# Patient Record
Sex: FEMALE | Race: BLACK OR AFRICAN AMERICAN | NOT HISPANIC OR LATINO | Employment: OTHER | URBAN - METROPOLITAN AREA
[De-identification: names, ages, dates, MRNs, and addresses within clinical notes are randomized per-mention and may not be internally consistent; named-entity substitution may affect disease eponyms.]

---

## 2017-01-19 ENCOUNTER — TELEPHONE (OUTPATIENT)
Dept: HEMATOLOGY/ONCOLOGY | Facility: CLINIC | Age: 52
End: 2017-01-19

## 2017-02-08 ENCOUNTER — LAB VISIT (OUTPATIENT)
Dept: LAB | Facility: OTHER | Age: 52
End: 2017-02-08
Attending: INTERNAL MEDICINE
Payer: COMMERCIAL

## 2017-02-08 DIAGNOSIS — D63.1 ANEMIA IN CHRONIC RENAL DISEASE: ICD-10-CM

## 2017-02-08 DIAGNOSIS — C90.00 MULTIPLE MYELOMA: ICD-10-CM

## 2017-02-08 DIAGNOSIS — N18.9 ANEMIA IN CHRONIC RENAL DISEASE: ICD-10-CM

## 2017-02-08 DIAGNOSIS — C90.01 MULTIPLE MYELOMA IN REMISSION: ICD-10-CM

## 2017-02-08 LAB
ALBUMIN SERPL BCP-MCNC: 3.8 G/DL
ALP SERPL-CCNC: 50 U/L
ALT SERPL W/O P-5'-P-CCNC: 12 U/L
ANION GAP SERPL CALC-SCNC: 13 MMOL/L
AST SERPL-CCNC: 13 U/L
B2 MICROGLOB SERPL-MCNC: 12.7 UG/ML
BASOPHILS # BLD AUTO: 0.03 K/UL
BASOPHILS NFR BLD: 0.6 %
BILIRUB SERPL-MCNC: 0.5 MG/DL
BUN SERPL-MCNC: 73 MG/DL
CALCIUM SERPL-MCNC: 9.5 MG/DL
CHLORIDE SERPL-SCNC: 106 MMOL/L
CO2 SERPL-SCNC: 17 MMOL/L
CREAT SERPL-MCNC: 5.6 MG/DL
DIFFERENTIAL METHOD: ABNORMAL
EOSINOPHIL # BLD AUTO: 0.1 K/UL
EOSINOPHIL NFR BLD: 1.7 %
ERYTHROCYTE [DISTWIDTH] IN BLOOD BY AUTOMATED COUNT: 16.6 %
EST. GFR  (AFRICAN AMERICAN): 9 ML/MIN/1.73 M^2
EST. GFR  (NON AFRICAN AMERICAN): 8 ML/MIN/1.73 M^2
FERRITIN SERPL-MCNC: 825 NG/ML
GLUCOSE SERPL-MCNC: 77 MG/DL
HCT VFR BLD AUTO: 24.2 %
HGB BLD-MCNC: 7.8 G/DL
IRON SERPL-MCNC: 47 UG/DL
LDH SERPL L TO P-CCNC: 133 U/L
LYMPHOCYTES # BLD AUTO: 0.8 K/UL
LYMPHOCYTES NFR BLD: 14.4 %
MCH RBC QN AUTO: 26.4 PG
MCHC RBC AUTO-ENTMCNC: 32.2 %
MCV RBC AUTO: 82 FL
MONOCYTES # BLD AUTO: 0.5 K/UL
MONOCYTES NFR BLD: 8.3 %
NEUTROPHILS # BLD AUTO: 4.1 K/UL
NEUTROPHILS NFR BLD: 74.8 %
PLATELET # BLD AUTO: 150 K/UL
PMV BLD AUTO: 9.1 FL
POTASSIUM SERPL-SCNC: 5.1 MMOL/L
PROT SERPL-MCNC: 7 G/DL
RBC # BLD AUTO: 2.95 M/UL
SATURATED IRON: 17 %
SODIUM SERPL-SCNC: 136 MMOL/L
TOTAL IRON BINDING CAPACITY: 272 UG/DL
TRANSFERRIN SERPL-MCNC: 184 MG/DL
WBC # BLD AUTO: 5.42 K/UL

## 2017-02-08 PROCEDURE — 36415 COLL VENOUS BLD VENIPUNCTURE: CPT

## 2017-02-08 PROCEDURE — 80053 COMPREHEN METABOLIC PANEL: CPT

## 2017-02-08 PROCEDURE — 84165 PROTEIN E-PHORESIS SERUM: CPT

## 2017-02-08 PROCEDURE — 85025 COMPLETE CBC W/AUTO DIFF WBC: CPT

## 2017-02-08 PROCEDURE — 82728 ASSAY OF FERRITIN: CPT

## 2017-02-08 PROCEDURE — 83520 IMMUNOASSAY QUANT NOS NONAB: CPT | Mod: 59

## 2017-02-08 PROCEDURE — 83615 LACTATE (LD) (LDH) ENZYME: CPT

## 2017-02-08 PROCEDURE — 82232 ASSAY OF BETA-2 PROTEIN: CPT

## 2017-02-08 PROCEDURE — 84165 PROTEIN E-PHORESIS SERUM: CPT | Mod: 26,,, | Performed by: PATHOLOGY

## 2017-02-08 PROCEDURE — 83540 ASSAY OF IRON: CPT

## 2017-02-09 LAB
ALBUMIN SERPL ELPH-MCNC: 4.05 G/DL
ALPHA1 GLOB SERPL ELPH-MCNC: 0.26 G/DL
ALPHA2 GLOB SERPL ELPH-MCNC: 0.76 G/DL
B-GLOBULIN SERPL ELPH-MCNC: 0.73 G/DL
GAMMA GLOB SERPL ELPH-MCNC: 0.8 G/DL
KAPPA LC SER QL IA: 12.07 MG/DL
KAPPA LC/LAMBDA SER IA: 1.36
LAMBDA LC SER QL IA: 8.85 MG/DL
PATHOLOGIST INTERPRETATION SPE: NORMAL
PROT SERPL-MCNC: 6.6 G/DL

## 2017-02-12 DIAGNOSIS — F32.A DEPRESSION, UNSPECIFIED DEPRESSION TYPE: ICD-10-CM

## 2017-02-12 RX ORDER — ESCITALOPRAM OXALATE 20 MG/1
TABLET ORAL
Qty: 90 TABLET | Refills: 0 | Status: SHIPPED | OUTPATIENT
Start: 2017-02-12 | End: 2018-02-19 | Stop reason: SDUPTHER

## 2017-05-05 ENCOUNTER — TELEPHONE (OUTPATIENT)
Dept: HEMATOLOGY/ONCOLOGY | Facility: CLINIC | Age: 52
End: 2017-05-05

## 2017-05-05 DIAGNOSIS — N08 MYELOMA KIDNEY: Primary | ICD-10-CM

## 2017-05-05 DIAGNOSIS — C90.00 MYELOMA: ICD-10-CM

## 2017-05-05 DIAGNOSIS — C90.00 MYELOMA KIDNEY: Primary | ICD-10-CM

## 2017-05-17 ENCOUNTER — LAB VISIT (OUTPATIENT)
Dept: LAB | Facility: OTHER | Age: 52
End: 2017-05-17
Attending: INTERNAL MEDICINE
Payer: COMMERCIAL

## 2017-05-17 DIAGNOSIS — C90.00 MYELOMA: ICD-10-CM

## 2017-05-17 LAB
ALBUMIN SERPL BCP-MCNC: 3.9 G/DL
ALP SERPL-CCNC: 79 U/L
ALT SERPL W/O P-5'-P-CCNC: 13 U/L
ANION GAP SERPL CALC-SCNC: 14 MMOL/L
AST SERPL-CCNC: 21 U/L
B2 MICROGLOB SERPL-MCNC: 12 UG/ML
BASOPHILS # BLD AUTO: 0.02 K/UL
BASOPHILS NFR BLD: 0.5 %
BILIRUB SERPL-MCNC: 0.3 MG/DL
BUN SERPL-MCNC: 76 MG/DL
CALCIUM SERPL-MCNC: 9.3 MG/DL
CHLORIDE SERPL-SCNC: 113 MMOL/L
CO2 SERPL-SCNC: 13 MMOL/L
CREAT SERPL-MCNC: 5.7 MG/DL
DIFFERENTIAL METHOD: ABNORMAL
EOSINOPHIL # BLD AUTO: 0.1 K/UL
EOSINOPHIL NFR BLD: 2.8 %
ERYTHROCYTE [DISTWIDTH] IN BLOOD BY AUTOMATED COUNT: 18 %
EST. GFR  (AFRICAN AMERICAN): 9 ML/MIN/1.73 M^2
EST. GFR  (NON AFRICAN AMERICAN): 8 ML/MIN/1.73 M^2
GLUCOSE SERPL-MCNC: 104 MG/DL
HCT VFR BLD AUTO: 22.1 %
HGB BLD-MCNC: 7.1 G/DL
LYMPHOCYTES # BLD AUTO: 0.8 K/UL
LYMPHOCYTES NFR BLD: 19.7 %
MCH RBC QN AUTO: 26 PG
MCHC RBC AUTO-ENTMCNC: 32.1 %
MCV RBC AUTO: 81 FL
MONOCYTES # BLD AUTO: 0.3 K/UL
MONOCYTES NFR BLD: 7.4 %
NEUTROPHILS # BLD AUTO: 2.7 K/UL
NEUTROPHILS NFR BLD: 69.3 %
PLATELET # BLD AUTO: 164 K/UL
PMV BLD AUTO: 8.3 FL
POTASSIUM SERPL-SCNC: 5.1 MMOL/L
PROT SERPL-MCNC: 7.1 G/DL
RBC # BLD AUTO: 2.73 M/UL
SODIUM SERPL-SCNC: 140 MMOL/L
WBC # BLD AUTO: 3.9 K/UL

## 2017-05-17 PROCEDURE — 85025 COMPLETE CBC W/AUTO DIFF WBC: CPT | Mod: TXP

## 2017-05-17 PROCEDURE — 84165 PROTEIN E-PHORESIS SERUM: CPT | Mod: NTX

## 2017-05-17 PROCEDURE — 82232 ASSAY OF BETA-2 PROTEIN: CPT | Mod: TXP

## 2017-05-17 PROCEDURE — 84165 PROTEIN E-PHORESIS SERUM: CPT | Mod: 26,NTX,, | Performed by: PATHOLOGY

## 2017-05-17 PROCEDURE — 36415 COLL VENOUS BLD VENIPUNCTURE: CPT | Mod: TXP

## 2017-05-17 PROCEDURE — 80053 COMPREHEN METABOLIC PANEL: CPT | Mod: TXP

## 2017-05-18 LAB
ALBUMIN SERPL ELPH-MCNC: 4.22 G/DL
ALPHA1 GLOB SERPL ELPH-MCNC: 0.27 G/DL
ALPHA2 GLOB SERPL ELPH-MCNC: 0.7 G/DL
B-GLOBULIN SERPL ELPH-MCNC: 0.69 G/DL
GAMMA GLOB SERPL ELPH-MCNC: 0.82 G/DL
PATHOLOGIST INTERPRETATION SPE: NORMAL
PROT SERPL-MCNC: 6.7 G/DL

## 2017-05-22 ENCOUNTER — TELEPHONE (OUTPATIENT)
Dept: HEMATOLOGY/ONCOLOGY | Facility: CLINIC | Age: 52
End: 2017-05-22

## 2017-05-22 NOTE — TELEPHONE ENCOUNTER
Pt called to reschedule appointment until Wednesday (5/24). Pt also wanted  to know that she is also undergoing dialysis as well. Will discuss on on next appt  -[facility]

## 2017-05-23 DIAGNOSIS — F32.A DEPRESSION, UNSPECIFIED DEPRESSION TYPE: ICD-10-CM

## 2017-05-24 RX ORDER — ESCITALOPRAM OXALATE 20 MG/1
TABLET ORAL
Qty: 90 TABLET | Refills: 0 | Status: SHIPPED | OUTPATIENT
Start: 2017-05-24 | End: 2017-06-20 | Stop reason: SDUPTHER

## 2017-05-24 NOTE — TELEPHONE ENCOUNTER
Pt called early this morning to cancel appt today, states she's not feeling well.  Wondering about her lab results though.  Discussed with  then attempted to call pt back, had to leave voicemail.

## 2017-05-24 NOTE — TELEPHONE ENCOUNTER
Pt returned call, reviewed lab results with her.  We will fax labs to her nephrologist, , & she will follow up with him regarding anemia.  States she had Procrit on 5/19 & 5/22. Appt r/s until June.

## 2017-06-08 DIAGNOSIS — Z76.82 ORGAN TRANSPLANT CANDIDATE: Primary | ICD-10-CM

## 2017-06-20 ENCOUNTER — OFFICE VISIT (OUTPATIENT)
Dept: HEMATOLOGY/ONCOLOGY | Facility: CLINIC | Age: 52
End: 2017-06-20
Payer: COMMERCIAL

## 2017-06-20 ENCOUNTER — LAB VISIT (OUTPATIENT)
Dept: LAB | Facility: HOSPITAL | Age: 52
End: 2017-06-20
Payer: COMMERCIAL

## 2017-06-20 VITALS
WEIGHT: 132.06 LBS | SYSTOLIC BLOOD PRESSURE: 162 MMHG | BODY MASS INDEX: 18.91 KG/M2 | TEMPERATURE: 98 F | DIASTOLIC BLOOD PRESSURE: 80 MMHG | HEIGHT: 70 IN | HEART RATE: 69 BPM | OXYGEN SATURATION: 99 %

## 2017-06-20 DIAGNOSIS — D63.1 ANEMIA IN CHRONIC KIDNEY DISEASE(285.21): ICD-10-CM

## 2017-06-20 DIAGNOSIS — C90.01 MULTIPLE MYELOMA IN REMISSION: Primary | ICD-10-CM

## 2017-06-20 DIAGNOSIS — N18.6 ESRD (END STAGE RENAL DISEASE): ICD-10-CM

## 2017-06-20 DIAGNOSIS — Z76.82 AWAITING ORGAN TRANSPLANT STATUS: ICD-10-CM

## 2017-06-20 DIAGNOSIS — N18.9 ANEMIA IN CHRONIC KIDNEY DISEASE(285.21): ICD-10-CM

## 2017-06-20 DIAGNOSIS — N18.6 ESRD (END STAGE RENAL DISEASE): Primary | ICD-10-CM

## 2017-06-20 PROCEDURE — 99214 OFFICE O/P EST MOD 30 MIN: CPT | Mod: S$GLB,,, | Performed by: INTERNAL MEDICINE

## 2017-06-20 PROCEDURE — 99999 PR PBB SHADOW E&M-EST. PATIENT-LVL III: CPT | Mod: PBBFAC,,, | Performed by: INTERNAL MEDICINE

## 2017-06-20 NOTE — PROGRESS NOTES
Subjective:       Patient ID: Jennifer Booth is a 52 y.o. female.    Chief Complaint: Follow-up    HPI Diagnosis: MM IPSS Stage III  in remission     LOST TO FOLLOW-UP   LAST VISIT 12/2016   52-year-old woman with MM in remission presents today for f/u.   She is also followed by Nephrology team at [facility].   Previously followed at [facility].   She has not had the resources to continue f/u at [facility].   No fevers, night sweats or wt loss .    She continues to smoke occasionally   .   Today, pt reports mild fatigue  No CP/lightheadedness  Appetite and weight stable    She is followed by Nephrology for ESRD  She was initially diagnosed with advanced kidney disease secondary to multiple myeloma & HTN.   She was diagnosed with  AK I from MM in 2010 that required initiation of dialysis.   She then underwent chemotherapy for her myeloma, and stopped dialysis in 2013.    Kidney biopsy earlier this yr due to worsening  kidney function reveals glomerulosclerosis and no evidence of MM    Pt undergoing Procrit therapy and IV Fe under direction of Nephrology  Pt missed Procrit therapy last 2mos    She is also followed by kidney transplant team at [facility] [place]  Patient met selection criteria for kidney transplant related to ESRD due to Hypertensive Nephrosclerosis    Cr level trending  Peaked 5.8mg/dl  She is undergoing HD 3x week since 5/19/17        PD planned in near future        CBC reveals trending Hb 7.8 g/dl+ to 7.1 g/dL  SPEP 5/17/2017 remains normal  UPEP-no monoclonal peaks    Patient met selection criteria for kidney transplant related to ESRD due to Hypertensive Nephrosclerosis    Onc hx:  She was diagnosed with kappa free light chain disease, 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 She originally presented with acute renal failure requiring HD .She has completed bortezomib/dexamethasone/Revlimid 6 cycles on 04/13/2012 for the multiple myeloma kappa light chain disease, IPSS stage III. She underwent  "bortezomib maintenance therapy three weeks on, one week off (05/15, 05/22 and 05/29) with her last cycle received on 05/29/2012. She is followed at [facility] myeloma [place] where she was evaluated for an auto stem cell transplant . It was decided not to proceed with transplant was originally due to drug reaction.Pt was diagnosed with DRESS syndrome during hospitalization and then [facility] team elected not to proceed proceed with auto SCT. Velcade maintenance was discontinued due to side effects.       Tx: Velcade/Dex/Revlimid x 6 cycles 4/13/12   Velcade maintenance 3wks on, 1wk off dc'd 5/29/12 sec to adv SE          Review of Systems   Constitutional: Negative for appetite change, chills and fatigue.   HENT: Negative for congestion, hearing loss and nosebleeds.    Eyes: Negative for visual disturbance.   Respiratory: Negative for apnea, cough, chest tightness, shortness of breath and wheezing.    Cardiovascular: Negative for chest pain, palpitations and leg swelling.   Gastrointestinal: Negative for abdominal distention, abdominal pain, blood in stool, constipation, diarrhea, nausea and vomiting.   Genitourinary: Negative for difficulty urinating, dysuria, flank pain, frequency, hematuria and urgency.   Musculoskeletal: Negative for arthralgias, back pain, gait problem, joint swelling and neck pain.   Skin: Negative for rash.        No petechiae, ecchymoses   Neurological: Negative for dizziness, tremors, seizures, syncope, speech difficulty, light-headedness, numbness and headaches.   Hematological: Negative for adenopathy. Does not bruise/bleed easily.       Objective:       Vitals:    06/20/17 1451   BP: (!) 162/80   BP Location: Right arm   Patient Position: Sitting   BP Method: Manual   Pulse: 69   Temp: 98.4 °F (36.9 °C)   TempSrc: Oral   SpO2: 99%   Weight: 59.9 kg (132 lb 0.9 oz)   Height: 5' 10" (1.778 m)       Physical Exam   Constitutional: She is oriented to person, place, and time. She appears " well-developed and well-nourished.   HENT:   Head: Normocephalic.   Mouth/Throat: Oropharynx is clear and moist. No oropharyngeal exudate.   Eyes: Conjunctivae and lids are normal. Pupils are equal, round, and reactive to light. No scleral icterus.   Neck: Normal range of motion. Neck supple. No thyromegaly present.   Cardiovascular: Normal rate, regular rhythm and normal heart sounds.    No murmur heard.  Pulmonary/Chest: Breath sounds normal. She has no wheezes. She has no rales.   Abdominal: Soft. Bowel sounds are normal. She exhibits no distension and no mass. There is no hepatosplenomegaly. There is no tenderness. There is no rebound and no guarding.   Musculoskeletal: Normal range of motion. She exhibits no edema or tenderness.   Lymphadenopathy:     She has no cervical adenopathy.     She has no axillary adenopathy.        Right: No supraclavicular adenopathy present.        Left: No supraclavicular adenopathy present.   Neurological: She is alert and oriented to person, place, and time. No cranial nerve deficit. Coordination normal.   Skin: Skin is warm and dry. No ecchymosis, no petechiae and no rash noted. No erythema.   Psychiatric: She has a normal mood and affect.             Bone survey 2015   1. Small lucent foci in the right femoral neck. Given this patient's history of multiple myeloma, these findings are concerning for sequela of that disease  2. Otherwise degenerative changes of the cervical spine and lower lumbar spine are identified.      Results for JOHN BEST (MRN [id number]) as of 6/22/2016 17:00   Ref. Range 4/15/2016 15:42 6/17/2016 08:49   [place] Free Light Chains Latest Ref Range: 0.33 - 1.94 mg/dL 6.79 (H) 8.42 (H)   Lambda Free Light Chains Latest Ref Range: 0.57 - 2.63 mg/dL 4.98 (H) 5.77 (H)   Kappa/Lambda FLC Ratio Latest Ref Range: 0.26 - 1.60  1.36 1.46          Lab Results   Component Value Date    WBC 3.90 05/17/2017    HGB 7.1 (L) 05/17/2017    HCT 22.1 (L) 05/17/2017    MCV  81 (L) 05/17/2017     05/17/2017                     SPEP 5/17/2017 - nl-  Patient met selection criteria for kidney transplant related to ESRD due to Hypertensive Nephrosclerosis    Results for JENNIFER BEST (MRN [id number]) as of 6/20/2017 15:11   Ref. Range 2/8/2017 15:44 5/17/2017 12:49   Beta-2 Microglobulin Latest Ref Range: 0.0 - 2.5 ug/mL 12.7 (H) 12.0 (H)       Lab Results   Component Value Date    WBC 3.90 05/17/2017    HGB 7.1 (L) 05/17/2017    HCT 22.1 (L) 05/17/2017    MCV 81 (L) 05/17/2017     05/17/2017       Results for JENNIFER BEST (MRN [id number]) as of 6/20/2017 15:11   Ref. Range 5/17/2017 12:49   Sodium Latest Ref Range: 136 - 145 mmol/L 140   Potassium Latest Ref Range: 3.5 - 5.1 mmol/L 5.1   Chloride Latest Ref Range: 95 - 110 mmol/L 113 (H)   CO2 Latest Ref Range: 23 - 29 mmol/L 13 (L)   Anion Gap Latest Ref Range: 8 - 16 mmol/L 14   BUN, Bld Latest Ref Range: 6 - 20 mg/dL 76 (H)   Creatinine Latest Ref Range: 0.5 - 1.4 mg/dL 5.7 (H)   eGFR if non African American Latest Ref Range: >60 mL/min/1.73 m^2 8 (A)   eGFR if African American Latest Ref Range: >60 mL/min/1.73 m^2 9 (A)   Glucose Latest Ref Range: 70 - 110 mg/dL 104   Calcium Latest Ref Range: 8.7 - 10.5 mg/dL 9.3   Alkaline Phosphatase Latest Ref Range: 55 - 135 U/L 79   Total Protein Latest Ref Range: 6.0 - 8.4 g/dL 7.1   Protein, Serum Latest Ref Range: 6.0 - 8.4 g/dL 6.7   Albumin Latest Ref Range: 3.5 - 5.2 g/dL 3.9   Total Bilirubin Latest Ref Range: 0.1 - 1.0 mg/dL 0.3   AST Latest Ref Range: 10 - 40 U/L 21   ALT Latest Ref Range: 10 - 44 U/L 13       Assessment:       1. Multiple myeloma in remission    2. ESRD (end stage renal disease)    3. Anemia in chronic kidney disease        Plan:   Jennifer was seen today for follow-up.    Diagnoses and associated orders for this visit:    Multiple myeloma in remission  bmbx UAMS 2013-neg  SPEP remains normal 5/17/2017  FLCR normal   Urine studies- no monoclonal peaks  Bone  survey 2016 no new findings  Kidney bx neg for myeloma involvement        ESRD  Followed by Nephrology  S/p Kidney biopsy earlier this yr due to worsening  kidney function reveals glomerulosclerosis and no evidence of MM  Followed by Renal Transplant team at [facility] - on cadaveric wait list     Anemia in chronic renal disease  Hb trending 7.1g/dl+ range  SHERMAN per Nephrology ( pt missed SHERMAN inj)  Intermittent IV iron treatments per Nephrology        F/u   2mo with cbc,cmp, SPEP, FLC, B-2 microglobulin       Cc: Micha Nunez Jr., MD

## 2017-07-13 ENCOUNTER — LAB VISIT (OUTPATIENT)
Dept: LAB | Facility: HOSPITAL | Age: 52
End: 2017-07-13
Payer: COMMERCIAL

## 2017-07-13 DIAGNOSIS — Z76.82 AWAITING ORGAN TRANSPLANT STATUS: ICD-10-CM

## 2017-07-31 ENCOUNTER — PATIENT MESSAGE (OUTPATIENT)
Dept: SURGERY | Facility: OTHER | Age: 52
End: 2017-07-31

## 2017-07-31 ENCOUNTER — HOSPITAL ENCOUNTER (OUTPATIENT)
Dept: PREADMISSION TESTING | Facility: OTHER | Age: 52
Discharge: HOME | End: 2017-07-31
Attending: SPECIALIST
Payer: COMMERCIAL

## 2017-07-31 ENCOUNTER — ANESTHESIA EVENT (OUTPATIENT)
Dept: SURGERY | Facility: OTHER | Age: 52
End: 2017-07-31
Payer: COMMERCIAL

## 2017-07-31 VITALS
DIASTOLIC BLOOD PRESSURE: 87 MMHG | BODY MASS INDEX: 20 KG/M2 | SYSTOLIC BLOOD PRESSURE: 121 MMHG | TEMPERATURE: 99 F | OXYGEN SATURATION: 99 % | WEIGHT: 132 LBS | HEIGHT: 68 IN | HEART RATE: 72 BPM

## 2017-07-31 DIAGNOSIS — Z76.82 ORGAN TRANSPLANT CANDIDATE: ICD-10-CM

## 2017-07-31 LAB
ANION GAP SERPL CALC-SCNC: 15 MMOL/L
BUN SERPL-MCNC: 31 MG/DL
CALCIUM SERPL-MCNC: 9 MG/DL
CHLORIDE SERPL-SCNC: 92 MMOL/L
CO2 SERPL-SCNC: 22 MMOL/L
CREAT SERPL-MCNC: 4.6 MG/DL
EST. GFR  (AFRICAN AMERICAN): 12 ML/MIN/1.73 M^2
EST. GFR  (NON AFRICAN AMERICAN): 10 ML/MIN/1.73 M^2
GLUCOSE SERPL-MCNC: 73 MG/DL
HCT VFR BLD AUTO: 35.9 %
HGB BLD-MCNC: 12.2 G/DL
HPRA INTERPRETATION: NORMAL
POTASSIUM SERPL-SCNC: 4.4 MMOL/L
SODIUM SERPL-SCNC: 129 MMOL/L

## 2017-07-31 PROCEDURE — 36415 COLL VENOUS BLD VENIPUNCTURE: CPT | Mod: TXP

## 2017-07-31 PROCEDURE — 85018 HEMOGLOBIN: CPT | Mod: TXP

## 2017-07-31 PROCEDURE — 86832 HLA CLASS I HIGH DEFIN QUAL: CPT | Mod: PO,TXP

## 2017-07-31 PROCEDURE — 85014 HEMATOCRIT: CPT | Mod: TXP

## 2017-07-31 PROCEDURE — 86833 HLA CLASS II HIGH DEFIN QUAL: CPT | Mod: PO,TXP

## 2017-07-31 PROCEDURE — 80048 BASIC METABOLIC PNL TOTAL CA: CPT | Mod: TXP

## 2017-07-31 RX ORDER — MIDAZOLAM HYDROCHLORIDE 5 MG/ML
4 INJECTION INTRAMUSCULAR; INTRAVENOUS
Status: CANCELLED | OUTPATIENT
Start: 2017-07-31

## 2017-07-31 RX ORDER — LIDOCAINE HYDROCHLORIDE 10 MG/ML
1 INJECTION, SOLUTION EPIDURAL; INFILTRATION; INTRACAUDAL; PERINEURAL ONCE
Status: CANCELLED | OUTPATIENT
Start: 2017-07-31 | End: 2017-07-31

## 2017-07-31 RX ORDER — OXYCODONE HYDROCHLORIDE 5 MG/1
5 TABLET ORAL ONCE AS NEEDED
Status: CANCELLED | OUTPATIENT
Start: 2017-07-31 | End: 2017-07-31

## 2017-07-31 RX ORDER — SODIUM CHLORIDE, SODIUM LACTATE, POTASSIUM CHLORIDE, CALCIUM CHLORIDE 600; 310; 30; 20 MG/100ML; MG/100ML; MG/100ML; MG/100ML
INJECTION, SOLUTION INTRAVENOUS CONTINUOUS
Status: CANCELLED | OUTPATIENT
Start: 2017-07-31

## 2017-07-31 RX ORDER — FAMOTIDINE 20 MG/1
20 TABLET, FILM COATED ORAL
Status: CANCELLED | OUTPATIENT
Start: 2017-07-31 | End: 2017-07-31

## 2017-07-31 NOTE — ANESTHESIA PREPROCEDURE EVALUATION
07/31/2017  Jennifer Booth is a 52 y.o., female.    Anesthesia Evaluation    I have reviewed the Patient Summary Reports.    I have reviewed the Nursing Notes.   I have reviewed the Medications.     Review of Systems  Anesthesia Hx:  No problems with previous Anesthesia  Denies Family Hx of Anesthesia complications.   Denies Personal Hx of Anesthesia complications.   Social:  Smoker, Social Alcohol Use 1/2 -1 ppd  occ marijuana  Wine with dinner   Hematology/Oncology:         -- Anemia: Hematology Comments: anemia Oncology Comments: Multiple myeloma dx'ed  Dec 2011, followed by oncologist  Chemo, last time over a year ago, considered to be in remission     EENT/Dental:EENT/Dental Normal   Cardiovascular:   Exercise tolerance: good Hypertension, well controlled htn not optimized    Pulmonary:   Recent URI, unresolved    Renal/:   Chronic Renal Disease, ESRD ESRD as result of light-chain reaction (result of multiple myeloma) dialyzed from Dec 2011- Aug 2012  Resumed hemodialysis 3 mos ago, last dialyzed Wednesday, August 2, 2017   Hepatic/GI:   GERD, well controlled    Endocrine:  Endocrine Normal    Psych:   Psychiatric History depression         Physical Exam  General:  Well nourished    Airway/Jaw/Neck:  Airway Findings: Mouth Opening: Normal Mallampati: I      Dental:  Dental Findings: molar caps, In tact              Anesthesia Plan  Type of Anesthesia, risks & benefits discussed:  Anesthesia Type:  general  Patient's Preference:   Intra-op Monitoring Plan:   Intra-op Monitoring Plan Comments:   Post Op Pain Control Plan:   Post Op Pain Control Plan Comments:   Induction:   IV  Beta Blocker:         Informed Consent: Patient understands risks and agrees with Anesthesia plan.  Questions answered. Anesthesia consent signed with patient.  ASA Score: 3     Day of Surgery Review of History & Physical:    H&P  update referred to the surgeon.     Anesthesia Plan Notes: H/H and BMP        Ready For Surgery From Anesthesia Perspective.     Patient to see primary care (Dr. Nunez) on Thursday for routine visit.  She will have her URI assessed (not getting better in the 2 weeks, having night sweats).  Pt to call on Thursday to report outcome of visit.  May postpone.

## 2017-07-31 NOTE — DISCHARGE INSTRUCTIONS
PRE-ADMIT TESTING -  [phone]    [address]  [facility]        OUTPATIENT SURGERY UNIT - [phone]    Your surgery has been scheduled at [facility]. We are pleased to have the opportunity to serve you. For Further Information please call [phone].    On the day of surgery please report to the Information Desk on the 1st floor.    · CONTACT YOUR PHYSICIAN'S OFFICE THE DAY PRIOR TO YOUR SURGERY TO OBTAIN YOUR ARRIVAL TIME.     · The evening before surgery do not eat anything after 9 p.m. ( this includes hard candy, chewing gum and mints).  You may only have GATORADE, POWERADE AND WATER  from 9 p.m. until you leave your home.   DO NOT DRINK ANY LIQUIDS ON THE WAY TO THE HOSPITAL.      SPECIAL MEDICATION INSTRUCTIONS: TAKE medications checked off by the Anesthesiologist on your Medication List.    Angiogram Patients: Take medications as instructed by your physician, including aspirin.     Surgery Patients:    If you take ASPIRIN - Your PHYSICIAN/SURGEON will need to inform you IF/OR when you need to stop taking aspirin prior to your surgery.     Do Not take any medications containing IBUPROFEN.  Do Not Wear any make-up or dark nail polish   (especially eye make-up) to surgery. If you come to surgery with makeup on you will be required to remove the makeup or nail polish.    Do not shave your surgical area at least 5 days prior to your surgery. The surgical prep will be performed at the hospital according to Infection Control regulations.    Leave all valuables at home.   Do Not wear any jewelry or watches, including any metal in body piercings.  Contact Lens must be removed before surgery. Either do not wear the contact lens or bring a case and solution for storage.  Please bring a container for eyeglasses or dentures as required.  Bring any paperwork your physician has provided, such as consent forms,  history and physicals, doctor's orders, etc.   Bring comfortable clothes  that are loose fitting to wear upon discharge. Take into consideration the type of surgery being performed.  Maintain your diet as advised per your physician the day prior to surgery.      Adequate rest the night before surgery is advised.   Park in the Parking lot behind the hospital or in the [facility] across the street from the parking lot. Parking is complimentary.  If you will be discharged the same day as your procedure, please arrange for a responsible adult to drive you home or to accompany you if traveling by taxi.   YOU WILL NOT BE PERMITTED TO DRIVE OR TO LEAVE THE HOSPITAL ALONE AFTER SURGERY.   It is strongly recommended that you arrange for someone to remain with you for the first 24 hrs following your surgery.       Thank you for your cooperation.  The Staff of [facility].        Bathing Instructions                                                                 Please shower the evening before and morning of your procedure with    ANTIBACTERIAL SOAP. ( DIAL, etc )  Concentrate on the surgical area   for at least 3 minutes and rinse completely. Dry off as usual.   Do not use any deodorant, powder, body lotions, perfume, after shave or    cologne.

## 2017-08-03 ENCOUNTER — ANESTHESIA (OUTPATIENT)
Dept: SURGERY | Facility: OTHER | Age: 52
End: 2017-08-03
Payer: COMMERCIAL

## 2017-08-03 ENCOUNTER — SURGERY (OUTPATIENT)
Age: 52
End: 2017-08-03

## 2017-08-03 ENCOUNTER — HOSPITAL ENCOUNTER (OUTPATIENT)
Facility: OTHER | Age: 52
Discharge: HOME | End: 2017-08-03
Attending: SPECIALIST | Admitting: SPECIALIST
Payer: COMMERCIAL

## 2017-08-03 ENCOUNTER — NURSE TRIAGE (OUTPATIENT)
Dept: ADMINISTRATIVE | Facility: CLINIC | Age: 52
End: 2017-08-03

## 2017-08-03 VITALS
HEART RATE: 60 BPM | WEIGHT: 132 LBS | HEIGHT: 68 IN | TEMPERATURE: 98 F | DIASTOLIC BLOOD PRESSURE: 96 MMHG | BODY MASS INDEX: 20 KG/M2 | RESPIRATION RATE: 16 BRPM | OXYGEN SATURATION: 100 % | SYSTOLIC BLOOD PRESSURE: 159 MMHG

## 2017-08-03 DIAGNOSIS — N18.6 ESRD (END STAGE RENAL DISEASE) ON DIALYSIS: ICD-10-CM

## 2017-08-03 DIAGNOSIS — Z99.2 ESRD (END STAGE RENAL DISEASE) ON DIALYSIS: ICD-10-CM

## 2017-08-03 LAB
B-HCG UR QL: NEGATIVE
CTP QC/QA: YES
POTASSIUM SERPL-SCNC: 3.7 MMOL/L

## 2017-08-03 PROCEDURE — 71000015 HC POSTOP RECOV 1ST HR: Mod: TXP | Performed by: SPECIALIST

## 2017-08-03 PROCEDURE — 25000003 PHARM REV CODE 250: Mod: TXP | Performed by: SPECIALIST

## 2017-08-03 PROCEDURE — 81025 URINE PREGNANCY TEST: CPT | Mod: TXP | Performed by: SPECIALIST

## 2017-08-03 PROCEDURE — 25000003 PHARM REV CODE 250: Mod: TXP | Performed by: NURSE ANESTHETIST, CERTIFIED REGISTERED

## 2017-08-03 PROCEDURE — 71000016 HC POSTOP RECOV ADDL HR: Mod: TXP | Performed by: SPECIALIST

## 2017-08-03 PROCEDURE — 37000009 HC ANESTHESIA EA ADD 15 MINS: Mod: NTX | Performed by: SPECIALIST

## 2017-08-03 PROCEDURE — 36000708 HC OR TIME LEV III 1ST 15 MIN: Mod: TXP | Performed by: SPECIALIST

## 2017-08-03 PROCEDURE — 63600175 PHARM REV CODE 636 W HCPCS: Mod: TXP | Performed by: ANESTHESIOLOGY

## 2017-08-03 PROCEDURE — C1750 CATH, HEMODIALYSIS,LONG-TERM: HCPCS | Mod: TXP | Performed by: SPECIALIST

## 2017-08-03 PROCEDURE — 25000003 PHARM REV CODE 250: Mod: TXP | Performed by: ANESTHESIOLOGY

## 2017-08-03 PROCEDURE — 36000709 HC OR TIME LEV III EA ADD 15 MIN: Mod: NTX | Performed by: SPECIALIST

## 2017-08-03 PROCEDURE — 84132 ASSAY OF SERUM POTASSIUM: CPT | Mod: TXP

## 2017-08-03 PROCEDURE — 27201423 OPTIME MED/SURG SUP & DEVICES STERILE SUPPLY: Mod: TXP | Performed by: SPECIALIST

## 2017-08-03 PROCEDURE — 37000008 HC ANESTHESIA 1ST 15 MINUTES: Mod: TXP | Performed by: SPECIALIST

## 2017-08-03 PROCEDURE — 71000039 HC RECOVERY, EACH ADD'L HOUR: Mod: TXP | Performed by: SPECIALIST

## 2017-08-03 PROCEDURE — 63600175 PHARM REV CODE 636 W HCPCS: Mod: TXP | Performed by: SPECIALIST

## 2017-08-03 PROCEDURE — 63600175 PHARM REV CODE 636 W HCPCS: Mod: TXP | Performed by: NURSE ANESTHETIST, CERTIFIED REGISTERED

## 2017-08-03 PROCEDURE — 71000033 HC RECOVERY, INTIAL HOUR: Mod: TXP | Performed by: SPECIALIST

## 2017-08-03 DEVICE — KIT PERITONEAL DIALYS 62.0CM: Type: IMPLANTABLE DEVICE | Site: ABDOMEN | Status: FUNCTIONAL

## 2017-08-03 RX ORDER — DIPHENHYDRAMINE HYDROCHLORIDE 50 MG/ML
25 INJECTION INTRAMUSCULAR; INTRAVENOUS EVERY 4 HOURS PRN
Status: DISCONTINUED | OUTPATIENT
Start: 2017-08-03 | End: 2017-08-03 | Stop reason: HOSPADM

## 2017-08-03 RX ORDER — RAMELTEON 8 MG/1
8 TABLET ORAL NIGHTLY PRN
Status: DISCONTINUED | OUTPATIENT
Start: 2017-08-03 | End: 2017-08-03 | Stop reason: HOSPADM

## 2017-08-03 RX ORDER — LIDOCAINE HCL/PF 100 MG/5ML
SYRINGE (ML) INTRAVENOUS
Status: DISCONTINUED | OUTPATIENT
Start: 2017-08-03 | End: 2017-08-03

## 2017-08-03 RX ORDER — ONDANSETRON 8 MG/1
8 TABLET, ORALLY DISINTEGRATING ORAL EVERY 8 HOURS PRN
Status: DISCONTINUED | OUTPATIENT
Start: 2017-08-03 | End: 2017-08-03 | Stop reason: HOSPADM

## 2017-08-03 RX ORDER — PROPOFOL 10 MG/ML
VIAL (ML) INTRAVENOUS
Status: DISCONTINUED | OUTPATIENT
Start: 2017-08-03 | End: 2017-08-03

## 2017-08-03 RX ORDER — FENTANYL CITRATE 50 UG/ML
25 INJECTION, SOLUTION INTRAMUSCULAR; INTRAVENOUS EVERY 5 MIN PRN
Status: DISCONTINUED | OUTPATIENT
Start: 2017-08-03 | End: 2017-08-03 | Stop reason: HOSPADM

## 2017-08-03 RX ORDER — OXYCODONE HYDROCHLORIDE 5 MG/1
5 TABLET ORAL ONCE AS NEEDED
Status: DISCONTINUED | OUTPATIENT
Start: 2017-08-03 | End: 2017-08-03 | Stop reason: HOSPADM

## 2017-08-03 RX ORDER — MEPERIDINE HYDROCHLORIDE 50 MG/ML
12.5 INJECTION INTRAMUSCULAR; INTRAVENOUS; SUBCUTANEOUS ONCE AS NEEDED
Status: DISCONTINUED | OUTPATIENT
Start: 2017-08-03 | End: 2017-08-03 | Stop reason: HOSPADM

## 2017-08-03 RX ORDER — SODIUM CHLORIDE 0.9 % (FLUSH) 0.9 %
3 SYRINGE (ML) INJECTION
Status: DISCONTINUED | OUTPATIENT
Start: 2017-08-03 | End: 2017-08-03 | Stop reason: HOSPADM

## 2017-08-03 RX ORDER — MIDAZOLAM HYDROCHLORIDE 5 MG/ML
4 INJECTION INTRAMUSCULAR; INTRAVENOUS
Status: DISCONTINUED | OUTPATIENT
Start: 2017-08-03 | End: 2017-08-03 | Stop reason: HOSPADM

## 2017-08-03 RX ORDER — HYDROMORPHONE HYDROCHLORIDE 2 MG/ML
0.4 INJECTION, SOLUTION INTRAMUSCULAR; INTRAVENOUS; SUBCUTANEOUS EVERY 5 MIN PRN
Status: DISCONTINUED | OUTPATIENT
Start: 2017-08-03 | End: 2017-08-03 | Stop reason: HOSPADM

## 2017-08-03 RX ORDER — NEOSTIGMINE METHYLSULFATE 1 MG/ML
INJECTION, SOLUTION INTRAVENOUS
Status: DISCONTINUED | OUTPATIENT
Start: 2017-08-03 | End: 2017-08-03

## 2017-08-03 RX ORDER — OXYCODONE HYDROCHLORIDE 5 MG/1
5 TABLET ORAL
Status: DISCONTINUED | OUTPATIENT
Start: 2017-08-03 | End: 2017-08-03 | Stop reason: HOSPADM

## 2017-08-03 RX ORDER — SODIUM CHLORIDE 0.9 % (FLUSH) 0.9 %
3 SYRINGE (ML) INJECTION EVERY 8 HOURS
Status: DISCONTINUED | OUTPATIENT
Start: 2017-08-03 | End: 2017-08-03 | Stop reason: HOSPADM

## 2017-08-03 RX ORDER — ACETAMINOPHEN 10 MG/ML
INJECTION, SOLUTION INTRAVENOUS
Status: DISCONTINUED | OUTPATIENT
Start: 2017-08-03 | End: 2017-08-03

## 2017-08-03 RX ORDER — FAMOTIDINE 20 MG/1
20 TABLET, FILM COATED ORAL
Status: COMPLETED | OUTPATIENT
Start: 2017-08-03 | End: 2017-08-03

## 2017-08-03 RX ORDER — GLYCOPYRROLATE 0.2 MG/ML
INJECTION INTRAMUSCULAR; INTRAVENOUS
Status: DISCONTINUED | OUTPATIENT
Start: 2017-08-03 | End: 2017-08-03

## 2017-08-03 RX ORDER — HEPARIN SODIUM 10000 [USP'U]/ML
INJECTION, SOLUTION INTRAVENOUS; SUBCUTANEOUS
Status: DISCONTINUED | OUTPATIENT
Start: 2017-08-03 | End: 2017-08-03 | Stop reason: HOSPADM

## 2017-08-03 RX ORDER — BUPIVACAINE HCL/EPINEPHRINE 0.25-.0005
VIAL (ML) INJECTION
Status: DISCONTINUED | OUTPATIENT
Start: 2017-08-03 | End: 2017-08-03 | Stop reason: HOSPADM

## 2017-08-03 RX ORDER — LIDOCAINE HYDROCHLORIDE 10 MG/ML
1 INJECTION, SOLUTION EPIDURAL; INFILTRATION; INTRACAUDAL; PERINEURAL ONCE
Status: DISCONTINUED | OUTPATIENT
Start: 2017-08-03 | End: 2017-08-03 | Stop reason: HOSPADM

## 2017-08-03 RX ORDER — FENTANYL CITRATE 50 UG/ML
INJECTION, SOLUTION INTRAMUSCULAR; INTRAVENOUS
Status: DISCONTINUED | OUTPATIENT
Start: 2017-08-03 | End: 2017-08-03

## 2017-08-03 RX ORDER — SODIUM CHLORIDE 9 MG/ML
INJECTION, SOLUTION INTRAVENOUS CONTINUOUS PRN
Status: DISCONTINUED | OUTPATIENT
Start: 2017-08-03 | End: 2017-08-03

## 2017-08-03 RX ORDER — OXYCODONE AND ACETAMINOPHEN 7.5; 325 MG/1; MG/1
1 TABLET ORAL EVERY 4 HOURS PRN
Qty: 40 TABLET | Refills: 0 | Status: SHIPPED | OUTPATIENT
Start: 2017-08-03 | End: 2017-11-09

## 2017-08-03 RX ORDER — SODIUM CHLORIDE 9 MG/ML
INJECTION, SOLUTION INTRAVENOUS CONTINUOUS
Status: DISCONTINUED | OUTPATIENT
Start: 2017-08-03 | End: 2017-08-03 | Stop reason: HOSPADM

## 2017-08-03 RX ORDER — CEFAZOLIN SODIUM 2 G/50ML
2 SOLUTION INTRAVENOUS
Status: DISCONTINUED | OUTPATIENT
Start: 2017-08-03 | End: 2017-08-03 | Stop reason: HOSPADM

## 2017-08-03 RX ORDER — ONDANSETRON 2 MG/ML
4 INJECTION INTRAMUSCULAR; INTRAVENOUS EVERY 12 HOURS PRN
Status: DISCONTINUED | OUTPATIENT
Start: 2017-08-03 | End: 2017-08-03 | Stop reason: HOSPADM

## 2017-08-03 RX ORDER — ONDANSETRON 2 MG/ML
INJECTION INTRAMUSCULAR; INTRAVENOUS
Status: DISCONTINUED | OUTPATIENT
Start: 2017-08-03 | End: 2017-08-03

## 2017-08-03 RX ORDER — ACETAMINOPHEN 325 MG/1
650 TABLET ORAL EVERY 8 HOURS PRN
Status: DISCONTINUED | OUTPATIENT
Start: 2017-08-03 | End: 2017-08-03 | Stop reason: HOSPADM

## 2017-08-03 RX ORDER — ROCURONIUM BROMIDE 10 MG/ML
INJECTION, SOLUTION INTRAVENOUS
Status: DISCONTINUED | OUTPATIENT
Start: 2017-08-03 | End: 2017-08-03

## 2017-08-03 RX ORDER — SODIUM CHLORIDE, SODIUM LACTATE, POTASSIUM CHLORIDE, CALCIUM CHLORIDE 600; 310; 30; 20 MG/100ML; MG/100ML; MG/100ML; MG/100ML
INJECTION, SOLUTION INTRAVENOUS CONTINUOUS
Status: DISCONTINUED | OUTPATIENT
Start: 2017-08-03 | End: 2017-08-03 | Stop reason: HOSPADM

## 2017-08-03 RX ORDER — DEXAMETHASONE SODIUM PHOSPHATE 4 MG/ML
INJECTION, SOLUTION INTRA-ARTICULAR; INTRALESIONAL; INTRAMUSCULAR; INTRAVENOUS; SOFT TISSUE
Status: DISCONTINUED | OUTPATIENT
Start: 2017-08-03 | End: 2017-08-03

## 2017-08-03 RX ADMIN — ROCURONIUM BROMIDE 40 MG: 10 INJECTION, SOLUTION INTRAVENOUS at 10:08

## 2017-08-03 RX ADMIN — CEFAZOLIN SODIUM 2 G: 2 SOLUTION INTRAVENOUS at 10:08

## 2017-08-03 RX ADMIN — PROPOFOL 200 MG: 10 INJECTION, EMULSION INTRAVENOUS at 10:08

## 2017-08-03 RX ADMIN — NEOSTIGMINE METHYLSULFATE 5 MG: 1 INJECTION INTRAVENOUS at 11:08

## 2017-08-03 RX ADMIN — ONDANSETRON 4 MG: 2 INJECTION INTRAMUSCULAR; INTRAVENOUS at 10:08

## 2017-08-03 RX ADMIN — FAMOTIDINE 20 MG: 20 TABLET, FILM COATED ORAL at 07:08

## 2017-08-03 RX ADMIN — FENTANYL CITRATE 100 MCG: 50 INJECTION, SOLUTION INTRAMUSCULAR; INTRAVENOUS at 10:08

## 2017-08-03 RX ADMIN — LIDOCAINE HYDROCHLORIDE 50 MG: 20 INJECTION, SOLUTION INTRAVENOUS at 10:08

## 2017-08-03 RX ADMIN — OXYCODONE HYDROCHLORIDE 5 MG: 5 TABLET ORAL at 11:08

## 2017-08-03 RX ADMIN — MIDAZOLAM HYDROCHLORIDE 4 MG: 5 INJECTION, SOLUTION INTRAMUSCULAR; INTRAVENOUS at 07:08

## 2017-08-03 RX ADMIN — SODIUM CHLORIDE: 0.9 INJECTION, SOLUTION INTRAVENOUS at 10:08

## 2017-08-03 RX ADMIN — HYDROMORPHONE HYDROCHLORIDE 0.4 MG: 2 INJECTION INTRAMUSCULAR; INTRAVENOUS; SUBCUTANEOUS at 11:08

## 2017-08-03 RX ADMIN — ACETAMINOPHEN 1000 MG: 10 INJECTION, SOLUTION INTRAVENOUS at 11:08

## 2017-08-03 RX ADMIN — GLYCOPYRROLATE 0.8 MG: 0.2 INJECTION, SOLUTION INTRAMUSCULAR; INTRAVENOUS at 11:08

## 2017-08-03 RX ADMIN — HEPARIN SODIUM 5000 UNITS: 10000 INJECTION, SOLUTION INTRAVENOUS; SUBCUTANEOUS at 11:08

## 2017-08-03 RX ADMIN — BUPIVACAINE HYDROCHLORIDE AND EPINEPHRINE BITARTRATE 50 ML: 2.5; .005 INJECTION, SOLUTION INFILTRATION; PERINEURAL at 10:08

## 2017-08-03 RX ADMIN — DEXAMETHASONE SODIUM PHOSPHATE 8 MG: 4 INJECTION, SOLUTION INTRAMUSCULAR; INTRAVENOUS at 10:08

## 2017-08-03 RX ADMIN — GLYCOPYRROLATE 0.4 MG: 0.2 INJECTION, SOLUTION INTRAMUSCULAR; INTRAVENOUS at 10:08

## 2017-08-03 NOTE — PLAN OF CARE
Patient prefers to have  Yusra daughter present for discharge teaching. Please contact them @ [phone].

## 2017-08-03 NOTE — ANESTHESIA POSTPROCEDURE EVALUATION
"Anesthesia Post Evaluation    Patient: Jennifer Booth    Procedure(s) Performed: Procedure(s) (LRB):  INSERTION-CATHETER-TENCHOFF-LAPAROSCOPIC (N/A)    Final Anesthesia Type: general  Patient location during evaluation: PACU  Patient participation: Yes- Able to Participate  Level of consciousness: awake and alert  Post-procedure vital signs: reviewed and stable  Pain management: adequate  Airway patency: patent  PONV status at discharge: No PONV  Anesthetic complications: no      Cardiovascular status: blood pressure returned to baseline and stable  Respiratory status: unassisted, spontaneous ventilation and room air  Hydration status: euvolemic  Follow-up not needed.        Visit Vitals  BP (!) 145/91 (BP Location: Right arm, Patient Position: Lying, BP Method: Automatic)   Pulse 62   Temp 36.6 °C (97.9 °F) (Oral)   Resp 16   Ht 5' 8" (1.727 m)   Wt 59.9 kg (132 lb)   SpO2 100%   Breastfeeding? No   BMI 20.07 kg/m²       Pain/Ivan Score: Pain Assessment Performed: Yes (8/3/2017  1:00 PM)  Presence of Pain: complains of pain/discomfort (8/3/2017  1:00 PM)  Pain Rating Prior to Med Admin: 1 (8/3/2017 12:45 PM)  Pain Rating Post Med Admin: 1 (8/3/2017 12:45 PM)  Ivan Score: 9 (8/3/2017 12:45 PM)  Modified Ivan Score: 19 (8/3/2017 12:45 PM)      "

## 2017-08-03 NOTE — DISCHARGE INSTRUCTIONS
Anesthesia: After Your Surgery  Youve just had surgery. During surgery, you received medication called anesthesia to keep you comfortable and pain-free. After surgery, you may experience some pain or nausea. This is common. Here are some tips for feeling better and recovering after surgery.    Going home  Your doctor or nurse will show you how to take care of yourself when you go home. He or she will also answer your questions. Have an adult family member or friend drive you home. For the first 24 hours after your surgery:  · Do not drive or use heavy equipment.  · Do not make important decisions or sign legal documents.  · Avoid alcohol.  · Have someone stay with you, if needed. He or she can watch for problems and help keep you safe.  Be sure to keep all follow-up appointments with your doctor. And rest after your procedure for as long as your doctor tells you to.    Coping with pain  If you have pain after surgery, pain medication will help you feel better. Take it as directed, before pain becomes severe. Also, ask your doctor or pharmacist about other ways to control pain, such as with heat, ice, and relaxation. And follow any other instructions your surgeon or nurse gives you.    Tips for taking pain medication  To get the best relief possible, remember these points:  · Pain medications can upset your stomach. Taking them with a little food may help.  · Most pain relievers taken by mouth need at least 20 to 30 minutes to take effect.  · Taking medication on a schedule can help you remember to take it. Try to time your medication so that you can take it before beginning an activity, such as dressing, walking, or sitting down for dinner.  · Constipation is a common side effect of pain medications. Contact your doctor before taking any medications like laxatives or stool softeners to help relieve constipation. Also ask about any dietary restrictions, because drinking lots of fluids and eating foods like fruits  and vegetables that are high in fiber can also help. Remember, dont take laxatives unless your surgeon has prescribed them.  · Mixing alcohol and pain medication can cause dizziness and slow your breathing. It can even be fatal. Dont drink alcohol while taking pain medication.  · Pain medication can slow your reflexes. Dont drive or operate machinery while taking pain medication.  If your health care provider tells you to take acetaminophen to help relieve your pain, ask him or her how much you are supposed to take each day. (Acetaminophen is the generic name for Tylenol and other brand-name pain relievers.) Acetaminophen or other pain relievers may interact with your prescription medicines or other over-the-counter (OTC) drugs. Some prescription medications contain acetaminophen along with other active ingredients. Using both prescription and OTC acetaminophen for pain can cause you to overdose. The FDA recommends that you read the labels on your OTC medications carefully. This will help you to clearly understand the list of active ingredients, dosing instructions, and any warnings. It may also help you avoid taking too much acetaminophen. If you have questions or don't understand the information, ask your pharmacist or health care provider to explain it to you before you take the OTC medication.    Managing nausea  Some people have an upset stomach after surgery. This is often due to anesthesia, pain, pain medications, or the stress of surgery. The following tips will help you manage nausea and get good nutrition as you recover. If you were on a special diet before surgery, ask your doctor if you should follow it during recovery. These tips may help:  · Dont push yourself to eat. Your body will tell you when to eat and how much.  · Start off with clear liquids and soup. They are easier to digest.  · Progress to semi-solid foods (mashed potatoes, applesauce, and gelatin) as you feel ready.  · Slowly move to  solid foods. Dont eat fatty, rich, or spicy foods at first.  · Dont force yourself to have three large meals a day. Instead, eat smaller amounts more often.  · Take pain medications with a small amount of solid food, such as crackers or toast to avoid nausea.      Call your surgeon if  · You still have pain an hour after taking medication (it may not be strong enough).  · You feel too sleepy, dizzy, or groggy (medication may be too strong).  · You have side effects like nausea, vomiting, or skin changes (rash, itching, or hives).   © 3470-2238 [employer]. [address] [place] [address], [place] [address]. All rights reserved. This information is not intended as a substitute for professional medical care. Always follow your healthcare professional's instructions.

## 2017-08-03 NOTE — OR NURSING
Dr Gonsales aware that patient is unable to void,and that bladder scan showed 35 cc's , order noted to discharge home without voiding.

## 2017-08-03 NOTE — OP NOTE
DATE OF PROCEDURE:  08/03/2017    PREOPERATIVE DIAGNOSIS:  End-stage renal disease.    POSTOPERATIVE DIAGNOSIS:  End-stage renal disease.    SURGEON:  Mukesh Gonsales Jr., M.D.    PROCEDURE:  Laparoscopic PD catheter placement.    PROCEDURE IN DETAIL:  The patient was brought to the Operating Room, placed in   supine position.  The abdomen prepped and draped in a sterile fashion.  A small   incision was made in the left upper quadrant and a Veress needle inserted and   pneumoperitoneum achieved.  A 5-mm Optiview trocar inserted on the left side of   the abdomen just above the level of the umbilicus.  An additional 5-mm trocar   inserted under direct observation.  This was placed in the right side of the   abdomen opposite of the previously placed trocar.  Peritoneal cavity inspected.    There were minimal adhesions in the pelvis.  A small incision was made in the   right lower quadrant.  A 20-gauge vascular needle inserted.  A guidewire   advanced through this and into the true pelvis.  A peel-away dilator placed over   the guidewire.  The guidewire removed.  A dual cuff curl cath Tenckhoff was   then inserted into the true pelvis.  The outer portion brought through a   subcutaneous tissue tunnel to bury the outer cuff approximately 1.5 cm proximal   to the exit site of the catheter.  The pneumoperitoneum released.  The abdominal   cavity infused with 2 liters of dialysate, which drained via gravity without   difficulty.  The pneumoperitoneum then reestablished.  Peritoneal cavity   inspected.  The pneumoperitoneum released.  The catheter was secured with 3-0   Vicryl.  The skin closed with running 4-0 Monocryl sutures.  The wound sterilely   dressed.  The patient tolerated the procedure well and left the Operating Room   in good condition.      FRANCOIS  dd: 08/03/2017 11:44:34 (CDT)  td: 08/03/2017 12:29:07 (CDT)  Doc ID   #[id number]  Job ID #[id number]    CC:

## 2017-08-03 NOTE — INTERVAL H&P NOTE
The patient has been examined and the H&P has been reviewed:    I concur with the findings and no changes have occurred since H&P was written.                Active Hospital Problems    Diagnosis  POA    ESRD (end stage renal disease) on dialysis [N18.6, Z99.2]  Not Applicable      Resolved Hospital Problems    Diagnosis Date Resolved POA   No resolved problems to display.

## 2017-08-03 NOTE — PLAN OF CARE
Jennifer Booth has met all discharge criteria from Phase II. Vital Signs are stable, ambulating  without difficulty. Discharge instructions given, patient verbalized understanding. Discharged from facility via wheelchair in stable condition.

## 2017-08-03 NOTE — H&P (VIEW-ONLY)
Patient ID: Jennifer Booth is a 52 y.o. female.    Chief Complaint: No chief complaint on file.      HPI:pt with ESRD for Tenckhoff catheter placement.S/P C section thru Pfannenstiel incision  HPI    Review of Systems   Constitutional: Negative.    HENT: Negative.    Eyes: Negative.  Negative for discharge.   Respiratory: Negative.    Cardiovascular: Negative.    Gastrointestinal: Negative.    Genitourinary: Negative.    Skin: Negative.    Psychiatric/Behavioral: Negative.        Current Outpatient Prescriptions   Medication Sig Dispense Refill    carvedilol (COREG) 12.5 MG tablet TK 1 T PO BID  3    cholecalciferol, vitamin D3, (VITAMIN D3) 400 unit Cap Take 2,000 Units by mouth once daily.       escitalopram oxalate (LEXAPRO) 20 MG tablet TAKE 1 TABLET BY MOUTH DAILY 90 tablet 0    NIFEDICAL XL 60 mg 24 hr tablet TAKE 1 TABLET BY MOUTH EVERY DAY 90 tablet 2    PROCRIT 10,000 unit/mL injection        No current facility-administered medications for this visit.        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/min from myeloma kidney & HTN      Depression     GERD (gastroesophageal reflux disease)     Hypertension     Mood swings     Multiple myeloma without mention of remission     Renal hypertension     Status post dialysis 2012       Past Surgical History:   Procedure Laterality Date    AV FISTULA PLACEMENT Left     BREAST SURGERY      cyst removal & drainage (left)     SECTION      RENAL BIOPSY  2016    VASCULAR SURGERY  2012    dialysis catheter to right subclavian       Family History   Problem Relation Age of Onset    Hypertension Mother     Heart failure Mother     Cancer Maternal Aunt     Diabetes Maternal Grandmother     Stroke Maternal Grandmother     Breast cancer Neg Hx     Ovarian cancer Neg  Hx     Colon cancer Neg Hx        Social History     Social History    Marital status: Single     Spouse name: N/A    Number of children: 1    Years of education: N/A     Occupational History    Appeals       Social History Main Topics    Smoking status: Current Every Day Smoker     Packs/day: 0.50     Years: 30.00     Types: Cigarettes    Smokeless tobacco: Not on file      Comment: 12/16/15: states 1/2 pack a day    Alcohol use 0.6 oz/week     1 Glasses of wine per week      Comment: Wine    Drug use: No    Sexual activity: Not Currently     Other Topics Concern    Not on file     Social History Narrative    Works FT; likes theatre. Lives alone.           Vitals:    07/26/17 1447   BP: (!) 149/89   Pulse: 85       Physical Exam   Constitutional: She is oriented to person, place, and time. She appears well-developed and well-nourished. No distress.   HENT:   Head: Normocephalic and atraumatic.   Eyes: EOM are normal. No scleral icterus.   Neck: Neck supple. No JVD present.   Cardiovascular: Normal rate, regular rhythm and normal heart sounds.    Pulmonary/Chest: Effort normal and breath sounds normal.   Abdominal: Soft. She exhibits no distension and no mass. There is tenderness. No hernia.   Musculoskeletal: Normal range of motion. She exhibits no edema, tenderness or deformity.   Neurological: She is alert and oriented to person, place, and time. She displays abnormal reflex. She exhibits normal muscle tone.   Skin: Skin is warm and dry. No rash noted. She is not diaphoretic.   Psychiatric: She has a normal mood and affect. Her behavior is normal. Thought content normal.       Assessment & Plan:   ESRD/PD catheter

## 2017-08-04 NOTE — TELEPHONE ENCOUNTER
Reason for Disposition   Caller has URGENT question and triager unable to answer question    Answer Assessment - Initial Assessment Questions  Pt had surgery today- had catheter placement for PD. She reported there was a clear plastic dressing over the catheter -she noted bleeding under it and panicked removing the dressing from the bottom so she could place gauze under it - now concerned about the bleeding and dressing and sterility.    Protocols used: ST POST-OP SYMPTOMS AND [id number]     advised Dr Gonsales has answering service, [phone]. Pt advised - instructed to call back for any further help.

## 2017-08-07 ENCOUNTER — LAB VISIT (OUTPATIENT)
Dept: LAB | Facility: HOSPITAL | Age: 52
End: 2017-08-07
Payer: COMMERCIAL

## 2017-08-07 DIAGNOSIS — Z76.82 AWAITING ORGAN TRANSPLANT STATUS: ICD-10-CM

## 2017-08-07 LAB
CLASS I ANTIBODIES - LUMINEX: NEGATIVE
CLASS II ANTIBODIES - LUMINEX: NEGATIVE
CPRA %: 0
SERUM COLLECTION DT - LUMINEX CLASS I: NORMAL
SERUM COLLECTION DT - LUMINEX CLASS II: NORMAL
SPCL1 TESTING DATE: NORMAL
SPCL2 TESTING DATE: NORMAL
SPCLU TESTING DATE: NORMAL

## 2017-08-07 PROCEDURE — 86829 HLA CLASS I/II ANTIBODY QUAL: CPT | Mod: PO,TXP

## 2017-08-07 PROCEDURE — 86829 HLA CLASS I/II ANTIBODY QUAL: CPT | Mod: 91,PO,TXP

## 2017-08-14 LAB — HPRA INTERPRETATION: NORMAL

## 2017-08-14 PROCEDURE — 86829 HLA CLASS I/II ANTIBODY QUAL: CPT | Mod: PO,TXP

## 2017-08-14 PROCEDURE — 86829 HLA CLASS I/II ANTIBODY QUAL: CPT | Mod: 91,PO,TXP

## 2017-08-16 PROCEDURE — 86833 HLA CLASS II HIGH DEFIN QUAL: CPT | Mod: PO,TXP

## 2017-08-16 PROCEDURE — 86832 HLA CLASS I HIGH DEFIN QUAL: CPT | Mod: PO,TXP

## 2017-08-20 DIAGNOSIS — F32.A DEPRESSION, UNSPECIFIED DEPRESSION TYPE: ICD-10-CM

## 2017-08-20 RX ORDER — ESCITALOPRAM OXALATE 20 MG/1
TABLET ORAL
Qty: 90 TABLET | Refills: 0 | Status: SHIPPED | OUTPATIENT
Start: 2017-08-20 | End: 2017-11-09 | Stop reason: SDUPTHER

## 2017-08-21 NOTE — BRIEF OP NOTE
[facility], operated by [facility]  Brief Operative Note     SUMMARY     Surgery Date: 8/3/2017     Surgeon(s) and Role:     * Mukesh Gonsales Jr., MD - Primary    Assisting Surgeon: None    Pre-op Diagnosis:  End-stage kidney disease [N18.6]  Encounter for extracorporeal dialysis [Z99.2]    Post-op Diagnosis:  Post-Op Diagnosis Codes:     * End-stage kidney disease [N18.6]     * Encounter for extracorporeal dialysis [Z99.2]    Procedure(s) (LRB):  INSERTION-CATHETER-TENCHOFF-LAPAROSCOPIC (N/A)    Anesthesia: General    Description of the findings of the procedure: minimal adhesions    Findings/Key Components: above  Estimated Blood Loss: min           Specimens:   Specimen (12h ago through future)    None          Discharge Note    SUMMARY     Admit Date: 8/3/2017    Discharge Date and Time:  08/21/2017 2:04 PM    Hospital Course (synopsis of major diagnoses, care, treatment, and services provided during the course of the hospital stay): b9     Final Diagnosis: Post-Op Diagnosis Codes:     * End-stage kidney disease [N18.6]     * Encounter for extracorporeal dialysis [Z99.2]    Disposition: Home or Self Care    Follow Up/Patient Instructions:     Medications:  Reconciled Home Medications:   Discharge Medication List as of 8/3/2017 12:56 PM      START taking these medications    Details   oxycodone-acetaminophen (PERCOCET) 7.5-325 mg per tablet Take 1 tablet by mouth every 4 (four) hours as needed for Pain., Starting Thu 8/3/2017, Print         CONTINUE these medications which have NOT CHANGED    Details   B COMPLEX W-C NO.20/FOLIC ACID (VIRT-CAPS ORAL) Take by mouth once daily., Historical Med      carvedilol (COREG) 12.5 MG tablet TK 1 T PO BID, Historical Med      cholecalciferol, vitamin D3, (VITAMIN D3) 400 unit Cap Take 2,000 Units by mouth once daily. , Until Discontinued, Historical Med      escitalopram oxalate (LEXAPRO) 20 MG tablet TAKE 1 TABLET BY MOUTH DAILY, Normal      NIFEDICAL XL 60 mg 24 hr tablet TAKE 1  TABLET BY MOUTH EVERY DAY, Normal      PROCRIT 10,000 unit/mL injection Starting 4/8/2016, Until Discontinued, Historical Med             Discharge Procedure Orders  Diet general     Activity as tolerated     Call MD for:  persistent nausea and vomiting     Call MD for:  severe uncontrolled pain     Call MD for:  redness, tenderness, or signs of infection (pain, swelling, redness, odor or green/yellow discharge around incision site)     Leave dressing on - Keep it clean, dry, and intact until clinic visit   Order Comments: Leave steri strips intact.       Follow-up Information     Follow up In 1 week.           Follow up In 1 week.

## 2017-08-23 LAB — HPRA INTERPRETATION: NORMAL

## 2017-09-05 ENCOUNTER — HOSPITAL ENCOUNTER (OUTPATIENT)
Dept: RADIOLOGY | Facility: HOSPITAL | Age: 52
Discharge: HOME | End: 2017-09-05
Attending: NURSE PRACTITIONER
Payer: COMMERCIAL

## 2017-09-05 DIAGNOSIS — Z76.82 ORGAN TRANSPLANT CANDIDATE: ICD-10-CM

## 2017-09-05 DIAGNOSIS — Z76.82 AWAITING ORGAN TRANSPLANT STATUS: Primary | ICD-10-CM

## 2017-09-05 PROCEDURE — 71020 XR CHEST PA AND LATERAL: CPT | Mod: TC,TXP

## 2017-09-05 PROCEDURE — 76770 US EXAM ABDO BACK WALL COMP: CPT | Mod: TC,TXP

## 2017-09-05 PROCEDURE — 71020 XR CHEST PA AND LATERAL: CPT | Mod: 26,TXP,, | Performed by: RADIOLOGY

## 2017-09-05 PROCEDURE — 76770 US EXAM ABDO BACK WALL COMP: CPT | Mod: 26,TXP,, | Performed by: RADIOLOGY

## 2017-09-07 ENCOUNTER — LAB VISIT (OUTPATIENT)
Dept: LAB | Facility: HOSPITAL | Age: 52
End: 2017-09-07
Payer: COMMERCIAL

## 2017-09-07 DIAGNOSIS — Z76.82 AWAITING ORGAN TRANSPLANT STATUS: Primary | ICD-10-CM

## 2017-09-07 DIAGNOSIS — Z76.82 AWAITING ORGAN TRANSPLANT STATUS: ICD-10-CM

## 2017-09-07 PROCEDURE — 99001 SPECIMEN HANDLING PT-LAB: CPT | Mod: PO,TXP

## 2017-09-21 LAB
HLA FREEZE AND HOLD INTERPRETATION: NORMAL
HPRA INTERPRETATION: NORMAL

## 2017-09-29 ENCOUNTER — LAB VISIT (OUTPATIENT)
Dept: LAB | Facility: OTHER | Age: 52
End: 2017-09-29
Attending: INTERNAL MEDICINE
Payer: COMMERCIAL

## 2017-09-29 DIAGNOSIS — N18.6 ESRD (END STAGE RENAL DISEASE): ICD-10-CM

## 2017-09-29 DIAGNOSIS — C90.01 MULTIPLE MYELOMA IN REMISSION: ICD-10-CM

## 2017-09-29 LAB
ALBUMIN SERPL BCP-MCNC: 2.8 G/DL
ALP SERPL-CCNC: 168 U/L
ALT SERPL W/O P-5'-P-CCNC: 11 U/L
ANION GAP SERPL CALC-SCNC: 10 MMOL/L
AST SERPL-CCNC: 17 U/L
B2 MICROGLOB SERPL-MCNC: 11.2 UG/ML
BASOPHILS # BLD AUTO: 0.02 K/UL
BASOPHILS NFR BLD: 0.5 %
BILIRUB SERPL-MCNC: 0.2 MG/DL
BUN SERPL-MCNC: 49 MG/DL
CALCIUM SERPL-MCNC: 8.5 MG/DL
CHLORIDE SERPL-SCNC: 106 MMOL/L
CO2 SERPL-SCNC: 20 MMOL/L
CREAT SERPL-MCNC: 5.3 MG/DL
DIFFERENTIAL METHOD: ABNORMAL
EOSINOPHIL # BLD AUTO: 0.1 K/UL
EOSINOPHIL NFR BLD: 1.9 %
ERYTHROCYTE [DISTWIDTH] IN BLOOD BY AUTOMATED COUNT: 14.6 %
EST. GFR  (AFRICAN AMERICAN): 10 ML/MIN/1.73 M^2
EST. GFR  (NON AFRICAN AMERICAN): 9 ML/MIN/1.73 M^2
GLUCOSE SERPL-MCNC: 89 MG/DL
HCT VFR BLD AUTO: 25 %
HGB BLD-MCNC: 8.6 G/DL
LYMPHOCYTES # BLD AUTO: 0.8 K/UL
LYMPHOCYTES NFR BLD: 22.3 %
MCH RBC QN AUTO: 29 PG
MCHC RBC AUTO-ENTMCNC: 34.4 G/DL
MCV RBC AUTO: 84 FL
MONOCYTES # BLD AUTO: 0.4 K/UL
MONOCYTES NFR BLD: 10.1 %
NEUTROPHILS # BLD AUTO: 2.5 K/UL
NEUTROPHILS NFR BLD: 65.2 %
PLATELET # BLD AUTO: 132 K/UL
PMV BLD AUTO: 9.1 FL
POTASSIUM SERPL-SCNC: 4.7 MMOL/L
PROT SERPL-MCNC: 5.6 G/DL
RBC # BLD AUTO: 2.97 M/UL
SODIUM SERPL-SCNC: 136 MMOL/L
WBC # BLD AUTO: 3.76 K/UL

## 2017-09-29 PROCEDURE — 83520 IMMUNOASSAY QUANT NOS NONAB: CPT | Mod: TXP

## 2017-09-29 PROCEDURE — 84165 PROTEIN E-PHORESIS SERUM: CPT | Mod: TXP

## 2017-09-29 PROCEDURE — 36415 COLL VENOUS BLD VENIPUNCTURE: CPT | Mod: TXP

## 2017-09-29 PROCEDURE — 85025 COMPLETE CBC W/AUTO DIFF WBC: CPT | Mod: TXP

## 2017-09-29 PROCEDURE — 80053 COMPREHEN METABOLIC PANEL: CPT | Mod: TXP

## 2017-09-29 PROCEDURE — 82232 ASSAY OF BETA-2 PROTEIN: CPT | Mod: TXP

## 2017-09-29 PROCEDURE — 84165 PROTEIN E-PHORESIS SERUM: CPT | Mod: 26,NTX,, | Performed by: PATHOLOGY

## 2017-09-30 ENCOUNTER — PATIENT MESSAGE (OUTPATIENT)
Dept: ADMINISTRATIVE | Facility: OTHER | Age: 52
End: 2017-09-30

## 2017-10-02 LAB
ALBUMIN SERPL ELPH-MCNC: 3.18 G/DL
ALPHA1 GLOB SERPL ELPH-MCNC: 0.23 G/DL
ALPHA2 GLOB SERPL ELPH-MCNC: 0.62 G/DL
B-GLOBULIN SERPL ELPH-MCNC: 0.56 G/DL
GAMMA GLOB SERPL ELPH-MCNC: 0.6 G/DL
KAPPA LC SER QL IA: 11.03 MG/DL
KAPPA LC/LAMBDA SER IA: 1.07
LAMBDA LC SER QL IA: 10.29 MG/DL
PATHOLOGIST INTERPRETATION SPE: NORMAL
PROT SERPL-MCNC: 5.2 G/DL

## 2017-10-03 ENCOUNTER — TELEPHONE (OUTPATIENT)
Dept: HEMATOLOGY/ONCOLOGY | Facility: CLINIC | Age: 52
End: 2017-10-03

## 2017-10-03 DIAGNOSIS — C90.01 MULTIPLE MYELOMA IN REMISSION: Primary | ICD-10-CM

## 2017-10-05 ENCOUNTER — LAB VISIT (OUTPATIENT)
Dept: LAB | Facility: HOSPITAL | Age: 52
End: 2017-10-05
Payer: COMMERCIAL

## 2017-10-05 DIAGNOSIS — Z76.82 AWAITING ORGAN TRANSPLANT STATUS: ICD-10-CM

## 2017-10-05 PROCEDURE — 86829 HLA CLASS I/II ANTIBODY QUAL: CPT | Mod: PO,TXP

## 2017-10-05 PROCEDURE — 86829 HLA CLASS I/II ANTIBODY QUAL: CPT | Mod: 91,PO,TXP

## 2017-11-01 ENCOUNTER — LAB VISIT (OUTPATIENT)
Dept: LAB | Facility: OTHER | Age: 52
End: 2017-11-01
Attending: FAMILY MEDICINE
Payer: COMMERCIAL

## 2017-11-01 DIAGNOSIS — C90.01 MULTIPLE MYELOMA IN REMISSION: ICD-10-CM

## 2017-11-01 LAB
ALBUMIN SERPL BCP-MCNC: 3 G/DL
ALP SERPL-CCNC: 83 U/L
ALT SERPL W/O P-5'-P-CCNC: 19 U/L
ANION GAP SERPL CALC-SCNC: 10 MMOL/L
AST SERPL-CCNC: 25 U/L
B2 MICROGLOB SERPL-MCNC: 12 UG/ML
BASOPHILS # BLD AUTO: 0.02 K/UL
BASOPHILS NFR BLD: 0.8 %
BILIRUB SERPL-MCNC: 0.5 MG/DL
BUN SERPL-MCNC: 35 MG/DL
CALCIUM SERPL-MCNC: 8.9 MG/DL
CHLORIDE SERPL-SCNC: 104 MMOL/L
CO2 SERPL-SCNC: 23 MMOL/L
CREAT SERPL-MCNC: 5.1 MG/DL
DIFFERENTIAL METHOD: ABNORMAL
EOSINOPHIL # BLD AUTO: 0.1 K/UL
EOSINOPHIL NFR BLD: 3.4 %
ERYTHROCYTE [DISTWIDTH] IN BLOOD BY AUTOMATED COUNT: 16.3 %
EST. GFR  (AFRICAN AMERICAN): 10 ML/MIN/1.73 M^2
EST. GFR  (NON AFRICAN AMERICAN): 9 ML/MIN/1.73 M^2
GLUCOSE SERPL-MCNC: 106 MG/DL
HCT VFR BLD AUTO: 23.3 %
HGB BLD-MCNC: 8 G/DL
LYMPHOCYTES # BLD AUTO: 0.7 K/UL
LYMPHOCYTES NFR BLD: 24.9 %
MCH RBC QN AUTO: 28.9 PG
MCHC RBC AUTO-ENTMCNC: 34.3 G/DL
MCV RBC AUTO: 84 FL
MONOCYTES # BLD AUTO: 0.2 K/UL
MONOCYTES NFR BLD: 6.5 %
NEUTROPHILS # BLD AUTO: 1.7 K/UL
NEUTROPHILS NFR BLD: 64 %
PLATELET # BLD AUTO: 154 K/UL
PMV BLD AUTO: 8.6 FL
POTASSIUM SERPL-SCNC: 4.3 MMOL/L
PROT SERPL-MCNC: 6.1 G/DL
RBC # BLD AUTO: 2.77 M/UL
SODIUM SERPL-SCNC: 137 MMOL/L
WBC # BLD AUTO: 2.61 K/UL

## 2017-11-01 PROCEDURE — 84165 PROTEIN E-PHORESIS SERUM: CPT | Mod: TXP

## 2017-11-01 PROCEDURE — 80053 COMPREHEN METABOLIC PANEL: CPT | Mod: TXP

## 2017-11-01 PROCEDURE — 85025 COMPLETE CBC W/AUTO DIFF WBC: CPT | Mod: TXP

## 2017-11-01 PROCEDURE — 83520 IMMUNOASSAY QUANT NOS NONAB: CPT | Mod: TXP

## 2017-11-01 PROCEDURE — 84165 PROTEIN E-PHORESIS SERUM: CPT | Mod: 26,NTX,, | Performed by: PATHOLOGY

## 2017-11-01 PROCEDURE — 36415 COLL VENOUS BLD VENIPUNCTURE: CPT | Mod: TXP

## 2017-11-01 PROCEDURE — 82232 ASSAY OF BETA-2 PROTEIN: CPT | Mod: TXP

## 2017-11-02 LAB
ALBUMIN SERPL ELPH-MCNC: 3.47 G/DL
ALPHA1 GLOB SERPL ELPH-MCNC: 0.28 G/DL
ALPHA2 GLOB SERPL ELPH-MCNC: 0.74 G/DL
B-GLOBULIN SERPL ELPH-MCNC: 0.71 G/DL
GAMMA GLOB SERPL ELPH-MCNC: 0.7 G/DL
KAPPA LC SER QL IA: 12.76 MG/DL
KAPPA LC/LAMBDA SER IA: 0.84
LAMBDA LC SER QL IA: 15.24 MG/DL
PATHOLOGIST INTERPRETATION SPE: NORMAL
PROT SERPL-MCNC: 5.9 G/DL

## 2017-11-06 ENCOUNTER — LAB VISIT (OUTPATIENT)
Dept: LAB | Facility: HOSPITAL | Age: 52
End: 2017-11-06
Payer: COMMERCIAL

## 2017-11-06 DIAGNOSIS — Z76.82 AWAITING ORGAN TRANSPLANT STATUS: ICD-10-CM

## 2017-11-06 LAB — HPRA INTERPRETATION: NORMAL

## 2017-11-06 PROCEDURE — 86829 HLA CLASS I/II ANTIBODY QUAL: CPT | Mod: PO,TXP

## 2017-11-06 PROCEDURE — 86829 HLA CLASS I/II ANTIBODY QUAL: CPT | Mod: 91,PO,TXP

## 2017-11-09 ENCOUNTER — OFFICE VISIT (OUTPATIENT)
Dept: HEMATOLOGY/ONCOLOGY | Facility: CLINIC | Age: 52
End: 2017-11-09
Payer: COMMERCIAL

## 2017-11-09 VITALS
DIASTOLIC BLOOD PRESSURE: 84 MMHG | HEIGHT: 67 IN | SYSTOLIC BLOOD PRESSURE: 128 MMHG | WEIGHT: 135.06 LBS | HEART RATE: 79 BPM | BODY MASS INDEX: 21.2 KG/M2 | TEMPERATURE: 99 F | OXYGEN SATURATION: 100 %

## 2017-11-09 DIAGNOSIS — N18.6 ESRD (END STAGE RENAL DISEASE): ICD-10-CM

## 2017-11-09 DIAGNOSIS — C90.01 MULTIPLE MYELOMA IN REMISSION: Primary | ICD-10-CM

## 2017-11-09 DIAGNOSIS — N18.9 ANEMIA IN CHRONIC KIDNEY DISEASE, UNSPECIFIED CKD STAGE: ICD-10-CM

## 2017-11-09 DIAGNOSIS — D63.1 ANEMIA IN CHRONIC KIDNEY DISEASE, UNSPECIFIED CKD STAGE: ICD-10-CM

## 2017-11-09 PROBLEM — N18.5 ANEMIA IN STAGE 5 CHRONIC KIDNEY DISEASE, NOT ON CHRONIC DIALYSIS: Status: ACTIVE | Noted: 2017-11-09

## 2017-11-09 PROCEDURE — 99214 OFFICE O/P EST MOD 30 MIN: CPT | Mod: S$GLB,,, | Performed by: INTERNAL MEDICINE

## 2017-11-09 PROCEDURE — 99999 PR PBB SHADOW E&M-EST. PATIENT-LVL III: CPT | Mod: PBBFAC,,, | Performed by: INTERNAL MEDICINE

## 2017-11-09 RX ORDER — NEPHROCAP 1 MG
CAP ORAL
Status: ON HOLD | COMMUNITY
Start: 2017-09-19 | End: 2017-11-13 | Stop reason: SDUPTHER

## 2017-11-09 RX ORDER — SEVELAMER CARBONATE 800 MG/1
TABLET, FILM COATED ORAL
Refills: 3 | COMMUNITY
Start: 2017-08-06 | End: 2019-07-03 | Stop reason: SDUPTHER

## 2017-11-09 NOTE — PROGRESS NOTES
Subjective:       Patient ID: Jennifer Booth is a 52 y.o. female.    Chief Complaint: Follow-up    HPI Diagnosis: MM IPSS Stage III  in remission       52-year-old woman with MM in remission presents today for f/u.   She is also followed by Nephrology team at [facility].   Previously followed at [facility].   She has not had the resources to continue f/u at [facility].       She is followed by Nephrology for ESRD  She was initially diagnosed with advanced kidney disease secondary to multiple myeloma & HTN.   She was diagnosed with  AK I from MM in 2010 that required initiation of dialysis.    She started chronic dialysis on 12/23/11 and ended on 8/28/12  She then underwent chemotherapy for her myeloma, and stopped dialysis in 2013.    Kidney biopsy earlier this yr due to worsening  kidney function reveals glomerulosclerosis and no evidence of MM    She is now undergoing peritoneal dialysis daily     She is followed by Kidney Transplant team  Patient met selection criteria for kidney transplant related to ESRD due to Hypertensive Nephrosclerosis  She is considered a candidate for kidney transplant       Today, pt reports mild fatigue  She is in good spirits  Appetite and weight stable  No SOB  No N/V  No CP    Pt recently restarted  Procrit therapy and IV Fe under direction of Nephrology    CBC reveals trending Hb 7.8 g/dl+ to 7.1 g/dL  SPEP 5/17/2017 remains normal  UPEP-no monoclonal peaks    Patient met selection criteria for kidney transplant related to ESRD due to Hypertensive Nephrosclerosis    Onc hx:  She was diagnosed with kappa free light chain disease, 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 She originally presented with acute renal failure requiring HD .She has completed bortezomib/dexamethasone/Revlimid 6 cycles on 04/13/2012 for the multiple myeloma kappa light chain disease, IPSS stage III. She underwent bortezomib maintenance therapy three weeks on, one week off (05/15, 05/22 and  "05/29) with her last cycle received on 05/29/2012. She is followed at [facility] myeloma [place] where she was evaluated for an auto stem cell transplant . It was decided not to proceed with transplant was originally due to drug reaction.Pt was diagnosed with DRESS syndrome during hospitalization and then [facility] team elected not to proceed proceed with auto SCT. Velcade maintenance was discontinued due to side effects.       Tx: Velcade/Dex/Revlimid x 6 cycles 4/13/12   Velcade maintenance 3wks on, 1wk off dc'd 5/29/12 sec to adv SE          Review of Systems   Constitutional: Negative for appetite change, chills and fatigue.   HENT: Negative for congestion, hearing loss and nosebleeds.    Eyes: Negative for visual disturbance.   Respiratory: Negative for apnea, cough, chest tightness, shortness of breath and wheezing.    Cardiovascular: Negative for chest pain, palpitations and leg swelling.   Gastrointestinal: Negative for abdominal distention, abdominal pain, blood in stool, constipation, diarrhea, nausea and vomiting.   Genitourinary: Negative for difficulty urinating, dysuria, flank pain, frequency, hematuria and urgency.   Musculoskeletal: Negative for arthralgias, back pain, gait problem, joint swelling and neck pain.   Skin: Negative for rash.        No petechiae, ecchymoses   Neurological: Negative for dizziness, tremors, seizures, syncope, speech difficulty, light-headedness, numbness and headaches.   Hematological: Negative for adenopathy. Does not bruise/bleed easily.       Objective:       Vitals:    11/09/17 1529 11/09/17 1532   BP: (!) 142/93 128/84   BP Location: Right arm Right arm   Patient Position: Sitting Sitting   BP Method: Medium (Automatic) Medium (Manual)   Pulse: 79    Temp: 98.8 °F (37.1 °C)    TempSrc: Oral    SpO2: 100%    Weight: 61.3 kg (135 lb 0.5 oz)    Height: 5' 7" (1.702 m)        Physical Exam   Constitutional: She is oriented to person, place, and time. She appears well-developed and " well-nourished.   HENT:   Head: Normocephalic.   Mouth/Throat: Oropharynx is clear and moist. No oropharyngeal exudate.   Eyes: Conjunctivae and lids are normal. Pupils are equal, round, and reactive to light. No scleral icterus.   Neck: Normal range of motion. Neck supple. No thyromegaly present.   Cardiovascular: Normal rate, regular rhythm and normal heart sounds.    No murmur heard.  Pulmonary/Chest: Breath sounds normal. She has no wheezes. She has no rales.   Abdominal: Soft. Bowel sounds are normal. She exhibits no distension and no mass. There is no hepatosplenomegaly. There is no tenderness. There is no rebound and no guarding.   Musculoskeletal: Normal range of motion. She exhibits no edema or tenderness.   Lymphadenopathy:     She has no cervical adenopathy.     She has no axillary adenopathy.        Right: No supraclavicular adenopathy present.        Left: No supraclavicular adenopathy present.   Neurological: She is alert and oriented to person, place, and time. No cranial nerve deficit. Coordination normal.   Skin: Skin is warm and dry. No ecchymosis, no petechiae and no rash noted. No erythema.   Psychiatric: She has a normal mood and affect.             Bone survey 2015   1. Small lucent foci in the right femoral neck. Given this patient's history of multiple myeloma, these findings are concerning for sequela of that disease  2. Otherwise degenerative changes of the cervical spine and lower lumbar spine are identified.      Results for BEST JOHN D (MRN [id number]) as of 6/22/2016 17:00   Ref. Range 4/15/2016 15:42 6/17/2016 08:49   [place] Free Light Chains Latest Ref Range: 0.33 - 1.94 mg/dL 6.79 (H) 8.42 (H)   Lambda Free Light Chains Latest Ref Range: 0.57 - 2.63 mg/dL 4.98 (H) 5.77 (H)   Kappa/Lambda FLC Ratio Latest Ref Range: 0.26 - 1.60  1.36 1.46          Lab Results   Component Value Date    WBC 3.98 11/10/2017    HGB 7.3 (L) 11/10/2017    HCT 22.2 (L) 11/10/2017    MCV 87 11/10/2017    PLT  162 11/10/2017                     SPEP 5/17/2017 - nl-  Patient met selection criteria for kidney transplant related to ESRD due to Hypertensive Nephrosclerosis    Results for JENNIFER BEST (MRN [id number]) as of 6/20/2017 15:11   Ref. Range 2/8/2017 15:44 5/17/2017 12:49   Beta-2 Microglobulin Latest Ref Range: 0.0 - 2.5 ug/mL 12.7 (H) 12.0 (H)       Lab Results   Component Value Date    WBC 3.98 11/10/2017    HGB 7.3 (L) 11/10/2017    HCT 22.2 (L) 11/10/2017    MCV 87 11/10/2017     11/10/2017           Results for JENNIFER BEST (MRN [id number]) as of 11/10/2017 13:10   Ref. Range 11/1/2017 08:03   [place] Free Light Chains Latest Ref Range: 0.33 - 1.94 mg/dL 12.76 (H)   Lambda Free Light Chains Latest Ref Range: 0.57 - 2.63 mg/dL 15.24 (H)   Kappa/Lambda FLC Ratio Latest Ref Range: 0.26 - 1.65  0.84     11/1/2017 SPEP- no paraprotein      Electronically reviewed and signed by:   Maya Westbrook MD   Signed on 11/02/17 at 13:41   Decreased total protein. No paraprotein bands seen.       Assessment:       1. Multiple myeloma in remission    2. ESRD (end stage renal disease)    3. Anemia in chronic kidney disease, unspecified CKD stage        Plan:   Jennifer was seen today for follow-up.    Diagnoses and associated orders for this visit:    Multiple myeloma in remission  bmbx [facility] 2013-neg  SPEP - NO paraprotein   FLCR normal   Urine studies- no monoclonal peaks  Bone survey 2016 no new findings  Kidney bx neg for myeloma involvement  Plan surveillance bone survey and urine studies        ESRD  Followed by Nephrology  S/p Kidney biopsy earlier this yr due to worsening  kidney function reveals glomerulosclerosis and no evidence of MM  Followed by Renal Transplant team at [facility] - on cadaveric wait list  Follow-up with Transplant team tomorrow  Pt undergoing PD daily started 6/2017      Anemia in chronic renal disease  Hb  8g/dl+ range  SHERMAN per Nephrology ( no SHERMAN x 4 mos)   Weekly Procrit restarted  ( not  taking 4 mos)   If Hb < 7g/dl , consider transfusion support      F/u   2mo with cbc,cmp, SPEP, FLC, B-2 microglobulin , bone survey and urine studies      Cc: MD Elliott Calvert Jr., MD

## 2017-11-10 ENCOUNTER — HOSPITAL ENCOUNTER (EMERGENCY)
Facility: OTHER | Age: 52
Discharge: HOME | End: 2017-11-10
Attending: EMERGENCY MEDICINE
Payer: COMMERCIAL

## 2017-11-10 VITALS
SYSTOLIC BLOOD PRESSURE: 148 MMHG | WEIGHT: 130 LBS | OXYGEN SATURATION: 100 % | BODY MASS INDEX: 20.4 KG/M2 | HEART RATE: 70 BPM | DIASTOLIC BLOOD PRESSURE: 85 MMHG | HEIGHT: 67 IN | RESPIRATION RATE: 16 BRPM | TEMPERATURE: 99 F

## 2017-11-10 DIAGNOSIS — T85.611A PERITONEAL DIALYSIS CATHETER DYSFUNCTION, INITIAL ENCOUNTER: Primary | ICD-10-CM

## 2017-11-10 LAB
ANION GAP SERPL CALC-SCNC: 10 MMOL/L
APPEARANCE FLD: CLEAR
BASOPHILS # BLD AUTO: 0.02 K/UL
BASOPHILS NFR BLD: 0.5 %
BODY FLD TYPE: NORMAL
BUN SERPL-MCNC: 36 MG/DL
CALCIUM SERPL-MCNC: 8.7 MG/DL
CHLORIDE SERPL-SCNC: 106 MMOL/L
CO2 SERPL-SCNC: 23 MMOL/L
COLOR FLD: COLORLESS
CREAT SERPL-MCNC: 4.7 MG/DL
DIFFERENTIAL METHOD: ABNORMAL
EOSINOPHIL # BLD AUTO: 0.1 K/UL
EOSINOPHIL NFR BLD: 2.3 %
EOSINOPHIL NFR FLD MANUAL: 17 %
ERYTHROCYTE [DISTWIDTH] IN BLOOD BY AUTOMATED COUNT: 16.9 %
EST. GFR  (AFRICAN AMERICAN): 11 ML/MIN/1.73 M^2
EST. GFR  (NON AFRICAN AMERICAN): 10 ML/MIN/1.73 M^2
GLUCOSE SERPL-MCNC: 108 MG/DL
GRAM STN SPEC: NORMAL
HCT VFR BLD AUTO: 22.2 %
HGB BLD-MCNC: 7.3 G/DL
LYMPHOCYTES # BLD AUTO: 0.9 K/UL
LYMPHOCYTES NFR BLD: 21.9 %
LYMPHOCYTES NFR FLD MANUAL: 33 %
MCH RBC QN AUTO: 28.6 PG
MCHC RBC AUTO-ENTMCNC: 32.9 G/DL
MCV RBC AUTO: 87 FL
MESOTHL CELL NFR FLD MANUAL: 17 %
MONOCYTES # BLD AUTO: 0.3 K/UL
MONOCYTES NFR BLD: 7.3 %
MONOS+MACROS NFR FLD MANUAL: 17 %
NEUTROPHILS # BLD AUTO: 2.7 K/UL
NEUTROPHILS NFR BLD: 67.7 %
NEUTROPHILS NFR FLD MANUAL: 16 %
PLATELET # BLD AUTO: 162 K/UL
PMV BLD AUTO: 9.2 FL
POTASSIUM SERPL-SCNC: 4.3 MMOL/L
RBC # BLD AUTO: 2.55 M/UL
SODIUM SERPL-SCNC: 139 MMOL/L
WBC # BLD AUTO: 3.98 K/UL
WBC # FLD: 7 /CU MM

## 2017-11-10 PROCEDURE — 96366 THER/PROPH/DIAG IV INF ADDON: CPT | Mod: NTX

## 2017-11-10 PROCEDURE — 87205 SMEAR GRAM STAIN: CPT | Mod: NTX

## 2017-11-10 PROCEDURE — 80048 BASIC METABOLIC PNL TOTAL CA: CPT | Mod: NTX

## 2017-11-10 PROCEDURE — 63600175 PHARM REV CODE 636 W HCPCS: Mod: NTX | Performed by: NURSE PRACTITIONER

## 2017-11-10 PROCEDURE — 25000003 PHARM REV CODE 250: Mod: NTX | Performed by: NURSE PRACTITIONER

## 2017-11-10 PROCEDURE — 96365 THER/PROPH/DIAG IV INF INIT: CPT | Mod: NTX

## 2017-11-10 PROCEDURE — 87070 CULTURE OTHR SPECIMN AEROBIC: CPT | Mod: NTX

## 2017-11-10 PROCEDURE — 89051 BODY FLUID CELL COUNT: CPT | Mod: NTX

## 2017-11-10 PROCEDURE — 87040 BLOOD CULTURE FOR BACTERIA: CPT | Mod: 59,NTX

## 2017-11-10 PROCEDURE — 96367 TX/PROPH/DG ADDL SEQ IV INF: CPT | Mod: NTX

## 2017-11-10 PROCEDURE — 85025 COMPLETE CBC W/AUTO DIFF WBC: CPT | Mod: NTX

## 2017-11-10 PROCEDURE — 99284 EMERGENCY DEPT VISIT MOD MDM: CPT | Mod: 25,NTX

## 2017-11-10 RX ORDER — GENTAMICIN SULFATE 100 MG/100ML
100 INJECTION, SOLUTION INTRAVENOUS ONCE
Status: COMPLETED | OUTPATIENT
Start: 2017-11-10 | End: 2017-11-10

## 2017-11-10 RX ADMIN — GENTAMICIN SULFATE 100 MG: 100 INJECTION, SOLUTION INTRAVENOUS at 08:11

## 2017-11-10 RX ADMIN — VANCOMYCIN HYDROCHLORIDE 1000 MG: 1 INJECTION, POWDER, LYOPHILIZED, FOR SOLUTION INTRAVENOUS at 09:11

## 2017-11-10 NOTE — ED NOTES
Rounding has been completed on the pt. Pt resting on stretcher with tv on. Pt reports pain remains 0 out of 10. Pt denies bathroom and positional needs. Pt AAOx4 and appropriate at this time. Respirations even and unlabored. No acute distress noted. Pt updated on POC. Bed is locked and in lowest position with side rails up x2. Call bell within reach and pt oriented to use of call bell. Pt on continuous cardiac monitoring, continuous pulse ox, and continuous BP cuff. Will continue to monitor.

## 2017-11-10 NOTE — ED PROVIDER NOTES
"Encounter Date: 11/10/2017    SCRIBE #1 NOTE: I, Christiano Martinez, am scribing for, and in the presence of, Dr. Coleman.       History     Chief Complaint   Patient presents with    Blood in PD fluid     " I was completing my PD last nigth when I notcied it was pink colored with blood in it. I called my PD nurse and she told me to come to the ER to ahve cultures taken and to start me on antibiotics". Pt currently asymptomatic. Denies fever, chills, N/V/D, abd pains or discomfort.      7:22 AM    Patient is a 52 y.o. female with a history of ESRD, HTN, and MM in remission who presents to the ED with complaint of suspected blood in her peritoneal dialysis fluid. After completing PD last night, her daughter noticed the fluid was "pink colored" with what appeared to be blood mixed in. She denies any cloudiness or sediment visualized. She called her PD nurse and was told to come to the ED for evaluation. She reports no associated symptoms, and denies fever, chills, nausea, vomiting, diarrhea, or abdominal pain. She has been on PD for five months and states this has never happened before. Her nephrologist is Dr. Diaz.       The history is provided by the patient.     Review of patient's allergies indicates:  No Known Allergies  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/min from myeloma kidney & HTN      Depression     Dialysis patient     dialysis m-w-f    GERD (gastroesophageal reflux disease)     Hypertension     Mood swings     Multiple myeloma without mention of remission     Renal hypertension     Status post dialysis 2012     Past Surgical History:   Procedure Laterality Date    AV FISTULA PLACEMENT Left     BREAST SURGERY      cyst removal & drainage (left)     SECTION      RENAL BIOPSY  2016    VASCULAR SURGERY  2012    dialysis catheter to right subclavian     Family " "History   Problem Relation Age of Onset    Hypertension Mother     Heart failure Mother     Cancer Maternal Aunt     Diabetes Maternal Grandmother     Stroke Maternal Grandmother     Breast cancer Neg Hx     Ovarian cancer Neg Hx     Colon cancer Neg Hx      Social History   Substance Use Topics    Smoking status: Current Every Day Smoker     Packs/day: 0.50     Years: 30.00     Types: Cigarettes    Smokeless tobacco: Never Used      Comment: 12/16/15: states 1/2 pack a day    Alcohol use 0.6 oz/week     1 Glasses of wine per week      Comment: Wine     Review of Systems   Constitutional: Negative for fever.   HENT: Negative for sore throat.    Respiratory: Negative for shortness of breath.    Cardiovascular: Negative for chest pain.   Gastrointestinal: Negative for abdominal pain, diarrhea, nausea and vomiting.        Positive for "pink-colored" peritoneal dialysis fluid.   Genitourinary: Negative for dysuria.   Musculoskeletal: Negative for back pain.   Skin: Negative for rash.   Neurological: Negative for weakness.   Hematological: Does not bruise/bleed easily.   Psychiatric/Behavioral: Negative for confusion.       Physical Exam     Initial Vitals [11/10/17 0714]   BP Pulse Resp Temp SpO2   (!) 142/85 87 16 97.9 °F (36.6 °C) 100 %      MAP       104         Physical Exam    Nursing note and vitals reviewed.  Constitutional: She appears well-developed and well-nourished. No distress.   HENT:   Head: Normocephalic and atraumatic.   Eyes: Conjunctivae and EOM are normal.   Neck: Neck supple.   Cardiovascular: Normal rate, regular rhythm, normal heart sounds and intact distal pulses. Exam reveals no gallop and no friction rub.    No murmur heard.  Pulmonary/Chest: Breath sounds normal. No respiratory distress. She has no wheezes. She has no rhonchi. She has no rales.   Abdominal: Soft. There is no tenderness.   In the RLQ there is a peritoneal dialysis catheter with no tenderness, discharge, or " surrounding erythema.   Musculoskeletal: Normal range of motion.   Neurological: She is alert and oriented to person, place, and time. She has normal strength.   Skin: Skin is warm and dry.   Psychiatric: She has a normal mood and affect. Her behavior is normal. Judgment and thought content normal.         ED Course   Procedures  Labs Reviewed   CULTURE, BLOOD   CULTURE, BLOOD   CULTURE, BODY FLUID - BACTEC   GRAM STAIN   BASIC METABOLIC PANEL   CBC W/ AUTO DIFFERENTIAL   WBC & DIFF,BODY FLUID             Medical Decision Making:   Clinical Tests:   Lab Tests: Ordered and Reviewed  Radiological Study: Ordered and Reviewed  ED Management:  Well-appearing patient presents complaining of blood in her peritoneal dialysis that she found this morning.  Denies any symptoms.  She does received sample which does appear bloody.  Catheters intact without signs of infection or dislodgment.  We have discussed with nephrology who comes evaluate the patient.  Recommend IV dose of vancomycin and gent.  Send the fluid and labwork, they do not think we need to keep the patient until results.  They will follow up with her in clinic later today.  Patient is comfortable with this plan.    I did have an extensive talk regarding signs to return for and need for follow up. Patient expressed understanding and will monitor symptoms closely and follow-up as needed.    RUSTY Coleman M.D.  11/10/2017  8:12 AM    7:24 AM - Paged Nephrology.  7:29 AM - Case discussed with Raymon Vidales, nephrology mid-level, who requests cultures and cell count, and will come to evaluate the patient.  7:35 AM - Raymon Vidales at bedside evaluating the patient.            Scribe Attestation:   Scribe #1: I performed the above scribed service and the documentation accurately describes the services I performed. I attest to the accuracy of the note.            ED Course      Clinical Impression:     1. Peritoneal dialysis catheter dysfunction, initial  encounter                                 Mulugeta Coleman MD  11/10/17 0804

## 2017-11-10 NOTE — ED NOTES
Patient identifiers verified and correct for Jennifer Booth.    LOC: The patient is awake, alert and aware of environment with an appropriate affect, the patient is oriented x 3 and speaking appropriately.  APPEARANCE: Patient resting comfortably and in no acute distress, patient is clean and well groomed, patient's clothing is properly fastened.  SKIN: The skin is warm and dry, color consistent with ethnicity, patient has normal skin turgor and moist mucus membranes, skin intact, no breakdown or bruising noted.  MUSCULOSKELETAL: Patient moving all extremities spontaneously, no obvious swelling or deformities noted.  RESPIRATORY: Airway is open and patent, respirations are spontaneous, patient has a normal effort and rate, no accessory muscle use noted, bilateral breath sounds clear and equal.  CARDIAC: Patient has a normal rate and regular rhythm, no periphreal edema noted, capillary refill < 3 seconds.  ABDOMEN: Soft and non tender to palpation, no distention noted. +RLQ PD access- pt noticed pink tinged fluid last night  NEUROLOGIC: Eyes open spontaneously, behavior appropriate to situation, follows commands, facial expression symmetrical, bilateral hand grasp equal and even, purposeful motor response noted, normal sensation in all extremities when touched with a finger.

## 2017-11-10 NOTE — ED NOTES
Rounding has been completed on the pt. Pt resting on stretcher with lights off and tv on. Pt reports pain remains 0 out of 0. Pt denies bathroom and positional needs. Pt AAOx4 and appropriate at this time. Respirations even and unlabored. No acute distress noted. Pt updated on POC. Bed is locked and in lowest position with side rails up x2. Call bell within reach and pt oriented to use of call bell. Pt on continuous cardiac monitoring, continuous pulse ox, and continuous BP cuff. Will continue to monitor.

## 2017-11-10 NOTE — ED NOTES
PT known to us from clinic with ESRD on CCP[facility].  Just seen in clinic 2 days ago w/o any complaints.  Saw Hem/Onc yesterday.  Had a restless night last night and was tossing and turning in sleep and got tangled in her PD line.      Upon awakening this am she noticed blood tinge PD fluid in her bag.  No fever/chills/abd pain.  Came to ED for evaluation.      Pt seen and examined.  Completely asymptomatic.  PD line accessed and fluid clearing.      Discussed with Dr. Diaz.  Will cover with Vanc/Gent IV, send cell count, culture.      Will follow up on cultures.  Pt has appt on Monday with PD clinic.  Will alert them of current issue and redraw cell count/culture there.  No need for oral antibx at this time as Vanc/Gent will cover her over next couple of days.        Thanks for alerting us of our pt.      See orders.  OK to DC home.

## 2017-11-10 NOTE — ED NOTES
Pt to ED c/o blood tinged peritoneal fluid while completing PD last night, Pt reports called PD nurse and was instructed to come to ED for BC and abx. Pt denies pain, fever, chills, NVD, Cp, SOB, cough, congestion. Pt AAOx4 and appropriate at this time. Respirations even and unlabored. No acute distress noted.Awaiting further orders. Pt updated on POC. Bed is locked and in lowest position with side rails up x2. Call bell within reach and pt oriented to use of call bell. Pt on continuous pulse ox, and continuous BP cuff. Will continue to monitor.

## 2017-11-13 ENCOUNTER — HOSPITAL ENCOUNTER (INPATIENT)
Facility: OTHER | Age: 52
LOS: 1 days | Discharge: HOME | DRG: 606 | End: 2017-11-14
Attending: EMERGENCY MEDICINE | Admitting: EMERGENCY MEDICINE
Payer: COMMERCIAL

## 2017-11-13 DIAGNOSIS — D64.9 SYMPTOMATIC ANEMIA: ICD-10-CM

## 2017-11-13 DIAGNOSIS — E87.1 HYPONATREMIA: ICD-10-CM

## 2017-11-13 DIAGNOSIS — N18.6 ESRD ON PERITONEAL DIALYSIS: ICD-10-CM

## 2017-11-13 DIAGNOSIS — D69.6 THROMBOCYTOPENIA: ICD-10-CM

## 2017-11-13 DIAGNOSIS — R06.02 SOB (SHORTNESS OF BREATH): ICD-10-CM

## 2017-11-13 DIAGNOSIS — R06.02 SHORTNESS OF BREATH: ICD-10-CM

## 2017-11-13 DIAGNOSIS — Z99.2 ESRD ON PERITONEAL DIALYSIS: ICD-10-CM

## 2017-11-13 DIAGNOSIS — T78.40XA ALLERGIC REACTION TO DRUG, INITIAL ENCOUNTER: Primary | ICD-10-CM

## 2017-11-13 LAB
ABO + RH BLD: NORMAL
ALBUMIN SERPL BCP-MCNC: 2.3 G/DL
ALP SERPL-CCNC: 43 U/L
ALT SERPL W/O P-5'-P-CCNC: 25 U/L
ANION GAP SERPL CALC-SCNC: 9 MMOL/L
ANISOCYTOSIS BLD QL SMEAR: SLIGHT
APPEARANCE FLD: CLEAR
AST SERPL-CCNC: 32 U/L
BASOPHILS # BLD AUTO: 0.01 K/UL
BASOPHILS NFR BLD: 0.1 %
BASOPHILS NFR FLD MANUAL: 1 %
BILIRUB SERPL-MCNC: 0.4 MG/DL
BLD GP AB SCN CELLS X3 SERPL QL: NORMAL
BLD PROD TYP BPU: NORMAL
BLD PROD TYP BPU: NORMAL
BLOOD UNIT EXPIRATION DATE: NORMAL
BLOOD UNIT EXPIRATION DATE: NORMAL
BLOOD UNIT TYPE CODE: 9500
BLOOD UNIT TYPE CODE: 9500
BLOOD UNIT TYPE: NORMAL
BLOOD UNIT TYPE: NORMAL
BODY FLD TYPE: NORMAL
BUN SERPL-MCNC: 33 MG/DL
CALCIUM SERPL-MCNC: 8.5 MG/DL
CHLORIDE SERPL-SCNC: 97 MMOL/L
CO2 SERPL-SCNC: 22 MMOL/L
CODING SYSTEM: NORMAL
CODING SYSTEM: NORMAL
COLOR FLD: COLORLESS
CREAT SERPL-MCNC: 5.7 MG/DL
DIFFERENTIAL METHOD: ABNORMAL
DISPENSE STATUS: NORMAL
DISPENSE STATUS: NORMAL
DOHLE BOD BLD QL SMEAR: PRESENT
EOSINOPHIL # BLD AUTO: 0.2 K/UL
EOSINOPHIL NFR BLD: 2.3 %
EOSINOPHIL NFR FLD MANUAL: 7 %
ERYTHROCYTE [DISTWIDTH] IN BLOOD BY AUTOMATED COUNT: 15.8 %
EST. GFR  (AFRICAN AMERICAN): 9 ML/MIN/1.73 M^2
EST. GFR  (NON AFRICAN AMERICAN): 8 ML/MIN/1.73 M^2
GLUCOSE SERPL-MCNC: 120 MG/DL
GRAM STN SPEC: NORMAL
HCT VFR BLD AUTO: 20.4 %
HGB BLD-MCNC: 6.7 G/DL
HYPOCHROMIA BLD QL SMEAR: ABNORMAL
INR PPP: 0.9
INR PPP: 0.9
LACTATE SERPL-SCNC: 0.8 MMOL/L
LYMPHOCYTES # BLD AUTO: 0.2 K/UL
LYMPHOCYTES NFR BLD: 2.8 %
LYMPHOCYTES NFR FLD MANUAL: 8 %
MCH RBC QN AUTO: 28 PG
MCHC RBC AUTO-ENTMCNC: 32.8 G/DL
MCV RBC AUTO: 85 FL
MESOTHL CELL NFR FLD MANUAL: 9 %
MONOCYTES # BLD AUTO: 0.2 K/UL
MONOCYTES NFR BLD: 3.3 %
MONOS+MACROS NFR FLD MANUAL: 65 %
NEUTROPHILS # BLD AUTO: 6.6 K/UL
NEUTROPHILS NFR BLD: 91.5 %
NEUTROPHILS NFR FLD MANUAL: 10 %
NUM UNITS TRANS PACKED RBC: NORMAL
NUM UNITS TRANS PACKED RBC: NORMAL
OVALOCYTES BLD QL SMEAR: ABNORMAL
PLATELET # BLD AUTO: 86 K/UL
PLATELET BLD QL SMEAR: ABNORMAL
PMV BLD AUTO: 9.4 FL
POIKILOCYTOSIS BLD QL SMEAR: SLIGHT
POLYCHROMASIA BLD QL SMEAR: ABNORMAL
POTASSIUM SERPL-SCNC: 4 MMOL/L
PROT SERPL-MCNC: 5.2 G/DL
PROTHROMBIN TIME: 10.3 SEC
PROTHROMBIN TIME: 10.3 SEC
RBC # BLD AUTO: 2.39 M/UL
SCHISTOCYTES BLD QL SMEAR: ABNORMAL
SCHISTOCYTES BLD QL SMEAR: PRESENT
SODIUM SERPL-SCNC: 128 MMOL/L
TOXIC GRANULES BLD QL SMEAR: PRESENT
WBC # BLD AUTO: 7.25 K/UL
WBC # FLD: 21 /CU MM

## 2017-11-13 PROCEDURE — 85025 COMPLETE CBC W/AUTO DIFF WBC: CPT | Mod: NTX

## 2017-11-13 PROCEDURE — 89051 BODY FLUID CELL COUNT: CPT | Mod: NTX

## 2017-11-13 PROCEDURE — P9040 RBC LEUKOREDUCED IRRADIATED: HCPCS | Mod: NTX

## 2017-11-13 PROCEDURE — 96374 THER/PROPH/DIAG INJ IV PUSH: CPT | Mod: NTX

## 2017-11-13 PROCEDURE — 93010 ELECTROCARDIOGRAM REPORT: CPT | Mod: NTX,,, | Performed by: INTERNAL MEDICINE

## 2017-11-13 PROCEDURE — 63600175 PHARM REV CODE 636 W HCPCS: Mod: NTX | Performed by: EMERGENCY MEDICINE

## 2017-11-13 PROCEDURE — 93005 ELECTROCARDIOGRAM TRACING: CPT | Mod: NTX

## 2017-11-13 PROCEDURE — 86900 BLOOD TYPING SEROLOGIC ABO: CPT | Mod: NTX

## 2017-11-13 PROCEDURE — 85610 PROTHROMBIN TIME: CPT | Mod: NTX

## 2017-11-13 PROCEDURE — 96375 TX/PRO/DX INJ NEW DRUG ADDON: CPT | Mod: NTX

## 2017-11-13 PROCEDURE — 36430 TRANSFUSION BLD/BLD COMPNT: CPT

## 2017-11-13 PROCEDURE — S0028 INJECTION, FAMOTIDINE, 20 MG: HCPCS | Mod: NTX | Performed by: EMERGENCY MEDICINE

## 2017-11-13 PROCEDURE — 25000003 PHARM REV CODE 250: Mod: NTX | Performed by: NURSE PRACTITIONER

## 2017-11-13 PROCEDURE — 87070 CULTURE OTHR SPECIMN AEROBIC: CPT | Mod: NTX

## 2017-11-13 PROCEDURE — 80053 COMPREHEN METABOLIC PANEL: CPT | Mod: NTX

## 2017-11-13 PROCEDURE — 86920 COMPATIBILITY TEST SPIN: CPT | Mod: NTX

## 2017-11-13 PROCEDURE — 20000000 HC ICU ROOM: Mod: NTX

## 2017-11-13 PROCEDURE — 87040 BLOOD CULTURE FOR BACTERIA: CPT | Mod: NTX

## 2017-11-13 PROCEDURE — 87205 SMEAR GRAM STAIN: CPT | Mod: NTX

## 2017-11-13 PROCEDURE — 25000003 PHARM REV CODE 250: Mod: NTX | Performed by: EMERGENCY MEDICINE

## 2017-11-13 PROCEDURE — 83605 ASSAY OF LACTIC ACID: CPT | Mod: NTX

## 2017-11-13 PROCEDURE — 63600175 PHARM REV CODE 636 W HCPCS: Mod: NTX | Performed by: INTERNAL MEDICINE

## 2017-11-13 PROCEDURE — 99223 1ST HOSP IP/OBS HIGH 75: CPT | Mod: NTX,,, | Performed by: INTERNAL MEDICINE

## 2017-11-13 PROCEDURE — 25000003 PHARM REV CODE 250: Mod: NTX | Performed by: INTERNAL MEDICINE

## 2017-11-13 PROCEDURE — 99285 EMERGENCY DEPT VISIT HI MDM: CPT | Mod: 25,NTX

## 2017-11-13 PROCEDURE — 86850 RBC ANTIBODY SCREEN: CPT | Mod: NTX

## 2017-11-13 RX ORDER — DIPHENHYDRAMINE HYDROCHLORIDE 50 MG/ML
25 INJECTION INTRAMUSCULAR; INTRAVENOUS
Status: COMPLETED | OUTPATIENT
Start: 2017-11-13 | End: 2017-11-13

## 2017-11-13 RX ORDER — ACETAMINOPHEN 500 MG
1000 TABLET ORAL
Status: COMPLETED | OUTPATIENT
Start: 2017-11-13 | End: 2017-11-13

## 2017-11-13 RX ORDER — HYDROCODONE BITARTRATE AND ACETAMINOPHEN 500; 5 MG/1; MG/1
TABLET ORAL
Status: DISCONTINUED | OUTPATIENT
Start: 2017-11-13 | End: 2017-11-14 | Stop reason: HOSPADM

## 2017-11-13 RX ORDER — ESCITALOPRAM OXALATE 10 MG/1
20 TABLET ORAL DAILY
Status: DISCONTINUED | OUTPATIENT
Start: 2017-11-14 | End: 2017-11-14 | Stop reason: HOSPADM

## 2017-11-13 RX ORDER — FAMOTIDINE 10 MG/ML
20 INJECTION INTRAVENOUS EVERY 24 HOURS
Status: DISCONTINUED | OUTPATIENT
Start: 2017-11-14 | End: 2017-11-14 | Stop reason: HOSPADM

## 2017-11-13 RX ORDER — ZOLPIDEM TARTRATE 5 MG/1
5 TABLET ORAL NIGHTLY PRN
Status: DISCONTINUED | OUTPATIENT
Start: 2017-11-13 | End: 2017-11-14 | Stop reason: HOSPADM

## 2017-11-13 RX ORDER — SEVELAMER CARBONATE 800 MG/1
1600 TABLET, FILM COATED ORAL
Status: DISCONTINUED | OUTPATIENT
Start: 2017-11-13 | End: 2017-11-14 | Stop reason: HOSPADM

## 2017-11-13 RX ORDER — CETIRIZINE HYDROCHLORIDE 5 MG/1
5 TABLET ORAL DAILY
Status: DISCONTINUED | OUTPATIENT
Start: 2017-11-13 | End: 2017-11-14 | Stop reason: HOSPADM

## 2017-11-13 RX ORDER — FAMOTIDINE 10 MG/ML
20 INJECTION INTRAVENOUS
Status: COMPLETED | OUTPATIENT
Start: 2017-11-13 | End: 2017-11-13

## 2017-11-13 RX ORDER — METHYLPREDNISOLONE SOD SUCC 125 MG
125 VIAL (EA) INJECTION
Status: COMPLETED | OUTPATIENT
Start: 2017-11-13 | End: 2017-11-13

## 2017-11-13 RX ORDER — CALCIUM CARB/VITAMIN D3/VIT K1 500-500-40
2000 TABLET,CHEWABLE ORAL DAILY
Status: DISCONTINUED | OUTPATIENT
Start: 2017-11-14 | End: 2017-11-14 | Stop reason: HOSPADM

## 2017-11-13 RX ORDER — METHYLPREDNISOLONE SOD SUCC 125 MG
125 VIAL (EA) INJECTION EVERY 6 HOURS
Status: DISCONTINUED | OUTPATIENT
Start: 2017-11-13 | End: 2017-11-14

## 2017-11-13 RX ADMIN — DIPHENHYDRAMINE HYDROCHLORIDE 25 MG: 50 INJECTION, SOLUTION INTRAMUSCULAR; INTRAVENOUS at 10:11

## 2017-11-13 RX ADMIN — SEVELAMER CARBONATE 1600 MG: 800 TABLET, FILM COATED ORAL at 05:11

## 2017-11-13 RX ADMIN — METHYLPREDNISOLONE SODIUM SUCCINATE 125 MG: 125 INJECTION, POWDER, FOR SOLUTION INTRAMUSCULAR; INTRAVENOUS at 10:11

## 2017-11-13 RX ADMIN — ACETAMINOPHEN 1000 MG: 500 TABLET ORAL at 11:11

## 2017-11-13 RX ADMIN — METHYLPREDNISOLONE SODIUM SUCCINATE 125 MG: 125 INJECTION, POWDER, FOR SOLUTION INTRAMUSCULAR; INTRAVENOUS at 09:11

## 2017-11-13 RX ADMIN — FAMOTIDINE 20 MG: 10 INJECTION, SOLUTION INTRAVENOUS at 10:11

## 2017-11-13 RX ADMIN — CETIRIZINE HYDROCHLORIDE 5 MG: 5 TABLET, FILM COATED ORAL at 05:11

## 2017-11-13 RX ADMIN — ZOLPIDEM TARTRATE 5 MG: 5 TABLET, FILM COATED ORAL at 09:11

## 2017-11-13 NOTE — UM SECONDARY REVIEW
F/U  328PM CALLED EHR - CASE STILL IN PROGRESS - NEEDS MD NOTES IN EPIC - TDB RNCM - ER COVERAGE

## 2017-11-13 NOTE — ED NOTES
Rounding has been completed on the pt. Pt resting on stretcher with daughter at bedside. Pt reports pain 0 out of 10. Pt denies bathroom and positional needs. Pt AAOx4 and appropriate at this time. Respirations even and unlabored. No acute distress noted. Pt updated on POC. Bed is locked and in lowest position with side rails up x2. Call bell within reach and pt oriented to use of call bell. Pt on continuous cardiac monitoring, continuous pulse ox, and continuous BP cuff. Will continue to monitor.

## 2017-11-13 NOTE — ED NOTES
Pt to ED c/o upper lip swelling, throat swelling and irritation, generalized body itching, rash to BUE and face around mouth since Saturday. Pt seen in ED Friday c/o blood in PD fluid and given IV abx here. Pt denies any prior allergic reaction to medication. Pt also reports swelling to BLE- so she performed PD at home and reports BLE swelling decreased but lip swelling remained-PD every night- reports taking benadryl at 600. Pt denies pain, SOB, NVD, dysuria, cough, congestion. Pt AAOx4 and appropriate at this time. Respirations even and unlabored. No acute distress noted. Awaiting further orders. Pt updated on POC. Bed is locked and in lowest position with side rails up x2. Call bell within reach and pt oriented to use of call bell. Pt on continuous cardiac monitoring, continuous pulse ox, and continuous BP cuff. Will continue to monitor.

## 2017-11-13 NOTE — ED NOTES
Rounding has been completed on the pt. Pt resting on stretcher with eyes closed- awakens as RN entered room. Pt reports pain 0 out of 10. Pt denies bathroom and positional needs. Pt AAOx4 and appropriate at this time. Respirations even and unlabored. No acute distress noted. Pt updated on POC. Bed is locked and in lowest position with side rails up x2. Call bell within reach and pt oriented to use of call bell. Pt on continuous cardiac monitoring, continuous pulse ox, and continuous BP cuff. Will continue to monitor.

## 2017-11-13 NOTE — ED NOTES
Rounding has been completed on the pt. Pt resting on stretcher with eyes closed- awakens to verbal stimuli. Pt reports pain remains 0 out of 10. Pt denies bathroom and positional needs. Pt AAOx4 and appropriate at this time. Respirations even and unlabored. No acute distress noted. Pt updated on POC. Bed is locked and in lowest position with side rails up x2. Call bell within reach and pt oriented to use of call bell. Pt on continuous cardiac monitoring, continuous pulse ox, and continuous BP cuff. Will continue to monitor.

## 2017-11-13 NOTE — ED NOTES
Patient identifiers verified and correct for Jennifer Booth.    LOC: The patient is awake, alert and aware of environment with an appropriate affect, the patient is oriented x 3 and speaking appropriately.  APPEARANCE: Patient resting comfortably and in no acute distress, patient is clean and well groomed, patient's clothing is properly fastened.  SKIN: The skin is warm and dry, petichiae noted to BUE, rash noted around mouth and on posterior aspect of bilateral hands  HEAD: +upper lip swelling, pt reports throat itching and irritation  MUSCULOSKELETAL: Patient moving all extremities spontaneously, no obvious swelling or deformities noted.  RESPIRATORY: Airway is open and patent, respirations are spontaneous, patient has a normal effort and rate, no accessory muscle use noted, bilateral breath sounds clear .  CARDIAC: Patient has a normal rate and regular rhythm, no periphreal edema noted, capillary refill < 3 seconds.  ABDOMEN: Soft and non tender to palpation, no distention noted.  NEUROLOGIC: Eyes open spontaneously, behavior appropriate to situation, follows commands, facial expression symmetrical, , purposeful motor response noted

## 2017-11-13 NOTE — PLAN OF CARE
ATTN: TEAM DC PLANNING      PT VOICED INDEPENDENT  NO NEEDS @ THIS    IF ANY ARISE PLEASE CONTACT RN TOREY / PERCY TEAM     READMIT WITH 30 DAYS    HX: PDIALYISIS @HOME QD     Lauren Burciaga RN  Case management 11/13/201712:10 PM  # [phone] (FAX) [phone]     11/13/17 1210   Discharge Assessment   Assessment Type Discharge Planning Assessment   Confirmed/corrected address and phone number on facesheet? Yes   Assessment information obtained from? Patient   Communicated expected length of stay with patient/caregiver yes   Prior to hospitilization cognitive status: Alert/Oriented   Prior to hospitalization functional status: Independent   Current cognitive status: Alert/Oriented   Current Functional Status: Independent   Lives With child(autumn), adult   Able to Return to Prior Arrangements yes   Is patient able to care for self after discharge? Yes   Patient's perception of discharge disposition admitted as an inpatient   Readmission Within The Last 30 Days current reason for admission unrelated to previous admission   Patient currently being followed by outpatient case management? No   Patient currently receives any other outside agency services? No   Equipment Currently Used at Home none   Do you have any problems affording any of your prescribed medications? TBD   Is the patient taking medications as prescribed? yes   Does the patient have transportation home? Yes   Transportation Available car   Does the patient receive services at the [facility]? No   Discharge Plan A Home with family   Discharge Plan B Home with family   Readmission Questionnaire   At the time of your discharge, did someone talk to you about what your health problems were? Yes   At the time of discharge, did someone talk to you about what to watch out for regarding worsening of your health problem? Yes   At the time of discharge, did someone talk to you about which medication to take when you left the hospital and which ones  to stop taking? Yes   At the time of discharge, did someone talk to you about when and where to follow up with a doctor after you left the hospital? Yes   How often do you need to have someone help you when you read instructions, pamphlets, or other written material from your doctor or pharmacy? Sometimes   Do you have problems taking your medications as prescribed? Yes   Do you have any problems affording any of  your prescribed medications? No   Do you have problems obtaining/receiving your medications? No   Does the patient have transportation to healthcare appointments? No   Living Arrangements house   Does the patient have family/friends to help with healtcare needs after discharge? yes   Does your caregiver provide all the help you need? Yes   Are you currently feeling confused? No   Are you currently having memory problems? No   Have you felt down, depressed, or hopeless? 0

## 2017-11-13 NOTE — CONSULTS
Consult Note  Nephrology    Consult Requested By: Apolonia Fair, *  Reason for Consult: ESRD    SUBJECTIVE:     History of Present Illness:  Patient is a 52 y.o. 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  Pt seen in [facility] ED on 11/10 for concerns of bleeding in peritoneal fluid after she entangled herself with the tubing.  Peritoneal fluid at the time was colorless with only 7 WBCs.  She received a dose of Vanc and Gent and was discharged from the ED.     Pt and daughter report that the next morning, pt noted upper lip and R facial swelling, hives to underside of chin, and rash to L arm and b/l palms.  She did not do PD treatment on 11/10 evening but did usual treatment on  and  nights.  She took Benadryl with no relief.    This morning she went to PD clinic and with no improvements in swelling, was advised by PD nurse to come to ER.  She reports pruritis.  Denies skin sloughing, CP, SOB, N/V/D.   In the ED SBPs in the 90s.  Labs remarkable for acute on chronic anemia with H/H 6.7/20.4 with thrombocytopenia (platelets 86).      She saw Dr. Shane last week.    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/min from myeloma kidney & HTN      Depression     Dialysis patient     dialysis --    GERD (gastroesophageal reflux disease)     Hypertension     Mood swings     Multiple myeloma without mention of remission     Renal hypertension     Status post dialysis 2012     Past Surgical History:   Procedure Laterality Date    AV FISTULA PLACEMENT Left     BREAST SURGERY      cyst removal & drainage (left)     SECTION      RENAL BIOPSY  2016    VASCULAR SURGERY  2012     dialysis catheter to right subclavian     Family History   Problem Relation Age of Onset    Hypertension Mother     Heart failure Mother     Cancer Maternal Aunt     Diabetes Maternal Grandmother     Stroke Maternal Grandmother     Breast cancer Neg Hx     Ovarian cancer Neg Hx     Colon cancer Neg Hx      Social History   Substance Use Topics    Smoking status: Current Every Day Smoker     Packs/day: 0.50     Years: 30.00     Types: Cigarettes    Smokeless tobacco: Never Used      Comment: 12/16/15: states 1/2 pack a day    Alcohol use 0.6 oz/week     1 Glasses of wine per week      Comment: Wine       Review of patient's allergies indicates:  No Known Allergies     Review of Systems:  Constitutional: positive for fatigue  Eyes: no visual changes  ENT: no nasal congestion or sore throat  Respiratory: no cough or shortness of breath  Cardiovascular: no chest pain or palpitations  Gastrointestinal: no nausea or vomiting, no abdominal pain or change in bowel habits  Allergy/Immunology: postnasal drip  Musculoskeletal: no arthralgias or myalgias  Neurological: no seizures or tremors  Behavioral/Psych: no auditory or visual hallucinations  Endocrine: no heat or cold intolerance     OBJECTIVE:     Vital Signs (Most Recent)  Temp: 98.5 °F (36.9 °C) (11/13/17 1445)  Pulse: 66 (11/13/17 1500)  Resp: 17 (11/13/17 1500)  BP: (!) 92/59 (11/13/17 1500)  SpO2: 100 % (11/13/17 1500)    Vital Signs Range (Last 24H):  Temp:  [97.6 °F (36.4 °C)-100.8 °F (38.2 °C)]   Pulse:  [66-74]   Resp:  [16-30]   BP: ()/(51-61)   SpO2:  [99 %-100 %]     Physical Exam:  Gen: AAOx3, NAD  HEENT: mmm, sclera anicteric, swelling to R face and upper lip.  No tongue enlargement or oropharyngeal exudate.  CV: RRR, no m/r  Resp: CTAB, no rales or wheezes  GI: soft, ND, NTTP, +BS.  PD catheter examined--no discharge, erythema, or pain at exit site.  Extr: no edema  Skin: petechiae to left upper arm and b/l palms.  Hives to underside of  chin.  Access: PD catheter c/d/i. L forearm AVF with palpable thrill.         Laboratory:      Recent Labs  Lab 11/10/17  0728 11/13/17  0948    128*   K 4.3 4.0    97   CO2 23 22*   BUN 36* 33*   CREATININE 4.7* 5.7*   CALCIUM 8.7 8.5*       Recent Labs  Lab 11/10/17  0728 11/13/17  0948   WBC 3.98 7.25   HGB 7.3* 6.7*   HCT 22.2* 20.4*    86*       Diagnostic Results:  Labs: Reviewed    ASSESSMENT/PLAN:     Allergic reaction to medications versus viral exanthum?  - Possible allergic reaction to Vanc and Gent from 11/10?  Also consider viral illness?  - Check complement levels.  - Review of medications to not reveal any that are high-risk for causing Garvey-Chiki Syndrome.  - Steroids and anti-histamines.    Hypotension (I95.9)  - Due to above?  Less likely sepsis, as no apparent source of infection.  - PD cultures checked and unremarkable.  No abx for now.  - Received 1L NS bolus in ED.  - Hold CCB and BB.    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  - CCPD Rx: 9 hours, Dextrose 2.5% dialysate, total volume 8L, fill volume 1.5L, last fill 500ml, dwell time: 1:35.  - Volume and electrolytes acceptable.  Hold PD tonight.  - She has reasonable residual renal function, so can Lasix challenge if she becomes hypervolemic.  - [place] [facility]    Acute on chronic anemia of CKD (D63.1)  - Will check FOBT.  - I was hoping to avoid blood transfusion, as she is being evaluated for transfusion and can mount antibodies, but with symptomatic anemia, will transfuse 2u PRBCs today.    Secondary HPTH, Vitamin D deficiency, Hyperphosphatemia (N25.81, E55.9, E83.39)  - Resume D3 2000 units daily.  - Recently increased Renvela to 2 tabs with meals.    Thrombocytopenia  - Due to above viral illness versus allergic reaction?  - Will follow trends.  - Avoid antiplatelet agents.    Thank you for the  consult.  We will continue to follow along with you.    Elliott Diaz MD  Nephrology

## 2017-11-13 NOTE — UM SECONDARY REVIEW
11/13 - INCOMPLETE MD NOTE FROM ER - FAILED REVIEW - PLEASE REREVIEW - I WILL EMAIL TO EHR - TDB RNCM ER COVERAGE

## 2017-11-13 NOTE — ED PROVIDER NOTES
Encounter Date: 2017    SCRIBE #1 NOTE: Emily MEEKS am scribing for, and in the presence of, Dr. Reynoso.       History     Chief Complaint   Patient presents with    Allergic Reaction     lip swelling, rash to bilateral palms with blisters, SOB and sore throat x 2 days; seen in ED friday and treated with IV abx for possible infection to PD site; Pt speaking in full sentences, in no distress, no oral/throat swelling     Time seen by provider: 9:37 AM    This is a 52 y.o. female who presents to the ED on the referral of Dr. Diaz with complaint of an allergic reaction that has progressively worsened over the past 3 days.  As per her daughter, the patient began experiencing lip swelling after taking a dose of antibiotics that she was prescribed 3 days ago.  This morning, she noticed that the patient had a sore throat and blisters to the dorsum of the hands bilaterally.  The patient mentions no fever, chills, congestion, rhinorrhea, trouble swallowing, chest tightness, cough, SOB, CP, abdominal pain, nausea, or vomiting.  Although she attempted to alleviate her symptoms with benadryl approximately 3.5 hours ago, she has found no relief.  She mentions no other identifying, alleviating, or exacerbating factors.  As per medical records, the patient has history of HTN, multiple myeloma, and Stage IV CKD, for which she receives peritoneal dialysis every night.  She admits that she has not taken her daily dose of antibiotics.  She has surgical history of AV fistula placement,  section, and dialysis catheter placement.  The patient mentions that she saw her nephrologist, Dr. Diaz, this morning.  She claims that she has had no recent complications with her dialysis.        The history is provided by medical records, the patient and a relative (daughter).     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-29 ml/min from myeloma kidney & HTN      Depression     Dialysis patient     dialysis m-w-f    GERD (gastroesophageal reflux disease)     Hypertension     Mood swings     Multiple myeloma without mention of remission     Renal hypertension     Status post dialysis 2012     Past Surgical History:   Procedure Laterality Date    AV FISTULA PLACEMENT Left 2014    BREAST SURGERY  1995    cyst removal & drainage (left)     SECTION      RENAL BIOPSY  2016    VASCULAR SURGERY  2012    dialysis catheter to right subclavian     Family History   Problem Relation Age of Onset    Hypertension Mother     Heart failure Mother     Cancer Maternal Aunt     Diabetes Maternal Grandmother     Stroke Maternal Grandmother     Breast cancer Neg Hx     Ovarian cancer Neg Hx     Colon cancer Neg Hx      Social History   Substance Use Topics    Smoking status: Current Every Day Smoker     Packs/day: 0.50     Years: 30.00     Types: Cigarettes    Smokeless tobacco: Never Used      Comment: 12/16/15: states 1/2 pack a day    Alcohol use 0.6 oz/week     1 Glasses of wine per week      Comment: Wine     Review of Systems   Constitutional: Negative for chills and fever.   HENT: Positive for facial swelling (lips) and sore throat. Negative for congestion, rhinorrhea and trouble swallowing.    Eyes: Negative for photophobia and redness.   Respiratory: Negative for cough, chest tightness and shortness of breath.    Cardiovascular: Negative for chest pain.   Gastrointestinal: Negative for abdominal pain, nausea and vomiting.   Genitourinary: Negative for dysuria.   Musculoskeletal: Negative for back pain.   Skin: Negative for rash.        Positive for blisters to the dorsum of the hands bilaterally.     Neurological: Negative for weakness, light-headedness and headaches.   Psychiatric/Behavioral: Negative for confusion.       Physical Exam      Initial Vitals [11/13/17 0915]   BP Pulse Resp Temp SpO2   -- 74 16 99.2 °F (37.3 °C) 100 %      MAP       --         Physical Exam    Nursing note and vitals reviewed.  Constitutional: She appears well-developed and well-nourished. She is not diaphoretic. No distress.   HENT:   Head: Normocephalic and atraumatic.   Mouth/Throat: Oropharynx is clear and moist. No oral lesions. No trismus in the jaw.   Oropharynx is clear and intact.  Moist mucous membranes.  No visible oral ulcerations.  Upper lip swelling 1+.  No tongue edema.  No trismus or drooling.     Eyes: Conjunctivae and EOM are normal. Pupils are equal, round, and reactive to light. No scleral icterus.   Conjunctiva are pink, clear, and intact.   Neck: Normal range of motion. Neck supple. No stridor present.   No stridor.   Cardiovascular: Normal rate, regular rhythm, S1 normal, S2 normal and normal heart sounds. Exam reveals no gallop and no friction rub.    No murmur heard.  Pulmonary/Chest: Breath sounds normal. No respiratory distress. She has no wheezes. She has no rhonchi. She has no rales.   Clear to ascultation bilaterally.   Abdominal: Soft. Bowel sounds are normal. There is no tenderness. There is no rebound and no guarding.   No audible bruits.   Musculoskeletal: Normal range of motion. She exhibits edema. She exhibits no tenderness.   Right hand is edematous with some erythema.   No lower extremity edema.    Lymphadenopathy:     She has no cervical adenopathy.   Neurological: She is alert and oriented to person, place, and time.   Skin: Skin is warm and dry. Petechiae noted. No rash noted. There is erythema. No pallor.   Blisters to both hands with some skin peeling.  Blotchy, erythematous petechiae on the left arm.  A few petechiae to the right forearm.  Right hand is edematous with some erythema.    Psychiatric: She has a normal mood and affect. Her behavior is normal. Judgment and thought content normal.         ED Course    Procedures  Labs Reviewed   CBC W/ AUTO DIFFERENTIAL - Abnormal; Notable for the following:        Result Value    RBC 2.39 (*)     Hemoglobin 6.7 (*)     Hematocrit 20.4 (*)     RDW 15.8 (*)     Platelets 86 (*)     Lymph # 0.2 (*)     Mono # 0.2 (*)     Gran% 91.5 (*)     Lymph% 2.8 (*)     Mono% 3.3 (*)     All other components within normal limits   COMPREHENSIVE METABOLIC PANEL - Abnormal; Notable for the following:     Sodium 128 (*)     CO2 22 (*)     Glucose 120 (*)     BUN, Bld 33 (*)     Creatinine 5.7 (*)     Calcium 8.5 (*)     Total Protein 5.2 (*)     Albumin 2.3 (*)     Alkaline Phosphatase 43 (*)     eGFR if  9 (*)     eGFR if non  8 (*)     All other components within normal limits   CULTURE, BLOOD   CULTURE, BLOOD   PROTIME-INR   PROTIME-INR   LACTIC ACID, PLASMA   TYPE & SCREEN      Imaging Results          X-Ray Chest AP Portable (Final result)  Result time 11/13/17 11:35:12    Final result by Tiago Dixon MD (11/13/17 11:35:12)                 Impression:        No acute cardiopulmonary abnormality or detrimental change when compared with 09/05/2017.      Electronically signed by: Tiago Dixon  Date:     11/13/17  Time:    11:35              Narrative:    CLINICAL INFORMATION: Shortness of breath.    COMPARISON: 09/05/2017.    FINDINGS: One view of the chest was obtained.    The cardiac silhouette is not enlarged.  No air space disease.  No pleural effusion or pneumothorax.                              EKG Readings: (Independently Interpreted)   Initial Reading: No STEMI.   Normal sinus rhythm. No ischemic changes. Ventricular rate of 71.        X-Rays:   Independently Interpreted Readings:   Chest X-Ray: X-Ray Chest AP Portable: No acute findings visualized.      Medical Decision Making:   Independently Interpreted Test(s):   I have ordered and independently interpreted X-rays - see prior notes.  I have ordered and independently interpreted EKG  Reading(s) - see prior notes  Clinical Tests:   Lab Tests: Ordered and Reviewed  Radiological Study: Ordered and Reviewed  Medical Tests: Ordered and Reviewed  ED Management:  10:30 AM.  Delay in patient care because awaiting lab results.    Other:   I have discussed this case with another health care provider.       <> Summary of the Discussion: 11:19 AM.  Consulted and discussed with Dr. Diaz, who agrees with current plan for admission.  Will type and cross for blood transfusion.  Agrees with most probably not sepsis.  Will not initiate sepsis protocol at this time.  Will continue to follow.  Will come to see the patient.  Will admit to the hopstialist.   11:40 AM. Consulted and discussed with Dr. Astorga, who will admit the patient to Dr. Manjula Serraibrachel Attestation:   Scribe #1: I performed the above scribed service and the documentation accurately describes the services I performed. I attest to the accuracy of the note.    Attending Attestation:             Attending ED Notes:   Emergent evaluation a 52-year-old female with chief complaint of upper lip swelling, rash to face and bilateral upper extremities.  Patient concerned she may be having an ALLERGIC reaction to IV antibiotics that she had on Friday.  Patient is initially afebrile, nontoxic-appearing with stable vital signs.  Patient is neurovascularly intact without focal neurologic deficits.  Throughout the course of patient's stay patient had a fever which was treated with Tylenol.  There is no elevation of white blood cell count.  H&H is 6.7 and 20.4.  Patient is type and screen for 2 units of packed red blood cells.  Consent for blood transfusion is signed to the chart.  Sodium of 128.  BUN/creatinine are 33 and 5.7.  Calcium of 8.5.  Albumin is 2.3.  No acute findings on chest x-ray.  Lactic acid is within normal limits.  The patient is extensively counseled on her diagnosis and treatment including all laboratory, diagnostic and  physical exam findings.  The patient is admitted in stable condition.          ED Course      Clinical Impression:     1. Allergic reaction to drug, initial encounter    2. SOB (shortness of breath)    3. Shortness of breath    4. Hyponatremia    5. Thrombocytopenia    6. Symptomatic anemia    7. ESRD on peritoneal dialysis                                 Miguel Doran MD  11/13/17 8662

## 2017-11-14 VITALS
RESPIRATION RATE: 20 BRPM | WEIGHT: 123.25 LBS | SYSTOLIC BLOOD PRESSURE: 136 MMHG | HEIGHT: 67 IN | OXYGEN SATURATION: 100 % | HEART RATE: 68 BPM | BODY MASS INDEX: 19.35 KG/M2 | TEMPERATURE: 98 F | DIASTOLIC BLOOD PRESSURE: 85 MMHG

## 2017-11-14 LAB
ANION GAP SERPL CALC-SCNC: 9 MMOL/L
ANISOCYTOSIS BLD QL SMEAR: SLIGHT
BASOPHILS # BLD AUTO: ABNORMAL K/UL
BASOPHILS NFR BLD: 0 %
BUN SERPL-MCNC: 44 MG/DL
C3 SERPL-MCNC: 113 MG/DL
C4 SERPL-MCNC: 41 MG/DL
CALCIUM SERPL-MCNC: 9 MG/DL
CHLORIDE SERPL-SCNC: 98 MMOL/L
CO2 SERPL-SCNC: 21 MMOL/L
CREAT SERPL-MCNC: 5.8 MG/DL
DIFFERENTIAL METHOD: ABNORMAL
DOHLE BOD BLD QL SMEAR: PRESENT
EOSINOPHIL # BLD AUTO: ABNORMAL K/UL
EOSINOPHIL NFR BLD: 3 %
ERYTHROCYTE [DISTWIDTH] IN BLOOD BY AUTOMATED COUNT: 15.3 %
EST. GFR  (AFRICAN AMERICAN): 9 ML/MIN/1.73 M^2
EST. GFR  (NON AFRICAN AMERICAN): 8 ML/MIN/1.73 M^2
GIANT PLATELETS BLD QL SMEAR: PRESENT
GLUCOSE SERPL-MCNC: 127 MG/DL
HCT VFR BLD AUTO: 29.4 %
HGB BLD-MCNC: 10.2 G/DL
LYMPHOCYTES # BLD AUTO: ABNORMAL K/UL
LYMPHOCYTES NFR BLD: 3 %
MCH RBC QN AUTO: 29 PG
MCHC RBC AUTO-ENTMCNC: 34.7 G/DL
MCV RBC AUTO: 84 FL
MONOCYTES # BLD AUTO: ABNORMAL K/UL
MONOCYTES NFR BLD: 5 %
NEUTROPHILS # BLD AUTO: ABNORMAL K/UL
NEUTROPHILS NFR BLD: 50 %
NEUTS BAND NFR BLD MANUAL: 39 %
OB PNL STL: NEGATIVE
PHOSPHATE SERPL-MCNC: 5.6 MG/DL
PLATELET # BLD AUTO: 75 K/UL
PLATELET BLD QL SMEAR: ABNORMAL
PMV BLD AUTO: 9.4 FL
POTASSIUM SERPL-SCNC: 4.1 MMOL/L
RBC # BLD AUTO: 3.52 M/UL
SODIUM SERPL-SCNC: 128 MMOL/L
WBC # BLD AUTO: 9.94 K/UL

## 2017-11-14 PROCEDURE — 80048 BASIC METABOLIC PNL TOTAL CA: CPT | Mod: NTX

## 2017-11-14 PROCEDURE — 25000003 PHARM REV CODE 250: Mod: NTX | Performed by: NURSE PRACTITIONER

## 2017-11-14 PROCEDURE — 82272 OCCULT BLD FECES 1-3 TESTS: CPT | Mod: NTX

## 2017-11-14 PROCEDURE — 85027 COMPLETE CBC AUTOMATED: CPT | Mod: NTX

## 2017-11-14 PROCEDURE — 85007 BL SMEAR W/DIFF WBC COUNT: CPT | Mod: NTX

## 2017-11-14 PROCEDURE — 86160 COMPLEMENT ANTIGEN: CPT | Mod: 59,NTX

## 2017-11-14 PROCEDURE — 25000003 PHARM REV CODE 250: Mod: NTX | Performed by: INTERNAL MEDICINE

## 2017-11-14 PROCEDURE — 63600175 PHARM REV CODE 636 W HCPCS: Mod: NTX | Performed by: INTERNAL MEDICINE

## 2017-11-14 PROCEDURE — 86160 COMPLEMENT ANTIGEN: CPT | Mod: NTX

## 2017-11-14 PROCEDURE — 99238 HOSP IP/OBS DSCHRG MGMT 30/<: CPT | Mod: NTX,,, | Performed by: INTERNAL MEDICINE

## 2017-11-14 PROCEDURE — 84100 ASSAY OF PHOSPHORUS: CPT | Mod: NTX

## 2017-11-14 PROCEDURE — S0028 INJECTION, FAMOTIDINE, 20 MG: HCPCS | Mod: NTX | Performed by: INTERNAL MEDICINE

## 2017-11-14 PROCEDURE — 36415 COLL VENOUS BLD VENIPUNCTURE: CPT | Mod: NTX

## 2017-11-14 RX ORDER — METHYLPREDNISOLONE SOD SUCC 125 MG
80 VIAL (EA) INJECTION EVERY 12 HOURS
Status: DISCONTINUED | OUTPATIENT
Start: 2017-11-14 | End: 2017-11-14 | Stop reason: HOSPADM

## 2017-11-14 RX ORDER — METHYLPREDNISOLONE 4 MG/1
TABLET ORAL
Qty: 1 PACKAGE | Refills: 0 | Status: SHIPPED | OUTPATIENT
Start: 2017-11-14 | End: 2017-12-05

## 2017-11-14 RX ORDER — DIPHENHYDRAMINE HCL 25 MG
25 CAPSULE ORAL ONCE
Status: COMPLETED | OUTPATIENT
Start: 2017-11-14 | End: 2017-11-14

## 2017-11-14 RX ADMIN — METHYLPREDNISOLONE SODIUM SUCCINATE 125 MG: 125 INJECTION, POWDER, FOR SOLUTION INTRAMUSCULAR; INTRAVENOUS at 05:11

## 2017-11-14 RX ADMIN — Medication 2000 UNITS: at 09:11

## 2017-11-14 RX ADMIN — DIPHENHYDRAMINE HYDROCHLORIDE 25 MG: 25 CAPSULE ORAL at 09:11

## 2017-11-14 RX ADMIN — FAMOTIDINE 20 MG: 10 INJECTION, SOLUTION INTRAVENOUS at 09:11

## 2017-11-14 RX ADMIN — CETIRIZINE HYDROCHLORIDE 5 MG: 5 TABLET, FILM COATED ORAL at 08:11

## 2017-11-14 RX ADMIN — ESCITALOPRAM 20 MG: 10 TABLET, FILM COATED ORAL at 08:11

## 2017-11-14 RX ADMIN — SEVELAMER CARBONATE 1600 MG: 800 TABLET, FILM COATED ORAL at 07:11

## 2017-11-14 NOTE — PROGRESS NOTES
[facility] Hospital Medicine  Progress Note    Patient Name: Jennifer Booth  MRN: [id number]  Patient Class: IP- Inpatient   Admission Date: 2017  Length of Stay: 0 days  Attending Physician: Apolonia Fair, *  Primary Care Provider: Meg Donahue MD        Subjective:     Principal Problem:Allergic drug reaction    HPI:  51 yo female with PMH of ESRD on peritoneal dialysis, HTN, and Multiple myeloma presents to the ED from peritoneal dialysis c/o facial swelling x 2 days.  On 11/10/17, patient received vanc and gentamicin in the ER after being evaluated for possible bleeding in peritoneal fluid.  The next day, patient noticed upper lip swelling, right sided facial swelling, and hives on face, arms, and hands.  Took benadryl at home but did not resolve symptoms.  Patient went to PD clinic today and was instructed to come to the ER for further evaluation.     Hospital Course:  No notes on file    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/min from myeloma kidney & HTN      Depression     Dialysis patient     dialysis m-w-f    GERD (gastroesophageal reflux disease)     Hypertension     Mood swings     Multiple myeloma without mention of remission     Renal hypertension     Status post dialysis 2012       Past Surgical History:   Procedure Laterality Date    AV FISTULA PLACEMENT Left 2014    BREAST SURGERY  1995    cyst removal & drainage (left)     SECTION      RENAL BIOPSY  2016    VASCULAR SURGERY  2012    dialysis catheter to right subclavian       Review of patient's allergies indicates:  No Known Allergies    No current facility-administered medications on file prior to encounter.      Current Outpatient Prescriptions on File Prior to Encounter   Medication Sig    B COMPLEX W-C NO.20/FOLIC ACID (VIRT-CAPS ORAL) Take 1 capsule by mouth once daily.      carvedilol (COREG) 12.5 MG tablet TK 1 T PO BID    cholecalciferol, vitamin D3, (VITAMIN D3) 400 unit Cap Take 2,000 Units by mouth once daily.     escitalopram oxalate (LEXAPRO) 20 MG tablet TAKE 1 TABLET BY MOUTH DAILY    NIFEDICAL XL 60 mg 24 hr tablet TAKE 1 TABLET BY MOUTH EVERY DAY    RENVELA 800 mg Tab TK 1 T PO  TID WITH MEALS    PROCRIT 10,000 unit/mL injection     [DISCONTINUED] VIRT-CAPS 1 mg Cap      Family History     Problem Relation (Age of Onset)    Cancer Maternal Aunt    Diabetes Maternal Grandmother    Heart failure Mother    Hypertension Mother    Stroke Maternal Grandmother        Social History Main Topics    Smoking status: Current Every Day Smoker     Packs/day: 0.50     Years: 30.00     Types: Cigarettes    Smokeless tobacco: Never Used      Comment: 12/16/15: states 1/2 pack a day    Alcohol use 0.6 oz/week     1 Glasses of wine per week      Comment: Wine    Drug use: No    Sexual activity: Not Currently     Review of Systems   Constitutional: Negative for chills and fever.   HENT: Negative.    Eyes: Negative.    Respiratory: Negative for chest tightness and shortness of breath.    Cardiovascular: Negative for chest pain and palpitations.   Gastrointestinal: Negative for abdominal distention and abdominal pain.   Endocrine: Negative.    Genitourinary: Negative for dysuria and frequency.   Musculoskeletal: Negative for arthralgias and back pain.   Skin: Positive for rash.   Neurological: Negative for dizziness and headaches.   Psychiatric/Behavioral: Negative for agitation and confusion.     Objective:     Vital Signs (Most Recent):  Temp: 97.7 °F (36.5 °C) (11/13/17 1915)  Pulse: 62 (11/13/17 2000)  Resp: (!) 26 (11/13/17 2000)  BP: 93/60 (11/13/17 2000)  SpO2: 99 % (11/13/17 2000) Vital Signs (24h Range):  Temp:  [96.7 °F (35.9 °C)-100.8 °F (38.2 °C)] 97.7 °F (36.5 °C)  Pulse:  [60-74] 62  Resp:  [16-33] 26  SpO2:  [97 %-100 %] 99 %  BP: ()/(51-71) 93/60     Weight:  55.9 kg (123 lb 3.8 oz)  Body mass index is 19.3 kg/m².    Physical Exam   Constitutional: She is oriented to person, place, and time. She appears well-developed and well-nourished.   HENT:   Head: Normocephalic and atraumatic.   Eyes: Conjunctivae and EOM are normal.   Neck: Normal range of motion. Neck supple.   Cardiovascular: Normal rate and regular rhythm.    Pulmonary/Chest: Effort normal and breath sounds normal.   Abdominal: Soft. Bowel sounds are normal.   Musculoskeletal: Normal range of motion.   Neurological: She is alert and oriented to person, place, and time.   Skin: Skin is warm. Petechiae: arms and palms. She is diaphoretic.        Significant Labs:   CBC:   Recent Labs  Lab 11/13/17  0948   WBC 7.25   HGB 6.7*   HCT 20.4*   PLT 86*     CMP:   Recent Labs  Lab 11/13/17  0948   *   K 4.0   CL 97   CO2 22*   *   BUN 33*   CREATININE 5.7*   CALCIUM 8.5*   PROT 5.2*   ALBUMIN 2.3*   BILITOT 0.4   ALKPHOS 43*   AST 32   ALT 25   ANIONGAP 9   EGFRNONAA 8*     All pertinent labs within the past 24 hours have been reviewed.    Significant Imaging: I have reviewed all pertinent imaging results/findings within the past 24 hours.    Assessment/Plan:      * Allergic drug reaction    -Appears to be an allergic reaction to vancomycin or gentamycin  -Solumedrol 125 mg IV q 6h  -Famotidine 20 mg IV q 12h  -Monitor clinically for improvement          Anemia    -H/H 6.7/20.4  -Nephrology will transfuse today 2 Units of PRBCs  -Monitor H/H daily          Renal hypertension    -Currently hypotensive  -Hold carvedilol and nifedical  -No evidence of infection  -Will continue to monitor clinically          ESRD (end stage renal disease)    -Per Nephrology  -Peritoneal dialysis  -On hold for tonight          Multiple myeloma in remission              Thrombocytopenia    -Currently platelets 86  -No active bleeding            VTE Risk Mitigation         Ordered     Medium Risk of VTE  Once      11/13/17 5046      Place sequential compression device  Until discontinued      11/13/17 6099              Apolonia Fair MD  Department of Hospital Medicine   [facility]

## 2017-11-14 NOTE — PROGRESS NOTES
"Nephrology  Progress Note    Admit Date: 11/13/2017   LOS: 1 day     SUBJECTIVE:     Follow-up For:  Allergic drug reaction    Interval History:    Uneventful night.  Tolerated 2 units PRBC's w/o difficulty.  Rash starting to improve.  Denies CP/SOB. Family at bedside.     Review of Systems:  Constitutional: No fever or chills  Respiratory: No cough or shortness of breath  Cardiovascular: No chest pain or palpitations  Gastrointestinal: No nausea or vomiting  Neurological: No confusion or weakness    OBJECTIVE:     Vital Signs Range (Last 24H):  /84 (BP Location: Left arm, Patient Position: Lying)   Pulse 72   Temp 97.7 °F (36.5 °C) (Oral)   Resp (!) 29   Ht 5' 7" (1.702 m)   Wt 55.9 kg (123 lb 3.8 oz)   SpO2 (!) 93%   Breastfeeding? No   BMI 19.30 kg/m²     Temp:  [96.7 °F (35.9 °C)-100.8 °F (38.2 °C)]   Pulse:  [60-74]   Resp:  [15-33]   BP: ()/(51-84)   SpO2:  [93 %-100 %]     I & O (Last 24H):  Intake/Output Summary (Last 24 hours) at 11/14/17 0823  Last data filed at 11/14/17 0600   Gross per 24 hour   Intake          1063.75 ml   Output                0 ml   Net          1063.75 ml       Physical Exam:  General appearance: Whole body rash.  Well developed, well nourished  Eyes:  Conjunctivae/corneas clear. PERRL.  Lungs: Normal respiratory effort,   clear to auscultation bilaterally   Heart: Regular rate and rhythm, S1, S2 normal, no murmur, rub or edmundo.  Abdomen: Soft, non-tender non-distended; bowel sounds normal; no masses,  no organomegaly.  PD site CDI.   Extremities: No cyanosis or clubbing. No edema.    Skin: whole body rash.    Neurologic: Normal strength and tone. No focal numbness or weakness   Access: PD catheter c/d/i. L forearm AVF with palpable thrill.    Laboratory Data:    Recent Labs  Lab 11/13/17  0948   WBC 7.25   RBC 2.39*   HGB 6.7*   HCT 20.4*   PLT 86*   MCV 85   MCH 28.0   MCHC 32.8       BMP:   Recent Labs  Lab 11/13/17  0948   *   *   K 4.0   CL 97 "   CO2 22*   BUN 33*   CREATININE 5.7*   CALCIUM 8.5*     Lab Results   Component Value Date    CALCIUM 8.5 (L) 11/13/2017    PHOS 4.2 07/26/2016               Medications:  Medication list was reviewed and changes noted under Assessment/Plan.    Diagnostic Results:        ASSESSMENT/PLAN:     Allergic reaction to medications versus viral exanthum?  - Possible allergic reaction to Vanc and Gent from 11/10?  Will make allergic to both for now.     - Check complement levels.  - Review of medications to not reveal any that are high-risk for causing Garvey-Hciki Syndrome.  - Continue with Steroids and anti-histamines.     Hypotension (I95.9)  - Due to above? Much improved after PRBC's.   - PD cultures checked and unremarkable.  No abx for now.  - Hold CCB and BB.     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  - CCPD Rx: 9 hours, Dextrose 2.5% dialysate, total volume 8L, fill volume 1.5L, last fill 500ml, dwell time: 1:35.  - PD today.  - [place] [facility]     Acute on chronic anemia of CKD (D63.1)  - Will check FOBT.  - I was hoping to avoid blood transfusion, as she is being evaluated for transfusion and can mount antibodies, but with symptomatic anemia, transfused 2u PRBCs yesterday.  Repeat CBC pending.     Secondary HPTH, Vitamin D deficiency, Hyperphosphatemia (N25.81, E55.9, E83.39)  - Resume D3 2000 units daily.  - Recently increased Renvela to 2 tabs with meals.     Thrombocytopenia  - Due to above viral illness versus allergic reaction?  - Will follow trends.  - Avoid antiplatelet agents.          See above  Can transfer to floor from Renal standpoint.

## 2017-11-14 NOTE — NURSING
Patient awake alert and oriented. Vitals stable and patient free from S/S of distress or injury.  Patient ambulates under own power and is ready for discharge.

## 2017-11-14 NOTE — ASSESSMENT & PLAN NOTE
-Currently hypotensive  -Hold carvedilol and nifedical  -No evidence of infection  -Will continue to monitor clinically

## 2017-11-14 NOTE — HPI
51 yo female with PMH of ESRD on peritoneal dialysis, HTN, and Multiple myeloma presents to the ED from peritoneal dialysis c/o facial swelling x 2 days.  On 11/10/17, patient received vanc and gentamicin in the ER after being evaluated for possible bleeding in peritoneal fluid.  The next day, patient noticed upper lip swelling, right sided facial swelling, and hives on face, arms, and hands.  Took benadryl at home but did not resolve symptoms.  Patient went to PD clinic today and was instructed to come to the ER for further evaluation.

## 2017-11-14 NOTE — PLAN OF CARE
11/14/17 1107   Final Note   Assessment Type Final Discharge Note   Discharge Disposition Home   Hospital Follow Up  Appt(s) scheduled? Yes   Discharge plans and expectations educations in teach back method with documentation complete? Yes   Right Care Referral Info   Post Acute Recommendation No Care   Referral Type See AVS

## 2017-11-14 NOTE — PLAN OF CARE
11/14/2017    Patient: Jennifer Booth    YOB: 1965    To whom it may concern,    Jennifer Booth was admitted to the hospital under my care on 11/13/2017 and was discharged on 11/14/2017.  Cleared to return to work on 11/16/17, with no restrictions.    If you have any questions or concerns, please don't hesitate to contact the department of hospital medicine at [phone]    Sincerely,    Javan Newman MD    Department of Hospital Medicine

## 2017-11-14 NOTE — DISCHARGE SUMMARY
Discharge Summary  Hospital Medicine      Admit Date: 11/13/2017    Discharge Date and Time: 11/14/2017 9:23 AM    Discharge Attending Physician: Javan Newman MD     Diagnoses:  Active Hospital Problems    Diagnosis  POA    *Allergic drug reaction [T78.40XA]  Yes    ESRD (end stage renal disease) [N18.6]  Yes    Anemia [D64.9]  Yes    Thrombocytopenia [D69.6]  Yes    Renal hypertension [I12.9]  Yes     Chronic      Resolved Hospital Problems    Diagnosis Date Resolved POA   No resolved problems to display.       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  On 11/10/17, patient received vanc and gentamicin in the ER after being evaluated for possible bleeding in peritoneal fluid.  The next day, patient noticed upper lip swelling, right sided facial swelling, and hives on face, arms, and hands.  Took benadryl at home but did not resolve symptoms.  Patient went to PD clinic today and was instructed to come to the ER for further evaluation.    Allergic drug reaction     -Appears to be an allergic reaction to vancomycin or gentamycin vs. others  -sx improved with IV Steroids and Benadryl.   - now with resolving rash and very mild upper lip swelling. Oropharynx clear.   - plan for discharge with steroid taper  - have suggested close follow up with Dr. Madden, Allergy/Immunology for formal evaluation  - was made Gentamycin and Vancomycin allergic for now. .        Anemia     -H/H 6.7/20.4  -transfused while admitted.        Renal hypertension     - had relative hypotension when admitted  - now normotensive  - resume home Rx.        ESRD (end stage renal disease)     - she is on PD  - no evidence for peritonitis  - will resume home PD  - followed by Dr. Nunez  - has left forearm AVF.         Multiple myeloma in remission      -  Appears ASX.        Thrombocytopenia     - cause unclear.   - outpatient evaluation per hematology.          Plans for discharge        Consults: Nephrology    Significant Diagnostic Studies:   CBC:   Recent Labs  Lab 11/13/17  0948   WBC 7.25   RBC 2.39*   HGB 6.7*   HCT 20.4*   PLT 86*   MCV 85   MCH 28.0   MCHC 32.8       CMP:   Recent Labs  Lab 11/13/17  0948   *   CALCIUM 8.5*   ALBUMIN 2.3*   PROT 5.2*   *   K 4.0   CO2 22*   CL 97   BUN 33*   CREATININE 5.7*   ALKPHOS 43*   ALT 25   AST 32   BILITOT 0.4       Special Treatments/Procedures: none    Disposition: Home or Self Care    Diet: renal    Activity: as tolerated    Patient Instructions:   Reconciled Home Medications:   Current Discharge Medication List      START taking these medications    Details   methylPREDNISolone (MEDROL DOSEPACK) 4 mg tablet use as directed  Qty: 1 Package, Refills: 0         CONTINUE these medications which have NOT CHANGED    Details   B COMPLEX W-C NO.20/FOLIC ACID (VIRT-CAPS ORAL) Take 1 capsule by mouth once daily.       carvedilol (COREG) 12.5 MG tablet TK 1 T PO BID  Refills: 3      cholecalciferol, vitamin D3, (VITAMIN D3) 400 unit Cap Take 2,000 Units by mouth once daily.     Associated Diagnoses: Multiple myeloma in remission      escitalopram oxalate (LEXAPRO) 20 MG tablet TAKE 1 TABLET BY MOUTH DAILY  Qty: 90 tablet, Refills: 0    Associated Diagnoses: Depression, unspecified depression type      NIFEDICAL XL 60 mg 24 hr tablet TAKE 1 TABLET BY MOUTH EVERY DAY  Qty: 90 tablet, Refills: 2    Comments: **Patient requests 90 day supply**  Associated Diagnoses: Hypertension      RENVELA 800 mg Tab TK 1 T PO  TID WITH MEALS  Refills: 3      PROCRIT 10,000 unit/mL injection                Discharge Procedure Orders  Diet renal     Activity as tolerated     Call MD for:  temperature >100.4     Call MD for:  severe uncontrolled pain     Call MD for:  difficulty breathing or  increased cough     Call MD for:  severe persistent headache     Call MD for:  increased confusion or weakness     No dressing needed         Follow-up Information     Meg Donahue MD In 1 week.    Specialty:  Family Medicine  Contact information:  [address]  [address]  [facility] [id number] [phone]             Micha Nunez MD.    Specialty:  Nephrology  Contact information:  [address]  [address]  [facility] [id number] [phone]             Jason Madden MD In 1 week.    Specialty:  Allergy  Contact information:  [address]  [address]  [facility] [id number] [phone]

## 2017-11-14 NOTE — SUBJECTIVE & OBJECTIVE
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/min from myeloma kidney & HTN      Depression     Dialysis patient     dialysis m-w-    GERD (gastroesophageal reflux disease)     Hypertension     Mood swings     Multiple myeloma without mention of remission     Renal hypertension     Status post dialysis 2012       Past Surgical History:   Procedure Laterality Date    AV FISTULA PLACEMENT Left 2014    BREAST SURGERY  1995    cyst removal & drainage (left)     SECTION      RENAL BIOPSY  2016    VASCULAR SURGERY  2012    dialysis catheter to right subclavian       Review of patient's allergies indicates:  No Known Allergies    No current facility-administered medications on file prior to encounter.      Current Outpatient Prescriptions on File Prior to Encounter   Medication Sig    B COMPLEX W-C NO.20/FOLIC ACID (VIRT-CAPS ORAL) Take 1 capsule by mouth once daily.     carvedilol (COREG) 12.5 MG tablet TK 1 T PO BID    cholecalciferol, vitamin D3, (VITAMIN D3) 400 unit Cap Take 2,000 Units by mouth once daily.     escitalopram oxalate (LEXAPRO) 20 MG tablet TAKE 1 TABLET BY MOUTH DAILY    NIFEDICAL XL 60 mg 24 hr tablet TAKE 1 TABLET BY MOUTH EVERY DAY    RENVELA 800 mg Tab TK 1 T PO  TID WITH MEALS    PROCRIT 10,000 unit/mL injection     [DISCONTINUED] VIRT-CAPS 1 mg Cap      Family History     Problem Relation (Age of Onset)    Cancer Maternal Aunt    Diabetes Maternal Grandmother    Heart failure Mother    Hypertension Mother    Stroke Maternal Grandmother        Social History Main Topics    Smoking status: Current Every Day Smoker     Packs/day: 0.50     Years: 30.00     Types: Cigarettes    Smokeless tobacco: Never Used      Comment: 12/16/15: states 1/2 pack a day    Alcohol use 0.6 oz/week     1 Glasses of wine per week      Comment: Wine    Drug use: No    Sexual activity:  Not Currently     Review of Systems   Constitutional: Negative for chills and fever.   HENT: Negative.    Eyes: Negative.    Respiratory: Negative for chest tightness and shortness of breath.    Cardiovascular: Negative for chest pain and palpitations.   Gastrointestinal: Negative for abdominal distention and abdominal pain.   Endocrine: Negative.    Genitourinary: Negative for dysuria and frequency.   Musculoskeletal: Negative for arthralgias and back pain.   Skin: Positive for rash.   Neurological: Negative for dizziness and headaches.   Psychiatric/Behavioral: Negative for agitation and confusion.     Objective:     Vital Signs (Most Recent):  Temp: 97.7 °F (36.5 °C) (11/13/17 1915)  Pulse: 62 (11/13/17 2000)  Resp: (!) 26 (11/13/17 2000)  BP: 93/60 (11/13/17 2000)  SpO2: 99 % (11/13/17 2000) Vital Signs (24h Range):  Temp:  [96.7 °F (35.9 °C)-100.8 °F (38.2 °C)] 97.7 °F (36.5 °C)  Pulse:  [60-74] 62  Resp:  [16-33] 26  SpO2:  [97 %-100 %] 99 %  BP: ()/(51-71) 93/60     Weight: 55.9 kg (123 lb 3.8 oz)  Body mass index is 19.3 kg/m².    Physical Exam   Constitutional: She is oriented to person, place, and time. She appears well-developed and well-nourished.   HENT:   Head: Normocephalic and atraumatic.   Eyes: Conjunctivae and EOM are normal.   Neck: Normal range of motion. Neck supple.   Cardiovascular: Normal rate and regular rhythm.    Pulmonary/Chest: Effort normal and breath sounds normal.   Abdominal: Soft. Bowel sounds are normal.   Musculoskeletal: Normal range of motion.   Neurological: She is alert and oriented to person, place, and time.   Skin: Skin is warm. Petechiae: arms and palms. She is diaphoretic.        Significant Labs:   CBC:   Recent Labs  Lab 11/13/17 0948   WBC 7.25   HGB 6.7*   HCT 20.4*   PLT 86*     CMP:   Recent Labs  Lab 11/13/17 0948   *   K 4.0   CL 97   CO2 22*   *   BUN 33*   CREATININE 5.7*   CALCIUM 8.5*   PROT 5.2*   ALBUMIN 2.3*   BILITOT 0.4   ALKPHOS 43*    AST 32   ALT 25   ANIONGAP 9   EGFRNONAA 8*     All pertinent labs within the past 24 hours have been reviewed.    Significant Imaging: I have reviewed all pertinent imaging results/findings within the past 24 hours.

## 2017-11-14 NOTE — PLAN OF CARE
Problem: Patient Care Overview  Goal: Plan of Care Review  Outcome: Ongoing (interventions implemented as appropriate)  Patient stable overnight,  No complaints of SOB,  pain or weakness.  No swelling noted, few hives on lower face.   Ambulates to commode, voids small amounts.  One small dark stool sent to lab this a.m.  PD catheter without sign/symptoms of infection.  Given Ambien 5mg for insomnia.  Free from falls and injury.

## 2017-11-14 NOTE — ASSESSMENT & PLAN NOTE
-Appears to be an allergic reaction to vancomycin or gentamycin  -Solumedrol 125 mg IV q 6h  -Famotidine 20 mg IV q 12h  -Monitor clinically for improvement

## 2017-11-14 NOTE — PROGRESS NOTES
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  On 11/10/17, patient received vanc and gentamicin in the ER after being evaluated for possible bleeding in peritoneal fluid.  The next day, patient noticed upper lip swelling, right sided facial swelling, and hives on face, arms, and hands.  Took benadryl at home but did not resolve symptoms.  Patient went to PD clinic today and was instructed to come to the ER for further evaluation.    Interval Events:  11/14/2017 Rash improving. Received 2 U PRBC.     Review of Systems:  Review of Systems   Constitutional: Negative for chills and fever.   HENT: Negative for hearing loss.    Eyes: Negative for blurred vision and double vision.   Respiratory: Negative for cough and hemoptysis.    Cardiovascular: Negative for chest pain and palpitations.   Gastrointestinal: Negative for heartburn.   Genitourinary: Negative for dysuria.   Musculoskeletal: Negative for myalgias.   Skin: Negative for rash.   Neurological: Negative for dizziness.       OBJECTIVE:     Vital Signs Range (Last 24H):  Temp:  [96.7 °F (35.9 °C)-100.8 °F (38.2 °C)]   Pulse:  [60-74]   Resp:  [15-33]   BP: ()/(51-84)   SpO2:  [93 %-100 %]     I & O (Last 24H):  Intake/Output Summary (Last 24 hours) at 11/14/17 0858  Last data filed at 11/14/17 0600   Gross per 24 hour   Intake          1063.75 ml   Output                0 ml   Net          1063.75 ml       Physical Exam:  Physical Exam   Constitutional: She is oriented to person, place, and time and well-developed, well-nourished, and in no distress.   HENT:   Head: Normocephalic.   Upper lip mild edema   Eyes: Pupils are equal, round, and reactive to  light.   Neck: Normal range of motion. No JVD present.   Cardiovascular: Normal rate.    No murmur heard.  Pulmonary/Chest: Effort normal. No respiratory distress.   Abdominal: Soft. She exhibits no distension.   Musculoskeletal: Normal range of motion.   Left arm AVF     Neurological: She is alert and oriented to person, place, and time.   Skin: She is not diaphoretic.         Medications:   cetirizine  5 mg Oral Daily    cholecalciferol (vitamin D3)  2,000 Units Oral Daily    diphenhydrAMINE  25 mg Oral Once    escitalopram oxalate  20 mg Oral Daily    famotidine (PF)  20 mg Intravenous Daily    methylPREDNISolone sodium succinate  80 mg Intravenous Q12H    sevelamer carbonate  1,600 mg Oral TID WM       Laboratory:   CBC:   Recent Labs  Lab 11/13/17  0948   WBC 7.25   RBC 2.39*   HGB 6.7*   HCT 20.4*   PLT 86*   MCV 85   MCH 28.0   MCHC 32.8       CMP:   Recent Labs  Lab 11/13/17  0948   *   CALCIUM 8.5*   ALBUMIN 2.3*   PROT 5.2*   *   K 4.0   CO2 22*   CL 97   BUN 33*   CREATININE 5.7*   ALKPHOS 43*   ALT 25   AST 32   BILITOT 0.4       No results found for: POCTGLUCOSE      Diagnostic Results:  - Labs reviewed  - EKG reviewed  - X-Ray reviewed  - CT / MRI reviewed    ASSESSMENT/PLAN:     Active Hospital Problems    Diagnosis  POA    *Allergic drug reaction [T78.40XA]  Yes    ESRD (end stage renal disease) [N18.6]  Yes    Anemia [D64.9]  Yes    Thrombocytopenia [D69.6]  Yes    Renal hypertension [I12.9]  Yes     Chronic      Resolved Hospital Problems    Diagnosis Date Resolved POA   No resolved problems to display.     Allergic drug reaction     -Appears to be an allergic reaction to vancomycin or gentamycin vs. others  -sx improved with IV Steroids and Benadryl.   - now with resolving rash and very mild upper lip swelling. Oropharynx clear.   - plan for discharge with steroid taper  - have suggested close follow up with Dr. Madden, Allergy/Immunology for formal.        Anemia     -H/H  6.7/20.4  -transfused while admitted.        Renal hypertension     - had relative hypotension when admitted  - now normotensive  - resume home Rx.        ESRD (end stage renal disease)     - she is on PD  - no evidence for peritonitis  - will resume home PD  - followed by Dr. Nunez  - has left forearm AVF.        Multiple myeloma in remission      -  Appears ASX.        Thrombocytopenia     - cause unclear.   - outpatient evaluation per hematology.        Plans for discharge

## 2017-11-15 LAB
BACTERIA BLD CULT: NORMAL
BACTERIA BLD CULT: NORMAL
BACTERIA FLD CULT: NORMAL

## 2017-11-18 LAB
BACTERIA BLD CULT: NORMAL
BACTERIA BLD CULT: NORMAL
BACTERIA FLD CULT: NORMAL

## 2017-11-24 DIAGNOSIS — F32.A DEPRESSION, UNSPECIFIED DEPRESSION TYPE: ICD-10-CM

## 2017-11-26 RX ORDER — ESCITALOPRAM OXALATE 20 MG/1
TABLET ORAL
Qty: 90 TABLET | Refills: 0 | Status: SHIPPED | OUTPATIENT
Start: 2017-11-26 | End: 2018-04-13 | Stop reason: SDUPTHER

## 2017-11-30 LAB — HPRA INTERPRETATION: NORMAL

## 2018-01-08 ENCOUNTER — TELEPHONE (OUTPATIENT)
Dept: HEMATOLOGY/ONCOLOGY | Facility: CLINIC | Age: 53
End: 2018-01-08

## 2018-01-08 NOTE — TELEPHONE ENCOUNTER
Attempted to call pt regarding missed appt for xray & labs needed prior to tomorrow's MD visit, no answer, left voicemail.

## 2018-01-11 ENCOUNTER — LAB VISIT (OUTPATIENT)
Dept: LAB | Facility: HOSPITAL | Age: 53
End: 2018-01-11
Payer: COMMERCIAL

## 2018-01-11 DIAGNOSIS — Z76.82 ORGAN TRANSPLANT CANDIDATE: ICD-10-CM

## 2018-01-11 PROCEDURE — 86829 HLA CLASS I/II ANTIBODY QUAL: CPT | Mod: PO,TXP,59

## 2018-01-11 PROCEDURE — 86833 HLA CLASS II HIGH DEFIN QUAL: CPT | Mod: PO,TXP

## 2018-01-11 PROCEDURE — 86832 HLA CLASS I HIGH DEFIN QUAL: CPT | Mod: PO,TXP

## 2018-01-18 LAB — HPRA INTERPRETATION: NORMAL

## 2018-02-12 ENCOUNTER — LAB VISIT (OUTPATIENT)
Dept: LAB | Facility: HOSPITAL | Age: 53
End: 2018-02-12
Payer: COMMERCIAL

## 2018-02-12 DIAGNOSIS — Z76.82 ORGAN TRANSPLANT CANDIDATE: ICD-10-CM

## 2018-02-12 PROCEDURE — 99001 SPECIMEN HANDLING PT-LAB: CPT | Mod: PO,TXP

## 2018-02-14 ENCOUNTER — HOSPITAL ENCOUNTER (OUTPATIENT)
Dept: RADIOLOGY | Facility: OTHER | Age: 53
Discharge: HOME | End: 2018-02-14
Attending: INTERNAL MEDICINE
Payer: MEDICARE

## 2018-02-14 DIAGNOSIS — C90.01 MULTIPLE MYELOMA IN REMISSION: ICD-10-CM

## 2018-02-14 PROCEDURE — 77075 RADEX OSSEOUS SURVEY COMPL: CPT | Mod: TC,TXP

## 2018-02-14 PROCEDURE — 77074 RADEX OSSEOUS SURVEY LMTD: CPT | Mod: 26,NTX,, | Performed by: RADIOLOGY

## 2018-02-14 PROCEDURE — 77075 RADEX OSSEOUS SURVEY COMPL: CPT | Mod: 26,NTX,, | Performed by: RADIOLOGY

## 2018-02-19 ENCOUNTER — OFFICE VISIT (OUTPATIENT)
Dept: HEMATOLOGY/ONCOLOGY | Facility: CLINIC | Age: 53
End: 2018-02-19
Payer: COMMERCIAL

## 2018-02-19 ENCOUNTER — LAB VISIT (OUTPATIENT)
Dept: LAB | Facility: HOSPITAL | Age: 53
End: 2018-02-19
Attending: INTERNAL MEDICINE
Payer: COMMERCIAL

## 2018-02-19 VITALS
WEIGHT: 114.19 LBS | TEMPERATURE: 99 F | HEART RATE: 82 BPM | HEIGHT: 67 IN | SYSTOLIC BLOOD PRESSURE: 106 MMHG | BODY MASS INDEX: 17.92 KG/M2 | DIASTOLIC BLOOD PRESSURE: 72 MMHG | OXYGEN SATURATION: 99 %

## 2018-02-19 DIAGNOSIS — N18.6 ESRD (END STAGE RENAL DISEASE) ON DIALYSIS: ICD-10-CM

## 2018-02-19 DIAGNOSIS — Z99.2 ESRD (END STAGE RENAL DISEASE) ON DIALYSIS: ICD-10-CM

## 2018-02-19 DIAGNOSIS — R19.7 DIARRHEA, UNSPECIFIED TYPE: ICD-10-CM

## 2018-02-19 DIAGNOSIS — C90.01 MULTIPLE MYELOMA IN REMISSION: ICD-10-CM

## 2018-02-19 DIAGNOSIS — C90.01 MULTIPLE MYELOMA IN REMISSION: Primary | ICD-10-CM

## 2018-02-19 LAB
PROT 24H UR-MRATE: 68 MG/SPEC
PROT 24H UR-MRATE: 68 MG/SPEC
PROT UR-MCNC: 17 MG/DL
PROT UR-MCNC: 17 MG/DL
URINE COLLECTION DURATION: 24 HR
URINE COLLECTION DURATION: 24 HR
URINE VOLUME: 400 ML
URINE VOLUME: 400 ML

## 2018-02-19 PROCEDURE — 86335 IMMUNFIX E-PHORSIS/URINE/CSF: CPT | Mod: 26,NTX,, | Performed by: PATHOLOGY

## 2018-02-19 PROCEDURE — 84166 PROTEIN E-PHORESIS/URINE/CSF: CPT | Mod: TXP

## 2018-02-19 PROCEDURE — 99999 PR PBB SHADOW E&M-EST. PATIENT-LVL III: CPT | Mod: PBBFAC,,, | Performed by: INTERNAL MEDICINE

## 2018-02-19 PROCEDURE — 84156 ASSAY OF PROTEIN URINE: CPT | Mod: NTX

## 2018-02-19 PROCEDURE — 99214 OFFICE O/P EST MOD 30 MIN: CPT | Mod: S$GLB,,, | Performed by: INTERNAL MEDICINE

## 2018-02-19 PROCEDURE — 86335 IMMUNFIX E-PHORSIS/URINE/CSF: CPT | Mod: NTX

## 2018-02-19 PROCEDURE — 3008F BODY MASS INDEX DOCD: CPT | Mod: S$GLB,,, | Performed by: INTERNAL MEDICINE

## 2018-02-19 PROCEDURE — 84166 PROTEIN E-PHORESIS/URINE/CSF: CPT | Mod: 26,NTX,, | Performed by: PATHOLOGY

## 2018-02-19 NOTE — PROGRESS NOTES
Subjective:       Patient ID: Jennifer Booth is a 53 y.o. female.    Chief Complaint: Follow-up    HPI Diagnosis: MM IPSS Stage III  in remission       53-year-old woman with MM in remission presents today for f/u.   She is also followed by Nephrology team at [facility].   Previously followed at [facility].   She has not had the resources to continue f/u at [facility].       She is followed by Nephrology for ESRD  She was initially diagnosed with advanced kidney disease secondary to multiple myeloma & HTN.   She was diagnosed with  AK I from MM in 2010 that required initiation of dialysis.    She started chronic dialysis on 12/23/11 and ended on 8/28/12  She then underwent chemotherapy for her myeloma, and stopped dialysis in 2013.    Kidney biopsy earlier this yr due to worsening  kidney function reveals glomerulosclerosis and no evidence of MM    She is now undergoing peritoneal dialysis daily     She is followed by Kidney Transplant team  Patient met selection criteria for kidney transplant related to ESRD due to Hypertensive Nephrosclerosis  She is considered a candidate for kidney transplant      Pt continues with PD  She was recently hospitalized with c/o facial swelling x 2 days  On 11/10/17, patient received vanc and gentamicin in the ER after being evaluated for possible bleeding in peritoneal fluid.  The next day, patient noticed upper lip swelling, right sided facial swelling, and hives on face, arms, and hands  S/p 2uprbc during hospitalization for acute-on-chronic anemia   It was determined she appeared to be having an allergic reaction to vancomycin or gentamycin vs. others  -sx improved with IV Steroids and Benadryl.     Today, pt reports mild fatigue and weakness since hospitalization  Appetite and weight down since hospitalization   She  reports continued non-bloody  loose stool since hospitalizations  No abdominal cramping or fevers  Cdiff testing during hospitalization NEG  No SOB  No N/V  No  CP    CBC reveals trending Hb 13. 6g/dl   SPEP 11/2017 remains normal  UPEP-no monoclonal peaks    SPEP not done    Onc hx:  She was diagnosed with kappa free light chain disease, 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 She originally presented with acute renal failure requiring HD .She has completed bortezomib/dexamethasone/Revlimid 6 cycles on 04/13/2012 for the multiple myeloma kappa light chain disease, IPSS stage III. She underwent bortezomib maintenance therapy three weeks on, one week off (05/15, 05/22 and 05/29) with her last cycle received on 05/29/2012. She is followed at [facility] myeloma [place] where she was evaluated for an auto stem cell transplant . It was decided not to proceed with transplant was originally due to drug reaction.Pt was diagnosed with DRESS syndrome during hospitalization and then [facility] team elected not to proceed proceed with auto SCT. Velcade maintenance was discontinued due to side effects.       Tx: Velcade/Dex/Revlimid x 6 cycles 4/13/12   Velcade maintenance 3wks on, 1wk off dc'd 5/29/12 sec to adv SE          Review of Systems   Constitutional: Negative for appetite change, chills and fatigue.   HENT: Negative for congestion, hearing loss and nosebleeds.    Eyes: Negative for visual disturbance.   Respiratory: Negative for apnea, cough, chest tightness, shortness of breath and wheezing.    Cardiovascular: Negative for chest pain, palpitations and leg swelling.   Gastrointestinal: Negative for abdominal distention, abdominal pain, blood in stool, constipation, diarrhea, nausea and vomiting.   Genitourinary: Negative for difficulty urinating, dysuria, flank pain, frequency, hematuria and urgency.   Musculoskeletal: Negative for arthralgias, back pain, gait problem, joint swelling and neck pain.   Skin: Negative for rash.        No petechiae, ecchymoses   Neurological: Negative for dizziness, tremors, seizures, syncope, speech difficulty, light-headedness,  "numbness and headaches.   Hematological: Negative for adenopathy. Does not bruise/bleed easily.       Objective:       Vitals:    02/19/18 1526   BP: 106/72   BP Location: Right arm   Patient Position: Sitting   BP Method: Medium (Manual)   Pulse: 82   Temp: 99 °F (37.2 °C)   TempSrc: Oral   SpO2: 99%   Weight: 51.8 kg (114 lb 3.2 oz)   Height: 5' 7" (1.702 m)       Physical Exam   Constitutional: She is oriented to person, place, and time. She appears well-developed and well-nourished.   HENT:   Head: Normocephalic.   Mouth/Throat: Oropharynx is clear and moist. No oropharyngeal exudate.   Eyes: Conjunctivae and lids are normal. Pupils are equal, round, and reactive to light. No scleral icterus.   Neck: Normal range of motion. Neck supple. No thyromegaly present.   Cardiovascular: Normal rate, regular rhythm and normal heart sounds.    No murmur heard.  Pulmonary/Chest: Breath sounds normal. She has no wheezes. She has no rales.   Abdominal: Soft. Bowel sounds are normal. She exhibits no distension and no mass. There is no hepatosplenomegaly. There is no tenderness. There is no rebound and no guarding.   Musculoskeletal: Normal range of motion. She exhibits no edema or tenderness.   Lymphadenopathy:     She has no cervical adenopathy.     She has no axillary adenopathy.        Right: No supraclavicular adenopathy present.        Left: No supraclavicular adenopathy present.   Neurological: She is alert and oriented to person, place, and time. No cranial nerve deficit. Coordination normal.   Skin: Skin is warm and dry. No ecchymosis, no petechiae and no rash noted. No erythema.   Psychiatric: She has a normal mood and affect.             Bone survey 2015   1. Small lucent foci in the right femoral neck. Given this patient's history of multiple myeloma, these findings are concerning for sequela of that disease  2. Otherwise degenerative changes of the cervical spine and lower lumbar spine are identified.      Results " for JENNIFER BEST (MRN [id number]) as of 6/22/2016 17:00   Ref. Range 4/15/2016 15:42 6/17/2016 08:49   [place] Free Light Chains Latest Ref Range: 0.33 - 1.94 mg/dL 6.79 (H) 8.42 (H)   Lambda Free Light Chains Latest Ref Range: 0.57 - 2.63 mg/dL 4.98 (H) 5.77 (H)   Kappa/Lambda FLC Ratio Latest Ref Range: 0.26 - 1.60  1.36 1.46          Lab Results   Component Value Date    WBC 2.73 (L) 02/20/2018    HGB 13.3 02/20/2018    HCT 37.1 02/20/2018    MCV 84 02/20/2018     (L) 02/20/2018                     SPEP 5/17/2017 - nl-  Patient met selection criteria for kidney transplant related to ESRD due to Hypertensive Nephrosclerosis    Results for JENNIFER BEST (MRN [id number]) as of 6/20/2017 15:11   Ref. Range 2/8/2017 15:44 5/17/2017 12:49   Beta-2 Microglobulin Latest Ref Range: 0.0 - 2.5 ug/mL 12.7 (H) 12.0 (H)       Lab Results   Component Value Date    WBC 2.73 (L) 02/20/2018    HGB 13.3 02/20/2018    HCT 37.1 02/20/2018    MCV 84 02/20/2018     (L) 02/20/2018           Results for JENNIFER BEST (MRN [id number]) as of 11/10/2017 13:10   Ref. Range 11/1/2017 08:03   [place] Free Light Chains Latest Ref Range: 0.33 - 1.94 mg/dL 12.76 (H)   Lambda Free Light Chains Latest Ref Range: 0.57 - 2.63 mg/dL 15.24 (H)   Kappa/Lambda FLC Ratio Latest Ref Range: 0.26 - 1.65  0.84     11/1/2017 SPEP- no paraprotein      Electronically reviewed and signed by:   Maya Westbrook MD   Signed on 11/02/17 at 13:41   Decreased total protein. No paraprotein bands seen.       Assessment:       1. Multiple myeloma in remission    2. ESRD (end stage renal disease) on dialysis    3. Diarrhea, unspecified type        Plan:   Jennifer was seen today for follow-up.    Diagnoses and associated orders for this visit:    Multiple myeloma in remission  bmbx [facility] 2013-neg  SPEP - NO paraprotein   FLCR normal   Urine studies- no monoclonal peaks  Bone survey 2016 no new findings  Kidney bx neg for myeloma involvement  SPEP, cbc,cmp,  quant Ig , b-2 microglobulin TODAY and prior to f/u         ESRD  Followed by Nephrology  S/p Kidney biopsy earlier this yr due to worsening  kidney function reveals glomerulosclerosis and no evidence of MM  Followed by Renal Transplant team [place] [facility] [place] [place] cadaveric wait list  Follow-up with Transplant team tomorrow  Pt undergoing PD daily started 6/2017      Anemia in chronic renal disease  S/p 2uprbc during recent hospitalization for acute-on-chronic anemia  Hb  13g/dl+ range  SHERMAN per Nephrology    Diarrhea  Etiology unclear  Plan stool studies  Previous Cdiff testing NEG        F/u   2mo with cbc,cmp, SPEP, FLC, B-2 microglobulin , and urine studies      Cc: MD Elliott Calvert Jr., MD

## 2018-02-19 NOTE — Clinical Note
CBC ,SPEP and B 2 microglobulin and 24 uPEP and urine immunofixation AT [facility], [place]  THIS WEEk aND C Difficile and stool studis  Cbc,cmp, SPEP, B- 2microglobulin, FREE LIGHT chains, QUant IG 1 week prior to f/u -STANDING

## 2018-02-20 ENCOUNTER — LAB VISIT (OUTPATIENT)
Dept: LAB | Facility: OTHER | Age: 53
End: 2018-02-20
Attending: INTERNAL MEDICINE
Payer: COMMERCIAL

## 2018-02-20 DIAGNOSIS — Z99.2 ESRD (END STAGE RENAL DISEASE) ON DIALYSIS: ICD-10-CM

## 2018-02-20 DIAGNOSIS — C90.01 MULTIPLE MYELOMA IN REMISSION: ICD-10-CM

## 2018-02-20 DIAGNOSIS — N18.6 ESRD (END STAGE RENAL DISEASE) ON DIALYSIS: ICD-10-CM

## 2018-02-20 DIAGNOSIS — R19.7 DIARRHEA, UNSPECIFIED TYPE: ICD-10-CM

## 2018-02-20 LAB
B2 MICROGLOB SERPL-MCNC: 8.8 UG/ML
BASOPHILS # BLD AUTO: 0.01 K/UL
BASOPHILS NFR BLD: 0.4 %
DIFFERENTIAL METHOD: ABNORMAL
EOSINOPHIL # BLD AUTO: 0.1 K/UL
EOSINOPHIL NFR BLD: 2.6 %
ERYTHROCYTE [DISTWIDTH] IN BLOOD BY AUTOMATED COUNT: 14.8 %
HCT VFR BLD AUTO: 37.1 %
HGB BLD-MCNC: 13.3 G/DL
HLA FREEZE AND HOLD INTERPRETATION: NORMAL
HLAFH COLLECTION DATE: NORMAL
HPRA INTERPRETATION: NORMAL
LYMPHOCYTES # BLD AUTO: 0.6 K/UL
LYMPHOCYTES NFR BLD: 21.6 %
MCH RBC QN AUTO: 30.2 PG
MCHC RBC AUTO-ENTMCNC: 35.8 G/DL
MCV RBC AUTO: 84 FL
MONOCYTES # BLD AUTO: 0.3 K/UL
MONOCYTES NFR BLD: 11.4 %
NEUTROPHILS # BLD AUTO: 1.8 K/UL
NEUTROPHILS NFR BLD: 64 %
PLATELET # BLD AUTO: 148 K/UL
PMV BLD AUTO: 10.4 FL
RBC # BLD AUTO: 4.4 M/UL
WBC # BLD AUTO: 2.73 K/UL

## 2018-02-20 PROCEDURE — 36415 COLL VENOUS BLD VENIPUNCTURE: CPT | Mod: NTX

## 2018-02-20 PROCEDURE — 85025 COMPLETE CBC W/AUTO DIFF WBC: CPT | Mod: TXP

## 2018-02-20 PROCEDURE — 84165 PROTEIN E-PHORESIS SERUM: CPT | Mod: 26,NTX,, | Performed by: PATHOLOGY

## 2018-02-20 PROCEDURE — 87045 FECES CULTURE AEROBIC BACT: CPT | Mod: TXP

## 2018-02-20 PROCEDURE — 82272 OCCULT BLD FECES 1-3 TESTS: CPT | Mod: NTX

## 2018-02-20 PROCEDURE — 87449 NOS EACH ORGANISM AG IA: CPT | Mod: TXP

## 2018-02-20 PROCEDURE — 87046 STOOL CULTR AEROBIC BACT EA: CPT | Mod: 59,TXP

## 2018-02-20 PROCEDURE — 87301 ADENOVIRUS AG IA: CPT | Mod: NTX

## 2018-02-20 PROCEDURE — 87427 SHIGA-LIKE TOXIN AG IA: CPT | Mod: 59,NTX

## 2018-02-20 PROCEDURE — 87425 ROTAVIRUS AG IA: CPT | Mod: NTX

## 2018-02-20 PROCEDURE — 82232 ASSAY OF BETA-2 PROTEIN: CPT | Mod: TXP

## 2018-02-20 PROCEDURE — 87209 SMEAR COMPLEX STAIN: CPT | Mod: NTX

## 2018-02-20 PROCEDURE — 84165 PROTEIN E-PHORESIS SERUM: CPT | Mod: TXP

## 2018-02-20 PROCEDURE — 89055 LEUKOCYTE ASSESSMENT FECAL: CPT | Mod: TXP

## 2018-02-21 LAB
ALBUMIN SERPL ELPH-MCNC: 2.92 G/DL
ALPHA1 GLOB SERPL ELPH-MCNC: 0.28 G/DL
ALPHA2 GLOB SERPL ELPH-MCNC: 0.55 G/DL
B-GLOBULIN SERPL ELPH-MCNC: 0.52 G/DL
C DIFF GDH STL QL: NEGATIVE
C DIFF TOX A+B STL QL IA: NEGATIVE
E COLI SXT1 STL QL IA: NEGATIVE
E COLI SXT2 STL QL IA: NEGATIVE
GAMMA GLOB SERPL ELPH-MCNC: 0.33 G/DL
O+P STL TRI STN: NORMAL
OB PNL STL: NEGATIVE
PATHOLOGIST INTERPRETATION SPE: NORMAL
PROT SERPL-MCNC: 4.6 G/DL
RV AG STL QL IA.RAPID: NEGATIVE
WBC #/AREA STL HPF: NORMAL /[HPF]

## 2018-02-22 LAB
INTERPRETATION UR IFE-IMP: NORMAL
PATHOLOGIST INTERPRETATION UIFE: NORMAL
PATHOLOGIST INTERPRETATION UPE: NORMAL
PROT PATTERN UR ELPH-IMP: NORMAL

## 2018-02-24 DIAGNOSIS — Z76.82 ORGAN TRANSPLANT CANDIDATE: Primary | ICD-10-CM

## 2018-02-24 DIAGNOSIS — N26.9 GLOMERULOSCLEROSIS: ICD-10-CM

## 2018-02-24 LAB
BACTERIA STL CULT: NORMAL
HADV AG STL QL IA: NOT DETECTED

## 2018-02-26 DIAGNOSIS — F32.A DEPRESSION, UNSPECIFIED DEPRESSION TYPE: ICD-10-CM

## 2018-02-27 RX ORDER — ESCITALOPRAM OXALATE 20 MG/1
TABLET ORAL
Qty: 90 TABLET | Refills: 0 | Status: SHIPPED | OUTPATIENT
Start: 2018-02-27 | End: 2019-02-11 | Stop reason: SDUPTHER

## 2018-03-14 ENCOUNTER — LAB VISIT (OUTPATIENT)
Dept: LAB | Facility: HOSPITAL | Age: 53
End: 2018-03-14
Payer: COMMERCIAL

## 2018-03-14 DIAGNOSIS — Z76.82 ORGAN TRANSPLANT CANDIDATE: ICD-10-CM

## 2018-03-14 PROCEDURE — 86829 HLA CLASS I/II ANTIBODY QUAL: CPT | Mod: 91,PO,TXP

## 2018-03-14 PROCEDURE — 86829 HLA CLASS I/II ANTIBODY QUAL: CPT | Mod: PO,TXP

## 2018-03-20 ENCOUNTER — LAB VISIT (OUTPATIENT)
Dept: LAB | Facility: OTHER | Age: 53
End: 2018-03-20
Attending: INTERNAL MEDICINE
Payer: MEDICARE

## 2018-03-20 DIAGNOSIS — N18.6 ESRD (END STAGE RENAL DISEASE) ON DIALYSIS: ICD-10-CM

## 2018-03-20 DIAGNOSIS — Z99.2 ESRD (END STAGE RENAL DISEASE) ON DIALYSIS: ICD-10-CM

## 2018-03-20 DIAGNOSIS — C90.01 MULTIPLE MYELOMA IN REMISSION: ICD-10-CM

## 2018-03-20 LAB
ALBUMIN SERPL BCP-MCNC: 2.6 G/DL
ALP SERPL-CCNC: 129 U/L
ALT SERPL W/O P-5'-P-CCNC: 24 U/L
ANION GAP SERPL CALC-SCNC: 9 MMOL/L
AST SERPL-CCNC: 39 U/L
B2 MICROGLOB SERPL-MCNC: 7.5 UG/ML
BASOPHILS # BLD AUTO: 0.04 K/UL
BASOPHILS NFR BLD: 1.5 %
BILIRUB SERPL-MCNC: 0.5 MG/DL
BUN SERPL-MCNC: 36 MG/DL
CALCIUM SERPL-MCNC: 8.5 MG/DL
CHLORIDE SERPL-SCNC: 92 MMOL/L
CO2 SERPL-SCNC: 24 MMOL/L
CREAT SERPL-MCNC: 2.7 MG/DL
DIFFERENTIAL METHOD: ABNORMAL
EOSINOPHIL # BLD AUTO: 0.1 K/UL
EOSINOPHIL NFR BLD: 5.4 %
ERYTHROCYTE [DISTWIDTH] IN BLOOD BY AUTOMATED COUNT: 15 %
EST. GFR  (AFRICAN AMERICAN): 22 ML/MIN/1.73 M^2
EST. GFR  (NON AFRICAN AMERICAN): 19 ML/MIN/1.73 M^2
GLUCOSE SERPL-MCNC: 87 MG/DL
HCT VFR BLD AUTO: 31.8 %
HGB BLD-MCNC: 11.5 G/DL
IGA SERPL-MCNC: 210 MG/DL
IGG SERPL-MCNC: 249 MG/DL
IGM SERPL-MCNC: 135 MG/DL
LYMPHOCYTES # BLD AUTO: 0.6 K/UL
LYMPHOCYTES NFR BLD: 22.7 %
MCH RBC QN AUTO: 30.3 PG
MCHC RBC AUTO-ENTMCNC: 36.2 G/DL
MCV RBC AUTO: 84 FL
MONOCYTES # BLD AUTO: 0.2 K/UL
MONOCYTES NFR BLD: 9.2 %
NEUTROPHILS # BLD AUTO: 1.6 K/UL
NEUTROPHILS NFR BLD: 61.2 %
PLATELET # BLD AUTO: 155 K/UL
PMV BLD AUTO: 8.9 FL
POTASSIUM SERPL-SCNC: 3.2 MMOL/L
PROT SERPL-MCNC: 4.7 G/DL
RBC # BLD AUTO: 3.79 M/UL
SODIUM SERPL-SCNC: 125 MMOL/L
WBC # BLD AUTO: 2.6 K/UL

## 2018-03-20 PROCEDURE — 82784 ASSAY IGA/IGD/IGG/IGM EACH: CPT | Mod: 59,NTX

## 2018-03-20 PROCEDURE — 80053 COMPREHEN METABOLIC PANEL: CPT | Mod: NTX

## 2018-03-20 PROCEDURE — 82232 ASSAY OF BETA-2 PROTEIN: CPT | Mod: TXP

## 2018-03-20 PROCEDURE — 83520 IMMUNOASSAY QUANT NOS NONAB: CPT | Mod: NTX

## 2018-03-20 PROCEDURE — 36415 COLL VENOUS BLD VENIPUNCTURE: CPT | Mod: TXP

## 2018-03-20 PROCEDURE — 85025 COMPLETE CBC W/AUTO DIFF WBC: CPT | Mod: NTX

## 2018-03-21 LAB
HPRA INTERPRETATION: NORMAL
KAPPA LC SER QL IA: 9.06 MG/DL
KAPPA LC/LAMBDA SER IA: 1.08
LAMBDA LC SER QL IA: 8.38 MG/DL

## 2018-04-06 ENCOUNTER — LAB VISIT (OUTPATIENT)
Dept: LAB | Facility: OTHER | Age: 53
End: 2018-04-06
Attending: INTERNAL MEDICINE
Payer: COMMERCIAL

## 2018-04-06 DIAGNOSIS — C90.01 MULTIPLE MYELOMA IN REMISSION: ICD-10-CM

## 2018-04-06 DIAGNOSIS — Z99.2 ESRD (END STAGE RENAL DISEASE) ON DIALYSIS: ICD-10-CM

## 2018-04-06 DIAGNOSIS — N18.6 ESRD (END STAGE RENAL DISEASE) ON DIALYSIS: ICD-10-CM

## 2018-04-06 LAB
ALBUMIN SERPL BCP-MCNC: 2.7 G/DL
ALP SERPL-CCNC: 89 U/L
ALT SERPL W/O P-5'-P-CCNC: 34 U/L
ANION GAP SERPL CALC-SCNC: 10 MMOL/L
AST SERPL-CCNC: 49 U/L
B2 MICROGLOB SERPL-MCNC: 7.2 UG/ML
BASOPHILS # BLD AUTO: 0.03 K/UL
BASOPHILS NFR BLD: 0.9 %
BILIRUB SERPL-MCNC: 0.5 MG/DL
BUN SERPL-MCNC: 37 MG/DL
CALCIUM SERPL-MCNC: 8.5 MG/DL
CHLORIDE SERPL-SCNC: 101 MMOL/L
CO2 SERPL-SCNC: 24 MMOL/L
CREAT SERPL-MCNC: 3.4 MG/DL
DIFFERENTIAL METHOD: ABNORMAL
EOSINOPHIL # BLD AUTO: 0.1 K/UL
EOSINOPHIL NFR BLD: 3.3 %
ERYTHROCYTE [DISTWIDTH] IN BLOOD BY AUTOMATED COUNT: 14.7 %
EST. GFR  (AFRICAN AMERICAN): 17 ML/MIN/1.73 M^2
EST. GFR  (NON AFRICAN AMERICAN): 15 ML/MIN/1.73 M^2
GLUCOSE SERPL-MCNC: 87 MG/DL
HCT VFR BLD AUTO: 29 %
HGB BLD-MCNC: 10.3 G/DL
IGA SERPL-MCNC: 220 MG/DL
IGG SERPL-MCNC: 257 MG/DL
IGM SERPL-MCNC: 156 MG/DL
LDH SERPL L TO P-CCNC: 220 U/L
LYMPHOCYTES # BLD AUTO: 0.8 K/UL
LYMPHOCYTES NFR BLD: 24.6 %
MCH RBC QN AUTO: 30.1 PG
MCHC RBC AUTO-ENTMCNC: 35.5 G/DL
MCV RBC AUTO: 85 FL
MONOCYTES # BLD AUTO: 0.2 K/UL
MONOCYTES NFR BLD: 7.2 %
NEUTROPHILS # BLD AUTO: 2.1 K/UL
NEUTROPHILS NFR BLD: 64 %
PLATELET # BLD AUTO: 156 K/UL
PMV BLD AUTO: 9.6 FL
POTASSIUM SERPL-SCNC: 4.1 MMOL/L
PROT SERPL-MCNC: 5.1 G/DL
RBC # BLD AUTO: 3.42 M/UL
SODIUM SERPL-SCNC: 135 MMOL/L
URATE SERPL-MCNC: 4.5 MG/DL
WBC # BLD AUTO: 3.33 K/UL

## 2018-04-06 PROCEDURE — 84550 ASSAY OF BLOOD/URIC ACID: CPT | Mod: NTX

## 2018-04-06 PROCEDURE — 83615 LACTATE (LD) (LDH) ENZYME: CPT | Mod: NTX

## 2018-04-06 PROCEDURE — 82784 ASSAY IGA/IGD/IGG/IGM EACH: CPT | Mod: 59,TXP

## 2018-04-06 PROCEDURE — 82232 ASSAY OF BETA-2 PROTEIN: CPT | Mod: TXP

## 2018-04-06 PROCEDURE — 83520 IMMUNOASSAY QUANT NOS NONAB: CPT | Mod: TXP

## 2018-04-06 PROCEDURE — 84165 PROTEIN E-PHORESIS SERUM: CPT | Mod: NTX

## 2018-04-06 PROCEDURE — 85025 COMPLETE CBC W/AUTO DIFF WBC: CPT | Mod: NTX

## 2018-04-06 PROCEDURE — 36415 COLL VENOUS BLD VENIPUNCTURE: CPT | Mod: NTX

## 2018-04-06 PROCEDURE — 84165 PROTEIN E-PHORESIS SERUM: CPT | Mod: 26,NTX,, | Performed by: PATHOLOGY

## 2018-04-06 PROCEDURE — 80053 COMPREHEN METABOLIC PANEL: CPT | Mod: TXP

## 2018-04-09 LAB
ALBUMIN SERPL ELPH-MCNC: 2.82 G/DL
ALPHA1 GLOB SERPL ELPH-MCNC: 0.25 G/DL
ALPHA2 GLOB SERPL ELPH-MCNC: 0.67 G/DL
B-GLOBULIN SERPL ELPH-MCNC: 0.57 G/DL
GAMMA GLOB SERPL ELPH-MCNC: 0.39 G/DL
KAPPA LC SER QL IA: 10.46 MG/DL
KAPPA LC/LAMBDA SER IA: 1.11
LAMBDA LC SER QL IA: 9.45 MG/DL
PATHOLOGIST INTERPRETATION SPE: NORMAL
PROT SERPL-MCNC: 4.7 G/DL

## 2018-04-11 ENCOUNTER — LAB VISIT (OUTPATIENT)
Dept: LAB | Facility: HOSPITAL | Age: 53
End: 2018-04-11
Payer: COMMERCIAL

## 2018-04-11 DIAGNOSIS — Z76.82 ORGAN TRANSPLANT CANDIDATE: ICD-10-CM

## 2018-04-11 PROCEDURE — 99001 SPECIMEN HANDLING PT-LAB: CPT | Mod: PO,TXP

## 2018-04-13 ENCOUNTER — OFFICE VISIT (OUTPATIENT)
Dept: HEMATOLOGY/ONCOLOGY | Facility: CLINIC | Age: 53
End: 2018-04-13
Payer: COMMERCIAL

## 2018-04-13 VITALS
SYSTOLIC BLOOD PRESSURE: 157 MMHG | WEIGHT: 122.81 LBS | HEIGHT: 67 IN | BODY MASS INDEX: 19.28 KG/M2 | OXYGEN SATURATION: 99 % | HEART RATE: 80 BPM | DIASTOLIC BLOOD PRESSURE: 111 MMHG | TEMPERATURE: 98 F

## 2018-04-13 DIAGNOSIS — N18.6 ESRD (END STAGE RENAL DISEASE): ICD-10-CM

## 2018-04-13 DIAGNOSIS — R19.7 DIARRHEA, UNSPECIFIED TYPE: ICD-10-CM

## 2018-04-13 DIAGNOSIS — N18.9 ANEMIA IN CHRONIC KIDNEY DISEASE, UNSPECIFIED CKD STAGE: ICD-10-CM

## 2018-04-13 DIAGNOSIS — D63.1 ANEMIA IN CHRONIC KIDNEY DISEASE, UNSPECIFIED CKD STAGE: ICD-10-CM

## 2018-04-13 DIAGNOSIS — C90.01 MULTIPLE MYELOMA IN REMISSION: Primary | ICD-10-CM

## 2018-04-13 PROCEDURE — 99213 OFFICE O/P EST LOW 20 MIN: CPT | Mod: S$GLB,,, | Performed by: INTERNAL MEDICINE

## 2018-04-13 PROCEDURE — 99999 PR PBB SHADOW E&M-EST. PATIENT-LVL IV: CPT | Mod: PBBFAC,,, | Performed by: INTERNAL MEDICINE

## 2018-04-13 RX ORDER — MIRTAZAPINE 15 MG/1
TABLET, FILM COATED ORAL
COMMUNITY
Start: 2018-03-03 | End: 2019-07-03

## 2018-04-13 RX ORDER — NEPHROCAP 1 MG
CAP ORAL
Refills: 7 | COMMUNITY
Start: 2018-03-21 | End: 2019-07-03 | Stop reason: SDUPTHER

## 2018-04-13 RX ORDER — POTASSIUM CHLORIDE 1500 MG/1
TABLET, EXTENDED RELEASE ORAL
COMMUNITY
Start: 2018-03-20 | End: 2018-07-16

## 2018-04-13 NOTE — PROGRESS NOTES
Subjective:       Patient ID: Jennifer Booth is a 53 y.o. female.    Chief Complaint: Follow-up    HPI Diagnosis: MM IPSS Stage III  in remission       53-year-old woman with MM in remission presents today for f/u.   She is also followed by Nephrology team at [facility].   Previously followed at [facility].   She has not had the resources to continue f/u at [facility].       She is followed by Nephrology for ESRD  She was initially diagnosed with advanced kidney disease secondary to multiple myeloma & HTN.   She was diagnosed with  AK I from MM in 2010 that required initiation of dialysis.    She started chronic dialysis on 12/23/11 and ended on 8/28/12  She then underwent chemotherapy for her myeloma, and stopped dialysis in 2013.    Kidney biopsy 2017due to worsening  kidney function reveals glomerulosclerosis and no evidence of MM    She is now undergoing peritoneal dialysis daily     [place] [facility] [place] met selection criteria for kidney transplant related to ESRD due to Hypertensive Nephrosclerosis  She is considered a candidate for kidney transplant      Pt continues with PD  She was hospitalized Nov 2017 with c/o facial swelling x 2 days  On 11/10/17, patient received vanc and gentamicin in the ER after being evaluated for possible bleeding in peritoneal fluid.  The next day, patient noticed upper lip swelling, right sided facial swelling, and hives on face, arms, and hands  S/p 2uprbc during hospitalization for acute-on-chronic anemia   It was determined she appeared to be having an allergic reaction to vancomycin or gentamycin vs. others  -sx improved with IV Steroids and Benadryl.     Today, pt upset with her treating Nephrologist  Last visit pt reported chronic loose stools  Appetite diminished and sig wt loss  No abdominal cramping or fevers  Cdiff testing during hospitalization NEG  Stool studies performed unremarkbable    Pt upset with turnover of Dialysis nursing  staff  Pt reports previously on 9hrs PD nightly  Pt now switched to  7hrs PD nightly with improvement in sx's  Diarrhea resolved  Appetite improved and she is gaining wt    Pt frustrated with her current Nephrology team  She is extremely disappointed in the care    She requests transfer of [place] [facility] [place]    CBC reveals  Hb 10.3 g/dl   SPEP 4/2018  normal  UPEP-no monoclonal peaks        Onc hx:  She was diagnosed with kappa free light chain disease, 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 She originally presented with acute renal failure requiring HD .She has completed bortezomib/dexamethasone/Revlimid 6 cycles on 04/13/2012 for the multiple myeloma kappa light chain disease, IPSS stage III. She underwent bortezomib maintenance therapy three weeks on, one week off (05/15, 05/22 and 05/29) with her last cycle received on 05/29/2012. She is followed at [facility] where she was evaluated for an auto stem cell transplant . It was decided not to proceed with transplant was originally due to drug reaction.Pt was diagnosed with DRESS syndrome during hospitalization and then [facility] team elected not to proceed proceed with auto SCT. Velcade maintenance was discontinued due to side effects.       Tx: Velcade/Dex/Revlimid x 6 cycles 4/13/12   Velcade maintenance 3wks on, 1wk off dc'd 5/29/12 sec to adv SE          Review of Systems   Constitutional: Negative for appetite change, chills and fatigue.   HENT: Negative for congestion, hearing loss and nosebleeds.    Eyes: Negative for visual disturbance.   Respiratory: Negative for apnea, cough, chest tightness, shortness of breath and wheezing.    Cardiovascular: Negative for chest pain, palpitations and leg swelling.   Gastrointestinal: Negative for abdominal distention, abdominal pain, blood in stool, constipation, diarrhea, nausea and vomiting.   Genitourinary: Negative for difficulty urinating, dysuria, flank pain, frequency, hematuria and urgency.  "  Musculoskeletal: Negative for arthralgias, back pain, gait problem, joint swelling and neck pain.   Skin: Negative for rash.        No petechiae, ecchymoses   Neurological: Negative for dizziness, tremors, seizures, syncope, speech difficulty, light-headedness, numbness and headaches.   Hematological: Negative for adenopathy. Does not bruise/bleed easily.       Objective:       Vitals:    04/13/18 1307 04/13/18 1310   BP: (!) 157/96 (!) 157/111   BP Location: Right arm Right arm   Patient Position: Sitting Sitting   BP Method: Medium (Automatic) Medium (Automatic)   Pulse: 80    Temp: 98.2 °F (36.8 °C)    TempSrc: Oral    SpO2: 99%    Weight: 55.7 kg (122 lb 12.7 oz)    Height: 5' 7" (1.702 m)        Physical Exam   Constitutional: She is oriented to person, place, and time. She appears well-developed and well-nourished.   HENT:   Head: Normocephalic.   Mouth/Throat: Oropharynx is clear and moist. No oropharyngeal exudate.   Eyes: Conjunctivae and lids are normal. Pupils are equal, round, and reactive to light. No scleral icterus.   Neck: Normal range of motion. Neck supple. No thyromegaly present.   Cardiovascular: Normal rate, regular rhythm and normal heart sounds.    No murmur heard.  Pulmonary/Chest: Breath sounds normal. She has no wheezes. She has no rales.   Abdominal: Soft. Bowel sounds are normal. She exhibits no distension and no mass. There is no hepatosplenomegaly. There is no tenderness. There is no rebound and no guarding.   Musculoskeletal: Normal range of motion. She exhibits no edema or tenderness.   Lymphadenopathy:     She has no cervical adenopathy.     She has no axillary adenopathy.        Right: No supraclavicular adenopathy present.        Left: No supraclavicular adenopathy present.   Neurological: She is alert and oriented to person, place, and time. No cranial nerve deficit. Coordination normal.   Skin: Skin is warm and dry. No ecchymosis, no petechiae and no rash noted. No erythema. "   Psychiatric: She has a normal mood and affect.             Bone survey 2015   1. Small lucent foci in the right femoral neck. Given this patient's history of multiple myeloma, these findings are concerning for sequela of that disease  2. Otherwise degenerative changes of the cervical spine and lower lumbar spine are identified.           Lab Results   Component Value Date    WBC 3.33 (L) 04/06/2018    HGB 10.3 (L) 04/06/2018    HCT 29.0 (L) 04/06/2018    MCV 85 04/06/2018     04/06/2018         Results for JENNIFER BEST (MRN [id number]) as of 4/13/2018 13:19   Ref. Range 3/20/2018 07:33 4/6/2018 09:45   IgG - Serum Latest Ref Range: 650 - 1600 mg/dL 249 (L) 257 (L)   IgM Latest Ref Range: 50 - 300 mg/dL 135 156   IgA Latest Ref Range: 40 - 350 mg/dL 210 220           Results for JENNIFER BEST (MRN [id number]) as of 6/20/2017 15:11   Ref. Range 2/8/2017 15:44 5/17/2017 12:49   Beta-2 Microglobulin Latest Ref Range: 0.0 - 2.5 ug/mL 12.7 (H) 12.0 (H)       Lab Results   Component Value Date    WBC 3.33 (L) 04/06/2018    HGB 10.3 (L) 04/06/2018    HCT 29.0 (L) 04/06/2018    MCV 85 04/06/2018     04/06/2018         Results for JENNIFER BEST (MRN [id number]) as of 4/13/2018 13:19   Ref. Range 3/20/2018 07:33 4/6/2018 09:45   [place] Free Light Chains Latest Ref Range: 0.33 - 1.94 mg/dL 9.06 (H) 10.46 (H)   Lambda Free Light Chains Latest Ref Range: 0.57 - 2.63 mg/dL 8.38 (H) 9.45 (H)   Kappa/Lambda FLC Ratio Latest Ref Range: 0.26 - 1.65  1.08 1.11       Bone survey 2/2018   No convincing lytic lesions to confirm the presence of myeloma.    SPEP 4/6/2018    Decreased total protein.   Normal gamma globulins are decreased.   No paraproteins are detected.        Assessment:       1. Multiple myeloma in remission    2. ESRD (end stage renal disease)    3. Anemia in chronic kidney disease, unspecified CKD stage    4. Diarrhea, unspecified type        Plan:   Jennifer was seen today for follow-up.    Diagnoses and  associated orders for this visit:    Multiple myeloma in remission  bmbx UAMS 2013-neg  SPEP - NO paraprotein   FLCR normal   Urine studies- no monoclonal peaks  Bone survey 2018 no new findings  Kidney bx 2017 neg for myeloma involvement      ESRD  Followed by Nephrology  S/p Kidney biopsy 2017 ( outside facility)  due to worsening  kidney function reveals glomerulosclerosis and no evidence of MM  [place] [facility] [place]  Pt undergoing PD daily started 6/2017   [place] [facility] [place]  Follow-up with transplant team 5/3/2018      Anemia in chronic renal disease  S/p 2uprbc during  hospitalization  11/2017 for acute-on-chronic anemia  Hb  10.3 g/dl   SHERMAN per Nephrology    Diarrhea, resolved  Etiology unclear  STOOL STUDIES NEG   Previous Cdiff testing NEG      AMBULATORY REFERRAL TO NEPHROLOGY         F/u   2mo with cbc,cmp, SPEP, FLC, B-2 microglobulin , and urine studies      Cc: MD Elliott Calvert Jr., MD

## 2018-04-18 PROBLEM — D63.1 ANEMIA IN CHRONIC RENAL DISEASE: Status: ACTIVE | Noted: 2018-04-18

## 2018-04-18 PROBLEM — N18.9 ANEMIA IN CHRONIC RENAL DISEASE: Status: ACTIVE | Noted: 2018-04-18

## 2018-04-24 ENCOUNTER — TELEPHONE (OUTPATIENT)
Dept: OBSTETRICS AND GYNECOLOGY | Facility: CLINIC | Age: 53
End: 2018-04-24

## 2018-04-24 ENCOUNTER — HOSPITAL ENCOUNTER (OUTPATIENT)
Dept: RADIOLOGY | Facility: HOSPITAL | Age: 53
Discharge: HOME | End: 2018-04-24
Attending: NURSE PRACTITIONER
Payer: COMMERCIAL

## 2018-04-24 ENCOUNTER — OFFICE VISIT (OUTPATIENT)
Dept: OBSTETRICS AND GYNECOLOGY | Facility: CLINIC | Age: 53
End: 2018-04-24
Payer: COMMERCIAL

## 2018-04-24 VITALS
DIASTOLIC BLOOD PRESSURE: 68 MMHG | BODY MASS INDEX: 18.69 KG/M2 | SYSTOLIC BLOOD PRESSURE: 108 MMHG | WEIGHT: 119.06 LBS | HEIGHT: 67 IN

## 2018-04-24 DIAGNOSIS — Z78.0 POSTMENOPAUSE: ICD-10-CM

## 2018-04-24 DIAGNOSIS — Z01.419 WELL WOMAN EXAM WITH ROUTINE GYNECOLOGICAL EXAM: Primary | ICD-10-CM

## 2018-04-24 DIAGNOSIS — Z76.82 ORGAN TRANSPLANT CANDIDATE: ICD-10-CM

## 2018-04-24 PROCEDURE — 99396 PREV VISIT EST AGE 40-64: CPT | Mod: S$GLB,,, | Performed by: NURSE PRACTITIONER

## 2018-04-24 PROCEDURE — 77067 SCR MAMMO BI INCL CAD: CPT | Mod: TC,TXP

## 2018-04-24 PROCEDURE — 99999 PR PBB SHADOW E&M-EST. PATIENT-LVL III: CPT | Mod: PBBFAC,,, | Performed by: NURSE PRACTITIONER

## 2018-04-24 PROCEDURE — 88175 CYTOPATH C/V AUTO FLUID REDO: CPT

## 2018-04-24 PROCEDURE — 77067 SCR MAMMO BI INCL CAD: CPT | Mod: 26,TXP,, | Performed by: RADIOLOGY

## 2018-04-24 NOTE — LETTER
April 24, 2018      Breanna Anne MD  [address]  [facility] [address]           [facility]N 5th Floor  [address]  [facility] [address]  Phone: [phone]          Patient: Jennifer Booth   MR Number: [id number]   YOB: 1965   Date of Visit: 4/24/2018       Dear Dr. Breanna Anne:    Thank you for referring Jennifer Booth to me for evaluation. Attached you will find relevant portions of my assessment and plan of care.    If you have questions, please do not hesitate to call me. I look forward to following Jennifer Booth along with you.    Sincerely,    NINA Souza, ABRAHAM    Enclosure  CC:  No Recipients    If you would like to receive this communication electronically, please contact [email or web address] or [phone] to request more information on [facility] access.    For providers and/or their staff who would like to refer a patient to [facility], please contact us through our one-stop-shop provider referral line, [facility], at [phone].    If you feel you have received this communication in error or would no longer like to receive these types of communications, please e-mail [email or web address]

## 2018-04-24 NOTE — PROGRESS NOTES
HISTORY OF PRESENT ILLNESS:    Jennifer Booth is a 53 y.o. female , presents for a pre-kidney routine exam and has no gyn complaints.      Past Medical History:   Diagnosis Date    Anxiety     Arm pain, left 2014    Breast cyst     Chronic renal failure 2014    CKD stage 4, GFR 15-29 ml/min from myeloma kidney & HTN      Depression     Dialysis patient     dialysis m-w-f    GERD (gastroesophageal reflux disease)     Hypertension     Mood swings     Multiple myeloma without mention of remission     Renal hypertension     Status post dialysis 2012       Past Surgical History:   Procedure Laterality Date    AV FISTULA PLACEMENT Left     BREAST CYST ASPIRATION Left     BREAST CYST EXCISION Left      SECTION      x1    RENAL BIOPSY  2016    VASCULAR SURGERY  2012    dialysis catheter to right subclavian        MEDICATIONS AND ALLERGIES:      Current Outpatient Prescriptions:     B COMPLEX W-C NO.20/FOLIC ACID (VIRT-CAPS ORAL), Take 1 capsule by mouth once daily. , Disp: , Rfl:     carvedilol (COREG) 12.5 MG tablet, TK 1 T PO BID, Disp: , Rfl: 3    cholecalciferol, vitamin D3, (VITAMIN D3) 400 unit Cap, Take 2,000 Units by mouth once daily. , Disp: , Rfl:     escitalopram oxalate (LEXAPRO) 20 MG tablet, TAKE 1 TABLET BY MOUTH DAILY, Disp: 90 tablet, Rfl: 0    mirtazapine (REMERON) 15 MG tablet, , Disp: , Rfl:     NIFEDICAL XL 60 mg 24 hr tablet, TAKE 1 TABLET BY MOUTH EVERY DAY, Disp: 90 tablet, Rfl: 2    potassium chloride (K-TAB) 20 mEq, , Disp: , Rfl:     PROCRIT 10,000 unit/mL injection, , Disp: , Rfl:     RENVELA 800 mg Tab, TK 1 T PO  TID WITH MEALS, Disp: , Rfl: 3    VIRT-CAPS 1 mg Cap, TK 1 C PO QD, Disp: , Rfl: 7    Review of patient's allergies indicates:   Allergen Reactions    Gentamicin Anaphylaxis    Vancomycin analogues Anaphylaxis       Family History   Problem Relation Age of Onset    Hypertension Mother     Heart failure Mother      Cancer Maternal Aunt     Diabetes Maternal Grandmother     Stroke Maternal Grandmother     Breast cancer Neg Hx     Ovarian cancer Neg Hx     Colon cancer Neg Hx        Social History     Social History    Marital status: Single     Spouse name: N/A    Number of children: 1    Years of education: N/A     Occupational History    Appeals       Social History Main Topics    Smoking status: Current Every Day Smoker     Packs/day: 0.50     Years: 30.00     Types: Cigarettes    Smokeless tobacco: Never Used      Comment: 12/16/15: states 1/2 pack a day    Alcohol use 0.6 oz/week     1 Glasses of wine per week      Comment: Wine    Drug use: No    Sexual activity: Not Currently     Other Topics Concern    Not on file     Social History Narrative    Works FT; likes theatre. Lives alone.           OB HISTORY: Number of C/S:1    COMPREHENSIVE GYN HISTORY:  PAP History:  Denies abnormal Paps.  Infection History: Denies STDs. Denies PID.  Benign History: Denies uterine fibroids. Denies ovarian cysts. Denies endometriosis.  Denies other conditions.  Cancer History: Denies cervical cancer. Denies uterine cancer or hyperplasia. Denies ovarian cancer. Denies vulvar cancer or pre-cancer. Denies vaginal cancer or pre-cancer. Denies breast cancer. Denies colon cancer.  Sexual Activity History: Denies currently being sexually active  Menstrual History: Denies menses. Pt is  not on HRT.     ROS:  GENERAL: No weight changes. No swelling. No fatigue. No fever.  CARDIOVASCULAR: No chest pain. No shortness of breath. No leg cramps.   NEUROLOGICAL: No headaches. No vision changes.  BREASTS: No pain. No lumps. No discharge.  ABDOMEN: No pain. No nausea. No vomiting. No diarrhea. No constipation.  REPRODUCTIVE: No abnormal bleeding.   VULVA: No pain. No lesions. No itching.  VAGINA: No relaxation. No itching. No odor. No discharge. No lesions.  URINARY: No incontinence. No nocturia. No frequency. No dysuria.    BP  "108/68   Ht 5' 7" (1.702 m)   Wt 54 kg (119 lb 0.8 oz)   BMI 18.65 kg/m²     PE:  APPEARANCE: Well nourished, well developed, in no acute distress.  AFFECT: WNL, alert and oriented x 3.  SKIN: No hirsutism or acne.  NECK: Neck symmetric without masses or thyromegaly.  NODES: No inguinal, cervical, axillary or femoral lymph node enlargement.  CHEST: Good respiratory effort.   ABDOMEN: Soft. No tenderness or masses. PD CATHETER RLQ.   BREASTS: Symmetrical, no skin changes or visible lesions. No palpable masses, nipple discharge bilaterally.  PELVIC: ATROPHIC EXTERNAL FEMALE GENITALIA without lesions. Normal hair distribution. Adequate perineal body, normal urethral meatus. VAGINA DRY without lesions or discharge. CERVIX STENOTIC without lesions, discharge or tenderness. No significant cystocele or rectocele. Bimanual exam shows uterus to be normal size, regular, mobile and nontender. Adnexa without masses or tenderness.  RECTAL: Rectovaginal exam confirms above with normal sphincter tone, no masses.  EXTREMITIES: No edema.    DIAGNOSIS:  1. Well woman exam with routine gynecological exam    2. Postmenopause        PLAN:    Orders Placed This Encounter    Liquid-based pap smear, screening   Had mammogram this morning  Up to date on colon screening    COUNSELING:  The patient was counseled today on:  -osteoporosis prevention, (to ask her transplant team about calcium supplementation), regular weight bearing exercise;  -A.C.S. Pap and pelvic exam guidelines (pap every 3 years, no pap after age 65) and recommendations for yearly mammogram;  -to see her PCP for other health maintenance.    FOLLOW-UP annually.    "

## 2018-04-26 LAB
HLA FREEZE AND HOLD INTERPRETATION: NORMAL
HLAFH COLLECTION DATE: NORMAL
HPRA INTERPRETATION: NORMAL

## 2018-05-01 DIAGNOSIS — Z76.82 AWAITING ORGAN TRANSPLANT STATUS: Primary | ICD-10-CM

## 2018-05-29 DIAGNOSIS — F32.A DEPRESSION, UNSPECIFIED DEPRESSION TYPE: ICD-10-CM

## 2018-05-29 RX ORDER — ESCITALOPRAM OXALATE 20 MG/1
TABLET ORAL
Qty: 90 TABLET | Refills: 0 | Status: SHIPPED | OUTPATIENT
Start: 2018-05-29 | End: 2018-06-26 | Stop reason: SDUPTHER

## 2018-06-08 ENCOUNTER — LAB VISIT (OUTPATIENT)
Dept: LAB | Facility: HOSPITAL | Age: 53
End: 2018-06-08
Payer: MEDICARE

## 2018-06-08 DIAGNOSIS — Z76.82 ORGAN TRANSPLANT CANDIDATE: ICD-10-CM

## 2018-06-08 PROCEDURE — 86829 HLA CLASS I/II ANTIBODY QUAL: CPT | Mod: PO,TXP

## 2018-06-08 PROCEDURE — 86829 HLA CLASS I/II ANTIBODY QUAL: CPT | Mod: 91,PO,TXP

## 2018-06-16 LAB — HPRA INTERPRETATION: NORMAL

## 2018-06-19 ENCOUNTER — LAB VISIT (OUTPATIENT)
Dept: LAB | Facility: OTHER | Age: 53
End: 2018-06-19
Attending: INTERNAL MEDICINE
Payer: COMMERCIAL

## 2018-06-19 DIAGNOSIS — C90.01 MULTIPLE MYELOMA IN REMISSION: ICD-10-CM

## 2018-06-19 DIAGNOSIS — Z99.2 ESRD (END STAGE RENAL DISEASE) ON DIALYSIS: ICD-10-CM

## 2018-06-19 DIAGNOSIS — N18.6 ESRD (END STAGE RENAL DISEASE) ON DIALYSIS: ICD-10-CM

## 2018-06-19 LAB
ALBUMIN SERPL BCP-MCNC: 2.8 G/DL
ALP SERPL-CCNC: 119 U/L
ALT SERPL W/O P-5'-P-CCNC: 39 U/L
ANION GAP SERPL CALC-SCNC: 14 MMOL/L
AST SERPL-CCNC: 121 U/L
B2 MICROGLOB SERPL-MCNC: 8.1 UG/ML
BASOPHILS # BLD AUTO: 0.03 K/UL
BASOPHILS NFR BLD: 0.9 %
BILIRUB SERPL-MCNC: 0.2 MG/DL
BUN SERPL-MCNC: 50 MG/DL
CALCIUM SERPL-MCNC: 8.4 MG/DL
CHLORIDE SERPL-SCNC: 104 MMOL/L
CO2 SERPL-SCNC: 18 MMOL/L
CREAT SERPL-MCNC: 4.9 MG/DL
DIFFERENTIAL METHOD: ABNORMAL
EOSINOPHIL # BLD AUTO: 0.1 K/UL
EOSINOPHIL NFR BLD: 3 %
ERYTHROCYTE [DISTWIDTH] IN BLOOD BY AUTOMATED COUNT: 16.1 %
EST. GFR  (AFRICAN AMERICAN): 11 ML/MIN/1.73 M^2
EST. GFR  (NON AFRICAN AMERICAN): 9 ML/MIN/1.73 M^2
GLUCOSE SERPL-MCNC: 82 MG/DL
HCT VFR BLD AUTO: 25.7 %
HGB BLD-MCNC: 8.6 G/DL
IGA SERPL-MCNC: 187 MG/DL
IGG SERPL-MCNC: 251 MG/DL
IGM SERPL-MCNC: 161 MG/DL
LYMPHOCYTES # BLD AUTO: 0.7 K/UL
LYMPHOCYTES NFR BLD: 22.5 %
MCH RBC QN AUTO: 30.6 PG
MCHC RBC AUTO-ENTMCNC: 33.5 G/DL
MCV RBC AUTO: 92 FL
MONOCYTES # BLD AUTO: 0.3 K/UL
MONOCYTES NFR BLD: 8.2 %
NEUTROPHILS # BLD AUTO: 2.1 K/UL
NEUTROPHILS NFR BLD: 65.1 %
PLATELET # BLD AUTO: 171 K/UL
PMV BLD AUTO: 9.1 FL
POTASSIUM SERPL-SCNC: 4.3 MMOL/L
PROT SERPL-MCNC: 5.2 G/DL
RBC # BLD AUTO: 2.81 M/UL
SODIUM SERPL-SCNC: 136 MMOL/L
WBC # BLD AUTO: 3.29 K/UL

## 2018-06-19 PROCEDURE — 82232 ASSAY OF BETA-2 PROTEIN: CPT | Mod: NTX

## 2018-06-19 PROCEDURE — 83520 IMMUNOASSAY QUANT NOS NONAB: CPT | Mod: 59,TXP

## 2018-06-19 PROCEDURE — 36415 COLL VENOUS BLD VENIPUNCTURE: CPT | Mod: NTX

## 2018-06-19 PROCEDURE — 82784 ASSAY IGA/IGD/IGG/IGM EACH: CPT | Mod: 59,TXP

## 2018-06-19 PROCEDURE — 84165 PROTEIN E-PHORESIS SERUM: CPT | Mod: 26,NTX,, | Performed by: PATHOLOGY

## 2018-06-19 PROCEDURE — 85025 COMPLETE CBC W/AUTO DIFF WBC: CPT | Mod: TXP

## 2018-06-19 PROCEDURE — 84165 PROTEIN E-PHORESIS SERUM: CPT | Mod: TXP

## 2018-06-19 PROCEDURE — 86334 IMMUNOFIX E-PHORESIS SERUM: CPT | Mod: NTX

## 2018-06-19 PROCEDURE — 86334 IMMUNOFIX E-PHORESIS SERUM: CPT | Mod: 26,NTX,, | Performed by: PATHOLOGY

## 2018-06-19 PROCEDURE — 80053 COMPREHEN METABOLIC PANEL: CPT | Mod: TXP

## 2018-06-20 LAB
ALBUMIN SERPL ELPH-MCNC: 3.14 G/DL
ALPHA1 GLOB SERPL ELPH-MCNC: 0.25 G/DL
ALPHA2 GLOB SERPL ELPH-MCNC: 0.64 G/DL
B-GLOBULIN SERPL ELPH-MCNC: 0.52 G/DL
GAMMA GLOB SERPL ELPH-MCNC: 0.35 G/DL
KAPPA LC SER QL IA: 13.49 MG/DL
KAPPA LC/LAMBDA SER IA: 1.62
LAMBDA LC SER QL IA: 8.34 MG/DL
PROT SERPL-MCNC: 4.9 G/DL

## 2018-06-21 LAB — PATHOLOGIST INTERPRETATION SPE: NORMAL

## 2018-06-22 LAB
INTERPRETATION SERPL IFE-IMP: NORMAL
PATHOLOGIST INTERPRETATION IFE: NORMAL

## 2018-06-26 ENCOUNTER — OFFICE VISIT (OUTPATIENT)
Dept: HEMATOLOGY/ONCOLOGY | Facility: CLINIC | Age: 53
End: 2018-06-26
Payer: COMMERCIAL

## 2018-06-26 VITALS
SYSTOLIC BLOOD PRESSURE: 131 MMHG | HEART RATE: 82 BPM | BODY MASS INDEX: 19.87 KG/M2 | HEIGHT: 67 IN | WEIGHT: 126.56 LBS | DIASTOLIC BLOOD PRESSURE: 83 MMHG | OXYGEN SATURATION: 99 % | TEMPERATURE: 99 F

## 2018-06-26 DIAGNOSIS — M54.9 BACK PAIN, UNSPECIFIED BACK LOCATION, UNSPECIFIED BACK PAIN LATERALITY, UNSPECIFIED CHRONICITY: ICD-10-CM

## 2018-06-26 DIAGNOSIS — D63.1 ANEMIA IN CHRONIC KIDNEY DISEASE, UNSPECIFIED CKD STAGE: ICD-10-CM

## 2018-06-26 DIAGNOSIS — N18.9 ANEMIA IN CHRONIC KIDNEY DISEASE, UNSPECIFIED CKD STAGE: ICD-10-CM

## 2018-06-26 DIAGNOSIS — C90.00 MULTIPLE MYELOMA, REMISSION STATUS UNSPECIFIED: Primary | ICD-10-CM

## 2018-06-26 PROCEDURE — 3008F BODY MASS INDEX DOCD: CPT | Mod: CPTII,S$GLB,, | Performed by: INTERNAL MEDICINE

## 2018-06-26 PROCEDURE — 99999 PR PBB SHADOW E&M-EST. PATIENT-LVL IV: CPT | Mod: PBBFAC,,, | Performed by: INTERNAL MEDICINE

## 2018-06-26 PROCEDURE — 99214 OFFICE O/P EST MOD 30 MIN: CPT | Mod: S$GLB,,, | Performed by: INTERNAL MEDICINE

## 2018-06-26 NOTE — Clinical Note
MRI T AND LUMBAR SPINE   referaral [place] [facility] [place] - [place] or [facility]  Multiple Myeloma

## 2018-06-26 NOTE — PROGRESS NOTES
Subjective:       Patient ID: Jennifer Booth is a 53 y.o. female.    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.   Previously followed at [facility].   She has not had the resources to continue f/u at [facility].       She is followed by Nephrology for ESRD  She was initially diagnosed with advanced kidney disease secondary to multiple myeloma & HTN.   She was diagnosed with  AK I from MM in 2010 that required initiation of dialysis.    She started chronic dialysis on 12/23/11 and ended on 8/28/12  She then underwent chemotherapy for her myeloma, and stopped dialysis in 2013.    Kidney biopsy 2017due to worsening  kidney function reveals glomerulosclerosis and no evidence of MM    She is now undergoing peritoneal dialysis daily     She is [place] [facility]  Patient met selection criteria for kidney transplant related to ESRD due to Hypertensive Nephrosclerosis  She is considered a candidate for kidney transplant      Pt continues with PD  She was hospitalized Nov 2017 with c/o facial swelling x 2 days  On 11/10/17, patient received vanc and gentamicin in the ER after being evaluated for possible bleeding in peritoneal fluid.  The next day, patient noticed upper lip swelling, right sided facial swelling, and hives on face, arms, and hands  S/p 2uprbc during hospitalization for acute-on-chronic anemia   It was determined she appeared to be having an allergic reaction to vancomycin or gentamycin vs. others  -sx improved with IV Steroids and Benadryl.       Last visit pt reported chronic loose stools  Appetite diminished and sig wt loss  No abdominal cramping or fevers  Cdiff testing during hospitalization NEG  Stool studies performed unremarkable  She is undergoing further evaluation per her treating  Nephrologist    Pt reports previously on 9hrs PD nightly  Pt now switched to  7hrs PD nightly with improvement in sx's  Loose stools improve  Appetite improved and she is slowly gaining wt  Mild LBP x 1 wk  No numbness/tingling in extremities  She has now transferred care to Dr. Wolfe     She is undergoing EPO under direction of Nephrology        CBC reveals  Hb 8.6 g/dl   SPEP 4/2018  normal  UPEP-no monoclonal peaks    SPEP 6/19/2018 There is a very faint linear irregularity in gamma, less than 0.1   g/dL.            Onc hx:  She was diagnosed with kappa free light chain disease, 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 She originally presented with acute renal failure requiring HD .She has completed bortezomib/dexamethasone/Revlimid 6 cycles on 04/13/2012 for the multiple myeloma kappa light chain disease, IPSS stage III. She underwent bortezomib maintenance therapy three weeks on, one week off (05/15, 05/22 and 05/29) with her last cycle received on 05/29/2012. She is followed at [facility] [place] where she was evaluated for an auto stem cell transplant . It was decided not to proceed with transplant was originally due to drug reaction.Pt was diagnosed with DRESS syndrome during hospitalization and then [facility] team elected not to proceed proceed with auto SCT. Velcade maintenance was discontinued due to side effects.       Tx: Velcade/Dex/Revlimid x 6 cycles 4/13/12   Velcade maintenance 3wks on, 1wk off dc'd 5/29/12 sec to adv SE          Review of Systems   Constitutional: Negative for appetite change, chills and fatigue.   HENT: Negative for congestion, hearing loss and nosebleeds.    Eyes: Negative for visual disturbance.   Respiratory: Negative for apnea, cough, chest tightness, shortness of breath and wheezing.    Cardiovascular: Negative for chest pain, palpitations and leg swelling.   Gastrointestinal: Negative for abdominal distention, abdominal pain, blood in stool,  "constipation, diarrhea, nausea and vomiting.   Genitourinary: Negative for difficulty urinating, dysuria, flank pain, frequency, hematuria and urgency.   Musculoskeletal: Negative for arthralgias, back pain, gait problem, joint swelling and neck pain.   Skin: Negative for rash.        No petechiae, ecchymoses   Neurological: Negative for dizziness, tremors, seizures, syncope, speech difficulty, light-headedness, numbness and headaches.   Hematological: Negative for adenopathy. Does not bruise/bleed easily.       Objective:       Vitals:    06/26/18 1503   BP: 131/83   BP Location: Right arm   Patient Position: Sitting   BP Method: Medium (Automatic)   Pulse: 82   Temp: 98.8 °F (37.1 °C)   TempSrc: Oral   SpO2: 99%   Weight: 57.4 kg (126 lb 8.7 oz)   Height: 5' 7" (1.702 m)       Physical Exam   Constitutional: She is oriented to person, place, and time. She appears well-developed and well-nourished.   HENT:   Head: Normocephalic.   Mouth/Throat: Oropharynx is clear and moist. No oropharyngeal exudate.   Eyes: Conjunctivae and lids are normal. Pupils are equal, round, and reactive to light. No scleral icterus.   Neck: Normal range of motion. Neck supple. No thyromegaly present.   Cardiovascular: Normal rate, regular rhythm and normal heart sounds.    No murmur heard.  Pulmonary/Chest: Breath sounds normal. She has no wheezes. She has no rales.   Abdominal: Soft. Bowel sounds are normal. She exhibits no distension and no mass. There is no hepatosplenomegaly. There is no tenderness. There is no rebound and no guarding.   Musculoskeletal: Normal range of motion. She exhibits no edema or tenderness.   Lymphadenopathy:     She has no cervical adenopathy.     She has no axillary adenopathy.        Right: No supraclavicular adenopathy present.        Left: No supraclavicular adenopathy present.   Neurological: She is alert and oriented to person, place, and time. No cranial nerve deficit. Coordination normal.   Skin: Skin " is warm and dry. No ecchymosis, no petechiae and no rash noted. No erythema.   Psychiatric: She has a normal mood and affect.             Bone survey 2015   1. Small lucent foci in the right femoral neck. Given this patient's history of multiple myeloma, these findings are concerning for sequela of that disease  2. Otherwise degenerative changes of the cervical spine and lower lumbar spine are identified.           Lab Results   Component Value Date    WBC 3.29 (L) 06/19/2018    HGB 8.6 (L) 06/19/2018    HCT 25.7 (L) 06/19/2018    MCV 92 06/19/2018     06/19/2018         Results for JOHN BEST (MRN [id number]) as of 4/13/2018 13:19   Ref. Range 3/20/2018 07:33 4/6/2018 09:45   IgG - Serum Latest Ref Range: 650 - 1600 mg/dL 249 (L) 257 (L)   IgM Latest Ref Range: 50 - 300 mg/dL 135 156   IgA Latest Ref Range: 40 - 350 mg/dL 210 220           Results for JOHN BEST (MRN [id number]) as of 6/20/2017 15:11   Ref. Range 2/8/2017 15:44 5/17/2017 12:49   Beta-2 Microglobulin Latest Ref Range: 0.0 - 2.5 ug/mL 12.7 (H) 12.0 (H)       Lab Results   Component Value Date    WBC 3.29 (L) 06/19/2018    HGB 8.6 (L) 06/19/2018    HCT 25.7 (L) 06/19/2018    MCV 92 06/19/2018     06/19/2018       Results for JOHN BEST (MRN [id number]) as of 7/15/2018 20:14   Ref. Range 3/20/2018 07:33 4/6/2018 09:45 6/19/2018 07:50   Scanlon Free Light Chains Latest Ref Range: 0.33 - 1.94 mg/dL 9.06 (H) 10.46 (H) 13.49 (H)   Lambda Free Light Chains Latest Ref Range: 0.57 - 2.63 mg/dL 8.38 (H) 9.45 (H) 8.34 (H)   Kappa/Lambda FLC Ratio Latest Ref Range: 0.26 - 1.65  1.08 1.11 1.62         Bone survey 2/2018   No convincing lytic lesions to confirm the presence of myeloma.    SPEP 6/19/2018  There is a very faint linear irregularity in gamma, less than 0.1 g/dL.      Results for JOHN BEST (MRN [id number]) as of 6/26/2018 15:22   Ref. Range 11/1/2017 08:03 2/14/2018 10:22 2/20/2018 08:09 3/20/2018 07:33 4/6/2018 09:45 6/19/2018  07:50   Beta-2 Microglobulin Latest Ref Range: 0.0 - 2.5 ug/mL 12.0 (H) 10.3 (H) 8.8 (H) 7.5 (H) 7.2 (H) 8.1 (H)     Assessment:       1. Multiple myeloma, remission status unspecified    2. Anemia in chronic kidney disease, unspecified CKD stage    3. Back pain, unspecified back location, unspecified back pain laterality, unspecified chronicity        Plan:   Jennifer was seen today for follow-up.    Diagnoses and associated orders for this visit:    Multiple myeloma   Dagnosed with kappa free light chain disease, multiple myeloma IPSS with deletion 13, 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   She originally presented with acute renal failure requiring HD .  She  completed bortezomib/dexamethasone/Revlimid 6 cycles on 04/13/2012 for the multiple myeloma kappa light chain disease, IPSS stage III.   She underwent bortezomib maintenance therapy three weeks on, one week off (05/15, 05/22 and 05/29) with her last cycle received on 05/29/2012  She was followed at [facility] and was diagnosed with DRESS syndrome during hospitalization and then [facility] team elected not to proceed proceed with auto SCT. Velcade maintenance was discontinued due to side effects.   She has remained in remission with nl SPEP   She has had worsening kidney function and s/p Kidney bx 2017 neg for myeloma involvement  Urine studies- no monoclonal peaks  Bone survey 2018 no new findings  Recent SPEP reveals very faint linear irregularity in gamma, less than 0.1 g/dL.  Serum free KLC 13.49mg/dl and lambda free light chain 8.34 mg/dl and K/L FLCR 1.62  Plan MRI T and L spine  Plan [place] [facility] [place] for possible relapse          ESRD  Followed by Nephrology  S/p Kidney biopsy 2017 ( outside facility)  due to worsening  kidney function reveals glomerulosclerosis and no evidence of MM  [place] [facility] [place] cadaver[place]  Pt undergoing PD daily started 6/2017   She is [place] [facility] [place]   Follow-up  with transplant team 5/3/2018      Anemia in chronic renal disease  S/p 2uprbc during  hospitalization  11/2017 for acute-on-chronic anemia  Hb  remains 8 g/dl   SHERMAN per Nephrology    Loose stools  Etiology unclear  STOOL STUDIES NEG   Previous Cdiff testing NEG      45 minutes spent during this visit of which greater than 50% devoted to counseling and coordination of care regarding diagnosis and management plan.    F/u   2mo with cbc,cmp, SPEP, FLC, B-2 microglobulin , and urine studies      Cc: MD Elliott Calvert Jr., MD

## 2018-06-29 ENCOUNTER — HOSPITAL ENCOUNTER (OUTPATIENT)
Dept: RADIOLOGY | Facility: HOSPITAL | Age: 53
Discharge: HOME | End: 2018-06-29
Attending: INTERNAL MEDICINE
Payer: COMMERCIAL

## 2018-06-29 DIAGNOSIS — C90.00 MULTIPLE MYELOMA, REMISSION STATUS UNSPECIFIED: ICD-10-CM

## 2018-06-29 DIAGNOSIS — M54.9 BACK PAIN, UNSPECIFIED BACK LOCATION, UNSPECIFIED BACK PAIN LATERALITY, UNSPECIFIED CHRONICITY: ICD-10-CM

## 2018-06-29 PROCEDURE — 72146 MRI CHEST SPINE W/O DYE: CPT | Mod: 26,NTX,, | Performed by: RADIOLOGY

## 2018-06-29 PROCEDURE — 72148 MRI LUMBAR SPINE W/O DYE: CPT | Mod: 26,NTX,, | Performed by: RADIOLOGY

## 2018-06-29 PROCEDURE — 72146 MRI CHEST SPINE W/O DYE: CPT | Mod: TC,TXP

## 2018-06-29 PROCEDURE — 72148 MRI LUMBAR SPINE W/O DYE: CPT | Mod: TC,NTX

## 2018-07-16 ENCOUNTER — INITIAL CONSULT (OUTPATIENT)
Dept: HEMATOLOGY/ONCOLOGY | Facility: CLINIC | Age: 53
End: 2018-07-16
Payer: COMMERCIAL

## 2018-07-16 ENCOUNTER — LAB VISIT (OUTPATIENT)
Dept: LAB | Facility: HOSPITAL | Age: 53
End: 2018-07-16
Attending: INTERNAL MEDICINE
Payer: COMMERCIAL

## 2018-07-16 VITALS
OXYGEN SATURATION: 100 % | TEMPERATURE: 99 F | DIASTOLIC BLOOD PRESSURE: 86 MMHG | HEIGHT: 67 IN | HEART RATE: 68 BPM | BODY MASS INDEX: 20.17 KG/M2 | SYSTOLIC BLOOD PRESSURE: 122 MMHG | WEIGHT: 128.5 LBS

## 2018-07-16 DIAGNOSIS — C90.00 MULTIPLE MYELOMA, REMISSION STATUS UNSPECIFIED: ICD-10-CM

## 2018-07-16 DIAGNOSIS — C90.00 MULTIPLE MYELOMA, REMISSION STATUS UNSPECIFIED: Primary | ICD-10-CM

## 2018-07-16 LAB
ALBUMIN SERPL BCP-MCNC: 2.4 G/DL
ALP SERPL-CCNC: 100 U/L
ALT SERPL W/O P-5'-P-CCNC: 21 U/L
ANION GAP SERPL CALC-SCNC: 5 MMOL/L
AST SERPL-CCNC: 31 U/L
BASOPHILS # BLD AUTO: 0.03 K/UL
BASOPHILS NFR BLD: 0.6 %
BILIRUB SERPL-MCNC: 0.2 MG/DL
BUN SERPL-MCNC: 50 MG/DL
CALCIUM SERPL-MCNC: 8 MG/DL
CHLORIDE SERPL-SCNC: 104 MMOL/L
CO2 SERPL-SCNC: 21 MMOL/L
CREAT SERPL-MCNC: 4.6 MG/DL
DIFFERENTIAL METHOD: ABNORMAL
EOSINOPHIL # BLD AUTO: 0.2 K/UL
EOSINOPHIL NFR BLD: 3.4 %
ERYTHROCYTE [DISTWIDTH] IN BLOOD BY AUTOMATED COUNT: 14.7 %
EST. GFR  (AFRICAN AMERICAN): 11.7 ML/MIN/1.73 M^2
EST. GFR  (NON AFRICAN AMERICAN): 10.1 ML/MIN/1.73 M^2
GLUCOSE SERPL-MCNC: 85 MG/DL
HCT VFR BLD AUTO: 24.8 %
HGB BLD-MCNC: 8.2 G/DL
IMM GRANULOCYTES # BLD AUTO: 0 K/UL
IMM GRANULOCYTES NFR BLD AUTO: 0 %
LYMPHOCYTES # BLD AUTO: 1.2 K/UL
LYMPHOCYTES NFR BLD: 24.7 %
MCH RBC QN AUTO: 29.9 PG
MCHC RBC AUTO-ENTMCNC: 33.1 G/DL
MCV RBC AUTO: 91 FL
MONOCYTES # BLD AUTO: 0.4 K/UL
MONOCYTES NFR BLD: 7.3 %
NEUTROPHILS # BLD AUTO: 3.1 K/UL
NEUTROPHILS NFR BLD: 64 %
NRBC BLD-RTO: 0 /100 WBC
PLATELET # BLD AUTO: 158 K/UL
PMV BLD AUTO: 10.5 FL
POTASSIUM SERPL-SCNC: 5.2 MMOL/L
PROT SERPL-MCNC: 4.6 G/DL
RBC # BLD AUTO: 2.74 M/UL
SODIUM SERPL-SCNC: 130 MMOL/L
WBC # BLD AUTO: 4.77 K/UL

## 2018-07-16 PROCEDURE — 84165 PROTEIN E-PHORESIS SERUM: CPT | Mod: 26,TXP,, | Performed by: PATHOLOGY

## 2018-07-16 PROCEDURE — 83520 IMMUNOASSAY QUANT NOS NONAB: CPT | Mod: TXP

## 2018-07-16 PROCEDURE — 99213 OFFICE O/P EST LOW 20 MIN: CPT | Mod: PBBFAC | Performed by: INTERNAL MEDICINE

## 2018-07-16 PROCEDURE — 99999 PR PBB SHADOW E&M-EST. PATIENT-LVL III: CPT | Mod: PBBFAC,,, | Performed by: INTERNAL MEDICINE

## 2018-07-16 PROCEDURE — 80053 COMPREHEN METABOLIC PANEL: CPT | Mod: TXP

## 2018-07-16 PROCEDURE — 84165 PROTEIN E-PHORESIS SERUM: CPT | Mod: TXP

## 2018-07-16 PROCEDURE — 36415 COLL VENOUS BLD VENIPUNCTURE: CPT | Mod: TXP

## 2018-07-16 PROCEDURE — 99215 OFFICE O/P EST HI 40 MIN: CPT | Mod: S$GLB,,, | Performed by: INTERNAL MEDICINE

## 2018-07-16 PROCEDURE — 85025 COMPLETE CBC W/AUTO DIFF WBC: CPT | Mod: TXP

## 2018-07-16 NOTE — LETTER
July 17, 2018      Leilani Shane MD  [address]  [address]  [place] [address]           [facility]  [address]  [place] [address]  Phone: [phone]          Patient: Jennifer Booth   MR Number: [id number]   YOB: 1965   Date of Visit: 7/16/2018       Dear Dr. Leilani Shane:    Thank you for referring Jennifer Booth to me for evaluation. Attached you will find relevant portions of my assessment and plan of care.    If you have questions, please do not hesitate to call me. I look forward to following Jennifer Booth along with you.    Sincerely,    Jaycee Gonsales MD    Enclosure  CC:  No Recipients    If you would like to receive this communication electronically, please contact [email or web address] or [phone] to request more information on [employer] access.    For providers and/or their staff who would like to refer a patient to [facility], please contact us through our one-stop-shop provider referral line, [facility], at [phone].    If you feel you have received this communication in error or would no longer like to receive these types of communications, please e-mail [email or web address]

## 2018-07-17 LAB
ALBUMIN SERPL ELPH-MCNC: 2.67 G/DL
ALPHA1 GLOB SERPL ELPH-MCNC: 0.25 G/DL
ALPHA2 GLOB SERPL ELPH-MCNC: 0.65 G/DL
B-GLOBULIN SERPL ELPH-MCNC: 0.51 G/DL
GAMMA GLOB SERPL ELPH-MCNC: 0.33 G/DL
KAPPA LC SER QL IA: 8.45 MG/DL
KAPPA LC/LAMBDA SER IA: 1.27
LAMBDA LC SER QL IA: 6.64 MG/DL
PATHOLOGIST INTERPRETATION SPE: NORMAL
PROT SERPL-MCNC: 4.4 G/DL

## 2018-07-17 NOTE — PROGRESS NOTES
"Subjective:       Patient ID: Jennifer Booth is a 53 y.o. female.    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 She was diagnosed in December 2011 after feeling ill and eventual diagnosis of renal failure due to myeloma and hypertension. Her induction therapy was VRD for 6 cyles. She was also on renal support with hemodialysis. After 6 cycles of VRD completed in April 2012 she started maintenance Velcade for a few cycles. Therapy was stopped after collection of stem cell for ASCT at [facility]. Throughout induction and maintenance therapy she had a rash, she describes as her face "breaking out." This is documented as DRESS syndrome and the reason why [facility] deferred autologous transplant after stem cell collection.    The primary health issues since diagnosis and treatment of myeloma has been end stage renal disease. She was on hemodialysis from December 2011 to August 2012 when renal function improved on Myeloma induction therapy per patient. Due to worsening renal function in 2017 renal biopsy was performed to reveal glomerulosclerosis and no evidence of myeloma. She is now using peritoneal dialysis. [place] [facility] [place]    She is having events of acute on chronic anemia. It is unclear if this is progression of myeloma (unlikely with a m protein of 0.1) or anemia of chronic renal disease. She does not have gross clinical blood loss. 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 Calcium is low. Free light chain ration was normal in June 2018, though difficult to interpret in setting of renal failure. Previously normal total protein pattern now has an M protein of 0.1 grams in June 2018. Bone survey in February 2018 had no lytic lesions.    The patient is an active " "tobacco smoker. She drinks alcohol weekly. She made a passing comment to the MA that she has issues with substance abuse.    Tx: Velcade/Dex/Revlimid x 6 cycles 4/13/12   Velcade maintenance 3wks on, 1wk off dc'd 5/29/12 sec to adv SE (rash)    Review of Systems   Constitutional: Negative.  Negative for appetite change, chills and fatigue.   HENT: Negative.  Negative for congestion, hearing loss and nosebleeds.    Eyes: Negative.  Negative for visual disturbance.   Respiratory: Negative.  Negative for apnea, cough, chest tightness, shortness of breath and wheezing.    Cardiovascular: Negative.  Negative for chest pain, palpitations and leg swelling.   Gastrointestinal: Negative.  Negative for abdominal distention, abdominal pain, blood in stool, constipation, diarrhea, nausea and vomiting.   Endocrine: Negative.    Genitourinary: Negative.  Negative for difficulty urinating, dysuria, flank pain, frequency, hematuria and urgency.   Musculoskeletal: Negative.  Negative for arthralgias, back pain, gait problem, joint swelling and neck pain.   Skin: Negative.  Negative for rash.        No petechiae, ecchymoses   Allergic/Immunologic: Negative for environmental allergies, food allergies and immunocompromised state.   Neurological: Negative.  Negative for dizziness, tremors, seizures, syncope, speech difficulty, light-headedness, numbness and headaches.   Hematological: Negative for adenopathy. Does not bruise/bleed easily.   Psychiatric/Behavioral: Negative.        Objective:       Vitals:    07/16/18 1437   BP: 122/86   BP Location: Left arm   Patient Position: Sitting   BP Method: Medium (Automatic)   Pulse: 68   Temp: 98.6 °F (37 °C)   TempSrc: Oral   SpO2: 100%   Weight: 58.3 kg (128 lb 8.5 oz)   Height: 5' 7" (1.702 m)       Physical Exam   Constitutional: She is oriented to person, place, and time. She appears well-developed and well-nourished.   HENT:   Head: Normocephalic and atraumatic.   Mouth/Throat: " Oropharynx is clear and moist. No oropharyngeal exudate.   Eyes: Conjunctivae and lids are normal. Pupils are equal, round, and reactive to light. No scleral icterus.   Neck: Normal range of motion. Neck supple. No thyromegaly present.   Cardiovascular: Normal rate, regular rhythm, normal heart sounds and intact distal pulses.    No murmur heard.  Pulmonary/Chest: Effort normal and breath sounds normal. No respiratory distress. She has no wheezes. She has no rales.   Abdominal: Soft. Bowel sounds are normal. She exhibits no distension and no mass. There is no hepatosplenomegaly. There is no tenderness. There is no rebound and no guarding.   Musculoskeletal: Normal range of motion. She exhibits no edema or tenderness.   Lymphadenopathy:     She has no cervical adenopathy.     She has no axillary adenopathy.        Right: No supraclavicular adenopathy present.        Left: No supraclavicular adenopathy present.   Neurological: She is alert and oriented to person, place, and time. No cranial nerve deficit. Coordination normal.   Skin: Skin is warm and dry. No ecchymosis, no petechiae and no rash noted. No erythema.   Psychiatric: She has a normal mood and affect. Her behavior is normal.   Nursing note and vitals reviewed.         Assessment:       1. Multiple myeloma, remission status unspecified        Plan:   Jennifer was seen today for initial visit:    Multiple myeloma   History of MM in remission for about 5-6 years. Now with possible evidence of relapse with detectable M protein.  Repeat M protein today to assess pace of relapse today has no detectable m protein.  Free light chain ratio remains normal.  VRD could be considered for re-induction since the patient had a prolonged remission but prior DRESS syndrome on treatment is concerning.  We discussed several new treatment options. One includes Ninlaro, Revlimid (renal dosing for dialysis) and dexamethasone as an all oral regimen.  May still be a candidate for  ASCT but patient is hesitant since [facility] deferred at diagnosis.    ESRD  S/p Kidney biopsy 2017 ( outside facility)  due to worsening  kidney function reveals glomerulosclerosis and no evidence of MM  Followed by Renal Transplant team at [facility] - on cadaveric wait list  PD daily started 6/2017      Anemia in chronic renal disease  Hb  remains 8 g/dl   SHERMAN per Nephrology    Recommendations:  Repeat myeloma labs have returned to normal on 7/16/18  Recommend repeat every 4 months to get sense of disease behavior.  If persistent increase in M protein, return visit to BMT to consider treatment for relapse and possible transplant.

## 2018-07-18 ENCOUNTER — LAB VISIT (OUTPATIENT)
Dept: LAB | Facility: HOSPITAL | Age: 53
End: 2018-07-18
Payer: COMMERCIAL

## 2018-07-18 DIAGNOSIS — Z76.82 ORGAN TRANSPLANT CANDIDATE: ICD-10-CM

## 2018-07-18 PROCEDURE — 99001 SPECIMEN HANDLING PT-LAB: CPT | Mod: PO,TXP

## 2018-07-19 ENCOUNTER — TELEPHONE (OUTPATIENT)
Dept: HEMATOLOGY/ONCOLOGY | Facility: CLINIC | Age: 53
End: 2018-07-19

## 2018-07-19 NOTE — TELEPHONE ENCOUNTER
----- Message from Leilani Shane MD sent at 7/19/2018 11:16 AM CDT -----  Notify pt I have reviewed Dr. Gonsales's note and repeat SPEP was wnl and plan to recheck labs in 3mos   OK to reschedule pt to 3 mos f/u with labs     OK to re schedule to 3 mos

## 2018-07-24 NOTE — TELEPHONE ENCOUNTER
Pt returned call, notified of 's message, she voiced understanding. July appt rescheduled to October, appt letters mailed.

## 2018-08-01 LAB
HLA FREEZE AND HOLD INTERPRETATION: NORMAL
HLAFH COLLECTION DATE: NORMAL

## 2018-08-02 LAB — HPRA INTERPRETATION: NORMAL

## 2018-08-02 PROCEDURE — 99001 SPECIMEN HANDLING PT-LAB: CPT | Mod: PO,TXP

## 2018-08-12 PROCEDURE — 99001 SPECIMEN HANDLING PT-LAB: CPT | Mod: PO,TXP

## 2018-08-13 ENCOUNTER — LAB VISIT (OUTPATIENT)
Dept: LAB | Facility: HOSPITAL | Age: 53
End: 2018-08-13
Payer: COMMERCIAL

## 2018-08-13 DIAGNOSIS — N18.6 ESRD (END STAGE RENAL DISEASE): ICD-10-CM

## 2018-08-13 PROCEDURE — 99001 SPECIMEN HANDLING PT-LAB: CPT | Mod: PO,TXP

## 2018-09-11 ENCOUNTER — LAB VISIT (OUTPATIENT)
Dept: LAB | Facility: HOSPITAL | Age: 53
End: 2018-09-11
Payer: COMMERCIAL

## 2018-09-11 DIAGNOSIS — Z76.82 AWAITING ORGAN TRANSPLANT STATUS: ICD-10-CM

## 2018-09-11 DIAGNOSIS — Z76.82 AWAITING ORGAN TRANSPLANT STATUS: Primary | ICD-10-CM

## 2018-09-11 PROCEDURE — 86829 HLA CLASS I/II ANTIBODY QUAL: CPT | Mod: PO,TXP

## 2018-09-11 PROCEDURE — 86829 HLA CLASS I/II ANTIBODY QUAL: CPT | Mod: 91,PO,TXP

## 2018-09-13 LAB — HPRA INTERPRETATION: NORMAL

## 2018-09-30 DIAGNOSIS — F32.A DEPRESSION, UNSPECIFIED DEPRESSION TYPE: ICD-10-CM

## 2018-09-30 RX ORDER — ESCITALOPRAM OXALATE 20 MG/1
TABLET ORAL
Qty: 90 TABLET | Refills: 0 | Status: SHIPPED | OUTPATIENT
Start: 2018-09-30 | End: 2019-02-11 | Stop reason: SDUPTHER

## 2018-10-10 DIAGNOSIS — Z76.82 ORGAN TRANSPLANT CANDIDATE: ICD-10-CM

## 2018-10-16 ENCOUNTER — LAB VISIT (OUTPATIENT)
Dept: LAB | Facility: HOSPITAL | Age: 53
End: 2018-10-16
Payer: COMMERCIAL

## 2018-10-16 DIAGNOSIS — Z76.82 AWAITING ORGAN TRANSPLANT STATUS: ICD-10-CM

## 2018-10-16 PROCEDURE — 99001 SPECIMEN HANDLING PT-LAB: CPT | Mod: PO,TXP

## 2018-10-17 ENCOUNTER — LAB VISIT (OUTPATIENT)
Dept: LAB | Facility: OTHER | Age: 53
End: 2018-10-17
Attending: INTERNAL MEDICINE
Payer: COMMERCIAL

## 2018-10-17 DIAGNOSIS — C90.01 MULTIPLE MYELOMA IN REMISSION: ICD-10-CM

## 2018-10-17 DIAGNOSIS — N18.6 ESRD (END STAGE RENAL DISEASE) ON DIALYSIS: ICD-10-CM

## 2018-10-17 DIAGNOSIS — Z99.2 ESRD (END STAGE RENAL DISEASE) ON DIALYSIS: ICD-10-CM

## 2018-10-17 LAB
ALBUMIN SERPL BCP-MCNC: 2.3 G/DL
ALP SERPL-CCNC: 83 U/L
ALT SERPL W/O P-5'-P-CCNC: 12 U/L
ANION GAP SERPL CALC-SCNC: 6 MMOL/L
AST SERPL-CCNC: 24 U/L
BASOPHILS # BLD AUTO: 0.02 K/UL
BASOPHILS NFR BLD: 0.5 %
BILIRUB SERPL-MCNC: 0.4 MG/DL
BUN SERPL-MCNC: 9 MG/DL
CALCIUM SERPL-MCNC: 8.3 MG/DL
CHLORIDE SERPL-SCNC: 96 MMOL/L
CO2 SERPL-SCNC: 29 MMOL/L
CREAT SERPL-MCNC: 2.7 MG/DL
DIFFERENTIAL METHOD: ABNORMAL
EOSINOPHIL # BLD AUTO: 0.1 K/UL
EOSINOPHIL NFR BLD: 2.3 %
ERYTHROCYTE [DISTWIDTH] IN BLOOD BY AUTOMATED COUNT: 18.8 %
EST. GFR  (AFRICAN AMERICAN): 22 ML/MIN/1.73 M^2
EST. GFR  (NON AFRICAN AMERICAN): 19 ML/MIN/1.73 M^2
GLUCOSE SERPL-MCNC: 100 MG/DL
HCT VFR BLD AUTO: 28.4 %
HGB BLD-MCNC: 9.4 G/DL
IGA SERPL-MCNC: 191 MG/DL
IGG SERPL-MCNC: 297 MG/DL
IGM SERPL-MCNC: 132 MG/DL
LYMPHOCYTES # BLD AUTO: 1.1 K/UL
LYMPHOCYTES NFR BLD: 25.7 %
MCH RBC QN AUTO: 29.1 PG
MCHC RBC AUTO-ENTMCNC: 33.1 G/DL
MCV RBC AUTO: 88 FL
MONOCYTES # BLD AUTO: 0.3 K/UL
MONOCYTES NFR BLD: 6.8 %
NEUTROPHILS # BLD AUTO: 2.9 K/UL
NEUTROPHILS NFR BLD: 64.7 %
PLATELET # BLD AUTO: 107 K/UL
PMV BLD AUTO: 9.1 FL
POTASSIUM SERPL-SCNC: 4.3 MMOL/L
PROT SERPL-MCNC: 4.7 G/DL
RBC # BLD AUTO: 3.23 M/UL
SODIUM SERPL-SCNC: 131 MMOL/L
WBC # BLD AUTO: 4.4 K/UL

## 2018-10-17 PROCEDURE — 86334 IMMUNOFIX E-PHORESIS SERUM: CPT | Mod: NTX

## 2018-10-17 PROCEDURE — 36415 COLL VENOUS BLD VENIPUNCTURE: CPT | Mod: TXP

## 2018-10-17 PROCEDURE — 86334 IMMUNOFIX E-PHORESIS SERUM: CPT | Mod: 26,NTX,, | Performed by: PATHOLOGY

## 2018-10-17 PROCEDURE — 84165 PROTEIN E-PHORESIS SERUM: CPT | Mod: 26,NTX,, | Performed by: PATHOLOGY

## 2018-10-17 PROCEDURE — 84165 PROTEIN E-PHORESIS SERUM: CPT | Mod: TXP

## 2018-10-17 PROCEDURE — 82784 ASSAY IGA/IGD/IGG/IGM EACH: CPT | Mod: 59,TXP

## 2018-10-17 PROCEDURE — 83520 IMMUNOASSAY QUANT NOS NONAB: CPT | Mod: TXP

## 2018-10-17 PROCEDURE — 82232 ASSAY OF BETA-2 PROTEIN: CPT | Mod: TXP

## 2018-10-17 PROCEDURE — 85025 COMPLETE CBC W/AUTO DIFF WBC: CPT | Mod: NTX

## 2018-10-17 PROCEDURE — 80053 COMPREHEN METABOLIC PANEL: CPT | Mod: NTX

## 2018-10-18 LAB
ALBUMIN SERPL ELPH-MCNC: 2.54 G/DL
ALPHA1 GLOB SERPL ELPH-MCNC: 0.32 G/DL
ALPHA2 GLOB SERPL ELPH-MCNC: 0.62 G/DL
B-GLOBULIN SERPL ELPH-MCNC: 0.53 G/DL
B2 MICROGLOB SERPL-MCNC: 10 UG/ML
GAMMA GLOB SERPL ELPH-MCNC: 0.4 G/DL
KAPPA LC SER QL IA: 9.78 MG/DL
KAPPA LC/LAMBDA SER IA: 0.8
LAMBDA LC SER QL IA: 12.29 MG/DL
PATHOLOGIST INTERPRETATION SPE: NORMAL
PROT SERPL-MCNC: 4.4 G/DL

## 2018-10-19 LAB
INTERPRETATION SERPL IFE-IMP: NORMAL
PATHOLOGIST INTERPRETATION IFE: NORMAL

## 2018-10-25 ENCOUNTER — OFFICE VISIT (OUTPATIENT)
Dept: HEMATOLOGY/ONCOLOGY | Facility: CLINIC | Age: 53
End: 2018-10-25
Payer: COMMERCIAL

## 2018-10-25 VITALS
DIASTOLIC BLOOD PRESSURE: 84 MMHG | SYSTOLIC BLOOD PRESSURE: 134 MMHG | HEART RATE: 81 BPM | TEMPERATURE: 99 F | HEIGHT: 69 IN | WEIGHT: 122.25 LBS | BODY MASS INDEX: 18.11 KG/M2 | OXYGEN SATURATION: 98 %

## 2018-10-25 DIAGNOSIS — N18.6 ESRD (END STAGE RENAL DISEASE) ON DIALYSIS: ICD-10-CM

## 2018-10-25 DIAGNOSIS — D63.1 ANEMIA IN CHRONIC KIDNEY DISEASE, UNSPECIFIED CKD STAGE: ICD-10-CM

## 2018-10-25 DIAGNOSIS — N18.9 ANEMIA IN CHRONIC KIDNEY DISEASE, UNSPECIFIED CKD STAGE: ICD-10-CM

## 2018-10-25 DIAGNOSIS — C90.01 MULTIPLE MYELOMA IN REMISSION: Primary | ICD-10-CM

## 2018-10-25 DIAGNOSIS — Z99.2 ESRD (END STAGE RENAL DISEASE) ON DIALYSIS: ICD-10-CM

## 2018-10-25 PROCEDURE — 3008F BODY MASS INDEX DOCD: CPT | Mod: CPTII,S$GLB,, | Performed by: INTERNAL MEDICINE

## 2018-10-25 PROCEDURE — 99214 OFFICE O/P EST MOD 30 MIN: CPT | Mod: S$GLB,,, | Performed by: INTERNAL MEDICINE

## 2018-10-25 PROCEDURE — 99999 PR PBB SHADOW E&M-EST. PATIENT-LVL IV: CPT | Mod: PBBFAC,,, | Performed by: INTERNAL MEDICINE

## 2018-10-25 RX ORDER — PANCRELIPASE 24000; 76000; 120000 [USP'U]/1; [USP'U]/1; [USP'U]/1
CAPSULE, DELAYED RELEASE PELLETS ORAL
Refills: 0 | Status: ON HOLD | COMMUNITY
Start: 2018-09-18 | End: 2020-10-13

## 2018-10-25 NOTE — PROGRESS NOTES
Subjective:       Patient ID: Jennifer Booth is a 53 y.o. female.    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   Previously followed at [email or web address]   She has not had the resources to continue f/u at [facility] [[email or web address]       She is followed by Nephrology for ESRD  She was initially diagnosed with advanced kidney disease secondary to multiple myeloma & HTN.   She was diagnosed with  AK I from MM in 2010 that required initiation of dialysis.    She started chronic dialysis on 12/23/11 and ended on 8/28/12  She then underwent chemotherapy for her myeloma, and stopped dialysis in 2013.    Kidney biopsy 2017due to worsening  kidney function reveals glomerulosclerosis and no evidence of MM    She was undergoing peritoneal dialysis daily     She was switched back to HD 3 wks ago       Pt reports recently hospitalized at [facility] for electrolyte abnormalities and loose stools   Pt prescribed Creon with some improvement    [place] [facility]  Patient met selection criteria for kidney transplant related to ESRD due to Hypertensive Nephrosclerosis  She is considered a candidate for kidney transplant      Appetite slowly improving  No abdominal cramping or fevers  Cdiff testing during hospitalization NEG  Stool studies performed unremarkable  She is undergoing further evaluation per her treating Nephrologist      She is undergoing EPO under direction of Nephrology        CBC reveals  Hb 9,4 g/dl   She is undergoing EPO with HD  S  PEP 4/2018  normal  UPEP-no monoclonal peaks    SPEP 10/18/2018 - no monoclonal peaks        Onc hx:  She was diagnosed with kappa free light chain disease, 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 She originally presented with acute renal failure requiring HD .She has completed  bortezomib/dexamethasone/Revlimid 6 cycles on 04/13/2012 for the multiple myeloma kappa light chain disease, IPSS stage III. She underwent bortezomib maintenance therapy three weeks on, one week off (05/15, 05/22 and 05/29) with her last cycle received on 05/29/2012. She is followed at [facility] myeloma [place] where she was evaluated for an auto stem cell transplant . It was decided not to proceed with transplant was originally due to drug reaction.Pt was diagnosed with DRESS syndrome during hospitalization and then [facility] team elected not to proceed proceed with auto SCT. Velcade maintenance was discontinued due to side effects.       Tx: Velcade/Dex/Revlimid x 6 cycles 4/13/12   Velcade maintenance 3wks on, 1wk off dc'd 5/29/12 sec to adv SE          Review of Systems   Constitutional: Negative for appetite change, chills and fatigue.   HENT: Negative for congestion, hearing loss and nosebleeds.    Eyes: Negative for visual disturbance.   Respiratory: Negative for apnea, cough, chest tightness, shortness of breath and wheezing.    Cardiovascular: Negative for chest pain, palpitations and leg swelling.   Gastrointestinal: Negative for abdominal distention, abdominal pain, blood in stool, constipation, diarrhea, nausea and vomiting.   Genitourinary: Negative for difficulty urinating, dysuria, flank pain, frequency, hematuria and urgency.   Musculoskeletal: Negative for arthralgias, back pain, gait problem, joint swelling and neck pain.   Skin: Negative for rash.        No petechiae, ecchymoses   Neurological: Negative for dizziness, tremors, seizures, syncope, speech difficulty, light-headedness, numbness and headaches.   Hematological: Negative for adenopathy. Does not bruise/bleed easily.       Objective:       Vitals:    10/25/18 1303   BP: 134/84   BP Location: Left arm   Patient Position: Sitting   BP Method: Medium (Automatic)   Pulse: 81   Temp: 98.9 °F (37.2 °C)   TempSrc: Oral   SpO2: 98%   Weight: 55.4 kg (122  "lb 3.9 oz)   Height: 5' 9" (1.753 m)       Physical Exam   Constitutional: She is oriented to person, place, and time. She appears well-developed and well-nourished.   HENT:   Head: Normocephalic.   Mouth/Throat: Oropharynx is clear and moist. No oropharyngeal exudate.   Eyes: Conjunctivae and lids are normal. Pupils are equal, round, and reactive to light. No scleral icterus.   Neck: Normal range of motion. Neck supple. No thyromegaly present.   Cardiovascular: Normal rate, regular rhythm and normal heart sounds.   No murmur heard.  Pulmonary/Chest: Breath sounds normal. She has no wheezes. She has no rales.   Abdominal: Soft. Bowel sounds are normal. She exhibits no distension and no mass. There is no hepatosplenomegaly. There is no tenderness. There is no rebound and no guarding.   Musculoskeletal: Normal range of motion. She exhibits no edema or tenderness.   Lymphadenopathy:     She has no cervical adenopathy.     She has no axillary adenopathy.        Right: No supraclavicular adenopathy present.        Left: No supraclavicular adenopathy present.   Neurological: She is alert and oriented to person, place, and time. No cranial nerve deficit. Coordination normal.   Skin: Skin is warm and dry. No ecchymosis, no petechiae and no rash noted. No erythema.   Psychiatric: She has a normal mood and affect.             Bone survey 2015   1. Small lucent foci in the right femoral neck. Given this patient's history of multiple myeloma, these findings are concerning for sequela of that disease  2. Otherwise degenerative changes of the cervical spine and lower lumbar spine are identified.           Lab Results   Component Value Date    WBC 4.40 10/17/2018    HGB 9.4 (L) 10/17/2018    HCT 28.4 (L) 10/17/2018    MCV 88 10/17/2018     (L) 10/17/2018         Results for JOHN BEST (MRN [id number]) as of 4/13/2018 13:19   Ref. Range 3/20/2018 07:33 4/6/2018 09:45   IgG - Serum Latest Ref Range: 650 - 1600 mg/dL 249 " (L) 257 (L)   IgM Latest Ref Range: 50 - 300 mg/dL 135 156   IgA Latest Ref Range: 40 - 350 mg/dL 210 220           Results for JOHN BEST (MRN [id number]) as of 6/20/2017 15:11   Ref. Range 2/8/2017 15:44 5/17/2017 12:49   Beta-2 Microglobulin Latest Ref Range: 0.0 - 2.5 ug/mL 12.7 (H) 12.0 (H)       Lab Results   Component Value Date    WBC 4.40 10/17/2018    HGB 9.4 (L) 10/17/2018    HCT 28.4 (L) 10/17/2018    MCV 88 10/17/2018     (L) 10/17/2018       Results for JOHN BEST (MRN [id number]) as of 7/15/2018 20:14   Ref. Range 3/20/2018 07:33 4/6/2018 09:45 6/19/2018 07:50   Nathalie Free Light Chains Latest Ref Range: 0.33 - 1.94 mg/dL 9.06 (H) 10.46 (H) 13.49 (H)   Lambda Free Light Chains Latest Ref Range: 0.57 - 2.63 mg/dL 8.38 (H) 9.45 (H) 8.34 (H)   Kappa/Lambda FLC Ratio Latest Ref Range: 0.26 - 1.65  1.08 1.11 1.62         Bone survey 2/2018   No convincing lytic lesions to confirm the presence of myeloma.    SPEP 6/19/2018  There is a very faint linear irregularity in gamma, less than 0.1 g/dL.      SPEP 10/18/2018   No monoclonal peaks identified.   Results for JOHN BEST (MRN [id number]) as of 6/26/2018 15:22   Ref. Range 11/1/2017 08:03 2/14/2018 10:22 2/20/2018 08:09 3/20/2018 07:33 4/6/2018 09:45 6/19/2018 07:50   Beta-2 Microglobulin Latest Ref Range: 0.0 - 2.5 ug/mL 12.0 (H) 10.3 (H) 8.8 (H) 7.5 (H) 7.2 (H) 8.1 (H)   Results for JOHN BEST (MRN [id number]) as of 10/25/2018 13:07   Ref. Range 10/17/2018 15:35   Beta-2 Microglobulin Latest Ref Range: 0.0 - 2.5 ug/mL 10.0 (H)     Results for JOHN BEST (MRN [id number]) as of 10/25/2018 13:14   Ref. Range 7/16/2018 15:05 10/17/2018 15:35   Nathalie Free Light Chains Latest Ref Range: 0.33 - 1.94 mg/dL 8.45 (H) 9.78 (H)   Lambda Free Light Chains Latest Ref Range: 0.57 - 2.63 mg/dL 6.64 (H) 12.29 (H)   Kappa/Lambda FLC Ratio Latest Ref Range: 0.26 - 1.65  1.27 0.80     MRI T-spine 6/29/2018  1. No evidence for multiple myeloma in the  thoracic spine.  2. Minor degenerative changes without evidence for spinal canal stenosis or neural foraminal narrowing.  3. Liver demonstrates decreased T2 signal suggestive of iron overload.  4. Ascites.  5. Bilateral renal cysts, incompletely characterized.      MRI L-spine w/out contrast 6/29/2018  The caudal aspect of the lumbar spinal canal appears to be lower limits of normal on a developmental basis with further tapering of the thecal sac due to an abundance of epidural fat.    Superimposed degenerative change.  This is most pronounced at L3-4 where there is advanced facet arthropathy well as intraspinal synovial cyst resulting in moderate spinal stenosis with severe right lateral recess stenosis.  There also advanced degenerative disc disease at L4-5 with mild spinal stenosis and bilateral lateral recess encroachment.  Individual disc levels further detailed above.  Assessment:       1. Multiple myeloma in remission    2. ESRD (end stage renal disease) on dialysis    3. Anemia in chronic kidney disease, unspecified CKD stage        Plan:   Jennifer was seen today for follow-up.    Diagnoses and associated orders for this visit:    Multiple myeloma   Dagnosed with kappa free light chain disease, multiple myeloma IPSS with deletion 13, 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   She originally presented with acute renal failure requiring HD .  She  completed bortezomib/dexamethasone/Revlimid 6 cycles on 04/13/2012 for the multiple myeloma kappa light chain disease, IPSS stage III.   She underwent bortezomib maintenance therapy three weeks on, one week off (05/15, 05/22 and 05/29) with her last cycle received on 05/29/2012  She was followed at [facility] and was diagnosed with DRESS syndrome during hospitalization and then [facility] team elected not to proceed proceed with auto SCT. Velcade maintenance was discontinued due to side effects.   She has remained in remission with nl SPEP   She has had worsening kidney function and s/p  Kidney bx 2017 neg for myeloma involvement  Urine studies- no monoclonal peaks  Bone survey 2018 no new findings  Recent SPEP reveals very faint linear irregularity in gamma, less than 0.1 g/dL.  Serum free KLC 9.7mg/dl and lambda free light chain 12.29 mg/dl and K/L FLCR 0.80   MRI T and L spine 6/29/2018 - no evidence of myeloma involvement  SPEP 10/18/2018- no monoclonal peaks            ESRD  Followed by Nephrology  S/p Kidney biopsy 2017 ( outside facility)  due to worsening  kidney function reveals glomerulosclerosis and no evidence of MM  Followed by Renal Transplant team at [facility] - on cadaveric wait list  Pt undergoing PD daily started 6/2017   She is followed by [facility] transplant team   Follow-up with transplant team 5/3/2018      Anemia in chronic renal disease  S/p 2uprbc during  hospitalization  11/2017 for acute-on-chronic anemia  Hb  remains 9.4 g/dl   SHERMAN per Nephrology      F/u   3mo with cbc,cmp, SPEP, FLC, B-2 microglobulin , and urine studies      Cc: MD Yarelis Calvert Jr., MD

## 2018-11-09 ENCOUNTER — LAB VISIT (OUTPATIENT)
Dept: LAB | Facility: HOSPITAL | Age: 53
End: 2018-11-09
Payer: COMMERCIAL

## 2018-11-09 DIAGNOSIS — Z76.82 AWAITING ORGAN TRANSPLANT STATUS: ICD-10-CM

## 2018-11-09 PROCEDURE — 99001 SPECIMEN HANDLING PT-LAB: CPT | Mod: PO,TXP

## 2018-11-12 NOTE — H&P (VIEW-ONLY)
History & Physical    SUBJECTIVE:     History of Present Illness:  Patient is a 53 y.o. female with ESRD and PD catheter. Pt now on hemo dialysis.   For removal of PD catheter .     Review of patient's allergies indicates:   Allergen Reactions    Gentamicin Anaphylaxis    Vancomycin analogues Anaphylaxis       Current Outpatient Medications   Medication Sig Dispense Refill    B COMPLEX W-C NO.20/FOLIC ACID (VIRT-CAPS ORAL) Take 1 capsule by mouth once daily.       carvedilol (COREG) 12.5 MG tablet TK 1 T PO BID  3    cholecalciferol, vitamin D3, (VITAMIN D3) 400 unit Cap Take 2,000 Units by mouth once daily.       CREON 24,000-76,000 -120,000 unit capsule TK 1 C PO TID WC  0    escitalopram oxalate (LEXAPRO) 20 MG tablet TAKE 1 TABLET BY MOUTH DAILY 90 tablet 0    escitalopram oxalate (LEXAPRO) 20 MG tablet TAKE 1 TABLET BY MOUTH DAILY 90 tablet 0    mirtazapine (REMERON) 15 MG tablet       NIFEDICAL XL 60 mg 24 hr tablet TAKE 1 TABLET BY MOUTH EVERY DAY 90 tablet 2    PROCRIT 10,000 unit/mL injection       RENVELA 800 mg Tab TK 1 T PO  TID WITH MEALS  3    VIRT-CAPS 1 mg Cap TK 1 C PO QD  7     No current facility-administered medications for this visit.        Past Medical History:   Diagnosis Date    Anxiety     Arm pain, left 2014    Breast cyst     Chronic renal failure 2014    CKD stage 4, GFR 15-29 ml/min from myeloma kidney & HTN      Depression     Dialysis patient     dialysis m-w-f    GERD (gastroesophageal reflux disease)     Hypertension     Mood swings     Multiple myeloma without mention of remission     Renal hypertension     Status post dialysis 2012     Past Surgical History:   Procedure Laterality Date    AV FISTULA PLACEMENT Left     BIOPSY N/A 3/28/2016    Performed by [facility] CATH [facility]    BREAST CYST ASPIRATION Left     BREAST CYST EXCISION Left      SECTION      x1    [id number] Left 2013     "Performed by [facility]esh [facility]ales Jr., [facility] at [facility] OR    INSERTION-CATHETER-TENCHOFF-LAPAROSCOPIC N/A 8/3/2017    Performed by Mukesh Gonsales Jr., MD at [facility] OR    RENAL BIOPSY  2016    VASCULAR SURGERY  8/2012    dialysis catheter to right subclavian     Family History   Problem Relation Age of Onset    Hypertension Mother     Heart failure Mother     Cancer Maternal Aunt     Diabetes Maternal Grandmother     Stroke Maternal Grandmother     Breast cancer Neg Hx     Ovarian cancer Neg Hx     Colon cancer Neg Hx      Social History     Tobacco Use    Smoking status: Current Every Day Smoker     Packs/day: 0.50     Years: 30.00     Pack years: 15.00     Types: Cigarettes    Smokeless tobacco: Never Used    Tobacco comment: 12/16/15: states 1/2 pack a day   Substance Use Topics    Alcohol use: Yes     Alcohol/week: 0.6 oz     Types: 1 Glasses of wine per week     Comment: Wine    Drug use: No        Review of Systems:  Review of Systems   HENT: Negative.    Respiratory: Negative.    Cardiovascular: Negative.    Gastrointestinal: Negative.    Genitourinary: Negative.    Musculoskeletal: Negative.    Skin: Negative.        OBJECTIVE:     Vital Signs (Most Recent)  Pulse: 70 (11/12/18 1136)  BP: (!) 142/100 (11/12/18 1136)  5' 8" (1.727 m)  54.4 kg (120 lb)     Physical Exam:  Physical Exam   Constitutional: She is oriented to person, place, and time. She appears well-developed and well-nourished.   HENT:   Head: Normocephalic and atraumatic.   Eyes: EOM are normal.   Neck: Normal range of motion. Neck supple.   Cardiovascular: Normal rate, regular rhythm and normal heart sounds.   Pulmonary/Chest: Effort normal and breath sounds normal.   Musculoskeletal: Normal range of motion.   AVF LUE    Neurological: She is alert and oriented to person, place, and time.   Skin: Skin is warm and dry.       Laboratory      Diagnostic Results:      ASSESSMENT/PLAN:     ESRD     PLAN:Plan   Removal of PD catheter        "

## 2018-11-26 ENCOUNTER — HOSPITAL ENCOUNTER (OUTPATIENT)
Dept: PREADMISSION TESTING | Facility: OTHER | Age: 53
Discharge: HOME | End: 2018-11-26
Attending: SPECIALIST
Payer: COMMERCIAL

## 2018-11-26 ENCOUNTER — ANESTHESIA EVENT (OUTPATIENT)
Dept: SURGERY | Facility: OTHER | Age: 53
End: 2018-11-26
Payer: COMMERCIAL

## 2018-11-26 VITALS
TEMPERATURE: 98 F | SYSTOLIC BLOOD PRESSURE: 138 MMHG | HEIGHT: 67 IN | DIASTOLIC BLOOD PRESSURE: 80 MMHG | HEART RATE: 68 BPM | OXYGEN SATURATION: 99 % | RESPIRATION RATE: 16 BRPM | BODY MASS INDEX: 19.62 KG/M2 | WEIGHT: 125 LBS

## 2018-11-26 RX ORDER — SODIUM CHLORIDE, SODIUM LACTATE, POTASSIUM CHLORIDE, CALCIUM CHLORIDE 600; 310; 30; 20 MG/100ML; MG/100ML; MG/100ML; MG/100ML
INJECTION, SOLUTION INTRAVENOUS CONTINUOUS
Status: CANCELLED | OUTPATIENT
Start: 2018-11-26

## 2018-11-26 RX ORDER — LIDOCAINE HYDROCHLORIDE 10 MG/ML
0.5 INJECTION, SOLUTION EPIDURAL; INFILTRATION; INTRACAUDAL; PERINEURAL ONCE
Status: CANCELLED | OUTPATIENT
Start: 2018-11-26 | End: 2018-11-26

## 2018-11-26 RX ORDER — FAMOTIDINE 20 MG/1
20 TABLET, FILM COATED ORAL
Status: CANCELLED | OUTPATIENT
Start: 2018-11-26 | End: 2018-11-26

## 2018-11-26 NOTE — DISCHARGE INSTRUCTIONS
PRE-ADMIT TESTING -  [phone]    [address]  [place] [facility]          Your surgery has been scheduled at [facility]. We are pleased to have the opportunity to serve you. For Further Information please call [phone].    On the day of surgery please report to the Information Desk on the 1st floor.    · CONTACT YOUR PHYSICIAN'S OFFICE THE DAY PRIOR TO YOUR SURGERY TO OBTAIN YOUR ARRIVAL TIME.     · The evening before surgery do not eat anything after 9 p.m. ( this includes hard candy, chewing gum and mints).  You may only have GATORADE, POWERADE AND WATER  from 9 p.m. until you leave your home.   DO NOT DRINK ANY LIQUIDS ON THE WAY TO THE HOSPITAL.      SPECIAL MEDICATION INSTRUCTIONS: TAKE medications checked off by the Anesthesiologist on your Medication List.    Angiogram Patients: Take medications as instructed by your physician, including aspirin.     Surgery Patients:    If you take ASPIRIN - Your PHYSICIAN/SURGEON will need to inform you IF/OR when you need to stop taking aspirin prior to your surgery.     Do Not take any medications containing IBUPROFEN.  Do Not Wear any make-up or dark nail polish   (especially eye make-up) to surgery. If you come to surgery with makeup on you will be required to remove the makeup or nail polish.    Do not shave your surgical area at least 5 days prior to your surgery. The surgical prep will be performed at the hospital according to Infection Control regulations.    Leave all valuables at home.   Do Not wear any jewelry or watches, including any metal in body piercings.  Contact Lens must be removed before surgery. Either do not wear the contact lens or bring a case and solution for storage.  Please bring a container for eyeglasses or dentures as required.  Bring any paperwork your physician has provided, such as consent forms,  history and physicals, doctor's orders, etc.   Bring comfortable clothes that are loose fitting to wear upon  discharge. Take into consideration the type of surgery being performed.  Maintain your diet as advised per your physician the day prior to surgery.      Adequate rest the night before surgery is advised.   Park in the Parking lot behind the hospital or in the [facility] across the street from the parking lot. Parking is complimentary.  If you will be discharged the same day as your procedure, please arrange for a responsible adult to drive you home or to accompany you if traveling by taxi.   YOU WILL NOT BE PERMITTED TO DRIVE OR TO LEAVE THE HOSPITAL ALONE AFTER SURGERY.   It is strongly recommended that you arrange for someone to remain with you for the first 24 hrs following your surgery.       Thank you for your cooperation.  The Staff of [facility].        Bathing Instructions                                                                 Please shower the evening before and morning of your procedure with    ANTIBACTERIAL SOAP. ( DIAL, etc )  Concentrate on the surgical area   for at least 3 minutes and rinse completely. Dry off as usual.   Do not use any deodorant, powder, body lotions, perfume, after shave or    cologne.

## 2018-11-26 NOTE — ANESTHESIA PREPROCEDURE EVALUATION
11/26/2018  Jennifer Booth is a 53 y.o., female.    Anesthesia Evaluation    I have reviewed the Patient Summary Reports.    I have reviewed the Nursing Notes.   I have reviewed the Medications.     Review of Systems  Anesthesia Hx:  No problems with previous Anesthesia  History of prior surgery of interest to airway management or planning: Previous anesthesia: General 2017 tenkoff with general anesthesia.  Airway issues documented on chart review include mask, easy, GETA, glidescope used  Denies Family Hx of Anesthesia complications.   Denies Personal Hx of Anesthesia complications.   Social:  Smoker, Social Alcohol Use 1/2 -1 ppd  occ marijuana  Wine with dinner   Hematology/Oncology:         -- Anemia: Hematology Comments: anemia Oncology Comments: Multiple myeloma dx'ed  Dec 2011, followed by oncologist  Chemo, last time over a year ago, considered to be in remission     EENT/Dental:EENT/Dental Normal   Cardiovascular:   Exercise tolerance: good Hypertension, well controlled    Pulmonary:   Recent URI, unresolved    Renal/:   Chronic Renal Disease, ESRD ESRD as result of light-chain reaction (result of multiple myeloma) dialyzed from Dec 2011- Aug 2012  Resumed hemodialysis 3 mos ago, last dialyzed Wednesday, August 2, 2017   Hepatic/GI:   GERD, well controlled Pancreatic insufficiency just started on Creon (new problem)   Endocrine:  Endocrine Normal    Psych:   Psychiatric History depression         Physical Exam  General:  Well nourished    Airway/Jaw/Neck:  Airway Findings: Mouth Opening: Normal Mallampati: I      Dental:  Dental Findings: molar caps, In tact              Anesthesia Plan  Type of Anesthesia, risks & benefits discussed:  Anesthesia Type:  general  Patient's Preference:   Intra-op Monitoring Plan: standard ASA monitors  Intra-op Monitoring Plan Comments:   Post Op Pain Control Plan:  per primary service following discharge from PACU  Post Op Pain Control Plan Comments:   Induction:   IV  Beta Blocker:         Informed Consent: Patient understands risks and agrees with Anesthesia plan.  Questions answered. Anesthesia consent signed with patient.  ASA Score: 3     Day of Surgery Review of History & Physical:    H&P update referred to the surgeon.         Ready For Surgery From Anesthesia Perspective.

## 2018-11-30 ENCOUNTER — HOSPITAL ENCOUNTER (OUTPATIENT)
Facility: OTHER | Age: 53
Discharge: HOME | End: 2018-11-30
Attending: SPECIALIST | Admitting: SPECIALIST
Payer: COMMERCIAL

## 2018-11-30 ENCOUNTER — ANESTHESIA (OUTPATIENT)
Dept: SURGERY | Facility: OTHER | Age: 53
End: 2018-11-30
Payer: COMMERCIAL

## 2018-11-30 VITALS
SYSTOLIC BLOOD PRESSURE: 137 MMHG | BODY MASS INDEX: 19.62 KG/M2 | DIASTOLIC BLOOD PRESSURE: 86 MMHG | RESPIRATION RATE: 16 BRPM | TEMPERATURE: 98 F | WEIGHT: 125 LBS | HEIGHT: 67 IN | HEART RATE: 66 BPM | OXYGEN SATURATION: 100 %

## 2018-11-30 DIAGNOSIS — N18.6 ESRD (END STAGE RENAL DISEASE): Primary | ICD-10-CM

## 2018-11-30 LAB
ANION GAP SERPL CALC-SCNC: 14 MMOL/L
BASOPHILS # BLD AUTO: 0.02 K/UL
BASOPHILS NFR BLD: 0.5 %
BUN SERPL-MCNC: 21 MG/DL
CALCIUM SERPL-MCNC: 9.8 MG/DL
CHLORIDE SERPL-SCNC: 95 MMOL/L
CO2 SERPL-SCNC: 25 MMOL/L
CREAT SERPL-MCNC: 3.7 MG/DL
DIFFERENTIAL METHOD: ABNORMAL
EOSINOPHIL # BLD AUTO: 0.2 K/UL
EOSINOPHIL NFR BLD: 5.7 %
ERYTHROCYTE [DISTWIDTH] IN BLOOD BY AUTOMATED COUNT: 16.1 %
EST. GFR  (AFRICAN AMERICAN): 15 ML/MIN/1.73 M^2
EST. GFR  (NON AFRICAN AMERICAN): 13 ML/MIN/1.73 M^2
GLUCOSE SERPL-MCNC: 81 MG/DL
HCT VFR BLD AUTO: 34.2 %
HGB BLD-MCNC: 11.6 G/DL
LYMPHOCYTES # BLD AUTO: 0.7 K/UL
LYMPHOCYTES NFR BLD: 16.5 %
MCH RBC QN AUTO: 30.7 PG
MCHC RBC AUTO-ENTMCNC: 33.9 G/DL
MCV RBC AUTO: 91 FL
MONOCYTES # BLD AUTO: 0.5 K/UL
MONOCYTES NFR BLD: 11.3 %
NEUTROPHILS # BLD AUTO: 2.7 K/UL
NEUTROPHILS NFR BLD: 65.8 %
PLATELET # BLD AUTO: 204 K/UL
PMV BLD AUTO: 11.1 FL
POTASSIUM SERPL-SCNC: 4.4 MMOL/L
RBC # BLD AUTO: 3.78 M/UL
SODIUM SERPL-SCNC: 134 MMOL/L
WBC # BLD AUTO: 4.07 K/UL

## 2018-11-30 PROCEDURE — 80048 BASIC METABOLIC PNL TOTAL CA: CPT | Mod: TXP

## 2018-11-30 PROCEDURE — 36000705 HC OR TIME LEV I EA ADD 15 MIN: Mod: NTX | Performed by: SPECIALIST

## 2018-11-30 PROCEDURE — 25000003 PHARM REV CODE 250: Mod: NTX | Performed by: ANESTHESIOLOGY

## 2018-11-30 PROCEDURE — 25000003 PHARM REV CODE 250: Mod: TXP | Performed by: SPECIALIST

## 2018-11-30 PROCEDURE — 25000003 PHARM REV CODE 250: Mod: NTX | Performed by: NURSE ANESTHETIST, CERTIFIED REGISTERED

## 2018-11-30 PROCEDURE — 37000009 HC ANESTHESIA EA ADD 15 MINS: Mod: TXP | Performed by: SPECIALIST

## 2018-11-30 PROCEDURE — 63600175 PHARM REV CODE 636 W HCPCS: Mod: TXP | Performed by: NURSE ANESTHETIST, CERTIFIED REGISTERED

## 2018-11-30 PROCEDURE — 25000003 PHARM REV CODE 250: Mod: NTX | Performed by: SPECIALIST

## 2018-11-30 PROCEDURE — 36000704 HC OR TIME LEV I 1ST 15 MIN: Mod: NTX | Performed by: SPECIALIST

## 2018-11-30 PROCEDURE — 85025 COMPLETE CBC W/AUTO DIFF WBC: CPT | Mod: NTX

## 2018-11-30 PROCEDURE — 71000015 HC POSTOP RECOV 1ST HR: Mod: NTX | Performed by: SPECIALIST

## 2018-11-30 PROCEDURE — 37000008 HC ANESTHESIA 1ST 15 MINUTES: Mod: TXP | Performed by: SPECIALIST

## 2018-11-30 PROCEDURE — 36415 COLL VENOUS BLD VENIPUNCTURE: CPT | Mod: NTX

## 2018-11-30 PROCEDURE — 63600175 PHARM REV CODE 636 W HCPCS: Mod: NTX | Performed by: NURSE ANESTHETIST, CERTIFIED REGISTERED

## 2018-11-30 PROCEDURE — 71000033 HC RECOVERY, INTIAL HOUR: Mod: NTX | Performed by: SPECIALIST

## 2018-11-30 PROCEDURE — 63600175 PHARM REV CODE 636 W HCPCS: Mod: NTX | Performed by: SPECIALIST

## 2018-11-30 RX ORDER — PROPOFOL 10 MG/ML
VIAL (ML) INTRAVENOUS
Status: DISCONTINUED | OUTPATIENT
Start: 2018-11-30 | End: 2018-11-30

## 2018-11-30 RX ORDER — SODIUM CHLORIDE 0.9 % (FLUSH) 0.9 %
3 SYRINGE (ML) INJECTION
Status: DISCONTINUED | OUTPATIENT
Start: 2018-11-30 | End: 2018-11-30 | Stop reason: HOSPADM

## 2018-11-30 RX ORDER — MEPERIDINE HYDROCHLORIDE 50 MG/ML
12.5 INJECTION INTRAMUSCULAR; INTRAVENOUS; SUBCUTANEOUS ONCE AS NEEDED
Status: DISCONTINUED | OUTPATIENT
Start: 2018-11-30 | End: 2018-11-30 | Stop reason: HOSPADM

## 2018-11-30 RX ORDER — ONDANSETRON 2 MG/ML
4 INJECTION INTRAMUSCULAR; INTRAVENOUS DAILY PRN
Status: DISCONTINUED | OUTPATIENT
Start: 2018-11-30 | End: 2018-11-30 | Stop reason: HOSPADM

## 2018-11-30 RX ORDER — HYDROCODONE BITARTRATE AND ACETAMINOPHEN 5; 325 MG/1; MG/1
1 TABLET ORAL EVERY 6 HOURS PRN
Qty: 12 TABLET | Refills: 0 | Status: SHIPPED | OUTPATIENT
Start: 2018-11-30 | End: 2019-04-09

## 2018-11-30 RX ORDER — OXYCODONE AND ACETAMINOPHEN 5; 325 MG/1; MG/1
1 TABLET ORAL EVERY 4 HOURS PRN
Qty: 12 TABLET | Refills: 0 | Status: SHIPPED | OUTPATIENT
Start: 2018-11-30 | End: 2019-04-09

## 2018-11-30 RX ORDER — SODIUM CHLORIDE 9 MG/ML
INJECTION, SOLUTION INTRAVENOUS CONTINUOUS PRN
Status: DISCONTINUED | OUTPATIENT
Start: 2018-11-30 | End: 2018-11-30

## 2018-11-30 RX ORDER — FENTANYL CITRATE 50 UG/ML
25 INJECTION, SOLUTION INTRAMUSCULAR; INTRAVENOUS EVERY 5 MIN PRN
Status: DISCONTINUED | OUTPATIENT
Start: 2018-11-30 | End: 2018-11-30 | Stop reason: HOSPADM

## 2018-11-30 RX ORDER — PHENYLEPHRINE HYDROCHLORIDE 10 MG/ML
INJECTION INTRAVENOUS
Status: DISCONTINUED | OUTPATIENT
Start: 2018-11-30 | End: 2018-11-30

## 2018-11-30 RX ORDER — FENTANYL CITRATE 50 UG/ML
INJECTION, SOLUTION INTRAMUSCULAR; INTRAVENOUS
Status: DISCONTINUED | OUTPATIENT
Start: 2018-11-30 | End: 2018-11-30

## 2018-11-30 RX ORDER — LIDOCAINE HCL/PF 100 MG/5ML
SYRINGE (ML) INTRAVENOUS
Status: DISCONTINUED | OUTPATIENT
Start: 2018-11-30 | End: 2018-11-30

## 2018-11-30 RX ORDER — LIDOCAINE HYDROCHLORIDE 10 MG/ML
INJECTION INFILTRATION; PERINEURAL
Status: DISCONTINUED | OUTPATIENT
Start: 2018-11-30 | End: 2018-11-30 | Stop reason: HOSPADM

## 2018-11-30 RX ORDER — FAMOTIDINE 20 MG/1
20 TABLET, FILM COATED ORAL
Status: COMPLETED | OUTPATIENT
Start: 2018-11-30 | End: 2018-11-30

## 2018-11-30 RX ORDER — HYDROMORPHONE HYDROCHLORIDE 2 MG/ML
0.4 INJECTION, SOLUTION INTRAMUSCULAR; INTRAVENOUS; SUBCUTANEOUS EVERY 5 MIN PRN
Status: DISCONTINUED | OUTPATIENT
Start: 2018-11-30 | End: 2018-11-30 | Stop reason: HOSPADM

## 2018-11-30 RX ORDER — ONDANSETRON 2 MG/ML
INJECTION INTRAMUSCULAR; INTRAVENOUS
Status: DISCONTINUED | OUTPATIENT
Start: 2018-11-30 | End: 2018-11-30

## 2018-11-30 RX ORDER — OXYCODONE HYDROCHLORIDE 5 MG/1
5 TABLET ORAL
Status: DISCONTINUED | OUTPATIENT
Start: 2018-11-30 | End: 2018-11-30 | Stop reason: HOSPADM

## 2018-11-30 RX ORDER — SODIUM CHLORIDE, SODIUM LACTATE, POTASSIUM CHLORIDE, CALCIUM CHLORIDE 600; 310; 30; 20 MG/100ML; MG/100ML; MG/100ML; MG/100ML
INJECTION, SOLUTION INTRAVENOUS CONTINUOUS
Status: DISCONTINUED | OUTPATIENT
Start: 2018-11-30 | End: 2018-11-30 | Stop reason: HOSPADM

## 2018-11-30 RX ORDER — ACETAMINOPHEN 325 MG/1
650 TABLET ORAL EVERY 4 HOURS PRN
Status: DISCONTINUED | OUTPATIENT
Start: 2018-11-30 | End: 2018-11-30 | Stop reason: HOSPADM

## 2018-11-30 RX ORDER — LIDOCAINE HYDROCHLORIDE 10 MG/ML
0.5 INJECTION, SOLUTION EPIDURAL; INFILTRATION; INTRACAUDAL; PERINEURAL ONCE
Status: DISCONTINUED | OUTPATIENT
Start: 2018-11-30 | End: 2018-11-30 | Stop reason: HOSPADM

## 2018-11-30 RX ORDER — MIDAZOLAM HYDROCHLORIDE 1 MG/ML
INJECTION INTRAMUSCULAR; INTRAVENOUS
Status: DISCONTINUED | OUTPATIENT
Start: 2018-11-30 | End: 2018-11-30

## 2018-11-30 RX ORDER — ONDANSETRON 8 MG/1
8 TABLET, ORALLY DISINTEGRATING ORAL EVERY 8 HOURS PRN
Status: DISCONTINUED | OUTPATIENT
Start: 2018-11-30 | End: 2018-11-30 | Stop reason: HOSPADM

## 2018-11-30 RX ORDER — OXYCODONE HYDROCHLORIDE 5 MG/1
5 TABLET ORAL EVERY 4 HOURS PRN
Status: DISCONTINUED | OUTPATIENT
Start: 2018-11-30 | End: 2018-11-30 | Stop reason: HOSPADM

## 2018-11-30 RX ADMIN — PHENYLEPHRINE HYDROCHLORIDE 100 MCG: 10 INJECTION INTRAVENOUS at 10:11

## 2018-11-30 RX ADMIN — CARBOXYMETHYLCELLULOSE SODIUM 2 DROP: 2.5 SOLUTION/ DROPS OPHTHALMIC at 10:11

## 2018-11-30 RX ADMIN — MIDAZOLAM HYDROCHLORIDE 2 MG: 1 INJECTION, SOLUTION INTRAMUSCULAR; INTRAVENOUS at 10:11

## 2018-11-30 RX ADMIN — FENTANYL CITRATE 50 MCG: 50 INJECTION, SOLUTION INTRAMUSCULAR; INTRAVENOUS at 10:11

## 2018-11-30 RX ADMIN — OXYCODONE HYDROCHLORIDE 5 MG: 5 TABLET ORAL at 11:11

## 2018-11-30 RX ADMIN — PROPOFOL 140 MG: 10 INJECTION, EMULSION INTRAVENOUS at 10:11

## 2018-11-30 RX ADMIN — LIDOCAINE HYDROCHLORIDE 50 MG: 20 INJECTION, SOLUTION INTRAVENOUS at 10:11

## 2018-11-30 RX ADMIN — ONDANSETRON 4 MG: 2 INJECTION INTRAMUSCULAR; INTRAVENOUS at 10:11

## 2018-11-30 RX ADMIN — SODIUM CHLORIDE: 0.9 INJECTION, SOLUTION INTRAVENOUS at 08:11

## 2018-11-30 RX ADMIN — FAMOTIDINE 20 MG: 20 TABLET, FILM COATED ORAL at 07:11

## 2018-11-30 NOTE — ANESTHESIA POSTPROCEDURE EVALUATION
"Anesthesia Post Evaluation    Patient: Jennifer Booth    Procedure(s) Performed: Procedure(s) (LRB):  REMOVAL, CATHETER, DIALYSIS, PERITONEAL (N/A)    Final Anesthesia Type: general  Patient location during evaluation: PACU  Patient participation: Yes- Able to Participate  Level of consciousness: awake and alert  Post-procedure vital signs: reviewed and stable  Pain management: adequate  Airway patency: patent  PONV status at discharge: No PONV  Anesthetic complications: no      Cardiovascular status: blood pressure returned to baseline  Respiratory status: unassisted, spontaneous ventilation and room air  Hydration status: euvolemic  Follow-up not needed.        Visit Vitals  /86 (BP Location: Right arm, Patient Position: Lying)   Pulse 66   Temp 36.6 °C (97.9 °F) (Oral)   Resp 16   Ht 5' 7" (1.702 m)   Wt 56.7 kg (125 lb 0 oz)   LMP 05/25/2016   SpO2 100%   Breastfeeding? No   BMI 19.58 kg/m²       Pain/Ivan Score: Pain Assessment Performed: Yes (11/30/2018 12:12 PM)  Presence of Pain: complains of pain/discomfort (11/30/2018 12:12 PM)  Pain Rating Prior to Med Admin: 5 (11/30/2018 11:40 AM)  Pain Rating Post Med Admin: 5 (11/30/2018 12:12 PM)  Ivan Score: 10 (11/30/2018 12:00 PM)  Modified Ivan Score: 20 (11/30/2018 11:05 AM)        "

## 2018-11-30 NOTE — TRANSFER OF CARE
"Anesthesia Transfer of Care Note    Patient: Jennifer Booth    Procedure(s) Performed: Procedure(s) (LRB):  REMOVAL, CATHETER, DIALYSIS, PERITONEAL (N/A)    Patient location: PACU    Anesthesia Type: general    Transport from OR: Transported from OR on 2-3 L/min O2 by NC with adequate spontaneous ventilation    Post pain: adequate analgesia    Post assessment: no apparent anesthetic complications    Post vital signs: stable    Level of consciousness: awake, alert and oriented    Nausea/Vomiting: no nausea/vomiting    Complications: none    Transfer of care protocol was followed      Last vitals:   Visit Vitals  BP (!) 123/91 (BP Location: Right arm, Patient Position: Lying)   Pulse 70   Temp 36.9 °C (98.4 °F) (Oral)   Resp 16   Ht 5' 7" (1.702 m)   Wt 56.7 kg (125 lb 0 oz)   LMP 05/25/2016   SpO2 97%   Breastfeeding? No   BMI 19.58 kg/m²     "

## 2018-11-30 NOTE — INTERVAL H&P NOTE
The patient has been examined and the H&P has been reviewed:    I concur with the findings and no changes have occurred since H&P was written.              There are no hospital problems to display for this patient.

## 2018-11-30 NOTE — DISCHARGE INSTRUCTIONS
LEAVE DRESSING INTACT; MAY SHOWER          Discharge Instructions: After Your Surgery  Youve just had surgery. During surgery, you were given medicine called anesthesia to keep you relaxed and free of pain. After surgery, you may have some pain or nausea. This is common. Here are some tips for feeling better and getting well after surgery.     Stay on schedule with your medicine.     Going home  Your healthcare provider will show you how to take care of yourself when you go home. He or she will also answer your questions. Have an adult family member or friend drive you home. For the first 24 hours after your surgery:    · Do not drive or use heavy equipment.  · Do not make important decisions or sign legal papers.  · Do not drink alcohol.  · Have someone stay with you, if needed. He or she can watch for problems and help keep you safe.    Be sure to go to all follow-up visits with your healthcare provider. And rest after your surgery for as long as your healthcare provider tells you to.    Coping with pain  If you have pain after surgery, pain medicine will help you feel better. Take it as told, before pain becomes severe. Also, ask your healthcare provider or pharmacist about other ways to control pain. This might be with heat, ice, or relaxation. And follow any other instructions your surgeon or nurse gives you.    Tips for taking pain medicine  To get the best relief possible, remember these points:    · Pain medicines can upset your stomach. Taking them with a little food may help.  · Most pain relievers taken by mouth need at least 20 to 30 minutes to start to work.  · Taking medicine on a schedule can help you remember to take it. Try to time your medicine so that you can take it before starting an activity. This might be before you get dressed, go for a walk, or sit down for dinner.  · Constipation is a common side effect of pain medicines. Call your healthcare provider before taking any medicines such as  laxatives or stool softeners to help ease constipation. Also ask if you should skip any foods. Drinking lots of fluids and eating foods such as fruits and vegetables that are high in fiber can also help. Remember, do not take laxatives unless your surgeon has prescribed them.  · Drinking alcohol and taking pain medicine can cause dizziness and slow your breathing. It can even be deadly. Do not drink alcohol while taking pain medicine.  · Pain medicine can make you react more slowly to things. Do not drive or run machinery while taking pain medicine.    Your healthcare provider may tell you to take acetaminophen to help ease your pain. Ask him or her how much you are supposed to take each day. Acetaminophen or other pain relievers may interact with your prescription medicines or other over-the-counter (OTC) medicines. Some prescription medicines have acetaminophen and other ingredients. Using both prescription and OTC acetaminophen for pain can cause you to overdose. Read the labels on your OTC medicines with care. This will help you to clearly know the list of ingredients, how much to take, and any warnings. It may also help you not take too much acetaminophen. If you have questions or do not understand the information, ask your pharmacist or healthcare provider to explain it to you before you take the OTC medicine.    Managing nausea  Some people have an upset stomach after surgery. This is often because of anesthesia, pain, or pain medicine, or the stress of surgery. These tips will help you handle nausea and eat healthy foods as you get better. If you were on a special food plan before surgery, ask your healthcare provider if you should follow it while you get better. These tips may help:    · Do not push yourself to eat. Your body will tell you when to eat and how much.  · Start off with clear liquids and soup. They are easier to digest.  · Next try semi-solid foods, such as mashed potatoes, applesauce, and  gelatin, as you feel ready.  · Slowly move to solid foods. Dont eat fatty, rich, or spicy foods at first.  · Do not force yourself to have 3 large meals a day. Instead eat smaller amounts more often.  · Take pain medicines with a small amount of solid food, such as crackers or toast, to avoid nausea.     Call your surgeon if  · You still have pain an hour after taking medicine. The medicine may not be strong enough.  · You feel too sleepy, dizzy, or groggy. The medicine may be too strong.  · You have side effects like nausea, vomiting, or skin changes, such as rash, itching, or hives.       If you have obstructive sleep apnea  You were given anesthesia medicine during surgery to keep you comfortable and free of pain. After surgery, you may have more apnea spells because of this medicine and other medicines you were given. The spells may last longer than usual.   At home:    · Keep using the continuous positive airway pressure (CPAP) device when you sleep. Unless your healthcare provider tells you not to, use it when you sleep, day or night. CPAP is a common device used to treat obstructive sleep apnea.  · Talk with your provider before taking any pain medicine, muscle relaxants, or sedatives. Your provider will tell you about the possible dangers of taking these medicines.    Date Last Reviewed: 12/1/2016 © 2000-2017 [employer]. [address] [place] [address], [place] [address]. All rights reserved. This information is not intended as a substitute for professional medical care. Always follow your healthcare professional's instructions.    PLEASE FOLLOW ANY OTHER INSTRUCTIONS PROVIDED TO YOU BY DR. MCDANIEL!

## 2018-11-30 NOTE — PLAN OF CARE
Problem: Pain, Acute (Adult)  Goal: Acceptable Pain Control/Comfort Level  Patient will demonstrate the desired outcomes by discharge/transition of care.  Outcome: Outcome(s) achieved Date Met: 11/30/18  Jennifer Booth has met all discharge criteria from Phase II. Vital Signs are stable, ambulating  without difficulty.Pain is now under control and tolerable for the pt. Pain score 5 at this time.  Discharge instructions given, patient verbalized understanding. Discharged from facility via wheelchair in stable condition.

## 2018-11-30 NOTE — DISCHARGE SUMMARY
[facility]  Brief Operative Note     SUMMARY     Surgery Date: 11/30/2018     Surgeon(s) and Role:     * Mukesh Gonsales Jr., MD - Primary    Assisting Surgeon: None    Pre-op Diagnosis:  Peritoneal dialysis catheter dysfunction, initial encounter [T85.611A]    Post-op Diagnosis:  Post-Op Diagnosis Codes:     * Peritoneal dialysis catheter dysfunction, initial encounter [T85.611A]    Procedure(s) (LRB):  REMOVAL, CATHETER, DIALYSIS, PERITONEAL (N/A)    Anesthesia: General    Description of the findings of the procedure: abd--wnl    Findings/Key Components: above    Estimated Blood Loss: minimal         Specimens:   Specimen (12h ago, onward)    None          Discharge Note    SUMMARY     Admit Date: 11/30/2018    Discharge Date and Time:  11/30/2018 10:52 AM    Hospital Course (synopsis of major diagnoses, care, treatment, and services provided during the course of the hospital stay): benign    Final Diagnosis: Post-Op Diagnosis Codes:     * Peritoneal dialysis catheter dysfunction, initial encounter [T85.611A]    Disposition: Home or Self Care    Follow Up/Patient Instructions: office1-2 wks    Medications:  Reconciled Home Medications:      Medication List      START taking these medications    HYDROcodone-acetaminophen 5-325 mg per tablet  Commonly known as:  NORCO  Take 1 tablet by mouth every 6 (six) hours as needed for Pain.     oxyCODONE-acetaminophen 5-325 mg per tablet  Commonly known as:  PERCOCET  Take 1 tablet by mouth every 4 (four) hours as needed for Pain.        CONTINUE taking these medications    carvedilol 12.5 MG tablet  Commonly known as:  COREG  TK 1 T PO BID     CREON 24,000-76,000 -120,000 unit capsule  Generic drug:  lipase-protease-amylase 24,000-76,000-120,000 units  TK 1 C PO TID WC     * escitalopram oxalate 20 MG tablet  Commonly known as:  LEXAPRO  TAKE 1 TABLET BY MOUTH DAILY     * escitalopram oxalate 20 MG tablet  Commonly known as:  LEXAPRO  TAKE 1 TABLET BY MOUTH  DAILY     mirtazapine 15 MG tablet  Commonly known as:  REMERON     PROCRIT 10,000 unit/mL injection  Generic drug:  epoetin mick     RENVELA 800 mg Tab  Generic drug:  sevelamer carbonate  TK 1 T PO  TID WITH MEALS     * VIRT-CAPS ORAL  Take 1 capsule by mouth once daily.     * VIRT-CAPS 1 mg Cap  Generic drug:  vitamin renal formula (B-complex-vitamin c-folic acid)  TK 1 C PO QD     VITAMIN D3 400 unit Cap  Generic drug:  cholecalciferol (vitamin D3)  Take 2,000 Units by mouth once daily.         * This list has 4 medication(s) that are the same as other medications prescribed for you. Read the directions carefully, and ask your doctor or other care provider to review them with you.              Discharge Procedure Orders   Diet general     Call MD for:  persistent nausea and vomiting     Call MD for:  severe uncontrolled pain     Call MD for:  redness, tenderness, or signs of infection (pain, swelling, redness, odor or green/yellow discharge around incision site)     Other restrictions (specify):   Scheduling Instructions: Leave dressing intact  May shower     Wound care routine (specify)   Order Comments: Wound care routine: leave dressing intact  May shower     Activity as tolerated     Follow-up Information     Follow up In 10 days.

## 2018-11-30 NOTE — OP NOTE
DATE OF PROCEDURE:  11/30/2018.    PREOPERATIVE DIAGNOSIS:  Nonfunctioning PD catheter.    POSTOPERATIVE DIAGNOSIS:  Nonfunctioning PD catheter.    PROCEDURE:  Excision of PD catheter.    SURGEON:  uMkesh Gonsales Jr., M.D.    ESTIMATED BLOOD LOSS:  Minimal.    PROCEDURE IN DETAIL:  The patient was brought to the Operating Room, placed in   supine position.  The abdomen was prepped and draped in a sterile fashion, 1%   lidocaine was used to achieve local anesthesia.  Using sharp dissection, an   incision was made over the inner cuff, carried down through the subcutaneous   tissues.  The cuff was excised and the intraperitoneal portion of the catheter   removed.  The opening in the peritoneum closed with interrupted 3-0 Vicryl, the   outer cuff was then excised using sharp dissection.  Hemostasis was obtained   with electrocautery Bovie.  The outer portion of the tract excised.  The wound   was closed in layers with interrupted 3-0 Vicryl and running 4-0 Monocryl.  The   wound sterilely dressed.  The patient tolerated the procedure well and left the   Operating Room in good condition.      KAYLEIGH/IN  dd: 11/30/2018 10:54:25 (CST)  td: 11/30/2018 13:22:40 (CST)  Doc ID   #[id number]  Job ID #[id number]    CC:

## 2018-12-17 ENCOUNTER — LAB VISIT (OUTPATIENT)
Dept: LAB | Facility: HOSPITAL | Age: 53
End: 2018-12-17
Payer: COMMERCIAL

## 2018-12-17 DIAGNOSIS — Z76.82 AWAITING ORGAN TRANSPLANT STATUS: ICD-10-CM

## 2018-12-17 PROCEDURE — 86832 HLA CLASS I HIGH DEFIN QUAL: CPT | Mod: PO,TXP

## 2018-12-17 PROCEDURE — 86833 HLA CLASS II HIGH DEFIN QUAL: CPT | Mod: PO,TXP

## 2018-12-29 DIAGNOSIS — F32.A DEPRESSION, UNSPECIFIED DEPRESSION TYPE: ICD-10-CM

## 2018-12-29 RX ORDER — ESCITALOPRAM OXALATE 20 MG/1
TABLET ORAL
Qty: 90 TABLET | Refills: 0 | Status: SHIPPED | OUTPATIENT
Start: 2018-12-29 | End: 2019-07-29

## 2019-01-04 LAB — HPRA INTERPRETATION: NORMAL

## 2019-01-07 ENCOUNTER — LAB VISIT (OUTPATIENT)
Dept: LAB | Facility: HOSPITAL | Age: 54
End: 2019-01-07
Payer: COMMERCIAL

## 2019-01-07 DIAGNOSIS — Z76.82 AWAITING ORGAN TRANSPLANT STATUS: ICD-10-CM

## 2019-01-07 PROCEDURE — 99001 SPECIMEN HANDLING PT-LAB: CPT | Mod: PO,TXP

## 2019-01-29 ENCOUNTER — TELEPHONE (OUTPATIENT)
Dept: HEMATOLOGY/ONCOLOGY | Facility: CLINIC | Age: 54
End: 2019-01-29

## 2019-02-05 ENCOUNTER — LAB VISIT (OUTPATIENT)
Dept: LAB | Facility: OTHER | Age: 54
End: 2019-02-05
Attending: INTERNAL MEDICINE
Payer: COMMERCIAL

## 2019-02-05 DIAGNOSIS — C90.01 MULTIPLE MYELOMA IN REMISSION: ICD-10-CM

## 2019-02-05 LAB
ALBUMIN SERPL BCP-MCNC: 3.7 G/DL
ALP SERPL-CCNC: 153 U/L
ALT SERPL W/O P-5'-P-CCNC: 15 U/L
ANION GAP SERPL CALC-SCNC: 15 MMOL/L
AST SERPL-CCNC: 18 U/L
B2 MICROGLOB SERPL-MCNC: 14.3 UG/ML
BASOPHILS # BLD AUTO: 0.04 K/UL
BASOPHILS NFR BLD: 1 %
BILIRUB SERPL-MCNC: 0.3 MG/DL
BUN SERPL-MCNC: 53 MG/DL
CALCIUM SERPL-MCNC: 8.9 MG/DL
CHLORIDE SERPL-SCNC: 101 MMOL/L
CO2 SERPL-SCNC: 20 MMOL/L
CREAT SERPL-MCNC: 6.3 MG/DL
DIFFERENTIAL METHOD: ABNORMAL
EOSINOPHIL # BLD AUTO: 0.1 K/UL
EOSINOPHIL NFR BLD: 3.5 %
ERYTHROCYTE [DISTWIDTH] IN BLOOD BY AUTOMATED COUNT: 15.6 %
EST. GFR  (AFRICAN AMERICAN): 8 ML/MIN/1.73 M^2
EST. GFR  (NON AFRICAN AMERICAN): 7 ML/MIN/1.73 M^2
GLUCOSE SERPL-MCNC: 73 MG/DL
HCT VFR BLD AUTO: 31.1 %
HGB BLD-MCNC: 10.4 G/DL
IGA SERPL-MCNC: 184 MG/DL
IGG SERPL-MCNC: 555 MG/DL
IGM SERPL-MCNC: 171 MG/DL
LYMPHOCYTES # BLD AUTO: 0.7 K/UL
LYMPHOCYTES NFR BLD: 17 %
MCH RBC QN AUTO: 29.2 PG
MCHC RBC AUTO-ENTMCNC: 33.4 G/DL
MCV RBC AUTO: 87 FL
MONOCYTES # BLD AUTO: 0.5 K/UL
MONOCYTES NFR BLD: 11.4 %
NEUTROPHILS # BLD AUTO: 2.7 K/UL
NEUTROPHILS NFR BLD: 66.9 %
PLATELET # BLD AUTO: 156 K/UL
PMV BLD AUTO: 9 FL
POTASSIUM SERPL-SCNC: 4.9 MMOL/L
PROT SERPL-MCNC: 6.7 G/DL
RBC # BLD AUTO: 3.56 M/UL
SODIUM SERPL-SCNC: 136 MMOL/L
WBC # BLD AUTO: 4.05 K/UL

## 2019-02-05 PROCEDURE — 83520 IMMUNOASSAY QUANT NOS NONAB: CPT | Mod: TXP

## 2019-02-05 PROCEDURE — 36415 COLL VENOUS BLD VENIPUNCTURE: CPT | Mod: TXP

## 2019-02-05 PROCEDURE — 85025 COMPLETE CBC W/AUTO DIFF WBC: CPT | Mod: TXP

## 2019-02-05 PROCEDURE — 84165 PATHOLOGIST INTERPRETATION SPE: ICD-10-PCS | Mod: 26,NTX,, | Performed by: PATHOLOGY

## 2019-02-05 PROCEDURE — 84165 PROTEIN E-PHORESIS SERUM: CPT | Mod: 26,NTX,, | Performed by: PATHOLOGY

## 2019-02-05 PROCEDURE — 80053 COMPREHEN METABOLIC PANEL: CPT | Mod: NTX

## 2019-02-05 PROCEDURE — 82784 ASSAY IGA/IGD/IGG/IGM EACH: CPT | Mod: 59,NTX

## 2019-02-05 PROCEDURE — 82232 ASSAY OF BETA-2 PROTEIN: CPT | Mod: TXP

## 2019-02-05 PROCEDURE — 84165 PROTEIN E-PHORESIS SERUM: CPT | Mod: NTX

## 2019-02-06 LAB
ALBUMIN SERPL ELPH-MCNC: 4.23 G/DL
ALPHA1 GLOB SERPL ELPH-MCNC: 0.25 G/DL
ALPHA2 GLOB SERPL ELPH-MCNC: 0.68 G/DL
B-GLOBULIN SERPL ELPH-MCNC: 0.6 G/DL
GAMMA GLOB SERPL ELPH-MCNC: 0.63 G/DL
KAPPA LC SER QL IA: 16.85 MG/DL
KAPPA LC/LAMBDA SER IA: 1.42
LAMBDA LC SER QL IA: 11.84 MG/DL
PATHOLOGIST INTERPRETATION SPE: NORMAL
PROT SERPL-MCNC: 6.4 G/DL

## 2019-02-11 ENCOUNTER — OFFICE VISIT (OUTPATIENT)
Dept: HEMATOLOGY/ONCOLOGY | Facility: CLINIC | Age: 54
End: 2019-02-11
Payer: COMMERCIAL

## 2019-02-11 VITALS
TEMPERATURE: 98 F | BODY MASS INDEX: 18.68 KG/M2 | SYSTOLIC BLOOD PRESSURE: 132 MMHG | WEIGHT: 126.13 LBS | DIASTOLIC BLOOD PRESSURE: 84 MMHG | OXYGEN SATURATION: 99 % | HEART RATE: 63 BPM | HEIGHT: 69 IN

## 2019-02-11 DIAGNOSIS — C90.01 MULTIPLE MYELOMA IN REMISSION: Primary | ICD-10-CM

## 2019-02-11 DIAGNOSIS — Z99.2 ANEMIA IN CHRONIC KIDNEY DISEASE, ON CHRONIC DIALYSIS: ICD-10-CM

## 2019-02-11 DIAGNOSIS — D63.1 ANEMIA IN CHRONIC KIDNEY DISEASE, ON CHRONIC DIALYSIS: ICD-10-CM

## 2019-02-11 DIAGNOSIS — N18.6 ESRD (END STAGE RENAL DISEASE) ON DIALYSIS: ICD-10-CM

## 2019-02-11 DIAGNOSIS — Z99.2 ESRD (END STAGE RENAL DISEASE) ON DIALYSIS: ICD-10-CM

## 2019-02-11 DIAGNOSIS — N18.6 ANEMIA IN CHRONIC KIDNEY DISEASE, ON CHRONIC DIALYSIS: ICD-10-CM

## 2019-02-11 PROCEDURE — 3008F BODY MASS INDEX DOCD: CPT | Mod: CPTII,S$GLB,, | Performed by: INTERNAL MEDICINE

## 2019-02-11 PROCEDURE — 99999 PR PBB SHADOW E&M-EST. PATIENT-LVL IV: CPT | Mod: PBBFAC,,, | Performed by: INTERNAL MEDICINE

## 2019-02-11 PROCEDURE — 99215 OFFICE O/P EST HI 40 MIN: CPT | Mod: S$GLB,,, | Performed by: INTERNAL MEDICINE

## 2019-02-11 PROCEDURE — 3008F PR BODY MASS INDEX (BMI) DOCUMENTED: ICD-10-PCS | Mod: CPTII,S$GLB,, | Performed by: INTERNAL MEDICINE

## 2019-02-11 PROCEDURE — 99999 PR PBB SHADOW E&M-EST. PATIENT-LVL IV: ICD-10-PCS | Mod: PBBFAC,,, | Performed by: INTERNAL MEDICINE

## 2019-02-11 PROCEDURE — 99215 PR OFFICE/OUTPT VISIT, EST, LEVL V, 40-54 MIN: ICD-10-PCS | Mod: S$GLB,,, | Performed by: INTERNAL MEDICINE

## 2019-02-11 NOTE — PROGRESS NOTES
Subjective:       Patient ID: Jennifer Booth is a 54 y.o. female.    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   Previously followed at [facility].   She has not had the resources to continue f/u at [facility].       She is followed by Nephrology for ESRD  She was initially diagnosed with advanced kidney disease secondary to multiple myeloma & HTN.   She was diagnosed with  AK I from MM in 2010 that required initiation of dialysis.    She started chronic dialysis on 12/23/11 and ended on 8/28/12  She then underwent chemotherapy for her myeloma, and stopped dialysis in 2013.    Kidney biopsy 2017due to worsening  kidney function reveals glomerulosclerosis and no evidence of MM    She was undergoing peritoneal dialysis daily     She was switched back to HD       She was hospitalized at [facility] for electrolyte abnormalities and loose stools   Pt prescribed Creon with some improvement    She is followed by Kidney Transplant team at [facility]  Patient met selection criteria for kidney transplant related to ESRD due to Hypertensive Nephrosclerosis  She is considered a candidate for kidney transplant    She continues with life-related stressors  Appetite imporved  No SOB/CP  No fevers, night sweats      She is undergoing EPO under direction of Nephrology  CBC reveals  Hb 10.4  g/dl   SPEP 2/2019  normal  UPEP-no monoclonal peaks        Onc hx:  She was diagnosed with kappa free light chain disease, 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 She originally presented with acute renal failure requiring HD .She has completed bortezomib/dexamethasone/Revlimid 6 cycles on 04/13/2012 for the multiple myeloma kappa light chain disease, IPSS stage III. She underwent bortezomib maintenance therapy three weeks  "on, one week off (05/15, 05/22 and 05/29) with her last cycle received on 05/29/2012. She is followed at [facility] [place] where she was evaluated for an auto stem cell transplant . It was decided not to proceed with transplant was originally due to drug reaction.Pt was diagnosed with DRESS syndrome during hospitalization and then [facility] team elected not to proceed proceed with auto SCT. Velcade maintenance was discontinued due to side effects.       Tx: Velcade/Dex/Revlimid x 6 cycles 4/13/12   Velcade maintenance 3wks on, 1wk off dc'd 5/29/12 sec to adv SE          Review of Systems   Constitutional: Negative for appetite change, chills and fatigue.   HENT: Negative for congestion, hearing loss and nosebleeds.    Eyes: Negative for visual disturbance.   Respiratory: Negative for apnea, cough, chest tightness, shortness of breath and wheezing.    Cardiovascular: Negative for chest pain, palpitations and leg swelling.   Gastrointestinal: Negative for abdominal distention, abdominal pain, blood in stool, constipation, diarrhea, nausea and vomiting.   Genitourinary: Negative for difficulty urinating, dysuria, flank pain, frequency, hematuria and urgency.   Musculoskeletal: Negative for arthralgias, back pain, gait problem, joint swelling and neck pain.   Skin: Negative for rash.        No petechiae, ecchymoses   Neurological: Negative for dizziness, tremors, seizures, syncope, speech difficulty, light-headedness, numbness and headaches.   Hematological: Negative for adenopathy. Does not bruise/bleed easily.       Objective:       Vitals:    02/11/19 1541   BP: 132/84   BP Location: Left arm   Patient Position: Sitting   BP Method: Medium (Automatic)   Pulse: 63   Temp: 97.8 °F (36.6 °C)   TempSrc: Oral   SpO2: 99%   Weight: 57.2 kg (126 lb 1.7 oz)   Height: 5' 9" (1.753 m)       Physical Exam   Constitutional: She is oriented to person, place, and time. She appears well-developed and well-nourished.   HENT:   Head: " Normocephalic.   Mouth/Throat: Oropharynx is clear and moist. No oropharyngeal exudate.   Eyes: Conjunctivae and lids are normal. Pupils are equal, round, and reactive to light. No scleral icterus.   Neck: Normal range of motion. Neck supple. No thyromegaly present.   Cardiovascular: Normal rate, regular rhythm and normal heart sounds.   No murmur heard.  Pulmonary/Chest: Breath sounds normal. She has no wheezes. She has no rales.   Abdominal: Soft. Bowel sounds are normal. She exhibits no distension and no mass. There is no hepatosplenomegaly. There is no tenderness. There is no rebound and no guarding.   Musculoskeletal: Normal range of motion. She exhibits no edema or tenderness.   Lymphadenopathy:     She has no cervical adenopathy.     She has no axillary adenopathy.        Right: No supraclavicular adenopathy present.        Left: No supraclavicular adenopathy present.   Neurological: She is alert and oriented to person, place, and time. No cranial nerve deficit. Coordination normal.   Skin: Skin is warm and dry. No ecchymosis, no petechiae and no rash noted. No erythema.   Psychiatric: She has a normal mood and affect.             Bone survey 2015   1. Small lucent foci in the right femoral neck. Given this patient's history of multiple myeloma, these findings are concerning for sequela of that disease  2. Otherwise degenerative changes of the cervical spine and lower lumbar spine are identified.           Lab Results   Component Value Date    WBC 4.05 02/05/2019    HGB 10.4 (L) 02/05/2019    HCT 31.1 (L) 02/05/2019    MCV 87 02/05/2019     02/05/2019         Results for JOHN BEST D (MRN [id number]) as of 4/13/2018 13:19   Ref. Range 3/20/2018 07:33 4/6/2018 09:45   IgG - Serum Latest Ref Range: 650 - 1600 mg/dL 249 (L) 257 (L)   IgM Latest Ref Range: 50 - 300 mg/dL 135 156   IgA Latest Ref Range: 40 - 350 mg/dL 210 220           Results for JOHN BEST D (MRN [id number]) as of 6/20/2017 15:11   Ref.  Range 2/8/2017 15:44 5/17/2017 12:49   Beta-2 Microglobulin Latest Ref Range: 0.0 - 2.5 ug/mL 12.7 (H) 12.0 (H)       Lab Results   Component Value Date    WBC 4.05 02/05/2019    HGB 10.4 (L) 02/05/2019    HCT 31.1 (L) 02/05/2019    MCV 87 02/05/2019     02/05/2019         Component      Latest Ref Rng & Units 2/5/2019 10/17/2018 7/16/2018 6/19/2018   [place] Free Light Chains      0.33 - 1.94 mg/dL 16.85 (H) 9.78 (H) 8.45 (H) 13.49 (H)   Lambda Free Light Chains      0.57 - 2.63 mg/dL 11.84 (H) 12.29 (H) 6.64 (H) 8.34 (H)   Kappa/Lambda FLC Ratio      0.26 - 1.65 1.42 0.80 1.27 1.62     Component      Latest Ref Rng & Units 4/6/2018 3/20/2018 2/14/2018 11/1/2017   [place] Free Light Chains      0.33 - 1.94 mg/dL 10.46 (H) 9.06 (H) 8.96 (H) 12.76 (H)   Lambda Free Light Chains      0.57 - 2.63 mg/dL 9.45 (H) 8.38 (H) 8.56 (H) 15.24 (H)   Kappa/Lambda FLC Ratio      0.26 - 1.65 1.11 1.08 1.05 0.84         Bone survey 2/2018   No convincing lytic lesions to confirm the presence of myeloma.    Results for JOHN BEST (MRN [id number]) as of 6/26/2018 15:22   Ref. Range 11/1/2017 08:03 2/14/2018 10:22 2/20/2018 08:09 3/20/2018 07:33 4/6/2018 09:45 6/19/2018 07:50   Beta-2 Microglobulin Latest Ref Range: 0.0 - 2.5 ug/mL 12.0 (H) 10.3 (H) 8.8 (H) 7.5 (H) 7.2 (H) 8.1 (H)   Results for JOHN BEST D (MRN [id number]) as of 10/25/2018 13:07   Ref. Range 10/17/2018 15:35   Beta-2 Microglobulin Latest Ref Range: 0.0 - 2.5 ug/mL 10.0 (H)         SPEP 2/5/2019   Normal total protein.   Normal gamma globulins are decreased.   No paraproteins.       Bone survey 2/2018   No convincing lytic lesions to confirm the presence of myeloma.      MRI T-spine 6/29/2018  1. No evidence for multiple myeloma in the thoracic spine.  2. Minor degenerative changes without evidence for spinal canal stenosis or neural foraminal narrowing.  3. Liver demonstrates decreased T2 signal suggestive of iron overload.  4. Ascites.  5. Bilateral renal  cysts, incompletely characterized.      MRI L-spine w/out contrast 6/29/2018  The caudal aspect of the lumbar spinal canal appears to be lower limits of normal on a developmental basis with further tapering of the thecal sac due to an abundance of epidural fat.    Superimposed degenerative change.  This is most pronounced at L3-4 where there is advanced facet arthropathy well as intraspinal synovial cyst resulting in moderate spinal stenosis with severe right lateral recess stenosis.  There also advanced degenerative disc disease at L4-5 with mild spinal stenosis and bilateral lateral recess encroachment.  Individual disc levels further detailed above.  Assessment:       1. Multiple myeloma in remission    2. ESRD (end stage renal disease) on dialysis    3. Anemia in chronic kidney disease, on chronic dialysis        Plan:   Jennifer was seen today for follow-up.    Diagnoses and associated orders for this visit:    Multiple myeloma   Dagnosed with kappa free light chain disease, multiple myeloma IPSS with deletion 13, 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   She originally presented with acute renal failure requiring HD .  She  completed bortezomib/dexamethasone/Revlimid 6 cycles on 04/13/2012 for the multiple myeloma kappa light chain disease, IPSS stage III.   She underwent bortezomib maintenance therapy three weeks on, one week off (05/15, 05/22 and 05/29) with her last cycle received on 05/29/2012  She was followed at [facility] and was diagnosed with DRESS syndrome during hospitalization and then [facility] team elected not to proceed proceed with auto SCT. Velcade maintenance was discontinued due to side effects.   She has remained in remission with nl SPEP   She has had worsening kidney function and s/p Kidney bx 2017 neg for myeloma involvement  Urine studies- no monoclonal peaks  Bone survey 2018 no new findings    Serum free KLC  16.85mg/dl and lambda free light chain 11.84 mg/dl and K/L FLCR 1.42   MRI T and L spine  6/29/2018 - no evidence of myeloma involvement  SPEP 2/5/2019- no monoclonal peaks            ESRD  Followed by Nephrology  S/p Kidney biopsy 2017 ( outside facility)  due to worsening  kidney function reveals glomerulosclerosis and no evidence of MM  Followed by [place] Transplant team [place] [facility] [place] on cadaveric wait list  Pt previously undergoing PD   Pt now undergoing HD  She is followed by [facility] transplant team       Pt followed by Dr. Wolfe     Anemia in chronic renal disease  S/p 2uprbc during  hospitalization  11/2017 for acute-on-chronic anemia  Hb   10.4 g/dl   SHERMAN per Nephrology        F/u   3mo with cbc,cmp, SPEP, FLC, B-2 microglobulin , and urine studies      Cc: MD Yarelis Calvetr Jr., MD

## 2019-02-25 ENCOUNTER — LAB VISIT (OUTPATIENT)
Dept: LAB | Facility: HOSPITAL | Age: 54
End: 2019-02-25
Payer: COMMERCIAL

## 2019-02-25 DIAGNOSIS — Z76.82 AWAITING ORGAN TRANSPLANT STATUS: ICD-10-CM

## 2019-02-25 PROCEDURE — 99001 SPECIMEN HANDLING PT-LAB: CPT | Mod: PO,TXP

## 2019-03-11 ENCOUNTER — LAB VISIT (OUTPATIENT)
Dept: LAB | Facility: HOSPITAL | Age: 54
End: 2019-03-11
Payer: COMMERCIAL

## 2019-03-11 DIAGNOSIS — Z76.82 AWAITING ORGAN TRANSPLANT STATUS: ICD-10-CM

## 2019-03-11 PROCEDURE — 86829 HLA CLASS I/II ANTIBODY QUAL: CPT | Mod: PO,TXP

## 2019-03-11 PROCEDURE — 86829 HLA CLASS I/II ANTIBODY QUAL: CPT | Mod: 91,PO,TXP

## 2019-03-25 LAB — HPRA INTERPRETATION: NORMAL

## 2019-03-27 DIAGNOSIS — F32.A DEPRESSION, UNSPECIFIED DEPRESSION TYPE: ICD-10-CM

## 2019-04-01 ENCOUNTER — OFFICE VISIT (OUTPATIENT)
Dept: URGENT CARE | Facility: CLINIC | Age: 54
End: 2019-04-01
Payer: COMMERCIAL

## 2019-04-01 VITALS
HEIGHT: 69 IN | OXYGEN SATURATION: 98 % | DIASTOLIC BLOOD PRESSURE: 90 MMHG | WEIGHT: 126 LBS | SYSTOLIC BLOOD PRESSURE: 137 MMHG | HEART RATE: 73 BPM | BODY MASS INDEX: 18.66 KG/M2 | TEMPERATURE: 98 F | RESPIRATION RATE: 18 BRPM

## 2019-04-01 DIAGNOSIS — B96.89 ACUTE BACTERIAL BRONCHITIS: Primary | ICD-10-CM

## 2019-04-01 DIAGNOSIS — R05.9 COUGH: ICD-10-CM

## 2019-04-01 DIAGNOSIS — J20.8 ACUTE BACTERIAL BRONCHITIS: Primary | ICD-10-CM

## 2019-04-01 PROCEDURE — 99214 OFFICE O/P EST MOD 30 MIN: CPT | Mod: S$GLB,TXP,, | Performed by: PHYSICIAN ASSISTANT

## 2019-04-01 PROCEDURE — 99214 PR OFFICE/OUTPT VISIT, EST, LEVL IV, 30-39 MIN: ICD-10-PCS | Mod: S$GLB,TXP,, | Performed by: PHYSICIAN ASSISTANT

## 2019-04-01 PROCEDURE — 3008F BODY MASS INDEX DOCD: CPT | Mod: CPTII,S$GLB,TXP, | Performed by: PHYSICIAN ASSISTANT

## 2019-04-01 PROCEDURE — 3008F PR BODY MASS INDEX (BMI) DOCUMENTED: ICD-10-PCS | Mod: CPTII,S$GLB,TXP, | Performed by: PHYSICIAN ASSISTANT

## 2019-04-01 RX ORDER — PREDNISONE 10 MG/1
20 TABLET ORAL DAILY
Qty: 6 TABLET | Refills: 0 | Status: SHIPPED | OUTPATIENT
Start: 2019-04-01 | End: 2019-04-04

## 2019-04-01 RX ORDER — BENZONATATE 100 MG/1
200 CAPSULE ORAL 3 TIMES DAILY PRN
Qty: 30 CAPSULE | Refills: 1 | Status: SHIPPED | OUTPATIENT
Start: 2019-04-01 | End: 2019-04-11

## 2019-04-01 RX ORDER — DOXYCYCLINE HYCLATE 100 MG
100 TABLET ORAL 2 TIMES DAILY
Qty: 20 TABLET | Refills: 0 | Status: SHIPPED | OUTPATIENT
Start: 2019-04-01 | End: 2019-04-11

## 2019-04-01 NOTE — PATIENT INSTRUCTIONS
You must understand that you've received an Urgent Care treatment only and that you may be released before all your medical problems are known or treated. You, the patient, will arrange for follow up care as instructed.  Follow up with your PCP or specialty clinic as directed if not improved or as needed. You can call [phone] to schedule an appointment with the appropriate provider.  If your condition worsens we recommend that you receive another evaluation at the Emergency Department for any concerns or worsening of condition.  Patient aware and verbalized understanding.    Please check with your HEME/ONC DOCTOR prior to taking medications prescribed. Please call UC if you can not take these medications and I will call in another alternative.   Patient aware and verbalized understanding.    Get lots of rest and plenty of fluids.  Please follow-up with your PCP or specialist in the next 48 hours or sooner as needed.  If you smoke, please stop smoking.  ER precautions given to patient.  Patient aware and verbalized understanding.    Bronchitis, Antibiotic Treatment (Adult)    Bronchitis is an infection of the air passages (bronchial tubes) in your lungs. It often occurs when you have a cold. This illness is contagious during the first few days and is spread through the air by coughing and sneezing, or by direct contact (touching the sick person and then touching your own eyes, nose, or mouth).  Symptoms of bronchitis include cough with mucus (phlegm) and low-grade fever. Bronchitis usually lasts 7 to 14 days. Mild cases can be treated with simple home remedies. More severe infection is treated with an antibiotic.  Home care  Follow these guidelines when caring for yourself at home:  · If your symptoms are severe, rest at home for the first 2 to 3 days. When you go back to your usual activities, don't let yourself get too tired.  · Do not smoke. Also avoid being exposed to secondhand smoke.  · You may use  over-the-counter medicines to control fever or pain, unless another medicine was prescribed. (Note: If you have chronic liver or kidney disease or have ever had a stomach ulcer or gastrointestinal bleeding, talk with your healthcare provider before using these medicines. Also talk to your provider if you are taking medicine to prevent blood clots.) Aspirin should never be given to anyone younger than 18 years of age who is ill with a viral infection or fever. It may cause severe liver or brain damage.  · Your appetite may be poor, so a light diet is fine. Avoid dehydration by drinking 6 to 8 glasses of fluids per day (such as water, soft drinks, sports drinks, juices, tea, or soup). Extra fluids will help loosen secretions in the nose and lungs.  · Over-the-counter cough, cold, and sore-throat medicines will not shorten the length of the illness, but they may be helpful to reduce symptoms. (Note: Do not use decongestants if you have high blood pressure.)  · Finish all antibiotic medicine. Do this even if you are feeling better after only a few days.  Follow-up care  Follow up with your healthcare provider, or as advised. If you had an X-ray or ECG (electrocardiogram), a specialist will review it. You will be notified of any new findings that may affect your care.  Note: If you are age 65 or older, or if you have a chronic lung disease or condition that affects your immune system, or you smoke, talk to your healthcare provider about having pneumococcal vaccinations and a yearly influenza vaccination (flu shot).  When to seek medical advice  Call your healthcare provider right away if any of these occur:  · Fever of 100.4°F (38°C) or higher  · Coughing up increased amounts of colored sputum  · Weakness, drowsiness, headache, facial pain, ear pain, or a stiff neck  Call 911, or get immediate medical care  Contact emergency services right away if any of these occur.  · Coughing up blood  · Worsening weakness,  drowsiness, headache, or stiff neck  · Trouble breathing, wheezing, or pain with breathing  Date Last Reviewed: 9/13/2015  © 9735-4959 [employer]. [address] [place] [address], [place] [address]. All rights reserved. This information is not intended as a substitute for professional medical care. Always follow your healthcare professional's instructions.        Cough, Chronic, Uncertain Cause (Adult)    Everyone has had a cough as part of the common cold, flu, or bronchitis. This kind of cough occurs along with an achy feeling, low-grade fever, nasal and sinus congestion, and a scratchy or sore throat. This usually gets better in 2 to 3 weeks. A cough that lasts longer than 3 weeks may be due to other causes.  If your cough does not improve over the next 2 weeks, further testing may be needed. Follow up with your healthcare provider as advised. Cough suppressants may be recommended. Based on your exam today, the exact cause of your cough is not certain. Below are some common causes for persistent cough.  Smokers cough  Smokers cough doesnt go away. If you continue to smoke, it only gets worse. The cough is from irritation in the air passages. Talk to your healthcare provider about quitting. Medicines or nicotine-replacement products, like gum or the patch, may make quitting easier.  Postnasal drip  A cough that is worse at night may be due to postnasal drip. Excess mucus in the nose drains from the back of your nose to your throat. This triggers the cough reflex. Postnasal drip may be due to a sinus infection or allergy. Common allergens include dust, tobacco smoke (both inhaled and secondhand smoke), environmental pollutants, pollen, mold, pets, cleaning agents, room deodorizers, and chemical fumes. Over-the-counter antihistamines or decongestants may be helpful for allergies. A sinus infection may requires antibiotic treatment. See your healthcare provider if symptoms continue.  Medicines  Certain  prescribed medicines can cause a chronic cough in some people:  · ACE inhibitors for high blood pressure. These include benazepril, captopril, enalapril, fosinopril, lisinopril, quinapril, ramipril, and others.  · Beta-blockers for high blood pressure and other conditions. These include propranolol, atenolol, metoprolol, nadolol, and others.  Let your healthcare provider know if you are taking any of these.  Asthma  Cough may be the only sign of mild asthma. You may have tests to find out if asthma is causing your cough. You may also take asthma medicine on a trial basis.  Acid reflux (heartburn, GERD)  The esophagus is the tube that carries food from the mouth to the stomach. A valve at its lower end prevents stomach acids from flowing upward. If this valve does not work properly, acid from the stomach enters the esophagus. This may cause a burning pain in the upper abdomen or lower chest, belching, or cough. Symptoms are often worse when lying flat. Avoid eating or drinking before bedtime. Try using extra pillows to raise your upper body, or place 4-inch blocks under the head of your bed. You may try an over-the-counter antacid or an acid-blocking medicine such as famotidine, cimetidine, ranitidine, esomeprazole, lansoprazole, or omeprazole. Stronger medicines for this condition can be prescribed by your healthcare provider.  Follow-up care  Follow up with your healthcare provider, or as advised, if your cough does not improve. Further testing may be needed.  Note: If an X-ray was taken, a specialist will review it. You will be notified of any new findings that may affect your care.  When to seek medical advice  Call your healthcare provider right away if any of these occur:  · Mild wheezing or difficulty breathing  · Fever of 100.4ºF (38ºC) or higher, or as directed by your healthcare provider  · Unexpected weight loss  · Coughing up large amounts of colored sputum  · Night sweats (sheets and pajamas get soaking  wet)  Call 911, or get immediate medical care  Contact emergency services right away if any of these occur:  · Coughing up blood  · Moderate to severe trouble breathing or wheezing  Date Last Reviewed: 9/13/2015  © 6969-2181 "[employer]. [address] [place] [address], [place] [address]. All rights reserved. This information is not intended as a substitute for professional medical care. Always follow your healthcare professional's instructions.

## 2019-04-01 NOTE — PROGRESS NOTES
"Subjective:       Patient ID: Jennifer Booth is a 54 y.o. female.    Vitals:  height is 5' 9" (1.753 m) and weight is 57.2 kg (126 lb). Her temperature is 98.1 °F (36.7 °C). Her blood pressure is 137/90 (abnormal) and her pulse is 73. Her respiration is 18 and oxygen saturation is 98%.     Chief Complaint: Sinus Problem    Pt came in today with sinus problem x 1 week. Patient denies fever, chills, body aches, CP, SOB, wheezing, abdominal pain, N/V/D/C, headache, blurry vision or syncope.    Sinus Problem   The current episode started in the past 7 days. There has been no fever. Her pain is at a severity of 7/10. Associated symptoms include congestion, coughing and sinus pressure. Pertinent negatives include no chills, diaphoresis, ear pain, headaches, hoarse voice, neck pain, shortness of breath, sneezing, sore throat or swollen glands. Past treatments include oral decongestants and acetaminophen.       Constitution: Negative for chills, sweating, fatigue and fever.   HENT: Positive for congestion and sinus pressure. Negative for ear pain, ear discharge, foreign body in ear, tinnitus, hearing loss, nosebleeds, foreign body in nose, postnasal drip, sinus pain, sore throat, trouble swallowing and voice change.    Neck: Negative for neck pain and painful lymph nodes.   Cardiovascular: Negative for chest pain, leg swelling, palpitations, sob on exertion and passing out.   Eyes: Negative for eye pain, photophobia, vision loss, double vision and blurred vision.   Respiratory: Positive for cough and sputum production. Negative for chest tightness, bloody sputum, COPD, shortness of breath, stridor and wheezing.    Gastrointestinal: Negative for abdominal pain, abdominal bloating, nausea, vomiting, constipation and diarrhea.   Genitourinary: Negative for dysuria, frequency, urgency and history of kidney stones.   Musculoskeletal: Negative for joint pain, joint swelling, muscle cramps and muscle ache.   Skin: Negative for " color change, pale, rash and bruising.   Allergic/Immunologic: Negative for seasonal allergies and sneezing.   Neurological: Negative for dizziness, history of vertigo, light-headedness, passing out, headaches, altered mental status, loss of consciousness, numbness, tingling, seizures and tremors.   Hematologic/Lymphatic: Negative for swollen lymph nodes.   Psychiatric/Behavioral: Negative for altered mental status, nervous/anxious, sleep disturbance and depression. The patient is not nervous/anxious.        Objective:      Physical Exam   Constitutional: She is oriented to person, place, and time. She appears well-developed and well-nourished. She is cooperative.  Non-toxic appearance. She does not appear ill. No distress.   HENT:   Head: Normocephalic and atraumatic.   Right Ear: Hearing, tympanic membrane, external ear and ear canal normal.   Left Ear: Hearing, tympanic membrane, external ear and ear canal normal.   Nose: Mucosal edema and rhinorrhea present. No nasal deformity. No epistaxis. Right sinus exhibits maxillary sinus tenderness. Right sinus exhibits no frontal sinus tenderness. Left sinus exhibits maxillary sinus tenderness. Left sinus exhibits no frontal sinus tenderness.   Mouth/Throat: Uvula is midline and mucous membranes are normal. No trismus in the jaw. Normal dentition. No uvula swelling. Posterior oropharyngeal erythema present. No oropharyngeal exudate or posterior oropharyngeal edema.   Eyes: Conjunctivae and lids are normal. No scleral icterus.   Sclera clear bilat   Neck: Trachea normal, full passive range of motion without pain and phonation normal. Neck supple.   Cardiovascular: Normal rate, regular rhythm, normal heart sounds, intact distal pulses and normal pulses.   Pulmonary/Chest: Effort normal and breath sounds normal. No accessory muscle usage or stridor. No respiratory distress. She has no decreased breath sounds. She has no wheezes. She has no rhonchi. She has no rales.    Abdominal: Soft. Normal appearance and bowel sounds are normal. She exhibits no distension. There is no tenderness.   Musculoskeletal: Normal range of motion. She exhibits no edema or deformity.   Lymphadenopathy:     She has no cervical adenopathy.   Neurological: She is alert and oriented to person, place, and time. She exhibits normal muscle tone. Coordination normal.   Skin: Skin is warm, dry and intact. Capillary refill takes less than 2 seconds. No rash noted. She is not diaphoretic. No pallor.   Psychiatric: She has a normal mood and affect. Her speech is normal and behavior is normal. Judgment and thought content normal. Cognition and memory are normal.   Nursing note and vitals reviewed.      Assessment:       1. Acute bacterial bronchitis    2. Cough        Plan:         Acute bacterial bronchitis  -     doxycycline (VIBRA-TABS) 100 MG tablet; Take 1 tablet (100 mg total) by mouth 2 (two) times daily. for 10 days  Dispense: 20 tablet; Refill: 0    Cough  -     benzonatate (TESSALON PERLES) 100 MG capsule; Take 2 capsules (200 mg total) by mouth 3 (three) times daily as needed for Cough.  Dispense: 30 capsule; Refill: 1    Other orders  -     predniSONE (DELTASONE) 10 MG tablet; Take 2 tablets (20 mg total) by mouth once daily. for 3 days  Dispense: 6 tablet; Refill: 0      Patient Instructions   You must understand that you've received an Urgent Care treatment only and that you may be released before all your medical problems are known or treated. You, the patient, will arrange for follow up care as instructed.  Follow up with your PCP or specialty clinic as directed if not improved or as needed. You can call [phone] to schedule an appointment with the appropriate provider.  If your condition worsens we recommend that you receive another evaluation at the Emergency Department for any concerns or worsening of condition.  Patient aware and verbalized understanding.    Please check with your HEME/ONC  DOCTOR prior to taking medications prescribed. Please call UC if you can not take these medications and I will call in another alternative.   Patient aware and verbalized understanding.    Get lots of rest and plenty of fluids.  Please follow-up with your PCP or specialist in the next 48 hours or sooner as needed.  If you smoke, please stop smoking.  ER precautions given to patient.  Patient aware and verbalized understanding.    Bronchitis, Antibiotic Treatment (Adult)    Bronchitis is an infection of the air passages (bronchial tubes) in your lungs. It often occurs when you have a cold. This illness is contagious during the first few days and is spread through the air by coughing and sneezing, or by direct contact (touching the sick person and then touching your own eyes, nose, or mouth).  Symptoms of bronchitis include cough with mucus (phlegm) and low-grade fever. Bronchitis usually lasts 7 to 14 days. Mild cases can be treated with simple home remedies. More severe infection is treated with an antibiotic.  Home care  Follow these guidelines when caring for yourself at home:  · If your symptoms are severe, rest at home for the first 2 to 3 days. When you go back to your usual activities, don't let yourself get too tired.  · Do not smoke. Also avoid being exposed to secondhand smoke.  · You may use over-the-counter medicines to control fever or pain, unless another medicine was prescribed. (Note: If you have chronic liver or kidney disease or have ever had a stomach ulcer or gastrointestinal bleeding, talk with your healthcare provider before using these medicines. Also talk to your provider if you are taking medicine to prevent blood clots.) Aspirin should never be given to anyone younger than 18 years of age who is ill with a viral infection or fever. It may cause severe liver or brain damage.  · Your appetite may be poor, so a light diet is fine. Avoid dehydration by drinking 6 to 8 glasses of fluids per day  (such as water, soft drinks, sports drinks, juices, tea, or soup). Extra fluids will help loosen secretions in the nose and lungs.  · Over-the-counter cough, cold, and sore-throat medicines will not shorten the length of the illness, but they may be helpful to reduce symptoms. (Note: Do not use decongestants if you have high blood pressure.)  · Finish all antibiotic medicine. Do this even if you are feeling better after only a few days.  Follow-up care  Follow up with your healthcare provider, or as advised. If you had an X-ray or ECG (electrocardiogram), a specialist will review it. You will be notified of any new findings that may affect your care.  Note: If you are age 65 or older, or if you have a chronic lung disease or condition that affects your immune system, or you smoke, talk to your healthcare provider about having pneumococcal vaccinations and a yearly influenza vaccination (flu shot).  When to seek medical advice  Call your healthcare provider right away if any of these occur:  · Fever of 100.4°F (38°C) or higher  · Coughing up increased amounts of colored sputum  · Weakness, drowsiness, headache, facial pain, ear pain, or a stiff neck  Call 911, or get immediate medical care  Contact emergency services right away if any of these occur.  · Coughing up blood  · Worsening weakness, drowsiness, headache, or stiff neck  · Trouble breathing, wheezing, or pain with breathing  Date Last Reviewed: 9/13/2015  © 5950-9816 [employer]. [address] [place] [address], [place] [address]. All rights reserved. This information is not intended as a substitute for professional medical care. Always follow your healthcare professional's instructions.        Cough, Chronic, Uncertain Cause (Adult)    Everyone has had a cough as part of the common cold, flu, or bronchitis. This kind of cough occurs along with an achy feeling, low-grade fever, nasal and sinus congestion, and a scratchy or sore throat. This usually gets  better in 2 to 3 weeks. A cough that lasts longer than 3 weeks may be due to other causes.  If your cough does not improve over the next 2 weeks, further testing may be needed. Follow up with your healthcare provider as advised. Cough suppressants may be recommended. Based on your exam today, the exact cause of your cough is not certain. Below are some common causes for persistent cough.  Smokers cough  Smokers cough doesnt go away. If you continue to smoke, it only gets worse. The cough is from irritation in the air passages. Talk to your healthcare provider about quitting. Medicines or nicotine-replacement products, like gum or the patch, may make quitting easier.  Postnasal drip  A cough that is worse at night may be due to postnasal drip. Excess mucus in the nose drains from the back of your nose to your throat. This triggers the cough reflex. Postnasal drip may be due to a sinus infection or allergy. Common allergens include dust, tobacco smoke (both inhaled and secondhand smoke), environmental pollutants, pollen, mold, pets, cleaning agents, room deodorizers, and chemical fumes. Over-the-counter antihistamines or decongestants may be helpful for allergies. A sinus infection may requires antibiotic treatment. See your healthcare provider if symptoms continue.  Medicines  Certain prescribed medicines can cause a chronic cough in some people:  · ACE inhibitors for high blood pressure. These include benazepril, captopril, enalapril, fosinopril, lisinopril, quinapril, ramipril, and others.  · Beta-blockers for high blood pressure and other conditions. These include propranolol, atenolol, metoprolol, nadolol, and others.  Let your healthcare provider know if you are taking any of these.  Asthma  Cough may be the only sign of mild asthma. You may have tests to find out if asthma is causing your cough. You may also take asthma medicine on a trial basis.  Acid reflux (heartburn, GERD)  The esophagus is the tube that  carries food from the mouth to the stomach. A valve at its lower end prevents stomach acids from flowing upward. If this valve does not work properly, acid from the stomach enters the esophagus. This may cause a burning pain in the upper abdomen or lower chest, belching, or cough. Symptoms are often worse when lying flat. Avoid eating or drinking before bedtime. Try using extra pillows to raise your upper body, or place 4-inch blocks under the head of your bed. You may try an over-the-counter antacid or an acid-blocking medicine such as famotidine, cimetidine, ranitidine, esomeprazole, lansoprazole, or omeprazole. Stronger medicines for this condition can be prescribed by your healthcare provider.  Follow-up care  Follow up with your healthcare provider, or as advised, if your cough does not improve. Further testing may be needed.  Note: If an X-ray was taken, a specialist will review it. You will be notified of any new findings that may affect your care.  When to seek medical advice  Call your healthcare provider right away if any of these occur:  · Mild wheezing or difficulty breathing  · Fever of 100.4ºF (38ºC) or higher, or as directed by your healthcare provider  · Unexpected weight loss  · Coughing up large amounts of colored sputum  · Night sweats (sheets and pajamas get soaking wet)  Call 911, or get immediate medical care  Contact emergency services right away if any of these occur:  · Coughing up blood  · Moderate to severe trouble breathing or wheezing  Date Last Reviewed: 9/13/2015  © 7396-3722 [employer]. [address] [place] [address], [place] [address]. All rights reserved. This information is not intended as a substitute for professional medical care. Always follow your healthcare professional's instructions.

## 2019-04-09 ENCOUNTER — OFFICE VISIT (OUTPATIENT)
Dept: URGENT CARE | Facility: CLINIC | Age: 54
End: 2019-04-09
Payer: COMMERCIAL

## 2019-04-09 VITALS
WEIGHT: 126 LBS | DIASTOLIC BLOOD PRESSURE: 69 MMHG | RESPIRATION RATE: 16 BRPM | SYSTOLIC BLOOD PRESSURE: 108 MMHG | OXYGEN SATURATION: 98 % | BODY MASS INDEX: 18.66 KG/M2 | HEART RATE: 77 BPM | HEIGHT: 69 IN | TEMPERATURE: 98 F

## 2019-04-09 DIAGNOSIS — T78.40XA ALLERGIC REACTION, INITIAL ENCOUNTER: Primary | ICD-10-CM

## 2019-04-09 PROCEDURE — 99214 PR OFFICE/OUTPT VISIT, EST, LEVL IV, 30-39 MIN: ICD-10-PCS | Mod: S$GLB,TXP,, | Performed by: PHYSICIAN ASSISTANT

## 2019-04-09 PROCEDURE — 96372 THER/PROPH/DIAG INJ SC/IM: CPT | Mod: S$GLB,TXP,, | Performed by: PHYSICIAN ASSISTANT

## 2019-04-09 PROCEDURE — 96372 PR INJECTION,THERAP/PROPH/DIAG2ST, IM OR SUBCUT: ICD-10-PCS | Mod: S$GLB,TXP,, | Performed by: PHYSICIAN ASSISTANT

## 2019-04-09 PROCEDURE — 99214 OFFICE O/P EST MOD 30 MIN: CPT | Mod: S$GLB,TXP,, | Performed by: PHYSICIAN ASSISTANT

## 2019-04-09 RX ORDER — HYDROXYZINE PAMOATE 25 MG/1
25 CAPSULE ORAL 4 TIMES DAILY
Qty: 30 CAPSULE | Refills: 0 | Status: SHIPPED | OUTPATIENT
Start: 2019-04-09 | End: 2019-05-13

## 2019-04-09 RX ORDER — BETAMETHASONE SODIUM PHOSPHATE AND BETAMETHASONE ACETATE 3; 3 MG/ML; MG/ML
6 INJECTION, SUSPENSION INTRA-ARTICULAR; INTRALESIONAL; INTRAMUSCULAR; SOFT TISSUE
Status: COMPLETED | OUTPATIENT
Start: 2019-04-09 | End: 2019-04-09

## 2019-04-09 RX ADMIN — BETAMETHASONE SODIUM PHOSPHATE AND BETAMETHASONE ACETATE 6 MG: 3; 3 INJECTION, SUSPENSION INTRA-ARTICULAR; INTRALESIONAL; INTRAMUSCULAR; SOFT TISSUE at 06:04

## 2019-04-09 NOTE — PROGRESS NOTES
"Subjective:       Patient ID: Jennifer Booth is a 54 y.o. female.    Vitals:  height is 5' 9" (1.753 m) and weight is 57.2 kg (126 lb). Her oral temperature is 98 °F (36.7 °C). Her blood pressure is 108/69 and her pulse is 77. Her respiration is 16 and oxygen saturation is 98%.     Chief Complaint: Rash    Patient reports that she took a bubble bath for the first time with new products last night and does not know if she had an allergic reaction from new soaps and/or with the combination of taking Doxycycline. Patient reports NKA to Doxy or new soaps before, but these are the 2 most recent changes that she can think of that could have caused the allergic reaction. Patient reports rash on both upper extremities and abdomen. Patient denies fever, chills, CP, SOB, wheezing, abdominal pain, N/V/D/C, headache, blurry vision, dizziness or syncope.    Rash   This is a new problem. Episode onset: 3 days ago. The problem has been gradually worsening since onset. The rash is diffuse. The rash is characterized by redness and itchiness. She was exposed to a new medication (Doxycycline). Pertinent negatives include no congestion, cough, diarrhea, eye pain, fatigue, fever, shortness of breath, sore throat or vomiting. Past treatments include topical steroids. The treatment provided mild relief.       Constitution: Negative for activity change, appetite change, chills, sweating, fatigue and fever.   HENT: Negative for facial swelling, facial trauma, congestion, nosebleeds, foreign body in nose, postnasal drip, sinus pain, sinus pressure, sore throat and trouble swallowing.    Neck: Negative for neck pain, neck stiffness, painful lymph nodes and neck swelling.   Cardiovascular: Negative for chest pain, leg swelling, palpitations, sob on exertion and passing out.   Eyes: Negative for eye discharge, eye itching, eye pain, eye redness, photophobia, vision loss, double vision, blurred vision and eyelid swelling.   Respiratory: " Negative for cough, sputum production, bloody sputum, COPD, shortness of breath, stridor and wheezing.    Gastrointestinal: Negative for abdominal pain, abdominal bloating, nausea, vomiting, constipation, diarrhea and heartburn.   Genitourinary: Negative for dysuria, frequency, urgency, hematuria and pelvic pain.   Musculoskeletal: Negative for joint pain, joint swelling, muscle cramps and muscle ache.   Skin: Positive for rash and erythema. Negative for color change, pale, wound, abrasion, laceration, lesion, skin thickening/induration, puncture wound, bruising, abscess, avulsion and hives.   Allergic/Immunologic: Positive for itching. Negative for environmental allergies, immunocompromised state and hives.   Neurological: Negative for dizziness, history of vertigo, light-headedness, passing out, facial drooping, speech difficulty, coordination disturbances, loss of balance, headaches, history of migraines, disorientation, altered mental status, loss of consciousness, numbness, tingling, seizures and tremors.   Hematologic/Lymphatic: Negative for swollen lymph nodes.   Psychiatric/Behavioral: Negative for altered mental status, disorientation and nervous/anxious. The patient is not nervous/anxious.        Objective:      Physical Exam   Constitutional: She is oriented to person, place, and time. She appears well-developed and well-nourished. She is cooperative.  Non-toxic appearance. She does not have a sickly appearance. She does not appear ill. No distress.   Patient is stable, seated comfortably in NAD.   HENT:   Head: Normocephalic and atraumatic.   Right Ear: Hearing, tympanic membrane, external ear and ear canal normal.   Left Ear: Hearing, tympanic membrane, external ear and ear canal normal.   Nose: Nose normal. No mucosal edema, rhinorrhea or nasal deformity. No epistaxis. Right sinus exhibits no maxillary sinus tenderness and no frontal sinus tenderness. Left sinus exhibits no maxillary sinus tenderness  and no frontal sinus tenderness.   Mouth/Throat: Uvula is midline, oropharynx is clear and moist and mucous membranes are normal. No trismus in the jaw. Normal dentition. No uvula swelling. No oropharyngeal exudate, posterior oropharyngeal edema or posterior oropharyngeal erythema.   Eyes: Pupils are equal, round, and reactive to light. Conjunctivae, EOM and lids are normal. No scleral icterus.   Sclera clear bilat   Neck: Trachea normal, normal range of motion, full passive range of motion without pain and phonation normal. Neck supple.   Cardiovascular: Normal rate, regular rhythm, normal heart sounds, intact distal pulses and normal pulses.   Pulmonary/Chest: Effort normal and breath sounds normal. No accessory muscle usage or stridor. No respiratory distress. She has no decreased breath sounds. She has no wheezes. She has no rhonchi. She has no rales.   Abdominal: Soft. Normal appearance and bowel sounds are normal. She exhibits no distension. There is no tenderness. There is no rigidity, no rebound, no guarding, no CVA tenderness, no tenderness at McBurney's point and negative Karimi's sign. No hernia.   Musculoskeletal: Normal range of motion. She exhibits no edema or deformity.   FROM to upper and lower extremities bilateral. 5/5 strength and full sensation bilateral. 2+ pulses bilateral. No numbness or tingling. Negative straight leg raise. Able to ambulate without difficulty.   Lymphadenopathy:     She has no cervical adenopathy.        Right cervical: No superficial cervical, no deep cervical and no posterior cervical adenopathy present.       Left cervical: No superficial cervical, no deep cervical and no posterior cervical adenopathy present.   Neurological: She is alert and oriented to person, place, and time. She has normal strength. No cranial nerve deficit or sensory deficit. She exhibits normal muscle tone. She displays a negative Romberg sign. Coordination normal.   Skin: Skin is warm, dry and  intact. Capillary refill takes less than 2 seconds. Rash noted. No abrasion, no bruising, no burn, no ecchymosis, no laceration, no lesion, no petechiae and no purpura noted. Rash is urticarial. Rash is not macular, not papular, not maculopapular, not nodular, not pustular and not vesicular. She is not diaphoretic. There is erythema. No pallor.   Small, round, raised, erythematous and pruritic lesions noted to upper extremities bilateral and abdomen. No TTP, fluctuance, induration, warmth, drainage, weeping, oozing, excoriations or blisters.   Psychiatric: She has a normal mood and affect. Her speech is normal and behavior is normal. Judgment and thought content normal. Cognition and memory are normal.   Nursing note and vitals reviewed.      Assessment:       1. Allergic reaction, initial encounter        Plan:         Allergic reaction, initial encounter  -     hydrOXYzine pamoate (VISTARIL) 25 MG Cap; Take 1 capsule (25 mg total) by mouth 4 (four) times daily.  Dispense: 30 capsule; Refill: 0  -     betamethasone acetate-betamethasone sodium phosphate injection 6 mg      Patient Instructions   If you were prescribed a narcotic or controlled medication, do not drive or operate heavy equipment or machinery while taking these medications.  You must understand that you've received an Urgent Care treatment only and that you may be released before all your medical problems are known or treated. You, the patient, will arrange for follow up care as instructed.  Follow up with your PCP or specialty clinic as directed if not improved or as needed. You can call [phone] to schedule an appointment with the appropriate provider.  If your condition worsens we recommend that you receive another evaluation at the Emergency Department for any concerns or worsening of condition.  Patient aware and verbalized understanding.    You received a steroid shot today - this can elevate your blood pressure, elevate your blood sugar,  water weight gain, nervous energy, redness to the face and dimpling of the skin where the shot goes in.   Patient aware and verbalized understanding.    Stop taking Doxycycline.  Do not try any new soaps, detergents, etc.  Advised patient to avoid alcohol, caffeine, spicy foods, etc. until symptoms completely resolve.  Contact your Heme/Onc doctor tomorrow morning.  Please drink plenty of fluids.   Please get plenty of rest.  You can take OTC Pepcid or Zantac as directed for the next 24-72 hours as needed.   If you have a localized reaction, you can OTC topical creams (e.g. Cortaid) as directed to the affected area.  Vistaril as needed for itching, however this may make you drowsy, so do not drive or operate heavy equipment or machinery while taking these medications.  Please follow-up with PCP for further evaluation as needed.  STRICT ER PRECAUTIONS GIVEN TO PATIENT.  Patient is stable, seated comfortably and in NAD upon discharge.  Patient aware and verbalized understanding.    General Allergic Reactions  An allergic reaction is a set of symptoms caused by an allergen. An allergen is something that causes a persons immune system to react. When a person comes in contact with an allergen, it causes the body to release chemicals. These include the chemical histamine. Histamine causes swelling and itching. It may affect the entire body. This is called a general allergic reaction. Often symptoms affect only 1 part of the body. This is called a local allergic reaction.  You are having an allergic reaction. Almost anything can cause one. Different people are allergic to different things. It is usually something that you ate or swallowed, came into contact with by getting or putting it on your skin or clothes, or something you breathed in the air. This can be very annoying and sometimes scary.  Most of us think of allergic reactions when we have a rash or itchy skin. Symptoms can include:  · Itching of the eyes, nose,  and roof of the mouth  · Runny or stuffy nose  · Watery eyes   · Sneezing or coughing   · A blocked feeling in the ear  · Red, itchy rash called hives  · Red and purple spots  · Rash, redness, welts, blisters  · Itching, burning, stinging, pain  · Dry, flaky, cracking, scaly skin  Severe symptoms include:  · Swelling of the face, lips, or other parts of the body  · Hoarse voice  · Trouble swallowing, feeling like your throat is closing  · Trouble breathing, wheezing  · Nausea, vomiting, diarrhea, stomach cramps  · Feeling faint or lightheaded, rapid heart rate  Sometimes the cause may be obvious. But there are so many things that can cause a reaction that you may not be able to figure out. The most important things to help find your allergen are:  · Remembering when it started  · What you were doing at the time or just before that  · Any activities you were involved in  · Any new products or contacts  Below are some common causes. But remember that almost anything can cause a reaction. You may not even be aware that you came into contact with one of these things:  · Dust, mold, pollen  · Plants (common ones are poison ivy and poison oak, but there are many others)   · Animals  · Foods such as shrimp, shellfish, peanuts, milk products, gluten, and eggs. Also food colorings, flavorings, and additives.  · Insect bites or stings such as bees, mosquitos, fleas, ticks  · Medicines such as penicillin, sulfa medicines, amoxicillin, aspirin, and ibuprofen. But any medicine can cause a reaction.  · Jewelry such as nickel or gold. This can be new, or something youve worn for a while, including zippers and buttons.  · Latex such as in gloves, clothes, toys, balloons, or some tapes. Some people allergic to latex may also have problems with foods like bananas, avocados, kiwi, papaya, or chestnuts.  · Lotions, perfumes, cosmetics, soaps, shampoos, skincare products, nail products  · Chemicals or dyes in clothing, linen, ,  hair dyes, soaps, iodine  Many viruses and common colds can cause a rash that is not an allergic reaction. Sometimes it is hard to tell the difference between allergies, sensitivity, or an intolerance to something. This is especially true with food. Many things can cause diarrhea, vomiting, stomach cramps, and skin irritation.  Home care    The goal of treatment is to help relieve the symptoms and get you feeling better. The rash will usually fade over several days. But it can sometimes last a couple of weeks. Over the next couple of days, there may be times when it is gets a little worse, and then better again. Here are some things to do:  · If you know what you are allergic to, stay away from it. Future reactions could be worse than this one.  · Avoid tight clothing and anything that heats up your skin (hot showers or baths, direct sunlight). Heat will make itching worse.  · An ice pack will relieve local areas of intense itching and redness. To make an ice pack, put ice cubes in a plastic bag that seals at the top. Wrap it in a thin, clean towel. Dont put the ice directly on the skin because it can damage the skin.  · Oral diphenhydramine is an over-the-counter antihistamine sold at pharmacy and grocery stores. Unless a prescription antihistamine was given, diphenhydramine may be used to reduce itching if large areas of the skin are involved. It may make you sleepy. So be careful using it in the daytime or when going to school, working, or driving. Note: Dont use diphenhydramine if you have glaucoma or if you are a man with trouble urinating due to an enlarged prostate. There are other antihistamines that wont make you so sleepy. These are good choices for daytime use. Ask your pharmacist for suggestions.  · Dont use diphenhydramine cream on your skin. It can cause a further reaction in some people.  · To help prevent an infection, don't scratch the affected area. Scratching may worsen the reaction and damage  your skin. It can also lead to an infection. Always check the affected for signs of an infection.  · Call your healthcare provider and ask what you can use to help decrease the itching.  · To decrease allergic reactions, try the following:    · Use heat-steam to clean your home  · Use high-efficiency particulate (HEPA) vacuums and filters  · Stay away from food and pet triggers  · Kill any cockroaches  · Clean your house often  Follow-up care  Follow up with your healthcare provider, or as advised. If you had a severe reaction today, or if you have had several mild to medium allergic reactions in the past, ask your provider about allergy testing. This can help you find out what you are allergic to. If your reaction included dizziness, fainting, or trouble breathing or swallowing, ask your provider about carrying auto-injectable epinephrine.  Call 911  Call 911 if any of these occur:  · Trouble breathing or swallowing, wheezing  · Cool, moist, pale skin  · Shortness of breath  · Hoarse voice or trouble speaking  · Confused   · Very drowsy or trouble awakening  · Fainting or loss of consciousness  · Rapid heart rate  · Feeling of dizziness or weakness or a sudden drop in blood pressure  · Feeling of doom  · Feeling lightheaded  · Severe nausea or vomiting, or diarrhea  · Seizure  · Swelling in the face, eyelids, lips, mouth, throat or tongue  · Drooling  When to seek medical advice  Call your healthcare provider right away if any of these occur:  · Spreading areas of itching, redness or swelling  · Nausea or stomach cramps or abdominal pain  · Continuing or recurring symptoms  · Spreading areas of redness, swelling, or itching  · Signs of infection at the affected site:  ¨ Spreading redness  ¨ Increased pain or swelling  ¨ Fluid or colored drainage from the site  ¨ Fever of 100.4°F (38°C) or above lasting for 24 to 48 hours, or as directed by your provider  Date Last Reviewed: 3/1/2017  © 8358-8718 [employer] [address] [place] [address], [place] [address]. All rights reserved. This information is not intended as a substitute for professional medical care. Always follow your healthcare professional's instructions.

## 2019-04-09 NOTE — PATIENT INSTRUCTIONS
If you were prescribed a narcotic or controlled medication, do not drive or operate heavy equipment or machinery while taking these medications.  You must understand that you've received an Urgent Care treatment only and that you may be released before all your medical problems are known or treated. You, the patient, will arrange for follow up care as instructed.  Follow up with your PCP or specialty clinic as directed if not improved or as needed. You can call [phone] to schedule an appointment with the appropriate provider.  If your condition worsens we recommend that you receive another evaluation at the Emergency Department for any concerns or worsening of condition.  Patient aware and verbalized understanding.    You received a steroid shot today - this can elevate your blood pressure, elevate your blood sugar, water weight gain, nervous energy, redness to the face and dimpling of the skin where the shot goes in.   Patient aware and verbalized understanding.    Stop taking Doxycycline.  Do not try any new soaps, detergents, etc.  Advised patient to avoid alcohol, caffeine, spicy foods, etc. until symptoms completely resolve.  Contact your Heme/Onc doctor tomorrow morning.  Please drink plenty of fluids.   Please get plenty of rest.  You can take OTC Pepcid or Zantac as directed for the next 24-72 hours as needed.   If you have a localized reaction, you can OTC topical creams (e.g. Cortaid) as directed to the affected area.  Vistaril as needed for itching, however this may make you drowsy, so do not drive or operate heavy equipment or machinery while taking these medications.  Please follow-up with PCP for further evaluation as needed.  STRICT ER PRECAUTIONS GIVEN TO PATIENT.  Patient is stable, seated comfortably and in NAD upon discharge.  Patient aware and verbalized understanding.    General Allergic Reactions  An allergic reaction is a set of symptoms caused by an allergen. An allergen is something  that causes a persons immune system to react. When a person comes in contact with an allergen, it causes the body to release chemicals. These include the chemical histamine. Histamine causes swelling and itching. It may affect the entire body. This is called a general allergic reaction. Often symptoms affect only 1 part of the body. This is called a local allergic reaction.  You are having an allergic reaction. Almost anything can cause one. Different people are allergic to different things. It is usually something that you ate or swallowed, came into contact with by getting or putting it on your skin or clothes, or something you breathed in the air. This can be very annoying and sometimes scary.  Most of us think of allergic reactions when we have a rash or itchy skin. Symptoms can include:  · Itching of the eyes, nose, and roof of the mouth  · Runny or stuffy nose  · Watery eyes   · Sneezing or coughing   · A blocked feeling in the ear  · Red, itchy rash called hives  · Red and purple spots  · Rash, redness, welts, blisters  · Itching, burning, stinging, pain  · Dry, flaky, cracking, scaly skin  Severe symptoms include:  · Swelling of the face, lips, or other parts of the body  · Hoarse voice  · Trouble swallowing, feeling like your throat is closing  · Trouble breathing, wheezing  · Nausea, vomiting, diarrhea, stomach cramps  · Feeling faint or lightheaded, rapid heart rate  Sometimes the cause may be obvious. But there are so many things that can cause a reaction that you may not be able to figure out. The most important things to help find your allergen are:  · Remembering when it started  · What you were doing at the time or just before that  · Any activities you were involved in  · Any new products or contacts  Below are some common causes. But remember that almost anything can cause a reaction. You may not even be aware that you came into contact with one of these things:  · Dust, mold, pollen  · Plants  (common ones are poison ivy and poison oak, but there are many others)   · Animals  · Foods such as shrimp, shellfish, peanuts, milk products, gluten, and eggs. Also food colorings, flavorings, and additives.  · Insect bites or stings such as bees, mosquitos, fleas, ticks  · Medicines such as penicillin, sulfa medicines, amoxicillin, aspirin, and ibuprofen. But any medicine can cause a reaction.  · Jewelry such as nickel or gold. This can be new, or something youve worn for a while, including zippers and buttons.  · Latex such as in gloves, clothes, toys, balloons, or some tapes. Some people allergic to latex may also have problems with foods like bananas, avocados, kiwi, papaya, or chestnuts.  · Lotions, perfumes, cosmetics, soaps, shampoos, skincare products, nail products  · Chemicals or dyes in clothing, linen, , hair dyes, soaps, iodine  Many viruses and common colds can cause a rash that is not an allergic reaction. Sometimes it is hard to tell the difference between allergies, sensitivity, or an intolerance to something. This is especially true with food. Many things can cause diarrhea, vomiting, stomach cramps, and skin irritation.  Home care    The goal of treatment is to help relieve the symptoms and get you feeling better. The rash will usually fade over several days. But it can sometimes last a couple of weeks. Over the next couple of days, there may be times when it is gets a little worse, and then better again. Here are some things to do:  · If you know what you are allergic to, stay away from it. Future reactions could be worse than this one.  · Avoid tight clothing and anything that heats up your skin (hot showers or baths, direct sunlight). Heat will make itching worse.  · An ice pack will relieve local areas of intense itching and redness. To make an ice pack, put ice cubes in a plastic bag that seals at the top. Wrap it in a thin, clean towel. Dont put the ice directly on the skin  because it can damage the skin.  · Oral diphenhydramine is an over-the-counter antihistamine sold at pharmacy and grocery stores. Unless a prescription antihistamine was given, diphenhydramine may be used to reduce itching if large areas of the skin are involved. It may make you sleepy. So be careful using it in the daytime or when going to school, working, or driving. Note: Dont use diphenhydramine if you have glaucoma or if you are a man with trouble urinating due to an enlarged prostate. There are other antihistamines that wont make you so sleepy. These are good choices for daytime use. Ask your pharmacist for suggestions.  · Dont use diphenhydramine cream on your skin. It can cause a further reaction in some people.  · To help prevent an infection, don't scratch the affected area. Scratching may worsen the reaction and damage your skin. It can also lead to an infection. Always check the affected for signs of an infection.  · Call your healthcare provider and ask what you can use to help decrease the itching.  · To decrease allergic reactions, try the following:    · Use heat-steam to clean your home  · Use high-efficiency particulate (HEPA) vacuums and filters  · Stay away from food and pet triggers  · Kill any cockroaches  · Clean your house often  Follow-up care  Follow up with your healthcare provider, or as advised. If you had a severe reaction today, or if you have had several mild to medium allergic reactions in the past, ask your provider about allergy testing. This can help you find out what you are allergic to. If your reaction included dizziness, fainting, or trouble breathing or swallowing, ask your provider about carrying auto-injectable epinephrine.  Call 911  Call 911 if any of these occur:  · Trouble breathing or swallowing, wheezing  · Cool, moist, pale skin  · Shortness of breath  · Hoarse voice or trouble speaking  · Confused   · Very drowsy or trouble awakening  · Fainting or loss of  consciousness  · Rapid heart rate  · Feeling of dizziness or weakness or a sudden drop in blood pressure  · Feeling of doom  · Feeling lightheaded  · Severe nausea or vomiting, or diarrhea  · Seizure  · Swelling in the face, eyelids, lips, mouth, throat or tongue  · Drooling  When to seek medical advice  Call your healthcare provider right away if any of these occur:  · Spreading areas of itching, redness or swelling  · Nausea or stomach cramps or abdominal pain  · Continuing or recurring symptoms  · Spreading areas of redness, swelling, or itching  · Signs of infection at the affected site:  ¨ Spreading redness  ¨ Increased pain or swelling  ¨ Fluid or colored drainage from the site  ¨ Fever of 100.4°F (38°C) or above lasting for 24 to 48 hours, or as directed by your provider  Date Last Reviewed: 3/1/2017  © 5189-3684 [employer] [address] [place] [id number]. All rights reserved. This information is not intended as a substitute for professional medical care. Always follow your healthcare professional's instructions.

## 2019-04-15 RX ORDER — ESCITALOPRAM OXALATE 20 MG/1
TABLET ORAL
Qty: 90 TABLET | Refills: 0 | Status: SHIPPED | OUTPATIENT
Start: 2019-04-15 | End: 2019-04-16

## 2019-04-16 DIAGNOSIS — F32.A DEPRESSION, UNSPECIFIED DEPRESSION TYPE: ICD-10-CM

## 2019-04-16 DIAGNOSIS — Z76.82 ORGAN TRANSPLANT CANDIDATE: Primary | ICD-10-CM

## 2019-04-16 RX ORDER — ESCITALOPRAM OXALATE 20 MG/1
20 TABLET ORAL DAILY
Qty: 30 TABLET | Refills: 2 | OUTPATIENT
Start: 2019-04-16

## 2019-04-16 RX ORDER — ESCITALOPRAM OXALATE 20 MG/1
TABLET ORAL
Qty: 90 TABLET | Refills: 0 | Status: SHIPPED | OUTPATIENT
Start: 2019-04-16 | End: 2019-04-16 | Stop reason: SDUPTHER

## 2019-04-16 NOTE — PROGRESS NOTES
YEARLY LIST MANAGEMENT NOTE    [facility] kidney transplant listing status reviewed.  Patient is due for follow-up appointments.   Appointments will be scheduled per protocol.

## 2019-04-16 NOTE — PROGRESS NOTES
Care everywhere notes from ED visit       ED Provider Notes - Selena Vickie L, FNP - 04/11/2019 6:08 PM CDT  Formatting of this note may be different from the original.  CHIEF COMPLAINT  Chief Complaint   Patient presents with    Rash     HPI  Jennifer Booth is a 54 y.o. female who presents with complaints of itchy rash to her abdomen times two days. Patient reports starting doxcycline, tessalon perles, and prednisone for bronchitis. She returned to the urgent care yesterday where she was diagnosed with the bronchitis and given a prescription for vistaril. Patient got medication filled but has not taken them due to fear of other side effects. She denies any difficulty breathing or swallowing.     PAST MEDICAL HISTORY  Past Medical History:   Diagnosis Date    Anemia    Anxiety    Chronic pancreatitis    Depression    Diverticulitis    ESRD (end stage renal disease)    GERD (gastroesophageal reflux disease)    Hypertension    LFT elevation    Na deficiency    Renal disorder     CURRENT MEDICATIONS   No current facility-administered medications for this encounter.     Current Outpatient Prescriptions:    carvedilol (COREG) 12.5 MG tablet, Take 12.5 mg by mouth 2 (two) times daily with meals, Disp: , Rfl:    cholecalciferol, vitamin D3, 1000 UNITS tablet, Take 2 tablets (2,000 Units total) by mouth daily, Disp: 60 tablet, Rfl: 0   escitalopram oxalate (LEXAPRO) 20 MG tablet, Take 20 mg by mouth daily, Disp: , Rfl:    folic acid (FOLVITE) 1 MG tablet, Take 1 tablet (1 mg total) by mouth daily, Disp: 30 tablet, Rfl: 11   lipase-protease-amylase (CREON) 24,000-76,000 -120,000 unit per capsule, Take 1 capsule by mouth 3 (three) times daily with meals, Disp: 90 capsule, Rfl: 0   methylPREDNISolone (MEDROL DOSEPACK) 4 mg tablet, follow package directions, start 4/12/19, Disp: 1 Package, Rfl: 0   mometasone (NASONEX) 50 mcg/actuation nasal spray, 1 spray by Nasal route 2 (two) times daily, Disp: 17 g, Rfl:  3   multivitamin (THERAGRAN) per tablet, Take 1 tablet by mouth daily, Disp: 30 tablet, Rfl: 11   pantoprazole (PROTONIX) 40 MG tablet, Take 1 tablet (40 mg total) by mouth daily, Disp: 30 tablet, Rfl: 11   potassium chloride (KLOR-CON) 20 mEq packet, Take 20 mEq by mouth 2 (two) times daily, Disp: , Rfl:    sevelamer (RENAGEL) 800 MG tablet, Take 1 tablet (800 mg total) by mouth 3 (three) times daily with meals, Disp: 90 tablet, Rfl: 11   thiamine 100 MG tablet, Take 1 tablet (100 mg total) by mouth daily, Disp: 30 tablet, Rfl: 11   VIRT-CAPS 1 mg Cap per capsule, TAKE 1 CAPSULE BY MOUTH EVERY DAY, Disp: 30 capsule, Rfl: 0    ALLERGIES   Allergies   Allergen Reactions    Gentamicin Anaphylaxis    Vancomycin Analogues Anaphylaxis     SURGICAL HISTORY   Past Surgical History:   Procedure Laterality Date    BREAST BIOPSY Left     SECTION    COLONOSCOPY N/A 2018   Procedure: COLONOSCOPY; Surgeon: Shaw Mahmood MD; Location: [facility]; Service: Gastroenterology; Laterality: N/A; EGD/colon with propofol anesthesia and ampicillin/gent preprocedure done on nursing unit preprocedure    RENAL BIOPSY   3/2016     SOCIAL HISTORY   Social History     Social History    Marital status:    Spouse name: N/A    Number of children: N/A    Years of education: N/A     Social History Main Topics    Smoking status: Current Every Day Smoker    Smokeless tobacco: Never Used    Alcohol use Yes   Comment: daily    Drug use: No    Sexual activity: Not Asked   Comment: chela dasilva @ [employer] [employer] // Single // 1 Daughter     Other Topics Concern    None     Social History Narrative     FAMILY HISTORY   Family History   Problem Relation Age of Onset    Heart failure Mother    Hypertension Mother     REVIEW OF SYSTEMS   Constitutional: No fever, chills.  Eyes: No redness, pain, or discharge.   HENT: No facial swelling, no difficulty swallowing.   Respiratory: No wheezing or shortness of breath.    Cardiovascular: No chest pain or palpitations.  GI: No abdominal pain, nausea, vomiting.  Musculoskeletal: No injury.  Skin: Positive rash.  Neurologic: No focal weakness or sensory changes.   All Systems otherwise negative except as noted in the Review of Systems and History of Present Illness.    Physical Exam  VITAL SIGNS: Blood pressure (!) 106/80, pulse 77, temperature 98.2 °F (36.8 °C), temperature source Oral, resp. rate 16, SpO2 100 %.   Constitutional: No acute distress. Well developed, well nourished, alert & oriented x 3, non-toxic appearance.   HENT: Normocephalic, atraumatic. Mucous membranes moist.   Eyes: EOMI, conjunctiva normal.  Neck: Normal range of motion.  Respiratory: Respirations are even and non-labored.   GI: Soft, nontender, no rebound.  Musculoskeletal: No gross abnormalities, normal gait.   Integument: Warm, dry skin with erythematous macular rash to abdomen.   Neurologic: Normal motor, sensation with no focal deficit.  Psychiatric: Affect normal, Mood normal.     LABS  Pertinent labs reviewed. (See chart for details)   Labs Reviewed - No data to display    EKG   ECG Results   None       EKG interpreted by ED MD    RADIOLOGY   No orders to display       PROCEDURES   Procedures    Medications   diphenhydrAMINE (BENADRYL) capsule 25 mg (25 mg Oral Given 4/11/19 1743)   famotidine (PEPCID) tablet 20 mg (20 mg Oral Given 4/11/19 1742)   dexamethasone (DECADRON) injection 8 mg (8 mg Intramuscular Given 4/11/19 1742)     ED COURSE & MEDICAL DECISION MAKING   ED Course     Pertinent & Imaging studies reviewed. (See chart for details)    Differential Diagnosis: contact dermatitis, Ion-Chiki's syndrome, allergic reaction, measles.     Discharge Medication List as of 4/11/2019 6:12 PM       Discharge Medication List as of 4/11/2019 6:12 PM     START taking these medications   Details   methylPREDNISolone (MEDROL DOSEPACK) 4 mg tablet follow package directions, start 4/12/19, Normal          DISPOSITION  Patient in stable condition.   Physical exam findings discussed with patient. No further testing warranted at this time. Will dispo home with instructions to follow up with PCP.  Pt understands to return to the ED for worsening or changing condition.   Pt agrees with plan of care.    FINAL IMPRESSION   1. Rash, drug       LIZ Rodriguez  04/11/19 2014      Back to top of Miscellaneous Notes  ED Triage Notes - Suma Franklin RN - 04/11/2019 4:12 PM CDT  Pt reports rash to stomach after taking an antibiotics. The rash started 2 days ago. Pt has not taken any medication to treat the rash. Pt was prescribed vistaril from urgent care, but states she was scared to take it.     Back to top of Miscellaneous Notes  ED Triage Notes - Vickie Walters, LIZ - 04/11/2019 4:10 PM CDT  C/o itchy rash for the past 3-4 days after starting doxycycline for bronchitis. No hospital pre-treatment prior to arrival. +hives to abdomen.

## 2019-04-17 ENCOUNTER — TELEPHONE (OUTPATIENT)
Dept: FAMILY MEDICINE | Facility: CLINIC | Age: 54
End: 2019-04-17

## 2019-04-17 NOTE — TELEPHONE ENCOUNTER
----- Message from Milena Carmichael sent at 4/17/2019 10:12 AM CDT -----  Contact: Pt   Name of Who is Calling: JOHN BEST [[id number]]    What is the request in detail: Pt states she is a former pt of Dr. Donahue and would like to re-establish care. States it's been about 5 years. Please call pt to further discuss and advise.     Can the clinic reply by [facility]: No     [facility] [phone]

## 2019-04-25 ENCOUNTER — LAB VISIT (OUTPATIENT)
Dept: LAB | Facility: HOSPITAL | Age: 54
End: 2019-04-25
Payer: COMMERCIAL

## 2019-04-25 DIAGNOSIS — Z76.82 AWAITING ORGAN TRANSPLANT STATUS: ICD-10-CM

## 2019-04-25 PROCEDURE — 99001 SPECIMEN HANDLING PT-LAB: CPT | Mod: PO,TXP

## 2019-05-03 ENCOUNTER — LAB VISIT (OUTPATIENT)
Dept: LAB | Facility: HOSPITAL | Age: 54
End: 2019-05-03
Payer: COMMERCIAL

## 2019-05-03 DIAGNOSIS — Z76.82 AWAITING ORGAN TRANSPLANT STATUS: ICD-10-CM

## 2019-05-03 PROCEDURE — 99001 SPECIMEN HANDLING PT-LAB: CPT | Mod: PO,TXP

## 2019-05-06 ENCOUNTER — LAB VISIT (OUTPATIENT)
Dept: LAB | Facility: OTHER | Age: 54
End: 2019-05-06
Payer: COMMERCIAL

## 2019-05-06 DIAGNOSIS — C90.01 MULTIPLE MYELOMA IN REMISSION: ICD-10-CM

## 2019-05-06 LAB
ALBUMIN SERPL BCP-MCNC: 3.8 G/DL (ref 3.5–5.2)
ALP SERPL-CCNC: 151 U/L (ref 55–135)
ALT SERPL W/O P-5'-P-CCNC: 18 U/L (ref 10–44)
ANION GAP SERPL CALC-SCNC: 14 MMOL/L (ref 8–16)
AST SERPL-CCNC: 22 U/L (ref 10–40)
B2 MICROGLOB SERPL-MCNC: 17.8 UG/ML (ref 0–2.5)
BASOPHILS # BLD AUTO: 0.04 K/UL (ref 0–0.2)
BASOPHILS NFR BLD: 0.9 % (ref 0–1.9)
BILIRUB SERPL-MCNC: 0.3 MG/DL (ref 0.1–1)
BUN SERPL-MCNC: 46 MG/DL (ref 6–20)
CALCIUM SERPL-MCNC: 9.3 MG/DL (ref 8.7–10.5)
CHLORIDE SERPL-SCNC: 101 MMOL/L (ref 95–110)
CO2 SERPL-SCNC: 22 MMOL/L (ref 23–29)
CREAT SERPL-MCNC: 4.7 MG/DL (ref 0.5–1.4)
DIFFERENTIAL METHOD: ABNORMAL
EOSINOPHIL # BLD AUTO: 0.2 K/UL (ref 0–0.5)
EOSINOPHIL NFR BLD: 3.3 % (ref 0–8)
ERYTHROCYTE [DISTWIDTH] IN BLOOD BY AUTOMATED COUNT: 18.3 % (ref 11.5–14.5)
EST. GFR  (AFRICAN AMERICAN): 11 ML/MIN/1.73 M^2
EST. GFR  (NON AFRICAN AMERICAN): 10 ML/MIN/1.73 M^2
GLUCOSE SERPL-MCNC: 96 MG/DL (ref 70–110)
HCT VFR BLD AUTO: 27.2 % (ref 37–48.5)
HGB BLD-MCNC: 8.8 G/DL (ref 12–16)
LYMPHOCYTES # BLD AUTO: 0.7 K/UL (ref 1–4.8)
LYMPHOCYTES NFR BLD: 16.3 % (ref 18–48)
MCH RBC QN AUTO: 29.8 PG (ref 27–31)
MCHC RBC AUTO-ENTMCNC: 32.4 G/DL (ref 32–36)
MCV RBC AUTO: 92 FL (ref 82–98)
MONOCYTES # BLD AUTO: 0.4 K/UL (ref 0.3–1)
MONOCYTES NFR BLD: 8.9 % (ref 4–15)
NEUTROPHILS # BLD AUTO: 3.2 K/UL (ref 1.8–7.7)
NEUTROPHILS NFR BLD: 70.4 % (ref 38–73)
PLATELET # BLD AUTO: 197 K/UL (ref 150–350)
PMV BLD AUTO: 10.9 FL (ref 9.2–12.9)
POTASSIUM SERPL-SCNC: 4.9 MMOL/L (ref 3.5–5.1)
PROT SERPL-MCNC: 6.7 G/DL (ref 6–8.4)
RBC # BLD AUTO: 2.95 M/UL (ref 4–5.4)
SODIUM SERPL-SCNC: 137 MMOL/L (ref 136–145)
WBC # BLD AUTO: 4.48 K/UL (ref 3.9–12.7)

## 2019-05-06 PROCEDURE — 82232 ASSAY OF BETA-2 PROTEIN: CPT | Mod: TXP

## 2019-05-06 PROCEDURE — 84165 PATHOLOGIST INTERPRETATION SPE: ICD-10-PCS | Mod: 26,NTX,, | Performed by: PATHOLOGY

## 2019-05-06 PROCEDURE — 83520 IMMUNOASSAY QUANT NOS NONAB: CPT | Mod: 59,TXP

## 2019-05-06 PROCEDURE — 80053 COMPREHEN METABOLIC PANEL: CPT | Mod: TXP

## 2019-05-06 PROCEDURE — 36415 COLL VENOUS BLD VENIPUNCTURE: CPT | Mod: NTX

## 2019-05-06 PROCEDURE — 85025 COMPLETE CBC W/AUTO DIFF WBC: CPT | Mod: NTX

## 2019-05-06 PROCEDURE — 84165 PROTEIN E-PHORESIS SERUM: CPT | Mod: 26,NTX,, | Performed by: PATHOLOGY

## 2019-05-06 PROCEDURE — 84165 PROTEIN E-PHORESIS SERUM: CPT | Mod: NTX

## 2019-05-07 LAB
ALBUMIN SERPL ELPH-MCNC: 4.04 G/DL (ref 3.35–5.55)
ALPHA1 GLOB SERPL ELPH-MCNC: 0.33 G/DL (ref 0.17–0.41)
ALPHA2 GLOB SERPL ELPH-MCNC: 0.81 G/DL (ref 0.43–0.99)
B-GLOBULIN SERPL ELPH-MCNC: 0.64 G/DL (ref 0.5–1.1)
GAMMA GLOB SERPL ELPH-MCNC: 0.68 G/DL (ref 0.67–1.58)
HLA FREEZE AND HOLD INTERPRETATION: NORMAL
HLAFH COLLECTION DATE: NORMAL
HPRA INTERPRETATION: NORMAL
KAPPA LC SER QL IA: 16.06 MG/DL (ref 0.33–1.94)
KAPPA LC/LAMBDA SER IA: 1.16 (ref 0.26–1.65)
LAMBDA LC SER QL IA: 13.8 MG/DL (ref 0.57–2.63)
PATHOLOGIST INTERPRETATION SPE: NORMAL
PROT SERPL-MCNC: 6.5 G/DL (ref 6–8.4)

## 2019-05-13 ENCOUNTER — OFFICE VISIT (OUTPATIENT)
Dept: HEMATOLOGY/ONCOLOGY | Facility: CLINIC | Age: 54
End: 2019-05-13
Payer: COMMERCIAL

## 2019-05-13 VITALS
HEART RATE: 60 BPM | WEIGHT: 124.31 LBS | SYSTOLIC BLOOD PRESSURE: 160 MMHG | DIASTOLIC BLOOD PRESSURE: 58 MMHG | OXYGEN SATURATION: 99 % | TEMPERATURE: 98 F | BODY MASS INDEX: 19.51 KG/M2 | HEIGHT: 67 IN

## 2019-05-13 DIAGNOSIS — Z99.2 ESRD (END STAGE RENAL DISEASE) ON DIALYSIS: ICD-10-CM

## 2019-05-13 DIAGNOSIS — D63.1 ANEMIA IN CHRONIC KIDNEY DISEASE, ON CHRONIC DIALYSIS: ICD-10-CM

## 2019-05-13 DIAGNOSIS — N18.6 ANEMIA IN CHRONIC KIDNEY DISEASE, ON CHRONIC DIALYSIS: ICD-10-CM

## 2019-05-13 DIAGNOSIS — N18.6 ESRD (END STAGE RENAL DISEASE) ON DIALYSIS: ICD-10-CM

## 2019-05-13 DIAGNOSIS — Z99.2 ANEMIA IN CHRONIC KIDNEY DISEASE, ON CHRONIC DIALYSIS: ICD-10-CM

## 2019-05-13 DIAGNOSIS — C90.00 MULTIPLE MYELOMA, REMISSION STATUS UNSPECIFIED: Primary | ICD-10-CM

## 2019-05-13 DIAGNOSIS — Z00.00 HEALTHCARE MAINTENANCE: ICD-10-CM

## 2019-05-13 PROCEDURE — 3008F PR BODY MASS INDEX (BMI) DOCUMENTED: ICD-10-PCS | Mod: CPTII,S$GLB,, | Performed by: INTERNAL MEDICINE

## 2019-05-13 PROCEDURE — 3008F BODY MASS INDEX DOCD: CPT | Mod: CPTII,S$GLB,, | Performed by: INTERNAL MEDICINE

## 2019-05-13 PROCEDURE — 99999 PR PBB SHADOW E&M-EST. PATIENT-LVL V: CPT | Mod: PBBFAC,,, | Performed by: INTERNAL MEDICINE

## 2019-05-13 PROCEDURE — 99214 PR OFFICE/OUTPT VISIT, EST, LEVL IV, 30-39 MIN: ICD-10-PCS | Mod: S$GLB,,, | Performed by: INTERNAL MEDICINE

## 2019-05-13 PROCEDURE — 99999 PR PBB SHADOW E&M-EST. PATIENT-LVL V: ICD-10-PCS | Mod: PBBFAC,,, | Performed by: INTERNAL MEDICINE

## 2019-05-13 PROCEDURE — 99214 OFFICE O/P EST MOD 30 MIN: CPT | Mod: S$GLB,,, | Performed by: INTERNAL MEDICINE

## 2019-05-13 RX ORDER — LANOLIN ALCOHOL/MO/W.PET/CERES
100 CREAM (GRAM) TOPICAL
COMMUNITY
Start: 2018-09-18 | End: 2019-09-18

## 2019-05-13 RX ORDER — POTASSIUM CHLORIDE 1.5 G/1.58G
20 POWDER, FOR SOLUTION ORAL
COMMUNITY
End: 2019-07-03

## 2019-05-13 RX ORDER — PANTOPRAZOLE SODIUM 40 MG/1
40 TABLET, DELAYED RELEASE ORAL
COMMUNITY
Start: 2018-09-18 | End: 2019-07-03

## 2019-05-13 RX ORDER — MOMETASONE FUROATE 50 UG/1
SPRAY, METERED NASAL
Refills: 3 | COMMUNITY
Start: 2019-03-29 | End: 2019-08-14

## 2019-05-13 RX ORDER — SEVELAMER HYDROCHLORIDE 800 MG/1
800 TABLET, FILM COATED ORAL
COMMUNITY
Start: 2018-09-18 | End: 2019-07-29

## 2019-05-13 RX ORDER — FOLIC ACID 1 MG/1
1 TABLET ORAL
Status: ON HOLD | COMMUNITY
Start: 2018-09-18 | End: 2020-10-13

## 2019-05-13 RX ORDER — HYDROXYZINE HYDROCHLORIDE 25 MG/1
TABLET, FILM COATED ORAL
Refills: 0 | COMMUNITY
Start: 2019-04-10 | End: 2019-07-03

## 2019-05-13 RX ORDER — MULTIVITAMIN
1 TABLET ORAL
COMMUNITY
Start: 2018-09-18 | End: 2019-07-29

## 2019-05-13 NOTE — PROGRESS NOTES
Subjective:       Patient ID: Jennifer Booth is a 54 y.o. female.    Chief Complaint: Follow-up    HPI Diagnosis: MM IPSS Stage III deletion 13, t4:14 diagnosed 12/2011 in remission         54-year-old woman with MM in remission here for f/u   She is also followed by Nephrology team at [facility].   Previously followed at [facility].   She has not had the resources to continue f/u at [facility].       She is followed by Nephrology for ESRD  She was initially diagnosed with advanced kidney disease secondary to multiple myeloma & HTN.   She was diagnosed with  AK I from MM in 2010 that required initiation of dialysis.    She started chronic dialysis on 12/23/11 and ended on 8/28/12  She then underwent chemotherapy for her myeloma, and stopped dialysis in 2013.    Kidney biopsy 2017due to worsening  kidney function reveals glomerulosclerosis and no evidence of MM    She was undergoing peritoneal dialysis daily     She was switched back to HD     She is followed by Kidney Transplant team [place] [facility]  Patient met selection criteria for kidney transplant related to ESRD due to Hypertensive Nephrosclerosis  She is considered a candidate for kidney transplant    She is doing well  She is excited about becoming a grandmother soon  She is considering switching Nephrolotgists  Appetite and wieght stable   No SOB/CP  No fevers, night sweats\  No recent infections      She is undergoing EPO under direction of Nephrology  CBC reveals  Hb 8.8  g/dl   SPEP 5/2019  normal  UPEP-no monoclonal peaks        Onc hx:  She was diagnosed with kappa free light chain disease, 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 She originally presented with acute renal failure requiring HD .She has completed bortezomib/dexamethasone/Revlimid 6 cycles on 04/13/2012 for the multiple myeloma kappa light chain disease, IPSS stage III. She underwent bortezomib maintenance therapy three weeks on, one week off (05/15, 05/22 and 05/29) with her  "last cycle received on 05/29/2012. She is followed at [facility] myeloma [place] where she was evaluated for an auto stem cell transplant . It was decided not to proceed with transplant was originally due to drug reaction.Pt was diagnosed with DRESS syndrome during hospitalization and then [facility] team elected not to proceed proceed with auto SCT. Velcade maintenance was discontinued due to side effects.       Tx: Velcade/Dex/Revlimid x 6 cycles 4/13/12   Velcade maintenance 3wks on, 1wk off dc'd 5/29/12 sec to adv SE          Review of Systems   Constitutional: Negative for appetite change, chills and fatigue.   HENT: Negative for congestion, hearing loss and nosebleeds.    Eyes: Negative for visual disturbance.   Respiratory: Negative for apnea, cough, chest tightness, shortness of breath and wheezing.    Cardiovascular: Negative for chest pain, palpitations and leg swelling.   Gastrointestinal: Negative for abdominal distention, abdominal pain, blood in stool, constipation, diarrhea, nausea and vomiting.   Genitourinary: Negative for difficulty urinating, dysuria, flank pain, frequency, hematuria and urgency.   Musculoskeletal: Negative for arthralgias, back pain, gait problem, joint swelling and neck pain.   Skin: Negative for rash.        No petechiae, ecchymoses   Neurological: Negative for dizziness, tremors, seizures, syncope, speech difficulty, light-headedness, numbness and headaches.   Hematological: Negative for adenopathy. Does not bruise/bleed easily.       Objective:       Vitals:    05/13/19 1528 05/13/19 1533   BP: (!) 160/104 (!) 160/58   BP Location: Right arm Right arm   Patient Position: Sitting Sitting   BP Method: Medium (Automatic) Medium (Manual)   Pulse: 60    Temp: 97.5 °F (36.4 °C)    TempSrc: Oral    SpO2: 99%    Weight: 56.4 kg (124 lb 5.4 oz)    Height: 5' 7" (1.702 m)        Physical Exam   Constitutional: She is oriented to person, place, and time. She appears well-developed and " well-nourished.   HENT:   Head: Normocephalic.   Mouth/Throat: Oropharynx is clear and moist. No oropharyngeal exudate.   Eyes: Pupils are equal, round, and reactive to light. Conjunctivae and lids are normal. No scleral icterus.   Neck: Normal range of motion. Neck supple. No thyromegaly present.   Cardiovascular: Normal rate, regular rhythm and normal heart sounds.   No murmur heard.  Pulmonary/Chest: Breath sounds normal. She has no wheezes. She has no rales.   Abdominal: Soft. Bowel sounds are normal. She exhibits no distension and no mass. There is no hepatosplenomegaly. There is no tenderness. There is no rebound and no guarding.   Musculoskeletal: Normal range of motion. She exhibits no edema or tenderness.   Lymphadenopathy:     She has no cervical adenopathy.     She has no axillary adenopathy.        Right: No supraclavicular adenopathy present.        Left: No supraclavicular adenopathy present.   Neurological: She is alert and oriented to person, place, and time. No cranial nerve deficit. Coordination normal.   Skin: Skin is warm and dry. No ecchymosis, no petechiae and no rash noted. No erythema.   Psychiatric: She has a normal mood and affect.             Bone survey 2015   1. Small lucent foci in the right femoral neck. Given this patient's history of multiple myeloma, these findings are concerning for sequela of that disease  2. Otherwise degenerative changes of the cervical spine and lower lumbar spine are identified.           Lab Results   Component Value Date    WBC 4.48 05/06/2019    HGB 8.8 (L) 05/06/2019    HCT 27.2 (L) 05/06/2019    MCV 92 05/06/2019     05/06/2019         Results for JOHN BEST (MRN [id number]) as of 4/13/2018 13:19   Ref. Range 3/20/2018 07:33 4/6/2018 09:45   IgG - Serum Latest Ref Range: 650 - 1600 mg/dL 249 (L) 257 (L)   IgM Latest Ref Range: 50 - 300 mg/dL 135 156   IgA Latest Ref Range: 40 - 350 mg/dL 210 220                 Lab Results   Component Value Date     WBC 4.48 05/06/2019    HGB 8.8 (L) 05/06/2019    HCT 27.2 (L) 05/06/2019    MCV 92 05/06/2019     05/06/2019         Results for JOHN BEST (MRN [id number]) as of 5/13/2019 15:38   Ref. Range 6/19/2018 07:50 7/16/2018 15:05 10/17/2018 15:35 2/5/2019 08:50 5/6/2019 09:19   Nubieber Free Light Chains Latest Ref Range: 0.33 - 1.94 mg/dL 13.49 (H) 8.45 (H) 9.78 (H) 16.85 (H) 16.06 (H)   Lambda Free Light Chains Latest Ref Range: 0.57 - 2.63 mg/dL 8.34 (H) 6.64 (H) 12.29 (H) 11.84 (H) 13.80 (H)   Kappa/Lambda FLC Ratio Latest Ref Range: 0.26 - 1.65  1.62 1.27 0.80 1.42 1.16         Results for JOHN BEST (MRN [id number]) as of 5/13/2019 15:43   Ref. Range 10/17/2018 15:35 2/5/2019 08:50 5/6/2019 09:19   Beta-2 Microglobulin Latest Ref Range: 0.0 - 2.5 ug/mL 10.0 (H) 14.3 (H) 17.8 (H)       SPEP 5/7/2019   No discrete paraprotein bands are identified          Bone survey 2/2018   No convincing lytic lesions to confirm the presence of myeloma.      MRI T-spine 6/29/2018  1. No evidence for multiple myeloma in the thoracic spine.  2. Minor degenerative changes without evidence for spinal canal stenosis or neural foraminal narrowing.  3. Liver demonstrates decreased T2 signal suggestive of iron overload.  4. Ascites.  5. Bilateral renal cysts, incompletely characterized.      MRI L-spine w/out contrast 6/29/2018  The caudal aspect of the lumbar spinal canal appears to be lower limits of normal on a developmental basis with further tapering of the thecal sac due to an abundance of epidural fat.    Superimposed degenerative change.  This is most pronounced at L3-4 where there is advanced facet arthropathy well as intraspinal synovial cyst resulting in moderate spinal stenosis with severe right lateral recess stenosis.  There also advanced degenerative disc disease at L4-5 with mild spinal stenosis and bilateral lateral recess encroachment.  Individual disc levels further detailed above.  Assessment:       1.  Multiple myeloma, remission status unspecified    2. ESRD (end stage renal disease) on dialysis    3. Anemia in chronic kidney disease, on chronic dialysis        Plan:   Jennifer was seen today for follow-up.    Diagnoses and associated orders for this visit:    Multiple myeloma   Dagnosed with kappa free light chain disease, multiple myeloma IPSS with deletion 13, 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   She originally presented with acute renal failure requiring HD .  She  completed bortezomib/dexamethasone/Revlimid 6 cycles on 04/13/2012 for the multiple myeloma kappa light chain disease, IPSS stage III.   She underwent bortezomib maintenance therapy three weeks on, one week off (05/15, 05/22 and 05/29) with her last cycle received on 05/29/2012  She was followed at [facility] and was diagnosed with DRESS syndrome during hospitalization and then [facility] team elected not to proceed proceed with auto SCT. Velcade maintenance was discontinued due to side effects.   She has remained in remission with nl SPEP   She has had worsening kidney function and s/p Kidney bx 2017 neg for myeloma involvement  Urine studies- no monoclonal peaks  Bone survey 2018 no new findings    Serum free KLC  16.06mg/dl and lambda free light chain 13.80 mg/dl and K/L FLCR 1.16   MRI T and L spine 6/29/2018 - no evidence of myeloma involvement  SPEP 5/7/2019- no monoclonal peaks        ESRD  Followed by Nephrology  S/p Kidney biopsy 2017 ( outside facility)  due to worsening  kidney function reveals glomerulosclerosis and no evidence of MM  Followed by Renal Transplant team at [facility] [place] on cadaveric wait list  Pt previously undergoing PD   Pt now undergoing HD  She is followed by [facility] transplant team       Pt followed by Dr. Wolfe     Anemia in chronic renal disease  S/p 2uprbc during  hospitalization  11/2017 for acute-on-chronic anemia  Hb   8.8g/dl   SHERMAN per Nephrology        F/u   3mo with cbc,cmp, SPEP, FLC, B-2 microglobulin , and urine studies      Cc:  MD Yarelis Calvert Jr., MD

## 2019-05-14 ENCOUNTER — TELEPHONE (OUTPATIENT)
Dept: FAMILY MEDICINE | Facility: CLINIC | Age: 54
End: 2019-05-14

## 2019-05-14 NOTE — TELEPHONE ENCOUNTER
----- Message from Lou Jha MA sent at 5/14/2019 11:10 AM CDT -----  Regarding: FW: Establish care      ----- Message -----  From: Antonia Damian RN  Sent: 5/14/2019  10:10 AM  To: Pilar WILCOX Staff, Laura Alcocer Staff  Subject: Establish care                                   Good morning,   is referring this pt to either  or  to establish care, can someone please assist with scheduling? Thanks.  Clark PYLE

## 2019-05-20 ENCOUNTER — OFFICE VISIT (OUTPATIENT)
Dept: FAMILY MEDICINE | Facility: CLINIC | Age: 54
End: 2019-05-20
Payer: COMMERCIAL

## 2019-05-20 VITALS
WEIGHT: 124.75 LBS | BODY MASS INDEX: 18.91 KG/M2 | HEART RATE: 71 BPM | HEIGHT: 68 IN | SYSTOLIC BLOOD PRESSURE: 138 MMHG | OXYGEN SATURATION: 100 % | DIASTOLIC BLOOD PRESSURE: 82 MMHG

## 2019-05-20 DIAGNOSIS — Z99.2 ESRD (END STAGE RENAL DISEASE) ON DIALYSIS: ICD-10-CM

## 2019-05-20 DIAGNOSIS — J30.2 SEASONAL ALLERGIES: Primary | ICD-10-CM

## 2019-05-20 DIAGNOSIS — F17.200 TOBACCO DEPENDENCE: ICD-10-CM

## 2019-05-20 DIAGNOSIS — C90.01 MULTIPLE MYELOMA IN REMISSION: ICD-10-CM

## 2019-05-20 DIAGNOSIS — D63.1 ANEMIA IN STAGE 5 CHRONIC KIDNEY DISEASE, NOT ON CHRONIC DIALYSIS: ICD-10-CM

## 2019-05-20 DIAGNOSIS — N18.6 ESRD (END STAGE RENAL DISEASE) ON DIALYSIS: ICD-10-CM

## 2019-05-20 DIAGNOSIS — I12.9 RENAL HYPERTENSION: Chronic | ICD-10-CM

## 2019-05-20 DIAGNOSIS — N18.5 ANEMIA IN STAGE 5 CHRONIC KIDNEY DISEASE, NOT ON CHRONIC DIALYSIS: ICD-10-CM

## 2019-05-20 PROBLEM — K21.9 GERD WITHOUT ESOPHAGITIS: Status: ACTIVE | Noted: 2018-09-13

## 2019-05-20 PROCEDURE — 3008F BODY MASS INDEX DOCD: CPT | Mod: CPTII,S$GLB,, | Performed by: FAMILY MEDICINE

## 2019-05-20 PROCEDURE — 99204 PR OFFICE/OUTPT VISIT, NEW, LEVL IV, 45-59 MIN: ICD-10-PCS | Mod: S$GLB,,, | Performed by: FAMILY MEDICINE

## 2019-05-20 PROCEDURE — 99999 PR PBB SHADOW E&M-EST. PATIENT-LVL V: ICD-10-PCS | Mod: PBBFAC,,, | Performed by: FAMILY MEDICINE

## 2019-05-20 PROCEDURE — 3008F PR BODY MASS INDEX (BMI) DOCUMENTED: ICD-10-PCS | Mod: CPTII,S$GLB,, | Performed by: FAMILY MEDICINE

## 2019-05-20 PROCEDURE — 99999 PR PBB SHADOW E&M-EST. PATIENT-LVL V: CPT | Mod: PBBFAC,,, | Performed by: FAMILY MEDICINE

## 2019-05-20 PROCEDURE — 99204 OFFICE O/P NEW MOD 45 MIN: CPT | Mod: S$GLB,,, | Performed by: FAMILY MEDICINE

## 2019-05-20 NOTE — PROGRESS NOTES
"Subjective:       Patient ID: Jennifer Booth is a 54 y.o. female.    Chief Complaint: Establish Care    53 yo F pt, new to me, with PMH significant for Multiple Myeloma in remission, ESRD on HD, AoCKD, HTN, GERD, MDD/Anxiety,  Constipation, and tobacco use.  She presents to establish care today.    Acute concerns:  Reports that she has been experiencing problems with her "sinuses".  She uses Benadryl as needed with improvement in symptoms.  Denies fevers, chills, headache, facial pain, and sore throat.    Chronic Diseases  Multiple Myeloma in remission: Followed by [facility]; last visit 5/13/19. Shecompleted bortezomib/dexamethasone/Revlimid 6 cycles on 04/13/2012 for the multiple myeloma kappa light chain disease, IPSS stage III. She underwent bortezomib maintenance therapy three weeks on, one week off (05/15, 05/22 and 05/29) with her last cycle received on 05/29/2012. She was followed at [facility] and was diagnosed with DRESS syndrome during hospitalization and then [facility] team elected not to proceed with auto SCT. Velcade maintenance was discontinued due to side effects. MRI T and L spine 6/29/2018 - no evidence of myeloma involvement  SPEP 5/7/2019- no monoclonal peaks.     ESRD on HD:  She is followed by a nephrologist at [facility].  Receives hemodialysis on Tuesdays/Thursdays/Saturdays. Iinitially diagnosed with advanced kidney disease secondary to multiple myeloma & HTN.  She was diagnosed with NANETTE from MM in 2010 that required initiation of dialysis.  She started chronic dialysis on 12/23/11 and ended on 8/28/12. She then underwent chemotherapy for her myeloma, and stopped dialysis in 2013.  Kidney biopsy performed 2017due to worsening  Kidney--revealed glomerulosclerosis and no evidence of MM. She has an upcoming appt with renal transplant at [facility] 6/2019. Patient met selection criteria for kidney transplant related to ESRD due to Hypertensive Nephrosclerosis. She is considered a " candidate for kidney transplant     AoCKD:  Followed by [facility].  Receives Procrit infusions during HD. H/H 8.8/27.2 5/6/19    HTN: /82 in the office today on triage; 138/82 on my repeat    She continues to smoke cigarettes. Pre-contemplative stage of quitting.         Review of Systems   Constitutional: Negative for activity change, appetite change, chills, fever and unexpected weight change.   HENT: Negative for sore throat.    Eyes: Negative for redness, itching and visual disturbance.   Respiratory: Negative for chest tightness.    Cardiovascular: Negative for chest pain.   Gastrointestinal: Negative for constipation, diarrhea, nausea and vomiting.   Genitourinary: Negative for dysuria, frequency, hematuria, urgency, vaginal bleeding and vaginal discharge.   Skin: Negative for rash.   Neurological: Negative for dizziness, syncope and headaches.   Psychiatric/Behavioral: Negative for dysphoric mood and suicidal ideas. The patient is not nervous/anxious.          Past Medical History:   Diagnosis Date    Anemia     Anxiety     Arm pain, left 2/12/2014    Breast cyst     Chronic renal failure 2/12/2014    CKD stage 4, GFR 15-29 ml/min from myeloma kidney & HTN      Depression     Dialysis patient     dialysis m-w-f    Encounter for blood transfusion     GERD (gastroesophageal reflux disease)     Hypertension     Mood swings     Multiple myeloma without mention of remission     Renal hypertension     Status post dialysis 8/2012    Thrombocytopenia 8/2/2012       Patient Active Problem List   Diagnosis    Constipation - functional    Multiple myeloma in remission    Renal hypertension    Anemia    Tobacco abuse counseling    Cigarette nicotine dependence    ESRD (end stage renal disease) on dialysis    Anemia in stage 5 chronic kidney disease, not on chronic dialysis    Allergic drug reaction    Glomerulosclerosis    Anemia in chronic renal disease    Multiple  "myeloma without mention of remission       Past Surgical History:   Procedure Laterality Date    AV FISTULA PLACEMENT Left     BIOPSY N/A 3/28/2016    Performed by [facility] Diagnostic Provider at [facility] CATH LAB    BREAST CYST ASPIRATION Left     BREAST CYST EXCISION Left      SECTION      x1    [id number]-HW Left 2013    Performed by Mukesh Gonsales Jr., MD at [facility] OR    INSERTION-CATHETER-TENCHOFF-LAPAROSCOPIC N/A 8/3/2017    Performed by Mukesh Gonsales Jr., MD at [facility] OR    pd catheter      REMOVAL, CATHETER, DIALYSIS, PERITONEAL N/A 2018    Performed by Mukesh Gonsales Jr., MD at [facility] OR    RENAL BIOPSY  2016    VASCULAR SURGERY  2012    dialysis catheter to right subclavian       Family History   Problem Relation Age of Onset    Hypertension Mother     Heart failure Mother     Cancer Maternal Aunt     Diabetes Maternal Grandmother     Stroke Maternal Grandmother     Breast cancer Neg Hx     Ovarian cancer Neg Hx     Colon cancer Neg Hx        Social History     Tobacco Use   Smoking Status Current Every Day Smoker    Packs/day: 0.50    Years: 30.00    Pack years: 15.00    Types: Cigarettes   Smokeless Tobacco Never Used   Tobacco Comment    12/16/15: states 1/2 pack a day       Social History     Social History Narrative    Works FT; likes theatre. Lives alone.       Objective:        Vitals:    19 0714 19 0740   BP: (!) 150/82 138/82   Pulse: 71    SpO2: 100%    Weight: 56.6 kg (124 lb 12.5 oz)    Height: 5' 7.5" (1.715 m)        Physical Exam   Constitutional: She is oriented to person, place, and time. She appears well-developed and well-nourished. No distress.   HENT:   Head: Normocephalic and atraumatic.   Right Ear: External ear normal.   Left Ear: External ear normal.   Nose: Mucosal edema (+ inflamed/boggy inferior nasal turbinates) present. No rhinorrhea. Right sinus exhibits no maxillary sinus tenderness and no frontal sinus tenderness. " Left sinus exhibits no maxillary sinus tenderness and no frontal sinus tenderness.   Mouth/Throat: Oropharynx is clear and moist. No oropharyngeal exudate.   Eyes: EOM are normal. No scleral icterus.   Neck: Normal range of motion. Neck supple.   Cardiovascular: Normal rate and regular rhythm. Exam reveals no gallop and no friction rub.   Murmur (loudest at LUSB) heard.   Systolic murmur is present with a grade of 1/6.  Pulmonary/Chest: Effort normal and breath sounds normal. She has no wheezes. She has no rales.   Musculoskeletal: Normal range of motion. She exhibits no edema.   Neurological: She is alert and oriented to person, place, and time. No cranial nerve deficit.   Skin: Skin is warm and dry. No rash noted. No erythema.   Psychiatric: She has a normal mood and affect. Her behavior is normal.       Assessment:       1. Seasonal allergies    2. Multiple myeloma in remission    3. ESRD (end stage renal disease) on dialysis    4. Anemia in stage 5 chronic kidney disease, not on chronic dialysis    5. Renal hypertension    6. Tobacco dependence        Plan:       Jennifer was seen today for establish care.    Diagnoses and all orders for this visit:    Seasonal allergies    Multiple myeloma in remission    ESRD (end stage renal disease) on dialysis    Anemia in stage 5 chronic kidney disease, not on chronic dialysis    Renal hypertension    Tobacco dependence  -     Ambulatory referral to Smoking Cessation Program    [place]   Encouraged adherence with previously prescribed Nasonex daily.  Add over-the-counter Allegra or Zyrtec daily.    Multiple Myeloma in remission: Followed by [facility]; last visit 5/13/19. Shecompleted bortezomib/dexamethasone/Revlimid 6 cycles on 04/13/2012 for the multiple myeloma kappa light chain disease, IPSS stage III. She underwent bortezomib maintenance therapy three weeks on, one week off (05/15, 05/22 and 05/29) with her last cycle received on 05/29/2012. She was  followed at [facility] and was diagnosed with DRESS syndrome during hospitalization and then [facility] team elected not to proceed with auto SCT. Velcade maintenance was discontinued due to side effects. MRI T and L spine 6/29/2018 - no evidence of myeloma involvement  SPEP 5/7/2019- no monoclonal peaks.     ESRD on HD:   Continue follow-up with Nephrology.  HD Tuesdays/Thursdays/Saturdays.   Initially diagnosed with advanced kidney disease secondary to multiple myeloma & HTN.   Started chronic dialysis on 12/23/11 and ended on 8/28/12.  Completed chemotherapy for her myeloma, and stopped dialysis in 2013.    Kidney biopsy performed 2017due to worsening  Kidney--revealed glomerulosclerosis and no evidence of MM.   Patient met selection criteria for kidney transplant related to ESRD due to Hypertensive Nephrosclerosis.  Has  upcoming app[place] [facility] [place]   Continue med regimen per renal      AoCKD:    H/H 8.8/27.2 5/6/19  Followed by [facility]  Receives Procrit infusions during HD  Next visit due 8/2019    HTN  BP at target today. Continue carvedilol 12.5 BID    Tobacco Dependence  Referred to tobacco cessation program today    MDD/Anxiety   Mood stable in the office today   Will need to determine if pt adherent with previously rx'd lexapro and mirtazapine     GERD  Assess if pt requiring pantoprazole on f/u visit     HM   Pap neg 4/2018  Mammo BIRADs Cat 4/2018  Catarina 10/2011  Pneumovax-23 due 10/2021  Tdap due 7/2026    Follow up in about 6 weeks (around 7/1/2019).

## 2019-05-20 NOTE — LETTER
May 20, 2019      Other  [address]  [place] [address]           [address]  [place] [address]  Phone: [phone]  Fax: [phone]          Patient: Jennifer Booth   MR Number: [id number]   YOB: 1965   Date of Visit: 5/20/2019       Dear Other:    Thank you for referring Jennifer Booth to me for evaluation. Attached you will find relevant portions of my assessment and plan of care.    If you have questions, please do not hesitate to call me. I look forward to following Jennifer Booth along with you.    Sincerely,    Evangelista Wilks MD    Enclosure  CC:  No Recipients    If you would like to receive this communication electronically, please contact [email or web address] or [phone] to request more information on [employer] Link access.    For providers and/or their staff who would like to refer a patient to [facility], please contact us through our one-stop-shop provider referral line, [facility] , at [phone].    If you feel you have received this communication in error or would no longer like to receive these types of communications, please e-mail [email or web address]

## 2019-06-05 ENCOUNTER — HOSPITAL ENCOUNTER (OUTPATIENT)
Dept: RADIOLOGY | Facility: HOSPITAL | Age: 54
Discharge: HOME | End: 2019-06-05
Attending: NURSE PRACTITIONER
Payer: COMMERCIAL

## 2019-06-05 ENCOUNTER — HOSPITAL ENCOUNTER (OUTPATIENT)
Dept: CARDIOLOGY | Facility: HOSPITAL | Age: 54
Discharge: HOME | End: 2019-06-05
Attending: NURSE PRACTITIONER
Payer: COMMERCIAL

## 2019-06-05 ENCOUNTER — OFFICE VISIT (OUTPATIENT)
Dept: OBSTETRICS AND GYNECOLOGY | Facility: CLINIC | Age: 54
End: 2019-06-05
Payer: COMMERCIAL

## 2019-06-05 VITALS
DIASTOLIC BLOOD PRESSURE: 78 MMHG | HEIGHT: 68 IN | WEIGHT: 127.63 LBS | BODY MASS INDEX: 19.34 KG/M2 | SYSTOLIC BLOOD PRESSURE: 110 MMHG

## 2019-06-05 DIAGNOSIS — Z76.82 ORGAN TRANSPLANT CANDIDATE: ICD-10-CM

## 2019-06-05 DIAGNOSIS — Z12.31 SCREENING MAMMOGRAM, ENCOUNTER FOR: ICD-10-CM

## 2019-06-05 DIAGNOSIS — N18.6 ESRD (END STAGE RENAL DISEASE) ON DIALYSIS: ICD-10-CM

## 2019-06-05 DIAGNOSIS — Z12.4 PAP SMEAR FOR CERVICAL CANCER SCREENING: ICD-10-CM

## 2019-06-05 DIAGNOSIS — Z01.419 ENCOUNTER FOR ANNUAL ROUTINE GYNECOLOGICAL EXAMINATION: Primary | ICD-10-CM

## 2019-06-05 DIAGNOSIS — Z99.2 ESRD (END STAGE RENAL DISEASE) ON DIALYSIS: ICD-10-CM

## 2019-06-05 LAB
AORTIC ROOT ANNULUS: 3.13 CM
AV INDEX (PROSTH): 0.85
AV MEAN GRADIENT: 4.24 MMHG
AV PEAK GRADIENT: 7.18 MMHG
AV VALVE AREA: 2.66 CM2
AV VELOCITY RATIO: 0.83
BSA FOR ECHO PROCEDURE: 1.66 M2
CV ECHO LV RWT: 0.27 CM
DOP CALC AO PEAK VEL: 1.34 M/S
DOP CALC AO VTI: 30.87 CM
DOP CALC LVOT AREA: 3.11 CM2
DOP CALC LVOT DIAMETER: 1.99 CM
DOP CALC LVOT PEAK VEL: 1.12 M/S
DOP CALC LVOT STROKE VOLUME: 81.98 CM3
DOP CALCLVOT PEAK VEL VTI: 26.37 CM
E WAVE DECELERATION TIME: 290.67 MSEC
E/A RATIO: 0.52
ECHO LV POSTERIOR WALL: 0.76 CM (ref 0.6–1.1)
FRACTIONAL SHORTENING: 29 % (ref 28–44)
INTERVENTRICULAR SEPTUM: 0.84 CM (ref 0.6–1.1)
LA MAJOR: 4.81 CM
LA MINOR: 5.13 CM
LA WIDTH: 4.02 CM
LEFT ATRIUM SIZE: 3.64 CM
LEFT ATRIUM VOLUME INDEX: 36.8 ML/M2
LEFT ATRIUM VOLUME: 61.75 CM3
LEFT INTERNAL DIMENSION IN SYSTOLE: 4.03 CM (ref 2.1–4)
LEFT VENTRICLE DIASTOLIC VOLUME INDEX: 93.28 ML/M2
LEFT VENTRICLE DIASTOLIC VOLUME: 156.69 ML
LEFT VENTRICLE MASS INDEX: 99.8 G/M2
LEFT VENTRICLE SYSTOLIC VOLUME INDEX: 42.4 ML/M2
LEFT VENTRICLE SYSTOLIC VOLUME: 71.21 ML
LEFT VENTRICULAR INTERNAL DIMENSION IN DIASTOLE: 5.65 CM (ref 3.5–6)
LEFT VENTRICULAR MASS: 167.6 G
MV PEAK A VEL: 0.92 M/S
MV PEAK E VEL: 0.48 M/S
PISA TR MAX VEL: 2.13 M/S
PULM VEIN S/D RATIO: 1.29
PV PEAK D VEL: 0.28 M/S
PV PEAK S VEL: 0.36 M/S
RA MAJOR: 3.86 CM
RA PRESSURE: 3 MMHG
RIGHT VENTRICULAR END-DIASTOLIC DIMENSION: 2.69 CM
TR MAX PG: 18.15 MMHG
TV REST PULMONARY ARTERY PRESSURE: 21 MMHG

## 2019-06-05 PROCEDURE — 71046 X-RAY EXAM CHEST 2 VIEWS: CPT | Mod: 26,TXP,, | Performed by: RADIOLOGY

## 2019-06-05 PROCEDURE — 99999 PR PBB SHADOW E&M-EST. PATIENT-LVL III: ICD-10-PCS | Mod: PBBFAC,,, | Performed by: OBSTETRICS & GYNECOLOGY

## 2019-06-05 PROCEDURE — 99396 PREV VISIT EST AGE 40-64: CPT | Mod: S$GLB,,, | Performed by: OBSTETRICS & GYNECOLOGY

## 2019-06-05 PROCEDURE — 71046 X-RAY EXAM CHEST 2 VIEWS: CPT | Mod: TC,FY,TXP

## 2019-06-05 PROCEDURE — 77067 SCR MAMMO BI INCL CAD: CPT | Mod: 26,TXP,, | Performed by: RADIOLOGY

## 2019-06-05 PROCEDURE — 76770 US EXAM ABDO BACK WALL COMP: CPT | Mod: TC,TXP

## 2019-06-05 PROCEDURE — 99999 PR PBB SHADOW E&M-EST. PATIENT-LVL III: CPT | Mod: PBBFAC,,, | Performed by: OBSTETRICS & GYNECOLOGY

## 2019-06-05 PROCEDURE — 72170 XR PELVIS ROUTINE AP: ICD-10-PCS | Mod: 26,TXP,, | Performed by: RADIOLOGY

## 2019-06-05 PROCEDURE — 77067 MAMMO DIGITAL SCREENING BILAT WITH CAD: ICD-10-PCS | Mod: 26,TXP,, | Performed by: RADIOLOGY

## 2019-06-05 PROCEDURE — 72170 X-RAY EXAM OF PELVIS: CPT | Mod: TC,FY,TXP

## 2019-06-05 PROCEDURE — 77067 SCR MAMMO BI INCL CAD: CPT | Mod: TC,TXP

## 2019-06-05 PROCEDURE — 93306 TTE W/DOPPLER COMPLETE: CPT | Mod: 26,TXP,, | Performed by: STUDENT IN AN ORGANIZED HEALTH CARE EDUCATION/TRAINING PROGRAM

## 2019-06-05 PROCEDURE — 72170 X-RAY EXAM OF PELVIS: CPT | Mod: 26,TXP,, | Performed by: RADIOLOGY

## 2019-06-05 PROCEDURE — 76770 US RETROPERITONEAL COMPLETE: ICD-10-PCS | Mod: 26,TXP,, | Performed by: RADIOLOGY

## 2019-06-05 PROCEDURE — 71046 XR CHEST PA AND LATERAL: ICD-10-PCS | Mod: 26,TXP,, | Performed by: RADIOLOGY

## 2019-06-05 PROCEDURE — 93306 TTE W/DOPPLER COMPLETE: CPT | Mod: TXP

## 2019-06-05 PROCEDURE — 76770 US EXAM ABDO BACK WALL COMP: CPT | Mod: 26,TXP,, | Performed by: RADIOLOGY

## 2019-06-05 PROCEDURE — 88142 CYTOPATH C/V THIN LAYER: CPT

## 2019-06-05 PROCEDURE — 99396 PR PREVENTIVE VISIT,EST,40-64: ICD-10-PCS | Mod: S$GLB,,, | Performed by: OBSTETRICS & GYNECOLOGY

## 2019-06-05 PROCEDURE — 93306 TRANSTHORACIC ECHO (TTE) COMPLETE (CUPID ONLY): ICD-10-PCS | Mod: 26,TXP,, | Performed by: STUDENT IN AN ORGANIZED HEALTH CARE EDUCATION/TRAINING PROGRAM

## 2019-06-05 PROCEDURE — 87624 HPV HI-RISK TYP POOLED RSLT: CPT

## 2019-06-05 NOTE — PROGRESS NOTES
Chief Complaint   Patient presents with    Well Woman       HISTORY OF PRESENT ILLNESS:   Jennifer Booth is a 54 y.o. female   () who presents for well woman exam.  Patient's last menstrual period was 2016.. Menopause at age 53  She has no complaints.   Declines STD testing. She is on the transplant list and they want her to have yearly pap smears.      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 CATH [facility]    BREAST CYST ASPIRATION Left     BREAST CYST EXCISION Left      SECTION      x1    NFRMAZTZ-JAXWBNO-WR Left 2013    Performed by [facility] Jr[facility] at [facility] OR    INSERTION-CATHETER-TENCHOFF-LAPAROSCOPIC N/A 8/3/2017    Performed by [facility] Jr[facility] OR    pd catheter      REMOVAL, CATHETER, DIALYSIS, PERITONEAL N/A 2018    Performed by [facility] Jr[facility] OR    RENAL BIOPSY  2016    VASCULAR SURGERY  2012    dialysis catheter to right subclavian         Social History     Socioeconomic History    Marital status: Single     Spouse name: Not on file    Number of children: 1    Years of education: Not on file    Highest education level: Not on file   Occupational History    Occupation: Appeals    Social Needs    Financial resource  strain: Not on file    Food insecurity:     Worry: Not on file     Inability: Not on file    Transportation needs:     Medical: Not on file     Non-medical: Not on file   Tobacco Use    Smoking status: Current Every Day Smoker     Packs/day: 0.50     Years: 30.00     Pack years: 15.00     Types: Cigarettes    Smokeless tobacco: Never Used    Tobacco comment: 12/16/15: states 1/2 pack a day   Substance and Sexual Activity    Alcohol use: Yes     Alcohol/week: 1.2 oz     Types: 2 Glasses of wine per week     Comment: nightly    Drug use: No    Sexual activity: Not Currently   Lifestyle    Physical activity:     Days per week: Not on file     Minutes per session: Not on file    Stress: Not on file   Relationships    Social connections:     Talks on phone: Not on file     Gets together: Not on file     Attends Anabaptist service: Not on file     Active member of club or organization: Not on file     Attends meetings of clubs or organizations: Not on file     Relationship status: Not on file   Other Topics Concern    Not on file   Social History Narrative    Works FT; likes theatre. Lives alone.       Family History   Problem Relation Age of Onset    Hypertension Mother     Heart failure Mother     Cancer Maternal Aunt     Diabetes Maternal Grandmother     Stroke Maternal Grandmother     Breast cancer Neg Hx     Ovarian cancer Neg Hx     Colon cancer Neg Hx          OB History    Para Term  AB Living   1 1 1     1   SAB TAB Ectopic Multiple Live Births           1      # Outcome Date GA Lbr Jeison/2nd Weight Sex Delivery Anes PTL Lv   1 Term      CS-LTranv  N ALIN       COMPREHENSIVE GYN HISTORY:  PAP History: Denies abnormal Paps, 2018 NILM (no HPV done)  Infection History: Denies STDs. Denies PID.  Benign History: Denies uterine fibroids. Denies ovarian cysts. Denies endometriosis Denies other conditions.  Cancer History: Denies cervical cancer. Denies uterine cancer or hyperplasia. Denies  "ovarian cancer. Denies vulvar cancer or pre-cancer. Denies vaginal cancer or pre-cancer. Denies breast cancer. Denies colon cancer.  Cycle: 11/mon/5d  Menopause at 53    ROS:  GENERAL: Denies weight gain or weight loss. Feeling well overall.   SKIN: Denies rash or lesions.   HEAD: Denies headache.   NODES: Denies enlarged lymph nodes.   CHEST: Denies shortness of breath.   ABDOMEN: No abdominal pain, constipation, diarrhea, nausea, vomiting or rectal bleeding.   URINARY: No frequency, dysuria, hematuria, or burning on urination.  REPRODUCTIVE: See HPI.   BREASTS: The patient denies pain, lumps, or nipple discharge.       /78   Ht 5' 7.5" (1.715 m)   Wt 57.9 kg (127 lb 10.3 oz)   LMP 05/25/2016   BMI 19.70 kg/m²     APPEARANCE: Well nourished, well developed, in no acute distress.  NECK: Neck symmetric without  thyromegaly.  NODES: No inguinal, cervical lymph node enlargement.  CHEST: Lungs clear to auscultation.  HEART: Regular rate and rhythm, no murmurs, rubs or gallops.  ABDOMEN: Soft. No tenderness or masses. No hernias. No hepatosplenomegaly.  BREASTS: Symmetrical, no skin changes or visible lesions. No palpable masses, nipple discharge or adenopathy bilaterally.  PELVIC:   VULVA: No lesions. Normal female genitalia.  URETHRAL MEATUS: Normal size and location, no lesions, no prolapse.  URETHRA: No masses, tenderness, prolapse or scarring.  VAGINA: atrophic, no discharge, no significant cystocele or rectocele.  CERVIX: No lesions and discharge.  UTERUS: Normal size, regular shape, mobile, non-tender, bladder base nontender.  ADNEXA: No masses or tenderness.  PERINEUM: Normal, mo masses    Data Reviewed:      Last MMG: Date: 4/2018   Lipid profile: Date:   Lab Results   Component Value Date    CHOL 198 07/26/2016    CHOL 210 (H) 10/25/2011    CHOL 125 04/14/2010     Lab Results   Component Value Date    HDL 99 (H) 07/26/2016    HDL 40 10/25/2011    HDL 40 04/14/2010     Lab Results   Component Value " Date    LDLCALC 75.4 07/26/2016    LDLCALC 127.0 10/25/2011    LDLCALC 73.6 04/14/2010     Lab Results   Component Value Date    TRIG 118 07/26/2016    TRIG 214 (H) 10/25/2011    TRIG 57 04/14/2010     Lab Results   Component Value Date    CHOLHDL 50.0 07/26/2016    CHOLHDL 19.0 (L) 10/25/2011    CHOLHDL 32.0 04/14/2010     TSH:   Lab Results   Component Value Date    TSH 0.697 12/26/2011     Colonoscopy Date: 2011, repeat 10 years per patient     1. Encounter for annual routine gynecological examination    2. ESRD (end stage renal disease) on dialysis    3. Pap smear for cervical cancer screening    4. Screening mammogram, encounter for        A/P 1. Routine gyn annual exam. s/p normal breast exam and MMG ordered.  Pap with HPV cotesting ordered. Will do yearly since likely be immunocompromised after transplant. If HPV - then don't need to do that again just the pap smear. STD testing: GC/CT/trich, syphilis, HBV/HCV and HIV declined. Lipid Profile, needed every 5 years, up to date. Fasting glucose, needed every 3 years, up to date.   TSH, needed every 5 years, up to date.   Colonoscopy up to date.     F/u in 1 yr or PRN

## 2019-06-06 ENCOUNTER — TELEPHONE (OUTPATIENT)
Dept: RADIOLOGY | Facility: HOSPITAL | Age: 54
End: 2019-06-06

## 2019-06-13 ENCOUNTER — LAB VISIT (OUTPATIENT)
Dept: LAB | Facility: HOSPITAL | Age: 54
End: 2019-06-13
Payer: COMMERCIAL

## 2019-06-13 ENCOUNTER — PATIENT MESSAGE (OUTPATIENT)
Dept: OBSTETRICS AND GYNECOLOGY | Facility: CLINIC | Age: 54
End: 2019-06-13

## 2019-06-13 DIAGNOSIS — Z76.82 AWAITING ORGAN TRANSPLANT STATUS: ICD-10-CM

## 2019-06-13 LAB
HPV HR 12 DNA CVX QL NAA+PROBE: NEGATIVE
HPV16 AG SPEC QL: NEGATIVE
HPV18 DNA SPEC QL NAA+PROBE: NEGATIVE

## 2019-06-13 PROCEDURE — 86829 HLA CLASS I/II ANTIBODY QUAL: CPT | Mod: PO,TXP

## 2019-06-13 PROCEDURE — 86829 HLA CLASS I/II ANTIBODY QUAL: CPT | Mod: 91,PO,TXP

## 2019-06-14 ENCOUNTER — CLINICAL SUPPORT (OUTPATIENT)
Dept: CARDIOLOGY | Facility: CLINIC | Age: 54
End: 2019-06-14
Attending: NURSE PRACTITIONER
Payer: MEDICARE

## 2019-06-14 ENCOUNTER — OFFICE VISIT (OUTPATIENT)
Dept: TRANSPLANT | Facility: CLINIC | Age: 54
End: 2019-06-14
Payer: COMMERCIAL

## 2019-06-14 VITALS
TEMPERATURE: 99 F | HEART RATE: 72 BPM | DIASTOLIC BLOOD PRESSURE: 102 MMHG | WEIGHT: 128.06 LBS | BODY MASS INDEX: 20.1 KG/M2 | RESPIRATION RATE: 16 BRPM | OXYGEN SATURATION: 100 % | HEIGHT: 67 IN | SYSTOLIC BLOOD PRESSURE: 143 MMHG

## 2019-06-14 DIAGNOSIS — Z76.82 ORGAN TRANSPLANT CANDIDATE: ICD-10-CM

## 2019-06-14 DIAGNOSIS — Z71.6 TOBACCO ABUSE COUNSELING: ICD-10-CM

## 2019-06-14 DIAGNOSIS — N18.6 ANEMIA IN CHRONIC KIDNEY DISEASE, ON CHRONIC DIALYSIS: ICD-10-CM

## 2019-06-14 DIAGNOSIS — N26.9 GLOMERULOSCLEROSIS: ICD-10-CM

## 2019-06-14 DIAGNOSIS — Z99.2 ESRD (END STAGE RENAL DISEASE) ON DIALYSIS: Primary | ICD-10-CM

## 2019-06-14 DIAGNOSIS — Z76.82 PATIENT ON WAITING LIST FOR KIDNEY TRANSPLANT: Chronic | ICD-10-CM

## 2019-06-14 DIAGNOSIS — I12.9 RENAL HYPERTENSION: Chronic | ICD-10-CM

## 2019-06-14 DIAGNOSIS — N18.6 ESRD (END STAGE RENAL DISEASE) ON DIALYSIS: Primary | ICD-10-CM

## 2019-06-14 DIAGNOSIS — D63.1 ANEMIA IN CHRONIC KIDNEY DISEASE, ON CHRONIC DIALYSIS: ICD-10-CM

## 2019-06-14 DIAGNOSIS — Z99.2 ANEMIA IN CHRONIC KIDNEY DISEASE, ON CHRONIC DIALYSIS: ICD-10-CM

## 2019-06-14 DIAGNOSIS — C90.01 MULTIPLE MYELOMA IN REMISSION: ICD-10-CM

## 2019-06-14 PROBLEM — C90.00 MULTIPLE MYELOMA WITHOUT MENTION OF REMISSION: Status: RESOLVED | Noted: 2019-05-13 | Resolved: 2019-06-14

## 2019-06-14 PROBLEM — N18.5 ANEMIA IN STAGE 5 CHRONIC KIDNEY DISEASE, NOT ON CHRONIC DIALYSIS: Status: RESOLVED | Noted: 2017-11-09 | Resolved: 2019-06-14

## 2019-06-14 LAB
CV PHARM DOSE: 0.4 MG
CV STRESS BASE HR: 69 BPM
DIASTOLIC BLOOD PRESSURE: 96 MMHG
END DIASTOLIC INDEX-HIGH: 170 ML/M2
END SYSTOLIC INDEX-HIGH: 70 ML/M2
NUC REST DIASTOLIC VOLUME INDEX: 125
NUC REST EJECTION FRACTION: 61
NUC REST SYSTOLIC VOLUME INDEX: 49
NUC STRESS DIASTOLIC VOLUME INDEX: 126
NUC STRESS EJECTION FRACTION: 59 %
NUC STRESS SYSTOLIC VOLUME INDEX: 52
OHS CV CPX 85 PERCENT MAX PREDICTED HEART RATE MALE: 134
OHS CV CPX MAX PREDICTED HEART RATE: 158
OHS CV CPX PATIENT IS FEMALE: 1
OHS CV CPX PATIENT IS MALE: 0
OHS CV CPX PEAK HEAR RATE: 72 BPM
OHS CV CPX PERCENT MAX PREDICTED HEART RATE ACHIEVED: 46
OHS CV CPX RATE PRESSURE PRODUCT PRESENTING: NORMAL
RETIRED EF AND QEF - SEE NOTES: 51 %
STRESS ECHO TARGET HR: 140.25 BPM
SYSTOLIC BLOOD PRESSURE: 158 MMHG

## 2019-06-14 PROCEDURE — 93016 CV STRESS TEST SUPVJ ONLY: CPT | Mod: S$PBB,TXP,, | Performed by: INTERNAL MEDICINE

## 2019-06-14 PROCEDURE — 99999 PR PBB SHADOW E&M-EST. PATIENT-LVL V: CPT | Mod: PBBFAC,TXP,, | Performed by: NURSE PRACTITIONER

## 2019-06-14 PROCEDURE — 3008F BODY MASS INDEX DOCD: CPT | Mod: CPTII,S$GLB,TXP, | Performed by: NURSE PRACTITIONER

## 2019-06-14 PROCEDURE — 93018 CV STRESS TEST I&R ONLY: CPT | Mod: S$PBB,TXP,, | Performed by: INTERNAL MEDICINE

## 2019-06-14 PROCEDURE — 93018 STRESS TEST WITH MYOCARDIAL PERFUSION (CUPID ONLY): ICD-10-PCS | Mod: S$PBB,TXP,, | Performed by: INTERNAL MEDICINE

## 2019-06-14 PROCEDURE — 3008F PR BODY MASS INDEX (BMI) DOCUMENTED: ICD-10-PCS | Mod: CPTII,S$GLB,TXP, | Performed by: NURSE PRACTITIONER

## 2019-06-14 PROCEDURE — 99999 PR PBB SHADOW E&M-EST. PATIENT-LVL V: ICD-10-PCS | Mod: PBBFAC,TXP,, | Performed by: NURSE PRACTITIONER

## 2019-06-14 PROCEDURE — 93016 STRESS TEST WITH MYOCARDIAL PERFUSION (CUPID ONLY): ICD-10-PCS | Mod: S$PBB,TXP,, | Performed by: INTERNAL MEDICINE

## 2019-06-14 PROCEDURE — 99205 PR OFFICE/OUTPT VISIT, NEW, LEVL V, 60-74 MIN: ICD-10-PCS | Mod: S$GLB,TXP,, | Performed by: NURSE PRACTITIONER

## 2019-06-14 PROCEDURE — 78452 HT MUSCLE IMAGE SPECT MULT: CPT | Mod: PBBFAC,TXP | Performed by: INTERNAL MEDICINE

## 2019-06-14 PROCEDURE — 78452 STRESS TEST WITH MYOCARDIAL PERFUSION (CUPID ONLY): ICD-10-PCS | Mod: 26,S$PBB,TXP, | Performed by: INTERNAL MEDICINE

## 2019-06-14 PROCEDURE — 99205 OFFICE O/P NEW HI 60 MIN: CPT | Mod: S$GLB,TXP,, | Performed by: NURSE PRACTITIONER

## 2019-06-14 RX ORDER — REGADENOSON 0.08 MG/ML
0.4 INJECTION, SOLUTION INTRAVENOUS
Status: COMPLETED | OUTPATIENT
Start: 2019-06-14 | End: 2019-06-14

## 2019-06-14 RX ADMIN — REGADENOSON 0.4 MG: 0.08 INJECTION, SOLUTION INTRAVENOUS at 09:06

## 2019-06-14 NOTE — PATIENT INSTRUCTIONS
A MESSAGE FROM THE SOCIAL WORK TEAM:    We want to provide you with the best care possible.  Your caregiver's involvement in the transplant process is very important to your success as a transplant recipient.      Please contact your nurse coordinator to reschedule initial nurse education and social work evaluation with your caregiver(s).  The initial educations are usually on Tuesday, Wednesday and Thursday mornings.

## 2019-06-14 NOTE — PROGRESS NOTES
LISTED PATIENT EDUCATION NOTE    Ms. Jennifer Booth was seen in pre-kidney transplant clinic for evaluation for kidney, kidney/pancreas or pancreas only transplant.  The patient attended a group education session that discussed/reviewed the following aspects of transplantation: evaluation and selection committee process, UNOS waitlist management/multiple listings, types of organs offered (KDPI < 85%, KDPI > 85%, PHS increased risk, DCD, HCV+), financial aspects, surgical procedures, dietary instruction pre- and post-transplant, health maintenance pre- and post-transplant, post-transplant hospitalization and outpatient follow-up, potential to participate in a research protocol, and medication management and side effects.  A question and answer session was provided after the presentation.    The patient was seen by all members of the multi-disciplinary team to include: Nephrologist/PA, Surgeon, , Transplant Coordinator, , Pharmacist and Dietician (if applicable).    The patient reviewed and signed all consents for evaluation which were witnessed and sent to scanning into the EPIC chart.    The patient was given an education book and plan for further evaluation based on her individual assessment.      The patient was encouraged to call with any questions or concerns.

## 2019-06-14 NOTE — PROGRESS NOTES
PHARM.D. PRE-TRANSPLANT NOTE:    This patient's medication therapy was evaluated as part of her pre-transplant evaluation.      The following general pharmacologic concerns were noted: none    The following pharmacologic concerns related to HCV therapy were noted: none      This patient's medication profile was reviewed for contraindications for DAA Hepatitis C therapy:    [x]  No current inducers of CYP 3A4 or PGP  [x]  No amiodarone on this patient's EMR profile in the last 24 months  [x]  No past or current atrial fibrillation on this patient's EMR profile       Current Outpatient Medications   Medication Sig Dispense Refill    B COMPLEX W-C NO.20/FOLIC ACID (VIRT-CAPS ORAL) Take 1 capsule by mouth once daily.       carvedilol (COREG) 12.5 MG tablet TK 1 T PO BID  3    cholecalciferol, vitamin D3, (VITAMIN D3) 400 unit Cap Take 2,000 Units by mouth once daily.       CREON 24,000-76,000 -120,000 unit capsule TK 1 C PO TID WC  0    escitalopram oxalate (LEXAPRO) 20 MG tablet TAKE 1 TABLET BY MOUTH DAILY 90 tablet 0    folic acid (FOLVITE) 1 MG tablet Take 1 mg by mouth.      hydrOXYzine HCl (ATARAX) 25 MG tablet   0    mirtazapine (REMERON) 15 MG tablet       mometasone (NASONEX) 50 mcg/actuation nasal spray SHAKE LQ AND U 1 SPR IEN BID  3    multivitamin (THERAGRAN) per tablet Take 1 tablet by mouth.      pantoprazole (PROTONIX) 40 MG tablet Take 40 mg by mouth.      potassium chloride (KLOR-CON) 20 mEq Pack Take 20 mEq by mouth.      PROCRIT 10,000 unit/mL injection       RENVELA 800 mg Tab TK 1 T PO  TID WITH MEALS  3    sevelamer HCl (RENAGEL) 800 MG Tab Take 800 mg by mouth.      thiamine 100 MG tablet Take 100 mg by mouth.      VIRT-CAPS 1 mg Cap TK 1 C PO QD  7     No current facility-administered medications for this visit.          Currently she is responsible for preparing / administering this patient's medications on a daily basis.  I am available for consultation and can be contacted,  as needed by the other members of the Kidney Transplant team.

## 2019-06-14 NOTE — LETTER
June 17, 2019        Elliott iDaz  [address]  [address]  [facility]  [facility] [address]  Phone: [phone]  Fax: [phone]             [facility]  [address]  [facility] [address]  Phone: [phone]   Patient: Jennifer Booth   MR Number: [id number]   YOB: 1965   Date of Visit: 6/14/2019       Dear Dr. Elliott Diza    Thank you for referring Jennifer Booth to me for evaluation. Attached you will find relevant portions of my assessment and plan of care.    If you have questions, please do not hesitate to call me. I look forward to following Jennifer Booth along with you.    Sincerely,    Jayna Miles, NP    Enclosure    If you would like to receive this communication electronically, please contact [email or web address] or [phone] to request [employer] Link access.    [employer] Link is a tool which provides read-only access to select patient information with whom you have a relationship. Its easy to use and provides real time access to review your patients record including encounter summaries, notes, results, and demographic information.    If you feel you have received this communication in error or would no longer like to receive these types of communications, please e-mail [email or web address]

## 2019-06-14 NOTE — PROGRESS NOTES
Kidney Transplant Recipient Reevalulation    Referring Physician: Elliott Diaz  Current Nephrologist: Elliott Diaz  Waitlist Status: inactive  Dialysis Start Date: 12/23/2011    Subjective:     CC:  Annual reassessment of kidney transplant candidacy.    HPI:  Ms. Booth is a 54 y.o. year old Black or  female with ESRD secondary to HTN and chronic glomerulosclerosis, Myeloma.  She has been on the wait list for a kidney transplant at [facility] since 12/23/2011. Patient  Was initially started on  hemodialysis   12/23/2011. She switched to PD,   And is now back on HD. Patient is dialyzing on TTS schedule.  She reports multiple Peritonitis infections were the reason she switched back to HD. Patient reports that she is tolerating dialysis well.. She has a LUE AV fistula.  Dialyzes for 3 1/2 hours per session. BP remains stable. Recent hospitalizations or ED visits. ED 4/2019 for bronchitis. Reports allergic reaction to doxycycline/ generalized rash and swelling. Denies facial /tongue edema or SOB.    Overall feels well. No health concerns today.   Denies chest pain, SOB, leg pain, abdominal pain or LUTs.    Recent labs and diagnostics reviewed and were acceptable    Hx MM  IS FOLLOWED BY HEMONC , in remission with normal SPEP  . lov 2/2019    GYN EXAM ON 6/5/2019  RECENT LABS [facility] DIAGNOSTICS REVIEWED / ACCEPTABLE       Past Medical History:   Diagnosis Date    Anemia     Anxiety     Arm pain, left 2/12/2014    Breast cyst     Chronic renal failure 2/12/2014    CKD stage 4, GFR 15-29 ml/min from myeloma kidney & HTN      Depression     Dialysis patient     dialysis m-w-f    Encounter for blood transfusion     GERD (gastroesophageal reflux disease)     Hypertension     Mood swings     Multiple myeloma in remission 10/26/2012    per Hem/Oc note    Multiple myeloma without mention of remission     Renal hypertension     Status post dialysis 8/2012    Thrombocytopenia 8/2/2012       Review  "of Systems   Constitutional: Negative for activity change, appetite change, chills, fatigue, fever and unexpected weight change.   HENT: Negative for congestion, facial swelling, postnasal drip, rhinorrhea, sinus pressure, sore throat and trouble swallowing.    Eyes: Positive for visual disturbance. Negative for pain and redness.        Wears glasses   Respiratory: Negative for cough, chest tightness, shortness of breath and wheezing.    Cardiovascular: Negative.  Negative for chest pain, palpitations and leg swelling.   Gastrointestinal: Negative for abdominal pain, diarrhea, nausea and vomiting.        Takes creon    Genitourinary: Positive for decreased urine volume. Negative for dysuria, flank pain and urgency.   Musculoskeletal: Negative for gait problem, neck pain and neck stiffness.   Skin: Negative for rash.   Allergic/Immunologic: Negative for environmental allergies, food allergies and immunocompromised state.   Neurological: Negative for dizziness, weakness, light-headedness and headaches.   Psychiatric/Behavioral: Negative for agitation and confusion. The patient is not nervous/anxious.        Objective:   body mass index is 20.06 kg/m².  BP (!) 143/102 (BP Location: Right arm, Patient Position: Sitting, BP Method: Medium (Automatic))   Pulse 72   Temp 99.3 °F (37.4 °C) (Oral)   Resp 16   Ht 5' 7" (1.702 m)   Wt 58.1 kg (128 lb 1.4 oz)   LMP 05/25/2016   SpO2 100%   BMI 20.06 kg/m²     Physical Exam   Constitutional: She is oriented to person, place, and time. She appears well-developed and well-nourished.   HENT:   Head: Normocephalic.   Mouth/Throat: Oropharynx is clear and moist. No oropharyngeal exudate.   Eyes: Pupils are equal, round, and reactive to light. Conjunctivae and EOM are normal. No scleral icterus.   Neck: Normal range of motion. Neck supple.   Cardiovascular: Normal rate, regular rhythm and normal heart sounds.   Pulmonary/Chest: Effort normal and breath sounds normal. "   Abdominal: Soft. Normal appearance and bowel sounds are normal. She exhibits no distension and no mass. There is no splenomegaly or hepatomegaly. There is no tenderness. There is no rebound, no guarding, no CVA tenderness, no tenderness at McBurney's point and negative Karimi's sign.   Musculoskeletal: Normal range of motion. She exhibits no edema.        Arms:  Lymphadenopathy:     She has no cervical adenopathy.   Neurological: She is alert and oriented to person, place, and time. She exhibits normal muscle tone. Coordination normal.   Skin: Skin is warm and dry.   Psychiatric: She has a normal mood and affect. Her behavior is normal.   Vitals reviewed.      Labs:  Lab Results   Component Value Date    WBC 4.48 05/06/2019    HGB 8.8 (L) 05/06/2019    HCT 27.2 (L) 05/06/2019     05/06/2019    K 4.9 05/06/2019     05/06/2019    CO2 22 (L) 05/06/2019    BUN 46 (H) 05/06/2019    CREATININE 4.7 (H) 05/06/2019    EGFRNONAA 10 (A) 05/06/2019    CALCIUM 9.3 05/06/2019    PHOS 5.6 (H) 11/14/2017    MG 1.6 05/09/2014    ALBUMIN 3.8 05/06/2019    AST 22 05/06/2019    ALT 18 05/06/2019    PTH 1,149.0 (H) 06/14/2019       Lab Results   Component Value Date    PREALBUMIN 23 12/25/2011    BILIRUBINUA Negative 01/09/2013    GGT 27 06/09/2014    AMYLASE 84 12/19/2011    LIPASE 36 12/19/2011    PROTEINUA Negative 01/09/2013    NITRITE Negative 01/09/2013    RBCUA 0-1 01/09/2013    WBCUA 2 01/09/2013       No results found for: HLAABCTYPE    Lab Results   Component Value Date    CPRA 0 12/11/2018    BN3RLQS Negative 12/11/2018    CIIAB Negative 12/11/2018       Labs were reviewed with the patient.    Pre-transplant Workup:   Reviewed with the patient.    Assessment:     1. ESRD (end stage renal disease) on dialysis    2. Patient on waiting list for kidney transplant    3. Anemia in chronic kidney disease, on chronic dialysis    4. Multiple myeloma in remission    5. Tobacco abuse counseling    6. Renal hypertension     7. Glomerulosclerosis        Plan:   6/5 MMG --Needs further imaging to assess asymmetry to right breast--right breast US      NEEDING SW CLEARANCE FOR CAREGIVER SUPPORT--SEE SW NOTE     Michell scan / cardiac pending      ENCOURAGED SMOKING CESSATION --pt has been referred to smoking cessation program and is setting up the initial consult apt.     Fax labs to dialysis/nephrologist concerning PTH mgmt  Needs to maintain acceptable PTH for txp.    Lab Results   Component Value Date    PTH 1,149.0 (H) 06/14/2019    CALCIUM 9.3 05/06/2019    CAION 1.23 12/26/2011    PHOS 5.6 (H) 11/14/2017         Colonoscopy due 10/2021    Transplant Candidacy:   Ms. Booth is an unacceptable kidney transplant candidate.  Meets center eligibility for accepting HCV+ donor offer - yes.  Patient educated on HCV+ donors. Jennifer is willing  to accept HCV+ donor offer -  yes   Patient is a candidate for KDPI > 85 kidney donor offer - no D/T DIALYSIS TIME .  She will be placed in an inactive status at present due to  NEEDING SW CLEARANCE FOR CAREGIVER SUPPORT.    Jayna Miles NP       Follow-up:   In addition to the tests noted in the plan, Ms. oBoth will continue to have reevaluation as per the standing pre-kidney transplant protocol:  1. Monthly blood for PRA  2. Annual return to clinic, except HIV positive, > 65 years of age, or pancreas transplant candidates who will be scheduled to see transplant every 6 months while in pre-transplant phase  3. Annual re-testing: CXR, EKG, yearly mammograms for women over 40 and PSA for males over 40, cardiology follow-up as recommended by initial cardiology pre-transplant evaluation  4. Renal ultrasound every 2 years  5. Baseline colonoscopy after age 50 and repeated as recommended    UNOS Patient Status  Functional Status: 60% - Requires occasional assistance but is able to care for needs  Physical Capacity: No Limitations

## 2019-06-17 ENCOUNTER — TELEPHONE (OUTPATIENT)
Dept: RADIOLOGY | Facility: HOSPITAL | Age: 54
End: 2019-06-17

## 2019-06-18 ENCOUNTER — TELEPHONE (OUTPATIENT)
Dept: TRANSPLANT | Facility: CLINIC | Age: 54
End: 2019-06-18

## 2019-06-18 LAB — HPRA INTERPRETATION: NORMAL

## 2019-06-18 NOTE — TELEPHONE ENCOUNTER
YEARLY LIST MANAGEMENT NOTE    Jennifer Booth's kidney transplant listing status reviewed.  Patient is due for follow-up appointments on May 15, 2020.  Appointments will be scheduled per protocol.    Patient did not bring caregiver to clinic revisit on 6/14/19. She was informed that she needs to call coordinator to schedule appointment with her caregiver present. She remains inactive at this time.

## 2019-06-20 NOTE — PROGRESS NOTES
" SW met with pt alone in [facility] to perform annual assessment update.  Pt was alone.  She informed sw that her dtr (who was to be her primary caregiver) is now six months pregnant and, for this reason, could not accompany pt to today's appointments.  Additionally, pt stated she and her alternate caregiver, (sister, Dorothea Monet) are estranged.  "That relationship is dead to me."  Pt stated she currently has no caregiver plan.   SW provided education on the importance of having a solid caregiver plan.  Pt currently is working for [employer] and reports she has difficulty with cooking, shopping and housekeeping as well as affording medications, indicating a heightened need for extra support.  SW explained that since pt has been listed for roughly 8 years, she could be called for transplant at any moment.  SW explained that pt needs to develop a solid caregiver plan with primary and backup caregivers as well as a financial plan and return to clinic with caregivers for nurse education and social work eval.  THIS PATIENT WOULD BE HIGH RISK FOR TRANSPLANT AT THIS TIME DUE TO CAREGIVER AND FINANCIAL ISSUES.  Info provided to RN Coordinator.    "

## 2019-06-24 ENCOUNTER — HOSPITAL ENCOUNTER (OUTPATIENT)
Dept: RADIOLOGY | Facility: HOSPITAL | Age: 54
Discharge: HOME | End: 2019-06-24
Attending: NURSE PRACTITIONER
Payer: COMMERCIAL

## 2019-06-24 DIAGNOSIS — R92.8 ABNORMAL MAMMOGRAM: ICD-10-CM

## 2019-06-24 PROCEDURE — 77065 DX MAMMO INCL CAD UNI: CPT | Mod: TC,TXP

## 2019-06-24 PROCEDURE — 77061 BREAST TOMOSYNTHESIS UNI: CPT | Mod: 26,TXP,, | Performed by: RADIOLOGY

## 2019-06-24 PROCEDURE — 77061 BREAST TOMOSYNTHESIS UNI: CPT | Mod: TC,TXP

## 2019-06-24 PROCEDURE — 77065 MAMMO DIGITAL DIAGNOSTIC RIGHT WITH TOMOSYNTHESIS_CAD: ICD-10-PCS | Mod: 26,TXP,, | Performed by: RADIOLOGY

## 2019-06-24 PROCEDURE — 77065 DX MAMMO INCL CAD UNI: CPT | Mod: 26,TXP,, | Performed by: RADIOLOGY

## 2019-06-24 PROCEDURE — 77061 MAMMO DIGITAL DIAGNOSTIC RIGHT WITH TOMOSYNTHESIS_CAD: ICD-10-PCS | Mod: 26,TXP,, | Performed by: RADIOLOGY

## 2019-07-03 ENCOUNTER — OFFICE VISIT (OUTPATIENT)
Dept: FAMILY MEDICINE | Facility: CLINIC | Age: 54
End: 2019-07-03
Payer: COMMERCIAL

## 2019-07-03 VITALS
WEIGHT: 126.56 LBS | BODY MASS INDEX: 19.18 KG/M2 | HEIGHT: 68 IN | SYSTOLIC BLOOD PRESSURE: 130 MMHG | HEART RATE: 75 BPM | DIASTOLIC BLOOD PRESSURE: 82 MMHG | OXYGEN SATURATION: 100 % | TEMPERATURE: 98 F

## 2019-07-03 DIAGNOSIS — J30.89 ALLERGIC RHINITIS DUE TO OTHER ALLERGIC TRIGGER, UNSPECIFIED SEASONALITY: ICD-10-CM

## 2019-07-03 DIAGNOSIS — F41.9 ANXIETY AND DEPRESSION: ICD-10-CM

## 2019-07-03 DIAGNOSIS — F17.210 CIGARETTE NICOTINE DEPENDENCE WITHOUT COMPLICATION: ICD-10-CM

## 2019-07-03 DIAGNOSIS — C90.01 MULTIPLE MYELOMA IN REMISSION: ICD-10-CM

## 2019-07-03 DIAGNOSIS — I12.9 RENAL HYPERTENSION: Chronic | ICD-10-CM

## 2019-07-03 DIAGNOSIS — F32.A ANXIETY AND DEPRESSION: ICD-10-CM

## 2019-07-03 DIAGNOSIS — Z99.2 ANEMIA IN CHRONIC KIDNEY DISEASE, ON CHRONIC DIALYSIS: ICD-10-CM

## 2019-07-03 DIAGNOSIS — N18.6 ESRD (END STAGE RENAL DISEASE) ON DIALYSIS: Primary | ICD-10-CM

## 2019-07-03 DIAGNOSIS — N18.6 ANEMIA IN CHRONIC KIDNEY DISEASE, ON CHRONIC DIALYSIS: ICD-10-CM

## 2019-07-03 DIAGNOSIS — Z99.2 ESRD (END STAGE RENAL DISEASE) ON DIALYSIS: Primary | ICD-10-CM

## 2019-07-03 DIAGNOSIS — D63.1 ANEMIA IN CHRONIC KIDNEY DISEASE, ON CHRONIC DIALYSIS: ICD-10-CM

## 2019-07-03 PROBLEM — T78.40XA ALLERGIC DRUG REACTION: Status: RESOLVED | Noted: 2017-11-13 | Resolved: 2019-07-03

## 2019-07-03 PROBLEM — J30.9 ALLERGIC RHINITIS: Status: ACTIVE | Noted: 2019-07-03

## 2019-07-03 PROCEDURE — 99999 PR PBB SHADOW E&M-EST. PATIENT-LVL V: CPT | Mod: PBBFAC,,, | Performed by: FAMILY MEDICINE

## 2019-07-03 PROCEDURE — 99215 OFFICE O/P EST HI 40 MIN: CPT | Mod: PBBFAC,PO | Performed by: FAMILY MEDICINE

## 2019-07-03 PROCEDURE — 99999 PR PBB SHADOW E&M-EST. PATIENT-LVL V: ICD-10-PCS | Mod: PBBFAC,,, | Performed by: FAMILY MEDICINE

## 2019-07-03 PROCEDURE — 99214 OFFICE O/P EST MOD 30 MIN: CPT | Mod: S$GLB,,, | Performed by: FAMILY MEDICINE

## 2019-07-03 PROCEDURE — 99214 PR OFFICE/OUTPT VISIT, EST, LEVL IV, 30-39 MIN: ICD-10-PCS | Mod: S$GLB,,, | Performed by: FAMILY MEDICINE

## 2019-07-03 NOTE — PROGRESS NOTES
"Subjective:       Patient ID: Jennifer Booth is a 54 y.o. female.    Chief Complaint: Follow-up    53 yo F pt, recently established with me, with PMH significant for Multiple Myeloma in remission, ESRD on HD, AoCKD, HTN, GERD, MDD/Anxiety,  Constipation, and tobacco use.      Acute concerns:  Interested in changing HD centers. At present she receives HD at [facility] with Dr. Wolfe. States she no longer feels comfortable with [facility] as there are too many "changes".     Chronic Diseases  Multiple Myeloma in remission: Followed by [facility]; last visit 5/13/19. Shecompleted bortezomib/dexamethasone/Revlimid 6 cycles on 04/13/2012 for the multiple myeloma kappa light chain disease, IPSS stage III. She underwent bortezomib maintenance therapy three weeks on, one week off (05/15, 05/22 and 05/29) with her last cycle received on 05/29/2012. She was followed at [facility] and was diagnosed with DRESS syndrome during hospitalization and then [facility] team elected not to proceed with auto SCT. Velcade maintenance was discontinued due to side effects. MRI T and L spine 6/29/2018 - no evidence of myeloma involvement  SPEP 5/7/2019- no monoclonal peaks. Has upcoming appt on 8/14/19    ESRD on HD:  She is followed by a nephrologist at [facility].  Receives hemodialysis on Tuesdays/Thursdays/Saturdays. Iinitially diagnosed with advanced kidney disease secondary to multiple myeloma & HTN.  She was diagnosed with NANETTE from MM in 2010 that required initiation of dialysis.  She started chronic dialysis on 12/23/11 and ended on 8/28/12. She then underwent chemotherapy for her myeloma, and stopped dialysis in 2013.  Kidney biopsy performed 2017due to worsening  Kidney--revealed glomerulosclerosis and no evidence of MM. [place] [facility] [place]. Patient met selection criteria for kidney transplant related to ESRD due to Hypertensive Nephrosclerosis. During most recent visit, she was " placed on inactive status as she was deemed a high risk candidate for kidney transplant due to lack of caregiver support and financial hardship. Plans for caregivers to be her daughter (currently in third trimester of pregnancy) and her daughter's fiance. Pt is required to attend appt with caregiver.Her next appt with transplant is scheduled for 5/19/2020. She plans to f/u with her daughter prior to this time.     AoCKD:  Followed by [facility].  Receives Procrit infusions during HD. H/H 8.8/27.2 5/6/19    HTN: /82 in the office today. Adherent with Coreg 12.5 mg BID    She continues to smoke cigarettes. Contemplative stage of quitting. Referred to tobacco cessation at previous visit, but has not scheduled appointment.     Review of Systems    Review of Systems   Constitutional: Negative for activity change, appetite change, chills, fever and unexpected weight change.   HENT: Negative for sore throat.    Eyes: Negative for redness, itching and visual disturbance.   Respiratory: Negative for chest tightness.    Cardiovascular: Negative for chest pain.   Gastrointestinal: Negative for constipation, diarrhea, nausea and vomiting.   Genitourinary: Negative for dysuria, frequency, hematuria, urgency, vaginal bleeding and vaginal discharge.   Skin: Negative for rash.   Neurological: Negative for dizziness, syncope and headaches.   Psychiatric/Behavioral: Negative for dysphoric mood and suicidal ideas. The patient is not nervous/anxious.      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      Depression     Dialysis patient     dialysis m-w-f    Encounter for blood transfusion     GERD (gastroesophageal reflux disease)     Hypertension     Mood swings     Multiple myeloma in remission 10/26/2012     per Hem/Oc note    Multiple myeloma without mention of remission     Renal hypertension     Status post dialysis 2012    Thrombocytopenia 2012       Patient Active Problem List   Diagnosis    Constipation - functional    Multiple myeloma in remission    Renal hypertension    Tobacco abuse counseling    Cigarette nicotine dependence    ESRD (end stage renal disease) on dialysis    Glomerulosclerosis    Anemia in chronic kidney disease, on chronic dialysis    GERD without esophagitis    Patient on waiting list for kidney transplant    Anxiety and depression    Allergic rhinitis       Past Surgical History:   Procedure Laterality Date    AV FISTULA PLACEMENT Left     BIOPSY N/A 3/28/2016    Performed by [facility] Provider at [facility] CATH LAB    BREAST CYST ASPIRATION Left     BREAST CYST EXCISION Left      SECTION      x1    HJVUIVND-[id number]-DO Left 2013    Performed by [facility]ales Jr., MD at [facility] OR    INSERTION-CATHETER-TENCHOFF-LAPAROSCOPIC N/A 8/3/2017    Performed by [facility] Jr[facility] at [facility] OR    pd catheter      REMOVAL, CATHETER, DIALYSIS, PERITONEAL N/A 2018    Performed by [facility]ales Jr., [facility] at [facility] OR    RENAL BIOPSY  2016    VASCULAR SURGERY  2012    dialysis catheter to right subclavian       Family History   Problem Relation Age of Onset    Hypertension Mother     Heart failure Mother     Ovarian cancer Maternal Aunt     Diabetes Maternal Grandmother     Stroke Maternal Grandmother     Breast cancer Neg Hx     Colon cancer Neg Hx        Social History     Tobacco Use   Smoking Status Current Every Day Smoker    Packs/day: 0.50    Years: 30.00    Pack years: 15.00    Types: Cigarettes   Smokeless Tobacco Never Used   Tobacco Comment    12/16/15: states 1/2 pack a day       Social History     Social History Narrative    Works FT; likes theatre. Lives alone.       Objective:        Vitals:    19 0832   BP: 130/82  "  Pulse: 75   Temp: 97.8 °F (36.6 °C)   TempSrc: Oral   SpO2: 100%   Weight: 57.4 kg (126 lb 8.7 oz)   Height: 5' 8" (1.727 m)       Physical Exam    Physical Exam   Constitutional: She is oriented to person, place, and time. She appears well-developed and well-nourished. No distress.   HENT:   Head: Normocephalic and atraumatic.   Right Ear: External ear normal.   Left Ear: External ear normal.   Nose: Normal   Mouth/Throat: MMM.  Eyes: EOM are normal. No scleral icterus.   Neck: Normal range of motion. Neck supple.   Cardiovascular: Normal rate and regular rhythm. Exam reveals no gallop and no friction rub.   Murmur (loudest at LUSB) heard.   Systolic murmur is present with a grade of 1/6.  Pulmonary/Chest: Effort normal and breath sounds normal. She has no wheezes. She has no rales.   Musculoskeletal: Normal range of motion. She exhibits no edema.   Neurological: She is alert and oriented to person, place, and time. No cranial nerve deficit.   Skin: Skin is warm and dry. No rash noted. No erythema.   Psychiatric: She has a normal mood and affect. Her behavior is normal.     Assessment:       1. ESRD (end stage renal disease) on dialysis    2. Multiple myeloma in remission    3. Anemia in chronic kidney disease, on chronic dialysis    4. Renal hypertension    5. Cigarette nicotine dependence without complication    6. Anxiety and depression    7. Allergic rhinitis due to other allergic trigger, unspecified seasonality        Plan:       Jennifer was seen today for follow-up.    Diagnoses and all orders for this visit:    ESRD (end stage renal disease) on dialysis  -     Cancel: Ambulatory referral to Nephrology  -     Ambulatory referral to Nephrology    Multiple myeloma in remission    Anemia in chronic kidney disease, on chronic dialysis    Renal hypertension    Cigarette nicotine dependence without complication    Anxiety and depression    Allergic rhinitis due to other allergic trigger, unspecified " seasonality      ESRD on HD:   Continue follow-up with Nephrology.  HD Tuesdays/Thursdays/Saturdays.   Initially diagnosed with advanced kidney disease secondary to multiple myeloma & HTN.   Started chronic dialysis on 12/23/11 and ended on 8/28/12.  Completed chemotherapy for her myeloma, and stopped dialysis in 2013.    Kidney biopsy performed 2017due to worsening  Kidney--revealed glomerulosclerosis and no evidence of MM.   Patient met selection criteria for kidney transplant related to ESRD due to Hypertensive Nephrosclerosis.   Placed on inactive status 6/14/19--deemed high risk 2/2 lack of caregiver and financial support. Pt will schedule a f/u appt with proposed caregivers (daughter and daughter's fiance)  Continue med regimen per renal    Multiple Myeloma in remission: Followed by [facility]; last visit 5/13/19. Shecompleted bortezomib/dexamethasone/Revlimid 6 cycles on 04/13/2012 for the multiple myeloma kappa light chain disease, IPSS stage III. She underwent bortezomib maintenance therapy three weeks on, one week off (05/15, 05/22 and 05/29) with her last cycle received on 05/29/2012. She was [facility] and was diagnosed with DRESS syndrome during hospitalization and [facility]. Velcade maintenance was discontinued due to side effects. MRI T and L spine 6/29/2018 - no evidence of myeloma involvement  SPEP 5/7/2019- no monoclonal peaks.   F/U visit scheduled for 8/14/2019     AoCKD:    H/H 8.9/26.7 5/28/19  Followed by [facility]  Receives Procrit infusions with each HD session  Has labs once per month during HD    HTN  BP at target today. Continue carvedilol 12.5 BID    Tobacco Dependence  Referred to tobacco cessation program. Advised to schedule appt    MDD/Anxiety   Mood stable in the office today   Continue Lexapro as rx'd     AR   Continue Nasonex + OTC 2nd generation H-1 blocker     HM   Pap neg 4/2018  Mammo  BIRADs Cat 4/2018  [place] 10/2011. Due 10/2021  Pneumovax-23 due 10/2021  Tdap due 7/2026  Encouraged to obtain Shingrix from pharmacy    Follow up in about 3 months (around 10/3/2019).

## 2019-07-05 ENCOUNTER — LAB VISIT (OUTPATIENT)
Dept: LAB | Facility: HOSPITAL | Age: 54
End: 2019-07-05
Payer: COMMERCIAL

## 2019-07-05 DIAGNOSIS — Z76.82 AWAITING ORGAN TRANSPLANT STATUS: ICD-10-CM

## 2019-07-05 PROCEDURE — 99001 SPECIMEN HANDLING PT-LAB: CPT | Mod: PO,TXP

## 2019-07-11 LAB — HPRA INTERPRETATION: NORMAL

## 2019-07-15 DIAGNOSIS — F32.A DEPRESSION, UNSPECIFIED DEPRESSION TYPE: ICD-10-CM

## 2019-07-15 LAB
HLA FREEZE AND HOLD INTERPRETATION: NORMAL
HLAFH COLLECTION DATE: NORMAL

## 2019-07-23 RX ORDER — ESCITALOPRAM OXALATE 20 MG/1
TABLET ORAL
Qty: 90 TABLET | Refills: 0 | Status: SHIPPED | OUTPATIENT
Start: 2019-07-23 | End: 2021-07-22 | Stop reason: SDUPTHER

## 2019-08-06 ENCOUNTER — TELEPHONE (OUTPATIENT)
Dept: TRANSPLANT | Facility: CLINIC | Age: 54
End: 2019-08-06

## 2019-08-08 ENCOUNTER — LAB VISIT (OUTPATIENT)
Dept: LAB | Facility: OTHER | Age: 54
End: 2019-08-08
Attending: INTERNAL MEDICINE
Payer: COMMERCIAL

## 2019-08-08 DIAGNOSIS — C90.00 MULTIPLE MYELOMA, REMISSION STATUS UNSPECIFIED: ICD-10-CM

## 2019-08-08 LAB
ALBUMIN SERPL BCP-MCNC: 4.3 G/DL (ref 3.5–5.2)
ALP SERPL-CCNC: 125 U/L (ref 55–135)
ALT SERPL W/O P-5'-P-CCNC: 11 U/L (ref 10–44)
ANION GAP SERPL CALC-SCNC: 15 MMOL/L (ref 8–16)
AST SERPL-CCNC: 20 U/L (ref 10–40)
B2 MICROGLOB SERPL-MCNC: 17.4 UG/ML (ref 0–2.5)
BASOPHILS # BLD AUTO: 0.03 K/UL (ref 0–0.2)
BASOPHILS NFR BLD: 1 % (ref 0–1.9)
BILIRUB SERPL-MCNC: 0.4 MG/DL (ref 0.1–1)
BUN SERPL-MCNC: 29 MG/DL (ref 6–20)
CALCIUM SERPL-MCNC: 9.2 MG/DL (ref 8.7–10.5)
CHLORIDE SERPL-SCNC: 90 MMOL/L (ref 95–110)
CO2 SERPL-SCNC: 21 MMOL/L (ref 23–29)
CREAT SERPL-MCNC: 5.3 MG/DL (ref 0.5–1.4)
DIFFERENTIAL METHOD: ABNORMAL
EOSINOPHIL # BLD AUTO: 0.1 K/UL (ref 0–0.5)
EOSINOPHIL NFR BLD: 4.2 % (ref 0–8)
ERYTHROCYTE [DISTWIDTH] IN BLOOD BY AUTOMATED COUNT: 16 % (ref 11.5–14.5)
EST. GFR  (AFRICAN AMERICAN): 10 ML/MIN/1.73 M^2
EST. GFR  (NON AFRICAN AMERICAN): 8 ML/MIN/1.73 M^2
GLUCOSE SERPL-MCNC: 78 MG/DL (ref 70–110)
HCT VFR BLD AUTO: 34.5 % (ref 37–48.5)
HGB BLD-MCNC: 11.6 G/DL (ref 12–16)
IMM GRANULOCYTES # BLD AUTO: 0.01 K/UL (ref 0–0.04)
IMM GRANULOCYTES NFR BLD AUTO: 0.3 % (ref 0–0.5)
LDH SERPL L TO P-CCNC: 132 U/L (ref 110–260)
LYMPHOCYTES # BLD AUTO: 0.6 K/UL (ref 1–4.8)
LYMPHOCYTES NFR BLD: 21.3 % (ref 18–48)
MCH RBC QN AUTO: 30.3 PG (ref 27–31)
MCHC RBC AUTO-ENTMCNC: 33.6 G/DL (ref 32–36)
MCV RBC AUTO: 90 FL (ref 82–98)
MONOCYTES # BLD AUTO: 0.4 K/UL (ref 0.3–1)
MONOCYTES NFR BLD: 12.2 % (ref 4–15)
NEUTROPHILS # BLD AUTO: 1.7 K/UL (ref 1.8–7.7)
NEUTROPHILS NFR BLD: 61 % (ref 38–73)
NRBC BLD-RTO: 0 /100 WBC
PLATELET # BLD AUTO: 123 K/UL (ref 150–350)
PMV BLD AUTO: 9.7 FL (ref 9.2–12.9)
POTASSIUM SERPL-SCNC: 4.2 MMOL/L (ref 3.5–5.1)
PROT SERPL-MCNC: 7.2 G/DL (ref 6–8.4)
RBC # BLD AUTO: 3.83 M/UL (ref 4–5.4)
SODIUM SERPL-SCNC: 126 MMOL/L (ref 136–145)
WBC # BLD AUTO: 2.86 K/UL (ref 3.9–12.7)

## 2019-08-08 PROCEDURE — 83615 LACTATE (LD) (LDH) ENZYME: CPT | Mod: NTX

## 2019-08-08 PROCEDURE — 82232 ASSAY OF BETA-2 PROTEIN: CPT | Mod: NTX

## 2019-08-08 PROCEDURE — 84165 PATHOLOGIST INTERPRETATION SPE: ICD-10-PCS | Mod: 26,NTX,, | Performed by: PATHOLOGY

## 2019-08-08 PROCEDURE — 86747 PARVOVIRUS ANTIBODY: CPT | Mod: NTX

## 2019-08-08 PROCEDURE — 36415 COLL VENOUS BLD VENIPUNCTURE: CPT | Mod: NTX

## 2019-08-08 PROCEDURE — 83520 IMMUNOASSAY QUANT NOS NONAB: CPT | Mod: 59,NTX

## 2019-08-08 PROCEDURE — 84165 PROTEIN E-PHORESIS SERUM: CPT | Mod: NTX

## 2019-08-08 PROCEDURE — 85025 COMPLETE CBC W/AUTO DIFF WBC: CPT | Mod: NTX

## 2019-08-08 PROCEDURE — 84165 PROTEIN E-PHORESIS SERUM: CPT | Mod: 26,NTX,, | Performed by: PATHOLOGY

## 2019-08-08 PROCEDURE — 80053 COMPREHEN METABOLIC PANEL: CPT | Mod: TXP

## 2019-08-09 LAB
ALBUMIN SERPL ELPH-MCNC: 4.32 G/DL (ref 3.35–5.55)
ALPHA1 GLOB SERPL ELPH-MCNC: 0.3 G/DL (ref 0.17–0.41)
ALPHA2 GLOB SERPL ELPH-MCNC: 0.65 G/DL (ref 0.43–0.99)
B-GLOBULIN SERPL ELPH-MCNC: 0.63 G/DL (ref 0.5–1.1)
GAMMA GLOB SERPL ELPH-MCNC: 0.7 G/DL (ref 0.67–1.58)
KAPPA LC SER QL IA: 15.18 MG/DL (ref 0.33–1.94)
KAPPA LC/LAMBDA SER IA: 1.08 (ref 0.26–1.65)
LAMBDA LC SER QL IA: 14 MG/DL (ref 0.57–2.63)
PATHOLOGIST INTERPRETATION SPE: NORMAL
PROT SERPL-MCNC: 6.6 G/DL (ref 6–8.4)

## 2019-08-11 LAB
PARVOVIRUS B19 ABS IGG & IGM: ABNORMAL
PARVOVIRUS B19 IGG ANTIBODY: POSITIVE
PARVOVIRUS B19 IGM ANTIBODY: NEGATIVE

## 2019-08-12 ENCOUNTER — LAB VISIT (OUTPATIENT)
Dept: LAB | Facility: HOSPITAL | Age: 54
End: 2019-08-12
Payer: COMMERCIAL

## 2019-08-12 DIAGNOSIS — Z76.82 AWAITING ORGAN TRANSPLANT STATUS: ICD-10-CM

## 2019-08-12 PROCEDURE — 86829 HLA CLASS I/II ANTIBODY QUAL: CPT | Mod: PO,TXP

## 2019-08-12 PROCEDURE — 86829 HLA CLASS I/II ANTIBODY QUAL: CPT | Mod: 91,PO,TXP

## 2019-08-14 ENCOUNTER — OFFICE VISIT (OUTPATIENT)
Dept: HEMATOLOGY/ONCOLOGY | Facility: CLINIC | Age: 54
End: 2019-08-14
Payer: COMMERCIAL

## 2019-08-14 ENCOUNTER — OFFICE VISIT (OUTPATIENT)
Dept: TRANSPLANT | Facility: CLINIC | Age: 54
End: 2019-08-14
Payer: COMMERCIAL

## 2019-08-14 VITALS
OXYGEN SATURATION: 99 % | HEART RATE: 72 BPM | HEIGHT: 67 IN | SYSTOLIC BLOOD PRESSURE: 150 MMHG | DIASTOLIC BLOOD PRESSURE: 100 MMHG | WEIGHT: 126.56 LBS | BODY MASS INDEX: 19.87 KG/M2 | TEMPERATURE: 98 F

## 2019-08-14 VITALS
HEART RATE: 76 BPM | SYSTOLIC BLOOD PRESSURE: 165 MMHG | HEIGHT: 68 IN | TEMPERATURE: 98 F | WEIGHT: 126.56 LBS | OXYGEN SATURATION: 99 % | BODY MASS INDEX: 19.18 KG/M2 | DIASTOLIC BLOOD PRESSURE: 106 MMHG | RESPIRATION RATE: 18 BRPM

## 2019-08-14 DIAGNOSIS — N18.6 ANEMIA IN CHRONIC KIDNEY DISEASE, ON CHRONIC DIALYSIS: ICD-10-CM

## 2019-08-14 DIAGNOSIS — N18.6 ESRD (END STAGE RENAL DISEASE) ON DIALYSIS: ICD-10-CM

## 2019-08-14 DIAGNOSIS — D63.1 ANEMIA IN CHRONIC KIDNEY DISEASE, ON CHRONIC DIALYSIS: ICD-10-CM

## 2019-08-14 DIAGNOSIS — Z99.2 ANEMIA IN CHRONIC KIDNEY DISEASE, ON CHRONIC DIALYSIS: ICD-10-CM

## 2019-08-14 DIAGNOSIS — Z76.82 PATIENT ON WAITING LIST FOR KIDNEY TRANSPLANT: Primary | Chronic | ICD-10-CM

## 2019-08-14 DIAGNOSIS — C90.01 MULTIPLE MYELOMA IN REMISSION: Primary | ICD-10-CM

## 2019-08-14 DIAGNOSIS — Z99.2 ESRD (END STAGE RENAL DISEASE) ON DIALYSIS: ICD-10-CM

## 2019-08-14 LAB — HPRA INTERPRETATION: NORMAL

## 2019-08-14 PROCEDURE — 99999 PR PBB SHADOW E&M-EST. PATIENT-LVL IV: ICD-10-PCS | Mod: PBBFAC,,, | Performed by: INTERNAL MEDICINE

## 2019-08-14 PROCEDURE — 3008F PR BODY MASS INDEX (BMI) DOCUMENTED: ICD-10-PCS | Mod: CPTII,S$GLB,, | Performed by: INTERNAL MEDICINE

## 2019-08-14 PROCEDURE — 99214 PR OFFICE/OUTPT VISIT, EST, LEVL IV, 30-39 MIN: ICD-10-PCS | Mod: S$GLB,,, | Performed by: INTERNAL MEDICINE

## 2019-08-14 PROCEDURE — 99499 UNLISTED E&M SERVICE: CPT | Mod: S$GLB,TXP,, | Performed by: INTERNAL MEDICINE

## 2019-08-14 PROCEDURE — 99999 PR PBB SHADOW E&M-EST. PATIENT-LVL III: ICD-10-PCS | Mod: PBBFAC,TXP,,

## 2019-08-14 PROCEDURE — 99214 OFFICE O/P EST MOD 30 MIN: CPT | Mod: S$GLB,,, | Performed by: INTERNAL MEDICINE

## 2019-08-14 PROCEDURE — 99999 PR PBB SHADOW E&M-EST. PATIENT-LVL IV: CPT | Mod: PBBFAC,,, | Performed by: INTERNAL MEDICINE

## 2019-08-14 PROCEDURE — 99999 PR PBB SHADOW E&M-EST. PATIENT-LVL III: CPT | Mod: PBBFAC,TXP,,

## 2019-08-14 PROCEDURE — 3008F BODY MASS INDEX DOCD: CPT | Mod: CPTII,S$GLB,, | Performed by: INTERNAL MEDICINE

## 2019-08-14 PROCEDURE — 99499 NO LOS: ICD-10-PCS | Mod: S$GLB,TXP,, | Performed by: INTERNAL MEDICINE

## 2019-08-14 NOTE — PROGRESS NOTES
Transplant Recipient Adult Psychosocial Assessment-UPDATE     Jennifer Booth  [address]  [address]  [place] [address]  Telephone Information:   Mobile [phone]   Home  [phone] (home)  Work  [phone] (work)  E-mail  [email or web address]    Sex: female  YOB: 1965  Age: 54 y.o.    Encounter Date: 2019  [place] Citizen: yes  Primary Language: English   Needed: no    Emergency Contact:  Name: Yusra Basurto  Relationship: daughter  Address: [address] [place] [address]  Phone Numbers:  [phone] (mobile)    Family/Social Support:   Number of dependents/: none  Marital history:  once and  over 20 yrs ago.  One dtr, Yusra age 31.  Other family dynamics: Pt's parents are , one sister Dutch Monet who lives in [place] [phone].  Dtr provided care to pt for about two years during her treatment for multiple myeloma.  Pt has named dtr and dtr's ashleee as primary caregiver.    Household Composition:    Pt lives alone.    Do you and your caregivers have access to reliable transportation? yes  PRIMARY CAREGIVER: Yusra Basurto and Gilberto Schneider will be primary caregiver, phone number [phone] and [phone].   Pt's dtr is currently pregnant and due in October. Dtr's fiance will act as primary caregiver when pt's dtr goes into labor and has to care for . Pt's dtr reports fiance's family will assist with childcare as needed,      provided in-depth information to patient and caregiver regarding pre- and post-transplant caregiver role.   strongly encourages patient and caregiver to have concrete plan regarding post-transplant care giving, including back-up caregiver(s) to ensure care giving needs are met as needed.    Patient and Caregiver states understanding all aspects of caregiver role/commitment and is able/willing/committed to being caregiver to the fullest extent necessary.    Patient  and Caregiver verbalizes understanding of the education provided today and caregiver responsibilities.         remains available. Patient and Caregiver agree to contact  in a timely manner if concerns arise.      Able to take time off work without financial concerns: yes.     Additional Significant Others who will Assist with Transplant:    Name: Alvaro Holder  Age: 31  Number: [phone]  City: [place] State: [place]  Relationship: dtr's fiancee  Does person drive? yes    Living Will: no  Healthcare Power of : yes dtr Yusra  Advance Directives on file: <<no information> per medical record.  Verbally reviewed LW/HCPA information.   provided patient with copy of LW/HCPA documents and provided education on completion of forms.    Living Donors: No. Education and resource information given to patient.    Highest Education Level: Attended College/Technical School  Reading Ability: college  Reports difficulty with: seeing and wears glasses  Learns Best By:  reading     Status: no  VA Benefits: no     Working for Income: yes  If yes, working activity level: Working Full Time  Patient is employed as appeals desk in 's office at  [employer]..    Spouse/Significant Other Employment: Dtr works as the manager of a [email or web address].     Disabled: no    Monthly Income:  Salary/Wages: $2200.00  Able to afford all costs now and if transplanted, including medications: yes Pt reported no financial concerns at this time and dtr reports desire to assist as needed. Pt   Patient and Caregiver verbalizes understanding of personal responsibilities related to transplant costs and the importance of having a financial plan to ensure that patients transplant costs are fully covered.       provided fundraising information/education. Patient and Caregiververbalizes understanding.   remains available.    Insurance:   Payor/Plan Subscr   Sex Relation Sub. Ins. ID Effective Group Num   1. MEDICARE - ME* JOHN BEST 1965 Female  [id number] 3/1/12                                    [address]   2. BLUE CROSS BL* JOHN BEST D 1965 Female  [id number]                                   [address] [address]     Primary Insurance (for UNOS reporting): Public Insurance - Medicare FFS (Fee For Service)  Secondary Insurance (for UNOS reporting): Private Insurance through pt's employer  Patient and Caregiver verbalizes clear understanding that patient may experience difficulty obtaining and/or be denied insurance coverage post-surgery. This includes and is not limited to disability insurance, life insurance, health insurance, burial insurance, long term care insurance, and other insurances.      Patient and Caregiver also reports understanding that future health concerns related to or unrelated to transplantation may not be covered by patient's insurance.  Resources and information provided and reviewed.     Patient and Caregiver provides verbal permission to release any necessary information to outside resources for patient care and discharge planning.  Resources and information provided are reviewed.      Dialysis Adherence: Patient reports she is currently on dialysis and utilizes in-center treatments. Pt reports as highly compliant.     Infusion Service: patient utilizing? no  Home Health: patient utilizing? no  DME: no  Pulmonary/Cardiac Rehab: none   ADLS:  Pt states ability to independently accomplish all adls.    Adherence:   Pt states current and expected compliance with all healthcare recommendations..  Adherence education and counseling provided.     Per History Section:  Past Medical History:   Diagnosis Date    Allergic drug reaction 2017    Anemia     Anxiety     Arm pain, left 2014    Breast cyst     Chronic renal failure 2014    CKD stage 4, GFR 15-29 ml/min from myeloma kidney & HTN       Depression     Dialysis patient     dialysis m-w-f    Encounter for blood transfusion     GERD (gastroesophageal reflux disease)     Hypertension     Mood swings     Multiple myeloma in remission 10/26/2012    per Hem/Oc note    Multiple myeloma without mention of remission     Renal hypertension     Status post dialysis 8/2012    Thrombocytopenia 8/2/2012     Social History     Tobacco Use    Smoking status: Current Every Day Smoker     Packs/day: 0.50     Years: 30.00     Pack years: 15.00     Types: Cigarettes    Smokeless tobacco: Never Used    Tobacco comment: 12/16/15: states 1/2 pack a day   Substance Use Topics    Alcohol use: Yes     Alcohol/week: 1.2 oz     Types: 2 Glasses of wine per week     Comment: nightly     Social History     Substance and Sexual Activity   Drug Use No     Social History     Substance and Sexual Activity   Sexual Activity Not Currently       Per Today's Psychosocial:  Tobacco: Pt currently smokes 1/2 ppd and is trying to quit.  MOHSEN recommended pt stop smoking with the help of smoking cessation program. Pt states she has info on programs and will seek help..  Alcohol: two glasses wine a night.  Illicit Drugs/Non-prescribed Medications: marijuana on weekends.  MOHSEN counseled pt on risks of smoking marijuana after transplant and recommended pt plan to quit.    Patient and Caregiver states clear understanding of the potential impact of substance use as it relates to transplant candidacy and is aware of possible random substance screening.  Substance abstinence/cessation counseling, education and resources provided and reviewed.     Arrests/DWI/Treatment/Rehab: patient denies    Psychiatric History:    Mental Health: anxiety and reports ability to utilize counseling services through employer, dialysis unit and [facility] as needed.   Psychiatrist/Counselor: Pt utilized through oncologist as prescribed   Medications:  Lexapro.  Pt states it helps her anxiety.   Suicide/Homicide  Issues: Pt denies current or past suicidal/homicidal ideation.   Safety at home: Pt denies any home safety concerns.    Knowledge: Patient and Caregiver states having clear understanding and realistic expectations regarding the potential risks and potential benefits of organ transplantation and organ donation and agrees to discuss with health care team members and support system members, as well as to utilize available resources and express questions and/or concerns in order to further facilitate the pt informed decision-making.  Resources and information provided and reviewed.    Patient and Caregiver is aware of [facility]'s affiliation and/or partnership with agencies in home health care, LTAC, SNF, [facility], and other hospitals and clinics.    Understanding: Patient and Caregiver reports having a clear understanding of the many lifetime commitments involved with being a transplant recipient, including costs, compliance, medications, lab work, procedures, appointments, concrete and financial planning, preparedness, timely and appropriate communication of concerns, abstinence (ETOH, tobacco, illicit non-prescribed drugs), adherence to all health care team recommendations, support system and caregiver involvement, appropriate and timely resource utilization and follow-through, mental health counseling as needed/recommended, and patient and caregiver responsibilities.  Social Service Handbook, resources and detailed educational information provided and reviewed.  Educational information provided.    Patient and Caregiver also reports current and expected compliance with health care regime and states having a clear understanding of the importance of compliance.      Patient and Caregiver reports a clear understanding that risks and benefits may be involved with organ transplantation and with organ donation.       Patient and Caregiver also reports clear understanding that psychosocial risk factors may affect patient, and  include but are not limited to feelings of depression, generalized anxiety, anxiety regarding dependence on others, post traumatic stress disorder, feelings of guilt and other emotional and/or mental concerns, and/or exacerbation of existing mental health concerns.  Detailed resources provided and discussed.      Patient and Caregiver agrees to access appropriate resources in a timely manner as needed and/or as recommended, and to communicate concerns appropriately.  Patient and Caregiver also reports a clear understanding of treatment options available.     Patient and Caregiver received education in a group setting.   reviewed education, provided additional information, and answered questions.    Feelings or Concerns: Pt stated she has no current concerns.    Coping: Rests on weekends, prays, is spiritual, enjoys wine.    Goals: Live a healthy long life.     Interview Behavior: Patient and Caregiver presents as alert and oriented x 4, pleasant, good eye contact, well groomed, recall good, concentration/judgement good, average intelligence, calm, communicative, cooperative and asking and answering questions appropriately.          Transplant Social Work - Candidacy  Assessment/Plan:     Psychosocial Suitability: Patient presents as an acceptable candidate for kidney transplant at this time. Based on psychosocial risk factors, patient presents as medium risk, due to previously reported financial concerns but currently denies.      Recommendations/Additional Comments: Pt plans to d/c to dtr's home [place].  MOHSEN recommends that pt conduct fundraising to assist pt with pay for cost of medications, food, gas, and other transplant related needs. SW recommends that pt remain aware of potential mental health concerns and contact the team if any concerns arise. SW recommends that pt remain abstinent from tobacco, ETOH, and drug use. SW supports pt's continued dialysis adherence. SW remains available to answer any  questions or concerns that arise as the pt moves through the transplant process.     Ratna Costello, MSW, LMSW

## 2019-08-14 NOTE — PROGRESS NOTES
INITIAL PATIENT EDUCATION NOTE    Ms. Jennifer Booth was seen in pre-kidney transplant clinic for evaluation for kidney, kidney/pancreas or pancreas only transplant.  The patient attended a group education session that discussed/reviewed the following aspects of transplantation: evaluation and selection committee process, UNOS waitlist management/multiple listings, types of organs offered (KDPI < 85%, KDPI > 85%, PHS increased risk, DCD, HCV+), financial aspects, surgical procedures, dietary instruction pre- and post-transplant, health maintenance pre- and post-transplant, post-transplant hospitalization and outpatient follow-up, potential to participate in a research protocol, and medication management and side effects.  A question and answer session was provided after the presentation.    The patient was seen by all members of the multi-disciplinary team to include:  and Transplant Coordinator    The patient reviewed and signed all consents for evaluation which were witnessed and sent to scanning into the EPIC chart.    The patient was given an education book and plan for further evaluation based on her individual assessment.      The patient was encouraged to call with any questions or concerns.

## 2019-08-14 NOTE — PROGRESS NOTES
Subjective:       Patient ID: Jennifer Booth is a 54 y.o. female.    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   Previously followed at [facility].   She has not had the resources to continue f/u at [facility].       She is followed by Nephrology for ESRD  She was initially diagnosed with advanced kidney disease secondary to multiple myeloma & HTN.   She was diagnosed with  AK I from MM in 2010 that required initiation of dialysis.    She started chronic dialysis on 12/23/11 and ended on 8/28/12  She then underwent chemotherapy for her myeloma, and stopped dialysis in 2013.    Kidney biopsy 2017due to worsening  kidney function reveals glomerulosclerosis and no evidence of MM    She was undergoing peritoneal dialysis daily     She was switched back to HD     She is followed by Kidney Transplant team at [facility]  Patient met selection criteria for kidney transplant related to ESRD due to Hypertensive Nephrosclerosis  She is considered a candidate for kidney transplant     Today, she is doing well  She is excited about becoming a grandmother soon  She is no longer  considering switching Nephrologists  Appetite and wieght stable   No SOB/CP  No fevers, night sweats  No recent infections      She is undergoing EPO under direction of Nephrology  CBC reveals  Hb 11.6  g/dl   SPEP 8/2019  normal          Onc hx:  She was diagnosed with kappa free light chain disease, 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 She originally presented with acute renal failure requiring HD .She has completed bortezomib/dexamethasone/Revlimid 6 cycles on 04/13/2012 for the multiple myeloma kappa light chain disease, IPSS stage III. She underwent bortezomib maintenance therapy three weeks on, one week off (05/15, 05/22 and 05/29) with her last  "cycle received on 05/29/2012. She is followed at [facility] myeloma [place] where she was evaluated for an auto stem cell transplant . It was decided not to proceed with transplant was originally due to drug reaction.Pt was diagnosed with DRESS syndrome during hospitalization and then [facility] team elected not to proceed proceed with auto SCT. Velcade maintenance was discontinued due to side effects.       Tx: Velcade/Dex/Revlimid x 6 cycles 4/13/12   Velcade maintenance 3wks on, 1wk off dc'd 5/29/12 sec to adv SE          Review of Systems   Constitutional: Negative for appetite change, chills and fatigue.   HENT: Negative for congestion, hearing loss and nosebleeds.    Eyes: Negative for visual disturbance.   Respiratory: Negative for apnea, cough, chest tightness, shortness of breath and wheezing.    Cardiovascular: Negative for chest pain, palpitations and leg swelling.   Gastrointestinal: Negative for abdominal distention, abdominal pain, blood in stool, constipation, diarrhea, nausea and vomiting.   Genitourinary: Negative for difficulty urinating, dysuria, flank pain, frequency, hematuria and urgency.   Musculoskeletal: Negative for arthralgias, back pain, gait problem, joint swelling and neck pain.   Skin: Negative for rash.        No petechiae, ecchymoses   Neurological: Negative for dizziness, tremors, seizures, syncope, speech difficulty, light-headedness, numbness and headaches.   Hematological: Negative for adenopathy. Does not bruise/bleed easily.       Objective:       Vitals:    08/14/19 1513 08/14/19 1522   BP: (!) 144/106 (!) 150/100   BP Location: Right arm Right arm   Patient Position: Sitting Sitting   BP Method: Medium (Automatic) Medium (Manual)   Pulse: 72    Temp: 98.3 °F (36.8 °C)    TempSrc: Oral    SpO2: 99%    Weight: 57.4 kg (126 lb 8.7 oz)    Height: 5' 7" (1.702 m)        Physical Exam   Constitutional: She is oriented to person, place, and time. She appears well-developed and well-nourished. "   HENT:   Head: Normocephalic.   Mouth/Throat: Oropharynx is clear and moist. No oropharyngeal exudate.   Eyes: Pupils are equal, round, and reactive to light. Conjunctivae and lids are normal. No scleral icterus.   Neck: Normal range of motion. Neck supple. No thyromegaly present.   Cardiovascular: Normal rate, regular rhythm and normal heart sounds.   No murmur heard.  Pulmonary/Chest: Breath sounds normal. She has no wheezes. She has no rales.   Abdominal: Soft. Bowel sounds are normal. She exhibits no distension and no mass. There is no hepatosplenomegaly. There is no tenderness. There is no rebound and no guarding.   Musculoskeletal: Normal range of motion. She exhibits no edema or tenderness.   Lymphadenopathy:     She has no cervical adenopathy.     She has no axillary adenopathy.        Right: No supraclavicular adenopathy present.        Left: No supraclavicular adenopathy present.   Neurological: She is alert and oriented to person, place, and time. No cranial nerve deficit. Coordination normal.   Skin: Skin is warm and dry. No ecchymosis, no petechiae and no rash noted. No erythema.   Psychiatric: She has a normal mood and affect.             Bone survey 2015   1. Small lucent foci in the right femoral neck. Given this patient's history of multiple myeloma, these findings are concerning for sequela of that disease  2. Otherwise degenerative changes of the cervical spine and lower lumbar spine are identified.           Lab Results   Component Value Date    WBC 2.86 (L) 08/08/2019    HGB 11.6 (L) 08/08/2019    HCT 34.5 (L) 08/08/2019    MCV 90 08/08/2019     (L) 08/08/2019         Results for JOHN BEST (MRN [id number]) as of 4/13/2018 13:19   Ref. Range 3/20/2018 07:33 4/6/2018 09:45   IgG - Serum Latest Ref Range: 650 - 1600 mg/dL 249 (L) 257 (L)   IgM Latest Ref Range: 50 - 300 mg/dL 135 156   IgA Latest Ref Range: 40 - 350 mg/dL 210 220                 Lab Results   Component Value Date    WBC  2.86 (L) 08/08/2019    HGB 11.6 (L) 08/08/2019    HCT 34.5 (L) 08/08/2019    MCV 90 08/08/2019     (L) 08/08/2019         Results for JOHN BEST (MRN [id number]) as of 5/13/2019 15:38   Ref. Range 6/19/2018 07:50 7/16/2018 15:05 10/17/2018 15:35 2/5/2019 08:50 5/6/2019 09:19   [place] Free Light Chains Latest Ref Range: 0.33 - 1.94 mg/dL 13.49 (H) 8.45 (H) 9.78 (H) 16.85 (H) 16.06 (H)   Lambda Free Light Chains Latest Ref Range: 0.57 - 2.63 mg/dL 8.34 (H) 6.64 (H) 12.29 (H) 11.84 (H) 13.80 (H)   Kappa/Lambda FLC Ratio Latest Ref Range: 0.26 - 1.65  1.62 1.27 0.80 1.42 1.16         Results for JOHN BEST (MRN [id number]) as of 5/13/2019 15:43   Ref. Range 10/17/2018 15:35 2/5/2019 08:50 5/6/2019 09:19   Beta-2 Microglobulin Latest Ref Range: 0.0 - 2.5 ug/mL 10.0 (H) 14.3 (H) 17.8 (H)       SPEP 5/7/2019   No discrete paraprotein bands are identified          Bone survey 2/2018   No convincing lytic lesions to confirm the presence of myeloma.      MRI T-spine 6/29/2018  1. No evidence for multiple myeloma in the thoracic spine.  2. Minor degenerative changes without evidence for spinal canal stenosis or neural foraminal narrowing.  3. Liver demonstrates decreased T2 signal suggestive of iron overload.  4. Ascites.  5. Bilateral renal cysts, incompletely characterized.      MRI L-spine w/out contrast 6/29/2018  The caudal aspect of the lumbar spinal canal appears to be lower limits of normal on a developmental basis with further tapering of the thecal sac due to an abundance of epidural fat.    Superimposed degenerative change.  This is most pronounced at L3-4 where there is advanced facet arthropathy well as intraspinal synovial cyst resulting in moderate spinal stenosis with severe right lateral recess stenosis.  There also advanced degenerative disc disease at L4-5 with mild spinal stenosis and bilateral lateral recess encroachment.  Individual disc levels further detailed above.    No discrete  paraprotein bands are identified    Assessment:       1. Multiple myeloma in remission    2. ESRD (end stage renal disease) on dialysis    3. Anemia in chronic kidney disease, on chronic dialysis        Plan:   eJnnifer was seen today for follow-up.    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   She originally presented with acute renal failure requiring HD .  She  completed bortezomib/dexamethasone/Revlimid 6 cycles on 04/13/2012 for the multiple myeloma kappa light chain disease, IPSS stage III.   She underwent bortezomib maintenance therapy three weeks on, one week off (05/15, 05/22 and 05/29) with her last cycle received on 05/29/2012  She was followed at [facility] and was diagnosed with DRESS syndrome during hospitalization and then [facility] team elected not to proceed proceed with auto SCT. Velcade maintenance was discontinued due to side effects.   She has remained in remission with nl SPEP   She has had worsening kidney function and s/p Kidney bx 2017 neg for myeloma involvement  Urine studies- no monoclonal peaks  Bone survey 2018 no new findings    today has no detectable m protein.  Free light chain ratio remains normal  No clear evidence of relapse  Serum free KLC  15.18 mg/dl and lambda free light chain 14 mg/dl and K/L FLCR 1.08   MRI T and L spine 6/29/2018 - no evidence of myeloma involvement  SPEP 8/2019- no monoclonal peaks        ESRD  Followed by Nephrology  S/p Kidney biopsy 2017 ( outside facility)  due to worsening  kidney function reveals glomerulosclerosis and no evidence of MM  Followed by Renal Transplant team at [facility] - on cadaveric wait list  Pt previously undergoing PD   Pt now undergoing HD  She is followed by [facility] transplant team       Pt followed by Dr. Wolfe     Anemia in chronic renal  disease  She has history of  events of acute on chronic anemia.  She does not have gross clinical blood loss. 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  . Free light chain ratio remains normal in Aug  2019, though difficult to interpret in setting of renal failure  . Previously normal total protein pattern now had an M protein of 0.1 grams in June 2018. Total protein pattern has now remained normal.  Bone survey in February 2018 had no lytic lesions.  S/p 2uprbc during  hospitalization  11/2017 for acute-on-chronic anemia  Hb   11.6g/dl   SHERMAN per Nephrology        F/u   3mo with cbc,cmp, SPEP, FLC, B-2 microglobulin , and urine studies      Cc: MD Yarelis Calvert Jr., MD

## 2019-09-18 DIAGNOSIS — Z76.82 PATIENT ON WAITING LIST FOR KIDNEY TRANSPLANT: Primary | ICD-10-CM

## 2019-09-20 ENCOUNTER — LAB VISIT (OUTPATIENT)
Dept: LAB | Facility: HOSPITAL | Age: 54
End: 2019-09-20
Payer: COMMERCIAL

## 2019-09-20 DIAGNOSIS — Z76.82 PATIENT ON WAITING LIST FOR KIDNEY TRANSPLANT: ICD-10-CM

## 2019-09-20 PROCEDURE — 99001 SPECIMEN HANDLING PT-LAB: CPT | Mod: PO,TXP

## 2019-10-08 ENCOUNTER — LAB VISIT (OUTPATIENT)
Dept: LAB | Facility: HOSPITAL | Age: 54
End: 2019-10-08
Payer: COMMERCIAL

## 2019-10-08 DIAGNOSIS — Z76.82 PATIENT ON WAITING LIST FOR KIDNEY TRANSPLANT: ICD-10-CM

## 2019-10-08 PROCEDURE — 86829 HLA CLASS I/II ANTIBODY QUAL: CPT | Mod: PO,TXP

## 2019-10-08 PROCEDURE — 86829 HLA CLASS I/II ANTIBODY QUAL: CPT | Mod: 91,PO,TXP

## 2019-10-11 ENCOUNTER — TELEPHONE (OUTPATIENT)
Dept: FAMILY MEDICINE | Facility: CLINIC | Age: 54
End: 2019-10-11

## 2019-10-11 NOTE — TELEPHONE ENCOUNTER
----- Message from Yarelis Mcdaniel sent at 10/10/2019  3:46 PM CDT -----  Contact: self / [phone]  She has a lump on her chest . Please advise

## 2019-10-11 NOTE — TELEPHONE ENCOUNTER
"Returned patient's call. Patient states that she has an lump on her left side of breast close to her underarms.  Patient denies any pain.  States that sometimes the lump does become "irritated." scheduled patient for appointment with Dr. Wilks to be evaluated in office.  Patient verbalized understanding.   "

## 2019-10-15 LAB — HPRA INTERPRETATION: NORMAL

## 2019-10-21 DIAGNOSIS — F32.A DEPRESSION, UNSPECIFIED DEPRESSION TYPE: ICD-10-CM

## 2019-11-01 RX ORDER — ESCITALOPRAM OXALATE 20 MG/1
TABLET ORAL
Qty: 90 TABLET | Refills: 0 | OUTPATIENT
Start: 2019-11-01

## 2019-11-07 PROCEDURE — 86832 HLA CLASS I HIGH DEFIN QUAL: CPT | Mod: PO,TXP

## 2019-11-07 PROCEDURE — 86977 RBC SERUM PRETX INCUBJ/INHIB: CPT | Mod: PO,TXP

## 2019-11-07 PROCEDURE — 86833 HLA CLASS II HIGH DEFIN QUAL: CPT | Mod: PO,TXP

## 2019-11-12 ENCOUNTER — LAB VISIT (OUTPATIENT)
Dept: LAB | Facility: OTHER | Age: 54
End: 2019-11-12
Attending: INTERNAL MEDICINE
Payer: MEDICARE

## 2019-11-12 DIAGNOSIS — C90.01 MULTIPLE MYELOMA IN REMISSION: ICD-10-CM

## 2019-11-12 LAB
ALBUMIN SERPL BCP-MCNC: 4 G/DL (ref 3.5–5.2)
ALP SERPL-CCNC: 111 U/L (ref 55–135)
ALT SERPL W/O P-5'-P-CCNC: 10 U/L (ref 10–44)
ANION GAP SERPL CALC-SCNC: 13 MMOL/L (ref 8–16)
AST SERPL-CCNC: 21 U/L (ref 10–40)
B2 MICROGLOB SERPL-MCNC: 16.6 UG/ML (ref 0–2.5)
BASOPHILS # BLD AUTO: 0.03 K/UL (ref 0–0.2)
BASOPHILS NFR BLD: 1 % (ref 0–1.9)
BILIRUB SERPL-MCNC: 0.6 MG/DL (ref 0.1–1)
BUN SERPL-MCNC: 9 MG/DL (ref 6–20)
CALCIUM SERPL-MCNC: 8.9 MG/DL (ref 8.7–10.5)
CHLORIDE SERPL-SCNC: 95 MMOL/L (ref 95–110)
CO2 SERPL-SCNC: 27 MMOL/L (ref 23–29)
CREAT SERPL-MCNC: 2.5 MG/DL (ref 0.5–1.4)
DIFFERENTIAL METHOD: ABNORMAL
EOSINOPHIL # BLD AUTO: 0.1 K/UL (ref 0–0.5)
EOSINOPHIL NFR BLD: 2.7 % (ref 0–8)
ERYTHROCYTE [DISTWIDTH] IN BLOOD BY AUTOMATED COUNT: 17.6 % (ref 11.5–14.5)
EST. GFR  (AFRICAN AMERICAN): 24 ML/MIN/1.73 M^2
EST. GFR  (NON AFRICAN AMERICAN): 21 ML/MIN/1.73 M^2
GLUCOSE SERPL-MCNC: 95 MG/DL (ref 70–110)
HCT VFR BLD AUTO: 30.3 % (ref 37–48.5)
HGB BLD-MCNC: 9.9 G/DL (ref 12–16)
IMM GRANULOCYTES # BLD AUTO: 0.01 K/UL (ref 0–0.04)
IMM GRANULOCYTES NFR BLD AUTO: 0.3 % (ref 0–0.5)
LYMPHOCYTES # BLD AUTO: 0.8 K/UL (ref 1–4.8)
LYMPHOCYTES NFR BLD: 27.3 % (ref 18–48)
MCH RBC QN AUTO: 29.2 PG (ref 27–31)
MCHC RBC AUTO-ENTMCNC: 32.7 G/DL (ref 32–36)
MCV RBC AUTO: 89 FL (ref 82–98)
MONOCYTES # BLD AUTO: 0.3 K/UL (ref 0.3–1)
MONOCYTES NFR BLD: 9.4 % (ref 4–15)
NEUTROPHILS # BLD AUTO: 1.8 K/UL (ref 1.8–7.7)
NEUTROPHILS NFR BLD: 59.3 % (ref 38–73)
NRBC BLD-RTO: 0 /100 WBC
PLATELET # BLD AUTO: 145 K/UL (ref 150–350)
PMV BLD AUTO: 10.1 FL (ref 9.2–12.9)
POTASSIUM SERPL-SCNC: 4 MMOL/L (ref 3.5–5.1)
PROT SERPL-MCNC: 7 G/DL (ref 6–8.4)
RBC # BLD AUTO: 3.39 M/UL (ref 4–5.4)
SODIUM SERPL-SCNC: 135 MMOL/L (ref 136–145)
WBC # BLD AUTO: 2.97 K/UL (ref 3.9–12.7)

## 2019-11-12 PROCEDURE — 86334 IMMUNOFIX E-PHORESIS SERUM: CPT | Mod: 26,NTX,, | Performed by: PATHOLOGY

## 2019-11-12 PROCEDURE — 84165 PATHOLOGIST INTERPRETATION SPE: ICD-10-PCS | Mod: 26,NTX,, | Performed by: PATHOLOGY

## 2019-11-12 PROCEDURE — 86334 PATHOLOGIST INTERPRETATION IFE: ICD-10-PCS | Mod: 26,NTX,, | Performed by: PATHOLOGY

## 2019-11-12 PROCEDURE — 36415 COLL VENOUS BLD VENIPUNCTURE: CPT | Mod: TXP

## 2019-11-12 PROCEDURE — 84165 PROTEIN E-PHORESIS SERUM: CPT | Mod: TXP

## 2019-11-12 PROCEDURE — 80053 COMPREHEN METABOLIC PANEL: CPT | Mod: TXP

## 2019-11-12 PROCEDURE — 82232 ASSAY OF BETA-2 PROTEIN: CPT | Mod: TXP

## 2019-11-12 PROCEDURE — 86334 IMMUNOFIX E-PHORESIS SERUM: CPT | Mod: TXP

## 2019-11-12 PROCEDURE — 85025 COMPLETE CBC W/AUTO DIFF WBC: CPT | Mod: TXP

## 2019-11-12 PROCEDURE — 84165 PROTEIN E-PHORESIS SERUM: CPT | Mod: 26,NTX,, | Performed by: PATHOLOGY

## 2019-11-13 ENCOUNTER — LAB VISIT (OUTPATIENT)
Dept: LAB | Facility: HOSPITAL | Age: 54
End: 2019-11-13
Payer: COMMERCIAL

## 2019-11-13 DIAGNOSIS — Z76.82 PATIENT ON WAITING LIST FOR KIDNEY TRANSPLANT: ICD-10-CM

## 2019-11-13 LAB
ALBUMIN SERPL ELPH-MCNC: 4.24 G/DL (ref 3.35–5.55)
ALPHA1 GLOB SERPL ELPH-MCNC: 0.3 G/DL (ref 0.17–0.41)
ALPHA2 GLOB SERPL ELPH-MCNC: 0.67 G/DL (ref 0.43–0.99)
B-GLOBULIN SERPL ELPH-MCNC: 0.64 G/DL (ref 0.5–1.1)
GAMMA GLOB SERPL ELPH-MCNC: 0.75 G/DL (ref 0.67–1.58)
INTERPRETATION SERPL IFE-IMP: NORMAL
PATHOLOGIST INTERPRETATION IFE: NORMAL
PATHOLOGIST INTERPRETATION SPE: NORMAL
PROT SERPL-MCNC: 6.6 G/DL (ref 6–8.4)

## 2019-11-15 ENCOUNTER — OFFICE VISIT (OUTPATIENT)
Dept: HEMATOLOGY/ONCOLOGY | Facility: CLINIC | Age: 54
End: 2019-11-15
Payer: COMMERCIAL

## 2019-11-15 VITALS
BODY MASS INDEX: 20.31 KG/M2 | SYSTOLIC BLOOD PRESSURE: 150 MMHG | OXYGEN SATURATION: 99 % | HEIGHT: 67 IN | TEMPERATURE: 98 F | WEIGHT: 129.44 LBS | HEART RATE: 69 BPM | DIASTOLIC BLOOD PRESSURE: 97 MMHG

## 2019-11-15 DIAGNOSIS — Z99.2 ANEMIA IN CHRONIC KIDNEY DISEASE, ON CHRONIC DIALYSIS: ICD-10-CM

## 2019-11-15 DIAGNOSIS — N18.6 ANEMIA IN CHRONIC KIDNEY DISEASE, ON CHRONIC DIALYSIS: ICD-10-CM

## 2019-11-15 DIAGNOSIS — N18.6 ESRD (END STAGE RENAL DISEASE) ON DIALYSIS: ICD-10-CM

## 2019-11-15 DIAGNOSIS — C90.01 MULTIPLE MYELOMA IN REMISSION: Primary | ICD-10-CM

## 2019-11-15 DIAGNOSIS — Z99.2 ESRD (END STAGE RENAL DISEASE) ON DIALYSIS: ICD-10-CM

## 2019-11-15 DIAGNOSIS — D63.1 ANEMIA IN CHRONIC KIDNEY DISEASE, ON CHRONIC DIALYSIS: ICD-10-CM

## 2019-11-15 PROCEDURE — 99999 PR PBB SHADOW E&M-EST. PATIENT-LVL III: ICD-10-PCS | Mod: PBBFAC,,, | Performed by: INTERNAL MEDICINE

## 2019-11-15 PROCEDURE — 99214 PR OFFICE/OUTPT VISIT, EST, LEVL IV, 30-39 MIN: ICD-10-PCS | Mod: S$GLB,,, | Performed by: INTERNAL MEDICINE

## 2019-11-15 PROCEDURE — 99214 OFFICE O/P EST MOD 30 MIN: CPT | Mod: S$GLB,,, | Performed by: INTERNAL MEDICINE

## 2019-11-15 PROCEDURE — 3008F PR BODY MASS INDEX (BMI) DOCUMENTED: ICD-10-PCS | Mod: CPTII,S$GLB,, | Performed by: INTERNAL MEDICINE

## 2019-11-15 PROCEDURE — 3008F BODY MASS INDEX DOCD: CPT | Mod: CPTII,S$GLB,, | Performed by: INTERNAL MEDICINE

## 2019-11-15 PROCEDURE — 99999 PR PBB SHADOW E&M-EST. PATIENT-LVL III: CPT | Mod: PBBFAC,,, | Performed by: INTERNAL MEDICINE

## 2019-11-15 RX ORDER — GUAIFENESIN 600 MG/1
1200 TABLET, EXTENDED RELEASE ORAL 2 TIMES DAILY
Status: ON HOLD | COMMUNITY
End: 2023-09-14 | Stop reason: ALTCHOICE

## 2019-11-15 NOTE — PROGRESS NOTES
Subjective:       Patient ID: Jennifer Booth is a 54 y.o. female.    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   Previously followed at [facility].   She has not had the resources to continue f/u at [facility].       She is followed by Nephrology for ESRD  She was initially diagnosed with advanced kidney disease secondary to multiple myeloma & HTN.   She was diagnosed with  AK I from MM in 2010 that required initiation of dialysis.    She started chronic dialysis on 12/23/11 and ended on 8/28/12  She then underwent chemotherapy for her myeloma, and stopped dialysis in 2013.    Kidney biopsy 2017due to worsening  kidney function reveals glomerulosclerosis and no evidence of MM    She was undergoing peritoneal dialysis daily     She was switched back to HD     She is followed by Kidney Transplant team at [facility]  Patient met selection criteria for kidney transplant related to ESRD due to Hypertensive Nephrosclerosis  She is considered a candidate for kidney transplant     Today, she is doing well  She recently had a grandbaby dtr  She is 1 month old   Appetite and weight  stable   No SOB/CP  No fevers, night sweats  No recent infections  She has occasional fatigue  No recent infections       She is undergoing EPO under direction of Nephrology  CBC reveals  Hb 9.9  g/dl   SPEP 10/2019   normal      Onc hx:  She was diagnosed with kappa free light chain disease, 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 She originally presented with acute renal failure requiring HD .She has completed bortezomib/dexamethasone/Revlimid 6 cycles on 04/13/2012 for the multiple myeloma kappa light chain disease, IPSS stage III. She underwent bortezomib maintenance therapy three weeks on, one week off (05/15, 05/22 and 05/29) with her last cycle  "received on 05/29/2012. She is followed at [facility] myeloma [place] where she was evaluated for an auto stem cell transplant . It was decided not to proceed with transplant was originally due to drug reaction.Pt was diagnosed with DRESS syndrome during hospitalization and then [facility] team elected not to proceed proceed with auto SCT. Velcade maintenance was discontinued due to side effects.       Tx: Velcade/Dex/Revlimid x 6 cycles 4/13/12   Velcade maintenance 3wks on, 1wk off dc'd 5/29/12 sec to adv SE          Review of Systems   Constitutional: Negative for appetite change, chills and fatigue.   HENT: Negative for congestion, hearing loss and nosebleeds.    Eyes: Negative for visual disturbance.   Respiratory: Negative for apnea, cough, chest tightness, shortness of breath and wheezing.    Cardiovascular: Negative for chest pain, palpitations and leg swelling.   Gastrointestinal: Negative for abdominal distention, abdominal pain, blood in stool, constipation, diarrhea, nausea and vomiting.   Genitourinary: Negative for difficulty urinating, dysuria, flank pain, frequency, hematuria and urgency.   Musculoskeletal: Negative for arthralgias, back pain, gait problem, joint swelling and neck pain.   Skin: Negative for rash.        No petechiae, ecchymoses   Neurological: Negative for dizziness, tremors, seizures, syncope, speech difficulty, light-headedness, numbness and headaches.   Hematological: Negative for adenopathy. Does not bruise/bleed easily.       Objective:       Vitals:    11/15/19 1011   BP: (!) 150/97   BP Location: Right arm   Patient Position: Sitting   BP Method: Medium (Automatic)   Pulse: 69   Temp: 97.6 °F (36.4 °C)   TempSrc: Oral   SpO2: 99%   Weight: 58.7 kg (129 lb 6.6 oz)   Height: 5' 7" (1.702 m)       Physical Exam   Constitutional: She is oriented to person, place, and time. She appears well-developed and well-nourished.   HENT:   Head: Normocephalic.   Mouth/Throat: Oropharynx is clear and " moist. No oropharyngeal exudate.   Eyes: Pupils are equal, round, and reactive to light. Conjunctivae and lids are normal. No scleral icterus.   Neck: Normal range of motion. Neck supple. No thyromegaly present.   Cardiovascular: Normal rate, regular rhythm and normal heart sounds.   No murmur heard.  Pulmonary/Chest: Breath sounds normal. She has no wheezes. She has no rales.   Abdominal: Soft. Bowel sounds are normal. She exhibits no distension and no mass. There is no hepatosplenomegaly. There is no tenderness. There is no rebound and no guarding.   Musculoskeletal: Normal range of motion. She exhibits no edema or tenderness.   Lymphadenopathy:     She has no cervical adenopathy.     She has no axillary adenopathy.        Right: No supraclavicular adenopathy present.        Left: No supraclavicular adenopathy present.   Neurological: She is alert and oriented to person, place, and time. No cranial nerve deficit. Coordination normal.   Skin: Skin is warm and dry. No ecchymosis, no petechiae and no rash noted. No erythema.   Psychiatric: She has a normal mood and affect.             Bone survey 2015   1. Small lucent foci in the right femoral neck. Given this patient's history of multiple myeloma, these findings are concerning for sequela of that disease  2. Otherwise degenerative changes of the cervical spine and lower lumbar spine are identified.           Lab Results   Component Value Date    WBC 2.97 (L) 11/12/2019    HGB 9.9 (L) 11/12/2019    HCT 30.3 (L) 11/12/2019    MCV 89 11/12/2019     (L) 11/12/2019         Results for JOHN BEST (MRN [id number]) as of 4/13/2018 13:19   Ref. Range 3/20/2018 07:33 4/6/2018 09:45   IgG - Serum Latest Ref Range: 650 - 1600 mg/dL 249 (L) 257 (L)   IgM Latest Ref Range: 50 - 300 mg/dL 135 156   IgA Latest Ref Range: 40 - 350 mg/dL 210 220                 Lab Results   Component Value Date    WBC 2.97 (L) 11/12/2019    HGB 9.9 (L) 11/12/2019    HCT 30.3 (L)  11/12/2019    MCV 89 11/12/2019     (L) 11/12/2019         Results for JOHN BEST (MRN [id number]) as of 5/13/2019 15:38   Ref. Range 6/19/2018 07:50 7/16/2018 15:05 10/17/2018 15:35 2/5/2019 08:50 5/6/2019 09:19   [place] Free Light Chains Latest Ref Range: 0.33 - 1.94 mg/dL 13.49 (H) 8.45 (H) 9.78 (H) 16.85 (H) 16.06 (H)   Lambda Free Light Chains Latest Ref Range: 0.57 - 2.63 mg/dL 8.34 (H) 6.64 (H) 12.29 (H) 11.84 (H) 13.80 (H)   Kappa/Lambda FLC Ratio Latest Ref Range: 0.26 - 1.65  1.62 1.27 0.80 1.42 1.16         Results for JOHN BEST (MRN [id number]) as of 5/13/2019 15:43   Ref. Range 10/17/2018 15:35 2/5/2019 08:50 5/6/2019 09:19   Beta-2 Microglobulin Latest Ref Range: 0.0 - 2.5 ug/mL 10.0 (H) 14.3 (H) 17.8 (H)       Bone survey 2/2018   No convincing lytic lesions to confirm the presence of myeloma.      MRI T-spine 6/29/2018  1. No evidence for multiple myeloma in the thoracic spine.  2. Minor degenerative changes without evidence for spinal canal stenosis or neural foraminal narrowing.  3. Liver demonstrates decreased T2 signal suggestive of iron overload.  4. Ascites.  5. Bilateral renal cysts, incompletely characterized.      MRI L-spine w/out contrast 6/29/2018  The caudal aspect of the lumbar spinal canal appears to be lower limits of normal on a developmental basis with further tapering of the thecal sac due to an abundance of epidural fat.    Superimposed degenerative change.  This is most pronounced at L3-4 where there is advanced facet arthropathy well as intraspinal synovial cyst resulting in moderate spinal stenosis with severe right lateral recess stenosis.  There also advanced degenerative disc disease at L4-5 with mild spinal stenosis and bilateral lateral recess encroachment.  Individual disc levels further detailed above.    No discrete paraprotein bands are identified      Results for JOHN BEST (MRN [id number]) as of 11/15/2019 10:18   Ref. Range 11/12/2019 10:38   Sodium  Latest Ref Range: 136 - 145 mmol/L 135 (L)   Potassium Latest Ref Range: 3.5 - 5.1 mmol/L 4.0   Chloride Latest Ref Range: 95 - 110 mmol/L 95   CO2 Latest Ref Range: 23 - 29 mmol/L 27   Anion Gap Latest Ref Range: 8 - 16 mmol/L 13   BUN, Bld Latest Ref Range: 6 - 20 mg/dL 9   Creatinine Latest Ref Range: 0.5 - 1.4 mg/dL 2.5 (H)   eGFR if non African American Latest Ref Range: >60 mL/min/1.73 m^2 21 (A)   eGFR if African American Latest Ref Range: >60 mL/min/1.73 m^2 24 (A)   Glucose Latest Ref Range: 70 - 110 mg/dL 95   Calcium Latest Ref Range: 8.7 - 10.5 mg/dL 8.9   Alkaline Phosphatase Latest Ref Range: 55 - 135 U/L 111   PROTEIN TOTAL Latest Ref Range: 6.0 - 8.4 g/dL 7.0   Protein, Serum Latest Ref Range: 6.0 - 8.4 g/dL 6.6   Albumin Latest Ref Range: 3.5 - 5.2 g/dL 4.0   BILIRUBIN TOTAL Latest Ref Range: 0.1 - 1.0 mg/dL 0.6   AST Latest Ref Range: 10 - 40 U/L 21   ALT Latest Ref Range: 10 - 44 U/L 10       SPEP 11/12/2019  No monoclonal peaks identified.       Results for JENNIFER BEST (MRN [id number]) as of 11/15/2019 10:18   Ref. Range 8/8/2019 08:35 11/12/2019 10:38   Beta-2 Microglobulin Latest Ref Range: 0.0 - 2.5 ug/mL 17.4 (H) 16.6 (H)       Assessment:       1. Multiple myeloma in remission    2. ESRD (end stage renal disease) on dialysis    3. Anemia in chronic kidney disease, on chronic dialysis        Plan:   Jennifer was seen today for follow-up.    Diagnoses and associated orders for this visit:      MM IPSS Stage III deletion 13, t 4:14 diagnosed 12/2011 in remission  Dagnosed with kappa free light chain disease, multiple myeloma IPSS with deletion 13, 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   She originally presented with acute renal failure requiring HD .  She  completed bortezomib/dexamethasone/Revlimid 6 cycles on 04/13/2012 for the multiple myeloma kappa light chain disease, IPSS stage III.   She underwent bortezomib maintenance therapy three weeks on, one week off (05/15, 05/22 and 05/29) with her last  cycle received on 05/29/2012  She was followed at [facility] and was diagnosed with DRESS syndrome during hospitalization and then [facility] team elected not to proceed proceed with auto SCT. Velcade maintenance was discontinued due to side effects.   She has remained in remission with nl SPEP   She has had worsening kidney function and s/p Kidney bx 2017 neg for myeloma involvement  Urine studies- no monoclonal peaks  Bone survey 2018 no new findings    today has no detectable m protein.  Free light chain ratio remains normal  No clear evidence of relapse  Serum free KLC  8/8/2019  15.18 mg/dl and lambda free light chain 14 mg/dl and K/L FLCR 1.08   MRI T and L spine 6/29/2018 - no evidence of myeloma involvement  SPEP 11/2019- no monoclonal peaks   Cont to monitor      ESRD  Followed by Nephrology  S/p Kidney biopsy 2017 ( outside facility)  due to worsening  kidney function reveals glomerulosclerosis and no evidence of MM  Followed by Renal Transplant team at [facility] - on cadaveric wait list  Pt previously undergoing PD   Pt now undergoing HD  She is followed by [facility] transplant team       Pt followed by Dr. Wolfe     Anemia in chronic renal disease  She has history of  events of acute on chronic anemia.  She does not have gross clinical blood loss. 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  . Free light chain ratio remains normal in Aug  2019, though difficult to interpret in setting of renal failure  . Previously normal total protein pattern now had an M protein of 0.1 grams in June 2018. Total protein pattern has now remained normal.  Bone survey in February 2018 had no lytic lesions.  S/p 2uprbc during  hospitalization  11/2017 for acute-on-chronic anemia  Hb   9.9g/dl   SHERMAN per Nephrology        Advance Care Planning     Power of   I initiated the process of advance care planning today and explained the importance of this process to the patient.  I introduced the concept of  advance directives to the patient, as well. Then the patient received detailed information about the importance of designating a Health Care Power of  (HCPOA). She was also instructed to communicate with this person about their wishes for future healthcare, should she become sick and lose decision-making capacity. The patient has not previously appointed a HCPOA. After our discussion, the patient has decided to complete a HCPOA and has appointed her daughter and NAME: Yusra Booth  I spent a total time of  16  minutes discussing this issue with the patient.            F/u   3mo with cbc,cmp, SPEP, FLC, B-2 microglobulin , and urine studies      Cc: MD Yarelis Calvert Jr., MD

## 2019-11-19 ENCOUNTER — TELEPHONE (OUTPATIENT)
Dept: TRANSPLANT | Facility: CLINIC | Age: 54
End: 2019-11-19

## 2019-11-19 NOTE — TELEPHONE ENCOUNTER
----- Message from Carroll Wakefield MD sent at 11/19/2019  1:06 PM CST -----  If the care giver was the only issue OK to be active. She also saw hematology recenlty who thinks the MM is silent. She needs to understand that MM is a condition that can recur or relapse posttransplant after immunosuppression. Her PRA is 0. Let's discuss with group induction therapy    Carroll   ----- Message -----  From: Emelia Cary  Sent: 11/19/2019  11:38 AM CST  To: [facility]    Patient was made inactive in June due to falling out of caregiver. She now has a confirmed caregiver per . She is due for annual revisit next June, everything else is updated. Can I reactivate her?    Emelia

## 2019-11-20 ENCOUNTER — COMMITTEE REVIEW (OUTPATIENT)
Dept: TRANSPLANT | Facility: CLINIC | Age: 54
End: 2019-11-20

## 2019-11-20 LAB
CLASS I ANTIBODIES - LUMINEX: NEGATIVE
CLASS II ANTIBODIES - LUMINEX: NEGATIVE
CPRA %: 0
HPRA INTERPRETATION: NORMAL
SERUM COLLECTION DT - LUMINEX CLASS I: NORMAL
SERUM COLLECTION DT - LUMINEX CLASS II: NORMAL
SPCL1 TESTING DATE: NORMAL
SPCL2 TESTING DATE: NORMAL
SPLUA TESTING DATE: NORMAL

## 2019-11-21 ENCOUNTER — TELEPHONE (OUTPATIENT)
Dept: TRANSPLANT | Facility: CLINIC | Age: 54
End: 2019-11-21

## 2019-11-21 NOTE — TELEPHONE ENCOUNTER
"SW attempted to conduct adherence update.  (unknown) AMAs, (unknown)  No Shows. , Gagan reports " she is fine and doesn't miss a whole lot. Maybe three times each month". MOHSEN inquired if a treatment log could be faxed. Gagan encouraged MOHSEN to contact dialysis SW, [place], tomorrow for records. MOHSEN will follow up.       Confirmed by dialysis unit-Gagan          "

## 2019-11-21 NOTE — ED NOTES
Impression: Combined forms of age-related cataract, bilateral: H25.813. Plan: Observe. No treatment currently recommended as cataracts do not affect the patients day to day life. Patient to monitor for vision changes and if vision significantly worsens, advised to RTC for evaluation. Phlebotomy called for Gentamicin.

## 2019-11-22 ENCOUNTER — TELEPHONE (OUTPATIENT)
Dept: TRANSPLANT | Facility: CLINIC | Age: 54
End: 2019-11-22

## 2019-11-22 NOTE — COMMITTEE REVIEW
Native Organ Dx: Chronic Glomerulosclerosis - Unspecified      SELECTION COMMITTEE NOTE    Jennifer Booth was discussed on Wednesday November 20th and again on Friday, November 22nd. Per committee, get CPRA. If 0%, Simulect will be the induction of choice. Pt may be reactivated on the wait list now since she has confirmed caregivers.   Update 11/22/19: PRA 0%, Induction Simulect per committee      Note written by Emelia Cary RN    ===============================================    I was present at the meeting and attest to the decision of the committee.

## 2019-11-22 NOTE — TELEPHONE ENCOUNTER
Spoke to patient, explained that she has been reactivated on the wait list, explained her increased risk of recurrence of MM, and to have her phone on and caregivers ready at all time since she has a good bit of wait time on the list. Pt voiced understanding.

## 2019-12-09 DIAGNOSIS — Z76.82 ORGAN TRANSPLANT CANDIDATE: Primary | ICD-10-CM

## 2019-12-10 ENCOUNTER — TELEPHONE (OUTPATIENT)
Dept: TRANSPLANT | Facility: HOSPITAL | Age: 54
End: 2019-12-10

## 2019-12-10 ENCOUNTER — TELEPHONE (OUTPATIENT)
Dept: TRANSPLANT | Facility: CLINIC | Age: 54
End: 2019-12-10

## 2019-12-10 DIAGNOSIS — Z76.82 AWAITING ORGAN TRANSPLANT STATUS: Primary | ICD-10-CM

## 2019-12-10 NOTE — TELEPHONE ENCOUNTER
"SW contacted pt to confirm caregiver plan due to pending donor offer. Pt reports receiving a call yesterday and denying the offer due to medical issues with the donor. Pt also reports some concerns with denying today's offer. Pt reports her living situation is not ideal and would like to "pass" on this offer and "put a pause" on transplant until situation improves. Pt continued to report concern with being delisted. SW expressed pt will not be delisted but will need to follow up with SW regarding update on situation. Pt reports "okay, ell I don't think I am ready right now. Everything is just too much". SW verbalized understanding and will notify coordinator.         Transplant Suitability  At this time, pt is an unacceptable candidate for transplant.      "

## 2019-12-10 NOTE — TELEPHONE ENCOUNTER
ON-CALL NOTE    UNOS# [id number]    Notified by Miguelangel Hill, , that Jennifer Booth is eligible for kidney offer.  Spoke with patient and identified no acute medical issues in telephone assessment. Protocol script read to patient regarding N/A, standard donor offer. Patient verbalized understanding, all questions answered, patient accepts organ offer.  Current sample of blood is available for crossmatch.  Patient reports no sensitizing event since last blood sample for PRA received.    Notified by Miguelangel Hill that crossmatch is negative.  Patient notified of test results and verbalized understanding.  Dialysis unit notified.
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
## 2020-01-21 PROCEDURE — 86829 HLA CLASS I/II ANTIBODY QUAL: CPT | Mod: 91,PO,TXP

## 2020-01-21 PROCEDURE — 86829 HLA CLASS I/II ANTIBODY QUAL: CPT | Mod: PO,TXP

## 2020-01-24 ENCOUNTER — LAB VISIT (OUTPATIENT)
Dept: LAB | Facility: HOSPITAL | Age: 55
End: 2020-01-24
Payer: COMMERCIAL

## 2020-01-24 DIAGNOSIS — Z76.82 PATIENT ON WAITING LIST FOR KIDNEY TRANSPLANT: ICD-10-CM

## 2020-01-29 LAB — HPRA INTERPRETATION: NORMAL

## 2020-02-03 ENCOUNTER — OFFICE VISIT (OUTPATIENT)
Dept: URGENT CARE | Facility: CLINIC | Age: 55
End: 2020-02-03
Payer: COMMERCIAL

## 2020-02-03 VITALS
OXYGEN SATURATION: 100 % | DIASTOLIC BLOOD PRESSURE: 98 MMHG | TEMPERATURE: 98 F | HEIGHT: 67 IN | HEART RATE: 60 BPM | WEIGHT: 126 LBS | SYSTOLIC BLOOD PRESSURE: 150 MMHG | BODY MASS INDEX: 19.78 KG/M2 | RESPIRATION RATE: 18 BRPM

## 2020-02-03 DIAGNOSIS — I10 ELEVATED BLOOD PRESSURE READING IN OFFICE WITH DIAGNOSIS OF HYPERTENSION: ICD-10-CM

## 2020-02-03 DIAGNOSIS — S22.32XA CLOSED FRACTURE OF ONE RIB OF LEFT SIDE, INITIAL ENCOUNTER: Primary | ICD-10-CM
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
## 2020-02-03 PROCEDURE — 99214 PR OFFICE/OUTPT VISIT, EST, LEVL IV, 30-39 MIN: ICD-10-PCS | Mod: S$GLB,TXP,, | Performed by: PHYSICIAN ASSISTANT

## 2020-02-03 PROCEDURE — 71100 X-RAY EXAM RIBS UNI 2 VIEWS: CPT | Mod: FY,LT,NTX,S$GLB | Performed by: RADIOLOGY

## 2020-02-03 PROCEDURE — 99214 OFFICE O/P EST MOD 30 MIN: CPT | Mod: S$GLB,TXP,, | Performed by: PHYSICIAN ASSISTANT

## 2020-02-03 PROCEDURE — 71100 XR RIBS 2 VIEW LEFT: ICD-10-PCS | Mod: FY,LT,NTX,S$GLB | Performed by: RADIOLOGY

## 2020-02-03 NOTE — LETTER
February 3, 2020      [facility] [address]  Phone: [phone]  Fax: [phone]       Patient: Jennifer Booth   YOB: 1965  Date of Visit: 02/03/2020    To Whom It May Concern:    Marry Booth  was at [facility] on 02/03/2020. She may return to work/school on 2/6/2020 with restrictions.  Please allow light duty without any lifting or straining to prevent any further injury.  Allow her to rest as needed.  If you have any questions or concerns, or if I can be of further assistance, please do not hesitate to contact me.    Sincerely,      Venus Carlton PA-C

## 2020-02-03 NOTE — PATIENT INSTRUCTIONS
Discharge Instructions: Taking Your Blood Pressure  Blood pressure is the force of blood as it moves from the heart through the blood vessels. You can take your own blood pressure reading using a digital monitor. Take readings as often as your healthcare provider instructs. Take your readings each time in the same way, using the same arm. Here are guidelines for taking your blood pressure.  The American Heart Association (AHA) recommends purchasing a blood pressure monitor that is validated and approved by the Association for the Advancement of Medical Instrumentation, the [place] Hypertension Society, and the International Protocol for the Validation of Automated BP Measuring Devices. If the blood pressure monitor is for a senior adult, a pregnant woman, or a child, make certain it is validated for use with such a population. For the most reliable readings, the AHA recommends an automatic, cuff-style, upper arm (bicep) monitor. The readings from finger and wrist monitors are not as reliable as the upper arm monitor.        Step 1. Relax    · Wait at least a half hour after smoking, eating, or exercising. Do not drink coffee, tea, soda, or other caffeinated beverages before checking your blood pressure.   · Sit comfortably at a table. Place the monitor near you.  · Rest for a few minutes before you begin.        Step 2. Wrap the cuff    · Place your arm on the table, palm up. Put your arm in a position that is level with your heart. Wrap the cuff around your upper arm, about an inch above your elbow. Its best to wrap the cuff on bare skin, not over clothing.  · Make sure your cuff fits. If it doesnt wrap around your upper arm, order a larger cuff. A cuff that is too large or too small can result in an inaccurate blood pressure reading.           Step 3. Inflate the cuff    · Pump the cuff until the scale reads 200. If you have a self-inflating cuff, push the button that starts the pump.  · The cuff will  tighten, then loosen.  · The numbers will change. When they stop changing, your blood pressure reading will appear.  · If you get a reading that is too high or too low for you, relax for a few minutes. Then do the test again.    Step 4. Write down the results  · Write down your blood pressure numbers. Gerardo the date and time. Keep your results in one place, such as a notebook.  · Remove the cuff from your arm. Turn off the machine.  · Take the readings with you to your medical appointments.  · If you start a new blood pressure medicine, or change a blood pressure medicine dose, note the day you started the new drug or dosage on your blood pressure recording sheet. This will help your healthcare provider monitor the effect of medication changes.     Date Last Reviewed: 4/27/2016  © 2020-7467 [employer]. [address] [place] [address]. All rights reserved. This information is not intended as a substitute for professional medical care. Always follow your healthcare professional's instructions.        Rib Fracture (Broken Rib)  Your ribs are curved bones in your chest. They help protect your lungs and expand and contract when you breathe. Children's ribs bend easily and can often withstand a blow or fall. But adult ribs are more likely to break (fracture) under stress. Even coughing or a hard sneeze can fracture a rib.    When to go to the Emergency Room (ER)  Although they can be painful, most rib fractures aren't serious. But they often make it hard to cough or breathe deeply. Get medical care right away if you have:  · Trouble breathing.  · Nausea, vomiting, or stomach pain with a sore or bruised rib.  · Pain that worsens over time.  · An injury to the chest or stomach.  What to expect in the ER  Here is what will happen in the ER:   · A healthcare provider will ask about your injury and examine you carefully.  · An X-ray of your chest will likely be taken to show any major damage to ribs and  lungs. However, ribs can undergo small breaks that do not show up on X-rays, even though they still hurt.  · You may be given medicine to ease your discomfort.  · Rarely, rib fractures can cause a lung to collapse or lead to bleeding in the chest. In these cases, a tube will be inserted into the chest to reinflate the lung or drain the blood.  Follow-up  You are likely to heal in 6 to 8 weeks. Most rib fractures heal on their own with no lasting effects. Call your healthcare provider right away if you notice any of these symptoms:  · Increased chest pain  · Shortness of breath  · Fever  · Coughing up blood  Date Last Reviewed: 9/26/2015  © 7477-7125 [employer]. [address] [place] [address], [place] [address]. All rights reserved. This information is not intended as a substitute for professional medical care. Always follow your healthcare professional's instructions.        You have been prescribed pain medication today.  Only take as needed and at bedtime.  Do not drive or operate machinery while on this medication, because this medicine is sedating can cause impairment.  This medication was prescribed to you and is intended for your use only.  Do not sell or distribute this medication.    Please follow up with your Primary care provider within 2-5 days if your signs and symptoms have not resolved or worsen.     If your condition worsens or fails to improve we recommend that you receive another evaluation at the emergency room immediately or contact your primary medical clinic to discuss your concerns.   You must understand that you have received an Urgent Care treatment only and that you may be released before all of your medical problems are known or treated. You, the patient, will arrange for follow up care as instructed.     RED FLAGS/WARNING SYMPTOMS DISCUSSED WITH PATIENT THAT WOULD WARRANT EMERGENT MEDICAL ATTENTION. PATIENT VERBALIZED UNDERSTANDING.

## 2020-02-03 NOTE — PROGRESS NOTES
"Subjective:       Patient ID: Jennifer Booth is a 55 y.o. female.    Vitals:  height is 5' 7" (1.702 m) and weight is 57.2 kg (126 lb). Her temperature is 98.2 °F (36.8 °C). Her blood pressure is 150/98 (abnormal) and her pulse is 60. Her respiration is 18 and oxygen saturation is 100%.     Chief Complaint: Chest Pain    Pt fell out rocking chair and states that the arm of the rocking chair caught her left side.  This accident occurred yesterday and she is now having constant left-sided rib pain.  Denies any chest pain or pressure.  Denies any shortness of breath, cough or hemoptysis.  Afebrile.  Denies any sore throat or otalgia.  Denies any calf pain, swelling or leg pain.    Chest Pain    This is a new problem. The current episode started yesterday. The problem occurs constantly. The problem has been gradually worsening. The pain is at a severity of 3/10. The pain is mild. The quality of the pain is described as sharp. The pain does not radiate. Pertinent negatives include no cough, dizziness, fever, headaches, nausea, shortness of breath, vomiting or weakness. The pain is aggravated by nothing. She has tried acetaminophen for the symptoms. The treatment provided mild relief.       Constitution: Negative for chills, fatigue and fever.   HENT: Negative for congestion and sore throat.    Neck: Negative for painful lymph nodes.   Cardiovascular: Negative for chest pain and leg swelling.   Eyes: Negative for double vision and blurred vision.   Respiratory: Negative for cough and shortness of breath.    Gastrointestinal: Negative for nausea, vomiting and diarrhea.   Genitourinary: Negative for dysuria, frequency, urgency and history of kidney stones.   Musculoskeletal: Negative for joint pain, joint swelling, muscle cramps and muscle ache.   Skin: Negative for color change, pale, rash and bruising.   Allergic/Immunologic: Negative for seasonal allergies.   Neurological: Negative for dizziness, history of vertigo, " light-headedness, passing out and headaches.   Hematologic/Lymphatic: Negative for swollen lymph nodes.   Psychiatric/Behavioral: Negative for nervous/anxious, sleep disturbance and depression. The patient is not nervous/anxious.        Objective:      Physical Exam   Constitutional: She is oriented to person, place, and time. Vital signs are normal. She appears well-developed and well-nourished. She is active and cooperative.  Non-toxic appearance. She does not have a sickly appearance. She does not appear ill. No distress.   HENT:   Head: Normocephalic and atraumatic.   Right Ear: Hearing, tympanic membrane, external ear and ear canal normal.   Left Ear: Hearing, tympanic membrane, external ear and ear canal normal.   Nose: Nose normal. No mucosal edema, rhinorrhea or nasal deformity. No epistaxis. Right sinus exhibits no maxillary sinus tenderness and no frontal sinus tenderness. Left sinus exhibits no maxillary sinus tenderness and no frontal sinus tenderness.   Mouth/Throat: Uvula is midline, oropharynx is clear and moist and mucous membranes are normal. No trismus in the jaw. Normal dentition. No uvula swelling. No oropharyngeal exudate, posterior oropharyngeal edema or posterior oropharyngeal erythema.   Eyes: Conjunctivae and lids are normal. No scleral icterus.   Neck: Trachea normal, normal range of motion, full passive range of motion without pain and phonation normal. Neck supple. No neck rigidity. No edema and no erythema present.   Cardiovascular: Normal rate, regular rhythm, normal heart sounds, intact distal pulses and normal pulses. Exam reveals no gallop and no friction rub.   No murmur heard.  Pulmonary/Chest: Effort normal and breath sounds normal. No stridor. No respiratory distress. She has no decreased breath sounds. She has no wheezes. She has no rhonchi. She has no rales. Chest wall is not dull to percussion. She exhibits no mass, no tenderness, no bony tenderness, no laceration, no  crepitus, no edema, no deformity, no swelling and no retraction.   Breath sounds auscultated in all lung fields and or vesicular.  No abnormal breath sounds auscultated including wheeze, rale or rhonchi.  No reproducible chest wall tenderness to palpation.       Abdominal: Soft. Normal appearance and bowel sounds are normal. She exhibits no abdominal bruit, no pulsatile midline mass and no mass.   Musculoskeletal: Normal range of motion. She exhibits no edema or deformity.   Neurological: She is alert and oriented to person, place, and time. She has normal strength and normal reflexes. No sensory deficit. She exhibits normal muscle tone. Coordination normal.   Skin: Skin is warm, dry, intact, not diaphoretic and not pale.   Psychiatric: She has a normal mood and affect. Her speech is normal and behavior is normal. Judgment and thought content normal. Cognition and memory are normal.   Nursing note and vitals reviewed.      RIB XR:  FINDINGS:  There is a nondisplaced fracture lateral aspect of the left 9th rib.  No pneumothorax.      Impression       Nondisplaced fracture lateral aspect left 9th rib.       Assessment:       1. Closed fracture of one rib of left side, initial encounter    2. Fall, initial encounter    3. Elevated blood pressure reading in office with diagnosis of hypertension        Plan:     blood pressure was elevated at time of visit registering 170/112 however repeat manual blood pressure taken and reading of 150/98 was obtained.  Patient states that she did take her carvedilol blood pressure medicine this morning.  Discussed the patient to follow up with primary care provider regarding her high blood pressure and to keep a diary of her blood pressure readings at home.  Discussed her blood pressure may be elevated at time of visit secondary to pain. Chest x-ray shows evidence of nondisplaced fracture of the 9th rib on her left side.  Patient has history of multiple myeloma as well as end-stage  renal disease.  Discussed no NSAID use however because of her pain will prescribe Tylenol codeine 3.  Discussed follow-up with primary care provider within the next week.  Discussed ER precautions should symptoms worsen.    Closed fracture of one rib of left side, initial encounter  -     acetaminophen-codeine 120-12 mg/5 mL suspension; Take 5 mLs by mouth every 8 (eight) hours as needed for Pain.  Dispense: 473 mL; Refill: 0    Fall, initial encounter  -     X-Ray Ribs 2 View Left; Future; Expected date: 02/03/2020    Elevated blood pressure reading in office with diagnosis of hypertension      Patient Instructions     Discharge Instructions: Taking Your Blood Pressure  Blood pressure is the force of blood as it moves from the heart through the blood vessels. You can take your own blood pressure reading using a digital monitor. Take readings as often as your healthcare provider instructs. Take your readings each time in the same way, using the same arm. Here are guidelines for taking your blood pressure.  The American Heart Association (AHA) recommends purchasing a blood pressure monitor that is validated and approved by the Association for the Advancement of Medical Instrumentation, the [place] Hypertension Society, and the International Protocol for the Validation of Automated BP Measuring Devices. If the blood pressure monitor is for a senior adult, a pregnant woman, or a child, make certain it is validated for use with such a population. For the most reliable readings, the AHA recommends an automatic, cuff-style, upper arm (bicep) monitor. The readings from finger and wrist monitors are not as reliable as the upper arm monitor.        Step 1. Relax    · Wait at least a half hour after smoking, eating, or exercising. Do not drink coffee, tea, soda, or other caffeinated beverages before checking your blood pressure.   · Sit comfortably at a table. Place the monitor near you.  · Rest for a few minutes before you  begin.        Step 2. Wrap the cuff    · Place your arm on the table, palm up. Put your arm in a position that is level with your heart. Wrap the cuff around your upper arm, about an inch above your elbow. Its best to wrap the cuff on bare skin, not over clothing.  · Make sure your cuff fits. If it doesnt wrap around your upper arm, order a larger cuff. A cuff that is too large or too small can result in an inaccurate blood pressure reading.           Step 3. Inflate the cuff    · Pump the cuff until the scale reads 200. If you have a self-inflating cuff, push the button that starts the pump.  · The cuff will tighten, then loosen.  · The numbers will change. When they stop changing, your blood pressure reading will appear.  · If you get a reading that is too high or too low for you, relax for a few minutes. Then do the test again.    Step 4. Write down the results  · Write down your blood pressure numbers. Gerardo the date and time. Keep your results in one place, such as a notebook.  · Remove the cuff from your arm. Turn off the machine.  · Take the readings with you to your medical appointments.  · If you start a new blood pressure medicine, or change a blood pressure medicine dose, note the day you started the new drug or dosage on your blood pressure recording sheet. This will help your healthcare provider monitor the effect of medication changes.     Date Last Reviewed: 4/27/2016  © 7565-5400 [employer]. [address] [place] [address], [place] [address]. All rights reserved. This information is not intended as a substitute for professional medical care. Always follow your healthcare professional's instructions.        Rib Fracture (Broken Rib)  Your ribs are curved bones in your chest. They help protect your lungs and expand and contract when you breathe. Children's ribs bend easily and can often withstand a blow or fall. But adult ribs are more likely to break (fracture) under stress. Even coughing or a  hard sneeze can fracture a rib.    When to go to the Emergency Room (ER)  Although they can be painful, most rib fractures aren't serious. But they often make it hard to cough or breathe deeply. Get medical care right away if you have:  · Trouble breathing.  · Nausea, vomiting, or stomach pain with a sore or bruised rib.  · Pain that worsens over time.  · An injury to the chest or stomach.  What to expect in the ER  Here is what will happen in the ER:   · A healthcare provider will ask about your injury and examine you carefully.  · An X-ray of your chest will likely be taken to show any major damage to ribs and lungs. However, ribs can undergo small breaks that do not show up on X-rays, even though they still hurt.  · You may be given medicine to ease your discomfort.  · Rarely, rib fractures can cause a lung to collapse or lead to bleeding in the chest. In these cases, a tube will be inserted into the chest to reinflate the lung or drain the blood.  Follow-up  You are likely to heal in 6 to 8 weeks. Most rib fractures heal on their own with no lasting effects. Call your healthcare provider right away if you notice any of these symptoms:  · Increased chest pain  · Shortness of breath  · Fever  · Coughing up blood  Date Last Reviewed: 9/26/2015  © 4831-6119 [employer]. [address] [place] [address], [place] [id number]. All rights reserved. This information is not intended as a substitute for professional medical care. Always follow your healthcare professional's instructions.        You have been prescribed pain medication today.  Only take as needed and at bedtime.  Do not drive or operate machinery while on this medication, because this medicine is sedating can cause impairment.  This medication was prescribed to you and is intended for your use only.  Do not sell or distribute this medication.    Please follow up with your Primary care provider within 2-5 days if your signs and symptoms have not resolved  or worsen.     If your condition worsens or fails to improve we recommend that you receive another evaluation at the emergency room immediately or contact your primary medical clinic to discuss your concerns.   You must understand that you have received an Urgent Care treatment only and that you may be released before all of your medical problems are known or treated. You, the patient, will arrange for follow up care as instructed.     RED FLAGS/WARNING SYMPTOMS DISCUSSED WITH PATIENT THAT WOULD WARRANT EMERGENT MEDICAL ATTENTION. PATIENT VERBALIZED UNDERSTANDING.

## 2020-02-06 PROCEDURE — 99001 SPECIMEN HANDLING PT-LAB: CPT | Mod: PO,TXP

## 2020-02-07 ENCOUNTER — LAB VISIT (OUTPATIENT)
Dept: LAB | Facility: HOSPITAL | Age: 55
End: 2020-02-07
Payer: COMMERCIAL

## 2020-02-07 DIAGNOSIS — Z76.82 PATIENT ON WAITING LIST FOR KIDNEY TRANSPLANT: ICD-10-CM

## 2020-02-11 ENCOUNTER — LAB VISIT (OUTPATIENT)
Dept: LAB | Facility: OTHER | Age: 55
End: 2020-02-11
Attending: INTERNAL MEDICINE
Payer: COMMERCIAL

## 2020-02-11 DIAGNOSIS — C90.01 MULTIPLE MYELOMA IN REMISSION: ICD-10-CM

## 2020-02-11 LAB
ALBUMIN SERPL BCP-MCNC: 3.9 G/DL (ref 3.5–5.2)
ALP SERPL-CCNC: 157 U/L (ref 55–135)
ALT SERPL W/O P-5'-P-CCNC: 14 U/L (ref 10–44)
ANION GAP SERPL CALC-SCNC: 9 MMOL/L (ref 8–16)
AST SERPL-CCNC: 23 U/L (ref 10–40)
B2 MICROGLOB SERPL-MCNC: 15 UG/ML (ref 0–2.5)
BASOPHILS # BLD AUTO: 0.02 K/UL (ref 0–0.2)
BASOPHILS NFR BLD: 0.7 % (ref 0–1.9)
BILIRUB SERPL-MCNC: 0.4 MG/DL (ref 0.1–1)
BUN SERPL-MCNC: 16 MG/DL (ref 6–20)
CALCIUM SERPL-MCNC: 8.5 MG/DL (ref 8.7–10.5)
CHLORIDE SERPL-SCNC: 92 MMOL/L (ref 95–110)
CO2 SERPL-SCNC: 32 MMOL/L (ref 23–29)
CREAT SERPL-MCNC: 2.9 MG/DL (ref 0.5–1.4)
DIFFERENTIAL METHOD: ABNORMAL
EOSINOPHIL # BLD AUTO: 0.1 K/UL (ref 0–0.5)
EOSINOPHIL NFR BLD: 2.2 % (ref 0–8)
ERYTHROCYTE [DISTWIDTH] IN BLOOD BY AUTOMATED COUNT: 14.4 % (ref 11.5–14.5)
EST. GFR  (AFRICAN AMERICAN): 20 ML/MIN/1.73 M^2
EST. GFR  (NON AFRICAN AMERICAN): 17 ML/MIN/1.73 M^2
GLUCOSE SERPL-MCNC: 97 MG/DL (ref 70–110)
HCT VFR BLD AUTO: 33.2 % (ref 37–48.5)
HGB BLD-MCNC: 11.3 G/DL (ref 12–16)
IGA SERPL-MCNC: 213 MG/DL (ref 40–350)
IGG SERPL-MCNC: 647 MG/DL (ref 650–1600)
IGM SERPL-MCNC: 159 MG/DL (ref 50–300)
IMM GRANULOCYTES # BLD AUTO: 0 K/UL (ref 0–0.04)
IMM GRANULOCYTES NFR BLD AUTO: 0 % (ref 0–0.5)
LYMPHOCYTES # BLD AUTO: 0.6 K/UL (ref 1–4.8)
LYMPHOCYTES NFR BLD: 20.1 % (ref 18–48)
MCH RBC QN AUTO: 30.1 PG (ref 27–31)
MCHC RBC AUTO-ENTMCNC: 34 G/DL (ref 32–36)
MCV RBC AUTO: 88 FL (ref 82–98)
MONOCYTES # BLD AUTO: 0.3 K/UL (ref 0.3–1)
MONOCYTES NFR BLD: 11 % (ref 4–15)
NEUTROPHILS # BLD AUTO: 1.8 K/UL (ref 1.8–7.7)
NEUTROPHILS NFR BLD: 66 % (ref 38–73)
NRBC BLD-RTO: 0 /100 WBC
PLATELET # BLD AUTO: 124 K/UL (ref 150–350)
PMV BLD AUTO: 13.1 FL (ref 9.2–12.9)
POTASSIUM SERPL-SCNC: 3.8 MMOL/L (ref 3.5–5.1)
PROT SERPL-MCNC: 6.8 G/DL (ref 6–8.4)
RBC # BLD AUTO: 3.76 M/UL (ref 4–5.4)
SODIUM SERPL-SCNC: 133 MMOL/L (ref 136–145)
WBC # BLD AUTO: 2.73 K/UL (ref 3.9–12.7)

## 2020-02-11 PROCEDURE — 86334 PATHOLOGIST INTERPRETATION IFE: ICD-10-PCS | Mod: 26,NTX,, | Performed by: PATHOLOGY

## 2020-02-11 PROCEDURE — 83520 IMMUNOASSAY QUANT NOS NONAB: CPT | Mod: NTX

## 2020-02-11 PROCEDURE — 85025 COMPLETE CBC W/AUTO DIFF WBC: CPT | Mod: NTX

## 2020-02-11 PROCEDURE — 82784 ASSAY IGA/IGD/IGG/IGM EACH: CPT | Mod: 59,TXP

## 2020-02-11 PROCEDURE — 36415 COLL VENOUS BLD VENIPUNCTURE: CPT | Mod: NTX

## 2020-02-11 PROCEDURE — 84165 PATHOLOGIST INTERPRETATION SPE: ICD-10-PCS | Mod: 26,NTX,, | Performed by: PATHOLOGY

## 2020-02-11 PROCEDURE — 84165 PROTEIN E-PHORESIS SERUM: CPT | Mod: 26,NTX,, | Performed by: PATHOLOGY

## 2020-02-11 PROCEDURE — 86334 IMMUNOFIX E-PHORESIS SERUM: CPT | Mod: NTX

## 2020-02-11 PROCEDURE — 84165 PROTEIN E-PHORESIS SERUM: CPT | Mod: NTX

## 2020-02-11 PROCEDURE — 86334 IMMUNOFIX E-PHORESIS SERUM: CPT | Mod: 26,NTX,, | Performed by: PATHOLOGY

## 2020-02-11 PROCEDURE — 80053 COMPREHEN METABOLIC PANEL: CPT | Mod: NTX

## 2020-02-11 PROCEDURE — 82232 ASSAY OF BETA-2 PROTEIN: CPT | Mod: NTX

## 2020-02-12 LAB
ALBUMIN SERPL ELPH-MCNC: 4.06 G/DL (ref 3.35–5.55)
ALPHA1 GLOB SERPL ELPH-MCNC: 0.27 G/DL (ref 0.17–0.41)
ALPHA2 GLOB SERPL ELPH-MCNC: 0.74 G/DL (ref 0.43–0.99)
B-GLOBULIN SERPL ELPH-MCNC: 0.63 G/DL (ref 0.5–1.1)
GAMMA GLOB SERPL ELPH-MCNC: 0.7 G/DL (ref 0.67–1.58)
INTERPRETATION SERPL IFE-IMP: NORMAL
KAPPA LC SER QL IA: 15.91 MG/DL (ref 0.33–1.94)
KAPPA LC/LAMBDA SER IA: 0.98 (ref 0.26–1.65)
LAMBDA LC SER QL IA: 16.23 MG/DL (ref 0.57–2.63)
PATHOLOGIST INTERPRETATION IFE: NORMAL
PATHOLOGIST INTERPRETATION SPE: NORMAL
PROT SERPL-MCNC: 6.4 G/DL (ref 6–8.4)

## 2020-02-20 ENCOUNTER — OFFICE VISIT (OUTPATIENT)
Dept: HEMATOLOGY/ONCOLOGY | Facility: CLINIC | Age: 55
End: 2020-02-20
Payer: COMMERCIAL

## 2020-02-20 ENCOUNTER — LAB VISIT (OUTPATIENT)
Dept: LAB | Facility: HOSPITAL | Age: 55
End: 2020-02-20
Attending: INTERNAL MEDICINE
Payer: COMMERCIAL

## 2020-02-20 VITALS
WEIGHT: 125 LBS | OXYGEN SATURATION: 97 % | DIASTOLIC BLOOD PRESSURE: 95 MMHG | SYSTOLIC BLOOD PRESSURE: 136 MMHG | HEART RATE: 71 BPM | BODY MASS INDEX: 19.62 KG/M2 | TEMPERATURE: 99 F | HEIGHT: 67 IN

## 2020-02-20 DIAGNOSIS — D63.1 ANEMIA IN CHRONIC KIDNEY DISEASE, ON CHRONIC DIALYSIS: ICD-10-CM

## 2020-02-20 DIAGNOSIS — N18.6 ESRD (END STAGE RENAL DISEASE) ON DIALYSIS: ICD-10-CM

## 2020-02-20 DIAGNOSIS — Z99.2 ESRD (END STAGE RENAL DISEASE) ON DIALYSIS: ICD-10-CM

## 2020-02-20 DIAGNOSIS — Z99.2 ANEMIA IN CHRONIC KIDNEY DISEASE, ON CHRONIC DIALYSIS: ICD-10-CM

## 2020-02-20 DIAGNOSIS — C90.01 MULTIPLE MYELOMA IN REMISSION: ICD-10-CM

## 2020-02-20 DIAGNOSIS — C90.01 MULTIPLE MYELOMA IN REMISSION: Primary | ICD-10-CM

## 2020-02-20 DIAGNOSIS — N18.6 ANEMIA IN CHRONIC KIDNEY DISEASE, ON CHRONIC DIALYSIS: ICD-10-CM

## 2020-02-20 PROCEDURE — 3008F PR BODY MASS INDEX (BMI) DOCUMENTED: ICD-10-PCS | Mod: CPTII,S$GLB,, | Performed by: INTERNAL MEDICINE

## 2020-02-20 PROCEDURE — 99214 PR OFFICE/OUTPT VISIT, EST, LEVL IV, 30-39 MIN: ICD-10-PCS | Mod: S$GLB,,, | Performed by: INTERNAL MEDICINE

## 2020-02-20 PROCEDURE — 99999 PR PBB SHADOW E&M-EST. PATIENT-LVL IV: CPT | Mod: PBBFAC,,, | Performed by: INTERNAL MEDICINE

## 2020-02-20 PROCEDURE — 3008F BODY MASS INDEX DOCD: CPT | Mod: CPTII,S$GLB,, | Performed by: INTERNAL MEDICINE

## 2020-02-20 PROCEDURE — 84165 PROTEIN E-PHORESIS SERUM: CPT | Mod: 26,NTX,, | Performed by: PATHOLOGY

## 2020-02-20 PROCEDURE — 84165 PROTEIN E-PHORESIS SERUM: CPT | Mod: TXP

## 2020-02-20 PROCEDURE — 84165 PATHOLOGIST INTERPRETATION SPE: ICD-10-PCS | Mod: 26,NTX,, | Performed by: PATHOLOGY

## 2020-02-20 PROCEDURE — 86334 IMMUNOFIX E-PHORESIS SERUM: CPT | Mod: TXP

## 2020-02-20 PROCEDURE — 99999 PR PBB SHADOW E&M-EST. PATIENT-LVL IV: ICD-10-PCS | Mod: PBBFAC,,, | Performed by: INTERNAL MEDICINE

## 2020-02-20 PROCEDURE — 86334 IMMUNOFIX E-PHORESIS SERUM: CPT | Mod: 26,NTX,, | Performed by: PATHOLOGY

## 2020-02-20 PROCEDURE — 99214 OFFICE O/P EST MOD 30 MIN: CPT | Mod: S$GLB,,, | Performed by: INTERNAL MEDICINE

## 2020-02-20 PROCEDURE — 36415 COLL VENOUS BLD VENIPUNCTURE: CPT | Mod: TXP

## 2020-02-20 PROCEDURE — 86334 PATHOLOGIST INTERPRETATION IFE: ICD-10-PCS | Mod: 26,NTX,, | Performed by: PATHOLOGY

## 2020-02-20 RX ORDER — LOSARTAN POTASSIUM 100 MG/1
TABLET ORAL
Status: ON HOLD | COMMUNITY
Start: 2020-01-06 | End: 2020-10-13

## 2020-02-20 RX ORDER — CINACALCET 30 MG/1
30 TABLET, FILM COATED ORAL
COMMUNITY
Start: 2020-01-28 | End: 2022-09-27 | Stop reason: SDUPTHER

## 2020-02-20 RX ORDER — ACETAMINOPHEN AND CODEINE PHOSPHATE 120; 12 MG/5ML; MG/5ML
SOLUTION ORAL
COMMUNITY
Start: 2020-02-03 | End: 2020-08-04

## 2020-02-20 NOTE — Clinical Note
cbc,cmp, SPEP, NAIN FLC, B-2 microglobulin  And quant Ig 1-2 wks prior to f/u 2mosSPEP and NAIN today

## 2020-02-20 NOTE — PROGRESS NOTES
Subjective:       Patient ID: Jennifer Booth is a 55 y.o. female.    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.   Previously followed at [facility].   She has not had the resources to continue f/u at [facility].       She is followed by Nephrology for ESRD  She was initially diagnosed with advanced kidney disease secondary to multiple myeloma & HTN.   She was diagnosed with  AK I from MM in 2010 that required initiation of dialysis.    She started chronic dialysis on 12/23/11 and ended on 8/28/12  She then underwent chemotherapy for her myeloma, and stopped dialysis in 2013.    Kidney biopsy 2017due to worsening  kidney function reveals glomerulosclerosis and no evidence of MM    She was undergoing peritoneal dialysis daily     She was switched back to HD     [place] [facility] [place] met selection criteria for kidney transplant related to ESRD due to Hypertensive Nephrosclerosis  She is considered a candidate for kidney transplant     Today, she is doing well  Her granddtr is 4 mos old   Appetite and weight  stable   No SOB/CP  No fevers, night sweats  No recent infections  She has occasional fatigue        She is undergoing EPO under direction of Nephrology  CBC reveals  Hb 11.3  g/dl   SPEP 2/11//2020   No monoclonal peaks      Onc hx:  She was diagnosed with kappa free light chain disease, 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 She originally presented with acute renal failure requiring HD .She has completed bortezomib/dexamethasone/Revlimid 6 cycles on 04/13/2012 for the multiple myeloma kappa light chain disease, IPSS stage III. She underwent bortezomib maintenance therapy three weeks on, one week off (05/15, 05/22 and 05/29) with her last cycle received on 05/29/2012.  "She is followed at [facility] myeloma [place] where she was evaluated for an auto stem cell transplant . It was decided not to proceed with transplant was originally due to drug reaction.Pt was diagnosed with DRESS syndrome during hospitalization and then [facility] team elected not to proceed proceed with auto SCT. Velcade maintenance was discontinued due to side effects.       Tx: Velcade/Dex/Revlimid x 6 cycles 4/13/12   Velcade maintenance 3wks on, 1wk off dc'd 5/29/12 sec to adv SE          Review of Systems   Constitutional: Negative for appetite change, chills and fatigue.   HENT: Negative for congestion, hearing loss and nosebleeds.    Eyes: Negative for visual disturbance.   Respiratory: Negative for apnea, cough, chest tightness, shortness of breath and wheezing.    Cardiovascular: Negative for chest pain, palpitations and leg swelling.   Gastrointestinal: Negative for abdominal distention, abdominal pain, blood in stool, constipation, diarrhea, nausea and vomiting.   Genitourinary: Negative for difficulty urinating, dysuria, flank pain, frequency, hematuria and urgency.   Musculoskeletal: Negative for arthralgias, back pain, gait problem, joint swelling and neck pain.   Skin: Negative for rash.        No petechiae, ecchymoses   Neurological: Negative for dizziness, tremors, seizures, syncope, speech difficulty, light-headedness, numbness and headaches.   Hematological: Negative for adenopathy. Does not bruise/bleed easily.       Objective:       Vitals:    02/20/20 1500   BP: (!) 136/95   BP Location: Right arm   Patient Position: Sitting   BP Method: Medium (Automatic)   Pulse: 71   Temp: 98.5 °F (36.9 °C)   TempSrc: Oral   SpO2: 97%   Weight: 56.7 kg (125 lb)   Height: 5' 7" (1.702 m)       Physical Exam   Constitutional: She is oriented to person, place, and time. She appears well-developed and well-nourished.   HENT:   Head: Normocephalic.   Mouth/Throat: Oropharynx is clear and moist. No oropharyngeal exudate. "   Eyes: Pupils are equal, round, and reactive to light. Conjunctivae and lids are normal. No scleral icterus.   Neck: Normal range of motion. Neck supple. No thyromegaly present.   Cardiovascular: Normal rate, regular rhythm and normal heart sounds.   No murmur heard.  Pulmonary/Chest: Breath sounds normal. She has no wheezes. She has no rales.   Abdominal: Soft. Bowel sounds are normal. She exhibits no distension and no mass. There is no hepatosplenomegaly. There is no tenderness. There is no rebound and no guarding.   Musculoskeletal: Normal range of motion. She exhibits no edema or tenderness.   Lymphadenopathy:     She has no cervical adenopathy.     She has no axillary adenopathy.        Right: No supraclavicular adenopathy present.        Left: No supraclavicular adenopathy present.   Neurological: She is alert and oriented to person, place, and time. No cranial nerve deficit. Coordination normal.   Skin: Skin is warm and dry. No ecchymosis, no petechiae and no rash noted. No erythema.   Psychiatric: She has a normal mood and affect.             Bone survey 2015   1. Small lucent foci in the right femoral neck. Given this patient's history of multiple myeloma, these findings are concerning for sequela of that disease  2. Otherwise degenerative changes of the cervical spine and lower lumbar spine are identified.           Lab Results   Component Value Date    WBC 2.73 (L) 02/11/2020    HGB 11.3 (L) 02/11/2020    HCT 33.2 (L) 02/11/2020    MCV 88 02/11/2020     (L) 02/11/2020                         Lab Results   Component Value Date    WBC 2.73 (L) 02/11/2020    HGB 11.3 (L) 02/11/2020    HCT 33.2 (L) 02/11/2020    MCV 88 02/11/2020     (L) 02/11/2020         Results for JOHN BEST (MRN [id number]) as of 5/13/2019 15:38   Ref. Range 6/19/2018 07:50 7/16/2018 15:05 10/17/2018 15:35 2/5/2019 08:50 5/6/2019 09:19   Brook Free Light Chains Latest Ref Range: 0.33 - 1.94 mg/dL 13.49 (H) 8.45 (H) 9.78  (H) 16.85 (H) 16.06 (H)   Lambda Free Light Chains Latest Ref Range: 0.57 - 2.63 mg/dL 8.34 (H) 6.64 (H) 12.29 (H) 11.84 (H) 13.80 (H)   Kappa/Lambda FLC Ratio Latest Ref Range: 0.26 - 1.65  1.62 1.27 0.80 1.42 1.16       Results for JIGNA BESTA D (MRN [id number]) as of 2/23/2020 23:13   Ref. Range 2/11/2020 10:26   IgG - Serum Latest Ref Range: 650 - 1600 mg/dL 647 (L)   IgM Latest Ref Range: 50 - 300 mg/dL 159   IgA Latest Ref Range: 40 - 350 mg/dL 213     Results for JIGNA BESTA D (MRN [id number]) as of 5/13/2019 15:43   Ref. Range 10/17/2018 15:35 2/5/2019 08:50 5/6/2019 09:19   Beta-2 Microglobulin Latest Ref Range: 0.0 - 2.5 ug/mL 10.0 (H) 14.3 (H) 17.8 (H)       Bone survey 2/2018   No convincing lytic lesions to confirm the presence of myeloma.      MRI T-spine 6/29/2018  1. No evidence for multiple myeloma in the thoracic spine.  2. Minor degenerative changes without evidence for spinal canal stenosis or neural foraminal narrowing.  3. Liver demonstrates decreased T2 signal suggestive of iron overload.  4. Ascites.  5. Bilateral renal cysts, incompletely characterized.      MRI L-spine w/out contrast 6/29/2018  The caudal aspect of the lumbar spinal canal appears to be lower limits of normal on a developmental basis with further tapering of the thecal sac due to an abundance of epidural fat.    Superimposed degenerative change.  This is most pronounced at L3-4 where there is advanced facet arthropathy well as intraspinal synovial cyst resulting in moderate spinal stenosis with severe right lateral recess stenosis.  There also advanced degenerative disc disease at L4-5 with mild spinal stenosis and bilateral lateral recess encroachment.  Individual disc levels further detailed above.    No discrete paraprotein bands are identified            SPEP 2/11/2020 No monoclonal peaks      Results for ALON BESTCIA D (MRN [id number]) as of 2/23/2020 23:13   Ref. Range 11/12/2019 10:38 2/11/2020 10:26   Beta-2  Microglobulin Latest Ref Range: 0.0 - 2.5 ug/mL 16.6 (H) 15.0 (H)       Assessment:       1. Multiple myeloma in remission    2. Anemia in chronic kidney disease, on chronic dialysis    3. ESRD (end stage renal disease) on dialysis        Plan:   Jennifer was seen today for follow-up.    Diagnoses and associated orders for this visit:      MM IPSS Stage III deletion 13, t 4:14 diagnosed 12/2011 in remission  Dagnosed with kappa free light chain disease, multiple myeloma IPSS with deletion 13, 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   She originally presented with acute renal failure requiring HD .  She  completed bortezomib/dexamethasone/Revlimid 6 cycles on 04/13/2012 for the multiple myeloma kappa light chain disease, IPSS stage III.   She underwent bortezomib maintenance therapy three weeks on, one week off (05/15, 05/22 and 05/29) with her last cycle received on 05/29/2012  She was followed at [facility] and was diagnosed with DRESS syndrome during hospitalization and then [facility] team elected not to proceed proceed with auto SCT. Velcade maintenance was discontinued due to side effects.   She has remained in remission with nl SPEP   She has had worsening kidney function and s/p Kidney bx 2017 neg for myeloma involvement  Urine studies- no monoclonal peaks  Bone survey 2018 no new findings    today has no detectable m protein.  Free light chain ratio remains normal  No clear evidence of relapse  Serum free KLC  8/8/2019  15.18 mg/dl and lambda free light chain 14 mg/dl and K/L FLCR 1.08   MRI T and L spine 6/29/2018 - no evidence of myeloma involvement  SPEP 2/2020- no monoclonal peaks   Cont to monitor      ESRD  Followed by Nephrology  S/p Kidney biopsy 2017 ( outside facility)  due to worsening  kidney function reveals glomerulosclerosis and no evidence of MM  Followed by Renal Transplant team at [facility] [place] - on cadaveric wait list  Pt previously undergoing PD   Pt now undergoing HD  She is followed by [facility] transplant team       Pt  followed by Dr. Wolfe     Anemia in chronic renal disease  She has history of  events of acute on chronic anemia.  She does not have gross clinical blood loss. 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  . Free light chain ratio remains normal in Aug  2019, though difficult to interpret in setting of renal failure  . Previously normal total protein pattern now had an M protein of 0.1 grams in June 2018. Total protein pattern has now remained normal.  Bone survey in February 2018 had no lytic lesions.  S/p 2uprbc during  hospitalization  11/2017 for acute-on-chronic anemia  Hb   11.3g/dl   SHERMAN per Nephrology        Advance Care Planning     Power of   I initiated the process of advance care planning today and explained the importance of this process to the patient.  I introduced the concept of advance directives to the patient, as well. Then the patient received detailed information about the importance of designating a Health Care Power of  (HCPOA). She was also instructed to communicate with this person about their wishes for future healthcare, should she become sick and lose decision-making capacity. The patient has not previously appointed a HCPOA. After our discussion, the patient has decided to complete a HCPOA and has appointed her daughter and NAME: Yusra Booth  I spent a total time of  16  minutes discussing this issue with the patient.            F/u   3mo with cbc,cmp, SPEP, FLC, B-2 microglobulin , and urine studies      Cc: MD Yarelis Calvert Jr., MD

## 2020-02-24 LAB
ALBUMIN SERPL ELPH-MCNC: 4.21 G/DL (ref 3.35–5.55)
ALPHA1 GLOB SERPL ELPH-MCNC: 0.29 G/DL (ref 0.17–0.41)
ALPHA2 GLOB SERPL ELPH-MCNC: 0.75 G/DL (ref 0.43–0.99)
B-GLOBULIN SERPL ELPH-MCNC: 0.64 G/DL (ref 0.5–1.1)
GAMMA GLOB SERPL ELPH-MCNC: 0.71 G/DL (ref 0.67–1.58)
INTERPRETATION SERPL IFE-IMP: NORMAL
PATHOLOGIST INTERPRETATION IFE: NORMAL
PATHOLOGIST INTERPRETATION SPE: NORMAL
PROT SERPL-MCNC: 6.6 G/DL (ref 6–8.4)

## 2020-03-03 DIAGNOSIS — Z76.82 ORGAN TRANSPLANT CANDIDATE: Primary | ICD-10-CM

## 2020-03-04 ENCOUNTER — TELEPHONE (OUTPATIENT)
Dept: TRANSPLANT | Facility: CLINIC | Age: 55
End: 2020-03-04

## 2020-03-05 PROCEDURE — 86829 HLA CLASS I/II ANTIBODY QUAL: CPT | Mod: 91,PO,TXP

## 2020-03-05 PROCEDURE — 86829 HLA CLASS I/II ANTIBODY QUAL: CPT | Mod: PO,TXP

## 2020-03-06 ENCOUNTER — LAB VISIT (OUTPATIENT)
Dept: LAB | Facility: HOSPITAL | Age: 55
End: 2020-03-06
Payer: COMMERCIAL

## 2020-03-06 DIAGNOSIS — Z76.82 PATIENT ON WAITING LIST FOR KIDNEY TRANSPLANT: ICD-10-CM

## 2020-03-13 LAB — HPRA INTERPRETATION: NORMAL

## 2020-04-02 PROCEDURE — 99001 SPECIMEN HANDLING PT-LAB: CPT | Mod: PO,TXP

## 2020-04-06 ENCOUNTER — LAB VISIT (OUTPATIENT)
Dept: LAB | Facility: HOSPITAL | Age: 55
End: 2020-04-06
Payer: COMMERCIAL

## 2020-04-06 DIAGNOSIS — Z76.82 PATIENT ON WAITING LIST FOR KIDNEY TRANSPLANT: ICD-10-CM

## 2020-04-09 ENCOUNTER — LAB VISIT (OUTPATIENT)
Dept: LAB | Facility: OTHER | Age: 55
End: 2020-04-09
Attending: INTERNAL MEDICINE
Payer: COMMERCIAL

## 2020-04-09 DIAGNOSIS — C90.01 MULTIPLE MYELOMA IN REMISSION: ICD-10-CM

## 2020-04-09 LAB
ALBUMIN SERPL BCP-MCNC: 4.2 G/DL (ref 3.5–5.2)
ALP SERPL-CCNC: 100 U/L (ref 55–135)
ALT SERPL W/O P-5'-P-CCNC: 14 U/L (ref 10–44)
ANION GAP SERPL CALC-SCNC: 11 MMOL/L (ref 8–16)
AST SERPL-CCNC: 18 U/L (ref 10–40)
BASOPHILS # BLD AUTO: 0.03 K/UL (ref 0–0.2)
BASOPHILS NFR BLD: 0.9 % (ref 0–1.9)
BILIRUB SERPL-MCNC: 0.6 MG/DL (ref 0.1–1)
BUN SERPL-MCNC: 7 MG/DL (ref 6–20)
CALCIUM SERPL-MCNC: 8.7 MG/DL (ref 8.7–10.5)
CHLORIDE SERPL-SCNC: 93 MMOL/L (ref 95–110)
CO2 SERPL-SCNC: 27 MMOL/L (ref 23–29)
CREAT SERPL-MCNC: 2.2 MG/DL (ref 0.5–1.4)
DIFFERENTIAL METHOD: ABNORMAL
EOSINOPHIL # BLD AUTO: 0.1 K/UL (ref 0–0.5)
EOSINOPHIL NFR BLD: 2.1 % (ref 0–8)
ERYTHROCYTE [DISTWIDTH] IN BLOOD BY AUTOMATED COUNT: 16.6 % (ref 11.5–14.5)
EST. GFR  (AFRICAN AMERICAN): 28 ML/MIN/1.73 M^2
EST. GFR  (NON AFRICAN AMERICAN): 24 ML/MIN/1.73 M^2
GLUCOSE SERPL-MCNC: 82 MG/DL (ref 70–110)
HCT VFR BLD AUTO: 26.7 % (ref 37–48.5)
HGB BLD-MCNC: 9 G/DL (ref 12–16)
IGA SERPL-MCNC: 219 MG/DL (ref 40–350)
IGG SERPL-MCNC: 689 MG/DL (ref 650–1600)
IGM SERPL-MCNC: 144 MG/DL (ref 50–300)
IMM GRANULOCYTES # BLD AUTO: 0 K/UL (ref 0–0.04)
IMM GRANULOCYTES NFR BLD AUTO: 0 % (ref 0–0.5)
LYMPHOCYTES # BLD AUTO: 0.7 K/UL (ref 1–4.8)
LYMPHOCYTES NFR BLD: 22.4 % (ref 18–48)
MCH RBC QN AUTO: 29 PG (ref 27–31)
MCHC RBC AUTO-ENTMCNC: 33.7 G/DL (ref 32–36)
MCV RBC AUTO: 86 FL (ref 82–98)
MONOCYTES # BLD AUTO: 0.4 K/UL (ref 0.3–1)
MONOCYTES NFR BLD: 12.4 % (ref 4–15)
NEUTROPHILS # BLD AUTO: 2.1 K/UL (ref 1.8–7.7)
NEUTROPHILS NFR BLD: 62.2 % (ref 38–73)
NRBC BLD-RTO: 0 /100 WBC
PLATELET # BLD AUTO: 117 K/UL (ref 150–350)
PMV BLD AUTO: 10.5 FL (ref 9.2–12.9)
POTASSIUM SERPL-SCNC: 4.3 MMOL/L (ref 3.5–5.1)
PROT SERPL-MCNC: 7.1 G/DL (ref 6–8.4)
RBC # BLD AUTO: 3.1 M/UL (ref 4–5.4)
SODIUM SERPL-SCNC: 131 MMOL/L (ref 136–145)
WBC # BLD AUTO: 3.3 K/UL (ref 3.9–12.7)

## 2020-04-09 PROCEDURE — 84165 PROTEIN E-PHORESIS SERUM: CPT | Mod: TXP

## 2020-04-09 PROCEDURE — 86334 IMMUNOFIX E-PHORESIS SERUM: CPT | Mod: NTX

## 2020-04-09 PROCEDURE — 86334 PATHOLOGIST INTERPRETATION IFE: ICD-10-PCS | Mod: 26,NTX,, | Performed by: PATHOLOGY

## 2020-04-09 PROCEDURE — 84165 PROTEIN E-PHORESIS SERUM: CPT | Mod: 26,NTX,, | Performed by: PATHOLOGY

## 2020-04-09 PROCEDURE — 86334 IMMUNOFIX E-PHORESIS SERUM: CPT | Mod: 26,NTX,, | Performed by: PATHOLOGY

## 2020-04-09 PROCEDURE — 83520 IMMUNOASSAY QUANT NOS NONAB: CPT | Mod: 59,TXP

## 2020-04-09 PROCEDURE — 36415 COLL VENOUS BLD VENIPUNCTURE: CPT | Mod: NTX

## 2020-04-09 PROCEDURE — 82784 ASSAY IGA/IGD/IGG/IGM EACH: CPT | Mod: 59,TXP

## 2020-04-09 PROCEDURE — 84165 PATHOLOGIST INTERPRETATION SPE: ICD-10-PCS | Mod: 26,NTX,, | Performed by: PATHOLOGY

## 2020-04-09 PROCEDURE — 85025 COMPLETE CBC W/AUTO DIFF WBC: CPT | Mod: TXP

## 2020-04-09 PROCEDURE — 80053 COMPREHEN METABOLIC PANEL: CPT | Mod: NTX

## 2020-04-13 LAB
ALBUMIN SERPL ELPH-MCNC: 4.36 G/DL (ref 3.35–5.55)
ALPHA1 GLOB SERPL ELPH-MCNC: 0.26 G/DL (ref 0.17–0.41)
ALPHA2 GLOB SERPL ELPH-MCNC: 0.69 G/DL (ref 0.43–0.99)
B-GLOBULIN SERPL ELPH-MCNC: 0.64 G/DL (ref 0.5–1.1)
GAMMA GLOB SERPL ELPH-MCNC: 0.74 G/DL (ref 0.67–1.58)
INTERPRETATION SERPL IFE-IMP: NORMAL
KAPPA LC SER QL IA: 17.99 MG/DL (ref 0.33–1.94)
KAPPA LC/LAMBDA SER IA: 1.08 (ref 0.26–1.65)
LAMBDA LC SER QL IA: 16.62 MG/DL (ref 0.57–2.63)
PATHOLOGIST INTERPRETATION IFE: NORMAL
PATHOLOGIST INTERPRETATION SPE: NORMAL
PROT SERPL-MCNC: 6.7 G/DL (ref 6–8.4)

## 2020-04-23 ENCOUNTER — TELEPHONE (OUTPATIENT)
Dept: TRANSPLANT | Facility: CLINIC | Age: 55
End: 2020-04-23

## 2020-04-23 ENCOUNTER — OFFICE VISIT (OUTPATIENT)
Dept: HEMATOLOGY/ONCOLOGY | Facility: CLINIC | Age: 55
End: 2020-04-23
Payer: COMMERCIAL

## 2020-04-23 DIAGNOSIS — N18.6 ESRD (END STAGE RENAL DISEASE) ON DIALYSIS: ICD-10-CM

## 2020-04-23 DIAGNOSIS — C90.00 MULTIPLE MYELOMA, REMISSION STATUS UNSPECIFIED: Primary | ICD-10-CM

## 2020-04-23 DIAGNOSIS — D69.6 THROMBOCYTOPENIA: ICD-10-CM

## 2020-04-23 DIAGNOSIS — N18.6 ANEMIA IN CHRONIC KIDNEY DISEASE, ON CHRONIC DIALYSIS: ICD-10-CM

## 2020-04-23 DIAGNOSIS — D63.1 ANEMIA IN CHRONIC KIDNEY DISEASE, ON CHRONIC DIALYSIS: ICD-10-CM

## 2020-04-23 DIAGNOSIS — Z99.2 ESRD (END STAGE RENAL DISEASE) ON DIALYSIS: ICD-10-CM

## 2020-04-23 DIAGNOSIS — Z99.2 ANEMIA IN CHRONIC KIDNEY DISEASE, ON CHRONIC DIALYSIS: ICD-10-CM

## 2020-04-23 PROCEDURE — 99214 PR OFFICE/OUTPT VISIT, EST, LEVL IV, 30-39 MIN: ICD-10-PCS | Mod: 95,,, | Performed by: INTERNAL MEDICINE

## 2020-04-23 PROCEDURE — 99214 OFFICE O/P EST MOD 30 MIN: CPT | Mod: 95,,, | Performed by: INTERNAL MEDICINE

## 2020-05-07 DIAGNOSIS — Z76.82 PRE-KIDNEY TRANSPLANT, LISTED: Primary | ICD-10-CM

## 2020-05-07 PROCEDURE — 86829 HLA CLASS I/II ANTIBODY QUAL: CPT | Mod: PO,TXP

## 2020-05-07 PROCEDURE — 86829 HLA CLASS I/II ANTIBODY QUAL: CPT | Mod: 91,PO,TXP

## 2020-05-07 NOTE — PROGRESS NOTES
Kidney Transplant Recipient Reevalulation    Referring Physician: Elliott Diaz  Current Nephrologist: Elliott Diaz  Waitlist Status: inactive  Dialysis Start Date: 12/23/2011    Subjective:     CC:  Annual reassessment of kidney transplant candidacy.  The patient location is: home  The chief complaint leading to consultation is:  Kidney Transplant Recipient Reevalulation  Visit type: audiovisual  Total time spent with patient: 20  Each patient to whom he or she provides medical services by telemedicine is:  (1) informed of the relationship between the physician and patient and the respective role of any other health care provider with respect to management of the patient; and (2) notified that he or she may decline to receive medical services by telemedicine and may withdraw from such care at any time.    Notes:      HPI:  Ms. Booth is a 55 y.o. year old Black or  female with ESRD secondary to HTN and chronic glomerulosclerosis, Myeloma.  She has been on the wait list for a kidney transplant at [facility] since 12/23/2011. Patient  Was initially started on  hemodialysis   12/23/2011. She switched to PD,   And is now back on HD. Patient is dialyzing on TTS schedule.  She reports multiple Peritonitis infections were the reason she switched back to HD. Patient reports that she is tolerating dialysis well.. She has a LUE AV fistula.  Dialyzes for 3 1/2 hours per session. BP remains stable.  Recent hospitalizations or ED visits.   2/3/2020 Was seen in  for a fall and sequale/ closed fx left rib   2/3/2020 Rib Xray   Impression     Nondisplaced fracture lateral aspect left 9th rib.   -pt reports her rib fx has healed.     Currently inactive since 12/10/2019 because of patient choice - not ready for transplant at the current time. Her caregiver was present during the video visit, and she states she is now ready for a transplant. Over all is feeling well.     fx assessment   Works in the [email or web address] in [place]. Not working currently d/t covid pandemic. She goe up and down stairs daily and will walk outside. Denies MONTES, chest pain or claudication     She was trying to arrange attending the smoking cessation program but could not coordinate it d/t work and dialysis. ENCOURAGED SMOKING CESSATION      PMH  Hx MM  IS FOLLOWED BY [facility] , in remission with normal SPEP, LOV 4/23/2020   Lab /diagnostic results reviewed with patient today.    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      Depression     Dialysis patient     dialysis m-w-f    Encounter for blood transfusion     GERD (gastroesophageal reflux disease)     Hypertension     Mood swings     Multiple myeloma in remission 10/26/2012    per Hem/Oc note    Multiple myeloma without mention of remission     Renal hypertension     Status post dialysis 8/2012    Thrombocytopenia 8/2/2012       Review of Systems   Constitutional: Negative for activity change, appetite change, chills, fatigue, fever and unexpected weight change.   HENT: Negative for congestion, facial swelling, postnasal drip, rhinorrhea, sinus pressure, sore throat and trouble swallowing.    Eyes: Positive for visual disturbance. Negative for pain and redness.        Wears glasses   Respiratory: Negative for cough, chest tightness, shortness of breath and wheezing.    Cardiovascular: Negative.  Negative for chest pain, palpitations and leg swelling.   Gastrointestinal: Negative for abdominal pain, diarrhea, nausea and vomiting.        Takes creon    Genitourinary: Positive for decreased urine volume. Negative for dysuria, flank pain and urgency.   Musculoskeletal: Negative for gait problem, neck pain and neck stiffness.   Skin: Negative for rash.   Allergic/Immunologic:  Negative for environmental allergies, food allergies and immunocompromised state.   Neurological: Negative for dizziness, weakness, light-headedness and headaches.   Psychiatric/Behavioral: Negative for agitation and confusion. The patient is not nervous/anxious.        Objective:   body mass index is unknown because there is no height or weight on file.  LMP 05/25/2016     Labs:  Lab Results   Component Value Date    WBC 3.30 (L) 04/09/2020    HGB 9.0 (L) 04/09/2020    HCT 26.7 (L) 04/09/2020     (L) 04/09/2020    K 4.3 04/09/2020    CL 93 (L) 04/09/2020    CO2 27 04/09/2020    BUN 7 04/09/2020    CREATININE 2.2 (H) 04/09/2020    EGFRNONAA 24 (A) 04/09/2020    CALCIUM 8.7 04/09/2020    PHOS 5.6 (H) 11/14/2017    MG 1.6 05/09/2014    ALBUMIN 4.2 04/09/2020    AST 18 04/09/2020    ALT 14 04/09/2020    PTH 1,149.0 (H) 06/14/2019       Lab Results   Component Value Date    PREALBUMIN 23 12/25/2011    BILIRUBINUA Negative 01/09/2013    GGT 27 06/09/2014    AMYLASE 84 12/19/2011    LIPASE 36 12/19/2011    PROTEINUA Negative 01/09/2013    NITRITE Negative 01/09/2013    RBCUA 0-1 01/09/2013    WBCUA 2 01/09/2013       No results found for: HLAABCTYPE    Lab Results   Component Value Date    CPRA 0 11/07/2019    IW2NQFD Negative 11/07/2019    CIIAB Negative 11/07/2019       Labs were reviewed with the patient.    Pre-transplant Workup:   Reviewed with the patient.    Assessment:     1. Patient on waiting list for kidney transplant    2. ESRD (end stage renal disease) on dialysis    3. Renal hypertension    4. Multiple myeloma in remission    5. Anemia in chronic kidney disease, on chronic dialysis        Plan:    Renal US, CXR,  And 2 d echo due  MMG due 6/2020       OK to reactivate when cleared by SW FOR CAREGIVER SUPPORT/plan and completes UTD diagnostic testing and UTD PTH , assuming it is acceptable.        ENCOURAGED SMOKING CESSATION       Needs UTD PTH  Needs to maintain acceptable PTH for txp.    Lab Results    Component Value Date    PTH 1,149.0 (H) 06/14/2019    CALCIUM 8.7 04/09/2020    CAION 1.23 12/26/2011    PHOS 5.6 (H) 11/14/2017     Colonoscopy due 10/2021    Transplant Candidacy:   Ms. Booth is an unacceptable kidney transplant candidate d/t needing SW clearance, but medically remains acceptable.  Meets center eligibility for accepting HCV+ donor offer - yes.  Patient educated on HCV+ donors. Jennifer is willing  to accept HCV+ donor offer -  yes   Patient is a candidate for KDPI > 85 kidney donor offer - yes    She will  Remain  inactive status at present  Until Cleared  by  FOR acceptable CAREGIVER SUPPORT/plan.    Jayna Miles NP       Follow-up:   In addition to the tests noted in the plan, Ms. Booth will continue to have reevaluation as per the standing pre-kidney transplant protocol:  1. Monthly blood for PRA  2. Annual return to clinic, except HIV positive, > 65 years of age, or pancreas transplant candidates who will be scheduled to see transplant every 6 months while in pre-transplant phase  3. Annual re-testing: CXR, EKG, yearly mammograms for women over 40 and PSA for males over 40, cardiology follow-up as recommended by initial cardiology pre-transplant evaluation  4. Renal ultrasound every 2 years  5. Baseline colonoscopy after age 50 and repeated as recommended    UNOS Patient Status  Functional Status: 60% - Requires occasional assistance but is able to care for needs  Physical Capacity: No Limitations

## 2020-05-08 ENCOUNTER — OFFICE VISIT (OUTPATIENT)
Dept: TRANSPLANT | Facility: CLINIC | Age: 55
End: 2020-05-08
Payer: COMMERCIAL

## 2020-05-08 DIAGNOSIS — D63.1 ANEMIA IN CHRONIC KIDNEY DISEASE, ON CHRONIC DIALYSIS: ICD-10-CM

## 2020-05-08 DIAGNOSIS — Z99.2 ANEMIA IN CHRONIC KIDNEY DISEASE, ON CHRONIC DIALYSIS: ICD-10-CM

## 2020-05-08 DIAGNOSIS — I12.9 RENAL HYPERTENSION: Chronic | ICD-10-CM

## 2020-05-08 DIAGNOSIS — Z76.82 PATIENT ON WAITING LIST FOR KIDNEY TRANSPLANT: Primary | Chronic | ICD-10-CM

## 2020-05-08 DIAGNOSIS — N18.6 ANEMIA IN CHRONIC KIDNEY DISEASE, ON CHRONIC DIALYSIS: ICD-10-CM

## 2020-05-08 DIAGNOSIS — C90.01 MULTIPLE MYELOMA IN REMISSION: ICD-10-CM

## 2020-05-08 DIAGNOSIS — Z99.2 ESRD (END STAGE RENAL DISEASE) ON DIALYSIS: ICD-10-CM

## 2020-05-08 DIAGNOSIS — N18.6 ESRD (END STAGE RENAL DISEASE) ON DIALYSIS: ICD-10-CM

## 2020-05-08 PROCEDURE — 99213 OFFICE O/P EST LOW 20 MIN: CPT | Mod: 95,TXP,, | Performed by: NURSE PRACTITIONER

## 2020-05-08 PROCEDURE — 99213 PR OFFICE/OUTPT VISIT, EST, LEVL III, 20-29 MIN: ICD-10-PCS | Mod: 95,TXP,, | Performed by: NURSE PRACTITIONER

## 2020-05-08 NOTE — PROGRESS NOTES
"Transplant Recipient Adult Psychosocial Assessment Update     Jennifer Emmy  [address]  [address]  [place] [address]  Telephone Information:   Mobile [phone]   Home  [phone] (home)  Work  [phone] (work)  E-mail  [email or web address]    Sex: female  YOB: 1965  Age: 55 y.o.    Encounter Date: 2020  [place] Citizen: yes  Primary Language: English   Needed: no    Emergency Contact:  Name: Yusra Basurto  Relationship: daughter  Address: [address] [place] [address]  Phone Numbers:  [phone] (mobile)    Family/Social Support:   Number of dependents/: none  Marital history:  once and  over 20 yrs ago.  One dtr, Yusra age 31.  Other family dynamics: Pt's parents are , one sister Dutch Monet who lives in [place] [phone].  Estranged from sister. Dtr provided care to pt for about two years during her treatment for multiple myeloma.  Pt has named dtr and dtr's miguel as primary caregiver.  Dtr has a 7 month old baby.    Household Composition:    Pt lives alone.    Do you and your caregivers have access to reliable transportation? yes  PRIMARY CAREGIVER: Yusra Basurto and Gilberto Schneider will be primary caregiver, phone number [phone] and [phone]. Pt stated she will stay with dtr and s/o post transplant. SW attempted to contact dtr and dtr's s/o to confirm caregiver plan.  One number was "wrong number", no answer at the other number.  Will speak with pt to re-confirm these.         provided in-depth information to patient and caregiver regarding pre- and post-transplant caregiver role.   strongly encourages patient and caregiver to have concrete plan regarding post-transplant care giving, including back-up caregiver(s) to ensure care giving needs are met as needed.    Patient states understanding all aspects of caregiver role/commitment and is able/willing/committed to being caregiver " "to the fullest extent necessary.    Patient verbalizes understanding of the education provided today and caregiver responsibilities.         remains available. Patient agree to contact  in a timely manner if concerns arise.      Able to take time off work without financial concerns: yes.     Additional Significant Others who will Assist with Transplant:    Name: Alvaro Holder  Age: 31  Number: [phone]  City: [place] State: [place]  Relationship: dtr's fiancee  Does person drive? yes    Living Will: no  Healthcare Power of : yes dtr Yusra  Advance Directives on file: <<no information> per medical record.  Verbally reviewed LW/HCPA information.   provided patient with copy of LW/HCPA documents and provided education on completion of forms.    Living Donors: No. Education and resource information given to patient.    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 Currently working from home due to COVID shutdown.  Stated she has about one year's worth of leave time.  Pt stated she was called in for transplant (which she declined) stating, "it was a bad time; too much was going on".  She stated it was Christmastime, she was out of leave at work and dtr just had baby, however things are better now and, "I'm ready."    Spouse/Significant Other Employment: Dtr not currently working.    Disabled: no    Monthly Income:  Salary/Wages: $2200.00  Able to afford all costs now and if transplanted, including medications: yes Pt reported no financial concerns at this time and dtr reports desire to assist as needed. Pt   Patient verbalizes understanding of " personal responsibilities related to transplant costs and the importance of having a financial plan to ensure that patients transplant costs are fully covered.       provided fundraising information/education. Patient and Caregiververbalizes understanding.   remains available.    Insurance:   Payor/Plan Subscr  Sex Relation Sub. Ins. ID Effective Group Num   1. MEDICARE - ME* JOHN BEST 1965 Female  [id number] 3/1/12                                    [address]   2. BLUE CROSS BL* JOHN BEST 1965 Female  [id number]                                   [address] [address]         Primary Insurance (for UNOS reporting): Public Insurance - Medicare FFS (Fee For Service)  Secondary Insurance (for UNOS reporting): Private Insurance through pt's employer  Patient verbalizes clear understanding that patient may experience difficulty obtaining and/or be denied insurance coverage post-surgery. This includes and is not limited to disability insurance, life insurance, health insurance, burial insurance, long term care insurance, and other insurances.      Patient also reports understanding that future health concerns related to or unrelated to transplantation may not be covered by patient's insurance.  Resources and information provided and reviewed.     Patient provides verbal permission to release any necessary information to outside resources for patient care and discharge planning.  Resources and information provided are reviewed.      Dialysis Adherence: Patient reports she is currently on dialysis and utilizes in-center treatments. Pt reports as highly compliant. Letter has been sent to shirley for compliance check.      Infusion Service: patient utilizing? no  Home Health: patient utilizing? no  DME: no  Pulmonary/Cardiac Rehab: none   ADLS:  Pt states ability to independently accomplish all adls.    Adherence:   Pt states current and expected compliance with all  "healthcare recommendations..  Adherence education and counseling provided.     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      Depression     Dialysis patient     dialysis m-w-f    Encounter for blood transfusion     GERD (gastroesophageal reflux disease)     Hypertension     Mood swings     Multiple myeloma in remission 10/26/2012    per Hem/Oc note    Multiple myeloma without mention of remission     Renal hypertension     Status post dialysis 8/2012    Thrombocytopenia 8/2/2012     Social History     Tobacco Use    Smoking status: Current Every Day Smoker     Packs/day: 0.50     Years: 30.00     Pack years: 15.00     Types: Cigarettes    Smokeless tobacco: Never Used    Tobacco comment: 12/16/15: states 1/2 pack a day   Substance Use Topics    Alcohol use: Yes     Alcohol/week: 2.0 standard drinks     Types: 2 Glasses of wine per week     Comment: nightly     Social History     Substance and Sexual Activity   Drug Use No     Social History     Substance and Sexual Activity   Sexual Activity Not Currently       Per Today's Psychosocial:  Tobacco: Pt currently smokes 3/4 ppd and is trying to quit.  States she is smoking more since COVID shutdown. SW recommended pt stop smoking with the help of smoking cessation program. Pt states she has info on programs and will seek help..  Alcohol: Pt states she drinks a half bottle of wine daily.  She stated she drinks "a couple of times a day to calm myself".She stated her drinking has increased since working from home.  Illicit Drugs/Non-prescribed Medications: marijuana "whenever it's around".  States she averages about a joint a day..  MOHSEN counseled pt on risks of smoking marijuana after transplant and recommended pt plan to quit.    Patient " "states clear understanding of the potential impact of substance use as it relates to transplant candidacy and is aware of possible random substance screening.  Substance abstinence/cessation counseling, education and resources provided and reviewed.     Arrests/DWI/Treatment/Rehab: patient denies    Psychiatric History:    Mental Health: anxiety Has not seen a counselor or psychiatrist "In a while now." "Some days are up, some down, most are ok but some days I'm overwhelmed."  Reports increase in ETOH consumption to "calm me".   Psychiatrist/Counselor: none  Medications:  Lexapro.  Pt states it helps her anxiety.   Suicide/Homicide Issues: Pt denies current or past suicidal/homicidal ideation.   Safety at home: Pt denies any home safety concerns.    Knowledge: Patient states having clear understanding and realistic expectations regarding the potential risks and potential benefits of organ transplantation and organ donation and agrees to discuss with health care team members and support system members, as well as to utilize available resources and express questions and/or concerns in order to further facilitate the pt informed decision-making.  Resources and information provided and reviewed.    Patient is aware of [facility]'s affiliation and/or partnership with agencies in home health care, LTAC, SNF, [facility], and other hospitals and clinics.    Understanding: Patient reports having a clear understanding of the many lifetime commitments involved with being a transplant recipient, including costs, compliance, medications, lab work, procedures, appointments, concrete and financial planning, preparedness, timely and appropriate communication of concerns, abstinence (ETOH, tobacco, illicit non-prescribed drugs), adherence to all health care team recommendations, support system and caregiver involvement, appropriate and timely resource utilization and follow-through, mental health counseling as needed/recommended, and patient " and caregiver responsibilities.  Social Service Handbook, resources and detailed educational information provided and reviewed.  Educational information provided.    Patient also reports current and expected compliance with health care regime and states having a clear understanding of the importance of compliance.      Patient reports a clear understanding that risks and benefits may be involved with organ transplantation and with organ donation.       Patient also reports clear understanding that psychosocial risk factors may affect patient, and include but are not limited to feelings of depression, generalized anxiety, anxiety regarding dependence on others, post traumatic stress disorder, feelings of guilt and other emotional and/or mental concerns, and/or exacerbation of existing mental health concerns.  Detailed resources provided and discussed.      Patient agrees to access appropriate resources in a timely manner as needed and/or as recommended, and to communicate concerns appropriately.  Patient also reports a clear understanding of treatment options available.     Patient received education in a group setting.   reviewed education, provided additional information, and answered questions.    Feelings or Concerns: Pt stated she has no current concerns.    Coping: Rests on weekends, prays, is spiritual, enjoys wine, Enjoying her new grandchild.     Goals: Live a healthy long life.     Interview Behavior: Patient presents as alert and oriented x 4, pleasant, good eye contact, well groomed, recall good, concentration/judgement good, average intelligence, calm, communicative, cooperative and asking and answering questions appropriately.          Transplant Social Work - Candidacy  Assessment/Plan:     Psychosocial Suitability: Patient presents as an unacceptable candidate for kidney transplant at this time. Based on psychosocial risk factors, patient presents as high risk, due to increased substance  abuse secondary to untreated anxiety. Needs to be discussed with team prior to decision.  Pt likely needs either addiction psych or gen psych eval due to increased ETOH and Marijuana use.      Recommendations/Additional Comments: Pt plans to d/c to dtr's home [place].  MOHSEN recommends that pt conduct fundraising to assist pt with pay for cost of medications, food, gas, and other transplant related needs. SW recommends that pt remain aware of potential mental health concerns and contact the team if any concerns arise. SW recommends that pt remain abstinent from tobacco, ETOH, and drug use. SW supports pt's continued dialysis adherence. SW remains available to answer any questions or concerns that arise as the pt moves through the transplant process.     Lorena Marlow LCSW

## 2020-05-08 NOTE — LETTER
May 8, 2020        Elliott Diaz  [address]  [address]  [facility]  [facility] [address]  Phone: [phone]  Fax: [phone]             Anton Schaffer- Transplant  [address]  [facility] [address]  Phone: [phone]   Patient: Jennifer Booth   MR Number: [id number]   YOB: 1965   Date of Visit: 5/8/2020       Dear Dr. Elliott Diaz    Thank you for referring Jennifer Booth to me for evaluation. Attached you will find relevant portions of my assessment and plan of care.    If you have questions, please do not hesitate to call me. I look forward to following Jennifer Booth along with you.    Sincerely,    Jayna Miles, NP    Enclosure    If you would like to receive this communication electronically, please contact [email or web address] or [phone] to request [employer] access.    [employer] is a tool which provides read-only access to select patient information with whom you have a relationship. Its easy to use and provides real time access to review your patients record including encounter summaries, notes, results, and demographic information.    If you feel you have received this communication in error or would no longer like to receive these types of communications, please e-mail [email or web address]

## 2020-05-11 ENCOUNTER — LAB VISIT (OUTPATIENT)
Dept: LAB | Facility: HOSPITAL | Age: 55
End: 2020-05-11
Payer: COMMERCIAL

## 2020-05-11 DIAGNOSIS — Z76.82 PATIENT ON WAITING LIST FOR KIDNEY TRANSPLANT: ICD-10-CM

## 2020-05-11 NOTE — PROGRESS NOTES
INITIAL PATIENT EDUCATION NOTE    Ms. Jennifer Booth was seen in pre-kidney transplant clinic for evaluation for kidney, kidney/pancreas or pancreas only transplant.  The patient attended a group education session that discussed/reviewed the following aspects of transplantation: evaluation and selection committee process, UNOS waitlist management/multiple listings, types of organs offered (KDPI < 85%, KDPI > 85%, PHS increased risk, DCD, HCV+, HIV+ for HIV+ recipients and enbloc/dual), financial aspects, surgical procedures, dietary instruction pre- and post-transplant, health maintenance pre- and post-transplant, post-transplant hospitalization and outpatient follow-up, potential to participate in a research protocol, and medication management and side effects.  A question and answer session was provided after the presentation.    The patient was seen by all members of the multi-disciplinary team to include: Nephrologist/PA, Surgeon, , Transplant Coordinator, , Pharmacist and Dietician (if applicable).    The patient reviewed and signed all consents for evaluation which were witnessed and sent to scanning into the [facility] chart.    The patient was given an education book and plan for further evaluation based on her individual assessment.      The patient was encouraged to call with any questions or concerns.

## 2020-05-13 ENCOUNTER — TELEPHONE (OUTPATIENT)
Dept: TRANSPLANT | Facility: CLINIC | Age: 55
End: 2020-05-13

## 2020-05-13 NOTE — TELEPHONE ENCOUNTER
"Compliance check:  Dialysis unit reports in the past three months pt has had "3/31/20 pt cancelled due to transportation, 4/16/20 Pt cancelled due to car problems, 4/30/20 pt cancelled did not sleep well the night before." no shows, "no amas" AMAs, labs ,No lab concerns noted,  Transportation: no concerns noted and family support no concerns noted.    Reported by DW via fax back sheet    "

## 2020-05-14 LAB — HPRA INTERPRETATION: NORMAL

## 2020-05-18 ENCOUNTER — TELEPHONE (OUTPATIENT)
Dept: TRANSPLANT | Facility: HOSPITAL | Age: 55
End: 2020-05-18

## 2020-05-18 DIAGNOSIS — F43.20 ADJUSTMENT DISORDER, UNSPECIFIED TYPE: ICD-10-CM

## 2020-05-18 DIAGNOSIS — Z01.818 PRE-TRANSPLANT EVALUATION FOR CHRONIC KIDNEY DISEASE: Primary | ICD-10-CM

## 2020-05-18 DIAGNOSIS — F41.9 ANXIETY: ICD-10-CM

## 2020-05-18 DIAGNOSIS — Z76.82 AWAITING TRANSPLANTATION OF KIDNEY: ICD-10-CM

## 2020-05-18 NOTE — TELEPHONE ENCOUNTER
"----- Message from Emelia Cary sent at 5/18/2020  9:51 AM CDT -----  Regarding: FW: Psych consult for substance abuse and anxiety?  Dr. Wakefield,  You "done" this message but didn't respond to advise on plan. Praveena Kapoor  ----- Message -----  From: Emelia Cary  Sent: 5/11/2020   1:17 PM CDT  To: [facility]  Subject: Psych consult for substance abuse and anxiet#     was seen by Jayna on 5/8 via virtual visit. She was made inactive in December due to patient choice (was having caregiver and housing issues at the time). Those issues have resolved and Jayna said she is unacceptable pending  clearance. Lorena saw her and noted the following:    Tobacco: Pt currently smokes 3/4 ppd and is trying to quit.  States she is smoking more since COVID shutdown. MOHSEN recommended pt stop smoking with the help of smoking cessation program. Pt states she has info on programs and will seek help..  Alcohol: Pt states she drinks a half bottle of wine daily.  She stated she drinks "a couple of times a day to calm myself".She stated her drinking has increased since working from home.  Illicit Drugs/Non-prescribed Medications: marijuana "whenever it's around".  States she averages about a joint a day..  MOHSEN counseled pt on risks of smoking marijuana after transplant and recommended pt plan to quit.    Do you recommend psych consult? Addictive psych or general for anxiety? Please advise.     Emelia Plummer    "

## 2020-05-18 NOTE — TELEPHONE ENCOUNTER
Hi,   I recommend general Psychiatry for evaluation of underlying disorder driving increase THC and alcohol use.  I placed consult ordered for eval of anxiety and increased tobacco, EtOH, and THC use during pandemic.  Yudy Arnold MD  [ Covering for Dr. Wakefield ]

## 2020-05-19 ENCOUNTER — PATIENT MESSAGE (OUTPATIENT)
Dept: TRANSPLANT | Facility: CLINIC | Age: 55
End: 2020-05-19

## 2020-05-19 ENCOUNTER — TELEPHONE (OUTPATIENT)
Dept: TRANSPLANT | Facility: CLINIC | Age: 55
End: 2020-05-19

## 2020-05-19 NOTE — TELEPHONE ENCOUNTER
Spoke to patient, provided phone number to psychiatry department for her to schedule her visit. She will be reconsidered for reactivation on the list pending psych consult for increased alcohol and THC use (especially during pandemic).

## 2020-05-28 ENCOUNTER — TELEPHONE (OUTPATIENT)
Dept: TRANSPLANT | Facility: CLINIC | Age: 55
End: 2020-05-28

## 2020-05-28 NOTE — TELEPHONE ENCOUNTER
----- Message from Selena Demarco sent at 5/28/2020  3:06 PM CDT -----  Contact: pt    Pt is calling regarding letter to schedule an appt. Please call

## 2020-06-08 ENCOUNTER — HOSPITAL ENCOUNTER (INPATIENT)
Facility: HOSPITAL | Age: 55
LOS: 1 days | Discharge: HOME | DRG: 252 | End: 2020-06-10
Attending: EMERGENCY MEDICINE | Admitting: FAMILY MEDICINE
Payer: MEDICARE

## 2020-06-08 DIAGNOSIS — F10.10 ALCOHOL ABUSE: ICD-10-CM

## 2020-06-08 DIAGNOSIS — R06.02 SHORTNESS OF BREATH: ICD-10-CM

## 2020-06-08 DIAGNOSIS — Z99.2 ESRD ON HEMODIALYSIS: ICD-10-CM

## 2020-06-08 DIAGNOSIS — F10.10 ETOH ABUSE: ICD-10-CM

## 2020-06-08 DIAGNOSIS — R79.89 ELEVATED TROPONIN: ICD-10-CM

## 2020-06-08 DIAGNOSIS — T82.590A DIALYSIS AV FISTULA MALFUNCTION, INITIAL ENCOUNTER: Primary | ICD-10-CM

## 2020-06-08 DIAGNOSIS — N18.6 ESRD ON HEMODIALYSIS: ICD-10-CM

## 2020-06-08 DIAGNOSIS — E87.1 HYPONATREMIA: ICD-10-CM

## 2020-06-08 LAB
ALBUMIN SERPL BCP-MCNC: 3.7 G/DL (ref 3.5–5.2)
ALP SERPL-CCNC: 66 U/L (ref 55–135)
ALT SERPL W/O P-5'-P-CCNC: 14 U/L (ref 10–44)
ANION GAP SERPL CALC-SCNC: 17 MMOL/L (ref 8–16)
AST SERPL-CCNC: 21 U/L (ref 10–40)
BILIRUB SERPL-MCNC: 0.5 MG/DL (ref 0.1–1)
BNP SERPL-MCNC: >4900 PG/ML (ref 0–99)
BUN SERPL-MCNC: 32 MG/DL (ref 6–20)
CALCIUM SERPL-MCNC: 9.2 MG/DL (ref 8.7–10.5)
CHLORIDE SERPL-SCNC: 84 MMOL/L (ref 95–110)
CO2 SERPL-SCNC: 20 MMOL/L (ref 23–29)
CREAT SERPL-MCNC: 5.3 MG/DL (ref 0.5–1.4)
ERYTHROCYTE [DISTWIDTH] IN BLOOD BY AUTOMATED COUNT: 16.3 % (ref 11.5–14.5)
EST. GFR  (AFRICAN AMERICAN): 10 ML/MIN/1.73 M^2
EST. GFR  (NON AFRICAN AMERICAN): 8 ML/MIN/1.73 M^2
GLUCOSE SERPL-MCNC: 164 MG/DL (ref 70–110)
HCT VFR BLD AUTO: 31.9 % (ref 37–48.5)
HGB BLD-MCNC: 11.1 G/DL (ref 12–16)
MCH RBC QN AUTO: 31.7 PG (ref 27–31)
MCHC RBC AUTO-ENTMCNC: 34.8 G/DL (ref 32–36)
MCV RBC AUTO: 91 FL (ref 82–98)
PLATELET # BLD AUTO: 86 K/UL (ref 150–350)
PMV BLD AUTO: 10.4 FL (ref 9.2–12.9)
POTASSIUM SERPL-SCNC: 4.5 MMOL/L (ref 3.5–5.1)
PROT SERPL-MCNC: 6.5 G/DL (ref 6–8.4)
RBC # BLD AUTO: 3.5 M/UL (ref 4–5.4)
SODIUM SERPL-SCNC: 121 MMOL/L (ref 136–145)
TROPONIN I SERPL DL<=0.01 NG/ML-MCNC: 0.04 NG/ML (ref 0–0.03)
WBC # BLD AUTO: 4.71 K/UL (ref 3.9–12.7)

## 2020-06-08 PROCEDURE — 84484 ASSAY OF TROPONIN QUANT: CPT | Mod: NTX

## 2020-06-08 PROCEDURE — 80053 COMPREHEN METABOLIC PANEL: CPT | Mod: NTX

## 2020-06-08 PROCEDURE — 83880 ASSAY OF NATRIURETIC PEPTIDE: CPT | Mod: NTX

## 2020-06-08 PROCEDURE — 85027 COMPLETE CBC AUTOMATED: CPT | Mod: NTX

## 2020-06-08 PROCEDURE — 93010 EKG 12-LEAD: ICD-10-PCS | Mod: NTX,,, | Performed by: INTERNAL MEDICINE

## 2020-06-08 PROCEDURE — 93010 ELECTROCARDIOGRAM REPORT: CPT | Mod: NTX,,, | Performed by: INTERNAL MEDICINE

## 2020-06-08 PROCEDURE — 93005 ELECTROCARDIOGRAM TRACING: CPT | Mod: NTX

## 2020-06-08 PROCEDURE — 99285 EMERGENCY DEPT VISIT HI MDM: CPT | Mod: 25,NTX

## 2020-06-08 NOTE — Clinical Note
130 ml injected throughout the case. 20 mL total wasted during the case. 150 mL total used in the case.

## 2020-06-08 NOTE — Clinical Note
The site was marked. Prepped: left radial and left brachial. Prepped with: ChloraPrep. The site was clipped. The patient was draped. Entire Lt arm prepped

## 2020-06-09 ENCOUNTER — CLINICAL SUPPORT (OUTPATIENT)
Dept: SMOKING CESSATION | Facility: CLINIC | Age: 55
End: 2020-06-09
Payer: COMMERCIAL

## 2020-06-09 DIAGNOSIS — F17.210 CIGARETTE SMOKER: Primary | ICD-10-CM

## 2020-06-09 PROBLEM — I10 HYPERTENSION: Status: ACTIVE | Noted: 2020-06-09

## 2020-06-09 PROBLEM — E87.1 HYPONATREMIA: Status: ACTIVE | Noted: 2020-06-09

## 2020-06-09 PROBLEM — Z87.898 HISTORY OF SUBSTANCE USE: Status: ACTIVE | Noted: 2020-06-09

## 2020-06-09 PROBLEM — F10.10 ALCOHOL ABUSE: Status: ACTIVE | Noted: 2020-06-09

## 2020-06-09 PROBLEM — R06.02 SHORTNESS OF BREATH: Status: ACTIVE | Noted: 2020-06-09

## 2020-06-09 PROBLEM — T82.590A DIALYSIS AV FISTULA MALFUNCTION, INITIAL ENCOUNTER: Status: ACTIVE | Noted: 2020-06-09

## 2020-06-09 PROBLEM — R79.89 ELEVATED TROPONIN: Status: ACTIVE | Noted: 2020-06-09

## 2020-06-09 PROBLEM — R19.7 DIARRHEA: Status: ACTIVE | Noted: 2020-06-09

## 2020-06-09 PROBLEM — N25.81 SECONDARY HYPERPARATHYROIDISM: Status: ACTIVE | Noted: 2020-06-09

## 2020-06-09 LAB
ALBUMIN SERPL BCP-MCNC: 3.9 G/DL (ref 3.5–5.2)
ALP SERPL-CCNC: 60 U/L (ref 55–135)
ALT SERPL W/O P-5'-P-CCNC: 23 U/L (ref 10–44)
ANION GAP SERPL CALC-SCNC: 16 MMOL/L (ref 8–16)
AST SERPL-CCNC: 31 U/L (ref 10–40)
BACTERIA #/AREA URNS HPF: NORMAL /HPF
BASOPHILS # BLD AUTO: 0.01 K/UL (ref 0–0.2)
BASOPHILS NFR BLD: 0.2 % (ref 0–1.9)
BILIRUB SERPL-MCNC: 0.5 MG/DL (ref 0.1–1)
BILIRUB UR QL STRIP: NEGATIVE
BUN SERPL-MCNC: 38 MG/DL (ref 6–20)
C DIFF GDH STL QL: NEGATIVE
C DIFF TOX A+B STL QL IA: NEGATIVE
CALCIUM SERPL-MCNC: 9.3 MG/DL (ref 8.7–10.5)
CHLORIDE SERPL-SCNC: 84 MMOL/L (ref 95–110)
CLARITY UR: CLEAR
CO2 SERPL-SCNC: 21 MMOL/L (ref 23–29)
COLOR UR: YELLOW
CREAT SERPL-MCNC: 5.7 MG/DL (ref 0.5–1.4)
DIFFERENTIAL METHOD: ABNORMAL
EOSINOPHIL # BLD AUTO: 0 K/UL (ref 0–0.5)
EOSINOPHIL NFR BLD: 0.2 % (ref 0–8)
ERYTHROCYTE [DISTWIDTH] IN BLOOD BY AUTOMATED COUNT: 15.7 % (ref 11.5–14.5)
EST. GFR  (AFRICAN AMERICAN): 9 ML/MIN/1.73 M^2
EST. GFR  (NON AFRICAN AMERICAN): 8 ML/MIN/1.73 M^2
ETHANOL SERPL-MCNC: 30 MG/DL
GLUCOSE SERPL-MCNC: 94 MG/DL (ref 70–110)
GLUCOSE UR QL STRIP: NEGATIVE
HCT VFR BLD AUTO: 34.2 % (ref 37–48.5)
HGB BLD-MCNC: 12.2 G/DL (ref 12–16)
HGB UR QL STRIP: ABNORMAL
HYALINE CASTS #/AREA URNS LPF: 0 /LPF
IMM GRANULOCYTES # BLD AUTO: 0.01 K/UL (ref 0–0.04)
IMM GRANULOCYTES NFR BLD AUTO: 0.2 % (ref 0–0.5)
KETONES UR QL STRIP: NEGATIVE
LEUKOCYTE ESTERASE UR QL STRIP: NEGATIVE
LYMPHOCYTES # BLD AUTO: 0.5 K/UL (ref 1–4.8)
LYMPHOCYTES NFR BLD: 8.5 % (ref 18–48)
MAGNESIUM SERPL-MCNC: 2.4 MG/DL (ref 1.6–2.6)
MCH RBC QN AUTO: 31.6 PG (ref 27–31)
MCHC RBC AUTO-ENTMCNC: 35.7 G/DL (ref 32–36)
MCV RBC AUTO: 89 FL (ref 82–98)
MICROSCOPIC COMMENT: NORMAL
MONOCYTES # BLD AUTO: 0.4 K/UL (ref 0.3–1)
MONOCYTES NFR BLD: 8.1 % (ref 4–15)
NEUTROPHILS # BLD AUTO: 4.4 K/UL (ref 1.8–7.7)
NEUTROPHILS NFR BLD: 82.8 % (ref 38–73)
NITRITE UR QL STRIP: NEGATIVE
NRBC BLD-RTO: 0 /100 WBC
PH UR STRIP: 8 [PH] (ref 5–8)
PHOSPHATE SERPL-MCNC: 8.8 MG/DL (ref 2.7–4.5)
PLATELET # BLD AUTO: 95 K/UL (ref 150–350)
PMV BLD AUTO: 12.4 FL (ref 9.2–12.9)
POCT GLUCOSE: 78 MG/DL (ref 70–110)
POTASSIUM SERPL-SCNC: 4.9 MMOL/L (ref 3.5–5.1)
PROT SERPL-MCNC: 6.6 G/DL (ref 6–8.4)
PROT UR QL STRIP: ABNORMAL
RBC # BLD AUTO: 3.86 M/UL (ref 4–5.4)
RBC #/AREA URNS HPF: 1 /HPF (ref 0–4)
SARS-COV-2 RDRP RESP QL NAA+PROBE: NEGATIVE
SODIUM SERPL-SCNC: 121 MMOL/L (ref 136–145)
SP GR UR STRIP: 1.01 (ref 1–1.03)
SQUAMOUS #/AREA URNS HPF: 5 /HPF
TROPONIN I SERPL DL<=0.01 NG/ML-MCNC: 0.06 NG/ML (ref 0–0.03)
TROPONIN I SERPL DL<=0.01 NG/ML-MCNC: 0.07 NG/ML (ref 0–0.03)
URN SPEC COLLECT METH UR: ABNORMAL
UROBILINOGEN UR STRIP-ACNC: NEGATIVE EU/DL
WBC # BLD AUTO: 5.28 K/UL (ref 3.9–12.7)
WBC #/AREA URNS HPF: 1 /HPF (ref 0–5)

## 2020-06-09 PROCEDURE — 25000003 PHARM REV CODE 250: Mod: NTX | Performed by: INTERNAL MEDICINE

## 2020-06-09 PROCEDURE — 90935 HEMODIALYSIS ONE EVALUATION: CPT | Mod: NTX

## 2020-06-09 PROCEDURE — 96375 TX/PRO/DX INJ NEW DRUG ADDON: CPT

## 2020-06-09 PROCEDURE — 99407 BEHAV CHNG SMOKING > 10 MIN: CPT | Mod: S$GLB,TXP,,

## 2020-06-09 PROCEDURE — 36906 PR MECH THROMBECTOMY/INFUS, DIALYSIS CIRCUIT W/TRANSCATH PLCMNT, STENT: ICD-10-PCS | Mod: NTX,,, | Performed by: INTERNAL MEDICINE

## 2020-06-09 PROCEDURE — 96374 THER/PROPH/DIAG INJ IV PUSH: CPT

## 2020-06-09 PROCEDURE — 25000003 PHARM REV CODE 250: Mod: NTX | Performed by: FAMILY MEDICINE

## 2020-06-09 PROCEDURE — 86706 HEP B SURFACE ANTIBODY: CPT | Mod: NTX

## 2020-06-09 PROCEDURE — 84484 ASSAY OF TROPONIN QUANT: CPT | Mod: NTX

## 2020-06-09 PROCEDURE — 99152 MOD SED SAME PHYS/QHP 5/>YRS: CPT | Mod: NTX,,, | Performed by: INTERNAL MEDICINE

## 2020-06-09 PROCEDURE — 99223 1ST HOSP IP/OBS HIGH 75: CPT | Mod: 25,NTX,, | Performed by: NURSE PRACTITIONER

## 2020-06-09 PROCEDURE — 94761 N-INVAS EAR/PLS OXIMETRY MLT: CPT | Mod: NTX

## 2020-06-09 PROCEDURE — 81000 URINALYSIS NONAUTO W/SCOPE: CPT | Mod: NTX

## 2020-06-09 PROCEDURE — 63600175 PHARM REV CODE 636 W HCPCS: Mod: NTX | Performed by: INTERNAL MEDICINE

## 2020-06-09 PROCEDURE — 25500020 PHARM REV CODE 255: Mod: NTX | Performed by: INTERNAL MEDICINE

## 2020-06-09 PROCEDURE — 80100016 HC MAINTENANCE HEMODIALYSIS: Mod: NTX

## 2020-06-09 PROCEDURE — 63600175 PHARM REV CODE 636 W HCPCS: Mod: NTX | Performed by: HOSPITALIST

## 2020-06-09 PROCEDURE — 99999 PR PBB SHADOW E&M-EST. PATIENT-LVL II: ICD-10-PCS | Mod: PBBFAC,TXP,,

## 2020-06-09 PROCEDURE — C1876 STENT, NON-COA/NON-COV W/DEL: HCPCS | Mod: NTX | Performed by: INTERNAL MEDICINE

## 2020-06-09 PROCEDURE — 36906 THRMBC/NFS DIALYSIS CIRCUIT: CPT | Mod: NTX | Performed by: INTERNAL MEDICINE

## 2020-06-09 PROCEDURE — 99407 PR TOBACCO USE CESSATION INTENSIVE >10 MINUTES: ICD-10-PCS | Mod: S$GLB,TXP,,

## 2020-06-09 PROCEDURE — 36906 THRMBC/NFS DIALYSIS CIRCUIT: CPT | Mod: NTX,,, | Performed by: INTERNAL MEDICINE

## 2020-06-09 PROCEDURE — U0002 COVID-19 LAB TEST NON-CDC: HCPCS | Mod: NTX

## 2020-06-09 PROCEDURE — 99900035 HC TECH TIME PER 15 MIN (STAT): Mod: NTX

## 2020-06-09 PROCEDURE — 63600175 PHARM REV CODE 636 W HCPCS: Mod: NTX | Performed by: NURSE PRACTITIONER

## 2020-06-09 PROCEDURE — C1894 INTRO/SHEATH, NON-LASER: HCPCS | Mod: NTX | Performed by: INTERNAL MEDICINE

## 2020-06-09 PROCEDURE — 99152 MOD SED SAME PHYS/QHP 5/>YRS: CPT | Mod: NTX | Performed by: INTERNAL MEDICINE

## 2020-06-09 PROCEDURE — 27000221 HC OXYGEN, UP TO 24 HOURS: Mod: NTX

## 2020-06-09 PROCEDURE — 11000001 HC ACUTE MED/SURG PRIVATE ROOM: Mod: NTX

## 2020-06-09 PROCEDURE — 80320 DRUG SCREEN QUANTALCOHOLS: CPT | Mod: NTX

## 2020-06-09 PROCEDURE — 80053 COMPREHEN METABOLIC PANEL: CPT | Mod: NTX

## 2020-06-09 PROCEDURE — C1725 CATH, TRANSLUMIN NON-LASER: HCPCS | Mod: NTX | Performed by: INTERNAL MEDICINE

## 2020-06-09 PROCEDURE — 84100 ASSAY OF PHOSPHORUS: CPT | Mod: NTX

## 2020-06-09 PROCEDURE — C1887 CATHETER, GUIDING: HCPCS | Mod: NTX | Performed by: INTERNAL MEDICINE

## 2020-06-09 PROCEDURE — 25000003 PHARM REV CODE 250: Mod: NTX | Performed by: NURSE PRACTITIONER

## 2020-06-09 PROCEDURE — 87324 CLOSTRIDIUM AG IA: CPT | Mod: NTX

## 2020-06-09 PROCEDURE — 99152 PR MOD CONSCIOUS SEDATION, SAME PHYS, 5+ YRS, FIRST 15 MIN: ICD-10-PCS | Mod: NTX,,, | Performed by: INTERNAL MEDICINE

## 2020-06-09 PROCEDURE — 87449 NOS EACH ORGANISM AG IA: CPT | Mod: NTX

## 2020-06-09 PROCEDURE — 99999 PR PBB SHADOW E&M-EST. PATIENT-LVL II: CPT | Mod: PBBFAC,TXP,,

## 2020-06-09 PROCEDURE — 83735 ASSAY OF MAGNESIUM: CPT | Mod: NTX

## 2020-06-09 PROCEDURE — 86705 HEP B CORE ANTIBODY IGM: CPT | Mod: NTX

## 2020-06-09 PROCEDURE — 85025 COMPLETE CBC W/AUTO DIFF WBC: CPT | Mod: NTX

## 2020-06-09 PROCEDURE — C1769 GUIDE WIRE: HCPCS | Mod: NTX | Performed by: INTERNAL MEDICINE

## 2020-06-09 PROCEDURE — 99223 PR INITIAL HOSPITAL CARE,LEVL III: ICD-10-PCS | Mod: 25,NTX,, | Performed by: NURSE PRACTITIONER

## 2020-06-09 PROCEDURE — C1757 CATH, THROMBECTOMY/EMBOLECT: HCPCS | Mod: NTX | Performed by: INTERNAL MEDICINE

## 2020-06-09 PROCEDURE — 36415 COLL VENOUS BLD VENIPUNCTURE: CPT | Mod: NTX

## 2020-06-09 PROCEDURE — 99153 MOD SED SAME PHYS/QHP EA: CPT | Mod: NTX | Performed by: INTERNAL MEDICINE

## 2020-06-09 PROCEDURE — 87340 HEPATITIS B SURFACE AG IA: CPT | Mod: NTX

## 2020-06-09 PROCEDURE — 96376 TX/PRO/DX INJ SAME DRUG ADON: CPT

## 2020-06-09 PROCEDURE — 27201423 OPTIME MED/SURG SUP & DEVICES STERILE SUPPLY: Mod: NTX | Performed by: INTERNAL MEDICINE

## 2020-06-09 RX ORDER — LIDOCAINE HYDROCHLORIDE 10 MG/ML
INJECTION INFILTRATION; PERINEURAL
Status: DISCONTINUED | OUTPATIENT
Start: 2020-06-09 | End: 2020-06-09 | Stop reason: HOSPADM

## 2020-06-09 RX ORDER — ONDANSETRON 8 MG/1
8 TABLET, ORALLY DISINTEGRATING ORAL EVERY 8 HOURS PRN
Status: DISCONTINUED | OUTPATIENT
Start: 2020-06-09 | End: 2020-06-10 | Stop reason: HOSPADM

## 2020-06-09 RX ORDER — FUROSEMIDE 10 MG/ML
120 INJECTION INTRAMUSCULAR; INTRAVENOUS ONCE
Status: COMPLETED | OUTPATIENT
Start: 2020-06-09 | End: 2020-06-09

## 2020-06-09 RX ORDER — ONDANSETRON 2 MG/ML
4 INJECTION INTRAMUSCULAR; INTRAVENOUS EVERY 6 HOURS PRN
Status: DISCONTINUED | OUTPATIENT
Start: 2020-06-09 | End: 2020-06-10 | Stop reason: HOSPADM

## 2020-06-09 RX ORDER — HEPARIN SODIUM 200 [USP'U]/100ML
INJECTION, SOLUTION INTRAVENOUS
Status: DISCONTINUED | OUTPATIENT
Start: 2020-06-09 | End: 2020-06-10

## 2020-06-09 RX ORDER — HYDRALAZINE HYDROCHLORIDE 20 MG/ML
10 INJECTION INTRAMUSCULAR; INTRAVENOUS ONCE
Status: COMPLETED | OUTPATIENT
Start: 2020-06-09 | End: 2020-06-09

## 2020-06-09 RX ORDER — SEVELAMER CARBONATE 800 MG/1
800 TABLET, FILM COATED ORAL
Status: DISCONTINUED | OUTPATIENT
Start: 2020-06-09 | End: 2020-06-10 | Stop reason: HOSPADM

## 2020-06-09 RX ORDER — MUPIROCIN 20 MG/G
OINTMENT TOPICAL 2 TIMES DAILY
Status: DISCONTINUED | OUTPATIENT
Start: 2020-06-09 | End: 2020-06-10 | Stop reason: HOSPADM

## 2020-06-09 RX ORDER — CARVEDILOL 12.5 MG/1
12.5 TABLET ORAL 2 TIMES DAILY
Status: DISCONTINUED | OUTPATIENT
Start: 2020-06-09 | End: 2020-06-09

## 2020-06-09 RX ORDER — HYDRALAZINE HYDROCHLORIDE 20 MG/ML
10 INJECTION INTRAMUSCULAR; INTRAVENOUS EVERY 8 HOURS PRN
Status: DISCONTINUED | OUTPATIENT
Start: 2020-06-09 | End: 2020-06-09

## 2020-06-09 RX ORDER — SODIUM CHLORIDE 9 MG/ML
INJECTION, SOLUTION INTRAVENOUS
Status: DISCONTINUED | OUTPATIENT
Start: 2020-06-09 | End: 2020-06-10 | Stop reason: HOSPADM

## 2020-06-09 RX ORDER — IPRATROPIUM BROMIDE AND ALBUTEROL SULFATE 2.5; .5 MG/3ML; MG/3ML
3 SOLUTION RESPIRATORY (INHALATION) EVERY 4 HOURS PRN
Status: DISCONTINUED | OUTPATIENT
Start: 2020-06-09 | End: 2020-06-10 | Stop reason: HOSPADM

## 2020-06-09 RX ORDER — LOSARTAN POTASSIUM 50 MG/1
100 TABLET ORAL DAILY
Status: DISCONTINUED | OUTPATIENT
Start: 2020-06-09 | End: 2020-06-10 | Stop reason: HOSPADM

## 2020-06-09 RX ORDER — DIPHENHYDRAMINE HYDROCHLORIDE 50 MG/ML
INJECTION INTRAMUSCULAR; INTRAVENOUS
Status: DISCONTINUED | OUTPATIENT
Start: 2020-06-09 | End: 2020-06-09 | Stop reason: HOSPADM

## 2020-06-09 RX ORDER — ACETAMINOPHEN 325 MG/1
650 TABLET ORAL EVERY 6 HOURS PRN
Status: DISCONTINUED | OUTPATIENT
Start: 2020-06-09 | End: 2020-06-10 | Stop reason: HOSPADM

## 2020-06-09 RX ORDER — IODIXANOL 320 MG/ML
INJECTION, SOLUTION INTRAVASCULAR
Status: DISCONTINUED | OUTPATIENT
Start: 2020-06-09 | End: 2020-06-09 | Stop reason: HOSPADM

## 2020-06-09 RX ORDER — CINACALCET 30 MG/1
30 TABLET, FILM COATED ORAL
Status: DISCONTINUED | OUTPATIENT
Start: 2020-06-09 | End: 2020-06-10 | Stop reason: HOSPADM

## 2020-06-09 RX ORDER — LOSARTAN POTASSIUM 50 MG/1
100 TABLET ORAL DAILY
Status: DISCONTINUED | OUTPATIENT
Start: 2020-06-09 | End: 2020-06-09

## 2020-06-09 RX ORDER — HEPARIN SODIUM 1000 [USP'U]/ML
INJECTION, SOLUTION INTRAVENOUS; SUBCUTANEOUS
Status: DISCONTINUED | OUTPATIENT
Start: 2020-06-09 | End: 2020-06-09 | Stop reason: HOSPADM

## 2020-06-09 RX ORDER — HYDRALAZINE HYDROCHLORIDE 20 MG/ML
10 INJECTION INTRAMUSCULAR; INTRAVENOUS EVERY 8 HOURS PRN
Status: DISCONTINUED | OUTPATIENT
Start: 2020-06-09 | End: 2020-06-10 | Stop reason: HOSPADM

## 2020-06-09 RX ORDER — FENTANYL CITRATE 50 UG/ML
INJECTION, SOLUTION INTRAMUSCULAR; INTRAVENOUS
Status: DISCONTINUED | OUTPATIENT
Start: 2020-06-09 | End: 2020-06-09 | Stop reason: HOSPADM

## 2020-06-09 RX ORDER — SODIUM CHLORIDE 9 MG/ML
INJECTION, SOLUTION INTRAVENOUS ONCE
Status: COMPLETED | OUTPATIENT
Start: 2020-06-09 | End: 2020-06-09

## 2020-06-09 RX ORDER — IBUPROFEN 200 MG
1 TABLET ORAL DAILY
Status: DISCONTINUED | OUTPATIENT
Start: 2020-06-09 | End: 2020-06-10 | Stop reason: HOSPADM

## 2020-06-09 RX ORDER — CARVEDILOL 12.5 MG/1
12.5 TABLET ORAL 2 TIMES DAILY
Status: DISCONTINUED | OUTPATIENT
Start: 2020-06-09 | End: 2020-06-10 | Stop reason: HOSPADM

## 2020-06-09 RX ORDER — MIDAZOLAM HYDROCHLORIDE 1 MG/ML
INJECTION INTRAMUSCULAR; INTRAVENOUS
Status: DISCONTINUED | OUTPATIENT
Start: 2020-06-09 | End: 2020-06-09 | Stop reason: HOSPADM

## 2020-06-09 RX ORDER — ESCITALOPRAM OXALATE 10 MG/1
20 TABLET ORAL DAILY
Status: DISCONTINUED | OUTPATIENT
Start: 2020-06-09 | End: 2020-06-10 | Stop reason: HOSPADM

## 2020-06-09 RX ADMIN — SEVELAMER CARBONATE 800 MG: 800 TABLET, FILM COATED ORAL at 01:06

## 2020-06-09 RX ADMIN — LOSARTAN POTASSIUM 100 MG: 50 TABLET ORAL at 04:06

## 2020-06-09 RX ADMIN — CARVEDILOL 12.5 MG: 12.5 TABLET, FILM COATED ORAL at 04:06

## 2020-06-09 RX ADMIN — ONDANSETRON 4 MG: 2 INJECTION, SOLUTION INTRAMUSCULAR; INTRAVENOUS at 03:06

## 2020-06-09 RX ADMIN — FUROSEMIDE 120 MG: 10 INJECTION, SOLUTION INTRAVENOUS at 01:06

## 2020-06-09 RX ADMIN — SEVELAMER CARBONATE 800 MG: 800 TABLET, FILM COATED ORAL at 09:06

## 2020-06-09 RX ADMIN — ESCITALOPRAM OXALATE 20 MG: 10 TABLET ORAL at 09:06

## 2020-06-09 RX ADMIN — CINACALCET HYDROCHLORIDE 30 MG: 30 TABLET, FILM COATED ORAL at 09:06

## 2020-06-09 RX ADMIN — SODIUM CHLORIDE: 0.9 INJECTION, SOLUTION INTRAVENOUS at 07:06

## 2020-06-09 RX ADMIN — HYDRALAZINE HYDROCHLORIDE 10 MG: 20 INJECTION INTRAMUSCULAR; INTRAVENOUS at 01:06

## 2020-06-09 RX ADMIN — HYDRALAZINE HYDROCHLORIDE 10 MG: 20 INJECTION INTRAMUSCULAR; INTRAVENOUS at 04:06

## 2020-06-09 RX ADMIN — CARVEDILOL 12.5 MG: 12.5 TABLET, FILM COATED ORAL at 11:06

## 2020-06-09 NOTE — PROGRESS NOTES
Individual Follow-Up Form    6/9/2020    Quit Date: To be determined    Clinical Status of Patient: Inpatient    Length of Service: 30 minutes    Comments: VRT cued into pt's room for smoking cessation education. Pt is a 1 pack per day cigarette smoker x 36 years.  21 mg nicotine patch ordered. Pt states that she is ready to quit smoking and she was enrolled in the [employer] during this encounter.     Diagnosis: F17.210    Next Visit:  Ambulatory referral to Smoking Cessation program following hospital discharge.

## 2020-06-09 NOTE — ASSESSMENT & PLAN NOTE
Patient was counseled on smoking cessation for 5 minutes and will be provided additional smoking cessation counseling prior to discharge.

## 2020-06-09 NOTE — ASSESSMENT & PLAN NOTE
History of substance use     Serum alcohol at 30  Monitor for DTs  Continuous cardiac monitoring  CIWA q 4 hours  Seizure/ Fall/ Delirium precautions  Ativan prn

## 2020-06-09 NOTE — H&P
"[facility] Hospital Medicine  History & Physical    Patient Name: Jennifer Booth  MRN: [id number]  Admission Date: 6/8/2020  Attending Physician: Ara Dahl*   Primary Care Provider: Evangelista Wilks MD         Patient information was obtained from patient and ER records.     Subjective:     Principal Problem:Hyponatremia    Chief Complaint:   Chief Complaint   Patient presents with    Shortness of Breath     Patient presents to ED secondary to shortness of breath x "a few hours" while at rest. Patient gets dialysis on tuesday, thursday and saturdays. EMS reports patient was diaphoretic and 84% on RA and 99% on non rebreather upon arrival. Received combivent treatment while en route.         HPI: 55 y.o. Female with past medial history of HTN, substance abuse, alcohol use, tobacco use, GERD, CKD on HD (Dialysis TThSat) here for compliant of SOB that started yesterday evening. Pt states she was feeling healthy until just a few hours ago. Pt states she was smoking, and all of a sudden felt very SOB with chest tightness. Pt states the SOB was consistent, did not get any better until EMS put pt on a nonrebreather.  Denies chest pain, nausea, vomiting, fevers, chills, dizziness or weakness. Of note, she reports she has been having diarrhea for about 6 months.  ED findings: Na 121, creatinine 5.3, potassium 4.5, BNP >4900, troponin 0.037, serum alcohol 30, chest x-ray concerning for volume overload, covid negative.  Patient admitted to hospital medicine for further medical management.    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      Depression     Dialysis patient     dialysis m-w-f    Encounter for blood transfusion     GERD (gastroesophageal reflux disease)     Hypertension     " Mood swings     Multiple myeloma in remission 10/26/2012    per Hem/Oc note    Multiple myeloma without mention of remission     Renal hypertension     Status post dialysis 2012    Thrombocytopenia 2012       Past Surgical History:   Procedure Laterality Date    AV FISTULA PLACEMENT Left     BREAST CYST ASPIRATION Left     BREAST CYST EXCISION Left      SECTION      x1    pd catheter      PERITONEAL CATHETER REMOVAL N/A 2018    Procedure: REMOVAL, CATHETER, DIALYSIS, PERITONEAL;  Surgeon: Mukesh Gonsales Jr., MD;  Location: [facility];  Service: General;  Laterality: N/A;    RENAL BIOPSY  2016    VASCULAR SURGERY  2012    dialysis catheter to right subclavian       Review of patient's allergies indicates:   Allergen Reactions    Doxycycline Swelling, Rash and Hives    Gentamicin Anaphylaxis    Vancomycin analogues Anaphylaxis       No current facility-administered medications on file prior to encounter.      Current Outpatient Medications on File Prior to Encounter   Medication Sig    acetaminophen with codeine (ACETAMINOPHEN-CODEINE) 120mg 12mg 5mL Soln TK 5 ML PO Q 8 H PRF PAIN    B COMPLEX W-C NO.20/FOLIC ACID (VIRT-CAPS ORAL) Take 1 capsule by mouth once daily.     carvedilol (COREG) 12.5 MG tablet TK 1 T PO BID    cholecalciferol, vitamin D3, (VITAMIN D3) 400 unit Cap Take 2,000 Units by mouth once daily.     cinacalcet (SENSIPAR) 30 MG Tab Take 30 mg by mouth.    CREON 24,000-76,000 -120,000 unit capsule TK 1 C PO TID WC    escitalopram oxalate (LEXAPRO) 20 MG tablet TAKE 1 TABLET BY MOUTH DAILY    folic acid (FOLVITE) 1 MG tablet Take 1 mg by mouth.    guaiFENesin (MUCINEX) 600 mg 12 hr tablet Take 1,200 mg by mouth 2 (two) times daily.    losartan (COZAAR) 100 MG tablet     sucroferric oxyhydroxide (VELPHORO) 500 mg Chew Take 500 mg by mouth 3 (three) times daily.     Family History     Problem Relation (Age of Onset)    Diabetes Maternal Grandmother     Heart failure Mother    Hypertension Mother    Ovarian cancer Maternal Aunt    Stroke Maternal Grandmother        Tobacco Use    Smoking status: Current Every Day Smoker     Packs/day: 0.50     Years: 30.00     Pack years: 15.00     Types: Cigarettes    Smokeless tobacco: Never Used    Tobacco comment: 12/16/15: states 1/2 pack a day   Substance and Sexual Activity    Alcohol use: Yes     Alcohol/week: 2.0 standard drinks     Types: 2 Glasses of wine per week     Comment: nightly    Drug use: No    Sexual activity: Not Currently     Review of Systems   Constitutional: Negative for chills and fever.   HENT: Negative for congestion, postnasal drip and rhinorrhea.    Respiratory: Positive for shortness of breath. Negative for chest tightness.    Cardiovascular: Negative for chest pain and palpitations.   Gastrointestinal: Negative for abdominal distention and abdominal pain.   Genitourinary: Negative for difficulty urinating.   Musculoskeletal: Negative for arthralgias and myalgias.   Skin: Negative for color change and wound.   Neurological: Negative for dizziness, weakness and headaches.   Psychiatric/Behavioral: Negative for agitation.     Objective:     Vital Signs (Most Recent):  Temp: 97.4 °F (36.3 °C) (06/08/20 2207)  Pulse: 75 (06/09/20 0202)  Resp: (!) 41 (06/09/20 0202)  BP: (!) 181/130 (06/09/20 0202)  SpO2: 99 % (06/09/20 0202) Vital Signs (24h Range):  Temp:  [97.4 °F (36.3 °C)] 97.4 °F (36.3 °C)  Pulse:  [66-75] 75  Resp:  [21-41] 41  SpO2:  [99 %-100 %] 99 %  BP: (167-190)/(116-130) 181/130     Weight: 56.7 kg (125 lb)  Body mass index is 19.58 kg/m².    Physical Exam   Constitutional: She is oriented to person, place, and time. She appears well-developed and well-nourished.   HENT:   Head: Normocephalic and atraumatic.   Eyes: Pupils are equal, round, and reactive to light. EOM are normal.   Neck: Normal range of motion. Neck supple.   Cardiovascular: Normal rate.   Pulmonary/Chest: Effort normal.    Supplemental oxygen in use   Abdominal: Soft. Bowel sounds are normal.   Musculoskeletal: She exhibits no deformity.   Neurological: She is alert and oriented to person, place, and time.   Skin: Skin is warm and dry.   Psychiatric: She has a normal mood and affect.         CRANIAL NERVES     CN III, IV, VI   Pupils are equal, round, and reactive to light.  Extraocular motions are normal.        Significant Labs:     Bilirubin:   Recent Labs   Lab 06/08/20 2210   BILITOT 0.5     BMP:   Recent Labs   Lab 06/08/20 2210   *   *   K 4.5   CL 84*   CO2 20*   BUN 32*   CREATININE 5.3*   CALCIUM 9.2     CBC:   Recent Labs   Lab 06/08/20 2210   WBC 4.71   HGB 11.1*   HCT 31.9*   PLT 86*     CMP:   Recent Labs   Lab 06/08/20 2210   *   K 4.5   CL 84*   CO2 20*   *   BUN 32*   CREATININE 5.3*   CALCIUM 9.2   PROT 6.5   ALBUMIN 3.7   BILITOT 0.5   ALKPHOS 66   AST 21   ALT 14   ANIONGAP 17*   EGFRNONAA 8*     Cardiac Markers:   Recent Labs   Lab 06/08/20 2210   BNP >4,900*     Troponin:   Recent Labs   Lab 06/08/20 2210   TROPONINI 0.037*       Significant Imaging: I have reviewed all pertinent imaging results/findings within the past 24 hours.    Assessment/Plan:     * Hyponatremia  Monitor  Daily labs  neurochecks q 4 hours      Shortness of breath  Chest x-ray concerning for volume overload  BNP and troponin elevated  continuous cardiac monitoring  Echo pending  Daily weight  Oxygen prn      Elevated troponin  Likely related to demand  Denies chest pain  Trend x 3      Diarrhea  Patient reports diarrhea for approximately 6 months  Stool for Cdiff pending  monitor      Alcohol abuse  History of substance use     Serum alcohol at 30  Monitor for DTs  Continuous cardiac monitoring  CIWA q 4 hours  Seizure/ Fall/ Delirium precautions  Ativan prn    Hypertension  Continue losartan and carvedilol      Anxiety and depression    Continue escitalopram    Anemia in chronic kidney disease, on chronic  dialysis  Monitor      ESRD (end stage renal disease) on dialysis  Secondary hyperparathyroidism    Consult nephrology  Strict  I&O  Avoid nephrotoxic agents   Renally dose medications      Tobacco abuse counseling  Patient was counseled on smoking cessation for 5 minutes and will be provided additional smoking cessation counseling prior to discharge.        VTE Risk Mitigation (From admission, onward)    None             Elena Mike NP  Department of Hospital Medicine   [facility]

## 2020-06-09 NOTE — ED NOTES
Pt had coughing spell, became nauseous and had projectile red/purple emesis. Pt had about 300cc emesis out. Given 4mg Zofran. Pt states she does not feel nauseous anymore

## 2020-06-09 NOTE — HPI
55 y.o. Female with past medial history of HTN, substance abuse, alcohol use, tobacco use, GERD, CKD on HD (Dialysis TThSat) here for compliant of SOB that started yesterday evening. Pt states she was feeling healthy until just a few hours ago. Pt states she was smoking, and all of a sudden felt very SOB with chest tightness. Pt states the SOB was consistent, did not get any better until EMS put pt on a nonrebreather.  Denies chest pain, nausea, vomiting, fevers, chills, dizziness or weakness. Of note, she reports she has been having diarrhea for about 6 months.  ED findings: Na 121, creatinine 5.3, potassium 4.5, BNP >4900, troponin 0.037, serum alcohol 30, chest x-ray concerning for volume overload, covid negative.  Patient admitted to hospital medicine for further medical management.

## 2020-06-09 NOTE — CONSULTS
Continuity of care is important to us. Patient was previously seen in office by Dr. Marla Capellan. Please consult Dr. Marla Capellan/Carloz.

## 2020-06-09 NOTE — BRIEF OP NOTE
Brief Operative Note:    : Rahat Berger MD     Referring Physician: Self,Aaareferral     All Operators: Surgeon(s):  Rahat Berger MD     Preoperative Diagnosis: ESRD on hemodialysis [N18.6, Z99.2]  Dialysis AV fistula malfunction, initial encounter [T82.590A]     Postop Diagnosis: ESRD on hemodialysis [N18.6, Z99.2]  Dialysis AV fistula malfunction, initial encounter [T82.590A]    Treatments/Procedures: Procedure(s) (LRB):  Fistulogram (N/A)  Stent, Fistula  PTA, AV FISTULA (N/A)  Thrombectomy, Hemodialysis Graft Or Fistula (N/A)    Findings:Severe stenosis within av fistula.   Dtra+proximal venous access  4x60 then 6x60 sequential ballooning  6x100 viabahn given resistant, undilatable lesion across proximal part. See catheterization report for full details.    Estimated Blood loss: 10 cc    Specimens removed: No    Huber Vargas

## 2020-06-09 NOTE — CONSULTS
Nephrology Consult  H&P      Consult Requested By: Andrew Anderson, *  Reason for Consult: ESRD    SUBJECTIVE:     History of Present Illness:  Patient is a 55 y.o. female presents with SOB that started yesterday evening. Pt states she was feeling healthy until just a few hours ago. 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 Wolfe with Rx: 3.5h, Dry weight 56kg    Review of Systems   Constitutional: Negative for chills and fever.   Eyes: Negative for blurred vision and double vision.   Respiratory: Positive for shortness of breath. Negative for cough.    Cardiovascular: Negative for chest pain and leg swelling.   Gastrointestinal: Positive for diarrhea (chronic due to pancreatic insufficiency ). Negative for blood in stool, nausea and vomiting.   Genitourinary: Negative for dysuria, frequency and urgency.   Musculoskeletal: Negative for myalgias and neck pain.   Skin: Negative for itching and rash.   Neurological: Negative for dizziness.   Endo/Heme/Allergies: Does not bruise/bleed easily.   Psychiatric/Behavioral: Negative for depression.       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      Depression     Dialysis patient     dialysis m-w-f    Encounter for blood transfusion     GERD (gastroesophageal reflux disease)     Hypertension     Mood swings     Multiple myeloma in remission 10/26/2012    per Hem/Oc note    Multiple myeloma without mention of remission     Renal hypertension     Status post dialysis 8/2012    Thrombocytopenia 8/2/2012     Past Surgical History:   Procedure Laterality Date    AV FISTULA PLACEMENT Left 2014    BREAST CYST ASPIRATION  "Left     BREAST CYST EXCISION Left      SECTION      x1    pd catheter      PERITONEAL CATHETER REMOVAL N/A 2018    Procedure: REMOVAL, CATHETER, DIALYSIS, PERITONEAL;  Surgeon: Mukesh Gonsales Jr., MD;  Location: [facility] OR;  Service: General;  Laterality: N/A;    RENAL BIOPSY  2016    VASCULAR SURGERY  2012    dialysis catheter to right subclavian     Family History   Problem Relation Age of Onset    Hypertension Mother     Heart failure Mother     Ovarian cancer Maternal Aunt     Diabetes Maternal Grandmother     Stroke Maternal Grandmother     Breast cancer Neg Hx     Colon cancer Neg Hx      Social History     Tobacco Use    Smoking status: Current Every Day Smoker     Packs/day: 0.50     Years: 30.00     Pack years: 15.00     Types: Cigarettes    Smokeless tobacco: Never Used    Tobacco comment: 12/16/15: states 1/2 pack a day   Substance Use Topics    Alcohol use: Yes     Alcohol/week: 2.0 standard drinks     Types: 2 Glasses of wine per week     Comment: A bottle of wine/ night, Last night  had a bottle of wine    Drug use: No       Review of patient's allergies indicates:   Allergen Reactions    Doxycycline Swelling, Rash and Hives    Gentamicin Anaphylaxis    Vancomycin analogues Anaphylaxis            OBJECTIVE:     Vital Signs (Most Recent)  Vitals:    20 0443 20 0452 20 0552 20 0829   BP: (!) 175/113 (!) 170/110 (!) 169/108 (!) 163/109   Pulse: 75 75 75 73   Resp:  (!) 26 20 20   Temp:   97.5 °F (36.4 °C) 97 °F (36.1 °C)   TempSrc:    Oral   SpO2:  100% 99% 97%   Weight:   59 kg (130 lb 1.1 oz)    Height:   5' 8" (1.727 m)                  Medications:   sodium chloride 0.9%   Intravenous Once    carvediloL  12.5 mg Oral BID    cinacalcet  30 mg Oral Daily with breakfast    escitalopram oxalate  20 mg Oral Daily    losartan  100 mg Oral Daily    mupirocin   Nasal BID    sevelamer carbonate  800 mg Oral TID WM           Physical " Exam   Constitutional: She is oriented to person, place, and time and well-developed, well-nourished, and in no distress. No distress.   HENT:   Head: Normocephalic and atraumatic.   Mouth/Throat: Oropharynx is clear and moist.   Eyes: EOM are normal. No scleral icterus.   Neck: Neck supple. No JVD present.   Cardiovascular: Normal rate and regular rhythm. Exam reveals no friction rub.   No murmur heard.  Pulmonary/Chest: Effort normal. No respiratory distress. She has no wheezes. She has rales.   Abdominal: Soft. Bowel sounds are normal. She exhibits no distension. There is no tenderness.   Musculoskeletal: She exhibits no edema.   Neurological: She is alert and oriented to person, place, and time.   Skin: Skin is warm and dry. No rash noted. She is not diaphoretic. No erythema.   Psychiatric: Affect normal.       Laboratory:  Recent Labs   Lab 06/08/20 2210 06/09/20  0738   WBC 4.71 5.28   HGB 11.1* 12.2   HCT 31.9* 34.2*   PLT 86* 95*   MONO  --  8.1  0.4     Recent Labs   Lab 06/08/20 2210   *   K 4.5   CL 84*   CO2 20*   BUN 32*   CREATININE 5.3*   CALCIUM 9.2       Diagnostic Results:  X-Ray: Reviewed  US: Reviewed  Echo: Reviewed  ASSESSMENT/PLAN:     1. 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 Wolfe with Rx: 3.5h, Dry weight 56kg  Was previously listed for transplant in [facility] but now suspended    HD today    2. HTN (I10) - reassess after UF  3. Anemia of chronic kidney disease treated with SHERMAN (N18.9 D63.1) - at goal no epogen today   Recent Labs   Lab 06/08/20 2210 06/09/20  0738   HGB 11.1* 12.2   HCT 31.9* 34.2*   PLT 86* 95*       Lab Results   Component Value Date    IRON 47 02/08/2017    TIBC 272 02/08/2017    FERRITIN 825 (H) 02/08/2017       4. MBD (E88.9 M90.80) - recheck PTH, resume renvela    Lab Results   Component Value Date    PTH 1,149.0 (H) 06/14/2019    CALCIUM 9.2 06/08/2020    CAION 1.23 12/26/2011     PHOS 5.6 (H) 11/14/2017     Lab Results   Component Value Date    [id number] 34 06/19/2012       Lab Results   Component Value Date    CO2 20 (L) 06/08/2020       5. Hemodialysis Access (Z99.2 V45.11)- L wrist AVF  6. Nutrition/Hypoalbuminemia (E88.09) -  Recent Labs   Lab 06/08/20  2210   ALBUMIN 3.7     Nepro with meals TID. Renal vitamins daily          Thank you for consult, will follow  With any question please call [phone]  Erika Vega    [facility]  VIOLA Robb MD, NINA HARTMANN MD,   MD CURRY Vick, NP  [address]  [place], [address] [phone]

## 2020-06-09 NOTE — HPI
Jennifer Booth is a 55 y.o. Female with HTN, substance abuse, alcohol use, tobacco use, GERD, CKD on HD (Dialysis TThSat) here for compliant of SOB that started Monday. Patient all of a sudden felt very SOB with chest tightness. Pt states the SOB was consistent, did not get any better until EMS put pt on a nonrebreather.  Denies chest pain, nausea, vomiting, dizziness or weakness.  She last underwent HD on Saturday without reports of any problems and tolerated her full run. BNP >4900,  serum alcohol 30, chest x-ray concerning for volume overload, covid negative. Patient was unable to undergo HD 2/2 AVG malfunction and cardiology was consulted for a fistulagram.

## 2020-06-09 NOTE — ASSESSMENT & PLAN NOTE
Chest x-ray concerning for volume overload  BNP and troponin elevated  continuous cardiac monitoring  Echo pending  Daily weight  Oxygen prn

## 2020-06-09 NOTE — ED NOTES
Pt was doing very well on NRB--per MD request, try to ween pt off the oxygen. Turned O2 down to 2L, pt satting 92% and having difficulty breathing. Turned pt O2 up to 4L, pt sat at 95% but still having difficulty breathing. Per MD, put pt back on NRB. Pt feeling much more comfortable now.

## 2020-06-09 NOTE — SUBJECTIVE & OBJECTIVE
Past Medical History:   Diagnosis Date    Allergic drug reaction 2017    Anemia     Anxiety     Arm pain, left 2014    Breast cyst     Chronic renal failure 2014    CKD stage 4, GFR 15-29 ml/min from myeloma kidney & HTN      Depression     Dialysis patient     dialysis m-w-f    Encounter for blood transfusion     GERD (gastroesophageal reflux disease)     Hypertension     Mood swings     Multiple myeloma in remission 10/26/2012    per Hem/Oc note    Multiple myeloma without mention of remission     Renal hypertension     Status post dialysis 2012    Thrombocytopenia 2012       Past Surgical History:   Procedure Laterality Date    AV FISTULA PLACEMENT Left     BREAST CYST ASPIRATION Left     BREAST CYST EXCISION Left      SECTION      x1    pd catheter      PERITONEAL CATHETER REMOVAL N/A 2018    Procedure: REMOVAL, CATHETER, DIALYSIS, PERITONEAL;  Surgeon: Mukesh Gonsales Jr., MD;  Location: [facility];  Service: General;  Laterality: N/A;    RENAL BIOPSY      VASCULAR SURGERY  2012    dialysis catheter to right subclavian       Review of patient's allergies indicates:   Allergen Reactions    Doxycycline Swelling, Rash and Hives    Gentamicin Anaphylaxis    Vancomycin analogues Anaphylaxis       No current facility-administered medications on file prior to encounter.      Current Outpatient Medications on File Prior to Encounter   Medication Sig    acetaminophen with codeine (ACETAMINOPHEN-CODEINE) 120mg 12mg 5mL Soln TK 5 ML PO Q 8 H PRF PAIN    B COMPLEX W-C NO.20/FOLIC ACID (VIRT-CAPS ORAL) Take 1 capsule by mouth once daily.     carvedilol (COREG) 12.5 MG tablet TK 1 T PO BID    cholecalciferol, vitamin D3, (VITAMIN D3) 400 unit Cap Take 2,000 Units by mouth once daily.     cinacalcet (SENSIPAR) 30 MG Tab Take 30 mg by mouth.    CREON 24,000-76,000 -120,000 unit capsule TK 1 C PO TID WC    escitalopram oxalate (LEXAPRO) 20 MG  tablet TAKE 1 TABLET BY MOUTH DAILY    guaiFENesin (MUCINEX) 600 mg 12 hr tablet Take 1,200 mg by mouth 2 (two) times daily.    losartan (COZAAR) 100 MG tablet     sucroferric oxyhydroxide (VELPHORO) 500 mg Chew Take 500 mg by mouth 3 (three) times daily.    folic acid (FOLVITE) 1 MG tablet Take 1 mg by mouth.     Family History     Problem Relation (Age of Onset)    Diabetes Maternal Grandmother    Heart failure Mother    Hypertension Mother    Ovarian cancer Maternal Aunt    Stroke Maternal Grandmother        Tobacco Use    Smoking status: Current Every Day Smoker     Packs/day: 1.00     Years: 36.00     Pack years: 36.00     Types: Cigarettes     Start date: 1984    Smokeless tobacco: Never Used    Tobacco comment: Pt enrolled in the [employer]. Ambulatory referral to Smoking Cessation program.    Substance and Sexual Activity    Alcohol use: Yes     Alcohol/week: 2.0 standard drinks     Types: 2 Glasses of wine per week     Comment: A bottle of wine/ night, Last night 6/8 had a bottle of wine    Drug use: No    Sexual activity: Not Currently     Review of Systems   Constitution: Negative for diaphoresis.   HENT: Negative.    Eyes: Negative.    Cardiovascular: Positive for dyspnea on exertion and orthopnea. Negative for chest pain, irregular heartbeat, leg swelling, near-syncope, palpitations, paroxysmal nocturnal dyspnea and syncope.   Respiratory: Positive for cough and shortness of breath.    Endocrine: Negative.    Hematologic/Lymphatic: Negative.    Skin: Negative.    Musculoskeletal: Negative.    Gastrointestinal: Negative.    Genitourinary: Negative.    Neurological: Negative.    Psychiatric/Behavioral: Negative.    Allergic/Immunologic: Negative.      Objective:     Vital Signs (Most Recent):  Temp: 96.5 °F (35.8 °C) (06/09/20 1203)  Pulse: 76 (06/09/20 1203)  Resp: 20 (06/09/20 1203)  BP: (!) 161/109 (06/09/20 1203)  SpO2: 97 % (06/09/20 1203) Vital Signs (24h Range):  Temp:  [96.5 °F (35.8  °C)-98.1 °F (36.7 °C)] 96.5 °F (35.8 °C)  Pulse:  [66-80] 76  Resp:  [20-41] 20  SpO2:  [89 %-100 %] 97 %  BP: (146-190)/(103-130) 161/109     Weight: 59 kg (130 lb 1.1 oz)  Body mass index is 19.78 kg/m².    SpO2: 97 %  O2 Device (Oxygen Therapy): nasal cannula      Intake/Output Summary (Last 24 hours) at 6/9/2020 1330  Last data filed at 6/9/2020 1050  Gross per 24 hour   Intake 290 ml   Output 330 ml   Net -40 ml       Lines/Drains/Airways     Drain                 Hemodialysis AV Fistula Left forearm -- days          Peripheral Intravenous Line                 Peripheral IV - Single Lumen 06/08/20 2210 20 G Right Forearm less than 1 day                Physical Exam   Constitutional: She is oriented to person, place, and time. She appears well-developed and well-nourished. No distress.   HENT:   Head: Atraumatic.   Eyes: Right eye exhibits no discharge. Left eye exhibits no discharge.   Neck: No JVD present.   Cardiovascular: Normal rate, regular rhythm and normal heart sounds. Exam reveals no gallop and no friction rub.   No murmur heard.  Left wrist AVG +thrill, - bruit    Pulmonary/Chest: Effort normal. She has decreased breath sounds. She has no rales.   Abdominal: Soft. Bowel sounds are normal.   Neurological: She is alert and oriented to person, place, and time.   Skin: Skin is warm and dry. She is not diaphoretic.   Psychiatric: She has a normal mood and affect. Her behavior is normal. Judgment and thought content normal.       Significant Labs:   BMP:   Recent Labs   Lab 06/08/20 2210 06/09/20  0738   * 94   * 121*   K 4.5 4.9   CL 84* 84*   CO2 20* 21*   BUN 32* 38*   CREATININE 5.3* 5.7*   CALCIUM 9.2 9.3   MG  --  2.4   , CMP   Recent Labs   Lab 06/08/20 2210 06/09/20  0738   * 121*   K 4.5 4.9   CL 84* 84*   CO2 20* 21*   * 94   BUN 32* 38*   CREATININE 5.3* 5.7*   CALCIUM 9.2 9.3   PROT 6.5 6.6   ALBUMIN 3.7 3.9   BILITOT 0.5 0.5   ALKPHOS 66 60   AST 21 31   ALT 14 23    ANIONGAP 17* 16   ESTGFRAFRICA 10* 9*   EGFRNONAA 8* 8*   , CBC   Recent Labs   Lab 06/08/20  2210 06/09/20  0738   WBC 4.71 5.28   HGB 11.1* 12.2   HCT 31.9* 34.2*   PLT 86* 95*   , INR No results for input(s): INR, PROTIME in the last 48 hours., Lipid Panel No results for input(s): CHOL, HDL, LDLCALC, TRIG, CHOLHDL in the last 48 hours., Troponin   Recent Labs   Lab 06/08/20  2210 06/09/20  0351 06/09/20  1009   TROPONINI 0.037* 0.065* 0.056*    and All pertinent lab results from the last 24 hours have been reviewed.    Significant Imaging: Echocardiogram:   Transthoracic echo (TTE) complete (Cupid Only):   Results for orders placed or performed during the hospital encounter of 06/05/19   2D Echo w clor flow complete (Cupid Only)   Result Value Ref Range    AV mean gradient 4.24 mmHg    Ao peak harshad 1.34 m/s    Ao VTI 30.87 cm    IVS 0.84 0.6 - 1.1 cm    LA size 3.64 cm    Left Atrium Major Axis 4.81 cm    Left Atrium Minor Axis 5.13 cm    LVIDD 5.65 3.5 - 6.0 cm    LVIDS 4.03 (A) 2.1 - 4.0 cm    LVOT diameter 1.99 cm    LVOT peak VTI 26.37 cm    PW 0.76 0.6 - 1.1 cm    MV Peak A Harshad 0.92 m/s    E wave decelartion time 290.67 msec    MV Peak E Harshad 0.48 m/s    PV Peak D Harshad 0.28 m/s    PV Peak S Harshad 0.36 m/s    RA Major Axis 3.86 cm    RVDD 2.69 cm    TR Max Harshad 2.13 m/s    LA WIDTH 4.02 cm    Ao root annulus 3.13 cm    LV Diastolic Volume 156.69 mL    LV Systolic Volume 71.21 mL    LVOT peak harshad [id number] m/s    FS 29 %    LA volume 61.75 cm3    LV mass 167.60 g    Left Ventricle Relative Wall Thickness 0.27 cm    AV valve area 2.66 cm2    AV Velocity Ratio 0.83     AV index (prosthetic) 0.85     E/A ratio 0.52     Pulm vein S/D ratio 1.29     LVOT area 3.11 cm2    LVOT stroke volume 81.98 cm3    AV peak gradient 7.18 mmHg    LV Systolic Volume Index 42.4 mL/m2    LV Diastolic Volume Index 93.28 mL/m2    LA Volume Index 36.8 mL/m2    LV Mass Index 99.8 g/m2    Triscuspid Valve Regurgitation Peak Gradient 18.15  mmHg    BSA 1.66 m2    Right Atrial Pressure (from IVC) 3 mmHg    TV rest pulmonary artery pressure 21 mmHg    Narrative    · Normal left ventricular systolic function. The estimated ejection   fraction is 55%  · Grade I (mild) left ventricular diastolic dysfunction consistent with   impaired relaxation.  · Normal right ventricular systolic function.  · No wall motion abnormalities.  · Mild left atrial enlargement.  · Mild mitral regurgitation.  · The estimated PA systolic pressure is 21 mm Hg  · Normal central venous pressure (3 mm Hg).

## 2020-06-09 NOTE — CONSULTS
[facility]  Cardiology  Consult Note    Patient Name: Jennifer Booth  MRN: [id number]  Admission Date: 6/8/2020  Hospital Length of Stay: 0 days  Code Status: Full Code   Attending Provider: Andrew Anderson, *   Consulting Provider: Omi Schwartz NP  Primary Care Physician: Evangelista Wilks MD  Principal Problem:Hyponatremia    Patient information was obtained from patient and ER records.     Consults  Subjective:     Chief Complaint:  AVG malfunction      HPI:   Jennifer Booth is a 55 y.o. Female with HTN, substance abuse, alcohol use, tobacco use, GERD, CKD on HD (Dialysis TThSat) here for compliant of SOB that started Monday. Patient all of a sudden felt very SOB with chest tightness. Pt states the SOB was consistent, did not get any better until EMS put pt on a nonrebreather.  Denies chest pain, nausea, vomiting, dizziness or weakness.  She last underwent HD on Saturday without reports of any problems and tolerated her full run. BNP >4900,  serum alcohol 30, chest x-ray concerning for volume overload, covid negative. Patient was unable to undergo HD 2/2 AVG malfunction and cardiology was consulted for a fistulagram.     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      Depression     Dialysis patient     dialysis m-w-f    Encounter for blood transfusion     GERD (gastroesophageal reflux disease)     Hypertension     Mood swings     Multiple myeloma in remission 10/26/2012    per Hem/Oc note    Multiple myeloma without mention of remission     Renal hypertension     Status post dialysis 8/2012    Thrombocytopenia 8/2/2012       Past Surgical History:   Procedure Laterality Date    AV FISTULA PLACEMENT Left 2014    BREAST CYST ASPIRATION Left 1995    BREAST CYST EXCISION Left       SECTION      x1    pd catheter      PERITONEAL CATHETER REMOVAL N/A 2018    Procedure: REMOVAL, CATHETER, DIALYSIS, PERITONEAL;  Surgeon: Mukesh Gonsales Jr., MD;  Location: [facility];  Service: General;  Laterality: N/A;    RENAL BIOPSY  2016    VASCULAR SURGERY  2012    dialysis catheter to right subclavian       Review of patient's allergies indicates:   Allergen Reactions    Doxycycline Swelling, Rash and Hives    Gentamicin Anaphylaxis    Vancomycin analogues Anaphylaxis       No current facility-administered medications on file prior to encounter.      Current Outpatient Medications on File Prior to Encounter   Medication Sig    acetaminophen with codeine (ACETAMINOPHEN-CODEINE) 120mg 12mg 5mL Soln TK 5 ML PO Q 8 H PRF PAIN    B COMPLEX W-C NO.20/FOLIC ACID (VIRT-CAPS ORAL) Take 1 capsule by mouth once daily.     carvedilol (COREG) 12.5 MG tablet TK 1 T PO BID    cholecalciferol, vitamin D3, (VITAMIN D3) 400 unit Cap Take 2,000 Units by mouth once daily.     cinacalcet (SENSIPAR) 30 MG Tab Take 30 mg by mouth.    CREON 24,000-76,000 -120,000 unit capsule TK 1 C PO TID WC    escitalopram oxalate (LEXAPRO) 20 MG tablet TAKE 1 TABLET BY MOUTH DAILY    guaiFENesin (MUCINEX) 600 mg 12 hr tablet Take 1,200 mg by mouth 2 (two) times daily.    losartan (COZAAR) 100 MG tablet     sucroferric oxyhydroxide (VELPHORO) 500 mg Chew Take 500 mg by mouth 3 (three) times daily.    folic acid (FOLVITE) 1 MG tablet Take 1 mg by mouth.     Family History     Problem Relation (Age of Onset)    Diabetes Maternal Grandmother    Heart failure Mother    Hypertension Mother    Ovarian cancer Maternal Aunt    Stroke Maternal Grandmother        Tobacco Use    Smoking status: Current Every Day Smoker     Packs/day: 1.00     Years: 36.00     Pack years: 36.00     Types: Cigarettes     Start date:     Smokeless tobacco: Never Used    Tobacco comment: Pt enrolled in the [employer]. Ambulatory  referral to Smoking Cessation program.    Substance and Sexual Activity    Alcohol use: Yes     Alcohol/week: 2.0 standard drinks     Types: 2 Glasses of wine per week     Comment: A bottle of wine/ night, Last night 6/8 had a bottle of wine    Drug use: No    Sexual activity: Not Currently     Review of Systems   Constitution: Negative for diaphoresis.   HENT: Negative.    Eyes: Negative.    Cardiovascular: Positive for dyspnea on exertion and orthopnea. Negative for chest pain, irregular heartbeat, leg swelling, near-syncope, palpitations, paroxysmal nocturnal dyspnea and syncope.   Respiratory: Positive for cough and shortness of breath.    Endocrine: Negative.    Hematologic/Lymphatic: Negative.    Skin: Negative.    Musculoskeletal: Negative.    Gastrointestinal: Negative.    Genitourinary: Negative.    Neurological: Negative.    Psychiatric/Behavioral: Negative.    Allergic/Immunologic: Negative.      Objective:     Vital Signs (Most Recent):  Temp: 96.5 °F (35.8 °C) (06/09/20 1203)  Pulse: 76 (06/09/20 1203)  Resp: 20 (06/09/20 1203)  BP: (!) 161/109 (06/09/20 1203)  SpO2: 97 % (06/09/20 1203) Vital Signs (24h Range):  Temp:  [96.5 °F (35.8 °C)-98.1 °F (36.7 °C)] 96.5 °F (35.8 °C)  Pulse:  [66-80] 76  Resp:  [20-41] 20  SpO2:  [89 %-100 %] 97 %  BP: (146-190)/(103-130) 161/109     Weight: 59 kg (130 lb 1.1 oz)  Body mass index is 19.78 kg/m².    SpO2: 97 %  O2 Device (Oxygen Therapy): nasal cannula      Intake/Output Summary (Last 24 hours) at 6/9/2020 1330  Last data filed at 6/9/2020 1050  Gross per 24 hour   Intake 290 ml   Output 330 ml   Net -40 ml       Lines/Drains/Airways     Drain                 Hemodialysis AV Fistula Left forearm -- days          Peripheral Intravenous Line                 Peripheral IV - Single Lumen 06/08/20 2210 20 G Right Forearm less than 1 day                Physical Exam   Constitutional: She is oriented to person, place, and time. She appears well-developed and  well-nourished. No distress.   HENT:   Head: Atraumatic.   Eyes: Right eye exhibits no discharge. Left eye exhibits no discharge.   Neck: No JVD present.   Cardiovascular: Normal rate, regular rhythm and normal heart sounds. Exam reveals no gallop and no friction rub.   No murmur heard.  Left wrist AVG +thrill, - bruit    Pulmonary/Chest: Effort normal. She has decreased breath sounds. She has no rales.   Abdominal: Soft. Bowel sounds are normal.   Neurological: She is alert and oriented to person, place, and time.   Skin: Skin is warm and dry. She is not diaphoretic.   Psychiatric: She has a normal mood and affect. Her behavior is normal. Judgment and thought content normal.       Significant Labs:   BMP:   Recent Labs   Lab 06/08/20 2210 06/09/20  0738   * 94   * 121*   K 4.5 4.9   CL 84* 84*   CO2 20* 21*   BUN 32* 38*   CREATININE 5.3* 5.7*   CALCIUM 9.2 9.3   MG  --  2.4   , CMP   Recent Labs   Lab 06/08/20 2210 06/09/20  0738   * 121*   K 4.5 4.9   CL 84* 84*   CO2 20* 21*   * 94   BUN 32* 38*   CREATININE 5.3* 5.7*   CALCIUM 9.2 9.3   PROT 6.5 6.6   ALBUMIN 3.7 3.9   BILITOT 0.5 0.5   ALKPHOS 66 60   AST 21 31   ALT 14 23   ANIONGAP 17* 16   ESTGFRAFRICA 10* 9*   EGFRNONAA 8* 8*   , CBC   Recent Labs   Lab 06/08/20 2210 06/09/20  0738   WBC 4.71 5.28   HGB 11.1* 12.2   HCT 31.9* 34.2*   PLT 86* 95*   , INR No results for input(s): INR, PROTIME in the last 48 hours., Lipid Panel No results for input(s): CHOL, HDL, LDLCALC, TRIG, CHOLHDL in the last 48 hours., Troponin   Recent Labs   Lab 06/08/20 2210 06/09/20  0351 06/09/20  1009   TROPONINI 0.037* 0.065* 0.056*    and All pertinent lab results from the last 24 hours have been reviewed.    Significant Imaging: Echocardiogram:   Transthoracic echo (TTE) complete (Cupid Only):   Results for orders placed or performed during the hospital encounter of 06/05/19   2D Echo w clor flow complete (Cupid Only)   Result Value Ref Range    AV  mean gradient 4.24 mmHg    Ao peak harshad 1.34 m/s    Ao VTI 30.87 cm    IVS 0.84 0.6 - 1.1 cm    LA size 3.64 cm    Left Atrium Major Axis 4.81 cm    Left Atrium Minor Axis 5.13 cm    LVIDD 5.65 3.5 - 6.0 cm    LVIDS 4.03 (A) 2.1 - 4.0 cm    LVOT diameter 1.99 cm    LVOT peak VTI 26.37 cm    PW 0.76 0.6 - 1.1 cm    MV Peak A Harshad 0.92 m/s    E wave decelartion time 290.67 msec    MV Peak E Harshad 0.48 m/s    PV Peak D Harshad 0.28 m/s    PV Peak S Harshad 0.36 m/s    RA Major Axis 3.86 cm    RVDD 2.69 cm    TR Max Harshad 2.13 m/s    LA WIDTH 4.02 cm    Ao root annulus 3.13 cm    LV Diastolic Volume 156.69 mL    LV Systolic Volume 71.21 mL    LVOT peak harshad [id number] m/s    FS 29 %    LA volume 61.75 cm3    LV mass 167.60 g    Left Ventricle Relative Wall Thickness 0.27 cm    AV valve area 2.66 cm2    AV Velocity Ratio 0.83     AV index (prosthetic) 0.85     E/A ratio 0.52     Pulm vein S/D ratio 1.29     LVOT area 3.11 cm2    LVOT stroke volume 81.98 cm3    AV peak gradient 7.18 mmHg    LV Systolic Volume Index 42.4 mL/m2    LV Diastolic Volume Index 93.28 mL/m2    LA Volume Index 36.8 mL/m2    LV Mass Index 99.8 g/m2    Triscuspid Valve Regurgitation Peak Gradient 18.15 mmHg    BSA 1.66 m2    Right Atrial Pressure (from IVC) 3 mmHg    TV rest pulmonary artery pressure 21 mmHg    Narrative    · Normal left ventricular systolic function. The estimated ejection   fraction is 55%  · Grade I (mild) left ventricular diastolic dysfunction consistent with   impaired relaxation.  · Normal right ventricular systolic function.  · No wall motion abnormalities.  · Mild left atrial enlargement.  · Mild mitral regurgitation.  · The estimated PA systolic pressure is 21 mm Hg  · Normal central venous pressure (3 mm Hg).        Assessment and Plan:     Dialysis AV fistula malfunction, initial encounter  Unable to undergo HD due to suspected stenosis vs clot, NPO for fistulagram today with HD to follow for volume removal     Shortness of  breath  Volume overloaded on exam  AVF malfunction and unable to undergo HD- for fistulagram today   Minimal urine output at baseline     Elevated troponin  Felt to be stress induced in setting of volume overload     Alcohol abuse  Cessation encouraged    Tobacco abuse counseling  Smoking cessation         VTE Risk Mitigation (From admission, onward)    None          Thank you for your consult. I will follow-up with patient. Please contact us if you have any additional questions.    Omi Schwartz NP  Cardiology   [facility]

## 2020-06-09 NOTE — ED NOTES
55 yr old F PMH HTN, GERD, CKD Stage 4 (Dialysis TThSat) here for c/o of SOB that started just a few hours ago. Pt states she was feeling healthy until just a few hours ago. Pt states she was smoking, and all of a sudden felt very SOB with chest tightness. Pt states the SOB was consistent, did not get any better until EMS put pt on a NRB. A&Ox4. Pt hypertensive 's on arrival, pt anxious and sweating, 100% on 15L NRB. Pt now calm and cooperative, lightly sleeping in stretcher. Denies chest pain, nausea, or vomiting. Pt states she has been having diarrhea for about 6 months. Denies fevers, chills, dizziness or weakness.

## 2020-06-09 NOTE — ASSESSMENT & PLAN NOTE
Volume overloaded on exam  AVF malfunction and unable to undergo HD- for fistulagram today   Minimal urine output at baseline

## 2020-06-09 NOTE — SUBJECTIVE & OBJECTIVE
Past Medical History:   Diagnosis Date    Allergic drug reaction 2017    Anemia     Anxiety     Arm pain, left 2014    Breast cyst     Chronic renal failure 2014    CKD stage 4, GFR 15-29 ml/min from myeloma kidney & HTN      Depression     Dialysis patient     dialysis m-w-f    Encounter for blood transfusion     GERD (gastroesophageal reflux disease)     Hypertension     Mood swings     Multiple myeloma in remission 10/26/2012    per Hem/Oc note    Multiple myeloma without mention of remission     Renal hypertension     Status post dialysis 2012    Thrombocytopenia 2012       Past Surgical History:   Procedure Laterality Date    AV FISTULA PLACEMENT Left     BREAST CYST ASPIRATION Left     BREAST CYST EXCISION Left      SECTION      x1    pd catheter      PERITONEAL CATHETER REMOVAL N/A 2018    Procedure: REMOVAL, CATHETER, DIALYSIS, PERITONEAL;  Surgeon: Mukesh Gonsales Jr., MD;  Location: [facility];  Service: General;  Laterality: N/A;    RENAL BIOPSY      VASCULAR SURGERY  2012    dialysis catheter to right subclavian       Review of patient's allergies indicates:   Allergen Reactions    Doxycycline Swelling, Rash and Hives    Gentamicin Anaphylaxis    Vancomycin analogues Anaphylaxis       No current facility-administered medications on file prior to encounter.      Current Outpatient Medications on File Prior to Encounter   Medication Sig    acetaminophen with codeine (ACETAMINOPHEN-CODEINE) 120mg 12mg 5mL Soln TK 5 ML PO Q 8 H PRF PAIN    B COMPLEX W-C NO.20/FOLIC ACID (VIRT-CAPS ORAL) Take 1 capsule by mouth once daily.     carvedilol (COREG) 12.5 MG tablet TK 1 T PO BID    cholecalciferol, vitamin D3, (VITAMIN D3) 400 unit Cap Take 2,000 Units by mouth once daily.     cinacalcet (SENSIPAR) 30 MG Tab Take 30 mg by mouth.    CREON 24,000-76,000 -120,000 unit capsule TK 1 C PO TID WC    escitalopram oxalate (LEXAPRO) 20 MG  tablet TAKE 1 TABLET BY MOUTH DAILY    folic acid (FOLVITE) 1 MG tablet Take 1 mg by mouth.    guaiFENesin (MUCINEX) 600 mg 12 hr tablet Take 1,200 mg by mouth 2 (two) times daily.    losartan (COZAAR) 100 MG tablet     sucroferric oxyhydroxide (VELPHORO) 500 mg Chew Take 500 mg by mouth 3 (three) times daily.     Family History     Problem Relation (Age of Onset)    Diabetes Maternal Grandmother    Heart failure Mother    Hypertension Mother    Ovarian cancer Maternal Aunt    Stroke Maternal Grandmother        Tobacco Use    Smoking status: Current Every Day Smoker     Packs/day: 0.50     Years: 30.00     Pack years: 15.00     Types: Cigarettes    Smokeless tobacco: Never Used    Tobacco comment: 12/16/15: states 1/2 pack a day   Substance and Sexual Activity    Alcohol use: Yes     Alcohol/week: 2.0 standard drinks     Types: 2 Glasses of wine per week     Comment: nightly    Drug use: No    Sexual activity: Not Currently     Review of Systems   Constitutional: Negative for chills and fever.   HENT: Negative for congestion, postnasal drip and rhinorrhea.    Respiratory: Positive for shortness of breath. Negative for chest tightness.    Cardiovascular: Negative for chest pain and palpitations.   Gastrointestinal: Negative for abdominal distention and abdominal pain.   Genitourinary: Negative for difficulty urinating.   Musculoskeletal: Negative for arthralgias and myalgias.   Skin: Negative for color change and wound.   Neurological: Negative for dizziness, weakness and headaches.   Psychiatric/Behavioral: Negative for agitation.     Objective:     Vital Signs (Most Recent):  Temp: 97.4 °F (36.3 °C) (06/08/20 2207)  Pulse: 75 (06/09/20 0202)  Resp: (!) 41 (06/09/20 0202)  BP: (!) 181/130 (06/09/20 0202)  SpO2: 99 % (06/09/20 0202) Vital Signs (24h Range):  Temp:  [97.4 °F (36.3 °C)] 97.4 °F (36.3 °C)  Pulse:  [66-75] 75  Resp:  [21-41] 41  SpO2:  [99 %-100 %] 99 %  BP: (167-190)/(116-130) 181/130      Weight: 56.7 kg (125 lb)  Body mass index is 19.58 kg/m².    Physical Exam   Constitutional: She is oriented to person, place, and time. She appears well-developed and well-nourished.   HENT:   Head: Normocephalic and atraumatic.   Eyes: Pupils are equal, round, and reactive to light. EOM are normal.   Neck: Normal range of motion. Neck supple.   Cardiovascular: Normal rate.   Pulmonary/Chest: Effort normal.   Supplemental oxygen in use   Abdominal: Soft. Bowel sounds are normal.   Musculoskeletal: She exhibits no deformity.   Neurological: She is alert and oriented to person, place, and time.   Skin: Skin is warm and dry.   Psychiatric: She has a normal mood and affect.         CRANIAL NERVES     CN III, IV, VI   Pupils are equal, round, and reactive to light.  Extraocular motions are normal.        Significant Labs:     Bilirubin:   Recent Labs   Lab 06/08/20 2210   BILITOT 0.5     BMP:   Recent Labs   Lab 06/08/20 2210   *   *   K 4.5   CL 84*   CO2 20*   BUN 32*   CREATININE 5.3*   CALCIUM 9.2     CBC:   Recent Labs   Lab 06/08/20 2210   WBC 4.71   HGB 11.1*   HCT 31.9*   PLT 86*     CMP:   Recent Labs   Lab 06/08/20 2210   *   K 4.5   CL 84*   CO2 20*   *   BUN 32*   CREATININE 5.3*   CALCIUM 9.2   PROT 6.5   ALBUMIN 3.7   BILITOT 0.5   ALKPHOS 66   AST 21   ALT 14   ANIONGAP 17*   EGFRNONAA 8*     Cardiac Markers:   Recent Labs   Lab 06/08/20 2210   BNP >4,900*     Troponin:   Recent Labs   Lab 06/08/20 2210   TROPONINI 0.037*       Significant Imaging: I have reviewed all pertinent imaging results/findings within the past 24 hours.

## 2020-06-09 NOTE — PROCEDURES
Pt seen and examined on HD,    Cannulated started HD within 30 sec fistula collapses even with reduced blood flow. Pt reports happened before in dialysis center    Discussed with Dr. Contreras - if stable enough to go to cath lab will do fistulogram today and HD after fistulogram, otherwise will consult surgery for cath placement      ADDENDUM: fistulogram today @ 15:30 then emergent HD for volume overload right after procedure    For more details please see consult note from earlier today

## 2020-06-09 NOTE — PLAN OF CARE
RN cued into room to complete admit assessment, VIP model introduced, RN maintained pt. On contact isolation for CDIFF precautions. Plan of care reviewed with patient. Patient verbalized understanding. Patient informed of fall risk and fall precautions, call light within reach, 2x bed rails. Patient notified to ask staff for assistance and pt verbalized complete understanding. Arm bands and Telemetry applied. Time allowed for questions. Will continue to monitor and intervene as needed.

## 2020-06-09 NOTE — NURSING
"Pt arrived to HD for scheduled tx. Upon assessment noted left FA AVF with faint thrill and bruit. Fistula cannulated x2 with 15G needles. Upon connection to HD machine noted "collapse" of distal end (arterial) of fistula which caused alarming and high arterial pressures on HD machine. Blood rinsed back without loss.  Pt denies pain or discomfort and there is no obvious infiltration. After rinse back, no bruit was noted. Dr. Vega made aware of above.   "

## 2020-06-09 NOTE — PLAN OF CARE
Patient down to cath lab. TN spoke with patient's daughter Yusra [phone] to complete discharge assessment. Currently, patient lives alone and is independent of ADL's. No DME or HH noted. Per Yusra, patient is a TTS HD patient at [facility]. Upon discharge, Yusra will provide transportation home and will be available  to help as needed. Patient's PCP is Dr. Wilks.      Discharge brochure given to patient's daughter. TN will follow throughout transiitions of care and will assist with any discharge needs.           06/09/20 1175   Discharge Assessment   Assessment Type Discharge Planning Assessment   Confirmed/corrected address and phone number on facesheet? Yes   Assessment information obtained from? Patient;Medical Record   Expected Length of Stay (days) 2   Prior to hospitilization cognitive status: Alert/Oriented   Prior to hospitalization functional status: Independent   Current cognitive status: Alert/Oriented   Current Functional Status: Independent   Lives With alone   Able to Return to Prior Arrangements other (see comments)  (TBD)   Is patient able to care for self after discharge? Unable to determine at this time (comments)   Patient's perception of discharge disposition home or selfcare   Readmission Within the Last 30 Days no previous admission in last 30 days   Patient currently being followed by outpatient case management? No   Patient currently receives any other outside agency services? No   Equipment Currently Used at Home none   Do you have any problems affording any of your prescribed medications? No   Is the patient taking medications as prescribed? yes   Does the patient have transportation home? Yes   Transportation Anticipated family or friend will provide   Dialysis Name and Scheduled days T-TH-S   Does the patient receive services at [facility]? Yes   Discharge Plan A Home   Discharge Plan B Home with family   DME Needed Upon Discharge  other (see comments)  (TBD)         06/09/20 1535   Discharge Assessment   Assessment Type Discharge Planning Assessment   Confirmed/corrected address and phone number on facesheet? Yes   Assessment information obtained from? Patient;Medical Record   Expected Length of Stay (days) 2   Prior to hospitilization cognitive status: Alert/Oriented   Prior to hospitalization functional status: Independent   Current cognitive status: Alert/Oriented   Current Functional Status: Independent   Lives With alone   Able to Return to Prior Arrangements other (see comments)  (TBD)   Is patient able to care for self after discharge? Unable to determine at this time (comments)   Patient's perception of discharge disposition home or selfcare   Readmission Within the Last 30 Days no previous admission in last 30 days   Patient currently being followed by outpatient case management? No   Patient currently receives any other outside agency services? No   Equipment Currently Used at Home none   Do you have any problems affording any of your prescribed medications? No   Is the patient taking medications as prescribed? yes   Does the patient have transportation home? Yes   Transportation Anticipated family or friend will provide   Dialysis Name and Scheduled days T-TH-S   Does the patient receive services at the [facility]? Yes   Discharge Plan A Home   Discharge Plan B Home with family   DME Needed Upon Discharge  other (see comments)  (TBD)        06/09/20 1535   Discharge Assessment   Assessment Type Discharge Planning Assessment   Confirmed/corrected address and phone number on facesheet? Yes   Assessment information obtained from? Patient;Medical Record   Expected Length of Stay (days) 2   Prior to hospitilization cognitive status: Alert/Oriented   Prior to hospitalization functional status: Independent   Current cognitive status: Alert/Oriented   Current Functional Status: Independent   Lives With alone   Able to Return to Prior Arrangements other (see  comments)  (TBD)   Is patient able to care for self after discharge? Unable to determine at this time (comments)   Patient's perception of discharge disposition home or selfcare   Readmission Within the Last 30 Days no previous admission in last 30 days   Patient currently being followed by outpatient case management? No   Patient currently receives any other outside agency services? No   Equipment Currently Used at Home none   Do you have any problems affording any of your prescribed medications? No   Is the patient taking medications as prescribed? yes   Does the patient have transportation home? Yes   Transportation Anticipated family or friend will provide   Dialysis Name and Scheduled days T-TH-S   Does the patient receive services at the [facility]? Yes   Discharge Plan A Home   Discharge Plan B Home with family   DME Needed Upon Discharge  other (see comments)  (TBD)

## 2020-06-09 NOTE — ED PROVIDER NOTES
"Encounter Date: 6/8/2020       History     Chief Complaint   Patient presents with    Shortness of Breath     Patient presents to ED secondary to shortness of breath x "a few hours" while at rest. Patient gets dialysis on tuesday, thursday and saturdays. EMS reports patient was diaphoretic and 84% on RA and 99% on non rebreather upon arrival. Received combivent treatment while en route.      Jennifer Booth is a 55 y.o. 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    The patient presents to the ED due to shortness of breath, which started a few hours ago.  She has history of ESRD on HD T/TH/SA, with last session 2 days ago on Saturday.    She reports her shortness of breath has gradually worsened throughout the day.  She denies any associated fever, cough, chest pain, nausea/vomiting/diarrhea, abdominal pain, flank pain, leg pain or swelling.  She feels as though there is "congestion" in her chest that feels like fluid.  She denies any recent illness or known sick contacts.  She has not been diagnosed with COVID.  She denies any other complaints or concerns.  EMS reports patient was tachypneic and hypoxic on their arrival. NRB was placed. She does not use home O2.        Review of patient's allergies indicates:   Allergen Reactions    Doxycycline Swelling, Rash and Hives    Gentamicin Anaphylaxis    Vancomycin analogues Anaphylaxis     Past Medical History:   Diagnosis Date    Allergic drug reaction 11/13/2017    Anemia     " Anxiety     Arm pain, left 2014    Breast cyst     Chronic renal failure 2014    CKD stage 4, GFR 15-29 ml/min from myeloma kidney & HTN      Depression     Dialysis patient     dialysis m-w-f    Encounter for blood transfusion     GERD (gastroesophageal reflux disease)     Hypertension     Mood swings     Multiple myeloma in remission 10/26/2012    per Hem/Oc note    Multiple myeloma without mention of remission     Renal hypertension     Status post dialysis 2012    Thrombocytopenia 2012     Past Surgical History:   Procedure Laterality Date    AV FISTULA PLACEMENT Left     BREAST CYST ASPIRATION Left     BREAST CYST EXCISION Left      SECTION      x1    pd catheter      PERITONEAL CATHETER REMOVAL N/A 2018    Procedure: REMOVAL, CATHETER, DIALYSIS, PERITONEAL;  Surgeon: Mukesh Gonsales Jr., MD;  Location: [facility];  Service: General;  Laterality: N/A;    RENAL BIOPSY      VASCULAR SURGERY  2012    dialysis catheter to right subclavian     Family History   Problem Relation Age of Onset    Hypertension Mother     Heart failure Mother     Ovarian cancer Maternal Aunt     Diabetes Maternal Grandmother     Stroke Maternal Grandmother     Breast cancer Neg Hx     Colon cancer Neg Hx      Social History     Tobacco Use    Smoking status: Current Every Day Smoker     Packs/day: 1.00     Years: 36.00     Pack years: 36.00     Types: Cigarettes     Start date:     Smokeless tobacco: Never Used    Tobacco comment: Pt enrolled in the [employer]. Ambulatory referral to Smoking Cessation program.    Substance Use Topics    Alcohol use: Yes     Alcohol/week: 2.0 standard drinks     Types: 2 Glasses of wine per week     Comment: A bottle of wine/ night, Last night  had a bottle of wine    Drug use: No     Review of Systems   Constitutional: Negative for chills and fever.   HENT: Negative for sore throat.    Respiratory: Positive for  shortness of breath. Negative for cough.    Cardiovascular: Negative for chest pain, palpitations and leg swelling.   Gastrointestinal: Negative for nausea and vomiting.   Genitourinary: Negative for dysuria, frequency and urgency.   Musculoskeletal: Negative for back pain.   Skin: Negative for rash and wound.   Neurological: Negative for syncope and weakness.   Hematological: Does not bruise/bleed easily.   Psychiatric/Behavioral: Negative for agitation, behavioral problems and confusion.       Physical Exam     Initial Vitals   BP Pulse Resp Temp SpO2   06/08/20 2159 06/08/20 2207 06/08/20 2159 06/08/20 2207 06/08/20 2159   (!) 172/121 72 (!) 24 97.4 °F (36.3 °C) 99 %      MAP       --                Physical Exam    Nursing note and vitals reviewed.  Constitutional: She appears well-developed and well-nourished. She is not diaphoretic. No distress.   HENT:   Head: Normocephalic and atraumatic.   Mouth/Throat: Oropharynx is clear and moist.   Eyes: EOM are normal. Pupils are equal, round, and reactive to light.   Neck: No tracheal deviation present.   Cardiovascular: Normal rate, regular rhythm, normal heart sounds and intact distal pulses.   Pulmonary/Chest: No stridor. Tachypnea noted. No respiratory distress. She has no decreased breath sounds. She has no wheezes. She has rales (diffuse, bilateral).   Abdominal: Soft. Bowel sounds are normal. She exhibits no distension and no mass. There is no tenderness.   Musculoskeletal: Normal range of motion. She exhibits no edema.   Neurological: She is alert and oriented to person, place, and time. She has normal strength. No cranial nerve deficit or sensory deficit.   Skin: Skin is warm and dry. Capillary refill takes less than 2 seconds. No pallor.   Psychiatric: She has a normal mood and affect. Her behavior is normal. Thought content normal.         ED Course   Procedures  Labs Reviewed   CBC WITHOUT DIFFERENTIAL - Abnormal; Notable for the following components:        Result Value    RBC 3.50 (*)     Hemoglobin 11.1 (*)     Hematocrit 31.9 (*)     Mean Corpuscular Hemoglobin 31.7 (*)     RDW 16.3 (*)     Platelets 86 (*)     All other components within normal limits   COMPREHENSIVE METABOLIC PANEL - Abnormal; Notable for the following components:    Sodium 121 (*)     Chloride 84 (*)     CO2 20 (*)     Glucose 164 (*)     BUN, Bld 32 (*)     Creatinine 5.3 (*)     Anion Gap 17 (*)     eGFR if  10 (*)     eGFR if non  8 (*)     All other components within normal limits   TROPONIN I - Abnormal; Notable for the following components:    Troponin I 0.037 (*)     All other components within normal limits   B-TYPE NATRIURETIC PEPTIDE - Abnormal; Notable for the following components:    BNP >4,900 (*)     All other components within normal limits   ALCOHOL,MEDICAL (ETHANOL) - Abnormal; Notable for the following components:    Alcohol, Medical, Serum 30 (*)     All other components within normal limits   SARS-COV-2 RNA AMPLIFICATION, QUAL     EKG Readings: (Independently Interpreted)   Initial Reading: No STEMI. Previous EKG: Compared with most recent EKG Rhythm: Normal Sinus Rhythm.   Normal sinus rhythm, rate 71, LVH, T-wave inversion lateral leads, no ST elevations or other signs of ischemia, normal intervals.  Compared to prior EKG 06/2019, T-wave changes are new, otherwise grossly stable.     ECG Results          EKG 12-lead (Final result)  Result time 06/09/20 18:11:10    Final result by Interface, Lab In [facility] (06/09/20 18:11:10)                 Narrative:    Test Reason : R06.02,    Vent. Rate : 071 BPM     Atrial Rate : 071 BPM     P-R Int : 184 ms          QRS Dur : 086 ms      QT Int : 444 ms       P-R-T Axes : 072 045 114 degrees     QTc Int : 482 ms    Normal sinus rhythm  Possible Left atrial enlargement  LVH with repolarization abnormality  Cannot rule out Septal infarct ,age undetermined  Prolonged QT  Abnormal ECG  When compared with ECG  of 14-JUN-2019 09:21,  Minimal criteria for Septal infarct are now Present  and QRS voltage is more prominent  Confirmed by Michael Case MD ([id number]) on 6/9/2020 6:11:00 PM    Referred By: [email or web address]   SELF           Confirmed By:Michael Case MD                            Imaging Results          X-Ray Chest AP Portable (Final result)  Result time 06/08/20 22:58:56    Final result by Baljit Denson MD (06/08/20 22:58:56)                 Impression:      Findings suggestive for central pulmonary vascular congestion and mild edema.  More focal opacification/consolidative change is seen at the right lung base.  This is nonspecific but may reflect asymmetric edema versus potential superimposed aspiration or developing pneumonia in the right clinical setting.      Electronically signed by: Baljit Denson MD  Date:    06/08/2020  Time:    22:58             Narrative:    EXAMINATION:  XR CHEST AP PORTABLE    CLINICAL HISTORY:  shortness of breath;    TECHNIQUE:  Single frontal view of the chest was performed.    COMPARISON:  02/03/2020.    FINDINGS:  Heart is stable in size.  There is prominence of central pulmonary vasculature with perihilar opacity and increased interstitial attenuation seen.  More focal opacification/consolidative changes seen at the right lung base.  No evidence of pneumothorax or significant pleural effusion.  No acute osseous abnormality identified.                                 Medical Decision Making:   History:   Old Medical Records: I decided to obtain old medical records.  Old Records Summarized: other records.  Initial Assessment:   56 yo F with ESRD on HD presents to ED with SOB.  Hypertensive on arrival, rales throughout on exam.  Will obtain labs, CXR, and continue to monitor.  Differential Diagnosis:   Differential Diagnosis includes, but is not limited to:  PE, MI/ACS, pneumothorax, pericardial effusion/tamonade, pneumonia, lung abscess, pericarditis/myocarditis, pleural effusion,  lung mass, CHF exacerbation, asthma exacerbation, COPD exacerbation, aspirated/ingested foreign body, airway obstruction, CO poisoning, anemia, metabolic derangement, allergy/atopy, influenza, viral URI, viral syndrome.    Clinical Tests:   Lab Tests: Ordered and Reviewed  Radiological Study: Reviewed and Ordered  Medical Tests: Ordered and Reviewed  ED Management:  EKG without ischemia.  CXR with pulmonary vascular congestion consistent with fluid overload.  Labs reveal new hyponatremia. K WNL.   Patient weaned to room air without hypoxia, but still states she feels SOB. NC placed, and patient will be admitted to [facility].   No indication for emergent HD at this time, will likely remain stable for routine HD as scheduled in AM.    On re-evaluation, the patient's status has remained stable.  At this time, I believe the patient should be admitted to the hospital for further evaluation and management of hyponatremia.  [facility] service was contacted and the case was discussed.   The consulting physician/team agrees with plan and will admit under their service.   The patient and family were updated with test results, overall impression, and further plan of care. All questions were answered. The patient expressed understanding and agrees with the current plan.                                   Clinical Impression:       ICD-10-CM ICD-9-CM   1. Hyponatremia E87.1 276.1   2. Shortness of breath R06.02 786.05   3. ESRD on hemodialysis N18.6 585.6    Z99.2 V45.11   4. ETOH abuse F10.10 305.00   5. Alcohol abuse F10.10 305.00   6. Dialysis AV fistula malfunction, initial encounter T82.590A 996.1   7. Elevated troponin R79.89 790.6             ED Disposition Condition    Observation                           Víctor Manley MD  06/09/20 [id number]

## 2020-06-09 NOTE — ASSESSMENT & PLAN NOTE
Secondary hyperparathyroidism    Consult nephrology  Strict  I&O  Avoid nephrotoxic agents   Renally dose medications

## 2020-06-09 NOTE — PLAN OF CARE
Problem: Adult Inpatient Plan of Care  Goal: Plan of Care Review  Outcome: Ongoing, Progressing   VN completed admission questions with patient. Plan of care was discussed including home medications.  All questions answered.  Education given on safety measures and using call light before getting out of bed by herself. Patient verbalized understanding. Bed locked and in lowest position. Alarm on.  DaughterYusra notified, per patients request, of patients hospital admit.

## 2020-06-10 VITALS
HEIGHT: 68 IN | WEIGHT: 125 LBS | BODY MASS INDEX: 18.94 KG/M2 | HEART RATE: 72 BPM | OXYGEN SATURATION: 97 % | TEMPERATURE: 98 F | DIASTOLIC BLOOD PRESSURE: 87 MMHG | SYSTOLIC BLOOD PRESSURE: 162 MMHG | RESPIRATION RATE: 20 BRPM

## 2020-06-10 PROBLEM — R19.7 DIARRHEA: Status: RESOLVED | Noted: 2020-06-09 | Resolved: 2020-06-10

## 2020-06-10 PROBLEM — R79.89 ELEVATED TROPONIN: Status: RESOLVED | Noted: 2020-06-09 | Resolved: 2020-06-10

## 2020-06-10 PROBLEM — E87.70 VOLUME OVERLOAD: Status: ACTIVE | Noted: 2020-06-09

## 2020-06-10 LAB
ALBUMIN SERPL BCP-MCNC: 3.4 G/DL (ref 3.5–5.2)
ANION GAP SERPL CALC-SCNC: 14 MMOL/L (ref 8–16)
BASOPHILS # BLD AUTO: 0.02 K/UL (ref 0–0.2)
BASOPHILS NFR BLD: 0.6 % (ref 0–1.9)
BUN SERPL-MCNC: 28 MG/DL (ref 6–20)
CALCIUM SERPL-MCNC: 8.5 MG/DL (ref 8.7–10.5)
CHLORIDE SERPL-SCNC: 89 MMOL/L (ref 95–110)
CO2 SERPL-SCNC: 23 MMOL/L (ref 23–29)
CREAT SERPL-MCNC: 4.4 MG/DL (ref 0.5–1.4)
DIFFERENTIAL METHOD: ABNORMAL
EOSINOPHIL # BLD AUTO: 0 K/UL (ref 0–0.5)
EOSINOPHIL NFR BLD: 0.9 % (ref 0–8)
ERYTHROCYTE [DISTWIDTH] IN BLOOD BY AUTOMATED COUNT: 15.8 % (ref 11.5–14.5)
EST. GFR  (AFRICAN AMERICAN): 12 ML/MIN/1.73 M^2
EST. GFR  (NON AFRICAN AMERICAN): 11 ML/MIN/1.73 M^2
GLUCOSE SERPL-MCNC: 82 MG/DL (ref 70–110)
HBV CORE IGM SERPL QL IA: NEGATIVE
HBV SURFACE AG SERPL QL IA: NEGATIVE
HCT VFR BLD AUTO: 28.5 % (ref 37–48.5)
HGB BLD-MCNC: 10.1 G/DL (ref 12–16)
IMM GRANULOCYTES # BLD AUTO: 0.01 K/UL (ref 0–0.04)
IMM GRANULOCYTES NFR BLD AUTO: 0.3 % (ref 0–0.5)
LYMPHOCYTES # BLD AUTO: 0.4 K/UL (ref 1–4.8)
LYMPHOCYTES NFR BLD: 12.4 % (ref 18–48)
MAGNESIUM SERPL-MCNC: 2 MG/DL (ref 1.6–2.6)
MCH RBC QN AUTO: 32 PG (ref 27–31)
MCHC RBC AUTO-ENTMCNC: 35.4 G/DL (ref 32–36)
MCV RBC AUTO: 90 FL (ref 82–98)
MONOCYTES # BLD AUTO: 0.5 K/UL (ref 0.3–1)
MONOCYTES NFR BLD: 15.3 % (ref 4–15)
NEUTROPHILS # BLD AUTO: 2.4 K/UL (ref 1.8–7.7)
NEUTROPHILS NFR BLD: 70.5 % (ref 38–73)
NRBC BLD-RTO: 0 /100 WBC
PHOSPHATE SERPL-MCNC: 5.5 MG/DL (ref 2.7–4.5)
PHOSPHATE SERPL-MCNC: 5.6 MG/DL (ref 2.7–4.5)
PLATELET # BLD AUTO: 72 K/UL (ref 150–350)
PMV BLD AUTO: 11.7 FL (ref 9.2–12.9)
POTASSIUM SERPL-SCNC: 3.9 MMOL/L (ref 3.5–5.1)
RBC # BLD AUTO: 3.16 M/UL (ref 4–5.4)
SODIUM SERPL-SCNC: 126 MMOL/L (ref 136–145)
WBC # BLD AUTO: 3.4 K/UL (ref 3.9–12.7)

## 2020-06-10 PROCEDURE — 94761 N-INVAS EAR/PLS OXIMETRY MLT: CPT | Mod: NTX

## 2020-06-10 PROCEDURE — 90935 HEMODIALYSIS ONE EVALUATION: CPT | Mod: NTX

## 2020-06-10 PROCEDURE — 84100 ASSAY OF PHOSPHORUS: CPT | Mod: NTX

## 2020-06-10 PROCEDURE — 36415 COLL VENOUS BLD VENIPUNCTURE: CPT | Mod: NTX

## 2020-06-10 PROCEDURE — 83735 ASSAY OF MAGNESIUM: CPT | Mod: NTX

## 2020-06-10 PROCEDURE — 85025 COMPLETE CBC W/AUTO DIFF WBC: CPT | Mod: NTX

## 2020-06-10 PROCEDURE — 80069 RENAL FUNCTION PANEL: CPT | Mod: NTX

## 2020-06-10 RX ORDER — CLOPIDOGREL BISULFATE 75 MG/1
75 TABLET ORAL DAILY
Qty: 30 TABLET | Refills: 11 | Status: SHIPPED | OUTPATIENT
Start: 2020-06-11 | End: 2021-07-28

## 2020-06-10 RX ORDER — CARVEDILOL 12.5 MG/1
12.5 TABLET ORAL 2 TIMES DAILY
Qty: 60 TABLET | Refills: 11 | Status: ON HOLD | OUTPATIENT
Start: 2020-06-10 | End: 2020-10-13 | Stop reason: SDUPTHER

## 2020-06-10 RX ORDER — IBUPROFEN 200 MG
1 TABLET ORAL DAILY
Qty: 21 PATCH | Refills: 0 | Status: SHIPPED | OUTPATIENT
Start: 2020-06-11 | End: 2020-06-29 | Stop reason: SDUPTHER

## 2020-06-10 RX ORDER — NAPROXEN SODIUM 220 MG/1
81 TABLET, FILM COATED ORAL DAILY
Status: DISCONTINUED | OUTPATIENT
Start: 2020-06-10 | End: 2020-06-10 | Stop reason: HOSPADM

## 2020-06-10 RX ORDER — CLOPIDOGREL BISULFATE 75 MG/1
75 TABLET ORAL DAILY
Status: DISCONTINUED | OUTPATIENT
Start: 2020-06-10 | End: 2020-06-10 | Stop reason: HOSPADM

## 2020-06-10 RX ORDER — NAPROXEN SODIUM 220 MG/1
81 TABLET, FILM COATED ORAL DAILY
Qty: 30 TABLET | Refills: 11 | Status: SHIPPED | OUTPATIENT
Start: 2020-06-11 | End: 2022-09-27 | Stop reason: SDUPTHER

## 2020-06-10 NOTE — ASSESSMENT & PLAN NOTE
Chest x-ray concerning for volume overload  BNP and troponin elevated  - improved with HD  - likely 2/2 non-compliance with full HD sessions

## 2020-06-10 NOTE — NURSING
Arrived to HD s/p fistulogram. + thrill and + bruit noted. POC discussed with patient. Able to cannulate left FA fistula without difficulty. Tx initiated at this time.

## 2020-06-10 NOTE — PLAN OF CARE
Patient transferred to floor on tele monitor.  VSS. No c/o pain.   Patient attached to tele monitor upon arrival in room.  No changes noted to bloody drainage on dtat from arrival to cath lab prepost. No swelling noted. No active bleeding noted. + bruit. + thrill.  +2 pulses. Both left forearm and left hand dstat and tegaderm sites assessed with LASHANDA thapa and LASHANDA Landa at bedside.    All questions answered.

## 2020-06-10 NOTE — DISCHARGE INSTRUCTIONS
Aspirin, ASA oral tablets (English) View Edit Remove  Clopidogrel Bisulfate Oral tablet (English) View Edit Remove  Nicotine skin patches (English) View Edit Remove  Smoking Cessation (English) View Edit Remove  Hyponatremia (English) View Edit Remove  Hemodialysis Access, Caring for Your (English) View Edit Remove

## 2020-06-10 NOTE — PHYSICIAN QUERY
PT Name: Jennifer Booth  MR #: [id number]     Physician Query Form - Documentation Clarification      CDS/: Hailee Browning RN              Contact information: [phone]    This form is a permanent document in the medical record.     Query Date: Ariadna 10, 2020    By submitting this query, we are merely seeking further clarification of documentation. Please utilize your independent clinical judgment when addressing the question(s) below.    The Medical record reflects the following:    Supporting Clinical Findings Location in Medical Record   Presents with shortness of breath concerning for volume overload. She states she has been compliant with HD but has had progressive worsening of shortness of breath.    Attempted HD today, but unable to perform due to likely AVF stenosis. Interventional Cards consulted. Will give IV lasix x1     Shortness of breath  Chest x-ray concerning for volume overload      She received 2 dialysis sessions following this with removal volume and correction of electrolytes. She now feels back to baseline.         Findings suggestive for central pulmonary vascular congestion and mild edema.    More focal opacification/consolidative change is seen at the right lung base.  This is nonspecific but may reflect asymmetric edema versus potential superimposed aspiration or developing pneumonia in the right clinical setting.       6/9 HP                    6/10 DC summary          6/8 CXR   furosemide injection 120 mg       6/9 MAR                                                                            Doctor, Please specify diagnosis or diagnoses associated with above clinical findings.    Provider Use Only      [ x  ] Acute pulmonary edema    [   ] Chronic pulmonary edema    [   ] Acute on chronic pulmonary edema    [   ] other:___________________________                                                                                                             [  ] Clinically  Detail Level: Detailed Undetermined                Quality 431: Preventive Care And Screening: Unhealthy Alcohol Use - Screening: Patient screened for unhealthy alcohol use using a single question and scores less than 2 times per year Quality 226: Preventive Care And Screening: Tobacco Use: Screening And Cessation Intervention: Patient screened for tobacco use and is an ex/non-smoker

## 2020-06-10 NOTE — NURSING
Dr. Anderson notified of patients sodium level of 121. No new orders placed; pt is HD patient. Will discuss with nephrology.

## 2020-06-10 NOTE — ASSESSMENT & PLAN NOTE
Patient was counseled on smoking cessation for 5 minutes and will be provided additional smoking cessation counseling prior to discharge.  -provide nicotine patches on discharge

## 2020-06-10 NOTE — DISCHARGE SUMMARY
[facility] Hospital Medicine  Discharge Summary      Patient Name: Jennifer Booth  MRN: [id number]  Admission Date: 6/8/2020  Hospital Length of Stay: 1 days  Discharge Date and Time: 6/10/2020  1:47 PM  Attending Physician: No att. providers found   Discharging Provider: Andrew Anderson MD  Primary Care Provider: Evangelista Wilks MD      HPI:   55 y.o. Female with past medial history of HTN, substance abuse, alcohol use, tobacco use, GERD, CKD on HD (Dialysis TThSat) here for compliant of SOB that started yesterday evening. Pt states she was feeling healthy until just a few hours ago. Pt states she was smoking, and all of a sudden felt very SOB with chest tightness. Pt states the SOB was consistent, did not get any better until EMS put pt on a nonrebreather.  Denies chest pain, nausea, vomiting, fevers, chills, dizziness or weakness. Of note, she reports she has been having diarrhea for about 6 months.  ED findings: Na 121, creatinine 5.3, potassium 4.5, BNP >4900, troponin 0.037, serum alcohol 30, chest x-ray concerning for volume overload, covid negative.  Patient admitted to hospital medicine for further medical management.    Procedure(s) (LRB):  Fistulogram (N/A)  Stent, Fistula  PTA, AV FISTULA (N/A)  Thrombectomy, Hemodialysis Graft Or Fistula (N/A)      Hospital Course:   Ms. Booth presented with electrolyte abnormalities and volume overload, likely 2/2 noncompliance with HD. Per outpatient Nephrologist, she had had multiple abbreviated sessions in the past. She does admit to poor dietary compliance as well. Upon admission, she was found to have severe stenosis of AV fistula.  She was evaluated by Interventional Cardiology who placed a stent.  She received 2 dialysis sessions following this with removal volume and correction of electrolytes.  She now feels back to baseline.  Will discharge on aspirin and Plavix for 6 months with transition to aspirin alone following this.   "Problem based assessment below.    BP (!) 162/87 (BP Location: Right arm, Patient Position: Lying)   Pulse 72   Temp 98.3 °F (36.8 °C) (Oral)   Resp 20   Ht 5' 8" (1.727 m)   Wt 56.7 kg (125 lb)   LMP 05/25/2016   SpO2 97%   Breastfeeding? No   BMI 19.01 kg/m²    Physical Exam   Constitutional: She is oriented to person, place, and time. She appears well-developed and well-nourished.   HENT:   Head: Normocephalic and atraumatic.   Eyes: Pupils are equal, round, and reactive to light. EOM are normal.   Neck: Normal range of motion. Neck supple.   Cardiovascular: Normal rate.   Pulmonary/Chest: Effort normal.   Supplemental oxygen in use   Abdominal: Soft. Bowel sounds are normal.   Musculoskeletal: She exhibits no deformity.   Neurological: She is alert and oriented to person, place, and time.   Skin: Skin is warm and dry.   Psychiatric: She has a normal mood and affect.      Consults:   Consults (From admission, onward)        Status Ordering Provider     Inpatient consult to [facility]rology[facility] (Marilia Robb Nimkevych)  Once     Provider:  Erika Vega MD    Completed MELISA THOMPSON     Inpatient consult to Nephrology-[facility]  Once     Provider:  Erika Vega MD    Completed NINOSKA ANNE     Inpatient consult to Social Work/Case Management  Once     Provider:  (Not yet assigned)    Acknowledged INNOCENT-ITJAY LEOS N.          * Dialysis AV fistula malfunction, initial encounter  - AVF stenosis  - s/p stenting by interventional cardiology  -aspirin and Plavix for 6 months followed by aspirin alone      Volume overload  Chest x-ray concerning for volume overload  BNP and troponin elevated  - improved with HD  - likely 2/2 non-compliance with full HD sessions      ETOH abuse  History of substance use     No evidence of withdrawal    Hypertension  Continue losartan and carvedilol      Hyponatremia  Improved with dialysis      Anxiety and depression  Continue " escitalopram    Anemia in chronic kidney disease, on chronic dialysis  Monitor      ESRD (end stage renal disease) on dialysis  Secondary hyperparathyroidism    Nephrology followed while in house  -suspected volume overload was secondary to patient receiving only partial sessions dialysis as outpatient      Tobacco abuse counseling  Patient was counseled on smoking cessation for 5 minutes and will be provided additional smoking cessation counseling prior to discharge.  -provide nicotine patches on discharge    Elevated troponin-resolved as of 6/10/2020  Likely related to demand  Denies chest pain  Trend x 3      Diarrhea-resolved as of 6/10/2020  - 2/2 EPI        Final Active Diagnoses:    Diagnosis Date Noted POA    PRINCIPAL PROBLEM:  Dialysis AV fistula malfunction, initial encounter [T82.590A] 06/09/2020 Yes    Volume overload [E87.70] 06/09/2020 Yes    Hyponatremia [E87.1] 06/09/2020 Yes    Hypertension [I10] 06/09/2020 Yes    Secondary hyperparathyroidism [N25.81] 06/09/2020 Yes    ETOH abuse [F10.10] 06/09/2020 Yes    History of substance use [Z87.898] 06/09/2020 Not Applicable    Anxiety and depression [F41.9, F32.9] 07/03/2019 Yes    Anemia in chronic kidney disease, on chronic dialysis [N18.6, D63.1, Z99.2] 04/18/2018 Not Applicable    ESRD (end stage renal disease) on dialysis [N18.6, Z99.2] 08/03/2017 Not Applicable    Tobacco abuse counseling [Z71.6] 05/09/2013 Not Applicable      Problems Resolved During this Admission:    Diagnosis Date Noted Date Resolved POA    Diarrhea [R19.7] 06/09/2020 06/10/2020 Yes    Elevated troponin [R79.89] 06/09/2020 06/10/2020 Yes       Discharged Condition: good    Disposition: Home or Self Care    Follow Up:    Patient Instructions:      Diet renal     Notify your health care provider if you experience any of the following:  difficulty breathing or increased cough     Activity as tolerated       Significant Diagnostic Studies: Labs:   CMP   Recent Labs    Lab 06/08/20 2210 06/09/20  0738 06/10/20  0624   * 121* 126*   K 4.5 4.9 3.9   CL 84* 84* 89*   CO2 20* 21* 23   * 94 82   BUN 32* 38* 28*   CREATININE 5.3* 5.7* 4.4*   CALCIUM 9.2 9.3 8.5*   PROT 6.5 6.6  --    ALBUMIN 3.7 3.9 3.4*   BILITOT 0.5 0.5  --    ALKPHOS 66 60  --    AST 21 31  --    ALT 14 23  --    ANIONGAP 17* 16 14   ESTGFRAFRICA 10* 9* 12*   EGFRNONAA 8* 8* 11*   , CBC   Recent Labs   Lab 06/08/20 2210 06/09/20  0738 06/10/20  0627   WBC 4.71 5.28 3.40*   HGB 11.1* 12.2 10.1*   HCT 31.9* 34.2* 28.5*   PLT 86* 95* 72*   , INR   Lab Results   Component Value Date    INR 0.9 11/13/2017    INR 0.9 11/13/2017    INR 0.9 07/26/2016   , Lipid Panel   Lab Results   Component Value Date    CHOL 198 07/26/2016    HDL 99 (H) 07/26/2016    LDLCALC 75.4 07/26/2016    TRIG 118 07/26/2016    CHOLHDL 50.0 07/26/2016   , Troponin   Recent Labs   Lab 06/09/20  1009   TROPONINI 0.056*   , A1C: No results for input(s): HGBA1C in the last 4320 hours. and All labs within the past 24 hours have been reviewed  Radiology: X-Ray: CXR: X-Ray Chest 1 View (CXR): No results found for this visit on 06/08/20. and X-Ray Chest PA and Lateral (CXR): No results found for this visit on 06/08/20.  Cardiac Graphics: Echocardiogram:   2D echo with color flow doppler: No results found. However, due to the size of the patient record, not all encounters were searched. Please check Results Review for a complete set of results. and Transthoracic echo (TTE) complete (Cupid Only):   Results for orders placed or performed during the hospital encounter of 06/05/19   2D Echo w clor flow complete (Cupid Only)   Result Value Ref Range    AV mean gradient 4.24 mmHg    Ao peak marcelo 1.34 m/s    Ao VTI 30.87 cm    IVS 0.84 0.6 - 1.1 cm    LA size 3.64 cm    Left Atrium Major Axis 4.81 cm    Left Atrium Minor Axis 5.13 cm    LVIDD 5.65 3.5 - 6.0 cm    LVIDS 4.03 (A) 2.1 - 4.0 cm    LVOT diameter 1.99 cm    LVOT peak VTI 26.37 cm    PW  0.76 0.6 - 1.1 cm    MV Peak A Harshad 0.92 m/s    E wave decelartion time 290.67 msec    MV Peak E Harshad 0.48 m/s    PV Peak D Harshad 0.28 m/s    PV Peak S Harshad 0.36 m/s    RA Major Axis 3.86 cm    RVDD 2.69 cm    TR Max Harshad 2.13 m/s    LA WIDTH 4.02 cm    Ao root annulus 3.13 cm    LV Diastolic Volume 156.69 mL    LV Systolic Volume 71.21 mL    LVOT peak harshad [id number] m/s    FS 29 %    LA volume 61.75 cm3    LV mass 167.60 g    Left Ventricle Relative Wall Thickness 0.27 cm    AV valve area 2.66 cm2    AV Velocity Ratio 0.83     AV index (prosthetic) 0.85     E/A ratio 0.52     Pulm vein S/D ratio 1.29     LVOT area 3.11 cm2    LVOT stroke volume 81.98 cm3    AV peak gradient 7.18 mmHg    LV Systolic Volume Index 42.4 mL/m2    LV Diastolic Volume Index 93.28 mL/m2    LA Volume Index 36.8 mL/m2    LV Mass Index 99.8 g/m2    Triscuspid Valve Regurgitation Peak Gradient 18.15 mmHg    BSA 1.66 m2    Right Atrial Pressure (from IVC) 3 mmHg    TV rest pulmonary artery pressure 21 mmHg    Narrative    · Normal left ventricular systolic function. The estimated ejection   fraction is 55%  · Grade I (mild) left ventricular diastolic dysfunction consistent with   impaired relaxation.  · Normal right ventricular systolic function.  · No wall motion abnormalities.  · Mild left atrial enlargement.  · Mild mitral regurgitation.  · The estimated PA systolic pressure is 21 mm Hg  · Normal central venous pressure (3 mm Hg).          Pending Diagnostic Studies:     Procedure Component Value Units Date/Time    Hepatitis B Surface Antibody, Qual/Quant [[id number]] Collected:  06/09/20 1009    Order Status:  Sent Lab Status:  In process Updated:  06/09/20 4914    Specimen:  Blood          Medications:  Reconciled Home Medications:      Medication List      START taking these medications    aspirin 81 MG Chew  Chew and swallow 1 tablet (81 mg total) by mouth once daily.  Start taking on:  June 11, 2020     clopidogreL 75 mg tablet  Commonly  known as:  PLAVIX  Take 1 tablet (75 mg total) by mouth once daily.  Start taking on:  June 11, 2020     nicotine 21 mg/24 hr  Commonly known as:  NICODERM CQ  Place 1 patch onto the skin once daily.  Start taking on:  June 11, 2020        CHANGE how you take these medications    carvediloL 12.5 MG tablet  Commonly known as:  COREG  Take 1 tablet (12.5 mg total) by mouth 2 (two) times daily.  What changed:  See the new instructions.        CONTINUE taking these medications    acetaminophen-codeine 120mg 12mg 5mL Soln  TK 5 ML PO Q 8 H PRF PAIN     cinacalcet 30 MG Tab  Commonly known as:  SENSIPAR  Take 30 mg by mouth.     CREON 24,000-76,000 -120,000 unit capsule  Generic drug:  lipase-protease-amylase 24,000-76,000-120,000 units  TK 1 C PO TID WC     escitalopram oxalate 20 MG tablet  Commonly known as:  LEXAPRO  TAKE 1 TABLET BY MOUTH DAILY     folic acid 1 MG tablet  Commonly known as:  FOLVITE  Take 1 mg by mouth.     guaiFENesin 600 mg 12 hr tablet  Commonly known as:  MUCINEX  Take 1,200 mg by mouth 2 (two) times daily.     losartan 100 MG tablet  Commonly known as:  COZAAR     VELPHORO 500 mg Chew  Generic drug:  sucroferric oxyhydroxide  Take 500 mg by mouth 3 (three) times daily.     VIRT-CAPS ORAL  Take 1 capsule by mouth once daily.     VITAMIN D3 10 mcg (400 unit) Cap  Generic drug:  cholecalciferol (vitamin D3)  Take 2,000 Units by mouth once daily.            Indwelling Lines/Drains at time of discharge:   Lines/Drains/Airways     Drain                 Hemodialysis AV Fistula Left forearm -- days                Time spent on the discharge of patient: 40 minutes  Patient was seen and examined on the date of discharge and determined to be suitable for discharge.         Andrew Anderson MD  Department of Hospital Medicine  [facility]

## 2020-06-10 NOTE — PLAN OF CARE
Pt AOx4, VSS. IV CDI, saline locked. Pt ambulated to bathroom with standby assist, urinated 300 mL of urine, tolerated well. Cardiac monitoring maintained. Pt rested throughout the night with no complaints. Encouraged to call with questions/for assistance. Will continue to monitor.

## 2020-06-10 NOTE — ASSESSMENT & PLAN NOTE
- AVF stenosis  - s/p stenting by interventional cardiology  -aspirin and Plavix for 6 months followed by aspirin alone

## 2020-06-10 NOTE — HOSPITAL COURSE
"Ms. Booth presented with electrolyte abnormalities and volume overload, likely 2/2 noncompliance with HD. Per outpatient Nephrologist, she had had multiple abbreviated sessions in the past. She does admit to poor dietary compliance as well. Upon admission, she was found to have severe stenosis of AV fistula.  She was evaluated by Interventional Cardiology who placed a stent.  She received 2 dialysis sessions following this with removal volume and correction of electrolytes.  She now feels back to baseline.  Will discharge on aspirin and Plavix for 6 months with transition to aspirin alone following this.  Problem based assessment below.    BP (!) 162/87 (BP Location: Right arm, Patient Position: Lying)   Pulse 72   Temp 98.3 °F (36.8 °C) (Oral)   Resp 20   Ht 5' 8" (1.727 m)   Wt 56.7 kg (125 lb)   LMP 05/25/2016   SpO2 97%   Breastfeeding? No   BMI 19.01 kg/m²   Physical Exam   Constitutional: She is oriented to person, place, and time. She appears well-developed and well-nourished.   HENT:   Head: Normocephalic and atraumatic.   Eyes: Pupils are equal, round, and reactive to light. EOM are normal.   Neck: Normal range of motion. Neck supple.   Cardiovascular: Normal rate.   Pulmonary/Chest: Effort normal.   Supplemental oxygen in use   Abdominal: Soft. Bowel sounds are normal.   Musculoskeletal: She exhibits no deformity.   Neurological: She is alert and oriented to person, place, and time.   Skin: Skin is warm and dry.   Psychiatric: She has a normal mood and affect.   "

## 2020-06-10 NOTE — NURSING
Report given to LASHANDA Nova in dialysis. Advised jose of patients last BP; she advised to hold morning coreg due to patients HR of 71 and she would call if patient needed some BP meds during dialysis tx.

## 2020-06-10 NOTE — ASSESSMENT & PLAN NOTE
Secondary hyperparathyroidism    Nephrology followed while in house  -suspected volume overload was secondary to patient receiving only partial sessions dialysis as outpatient

## 2020-06-10 NOTE — PLAN OF CARE
Patient transferred to recovery cath lab slot 7 via stretcher with side rails up x2 .  Pt drowsy. Pt is stable when connecting to cardiac monitors.  VSS.   Left forearm fistula stitch d stat and tegaderm CDI with small dot of shadowing. No active bleeding noted. No swelling noted. Left wrist d stat and tegaderm dry and intact. Bloody shadowing noted on arrival to  Left wrist d sat /tegaderm. No changes noted, no increase in shadowing noted. no swelling noted.   + thrill + bruit. +2 pulses.   Pt denies any pain.  Skin normal in color and warm to touch, < 3 sec cap refill.  Fall risk precautions given and patient acknowledges.  AIDET completed to pt.  Will continue to monitor patient.

## 2020-06-10 NOTE — PLAN OF CARE
Patient to be discharged home. Discharge nurse will go over home medications and reasons for medications and final discharge instructions.        06/10/20 1558   Final Note   Assessment Type Final Discharge Note   Anticipated Discharge Disposition Home   What phone number can be called within the next 1-3 days to see how you are doing after discharge? [phone]   Hospital Follow Up  Appt(s) scheduled? Yes   Discharge plans and expectations educations in teach back method with documentation complete? Yes   Right Care Referral Info   Post Acute Recommendation Other   Referral Type None                 Future Appointments   Date Time Provider Department Center   6/19/2020  9:30 AM LAB, APPOINTMENT [facility] LAB VNP [facility]   6/19/2020 10:15 AM ECHO, [facility] NOM ECHOSTR [address]   6/19/2020 11:00 AM NOM OIC-US1 MASTER [facility] ULTR [facility]   6/19/2020 11:45 AM NOM OIC-XRAY [facility] XRAY [facility]   6/19/2020  2:00 PM Benja Noble MD [facility] MED [facility]   7/23/2020 10:00 AM LAB, SAME DAY [facility] LABDRAW [facility]   7/29/2020  9:00 AM Thuan Hahn LCSW [facility] SOCL [facility] [address]   7/30/2020  3:00 PM Leilani Shane MD [facility] HEM ONC [facility]

## 2020-06-11 ENCOUNTER — PATIENT OUTREACH (OUTPATIENT)
Dept: ADMINISTRATIVE | Facility: CLINIC | Age: 55
End: 2020-06-11

## 2020-06-11 NOTE — PATIENT INSTRUCTIONS
Arteriovenous (AV) Fistula for Dialysis  An AV fistula is a connection between an artery and a vein. For this procedure, an AV fistula is surgically created using an artery and a vein in your arm. (Your healthcare provider will let you know if another site is to be used.) When the artery and vein are joined, blood flow increases from the artery into the vein. As a result, the vein gets bigger over time. The enlarged vein provides easier access to the blood for a treatment for kidney failure (dialysis). This sheet explains the procedure and what to expect.     An AV fistula increases blood flow from the artery into the vein. Over time, the vein becomes stronger and enlarged.   Preparing for the procedure  Prepare as you have been told. In addition:  · Tell your healthcare provider about all the medicines you take. This includes all over-the-counter and prescription medicines, and street drugs. It also includes herbs, vitamins, and other supplements. You may need to stop taking some or all of them before the procedure.  · Follow any directions youre given for not eating or drinking before the procedure.  · Do not allow anyone to draw blood from or take blood pressure on the arm that will have the fistula before the procedure.  The day of the procedure  The procedure takes about 1 to 2 hours. Youll likely go home the same day.  Before the procedure begins:  · An IV (intravenous) line is put into a vein in the arm or hand not being used for the procedure. This line supplies fluids and medicines.  · To keep you free of pain during the procedure, youre given general anesthesia. This medicine puts you into a state like a deep sleep through the procedure. Or a nerve block may be used. This medicine numbs the arm. With it, you may also be given medicine that makes you relaxed and drowsy through the procedure.  During the procedure:  · The skin over your arm may be injected with numbing medicine.  · One or more small  cuts (incisions) are then made through the numbed skin. This depends on the size of your arm and the depth of the vein in your arm.  · The vein is attached to the selected artery.  · Any incisions made are then closed with stitches (sutures), staples, surgical glue, or strips of surgical tape.  After the procedure:  · Youll be asked to keep your arm raised (elevated) as often as possible for at least a week after the procedure.  · Youll be given medicines to manage pain as needed.  · Your arm and hand will be checked to make sure blood is flowing through the fistula properly. The feeling of blood rushing through the fistula is called a thrill. It is somewhat similar to the purring of a cat. Youll be taught how to check for this feeling each day to make sure there are no problems with your fistula. Youll also be taught how to care for your fistula at home.  · When its time for you to leave the hospital, have an adult family member or friend ready to drive you home.  Recovering at home  Once at home, follow all of the instructions youve been given. Be sure to:  · Take all medicines as directed.  · Care for your incision as instructed.  · Check for signs of infection at the incision site (see below).  · Avoid heavy lifting and strenuous activities as directed.  · Monitor and care for your fistula as instructed.  · Do your hand and arm exercises as instructed. This usually involves squeezing a ball in your hand for a few minutes each hour.  Call your healthcare provider if you have any of the following:  · Fever of 100.4°F (38°C) or higher  · Signs of infection at the incision site, such as increased redness or swelling, warmth, worsening pain, bleeding, or bad-smelling drainage  · You cant feel a thrill (the vibration of blood going through your arm)  · Pain or numbness in your fingers, hand, or arm  · Bleeding, redness, or warmth around your fistula  · Sudden bulging of the fistula (more than usual; a slight  bulge is normal)   Follow-Up  Your healthcare provider will check your fistula within 1 to 2 weeks after the procedure. It will likely take about 6 to 8 weeks for the fistula to enlarge enough to start dialysis. After that, make sure the fistula is checked each time you have dialysis. Your healthcare provider may also suggest checkups every 6 months.  Risks and possible complications include:  · The fistula not working properly  · Long wait before the fistula is ready (up to 6 months)  · Coldness or numbness in the hand (due to blood flowing away from the hand and into the fistula)  · An unsightly bump under the skin (due to enlargement of the fistula)  · Prolonged bleeding from the fistula after dialysis  · Narrowing or weakening of the blood vessels used for the fistula  · Formation of blood clots in the blood vessels used for the fistula  · Risks of anesthesia or any other medicines used during the procedure   Living with an AV Fistula  A problem, such as a narrowing (stricture) of the vein or an infection, can make the fistula unusable. If this happens, you may need other treatments to repair or make a new fistula. To protect your fistula, follow these and any other guidelines youre given:  · Check your fistula as often as your healthcare provider says. If you cant feel your thrill, let your provider know right away.  · Make sure your fistula is checked before each dialysis treatment.  · Dont let anyone draw blood from or take blood pressure on the arm that has the fistula.  · Wash your hands often and keep the area around your fistula clean.  · Dont sleep on the arm that has the fistula.  · Dont wear tight jewelry or a watch on the arm with your fistula.  · Protect your fistula from cuts, scrapes, or blows.  Date Last Reviewed: 1/1/2017  © 0640-0882 [employer]. [address] [place] [address], [place] [address]. All rights reserved. This information is not intended as a substitute for professional  medical care. Always follow your healthcare professional's instructions.

## 2020-06-12 LAB
HBV SURFACE AB SER QL IA: POSITIVE
HBV SURFACE AB SERPL IA-ACNC: NORMAL MIU/ML

## 2020-06-13 ENCOUNTER — OFFICE VISIT (OUTPATIENT)
Dept: URGENT CARE | Facility: CLINIC | Age: 55
End: 2020-06-13
Payer: COMMERCIAL

## 2020-06-13 VITALS
HEIGHT: 67 IN | WEIGHT: 120 LBS | TEMPERATURE: 98 F | HEART RATE: 72 BPM | OXYGEN SATURATION: 98 % | RESPIRATION RATE: 16 BRPM | BODY MASS INDEX: 18.83 KG/M2
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
## 2020-06-13 DIAGNOSIS — M25.552 PAIN OF LEFT HIP JOINT: Primary | ICD-10-CM

## 2020-06-13 PROCEDURE — 99213 PR OFFICE/OUTPT VISIT, EST, LEVL III, 20-29 MIN: ICD-10-PCS | Mod: S$GLB,TXP,, | Performed by: FAMILY MEDICINE

## 2020-06-13 PROCEDURE — 73502 XR HIP 2 VIEW LEFT: ICD-10-PCS | Mod: FY,LT,S$GLB,TXP | Performed by: INTERNAL MEDICINE

## 2020-06-13 PROCEDURE — 73502 X-RAY EXAM HIP UNI 2-3 VIEWS: CPT | Mod: FY,LT,S$GLB,TXP | Performed by: INTERNAL MEDICINE

## 2020-06-13 PROCEDURE — 99213 OFFICE O/P EST LOW 20 MIN: CPT | Mod: S$GLB,TXP,, | Performed by: FAMILY MEDICINE

## 2020-06-13 NOTE — PROGRESS NOTES
"Subjective:       Patient ID: Jennifer Booth is a 55 y.o. female.    Vitals:  height is 5' 7" (1.702 m) and weight is 54.4 kg (120 lb). Her temperature is 97.9 °F (36.6 °C). Her pulse is 72. Her respiration is 16 and oxygen saturation is 98%.     Chief Complaint: Leg Pain    Pt tripped while walking to her car. Lump on LT leg    Leg Pain   The incident occurred 1 to 3 hours ago. The incident occurred at home. The injury mechanism was a fall. The pain is present in the left leg. The quality of the pain is described as aching. The pain is at a severity of 4/10. The pain is mild. The pain has been constant since onset. Nothing aggravates the symptoms.       Constitution: Negative for chills, fatigue and fever.   HENT: Negative for congestion and sore throat.    Neck: Negative for painful lymph nodes.   Cardiovascular: Negative for chest pain and leg swelling.   Eyes: Negative for double vision and blurred vision.   Respiratory: Negative for cough and shortness of breath.    Gastrointestinal: Negative for nausea, vomiting and diarrhea.   Genitourinary: Negative for dysuria, frequency, urgency and history of kidney stones.   Musculoskeletal: Positive for joint pain and joint swelling. Negative for muscle cramps and muscle ache.   Skin: Positive for bruising. Negative for color change, pale and rash.   Allergic/Immunologic: Negative for seasonal allergies.   Neurological: Negative for dizziness, history of vertigo, light-headedness, passing out and headaches.   Hematologic/Lymphatic: Negative for swollen lymph nodes.   Psychiatric/Behavioral: Negative for nervous/anxious, sleep disturbance and depression. The patient is not nervous/anxious.        Objective:      Physical Exam   Musculoskeletal:      Left hip: She exhibits tenderness and swelling. She exhibits no bony tenderness and no deformity.         Assessment:       1. Pain of left hip joint    2. Fall, initial encounter        Plan:         Pain of left hip " joint  -     X-Ray Hip 2 or 3 views Left; Future; Expected date: 06/13/2020    Fall, initial encounter

## 2020-06-13 NOTE — PATIENT INSTRUCTIONS
Hip Contusion    A contusion is another word for a bruise. It happens when small blood vessels break open and leak blood into the nearby area. A hip contusion can result from a bump, hit, or fall. Symptoms of a contusion often include changes in skin color (bruising), swelling, and pain. It may take several hours for a deep bruise to show up. If the injury is severe, you may need an X-ray to check for broken bones. Swelling should decrease in a few days. Bruising and pain may take several weeks to go away.  Home care  · Unless another medicine was prescribed, you may take acetaminophen, ibuprofen, or naproxen to help relieve pain and swelling. If needed, stronger pain medicines may be prescribed. Take all medicines exactly as directed.  · Ice the bruised area to help reduce pain and swelling. Wrap a cold source (ice pack or ice cubes in a plastic bag) in a thin towel. Apply the cold source to the bruised area for 20 minutes every 1 to 2 hours the first day. Continue this 3 to 4 times a day until the pain and swelling goes away.  · If walking causes pain, use crutches or a walker until you can walk without pain. These items can be rented at most pharmacies and orthopedic supply stores.  · If your injury is keeping you from moving around or caring for yourself properly, you may qualify for services such as home healthcare. Check with your doctor and insurance company to see if this type of care is covered.  Follow-up  Follow up with your healthcare provider, or as advised.  When to seek medical advice   Call your healthcare provider right away if any of these occur:  · Increased pain, bruising, or swelling near the injured area  · Decreased ability to bear weight on the injured side  · Pain or swelling develops below the knee  · Chest pain or shortness of breath  Date Last Reviewed: 4/1/2017  © 4203-4177 [employer]. [address] [place] [address], [place] [address]. All rights reserved. This information is  not intended as a substitute for professional medical care. Always follow your healthcare professional's instructions.

## 2020-06-17 ENCOUNTER — PATIENT OUTREACH (OUTPATIENT)
Dept: ADMINISTRATIVE | Facility: HOSPITAL | Age: 55
End: 2020-06-17

## 2020-06-19 ENCOUNTER — HOSPITAL ENCOUNTER (OUTPATIENT)
Dept: RADIOLOGY | Facility: HOSPITAL | Age: 55
Discharge: HOME | End: 2020-06-19
Attending: NURSE PRACTITIONER
Payer: COMMERCIAL

## 2020-06-19 ENCOUNTER — HOSPITAL ENCOUNTER (OUTPATIENT)
Dept: CARDIOLOGY | Facility: HOSPITAL | Age: 55
Discharge: HOME | End: 2020-06-19
Attending: NURSE PRACTITIONER
Payer: MEDICARE

## 2020-06-19 ENCOUNTER — OFFICE VISIT (OUTPATIENT)
Dept: FAMILY MEDICINE | Facility: CLINIC | Age: 55
End: 2020-06-19
Payer: COMMERCIAL

## 2020-06-19 VITALS
SYSTOLIC BLOOD PRESSURE: 148 MMHG | BODY MASS INDEX: 18.88 KG/M2 | OXYGEN SATURATION: 100 % | DIASTOLIC BLOOD PRESSURE: 74 MMHG | HEIGHT: 68 IN | HEART RATE: 67 BPM | WEIGHT: 124.56 LBS | TEMPERATURE: 98 F

## 2020-06-19 VITALS
SYSTOLIC BLOOD PRESSURE: 150 MMHG | HEART RATE: 64 BPM | WEIGHT: 120 LBS | BODY MASS INDEX: 18.83 KG/M2 | HEIGHT: 67 IN | DIASTOLIC BLOOD PRESSURE: 80 MMHG

## 2020-06-19 DIAGNOSIS — F17.210 CIGARETTE NICOTINE DEPENDENCE WITHOUT COMPLICATION: ICD-10-CM

## 2020-06-19 DIAGNOSIS — E87.70 HYPERVOLEMIA, UNSPECIFIED HYPERVOLEMIA TYPE: ICD-10-CM

## 2020-06-19 DIAGNOSIS — Z99.2 ESRD (END STAGE RENAL DISEASE) ON DIALYSIS: Primary | ICD-10-CM

## 2020-06-19 DIAGNOSIS — I12.9 RENAL HYPERTENSION: ICD-10-CM

## 2020-06-19 DIAGNOSIS — F10.10 ETOH ABUSE: ICD-10-CM

## 2020-06-19 DIAGNOSIS — Z76.82 ORGAN TRANSPLANT CANDIDATE: ICD-10-CM

## 2020-06-19 DIAGNOSIS — N18.6 ESRD (END STAGE RENAL DISEASE) ON DIALYSIS: Primary | ICD-10-CM

## 2020-06-19 DIAGNOSIS — N18.6 ANEMIA IN CHRONIC KIDNEY DISEASE, ON CHRONIC DIALYSIS: ICD-10-CM

## 2020-06-19 DIAGNOSIS — T82.590A DIALYSIS AV FISTULA MALFUNCTION, INITIAL ENCOUNTER: ICD-10-CM

## 2020-06-19 DIAGNOSIS — N18.5 CKD (CHRONIC KIDNEY DISEASE) STAGE 5, GFR LESS THAN 15 ML/MIN: ICD-10-CM

## 2020-06-19 DIAGNOSIS — D63.1 ANEMIA IN CHRONIC KIDNEY DISEASE, ON CHRONIC DIALYSIS: ICD-10-CM

## 2020-06-19 DIAGNOSIS — C90.01 MULTIPLE MYELOMA IN REMISSION: ICD-10-CM

## 2020-06-19 DIAGNOSIS — Z99.2 ANEMIA IN CHRONIC KIDNEY DISEASE, ON CHRONIC DIALYSIS: ICD-10-CM

## 2020-06-19 LAB
ASCENDING AORTA: 3.35 CM
AV INDEX (PROSTH): 0.71
AV MEAN GRADIENT: 8 MMHG
AV PEAK GRADIENT: 15 MMHG
AV VALVE AREA: 2.33 CM2
AV VELOCITY RATIO: 0.74
BSA FOR ECHO PROCEDURE: 1.6 M2
CV ECHO LV RWT: 0.35 CM
DOP CALC AO PEAK VEL: 1.92 M/S
DOP CALC AO VTI: 37.68 CM
DOP CALC LVOT AREA: 3.3 CM2
DOP CALC LVOT DIAMETER: 2.05 CM
DOP CALC LVOT PEAK VEL: 1.43 M/S
DOP CALC LVOT STROKE VOLUME: 87.65 CM3
DOP CALCLVOT PEAK VEL VTI: 26.57 CM
E WAVE DECELERATION TIME: 193.58 MSEC
E/A RATIO: 1.91
E/E' RATIO: 17.83 M/S
ECHO LV POSTERIOR WALL: 1.05 CM (ref 0.6–1.1)
FRACTIONAL SHORTENING: 29 % (ref 28–44)
INTERVENTRICULAR SEPTUM: 1.09 CM (ref 0.6–1.1)
LA MAJOR: 6.78 CM
LA MINOR: 6.58 CM
LA WIDTH: 4.74 CM
LEFT ATRIUM SIZE: 4.68 CM
LEFT ATRIUM VOLUME INDEX: 77.4 ML/M2
LEFT ATRIUM VOLUME: 125.93 CM3
LEFT INTERNAL DIMENSION IN SYSTOLE: 4.33 CM (ref 2.1–4)
LEFT VENTRICLE DIASTOLIC VOLUME INDEX: 113.11 ML/M2
LEFT VENTRICLE DIASTOLIC VOLUME: 184.08 ML
LEFT VENTRICLE MASS INDEX: 168 G/M2
LEFT VENTRICLE SYSTOLIC VOLUME INDEX: 51.9 ML/M2
LEFT VENTRICLE SYSTOLIC VOLUME: 84.47 ML
LEFT VENTRICULAR INTERNAL DIMENSION IN DIASTOLE: 6.06 CM (ref 3.5–6)
LEFT VENTRICULAR MASS: 274.18 G
LV LATERAL E/E' RATIO: 15.29 M/S
LV SEPTAL E/E' RATIO: 21.4 M/S
MV PEAK A VEL: 0.56 M/S
MV PEAK E VEL: 1.07 M/S
MV STENOSIS PRESSURE HALF TIME: 56.14 MS
MV VALVE AREA P 1/2 METHOD: 3.92 CM2
PISA MRMAX VEL: 0.05 M/S
PISA TR MAX VEL: 2.63 M/S
PULM VEIN S/D RATIO: 1
PV PEAK D VEL: 0.53 M/S
PV PEAK S VEL: 0.53 M/S
RA MAJOR: 5.25 CM
RA PRESSURE: 8 MMHG
RA WIDTH: 3.38 CM
RIGHT VENTRICULAR END-DIASTOLIC DIMENSION: 4.23 CM
SINUS: 3.08 CM
STJ: 2.74 CM
TDI LATERAL: 0.07 M/S
TDI SEPTAL: 0.05 M/S
TDI: 0.06 M/S
TR MAX PG: 28 MMHG
TRICUSPID ANNULAR PLANE SYSTOLIC EXCURSION: 2.46 CM
TV REST PULMONARY ARTERY PRESSURE: 36 MMHG

## 2020-06-19 PROCEDURE — 76770 US RETROPERITONEAL COMPLETE: ICD-10-PCS | Mod: 26,TXP,, | Performed by: RADIOLOGY

## 2020-06-19 PROCEDURE — 93306 TTE W/DOPPLER COMPLETE: CPT | Mod: TXP

## 2020-06-19 PROCEDURE — 71046 X-RAY EXAM CHEST 2 VIEWS: CPT | Mod: 26,TXP,, | Performed by: RADIOLOGY

## 2020-06-19 PROCEDURE — 99999 PR PBB SHADOW E&M-EST. PATIENT-LVL IV: ICD-10-PCS | Mod: PBBFAC,,, | Performed by: FAMILY MEDICINE

## 2020-06-19 PROCEDURE — 93306 ECHO (CUPID ONLY): ICD-10-PCS | Mod: 26,TXP,, | Performed by: INTERNAL MEDICINE

## 2020-06-19 PROCEDURE — 99999 PR PBB SHADOW E&M-EST. PATIENT-LVL IV: CPT | Mod: PBBFAC,,, | Performed by: FAMILY MEDICINE

## 2020-06-19 PROCEDURE — 76770 US EXAM ABDO BACK WALL COMP: CPT | Mod: TC,TXP

## 2020-06-19 PROCEDURE — 99495 TCM SERVICES (MODERATE COMPLEXITY): ICD-10-PCS | Mod: S$GLB,,, | Performed by: FAMILY MEDICINE

## 2020-06-19 PROCEDURE — 71046 XR CHEST PA AND LATERAL: ICD-10-PCS | Mod: 26,TXP,, | Performed by: RADIOLOGY

## 2020-06-19 PROCEDURE — 71046 X-RAY EXAM CHEST 2 VIEWS: CPT | Mod: TC,TXP

## 2020-06-19 PROCEDURE — 76770 US EXAM ABDO BACK WALL COMP: CPT | Mod: 26,TXP,, | Performed by: RADIOLOGY

## 2020-06-19 PROCEDURE — 99495 TRANSJ CARE MGMT MOD F2F 14D: CPT | Mod: S$GLB,,, | Performed by: FAMILY MEDICINE

## 2020-06-19 PROCEDURE — 93306 TTE W/DOPPLER COMPLETE: CPT | Mod: 26,TXP,, | Performed by: INTERNAL MEDICINE

## 2020-06-19 NOTE — PROGRESS NOTES
Transitional Care Note  Subjective:       Patient ID: Jennifer Booth is a 55 y.o. female.  Chief Complaint:  Hospital follow-up  Family and/or Caretaker present at visit?  No.  Diagnostic tests reviewed/disposition: I have reviewed all completed as well as pending diagnostic tests at the time of discharge.  Disease/illness education: y  Home health/community services discussion/referrals: Patient does not have home health established from hospital visit.  They do not need home health.  If needed, we will set up home health for the patient.   Establishment or re-establishment of referral orders for community resources: No other necessary community resources.   Discussion with other health care providers: No discussion with other health care providers necessary.   HPI  Review of Systems    Objective:      Physical Exam    Assessment:       1. CKD (chronic kidney disease) stage 5, GFR less than 15 ml/min        Plan:         (Portions of this note were dictated using voice recognition software and may contain dictation related errors in spelling/grammar/syntax not found on text review)    CC:  Hospital follow-up      HPI: 55 y.o. female pt of Evangelista Wilks MD    Medical history below    Here for Hospital followup, admitted 6/8/20    CKD on dialysis, went to hospital secondary to shortness of breath and chest tightness, having also diarrhea for 6 months.  Labs showed hyponatremia with sodium 121, creatinine 5.3, potassium 4.5, BNP greater than 4900, troponin 0.037.  Chest x-ray concerning for volume overload.  CoVID negative.  Had admitted to poor dietary compliance.  Per outpatient nephrology had had multiple abbreviated sections in recent past.  AV fistula was malfunctioning, stented by interventional cardiology.  Started on aspirin Plavix for 6 months followed by aspirin alone afterwards.  Volume overload improved with dialysis    Chest x-ray 06/08/2020  Impression:     Findings suggestive for central pulmonary  vascular congestion and mild edema.  More focal opacification/consolidative change is seen at the right lung base.  This is nonspecific but may reflect asymmetric edema versus potential superimposed aspiration or developing pneumonia in the right clinical setting.      Interval history:  Breathing doing much better.  Compliant with dialysis.  Trying to get back on track with dietary habits, sodium reduction, fluid restriction.  Denies any current chest pain or shortness of breath.  No concerns about fluid overload at this time.  Blood pressure is high, 148/74.  Had seen her nephrologist at [facility] prior to admission.  Was started on a clonidine patch 0.1 mg weekly but has not started taking this yet..    Smoking, enrolled smoking cessation, appointment upcoming.  Use to drink alcohol fairly significantly but has not had any alcohol since admission      Past Medical History:   Diagnosis Date    Allergic drug reaction 2017    Anemia     Anxiety     Arm pain, left 2014    Breast cyst     Chronic renal failure 2014    CKD (chronic kidney disease) stage 5, GFR less than 15 ml/min     Depression     Dialysis patient     dialysis m-w-f    Encounter for blood transfusion     GERD (gastroesophageal reflux disease)     Hypertension     Mood swings     Multiple myeloma in remission 10/26/2012    per Hem/Oc note    Renal hypertension     Status post dialysis 2012    Thrombocytopenia 2012       Past Surgical History:   Procedure Laterality Date    AV FISTULA PLACEMENT Left     BREAST CYST ASPIRATION Left     BREAST CYST EXCISION Left      SECTION      x1    FISTULOGRAM N/A 2020    Procedure: Fistulogram;  Surgeon: Rahat Berger MD;  Location: [facility]ATH LAB[facility]EP;  Service: Cardiology;  Laterality: N/A;    pd catheter      PERCUTANEOUS TRANSLUMINAL ANGIOPLASTY OF ARTERIOVENOUS FISTULA N/A 2020    Procedure: PTA, AV FISTULA;  Surgeon: Rahat Berger  MD;  Location: [facility] CATH LAB/EP;  Service: Cardiology;  Laterality: N/A;    PERITONEAL CATHETER REMOVAL N/A 11/30/2018    Procedure: REMOVAL, CATHETER, DIALYSIS, PERITONEAL;  Surgeon: Mukesh Gonsales Jr., MD;  Location: [facility];  Service: General;  Laterality: N/A;    RENAL BIOPSY  2016    THROMBECTOMY OF HEMODIALYSIS ACCESS SITE N/A 6/9/2020    Procedure: Thrombectomy, Hemodialysis Graft Or Fistula;  Surgeon: Rahat Berger MD;  Location: [facility] CATH LAB/EP;  Service: Cardiology;  Laterality: N/A;    VASCULAR SURGERY  8/2012    dialysis catheter to right subclavian       Family History   Problem Relation Age of Onset    Hypertension Mother     Heart failure Mother     Ovarian cancer Maternal Aunt     Diabetes Maternal Grandmother     Stroke Maternal Grandmother     Breast cancer Neg Hx     Colon cancer Neg Hx        Social History     Tobacco Use    Smoking status: Current Every Day Smoker     Packs/day: 1.00     Years: 36.00     Pack years: 36.00     Types: Cigarettes     Start date: 1984    Smokeless tobacco: Never Used    Tobacco comment: Pt enrolled in the [employer]. Ambulatory referral to Smoking Cessation program.    Substance Use Topics    Alcohol use: Yes     Alcohol/week: 2.0 standard drinks     Types: 2 Glasses of wine per week     Comment: A bottle of wine/ night, Last night 6/8 had a bottle of wine    Drug use: No       Lab Results   Component Value Date    WBC 3.40 (L) 06/10/2020    HGB 10.1 (L) 06/10/2020    HCT 28.5 (L) 06/10/2020    MCV 90 06/10/2020    PLT 72 (L) 06/10/2020    CHOL 198 07/26/2016    TRIG 118 07/26/2016    HDL 99 (H) 07/26/2016    ALT 23 06/09/2020    AST 31 06/09/2020    BILITOT 0.5 06/09/2020    ALKPHOS 60 06/09/2020     (L) 06/10/2020    K 3.9 06/10/2020    CL 89 (L) 06/10/2020    CREATININE 4.4 (H) 06/10/2020    ESTGFRAFRICA 12 (A) 06/10/2020    EGFRNONAA 11 (A) 06/10/2020    CALCIUM 8.5 (L) 06/10/2020    ALBUMIN 3.4 (L) 06/10/2020    BUN 28 (H) 06/10/2020     CO2 23 06/10/2020    TSH 0.697 12/26/2011    INR 0.9 11/13/2017    INR 0.9 11/13/2017    LDLCALC 75.4 07/26/2016    GLU 82 06/10/2020         eGFR if African American (mL/min/1.73 m^2)   Date Value   06/10/2020 12 (A)   06/09/2020 9 (A)   06/08/2020 10 (A)   04/09/2020 28 (A)   02/11/2020 20 (A)   11/12/2019 24 (A)   08/08/2019 10 (A)   05/06/2019 11 (A)             ROS:  GENERAL: No fever, chills, fatigability or weight loss.  SKIN: No rashes, no itching.  HEAD: No headaches.  EYES: No visual changes  EARS: No ear pain or changes in hearing.  NOSE: No congestion or rhinorrhea.  MOUTH & THROAT: No hoarseness, change in voice, or sore throat.  NODES: Denies swollen glands.  CHEST: Denies MONTES, cyanosis, wheezing, cough and sputum production.  CARDIOVASCULAR: Denies chest pain, PND, orthopnea.  ABDOMEN: No nausea, vomiting, or changes in bowel function.  URINARY: No flank pain, dysuria or hematuria.  PERIPHERAL VASCULAR: No claudication or cyanosis.  MUSCULOSKELETAL: No joint stiffness or swelling. Denies back pain.  NEUROLOGIC: No weakness or numbness.    Vital signs reviewed  PE:   APPEARANCE: Well nourished, well developed, in no acute distress.    HEAD: Normocephalic, atraumatic.  EYES:   Conjunctivae noninjected.  EARS: TM's intact. Light reflex normal. No retraction or perforation  NECK: Supple with no cervical lymphadenopathy.    CHEST: Good inspiratory effort. Lungs clear to auscultation with no wheezes or crackles.  CARDIOVASCULAR: Normal S1, S2. No rubs, murmurs, or gallops.  ABDOMEN: Bowel sounds normal. Not distended. Soft. No tenderness or masses. No organomegaly.  EXTREMITIES: No edema.  Fistula site left arm nontender      IMPRESSION  1. ESRD (end stage renal disease) on dialysis    2. CKD (chronic kidney disease) stage 5, GFR less than 15 ml/min    3. Multiple myeloma in remission    4. Anemia in chronic kidney disease, on chronic dialysis    5. Hypervolemia, unspecified hypervolemia type    6. ETOH  abuse    7. Cigarette nicotine dependence without complication    8. Renal hypertension            PLAN  Reviewed hospitalizations, imaging, labs as above    Volume overload improved with hemodialysis.  Continue with dietary precautions, sodium reduction.  Repeat chest x-ray done today shows improvement of pulmonary findings from hospitalization.    Hypertension:  Follow-up with nephrology at [facility], plans on starting clonidine patch 0.1 mg weekly prescribed by her nephrologist along with her carvedilol 12.5 mg b.i.d.    Work on smoking cessation, maintain alcohol cessation    Multiple myeloma, in remission, follows up with Hematology-Oncology regularly

## 2020-06-22 ENCOUNTER — NURSE TRIAGE (OUTPATIENT)
Dept: ADMINISTRATIVE | Facility: CLINIC | Age: 55
End: 2020-06-22

## 2020-06-22 NOTE — TELEPHONE ENCOUNTER
Pt seen in clinic day 10 post procedure.  Pt denied cough, fever or difficulty breathing since procedure.  No contact attempts regarding day 13 of [facility] Procedural Symptom Tracker required per protocol.      Reason for Disposition   Caller has already spoken with the PCP (or office), and has no further questions    Protocols used: NO CONTACT OR DUPLICATE CONTACT CALL-A-[place]

## 2020-06-26 ENCOUNTER — TELEPHONE (OUTPATIENT)
Dept: TRANSPLANT | Facility: CLINIC | Age: 55
End: 2020-06-26

## 2020-06-26 DIAGNOSIS — Z76.82 ORGAN TRANSPLANT CANDIDATE: Primary | ICD-10-CM

## 2020-06-28 ENCOUNTER — NURSE TRIAGE (OUTPATIENT)
Dept: ADMINISTRATIVE | Facility: CLINIC | Age: 55
End: 2020-06-28

## 2020-06-28 NOTE — TELEPHONE ENCOUNTER
Reason for Disposition   Patient sounds very sick or weak to the triager    Additional Information   Negative: [1] Difficulty breathing AND [2] not severe   Negative: Arm is swollen, new onset (or leg swelling if IV in lower extremity)   Negative: Pus or cloudy fluid from IV site   Negative: Fever > 100.4 F (38.0 C)    Protocols used: IV SITE (SKIN) SYMPTOMS-A-AH    Her fistula on the left arm is oozing blold and she has some swelling. Patient advised to go the ED for evlaution. She verbalized understanding.

## 2020-06-29 ENCOUNTER — TELEPHONE (OUTPATIENT)
Dept: TRANSPLANT | Facility: CLINIC | Age: 55
End: 2020-06-29

## 2020-06-29 ENCOUNTER — CLINICAL SUPPORT (OUTPATIENT)
Dept: SMOKING CESSATION | Facility: CLINIC | Age: 55
End: 2020-06-29
Payer: COMMERCIAL

## 2020-06-29 VITALS — HEIGHT: 67 IN | BODY MASS INDEX: 20.09 KG/M2 | WEIGHT: 128 LBS

## 2020-06-29 DIAGNOSIS — F17.210 MODERATE CIGARETTE SMOKER (10-19 PER DAY): Primary | ICD-10-CM

## 2020-06-29 PROCEDURE — 99404 PR PREVENT COUNSEL,INDIV,60 MIN: ICD-10-PCS | Mod: S$GLB,TXP,,

## 2020-06-29 PROCEDURE — 99999 PR PBB SHADOW E&M-EST. PATIENT-LVL II: ICD-10-PCS | Mod: PBBFAC,TXP,,

## 2020-06-29 PROCEDURE — 99999 PR PBB SHADOW E&M-EST. PATIENT-LVL II: CPT | Mod: PBBFAC,TXP,,

## 2020-06-29 PROCEDURE — 99404 PREV MED CNSL INDIV APPRX 60: CPT | Mod: S$GLB,TXP,,

## 2020-06-29 RX ORDER — MICONAZOLE NITRATE 2 %
2 CREAM (GRAM) TOPICAL
Qty: 100 EACH | Refills: 0 | Status: SHIPPED | OUTPATIENT
Start: 2020-06-29 | End: 2021-12-11

## 2020-06-29 RX ORDER — IBUPROFEN 200 MG
1 TABLET ORAL DAILY
Qty: 28 PATCH | Refills: 0 | Status: SHIPPED | OUTPATIENT
Start: 2020-06-29 | End: 2021-12-11

## 2020-06-29 RX ORDER — DM/P-EPHED/ACETAMINOPH/DOXYLAM 30-7.5/3
2 LIQUID (ML) ORAL
Qty: 72 LOZENGE | Refills: 0 | Status: SHIPPED | OUTPATIENT
Start: 2020-06-29 | End: 2021-12-11

## 2020-06-29 NOTE — PROGRESS NOTES
FTND of 8 indicates a high level of tobacco dependency. CESD of 22 indicates some degree of mental distress or depression at this time. Smokes 20 cigarettes per day. Starting 21 mg patches and 2 gm Gum.lozenges prn.

## 2020-06-29 NOTE — Clinical Note
Your patient has just enrolled into the smoking cessation program. FTND of 8 indicates a high level of tobacco dependency. CESD of 22 indicates some degree of mental distress or depression at this time. Smokes 20 cigarettes per day. Starting 21 mg patches and 2 gm Gum.lozenges prn.

## 2020-07-02 PROCEDURE — 86829 HLA CLASS I/II ANTIBODY QUAL: CPT | Mod: PO,TXP

## 2020-07-02 PROCEDURE — 86829 HLA CLASS I/II ANTIBODY QUAL: CPT | Mod: 91,PO,TXP

## 2020-07-06 ENCOUNTER — CLINICAL SUPPORT (OUTPATIENT)
Dept: SMOKING CESSATION | Facility: CLINIC | Age: 55
End: 2020-07-06
Payer: COMMERCIAL

## 2020-07-06 DIAGNOSIS — F17.210 MODERATE CIGARETTE SMOKER (10-19 PER DAY): Primary | ICD-10-CM

## 2020-07-06 PROCEDURE — 99999 PR PBB SHADOW E&M-EST. PATIENT-LVL I: CPT | Mod: PBBFAC,TXP,,

## 2020-07-06 PROCEDURE — 99999 PR PBB SHADOW E&M-EST. PATIENT-LVL I: ICD-10-PCS | Mod: PBBFAC,TXP,,

## 2020-07-06 PROCEDURE — 99402 PR PREVENT COUNSEL,INDIV,30 MIN: ICD-10-PCS | Mod: S$GLB,TXP,,

## 2020-07-06 PROCEDURE — 99402 PREV MED CNSL INDIV APPRX 30: CPT | Mod: S$GLB,TXP,,

## 2020-07-06 NOTE — Clinical Note
Just a note to advise how the patient is progressing in the tobacco cessation program.  Spoke with patient regarding her quit attempt. She reports to not starting the medications yet as this was a holiday weekend. She plans to start the 21 mg patches and 2 mg Gum/Lozenges tomorrow. Session Focus:  orientation, client introductions, completion of TCRS (Tobacco Cessation Rating Scale) learned addiction model, cues/triggers, personal reasons for quitting, medications, goals, quit date. The patient denies any abnormal behavioral or mental changes at this time. The patient will continue with group therapy sessions and medication monitoring by CTTS. Prescribed medication management will be by physician.

## 2020-07-06 NOTE — PROGRESS NOTES
Individual Follow-Up Form    7/6/2020    Quit Date:     Clinical Status of Patient: Outpatient    Length of Service: 30 minutes    Continuing Medication: yes  Patches    Other Medications: Gum/Lozenges     Target Symptoms: Withdrawal and medication side effects. The following were rated moderate (3) to severe (4) on TCRS:  · Moderate (3): Desire or Crave Tobacco   · Severe (4): None    Comments: Spoke with patient regarding her quit attempt. She reports to not starting the medications yet as this was a holiday weekend. She plans to start the 21 mg patches and 2 mg Gum/Lozenges tomorrow. Session Focus:  orientation, client introductions, completion of TCRS (Tobacco Cessation Rating Scale) learned addiction model, cues/triggers, personal reasons for quitting, medications, goals, quit date. The patient denies any abnormal behavioral or mental changes at this time. The patient will continue with group therapy sessions and medication monitoring by CTTS. Prescribed medication management will be by physician.     Diagnosis: F17.210    Next Visit: 1 week

## 2020-07-08 ENCOUNTER — LAB VISIT (OUTPATIENT)
Dept: LAB | Facility: HOSPITAL | Age: 55
End: 2020-07-08
Payer: MEDICARE

## 2020-07-08 DIAGNOSIS — Z76.82 PATIENT ON WAITING LIST FOR KIDNEY TRANSPLANT: ICD-10-CM

## 2020-07-15 LAB — HPRA INTERPRETATION: NORMAL

## 2020-07-20 ENCOUNTER — HOSPITAL ENCOUNTER (OUTPATIENT)
Dept: RADIOLOGY | Facility: HOSPITAL | Age: 55
Discharge: HOME | End: 2020-07-20
Attending: NURSE PRACTITIONER
Payer: MEDICARE

## 2020-07-20 ENCOUNTER — OFFICE VISIT (OUTPATIENT)
Dept: OBSTETRICS AND GYNECOLOGY | Facility: CLINIC | Age: 55
End: 2020-07-20
Payer: MEDICARE

## 2020-07-20 VITALS
BODY MASS INDEX: 20.03 KG/M2 | DIASTOLIC BLOOD PRESSURE: 80 MMHG | WEIGHT: 127.88 LBS | SYSTOLIC BLOOD PRESSURE: 110 MMHG

## 2020-07-20 DIAGNOSIS — Z12.4 PAPANICOLAOU SMEAR FOR CERVICAL CANCER SCREENING: Primary | ICD-10-CM

## 2020-07-20 DIAGNOSIS — Z76.82 ORGAN TRANSPLANT CANDIDATE: ICD-10-CM

## 2020-07-20 PROCEDURE — 77067 SCR MAMMO BI INCL CAD: CPT | Mod: TC,TXP

## 2020-07-20 PROCEDURE — 77067 MAMMO DIGITAL SCREENING BILAT WITH CAD: ICD-10-PCS | Mod: 26,TXP,, | Performed by: RADIOLOGY

## 2020-07-20 PROCEDURE — 3008F BODY MASS INDEX DOCD: CPT | Mod: CPTII,S$GLB,, | Performed by: OBSTETRICS & GYNECOLOGY

## 2020-07-20 PROCEDURE — 99396 PR PREVENTIVE VISIT,EST,40-64: ICD-10-PCS | Mod: S$GLB,,, | Performed by: OBSTETRICS & GYNECOLOGY

## 2020-07-20 PROCEDURE — 99999 PR PBB SHADOW E&M-EST. PATIENT-LVL III: ICD-10-PCS | Mod: PBBFAC,,, | Performed by: OBSTETRICS & GYNECOLOGY

## 2020-07-20 PROCEDURE — 88175 CYTOPATH C/V AUTO FLUID REDO: CPT

## 2020-07-20 PROCEDURE — 99396 PREV VISIT EST AGE 40-64: CPT | Mod: S$GLB,,, | Performed by: OBSTETRICS & GYNECOLOGY

## 2020-07-20 PROCEDURE — 3008F PR BODY MASS INDEX (BMI) DOCUMENTED: ICD-10-PCS | Mod: CPTII,S$GLB,, | Performed by: OBSTETRICS & GYNECOLOGY

## 2020-07-20 PROCEDURE — 77067 SCR MAMMO BI INCL CAD: CPT | Mod: 26,TXP,, | Performed by: RADIOLOGY

## 2020-07-20 PROCEDURE — 99999 PR PBB SHADOW E&M-EST. PATIENT-LVL III: CPT | Mod: PBBFAC,,, | Performed by: OBSTETRICS & GYNECOLOGY

## 2020-07-20 NOTE — PROGRESS NOTES
GYNECOLOGY  :  Jennifer Booth is a 55 y.o.    Here today for  gyn annual visit   Hx End stage renal disease     No gyn  complaints  Mammogram was done today   Last gyn visit on: Dr Ayala     Past Medical History:   Diagnosis Date    Allergic drug reaction 2017    Anemia     Anxiety     Arm pain, left 2014    Breast cyst     Chronic renal failure 2014    CKD (chronic kidney disease) stage 5, GFR less than 15 ml/min     Depression     Dialysis patient     dialysis -w-    Encounter for blood transfusion     GERD (gastroesophageal reflux disease)     Hypertension     Mood swings     Multiple myeloma in remission 10/26/2012    per Hem/Oc note    Renal hypertension     Status post dialysis 2012    Thrombocytopenia 2012     Past Surgical History:   Procedure Laterality Date    AV FISTULA PLACEMENT Left     BREAST CYST ASPIRATION Left     BREAST CYST EXCISION Left      SECTION      x1    FISTULOGRAM N/A 2020    Procedure: Fistulogram;  Surgeon: Rahat Berger MD;  Location: [facility] CATH LAB/EP;  Service: Cardiology;  Laterality: N/A;    pd catheter      PERCUTANEOUS TRANSLUMINAL ANGIOPLASTY OF ARTERIOVENOUS FISTULA N/A 2020    Procedure: PTA, AV FISTULA;  Surgeon: Rahat Berger MD;  Location: [facility] CATH LAB/EP;  Service: Cardiology;  Laterality: N/A;    PERITONEAL CATHETER REMOVAL N/A 2018    Procedure: REMOVAL, CATHETER, DIALYSIS, PERITONEAL;  Surgeon: Mukesh Gonsales Jr., MD;  Location: [facility] OR;  Service: General;  Laterality: N/A;    RENAL BIOPSY  2016    THROMBECTOMY OF HEMODIALYSIS ACCESS SITE N/A 2020    Procedure: Thrombectomy, Hemodialysis Graft Or Fistula;  Surgeon: Rahat Berger MD;  Location: [facility] CATH LAB/EP;  Service: Cardiology;  Laterality: N/A;    VASCULAR SURGERY  2012    dialysis catheter to right subclavian     Family History   Problem Relation Age of Onset    Hypertension Mother     Heart failure  Mother     Ovarian cancer Maternal Aunt     Diabetes Maternal Grandmother     Stroke Maternal Grandmother     Breast cancer Neg Hx     Colon cancer Neg Hx      Social History     Tobacco Use    Smoking status: Current Every Day Smoker     Packs/day: 1.00     Years: 36.00     Pack years: 36.00     Types: Cigarettes     Start date:     Smokeless tobacco: Never Used    Tobacco comment: Pt enrolled in the [employer]. Ambulatory referral to Smoking Cessation program.    Substance Use Topics    Alcohol use: Yes     Alcohol/week: 2.0 standard drinks     Types: 2 Glasses of wine per week     Comment: A bottle of wine/ night, Last night  had a bottle of wine    Drug use: No     OB History    Para Term  AB Living   1 1 1     1   SAB TAB Ectopic Multiple Live Births           1      # Outcome Date GA Lbr Jeison/2nd Weight Sex Delivery Anes PTL Lv   1 Term      CS-LTranv  N ALIN       /80   Wt 58 kg (127 lb 13.9 oz)   LMP 2016   BMI 20.03 kg/m²     Last PAP=     Last Mammogram =         COMPREHENSIVE GYN HISTORY:  G 1 P 1     PAP History: Denies abnormal Paps.  Infection History: Denies STDs. Denies PID.  Benign History: Denies uterine fibroids. Denies ovarian cysts. Denies endometriosis.   Cancer History: Denies cervical cancer. Denies uterine cancer or hyperplasia. Denies ovarian cancer. Denies vulvar cancer or pre-cancer. Denies vaginal cancer or pre-cancer. Denies breast cancer. Denies colon cancer.    ROS  GENERAL: Denies significant weight gain or weight loss. Feeling well overall.  SKIN: Denies rash or lesions.  Normal skin turgor  HEAD: Denies head injury or headache.   NODES: Denies enlarged lymph nodes.   CHEST: Denies chest pain or shortness of breath.   CARDIOVASCULAR: Denies palpitations or left sided chest pain.   ABDOMEN: No abdominal pain,no  diarrhea,constipation  nausea, vomiting or rectal bleeding.   URINARY: normal  Frequency,no  Dysuria or burning on  urination.   REPRODUCTIVE: Per HPI   BREASTS: The patient sometimes performs breast self-examination and denies pain, lumps, or nipple discharge.   HEMATOLOGIC: No easy bruisability or excessive bleeding.   MUSCULOSKELETAL: Denies joint pain or swelling.   NEUROLOGIC: Denies syncope or weakness.   PSYCHIATRIC: Denies depression, anxiety or mood swings.    Physical Exam     Constitutional: She is oriented to person, place, and time. She appears well-developed and well-nourished. No distress.   HENT:   Head: Normocephalic.   NECK: Neck symmetric without masses or thyromegaly.  Cardiovascular: Normal rate and regular rhythm.   Pulmonary/Chest: Effort normal and breath sounds normal. No respiratory distress. She has no wheezes.   Breasts: Symmetrical, no skin changes or visible lesions. No palpable masses, nipple discharge or adenopathy bilaterally.  Abdominal: Soft not distended. Bowel sounds are normal. She exhibits   no masses . No tenderness to palpation.   Genitourinary: Pelvic exam was performed with patient supine.   External genitalia normal no lesions.Normal hair distribution   Adequate perineal body,normal urethral meatus   Vagina moist and well rugated without lesions, no vaginal  Discharge.   Cervix pink and without lesions. No bleeding. No significant cystocele or rectocele.  Bimanual exam showed uterus normal size, shape and position , mobile and nontender. Adnexa without masses or tenderness. Urethra and bladder normal  Extremities normal no cyanosis ,edema.     Procedures:  Pap smear      A/P Jennifer Henrylps 55 y.o.     Dx=  1- Routine gyn visit   2- -Cervical cancer screen  -Breast cancer screen     3-End stage renal disease     Plan:  Routine gyn   -s/p normal breast exam   -s/p normal pelvic exam:    Patient was counseled today on A.C.S. Pap guidelines and recommendations for yearly pelvic exams, mammograms and monthly self breast exams; to see her PCP for other health maintenance, nutrition  and weight gain counseling, discussed lab values.  Discussed colonoscopy recommendations every 10 years starting at age 50   Calcium and vit D daily intake     F/u in 1 yr or [place]    Con Gibbs M.D.   OB/GYN

## 2020-07-20 NOTE — LETTER
July 20, 2020      Jayna Miles, ABRAHAM  [address]  [facility]  1st Floor Clinic  [facility] [address]           [place] [facility]  [address]  [place] [address]  Phone: [phone]          Patient: Jennifer Booth   MR Number: [id number]   YOB: 1965   Date of Visit: 7/20/2020       Dear Jayna Miles:    Thank you for referring Jennifer Booth to me for evaluation. Attached you will find relevant portions of my assessment and plan of care.    If you have questions, please do not hesitate to call me. I look forward to following Jennifer Booth along with you.    Sincerely,    Con Gibbs MD    Enclosure  CC:  No Recipients    If you would like to receive this communication electronically, please contact [email or web address] or [phone] to request more information on [employer] access.    For providers and/or their staff who would like to refer a patient to [facility], please contact us through our one-stop-shop provider referral line, [facility], at [phone].    If you feel you have received this communication in error or would no longer like to receive these types of communications, please e-mail [email or web address]

## 2020-07-22 ENCOUNTER — CLINICAL SUPPORT (OUTPATIENT)
Dept: SMOKING CESSATION | Facility: CLINIC | Age: 55
End: 2020-07-22
Payer: COMMERCIAL

## 2020-07-22 DIAGNOSIS — F17.210 MODERATE CIGARETTE SMOKER (10-19 PER DAY): Primary | ICD-10-CM

## 2020-07-22 PROCEDURE — 99999 PR PBB SHADOW E&M-EST. PATIENT-LVL I: ICD-10-PCS | Mod: PBBFAC,TXP,,

## 2020-07-22 PROCEDURE — 99401 PREV MED CNSL INDIV APPRX 15: CPT | Mod: S$PBB,TXP,,

## 2020-07-22 PROCEDURE — 99999 PR PBB SHADOW E&M-EST. PATIENT-LVL I: CPT | Mod: PBBFAC,TXP,,

## 2020-07-22 PROCEDURE — 99401 PR PREVENT COUNSEL,INDIV,15 MIN: ICD-10-PCS | Mod: S$PBB,TXP,,

## 2020-07-22 NOTE — Clinical Note
Just a note to advise how the patient is progressing in the tobacco cessation program. The patient is prescribed tobacco cessation medication regimen of 21 mg Patches but has not started them yet. Patient states she's smoking the same, she states she's not started the patches yet and wants to, but is having a hard time starting. We discussed any side effects and any issues she's concerned about. She will start the patches on her way to [employer] tomorrow. Session Focus:  orientation, client introductions, completion of TCRS (Tobacco Cessation Rating Scale) learned addiction model, cues/triggers, personal reasons for quitting, medications, goals, quit date. The patient denies any abnormal behavioral or mental changes at this time. The patient will continue with group therapy sessions and medication monitoring by CTTS. Prescribed medication management will be by physician.

## 2020-07-22 NOTE — PROGRESS NOTES
Individual Follow-Up Form    7/22/2020    Quit Date:     Clinical Status of Patient: Outpatient    Length of Service: 15 minutes    Continuing Medication: no    Other Medications:      Target Symptoms: Withdrawal and medication side effects. The following were  rated moderate (3) to severe (4) on TCRS:  · Moderate (3): Desire or Crave Tobacco   · Severe (4): None    Comments: The patient is prescribed tobacco cessation medication regimen of 21 mg Patches but has not started them yet. Patient states she's smoking the same, she states she's not started the patches yet and wants to, but is having a hard time starting. We discussed any side effects and any issues she's concerned about. She will start the patches on her way to [employer] tomorrow. Session Focus:  orientation, client introductions, completion of TCRS (Tobacco Cessation Rating Scale) learned addiction model, cues/triggers, personal reasons for quitting, medications, goals, quit date. The patient denies any abnormal behavioral or mental changes at this time. The patient will continue with group therapy sessions and medication monitoring by CTTS. Prescribed medication management will be by physician.     Diagnosis: F17.210    Next Visit: 2 weeks

## 2020-07-28 ENCOUNTER — LAB VISIT (OUTPATIENT)
Dept: LAB | Facility: OTHER | Age: 55
End: 2020-07-28
Attending: INTERNAL MEDICINE
Payer: MEDICARE

## 2020-07-28 DIAGNOSIS — D69.6 THROMBOCYTOPENIA: ICD-10-CM

## 2020-07-28 DIAGNOSIS — C90.00 MULTIPLE MYELOMA, REMISSION STATUS UNSPECIFIED: ICD-10-CM

## 2020-07-28 LAB
ALBUMIN SERPL BCP-MCNC: 4 G/DL (ref 3.5–5.2)
ALP SERPL-CCNC: 146 U/L (ref 55–135)
ALT SERPL W/O P-5'-P-CCNC: 11 U/L (ref 10–44)
ANION GAP SERPL CALC-SCNC: 14 MMOL/L (ref 8–16)
AST SERPL-CCNC: 15 U/L (ref 10–40)
BASOPHILS # BLD AUTO: 0.04 K/UL (ref 0–0.2)
BASOPHILS NFR BLD: 0.9 % (ref 0–1.9)
BILIRUB SERPL-MCNC: 0.4 MG/DL (ref 0.1–1)
BUN SERPL-MCNC: 26 MG/DL (ref 6–20)
CALCIUM SERPL-MCNC: 10.2 MG/DL (ref 8.7–10.5)
CHLORIDE SERPL-SCNC: 90 MMOL/L (ref 95–110)
CO2 SERPL-SCNC: 30 MMOL/L (ref 23–29)
CREAT SERPL-MCNC: 4 MG/DL (ref 0.5–1.4)
DIFFERENTIAL METHOD: ABNORMAL
EOSINOPHIL # BLD AUTO: 0.2 K/UL (ref 0–0.5)
EOSINOPHIL NFR BLD: 4 % (ref 0–8)
ERYTHROCYTE [DISTWIDTH] IN BLOOD BY AUTOMATED COUNT: 16.8 % (ref 11.5–14.5)
EST. GFR  (AFRICAN AMERICAN): 14 ML/MIN/1.73 M^2
EST. GFR  (NON AFRICAN AMERICAN): 12 ML/MIN/1.73 M^2
GLUCOSE SERPL-MCNC: 103 MG/DL (ref 70–110)
HCT VFR BLD AUTO: 31.1 % (ref 37–48.5)
HGB BLD-MCNC: 10.4 G/DL (ref 12–16)
IGA SERPL-MCNC: 278 MG/DL (ref 40–350)
IGG SERPL-MCNC: 949 MG/DL (ref 650–1600)
IGM SERPL-MCNC: 205 MG/DL (ref 50–300)
IMM GRANULOCYTES # BLD AUTO: 0.01 K/UL (ref 0–0.04)
IMM GRANULOCYTES NFR BLD AUTO: 0.2 % (ref 0–0.5)
LYMPHOCYTES # BLD AUTO: 1 K/UL (ref 1–4.8)
LYMPHOCYTES NFR BLD: 21.3 % (ref 18–48)
MCH RBC QN AUTO: 29.4 PG (ref 27–31)
MCHC RBC AUTO-ENTMCNC: 33.4 G/DL (ref 32–36)
MCV RBC AUTO: 88 FL (ref 82–98)
MONOCYTES # BLD AUTO: 0.5 K/UL (ref 0.3–1)
MONOCYTES NFR BLD: 11.4 % (ref 4–15)
NEUTROPHILS # BLD AUTO: 2.8 K/UL (ref 1.8–7.7)
NEUTROPHILS NFR BLD: 62.2 % (ref 38–73)
NRBC BLD-RTO: 0 /100 WBC
PATH REV BLD -IMP: NORMAL
PATH REV BLD -IMP: NORMAL
PLATELET # BLD AUTO: 192 K/UL (ref 150–350)
PMV BLD AUTO: 8.9 FL (ref 9.2–12.9)
POTASSIUM SERPL-SCNC: 3.7 MMOL/L (ref 3.5–5.1)
PROT SERPL-MCNC: 7.9 G/DL (ref 6–8.4)
RBC # BLD AUTO: 3.54 M/UL (ref 4–5.4)
SODIUM SERPL-SCNC: 134 MMOL/L (ref 136–145)
VIT B12 SERPL-MCNC: >2000 PG/ML (ref 210–950)
WBC # BLD AUTO: 4.55 K/UL (ref 3.9–12.7)

## 2020-07-28 PROCEDURE — 82607 VITAMIN B-12: CPT | Mod: NTX

## 2020-07-28 PROCEDURE — 86334 PATHOLOGIST INTERPRETATION IFE: ICD-10-PCS | Mod: 26,NTX,, | Performed by: PATHOLOGY

## 2020-07-28 PROCEDURE — 83520 IMMUNOASSAY QUANT NOS NONAB: CPT | Mod: NTX

## 2020-07-28 PROCEDURE — 85025 COMPLETE CBC W/AUTO DIFF WBC: CPT | Mod: NTX

## 2020-07-28 PROCEDURE — 36415 COLL VENOUS BLD VENIPUNCTURE: CPT | Mod: NTX

## 2020-07-28 PROCEDURE — 85060 PATHOLOGIST REVIEW: ICD-10-PCS | Mod: NTX,,, | Performed by: PATHOLOGY

## 2020-07-28 PROCEDURE — 82784 ASSAY IGA/IGD/IGG/IGM EACH: CPT | Mod: 59,NTX

## 2020-07-28 PROCEDURE — 85060 BLOOD SMEAR INTERPRETATION: CPT | Mod: NTX,,, | Performed by: PATHOLOGY

## 2020-07-28 PROCEDURE — 84165 PROTEIN E-PHORESIS SERUM: CPT | Mod: 26,NTX,, | Performed by: PATHOLOGY

## 2020-07-28 PROCEDURE — 84165 PROTEIN E-PHORESIS SERUM: CPT | Mod: TXP

## 2020-07-28 PROCEDURE — 86334 IMMUNOFIX E-PHORESIS SERUM: CPT | Mod: 26,NTX,, | Performed by: PATHOLOGY

## 2020-07-28 PROCEDURE — 84165 PATHOLOGIST INTERPRETATION SPE: ICD-10-PCS | Mod: 26,NTX,, | Performed by: PATHOLOGY

## 2020-07-28 PROCEDURE — 86334 IMMUNOFIX E-PHORESIS SERUM: CPT | Mod: TXP

## 2020-07-28 PROCEDURE — 80053 COMPREHEN METABOLIC PANEL: CPT | Mod: TXP

## 2020-07-28 PROCEDURE — 82747 ASSAY OF FOLIC ACID RBC: CPT | Mod: NTX

## 2020-07-29 ENCOUNTER — OFFICE VISIT (OUTPATIENT)
Dept: PSYCHIATRY | Facility: CLINIC | Age: 55
End: 2020-07-29
Payer: MEDICARE

## 2020-07-29 DIAGNOSIS — F12.20 CANNABIS USE DISORDER, SEVERE, DEPENDENCE: ICD-10-CM

## 2020-07-29 DIAGNOSIS — F32.A DEPRESSION, UNSPECIFIED DEPRESSION TYPE: Primary | ICD-10-CM

## 2020-07-29 DIAGNOSIS — F10.10 ALCOHOL ABUSE: ICD-10-CM

## 2020-07-29 DIAGNOSIS — F41.9 ANXIETY: ICD-10-CM

## 2020-07-29 LAB
ALBUMIN SERPL ELPH-MCNC: 4.45 G/DL (ref 3.35–5.55)
ALPHA1 GLOB SERPL ELPH-MCNC: 0.35 G/DL (ref 0.17–0.41)
ALPHA2 GLOB SERPL ELPH-MCNC: 0.93 G/DL (ref 0.43–0.99)
B-GLOBULIN SERPL ELPH-MCNC: 0.82 G/DL (ref 0.5–1.1)
GAMMA GLOB SERPL ELPH-MCNC: 0.96 G/DL (ref 0.67–1.58)
INTERPRETATION SERPL IFE-IMP: NORMAL
KAPPA LC SER QL IA: 18.03 MG/DL (ref 0.33–1.94)
KAPPA LC/LAMBDA SER IA: 1.03 (ref 0.26–1.65)
LAMBDA LC SER QL IA: 17.53 MG/DL (ref 0.57–2.63)
PATHOLOGIST INTERPRETATION IFE: NORMAL
PATHOLOGIST INTERPRETATION SPE: NORMAL
PROT SERPL-MCNC: 7.5 G/DL (ref 6–8.4)

## 2020-07-29 PROCEDURE — 90791 PSYCH DIAGNOSTIC EVALUATION: CPT | Mod: S$GLB,TXP,, | Performed by: SOCIAL WORKER

## 2020-07-29 PROCEDURE — 90791 PR PSYCHIATRIC DIAGNOSTIC EVALUATION: ICD-10-PCS | Mod: S$GLB,TXP,, | Performed by: SOCIAL WORKER

## 2020-07-31 LAB
FINAL PATHOLOGIC DIAGNOSIS: NORMAL
FOLATE RBC-MCNC: ABNORMAL NG/ML (ref 280–791)
Lab: NORMAL

## 2020-07-31 NOTE — PROGRESS NOTES
"Psychiatry Initial Visit (PhD/LCSW)  Diagnostic Interview - CPT 60479    Date: 7/29/2020    Site: [facility]    Referral source: [facility]     Clinical status of patient: Outpatient    Jennifer Booth, a 55 y.o. female, for initial evaluation visit.  Met with patient.    Chief complaint/reason for encounter: addictive disorder, depression and anxiety    History of present illness: 55 year old female patient presents to the clinic today for initial visit with chief complaint of addictive disorder, depression, and anxiety.  Reports that she was referred to the psychiatry department by [facility].  She is on dialysis.  She reports that the transplant team has concerns about her mood and her issues with substance use.  She is prescribed Lexapro.  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  Describes her mood as "anxious".  She reports psychosocial stress, as her daughter and granddaughter will be moving in with her soon.  She was drinking up to a bottle of wine daily, until 6/19/20 when she ended up in the ED.  She has not used alcohol since.  She smokes marijuana daily.  Smokes 3-4 times daily.  She smoked this morning, prior to session.          Pain: intermittent; patient is on dialysis     Symptoms:   · Mood: depressed mood, diminished interest, insomnia, fatigue and social isolation  · Anxiety: excessive anxiety/worry and restlessness/keyed up  · Substance abuse: substance tolerance, take more than intended, unsuccessful efforts to control use, great deal of time spent with substance, activities reduced and use despite negative consequences  · Cognitive functioning: denied  · Health behaviors: noncontributory    Psychiatric history: psychotropic management by PCP and has participated in counseling/psychotherapy on an outpatient basis in the past    Medical history: patient is on waiting list for kidney transplant     Family history of psychiatric illness: " mother's side of family-substance use d/o                    Social history (marriage, employment, etc.): Patient is .  One daughter (32).  Patient's mother is .  Close with her stepfather.  One half-sister (42).  High school graduate.  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  [employer].         Substance use:   Alcohol: daily alcohol use (one bottle of wine daily); has not drank since    Drugs: daily cannabis use (3-4 times daily)       Tobacco: 1 ppd    Caffeine: coffee     Current medications and drug reactions (include OTC, herbal): see medication list      Strengths and liabilities: Strength: Patient is expressive/articulate., Strength: Patient is intelligent., Strength: Patient is motivated for change., Liability: Patient has poor health., Liability: Patient lacks coping skills.    Current Evaluation:     Mental Status Exam:  General Appearance:  unremarkable, age appropriate   Speech: normal tone, normal rate, normal pitch, normal volume      Level of Cooperation: cooperative      Thought Processes: normal and logical   Mood: a little anxious       Thought Content: normal, no suicidality, no homicidality, delusions, or paranoia   Affect: appropriate   Orientation: Oriented x3   Memory: recent >  intact, remote >  intact   Attention Span & Concentration: intact   Fund of General Knowledge: intact and appropriate to age and level of education   Abstract Reasoning: not assessed    Judgment & Insight: fair     Language  intact     Diagnostic Impression - Plan:       ICD-10-CM ICD-9-CM   1. Depression, unspecified depression type  F32.9 311   2. Anxiety  F41.9 300.00   3. Cannabis use disorder, severe, dependence  F12.20 304.30   4. Alcohol abuse  F10.10 305.00       Plan:AA, abstinence and ABU-patient furnished with information on the ABU-IOP at [facility]     Return to Clinic: upon completion of the ABU-IOP        Length of Service (minutes): 45

## 2020-08-04 ENCOUNTER — OFFICE VISIT (OUTPATIENT)
Dept: HEMATOLOGY/ONCOLOGY | Facility: CLINIC | Age: 55
End: 2020-08-04
Payer: COMMERCIAL

## 2020-08-04 VITALS
TEMPERATURE: 98 F | HEART RATE: 73 BPM | WEIGHT: 123.25 LBS | OXYGEN SATURATION: 98 % | BODY MASS INDEX: 18.68 KG/M2 | SYSTOLIC BLOOD PRESSURE: 130 MMHG | HEIGHT: 68 IN | DIASTOLIC BLOOD PRESSURE: 79 MMHG

## 2020-08-04 DIAGNOSIS — N18.6 ANEMIA IN CHRONIC KIDNEY DISEASE, ON CHRONIC DIALYSIS: ICD-10-CM

## 2020-08-04 DIAGNOSIS — N18.6 ESRD (END STAGE RENAL DISEASE) ON DIALYSIS: ICD-10-CM

## 2020-08-04 DIAGNOSIS — Z99.2 ESRD (END STAGE RENAL DISEASE) ON DIALYSIS: ICD-10-CM

## 2020-08-04 DIAGNOSIS — C90.00 MULTIPLE MYELOMA, REMISSION STATUS UNSPECIFIED: Primary | ICD-10-CM

## 2020-08-04 DIAGNOSIS — Z99.2 ANEMIA IN CHRONIC KIDNEY DISEASE, ON CHRONIC DIALYSIS: ICD-10-CM

## 2020-08-04 DIAGNOSIS — D63.1 ANEMIA IN CHRONIC KIDNEY DISEASE, ON CHRONIC DIALYSIS: ICD-10-CM

## 2020-08-04 PROCEDURE — 3008F BODY MASS INDEX DOCD: CPT | Mod: CPTII,S$GLB,, | Performed by: INTERNAL MEDICINE

## 2020-08-04 PROCEDURE — 99214 PR OFFICE/OUTPT VISIT, EST, LEVL IV, 30-39 MIN: ICD-10-PCS | Mod: S$GLB,,, | Performed by: INTERNAL MEDICINE

## 2020-08-04 PROCEDURE — 99214 OFFICE O/P EST MOD 30 MIN: CPT | Mod: S$GLB,,, | Performed by: INTERNAL MEDICINE

## 2020-08-04 PROCEDURE — 99999 PR PBB SHADOW E&M-EST. PATIENT-LVL IV: CPT | Mod: PBBFAC,,, | Performed by: INTERNAL MEDICINE

## 2020-08-04 PROCEDURE — 3008F PR BODY MASS INDEX (BMI) DOCUMENTED: ICD-10-PCS | Mod: CPTII,S$GLB,, | Performed by: INTERNAL MEDICINE

## 2020-08-04 PROCEDURE — 99999 PR PBB SHADOW E&M-EST. PATIENT-LVL IV: ICD-10-PCS | Mod: PBBFAC,,, | Performed by: INTERNAL MEDICINE

## 2020-08-04 NOTE — PROGRESS NOTES
Subjective:       Patient ID: Jennifer Booth is a 55 y.o. female.          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   Previously followed at [facility].   She has not had the resources to continue f/u at [facility].       She is followed by Nephrology for ESRD  She was initially diagnosed with advanced kidney disease secondary to multiple myeloma & HTN.   She was diagnosed with  AK I from MM in 2010 that required initiation of dialysis.    She started chronic dialysis on 12/23/11 and ended on 8/28/12  She then underwent chemotherapy for her myeloma, and stopped dialysis in 2013.    Kidney biopsy 2017due to worsening  kidney function reveals glomerulosclerosis and no evidence of MM    She was undergoing peritoneal dialysis daily     She was switched back to HD     She is followed by Kidney Transplant team at [facility]  Patient met selection criteria for kidney transplant related to ESRD due to Hypertensive Nephrosclerosis  She is considered a candidate for kidney transplant      She quit drinking 6/2020  She continues to smoke 1 ppd    She reports life-related stressors   Her dtr and son-in-law stay with home      Her granddtr is 9 mos old   Appetite and weight  stable   No SOB/CP/cough  No fevers, night sweats  No recent infections  She mild fatigue  No bleeding-nasal/rectal/urinary        She is undergoing EPO under direction of Nephrology    CBC reveals  Hb 10.4  g/dl   SPEP 7/28/2020   No monoclonal peaks      Onc hx:  She was diagnosed with kappa free light chain disease, 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 She originally presented with acute renal failure requiring HD .She has completed bortezomib/dexamethasone/Revlimid 6 cycles on 04/13/2012 for the multiple myeloma kappa light chain disease,  IPSS stage III. She underwent bortezomib maintenance therapy three weeks on, one week off (05/15, 05/22 and 05/29) with her last cycle received on 05/29/2012. She is followed at [facility] myeloma [place] where she was evaluated for an auto stem cell transplant . It was decided not to proceed with transplant was originally due to drug reaction.Pt was diagnosed with DRESS syndrome during hospitalization and then [facility] team elected not to proceed proceed with auto SCT. Velcade maintenance was discontinued due to side effects.       Tx: Velcade/Dex/Revlimid x 6 cycles 4/13/12   Velcade maintenance 3wks on, 1wk off dc'd 5/29/12 sec to adv SE          Review of Systems   Constitutional: Negative for appetite change, chills and fatigue.   HENT: Negative for congestion, hearing loss and nosebleeds.    Eyes: Negative for visual disturbance.   Respiratory: Negative for apnea, cough, chest tightness, shortness of breath and wheezing.    Cardiovascular: Negative for chest pain, palpitations and leg swelling.   Gastrointestinal: Negative for abdominal distention, abdominal pain, blood in stool, constipation, diarrhea, nausea and vomiting.   Genitourinary: Negative for difficulty urinating, dysuria, flank pain, frequency, hematuria and urgency.   Musculoskeletal: Negative for arthralgias, back pain, gait problem, joint swelling and neck pain.   Skin: Negative for rash.        No petechiae, ecchymoses   Neurological: Negative for dizziness, tremors, seizures, syncope, speech difficulty, light-headedness, numbness and headaches.   Hematological: Negative for adenopathy. Does not bruise/bleed easily.       Objective:       PE deferred  Televisits           Bone survey 2015   1. Small lucent foci in the right femoral neck. Given this patient's history of multiple myeloma, these findings are concerning for sequela of that disease  2. Otherwise degenerative changes of the cervical spine and lower lumbar spine are identified.           Lab  Results   Component Value Date    WBC 4.55 07/28/2020    HGB 10.4 (L) 07/28/2020    HCT 31.1 (L) 07/28/2020    MCV 88 07/28/2020     07/28/2020             Lab Results   Component Value Date    WBC 4.55 07/28/2020    HGB 10.4 (L) 07/28/2020    HCT 31.1 (L) 07/28/2020    MCV 88 07/28/2020     07/28/2020         Results for JOHN BEST (MRN [id number]) as of 5/13/2019 15:38   Ref. Range 6/19/2018 07:50 7/16/2018 15:05 10/17/2018 15:35 2/5/2019 08:50 5/6/2019 09:19   [place] Free Light Chains Latest Ref Range: 0.33 - 1.94 mg/dL 13.49 (H) 8.45 (H) 9.78 (H) 16.85 (H) 16.06 (H)   Lambda Free Light Chains Latest Ref Range: 0.57 - 2.63 mg/dL 8.34 (H) 6.64 (H) 12.29 (H) 11.84 (H) 13.80 (H)   Kappa/Lambda FLC Ratio Latest Ref Range: 0.26 - 1.65  1.62 1.27 0.80 1.42 1.16       Results for JOHN BEST (MRN [id number]) as of 2/23/2020 23:13   Ref. Range 2/11/2020 10:26   IgG - Serum Latest Ref Range: 650 - 1600 mg/dL 647 (L)   IgM Latest Ref Range: 50 - 300 mg/dL 159   IgA Latest Ref Range: 40 - 350 mg/dL 213       Component      Latest Ref Rng & Units 2/11/2020 11/12/2019 8/8/2019 5/6/2019   Beta-2 Microglobulin      0.0 - 2.5 ug/mL 15.0 (H) 16.6 (H) 17.4 (H) 17.8 (H)       Results for JOHN BEST (MRN [id number]) as of 8/9/2020 16:09   Ref. Range 2/11/2020 10:26 4/9/2020 10:00 7/28/2020 10:30   [place] Free Light Chains Latest Ref Range: 0.33 - 1.94 mg/dL 15.91 (H) 17.99 (H) 18.03 (H)   Lambda Free Light Chains Latest Ref Range: 0.57 - 2.63 mg/dL 16.23 (H) 16.62 (H) 17.53 (H)   Kappa/Lambda FLC Ratio Latest Ref Range: 0.26 - 1.65  0.98 1.08 1.03       Bone survey 2/2018   No convincing lytic lesions to confirm the presence of myeloma.      MRI T-spine 6/29/2018  1. No evidence for multiple myeloma in the thoracic spine.  2. Minor degenerative changes without evidence for spinal canal stenosis or neural foraminal narrowing.  3. Liver demonstrates decreased T2 signal suggestive of iron overload.  4. Ascites.  5.  Bilateral renal cysts, incompletely characterized.      MRI L-spine w/out contrast 6/29/2018  The caudal aspect of the lumbar spinal canal appears to be lower limits of normal on a developmental basis with further tapering of the thecal sac due to an abundance of epidural fat.    Superimposed degenerative change.  This is most pronounced at L3-4 where there is advanced facet arthropathy well as intraspinal synovial cyst resulting in moderate spinal stenosis with severe right lateral recess stenosis.  There also advanced degenerative disc disease at L4-5 with mild spinal stenosis and bilateral lateral recess encroachment.  Individual disc levels further detailed above.    No discrete paraprotein bands are identified          SPEP 7/28/2020 No monoclonal peaks    Assessment:       1. Multiple myeloma, remission status unspecified    2. ESRD (end stage renal disease) on dialysis    3. Anemia in chronic kidney disease, on chronic dialysis        Plan:   Jennifer was seen today for follow-up.    Diagnoses and associated orders for this visit:      MM IPSS Stage III deletion 13, t 4:14 diagnosed 12/2011 in remission  Dagnosed with kappa free light chain disease, multiple myeloma IPSS with deletion 13, 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   She originally presented with acute renal failure requiring HD .  She  completed bortezomib/dexamethasone/Revlimid 6 cycles on 04/13/2012 for the multiple myeloma kappa light chain disease, IPSS stage III.   She underwent bortezomib maintenance therapy three weeks on, one week off (05/15, 05/22 and 05/29) with her last cycle received on 05/29/2012  She was followed at [facility] and was diagnosed with DRESS syndrome during hospitalization and then [facility] team elected not to proceed proceed with auto SCT. Velcade maintenance was discontinued due to side effects.   She has remained in remission with nl SPEP   She has had worsening kidney function and s/p Kidney bx 2017 neg for myeloma  involvement  Urine studies- no monoclonal peaks  Bone survey 2018 no new findings    today has no detectable m protein.  Free light chain ratio remains normal  No clear evidence of relapse  Serum free KLC    18mg/dl and lambda free light chain 17.5 mg/dl and K/L FLCR 1.08   MRI T and L spine 6/29/2018 - no evidence of myeloma involvement  SPEP 7/2020- no monoclonal peaks   Cont to monitor      ESRD  Followed by Nephrology  S/p Kidney biopsy 2017 ( outside facility)  due to worsening  kidney function reveals glomerulosclerosis and no evidence of MM  Followed by Renal Transplant team at [facility] - on cadaveric wait list  Pt previously undergoing PD   Pt now undergoing HD  She is followed by [facility] transplant team       Pt followed by Dr. Wolfe     Anemia in chronic renal disease  She has history of  events of acute on chronic anemia.  She does not have gross clinical blood loss. 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  . Free light chain ratio remains normal in Aug  2019, though difficult to interpret in setting of renal failure  . Previously normal total protein pattern now had an M protein of 0.1 grams in June 2018. Total protein pattern has now remained normal.  Bone survey in February 2018 had no lytic lesions.  S/p 2uprbc during  hospitalization  11/2017 for acute-on-chronic anemia  SHERMAN per Nephrology   Hb   10.4/dl ( pt missed one dose)    Follow-up 3 mos with labs      Advance Care Planning     Power of   I previously initiated the process of advance care planning today and explained the importance of this process to the patient.  I introduced the concept of advance directives to the patient, as well. Then the patient received detailed information about the importance of designating a Health Care Power of  (HCPOA). She was also instructed to communicate with this person about their wishes for future healthcare, should she become sick and lose decision-making capacity. The  patient has not previously appointed a HCPOA. After our discussion, the patient has decided to complete a HCPOA and has appointed her daughter and NAME: Yusra Booth  I spent a total time of  16  minutes discussing this issue with the patient.             F/u   3mo with cbc,cmp, SPEP, FLC, B-2 microglobulin       Cc: MD Yarelis Calvert Jr., MD

## 2020-08-06 PROCEDURE — 99001 SPECIMEN HANDLING PT-LAB: CPT | Mod: PO,TXP

## 2020-08-10 ENCOUNTER — LAB VISIT (OUTPATIENT)
Dept: LAB | Facility: HOSPITAL | Age: 55
End: 2020-08-10
Payer: MEDICARE

## 2020-08-10 DIAGNOSIS — Z76.82 PATIENT ON WAITING LIST FOR KIDNEY TRANSPLANT: ICD-10-CM

## 2020-08-13 LAB
HLA FREEZE AND HOLD INTERPRETATION: NORMAL
HLAFH COLLECTION DATE: NORMAL

## 2020-09-03 PROCEDURE — 86829 HLA CLASS I/II ANTIBODY QUAL: CPT | Mod: 91,PO,TXP

## 2020-09-03 PROCEDURE — 86833 HLA CLASS II HIGH DEFIN QUAL: CPT | Mod: PO,TXP

## 2020-09-03 PROCEDURE — 86832 HLA CLASS I HIGH DEFIN QUAL: CPT | Mod: PO,TXP

## 2020-09-03 PROCEDURE — 86829 HLA CLASS I/II ANTIBODY QUAL: CPT | Mod: PO,TXP,59

## 2020-09-08 ENCOUNTER — LAB VISIT (OUTPATIENT)
Dept: LAB | Facility: HOSPITAL | Age: 55
End: 2020-09-08
Payer: MEDICARE

## 2020-09-08 DIAGNOSIS — Z76.82 PATIENT ON WAITING LIST FOR KIDNEY TRANSPLANT: ICD-10-CM

## 2020-09-10 LAB — HPRA INTERPRETATION: NORMAL

## 2020-09-15 ENCOUNTER — OFFICE VISIT (OUTPATIENT)
Dept: URGENT CARE | Facility: CLINIC | Age: 55
End: 2020-09-15
Payer: MEDICARE

## 2020-09-15 VITALS
HEART RATE: 78 BPM | HEIGHT: 70 IN | BODY MASS INDEX: 17.61 KG/M2 | RESPIRATION RATE: 16 BRPM | SYSTOLIC BLOOD PRESSURE: 137 MMHG | WEIGHT: 123 LBS | TEMPERATURE: 98 F | DIASTOLIC BLOOD PRESSURE: 88 MMHG

## 2020-09-15 DIAGNOSIS — M54.31 SCIATICA OF RIGHT SIDE: Primary | ICD-10-CM

## 2020-09-15 PROCEDURE — 99213 PR OFFICE/OUTPT VISIT, EST, LEVL III, 20-29 MIN: ICD-10-PCS | Mod: S$GLB,TXP,, | Performed by: NURSE PRACTITIONER

## 2020-09-15 PROCEDURE — 99213 OFFICE O/P EST LOW 20 MIN: CPT | Mod: S$GLB,TXP,, | Performed by: NURSE PRACTITIONER

## 2020-09-15 NOTE — PATIENT INSTRUCTIONS
You may continue to use Tylenol for pain and avoid sitting for long periods (request to lie down during dialysis).    Please contact your Primary provider for follow up.      Sciatica    Sciatica is a condition that causes pain in the lower back that spreads down into the buttock, hip, and leg. Sometimes the leg pain can happen without any back pain. Sciatica happens when a spinal nerve is irritated or has pressure put on it as comes out of the spinal canal in the lower back. This most often happens when a bulge or rupture of a nearby spinal disk presses on the nerve. Sciatica can also be caused by a narrowing of the spinal canal (spinal stenosis) or spasm of the muscle in the buttocks that the sciatic nerve passes through (pyriform muscle). Sciatica is also called lumbar radiculopathy.  Sciatica may begin after a sudden twisting or bending force, such as in a car accident. Or it can happen after a simple awkward movement. In either case, muscle spasm often also happens. Muscle spasm makes the pain worse.  A healthcare provider makes a diagnosis of sciatica from your symptoms and a physical exam. Unless you had an injury from a car accident or fall, you usually wont have X-rays taken at this time. This is because the nerves and disks in your back cant be seen on an X-ray. If the provider sees signs of a compressed nerve, you will need to schedule an MRI scan as an outpatient. Signs of a compressed nerve include loss of strength in a leg.  Most sciatica gets better with medicine, exercise, and physical therapy. If your symptoms continue after at least 3 months of medical treatment, you may need surgery or injections to your lower back.  Home care  Follow these tips when caring for yourself at home:  · You may need to stay in bed the first few days. But as soon as possible, begin sitting up or walking. This will help you avoid problems that come from staying in bed for long periods.  · When in bed, try to find a  position that is comfortable. A firm mattress is best. Try lying flat on your back with pillows under your knees. You can also try lying on your side with your knees bent up toward your chest and a pillow between your knees.  · Avoid sitting for long periods. This puts more stress on your lower back than standing or walking.  · Use heat from a hot shower, hot bath, or heating pad to help ease pain. Massage can also help. You can also try using an ice pack. You can make your own ice pack by putting ice cubes in a plastic bag. Wrap the bag in a thin towel. Try both heat and cold to see which works best. Use the method that feels best for 20 minutes several times a day.  · You may use acetaminophen or ibuprofen to ease pain, unless another pain medicine was prescribed. Note: If you have chronic liver or kidney disease, talk with your healthcare provider before taking these medicines. Also talk with your provider if youve had a stomach ulcer or gastrointestinal bleeding.  · Use safe lifting methods. Dont lift anything heavier than 15 pounds until all of the pain is gone.  Follow-up care  Follow up with your healthcare provider, or as advised. You may need physical therapy or additional tests.  If X-rays were taken, a radiologist will look at them. You will be told of any new findings that may affect your care.  When to seek medical advice  Call your healthcare provider right away if any of these occur:  · Pain gets worse even after taking prescribed medicine  · Weakness or numbness in 1 or both legs or hips  · Numbness in your groin or genital area  · You cant control your bowel or bladder  · Fever  · Redness or swelling over your back or spine   Date Last Reviewed: 8/1/2016  © 8274-1220 [employer]. [address] [place] [address], [place] [address]. All rights reserved. This information is not intended as a substitute for professional medical care. Always follow your healthcare professional's  instructions.

## 2020-09-15 NOTE — PROGRESS NOTES
"Subjective:       Patient ID: Jennifer Booth is a 55 y.o. female.    Vitals:  height is 5' 9.6" (1.768 m) and weight is 55.8 kg (123 lb). Her temporal temperature is 97.9 °F (36.6 °C). Her blood pressure is 137/88 and her pulse is 78. Her respiration is 16.     Chief Complaint: Pain (Right side of body)    Ms Booth comes to the clinic with 2 weeks of right-sided hip and low back pain.  She was unsure if this pain was related to an injury that occurred 3 months ago.  She denies any recent trauma or injury or falls.  Patient states that she has intermittent pain that starts at the lower right side of her back radiates to hip and then medially to level of her knee.  This is sometimes accompanied with paresthesias.  Patient denies this is a long-term a chronic issue.  Denies any urinary frequency or urgency associated with it.  No fever chills.  No nausea or vomiting or diarrhea.  She has been taking Tylenol to good effect but was concerned because pain has persisted for appear to time.    Pain  This is a new problem. The current episode started 1 to 4 weeks ago (fall a month ago and came here). The problem occurs constantly. The problem has been gradually worsening. Pertinent negatives include no arthralgias, chest pain, chills, congestion, coughing, fatigue, fever, headaches, joint swelling, myalgias, nausea, rash, sore throat, vertigo, vomiting or weakness. Associated symptoms comments: Entire right side of her body.    . She has tried acetaminophen for the symptoms. The treatment provided mild relief.       Constitution: Negative for chills, fatigue and fever.   HENT: Negative for congestion and sore throat.    Neck: Negative for painful lymph nodes.   Cardiovascular: Negative for chest pain and leg swelling.   Eyes: Negative for double vision and blurred vision.   Respiratory: Negative for cough and shortness of breath.    Gastrointestinal: Negative for nausea, vomiting and diarrhea.   Genitourinary: Negative for " dysuria, frequency, urgency and history of kidney stones.   Musculoskeletal: Positive for back pain. Negative for joint pain, joint swelling, muscle cramps and muscle ache.   Skin: Negative for color change, pale, rash and bruising.   Allergic/Immunologic: Negative for seasonal allergies.   Neurological: Negative for dizziness, history of vertigo, light-headedness, passing out and headaches.   Hematologic/Lymphatic: Negative for swollen lymph nodes.   Psychiatric/Behavioral: Negative for nervous/anxious, sleep disturbance and depression. The patient is not nervous/anxious.        Objective:      Physical Exam   Constitutional: She is oriented to person, place, and time. She appears well-developed. She is cooperative. No distress.   HENT:   Head: Normocephalic and atraumatic.   Nose: Nose normal.   Mouth/Throat: Oropharynx is clear and moist and mucous membranes are normal.   Eyes: Conjunctivae and lids are normal.   Neck: Trachea normal, normal range of motion, full passive range of motion without pain and phonation normal. Neck supple.   Cardiovascular: Normal rate, regular rhythm, normal heart sounds and normal pulses.   Pulmonary/Chest: Effort normal and breath sounds normal.   Abdominal: Soft. Normal appearance and bowel sounds are normal. She exhibits no abdominal bruit, no pulsatile midline mass and no mass.   Musculoskeletal:         General: No deformity.      Right hip: She exhibits normal range of motion, normal strength, no tenderness, no bony tenderness, no swelling and no crepitus.   Neurological: She is alert and oriented to person, place, and time. She has normal strength and normal reflexes. No sensory deficit.   Skin: Skin is warm, dry, intact and not diaphoretic. Psychiatric: Her speech is normal and behavior is normal. Judgment and thought content normal.   Nursing note and vitals reviewed.        Assessment:       1. Sciatica of right side        Plan:         Sciatica of right side      Patient  Instructions   You may continue to use Tylenol for pain and avoid sitting for long periods (request to lie down during dialysis).    Please contact your Primary provider for follow up.      Sciatica    Sciatica is a condition that causes pain in the lower back that spreads down into the buttock, hip, and leg. Sometimes the leg pain can happen without any back pain. Sciatica happens when a spinal nerve is irritated or has pressure put on it as comes out of the spinal canal in the lower back. This most often happens when a bulge or rupture of a nearby spinal disk presses on the nerve. Sciatica can also be caused by a narrowing of the spinal canal (spinal stenosis) or spasm of the muscle in the buttocks that the sciatic nerve passes through (pyriform muscle). Sciatica is also called lumbar radiculopathy.  Sciatica may begin after a sudden twisting or bending force, such as in a car accident. Or it can happen after a simple awkward movement. In either case, muscle spasm often also happens. Muscle spasm makes the pain worse.  A healthcare provider makes a diagnosis of sciatica from your symptoms and a physical exam. Unless you had an injury from a car accident or fall, you usually wont have X-rays taken at this time. This is because the nerves and disks in your back cant be seen on an X-ray. If the provider sees signs of a compressed nerve, you will need to schedule an MRI scan as an outpatient. Signs of a compressed nerve include loss of strength in a leg.  Most sciatica gets better with medicine, exercise, and physical therapy. If your symptoms continue after at least 3 months of medical treatment, you may need surgery or injections to your lower back.  Home care  Follow these tips when caring for yourself at home:  · You may need to stay in bed the first few days. But as soon as possible, begin sitting up or walking. This will help you avoid problems that come from staying in bed for long periods.  · When in bed,  try to find a position that is comfortable. A firm mattress is best. Try lying flat on your back with pillows under your knees. You can also try lying on your side with your knees bent up toward your chest and a pillow between your knees.  · Avoid sitting for long periods. This puts more stress on your lower back than standing or walking.  · Use heat from a hot shower, hot bath, or heating pad to help ease pain. Massage can also help. You can also try using an ice pack. You can make your own ice pack by putting ice cubes in a plastic bag. Wrap the bag in a thin towel. Try both heat and cold to see which works best. Use the method that feels best for 20 minutes several times a day.  · You may use acetaminophen or ibuprofen to ease pain, unless another pain medicine was prescribed. Note: If you have chronic liver or kidney disease, talk with your healthcare provider before taking these medicines. Also talk with your provider if youve had a stomach ulcer or gastrointestinal bleeding.  · Use safe lifting methods. Dont lift anything heavier than 15 pounds until all of the pain is gone.  Follow-up care  Follow up with your healthcare provider, or as advised. You may need physical therapy or additional tests.  If X-rays were taken, a radiologist will look at them. You will be told of any new findings that may affect your care.  When to seek medical advice  Call your healthcare provider right away if any of these occur:  · Pain gets worse even after taking prescribed medicine  · Weakness or numbness in 1 or both legs or hips  · Numbness in your groin or genital area  · You cant control your bowel or bladder  · Fever  · Redness or swelling over your back or spine   Date Last Reviewed: 8/1/2016  © 2334-3369 [employer]. [address] [place] [address], [place] [address]. All rights reserved. This information is not intended as a substitute for professional medical care. Always follow your healthcare professional's  instructions.

## 2020-09-23 ENCOUNTER — OFFICE VISIT (OUTPATIENT)
Dept: FAMILY MEDICINE | Facility: CLINIC | Age: 55
End: 2020-09-23
Attending: FAMILY MEDICINE
Payer: MEDICARE

## 2020-09-23 VITALS
BODY MASS INDEX: 18.66 KG/M2 | DIASTOLIC BLOOD PRESSURE: 82 MMHG | HEART RATE: 60 BPM | WEIGHT: 130.31 LBS | TEMPERATURE: 98 F | HEIGHT: 70 IN | SYSTOLIC BLOOD PRESSURE: 152 MMHG

## 2020-09-23 DIAGNOSIS — M54.31 SCIATICA, RIGHT SIDE: ICD-10-CM

## 2020-09-23 DIAGNOSIS — M54.41 ACUTE RIGHT-SIDED LOW BACK PAIN WITH RIGHT-SIDED SCIATICA: Primary | ICD-10-CM

## 2020-09-23 PROCEDURE — 99999 PR PBB SHADOW E&M-EST. PATIENT-LVL IV: ICD-10-PCS | Mod: PBBFAC,,, | Performed by: FAMILY MEDICINE

## 2020-09-23 PROCEDURE — 99999 PR PBB SHADOW E&M-EST. PATIENT-LVL IV: CPT | Mod: PBBFAC,,, | Performed by: FAMILY MEDICINE

## 2020-09-23 PROCEDURE — 99214 OFFICE O/P EST MOD 30 MIN: CPT | Mod: S$PBB,,, | Performed by: FAMILY MEDICINE

## 2020-09-23 PROCEDURE — 99214 PR OFFICE/OUTPT VISIT, EST, LEVL IV, 30-39 MIN: ICD-10-PCS | Mod: S$PBB,,, | Performed by: FAMILY MEDICINE

## 2020-09-23 PROCEDURE — 99214 OFFICE O/P EST MOD 30 MIN: CPT | Mod: PBBFAC,PO | Performed by: FAMILY MEDICINE

## 2020-09-23 RX ORDER — TRAMADOL HYDROCHLORIDE 50 MG/1
50 TABLET ORAL EVERY 8 HOURS PRN
Qty: 21 TABLET | Refills: 0 | Status: ON HOLD | OUTPATIENT
Start: 2020-09-23 | End: 2020-10-13 | Stop reason: SDUPTHER

## 2020-09-23 RX ORDER — METHYLPREDNISOLONE 4 MG/1
TABLET ORAL
Qty: 1 PACKAGE | Refills: 0 | Status: ON HOLD | OUTPATIENT
Start: 2020-09-23 | End: 2020-10-13 | Stop reason: HOSPADM

## 2020-09-23 NOTE — PATIENT INSTRUCTIONS
Sciatica    Sciatica is a condition that causes pain in the lower back that spreads down into the buttock, hip, and leg. Sometimes the leg pain can happen without any back pain. Sciatica happens when a spinal nerve is irritated or has pressure put on it as comes out of the spinal canal in the lower back. This most often happens when a bulge or rupture of a nearby spinal disk presses on the nerve. Sciatica can also be caused by a narrowing of the spinal canal (spinal stenosis) or spasm of the muscle in the buttocks that the sciatic nerve passes through (pyriform muscle). Sciatica is also called lumbar radiculopathy.  Sciatica may begin after a sudden twisting or bending force, such as in a car accident. Or it can happen after a simple awkward movement. In either case, muscle spasm often also happens. Muscle spasm makes the pain worse.  A healthcare provider makes a diagnosis of sciatica from your symptoms and a physical exam. Unless you had an injury from a car accident or fall, you usually wont have X-rays taken at this time. This is because the nerves and disks in your back cant be seen on an X-ray. If the provider sees signs of a compressed nerve, you will need to schedule an MRI scan as an outpatient. Signs of a compressed nerve include loss of strength in a leg.  Most sciatica gets better with medicine, exercise, and physical therapy. If your symptoms continue after at least 3 months of medical treatment, you may need surgery or injections to your lower back.  Home care  Follow these tips when caring for yourself at home:  · You may need to stay in bed the first few days. But as soon as possible, begin sitting up or walking. This will help you avoid problems that come from staying in bed for long periods.  · When in bed, try to find a position that is comfortable. A firm mattress is best. Try lying flat on your back with pillows under your knees. You can also try lying on your side with your knees bent up  toward your chest and a pillow between your knees.  · Avoid sitting for long periods. This puts more stress on your lower back than standing or walking.  · Use heat from a hot shower, hot bath, or heating pad to help ease pain. Massage can also help. You can also try using an ice pack. You can make your own ice pack by putting ice cubes in a plastic bag. Wrap the bag in a thin towel. Try both heat and cold to see which works best. Use the method that feels best for 20 minutes several times a day.  · You may use acetaminophen or ibuprofen to ease pain, unless another pain medicine was prescribed. Note: If you have chronic liver or kidney disease, talk with your healthcare provider before taking these medicines. Also talk with your provider if youve had a stomach ulcer or gastrointestinal bleeding.  · Use safe lifting methods. Dont lift anything heavier than 15 pounds until all of the pain is gone.  Follow-up care  Follow up with your healthcare provider, or as advised. You may need physical therapy or additional tests.  If X-rays were taken, a radiologist will look at them. You will be told of any new findings that may affect your care.  When to seek medical advice  Call your healthcare provider right away if any of these occur:  · Pain gets worse even after taking prescribed medicine  · Weakness or numbness in 1 or both legs or hips  · Numbness in your groin or genital area  · You cant control your bowel or bladder  · Fever  · Redness or swelling over your back or spine   Date Last Reviewed: 8/1/2016  © 2949-8950 [employer]. [address] [place] [address], [place] [address]. All rights reserved. This information is not intended as a substitute for professional medical care. Always follow your healthcare professional's instructions.        Understanding Lumbar Radiculopathy    Lumbar radiculopathy is irritation or inflammation of a nerve root in the low back. It causes symptoms that spread out from the back  down one or both legs. To understand this condition, it helps to understand the parts of the spine:  · Vertebrae. These are bones that stack to form the spine. The lumbar spine contains the 5 bottom vertebrae.  · Disks. These are soft pads of tissue between the vertebrae. They act as shock absorbers for the spine.  · Spinal canal. This is a tunnel formed within the stacked vertebrae. In the lumbar spine, nerves run through this canal.  · Nerves. These branch off and leave the spinal canal, traveling out to parts of the body. As they leave the spinal canal, nerves pass through openings between the vertebrae. The nerve root is the part of the nerve that is closest to the spinal canal.  · Sciatic nerve. This is a large nerve formed from several nerve roots in the low back. This nerve extends down the back of the leg to the foot.  With lumbar radiculopathy, nerve roots in the low back become irritated. This leads to pain and symptoms. The sciatic nerve is commonly involved, so the condition is often called sciatica.  What causes lumbar radiculopathy?  Aging, injury, poor posture, extra body weight, and other issues can lead to problems in the low back. These problems may then irritate nerve roots. They include:  · Damage to a disk in the lumbar spine. The damaged disk may then press on nearby nerve roots.  · Degeneration from wear and tear, and aging. This can lead to narrowing (stenosis) of the openings between the vertebrae. The narrowed openings press on nerve roots as they leave the spinal canal.  · Unstable spine. This is when a vertebra slips forward. It can then press on a nerve root.  Other, less common things can put pressure on nerves in the low back. These include diabetes, infection, or a tumor.  Symptoms of lumbar radiculopathy  These include:  · Pain in the low back  · Pain, numbness, tingling, or weakness that travels into the buttocks, hip, groin, or leg  · Muscle spasms  Treatment for lumbar  radiculopathy  In most cases, your healthcare provider will first try treatments that help relieve symptoms. These may include:  · Prescription and over-the-counter pain medicines. These help relieve pain, swelling, and irritation.  · Limits on positions and activities that increase pain. But lying in bed or avoiding all movement is only recommended for a short period of time.  · Physical therapy, including exercises and stretches. This helps decrease pain and increase movement and function.  · Steroid shots into the lower back. This may help relieve symptoms for a time.  · Weight-loss program. If you are overweight, losing extra pounds may help relieve symptoms.  In some cases, you may need surgery to fix the underlying problem. This depends on the cause, the symptoms, and how long the pain has lasted.  Possible complications  Over time, an irritated and inflamed nerve may become damaged. This may lead to long-lasting (permanent) numbness or weakness in your legs and feet. If symptoms change suddenly or get worse, be sure to let your healthcare provider know.  When to call your healthcare provider  Call your healthcare provider right away if you have any of these:  · New pain or pain that gets worse  · New or increasing weakness, tingling, or numbness in your leg or foot  · Problems controlling your bladder or bowel   Date Last Reviewed: 3/10/2016  © 8628-2633 [employer]. [address] [place] [address], [place] [address]. All rights reserved. This information is not intended as a substitute for professional medical care. Always follow your healthcare professional's instructions.

## 2020-09-23 NOTE — PROGRESS NOTES
Subjective:       Patient ID: Jennifer Booth is a 55 y.o. female.    Chief Complaint: Sciatica and Hip Pain (Right Hip)    55 yr old pleasant female with MM in remission, ESRD on HD, anxiety. Depression, and other co morbidities presents today for evaluation of low back pain right side and right hip pain. Onset 2-3 weeks ago and gradually worsening.  It is right sided and radiates to right leg. Tried OTC measures but none help. No tingling/numbness. No bowel or bladder issue.      History as below - reviewed     Hip Pain   There was no injury mechanism. The pain is present in the right hip. The quality of the pain is described as aching. The pain is at a severity of 9/10. The pain is severe. The pain has been constant since onset. Pertinent negatives include no tingling. The symptoms are aggravated by movement and palpation. She has tried heat, elevation and immobilization for the symptoms. The treatment provided no relief.   Back Pain  This is a new problem. The current episode started 1 to 4 weeks ago. The problem occurs constantly. The problem has been gradually worsening since onset. The quality of the pain is described as aching, cramping and shooting. The pain is at a severity of 9/10. The pain is severe. The pain is the same all the time. The symptoms are aggravated by bending, position, standing and twisting. Associated symptoms include leg pain. Pertinent negatives include no bladder incontinence, chest pain, dysuria, fever, headaches, paresthesias, pelvic pain, tingling or weight loss. She has tried heat, bed rest and home exercises for the symptoms. The treatment provided no relief.     Review of Systems   Constitutional: Negative.  Negative for activity change, diaphoresis, fever, unexpected weight change and weight loss.   HENT: Negative.  Negative for nasal congestion, ear discharge, hearing loss, rhinorrhea, sore throat and voice change.    Eyes: Negative.  Negative for pain, discharge and visual  disturbance.   Respiratory: Negative.  Negative for chest tightness, shortness of breath and wheezing.    Cardiovascular: Negative.  Negative for chest pain.   Gastrointestinal: Negative.  Negative for abdominal distention, anal bleeding, constipation and nausea.   Endocrine: Negative.  Negative for cold intolerance, polydipsia and polyuria.   Genitourinary: Negative.  Negative for bladder incontinence, decreased urine volume, difficulty urinating, dysuria, frequency, menstrual problem, pelvic pain and vaginal pain.   Musculoskeletal: Positive for back pain, leg pain and myalgias. Negative for arthralgias and gait problem.   Integumentary:  Negative for color change, pallor and wound. Negative.   Allergic/Immunologic: Negative.  Negative for environmental allergies and immunocompromised state.   Neurological: Negative.  Negative for dizziness, tingling, tremors, seizures, speech difficulty, headaches and paresthesias.   Hematological: Negative.  Negative for adenopathy. Does not bruise/bleed easily.   Psychiatric/Behavioral: Negative.  Negative for agitation, confusion, decreased concentration, hallucinations, self-injury and suicidal ideas. The patient is not nervous/anxious.      PMH/PSH/FH/SH/MED/ALLERGY reviewed        Objective:       Vitals:    09/23/20 1042   BP: (!) 152/82   Pulse: 60   Temp: 97.6 °F (36.4 °C)       Physical Exam  Constitutional:       General: She is not in acute distress.     Appearance: She is well-developed. She is not diaphoretic.   HENT:      Head: Normocephalic and atraumatic.      Right Ear: External ear normal.      Left Ear: External ear normal.      Nose: Nose normal.      Mouth/Throat:      Pharynx: No oropharyngeal exudate.   Eyes:      General: No scleral icterus.        Right eye: No discharge.         Left eye: No discharge.      Conjunctiva/sclera: Conjunctivae normal.      Pupils: Pupils are equal, round, and reactive to light.   Neck:      Musculoskeletal: Normal range of  motion and neck supple.      Thyroid: No thyromegaly.      Vascular: No JVD.      Trachea: No tracheal deviation.   Cardiovascular:      Rate and Rhythm: Normal rate and regular rhythm.      Heart sounds: Normal heart sounds. No murmur. No friction rub. No gallop.    Pulmonary:      Effort: Pulmonary effort is normal.      Breath sounds: Normal breath sounds. No stridor. No wheezing or rales.   Chest:      Chest wall: No tenderness.   Abdominal:      General: Bowel sounds are normal. There is no distension.      Palpations: Abdomen is soft. There is no mass.      Tenderness: There is no abdominal tenderness. There is no guarding or rebound.      Hernia: No hernia is present.   Musculoskeletal: Normal range of motion.         General: Tenderness (TTP right sacroiliac area and right paralumbar abd L3L4 and SLRT + right) present.   Lymphadenopathy:      Cervical: No cervical adenopathy.   Skin:     General: Skin is warm and dry.      Coloration: Skin is not pale.      Findings: No erythema or rash.   Neurological:      Mental Status: She is alert and oriented to person, place, and time.      Cranial Nerves: No cranial nerve deficit.      Motor: No abnormal muscle tone.      Coordination: Coordination normal.      Deep Tendon Reflexes: Reflexes are normal and symmetric. Reflexes normal.   Psychiatric:         Behavior: Behavior normal.         Thought Content: Thought content normal.         Judgment: Judgment normal.         Assessment:       1. Acute right-sided low back pain with right-sided sciatica    2. Sciatica, right side        Plan:           Jennifer was seen today for sciatica and hip pain.    Diagnoses and all orders for this visit:    Acute right-sided low back pain with right-sided sciatica  -     methylPREDNISolone (MEDROL DOSEPACK) 4 mg tablet; use as directed  -     traMADoL (ULTRAM) 50 mg tablet; Take 1 tablet (50 mg total) by mouth every 8 (eight) hours as needed for Pain.    Sciatica, right side  -      methylPREDNISolone (MEDROL DOSEPACK) 4 mg tablet; use as directed  -     traMADoL (ULTRAM) 50 mg tablet; Take 1 tablet (50 mg total) by mouth every 8 (eight) hours as needed for Pain.      Acute LBP with sciatica  -rest, heat pads  -tramadol prn  -medrol dose pack    Spent adequate time in obtaining history and explaining differentials    25 minutes spent during this visit of which greater than 50% devoted to face-face counseling and coordination of care regarding diagnosis and management plan    Follow up if symptoms worsen or fail to improve.

## 2020-10-12 ENCOUNTER — HOSPITAL ENCOUNTER (OUTPATIENT)
Facility: HOSPITAL | Age: 55
Discharge: HOME | End: 2020-10-13
Attending: EMERGENCY MEDICINE | Admitting: HOSPITALIST
Payer: MEDICARE

## 2020-10-12 DIAGNOSIS — J96.01 ACUTE RESPIRATORY FAILURE WITH HYPOXIA: Primary | ICD-10-CM

## 2020-10-12 DIAGNOSIS — E87.70 VOLUME OVERLOAD: ICD-10-CM

## 2020-10-12 DIAGNOSIS — N18.6 ESRD (END STAGE RENAL DISEASE): ICD-10-CM

## 2020-10-12 DIAGNOSIS — R06.02 SHORTNESS OF BREATH: ICD-10-CM

## 2020-10-12 DIAGNOSIS — M54.31 SCIATICA, RIGHT SIDE: ICD-10-CM

## 2020-10-12 DIAGNOSIS — M54.41 ACUTE RIGHT-SIDED LOW BACK PAIN WITH RIGHT-SIDED SCIATICA: ICD-10-CM

## 2020-10-12 PROCEDURE — 99285 EMERGENCY DEPT VISIT HI MDM: CPT | Mod: 25,NTX

## 2020-10-12 PROCEDURE — 27000190 HC CPAP FULL FACE MASK W/VALVE: Mod: NTX

## 2020-10-12 PROCEDURE — 99900035 HC TECH TIME PER 15 MIN (STAT): Mod: NTX

## 2020-10-12 PROCEDURE — 94660 CPAP INITIATION&MGMT: CPT | Mod: NTX

## 2020-10-13 VITALS
BODY MASS INDEX: 18.94 KG/M2 | TEMPERATURE: 98 F | OXYGEN SATURATION: 100 % | HEART RATE: 89 BPM | DIASTOLIC BLOOD PRESSURE: 76 MMHG | HEIGHT: 68 IN | SYSTOLIC BLOOD PRESSURE: 142 MMHG | RESPIRATION RATE: 16 BRPM | WEIGHT: 125 LBS

## 2020-10-13 PROBLEM — J96.01 ACUTE RESPIRATORY FAILURE WITH HYPOXIA: Status: RESOLVED | Noted: 2020-10-13 | Resolved: 2020-10-13

## 2020-10-13 PROBLEM — E87.20 METABOLIC ACIDOSIS: Status: ACTIVE | Noted: 2020-10-13

## 2020-10-13 PROBLEM — J96.01 ACUTE RESPIRATORY FAILURE WITH HYPOXIA: Status: ACTIVE | Noted: 2020-10-13

## 2020-10-13 LAB
ALBUMIN SERPL BCP-MCNC: 3.4 G/DL (ref 3.5–5.2)
ALLENS TEST: ABNORMAL
ALP SERPL-CCNC: 100 U/L (ref 55–135)
ALT SERPL W/O P-5'-P-CCNC: 10 U/L (ref 10–44)
ANION GAP SERPL CALC-SCNC: 17 MMOL/L (ref 8–16)
ANION GAP SERPL CALC-SCNC: 20 MMOL/L (ref 8–16)
AST SERPL-CCNC: 19 U/L (ref 10–40)
BASOPHILS # BLD AUTO: 0.01 K/UL (ref 0–0.2)
BASOPHILS # BLD AUTO: 0.04 K/UL (ref 0–0.2)
BASOPHILS NFR BLD: 0.1 % (ref 0–1.9)
BASOPHILS NFR BLD: 0.6 % (ref 0–1.9)
BILIRUB SERPL-MCNC: 0.4 MG/DL (ref 0.1–1)
BNP SERPL-MCNC: 4523 PG/ML (ref 0–99)
BUN SERPL-MCNC: 40 MG/DL (ref 6–20)
BUN SERPL-MCNC: 41 MG/DL (ref 6–20)
CALCIUM SERPL-MCNC: 9.5 MG/DL (ref 8.7–10.5)
CALCIUM SERPL-MCNC: 9.6 MG/DL (ref 8.7–10.5)
CHLORIDE SERPL-SCNC: 77 MMOL/L (ref 95–110)
CHLORIDE SERPL-SCNC: 78 MMOL/L (ref 95–110)
CO2 SERPL-SCNC: 19 MMOL/L (ref 23–29)
CO2 SERPL-SCNC: 22 MMOL/L (ref 23–29)
CREAT SERPL-MCNC: 7.9 MG/DL (ref 0.5–1.4)
CREAT SERPL-MCNC: 8 MG/DL (ref 0.5–1.4)
DELSYS: ABNORMAL
DIFFERENTIAL METHOD: ABNORMAL
DIFFERENTIAL METHOD: ABNORMAL
EOSINOPHIL # BLD AUTO: 0 K/UL (ref 0–0.5)
EOSINOPHIL # BLD AUTO: 0.2 K/UL (ref 0–0.5)
EOSINOPHIL NFR BLD: 0.4 % (ref 0–8)
EOSINOPHIL NFR BLD: 2.7 % (ref 0–8)
EP: 8
ERYTHROCYTE [DISTWIDTH] IN BLOOD BY AUTOMATED COUNT: 17 % (ref 11.5–14.5)
ERYTHROCYTE [DISTWIDTH] IN BLOOD BY AUTOMATED COUNT: 17.3 % (ref 11.5–14.5)
ERYTHROCYTE [SEDIMENTATION RATE] IN BLOOD BY WESTERGREN METHOD: 14 MM/H
EST. GFR  (AFRICAN AMERICAN): 6 ML/MIN/1.73 M^2
EST. GFR  (AFRICAN AMERICAN): 6 ML/MIN/1.73 M^2
EST. GFR  (NON AFRICAN AMERICAN): 5 ML/MIN/1.73 M^2
EST. GFR  (NON AFRICAN AMERICAN): 5 ML/MIN/1.73 M^2
FIO2: 30
GLUCOSE SERPL-MCNC: 116 MG/DL (ref 70–110)
GLUCOSE SERPL-MCNC: 157 MG/DL (ref 70–110)
HCO3 UR-SCNC: 20 MMOL/L (ref 24–28)
HCT VFR BLD AUTO: 29.4 % (ref 37–48.5)
HCT VFR BLD AUTO: 30.6 % (ref 37–48.5)
HGB BLD-MCNC: 10.5 G/DL (ref 12–16)
HGB BLD-MCNC: 10.5 G/DL (ref 12–16)
IMM GRANULOCYTES # BLD AUTO: 0.01 K/UL (ref 0–0.04)
IMM GRANULOCYTES # BLD AUTO: 0.04 K/UL (ref 0–0.04)
IMM GRANULOCYTES NFR BLD AUTO: 0.2 % (ref 0–0.5)
IMM GRANULOCYTES NFR BLD AUTO: 0.5 % (ref 0–0.5)
IP: 16
LYMPHOCYTES # BLD AUTO: 0.4 K/UL (ref 1–4.8)
LYMPHOCYTES # BLD AUTO: 1.4 K/UL (ref 1–4.8)
LYMPHOCYTES NFR BLD: 22.1 % (ref 18–48)
LYMPHOCYTES NFR BLD: 4.9 % (ref 18–48)
MAGNESIUM SERPL-MCNC: 2.2 MG/DL (ref 1.6–2.6)
MCH RBC QN AUTO: 30.6 PG (ref 27–31)
MCH RBC QN AUTO: 31.3 PG (ref 27–31)
MCHC RBC AUTO-ENTMCNC: 34.3 G/DL (ref 32–36)
MCHC RBC AUTO-ENTMCNC: 35.7 G/DL (ref 32–36)
MCV RBC AUTO: 88 FL (ref 82–98)
MCV RBC AUTO: 89 FL (ref 82–98)
MODE: ABNORMAL
MONOCYTES # BLD AUTO: 0.3 K/UL (ref 0.3–1)
MONOCYTES # BLD AUTO: 0.4 K/UL (ref 0.3–1)
MONOCYTES NFR BLD: 4.2 % (ref 4–15)
MONOCYTES NFR BLD: 6.1 % (ref 4–15)
NEUTROPHILS # BLD AUTO: 4.3 K/UL (ref 1.8–7.7)
NEUTROPHILS # BLD AUTO: 7.1 K/UL (ref 1.8–7.7)
NEUTROPHILS NFR BLD: 68.3 % (ref 38–73)
NEUTROPHILS NFR BLD: 89.9 % (ref 38–73)
NRBC BLD-RTO: 0 /100 WBC
NRBC BLD-RTO: 0 /100 WBC
PCO2 BLDA: 35 MMHG (ref 35–45)
PH SMN: 7.37 [PH] (ref 7.35–7.45)
PLATELET # BLD AUTO: 132 K/UL (ref 150–350)
PLATELET # BLD AUTO: 138 K/UL (ref 150–350)
PMV BLD AUTO: 10.1 FL (ref 9.2–12.9)
PMV BLD AUTO: 10.7 FL (ref 9.2–12.9)
PO2 BLDA: 82 MMHG (ref 80–100)
POC BE: -5 MMOL/L
POC SATURATED O2: 96 % (ref 95–100)
POC TCO2: 21 MMOL/L (ref 23–27)
POTASSIUM SERPL-SCNC: 3.7 MMOL/L (ref 3.5–5.1)
POTASSIUM SERPL-SCNC: 4.1 MMOL/L (ref 3.5–5.1)
PROT SERPL-MCNC: 6.4 G/DL (ref 6–8.4)
RBC # BLD AUTO: 3.36 M/UL (ref 4–5.4)
RBC # BLD AUTO: 3.43 M/UL (ref 4–5.4)
SAMPLE: ABNORMAL
SARS-COV-2 RDRP RESP QL NAA+PROBE: NEGATIVE
SITE: ABNORMAL
SODIUM SERPL-SCNC: 116 MMOL/L (ref 136–145)
SODIUM SERPL-SCNC: 117 MMOL/L (ref 136–145)
TROPONIN I SERPL DL<=0.01 NG/ML-MCNC: 0.05 NG/ML (ref 0–0.03)
WBC # BLD AUTO: 6.28 K/UL (ref 3.9–12.7)
WBC # BLD AUTO: 7.89 K/UL (ref 3.9–12.7)

## 2020-10-13 PROCEDURE — 96375 TX/PRO/DX INJ NEW DRUG ADDON: CPT | Mod: NTX,59

## 2020-10-13 PROCEDURE — 25000003 PHARM REV CODE 250: Mod: NTX | Performed by: EMERGENCY MEDICINE

## 2020-10-13 PROCEDURE — 25000003 PHARM REV CODE 250: Mod: NTX | Performed by: NURSE PRACTITIONER

## 2020-10-13 PROCEDURE — 80048 BASIC METABOLIC PNL TOTAL CA: CPT | Mod: NTX

## 2020-10-13 PROCEDURE — 63600175 PHARM REV CODE 636 W HCPCS: Mod: NTX | Performed by: HOSPITALIST

## 2020-10-13 PROCEDURE — 93005 ELECTROCARDIOGRAM TRACING: CPT | Mod: NTX

## 2020-10-13 PROCEDURE — U0002 COVID-19 LAB TEST NON-CDC: HCPCS | Mod: NTX

## 2020-10-13 PROCEDURE — G0378 HOSPITAL OBSERVATION PER HR: HCPCS | Mod: NTX

## 2020-10-13 PROCEDURE — 27000221 HC OXYGEN, UP TO 24 HOURS: Mod: NTX

## 2020-10-13 PROCEDURE — 99900035 HC TECH TIME PER 15 MIN (STAT): Mod: NTX

## 2020-10-13 PROCEDURE — 83735 ASSAY OF MAGNESIUM: CPT | Mod: NTX

## 2020-10-13 PROCEDURE — 36600 WITHDRAWAL OF ARTERIAL BLOOD: CPT | Mod: NTX

## 2020-10-13 PROCEDURE — 80053 COMPREHEN METABOLIC PANEL: CPT | Mod: NTX

## 2020-10-13 PROCEDURE — 83880 ASSAY OF NATRIURETIC PEPTIDE: CPT | Mod: NTX

## 2020-10-13 PROCEDURE — 82803 BLOOD GASES ANY COMBINATION: CPT | Mod: NTX

## 2020-10-13 PROCEDURE — 25000003 PHARM REV CODE 250: Mod: NTX | Performed by: HOSPITALIST

## 2020-10-13 PROCEDURE — 94761 N-INVAS EAR/PLS OXIMETRY MLT: CPT | Mod: NTX

## 2020-10-13 PROCEDURE — 25000003 PHARM REV CODE 250: Mod: NTX | Performed by: INTERNAL MEDICINE

## 2020-10-13 PROCEDURE — G0257 UNSCHED DIALYSIS ESRD PT HOS: HCPCS | Mod: NTX

## 2020-10-13 PROCEDURE — 94660 CPAP INITIATION&MGMT: CPT | Mod: NTX

## 2020-10-13 PROCEDURE — 85025 COMPLETE CBC W/AUTO DIFF WBC: CPT | Mod: 91,NTX

## 2020-10-13 PROCEDURE — 63600175 PHARM REV CODE 636 W HCPCS: Mod: NTX | Performed by: INTERNAL MEDICINE

## 2020-10-13 PROCEDURE — 96374 THER/PROPH/DIAG INJ IV PUSH: CPT | Mod: NTX

## 2020-10-13 PROCEDURE — 84484 ASSAY OF TROPONIN QUANT: CPT | Mod: NTX

## 2020-10-13 PROCEDURE — 63600175 PHARM REV CODE 636 W HCPCS: Mod: NTX | Performed by: EMERGENCY MEDICINE

## 2020-10-13 RX ORDER — CARVEDILOL 12.5 MG/1
12.5 TABLET ORAL 2 TIMES DAILY
Status: DISCONTINUED | OUTPATIENT
Start: 2020-10-13 | End: 2020-10-13

## 2020-10-13 RX ORDER — CARVEDILOL 25 MG/1
25 TABLET ORAL 2 TIMES DAILY
Status: DISCONTINUED | OUTPATIENT
Start: 2020-10-13 | End: 2020-10-13 | Stop reason: HOSPADM

## 2020-10-13 RX ORDER — TRAMADOL HYDROCHLORIDE 50 MG/1
50 TABLET ORAL EVERY 8 HOURS PRN
Qty: 21 TABLET | Refills: 0 | Status: SHIPPED | OUTPATIENT
Start: 2020-10-13 | End: 2021-12-11

## 2020-10-13 RX ORDER — CARVEDILOL 12.5 MG/1
12.5 TABLET ORAL 2 TIMES DAILY
COMMUNITY
End: 2021-07-22 | Stop reason: SDUPTHER

## 2020-10-13 RX ORDER — NITROGLYCERIN 20 MG/100ML
40 INJECTION INTRAVENOUS CONTINUOUS
Status: DISCONTINUED | OUTPATIENT
Start: 2020-10-13 | End: 2020-10-13

## 2020-10-13 RX ORDER — METHYLPREDNISOLONE SOD SUCC 125 MG
125 VIAL (EA) INJECTION
Status: COMPLETED | OUTPATIENT
Start: 2020-10-13 | End: 2020-10-13

## 2020-10-13 RX ORDER — HYDRALAZINE HYDROCHLORIDE 20 MG/ML
10 INJECTION INTRAMUSCULAR; INTRAVENOUS ONCE
Status: COMPLETED | OUTPATIENT
Start: 2020-10-13 | End: 2020-10-13

## 2020-10-13 RX ORDER — MUPIROCIN 20 MG/G
OINTMENT TOPICAL 2 TIMES DAILY
Status: DISCONTINUED | OUTPATIENT
Start: 2020-10-13 | End: 2020-10-13 | Stop reason: HOSPADM

## 2020-10-13 RX ORDER — FOLIC ACID 1 MG/1
1 TABLET ORAL DAILY
Status: DISCONTINUED | OUTPATIENT
Start: 2020-10-13 | End: 2020-10-13 | Stop reason: HOSPADM

## 2020-10-13 RX ORDER — CARVEDILOL 25 MG/1
25 TABLET ORAL 2 TIMES DAILY
Status: DISCONTINUED | OUTPATIENT
Start: 2020-10-13 | End: 2020-10-13

## 2020-10-13 RX ORDER — SODIUM CHLORIDE 9 MG/ML
INJECTION, SOLUTION INTRAVENOUS ONCE
Status: DISCONTINUED | OUTPATIENT
Start: 2020-10-13 | End: 2020-10-13 | Stop reason: HOSPADM

## 2020-10-13 RX ORDER — ACETAMINOPHEN 325 MG/1
650 TABLET ORAL EVERY 4 HOURS PRN
Status: DISCONTINUED | OUTPATIENT
Start: 2020-10-13 | End: 2020-10-13 | Stop reason: HOSPADM

## 2020-10-13 RX ORDER — SODIUM CHLORIDE 9 MG/ML
INJECTION, SOLUTION INTRAVENOUS
Status: DISCONTINUED | OUTPATIENT
Start: 2020-10-13 | End: 2020-10-13 | Stop reason: HOSPADM

## 2020-10-13 RX ORDER — SODIUM CHLORIDE 0.9 % (FLUSH) 0.9 %
10 SYRINGE (ML) INJECTION
Status: DISCONTINUED | OUTPATIENT
Start: 2020-10-13 | End: 2020-10-13 | Stop reason: HOSPADM

## 2020-10-13 RX ORDER — ESCITALOPRAM OXALATE 10 MG/1
20 TABLET ORAL DAILY
Status: DISCONTINUED | OUTPATIENT
Start: 2020-10-13 | End: 2020-10-13 | Stop reason: HOSPADM

## 2020-10-13 RX ORDER — ONDANSETRON 2 MG/ML
4 INJECTION INTRAMUSCULAR; INTRAVENOUS EVERY 8 HOURS PRN
Status: DISCONTINUED | OUTPATIENT
Start: 2020-10-13 | End: 2020-10-13 | Stop reason: HOSPADM

## 2020-10-13 RX ORDER — CLOPIDOGREL BISULFATE 75 MG/1
75 TABLET ORAL DAILY
Status: DISCONTINUED | OUTPATIENT
Start: 2020-10-13 | End: 2020-10-13 | Stop reason: HOSPADM

## 2020-10-13 RX ORDER — LOSARTAN POTASSIUM 50 MG/1
100 TABLET ORAL DAILY
Status: DISCONTINUED | OUTPATIENT
Start: 2020-10-13 | End: 2020-10-13 | Stop reason: HOSPADM

## 2020-10-13 RX ORDER — FUROSEMIDE 10 MG/ML
80 INJECTION INTRAMUSCULAR; INTRAVENOUS
Status: COMPLETED | OUTPATIENT
Start: 2020-10-13 | End: 2020-10-13

## 2020-10-13 RX ORDER — TRAMADOL HYDROCHLORIDE 50 MG/1
50 TABLET ORAL EVERY 8 HOURS PRN
Status: DISCONTINUED | OUTPATIENT
Start: 2020-10-13 | End: 2020-10-13 | Stop reason: HOSPADM

## 2020-10-13 RX ORDER — CINACALCET 30 MG/1
30 TABLET, FILM COATED ORAL
Status: DISCONTINUED | OUTPATIENT
Start: 2020-10-13 | End: 2020-10-13 | Stop reason: HOSPADM

## 2020-10-13 RX ORDER — CARVEDILOL 12.5 MG/1
12.5 TABLET ORAL 2 TIMES DAILY
Qty: 60 TABLET | Refills: 11 | Status: SHIPPED | OUTPATIENT
Start: 2020-10-13 | End: 2022-09-27 | Stop reason: DRUGHIGH

## 2020-10-13 RX ORDER — NAPROXEN SODIUM 220 MG/1
81 TABLET, FILM COATED ORAL DAILY
Status: DISCONTINUED | OUTPATIENT
Start: 2020-10-13 | End: 2020-10-13 | Stop reason: HOSPADM

## 2020-10-13 RX ADMIN — MUPIROCIN: 20 OINTMENT TOPICAL at 10:10

## 2020-10-13 RX ADMIN — TRAMADOL HYDROCHLORIDE 50 MG: 50 TABLET, COATED ORAL at 09:10

## 2020-10-13 RX ADMIN — NITROGLYCERIN 0.5 INCH: 20 OINTMENT TOPICAL at 01:10

## 2020-10-13 RX ADMIN — ACETAMINOPHEN 650 MG: 325 TABLET ORAL at 06:10

## 2020-10-13 RX ADMIN — HYDRALAZINE HYDROCHLORIDE 10 MG: 20 INJECTION INTRAMUSCULAR; INTRAVENOUS at 03:10

## 2020-10-13 RX ADMIN — FUROSEMIDE 80 MG: 10 INJECTION, SOLUTION INTRAVENOUS at 12:10

## 2020-10-13 RX ADMIN — CARVEDILOL 12.5 MG: 12.5 TABLET, FILM COATED ORAL at 04:10

## 2020-10-13 RX ADMIN — ESCITALOPRAM OXALATE 20 MG: 10 TABLET ORAL at 10:10

## 2020-10-13 RX ADMIN — ASPIRIN 81 MG 81 MG: 81 TABLET ORAL at 10:10

## 2020-10-13 RX ADMIN — FOLIC ACID 1 MG: 1 TABLET ORAL at 10:10

## 2020-10-13 RX ADMIN — LOSARTAN POTASSIUM 100 MG: 50 TABLET ORAL at 10:10

## 2020-10-13 RX ADMIN — CLOPIDOGREL 75 MG: 75 TABLET, FILM COATED ORAL at 10:10

## 2020-10-13 RX ADMIN — METHYLPREDNISOLONE SODIUM SUCCINATE 125 MG: 125 INJECTION, POWDER, FOR SOLUTION INTRAMUSCULAR; INTRAVENOUS at 01:10

## 2020-10-13 RX ADMIN — CINACALCET 30 MG: 30 TABLET, FILM COATED ORAL at 10:10

## 2020-10-13 NOTE — HPI
Jennifer Booth, 54 yo female, with past medial history of HTN, substance abuse, alcohol use, tobacco use, GERD, CKD on HD (Dialysis TThSat) and multiple myeloma in remission  presented to ED with acute onset SOB. Pt denies associated chest pain, cough/congestion, fever/chills, abdominal pain and n/v. Last HD, Friday 10/9.  Initial labs remarkable for Na 117, CO2 19, BNP 4523, Troponin 0.049. CXR negative. COVID 19 negative. -200 on arrival. She received furosemide 80 mg, soldu-medrol and nitro. Pt placed on continuous BIPAP. Pt admitted to [facility].

## 2020-10-13 NOTE — H&P
[facility]  History & Physical    Patient Name: Jennifer Booth  MRN: [id number]  Admission Date: 10/12/2020  Attending Physician: Andrew Anderson, *   Primary Care Provider: Evangelista Wilks MD         Patient information was obtained from patient, past medical records and ER records.     Subjective:     Principal Problem:Volume overload    Chief Complaint:   Chief Complaint   Patient presents with    Shortness of Breath     Pt presents with c/o sob that began 15 min pta. Pt diaphoretic in triage. Denies pain. Last dialysis Saturday        HPI: Jennifer Booth, 54 yo female, with past medial history of HTN, substance abuse, alcohol use, tobacco use, GERD, CKD on HD (Dialysis TThSat) and multiple myeloma in remission  presented to ED with acute onset SOB. Pt denies associated chest pain, cough/congestion, fever/chills, abdominal pain and n/v. Last HD, Friday 10/9.  Initial labs remarkable for Na 117, CO2 19, BNP 4523, Troponin 0.049. CXR negative. COVID 19 negative. -200 on arrival. She received furosemide 80 mg, soldu-medrol and nitro. Pt placed on continuous BIPAP. Pt admitted to [facility].     Past Medical History:   Diagnosis Date    Allergic drug reaction 2017    Anemia     Anxiety     Arm pain, left 2014    Breast cyst     Chronic renal failure 2014    CKD (chronic kidney disease) stage 5, GFR less than 15 ml/min     Depression     Dialysis patient     dialysis m-w-f    Encounter for blood transfusion     GERD (gastroesophageal reflux disease)     Hypertension     Mood swings     Multiple myeloma in remission 10/26/2012    per Hem/Oc note    Renal hypertension     Status post dialysis 2012    Thrombocytopenia 2012       Past Surgical History:   Procedure Laterality Date    AV FISTULA PLACEMENT Left     BREAST CYST ASPIRATION Left     BREAST CYST EXCISION Left      SECTION      x1     FISTULOGRAM N/A 6/9/2020    Procedure: Fistulogram;  Surgeon: Rahat Berger MD;  Location: [facility] CATH LAB/EP;  Service: Cardiology;  Laterality: N/A;    pd catheter      PERCUTANEOUS TRANSLUMINAL ANGIOPLASTY OF ARTERIOVENOUS FISTULA N/A 6/9/2020    Procedure: PTA, AV FISTULA;  Surgeon: Rahat Berger MD;  Location: [facility] CATH LAB/EP;  Service: Cardiology;  Laterality: N/A;    PERITONEAL CATHETER REMOVAL N/A 11/30/2018    Procedure: REMOVAL, CATHETER, DIALYSIS, PERITONEAL;  Surgeon: Mukesh Gonsales Jr., MD;  Location: [facility] OR;  Service: General;  Laterality: N/A;    RENAL BIOPSY  2016    THROMBECTOMY OF HEMODIALYSIS ACCESS SITE N/A 6/9/2020    Procedure: Thrombectomy, Hemodialysis Graft Or Fistula;  Surgeon: Rahat Begrer MD;  Location: [facility] CATH LAB/EP;  Service: Cardiology;  Laterality: N/A;    VASCULAR SURGERY  8/2012    dialysis catheter to right subclavian       Review of patient's allergies indicates:   Allergen Reactions    Doxycycline Swelling, Rash and Hives    Gentamicin Anaphylaxis    Vancomycin analogues Anaphylaxis       No current facility-administered medications on file prior to encounter.      Current Outpatient Medications on File Prior to Encounter   Medication Sig    aspirin 81 MG Chew Chew and swallow 1 tablet (81 mg total) by mouth once daily.    B COMPLEX W-C NO.20/FOLIC ACID (VIRT-CAPS ORAL) Take 1 capsule by mouth once daily.     carvediloL (COREG) 12.5 MG tablet Take 1 tablet (12.5 mg total) by mouth 2 (two) times daily.    cholecalciferol, vitamin D3, (VITAMIN D3) 400 unit Cap Take 2,000 Units by mouth once daily.     cinacalcet (SENSIPAR) 30 MG Tab Take 30 mg by mouth.    clopidogreL (PLAVIX) 75 mg tablet Take 1 tablet (75 mg total) by mouth once daily.    CREON 24,000-76,000 -120,000 unit capsule TK 1 C PO TID WC    escitalopram oxalate (LEXAPRO) 20 MG tablet TAKE 1 TABLET BY MOUTH DAILY    folic acid (FOLVITE) 1 MG tablet Take 1 mg by mouth.    guaiFENesin (MUCINEX)  600 mg 12 hr tablet Take 1,200 mg by mouth 2 (two) times daily.    losartan (COZAAR) 100 MG tablet     methylPREDNISolone (MEDROL DOSEPACK) 4 mg tablet use as directed    nicotine (NICODERM CQ) 21 mg/24 hr Place 1 patch onto the skin once daily.    nicotine polacrilex 2 MG Lozg Take 1-2 lozenges per hour as needed in place of a cigarette.    nicotine, polacrilex, (NICORETTE) 2 mg Gum Take 1-2 pieces by mouth per hour as needed in place of a cigarette.    sucroferric oxyhydroxide (VELPHORO) 500 mg Chew Take 500 mg by mouth 3 (three) times daily.    traMADoL (ULTRAM) 50 mg tablet Take 1 tablet (50 mg total) by mouth every 8 (eight) hours as needed for Pain.     Family History     Problem Relation (Age of Onset)    Diabetes Maternal Grandmother    Heart failure Mother    Hypertension Mother    Ovarian cancer Maternal Aunt    Stroke Maternal Grandmother        Tobacco Use    Smoking status: Current Every Day Smoker     Packs/day: 1.00     Years: 36.00     Pack years: 36.00     Types: Cigarettes     Start date: 1984    Smokeless tobacco: Never Used    Tobacco comment: Pt enrolled in the [employer]. Ambulatory referral to Smoking Cessation program.    Substance and Sexual Activity    Alcohol use: Yes     Alcohol/week: 2.0 standard drinks     Types: 2 Glasses of wine per week     Comment: A bottle of wine/ night, Last night 6/8 had a bottle of wine    Drug use: No    Sexual activity: Not Currently     Review of Systems   Constitutional: Negative for chills, diaphoresis and fever.   Eyes: Negative for photophobia.   Respiratory: Positive for shortness of breath. Negative for cough, chest tightness and wheezing.    Cardiovascular: Negative for chest pain, palpitations and leg swelling.   Gastrointestinal: Negative for abdominal pain, diarrhea, nausea and vomiting.   Genitourinary: Negative for dysuria, flank pain, frequency and hematuria.   Musculoskeletal: Negative for back pain and myalgias.   Neurological:  Negative for dizziness, syncope, light-headedness and headaches.   Psychiatric/Behavioral: Negative for confusion.     Objective:     Vital Signs (Most Recent):  Temp: 98.4 °F (36.9 °C) (10/12/20 2334)  Pulse: 85 (10/13/20 0501)  Resp: 16 (10/13/20 0501)  BP: (!) 171/114 (10/13/20 0501)  SpO2: 100 % (10/13/20 0501) Vital Signs (24h Range):  Temp:  [98.4 °F (36.9 °C)] 98.4 °F (36.9 °C)  Pulse:  [] 85  Resp:  [16-26] 16  SpO2:  [95 %-100 %] 100 %  BP: (139-198)/() 171/114     Weight: 56.7 kg (125 lb)  Body mass index is 19.01 kg/m².    Physical Exam  Constitutional:       Appearance: She is well-developed.   HENT:      Head: Normocephalic and atraumatic.   Eyes:      Conjunctiva/sclera: Conjunctivae normal.      Pupils: Pupils are equal, round, and reactive to light.   Neck:      Musculoskeletal: Normal range of motion.      Vascular: No JVD.   Cardiovascular:      Rate and Rhythm: Normal rate and regular rhythm.      Heart sounds: Normal heart sounds.   Pulmonary:      Effort: Pulmonary effort is normal. No respiratory distress.      Breath sounds: No wheezing.   Abdominal:      General: Bowel sounds are normal. There is no distension.      Palpations: Abdomen is soft.      Tenderness: There is no abdominal tenderness. There is no guarding.   Musculoskeletal: Normal range of motion.         General: No tenderness.   Skin:     General: Skin is warm and dry.      Capillary Refill: Capillary refill takes less than 2 seconds.   Neurological:      Mental Status: She is alert and oriented to person, place, and time.   Psychiatric:         Behavior: Behavior normal.           CRANIAL NERVES     CN III, IV, VI   Pupils are equal, round, and reactive to light.       Significant Labs:   BMP:   Recent Labs   Lab 10/13/20  0325   *   *   K 4.1   CL 77*   CO2 22*   BUN 41*   CREATININE 8.0*   CALCIUM 9.6   MG 2.2     CBC:   Recent Labs   Lab 10/13/20  0002 10/13/20  0325   WBC 6.28 7.89   HGB 10.5* 10.5*    HCT 30.6* 29.4*   * 132*     Troponin:   Recent Labs   Lab 10/13/20  0002   TROPONINI 0.049*       Significant Imaging: I have reviewed all pertinent imaging results/findings within the past 24 hours.    Assessment/Plan:     * Volume overload  ESRD (end stage renal disease) on dialysis    -presents to ED with acute onset of SOB   -improved with furosemide 80 mg x 1 and continuous BIPAP (wean as tolerated)   -patient reports last HD 10/9, reports she did not have HD 4 days straight last week 2/2 [place]   -BNP 4523   - No acute findings on CXR   -nephrology consulted           Dialysis AV fistula malfunction, initial encounter  Percutaneous transluminal angioplasty of arteriovenous fistula (N/A, 6/9/2020)    Continue ASA, plavix       ETOH abuse  No s/s of withdrawal, monitor.       Hyponatremia  Na 117 on admit   Neuro intact  Monitor   Nephrology consulted       Anxiety and depression  Continue escitalopram       GERD without esophagitis  PPI       Anemia in chronic kidney disease, on chronic dialysis  Hbg/Hct stable       Renal hypertension  Continue carvedilol and losartan        VTE Risk Mitigation (From admission, onward)         Ordered     IP VTE HIGH RISK PATIENT  Once      10/13/20 0231     Place sequential compression device  Until discontinued      10/13/20 0231                   Monika Modi NP  Department of Hospital Medicine   [facility]

## 2020-10-13 NOTE — NURSING
Received report from LASHANDA Jimenes. Pt is currently at dialysis. Hx of Acute resp failure with hypoxia. Pt has SOB at time. Informed that she was wearing a BIPAP at night and 2L O2@ dialysis for brief episode of SOB. Pt will transfer to unit after dialysis treatment.

## 2020-10-13 NOTE — ASSESSMENT & PLAN NOTE
Percutaneous transluminal angioplasty of arteriovenous fistula (N/A, 6/9/2020)    Continue ASA, plavix

## 2020-10-13 NOTE — NURSING
4 hours HD completed. 4L fluid removed. 10mg hydralazine administered IV per MD order post-tx. Last /96,P 95. Weight @ 56kg post-tx on floor scale. Denies SOB on RA @ this time.

## 2020-10-13 NOTE — CARE UPDATE
Pt is on NC at this time, no Bipap in room and pt is breathing comfortable with accept sats at this time

## 2020-10-13 NOTE — CARE UPDATE
Per MD order, patient placed on bipap with documented settings and patient parameters. Alarms are on and functioning. Patient tolerating well. ABG drawn and reported to Dr. Lefort    Results for JOHN BEST (MRN [id number]) as of 10/13/2020 00:10   Ref. Range 10/13/2020 00:02   POC PH Latest Ref Range: 7.35 - 7.45  7.365   POC PCO2 Latest Ref Range: 35 - 45 mmHg 35.0   POC PO2 Latest Ref Range: 80 - 100 mmHg 82   POC BE Latest Ref Range: -2 to 2 mmol/L -5   POC HCO3 Latest Ref Range: 24 - 28 mmol/L 20.0 (L)   POC SATURATED O2 Latest Ref Range: 95 - 100 % 96   POC TCO2 Latest Ref Range: 23 - 27 mmol/L 21 (L)   FiO2 Unknown 30   Sample Unknown ARTERIAL   DelSys Unknown CPAP/BiPAP   Allens Test Unknown Pass   Site Unknown RR   Mode Unknown BiPAP   Rate Unknown 14

## 2020-10-13 NOTE — ED NOTES
Pt report received from LASHANDA Segovia. Pt resting comfortably on 2L oxygen via nasal cannula. Pt reports resolution in SOB. Pt is noted to be hypertensive at this time. Denies any other complaints. Pt is connected to cardiac monitor, BP cuff, and pulse ox. Will continue to monitor.

## 2020-10-13 NOTE — ED NOTES
Pt c/ of left sided sciatica pain. Pt took 650mg of tyenol at 0624. Dr. Anderson paged for PRN order. Awaiting response.

## 2020-10-13 NOTE — SUBJECTIVE & OBJECTIVE
Past Medical History:   Diagnosis Date    Allergic drug reaction 2017    Anemia     Anxiety     Arm pain, left 2014    Breast cyst     Chronic renal failure 2014    CKD (chronic kidney disease) stage 5, GFR less than 15 ml/min     Depression     Dialysis patient     dialysis m-w-    Encounter for blood transfusion     GERD (gastroesophageal reflux disease)     Hypertension     Mood swings     Multiple myeloma in remission 10/26/2012    per Hem/Oc note    Renal hypertension     Status post dialysis 2012    Thrombocytopenia 2012       Past Surgical History:   Procedure Laterality Date    AV FISTULA PLACEMENT Left     BREAST CYST ASPIRATION Left     BREAST CYST EXCISION Left      SECTION      x1    FISTULOGRAM N/A 2020    Procedure: Fistulogram;  Surgeon: Rahat Berger MD;  Location: [facility] CATH LAB/EP;  Service: Cardiology;  Laterality: N/A;    pd catheter      PERCUTANEOUS TRANSLUMINAL ANGIOPLASTY OF ARTERIOVENOUS FISTULA N/A 2020    Procedure: PTA, AV FISTULA;  Surgeon: Rahat Berger MD;  Location: [facility] CATH LAB/EP;  Service: Cardiology;  Laterality: N/A;    PERITONEAL CATHETER REMOVAL N/A 2018    Procedure: REMOVAL, CATHETER, DIALYSIS, PERITONEAL;  Surgeon: Mukesh Gonsales Jr., MD;  Location: [facility] OR;  Service: General;  Laterality: N/A;    RENAL BIOPSY  2016    THROMBECTOMY OF HEMODIALYSIS ACCESS SITE N/A 2020    Procedure: Thrombectomy, Hemodialysis Graft Or Fistula;  Surgeon: Rahat Berger MD;  Location: [facility] CATH LAB/EP;  Service: Cardiology;  Laterality: N/A;    VASCULAR SURGERY  2012    dialysis catheter to right subclavian       Review of patient's allergies indicates:   Allergen Reactions    Doxycycline Swelling, Rash and Hives    Gentamicin Anaphylaxis    Vancomycin analogues Anaphylaxis       No current facility-administered medications on file prior to encounter.      Current Outpatient Medications on File  Prior to Encounter   Medication Sig    aspirin 81 MG Chew Chew and swallow 1 tablet (81 mg total) by mouth once daily.    B COMPLEX W-C NO.20/FOLIC ACID (VIRT-CAPS ORAL) Take 1 capsule by mouth once daily.     carvediloL (COREG) 12.5 MG tablet Take 1 tablet (12.5 mg total) by mouth 2 (two) times daily.    cholecalciferol, vitamin D3, (VITAMIN D3) 400 unit Cap Take 2,000 Units by mouth once daily.     cinacalcet (SENSIPAR) 30 MG Tab Take 30 mg by mouth.    clopidogreL (PLAVIX) 75 mg tablet Take 1 tablet (75 mg total) by mouth once daily.    CREON 24,000-76,000 -120,000 unit capsule TK 1 C PO TID WC    escitalopram oxalate (LEXAPRO) 20 MG tablet TAKE 1 TABLET BY MOUTH DAILY    folic acid (FOLVITE) 1 MG tablet Take 1 mg by mouth.    guaiFENesin (MUCINEX) 600 mg 12 hr tablet Take 1,200 mg by mouth 2 (two) times daily.    losartan (COZAAR) 100 MG tablet     methylPREDNISolone (MEDROL DOSEPACK) 4 mg tablet use as directed    nicotine (NICODERM CQ) 21 mg/24 hr Place 1 patch onto the skin once daily.    nicotine polacrilex 2 MG Lozg Take 1-2 lozenges per hour as needed in place of a cigarette.    nicotine, polacrilex, (NICORETTE) 2 mg Gum Take 1-2 pieces by mouth per hour as needed in place of a cigarette.    sucroferric oxyhydroxide (VELPHORO) 500 mg Chew Take 500 mg by mouth 3 (three) times daily.    traMADoL (ULTRAM) 50 mg tablet Take 1 tablet (50 mg total) by mouth every 8 (eight) hours as needed for Pain.     Family History     Problem Relation (Age of Onset)    Diabetes Maternal Grandmother    Heart failure Mother    Hypertension Mother    Ovarian cancer Maternal Aunt    Stroke Maternal Grandmother        Tobacco Use    Smoking status: Current Every Day Smoker     Packs/day: 1.00     Years: 36.00     Pack years: 36.00     Types: Cigarettes     Start date: 1984    Smokeless tobacco: Never Used    Tobacco comment: Pt enrolled in the [employer]. Ambulatory referral to Smoking Cessation program.     Substance and Sexual Activity    Alcohol use: Yes     Alcohol/week: 2.0 standard drinks     Types: 2 Glasses of wine per week     Comment: A bottle of wine/ night, Last night 6/8 had a bottle of wine    Drug use: No    Sexual activity: Not Currently     Review of Systems   Constitutional: Negative for chills, diaphoresis and fever.   Eyes: Negative for photophobia.   Respiratory: Positive for shortness of breath. Negative for cough, chest tightness and wheezing.    Cardiovascular: Negative for chest pain, palpitations and leg swelling.   Gastrointestinal: Negative for abdominal pain, diarrhea, nausea and vomiting.   Genitourinary: Negative for dysuria, flank pain, frequency and hematuria.   Musculoskeletal: Negative for back pain and myalgias.   Neurological: Negative for dizziness, syncope, light-headedness and headaches.   Psychiatric/Behavioral: Negative for confusion.     Objective:     Vital Signs (Most Recent):  Temp: 98.4 °F (36.9 °C) (10/12/20 2334)  Pulse: 85 (10/13/20 0501)  Resp: 16 (10/13/20 0501)  BP: (!) 171/114 (10/13/20 0501)  SpO2: 100 % (10/13/20 0501) Vital Signs (24h Range):  Temp:  [98.4 °F (36.9 °C)] 98.4 °F (36.9 °C)  Pulse:  [] 85  Resp:  [16-26] 16  SpO2:  [95 %-100 %] 100 %  BP: (139-198)/() 171/114     Weight: 56.7 kg (125 lb)  Body mass index is 19.01 kg/m².    Physical Exam  Constitutional:       Appearance: She is well-developed.   HENT:      Head: Normocephalic and atraumatic.   Eyes:      Conjunctiva/sclera: Conjunctivae normal.      Pupils: Pupils are equal, round, and reactive to light.   Neck:      Musculoskeletal: Normal range of motion.      Vascular: No JVD.   Cardiovascular:      Rate and Rhythm: Normal rate and regular rhythm.      Heart sounds: Normal heart sounds.   Pulmonary:      Effort: Pulmonary effort is normal. No respiratory distress.      Breath sounds: No wheezing.   Abdominal:      General: Bowel sounds are normal. There is no distension.       Palpations: Abdomen is soft.      Tenderness: There is no abdominal tenderness. There is no guarding.   Musculoskeletal: Normal range of motion.         General: No tenderness.   Skin:     General: Skin is warm and dry.      Capillary Refill: Capillary refill takes less than 2 seconds.   Neurological:      Mental Status: She is alert and oriented to person, place, and time.   Psychiatric:         Behavior: Behavior normal.           CRANIAL NERVES     CN III, IV, VI   Pupils are equal, round, and reactive to light.       Significant Labs:   BMP:   Recent Labs   Lab 10/13/20  0325   *   *   K 4.1   CL 77*   CO2 22*   BUN 41*   CREATININE 8.0*   CALCIUM 9.6   MG 2.2     CBC:   Recent Labs   Lab 10/13/20  0002 10/13/20  0325   WBC 6.28 7.89   HGB 10.5* 10.5*   HCT 30.6* 29.4*   * 132*     Troponin:   Recent Labs   Lab 10/13/20  0002   TROPONINI 0.049*       Significant Imaging: I have reviewed all pertinent imaging results/findings within the past 24 hours.

## 2020-10-13 NOTE — ED PROVIDER NOTES
Encounter Date: 10/12/2020       History     Chief Complaint   Patient presents with    Shortness of Breath     Pt presents with c/o sob that began 15 min pta. Pt diaphoretic in triage. Denies pain. Last dialysis Saturday       Time seen by provider: 11:38 PM on 10/12/2020    The patient is a 55 y.o. female who presents to the ED with complaint of shortness of breath which onset 10-15 minutes pta. Symptoms are constant in severity. Patient denies any chest pain, fever, chills, and all other sxs at this time. No prior Tx included. Patient reports the last time she had Dialysis was Saturday. Initial /122     has a past medical history of Allergic drug reaction (2017), Anemia, Anxiety, Arm pain, left (2014), Breast cyst, Chronic renal failure (2014), CKD (chronic kidney disease) stage 5, GFR less than 15 ml/min, Depression, Dialysis patient, Encounter for blood transfusion, GERD (gastroesophageal reflux disease), Hypertension, Mood swings, Multiple myeloma in remission (10/26/2012), Renal hypertension, Status post dialysis (2012), and Thrombocytopenia (2012).  has a past surgical history that includes Vascular surgery (2012); AV fistula placement (Left, ); Renal biopsy (); Breast cyst aspiration (Left, ); Breast cyst excision (Left, );  section; pd catheter; Peritoneal catheter removal (N/A, 2018); Fistulogram (N/A, 2020); Percutaneous transluminal angioplasty of arteriovenous fistula (N/A, 2020); and Thrombectomy of hemodialysis access site (N/A, 2020).    The history is provided by the patient.     Review of patient's allergies indicates:   Allergen Reactions    Doxycycline Swelling, Rash and Hives    Gentamicin Anaphylaxis    Vancomycin analogues Anaphylaxis     Past Medical History:   Diagnosis Date    Allergic drug reaction 2017    Anemia     Anxiety     Arm pain, left 2014    Breast cyst     Chronic renal failure  2014    CKD (chronic kidney disease) stage 5, GFR less than 15 ml/min     Depression     Dialysis patient     dialysis m-w-f    Encounter for blood transfusion     GERD (gastroesophageal reflux disease)     Hypertension     Mood swings     Multiple myeloma in remission 10/26/2012    per Hem/Oc note    Renal hypertension     Status post dialysis 2012    Thrombocytopenia 2012     Past Surgical History:   Procedure Laterality Date    AV FISTULA PLACEMENT Left 2014    BREAST CYST ASPIRATION Left     BREAST CYST EXCISION Left      SECTION      x1    FISTULOGRAM N/A 2020    Procedure: Fistulogram;  Surgeon: Rahat Berger MD;  Location: [facility] CATH LAB/EP;  Service: Cardiology;  Laterality: N/A;    pd catheter      PERCUTANEOUS TRANSLUMINAL ANGIOPLASTY OF ARTERIOVENOUS FISTULA N/A 2020    Procedure: PTA, AV FISTULA;  Surgeon: Rahat Berger MD;  Location: [facility] CATH LAB/EP;  Service: Cardiology;  Laterality: N/A;    PERITONEAL CATHETER REMOVAL N/A 2018    Procedure: REMOVAL, CATHETER, DIALYSIS, PERITONEAL;  Surgeon: Mukesh Gonsales Jr., MD;  Location: [facility] OR;  Service: General;  Laterality: N/A;    RENAL BIOPSY      THROMBECTOMY OF HEMODIALYSIS ACCESS SITE N/A 2020    Procedure: Thrombectomy, Hemodialysis Graft Or Fistula;  Surgeon: Rahat Berger MD;  Location: [facility] CATH LAB/EP;  Service: Cardiology;  Laterality: N/A;    VASCULAR SURGERY  2012    dialysis catheter to right subclavian     Family History   Problem Relation Age of Onset    Hypertension Mother     Heart failure Mother     Ovarian cancer Maternal Aunt     Diabetes Maternal Grandmother     Stroke Maternal Grandmother     Breast cancer Neg Hx     Colon cancer Neg Hx      Social History     Tobacco Use    Smoking status: Current Every Day Smoker     Packs/day: 1.00     Years: 36.00     Pack years: 36.00     Types: Cigarettes     Start date:     Smokeless tobacco: Never Used     Tobacco comment: Pt enrolled in the [employer]. Ambulatory referral to Smoking Cessation program.    Substance Use Topics    Alcohol use: Yes     Alcohol/week: 2.0 standard drinks     Types: 2 Glasses of wine per week     Comment: A bottle of wine/ night, Last night 6/8 had a bottle of wine    Drug use: No     Review of Systems   Constitutional: Negative for chills and fever.   HENT: Negative for ear pain and sore throat.    Eyes: Negative for redness.   Respiratory: Positive for shortness of breath.    Cardiovascular: Negative for chest pain.   Gastrointestinal: Negative for abdominal pain, diarrhea and vomiting.   Genitourinary: Negative for dysuria.   Musculoskeletal: Negative for back pain.   Skin: Negative for rash.   Neurological: Negative for headaches.       Physical Exam     Initial Vitals [10/12/20 2334]   BP Pulse Resp Temp SpO2   (!) 198/122 107 (!) 26 98.4 °F (36.9 °C) 95 %      MAP       --         Physical Exam    Nursing note and vitals reviewed.  Constitutional: She appears well-developed and well-nourished. She is diaphoretic.   Diaphoretic, [place]   HENT:   Head: Normocephalic and atraumatic.   Eyes: Conjunctivae and EOM are normal.   Neck: Normal range of motion. Neck supple.   Cardiovascular: Normal rate and regular rhythm.   Thrill in left forearm fistula   Pulmonary/Chest: No respiratory distress. She has rales.   Abdominal: Soft. Bowel sounds are normal. There is no abdominal tenderness.   Musculoskeletal: Normal range of motion. No tenderness or edema.   Neurological: She is alert and oriented to person, place, and time. She has normal strength.   Skin: Skin is warm. No rash noted.         ED Course   Procedures  Labs Reviewed   CBC W/ AUTO DIFFERENTIAL - Abnormal; Notable for the following components:       Result Value    RBC 3.43 (*)     Hemoglobin 10.5 (*)     Hematocrit 30.6 (*)     RDW 17.3 (*)     Platelets 138 (*)     All other components within normal limits    COMPREHENSIVE METABOLIC PANEL - Abnormal; Notable for the following components:    Sodium 117 (*)     Chloride 78 (*)     CO2 19 (*)     Glucose 157 (*)     BUN, Bld 40 (*)     Creatinine 7.9 (*)     Albumin 3.4 (*)     Anion Gap 20 (*)     eGFR if  6 (*)     eGFR if non  5 (*)     All other components within normal limits    Narrative:      Soduim  critical result(s) called and verbal readback obtained from   Mile Reyes RN by [facility] 10/13/2020 00:53   TROPONIN I - Abnormal; Notable for the following components:    Troponin I 0.049 (*)     All other components within normal limits   B-TYPE NATRIURETIC PEPTIDE - Abnormal; Notable for the following components:    BNP 4,523 (*)     All other components within normal limits   BASIC METABOLIC PANEL - Abnormal; Notable for the following components:    Sodium 116 (*)     Chloride 77 (*)     CO2 22 (*)     Glucose 116 (*)     BUN, Bld 41 (*)     Creatinine 8.0 (*)     Anion Gap 17 (*)     eGFR if  6 (*)     eGFR if non  5 (*)     All other components within normal limits    Narrative:      Sodium  critical result(s) called and verbal readback obtained from   Kathleen Hernandez RN by [facility] 10/13/2020 04:19   CBC W/ AUTO DIFFERENTIAL - Abnormal; Notable for the following components:    RBC 3.36 (*)     Hemoglobin 10.5 (*)     Hematocrit 29.4 (*)     Mean Corpuscular Hemoglobin 31.3 (*)     RDW 17.0 (*)     Platelets 132 (*)     Lymph # 0.4 (*)     Gran% 89.9 (*)     Lymph% 4.9 (*)     All other components within normal limits   ISTAT PROCEDURE - Abnormal; Notable for the following components:    POC HCO3 20.0 (*)     POC TCO2 21 (*)     All other components within normal limits   MAGNESIUM   SARS-COV-2 RNA AMPLIFICATION, QUAL     EKG Readings: (Independently Interpreted)   Sinus rhythm rate 74; Prolonged QT; No STEMI       Imaging Results          X-Ray Chest AP Portable (Final result)  Result time 10/13/20 00:21:51     Final result by Que De La Fuente DO (10/13/20 00:21:51)                 Impression:      No acute cardiopulmonary abnormality.      Electronically signed by: Que De La Fuente  Date:    10/13/2020  Time:    00:21             Narrative:    EXAMINATION:  XR CHEST AP PORTABLE    CLINICAL HISTORY:  respiratory distress;    TECHNIQUE:  Single frontal view of the chest was performed.    COMPARISON:  Multiple prior radiographs of the chest, most recent from 06/19/2020.    FINDINGS:  The lungs are well expanded and clear. No focal opacities are seen. The pleural spaces are clear.  The cardiac silhouette is unremarkable.  There are calcifications of the aortic arch.  The visualized osseous structures demonstrate degenerative changes.                                 Medical Decision Making:   History:   Old Medical Records: I decided to obtain old medical records.  Initial Assessment:   55 year old female presents to the ED complaining of shortness of breath. The patient was seen and examined. The history and physical exam was obtained. The nursing notes and vital signs were reviewed.     Secondary to symptoms and exam findings we ordered:    Labs: CBC, CMP, Troponin, BNP    Imaging: CXR    Differential Diagnosis includes, but is not limited to:  PE, MI/ACS, pneumothorax, pericardial effusion/tamonade, pneumonia, lung abscess, pericarditis/myocarditis, pleural effusion, lung mass, CHF exacerbation, asthma exacerbation, COPD exacerbation, aspirated/ingested foreign body, airway obstruction, CO poisoning, anemia, metabolic derangement, allergy/atopy, influenza, viral URI, viral syndrome.    Patient will be administered nitroglycerin.  Patient will be monitored here.  Differential Diagnosis:   Differential Diagnosis includes, but is not limited to:  PE, MI/ACS, pneumothorax, pericardial effusion/tamonade, pneumonia, lung abscess, pericarditis/myocarditis, pleural effusion, lung mass, CHF exacerbation, asthma exacerbation, COPD  exacerbation, aspirated/ingested foreign body, airway obstruction, CO poisoning, anemia, metabolic derangement, allergy/atopy, influenza, viral URI, viral syndrome.    Independently Interpreted Test(s):   I have ordered and independently interpreted EKG Reading(s) - see prior notes  Clinical Tests:   Lab Tests: Ordered and Reviewed  Radiological Study: Ordered and Reviewed  Medical Tests: Ordered and Reviewed  ED Management:  Placed on bipap with interval improvement  Suspect volume overload  Discussed with HM for ICU admission  Other:   I have discussed this case with another health care provider.                             Clinical Impression:     ICD-10-CM ICD-9-CM   1. Acute respiratory failure with hypoxia  J96.01 518.81   2. Shortness of breath  R06.02 786.05   3. ESRD (end stage renal disease)  N18.6 585.6   4. Volume overload  E87.70 276.69   5. Acute right-sided low back pain with right-sided sciatica  M54.41 724.2     724.3   6. Sciatica, right side  M54.31 724.3                      Disposition:   Disposition: Placed in Observation  Condition: Fair               Scribe attest I, Dr. Guy Lefort, personally performed the services described in this documentation. All medical record entries made by the scribe were at my direction and in my presence. I have reviewed the chart and agree that the record reflects my personal performance and is accurate and complete. Guy Lefort, MD.  11:13 PM 10/13/2020             Guy J. Lefort, MD  10/13/20 [id number]

## 2020-10-13 NOTE — NURSING
Pt arrived to unit after dialysis treatment. Received report from [place]. VSS. 4L of fluid removed. No complaints of pain or discomfort. Positive thrill and bruit noted to left arm access. Able to make needs known to staff. Informed to call for assistance if needed. Call bell in reach. Bed in lowest position. Bed alarm on. Will cont to monitor.

## 2020-10-13 NOTE — ED NOTES
Pt belongings and medications brought to room 458 by [facility] ED tech. Pt report given to LASHANDA Escalera

## 2020-10-13 NOTE — ED NOTES
Updated patient about being admitted. Pt verbalized understanding at this time. Call bell within reach and pt instructed to call for assistance. Pt in no apparent distress at this time. Pt reports feeling better at this time. Pt reports she is very tired. Lights turned off to provide low stimuli environment. Vital signs stable at this time. Pt within view of nurses station. Will continue to monitor.

## 2020-10-13 NOTE — ED NOTES
Review of patient's allergies indicates:   Allergen Reactions    Doxycycline Swelling, Rash and Hives    Gentamicin Anaphylaxis    Vancomycin analogues Anaphylaxis        Patient has verified the spelling of their name and  on armband.   APPEARANCE: Patient is alert, oriented x 4, and is distressed at this time.   SKIN: Skin is normal for race, warm, and dry. Normal skin turgor and mucous membranes moist.  CARDIAC: Normal rate and rhythm, no murmur heard.   RESPIRATORY: pt breathing shallow at this time. Pt is in respiratory distress. Pt 97% on 4L. Pt reports breathing issue started this afternoon. Pt breath sounds are diminished but equal bilaterally. No evidence of airway compromise. Fine crackles heard at bases.   GASTRO: Bowel sounds normal, abdomen is soft, no tenderness, and no abdominal distention.  MUSCLE: Full ROM. No bony tenderness or soft tissue tenderness. No obvious deformity.  PERIPHERAL VASCULAR: peripheral pulses present. Normal cap refill. No edema. Warm to touch. Pt denies any nausea,vomitting, diarrhea.  NEURO: 5/5 strength major flexors/extensors bilaterally. Sensory intact to light touch bilaterally. [place] coma scale: eyes open spontaneously-4, oriented & converses-5, obeys commands-6. No neurological abnormalities.   MENTAL STATUS: awake, alert and aware of environment.  : Voids without complication. Pt reports little urine output. Chronic dialysis pt. Fistula present to the left arm. Last dialyisized on Friday. Usually tues, Thursday.

## 2020-10-13 NOTE — PROCEDURES
Pt seen and examined on HD, tolerating procedure well    Vitals:    10/13/20 1121   BP: (!) 150/108   Pulse: 88   Resp:    Temp:      Goal UF 4 L, reassess BP and weight after HD  For more details please see consult note from earlier today
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
## 2020-10-14 NOTE — NURSING
Patient anxious to leave.  Did not want to wait for VN to review discharge instruction.  I reviewed the discharge instructions and medication with patient. Patient verbalized understanding of instructions.  Saline lock x2 discontinue by assigned day shift nurse.  Left via wheelchair with help of staff.

## 2020-10-15 PROCEDURE — 99001 SPECIMEN HANDLING PT-LAB: CPT | Mod: PO,TXP

## 2020-10-22 ENCOUNTER — LAB VISIT (OUTPATIENT)
Dept: LAB | Facility: HOSPITAL | Age: 55
End: 2020-10-22
Payer: MEDICARE

## 2020-10-22 DIAGNOSIS — Z76.82 PRE-KIDNEY TRANSPLANT, LISTED: ICD-10-CM

## 2020-11-04 ENCOUNTER — LAB VISIT (OUTPATIENT)
Dept: LAB | Facility: OTHER | Age: 55
End: 2020-11-04
Attending: INTERNAL MEDICINE
Payer: MEDICARE

## 2020-11-04 DIAGNOSIS — N18.6 ESRD (END STAGE RENAL DISEASE) ON DIALYSIS: ICD-10-CM

## 2020-11-04 DIAGNOSIS — D63.1 ANEMIA IN CHRONIC KIDNEY DISEASE, ON CHRONIC DIALYSIS: ICD-10-CM

## 2020-11-04 DIAGNOSIS — C90.00 MULTIPLE MYELOMA, REMISSION STATUS UNSPECIFIED: ICD-10-CM

## 2020-11-04 DIAGNOSIS — N18.6 ANEMIA IN CHRONIC KIDNEY DISEASE, ON CHRONIC DIALYSIS: ICD-10-CM

## 2020-11-04 DIAGNOSIS — Z99.2 ESRD (END STAGE RENAL DISEASE) ON DIALYSIS: ICD-10-CM

## 2020-11-04 DIAGNOSIS — Z99.2 ANEMIA IN CHRONIC KIDNEY DISEASE, ON CHRONIC DIALYSIS: ICD-10-CM

## 2020-11-04 LAB
ALBUMIN SERPL BCP-MCNC: 3.3 G/DL (ref 3.5–5.2)
ALP SERPL-CCNC: 97 U/L (ref 55–135)
ALT SERPL W/O P-5'-P-CCNC: 12 U/L (ref 10–44)
ANION GAP SERPL CALC-SCNC: 13 MMOL/L (ref 8–16)
AST SERPL-CCNC: 19 U/L (ref 10–40)
BASOPHILS # BLD AUTO: 0.03 K/UL (ref 0–0.2)
BASOPHILS NFR BLD: 0.7 % (ref 0–1.9)
BILIRUB SERPL-MCNC: 0.4 MG/DL (ref 0.1–1)
BUN SERPL-MCNC: 44 MG/DL (ref 6–20)
CALCIUM SERPL-MCNC: 10.5 MG/DL (ref 8.7–10.5)
CHLORIDE SERPL-SCNC: 82 MMOL/L (ref 95–110)
CO2 SERPL-SCNC: 27 MMOL/L (ref 23–29)
CREAT SERPL-MCNC: 6.2 MG/DL (ref 0.5–1.4)
DIFFERENTIAL METHOD: ABNORMAL
EOSINOPHIL # BLD AUTO: 0.1 K/UL (ref 0–0.5)
EOSINOPHIL NFR BLD: 1.9 % (ref 0–8)
ERYTHROCYTE [DISTWIDTH] IN BLOOD BY AUTOMATED COUNT: 17.4 % (ref 11.5–14.5)
EST. GFR  (AFRICAN AMERICAN): 8 ML/MIN/1.73 M^2
EST. GFR  (NON AFRICAN AMERICAN): 7 ML/MIN/1.73 M^2
GLUCOSE SERPL-MCNC: 93 MG/DL (ref 70–110)
HCT VFR BLD AUTO: 35 % (ref 37–48.5)
HGB BLD-MCNC: 11.7 G/DL (ref 12–16)
IGA SERPL-MCNC: 185 MG/DL (ref 40–350)
IGG SERPL-MCNC: 638 MG/DL (ref 650–1600)
IGM SERPL-MCNC: 130 MG/DL (ref 50–300)
IMM GRANULOCYTES # BLD AUTO: 0.01 K/UL (ref 0–0.04)
IMM GRANULOCYTES NFR BLD AUTO: 0.2 % (ref 0–0.5)
LYMPHOCYTES # BLD AUTO: 0.6 K/UL (ref 1–4.8)
LYMPHOCYTES NFR BLD: 15.1 % (ref 18–48)
MCH RBC QN AUTO: 30 PG (ref 27–31)
MCHC RBC AUTO-ENTMCNC: 33.4 G/DL (ref 32–36)
MCV RBC AUTO: 90 FL (ref 82–98)
MONOCYTES # BLD AUTO: 0.5 K/UL (ref 0.3–1)
MONOCYTES NFR BLD: 11.7 % (ref 4–15)
NEUTROPHILS # BLD AUTO: 2.9 K/UL (ref 1.8–7.7)
NEUTROPHILS NFR BLD: 70.4 % (ref 38–73)
NRBC BLD-RTO: 0 /100 WBC
PLATELET # BLD AUTO: 96 K/UL (ref 150–350)
PMV BLD AUTO: 11.3 FL (ref 9.2–12.9)
POTASSIUM SERPL-SCNC: 4.6 MMOL/L (ref 3.5–5.1)
PROT SERPL-MCNC: 6.4 G/DL (ref 6–8.4)
RBC # BLD AUTO: 3.9 M/UL (ref 4–5.4)
SODIUM SERPL-SCNC: 122 MMOL/L (ref 136–145)
WBC # BLD AUTO: 4.11 K/UL (ref 3.9–12.7)

## 2020-11-04 PROCEDURE — 84165 PATHOLOGIST INTERPRETATION SPE: ICD-10-PCS | Mod: 26,NTX,, | Performed by: PATHOLOGY

## 2020-11-04 PROCEDURE — 86334 PATHOLOGIST INTERPRETATION IFE: ICD-10-PCS | Mod: 26,NTX,, | Performed by: PATHOLOGY

## 2020-11-04 PROCEDURE — 86334 IMMUNOFIX E-PHORESIS SERUM: CPT | Mod: NTX

## 2020-11-04 PROCEDURE — 82784 ASSAY IGA/IGD/IGG/IGM EACH: CPT | Mod: 59,NTX

## 2020-11-04 PROCEDURE — 80053 COMPREHEN METABOLIC PANEL: CPT | Mod: NTX

## 2020-11-04 PROCEDURE — 83520 IMMUNOASSAY QUANT NOS NONAB: CPT | Mod: NTX

## 2020-11-04 PROCEDURE — 86334 IMMUNOFIX E-PHORESIS SERUM: CPT | Mod: 26,NTX,, | Performed by: PATHOLOGY

## 2020-11-04 PROCEDURE — 85025 COMPLETE CBC W/AUTO DIFF WBC: CPT | Mod: TXP

## 2020-11-04 PROCEDURE — 84165 PROTEIN E-PHORESIS SERUM: CPT | Mod: 26,NTX,, | Performed by: PATHOLOGY

## 2020-11-04 PROCEDURE — 36415 COLL VENOUS BLD VENIPUNCTURE: CPT | Mod: TXP

## 2020-11-04 PROCEDURE — 84165 PROTEIN E-PHORESIS SERUM: CPT | Mod: NTX

## 2020-11-05 LAB
ALBUMIN SERPL ELPH-MCNC: 3.39 G/DL (ref 3.35–5.55)
ALPHA1 GLOB SERPL ELPH-MCNC: 0.41 G/DL (ref 0.17–0.41)
ALPHA2 GLOB SERPL ELPH-MCNC: 0.93 G/DL (ref 0.43–0.99)
B-GLOBULIN SERPL ELPH-MCNC: 0.55 G/DL (ref 0.5–1.1)
GAMMA GLOB SERPL ELPH-MCNC: 0.63 G/DL (ref 0.67–1.58)
INTERPRETATION SERPL IFE-IMP: NORMAL
KAPPA LC SER QL IA: 14.18 MG/DL (ref 0.33–1.94)
KAPPA LC/LAMBDA SER IA: 1.04 (ref 0.26–1.65)
LAMBDA LC SER QL IA: 13.63 MG/DL (ref 0.57–2.63)
PATHOLOGIST INTERPRETATION IFE: NORMAL
PATHOLOGIST INTERPRETATION SPE: NORMAL
PROT SERPL-MCNC: 5.9 G/DL (ref 6–8.4)

## 2020-11-05 PROCEDURE — 86829 HLA CLASS I/II ANTIBODY QUAL: CPT | Mod: 91,PO,TXP

## 2020-11-05 PROCEDURE — 86829 HLA CLASS I/II ANTIBODY QUAL: CPT | Mod: PO,TXP

## 2020-11-09 ENCOUNTER — PATIENT OUTREACH (OUTPATIENT)
Dept: ADMINISTRATIVE | Facility: OTHER | Age: 55
End: 2020-11-09

## 2020-11-09 ENCOUNTER — OFFICE VISIT (OUTPATIENT)
Dept: URGENT CARE | Facility: CLINIC | Age: 55
End: 2020-11-09
Payer: MEDICARE

## 2020-11-09 VITALS
HEART RATE: 70 BPM | SYSTOLIC BLOOD PRESSURE: 162 MMHG | TEMPERATURE: 98 F | DIASTOLIC BLOOD PRESSURE: 90 MMHG | RESPIRATION RATE: 18 BRPM | OXYGEN SATURATION: 97 % | BODY MASS INDEX: 18.94 KG/M2 | WEIGHT: 125 LBS | HEIGHT: 68 IN

## 2020-11-09 DIAGNOSIS — B96.89 ACUTE BACTERIAL SINUSITIS: Primary | ICD-10-CM

## 2020-11-09 DIAGNOSIS — R01.1 HEART MURMUR, SYSTOLIC: ICD-10-CM

## 2020-11-09 DIAGNOSIS — J01.90 ACUTE BACTERIAL SINUSITIS: Primary | ICD-10-CM

## 2020-11-09 LAB
CTP QC/QA: YES
SARS-COV-2 RDRP RESP QL NAA+PROBE: NEGATIVE

## 2020-11-09 PROCEDURE — U0002: ICD-10-PCS | Mod: QW,S$GLB,TXP, | Performed by: NURSE PRACTITIONER

## 2020-11-09 PROCEDURE — U0002 COVID-19 LAB TEST NON-CDC: HCPCS | Mod: QW,S$GLB,TXP, | Performed by: NURSE PRACTITIONER

## 2020-11-09 PROCEDURE — 99214 OFFICE O/P EST MOD 30 MIN: CPT | Mod: S$GLB,TXP,, | Performed by: NURSE PRACTITIONER

## 2020-11-09 PROCEDURE — 99214 PR OFFICE/OUTPT VISIT, EST, LEVL IV, 30-39 MIN: ICD-10-PCS | Mod: S$GLB,TXP,, | Performed by: NURSE PRACTITIONER

## 2020-11-09 RX ORDER — MINERAL OIL
180 ENEMA (ML) RECTAL DAILY
Status: ON HOLD | COMMUNITY
End: 2023-09-14 | Stop reason: ALTCHOICE

## 2020-11-09 RX ORDER — MOMETASONE FUROATE 50 UG/1
SPRAY, METERED NASAL
COMMUNITY
Start: 2020-10-26 | End: 2021-12-11

## 2020-11-09 RX ORDER — AMOXICILLIN AND CLAVULANATE POTASSIUM 875; 125 MG/1; MG/1
1 TABLET, FILM COATED ORAL EVERY 12 HOURS
Qty: 14 TABLET | Refills: 0 | Status: SHIPPED | OUTPATIENT
Start: 2020-11-09 | End: 2020-11-16

## 2020-11-09 RX ORDER — OMEPRAZOLE 40 MG/1
CAPSULE, DELAYED RELEASE ORAL
COMMUNITY
Start: 2020-09-07 | End: 2021-12-11

## 2020-11-09 NOTE — PROGRESS NOTES
Updates were requested from care everywhere.  Chart was reviewed for overdue Proactive [facility] Encounters ([facility]) topics (CRS, Breast Cancer Screening, Eye exam)  Health Maintenance has been updated.  LINKS not responding.

## 2020-11-09 NOTE — PATIENT INSTRUCTIONS
Please follow up with your Primary care provider within 2-5 days if your signs and symptoms have not resolved or worsen.     Follow up with cardiology for heart murmur.    If your condition worsens or fails to improve we recommend that you receive another evaluation at the emergency room immediately or contact your primary medical clinic to discuss your concerns.   You must understand that you have received an Urgent Care treatment only and that you may be released before all of your medical problems are known or treated. You, the patient, will arrange for follow up care as instructed.     Acute Bacterial Rhinosinusitis (ABRS)    Acute bacterial rhinosinusitis (ABRS) is an infection of your nasal cavity and sinuses. Its caused by bacteria. Acute means that youve had symptoms for less than 12 weeks.  Understanding your sinuses  The nasal cavity is the large air-filled space behind your nose. The sinuses are a group of spaces formed by the bones of your face. They connect with your nasal cavity. ABRS causes the tissue lining these spaces to become inflamed. Mucus may not drain normally. This leads to facial pain and other symptoms.  What causes ABRS?  ABRS most often follows an upper respiratory infection caused by a virus. Bacteria then infect the lining of your nasal cavity and sinuses. But you can also get ABRS if you have:  · Nasal allergies  · Long-term nasal swelling and congestion not caused by allergies  · Blockage in the nose  Symptoms of ABRS  The symptoms of ABRS may be different for each person, and can include:  · Nasal congestion  · Runny nose  · Fluid draining from the nose down the throat (postnasal drip)  · Headache  · Cough  · Pain in the sinuses  · Thick, colored fluid from the nose (mucus)  · Fever  Diagnosing ABRS  ABRS may be diagnosed if youve had an upper respiratory infection like a cold and cough for longer than 10 to 14 days. Your health care provider will ask about your symptoms and  your medical history. The provider will check your vital signs, including your temperature. Youll have a physical exam. The health care provider will check your ears, nose, and throat. You likely wont need any tests. If ABRS comes back, you may have a culture or other tests.  Treatment for ABRS  Treatment may include:  · Antibiotic medicine. This is for symptoms that last for at least 10 to 14 days.  · Nasal corticosteroid medicine. Drops or spray used in the nose can lessen swelling and congestion.  · Over-the-counter pain medicine. This is to lessen sinus pain and pressure.  · Nasal decongestant medicine. Spray or drops may help to lessen congestion. Do not use them for more than a few days.  · Salt wash (saline irrigation). This can help to loosen mucus.  Possible complications of ABRS  ABRS may come back or become long-term (chronic).  In rare cases, ABRS may cause complications such as:   · Inflamed tissue around the brain and spinal cord (meningitis)  · Inflamed tissue around the eyes (orbital cellulitis)  · Inflamed bones around the sinuses (osteitis)  These problems may need to be treated in a hospital with intravenous (IV) antibiotic medicine or surgery.  When to call the health care provider  Call your health care provider if you have any of the following:  · Symptoms that dont get better, or get worse  · Symptoms that dont get better after 3 to 5 days on antibiotics  · Trouble seeing  · Swelling around your eyes  · Confusion or trouble staying awake   Date Last Reviewed: 3/3/2015  © 9646-1928 [employer]. [address] [place] [address], [place] [address]. All rights reserved. This information is not intended as a substitute for professional medical care. Always follow your healthcare professional's instructions.        Understanding a Heart Murmur    The heart makes sounds as it beats. These sounds occur as the heart valves open and close to allow blood to flow through the heart. A heart murmur  is an extra noise heard during a heartbeat. The noise is caused when blood does not flow smoothly through the heart. Heart murmurs can be innocent (harmless) or abnormal (caused by a heart problem).  What causes a heart murmur?  An innocent heart murmur can be a normal finding for many people. It may also be caused by:  · Fever  · Exercise  · Pregnancy  · Anemia  · Overactive thyroid gland  An abnormal heart murmur can be caused by heart problems such as:  · A damaged or diseased valve. The valve may be too narrow for blood to flow through easily. Or it may have problems opening or closing, and may leak blood backward.  · A hole in the heart (septal defect). This is a problem with the hearts structure that a person is born with (congenital). It causes blood to leak through the wall that normally divides the left and right sides of the heart.  What are the symptoms of a heart murmur?  Heart murmurs do not usually cause symptoms. They tend to be found when your healthcare provider is listening to your heart for another reason. People with an abnormal heart murmur may have symptoms of the problem causing the murmur. Symptoms can include:  · Feeling weak or tired  · Shortness of breath, especially with exercise  · Chest pain  · Fast, pounding, or skipping heartbeat  · Swollen ankles, feet, abdomen  · Feeling dizzy or faint  · Poor feeding and failing to grow normally (babies only)  How is a heart murmur treated?  An innocent heart murmur does not usually need treatment unless there is a clear cause, such as anemia. In such cases, treating the underlying cause should cure the murmur. In some cases, an innocent heart murmur may go away on its own.  Treatment for an abnormal heart murmur depends on the cause. Options may include:  · Medicines to help relieve symptoms  · Procedures or surgery to fix or replace a diseased or damaged heart valve  · Procedures or surgery to fix a hole in the heart  What are the complications  of a heart murmur?  An innocent heart murmur has no complications. Complications of an abnormal heart murmur will vary depending on the cause. Possible complications include:  · Heart failure. This problem occurs when the heart is so weak it no longer pumps blood well.  · Infection of the hearts valves or inner lining (infective endocarditis)  · Blood clots and stroke  · Fainting  · Heart attack  · Sudden cardiac arrest. This problem occurs when the heart suddenly stops beating.  When should I call my healthcare provider?  Call your healthcare provider right away if you have any of these:  · Chest pain  · Shortness of breath  · Fever of 100.4°F (38°C) or higher, or as directed  · Symptoms that dont get better with treatment, or symptoms that get worse  · New symptoms   Date Last Reviewed: 5/1/2016  © 9982-3051 [employer]. [address] [place] [address], [place] [address]. All rights reserved. This information is not intended as a substitute for professional medical care. Always follow your healthcare professional's instructions.

## 2020-11-09 NOTE — PROGRESS NOTES
Subjective:       Patient ID: eJnnifer Booth is a 55 y.o. female.      Established Patient - Audio Only Telehealth Visit     The patient location is: Home   The chief complaint leading to consultation is: Follow-up MM  Visit type: Virtual visit with audio only (telephone)  Visit time: 25 min  The reason for the audio only service rather than synchronous audio and video virtual visit was related to technical difficulties or patient preference/necessity.     Each patient to whom I provide medical services by telemedicine is:  (1) informed of the relationship between the physician and patient and the respective role of any other health care provider with respect to management of the patient; and (2) notified that they may decline to receive medical services by telemedicine and may withdraw from such care at any time. Patient verbally consented to receive this service via voice-only telephone call.      Chief Complaint: No chief complaint on file.     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.   Previously followed at [facility].   She has not had the resources to continue f/u at [facility].       She is followed by Nephrology for ESRD  She was initially diagnosed with advanced kidney disease secondary to multiple myeloma & HTN.   She was diagnosed with  AK I from MM in 2010 that required initiation of dialysis.    She started chronic dialysis on 12/23/11 and ended on 8/28/12  She then underwent chemotherapy for her myeloma, and stopped dialysis in 2013.    Kidney biopsy 2017due to worsening  kidney function reveals glomerulosclerosis and no evidence of MM    She was undergoing peritoneal dialysis daily     She was switched back to HD     She is followed by Kidney Transplant team at [facility] [place]  Patient met selection criteria for kidney transplant related to ESRD due to  Hypertensive Nephrosclerosis  She is considered a candidate for kidney transplant     Today, she is doing well  Her granddtr is 6 mos old   Appetite and weight  stable   No SOB/CP/cough  No fevers, night sweats  No recent infections  She mild fatigue  No bleeding-nasal/rectal/urinary        She is undergoing EPO under direction of Nephrology  She reports she missed last dose  CBC reveals  Hb 9.0  g/dl   SPEP 4/13//2020   No monoclonal peaks      Onc hx:  She was diagnosed with kappa free light chain disease, 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 She originally presented with acute renal failure requiring HD .She has completed bortezomib/dexamethasone/Revlimid 6 cycles on 04/13/2012 for the multiple myeloma kappa light chain disease, IPSS stage III. She underwent bortezomib maintenance therapy three weeks on, one week off (05/15, 05/22 and 05/29) with her last cycle received on 05/29/2012. She is followed at [facility] myeloma [place] where she was evaluated for an auto stem cell transplant . It was decided not to proceed with transplant was originally due to drug reaction.Pt was diagnosed with DRESS syndrome during hospitalization and then [facility] team elected not to proceed proceed with auto SCT. Velcade maintenance was discontinued due to side effects.       Tx: Velcade/Dex/Revlimid x 6 cycles 4/13/12   Velcade maintenance 3wks on, 1wk off dc'd 5/29/12 sec to adv SE          Review of Systems   Constitutional: Negative for appetite change, chills and fatigue.   HENT: Negative for congestion, hearing loss and nosebleeds.    Eyes: Negative for visual disturbance.   Respiratory: Negative for apnea, cough, chest tightness, shortness of breath and wheezing.    Cardiovascular: Negative for chest pain, palpitations and leg swelling.   Gastrointestinal: Negative for abdominal distention, abdominal pain, blood in stool, constipation, diarrhea, nausea and vomiting.   Genitourinary: Negative for difficulty  urinating, dysuria, flank pain, frequency, hematuria and urgency.   Musculoskeletal: Negative for arthralgias, back pain, gait problem, joint swelling and neck pain.   Skin: Negative for rash.        No petechiae, ecchymoses   Neurological: Negative for dizziness, tremors, seizures, syncope, speech difficulty, light-headedness, numbness and headaches.   Hematological: Negative for adenopathy. Does not bruise/bleed easily.       Objective:       PE deferred  Televisits           Bone survey 2015   1. Small lucent foci in the right femoral neck. Given this patient's history of multiple myeloma, these findings are concerning for sequela of that disease  2. Otherwise degenerative changes of the cervical spine and lower lumbar spine are identified.           Lab Results   Component Value Date    WBC 3.30 (L) 04/09/2020    HGB 9.0 (L) 04/09/2020    HCT 26.7 (L) 04/09/2020    MCV 86 04/09/2020     (L) 04/09/2020             Lab Results   Component Value Date    WBC 3.30 (L) 04/09/2020    HGB 9.0 (L) 04/09/2020    HCT 26.7 (L) 04/09/2020    MCV 86 04/09/2020     (L) 04/09/2020         Results for JOHN BEST D (MRN [id number]) as of 5/13/2019 15:38   Ref. Range 6/19/2018 07:50 7/16/2018 15:05 10/17/2018 15:35 2/5/2019 08:50 5/6/2019 09:19   Lee Free Light Chains Latest Ref Range: 0.33 - 1.94 mg/dL 13.49 (H) 8.45 (H) 9.78 (H) 16.85 (H) 16.06 (H)   Lambda Free Light Chains Latest Ref Range: 0.57 - 2.63 mg/dL 8.34 (H) 6.64 (H) 12.29 (H) 11.84 (H) 13.80 (H)   Kappa/Lambda FLC Ratio Latest Ref Range: 0.26 - 1.65  1.62 1.27 0.80 1.42 1.16       Results for JIGNA BESTA D (MRN [id number]) as of 2/23/2020 23:13   Ref. Range 2/11/2020 10:26   IgG - Serum Latest Ref Range: 650 - 1600 mg/dL 647 (L)   IgM Latest Ref Range: 50 - 300 mg/dL 159   IgA Latest Ref Range: 40 - 350 mg/dL 213     Results for JOHN BEST (MRN [id number]) as of 5/13/2019 15:43   Ref. Range 10/17/2018 15:35 2/5/2019 08:50 5/6/2019 09:19   Beta-2  Microglobulin Latest Ref Range: 0.0 - 2.5 ug/mL 10.0 (H) 14.3 (H) 17.8 (H)       Bone survey 2/2018   No convincing lytic lesions to confirm the presence of myeloma.      MRI T-spine 6/29/2018  1. No evidence for multiple myeloma in the thoracic spine.  2. Minor degenerative changes without evidence for spinal canal stenosis or neural foraminal narrowing.  3. Liver demonstrates decreased T2 signal suggestive of iron overload.  4. Ascites.  5. Bilateral renal cysts, incompletely characterized.      MRI L-spine w/out contrast 6/29/2018  The caudal aspect of the lumbar spinal canal appears to be lower limits of normal on a developmental basis with further tapering of the thecal sac due to an abundance of epidural fat.    Superimposed degenerative change.  This is most pronounced at L3-4 where there is advanced facet arthropathy well as intraspinal synovial cyst resulting in moderate spinal stenosis with severe right lateral recess stenosis.  There also advanced degenerative disc disease at L4-5 with mild spinal stenosis and bilateral lateral recess encroachment.  Individual disc levels further detailed above.    No discrete paraprotein bands are identified          SPEP 4/9/2020 No monoclonal peaks      Results for JENNIFER BEST (MRN [id number]) as of 2/23/2020 23:13   Ref. Range 11/12/2019 10:38 2/11/2020 10:26   Beta-2 Microglobulin Latest Ref Range: 0.0 - 2.5 ug/mL 16.6 (H) 15.0 (H)       Assessment:       1. Multiple myeloma, remission status unspecified    2. ESRD (end stage renal disease) on dialysis    3. Anemia in chronic kidney disease, on chronic dialysis    4. Thrombocytopenia,mild        Plan:   Jennifer was seen today for follow-up.    Diagnoses and associated orders for this visit:      MM IPSS Stage III deletion 13, t 4:14 diagnosed 12/2011 in remission  Dagnosed with kappa free light chain disease, multiple myeloma IPSS with deletion 13, 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   She originally presented with acute  renal failure requiring HD .  She  completed bortezomib/dexamethasone/Revlimid 6 cycles on 04/13/2012 for the multiple myeloma kappa light chain disease, IPSS stage III.   She underwent bortezomib maintenance therapy three weeks on, one week off (05/15, 05/22 and 05/29) with her last cycle received on 05/29/2012  She was followed at [facility] and was diagnosed with DRESS syndrome during hospitalization and then [facility] team elected not to proceed proceed with auto SCT. Velcade maintenance was discontinued due to side effects.   She has remained in remission with nl SPEP   She has had worsening kidney function and s/p Kidney bx 2017 neg for myeloma involvement  Urine studies- no monoclonal peaks  Bone survey 2018 no new findings    today has no detectable m protein.  Free light chain ratio remains normal  No clear evidence of relapse  Serum free KLC  8/8/2019  17.9 mg/dl and lambda free light chain 16.6 mg/dl and K/L FLCR 1.08   MRI T and L spine 6/29/2018 - no evidence of myeloma involvement  SPEP 4/2020- no monoclonal peaks   Cont to monitor      ESRD  Followed by Nephrology  S/p Kidney biopsy 2017 ( outside facility)  due to worsening  kidney function reveals glomerulosclerosis and no evidence of MM  [place] [facility] [place] cadaveric wait list  Pt previously undergoing PD   Pt now undergoing HD  She is followed by [facility] transplant team       Pt followed by Dr. Wolfe     Anemia in chronic renal disease  She has history of  events of acute on chronic anemia.  She does not have gross clinical blood loss. 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  . Free light chain ratio remains normal in Aug  2019, though difficult to interpret in setting of renal failure  . Previously normal total protein pattern now had an M protein of 0.1 grams in June 2018. Total protein pattern has now remained normal.  Bone survey in February 2018 had no lytic lesions.  S/p 2uprbc during   hospitalization  11/2017 for acute-on-chronic anemia  Hb   9.0g/dl ( pt missed one dose)  SHERMAN per Nephrology     Thrombocytopenia,mild, asymptomatic  Etiology unclear  Review peripheral smear  SAPPHIRE, b12, rbc folate  If worsens, consider bmbx    Follow-up 3 mos with labs      Advance Care Planning     Power of   I previously initiated the process of advance care planning today and explained the importance of this process to the patient.  I introduced the concept of advance directives to the patient, as well. Then the patient received detailed information about the importance of designating a Health Care Power of  (HCPOA). She was also instructed to communicate with this person about their wishes for future healthcare, should she become sick and lose decision-making capacity. The patient has not previously appointed a HCPOA. After our discussion, the patient has decided to complete a HCPOA and has appointed her daughter and NAME: Yusra Booth  I spent a total time of  16  minutes discussing this issue with the patient.             F/u   3mo with cbc,cmp, SPEP, FLC, B-2 microglobulin       Cc: MD Yarelis Calvert Jr., MD              This service was not originating from a related E/M service provided within the previous 7 days nor will  to an E/M service or procedure within the next 24 hours or my soonest available appointment.  Prevailing standard of care was able to be met in this audio-only visit.       None

## 2020-11-09 NOTE — PROGRESS NOTES
"Subjective:       Patient ID: Jennifer Booth is a 55 y.o. female.    Vitals:  height is 5' 8" (1.727 m) and weight is 56.7 kg (125 lb). Her temperature is 98.1 °F (36.7 °C). Her blood pressure is 162/90 (abnormal) and her pulse is 70. Her respiration is 18 and oxygen saturation is 97%.     Chief Complaint: URI    Presents to urgent care sinus congestion, runny nose, headache, sinus pressure, and ear fullness, x2 weeks.  Patient denies fever, chills, body aches, cough, sore throat, nausea, vomiting, diarrhea, loss of taste/smell, or contact with sick individuals.  Patient states she does not want COVID test today because she gets tested often for dialysis.  Patient has tried over-the-counter Allegra and Mucinex with some relief.  She states that she came in today because recently the mucus changed from clear to yellow/green.    URI   This is a new problem. The current episode started 1 to 4 weeks ago. The problem has been unchanged. There has been no fever. Associated symptoms include congestion, headaches, a plugged ear sensation, rhinorrhea, sinus pain and sneezing. Pertinent negatives include no abdominal pain, chest pain, coughing, diarrhea, dysuria, ear pain, joint pain, joint swelling, nausea, neck pain, rash, sore throat, swollen glands, vomiting or wheezing. She has tried antihistamine and acetaminophen for the symptoms. The treatment provided no relief.       Constitution: Negative for chills, sweating, fatigue and fever.   HENT: Positive for congestion, sinus pain and sinus pressure. Negative for ear pain, sore throat and voice change.    Neck: Negative for neck pain and painful lymph nodes.   Cardiovascular: Negative for chest pain.   Eyes: Negative for eye redness.   Respiratory: Negative for chest tightness, cough, sputum production, bloody sputum, COPD, shortness of breath, stridor, wheezing and asthma.    Gastrointestinal: Negative for abdominal pain, nausea, vomiting and diarrhea.   Genitourinary: " Negative for dysuria.   Musculoskeletal: Negative for muscle ache.   Skin: Negative for rash.   Allergic/Immunologic: Positive for seasonal allergies and sneezing. Negative for asthma.   Neurological: Positive for headaches. Negative for dizziness.   Hematologic/Lymphatic: Negative for swollen lymph nodes.       Objective:      Physical Exam   Constitutional: She is oriented to person, place, and time. She appears well-developed. She is cooperative.  Non-toxic appearance. She does not appear ill. No distress.   HENT:   Head: Normocephalic and atraumatic.   Ears:   Right Ear: Hearing, tympanic membrane, external ear and ear canal normal.   Left Ear: Hearing, tympanic membrane, external ear and ear canal normal.   Nose: No mucosal edema, rhinorrhea or nasal deformity. No epistaxis. Right sinus exhibits maxillary sinus tenderness and frontal sinus tenderness. Left sinus exhibits maxillary sinus tenderness and frontal sinus tenderness.   Mouth/Throat: Uvula is midline, oropharynx is clear and moist and mucous membranes are normal. No trismus in the jaw. Normal dentition. No uvula swelling. Cobblestoning present. No oropharyngeal exudate, posterior oropharyngeal edema or posterior oropharyngeal erythema.   Eyes: Conjunctivae and lids are normal. No scleral icterus.   Neck: Trachea normal, full passive range of motion without pain and phonation normal. Neck supple. No neck rigidity. No edema and no erythema present.   Cardiovascular: Normal rate, regular rhythm and normal pulses.   Murmur heard.   Systolic murmur is present.     Comments: L forearm AV fistula noted   Pulmonary/Chest: Effort normal and breath sounds normal. No accessory muscle usage or stridor. No respiratory distress. She has no decreased breath sounds. She has no wheezes. She has no rhonchi. She has no rales.   Abdominal: Normal appearance.   Musculoskeletal: Normal range of motion.         General: No deformity.      Right lower leg: No edema.      Left  lower leg: No edema.   Lymphadenopathy:        Head (right side): Tonsillar adenopathy present. No submental, no submandibular, no preauricular and no posterior auricular adenopathy present.        Head (left side): Tonsillar adenopathy present. No submental, no submandibular, no preauricular and no posterior auricular adenopathy present.   Neurological: She is alert and oriented to person, place, and time. She exhibits normal muscle tone. Coordination normal.   Skin: Skin is warm, dry, intact, not diaphoretic and not pale. Psychiatric: Her speech is normal and behavior is normal. Judgment and thought content normal.   Nursing note and vitals reviewed.    Results for orders placed or performed in visit on 11/09/20   POCT COVID-19 Rapid Screening   Result Value Ref Range    POC Rapid COVID Negative Negative     Acceptable Yes      *Note: Due to a large number of results and/or encounters for the requested time period, some results have not been displayed. A complete set of results can be found in Results Review.       Creatinine clearance reviewed, Augmentin dosed based on patient being dialyzed multiple times per week.      Assessment:       1. Acute bacterial sinusitis    2. Heart murmur, systolic        Plan:         Acute bacterial sinusitis  -     POCT COVID-19 Rapid Screening  -     amoxicillin-clavulanate 875-125mg (AUGMENTIN) 875-125 mg per tablet; Take 1 tablet by mouth every 12 (twelve) hours. for 7 days  Dispense: 14 tablet; Refill: 0  -     Discussed home care and OTC medications for symptomatic relief  -     Discussed f/u if symptoms persist or worsen    Heart murmur, systolic  -     Ambulatory referral/consult to Cardiology         Patient Instructions       Please follow up with your Primary care provider within 2-5 days if your signs and symptoms have not resolved or worsen.     Follow up with cardiology for heart murmur.    If your condition worsens or fails to improve we recommend  that you receive another evaluation at the emergency room immediately or contact your primary medical clinic to discuss your concerns.   You must understand that you have received an Urgent Care treatment only and that you may be released before all of your medical problems are known or treated. You, the patient, will arrange for follow up care as instructed.     Acute Bacterial Rhinosinusitis (ABRS)    Acute bacterial rhinosinusitis (ABRS) is an infection of your nasal cavity and sinuses. Its caused by bacteria. Acute means that youve had symptoms for less than 12 weeks.  Understanding your sinuses  The nasal cavity is the large air-filled space behind your nose. The sinuses are a group of spaces formed by the bones of your face. They connect with your nasal cavity. ABRS causes the tissue lining these spaces to become inflamed. Mucus may not drain normally. This leads to facial pain and other symptoms.  What causes ABRS?  ABRS most often follows an upper respiratory infection caused by a virus. Bacteria then infect the lining of your nasal cavity and sinuses. But you can also get ABRS if you have:  · Nasal allergies  · Long-term nasal swelling and congestion not caused by allergies  · Blockage in the nose  Symptoms of ABRS  The symptoms of ABRS may be different for each person, and can include:  · Nasal congestion  · Runny nose  · Fluid draining from the nose down the throat (postnasal drip)  · Headache  · Cough  · Pain in the sinuses  · Thick, colored fluid from the nose (mucus)  · Fever  Diagnosing ABRS  ABRS may be diagnosed if youve had an upper respiratory infection like a cold and cough for longer than 10 to 14 days. Your health care provider will ask about your symptoms and your medical history. The provider will check your vital signs, including your temperature. Youll have a physical exam. The health care provider will check your ears, nose, and throat. You likely wont need any tests. If ABRS comes  back, you may have a culture or other tests.  Treatment for ABRS  Treatment may include:  · Antibiotic medicine. This is for symptoms that last for at least 10 to 14 days.  · Nasal corticosteroid medicine. Drops or spray used in the nose can lessen swelling and congestion.  · Over-the-counter pain medicine. This is to lessen sinus pain and pressure.  · Nasal decongestant medicine. Spray or drops may help to lessen congestion. Do not use them for more than a few days.  · Salt wash (saline irrigation). This can help to loosen mucus.  Possible complications of ABRS  ABRS may come back or become long-term (chronic).  In rare cases, ABRS may cause complications such as:   · Inflamed tissue around the brain and spinal cord (meningitis)  · Inflamed tissue around the eyes (orbital cellulitis)  · Inflamed bones around the sinuses (osteitis)  These problems may need to be treated in a hospital with intravenous (IV) antibiotic medicine or surgery.  When to call the health care provider  Call your health care provider if you have any of the following:  · Symptoms that dont get better, or get worse  · Symptoms that dont get better after 3 to 5 days on antibiotics  · Trouble seeing  · Swelling around your eyes  · Confusion or trouble staying awake   Date Last Reviewed: 3/3/2015  © 3534-8580 [employer]. [address] [place] [address], [place] [address]. All rights reserved. This information is not intended as a substitute for professional medical care. Always follow your healthcare professional's instructions.        Understanding a Heart Murmur    The heart makes sounds as it beats. These sounds occur as the heart valves open and close to allow blood to flow through the heart. A heart murmur is an extra noise heard during a heartbeat. The noise is caused when blood does not flow smoothly through the heart. Heart murmurs can be innocent (harmless) or abnormal (caused by a heart problem).  What causes a heart murmur?  An  innocent heart murmur can be a normal finding for many people. It may also be caused by:  · Fever  · Exercise  · Pregnancy  · Anemia  · Overactive thyroid gland  An abnormal heart murmur can be caused by heart problems such as:  · A damaged or diseased valve. The valve may be too narrow for blood to flow through easily. Or it may have problems opening or closing, and may leak blood backward.  · A hole in the heart (septal defect). This is a problem with the hearts structure that a person is born with (congenital). It causes blood to leak through the wall that normally divides the left and right sides of the heart.  What are the symptoms of a heart murmur?  Heart murmurs do not usually cause symptoms. They tend to be found when your healthcare provider is listening to your heart for another reason. People with an abnormal heart murmur may have symptoms of the problem causing the murmur. Symptoms can include:  · Feeling weak or tired  · Shortness of breath, especially with exercise  · Chest pain  · Fast, pounding, or skipping heartbeat  · Swollen ankles, feet, abdomen  · Feeling dizzy or faint  · Poor feeding and failing to grow normally (babies only)  How is a heart murmur treated?  An innocent heart murmur does not usually need treatment unless there is a clear cause, such as anemia. In such cases, treating the underlying cause should cure the murmur. In some cases, an innocent heart murmur may go away on its own.  Treatment for an abnormal heart murmur depends on the cause. Options may include:  · Medicines to help relieve symptoms  · Procedures or surgery to fix or replace a diseased or damaged heart valve  · Procedures or surgery to fix a hole in the heart  What are the complications of a heart murmur?  An innocent heart murmur has no complications. Complications of an abnormal heart murmur will vary depending on the cause. Possible complications include:  · Heart failure. This problem occurs when the heart is  so weak it no longer pumps blood well.  · Infection of the hearts valves or inner lining (infective endocarditis)  · Blood clots and stroke  · Fainting  · Heart attack  · Sudden cardiac arrest. This problem occurs when the heart suddenly stops beating.  When should I call my healthcare provider?  Call your healthcare provider right away if you have any of these:  · Chest pain  · Shortness of breath  · Fever of 100.4°F (38°C) or higher, or as directed  · Symptoms that dont get better with treatment, or symptoms that get worse  · New symptoms   Date Last Reviewed: 5/1/2016  © 3549-6094 [employer]. [address] [place] [address], [place] [address]. All rights reserved. This information is not intended as a substitute for professional medical care. Always follow your healthcare professional's instructions.

## 2020-11-10 ENCOUNTER — LAB VISIT (OUTPATIENT)
Dept: LAB | Facility: HOSPITAL | Age: 55
End: 2020-11-10
Payer: MEDICARE

## 2020-11-10 DIAGNOSIS — Z76.82 PRE-KIDNEY TRANSPLANT, LISTED: ICD-10-CM

## 2020-11-11 ENCOUNTER — OFFICE VISIT (OUTPATIENT)
Dept: HEMATOLOGY/ONCOLOGY | Facility: CLINIC | Age: 55
End: 2020-11-11
Payer: MEDICARE

## 2020-11-11 ENCOUNTER — OFFICE VISIT (OUTPATIENT)
Dept: CARDIOLOGY | Facility: CLINIC | Age: 55
End: 2020-11-11
Payer: MEDICARE

## 2020-11-11 VITALS
OXYGEN SATURATION: 100 % | HEART RATE: 67 BPM | SYSTOLIC BLOOD PRESSURE: 160 MMHG | WEIGHT: 127 LBS | DIASTOLIC BLOOD PRESSURE: 94 MMHG | BODY MASS INDEX: 18.48 KG/M2

## 2020-11-11 VITALS
HEART RATE: 72 BPM | HEIGHT: 70 IN | TEMPERATURE: 98 F | SYSTOLIC BLOOD PRESSURE: 158 MMHG | OXYGEN SATURATION: 99 % | WEIGHT: 124.56 LBS | BODY MASS INDEX: 17.83 KG/M2 | DIASTOLIC BLOOD PRESSURE: 67 MMHG

## 2020-11-11 DIAGNOSIS — J96.01 ACUTE RESPIRATORY FAILURE WITH HYPOXIA: ICD-10-CM

## 2020-11-11 DIAGNOSIS — C90.00 MULTIPLE MYELOMA, REMISSION STATUS UNSPECIFIED: Primary | ICD-10-CM

## 2020-11-11 DIAGNOSIS — Z99.2 ESRD (END STAGE RENAL DISEASE) ON DIALYSIS: ICD-10-CM

## 2020-11-11 DIAGNOSIS — R01.1 MURMUR, CARDIAC: ICD-10-CM

## 2020-11-11 DIAGNOSIS — D63.1 ANEMIA IN CHRONIC KIDNEY DISEASE, ON CHRONIC DIALYSIS: ICD-10-CM

## 2020-11-11 DIAGNOSIS — Z99.2 ANEMIA IN CHRONIC KIDNEY DISEASE, ON CHRONIC DIALYSIS: ICD-10-CM

## 2020-11-11 DIAGNOSIS — N18.6 ESRD (END STAGE RENAL DISEASE) ON DIALYSIS: ICD-10-CM

## 2020-11-11 DIAGNOSIS — I10 ESSENTIAL HYPERTENSION: Primary | ICD-10-CM

## 2020-11-11 DIAGNOSIS — N18.6 ANEMIA IN CHRONIC KIDNEY DISEASE, ON CHRONIC DIALYSIS: ICD-10-CM

## 2020-11-11 PROCEDURE — 99999 PR PBB SHADOW E&M-EST. PATIENT-LVL IV: ICD-10-PCS | Mod: PBBFAC,,, | Performed by: INTERNAL MEDICINE

## 2020-11-11 PROCEDURE — 99999 PR PBB SHADOW E&M-EST. PATIENT-LVL IV: CPT | Mod: PBBFAC,,, | Performed by: INTERNAL MEDICINE

## 2020-11-11 PROCEDURE — 99214 OFFICE O/P EST MOD 30 MIN: CPT | Mod: S$PBB,,, | Performed by: INTERNAL MEDICINE

## 2020-11-11 PROCEDURE — 99999 PR PBB SHADOW E&M-EST. PATIENT-LVL IV: ICD-10-PCS | Mod: PBBFAC,TXP,, | Performed by: INTERNAL MEDICINE

## 2020-11-11 PROCEDURE — 99204 OFFICE O/P NEW MOD 45 MIN: CPT | Mod: S$PBB,TXP,, | Performed by: INTERNAL MEDICINE

## 2020-11-11 PROCEDURE — 99214 OFFICE O/P EST MOD 30 MIN: CPT | Mod: PBBFAC | Performed by: INTERNAL MEDICINE

## 2020-11-11 PROCEDURE — 99204 PR OFFICE/OUTPT VISIT, NEW, LEVL IV, 45-59 MIN: ICD-10-PCS | Mod: S$PBB,TXP,, | Performed by: INTERNAL MEDICINE

## 2020-11-11 PROCEDURE — 99214 PR OFFICE/OUTPT VISIT, EST, LEVL IV, 30-39 MIN: ICD-10-PCS | Mod: S$PBB,,, | Performed by: INTERNAL MEDICINE

## 2020-11-11 PROCEDURE — 99999 PR PBB SHADOW E&M-EST. PATIENT-LVL IV: CPT | Mod: PBBFAC,TXP,, | Performed by: INTERNAL MEDICINE

## 2020-11-11 PROCEDURE — 99214 OFFICE O/P EST MOD 30 MIN: CPT | Mod: PBBFAC,27,PO,NTX | Performed by: INTERNAL MEDICINE

## 2020-11-11 RX ORDER — NIFEDIPINE 60 MG/1
60 TABLET, EXTENDED RELEASE ORAL DAILY
Qty: 30 TABLET | Refills: 11 | Status: SHIPPED | OUTPATIENT
Start: 2020-11-11 | End: 2022-09-27 | Stop reason: SDUPTHER

## 2020-11-11 NOTE — ASSESSMENT & PLAN NOTE
The patient's echoes have been reviewed.  Her valves appear anatomically normal.  She has mitral regurgitation and tricuspid regurgitation.  The ejection fraction is normal.  Her exam findings and clinical status are stable.    I would clear her cardiac wise for renal transplantation-upcoming evaluation.
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
## 2020-11-11 NOTE — LETTER
November 11, 2020      Dylan Sims, NP  [address]  [facility] [address]           [facility]  [address]  [facility] [address]  Phone: [phone]          Patient: Jennifer Booth   MR Number: [id number]   YOB: 1965   Date of Visit: 11/11/2020       Dear Dylan Sims:    Thank you for referring Jennifer Booth to me for evaluation. Attached you will find relevant portions of my assessment and plan of care.    If you have questions, please do not hesitate to call me. I look forward to following Jennifer Booth along with you.    Sincerely,    Gerardo Baez MD    Enclosure  CC:  No Recipients    If you would like to receive this communication electronically, please contact [email or web address] or [phone] to request more information on [employer] access.    For providers and/or their staff who would like to refer a patient to [facility], please contact us through our one-stop-shop provider referral line, [facility], at [phone].    If you feel you have received this communication in error or would no longer like to receive these types of communications, please e-mail [email or web address]

## 2020-11-11 NOTE — ASSESSMENT & PLAN NOTE
I have added Procardia XL 60 mg per day to her carvedilol for better blood pressure control.  I will see her back in 6 months.
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
## 2020-11-12 ENCOUNTER — TELEPHONE (OUTPATIENT)
Dept: TRANSPLANT | Facility: CLINIC | Age: 55
End: 2020-11-12

## 2020-11-12 NOTE — TELEPHONE ENCOUNTER
----- Message from Milagros Jade RN sent at 11/12/2020  2:28 PM CST -----  Regarding: FW: Transplant Status  Contact: Pt @ [phone]    ----- Message -----  From: Saige Rao  Sent: 11/12/2020   2:21 PM CST  To: Kidney Waitlisted Coordinator  Subject: Transplant Status                                Pt stating she needs to speak with Emelia in regards to transplant eligibility status. States she completed appts and paperwork needed and would like to have a update on how to proceed.    Callback: [phone]

## 2020-11-12 NOTE — TELEPHONE ENCOUNTER
Spoke to patient, she still has not called [facility] for intense outpatient rehab per the SW in Psych's recommendation from July. She will call to establish. She will also follow up with smoking cessation. Once cleared she will be re-evaluated for reactivation.

## 2020-11-17 ENCOUNTER — OFFICE VISIT (OUTPATIENT)
Dept: FAMILY MEDICINE | Facility: CLINIC | Age: 55
End: 2020-11-17
Attending: FAMILY MEDICINE
Payer: MEDICARE

## 2020-11-17 VITALS
DIASTOLIC BLOOD PRESSURE: 70 MMHG | HEIGHT: 69 IN | WEIGHT: 122.38 LBS | HEART RATE: 78 BPM | SYSTOLIC BLOOD PRESSURE: 139 MMHG | BODY MASS INDEX: 18.13 KG/M2

## 2020-11-17 DIAGNOSIS — J01.91 ACUTE RECURRENT SINUSITIS, UNSPECIFIED LOCATION: Primary | ICD-10-CM

## 2020-11-17 PROCEDURE — 99999 PR PBB SHADOW E&M-EST. PATIENT-LVL III: CPT | Mod: PBBFAC,,, | Performed by: FAMILY MEDICINE

## 2020-11-17 PROCEDURE — 99213 OFFICE O/P EST LOW 20 MIN: CPT | Mod: PBBFAC,PO | Performed by: FAMILY MEDICINE

## 2020-11-17 PROCEDURE — 99999 PR PBB SHADOW E&M-EST. PATIENT-LVL III: ICD-10-PCS | Mod: PBBFAC,,, | Performed by: FAMILY MEDICINE

## 2020-11-17 PROCEDURE — 99214 OFFICE O/P EST MOD 30 MIN: CPT | Mod: S$PBB,,, | Performed by: FAMILY MEDICINE

## 2020-11-17 PROCEDURE — 99214 PR OFFICE/OUTPT VISIT, EST, LEVL IV, 30-39 MIN: ICD-10-PCS | Mod: S$PBB,,, | Performed by: FAMILY MEDICINE

## 2020-11-17 RX ORDER — MONTELUKAST SODIUM 10 MG/1
10 TABLET ORAL NIGHTLY
Qty: 30 TABLET | Refills: 0 | Status: SHIPPED | OUTPATIENT
Start: 2020-11-17 | End: 2020-11-17

## 2020-11-17 NOTE — PATIENT INSTRUCTIONS

## 2020-11-17 NOTE — PROGRESS NOTES
Subjective:       Patient ID: Jennifer Booth is a 55 y.o. female.    Chief Complaint: Sinus Problem    55 yr old pleasant female with MM in remission, ESRD on HD, anxiety. Depression, and other co morbidities presents today for evaluation of sinus congestion. Onset 10 days ago and took antibiotics and medrol dose pack. She feels much better. She feels back to normal. She made this appointment already. Details as follows -        Sinus Problem  This is a recurrent problem. The current episode started in the past 7 days. The problem has been gradually improving since onset. There has been no fever. Her pain is at a severity of 0/10. She is experiencing no pain. Associated symptoms include congestion and sneezing. Pertinent negatives include no diaphoresis, headaches, shortness of breath or sore throat. Past treatments include antibiotics, saline sprays, oral decongestants and spray decongestants. The treatment provided significant relief.     Review of Systems   Constitutional: Negative.  Negative for activity change, diaphoresis and unexpected weight change.   HENT: Positive for nasal congestion and sneezing. Negative for ear discharge, hearing loss, rhinorrhea, sore throat and voice change.    Eyes: Negative.  Negative for pain, discharge and visual disturbance.   Respiratory: Negative.  Negative for chest tightness, shortness of breath and wheezing.    Cardiovascular: Negative.  Negative for chest pain.   Gastrointestinal: Negative.  Negative for abdominal distention, anal bleeding, constipation and nausea.   Endocrine: Negative.  Negative for cold intolerance, polydipsia and polyuria.   Genitourinary: Negative.  Negative for decreased urine volume, difficulty urinating, dysuria, frequency, menstrual problem and vaginal pain.   Musculoskeletal: Negative.  Negative for arthralgias, gait problem and myalgias.   Integumentary:  Negative for color change, pallor and wound. Negative.   Allergic/Immunologic: Negative.   Negative for environmental allergies and immunocompromised state.   Neurological: Negative.  Negative for dizziness, tremors, seizures, speech difficulty and headaches.   Hematological: Negative.  Negative for adenopathy. Does not bruise/bleed easily.   Psychiatric/Behavioral: Negative.  Negative for agitation, confusion, decreased concentration, hallucinations, self-injury and suicidal ideas. The patient is not nervous/anxious.      PMH/PSH/FH/SH/MED/ALLERGY reviewed        Objective:       Vitals:    11/17/20 1450   BP: 139/70   Pulse: 78       Physical Exam  Constitutional:       General: She is not in acute distress.     Appearance: She is well-developed. She is not diaphoretic.   HENT:      Head: Normocephalic and atraumatic.      Nose: Mucosal edema present.      Right Sinus: Maxillary sinus tenderness present.      Left Sinus: Maxillary sinus tenderness present.   Eyes:      Pupils: Pupils are equal, round, and reactive to light.   Neck:      Musculoskeletal: Normal range of motion and neck supple.   Cardiovascular:      Rate and Rhythm: Normal rate and regular rhythm.      Heart sounds: Normal heart sounds. No murmur. No friction rub. No gallop.    Pulmonary:      Effort: Pulmonary effort is normal. No respiratory distress.      Breath sounds: Normal breath sounds. No wheezing or rales.   Skin:     General: Skin is warm and dry.   Neurological:      Mental Status: She is alert and oriented to person, place, and time.         Assessment:       1. Acute recurrent sinusitis, unspecified location        Plan:           Jennifer was seen today for sinus problem.    Diagnoses and all orders for this visit:    Acute recurrent sinusitis, unspecified location  -     montelukast (SINGULAIR) 10 mg tablet; Take 1 tablet (10 mg total) by mouth every evening.      Ac sinusitis  -Advised saline irrigation, warm humidifier, steam inhalation, vicks vaporub, claritin D for congestion  -singulair daily if OTC meds fail    Spent  adequate time in obtaining history and explaining differentials    25 minutes spent during this visit of which greater than 50% devoted to face-face counseling and coordination of care regarding diagnosis and management plan      Follow up if symptoms worsen or fail to improve.

## 2020-11-18 LAB — HPRA INTERPRETATION: NORMAL

## 2020-12-07 ENCOUNTER — TELEPHONE (OUTPATIENT)
Dept: FAMILY MEDICINE | Facility: CLINIC | Age: 55
End: 2020-12-07

## 2020-12-07 NOTE — PROGRESS NOTES
Subjective:       Patient ID: Jennifer Booth is a 55 y.o. female.    Patient Active Problem List   Diagnosis    Constipation - functional    Multiple myeloma in remission    Renal hypertension    Tobacco abuse counseling    Cigarette nicotine dependence    ESRD (end stage renal disease) on dialysis    Glomerulosclerosis    Anemia in chronic kidney disease, on chronic dialysis    GERD without esophagitis    Patient on waiting list for kidney transplant    Anxiety and depression    Allergic rhinitis    Hyponatremia    Hypertension    Secondary hyperparathyroidism    ETOH abuse    Volume overload    History of substance use    Dialysis AV fistula malfunction, initial encounter    CKD (chronic kidney disease) stage 5, GFR less than 15 ml/min    Acute respiratory failure with hypoxia    Metabolic acidosis    Murmur, cardiac         Chief Complaint: Sinus Problem      This patient is new to me    Sinus problem    Patient reports that she has had onset of sinus symptoms for the better part of 1 month.  She reports she has chronic sinusitis.  She uses Allegra, Nasonex, a for her symptoms.  She was reports using the Nasonex now 2 to 3 times a day.  She states that over the last 4 weeks she has gone to the position 3 times on 3 separate occasions she was given once Singulair which now she is taking every day once steroid and once Augmentin.  The steroid in Augmentin did not completely resolve her symptoms.  She reports continued symptoms of headache and sinus pressure.  Significant drainage.  She reports chills sometimes at night.  She does have sweats occasionally but she attributes this to her menopausal symptoms.  Her last COVID swab was he in November.        HPI  Review of Systems   All other systems reviewed and are negative.        Objective:       BP (!) 160/90 (BP Location: Right arm, Patient Position: Sitting, BP Method: Medium (Manual))   Pulse 69   Temp 97.3 °F (36.3 °C) (Oral)   Resp 17  "  Ht 5' 8" (1.727 m)   Wt 54.6 kg (120 lb 5.9 oz)   LMP 05/25/2016   SpO2 98%   BMI 18.30 kg/m²     Physical Exam  Vitals signs and nursing note reviewed.   Constitutional:       General: She is not in acute distress.     Appearance: She is well-developed. She is not diaphoretic.   HENT:      Head: Normocephalic.      Nose: Nose normal.      Comments: Large and erythematous , inflammed turbinates  Eyes:      General:         Right eye: No discharge.         Left eye: No discharge.      Conjunctiva/sclera: Conjunctivae normal.      Pupils: Pupils are equal, round, and reactive to light.   Neck:      Musculoskeletal: Normal range of motion.   Cardiovascular:      Rate and Rhythm: Normal rate and regular rhythm.      Heart sounds: Normal heart sounds. No murmur. No friction rub. No gallop.    Pulmonary:      Effort: Pulmonary effort is normal. No respiratory distress.   Abdominal:      General: Bowel sounds are normal. There is no distension.      Palpations: Abdomen is soft.   Musculoskeletal: Normal range of motion.         General: No deformity.   Skin:     General: Skin is warm.   Neurological:      Mental Status: She is alert and oriented to person, place, and time.      Cranial Nerves: No cranial nerve deficit.         Assessment:       1. Suspected COVID-19 virus infection    2. Acute bacterial rhinosinusitis    3. Problems with smell and taste    4. Chronic frontal sinusitis        Plan:         Jennifer was seen today for sinus problem.    Diagnoses and all orders for this visit:    Suspected COVID-19 virus infection  Signs, symptoms consistent with ABRS complicating chronic rhinosintusitis  history or pyhsical exam does not suggest bacterial infection such as PNA  Given the current outbreak and patient symptoms recommended COVID testing  Enrolled in home monitoring program   I provided instruction on supportive care measures   reviewed signs and symptoms that should prompt return to provider or evaluation " in the ED    Acute bacterial rhinosinusitis  -     levoFLOXacin (LEVAQUIN) 750 MG tablet; Take 1 tablet (750 mg total) by mouth once daily. for 7 days  -     Ambulatory referral/consult to ENT; Future    Problems with smell and taste  -     COVID-19 Routine Screening  -     Ambulatory referral/consult to ENT; Future    Chronic frontal sinusitis  -     Ambulatory referral/consult to ENT; Future            Future Appointments   Date Time Provider Department Center   2/10/2021 11:00 AM LAB, SAME DAY [facility] LABDRAW [facility]   2/11/2021  1:00 PM Leilani Shane MD [facility] HEM ONC [facility]         Medication List with Changes/Refills   Current Medications    ASPIRIN 81 MG CHEW    Chew and swallow 1 tablet (81 mg total) by mouth once daily.    B COMPLEX W-C NO.20/FOLIC ACID (VIRT-CAPS ORAL)    Take 1 capsule by mouth once daily.     CARVEDILOL (COREG) 12.5 MG TABLET    Take 1 tablet (12.5 mg total) by mouth 2 (two) times daily.    CARVEDILOL (COREG) 12.5 MG TABLET    Take 12.5 mg by mouth 2 (two) times daily.    CHOLECALCIFEROL, VITAMIN D3, (VITAMIN D3) 400 UNIT CAP    Take 2,000 Units by mouth once daily.     CINACALCET (SENSIPAR) 30 MG TAB    Take 30 mg by mouth.    CLOPIDOGREL (PLAVIX) 75 MG TABLET    Take 1 tablet (75 mg total) by mouth once daily.    ESCITALOPRAM OXALATE (LEXAPRO) 20 MG TABLET    TAKE 1 TABLET BY MOUTH DAILY    FEXOFENADINE (ALLEGRA) 180 MG TABLET    Take 180 mg by mouth.    GUAIFENESIN (MUCINEX) 600 MG 12 HR TABLET    Take 1,200 mg by mouth 2 (two) times daily.    IRON ASPGLY,NY-O-W36-FA-CA--60-25-1 MG-MG-MCG-MG CAP    Take 1 capsule by mouth.    MOMETASONE (NASONEX) 50 MCG/ACTUATION NASAL SPRAY    SHAKE LQ AND U 1 SPR IEN D    MONTELUKAST (SINGULAIR) 10 MG TABLET    TAKE 1 TABLET(10 MG) BY MOUTH EVERY EVENING    NICOTINE (NICODERM CQ) 21 MG/24 HR    Place 1 patch onto the skin once daily.    NICOTINE POLACRILEX 2 MG LOZG    Take 1-2 lozenges per hour as needed in place of a  cigarette.    NICOTINE, POLACRILEX, (NICORETTE) 2 MG GUM    Take 1-2 pieces by mouth per hour as needed in place of a cigarette.    NIFEDIPINE (PROCARDIA-XL) 60 MG (OSM) 24 HR TABLET    Take 1 tablet (60 mg total) by mouth once daily.    OMEPRAZOLE (PRILOSEC) 40 MG CAPSULE    TK ONE C PO D    SUCROFERRIC OXYHYDROXIDE (VELPHORO) 500 MG CHEW    Take 500 mg by mouth 3 (three) times daily.    TRAMADOL (ULTRAM) 50 MG TABLET    Take 1 tablet (50 mg total) by mouth every 8 (eight) hours as needed for Pain.         Disclaimer:  This note has been generated using voice-recognition software. There may be grammatical or spelling errors that have been missed during proof-reading

## 2020-12-07 NOTE — TELEPHONE ENCOUNTER
Spoke to pt and stated she was seen by Urgent Care and was prescribed antibiotics for Sinus Infection. Pt was seen by Dr. Sanchez and given Singulair, but she is requesting antibiotics and steroid. Pt was informed that she will have to come in for an appt. Pt was scheduled for an appt.

## 2020-12-07 NOTE — TELEPHONE ENCOUNTER
----- Message from Cordell Monique sent at 12/7/2020  2:39 PM CST -----  Type:  Needs Medical Advice    Who Called: patient  Symptoms (please be specific): blowing out green mucos  How long has patient had these symptoms:  a month  Pharmacy name and phone #:  [facility] [phone]  Would the patient rather a call back or a response via [facility]? call  Best Call Back Number: [phone]  Additional Information: She is a requesting a medrol dose pack and a refill of the sinus med she was previously taking.

## 2020-12-09 ENCOUNTER — OFFICE VISIT (OUTPATIENT)
Dept: INTERNAL MEDICINE | Facility: CLINIC | Age: 55
End: 2020-12-09
Payer: MEDICARE

## 2020-12-09 VITALS
DIASTOLIC BLOOD PRESSURE: 90 MMHG | HEIGHT: 68 IN | RESPIRATION RATE: 17 BRPM | HEART RATE: 69 BPM | BODY MASS INDEX: 18.24 KG/M2 | TEMPERATURE: 97 F | OXYGEN SATURATION: 98 % | WEIGHT: 120.38 LBS | SYSTOLIC BLOOD PRESSURE: 160 MMHG

## 2020-12-09 DIAGNOSIS — R43.9 PROBLEMS WITH SMELL AND TASTE: ICD-10-CM

## 2020-12-09 DIAGNOSIS — B96.89 ACUTE BACTERIAL RHINOSINUSITIS: ICD-10-CM

## 2020-12-09 DIAGNOSIS — J32.1 CHRONIC FRONTAL SINUSITIS: ICD-10-CM

## 2020-12-09 DIAGNOSIS — J01.90 ACUTE BACTERIAL RHINOSINUSITIS: ICD-10-CM

## 2020-12-09 DIAGNOSIS — Z20.822 SUSPECTED COVID-19 VIRUS INFECTION: Primary | ICD-10-CM

## 2020-12-09 PROCEDURE — 99214 PR OFFICE/OUTPT VISIT, EST, LEVL IV, 30-39 MIN: ICD-10-PCS | Mod: S$PBB,,, | Performed by: INTERNAL MEDICINE

## 2020-12-09 PROCEDURE — 99999 PR PBB SHADOW E&M-EST. PATIENT-LVL V: ICD-10-PCS | Mod: PBBFAC,,, | Performed by: INTERNAL MEDICINE

## 2020-12-09 PROCEDURE — 99214 OFFICE O/P EST MOD 30 MIN: CPT | Mod: S$PBB,,, | Performed by: INTERNAL MEDICINE

## 2020-12-09 PROCEDURE — 99215 OFFICE O/P EST HI 40 MIN: CPT | Mod: PBBFAC,PO | Performed by: INTERNAL MEDICINE

## 2020-12-09 PROCEDURE — U0003 INFECTIOUS AGENT DETECTION BY NUCLEIC ACID (DNA OR RNA); SEVERE ACUTE RESPIRATORY SYNDROME CORONAVIRUS 2 (SARS-COV-2) (CORONAVIRUS DISEASE [COVID-19]), AMPLIFIED PROBE TECHNIQUE, MAKING USE OF HIGH THROUGHPUT TECHNOLOGIES AS DESCRIBED BY CMS-2020-01-R: HCPCS

## 2020-12-09 PROCEDURE — 99999 PR PBB SHADOW E&M-EST. PATIENT-LVL V: CPT | Mod: PBBFAC,,, | Performed by: INTERNAL MEDICINE

## 2020-12-09 RX ORDER — LEVOFLOXACIN 750 MG/1
750 TABLET ORAL DAILY
Qty: 7 TABLET | Refills: 0 | Status: SHIPPED | OUTPATIENT
Start: 2020-12-09 | End: 2020-12-16

## 2020-12-09 NOTE — PATIENT INSTRUCTIONS
We were happy to see you today          For your Medication   We sent a prescription to your pharamcy for the sinusitis'  For more information about side effects please visit medlineplus.gov      For your Referrals  ENT           Please return to clinic in        Nasal Allergy Medicines  The table below lists the most common over-the-counter (OTC) medicines for nasal allergies. Some are pills. Some are liquids. And, some are nasal sprays. It's important to check with your healthcare provider or pharmacist before taking these medicines, even though they are available without a prescription. And, be sure to follow the instructions on the package labels.  Type of medicine Description of medicine   Antihistamines  Take ONE tab once a day  Claritin  Allegra  Xyzal  Zyrtec    Azelastin   This is a nasal spray that can help   Use one spray in each nostril daily during symptoms     · Stops the release of histamine, a substance in the body that causes many allergy symptoms.  · Helps prevent sneezing, runny nose, and itchy and watery eyes.   Corticosteroids  Use ONE spray in EACH nostril ONCE a day. For persistent symptoms increase to TWICE a day for 3-4 weeks  Flonase  Nasocort    · Reduces inflammation and swelling.  · Relieves itching and sneezing.   Decongestants  Afrin Nasal Spray - only use this for 1-2 days in a row       · Reduce swelling of nasal passages and relieve sinus pressure  · Overuse can worsen symptoms.   Mast cell inhibitors  Singluar  · Also help prevent cells from releasing histamine  · Prevent and relieve sneezing, itchiness, and runny nose.   Anticholinergics · Decrease mucus production in the nasal passages.  · Relieves runny nose.   Saline sprays, rinses, and gels · Provide lubrication or moisture to nasal passages. These can be used as often as needed.  · Help soothe irritated nasal passages. Loosens thick mucus.   NOTE: Talk to your healthcare provider or pharmacist about the possible side effects  and drug or food interactions of any medicine you take.   How to use nasal spray  Nasal sprays must be used the right way to be effective. Be sure to do the following:  · Blow your nose to clear your nostrils.  · Gently shake the bottle. Then remove the cap.  · With your right hand, carefully insert the tip of the bottle into your left nostril. Make sure to point the tip toward your ear and not the center of the nose.  · While gently breathing in through your nose, press down once on the pump to release the spray.  · Breathe out through your mouth.  · With your left hand, repeat the steps for your right nostril.  Date Last Reviewed: 9/1/2016  © 2820-9283 [employer]. [address] [place] [address], [place] [address]. All rights reserved. This information is not intended as a substitute for professional medical care. Always follow your healthcare professional's instructions.        Instructions for Patients with Confirmed or Suspected COVID-19    If you are awaiting your test result, you will either be called or it will be released to the patient portal.  If you have any questions about your test, please visit [email or web address] or call our COVID-19 information line at [phone].      Instructions for non-hospitalized or discharged patients with confirmed or suspected COVID-19:       Stay home except to get medical care.    Separate yourself from other people and animals in your home.    Call ahead before visiting your doctor.    Wear a face mask.    Cover your coughs and sneezes.    Clean your hands often.    Avoid sharing personal household items.    Clean all high-touch surfaces every day.    Monitor your symptoms. Seek prompt medical attention if your illness is worsening (e.g., difficulty breathing). Before seeking care, call your healthcare provider.    If you have a medical emergency and must call 911, notify the dispatcher that you have or are being evaluated for COVID-19. If  possible, put on a face mask before emergency medical services arrive.    Use the following symptom-based strategy to return to normal activity following a suspected or confirmed case of COVID-19. Continue isolation until:   o At least 3 days (72 hours) have passed since recovery defined as resolution of fever without the use of fever-reducing medications and improvement in respiratory symptoms (e.g. cough, shortness of breath), and   o At least 10 days have passed since the first positive test.       As one of the next steps, you will receive a call or text from the [place] Department of Health ([facility]) COVID-19 Tracing Team. See the contact information below so you know not to ignore the health departments call. It is important that you contact them back immediately so they can help.     Contact Tracer Number:  [phone]  Caller ID for most carriers: [facility]    What is contact tracing?   Contact tracing is a process that helps identify everyone who has been in close contact with an infected person. Contact tracers let those people know they may have been exposed and guide them on next steps. Confidentiality is important for everyone; no one will be told who may have exposed them to the virus.   Your involvement is important. The more we know about where and how this virus is spreading, the better chance we have at stopping it from spreading further.  What does exposure mean?   Exposure means you have been within 6 feet for more than 15 minutes with a person who has or had COVID-19.  What kind of questions do the contact tracers ask?   A contact tracer will confirm your basic contact information including name, address, phone number, and next of kin, as well as asking about any symptoms you may have had. Theyll also ask you how you think you may have gotten sick, such as places where you may have been exposed to the virus, and people you were with. Those names will never be shared with anyone  outside of that call, and will only be used to help trace and stop the spread of the virus.   I have privacy concerns. How will the state use my information?   Your privacy about your health is important. All calls are completed using call centers that use the appropriate health privacy protection measures (HIPAA compliance), meaning that your patient information is safe. No one will ever ask you any questions related to immigration status. Your health comes first.   Do I have to participate?   You do not have to participate, but we strongly encourage you to. Contact tracing can help us catch and control new outbreaks as theyre developing to keep your friends and family safe.   What if I dont hear from anyone?   If you dont receive a call within 24 hours, you can call the number above right away to inquire about your status. That line is open from 8:00 am - 8:00 p.m., 7 days a week.  Contact tracing saves lives! Together, we have the power to beat this virus and keep our loved ones and neighbors safe.       Instructions for household members, intimate partners and caregivers in a non-healthcare setting of a patient with confirmed or suspected COVID-19:         Close contacts should monitor their health and call their healthcare provider right away if they develop symptoms suggestive of COVID-19 (e.g., fever, cough, shortness of breath).    Stay home except to get medical care. Separate yourself from other people and animals in the home.   Monitor the patients symptoms. If the patient is getting sicker, call his or her healthcare provider. If the patient has a medical emergency and you need to call 911, notify the dispatch personnel that the patient has or is being evaluated for COVID-19.    Wear a facemask when around other people such as sharing a room or vehicle and before entering a healthcare provider's office.   Cover coughs and sneezes with a tissue. Throw used tissues in a lined trash can  immediately and wash hands.   Clean hands often with soap and water for at least 20 seconds or with an alcohol-based hand , rubbing hands together until they feel dry. Avoid touching your eyes, nose, and mouth with unwashed hands.   Clean all high-touch; surfaces every day, including counters, tabletops, doorknobs, bathroom fixtures, toilets, phones, keyboards, tablets, bedside tables, etc. Use a household cleaning spray or wipe according to label instructions.   Avoid sharing personal household items such as dishes, drinking glasses, cups, towels, bedding, etc. After these items are used, they should be washed thoroughly with soap and water.   Continue isolation until:   At least 3 days (72 hours) have passed since recovery defined as resolution of fever without the use of fever-reducing medications and improvement in respiratory symptoms (e.g. cough, shortness of breath), and    At least 10 days have passed since the patients first positive test.    https://www.cdc.gov/coronavirus/2019-ncov/your-health/index.htm

## 2020-12-10 LAB — SARS-COV-2 RNA RESP QL NAA+PROBE: NOT DETECTED

## 2020-12-11 ENCOUNTER — TELEPHONE (OUTPATIENT)
Dept: ADMINISTRATIVE | Facility: OTHER | Age: 55
End: 2020-12-11

## 2020-12-18 ENCOUNTER — PATIENT OUTREACH (OUTPATIENT)
Dept: ADMINISTRATIVE | Facility: OTHER | Age: 55
End: 2020-12-18

## 2020-12-18 NOTE — PROGRESS NOTES
LINKS immunization registry not responding  Care Everywhere updated  Health Maintenance updated  Chart reviewed for overdue [facility] ([facility]) health maintenance testing (CRS, Breast Ca, Diabetic Eye Exam)   Orders entered:N/A

## 2021-01-04 ENCOUNTER — OFFICE VISIT (OUTPATIENT)
Dept: OTOLARYNGOLOGY | Facility: CLINIC | Age: 56
End: 2021-01-04
Payer: COMMERCIAL

## 2021-01-04 VITALS — SYSTOLIC BLOOD PRESSURE: 133 MMHG | DIASTOLIC BLOOD PRESSURE: 85 MMHG | HEART RATE: 59 BPM

## 2021-01-04 DIAGNOSIS — J34.3 NASAL TURBINATE HYPERTROPHY: ICD-10-CM

## 2021-01-04 DIAGNOSIS — J34.2 NASAL SEPTAL DEVIATION: ICD-10-CM

## 2021-01-04 DIAGNOSIS — J32.9 CHRONIC SINUSITIS, UNSPECIFIED LOCATION: Primary | ICD-10-CM

## 2021-01-04 DIAGNOSIS — H69.90 DYSFUNCTION OF EUSTACHIAN TUBE, UNSPECIFIED LATERALITY: ICD-10-CM

## 2021-01-04 PROCEDURE — 99999 PR PBB SHADOW E&M-EST. PATIENT-LVL IV: CPT | Mod: PBBFAC,,, | Performed by: NURSE PRACTITIONER

## 2021-01-04 PROCEDURE — 99214 PR OFFICE/OUTPT VISIT, EST, LEVL IV, 30-39 MIN: ICD-10-PCS | Mod: S$PBB,,, | Performed by: NURSE PRACTITIONER

## 2021-01-04 PROCEDURE — 99999 PR PBB SHADOW E&M-EST. PATIENT-LVL IV: ICD-10-PCS | Mod: PBBFAC,,, | Performed by: NURSE PRACTITIONER

## 2021-01-04 PROCEDURE — 99214 OFFICE O/P EST MOD 30 MIN: CPT | Mod: S$PBB,,, | Performed by: NURSE PRACTITIONER

## 2021-01-04 RX ORDER — FLUTICASONE PROPIONATE 50 MCG
2 SPRAY, SUSPENSION (ML) NASAL DAILY
Qty: 11.1 ML | Refills: 2 | Status: SHIPPED | OUTPATIENT
Start: 2021-01-04 | End: 2021-04-04

## 2021-01-08 ENCOUNTER — PATIENT MESSAGE (OUTPATIENT)
Dept: TRANSPLANT | Facility: CLINIC | Age: 56
End: 2021-01-08

## 2021-01-22 ENCOUNTER — PATIENT MESSAGE (OUTPATIENT)
Dept: ADMINISTRATIVE | Facility: OTHER | Age: 56
End: 2021-01-22

## 2021-01-28 ENCOUNTER — PATIENT OUTREACH (OUTPATIENT)
Dept: ADMINISTRATIVE | Facility: OTHER | Age: 56
End: 2021-01-28

## 2021-02-10 ENCOUNTER — LAB VISIT (OUTPATIENT)
Dept: LAB | Facility: OTHER | Age: 56
End: 2021-02-10
Attending: INTERNAL MEDICINE
Payer: MEDICARE

## 2021-02-10 DIAGNOSIS — C90.00 MULTIPLE MYELOMA, REMISSION STATUS UNSPECIFIED: ICD-10-CM

## 2021-02-10 LAB
ALBUMIN SERPL BCP-MCNC: 3.8 G/DL (ref 3.5–5.2)
ALP SERPL-CCNC: 90 U/L (ref 55–135)
ALT SERPL W/O P-5'-P-CCNC: 10 U/L (ref 10–44)
ANION GAP SERPL CALC-SCNC: 16 MMOL/L (ref 8–16)
AST SERPL-CCNC: 13 U/L (ref 10–40)
B2 MICROGLOB SERPL-MCNC: 26.3 UG/ML (ref 0–2.5)
BASOPHILS # BLD AUTO: 0.04 K/UL (ref 0–0.2)
BASOPHILS NFR BLD: 0.9 % (ref 0–1.9)
BILIRUB SERPL-MCNC: 0.5 MG/DL (ref 0.1–1)
BUN SERPL-MCNC: 41 MG/DL (ref 6–20)
CALCIUM SERPL-MCNC: 10.4 MG/DL (ref 8.7–10.5)
CHLORIDE SERPL-SCNC: 85 MMOL/L (ref 95–110)
CO2 SERPL-SCNC: 30 MMOL/L (ref 23–29)
CREAT SERPL-MCNC: 5.8 MG/DL (ref 0.5–1.4)
DIFFERENTIAL METHOD: ABNORMAL
EOSINOPHIL # BLD AUTO: 0.1 K/UL (ref 0–0.5)
EOSINOPHIL NFR BLD: 3.1 % (ref 0–8)
ERYTHROCYTE [DISTWIDTH] IN BLOOD BY AUTOMATED COUNT: 19 % (ref 11.5–14.5)
EST. GFR  (AFRICAN AMERICAN): 9 ML/MIN/1.73 M^2
EST. GFR  (NON AFRICAN AMERICAN): 7 ML/MIN/1.73 M^2
GLUCOSE SERPL-MCNC: 96 MG/DL (ref 70–110)
HCT VFR BLD AUTO: 33.1 % (ref 37–48.5)
HGB BLD-MCNC: 10.6 G/DL (ref 12–16)
IMM GRANULOCYTES # BLD AUTO: 0.02 K/UL (ref 0–0.04)
IMM GRANULOCYTES NFR BLD AUTO: 0.4 % (ref 0–0.5)
LYMPHOCYTES # BLD AUTO: 1.2 K/UL (ref 1–4.8)
LYMPHOCYTES NFR BLD: 26.4 % (ref 18–48)
MCH RBC QN AUTO: 28.6 PG (ref 27–31)
MCHC RBC AUTO-ENTMCNC: 32 G/DL (ref 32–36)
MCV RBC AUTO: 90 FL (ref 82–98)
MONOCYTES # BLD AUTO: 0.4 K/UL (ref 0.3–1)
MONOCYTES NFR BLD: 9.7 % (ref 4–15)
NEUTROPHILS # BLD AUTO: 2.7 K/UL (ref 1.8–7.7)
NEUTROPHILS NFR BLD: 59.5 % (ref 38–73)
NRBC BLD-RTO: 0 /100 WBC
PLATELET # BLD AUTO: 151 K/UL (ref 150–350)
PMV BLD AUTO: 8.5 FL (ref 9.2–12.9)
POTASSIUM SERPL-SCNC: 5.5 MMOL/L (ref 3.5–5.1)
PROT SERPL-MCNC: 7.1 G/DL (ref 6–8.4)
RBC # BLD AUTO: 3.7 M/UL (ref 4–5.4)
SODIUM SERPL-SCNC: 131 MMOL/L (ref 136–145)
WBC # BLD AUTO: 4.55 K/UL (ref 3.9–12.7)

## 2021-02-10 PROCEDURE — 82232 ASSAY OF BETA-2 PROTEIN: CPT | Mod: NTX

## 2021-02-10 PROCEDURE — 80053 COMPREHEN METABOLIC PANEL: CPT | Mod: NTX

## 2021-02-10 PROCEDURE — 84165 PROTEIN E-PHORESIS SERUM: CPT | Mod: 26,NTX,, | Performed by: PATHOLOGY

## 2021-02-10 PROCEDURE — 86334 IMMUNOFIX E-PHORESIS SERUM: CPT | Mod: 26,NTX,, | Performed by: PATHOLOGY

## 2021-02-10 PROCEDURE — 85025 COMPLETE CBC W/AUTO DIFF WBC: CPT | Mod: NTX

## 2021-02-10 PROCEDURE — 84165 PROTEIN E-PHORESIS SERUM: CPT | Mod: TXP

## 2021-02-10 PROCEDURE — 84165 PATHOLOGIST INTERPRETATION SPE: ICD-10-PCS | Mod: 26,NTX,, | Performed by: PATHOLOGY

## 2021-02-10 PROCEDURE — 86334 IMMUNOFIX E-PHORESIS SERUM: CPT | Mod: TXP

## 2021-02-10 PROCEDURE — 36415 COLL VENOUS BLD VENIPUNCTURE: CPT | Mod: NTX

## 2021-02-10 PROCEDURE — 86334 PATHOLOGIST INTERPRETATION IFE: ICD-10-PCS | Mod: 26,NTX,, | Performed by: PATHOLOGY

## 2021-02-10 PROCEDURE — 83520 IMMUNOASSAY QUANT NOS NONAB: CPT | Mod: TXP

## 2021-02-11 ENCOUNTER — CLINICAL SUPPORT (OUTPATIENT)
Dept: SMOKING CESSATION | Facility: CLINIC | Age: 56
End: 2021-02-11
Payer: COMMERCIAL

## 2021-02-11 ENCOUNTER — LAB VISIT (OUTPATIENT)
Dept: LAB | Facility: HOSPITAL | Age: 56
End: 2021-02-11
Attending: INTERNAL MEDICINE
Payer: MEDICARE

## 2021-02-11 ENCOUNTER — OFFICE VISIT (OUTPATIENT)
Dept: HEMATOLOGY/ONCOLOGY | Facility: CLINIC | Age: 56
End: 2021-02-11
Payer: MEDICARE

## 2021-02-11 VITALS
SYSTOLIC BLOOD PRESSURE: 178 MMHG | DIASTOLIC BLOOD PRESSURE: 98 MMHG | WEIGHT: 118.38 LBS | BODY MASS INDEX: 17.94 KG/M2 | TEMPERATURE: 98 F | HEART RATE: 69 BPM | OXYGEN SATURATION: 100 % | HEIGHT: 68 IN

## 2021-02-11 DIAGNOSIS — N18.6 ESRD (END STAGE RENAL DISEASE) ON DIALYSIS: ICD-10-CM

## 2021-02-11 DIAGNOSIS — N18.6 ANEMIA IN CHRONIC KIDNEY DISEASE, ON CHRONIC DIALYSIS: ICD-10-CM

## 2021-02-11 DIAGNOSIS — E87.5 HYPERKALEMIA: ICD-10-CM

## 2021-02-11 DIAGNOSIS — F17.200 NICOTINE DEPENDENCE: Primary | ICD-10-CM

## 2021-02-11 DIAGNOSIS — Z99.2 ANEMIA IN CHRONIC KIDNEY DISEASE, ON CHRONIC DIALYSIS: ICD-10-CM

## 2021-02-11 DIAGNOSIS — Z99.2 ESRD (END STAGE RENAL DISEASE) ON DIALYSIS: ICD-10-CM

## 2021-02-11 DIAGNOSIS — D63.1 ANEMIA IN CHRONIC KIDNEY DISEASE, ON CHRONIC DIALYSIS: ICD-10-CM

## 2021-02-11 DIAGNOSIS — C90.00 MULTIPLE MYELOMA, REMISSION STATUS UNSPECIFIED: Primary | ICD-10-CM

## 2021-02-11 LAB
ALBUMIN SERPL ELPH-MCNC: 4.2 G/DL (ref 3.35–5.55)
ALPHA1 GLOB SERPL ELPH-MCNC: 0.29 G/DL (ref 0.17–0.41)
ALPHA2 GLOB SERPL ELPH-MCNC: 0.82 G/DL (ref 0.43–0.99)
B-GLOBULIN SERPL ELPH-MCNC: 0.69 G/DL (ref 0.5–1.1)
GAMMA GLOB SERPL ELPH-MCNC: 0.8 G/DL (ref 0.67–1.58)
INTERPRETATION SERPL IFE-IMP: NORMAL
KAPPA LC SER QL IA: 19.2 MG/DL (ref 0.33–1.94)
KAPPA LC/LAMBDA SER IA: 1.16 (ref 0.26–1.65)
LAMBDA LC SER QL IA: 16.6 MG/DL (ref 0.57–2.63)
PATHOLOGIST INTERPRETATION IFE: NORMAL
PATHOLOGIST INTERPRETATION SPE: NORMAL
POTASSIUM SERPL-SCNC: 5.2 MMOL/L (ref 3.5–5.1)
PROT SERPL-MCNC: 6.8 G/DL (ref 6–8.4)

## 2021-02-11 PROCEDURE — 99407 PR TOBACCO USE CESSATION INTENSIVE >10 MINUTES: ICD-10-PCS | Mod: NTX,S$GLB,, | Performed by: INTERNAL MEDICINE

## 2021-02-11 PROCEDURE — 84132 ASSAY OF SERUM POTASSIUM: CPT | Mod: NTX

## 2021-02-11 PROCEDURE — 36415 COLL VENOUS BLD VENIPUNCTURE: CPT | Mod: NTX

## 2021-02-11 PROCEDURE — 99407 BEHAV CHNG SMOKING > 10 MIN: CPT | Mod: NTX,S$GLB,, | Performed by: INTERNAL MEDICINE

## 2021-02-11 PROCEDURE — 99215 OFFICE O/P EST HI 40 MIN: CPT | Mod: PBBFAC | Performed by: INTERNAL MEDICINE

## 2021-02-11 PROCEDURE — 99999 PR PBB SHADOW E&M-EST. PATIENT-LVL V: CPT | Mod: PBBFAC,,, | Performed by: INTERNAL MEDICINE

## 2021-02-11 PROCEDURE — 99214 PR OFFICE/OUTPT VISIT, EST, LEVL IV, 30-39 MIN: ICD-10-PCS | Mod: S$PBB,,, | Performed by: INTERNAL MEDICINE

## 2021-02-11 PROCEDURE — 99214 OFFICE O/P EST MOD 30 MIN: CPT | Mod: S$PBB,,, | Performed by: INTERNAL MEDICINE

## 2021-02-11 PROCEDURE — 99999 PR PBB SHADOW E&M-EST. PATIENT-LVL V: ICD-10-PCS | Mod: PBBFAC,,, | Performed by: INTERNAL MEDICINE

## 2021-02-12 ENCOUNTER — IMMUNIZATION (OUTPATIENT)
Dept: INTERNAL MEDICINE | Facility: CLINIC | Age: 56
End: 2021-02-12
Payer: MEDICARE

## 2021-02-12 DIAGNOSIS — Z23 NEED FOR VACCINATION: Primary | ICD-10-CM

## 2021-02-12 PROCEDURE — 0011A COVID-19, MRNA, LNP-S, PF, 100 MCG/0.5 ML DOSE VACCINE: CPT | Mod: PBBFAC | Performed by: FAMILY MEDICINE

## 2021-03-12 ENCOUNTER — IMMUNIZATION (OUTPATIENT)
Dept: INTERNAL MEDICINE | Facility: CLINIC | Age: 56
End: 2021-03-12
Payer: COMMERCIAL

## 2021-03-12 DIAGNOSIS — Z23 NEED FOR VACCINATION: Primary | ICD-10-CM

## 2021-03-12 PROCEDURE — 0012A COVID-19, MRNA, LNP-S, PF, 100 MCG/0.5 ML DOSE VACCINE: CPT | Mod: CV19,S$GLB,, | Performed by: FAMILY MEDICINE

## 2021-03-12 PROCEDURE — 91301 COVID-19, MRNA, LNP-S, PF, 100 MCG/0.5 ML DOSE VACCINE: CPT | Mod: S$GLB,,, | Performed by: FAMILY MEDICINE

## 2021-03-12 PROCEDURE — 0012A COVID-19, MRNA, LNP-S, PF, 100 MCG/0.5 ML DOSE VACCINE: ICD-10-PCS | Mod: CV19,S$GLB,, | Performed by: FAMILY MEDICINE

## 2021-03-12 PROCEDURE — 91301 COVID-19, MRNA, LNP-S, PF, 100 MCG/0.5 ML DOSE VACCINE: ICD-10-PCS | Mod: S$GLB,,, | Performed by: FAMILY MEDICINE

## 2021-04-01 DIAGNOSIS — Z76.82 PRE-KIDNEY TRANSPLANT, LISTED: Primary | ICD-10-CM

## 2021-04-08 PROCEDURE — 99001 SPECIMEN HANDLING PT-LAB: CPT | Mod: PO,TXP | Performed by: NURSE PRACTITIONER

## 2021-04-12 ENCOUNTER — LAB VISIT (OUTPATIENT)
Dept: LAB | Facility: HOSPITAL | Age: 56
End: 2021-04-12
Payer: COMMERCIAL

## 2021-04-12 DIAGNOSIS — Z76.82 PRE-KIDNEY TRANSPLANT, LISTED: ICD-10-CM

## 2021-05-04 ENCOUNTER — EPISODE CHANGES (OUTPATIENT)
Dept: TRANSPLANT | Facility: CLINIC | Age: 56
End: 2021-05-04

## 2021-05-06 PROCEDURE — 86829 HLA CLASS I/II ANTIBODY QUAL: CPT | Mod: 91,PO,TXP | Performed by: NURSE PRACTITIONER

## 2021-05-06 PROCEDURE — 86829 HLA CLASS I/II ANTIBODY QUAL: CPT | Mod: PO,TXP | Performed by: NURSE PRACTITIONER

## 2021-05-07 ENCOUNTER — LAB VISIT (OUTPATIENT)
Dept: LAB | Facility: HOSPITAL | Age: 56
End: 2021-05-07
Attending: INTERNAL MEDICINE
Payer: MEDICARE

## 2021-05-07 ENCOUNTER — TELEPHONE (OUTPATIENT)
Dept: HEMATOLOGY/ONCOLOGY | Facility: CLINIC | Age: 56
End: 2021-05-07

## 2021-05-07 DIAGNOSIS — Z99.2 ESRD (END STAGE RENAL DISEASE) ON DIALYSIS: ICD-10-CM

## 2021-05-07 DIAGNOSIS — C90.00 MULTIPLE MYELOMA, REMISSION STATUS UNSPECIFIED: ICD-10-CM

## 2021-05-07 DIAGNOSIS — N18.6 ESRD (END STAGE RENAL DISEASE) ON DIALYSIS: ICD-10-CM

## 2021-05-07 LAB
ALBUMIN SERPL BCP-MCNC: 4.2 G/DL (ref 3.5–5.2)
ALP SERPL-CCNC: 175 U/L (ref 55–135)
ALT SERPL W/O P-5'-P-CCNC: 17 U/L (ref 10–44)
ANION GAP SERPL CALC-SCNC: 19 MMOL/L (ref 8–16)
AST SERPL-CCNC: 23 U/L (ref 10–40)
B2 MICROGLOB SERPL-MCNC: 31.2 UG/ML (ref 0–2.5)
BASOPHILS # BLD AUTO: 0.07 K/UL (ref 0–0.2)
BASOPHILS NFR BLD: 0.9 % (ref 0–1.9)
BILIRUB SERPL-MCNC: 0.4 MG/DL (ref 0.1–1)
BUN SERPL-MCNC: 39 MG/DL (ref 6–20)
CALCIUM SERPL-MCNC: 10.6 MG/DL (ref 8.7–10.5)
CHLORIDE SERPL-SCNC: 84 MMOL/L (ref 95–110)
CO2 SERPL-SCNC: 28 MMOL/L (ref 23–29)
CREAT SERPL-MCNC: 7 MG/DL (ref 0.5–1.4)
DIFFERENTIAL METHOD: ABNORMAL
EOSINOPHIL # BLD AUTO: 0.3 K/UL (ref 0–0.5)
EOSINOPHIL NFR BLD: 3.2 % (ref 0–8)
ERYTHROCYTE [DISTWIDTH] IN BLOOD BY AUTOMATED COUNT: 16 % (ref 11.5–14.5)
EST. GFR  (AFRICAN AMERICAN): 7 ML/MIN/1.73 M^2
EST. GFR  (NON AFRICAN AMERICAN): 6 ML/MIN/1.73 M^2
GLUCOSE SERPL-MCNC: 49 MG/DL (ref 70–110)
HCT VFR BLD AUTO: 41.2 % (ref 37–48.5)
HGB BLD-MCNC: 13.7 G/DL (ref 12–16)
IGA SERPL-MCNC: 292 MG/DL (ref 40–350)
IGG SERPL-MCNC: 1057 MG/DL (ref 650–1600)
IGM SERPL-MCNC: 231 MG/DL (ref 50–300)
IMM GRANULOCYTES # BLD AUTO: 0.03 K/UL (ref 0–0.04)
IMM GRANULOCYTES NFR BLD AUTO: 0.4 % (ref 0–0.5)
LYMPHOCYTES # BLD AUTO: 2.9 K/UL (ref 1–4.8)
LYMPHOCYTES NFR BLD: 35 % (ref 18–48)
MCH RBC QN AUTO: 28.6 PG (ref 27–31)
MCHC RBC AUTO-ENTMCNC: 33.3 G/DL (ref 32–36)
MCV RBC AUTO: 86 FL (ref 82–98)
MONOCYTES # BLD AUTO: 1.2 K/UL (ref 0.3–1)
MONOCYTES NFR BLD: 14.6 % (ref 4–15)
NEUTROPHILS # BLD AUTO: 3.8 K/UL (ref 1.8–7.7)
NEUTROPHILS NFR BLD: 45.9 % (ref 38–73)
NRBC BLD-RTO: 0 /100 WBC
PLATELET # BLD AUTO: 211 K/UL (ref 150–450)
PMV BLD AUTO: 9.4 FL (ref 9.2–12.9)
POTASSIUM SERPL-SCNC: 4.6 MMOL/L (ref 3.5–5.1)
PROT SERPL-MCNC: 8.9 G/DL (ref 6–8.4)
RBC # BLD AUTO: 4.79 M/UL (ref 4–5.4)
SODIUM SERPL-SCNC: 131 MMOL/L (ref 136–145)
WBC # BLD AUTO: 8.21 K/UL (ref 3.9–12.7)

## 2021-05-07 PROCEDURE — 84165 PROTEIN E-PHORESIS SERUM: CPT | Mod: 26,NTX,, | Performed by: PATHOLOGY

## 2021-05-07 PROCEDURE — 86334 PATHOLOGIST INTERPRETATION IFE: ICD-10-PCS | Mod: 26,NTX,, | Performed by: PATHOLOGY

## 2021-05-07 PROCEDURE — 85025 COMPLETE CBC W/AUTO DIFF WBC: CPT | Mod: NTX,AY | Performed by: INTERNAL MEDICINE

## 2021-05-07 PROCEDURE — 84165 PROTEIN E-PHORESIS SERUM: CPT | Mod: NTX | Performed by: INTERNAL MEDICINE

## 2021-05-07 PROCEDURE — 82784 ASSAY IGA/IGD/IGG/IGM EACH: CPT | Mod: TXP | Performed by: INTERNAL MEDICINE

## 2021-05-07 PROCEDURE — 36415 COLL VENOUS BLD VENIPUNCTURE: CPT | Mod: NTX | Performed by: INTERNAL MEDICINE

## 2021-05-07 PROCEDURE — 86334 IMMUNOFIX E-PHORESIS SERUM: CPT | Mod: NTX | Performed by: INTERNAL MEDICINE

## 2021-05-07 PROCEDURE — 83520 IMMUNOASSAY QUANT NOS NONAB: CPT | Mod: 59,NTX | Performed by: INTERNAL MEDICINE

## 2021-05-07 PROCEDURE — 86334 IMMUNOFIX E-PHORESIS SERUM: CPT | Mod: 26,NTX,, | Performed by: PATHOLOGY

## 2021-05-07 PROCEDURE — 82232 ASSAY OF BETA-2 PROTEIN: CPT | Mod: TXP | Performed by: INTERNAL MEDICINE

## 2021-05-07 PROCEDURE — 80053 COMPREHEN METABOLIC PANEL: CPT | Mod: TXP | Performed by: INTERNAL MEDICINE

## 2021-05-07 PROCEDURE — 84165 PATHOLOGIST INTERPRETATION SPE: ICD-10-PCS | Mod: 26,NTX,, | Performed by: PATHOLOGY

## 2021-05-10 ENCOUNTER — TELEPHONE (OUTPATIENT)
Dept: TRANSPLANT | Facility: CLINIC | Age: 56
End: 2021-05-10

## 2021-05-10 LAB
ALBUMIN SERPL ELPH-MCNC: 3.97 G/DL (ref 3.35–5.55)
ALPHA1 GLOB SERPL ELPH-MCNC: 0.3 G/DL (ref 0.17–0.41)
ALPHA2 GLOB SERPL ELPH-MCNC: 0.99 G/DL (ref 0.43–0.99)
B-GLOBULIN SERPL ELPH-MCNC: 0.74 G/DL (ref 0.5–1.1)
GAMMA GLOB SERPL ELPH-MCNC: 0.9 G/DL (ref 0.67–1.58)
INTERPRETATION SERPL IFE-IMP: NORMAL
PROT SERPL-MCNC: 6.9 G/DL (ref 6–8.4)

## 2021-05-11 ENCOUNTER — LAB VISIT (OUTPATIENT)
Dept: LAB | Facility: HOSPITAL | Age: 56
End: 2021-05-11
Payer: MEDICARE

## 2021-05-11 DIAGNOSIS — Z76.82 PRE-KIDNEY TRANSPLANT, LISTED: ICD-10-CM

## 2021-05-11 LAB
PATHOLOGIST INTERPRETATION IFE: NORMAL
PATHOLOGIST INTERPRETATION SPE: NORMAL

## 2021-05-12 LAB
KAPPA LC SER QL IA: 24.5 MG/DL (ref 0.33–1.94)
KAPPA LC/LAMBDA SER IA: 1.22 (ref 0.26–1.65)
LAMBDA LC SER QL IA: 20.12 MG/DL (ref 0.57–2.63)

## 2021-05-14 ENCOUNTER — OFFICE VISIT (OUTPATIENT)
Dept: HEMATOLOGY/ONCOLOGY | Facility: CLINIC | Age: 56
End: 2021-05-14
Payer: MEDICARE

## 2021-05-14 VITALS
HEIGHT: 68 IN | HEART RATE: 79 BPM | DIASTOLIC BLOOD PRESSURE: 91 MMHG | WEIGHT: 126.13 LBS | SYSTOLIC BLOOD PRESSURE: 141 MMHG | TEMPERATURE: 98 F | BODY MASS INDEX: 19.12 KG/M2

## 2021-05-14 DIAGNOSIS — N18.6 ESRD (END STAGE RENAL DISEASE) ON DIALYSIS: ICD-10-CM

## 2021-05-14 DIAGNOSIS — N18.6 ANEMIA IN CHRONIC KIDNEY DISEASE, ON CHRONIC DIALYSIS: ICD-10-CM

## 2021-05-14 DIAGNOSIS — D63.1 ANEMIA IN CHRONIC KIDNEY DISEASE, ON CHRONIC DIALYSIS: ICD-10-CM

## 2021-05-14 DIAGNOSIS — Z99.2 ESRD (END STAGE RENAL DISEASE) ON DIALYSIS: ICD-10-CM

## 2021-05-14 DIAGNOSIS — Z99.2 ANEMIA IN CHRONIC KIDNEY DISEASE, ON CHRONIC DIALYSIS: ICD-10-CM

## 2021-05-14 DIAGNOSIS — C90.00 MULTIPLE MYELOMA, REMISSION STATUS UNSPECIFIED: Primary | ICD-10-CM

## 2021-05-14 PROCEDURE — 99999 PR PBB SHADOW E&M-EST. PATIENT-LVL IV: ICD-10-PCS | Mod: PBBFAC,,, | Performed by: INTERNAL MEDICINE

## 2021-05-14 PROCEDURE — 99214 OFFICE O/P EST MOD 30 MIN: CPT | Mod: S$PBB,,, | Performed by: INTERNAL MEDICINE

## 2021-05-14 PROCEDURE — 99214 OFFICE O/P EST MOD 30 MIN: CPT | Mod: PBBFAC | Performed by: INTERNAL MEDICINE

## 2021-05-14 PROCEDURE — 99214 PR OFFICE/OUTPT VISIT, EST, LEVL IV, 30-39 MIN: ICD-10-PCS | Mod: S$PBB,,, | Performed by: INTERNAL MEDICINE

## 2021-05-14 PROCEDURE — 99999 PR PBB SHADOW E&M-EST. PATIENT-LVL IV: CPT | Mod: PBBFAC,,, | Performed by: INTERNAL MEDICINE

## 2021-05-20 LAB — HPRA INTERPRETATION: NORMAL

## 2021-05-24 ENCOUNTER — LAB VISIT (OUTPATIENT)
Dept: LAB | Facility: HOSPITAL | Age: 56
End: 2021-05-24
Attending: PSYCHIATRY & NEUROLOGY
Payer: MEDICARE

## 2021-05-24 ENCOUNTER — HOSPITAL ENCOUNTER (OUTPATIENT)
Dept: PSYCHIATRY | Facility: HOSPITAL | Age: 56
Discharge: HOME | End: 2021-05-24
Attending: PSYCHIATRY & NEUROLOGY
Payer: MEDICARE

## 2021-05-24 DIAGNOSIS — F41.1 GAD (GENERALIZED ANXIETY DISORDER): Chronic | ICD-10-CM

## 2021-05-24 DIAGNOSIS — F12.20 CANNABIS USE DISORDER, SEVERE, DEPENDENCE: Primary | Chronic | ICD-10-CM

## 2021-05-24 DIAGNOSIS — F10.21 ALCOHOL USE DISORDER, SEVERE, IN EARLY REMISSION: Chronic | ICD-10-CM

## 2021-05-24 DIAGNOSIS — F32.A DEPRESSION, UNSPECIFIED DEPRESSION TYPE: Primary | ICD-10-CM

## 2021-05-24 DIAGNOSIS — N18.6 ESRD (END STAGE RENAL DISEASE) ON DIALYSIS: ICD-10-CM

## 2021-05-24 DIAGNOSIS — F17.200 TOBACCO USE DISORDER: Chronic | ICD-10-CM

## 2021-05-24 DIAGNOSIS — Z99.2 ESRD (END STAGE RENAL DISEASE) ON DIALYSIS: ICD-10-CM

## 2021-05-24 DIAGNOSIS — F32.A DEPRESSION, UNSPECIFIED DEPRESSION TYPE: ICD-10-CM

## 2021-05-24 LAB
ALBUMIN SERPL BCP-MCNC: 3.5 G/DL (ref 3.5–5.2)
ALP SERPL-CCNC: 133 U/L (ref 55–135)
ALT SERPL W/O P-5'-P-CCNC: 13 U/L (ref 10–44)
ANION GAP SERPL CALC-SCNC: 17 MMOL/L (ref 8–16)
AST SERPL-CCNC: 20 U/L (ref 10–40)
BILIRUB SERPL-MCNC: 0.3 MG/DL (ref 0.1–1)
BUN SERPL-MCNC: 38 MG/DL (ref 6–20)
CALCIUM SERPL-MCNC: 10.6 MG/DL (ref 8.7–10.5)
CHLORIDE SERPL-SCNC: 78 MMOL/L (ref 95–110)
CO2 SERPL-SCNC: 26 MMOL/L (ref 23–29)
CREAT SERPL-MCNC: 7.8 MG/DL (ref 0.5–1.4)
EST. GFR  (AFRICAN AMERICAN): 6 ML/MIN/1.73 M^2
EST. GFR  (NON AFRICAN AMERICAN): 5.2 ML/MIN/1.73 M^2
GLUCOSE SERPL-MCNC: 87 MG/DL (ref 70–110)
POTASSIUM SERPL-SCNC: 5.3 MMOL/L (ref 3.5–5.1)
PROT SERPL-MCNC: 7.4 G/DL (ref 6–8.4)
SODIUM SERPL-SCNC: 121 MMOL/L (ref 136–145)

## 2021-05-24 PROCEDURE — 90853 GROUP PSYCHOTHERAPY: CPT | Mod: NTX,,, | Performed by: PSYCHOLOGIST

## 2021-05-24 PROCEDURE — 99223 1ST HOSP IP/OBS HIGH 75: CPT | Mod: NTX,,, | Performed by: PSYCHIATRY & NEUROLOGY

## 2021-05-24 PROCEDURE — 36415 COLL VENOUS BLD VENIPUNCTURE: CPT | Mod: NTX | Performed by: PSYCHIATRY & NEUROLOGY

## 2021-05-24 PROCEDURE — 99223 PR INITIAL HOSPITAL CARE,LEVL III: ICD-10-PCS | Mod: NTX,,, | Performed by: PSYCHIATRY & NEUROLOGY

## 2021-05-24 PROCEDURE — 80321 ALCOHOLS BIOMARKERS 1OR 2: CPT | Mod: NTX | Performed by: PSYCHIATRY & NEUROLOGY

## 2021-05-24 PROCEDURE — 80307 DRUG TEST PRSMV CHEM ANLYZR: CPT | Mod: NTX | Performed by: PSYCHIATRY & NEUROLOGY

## 2021-05-24 PROCEDURE — 80053 COMPREHEN METABOLIC PANEL: CPT | Mod: TXP | Performed by: PSYCHIATRY & NEUROLOGY

## 2021-05-24 PROCEDURE — 90853 GROUP PSYCHOTHERAPY: CPT | Mod: NTX

## 2021-05-24 PROCEDURE — 90853 PR GROUP PSYCHOTHERAPY: ICD-10-PCS | Mod: NTX,,, | Performed by: PSYCHOLOGIST

## 2021-05-24 PROCEDURE — 90853 GROUP PSYCHOTHERAPY: CPT | Mod: NTX | Performed by: SOCIAL WORKER

## 2021-05-25 ENCOUNTER — HOSPITAL ENCOUNTER (OUTPATIENT)
Dept: PSYCHIATRY | Facility: HOSPITAL | Age: 56
Discharge: HOME | End: 2021-05-25
Attending: PSYCHIATRY & NEUROLOGY
Payer: MEDICARE

## 2021-05-25 DIAGNOSIS — F12.20 CANNABIS USE DISORDER, SEVERE, DEPENDENCE: Primary | ICD-10-CM

## 2021-05-25 PROCEDURE — 90853 GROUP PSYCHOTHERAPY: CPT | Mod: TXP,,, | Performed by: PSYCHOLOGIST

## 2021-05-25 PROCEDURE — 90853 PR GROUP PSYCHOTHERAPY: ICD-10-PCS | Mod: TXP,,, | Performed by: PSYCHOLOGIST

## 2021-05-25 PROCEDURE — 90853 GROUP PSYCHOTHERAPY: CPT | Mod: NTX

## 2021-05-26 ENCOUNTER — EPISODE CHANGES (OUTPATIENT)
Dept: TRANSPLANT | Facility: CLINIC | Age: 56
End: 2021-05-26

## 2021-05-26 ENCOUNTER — HOSPITAL ENCOUNTER (OUTPATIENT)
Dept: PSYCHIATRY | Facility: HOSPITAL | Age: 56
Discharge: HOME | End: 2021-05-26
Attending: PSYCHIATRY & NEUROLOGY
Payer: MEDICARE

## 2021-05-26 ENCOUNTER — TELEPHONE (OUTPATIENT)
Dept: TRANSPLANT | Facility: CLINIC | Age: 56
End: 2021-05-26

## 2021-05-26 ENCOUNTER — LAB VISIT (OUTPATIENT)
Dept: LAB | Facility: HOSPITAL | Age: 56
End: 2021-05-26
Attending: PSYCHIATRY & NEUROLOGY
Payer: MEDICARE

## 2021-05-26 DIAGNOSIS — F12.20 CANNABIS USE DISORDER, SEVERE, DEPENDENCE: Primary | Chronic | ICD-10-CM

## 2021-05-26 DIAGNOSIS — F10.21 ALCOHOL USE DISORDER, SEVERE, IN EARLY REMISSION: Chronic | ICD-10-CM

## 2021-05-26 DIAGNOSIS — N18.5 CKD (CHRONIC KIDNEY DISEASE) STAGE 5, GFR LESS THAN 15 ML/MIN: ICD-10-CM

## 2021-05-26 DIAGNOSIS — N18.6 ESRD (END STAGE RENAL DISEASE) ON DIALYSIS: ICD-10-CM

## 2021-05-26 DIAGNOSIS — F12.20 CANNABIS USE DISORDER, SEVERE, DEPENDENCE: Primary | ICD-10-CM

## 2021-05-26 DIAGNOSIS — F41.1 GAD (GENERALIZED ANXIETY DISORDER): Chronic | ICD-10-CM

## 2021-05-26 DIAGNOSIS — F17.200 TOBACCO USE DISORDER: Chronic | ICD-10-CM

## 2021-05-26 DIAGNOSIS — F12.20 CANNABIS USE DISORDER, SEVERE, DEPENDENCE: ICD-10-CM

## 2021-05-26 DIAGNOSIS — F32.A DEPRESSION, UNSPECIFIED DEPRESSION TYPE: ICD-10-CM

## 2021-05-26 DIAGNOSIS — Z99.2 ESRD (END STAGE RENAL DISEASE) ON DIALYSIS: ICD-10-CM

## 2021-05-26 PROCEDURE — 90853 GROUP PSYCHOTHERAPY: CPT | Mod: NTX,,, | Performed by: PSYCHOLOGIST

## 2021-05-26 PROCEDURE — 90853 PR GROUP PSYCHOTHERAPY: ICD-10-PCS | Mod: 95,NTX,, | Performed by: PSYCHOLOGIST

## 2021-05-26 PROCEDURE — 99232 PR SUBSEQUENT HOSPITAL CARE,LEVL II: ICD-10-PCS | Mod: 95,NTX,, | Performed by: PSYCHIATRY & NEUROLOGY

## 2021-05-26 PROCEDURE — 90853 GROUP PSYCHOTHERAPY: CPT | Mod: TXP

## 2021-05-26 PROCEDURE — 90853 GROUP PSYCHOTHERAPY: CPT | Mod: 95,NTX,, | Performed by: PSYCHOLOGIST

## 2021-05-26 PROCEDURE — 80307 DRUG TEST PRSMV CHEM ANLYZR: CPT | Mod: TXP | Performed by: PSYCHIATRY & NEUROLOGY

## 2021-05-26 PROCEDURE — 90853 GROUP PSYCHOTHERAPY: CPT | Mod: NTX | Performed by: SOCIAL WORKER

## 2021-05-26 PROCEDURE — 99232 SBSQ HOSP IP/OBS MODERATE 35: CPT | Mod: 95,NTX,, | Performed by: PSYCHIATRY & NEUROLOGY

## 2021-05-26 PROCEDURE — 90853 PR GROUP PSYCHOTHERAPY: ICD-10-PCS | Mod: NTX,,, | Performed by: PSYCHOLOGIST

## 2021-05-26 PROCEDURE — 36415 COLL VENOUS BLD VENIPUNCTURE: CPT | Mod: TXP | Performed by: PSYCHIATRY & NEUROLOGY

## 2021-05-27 ENCOUNTER — HOSPITAL ENCOUNTER (OUTPATIENT)
Dept: PSYCHIATRY | Facility: HOSPITAL | Age: 56
Discharge: HOME | End: 2021-05-27
Attending: PSYCHIATRY & NEUROLOGY
Payer: MEDICARE

## 2021-05-27 DIAGNOSIS — F10.21 ALCOHOL USE DISORDER, SEVERE, IN EARLY REMISSION: ICD-10-CM

## 2021-05-27 DIAGNOSIS — F12.20 CANNABIS USE DISORDER, SEVERE, DEPENDENCE: Primary | ICD-10-CM

## 2021-05-27 LAB
AMPHETAMINES SERPL QL: NEGATIVE
BARBITURATES SERPL QL SCN: NEGATIVE
BENZODIAZ SERPL QL SCN: NEGATIVE
BZE SERPL QL: NEGATIVE
CARBOXYTHC SERPL QL SCN: POSITIVE
ETHANOL SERPL QL SCN: NEGATIVE
METHADONE SERPL QL SCN: NEGATIVE
OPIATES SERPL QL SCN: NEGATIVE
PCP SERPL QL SCN: NEGATIVE
PROPOXYPH SERPL QL: NEGATIVE

## 2021-05-27 PROCEDURE — 90853 GROUP PSYCHOTHERAPY: CPT | Mod: NTX | Performed by: SOCIAL WORKER

## 2021-05-27 PROCEDURE — 90853 PR GROUP PSYCHOTHERAPY: ICD-10-PCS | Mod: TXP,,, | Performed by: PSYCHOLOGIST

## 2021-05-27 PROCEDURE — 90853 GROUP PSYCHOTHERAPY: CPT | Mod: TXP,,, | Performed by: PSYCHOLOGIST

## 2021-05-28 ENCOUNTER — HOSPITAL ENCOUNTER (OUTPATIENT)
Dept: PSYCHIATRY | Facility: HOSPITAL | Age: 56
Discharge: HOME | End: 2021-05-28
Attending: PSYCHIATRY & NEUROLOGY
Payer: MEDICARE

## 2021-05-28 ENCOUNTER — LAB VISIT (OUTPATIENT)
Dept: LAB | Facility: HOSPITAL | Age: 56
End: 2021-05-28
Attending: PSYCHIATRY & NEUROLOGY
Payer: MEDICARE

## 2021-05-28 DIAGNOSIS — N18.6 ESRD (END STAGE RENAL DISEASE) ON DIALYSIS: ICD-10-CM

## 2021-05-28 DIAGNOSIS — F41.1 GAD (GENERALIZED ANXIETY DISORDER): Chronic | ICD-10-CM

## 2021-05-28 DIAGNOSIS — F12.20 CANNABIS USE DISORDER, SEVERE, DEPENDENCE: ICD-10-CM

## 2021-05-28 DIAGNOSIS — F10.21 ALCOHOL USE DISORDER, SEVERE, IN EARLY REMISSION: Chronic | ICD-10-CM

## 2021-05-28 DIAGNOSIS — F17.200 TOBACCO USE DISORDER: Chronic | ICD-10-CM

## 2021-05-28 DIAGNOSIS — Z99.2 ESRD (END STAGE RENAL DISEASE) ON DIALYSIS: ICD-10-CM

## 2021-05-28 DIAGNOSIS — F12.20 CANNABIS USE DISORDER, SEVERE, DEPENDENCE: Primary | Chronic | ICD-10-CM

## 2021-05-28 DIAGNOSIS — F12.20 CANNABIS USE DISORDER, SEVERE, DEPENDENCE: Primary | ICD-10-CM

## 2021-05-28 PROCEDURE — 90853 GROUP PSYCHOTHERAPY: CPT | Mod: NTX | Performed by: SOCIAL WORKER

## 2021-05-28 PROCEDURE — 90853 GROUP PSYCHOTHERAPY: CPT | Mod: TXP,,, | Performed by: PSYCHOLOGIST

## 2021-05-28 PROCEDURE — 99232 SBSQ HOSP IP/OBS MODERATE 35: CPT | Mod: 95,NTX,, | Performed by: PSYCHIATRY & NEUROLOGY

## 2021-05-28 PROCEDURE — 90853 PR GROUP PSYCHOTHERAPY: ICD-10-PCS | Mod: TXP,,, | Performed by: PSYCHOLOGIST

## 2021-05-28 PROCEDURE — 36415 COLL VENOUS BLD VENIPUNCTURE: CPT | Mod: TXP | Performed by: PSYCHIATRY & NEUROLOGY

## 2021-05-28 PROCEDURE — 80307 DRUG TEST PRSMV CHEM ANLYZR: CPT | Mod: TXP | Performed by: PSYCHIATRY & NEUROLOGY

## 2021-05-28 PROCEDURE — 90853 GROUP PSYCHOTHERAPY: CPT | Mod: TXP

## 2021-05-28 PROCEDURE — 99232 PR SUBSEQUENT HOSPITAL CARE,LEVL II: ICD-10-PCS | Mod: 95,NTX,, | Performed by: PSYCHIATRY & NEUROLOGY

## 2021-05-28 RX ORDER — MIRTAZAPINE 15 MG/1
TABLET, FILM COATED ORAL
Qty: 30 TABLET | Refills: 0 | Status: SHIPPED | OUTPATIENT
Start: 2021-05-28 | End: 2021-07-22

## 2021-05-31 ENCOUNTER — LAB VISIT (OUTPATIENT)
Dept: LAB | Facility: HOSPITAL | Age: 56
End: 2021-05-31
Attending: PSYCHIATRY & NEUROLOGY
Payer: COMMERCIAL

## 2021-05-31 ENCOUNTER — HOSPITAL ENCOUNTER (OUTPATIENT)
Dept: PSYCHIATRY | Facility: HOSPITAL | Age: 56
Discharge: HOME | End: 2021-05-31
Attending: PSYCHIATRY & NEUROLOGY
Payer: MEDICARE

## 2021-05-31 DIAGNOSIS — F12.20 CANNABIS USE DISORDER, SEVERE, DEPENDENCE: Primary | Chronic | ICD-10-CM

## 2021-05-31 DIAGNOSIS — F12.20 CANNABIS USE DISORDER, SEVERE, DEPENDENCE: ICD-10-CM

## 2021-05-31 DIAGNOSIS — F12.20 CANNABIS USE DISORDER, SEVERE, DEPENDENCE: Primary | ICD-10-CM

## 2021-05-31 DIAGNOSIS — F41.1 GAD (GENERALIZED ANXIETY DISORDER): Chronic | ICD-10-CM

## 2021-05-31 DIAGNOSIS — F10.21 ALCOHOL USE DISORDER, SEVERE, IN EARLY REMISSION: Chronic | ICD-10-CM

## 2021-05-31 DIAGNOSIS — N18.6 ESRD (END STAGE RENAL DISEASE) ON DIALYSIS: ICD-10-CM

## 2021-05-31 DIAGNOSIS — N18.5 CKD (CHRONIC KIDNEY DISEASE) STAGE 5, GFR LESS THAN 15 ML/MIN: ICD-10-CM

## 2021-05-31 DIAGNOSIS — F17.200 TOBACCO USE DISORDER: Chronic | ICD-10-CM

## 2021-05-31 DIAGNOSIS — Z99.2 ESRD (END STAGE RENAL DISEASE) ON DIALYSIS: ICD-10-CM

## 2021-05-31 LAB — PHOSPHATIDYLETHANOL (PETH): NEGATIVE NG/ML

## 2021-05-31 PROCEDURE — 99232 SBSQ HOSP IP/OBS MODERATE 35: CPT | Mod: 95,NTX,, | Performed by: PSYCHIATRY & NEUROLOGY

## 2021-05-31 PROCEDURE — 99232 PR SUBSEQUENT HOSPITAL CARE,LEVL II: ICD-10-PCS | Mod: 95,NTX,, | Performed by: PSYCHIATRY & NEUROLOGY

## 2021-05-31 PROCEDURE — 90853 GROUP PSYCHOTHERAPY: CPT | Mod: TXP,,, | Performed by: PSYCHOLOGIST

## 2021-05-31 PROCEDURE — 36415 COLL VENOUS BLD VENIPUNCTURE: CPT | Mod: NTX | Performed by: PSYCHIATRY & NEUROLOGY

## 2021-05-31 PROCEDURE — 90853 PR GROUP PSYCHOTHERAPY: ICD-10-PCS | Mod: TXP,,, | Performed by: PSYCHOLOGIST

## 2021-05-31 PROCEDURE — 90853 GROUP PSYCHOTHERAPY: CPT | Mod: TXP

## 2021-06-01 ENCOUNTER — LAB VISIT (OUTPATIENT)
Dept: LAB | Facility: HOSPITAL | Age: 56
End: 2021-06-01
Attending: PSYCHIATRY & NEUROLOGY
Payer: MEDICARE

## 2021-06-01 ENCOUNTER — HOSPITAL ENCOUNTER (OUTPATIENT)
Dept: PSYCHIATRY | Facility: HOSPITAL | Age: 56
Discharge: HOME | End: 2021-06-01
Attending: PSYCHIATRY & NEUROLOGY
Payer: MEDICARE

## 2021-06-01 DIAGNOSIS — F12.20 CANNABIS USE DISORDER, SEVERE, DEPENDENCE: Primary | ICD-10-CM

## 2021-06-01 DIAGNOSIS — F12.20 CANNABIS USE DISORDER, SEVERE, DEPENDENCE: ICD-10-CM

## 2021-06-01 LAB
AMPHETAMINES SERPL QL: NEGATIVE
AMPHETAMINES SERPL QL: NEGATIVE
BARBITURATES SERPL QL SCN: NEGATIVE
BARBITURATES SERPL QL SCN: NEGATIVE
BENZODIAZ SERPL QL SCN: NEGATIVE
BENZODIAZ SERPL QL SCN: NEGATIVE
BZE SERPL QL: NEGATIVE
BZE SERPL QL: NEGATIVE
CARBOXYTHC SERPL QL SCN: POSITIVE
CARBOXYTHC SERPL QL SCN: POSITIVE
ETHANOL SERPL QL SCN: NEGATIVE
ETHANOL SERPL QL SCN: NEGATIVE
METHADONE SERPL QL SCN: NEGATIVE
METHADONE SERPL QL SCN: NEGATIVE
OPIATES SERPL QL SCN: NEGATIVE
OPIATES SERPL QL SCN: NEGATIVE
PCP SERPL QL SCN: NEGATIVE
PCP SERPL QL SCN: NEGATIVE
PROPOXYPH SERPL QL: NEGATIVE
PROPOXYPH SERPL QL: NEGATIVE

## 2021-06-01 PROCEDURE — 90853 PR GROUP PSYCHOTHERAPY: ICD-10-PCS | Mod: TXP,,, | Performed by: PSYCHOLOGIST

## 2021-06-01 PROCEDURE — 90853 GROUP PSYCHOTHERAPY: CPT | Mod: TXP,,, | Performed by: PSYCHOLOGIST

## 2021-06-01 PROCEDURE — 90853 GROUP PSYCHOTHERAPY: CPT | Mod: NTX

## 2021-06-01 PROCEDURE — 36415 COLL VENOUS BLD VENIPUNCTURE: CPT | Mod: TXP | Performed by: PSYCHIATRY & NEUROLOGY

## 2021-06-01 PROCEDURE — 80307 DRUG TEST PRSMV CHEM ANLYZR: CPT | Mod: TXP | Performed by: PSYCHIATRY & NEUROLOGY

## 2021-06-02 ENCOUNTER — HOSPITAL ENCOUNTER (OUTPATIENT)
Dept: PSYCHIATRY | Facility: HOSPITAL | Age: 56
Discharge: HOME | End: 2021-06-02
Attending: PSYCHIATRY & NEUROLOGY
Payer: MEDICARE

## 2021-06-02 ENCOUNTER — TELEPHONE (OUTPATIENT)
Dept: TRANSPLANT | Facility: HOSPITAL | Age: 56
End: 2021-06-02

## 2021-06-02 DIAGNOSIS — F10.21 ALCOHOL USE DISORDER, SEVERE, IN EARLY REMISSION: Chronic | ICD-10-CM

## 2021-06-02 DIAGNOSIS — N18.5 CKD (CHRONIC KIDNEY DISEASE) STAGE 5, GFR LESS THAN 15 ML/MIN: ICD-10-CM

## 2021-06-02 DIAGNOSIS — F12.20 CANNABIS USE DISORDER, SEVERE, DEPENDENCE: Primary | Chronic | ICD-10-CM

## 2021-06-02 DIAGNOSIS — N18.6 ESRD (END STAGE RENAL DISEASE) ON DIALYSIS: ICD-10-CM

## 2021-06-02 DIAGNOSIS — Z99.2 ESRD (END STAGE RENAL DISEASE) ON DIALYSIS: ICD-10-CM

## 2021-06-02 DIAGNOSIS — F17.200 TOBACCO USE DISORDER: Chronic | ICD-10-CM

## 2021-06-02 DIAGNOSIS — F41.1 GAD (GENERALIZED ANXIETY DISORDER): Chronic | ICD-10-CM

## 2021-06-02 PROCEDURE — 90853 GROUP PSYCHOTHERAPY: CPT | Mod: 95,NTX,, | Performed by: PSYCHOLOGIST

## 2021-06-02 PROCEDURE — 90853 GROUP PSYCHOTHERAPY: CPT | Mod: TXP

## 2021-06-02 PROCEDURE — 99232 PR SUBSEQUENT HOSPITAL CARE,LEVL II: ICD-10-PCS | Mod: 95,NTX,, | Performed by: PSYCHIATRY & NEUROLOGY

## 2021-06-02 PROCEDURE — 90853 GROUP PSYCHOTHERAPY: CPT | Mod: TXP | Performed by: SOCIAL WORKER

## 2021-06-02 PROCEDURE — 90853 GROUP PSYCHOTHERAPY: CPT | Mod: NTX,,, | Performed by: PSYCHOLOGIST

## 2021-06-02 PROCEDURE — 90853 PR GROUP PSYCHOTHERAPY: ICD-10-PCS | Mod: 95,NTX,, | Performed by: PSYCHOLOGIST

## 2021-06-02 PROCEDURE — 90853 PR GROUP PSYCHOTHERAPY: ICD-10-PCS | Mod: NTX,,, | Performed by: PSYCHOLOGIST

## 2021-06-02 PROCEDURE — 99232 SBSQ HOSP IP/OBS MODERATE 35: CPT | Mod: 95,NTX,, | Performed by: PSYCHIATRY & NEUROLOGY

## 2021-06-02 RX ORDER — BUPROPION HYDROCHLORIDE 75 MG/1
75 TABLET ORAL DAILY
Qty: 30 TABLET | Refills: 0 | Status: SHIPPED | OUTPATIENT
Start: 2021-06-02 | End: 2021-07-02

## 2021-06-03 ENCOUNTER — HOSPITAL ENCOUNTER (OUTPATIENT)
Dept: PSYCHIATRY | Facility: HOSPITAL | Age: 56
Discharge: HOME | End: 2021-06-03
Attending: PSYCHIATRY & NEUROLOGY
Payer: MEDICARE

## 2021-06-03 DIAGNOSIS — F12.20 CANNABIS USE DISORDER, SEVERE, DEPENDENCE: Primary | ICD-10-CM

## 2021-06-03 PROCEDURE — 90853 GROUP PSYCHOTHERAPY: CPT | Mod: NTX

## 2021-06-03 PROCEDURE — 90853 GROUP PSYCHOTHERAPY: CPT | Mod: TXP,,, | Performed by: PSYCHOLOGIST

## 2021-06-03 PROCEDURE — 99001 SPECIMEN HANDLING PT-LAB: CPT | Mod: PO,TXP | Performed by: NURSE PRACTITIONER

## 2021-06-03 PROCEDURE — 90853 PR GROUP PSYCHOTHERAPY: ICD-10-PCS | Mod: TXP,,, | Performed by: PSYCHOLOGIST

## 2021-06-03 PROCEDURE — 90853 GROUP PSYCHOTHERAPY: CPT | Mod: NTX | Performed by: SOCIAL WORKER

## 2021-06-04 ENCOUNTER — TELEPHONE (OUTPATIENT)
Dept: TRANSPLANT | Facility: CLINIC | Age: 56
End: 2021-06-04

## 2021-06-04 ENCOUNTER — EPISODE CHANGES (OUTPATIENT)
Dept: TRANSPLANT | Facility: CLINIC | Age: 56
End: 2021-06-04

## 2021-06-04 ENCOUNTER — LAB VISIT (OUTPATIENT)
Dept: LAB | Facility: HOSPITAL | Age: 56
End: 2021-06-04
Attending: PSYCHIATRY & NEUROLOGY
Payer: MEDICARE

## 2021-06-04 ENCOUNTER — HOSPITAL ENCOUNTER (OUTPATIENT)
Dept: PSYCHIATRY | Facility: HOSPITAL | Age: 56
Discharge: HOME | End: 2021-06-04
Attending: PSYCHIATRY & NEUROLOGY
Payer: MEDICARE

## 2021-06-04 DIAGNOSIS — F17.200 TOBACCO USE DISORDER: Chronic | ICD-10-CM

## 2021-06-04 DIAGNOSIS — F12.20 CANNABIS USE DISORDER, SEVERE, DEPENDENCE: Primary | ICD-10-CM

## 2021-06-04 DIAGNOSIS — F12.20 CANNABIS USE DISORDER, SEVERE, DEPENDENCE: ICD-10-CM

## 2021-06-04 DIAGNOSIS — Z99.2 ESRD (END STAGE RENAL DISEASE) ON DIALYSIS: ICD-10-CM

## 2021-06-04 DIAGNOSIS — N18.6 ESRD (END STAGE RENAL DISEASE) ON DIALYSIS: ICD-10-CM

## 2021-06-04 DIAGNOSIS — F12.20 CANNABIS USE DISORDER, SEVERE, DEPENDENCE: Primary | Chronic | ICD-10-CM

## 2021-06-04 DIAGNOSIS — F41.1 GAD (GENERALIZED ANXIETY DISORDER): Chronic | ICD-10-CM

## 2021-06-04 DIAGNOSIS — F10.21 ALCOHOL USE DISORDER, SEVERE, IN EARLY REMISSION: Chronic | ICD-10-CM

## 2021-06-04 LAB
AMPHETAMINES SERPL QL: NEGATIVE
BARBITURATES SERPL QL SCN: NEGATIVE
BENZODIAZ SERPL QL SCN: NEGATIVE
BZE SERPL QL: NEGATIVE
CARBOXYTHC SERPL QL SCN: NEGATIVE
ETHANOL SERPL QL SCN: NEGATIVE
METHADONE SERPL QL SCN: NEGATIVE
OPIATES SERPL QL SCN: NEGATIVE
PCP SERPL QL SCN: NEGATIVE
PROPOXYPH SERPL QL: NEGATIVE

## 2021-06-04 PROCEDURE — 90853 GROUP PSYCHOTHERAPY: CPT | Mod: 59,TXP,, | Performed by: PSYCHOLOGIST

## 2021-06-04 PROCEDURE — 36415 COLL VENOUS BLD VENIPUNCTURE: CPT | Mod: TXP | Performed by: PSYCHIATRY & NEUROLOGY

## 2021-06-04 PROCEDURE — 90853 GROUP PSYCHOTHERAPY: CPT | Mod: TXP | Performed by: SOCIAL WORKER

## 2021-06-04 PROCEDURE — 80307 DRUG TEST PRSMV CHEM ANLYZR: CPT | Mod: TXP | Performed by: PSYCHIATRY & NEUROLOGY

## 2021-06-04 PROCEDURE — 99232 SBSQ HOSP IP/OBS MODERATE 35: CPT | Mod: 95,NTX,, | Performed by: PSYCHIATRY & NEUROLOGY

## 2021-06-04 PROCEDURE — 90853 GROUP PSYCHOTHERAPY: CPT | Mod: TXP

## 2021-06-04 PROCEDURE — 99232 PR SUBSEQUENT HOSPITAL CARE,LEVL II: ICD-10-PCS | Mod: 95,NTX,, | Performed by: PSYCHIATRY & NEUROLOGY

## 2021-06-04 PROCEDURE — 90853 PR GROUP PSYCHOTHERAPY: ICD-10-PCS | Mod: 59,TXP,, | Performed by: PSYCHOLOGIST

## 2021-06-07 ENCOUNTER — LAB VISIT (OUTPATIENT)
Dept: LAB | Facility: HOSPITAL | Age: 56
End: 2021-06-07
Attending: PSYCHIATRY & NEUROLOGY
Payer: MEDICARE

## 2021-06-07 ENCOUNTER — HOSPITAL ENCOUNTER (OUTPATIENT)
Dept: PSYCHIATRY | Facility: HOSPITAL | Age: 56
Discharge: HOME | End: 2021-06-07
Attending: PSYCHIATRY & NEUROLOGY
Payer: MEDICARE

## 2021-06-07 DIAGNOSIS — F17.200 TOBACCO USE DISORDER: Chronic | ICD-10-CM

## 2021-06-07 DIAGNOSIS — F12.20 CANNABIS USE DISORDER, SEVERE, DEPENDENCE: Primary | Chronic | ICD-10-CM

## 2021-06-07 DIAGNOSIS — N18.6 ESRD (END STAGE RENAL DISEASE) ON DIALYSIS: ICD-10-CM

## 2021-06-07 DIAGNOSIS — Z99.2 ESRD (END STAGE RENAL DISEASE) ON DIALYSIS: ICD-10-CM

## 2021-06-07 DIAGNOSIS — F12.20 CANNABIS USE DISORDER, SEVERE, DEPENDENCE: Primary | ICD-10-CM

## 2021-06-07 DIAGNOSIS — F10.21 ALCOHOL USE DISORDER, SEVERE, IN EARLY REMISSION: Chronic | ICD-10-CM

## 2021-06-07 DIAGNOSIS — F12.20 CANNABIS USE DISORDER, SEVERE, DEPENDENCE: ICD-10-CM

## 2021-06-07 PROCEDURE — 90853 GROUP PSYCHOTHERAPY: CPT | Mod: NTX | Performed by: SOCIAL WORKER

## 2021-06-07 PROCEDURE — 90853 PR GROUP PSYCHOTHERAPY: ICD-10-PCS | Mod: TXP,,, | Performed by: PSYCHOLOGIST

## 2021-06-07 PROCEDURE — 99232 SBSQ HOSP IP/OBS MODERATE 35: CPT | Mod: 95,NTX,, | Performed by: PSYCHIATRY & NEUROLOGY

## 2021-06-07 PROCEDURE — 80307 DRUG TEST PRSMV CHEM ANLYZR: CPT | Mod: TXP | Performed by: PSYCHIATRY & NEUROLOGY

## 2021-06-07 PROCEDURE — 99232 PR SUBSEQUENT HOSPITAL CARE,LEVL II: ICD-10-PCS | Mod: 95,NTX,, | Performed by: PSYCHIATRY & NEUROLOGY

## 2021-06-07 PROCEDURE — 90853 GROUP PSYCHOTHERAPY: CPT | Mod: TXP,,, | Performed by: PSYCHOLOGIST

## 2021-06-07 PROCEDURE — 36415 COLL VENOUS BLD VENIPUNCTURE: CPT | Mod: TXP | Performed by: PSYCHIATRY & NEUROLOGY

## 2021-06-08 ENCOUNTER — LAB VISIT (OUTPATIENT)
Dept: LAB | Facility: HOSPITAL | Age: 56
End: 2021-06-08
Payer: MEDICARE

## 2021-06-08 ENCOUNTER — HOSPITAL ENCOUNTER (OUTPATIENT)
Dept: PSYCHIATRY | Facility: HOSPITAL | Age: 56
Discharge: HOME | End: 2021-06-08
Attending: PSYCHIATRY & NEUROLOGY
Payer: MEDICARE

## 2021-06-08 DIAGNOSIS — Z76.82 PRE-KIDNEY TRANSPLANT, LISTED: ICD-10-CM

## 2021-06-08 DIAGNOSIS — F12.20 CANNABIS USE DISORDER, SEVERE, DEPENDENCE: Primary | ICD-10-CM

## 2021-06-08 PROCEDURE — 90853 GROUP PSYCHOTHERAPY: CPT | Mod: TXP,,, | Performed by: PSYCHOLOGIST

## 2021-06-08 PROCEDURE — 90853 PR GROUP PSYCHOTHERAPY: ICD-10-PCS | Mod: TXP,,, | Performed by: PSYCHOLOGIST

## 2021-06-08 PROCEDURE — 90853 GROUP PSYCHOTHERAPY: CPT | Mod: NTX

## 2021-06-09 ENCOUNTER — LAB VISIT (OUTPATIENT)
Dept: LAB | Facility: HOSPITAL | Age: 56
End: 2021-06-09
Attending: PSYCHIATRY & NEUROLOGY
Payer: MEDICARE

## 2021-06-09 ENCOUNTER — HOSPITAL ENCOUNTER (OUTPATIENT)
Dept: PSYCHIATRY | Facility: HOSPITAL | Age: 56
Discharge: HOME | End: 2021-06-09
Attending: PSYCHIATRY & NEUROLOGY
Payer: MEDICARE

## 2021-06-09 DIAGNOSIS — F41.1 GAD (GENERALIZED ANXIETY DISORDER): Chronic | ICD-10-CM

## 2021-06-09 DIAGNOSIS — N18.6 ESRD (END STAGE RENAL DISEASE) ON DIALYSIS: ICD-10-CM

## 2021-06-09 DIAGNOSIS — F17.200 TOBACCO USE DISORDER: Chronic | ICD-10-CM

## 2021-06-09 DIAGNOSIS — F10.21 ALCOHOL USE DISORDER, SEVERE, IN EARLY REMISSION: Chronic | ICD-10-CM

## 2021-06-09 DIAGNOSIS — Z99.2 ESRD (END STAGE RENAL DISEASE) ON DIALYSIS: ICD-10-CM

## 2021-06-09 DIAGNOSIS — F12.20 CANNABIS USE DISORDER, SEVERE, DEPENDENCE: ICD-10-CM

## 2021-06-09 DIAGNOSIS — F12.20 CANNABIS USE DISORDER, SEVERE, DEPENDENCE: Primary | ICD-10-CM

## 2021-06-09 DIAGNOSIS — F12.20 CANNABIS USE DISORDER, SEVERE, DEPENDENCE: Primary | Chronic | ICD-10-CM

## 2021-06-09 PROCEDURE — 90853 GROUP PSYCHOTHERAPY: CPT | Mod: TXP

## 2021-06-09 PROCEDURE — 99232 PR SUBSEQUENT HOSPITAL CARE,LEVL II: ICD-10-PCS | Mod: 95,NTX,, | Performed by: PSYCHIATRY & NEUROLOGY

## 2021-06-09 PROCEDURE — 90853 GROUP PSYCHOTHERAPY: CPT | Mod: 95,NTX,, | Performed by: PSYCHOLOGIST

## 2021-06-09 PROCEDURE — 99232 SBSQ HOSP IP/OBS MODERATE 35: CPT | Mod: 95,NTX,, | Performed by: PSYCHIATRY & NEUROLOGY

## 2021-06-09 PROCEDURE — 90853 PR GROUP PSYCHOTHERAPY: ICD-10-PCS | Mod: 95,NTX,, | Performed by: PSYCHOLOGIST

## 2021-06-09 PROCEDURE — 80307 DRUG TEST PRSMV CHEM ANLYZR: CPT | Mod: TXP | Performed by: PSYCHIATRY & NEUROLOGY

## 2021-06-09 PROCEDURE — 36415 COLL VENOUS BLD VENIPUNCTURE: CPT | Mod: TXP | Performed by: PSYCHIATRY & NEUROLOGY

## 2021-06-09 PROCEDURE — 90853 PR GROUP PSYCHOTHERAPY: ICD-10-PCS | Mod: NTX,,, | Performed by: PSYCHOLOGIST

## 2021-06-09 PROCEDURE — 90853 GROUP PSYCHOTHERAPY: CPT | Mod: NTX,,, | Performed by: PSYCHOLOGIST

## 2021-06-09 PROCEDURE — 90853 GROUP PSYCHOTHERAPY: CPT | Mod: NTX | Performed by: SOCIAL WORKER

## 2021-06-10 ENCOUNTER — HOSPITAL ENCOUNTER (OUTPATIENT)
Dept: PSYCHIATRY | Facility: HOSPITAL | Age: 56
Discharge: HOME | End: 2021-06-10
Attending: PSYCHIATRY & NEUROLOGY
Payer: MEDICARE

## 2021-06-10 DIAGNOSIS — F12.10 CANNABIS USE DISORDER, MILD, ABUSE: Primary | ICD-10-CM

## 2021-06-10 DIAGNOSIS — F12.20 CANNABIS USE DISORDER, SEVERE, DEPENDENCE: ICD-10-CM

## 2021-06-10 DIAGNOSIS — Z78.9 ALCOHOL USE: ICD-10-CM

## 2021-06-10 PROCEDURE — 90853 GROUP PSYCHOTHERAPY: CPT | Mod: TXP,,, | Performed by: PSYCHOLOGIST

## 2021-06-10 PROCEDURE — 90853 PR GROUP PSYCHOTHERAPY: ICD-10-PCS | Mod: TXP,,, | Performed by: PSYCHOLOGIST

## 2021-06-10 PROCEDURE — 90853 GROUP PSYCHOTHERAPY: CPT | Mod: NTX

## 2021-06-10 PROCEDURE — 90853 GROUP PSYCHOTHERAPY: CPT | Mod: TXP | Performed by: SOCIAL WORKER

## 2021-06-11 ENCOUNTER — LAB VISIT (OUTPATIENT)
Dept: LAB | Facility: HOSPITAL | Age: 56
End: 2021-06-11
Attending: PSYCHIATRY & NEUROLOGY
Payer: MEDICARE

## 2021-06-11 ENCOUNTER — HOSPITAL ENCOUNTER (OUTPATIENT)
Dept: PSYCHIATRY | Facility: HOSPITAL | Age: 56
Discharge: HOME | End: 2021-06-11
Attending: PSYCHIATRY & NEUROLOGY
Payer: MEDICARE

## 2021-06-11 DIAGNOSIS — F10.21 ALCOHOL USE DISORDER, SEVERE, IN EARLY REMISSION: Primary | Chronic | ICD-10-CM

## 2021-06-11 DIAGNOSIS — F12.20 CANNABIS USE DISORDER, SEVERE, DEPENDENCE: ICD-10-CM

## 2021-06-11 DIAGNOSIS — F41.1 GAD (GENERALIZED ANXIETY DISORDER): Chronic | ICD-10-CM

## 2021-06-11 DIAGNOSIS — F12.10 CANNABIS USE DISORDER, MILD, ABUSE: Primary | ICD-10-CM

## 2021-06-11 DIAGNOSIS — F12.10 CANNABIS USE DISORDER, MILD, ABUSE: ICD-10-CM

## 2021-06-11 DIAGNOSIS — F17.200 TOBACCO USE DISORDER: Chronic | ICD-10-CM

## 2021-06-11 DIAGNOSIS — Z99.2 ESRD (END STAGE RENAL DISEASE) ON DIALYSIS: ICD-10-CM

## 2021-06-11 DIAGNOSIS — N18.6 ESRD (END STAGE RENAL DISEASE) ON DIALYSIS: ICD-10-CM

## 2021-06-11 LAB
ALBUMIN SERPL BCP-MCNC: 3.5 G/DL (ref 3.5–5.2)
ALP SERPL-CCNC: 170 U/L (ref 55–135)
ALT SERPL W/O P-5'-P-CCNC: 13 U/L (ref 10–44)
ANION GAP SERPL CALC-SCNC: 19 MMOL/L (ref 8–16)
AST SERPL-CCNC: 22 U/L (ref 10–40)
BILIRUB SERPL-MCNC: 0.4 MG/DL (ref 0.1–1)
BUN SERPL-MCNC: 45 MG/DL (ref 6–20)
CALCIUM SERPL-MCNC: 8.9 MG/DL (ref 8.7–10.5)
CHLORIDE SERPL-SCNC: 85 MMOL/L (ref 95–110)
CO2 SERPL-SCNC: 26 MMOL/L (ref 23–29)
CREAT SERPL-MCNC: 6.5 MG/DL (ref 0.5–1.4)
EST. GFR  (AFRICAN AMERICAN): 8 ML/MIN/1.73 M^2
EST. GFR  (NON AFRICAN AMERICAN): 7 ML/MIN/1.73 M^2
ETHANOL SERPL-MCNC: <10 MG/DL
GLUCOSE SERPL-MCNC: 110 MG/DL (ref 70–110)
POTASSIUM SERPL-SCNC: 3.8 MMOL/L (ref 3.5–5.1)
PROT SERPL-MCNC: 7 G/DL (ref 6–8.4)
SODIUM SERPL-SCNC: 130 MMOL/L (ref 136–145)

## 2021-06-11 PROCEDURE — 82077 ASSAY SPEC XCP UR&BREATH IA: CPT | Mod: TXP | Performed by: PSYCHIATRY & NEUROLOGY

## 2021-06-11 PROCEDURE — 80321 ALCOHOLS BIOMARKERS 1OR 2: CPT | Mod: TXP | Performed by: PSYCHIATRY & NEUROLOGY

## 2021-06-11 PROCEDURE — 90853 GROUP PSYCHOTHERAPY: CPT | Mod: TXP | Performed by: SOCIAL WORKER

## 2021-06-11 PROCEDURE — 90853 GROUP PSYCHOTHERAPY: CPT | Mod: TXP

## 2021-06-11 PROCEDURE — 90853 PR GROUP PSYCHOTHERAPY: ICD-10-PCS | Mod: 95,NTX,, | Performed by: PSYCHOLOGIST

## 2021-06-11 PROCEDURE — 80307 DRUG TEST PRSMV CHEM ANLYZR: CPT | Mod: TXP | Performed by: PSYCHIATRY & NEUROLOGY

## 2021-06-11 PROCEDURE — 99232 PR SUBSEQUENT HOSPITAL CARE,LEVL II: ICD-10-PCS | Mod: 95,NTX,, | Performed by: PSYCHIATRY & NEUROLOGY

## 2021-06-11 PROCEDURE — 80053 COMPREHEN METABOLIC PANEL: CPT | Mod: TXP | Performed by: PSYCHIATRY & NEUROLOGY

## 2021-06-11 PROCEDURE — 90853 GROUP PSYCHOTHERAPY: CPT | Mod: 95,NTX,, | Performed by: PSYCHOLOGIST

## 2021-06-11 PROCEDURE — 99232 SBSQ HOSP IP/OBS MODERATE 35: CPT | Mod: 95,NTX,, | Performed by: PSYCHIATRY & NEUROLOGY

## 2021-06-14 ENCOUNTER — HOSPITAL ENCOUNTER (OUTPATIENT)
Dept: PSYCHIATRY | Facility: HOSPITAL | Age: 56
Discharge: HOME | End: 2021-06-14
Attending: PSYCHIATRY & NEUROLOGY
Payer: MEDICARE

## 2021-06-14 ENCOUNTER — LAB VISIT (OUTPATIENT)
Dept: LAB | Facility: HOSPITAL | Age: 56
End: 2021-06-14
Attending: PSYCHIATRY & NEUROLOGY
Payer: MEDICARE

## 2021-06-14 DIAGNOSIS — F12.20 CANNABIS USE DISORDER, SEVERE, DEPENDENCE: Primary | ICD-10-CM

## 2021-06-14 DIAGNOSIS — F12.10 CANNABIS USE DISORDER, MILD, ABUSE: ICD-10-CM

## 2021-06-14 DIAGNOSIS — F12.10 CANNABIS USE DISORDER, MILD, ABUSE: Primary | ICD-10-CM

## 2021-06-14 LAB
AMPHETAMINES SERPL QL: NEGATIVE
BARBITURATES SERPL QL SCN: NEGATIVE
BENZODIAZ SERPL QL SCN: NEGATIVE
BZE SERPL QL: NEGATIVE
CARBOXYTHC SERPL QL SCN: NEGATIVE
ETHANOL SERPL QL SCN: NEGATIVE
ETHANOL SERPL-MCNC: <10 MG/DL
METHADONE SERPL QL SCN: NEGATIVE
OPIATES SERPL QL SCN: NEGATIVE
PCP SERPL QL SCN: NEGATIVE
PROPOXYPH SERPL QL: NEGATIVE

## 2021-06-14 PROCEDURE — 80307 DRUG TEST PRSMV CHEM ANLYZR: CPT | Mod: TXP | Performed by: PSYCHIATRY & NEUROLOGY

## 2021-06-14 PROCEDURE — 36415 COLL VENOUS BLD VENIPUNCTURE: CPT | Mod: TXP | Performed by: PSYCHIATRY & NEUROLOGY

## 2021-06-14 PROCEDURE — G0177 OPPS/PHP; TRAIN & EDUC SERV: HCPCS | Mod: NTX

## 2021-06-14 PROCEDURE — 99231 SBSQ HOSP IP/OBS SF/LOW 25: CPT | Mod: GC,NTX,, | Performed by: PSYCHIATRY & NEUROLOGY

## 2021-06-14 PROCEDURE — 90853 GROUP PSYCHOTHERAPY: CPT | Mod: NTX | Performed by: SOCIAL WORKER

## 2021-06-14 PROCEDURE — 82077 ASSAY SPEC XCP UR&BREATH IA: CPT | Mod: TXP | Performed by: PSYCHIATRY & NEUROLOGY

## 2021-06-14 PROCEDURE — 90853 PR GROUP PSYCHOTHERAPY: ICD-10-PCS | Mod: TXP,,, | Performed by: PSYCHOLOGIST

## 2021-06-14 PROCEDURE — 99231 PR SUBSEQUENT HOSPITAL CARE,LEVL I: ICD-10-PCS | Mod: GC,NTX,, | Performed by: PSYCHIATRY & NEUROLOGY

## 2021-06-14 PROCEDURE — 90853 PR GROUP PSYCHOTHERAPY: ICD-10-PCS | Mod: NTX,,, | Performed by: PSYCHOLOGIST

## 2021-06-14 PROCEDURE — 90853 GROUP PSYCHOTHERAPY: CPT | Mod: TXP,,, | Performed by: PSYCHOLOGIST

## 2021-06-14 PROCEDURE — 90853 GROUP PSYCHOTHERAPY: CPT | Mod: NTX,,, | Performed by: PSYCHOLOGIST

## 2021-06-15 ENCOUNTER — HOSPITAL ENCOUNTER (OUTPATIENT)
Dept: PSYCHIATRY | Facility: HOSPITAL | Age: 56
Discharge: HOME | End: 2021-06-15
Attending: PSYCHIATRY & NEUROLOGY
Payer: MEDICARE

## 2021-06-15 DIAGNOSIS — F12.20 CANNABIS USE DISORDER, SEVERE, DEPENDENCE: Primary | ICD-10-CM

## 2021-06-15 PROCEDURE — 90853 GROUP PSYCHOTHERAPY: CPT | Mod: TXP,,, | Performed by: PSYCHOLOGIST

## 2021-06-15 PROCEDURE — 90853 PR GROUP PSYCHOTHERAPY: ICD-10-PCS | Mod: TXP,,, | Performed by: PSYCHOLOGIST

## 2021-06-15 PROCEDURE — 90853 GROUP PSYCHOTHERAPY: CPT | Mod: NTX

## 2021-06-16 ENCOUNTER — OFFICE VISIT (OUTPATIENT)
Dept: URGENT CARE | Facility: CLINIC | Age: 56
End: 2021-06-16
Payer: MEDICARE

## 2021-06-16 ENCOUNTER — HOSPITAL ENCOUNTER (OUTPATIENT)
Dept: PSYCHIATRY | Facility: HOSPITAL | Age: 56
Discharge: HOME | End: 2021-06-16
Attending: PSYCHIATRY & NEUROLOGY
Payer: MEDICARE

## 2021-06-16 ENCOUNTER — LAB VISIT (OUTPATIENT)
Dept: LAB | Facility: HOSPITAL | Age: 56
End: 2021-06-16
Attending: PSYCHIATRY & NEUROLOGY
Payer: MEDICARE

## 2021-06-16 VITALS
OXYGEN SATURATION: 98 % | DIASTOLIC BLOOD PRESSURE: 88 MMHG | RESPIRATION RATE: 20 BRPM | HEART RATE: 84 BPM | TEMPERATURE: 97 F | SYSTOLIC BLOOD PRESSURE: 148 MMHG

## 2021-06-16 DIAGNOSIS — R22.0 FACIAL SWELLING: ICD-10-CM

## 2021-06-16 DIAGNOSIS — K08.89 PAIN, DENTAL: Primary | ICD-10-CM

## 2021-06-16 DIAGNOSIS — F12.20 CANNABIS USE DISORDER, SEVERE, DEPENDENCE: ICD-10-CM

## 2021-06-16 DIAGNOSIS — F12.20 CANNABIS USE DISORDER, SEVERE, DEPENDENCE: Primary | ICD-10-CM

## 2021-06-16 LAB — ETHANOL SERPL-MCNC: <10 MG/DL

## 2021-06-16 PROCEDURE — 80307 DRUG TEST PRSMV CHEM ANLYZR: CPT | Mod: TXP | Performed by: PSYCHIATRY & NEUROLOGY

## 2021-06-16 PROCEDURE — 90853 GROUP PSYCHOTHERAPY: CPT | Mod: NTX | Performed by: SOCIAL WORKER

## 2021-06-16 PROCEDURE — 90853 GROUP PSYCHOTHERAPY: CPT | Mod: TXP,,, | Performed by: PSYCHOLOGIST

## 2021-06-16 PROCEDURE — 36415 COLL VENOUS BLD VENIPUNCTURE: CPT | Mod: TXP | Performed by: PSYCHIATRY & NEUROLOGY

## 2021-06-16 PROCEDURE — 90853 GROUP PSYCHOTHERAPY: CPT | Mod: 95,TXP,, | Performed by: PSYCHOLOGIST

## 2021-06-16 PROCEDURE — 99213 OFFICE O/P EST LOW 20 MIN: CPT | Mod: S$GLB,TXP,, | Performed by: PHYSICIAN ASSISTANT

## 2021-06-16 PROCEDURE — 82077 ASSAY SPEC XCP UR&BREATH IA: CPT | Mod: TXP | Performed by: PSYCHIATRY & NEUROLOGY

## 2021-06-16 PROCEDURE — 99213 PR OFFICE/OUTPT VISIT, EST, LEVL III, 20-29 MIN: ICD-10-PCS | Mod: S$GLB,TXP,, | Performed by: PHYSICIAN ASSISTANT

## 2021-06-16 PROCEDURE — 90853 PR GROUP PSYCHOTHERAPY: ICD-10-PCS | Mod: TXP,,, | Performed by: PSYCHOLOGIST

## 2021-06-16 PROCEDURE — 90853 GROUP PSYCHOTHERAPY: CPT | Mod: TXP

## 2021-06-16 PROCEDURE — 90853 PR GROUP PSYCHOTHERAPY: ICD-10-PCS | Mod: 95,TXP,, | Performed by: PSYCHOLOGIST

## 2021-06-16 RX ORDER — AMOXICILLIN 875 MG/1
875 TABLET, FILM COATED ORAL 2 TIMES DAILY
Qty: 10 TABLET | Refills: 0 | Status: SHIPPED | OUTPATIENT
Start: 2021-06-16 | End: 2021-06-21

## 2021-06-16 RX ORDER — HYDROCODONE BITARTRATE AND ACETAMINOPHEN 5; 325 MG/1; MG/1
1 TABLET ORAL EVERY 12 HOURS PRN
Qty: 5 TABLET | Refills: 0 | Status: SHIPPED | OUTPATIENT
Start: 2021-06-16 | End: 2021-06-21

## 2021-06-17 ENCOUNTER — HOSPITAL ENCOUNTER (OUTPATIENT)
Dept: PSYCHIATRY | Facility: HOSPITAL | Age: 56
Discharge: HOME | End: 2021-06-17
Attending: PSYCHIATRY & NEUROLOGY
Payer: MEDICARE

## 2021-06-17 DIAGNOSIS — F12.20 CANNABIS USE DISORDER, SEVERE, DEPENDENCE: Primary | ICD-10-CM

## 2021-06-17 PROCEDURE — 90853 GROUP PSYCHOTHERAPY: CPT | Mod: TXP,,, | Performed by: PSYCHOLOGIST

## 2021-06-17 PROCEDURE — 90853 GROUP PSYCHOTHERAPY: CPT | Mod: TXP | Performed by: SOCIAL WORKER

## 2021-06-17 PROCEDURE — 90853 PR GROUP PSYCHOTHERAPY: ICD-10-PCS | Mod: TXP,,, | Performed by: PSYCHOLOGIST

## 2021-06-17 PROCEDURE — 90853 GROUP PSYCHOTHERAPY: CPT | Mod: NTX

## 2021-06-18 ENCOUNTER — LAB VISIT (OUTPATIENT)
Dept: LAB | Facility: HOSPITAL | Age: 56
End: 2021-06-18
Attending: PSYCHIATRY & NEUROLOGY
Payer: MEDICARE

## 2021-06-18 ENCOUNTER — HOSPITAL ENCOUNTER (OUTPATIENT)
Dept: PSYCHIATRY | Facility: HOSPITAL | Age: 56
Discharge: HOME | End: 2021-06-18
Attending: PSYCHIATRY & NEUROLOGY
Payer: MEDICARE

## 2021-06-18 DIAGNOSIS — F12.20 CANNABIS USE DISORDER, SEVERE, DEPENDENCE: Primary | ICD-10-CM

## 2021-06-18 DIAGNOSIS — F12.20 CANNABIS USE DISORDER, SEVERE, DEPENDENCE: ICD-10-CM

## 2021-06-18 LAB — ETHANOL SERPL-MCNC: <10 MG/DL

## 2021-06-18 PROCEDURE — 99232 PR SUBSEQUENT HOSPITAL CARE,LEVL II: ICD-10-PCS | Mod: GC,NTX,, | Performed by: PSYCHIATRY & NEUROLOGY

## 2021-06-18 PROCEDURE — 99232 SBSQ HOSP IP/OBS MODERATE 35: CPT | Mod: GC,NTX,, | Performed by: PSYCHIATRY & NEUROLOGY

## 2021-06-18 PROCEDURE — 90853 GROUP PSYCHOTHERAPY: CPT | Mod: TXP | Performed by: SOCIAL WORKER

## 2021-06-18 PROCEDURE — 82077 ASSAY SPEC XCP UR&BREATH IA: CPT | Mod: TXP | Performed by: PSYCHIATRY & NEUROLOGY

## 2021-06-18 PROCEDURE — 90853 PR GROUP PSYCHOTHERAPY: ICD-10-PCS | Mod: TXP,,, | Performed by: PSYCHOLOGIST

## 2021-06-18 PROCEDURE — 90853 GROUP PSYCHOTHERAPY: CPT | Mod: TXP,,, | Performed by: PSYCHOLOGIST

## 2021-06-18 PROCEDURE — 80307 DRUG TEST PRSMV CHEM ANLYZR: CPT | Mod: TXP | Performed by: PSYCHIATRY & NEUROLOGY

## 2021-06-21 ENCOUNTER — HOSPITAL ENCOUNTER (EMERGENCY)
Facility: HOSPITAL | Age: 56
Discharge: HOME | End: 2021-06-21
Attending: EMERGENCY MEDICINE
Payer: MEDICARE

## 2021-06-21 ENCOUNTER — LAB VISIT (OUTPATIENT)
Dept: LAB | Facility: HOSPITAL | Age: 56
End: 2021-06-21
Attending: FAMILY MEDICINE
Payer: MEDICARE

## 2021-06-21 ENCOUNTER — HOSPITAL ENCOUNTER (OUTPATIENT)
Dept: PSYCHIATRY | Facility: HOSPITAL | Age: 56
Discharge: HOME | End: 2021-06-21
Attending: PSYCHIATRY & NEUROLOGY
Payer: MEDICARE

## 2021-06-21 VITALS
DIASTOLIC BLOOD PRESSURE: 67 MMHG | SYSTOLIC BLOOD PRESSURE: 108 MMHG | HEIGHT: 68 IN | RESPIRATION RATE: 20 BRPM | HEART RATE: 83 BPM | TEMPERATURE: 98 F | OXYGEN SATURATION: 98 % | WEIGHT: 120 LBS | BODY MASS INDEX: 18.19 KG/M2

## 2021-06-21 DIAGNOSIS — Z99.2 ESRD (END STAGE RENAL DISEASE) ON DIALYSIS: ICD-10-CM

## 2021-06-21 DIAGNOSIS — F12.20 CANNABIS USE DISORDER, SEVERE, DEPENDENCE: ICD-10-CM

## 2021-06-21 DIAGNOSIS — F12.20 CANNABIS USE DISORDER, SEVERE, DEPENDENCE: Primary | Chronic | ICD-10-CM

## 2021-06-21 DIAGNOSIS — F41.1 GAD (GENERALIZED ANXIETY DISORDER): Chronic | ICD-10-CM

## 2021-06-21 DIAGNOSIS — F17.200 TOBACCO USE DISORDER: Chronic | ICD-10-CM

## 2021-06-21 DIAGNOSIS — F10.21 ALCOHOL USE DISORDER, SEVERE, IN EARLY REMISSION: Chronic | ICD-10-CM

## 2021-06-21 DIAGNOSIS — S01.512A TONGUE LACERATION, INITIAL ENCOUNTER: Primary | ICD-10-CM

## 2021-06-21 DIAGNOSIS — N18.5 CKD (CHRONIC KIDNEY DISEASE) STAGE 5, GFR LESS THAN 15 ML/MIN: ICD-10-CM

## 2021-06-21 DIAGNOSIS — N18.6 ESRD (END STAGE RENAL DISEASE) ON DIALYSIS: ICD-10-CM

## 2021-06-21 PROCEDURE — 90853 PR GROUP PSYCHOTHERAPY: ICD-10-PCS | Mod: NTX,,, | Performed by: PSYCHOLOGIST

## 2021-06-21 PROCEDURE — 99232 SBSQ HOSP IP/OBS MODERATE 35: CPT | Mod: NTX,,, | Performed by: PSYCHIATRY & NEUROLOGY

## 2021-06-21 PROCEDURE — 99282 EMERGENCY DEPT VISIT SF MDM: CPT | Mod: 25,NTX

## 2021-06-21 PROCEDURE — 82077 ASSAY SPEC XCP UR&BREATH IA: CPT | Mod: TXP | Performed by: PSYCHIATRY & NEUROLOGY

## 2021-06-21 PROCEDURE — 90853 GROUP PSYCHOTHERAPY: CPT | Mod: NTX | Performed by: SOCIAL WORKER

## 2021-06-21 PROCEDURE — 80307 DRUG TEST PRSMV CHEM ANLYZR: CPT | Mod: TXP | Performed by: PSYCHIATRY & NEUROLOGY

## 2021-06-21 PROCEDURE — 36415 COLL VENOUS BLD VENIPUNCTURE: CPT | Mod: TXP | Performed by: PSYCHIATRY & NEUROLOGY

## 2021-06-21 PROCEDURE — 90853 GROUP PSYCHOTHERAPY: CPT | Mod: NTX,,, | Performed by: PSYCHOLOGIST

## 2021-06-21 PROCEDURE — 99232 PR SUBSEQUENT HOSPITAL CARE,LEVL II: ICD-10-PCS | Mod: NTX,,, | Performed by: PSYCHIATRY & NEUROLOGY

## 2021-06-22 ENCOUNTER — HOSPITAL ENCOUNTER (OUTPATIENT)
Dept: PSYCHIATRY | Facility: HOSPITAL | Age: 56
Discharge: HOME | End: 2021-06-22
Attending: PSYCHIATRY & NEUROLOGY
Payer: MEDICARE

## 2021-06-22 DIAGNOSIS — F12.20 CANNABIS USE DISORDER, SEVERE, DEPENDENCE: Primary | ICD-10-CM

## 2021-06-22 LAB
HLA FREEZE AND HOLD INTERPRETATION: NORMAL
HLAFH COLLECTION DATE: NORMAL
HPRA INTERPRETATION: NORMAL
PHOSPHATIDYLETHANOL (PETH): NEGATIVE NG/ML

## 2021-06-22 PROCEDURE — 90853 PR GROUP PSYCHOTHERAPY: ICD-10-PCS | Mod: TXP,,, | Performed by: PSYCHOLOGIST

## 2021-06-22 PROCEDURE — 90853 GROUP PSYCHOTHERAPY: CPT | Mod: NTX

## 2021-06-22 PROCEDURE — 90853 GROUP PSYCHOTHERAPY: CPT | Mod: TXP,,, | Performed by: PSYCHOLOGIST

## 2021-06-23 ENCOUNTER — LAB VISIT (OUTPATIENT)
Dept: LAB | Facility: HOSPITAL | Age: 56
End: 2021-06-23
Attending: PSYCHIATRY & NEUROLOGY
Payer: MEDICARE

## 2021-06-23 ENCOUNTER — HOSPITAL ENCOUNTER (OUTPATIENT)
Dept: PSYCHIATRY | Facility: HOSPITAL | Age: 56
Discharge: HOME | End: 2021-06-23
Attending: PSYCHIATRY & NEUROLOGY
Payer: MEDICARE

## 2021-06-23 DIAGNOSIS — F12.20 CANNABIS USE DISORDER, SEVERE, DEPENDENCE: Primary | Chronic | ICD-10-CM

## 2021-06-23 DIAGNOSIS — N18.6 ESRD (END STAGE RENAL DISEASE) ON DIALYSIS: ICD-10-CM

## 2021-06-23 DIAGNOSIS — F12.20 CANNABIS USE DISORDER, SEVERE, DEPENDENCE: Primary | ICD-10-CM

## 2021-06-23 DIAGNOSIS — F17.200 TOBACCO USE DISORDER: Chronic | ICD-10-CM

## 2021-06-23 DIAGNOSIS — F12.20 CANNABIS USE DISORDER, SEVERE, DEPENDENCE: ICD-10-CM

## 2021-06-23 DIAGNOSIS — Z99.2 ESRD (END STAGE RENAL DISEASE) ON DIALYSIS: ICD-10-CM

## 2021-06-23 DIAGNOSIS — N18.5 CKD (CHRONIC KIDNEY DISEASE) STAGE 5, GFR LESS THAN 15 ML/MIN: ICD-10-CM

## 2021-06-23 DIAGNOSIS — F10.21 ALCOHOL USE DISORDER, SEVERE, IN EARLY REMISSION: Chronic | ICD-10-CM

## 2021-06-23 LAB — ETHANOL SERPL-MCNC: <10 MG/DL

## 2021-06-23 PROCEDURE — 99232 PR SUBSEQUENT HOSPITAL CARE,LEVL II: ICD-10-PCS | Mod: NTX,,, | Performed by: PSYCHIATRY & NEUROLOGY

## 2021-06-23 PROCEDURE — 80307 DRUG TEST PRSMV CHEM ANLYZR: CPT | Mod: TXP | Performed by: PSYCHIATRY & NEUROLOGY

## 2021-06-23 PROCEDURE — 90853 GROUP PSYCHOTHERAPY: CPT | Mod: TXP

## 2021-06-23 PROCEDURE — 90853 GROUP PSYCHOTHERAPY: CPT | Mod: NTX,,, | Performed by: PSYCHOLOGIST

## 2021-06-23 PROCEDURE — 90853 GROUP PSYCHOTHERAPY: CPT | Mod: TXP | Performed by: SOCIAL WORKER

## 2021-06-23 PROCEDURE — 90853 PR GROUP PSYCHOTHERAPY: ICD-10-PCS | Mod: NTX,,, | Performed by: PSYCHOLOGIST

## 2021-06-23 PROCEDURE — 99232 SBSQ HOSP IP/OBS MODERATE 35: CPT | Mod: NTX,,, | Performed by: PSYCHIATRY & NEUROLOGY

## 2021-06-23 PROCEDURE — 82077 ASSAY SPEC XCP UR&BREATH IA: CPT | Mod: TXP | Performed by: PSYCHIATRY & NEUROLOGY

## 2021-06-24 ENCOUNTER — HOSPITAL ENCOUNTER (OUTPATIENT)
Dept: PSYCHIATRY | Facility: HOSPITAL | Age: 56
Discharge: HOME | End: 2021-06-24
Attending: PSYCHIATRY & NEUROLOGY
Payer: MEDICARE

## 2021-06-24 DIAGNOSIS — F12.20 CANNABIS USE DISORDER, SEVERE, DEPENDENCE: Primary | ICD-10-CM

## 2021-06-24 PROCEDURE — 90853 GROUP PSYCHOTHERAPY: CPT | Mod: TXP

## 2021-06-24 PROCEDURE — 90853 GROUP PSYCHOTHERAPY: CPT | Mod: TXP,,, | Performed by: PSYCHOLOGIST

## 2021-06-24 PROCEDURE — 90853 PR GROUP PSYCHOTHERAPY: ICD-10-PCS | Mod: TXP,,, | Performed by: PSYCHOLOGIST

## 2021-06-24 PROCEDURE — 90853 GROUP PSYCHOTHERAPY: CPT | Mod: TXP | Performed by: SOCIAL WORKER

## 2021-06-25 ENCOUNTER — TELEPHONE (OUTPATIENT)
Dept: FAMILY MEDICINE | Facility: CLINIC | Age: 56
End: 2021-06-25

## 2021-06-25 ENCOUNTER — LAB VISIT (OUTPATIENT)
Dept: LAB | Facility: HOSPITAL | Age: 56
End: 2021-06-25
Attending: PSYCHIATRY & NEUROLOGY
Payer: MEDICARE

## 2021-06-25 ENCOUNTER — HOSPITAL ENCOUNTER (OUTPATIENT)
Dept: PSYCHIATRY | Facility: HOSPITAL | Age: 56
Discharge: HOME | End: 2021-06-25
Attending: PSYCHIATRY & NEUROLOGY
Payer: MEDICARE

## 2021-06-25 DIAGNOSIS — N18.5 CKD (CHRONIC KIDNEY DISEASE) STAGE 5, GFR LESS THAN 15 ML/MIN: ICD-10-CM

## 2021-06-25 DIAGNOSIS — F41.1 GAD (GENERALIZED ANXIETY DISORDER): Chronic | ICD-10-CM

## 2021-06-25 DIAGNOSIS — N18.6 ESRD (END STAGE RENAL DISEASE) ON DIALYSIS: ICD-10-CM

## 2021-06-25 DIAGNOSIS — F12.20 CANNABIS USE DISORDER, SEVERE, DEPENDENCE: Primary | ICD-10-CM

## 2021-06-25 DIAGNOSIS — F17.200 TOBACCO USE DISORDER: Chronic | ICD-10-CM

## 2021-06-25 DIAGNOSIS — F12.20 CANNABIS USE DISORDER, SEVERE, DEPENDENCE: ICD-10-CM

## 2021-06-25 DIAGNOSIS — Z99.2 ESRD (END STAGE RENAL DISEASE) ON DIALYSIS: ICD-10-CM

## 2021-06-25 DIAGNOSIS — F12.20 CANNABIS USE DISORDER, SEVERE, DEPENDENCE: Chronic | ICD-10-CM

## 2021-06-25 DIAGNOSIS — F10.21 ALCOHOL USE DISORDER, SEVERE, IN EARLY REMISSION: Chronic | ICD-10-CM

## 2021-06-25 PROCEDURE — 80307 DRUG TEST PRSMV CHEM ANLYZR: CPT | Mod: TXP | Performed by: PSYCHIATRY & NEUROLOGY

## 2021-06-25 PROCEDURE — 99232 PR SUBSEQUENT HOSPITAL CARE,LEVL II: ICD-10-PCS | Mod: 95,NTX,, | Performed by: PSYCHIATRY & NEUROLOGY

## 2021-06-25 PROCEDURE — 82077 ASSAY SPEC XCP UR&BREATH IA: CPT | Mod: TXP | Performed by: PSYCHIATRY & NEUROLOGY

## 2021-06-25 PROCEDURE — 99232 SBSQ HOSP IP/OBS MODERATE 35: CPT | Mod: 95,NTX,, | Performed by: PSYCHIATRY & NEUROLOGY

## 2021-06-25 PROCEDURE — 36415 COLL VENOUS BLD VENIPUNCTURE: CPT | Mod: TXP | Performed by: PSYCHIATRY & NEUROLOGY

## 2021-06-28 ENCOUNTER — LAB VISIT (OUTPATIENT)
Dept: LAB | Facility: HOSPITAL | Age: 56
End: 2021-06-28
Attending: PSYCHIATRY & NEUROLOGY
Payer: MEDICARE

## 2021-06-28 ENCOUNTER — HOSPITAL ENCOUNTER (OUTPATIENT)
Dept: PSYCHIATRY | Facility: HOSPITAL | Age: 56
Discharge: HOME | End: 2021-06-28
Attending: PSYCHIATRY & NEUROLOGY
Payer: MEDICARE

## 2021-06-28 DIAGNOSIS — F17.200 TOBACCO USE DISORDER: Chronic | ICD-10-CM

## 2021-06-28 DIAGNOSIS — F12.20 CANNABIS USE DISORDER, SEVERE, DEPENDENCE: ICD-10-CM

## 2021-06-28 DIAGNOSIS — F41.1 GAD (GENERALIZED ANXIETY DISORDER): Chronic | ICD-10-CM

## 2021-06-28 DIAGNOSIS — F10.21 ALCOHOL USE DISORDER, SEVERE, IN EARLY REMISSION: Chronic | ICD-10-CM

## 2021-06-28 DIAGNOSIS — F12.20 CANNABIS USE DISORDER, SEVERE, DEPENDENCE: Primary | ICD-10-CM

## 2021-06-28 DIAGNOSIS — N18.6 ESRD (END STAGE RENAL DISEASE) ON DIALYSIS: ICD-10-CM

## 2021-06-28 DIAGNOSIS — N18.5 CKD (CHRONIC KIDNEY DISEASE) STAGE 5, GFR LESS THAN 15 ML/MIN: ICD-10-CM

## 2021-06-28 DIAGNOSIS — Z99.2 ESRD (END STAGE RENAL DISEASE) ON DIALYSIS: ICD-10-CM

## 2021-06-28 LAB
AMPHETAMINES SERPL QL: NEGATIVE
AMPHETAMINES SERPL QL: NEGATIVE
BARBITURATES SERPL QL SCN: NEGATIVE
BARBITURATES SERPL QL SCN: NEGATIVE
BENZODIAZ SERPL QL SCN: NEGATIVE
BENZODIAZ SERPL QL SCN: NEGATIVE
BZE SERPL QL: NEGATIVE
BZE SERPL QL: NEGATIVE
CARBOXYTHC SERPL QL SCN: NEGATIVE
CARBOXYTHC SERPL QL SCN: NEGATIVE
ETHANOL SERPL QL SCN: NEGATIVE
ETHANOL SERPL QL SCN: NEGATIVE
ETHANOL SERPL-MCNC: <10 MG/DL
METHADONE SERPL QL SCN: NEGATIVE
METHADONE SERPL QL SCN: NEGATIVE
OPIATES SERPL QL SCN: NEGATIVE
OPIATES SERPL QL SCN: POSITIVE
PCP SERPL QL SCN: NEGATIVE
PCP SERPL QL SCN: NEGATIVE
PROPOXYPH SERPL QL: NEGATIVE
PROPOXYPH SERPL QL: NEGATIVE

## 2021-06-28 PROCEDURE — 99232 PR SUBSEQUENT HOSPITAL CARE,LEVL II: ICD-10-PCS | Mod: 95,NTX,, | Performed by: PSYCHIATRY & NEUROLOGY

## 2021-06-28 PROCEDURE — 90853 GROUP PSYCHOTHERAPY: CPT | Mod: NTX

## 2021-06-28 PROCEDURE — 90853 GROUP PSYCHOTHERAPY: CPT | Mod: NTX | Performed by: SOCIAL WORKER

## 2021-06-28 PROCEDURE — 82077 ASSAY SPEC XCP UR&BREATH IA: CPT | Mod: TXP | Performed by: PSYCHIATRY & NEUROLOGY

## 2021-06-28 PROCEDURE — 80307 DRUG TEST PRSMV CHEM ANLYZR: CPT | Mod: TXP | Performed by: PSYCHIATRY & NEUROLOGY

## 2021-06-28 PROCEDURE — 90853 GROUP PSYCHOTHERAPY: CPT | Mod: 95,NTX,, | Performed by: PSYCHOLOGIST

## 2021-06-28 PROCEDURE — 90853 PR GROUP PSYCHOTHERAPY: ICD-10-PCS | Mod: 95,NTX,, | Performed by: PSYCHOLOGIST

## 2021-06-28 PROCEDURE — 36415 COLL VENOUS BLD VENIPUNCTURE: CPT | Mod: TXP | Performed by: PSYCHIATRY & NEUROLOGY

## 2021-06-28 PROCEDURE — 99232 SBSQ HOSP IP/OBS MODERATE 35: CPT | Mod: 95,NTX,, | Performed by: PSYCHIATRY & NEUROLOGY

## 2021-06-28 RX ORDER — MIRTAZAPINE 15 MG/1
15 TABLET, FILM COATED ORAL NIGHTLY
Qty: 30 TABLET | Refills: 0 | Status: SHIPPED | OUTPATIENT
Start: 2021-06-28 | End: 2021-07-28 | Stop reason: SDUPTHER

## 2021-06-28 RX ORDER — ESCITALOPRAM OXALATE 20 MG/1
20 TABLET ORAL DAILY
Qty: 30 TABLET | Refills: 0 | Status: SHIPPED | OUTPATIENT
Start: 2021-06-28 | End: 2021-07-28 | Stop reason: SDUPTHER

## 2021-06-28 RX ORDER — BUPROPION HYDROCHLORIDE 75 MG/1
75 TABLET ORAL DAILY
Qty: 30 TABLET | Refills: 0 | Status: SHIPPED | OUTPATIENT
Start: 2021-06-28 | End: 2021-07-28 | Stop reason: SDUPTHER

## 2021-06-29 ENCOUNTER — HOSPITAL ENCOUNTER (OUTPATIENT)
Dept: PSYCHIATRY | Facility: HOSPITAL | Age: 56
Discharge: HOME | End: 2021-06-29
Attending: PSYCHIATRY & NEUROLOGY
Payer: MEDICARE

## 2021-06-29 DIAGNOSIS — F12.20 CANNABIS USE DISORDER, SEVERE, DEPENDENCE: Primary | ICD-10-CM

## 2021-06-29 PROCEDURE — 90853 GROUP PSYCHOTHERAPY: CPT | Mod: NTX | Performed by: SOCIAL WORKER

## 2021-06-29 PROCEDURE — 90853 GROUP PSYCHOTHERAPY: CPT | Mod: TXP

## 2021-06-29 PROCEDURE — 90853 PR GROUP PSYCHOTHERAPY: ICD-10-PCS | Mod: TXP,,, | Performed by: PSYCHOLOGIST

## 2021-06-29 PROCEDURE — 90853 GROUP PSYCHOTHERAPY: CPT | Mod: TXP,,, | Performed by: PSYCHOLOGIST

## 2021-06-30 ENCOUNTER — HOSPITAL ENCOUNTER (OUTPATIENT)
Dept: PSYCHIATRY | Facility: HOSPITAL | Age: 56
Discharge: HOME | End: 2021-06-30
Attending: PSYCHIATRY & NEUROLOGY
Payer: MEDICARE

## 2021-06-30 ENCOUNTER — TELEPHONE (OUTPATIENT)
Dept: FAMILY MEDICINE | Facility: CLINIC | Age: 56
End: 2021-06-30

## 2021-06-30 ENCOUNTER — LAB VISIT (OUTPATIENT)
Dept: LAB | Facility: HOSPITAL | Age: 56
End: 2021-06-30
Attending: PSYCHIATRY & NEUROLOGY
Payer: MEDICARE

## 2021-06-30 DIAGNOSIS — N18.6 ESRD (END STAGE RENAL DISEASE) ON DIALYSIS: ICD-10-CM

## 2021-06-30 DIAGNOSIS — F10.21 ALCOHOL USE DISORDER, SEVERE, IN EARLY REMISSION: Chronic | ICD-10-CM

## 2021-06-30 DIAGNOSIS — F12.20 CANNABIS USE DISORDER, SEVERE, DEPENDENCE: Primary | ICD-10-CM

## 2021-06-30 DIAGNOSIS — N18.5 CKD (CHRONIC KIDNEY DISEASE) STAGE 5, GFR LESS THAN 15 ML/MIN: ICD-10-CM

## 2021-06-30 DIAGNOSIS — Z99.2 ESRD (END STAGE RENAL DISEASE) ON DIALYSIS: ICD-10-CM

## 2021-06-30 DIAGNOSIS — F17.200 TOBACCO USE DISORDER: Chronic | ICD-10-CM

## 2021-06-30 DIAGNOSIS — F12.20 CANNABIS USE DISORDER, SEVERE, DEPENDENCE: ICD-10-CM

## 2021-06-30 DIAGNOSIS — F41.1 GAD (GENERALIZED ANXIETY DISORDER): Chronic | ICD-10-CM

## 2021-06-30 LAB — ETHANOL SERPL-MCNC: <10 MG/DL

## 2021-06-30 PROCEDURE — 99232 PR SUBSEQUENT HOSPITAL CARE,LEVL II: ICD-10-PCS | Mod: 95,NTX,, | Performed by: PSYCHIATRY & NEUROLOGY

## 2021-06-30 PROCEDURE — 99232 SBSQ HOSP IP/OBS MODERATE 35: CPT | Mod: 95,NTX,, | Performed by: PSYCHIATRY & NEUROLOGY

## 2021-06-30 PROCEDURE — 90853 GROUP PSYCHOTHERAPY: CPT | Mod: TXP

## 2021-06-30 PROCEDURE — 90853 PR GROUP PSYCHOTHERAPY: ICD-10-PCS | Mod: 95,NTX,, | Performed by: PSYCHOLOGIST

## 2021-06-30 PROCEDURE — 80307 DRUG TEST PRSMV CHEM ANLYZR: CPT | Mod: TXP | Performed by: PSYCHIATRY & NEUROLOGY

## 2021-06-30 PROCEDURE — 90853 GROUP PSYCHOTHERAPY: CPT | Mod: TXP | Performed by: SOCIAL WORKER

## 2021-06-30 PROCEDURE — 90853 GROUP PSYCHOTHERAPY: CPT | Mod: 95,NTX,, | Performed by: PSYCHOLOGIST

## 2021-06-30 PROCEDURE — 82077 ASSAY SPEC XCP UR&BREATH IA: CPT | Mod: TXP | Performed by: PSYCHIATRY & NEUROLOGY

## 2021-06-30 PROCEDURE — 36415 COLL VENOUS BLD VENIPUNCTURE: CPT | Mod: TXP | Performed by: PSYCHIATRY & NEUROLOGY

## 2021-07-01 ENCOUNTER — HOSPITAL ENCOUNTER (OUTPATIENT)
Dept: PSYCHIATRY | Facility: HOSPITAL | Age: 56
Discharge: HOME | End: 2021-07-01
Attending: PSYCHIATRY & NEUROLOGY
Payer: MEDICARE

## 2021-07-01 DIAGNOSIS — F12.20 CANNABIS USE DISORDER, SEVERE, DEPENDENCE: Chronic | ICD-10-CM

## 2021-07-01 DIAGNOSIS — F10.20 ALCOHOL USE DISORDER, MODERATE, DEPENDENCE: ICD-10-CM

## 2021-07-01 LAB
AMPHETAMINES SERPL QL: NEGATIVE
BARBITURATES SERPL QL SCN: NEGATIVE
BENZODIAZ SERPL QL SCN: NEGATIVE
BZE SERPL QL: NEGATIVE
CARBOXYTHC SERPL QL SCN: NEGATIVE
ETHANOL SERPL QL SCN: NEGATIVE
METHADONE SERPL QL SCN: NEGATIVE
OPIATES SERPL QL SCN: POSITIVE
PCP SERPL QL SCN: NEGATIVE
PROPOXYPH SERPL QL: NEGATIVE

## 2021-07-01 PROCEDURE — 90853 GROUP PSYCHOTHERAPY: CPT | Mod: NTX | Performed by: SOCIAL WORKER

## 2021-07-01 PROCEDURE — 90853 PR GROUP PSYCHOTHERAPY: ICD-10-PCS | Mod: TXP,,, | Performed by: PSYCHOLOGIST

## 2021-07-01 PROCEDURE — 90853 GROUP PSYCHOTHERAPY: CPT | Mod: TXP,,, | Performed by: PSYCHOLOGIST

## 2021-07-02 ENCOUNTER — TELEPHONE (OUTPATIENT)
Dept: TRANSPLANT | Facility: CLINIC | Age: 56
End: 2021-07-02

## 2021-07-02 ENCOUNTER — HOSPITAL ENCOUNTER (OUTPATIENT)
Dept: PSYCHIATRY | Facility: HOSPITAL | Age: 56
Discharge: HOME | End: 2021-07-02
Attending: PSYCHIATRY & NEUROLOGY
Payer: COMMERCIAL

## 2021-07-02 ENCOUNTER — LAB VISIT (OUTPATIENT)
Dept: LAB | Facility: HOSPITAL | Age: 56
End: 2021-07-02
Attending: PSYCHIATRY & NEUROLOGY
Payer: MEDICARE

## 2021-07-02 DIAGNOSIS — F12.20 CANNABIS USE DISORDER, SEVERE, DEPENDENCE: Chronic | ICD-10-CM

## 2021-07-02 DIAGNOSIS — Z99.2 ESRD (END STAGE RENAL DISEASE) ON DIALYSIS: ICD-10-CM

## 2021-07-02 DIAGNOSIS — F12.20 CANNABIS USE DISORDER, SEVERE, DEPENDENCE: Primary | ICD-10-CM

## 2021-07-02 DIAGNOSIS — F17.200 TOBACCO USE DISORDER: Chronic | ICD-10-CM

## 2021-07-02 DIAGNOSIS — N18.5 CKD (CHRONIC KIDNEY DISEASE) STAGE 5, GFR LESS THAN 15 ML/MIN: ICD-10-CM

## 2021-07-02 DIAGNOSIS — F12.20 CANNABIS USE DISORDER, SEVERE, DEPENDENCE: ICD-10-CM

## 2021-07-02 DIAGNOSIS — F10.21 ALCOHOL USE DISORDER, SEVERE, IN EARLY REMISSION: Primary | Chronic | ICD-10-CM

## 2021-07-02 DIAGNOSIS — N18.6 ESRD (END STAGE RENAL DISEASE) ON DIALYSIS: ICD-10-CM

## 2021-07-02 DIAGNOSIS — F41.1 GAD (GENERALIZED ANXIETY DISORDER): Chronic | ICD-10-CM

## 2021-07-02 LAB — ETHANOL SERPL-MCNC: <10 MG/DL

## 2021-07-02 PROCEDURE — 99239 HOSP IP/OBS DSCHRG MGMT >30: CPT | Mod: NTX,,, | Performed by: PSYCHIATRY & NEUROLOGY

## 2021-07-02 PROCEDURE — 90853 GROUP PSYCHOTHERAPY: CPT | Mod: NTX

## 2021-07-02 PROCEDURE — 80307 DRUG TEST PRSMV CHEM ANLYZR: CPT | Mod: TXP | Performed by: PSYCHIATRY & NEUROLOGY

## 2021-07-02 PROCEDURE — 99239 PR HOSPITAL DISCHARGE DAY,>30 MIN: ICD-10-PCS | Mod: NTX,,, | Performed by: PSYCHIATRY & NEUROLOGY

## 2021-07-02 PROCEDURE — 82077 ASSAY SPEC XCP UR&BREATH IA: CPT | Mod: TXP | Performed by: PSYCHIATRY & NEUROLOGY

## 2021-07-06 DIAGNOSIS — Z76.82 ORGAN TRANSPLANT CANDIDATE: Primary | ICD-10-CM

## 2021-07-08 ENCOUNTER — PATIENT OUTREACH (OUTPATIENT)
Dept: ADMINISTRATIVE | Facility: OTHER | Age: 56
End: 2021-07-08

## 2021-07-08 PROCEDURE — 86829 HLA CLASS I/II ANTIBODY QUAL: CPT | Mod: 91,PO,TXP | Performed by: NURSE PRACTITIONER

## 2021-07-08 PROCEDURE — 86829 HLA CLASS I/II ANTIBODY QUAL: CPT | Mod: PO,TXP | Performed by: NURSE PRACTITIONER

## 2021-07-12 ENCOUNTER — PATIENT MESSAGE (OUTPATIENT)
Dept: PSYCHIATRY | Facility: CLINIC | Age: 56
End: 2021-07-12

## 2021-07-12 ENCOUNTER — LAB VISIT (OUTPATIENT)
Dept: LAB | Facility: HOSPITAL | Age: 56
End: 2021-07-12
Payer: MEDICARE

## 2021-07-12 DIAGNOSIS — Z76.82 PRE-KIDNEY TRANSPLANT, LISTED: ICD-10-CM

## 2021-07-14 LAB — HPRA INTERPRETATION: NORMAL

## 2021-07-16 ENCOUNTER — TELEPHONE (OUTPATIENT)
Dept: TRANSPLANT | Facility: CLINIC | Age: 56
End: 2021-07-16

## 2021-07-16 ENCOUNTER — TELEPHONE (OUTPATIENT)
Dept: HEMATOLOGY/ONCOLOGY | Facility: CLINIC | Age: 56
End: 2021-07-16

## 2021-07-16 RX ORDER — NAPROXEN SODIUM 220 MG/1
81 TABLET, FILM COATED ORAL DAILY
Qty: 30 TABLET | Refills: 11 | Status: CANCELLED | OUTPATIENT
Start: 2021-07-16 | End: 2022-07-16

## 2021-07-16 RX ORDER — CLOPIDOGREL BISULFATE 75 MG/1
75 TABLET ORAL DAILY
Qty: 30 TABLET | Refills: 11 | Status: CANCELLED | OUTPATIENT
Start: 2021-07-16 | End: 2022-07-16

## 2021-07-19 ENCOUNTER — PATIENT MESSAGE (OUTPATIENT)
Dept: PSYCHIATRY | Facility: CLINIC | Age: 56
End: 2021-07-19

## 2021-07-20 RX ORDER — NAPROXEN SODIUM 220 MG/1
81 TABLET, FILM COATED ORAL DAILY
Qty: 30 TABLET | Refills: 11 | OUTPATIENT
Start: 2021-07-20 | End: 2022-07-20

## 2021-07-20 RX ORDER — CLOPIDOGREL BISULFATE 75 MG/1
75 TABLET ORAL DAILY
Qty: 30 TABLET | Refills: 11 | Status: CANCELLED | OUTPATIENT
Start: 2021-07-16 | End: 2022-07-16

## 2021-07-20 RX ORDER — CLOPIDOGREL BISULFATE 75 MG/1
75 TABLET ORAL DAILY
Qty: 30 TABLET | Refills: 11 | OUTPATIENT
Start: 2021-07-20 | End: 2022-07-20

## 2021-07-20 RX ORDER — NAPROXEN SODIUM 220 MG/1
81 TABLET, FILM COATED ORAL DAILY
Qty: 30 TABLET | Refills: 11 | Status: CANCELLED | OUTPATIENT
Start: 2021-07-16 | End: 2022-07-16

## 2021-07-21 ENCOUNTER — OFFICE VISIT (OUTPATIENT)
Dept: CARDIOLOGY | Facility: CLINIC | Age: 56
End: 2021-07-21
Payer: MEDICARE

## 2021-07-21 ENCOUNTER — HOSPITAL ENCOUNTER (OUTPATIENT)
Dept: RADIOLOGY | Facility: HOSPITAL | Age: 56
Discharge: HOME | End: 2021-07-21
Attending: NURSE PRACTITIONER
Payer: MEDICARE

## 2021-07-21 VITALS
BODY MASS INDEX: 18.61 KG/M2 | OXYGEN SATURATION: 91 % | WEIGHT: 122.81 LBS | DIASTOLIC BLOOD PRESSURE: 91 MMHG | SYSTOLIC BLOOD PRESSURE: 136 MMHG | HEART RATE: 73 BPM | HEIGHT: 68 IN

## 2021-07-21 DIAGNOSIS — Z12.31 ENCOUNTER FOR SCREENING MAMMOGRAM FOR BREAST CANCER: ICD-10-CM

## 2021-07-21 DIAGNOSIS — N18.5 CKD (CHRONIC KIDNEY DISEASE) STAGE 5, GFR LESS THAN 15 ML/MIN: ICD-10-CM

## 2021-07-21 DIAGNOSIS — I10 ESSENTIAL HYPERTENSION: ICD-10-CM

## 2021-07-21 DIAGNOSIS — E78.00 PURE HYPERCHOLESTEROLEMIA: Primary | ICD-10-CM

## 2021-07-21 DIAGNOSIS — Z01.818 PREOPERATIVE CLEARANCE: ICD-10-CM

## 2021-07-21 DIAGNOSIS — Z76.82 ORGAN TRANSPLANT CANDIDATE: ICD-10-CM

## 2021-07-21 DIAGNOSIS — R01.1 MURMUR, CARDIAC: ICD-10-CM

## 2021-07-21 PROCEDURE — 99214 OFFICE O/P EST MOD 30 MIN: CPT | Mod: S$PBB,TXP,, | Performed by: INTERNAL MEDICINE

## 2021-07-21 PROCEDURE — 77067 SCR MAMMO BI INCL CAD: CPT | Mod: TC,TXP

## 2021-07-21 PROCEDURE — 77067 SCR MAMMO BI INCL CAD: CPT | Mod: 26,TXP,, | Performed by: RADIOLOGY

## 2021-07-21 PROCEDURE — 99214 PR OFFICE/OUTPT VISIT, EST, LEVL IV, 30-39 MIN: ICD-10-PCS | Mod: S$PBB,TXP,, | Performed by: INTERNAL MEDICINE

## 2021-07-21 PROCEDURE — 77067 MAMMO DIGITAL SCREENING BILAT WITH TOMO: ICD-10-PCS | Mod: 26,TXP,, | Performed by: RADIOLOGY

## 2021-07-21 PROCEDURE — 99999 PR PBB SHADOW E&M-EST. PATIENT-LVL IV: ICD-10-PCS | Mod: PBBFAC,TXP,, | Performed by: INTERNAL MEDICINE

## 2021-07-21 PROCEDURE — 99999 PR PBB SHADOW E&M-EST. PATIENT-LVL IV: CPT | Mod: PBBFAC,TXP,, | Performed by: INTERNAL MEDICINE

## 2021-07-21 PROCEDURE — 77063 BREAST TOMOSYNTHESIS BI: CPT | Mod: 26,TXP,, | Performed by: RADIOLOGY

## 2021-07-21 PROCEDURE — 77063 MAMMO DIGITAL SCREENING BILAT WITH TOMO: ICD-10-PCS | Mod: 26,TXP,, | Performed by: RADIOLOGY

## 2021-07-21 RX ORDER — HYDROCODONE BITARTRATE AND ACETAMINOPHEN 5; 325 MG/1; MG/1
1 TABLET ORAL EVERY 6 HOURS PRN
COMMUNITY
End: 2021-12-11

## 2021-07-21 RX ORDER — FLUTICASONE PROPIONATE 50 MCG
1 SPRAY, SUSPENSION (ML) NASAL DAILY PRN
COMMUNITY
Start: 2021-06-06

## 2021-07-22 PROBLEM — Z01.818 PREOPERATIVE CLEARANCE: Status: ACTIVE | Noted: 2021-07-22

## 2021-07-28 ENCOUNTER — OFFICE VISIT (OUTPATIENT)
Dept: PSYCHIATRY | Facility: CLINIC | Age: 56
End: 2021-07-28
Payer: MEDICARE

## 2021-07-28 VITALS
HEART RATE: 62 BPM | BODY MASS INDEX: 18.36 KG/M2 | HEIGHT: 68 IN | WEIGHT: 121.13 LBS | SYSTOLIC BLOOD PRESSURE: 129 MMHG | DIASTOLIC BLOOD PRESSURE: 86 MMHG

## 2021-07-28 DIAGNOSIS — N18.5 CKD (CHRONIC KIDNEY DISEASE) STAGE 5, GFR LESS THAN 15 ML/MIN: ICD-10-CM

## 2021-07-28 DIAGNOSIS — F32.A DEPRESSION, UNSPECIFIED DEPRESSION TYPE: ICD-10-CM

## 2021-07-28 DIAGNOSIS — F12.21 CANNABIS USE DISORDER, MODERATE, IN EARLY REMISSION: ICD-10-CM

## 2021-07-28 DIAGNOSIS — F10.21 ALCOHOL USE DISORDER, SEVERE, IN EARLY REMISSION: ICD-10-CM

## 2021-07-28 DIAGNOSIS — F17.200 TOBACCO USE DISORDER: ICD-10-CM

## 2021-07-28 DIAGNOSIS — F41.1 GAD (GENERALIZED ANXIETY DISORDER): Primary | ICD-10-CM

## 2021-07-28 PROCEDURE — 90792 PSYCH DIAG EVAL W/MED SRVCS: CPT | Mod: NTX,,, | Performed by: NURSE PRACTITIONER

## 2021-07-28 PROCEDURE — 99999 PR PBB SHADOW E&M-EST. PATIENT-LVL III: ICD-10-PCS | Mod: PBBFAC,TXP,, | Performed by: NURSE PRACTITIONER

## 2021-07-28 PROCEDURE — 99999 PR PBB SHADOW E&M-EST. PATIENT-LVL III: CPT | Mod: PBBFAC,TXP,, | Performed by: NURSE PRACTITIONER

## 2021-07-28 PROCEDURE — 90792 PR PSYCHIATRIC DIAGNOSTIC EVALUATION W/MEDICAL SERVICES: ICD-10-PCS | Mod: NTX,,, | Performed by: NURSE PRACTITIONER

## 2021-07-28 RX ORDER — MIRTAZAPINE 15 MG/1
15 TABLET, FILM COATED ORAL NIGHTLY
Qty: 90 TABLET | Refills: 0 | Status: SHIPPED | OUTPATIENT
Start: 2021-07-28 | End: 2022-01-12

## 2021-07-28 RX ORDER — BUPROPION HYDROCHLORIDE 75 MG/1
75 TABLET ORAL DAILY
Qty: 90 TABLET | Refills: 0 | Status: SHIPPED | OUTPATIENT
Start: 2021-07-28 | End: 2021-11-05

## 2021-07-28 RX ORDER — ESCITALOPRAM OXALATE 20 MG/1
20 TABLET ORAL DAILY
Qty: 90 TABLET | Refills: 0 | Status: SHIPPED | OUTPATIENT
Start: 2021-07-28 | End: 2021-11-29

## 2021-08-02 ENCOUNTER — OFFICE VISIT (OUTPATIENT)
Dept: PSYCHIATRY | Facility: CLINIC | Age: 56
End: 2021-08-02
Payer: MEDICARE

## 2021-08-02 DIAGNOSIS — F12.21 CANNABIS USE DISORDER, MODERATE, IN EARLY REMISSION: ICD-10-CM

## 2021-08-02 DIAGNOSIS — F41.1 GAD (GENERALIZED ANXIETY DISORDER): Primary | ICD-10-CM

## 2021-08-02 DIAGNOSIS — F32.A DEPRESSION, UNSPECIFIED DEPRESSION TYPE: ICD-10-CM

## 2021-08-02 DIAGNOSIS — F10.20 ALCOHOL USE DISORDER, MODERATE, DEPENDENCE: ICD-10-CM

## 2021-08-02 PROCEDURE — 90837 PR PSYCHOTHERAPY W/PATIENT, 60 MIN: ICD-10-PCS | Mod: 95,NTX,, | Performed by: SOCIAL WORKER

## 2021-08-02 PROCEDURE — 90837 PSYTX W PT 60 MINUTES: CPT | Mod: 95,NTX,, | Performed by: SOCIAL WORKER

## 2021-08-12 ENCOUNTER — CLINICAL SUPPORT (OUTPATIENT)
Dept: SMOKING CESSATION | Facility: CLINIC | Age: 56
End: 2021-08-12
Payer: COMMERCIAL

## 2021-08-12 DIAGNOSIS — F17.200 NICOTINE DEPENDENCE: Primary | ICD-10-CM

## 2021-08-12 PROCEDURE — 99407 BEHAV CHNG SMOKING > 10 MIN: CPT | Mod: S$PBB,NTX,,

## 2021-08-12 PROCEDURE — 99407 PR TOBACCO USE CESSATION INTENSIVE >10 MINUTES: ICD-10-PCS | Mod: S$PBB,NTX,,

## 2021-08-16 ENCOUNTER — PATIENT OUTREACH (OUTPATIENT)
Dept: ADMINISTRATIVE | Facility: OTHER | Age: 56
End: 2021-08-16

## 2021-08-18 ENCOUNTER — PATIENT MESSAGE (OUTPATIENT)
Dept: PHARMACY | Facility: CLINIC | Age: 56
End: 2021-08-18

## 2021-08-18 ENCOUNTER — OFFICE VISIT (OUTPATIENT)
Dept: FAMILY MEDICINE | Facility: CLINIC | Age: 56
End: 2021-08-18
Attending: FAMILY MEDICINE
Payer: MEDICARE

## 2021-08-18 ENCOUNTER — OFFICE VISIT (OUTPATIENT)
Dept: OBSTETRICS AND GYNECOLOGY | Facility: CLINIC | Age: 56
End: 2021-08-18
Payer: MEDICARE

## 2021-08-18 VITALS
WEIGHT: 128.31 LBS | DIASTOLIC BLOOD PRESSURE: 82 MMHG | SYSTOLIC BLOOD PRESSURE: 140 MMHG | BODY MASS INDEX: 19.51 KG/M2

## 2021-08-18 VITALS
DIASTOLIC BLOOD PRESSURE: 80 MMHG | OXYGEN SATURATION: 99 % | BODY MASS INDEX: 19.32 KG/M2 | SYSTOLIC BLOOD PRESSURE: 139 MMHG | HEART RATE: 73 BPM | HEIGHT: 68 IN | WEIGHT: 127.44 LBS

## 2021-08-18 DIAGNOSIS — Z76.82 ORGAN TRANSPLANT CANDIDATE: ICD-10-CM

## 2021-08-18 DIAGNOSIS — J01.90 ACUTE BACTERIAL SINUSITIS: ICD-10-CM

## 2021-08-18 DIAGNOSIS — Z99.2 ESRD (END STAGE RENAL DISEASE) ON DIALYSIS: ICD-10-CM

## 2021-08-18 DIAGNOSIS — F41.1 GAD (GENERALIZED ANXIETY DISORDER): ICD-10-CM

## 2021-08-18 DIAGNOSIS — N25.81 SECONDARY HYPERPARATHYROIDISM: ICD-10-CM

## 2021-08-18 DIAGNOSIS — N18.6 ESRD (END STAGE RENAL DISEASE) ON DIALYSIS: ICD-10-CM

## 2021-08-18 DIAGNOSIS — N18.5 CKD (CHRONIC KIDNEY DISEASE) STAGE 5, GFR LESS THAN 15 ML/MIN: ICD-10-CM

## 2021-08-18 DIAGNOSIS — Z01.419 ROUTINE GYNECOLOGICAL EXAMINATION: ICD-10-CM

## 2021-08-18 DIAGNOSIS — C90.01 MULTIPLE MYELOMA IN REMISSION: ICD-10-CM

## 2021-08-18 DIAGNOSIS — B96.89 ACUTE BACTERIAL SINUSITIS: ICD-10-CM

## 2021-08-18 DIAGNOSIS — Z12.11 ENCOUNTER FOR FIT (FECAL IMMUNOCHEMICAL TEST) SCREENING: ICD-10-CM

## 2021-08-18 DIAGNOSIS — F33.2 SEVERE EPISODE OF RECURRENT MAJOR DEPRESSIVE DISORDER, WITHOUT PSYCHOTIC FEATURES: ICD-10-CM

## 2021-08-18 DIAGNOSIS — Z00.00 ROUTINE GENERAL MEDICAL EXAMINATION AT HEALTH CARE FACILITY: Primary | ICD-10-CM

## 2021-08-18 DIAGNOSIS — Z12.4 CERVICAL CANCER SCREENING: Primary | ICD-10-CM

## 2021-08-18 PROCEDURE — 87624 HPV HI-RISK TYP POOLED RSLT: CPT | Performed by: OBSTETRICS & GYNECOLOGY

## 2021-08-18 PROCEDURE — 99396 PR PREVENTIVE VISIT,EST,40-64: ICD-10-PCS | Mod: S$PBB,,, | Performed by: FAMILY MEDICINE

## 2021-08-18 PROCEDURE — 99999 PR PBB SHADOW E&M-EST. PATIENT-LVL III: CPT | Mod: PBBFAC,,, | Performed by: OBSTETRICS & GYNECOLOGY

## 2021-08-18 PROCEDURE — 99999 PR PBB SHADOW E&M-EST. PATIENT-LVL V: ICD-10-PCS | Mod: PBBFAC,,, | Performed by: FAMILY MEDICINE

## 2021-08-18 PROCEDURE — 99999 PR PBB SHADOW E&M-EST. PATIENT-LVL V: CPT | Mod: PBBFAC,,, | Performed by: FAMILY MEDICINE

## 2021-08-18 PROCEDURE — 99213 OFFICE O/P EST LOW 20 MIN: CPT | Mod: PBBFAC,PO | Performed by: OBSTETRICS & GYNECOLOGY

## 2021-08-18 PROCEDURE — G0101 CA SCREEN;PELVIC/BREAST EXAM: HCPCS | Mod: S$PBB,,, | Performed by: OBSTETRICS & GYNECOLOGY

## 2021-08-18 PROCEDURE — G0101 PR CA SCREEN;PELVIC/BREAST EXAM: ICD-10-PCS | Mod: S$PBB,,, | Performed by: OBSTETRICS & GYNECOLOGY

## 2021-08-18 PROCEDURE — 88175 CYTOPATH C/V AUTO FLUID REDO: CPT | Performed by: OBSTETRICS & GYNECOLOGY

## 2021-08-18 PROCEDURE — 99999 PR PBB SHADOW E&M-EST. PATIENT-LVL III: ICD-10-PCS | Mod: PBBFAC,,, | Performed by: OBSTETRICS & GYNECOLOGY

## 2021-08-18 PROCEDURE — 99396 PREV VISIT EST AGE 40-64: CPT | Mod: S$PBB,,, | Performed by: FAMILY MEDICINE

## 2021-08-18 RX ORDER — SEVELAMER CARBONATE 800 MG/1
800 TABLET, FILM COATED ORAL
COMMUNITY
Start: 2021-08-09

## 2021-08-18 RX ORDER — CARVEDILOL 25 MG/1
TABLET ORAL
COMMUNITY
Start: 2021-04-02 | End: 2021-08-18 | Stop reason: SDUPTHER

## 2021-08-18 RX ORDER — ACETAMINOPHEN AND CODEINE PHOSPHATE 300; 30 MG/1; MG/1
1 TABLET ORAL EVERY 6 HOURS PRN
COMMUNITY
Start: 2021-06-23 | End: 2021-12-11

## 2021-08-18 RX ORDER — CLOPIDOGREL BISULFATE 75 MG/1
75 TABLET ORAL
COMMUNITY
Start: 2021-07-22 | End: 2022-09-27 | Stop reason: SDUPTHER

## 2021-08-18 RX ORDER — ESCITALOPRAM OXALATE 20 MG/1
1 TABLET ORAL DAILY
COMMUNITY
Start: 2021-05-07 | End: 2021-08-18 | Stop reason: SDUPTHER

## 2021-08-18 RX ORDER — SEVELAMER CARBONATE 800 MG/1
800 TABLET, FILM COATED ORAL 3 TIMES DAILY
Status: ON HOLD | COMMUNITY
Start: 2021-07-30 | End: 2022-08-18 | Stop reason: HOSPADM

## 2021-08-18 RX ORDER — LEVOFLOXACIN 250 MG/1
TABLET ORAL
Qty: 6 TABLET | Refills: 0 | Status: SHIPPED | OUTPATIENT
Start: 2021-08-18 | End: 2021-12-11

## 2021-08-20 ENCOUNTER — HOSPITAL ENCOUNTER (OUTPATIENT)
Dept: RADIOLOGY | Facility: HOSPITAL | Age: 56
Discharge: HOME | End: 2021-08-20
Attending: NURSE PRACTITIONER
Payer: MEDICARE

## 2021-08-20 DIAGNOSIS — Z76.82 ORGAN TRANSPLANT CANDIDATE: ICD-10-CM

## 2021-08-20 PROCEDURE — 76770 US RETROPERITONEAL COMPLETE: ICD-10-PCS | Mod: 26,TXP,, | Performed by: RADIOLOGY

## 2021-08-20 PROCEDURE — 72170 X-RAY EXAM OF PELVIS: CPT | Mod: TC,FY,NTX

## 2021-08-20 PROCEDURE — 76770 US EXAM ABDO BACK WALL COMP: CPT | Mod: 26,TXP,, | Performed by: RADIOLOGY

## 2021-08-20 PROCEDURE — 71046 X-RAY EXAM CHEST 2 VIEWS: CPT | Mod: TC,FY,TXP

## 2021-08-20 PROCEDURE — 72170 X-RAY EXAM OF PELVIS: CPT | Mod: 26,TXP,, | Performed by: RADIOLOGY

## 2021-08-20 PROCEDURE — 72170 XR PELVIS ROUTINE AP: ICD-10-PCS | Mod: 26,TXP,, | Performed by: RADIOLOGY

## 2021-08-20 PROCEDURE — 76770 US EXAM ABDO BACK WALL COMP: CPT | Mod: TC,TXP

## 2021-08-20 PROCEDURE — 71046 X-RAY EXAM CHEST 2 VIEWS: CPT | Mod: 26,TXP,, | Performed by: RADIOLOGY

## 2021-08-20 PROCEDURE — 71046 XR CHEST PA AND LATERAL: ICD-10-PCS | Mod: 26,TXP,, | Performed by: RADIOLOGY

## 2021-08-27 LAB
FINAL PATHOLOGIC DIAGNOSIS: NORMAL
HPV HR 12 DNA SPEC QL NAA+PROBE: POSITIVE
HPV16 AG SPEC QL: NEGATIVE
HPV18 DNA SPEC QL NAA+PROBE: NEGATIVE
Lab: NORMAL

## 2021-09-08 NOTE — ASSESSMENT & PLAN NOTE
Unable to undergo HD due to suspected stenosis vs clot, NPO for fistulagram today with HD to follow for volume removal    home w/ home PT

## 2021-09-13 ENCOUNTER — TELEPHONE (OUTPATIENT)
Dept: HEMATOLOGY/ONCOLOGY | Facility: CLINIC | Age: 56
End: 2021-09-13

## 2021-10-06 ENCOUNTER — TELEPHONE (OUTPATIENT)
Dept: CARDIOLOGY | Facility: CLINIC | Age: 56
End: 2021-10-06

## 2021-10-07 ENCOUNTER — TELEPHONE (OUTPATIENT)
Dept: TRANSPLANT | Facility: CLINIC | Age: 56
End: 2021-10-07

## 2021-10-11 ENCOUNTER — DOCUMENTATION ONLY (OUTPATIENT)
Dept: PSYCHIATRY | Facility: CLINIC | Age: 56
End: 2021-10-11

## 2021-10-13 ENCOUNTER — TELEPHONE (OUTPATIENT)
Dept: TRANSPLANT | Facility: CLINIC | Age: 56
End: 2021-10-13

## 2021-10-13 ENCOUNTER — LAB VISIT (OUTPATIENT)
Dept: LAB | Facility: HOSPITAL | Age: 56
End: 2021-10-13
Attending: INTERNAL MEDICINE
Payer: MEDICARE

## 2021-10-13 DIAGNOSIS — C90.00 MULTIPLE MYELOMA, REMISSION STATUS UNSPECIFIED: ICD-10-CM

## 2021-10-22 ENCOUNTER — OFFICE VISIT (OUTPATIENT)
Dept: HEMATOLOGY/ONCOLOGY | Facility: CLINIC | Age: 56
End: 2021-10-22
Payer: MEDICARE

## 2021-10-22 VITALS
DIASTOLIC BLOOD PRESSURE: 62 MMHG | TEMPERATURE: 98 F | SYSTOLIC BLOOD PRESSURE: 105 MMHG | HEART RATE: 69 BPM | BODY MASS INDEX: 19.05 KG/M2 | HEIGHT: 68 IN | WEIGHT: 125.69 LBS

## 2021-10-22 DIAGNOSIS — Z99.2 ESRD (END STAGE RENAL DISEASE) ON DIALYSIS: ICD-10-CM

## 2021-10-22 DIAGNOSIS — N18.6 ESRD (END STAGE RENAL DISEASE) ON DIALYSIS: ICD-10-CM

## 2021-10-22 DIAGNOSIS — Z99.2 ANEMIA IN CHRONIC KIDNEY DISEASE, ON CHRONIC DIALYSIS: ICD-10-CM

## 2021-10-22 DIAGNOSIS — C90.00 MULTIPLE MYELOMA, REMISSION STATUS UNSPECIFIED: Primary | ICD-10-CM

## 2021-10-22 DIAGNOSIS — N18.6 ANEMIA IN CHRONIC KIDNEY DISEASE, ON CHRONIC DIALYSIS: ICD-10-CM

## 2021-10-22 DIAGNOSIS — D63.1 ANEMIA IN CHRONIC KIDNEY DISEASE, ON CHRONIC DIALYSIS: ICD-10-CM

## 2021-10-22 PROCEDURE — 99214 PR OFFICE/OUTPT VISIT, EST, LEVL IV, 30-39 MIN: ICD-10-PCS | Mod: S$PBB,,, | Performed by: INTERNAL MEDICINE

## 2021-10-22 PROCEDURE — 99999 PR PBB SHADOW E&M-EST. PATIENT-LVL IV: CPT | Mod: PBBFAC,,, | Performed by: INTERNAL MEDICINE

## 2021-10-22 PROCEDURE — 99999 PR PBB SHADOW E&M-EST. PATIENT-LVL IV: ICD-10-PCS | Mod: PBBFAC,,, | Performed by: INTERNAL MEDICINE

## 2021-10-22 PROCEDURE — 99214 OFFICE O/P EST MOD 30 MIN: CPT | Mod: S$PBB,,, | Performed by: INTERNAL MEDICINE

## 2021-10-25 ENCOUNTER — DOCUMENTATION ONLY (OUTPATIENT)
Dept: PSYCHIATRY | Facility: CLINIC | Age: 56
End: 2021-10-25
Payer: MEDICARE

## 2021-11-08 ENCOUNTER — DOCUMENTATION ONLY (OUTPATIENT)
Dept: PSYCHIATRY | Facility: CLINIC | Age: 56
End: 2021-11-08
Payer: MEDICARE

## 2021-12-02 ENCOUNTER — PATIENT MESSAGE (OUTPATIENT)
Dept: RESEARCH | Facility: HOSPITAL | Age: 56
End: 2021-12-02
Payer: MEDICARE

## 2021-12-11 ENCOUNTER — OFFICE VISIT (OUTPATIENT)
Dept: URGENT CARE | Facility: CLINIC | Age: 56
End: 2021-12-11
Payer: MEDICARE

## 2021-12-11 ENCOUNTER — HOSPITAL ENCOUNTER (EMERGENCY)
Facility: HOSPITAL | Age: 56
Discharge: HOME | End: 2021-12-11
Attending: EMERGENCY MEDICINE
Payer: MEDICARE

## 2021-12-11 VITALS
WEIGHT: 120 LBS | TEMPERATURE: 99 F | RESPIRATION RATE: 20 BRPM | BODY MASS INDEX: 18.19 KG/M2 | DIASTOLIC BLOOD PRESSURE: 76 MMHG | HEART RATE: 90 BPM | OXYGEN SATURATION: 95 % | HEIGHT: 68 IN | SYSTOLIC BLOOD PRESSURE: 120 MMHG

## 2021-12-11 VITALS
DIASTOLIC BLOOD PRESSURE: 85 MMHG | HEART RATE: 72 BPM | OXYGEN SATURATION: 96 % | HEIGHT: 68 IN | WEIGHT: 125 LBS | SYSTOLIC BLOOD PRESSURE: 117 MMHG | RESPIRATION RATE: 18 BRPM | BODY MASS INDEX: 18.94 KG/M2 | TEMPERATURE: 98 F

## 2021-12-11 DIAGNOSIS — S62.231A OTHER CLOSED DISPLACED FRACTURE OF BASE OF FIRST METACARPAL BONE OF RIGHT HAND, INITIAL ENCOUNTER: ICD-10-CM
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
## 2021-12-11 DIAGNOSIS — M25.561 ACUTE PAIN OF RIGHT KNEE: ICD-10-CM

## 2021-12-11 DIAGNOSIS — S62.211A CLOSED BENNETT'S FRACTURE OF RIGHT THUMB, INITIAL ENCOUNTER: Primary | ICD-10-CM

## 2021-12-11 PROBLEM — K52.9 CHRONIC DIARRHEA: Status: ACTIVE | Noted: 2018-09-13

## 2021-12-11 PROBLEM — E55.9 VITAMIN D DEFICIENCY: Status: ACTIVE | Noted: 2019-01-24

## 2021-12-11 PROBLEM — E87.1 HYPONATREMIA: Status: ACTIVE | Noted: 2018-09-13

## 2021-12-11 PROBLEM — Z78.9 ALCOHOL USE: Status: ACTIVE | Noted: 2019-08-12

## 2021-12-11 PROBLEM — K86.1 CHRONIC PANCREATITIS: Status: ACTIVE | Noted: 2019-07-01

## 2021-12-11 PROBLEM — F10.90 ALCOHOL USE: Status: ACTIVE | Noted: 2019-08-12

## 2021-12-11 PROBLEM — Z91.158 NON-COMPLIANCE WITH RENAL DIALYSIS: Status: ACTIVE | Noted: 2019-07-01

## 2021-12-11 PROBLEM — F32.A DEPRESSION: Status: ACTIVE | Noted: 2019-07-01

## 2021-12-11 PROBLEM — D50.9 IRON DEFICIENCY ANEMIA: Status: ACTIVE | Noted: 2019-01-24

## 2021-12-11 PROBLEM — M85.80 OSTEOPENIA: Status: ACTIVE | Noted: 2021-03-08

## 2021-12-11 PROBLEM — I12.9 RENAL HYPERTENSION: Chronic | Status: ACTIVE | Noted: 2019-09-23

## 2021-12-11 PROBLEM — E46 PROTEIN CALORIE MALNUTRITION: Status: ACTIVE | Noted: 2018-09-13

## 2021-12-11 PROBLEM — N25.81 SECONDARY HYPERPARATHYROIDISM OF RENAL ORIGIN: Status: ACTIVE | Noted: 2019-07-01

## 2021-12-11 PROCEDURE — 73110 XR WRIST COMPLETE 3 VIEWS RIGHT: ICD-10-PCS | Mod: FY,RT,S$GLB,TXP | Performed by: RADIOLOGY

## 2021-12-11 PROCEDURE — 73562 X-RAY EXAM OF KNEE 3: CPT | Mod: FY,RT,S$GLB,TXP | Performed by: RADIOLOGY

## 2021-12-11 PROCEDURE — 99213 PR OFFICE/OUTPT VISIT, EST, LEVL III, 20-29 MIN: ICD-10-PCS | Mod: S$GLB,TXP,, | Performed by: PHYSICIAN ASSISTANT

## 2021-12-11 PROCEDURE — 73562 XR KNEE 3 VIEW RIGHT: ICD-10-PCS | Mod: FY,RT,S$GLB,TXP | Performed by: RADIOLOGY

## 2021-12-11 PROCEDURE — 99213 OFFICE O/P EST LOW 20 MIN: CPT | Mod: S$GLB,TXP,, | Performed by: PHYSICIAN ASSISTANT

## 2021-12-11 PROCEDURE — 99284 EMERGENCY DEPT VISIT MOD MDM: CPT | Mod: 25,NTX

## 2021-12-11 PROCEDURE — 73110 X-RAY EXAM OF WRIST: CPT | Mod: FY,RT,S$GLB,TXP | Performed by: RADIOLOGY

## 2021-12-11 RX ORDER — CLOPIDOGREL BISULFATE 75 MG/1
75 TABLET ORAL
COMMUNITY
Start: 2021-11-30 | End: 2021-12-11 | Stop reason: SDUPTHER

## 2021-12-11 RX ORDER — HYDROCODONE BITARTRATE AND ACETAMINOPHEN 5; 325 MG/1; MG/1
1 TABLET ORAL EVERY 6 HOURS PRN
Qty: 16 TABLET | Refills: 0 | Status: SHIPPED | OUTPATIENT
Start: 2021-12-11 | End: 2021-12-15

## 2021-12-11 RX ORDER — CHOLECALCIFEROL (VITAMIN D3) 25 MCG
2000 TABLET ORAL
COMMUNITY
Start: 2021-08-23 | End: 2022-04-08

## 2021-12-13 ENCOUNTER — TELEPHONE (OUTPATIENT)
Dept: ORTHOPEDICS | Facility: CLINIC | Age: 56
End: 2021-12-13
Payer: MEDICARE

## 2021-12-15 ENCOUNTER — PATIENT OUTREACH (OUTPATIENT)
Dept: ADMINISTRATIVE | Facility: OTHER | Age: 56
End: 2021-12-15
Payer: MEDICARE

## 2021-12-16 ENCOUNTER — OFFICE VISIT (OUTPATIENT)
Dept: ORTHOPEDICS | Facility: CLINIC | Age: 56
End: 2021-12-16
Payer: MEDICARE

## 2021-12-16 VITALS — BODY MASS INDEX: 18.18 KG/M2 | WEIGHT: 119.94 LBS | HEIGHT: 68 IN

## 2021-12-16 DIAGNOSIS — S69.91XA INJURY OF RIGHT THUMB, INITIAL ENCOUNTER: ICD-10-CM

## 2021-12-16 PROCEDURE — 99203 PR OFFICE/OUTPT VISIT, NEW, LEVL III, 30-44 MIN: ICD-10-PCS | Mod: S$PBB,,, | Performed by: ORTHOPAEDIC SURGERY

## 2021-12-16 PROCEDURE — 99999 PR PBB SHADOW E&M-EST. PATIENT-LVL III: CPT | Mod: PBBFAC,,, | Performed by: ORTHOPAEDIC SURGERY

## 2021-12-16 PROCEDURE — 99203 OFFICE O/P NEW LOW 30 MIN: CPT | Mod: S$PBB,,, | Performed by: ORTHOPAEDIC SURGERY

## 2021-12-16 PROCEDURE — 99999 PR PBB SHADOW E&M-EST. PATIENT-LVL III: ICD-10-PCS | Mod: PBBFAC,,, | Performed by: ORTHOPAEDIC SURGERY

## 2021-12-16 RX ORDER — TRAMADOL HYDROCHLORIDE 50 MG/1
50 TABLET ORAL EVERY 6 HOURS PRN
Qty: 30 TABLET | Refills: 0 | Status: SHIPPED | OUTPATIENT
Start: 2021-12-16 | End: 2022-01-25 | Stop reason: SDUPTHER

## 2022-01-05 ENCOUNTER — TELEPHONE (OUTPATIENT)
Dept: CARDIOLOGY | Facility: HOSPITAL | Age: 57
End: 2022-01-05
Payer: MEDICARE

## 2022-01-06 ENCOUNTER — TELEPHONE (OUTPATIENT)
Dept: TRANSPLANT | Facility: CLINIC | Age: 57
End: 2022-01-06
Payer: MEDICARE

## 2022-01-07 ENCOUNTER — TELEPHONE (OUTPATIENT)
Dept: ORTHOPEDICS | Facility: CLINIC | Age: 57
End: 2022-01-07
Payer: MEDICARE

## 2022-01-07 DIAGNOSIS — S69.91XA INJURY OF RIGHT THUMB, INITIAL ENCOUNTER: Primary | ICD-10-CM

## 2022-01-10 ENCOUNTER — HOSPITAL ENCOUNTER (OUTPATIENT)
Dept: RADIOLOGY | Facility: HOSPITAL | Age: 57
Discharge: HOME | End: 2022-01-10
Attending: ORTHOPAEDIC SURGERY
Payer: MEDICARE

## 2022-01-10 ENCOUNTER — OFFICE VISIT (OUTPATIENT)
Dept: ORTHOPEDICS | Facility: CLINIC | Age: 57
End: 2022-01-10
Payer: MEDICARE

## 2022-01-10 VITALS — WEIGHT: 119 LBS | BODY MASS INDEX: 18.09 KG/M2

## 2022-01-10 DIAGNOSIS — S69.91XD INJURY OF RIGHT THUMB, SUBSEQUENT ENCOUNTER: Primary | ICD-10-CM

## 2022-01-10 DIAGNOSIS — S69.91XA INJURY OF RIGHT THUMB, INITIAL ENCOUNTER: ICD-10-CM

## 2022-01-10 PROCEDURE — 20526 THER INJECTION CARP TUNNEL: CPT | Mod: S$PBB,RT,, | Performed by: ORTHOPAEDIC SURGERY

## 2022-01-10 PROCEDURE — 99213 PR OFFICE/OUTPT VISIT, EST, LEVL III, 20-29 MIN: ICD-10-PCS | Mod: S$PBB,25,, | Performed by: ORTHOPAEDIC SURGERY

## 2022-01-10 PROCEDURE — 73110 XR WRIST COMPLETE 3 VIEWS RIGHT: ICD-10-PCS | Mod: 26,RT,TXP, | Performed by: RADIOLOGY

## 2022-01-10 PROCEDURE — 20526 PR INJECT CARPAL TUNNEL: ICD-10-PCS | Mod: S$PBB,RT,, | Performed by: ORTHOPAEDIC SURGERY

## 2022-01-10 PROCEDURE — 99999 PR PBB SHADOW E&M-EST. PATIENT-LVL III: ICD-10-PCS | Mod: PBBFAC,,, | Performed by: ORTHOPAEDIC SURGERY

## 2022-01-10 PROCEDURE — 99999 PR PBB SHADOW E&M-EST. PATIENT-LVL III: CPT | Mod: PBBFAC,,, | Performed by: ORTHOPAEDIC SURGERY

## 2022-01-10 PROCEDURE — 20526 THER INJECTION CARP TUNNEL: CPT | Mod: PBBFAC,PN | Performed by: ORTHOPAEDIC SURGERY

## 2022-01-10 PROCEDURE — 73110 X-RAY EXAM OF WRIST: CPT | Mod: TC,FY,RT,NTX

## 2022-01-10 PROCEDURE — 99213 OFFICE O/P EST LOW 20 MIN: CPT | Mod: S$PBB,25,, | Performed by: ORTHOPAEDIC SURGERY

## 2022-01-10 PROCEDURE — 73110 X-RAY EXAM OF WRIST: CPT | Mod: 26,RT,TXP, | Performed by: RADIOLOGY

## 2022-01-10 RX ORDER — TRIAMCINOLONE ACETONIDE 40 MG/ML
20 INJECTION, SUSPENSION INTRA-ARTICULAR; INTRAMUSCULAR
Status: COMPLETED | OUTPATIENT
Start: 2022-01-10 | End: 2022-01-10

## 2022-01-10 RX ADMIN — TRIAMCINOLONE ACETONIDE 20 MG: 40 INJECTION, SUSPENSION INTRA-ARTICULAR; INTRAMUSCULAR at 11:01

## 2022-01-10 NOTE — PROGRESS NOTES
Subjective:      Patient ID: Jennifer Booth is a 56 y.o. female.  Chief Complaint: Follow-up (3 week follow up right hand fracture.)      HPI  Jennifer Booth is a  56 y.o. female presenting today for follow up of right thumb injury.  She reports that she is improved with the right thumb but now having numbness tingling in both hands right worse than left  She feels like the symptoms have gotten worse in the past several weeks since I saw her last  The thumb is actually better.    Review of patient's allergies indicates:   Allergen Reactions    Doxycycline Swelling, Rash and Hives    Gentamicin Anaphylaxis    Vancomycin analogues Anaphylaxis         Current Outpatient Medications   Medication Sig Dispense Refill    B COMPLEX W-C NO.20/FOLIC ACID (VIRT-CAPS ORAL) Take 1 capsule by mouth once daily.       buPROPion (WELLBUTRIN) 75 MG tablet TAKE 1 TABLET(75 MG) BY MOUTH EVERY DAY 90 tablet 0    clopidogreL (PLAVIX) 75 mg tablet Take 75 mg by mouth.      EScitalopram oxalate (LEXAPRO) 20 MG tablet TAKE 1 TABLET(20 MG) BY MOUTH EVERY DAY 90 tablet 0    fexofenadine (ALLEGRA) 180 MG tablet Take 180 mg by mouth.      fluticasone propionate (FLONASE) 50 mcg/actuation nasal spray 1 spray by Each Nostril route daily as needed.      guaiFENesin (MUCINEX) 600 mg 12 hr tablet Take 1,200 mg by mouth 2 (two) times daily.      mirtazapine (REMERON) 15 MG tablet Take 1 tablet (15 mg total) by mouth every evening. 90 tablet 0    montelukast (SINGULAIR) 10 mg tablet TAKE 1 TABLET(10 MG) BY MOUTH EVERY EVENING 90 tablet 3    sevelamer carbonate (RENVELA) 800 mg Tab Take 1 tablet by mouth 3 (three) times daily with meals.      sevelamer carbonate (RENVELA) 800 mg Tab Take 800 mg by mouth 3 (three) times daily.      sucroferric oxyhydroxide (VELPHORO) 500 mg Chew Take 500 mg by mouth 3 (three) times daily.      vitamin D (VITAMIN D3) 1000 units Tab Take 2,000 Units by mouth.      aspirin 81 MG Chew Chew and swallow 1  tablet (81 mg total) by mouth once daily. 30 tablet 11    carvediloL (COREG) 12.5 MG tablet Take 1 tablet (12.5 mg total) by mouth 2 (two) times daily. 60 tablet 11    cinacalcet (SENSIPAR) 30 MG Tab Take 30 mg by mouth.      NIFEdipine (PROCARDIA-XL) 60 MG (OSM) 24 hr tablet Take 1 tablet (60 mg total) by mouth once daily. 30 tablet 11     No current facility-administered medications for this visit.       Past Medical History:   Diagnosis Date    Addiction to drug     Alcohol abuse     Allergic drug reaction 2017    Anemia     Anxiety     Arm pain, left 2014    Breast cyst     Chronic renal failure 2014    CKD (chronic kidney disease) stage 5, GFR less than 15 ml/min     Depression     Dialysis patient     dialysis -w-    Encounter for blood transfusion     GERD (gastroesophageal reflux disease)     Hx of psychiatric care     Hypertension     Mood swings     Multiple myeloma in remission 10/26/2012    per Hem/Oc note    Psychiatric exam requested by authority     Psychiatric problem     Renal hypertension     Status post dialysis 2012    Therapy     Thrombocytopenia 2012       Past Surgical History:   Procedure Laterality Date    AV FISTULA PLACEMENT Left     BREAST CYST ASPIRATION Left     BREAST CYST EXCISION Left      SECTION      x1    FISTULOGRAM N/A 2020    Procedure: Fistulogram;  Surgeon: Rahat Berger MD;  Location: [facility] CATH LAB/EP;  Service: Cardiology;  Laterality: N/A;    pd catheter      PERCUTANEOUS TRANSLUMINAL ANGIOPLASTY OF ARTERIOVENOUS FISTULA N/A 2020    Procedure: PTA, AV FISTULA;  Surgeon: Rahat Berger MD;  Location: [facility] CATH LAB/EP;  Service: Cardiology;  Laterality: N/A;    PERITONEAL CATHETER REMOVAL N/A 2018    Procedure: REMOVAL, CATHETER, DIALYSIS, PERITONEAL;  Surgeon: Mukesh Gonsales Jr., MD;  Location: [facility] OR;  Service: General;  Laterality: N/A;    RENAL BIOPSY  2016    THROMBECTOMY OF  HEMODIALYSIS ACCESS SITE N/A 6/9/2020    Procedure: Thrombectomy, Hemodialysis Graft Or Fistula;  Surgeon: Rahat Berger MD;  Location: [facility] CATH LAB/EP;  Service: Cardiology;  Laterality: N/A;    VASCULAR SURGERY  8/2012    dialysis catheter to right subclavian       OBJECTIVE:   PHYSICAL EXAM:    Weight: 54 kg (119 lb)  Vitals:    01/10/22 1141   Weight: 54 kg (119 lb)   PainSc: 10-Worst pain ever   PainLoc: Hand     Ortho/SPM Exam  Examination right hand there is no significant swelling  Really no tenderness at the CMC joint of the thumb she has good range of motion mild instability  strength decreased  She does have a positive Tinel sign over the median nerve right wrist decreased sensation index and middle finger of both hands  Range of motion wrist fingers full  strength decreased both hands      RADIOGRAPHS:  AP lateral x-ray of the right hand demonstrates persistent deformity the base of the right thumb consistent with a chronic nonunion or possibly a degenerative process at the CMC joint  Comments: I have personally reviewed the imaging and I agree with the above radiologist's report.    ASSESSMENT/PLAN:     IMPRESSION:  1. Degenerative arthritis right thumb with injury.    2. New onset numbness both hands    PLAN:  For the right thumb she can wean out of the thumb brace  For both hands I have given wrist splints mainly for nighttime use  I have also ordered nerve conduction studies both hands  I recommended injection today for the right hand which is the more symptomatic side  She may be having carpal tunnel  After pause for time-out identified the right carpal tunnel injected with Kenalog 20 mg 0.5 cc xylocaine sterile technique  She tolerated the procedure well without complication  Follow-up after the nerve test is complete    FOLLOW UP:  After the nerve test    Disclaimer: This note has been generated using voice-recognition software. There may be typographical errors that have been missed  during proof-reading.

## 2022-01-12 NOTE — Clinical Note
Left message for patient to call, message below please assist in scheduling.     Percutaneous stick to the Right proximal forearm AV fistula

## 2022-01-18 ENCOUNTER — TELEPHONE (OUTPATIENT)
Dept: ORTHOPEDICS | Facility: CLINIC | Age: 57
End: 2022-01-18
Payer: MEDICARE

## 2022-01-18 NOTE — TELEPHONE ENCOUNTER
----- Message from Balbina Rae sent at 1/18/2022 12:27 PM CST -----  Regarding: call back  Contact: [phone]  Who Called: PT     Patient is calling to talk to nurse in regards to her EMG appointment and to see which doctor was she referred to.

## 2022-01-18 NOTE — TELEPHONE ENCOUNTER
----- Message from Shae Torres sent at 1/18/2022 11:40 AM CST -----  Type: Requesting to speak with nurse         Who Called: PT  Regarding: looking for nerve specialist she was referred to please advise   Would the patient rather a call back or a response via [facility]? Call back  Best Call Back Number: [phone]  Additional Information: n/a

## 2022-01-18 NOTE — TELEPHONE ENCOUNTER
Spoke with - pt was informed [facility] will be reaching out to her re: EMG appointment.

## 2022-01-25 ENCOUNTER — OFFICE VISIT (OUTPATIENT)
Dept: FAMILY MEDICINE | Facility: CLINIC | Age: 57
End: 2022-01-25
Payer: MEDICARE

## 2022-01-25 ENCOUNTER — HOSPITAL ENCOUNTER (OUTPATIENT)
Dept: RADIOLOGY | Facility: HOSPITAL | Age: 57
Discharge: HOME | End: 2022-01-25
Attending: NURSE PRACTITIONER
Payer: MEDICARE

## 2022-01-25 VITALS
WEIGHT: 114.63 LBS | BODY MASS INDEX: 17.37 KG/M2 | SYSTOLIC BLOOD PRESSURE: 118 MMHG | HEIGHT: 68 IN | DIASTOLIC BLOOD PRESSURE: 62 MMHG | OXYGEN SATURATION: 99 % | HEART RATE: 73 BPM
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
## 2022-01-25 DIAGNOSIS — M25.562 ACUTE PAIN OF LEFT KNEE: Primary | ICD-10-CM

## 2022-01-25 DIAGNOSIS — M25.562 ACUTE PAIN OF LEFT KNEE: ICD-10-CM

## 2022-01-25 PROCEDURE — 73564 XR KNEE COMP 4 OR MORE VIEWS LEFT: ICD-10-PCS | Mod: 26,LT,NTX, | Performed by: RADIOLOGY

## 2022-01-25 PROCEDURE — 73564 X-RAY EXAM KNEE 4 OR MORE: CPT | Mod: 26,LT,NTX, | Performed by: RADIOLOGY

## 2022-01-25 PROCEDURE — 99999 PR PBB SHADOW E&M-EST. PATIENT-LVL V: CPT | Mod: PBBFAC,,, | Performed by: NURSE PRACTITIONER

## 2022-01-25 PROCEDURE — 99214 OFFICE O/P EST MOD 30 MIN: CPT | Mod: S$PBB,,, | Performed by: NURSE PRACTITIONER

## 2022-01-25 PROCEDURE — 73564 X-RAY EXAM KNEE 4 OR MORE: CPT | Mod: TC,FY,PO,LT,NTX

## 2022-01-25 PROCEDURE — 99214 PR OFFICE/OUTPT VISIT, EST, LEVL IV, 30-39 MIN: ICD-10-PCS | Mod: S$PBB,,, | Performed by: NURSE PRACTITIONER

## 2022-01-25 PROCEDURE — 99999 PR PBB SHADOW E&M-EST. PATIENT-LVL V: ICD-10-PCS | Mod: PBBFAC,,, | Performed by: NURSE PRACTITIONER

## 2022-01-25 RX ORDER — TRAMADOL HYDROCHLORIDE 100 MG/1
100 TABLET, COATED ORAL EVERY 6 HOURS PRN
Qty: 28 TABLET | Refills: 0 | Status: SHIPPED | OUTPATIENT
Start: 2022-01-25 | End: 2022-02-01

## 2022-01-25 NOTE — PROGRESS NOTES
"Subjective:       Patient ID: Jennifer Booth is a 57 y.o. female.    Chief Complaint: Fall and Knee Pain    This is a pleasant 58 yo female patient of Dr. Sanchez who is new to me. She presents today accompanied by her daughter with c/o left knee pain. PMH includes    Patient Active Problem List:     Constipation - functional     Multiple myeloma in remission     Renal hypertension     Tobacco use disorder     ESRD (end stage renal disease) on dialysis     Glomerulosclerosis     Anemia in chronic kidney disease, on chronic dialysis     GERD without esophagitis     Patient on waiting list for kidney transplant     SATNAM (generalized anxiety disorder)     Allergic rhinitis     Hyponatremia     Hypertension     Secondary hyperparathyroidism of renal origin     Alcohol use disorder, severe, in early remission     Volume overload     History of substance use     Dialysis AV fistula malfunction, initial encounter     CKD (chronic kidney disease) stage 5, GFR less than 15 ml/min     Metabolic acidosis     Murmur, cardiac     Cannabis use disorder, mild, abuse     Alcohol use     Severe episode of recurrent major depressive disorder, without psychotic features     Chronic diarrhea     Chronic pancreatitis     Non-compliance with renal dialysis     Osteopenia     Protein calorie malnutrition     Vitamin D deficiency     Injury of right thumb    VSS today. Patient report she was bending down last night and became dizzy and fell forward on the ground at home. Caught herself with her hands but fell on her left knee. Has been experiencing pain since. Also has difficulty walking as patient reports her "leg gives out". Has no pain when sitting down; able to flex and extend LLE with no pain. No TTP. Says she feels sharp pain when weight-bearing. Has not tried to apply ice to site. Tried taking OTC acetaminophen with no relief. See more below.    Of note, patient also had another fall last month and fractured her right thumb. Followed " by Dr. Paris. Admits that she is not eating/drinking enough.     Fall  The accident occurred 12 to 24 hours ago. The fall occurred while standing. She landed on hard floor. There was no blood loss. The point of impact was the left knee. The pain is present in the left knee. The pain is moderate. The symptoms are aggravated by use of injured limb and standing. Pertinent negatives include no abdominal pain, fever, loss of consciousness, nausea, numbness, tingling or vomiting. She has tried rest and acetaminophen for the symptoms. The treatment provided significant relief.   Knee Pain   The incident occurred 12 to 24 hours ago. The incident occurred at home. The injury mechanism was a fall. The pain is present in the left knee. The pain is moderate. Associated symptoms include an inability to bear weight. Pertinent negatives include no loss of motion, loss of sensation, numbness or tingling. She reports no foreign bodies present. The symptoms are aggravated by weight bearing. She has tried rest and acetaminophen for the symptoms. The treatment provided significant relief.     Review of Systems   Constitutional: Positive for appetite change (chronic). Negative for chills and fever.   HENT: Negative for trouble swallowing.    Eyes: Negative for redness.   Respiratory: Negative for chest tightness and shortness of breath.    Cardiovascular: Negative for chest pain and leg swelling.   Gastrointestinal: Negative for abdominal pain, nausea and vomiting.   Musculoskeletal: Positive for gait problem and joint swelling. Negative for leg pain.   Neurological: Positive for dizziness (intermittently). Negative for tingling, loss of consciousness, weakness and numbness.         Objective:      Physical Exam  Vitals reviewed.   Constitutional:       General: She is not in acute distress.     Appearance: Normal appearance. She is well-developed, well-groomed and underweight.   HENT:      Head: Normocephalic.      Right Ear: External  ear normal.      Left Ear: External ear normal.   Eyes:      General:         Right eye: No discharge.         Left eye: No discharge.   Neck:      Vascular: No carotid bruit.   Cardiovascular:      Rate and Rhythm: Normal rate and regular rhythm.      Pulses:           Radial pulses are 2+ on the right side and 2+ on the left side.        Dorsalis pedis pulses are 2+ on the right side and 2+ on the left side.        Posterior tibial pulses are 2+ on the right side and 2+ on the left side.      Heart sounds: Normal heart sounds, S1 normal and S2 normal. No murmur heard.      Pulmonary:      Effort: Pulmonary effort is normal. No respiratory distress.      Breath sounds: No wheezing or rhonchi.   Abdominal:      General: There is no distension.   Musculoskeletal:         General: Swelling present. No tenderness.      Right knee: No swelling, bony tenderness or crepitus. Normal range of motion. No tenderness. Normal pulse.      Left knee: Swelling present. No erythema, bony tenderness or crepitus. Normal range of motion. No tenderness. Normal pulse.      Right lower leg: No edema.      Left lower leg: No edema.   Skin:     General: Skin is warm and dry.      Coloration: Skin is not pale.      Findings: No erythema.   Neurological:      Mental Status: She is alert and oriented to person, place, and time.      Motor: Weakness (to LLE) present. No tremor.      Coordination: Coordination normal.      Gait: Gait abnormal (d/t pain).   Psychiatric:         Attention and Perception: Attention normal.         Mood and Affect: Mood and affect normal.         Speech: Speech normal.         Behavior: Behavior normal. Behavior is cooperative.         Thought Content: Thought content normal.         Assessment:       Problem List Items Addressed This Visit    None     Visit Diagnoses     Acute pain of left knee    -  Primary    Relevant Medications    traMADoL 100 mg Tab    Other Relevant Orders    X-Ray Knee Complete 4 or More  Views Left (Completed)    Ambulatory referral/consult to Orthopedics    CANE FOR HOME USE    Fall, initial encounter        Relevant Orders    X-Ray Knee Complete 4 or More Views Left (Completed)    Ambulatory referral/consult to Orthopedics          Plan:       Jennifer was seen today for fall and knee pain.    Diagnoses and all orders for this visit:    Acute pain of left knee  -     X-Ray Knee Complete 4 or More Views Left; Future  -     traMADoL 100 mg Tab; Take 100 mg by mouth every 6 (six) hours as needed for Pain.  -     Ambulatory referral/consult to Orthopedics; Future  -     CANE FOR HOME USE    Fall, initial encounter  -     X-Ray Knee Complete 4 or More Views Left; Future  -     Ambulatory referral/consult to Orthopedics; Future      - Imaging ordered today: left knee appears stable, see below      X-Ray Knee Complete 4 or More Views Left  Narrative: EXAMINATION:  XR KNEE COMP 4 OR MORE VIEWS LEFT    CLINICAL HISTORY:  Pain in left knee    TECHNIQUE:  For more views left knee    COMPARISON:  12/11/2021    FINDINGS:  Joint spaces maintained.  Bony structures are intact.  No joint effusion is seen.  Impression: See above    Electronically signed by: George Glover MD  Date:    01/25/2022  Time:    10:49        - May take tramadol 100mg q6h PRN pain  - RICE instructions given  - Avoid excessive ambulation, heavy lifting  - Follow up with Orthopedics next week  - Warning signs discussed  - RTC as needed if symptoms worsen or fail to improve        I spent a total of 30 minutes on the day of the visit.  This includes face to face time and non-face to face time preparing to see the patient (eg, review of tests), obtaining and/or reviewing separately obtained history, documenting clinical information in the electronic or other health record, independently interpreting results and communicating results to the patient/family/caregiver, or care coordinator.

## 2022-01-25 NOTE — PATIENT INSTRUCTIONS
"Patient Education       Knee Pain   The Basics   Written by the doctors and editors at [facility]   What causes knee pain? -- Many different conditions can cause knee pain. Some of the most common are listed below.  · Bending or using the knee too much - This can cause pain in the front of the knee that worsens with running, climbing steps, or sitting for a long time.  · Arthritis - Arthritis is a general term that means inflammation of the joints. There are lots of types of arthritis. The most common type, called osteoarthritis, often comes with age. It can cause pain, stiffness, and swelling (figure 1).  · Bursitis - Bursitis happens when fluid-filled sacs around the knee (called "bursae") get irritated or swollen (figure 2). Bursitis can cause pain and swelling.  · A collection of fluid in the knee - This can happen after a knee injury.  · A tear in the meniscus - The meniscus is a cushion of rubbery material (cartilage) between the thigh bone and the leg bone (figure 3).  · A tear in a ligament - Ligaments are bands of tissue that connect one bone to another. There are 4 ligaments in each knee (figure 3).  · Muscle strain - Different leg muscles move the knee joint, causing the knee to bend and straighten. If one of these muscles doesn't work well, moving the knee can cause pain.  · Other knee injuries, a knee joint infection, or a condition called gout, which causes crystals to form inside joints.  · Conditions that don't involve the knee - For example, problems in the hip can sometimes cause knee pain.  Is there anything I can do on my own to feel better? -- Yes. To ease your symptoms, you can:  · Put ice on the knee to reduce pain and swelling - For the first few weeks after an injury, or after an activity that makes your pain worse, you can try icing your knee. Put a cold gel pack, bag of ice, or bag of frozen vegetables on the injured area every 1 to 2 hours, for 15 minutes each time. Put a thin towel between " "the ice (or other cold object) and your skin. To reduce swelling, sit or lie down and raise your leg above the level of your heart when you put ice on it.  · Rest your knee and avoid movements that worsen the pain - Try not to squat, kneel, or run. Also, don't use exercise machines, such as stair steppers or rowing machines. Instead, you can walk or swim (the front and back crawl strokes) for exercise.  · Take a pain-relieving medicine, such as acetaminophen (sample brand name: Tylenol) or ibuprofen (sample brand names: Advil, Motrin).  Should I see a doctor or nurse? -- See your doctor or nurse if:  · You are unable to put weight on your knee, your knee "locks" in place, or your knee "gives out"  · Your knee is very swollen and painful  · You have a fever with knee pain, swelling, and redness  · Your knee pain doesn't get better or gets worse after you treat it on your own for a few days  How is knee pain treated? -- The right treatment for knee pain depends on what is causing it. Treatments might include:  · Wearing a knee brace or shoe insert  · Doing exercises to strengthen and stretch the muscles that move the knee joint - Ask your doctor or nurse which exercises can help with the cause of your pain.  · Having physical therapy  · Getting a shot of medicine in the knee  · Other medicines  · Surgery  All topics are updated as new evidence becomes available and our peer review process is complete.  This topic retrieved from [employer] on: Sep 21, 2021.  Topic 14199 Version 11.0  Release: 29.4.2 - C29.263  © 2021 UpToDate, Inc. and/or its affiliates. All rights reserved.  figure 1: Knee osteoarthritis     This drawing shows a normal knee joint next to a knee joint with osteoarthritis (OA). In the OA joint, the cartilage covering the ends of the bones roughens and becomes thin, while the bone underneath the cartilage grows thicker. Bony growths called "osteophytes" can form. The space between the bones also becomes " "narrower.  Graphic 316881 Version 2.0    figure 2: Knee bursa (prepatellar bursa)     Graphic 31097 Version 3.0    figure 3: Front view of the knee     This drawing shows the inner parts of the knee as seen from the front. A small bone (called the patella or the "knee cap") that sits in front of the knee has been removed so that you can see what is under that bone. The anterior cruciate ligament (ACL) is in the middle in white. It connects the thigh bone (called the "femur") to the shin bone (called the "tibia"). The meniscus is a cushion of rubbery material (cartilage) between the thigh bone and the shin bone.  Graphic 32332 Version 5.0    Consumer Information Use and Disclaimer   This information is not specific medical advice and does not replace information you receive from your health care provider. This is only a brief summary of general information. It does NOT include all information about conditions, illnesses, injuries, tests, procedures, treatments, therapies, discharge instructions or life-style choices that may apply to you. You must talk with your health care provider for complete information about your health and treatment options. This information should not be used to decide whether or not to accept your health care provider's advice, instructions or recommendations. Only your health care provider has the knowledge and training to provide advice that is right for you. The use of this information is governed by the [employer] End User License Agreement, available at https://www.H2scan.InfoDif/en/solutions/Solid Sound/about/lindsey.The use of [employer] content is governed by the [employer] Terms of Use. ©2021 UpToDate, Inc. All rights reserved.  Copyright   © 2021 UpToDate, Inc. and/or its affiliates. All rights reserved.  Patient Education       Preventing Falls   The Basics   Written by the doctors and editors at [facility]   Am I at risk of falling? -- Your risk of falling increases as you grow older. That's " because getting older can make it harder to walk steadily and keep your balance. Also, the effects of falls are more serious in older people.  Overall, 3 to 4 out of every 10 people over the age of 65 fall each year. Up to 75 percent of people who fracture a hip never recover to the point they were before they had their fracture. If you have fallen in the past, you are at higher risk of falling again.  Several things can increase your risk of a fall, including:  · Illness  · A change in the medicines you take  · An unsafe or unfamiliar setting (for example, a room with rugs or furniture that might trip you, or an area you don't know well)  How can my doctor help me to avoid falling? -- Your doctor can talk to you about the following things:  · Past falls - It is important to tell your doctor about any times you have fallen or almost fallen. He or she can then suggest ways to prevent another fall.  · Your health conditions - Some health problems can put you at risk of falling. These include conditions that affect eyesight, hearing, muscle strength, or balance.  · The medicines you take - Certain medicines can increase the risk of falling. These include some medicines that are used for sleeping problems, anxiety, high blood pressure, or depression. Adding new medicines, or changing doses of some medicines, can also affect your risk of falling.  The more your doctor knows about your situation, the better he or she will be able to help you. For example, if you fell because you have a condition that causes pain, your doctor might suggest treatments to deal with the pain. Or if one of your medicines is making you dizzy and more likely to fall, your doctor might switch you to a different medicine.  Is there anything I can do on my own? -- Yes. To help keep from falling, you can:  · Make your home safer - To avoid falling at home, get rid of things that might make you trip or slip. This might include furniture, electrical  cords, clutter, and loose rugs (figure 1). Keep your home well-lit so that you can easily see where you are going. Avoid storing things in high places so you don't have to reach or climb.  · Wear sturdy shoes that fit well - Wearing shoes with high heels or slippery soles, or shoes that are too loose, can lead to falls. Walking around in bare feet, or only socks, can also increase your risk of falling.  · Take vitamin D pills - Taking vitamin D might lower the risk of falls in older people. This is because vitamin D helps make bones and muscles stronger. Your doctor can talk to you about whether you should take extra vitamin D, and how much.  · Stay active - Exercising on a regular basis can help lower your risk of falling. It might also help prevent you from getting hurt if you do fall. It is best to do a few different activities that help with both strength and balance. There are many kinds of exercise that can be safe for older people. These include walking, swimming, and Dilip Chi (a Chinese martial art that involves slow, gentle movements).  · Use a cane, walker, and other safety devices - If your doctor recommends that you use a cane or walker, be sure that it's the right size and you know how to use it. There are other devices that might help you avoid falling, too. These include grab bars or a sturdy seat for the shower, non-slip bath mats, and hand rails or treads for the stairs (to prevent slipping).  If you worry that you could fall, there are also alarm buttons that let you call for help if you fall and can't get up.  What should I do if I fall? -- If you fall, see your doctor right away, even if you aren't hurt. Your doctor can try to figure out what caused you to fall, and how likely you are to fall again. He or she will do an exam and talk to you about your health problems, medicines, and activities. Then he or she can suggest things you can do to avoid falling again.  Many older people have a hard time  recovering after a fall. Doing things to prevent falling can help you to protect your health and independence.  All topics are updated as new evidence becomes available and our peer review process is complete.  This topic retrieved from [employer] on: Sep 21, 2021.  Topic 81115 Version 18.0  Release: 29.4.2 - C29.263  © 2021 UpToDate, Inc. and/or its affiliates. All rights reserved.  figure 1: How to avoid falling at home     This picture shows some of the things that can cause a fall in your home. Look around and remove any loose rugs, electrical cords, clutter, or furniture that could trip you.  Graphic 22672 Version 1.0    Consumer Information Use and Disclaimer   This information is not specific medical advice and does not replace information you receive from your health care provider. This is only a brief summary of general information. It does NOT include all information about conditions, illnesses, injuries, tests, procedures, treatments, therapies, discharge instructions or life-style choices that may apply to you. You must talk with your health care provider for complete information about your health and treatment options. This information should not be used to decide whether or not to accept your health care provider's advice, instructions or recommendations. Only your health care provider has the knowledge and training to provide advice that is right for you. The use of this information is governed by the [employer] End User License Agreement, available at https://www.GetQuik.Etu6.com/en/solutions/InflaRx/about/lindsey.The use of [employer] content is governed by the [employer] Terms of Use. ©2021 UpToDate, Inc. All rights reserved.  Copyright   © 2021 UpToDate, Inc. and/or its affiliates. All rights reserved.

## 2022-01-27 ENCOUNTER — PATIENT OUTREACH (OUTPATIENT)
Dept: ADMINISTRATIVE | Facility: OTHER | Age: 57
End: 2022-01-27
Payer: MEDICARE

## 2022-01-27 NOTE — PROGRESS NOTES
Health Maintenance Due   Topic Date Due    LDCT Lung Screen  Never done    Influenza Vaccine (1) Never done    Colorectal Cancer Screening  10/07/2021    Pneumococcal Vaccines (Age 0-64) (3 of 4 - PPSV23) 10/10/2021    Shingles Vaccine (2 of 2) 10/13/2021     Updates were requested from care everywhere.  Chart was reviewed for overdue Proactive [facility] Encounters (AMITA) topics (CRS, Breast Cancer Screening, Eye exam)  Health Maintenance has been updated.  LINKS immunization registry triggered.  Immunizations were reconciled.

## 2022-01-28 ENCOUNTER — OFFICE VISIT (OUTPATIENT)
Dept: ORTHOPEDICS | Facility: CLINIC | Age: 57
End: 2022-01-28
Payer: MEDICARE

## 2022-01-28 VITALS — HEIGHT: 68 IN | WEIGHT: 114.63 LBS | BODY MASS INDEX: 17.37 KG/M2

## 2022-01-28 DIAGNOSIS — S80.02XA CONTUSION OF LEFT KNEE, INITIAL ENCOUNTER: Primary | ICD-10-CM
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
## 2022-01-28 DIAGNOSIS — M25.562 ACUTE PAIN OF LEFT KNEE: ICD-10-CM

## 2022-01-28 PROCEDURE — 99213 OFFICE O/P EST LOW 20 MIN: CPT | Mod: S$PBB,,, | Performed by: ORTHOPAEDIC SURGERY

## 2022-01-28 PROCEDURE — 99213 PR OFFICE/OUTPT VISIT, EST, LEVL III, 20-29 MIN: ICD-10-PCS | Mod: S$PBB,,, | Performed by: ORTHOPAEDIC SURGERY

## 2022-01-28 PROCEDURE — 99999 PR PBB SHADOW E&M-EST. PATIENT-LVL IV: CPT | Mod: PBBFAC,,, | Performed by: ORTHOPAEDIC SURGERY

## 2022-01-28 PROCEDURE — 99999 PR PBB SHADOW E&M-EST. PATIENT-LVL IV: ICD-10-PCS | Mod: PBBFAC,,, | Performed by: ORTHOPAEDIC SURGERY

## 2022-01-28 PROCEDURE — 99214 OFFICE O/P EST MOD 30 MIN: CPT | Mod: PBBFAC,PN | Performed by: ORTHOPAEDIC SURGERY

## 2022-01-28 NOTE — PROGRESS NOTES
Subjective:      Patient ID: Jennifer Booth is a 57 y.o. female.    Chief Complaint: Pain of the Left Knee and Fall    HPI     They have experienced problems with their left knee over the past 3 days. The patient reports relevant history of injury/aggravation.  The patient states that she fell at home.  She sustained a direct blow.  Pain is located  anteriorly Associated symptoms include giving way.  Symptoms are aggravated by walk. They have been treated with nothing.   Symptoms have recently improved. Ambulation reportedly has been impaired. Self care ADLs are not painful.     Review of Systems   Constitutional: Negative for fever and weight loss.   HENT: Negative for congestion.    Eyes: Negative for visual disturbance.   Cardiovascular: Negative for chest pain.   Respiratory: Negative for shortness of breath.    Hematologic/Lymphatic: Negative for bleeding problem. Does not bruise/bleed easily.   Skin: Negative for poor wound healing.   Musculoskeletal: Positive for joint pain.   Gastrointestinal: Negative for abdominal pain.   Genitourinary: Negative for dysuria.   Neurological: Negative for seizures.   Psychiatric/Behavioral: Negative for altered mental status.   Allergic/Immunologic: Negative for persistent infections.         Objective:      Ortho/SPM Exam      Left knee    The patient is not in acute distress.   Body habitus is cachectic  Sclera appear normal  No respiratory distress  The patient walks wide-based, nonantalgic gait  Resisted SLR negative.   The skin over the knee is intact.  Knee effusion 0  Tendernes is located absent.  Range of motion- Flexion full, Extension full.   Ligament exam:   MCL intact   Lachman intact              Post sag intact    LCL intact  Patellar apprehension negative.  Popliteal cyst negative  Patellar crepitation absent.  Flexion/pinch negative.  Pulses DP present, PT present.  Motor normal 5/5 strength in all tested muscle groups.   Sensory normal.    I reviewed the  relevant imaging for the patient's condition:  Left knee films show no fracture or dislocation.  Joint space is preserved.  No localized lesions.      Assessment:       Encounter Diagnoses   Name Primary?    Acute pain of left knee     Fall, initial encounter     Contusion of left knee, initial encounter Yes   Although a microfracture is possible in the patella or proximal tibia, there is no tenderness to support this.  This appears to be a contusion which is resolving.  The patient has impaired balance which puts her at risk for falling again.           Plan:       Jennifer was seen today for fall and pain.    Diagnoses and all orders for this visit:    Contusion of left knee, initial encounter  -     WALKER FOR HOME USE    Acute pain of left knee  -     Ambulatory referral/consult to Orthopedics    Fall, initial encounter  -     Ambulatory referral/consult to Orthopedics      I explained my diagnostic impression and the reasoning behind it in detail, using layman's terms.      Walker usage to prevent falls    Ice and Tylenol for symptomatic control    I expect symptoms to resolve within 1 month.  If they persist further imaging would be considered.

## 2022-02-07 ENCOUNTER — LAB VISIT (OUTPATIENT)
Dept: LAB | Facility: HOSPITAL | Age: 57
End: 2022-02-07
Payer: COMMERCIAL

## 2022-02-07 ENCOUNTER — OFFICE VISIT (OUTPATIENT)
Dept: ORTHOPEDICS | Facility: CLINIC | Age: 57
End: 2022-02-07
Payer: MEDICARE

## 2022-02-07 VITALS — HEIGHT: 68 IN | BODY MASS INDEX: 17.28 KG/M2 | WEIGHT: 114 LBS

## 2022-02-07 DIAGNOSIS — Z76.82 PRE-KIDNEY TRANSPLANT, LISTED: ICD-10-CM

## 2022-02-07 DIAGNOSIS — G56.03 BILATERAL CARPAL TUNNEL SYNDROME: ICD-10-CM

## 2022-02-07 DIAGNOSIS — Z41.9 ELECTIVE SURGERY: Primary | ICD-10-CM

## 2022-02-07 PROCEDURE — 99214 PR OFFICE/OUTPT VISIT, EST, LEVL IV, 30-39 MIN: ICD-10-PCS | Mod: S$PBB,,, | Performed by: ORTHOPAEDIC SURGERY

## 2022-02-07 PROCEDURE — 99214 OFFICE O/P EST MOD 30 MIN: CPT | Mod: S$PBB,,, | Performed by: ORTHOPAEDIC SURGERY

## 2022-02-07 PROCEDURE — 99214 OFFICE O/P EST MOD 30 MIN: CPT | Mod: PBBFAC,PN | Performed by: ORTHOPAEDIC SURGERY

## 2022-02-07 PROCEDURE — 99999 PR PBB SHADOW E&M-EST. PATIENT-LVL IV: CPT | Mod: PBBFAC,,, | Performed by: ORTHOPAEDIC SURGERY

## 2022-02-07 PROCEDURE — 99999 PR PBB SHADOW E&M-EST. PATIENT-LVL IV: ICD-10-PCS | Mod: PBBFAC,,, | Performed by: ORTHOPAEDIC SURGERY

## 2022-02-07 RX ORDER — IRON ASPGLY,PS/C/B12/FA/CA/SUC 150-25-1
1 CAPSULE ORAL DAILY
Status: ON HOLD | COMMUNITY
Start: 2022-01-05 | End: 2023-09-14 | Stop reason: ALTCHOICE

## 2022-02-07 NOTE — PROGRESS NOTES
CC:  Bilateral carpal tunnel syndrome        HPI:  Syndrome and mild left carpal tunnel  Jennifer Booth is a very pleasant 57 y.o. female complaining of bilateral hand pain as well as numbness tingling  Recent nerve conduction study showed evidence of bilateral carpal tunnel syndrome right worse than left  I went over these findings with the patient today and gave her copy the report  Previously we have tried injection as well as splinting with only temporary improvement          PAST MEDICAL HISTORY:   Past Medical History:   Diagnosis Date    Addiction to drug     Alcohol abuse     Allergic drug reaction 2017    Anemia     Anxiety     Arm pain, left 2014    Breast cyst     Chronic renal failure 2014    CKD (chronic kidney disease) stage 5, GFR less than 15 ml/min     Depression     Dialysis patient     dialysis m-w-f    Encounter for blood transfusion     GERD (gastroesophageal reflux disease)     Hx of psychiatric care     Hypertension     Mood swings     Multiple myeloma in remission 10/26/2012    per Hem/Oc note    Psychiatric exam requested by authority     Psychiatric problem     Renal hypertension     Status post dialysis 2012    Therapy     Thrombocytopenia 2012     PAST SURGICAL HISTORY:   Past Surgical History:   Procedure Laterality Date    AV FISTULA PLACEMENT Left     BREAST CYST ASPIRATION Left     BREAST CYST EXCISION Left      SECTION      x1    FISTULOGRAM N/A 2020    Procedure: Fistulogram;  Surgeon: Rahat Berger MD;  Location: [facility] CATH LAB/EP;  Service: Cardiology;  Laterality: N/A;    pd catheter      PERCUTANEOUS TRANSLUMINAL ANGIOPLASTY OF ARTERIOVENOUS FISTULA N/A 2020    Procedure: PTA, AV FISTULA;  Surgeon: Rahat Berger MD;  Location: [facility] CATH LAB/EP;  Service: Cardiology;  Laterality: N/A;    PERITONEAL CATHETER REMOVAL N/A 2018    Procedure: REMOVAL, CATHETER, DIALYSIS, PERITONEAL;  Surgeon:  Mukesh Gonsales Jr., MD;  Location: [facility];  Service: General;  Laterality: N/A;    RENAL BIOPSY  2016    THROMBECTOMY OF HEMODIALYSIS ACCESS SITE N/A 6/9/2020    Procedure: Thrombectomy, Hemodialysis Graft Or Fistula;  Surgeon: Rahat Berger MD;  Location: [facility] CATH LAB/EP;  Service: Cardiology;  Laterality: N/A;    VASCULAR SURGERY  8/2012    dialysis catheter to right subclavian     FAMILY HISTORY:   Family History   Problem Relation Age of Onset    Hypertension Mother     Heart failure Mother     Ovarian cancer Maternal Aunt     Diabetes Maternal Grandmother     Stroke Maternal Grandmother     Breast cancer Neg Hx     Colon cancer Neg Hx      SOCIAL HISTORY:   Social History     Socioeconomic History    Marital status: Single    Number of children: 1   Occupational History    Occupation: [employer]   Tobacco Use    Smoking status: Current Every Day Smoker     Packs/day: 1.00     Years: 36.00     Pack years: 36.00     Types: Cigarettes     Start date: 1984    Smokeless tobacco: Never Used    Tobacco comment: Pt enrolled in the [employer]. Ambulatory referral to Smoking Cessation program.    Substance and Sexual Activity    Alcohol use: Yes     Alcohol/week: 2.0 standard drinks     Types: 2 Glasses of wine per week     Comment: A bottle of wine/ night, Last night 6/8 had a bottle of wine    Drug use: No    Sexual activity: Not Currently     Partners: Male   Social History Narrative    Works FT; likes theatre. Lives alone.       MEDICATIONS:   Current Outpatient Medications:     aspirin 81 MG Chew, Chew and swallow 1 tablet (81 mg total) by mouth once daily., Disp: 30 tablet, Rfl: 11    B COMPLEX W-C NO.20/FOLIC ACID (VIRT-CAPS ORAL), Take 1 capsule by mouth once daily. , Disp: , Rfl:     buPROPion (WELLBUTRIN) 75 MG tablet, TAKE 1 TABLET(75 MG) BY MOUTH EVERY DAY, Disp: 90 tablet, Rfl: 0    cinacalcet (SENSIPAR) 30 MG Tab, Take 30 mg by mouth., Disp: , Rfl:     clopidogreL (PLAVIX)  75 mg tablet, Take 75 mg by mouth., Disp: , Rfl:     EScitalopram oxalate (LEXAPRO) 20 MG tablet, TAKE 1 TABLET(20 MG) BY MOUTH EVERY DAY, Disp: 90 tablet, Rfl: 0    FERREX 150 FORTE PLUS 150-60-25-1 mg-mg-mcg-mg Cap, Take 1 capsule by mouth once daily., Disp: , Rfl:     fexofenadine (ALLEGRA) 180 MG tablet, Take 180 mg by mouth., Disp: , Rfl:     fluticasone propionate (FLONASE) 50 mcg/actuation nasal spray, 1 spray by Each Nostril route daily as needed., Disp: , Rfl:     guaiFENesin (MUCINEX) 600 mg 12 hr tablet, Take 1,200 mg by mouth 2 (two) times daily., Disp: , Rfl:     mirtazapine (REMERON) 15 MG tablet, TAKE 1 TABLET(15 MG) BY MOUTH EVERY EVENING, Disp: 90 tablet, Rfl: 0    montelukast (SINGULAIR) 10 mg tablet, TAKE 1 TABLET(10 MG) BY MOUTH EVERY EVENING, Disp: 90 tablet, Rfl: 3    NIFEdipine (PROCARDIA-XL) 60 MG (OSM) 24 hr tablet, Take 1 tablet (60 mg total) by mouth once daily., Disp: 30 tablet, Rfl: 11    sevelamer carbonate (RENVELA) 800 mg Tab, Take 1 tablet by mouth 3 (three) times daily with meals., Disp: , Rfl:     sucroferric oxyhydroxide (VELPHORO) 500 mg Chew, Take 500 mg by mouth 3 (three) times daily., Disp: , Rfl:     vitamin D (VITAMIN D3) 1000 units Tab, Take 2,000 Units by mouth., Disp: , Rfl:     carvediloL (COREG) 12.5 MG tablet, Take 1 tablet (12.5 mg total) by mouth 2 (two) times daily., Disp: 60 tablet, Rfl: 11    sevelamer carbonate (RENVELA) 800 mg Tab, Take 800 mg by mouth 3 (three) times daily., Disp: , Rfl:   ALLERGIES:   Review of patient's allergies indicates:   Allergen Reactions    Doxycycline Swelling, Rash and Hives    Gentamicin Anaphylaxis    Vancomycin analogues Anaphylaxis       Review of Systems:  Constitutional: no fever or chills  ENT: no nasal congestion or sore throat  Respiratory: no cough or shortness of breath  Cardiovascular: no chest pain or palpitations  Gastrointestinal: no nausea or vomiting, PUD, GERD, NSAID intolerance  Genitourinary: no  "hematuria or dysuria  Integument/Breast: no rash or pruritis  Hematologic/Lymphatic: no easy bruising or lymphadenopathy  Musculoskeletal: see HPI  Neurological: no seizures or tremors  Behavioral/Psych: no auditory or visual hallucinations      Physical Exam   Vitals:    02/07/22 1338   Weight: 51.7 kg (114 lb)   Height: 5' 8" (1.727 m)   PainSc: 0-No pain       Constitutional: Oriented to person, place, and time. Appears well-developed and well-nourished.   HENT:   Head: Normocephalic and atraumatic.   Nose: Nose normal.   Eyes: No scleral icterus.   Neck: Normal range of motion.   Cardiovascular: Normal rate and regular rhythm.    Pulses:       Radial pulses are 2+ on the right side, and 2+ on the left side.   Pulmonary/Chest: Effort normal and breath sounds normal.   Abdominal: Soft.   Neurological: Alert and oriented to person, place, and time.   Skin: Skin is warm.   Psychiatric: Normal mood and affect.     MUSCULOSKELETAL UPPER EXTREMITY:  Examination hands demonstrate mild swelling bilaterally new positive Tinel sign on the right negative on the left   strength slightly decreased both hands   Range of motion of his fingers full    No atrophy noted            Diagnostic Studies:  Nerve conduction study demonstrates bilateral carpal tunnel syndrome        Assessment:  Bilateral carpal tunnel syndrome right worse than left    Plan:  I have recommended she consider surgical treatment for right carpal tunnel which is the more involved side  She is in agreement would like to set this up as an outpatient surgery        The risks and benefits of surgery were discussed with the patient today and they understand.  The consent was signed in the office for surgery.      Fabiano Paris MD (Jay)  [facility]  Orthopedic Upper Extremity Surgery      "

## 2022-02-09 DIAGNOSIS — D84.9 IMMUNOSUPPRESSED STATUS: ICD-10-CM

## 2022-02-16 ENCOUNTER — LAB VISIT (OUTPATIENT)
Dept: LAB | Facility: HOSPITAL | Age: 57
End: 2022-02-16
Attending: INTERNAL MEDICINE
Payer: MEDICARE

## 2022-02-16 DIAGNOSIS — C90.00 MULTIPLE MYELOMA, REMISSION STATUS UNSPECIFIED: ICD-10-CM

## 2022-02-16 LAB
ALBUMIN SERPL BCP-MCNC: 3.8 G/DL (ref 3.5–5.2)
ALP SERPL-CCNC: 242 U/L (ref 55–135)
ALT SERPL W/O P-5'-P-CCNC: 11 U/L (ref 10–44)
ANION GAP SERPL CALC-SCNC: 20 MMOL/L (ref 8–16)
AST SERPL-CCNC: 16 U/L (ref 10–40)
BASOPHILS # BLD AUTO: 0.03 K/UL (ref 0–0.2)
BASOPHILS NFR BLD: 0.8 % (ref 0–1.9)
BILIRUB SERPL-MCNC: 0.4 MG/DL (ref 0.1–1)
BUN SERPL-MCNC: 48 MG/DL (ref 6–20)
CALCIUM SERPL-MCNC: 10.2 MG/DL (ref 8.7–10.5)
CHLORIDE SERPL-SCNC: 90 MMOL/L (ref 95–110)
CO2 SERPL-SCNC: 26 MMOL/L (ref 23–29)
CREAT SERPL-MCNC: 10.5 MG/DL (ref 0.5–1.4)
DIFFERENTIAL METHOD: ABNORMAL
EOSINOPHIL # BLD AUTO: 0.1 K/UL (ref 0–0.5)
EOSINOPHIL NFR BLD: 2.2 % (ref 0–8)
ERYTHROCYTE [DISTWIDTH] IN BLOOD BY AUTOMATED COUNT: 17.9 % (ref 11.5–14.5)
EST. GFR  (AFRICAN AMERICAN): 4 ML/MIN/1.73 M^2
EST. GFR  (NON AFRICAN AMERICAN): 4 ML/MIN/1.73 M^2
GLUCOSE SERPL-MCNC: 81 MG/DL (ref 70–110)
HCT VFR BLD AUTO: 34 % (ref 37–48.5)
HGB BLD-MCNC: 10.9 G/DL (ref 12–16)
IGA SERPL-MCNC: 273 MG/DL (ref 40–350)
IGG SERPL-MCNC: 1044 MG/DL (ref 650–1600)
IGM SERPL-MCNC: 238 MG/DL (ref 50–300)
IMM GRANULOCYTES # BLD AUTO: 0.01 K/UL (ref 0–0.04)
IMM GRANULOCYTES NFR BLD AUTO: 0.3 % (ref 0–0.5)
LYMPHOCYTES # BLD AUTO: 0.9 K/UL (ref 1–4.8)
LYMPHOCYTES NFR BLD: 24.7 % (ref 18–48)
MCH RBC QN AUTO: 28.2 PG (ref 27–31)
MCHC RBC AUTO-ENTMCNC: 32.1 G/DL (ref 32–36)
MCV RBC AUTO: 88 FL (ref 82–98)
MONOCYTES # BLD AUTO: 0.3 K/UL (ref 0.3–1)
MONOCYTES NFR BLD: 7.9 % (ref 4–15)
NEUTROPHILS # BLD AUTO: 2.3 K/UL (ref 1.8–7.7)
NEUTROPHILS NFR BLD: 64.1 % (ref 38–73)
NRBC BLD-RTO: 0 /100 WBC
PLATELET # BLD AUTO: 155 K/UL (ref 150–450)
PMV BLD AUTO: 9.8 FL (ref 9.2–12.9)
POTASSIUM SERPL-SCNC: 5.9 MMOL/L (ref 3.5–5.1)
PROT SERPL-MCNC: 7.6 G/DL (ref 6–8.4)
RBC # BLD AUTO: 3.86 M/UL (ref 4–5.4)
SODIUM SERPL-SCNC: 136 MMOL/L (ref 136–145)
WBC # BLD AUTO: 3.65 K/UL (ref 3.9–12.7)

## 2022-02-16 PROCEDURE — 36415 COLL VENOUS BLD VENIPUNCTURE: CPT | Mod: TXP | Performed by: INTERNAL MEDICINE

## 2022-02-16 PROCEDURE — 80053 COMPREHEN METABOLIC PANEL: CPT | Mod: NTX | Performed by: INTERNAL MEDICINE

## 2022-02-16 PROCEDURE — 84165 PROTEIN E-PHORESIS SERUM: CPT | Mod: 26,NTX,, | Performed by: PATHOLOGY

## 2022-02-16 PROCEDURE — 86334 PATHOLOGIST INTERPRETATION IFE: ICD-10-PCS | Mod: 26,NTX,, | Performed by: PATHOLOGY

## 2022-02-16 PROCEDURE — 85025 COMPLETE CBC W/AUTO DIFF WBC: CPT | Mod: TXP | Performed by: INTERNAL MEDICINE

## 2022-02-16 PROCEDURE — 86334 IMMUNOFIX E-PHORESIS SERUM: CPT | Mod: 26,NTX,, | Performed by: PATHOLOGY

## 2022-02-16 PROCEDURE — 82784 ASSAY IGA/IGD/IGG/IGM EACH: CPT | Mod: 59,NTX | Performed by: INTERNAL MEDICINE

## 2022-02-16 PROCEDURE — 86334 IMMUNOFIX E-PHORESIS SERUM: CPT | Mod: TXP | Performed by: INTERNAL MEDICINE

## 2022-02-16 PROCEDURE — 84165 PROTEIN E-PHORESIS SERUM: CPT | Mod: TXP | Performed by: INTERNAL MEDICINE

## 2022-02-16 PROCEDURE — 83520 IMMUNOASSAY QUANT NOS NONAB: CPT | Mod: 59,TXP | Performed by: INTERNAL MEDICINE

## 2022-02-16 PROCEDURE — 84165 PATHOLOGIST INTERPRETATION SPE: ICD-10-PCS | Mod: 26,NTX,, | Performed by: PATHOLOGY

## 2022-02-17 LAB
INTERPRETATION SERPL IFE-IMP: NORMAL
KAPPA LC SER QL IA: 25.47 MG/DL (ref 0.33–1.94)
KAPPA LC/LAMBDA SER IA: 1.32 (ref 0.26–1.65)
LAMBDA LC SER QL IA: 19.25 MG/DL (ref 0.57–2.63)
PATHOLOGIST INTERPRETATION IFE: NORMAL

## 2022-02-20 LAB
ALBUMIN SERPL ELPH-MCNC: 4.04 G/DL (ref 3.35–5.55)
ALPHA1 GLOB SERPL ELPH-MCNC: 0.35 G/DL (ref 0.17–0.41)
ALPHA2 GLOB SERPL ELPH-MCNC: 0.97 G/DL (ref 0.43–0.99)
B-GLOBULIN SERPL ELPH-MCNC: 0.72 G/DL (ref 0.5–1.1)
GAMMA GLOB SERPL ELPH-MCNC: 1.02 G/DL (ref 0.67–1.58)
PROT SERPL-MCNC: 7.1 G/DL (ref 6–8.4)

## 2022-02-21 LAB — PATHOLOGIST INTERPRETATION SPE: NORMAL

## 2022-02-22 ENCOUNTER — TELEPHONE (OUTPATIENT)
Dept: HEMATOLOGY/ONCOLOGY | Facility: CLINIC | Age: 57
End: 2022-02-22
Payer: MEDICARE

## 2022-02-22 NOTE — TELEPHONE ENCOUNTER
Patient was contacted to confirm clinic appointment, patient stated unable to keep scheduled appointment due to a fall and unable to walk after falling and currently in the hospital at [facility]. Patient has concerns regarding labs done on 2/16/2022 and current status of Myeloma. Patient was offered to reschedule clinic appointment to a later month and date, patient declined and stated will contact office once discharged from hospital.

## 2022-02-22 NOTE — TELEPHONE ENCOUNTER
Tc to pt to advsie her that per Dr ramos labs appear within normal range  Message left on VM as she did not respond  Instructed her to contact office if she any further questions

## 2022-03-09 ENCOUNTER — TELEPHONE (OUTPATIENT)
Dept: ORTHOPEDICS | Facility: CLINIC | Age: 57
End: 2022-03-09
Payer: MEDICARE

## 2022-03-09 PROCEDURE — 99001 SPECIMEN HANDLING PT-LAB: CPT | Mod: PO,TXP | Performed by: NURSE PRACTITIONER

## 2022-03-09 NOTE — TELEPHONE ENCOUNTER
Contacted pt regarding an appt she had scheduled for tomorrow for post op after CTR procedure. Procedure was canceled. She states she canceled the procedure due to issue coming from neck. Pt states she had surgery on her neck and issue has been resolved.

## 2022-03-21 ENCOUNTER — TELEPHONE (OUTPATIENT)
Dept: HEMATOLOGY/ONCOLOGY | Facility: CLINIC | Age: 57
End: 2022-03-21
Payer: MEDICARE

## 2022-03-21 DIAGNOSIS — N18.6 ESRD (END STAGE RENAL DISEASE) ON DIALYSIS: ICD-10-CM

## 2022-03-21 DIAGNOSIS — Z99.2 ESRD (END STAGE RENAL DISEASE) ON DIALYSIS: ICD-10-CM

## 2022-03-21 DIAGNOSIS — C90.00 MULTIPLE MYELOMA, REMISSION STATUS UNSPECIFIED: Primary | ICD-10-CM

## 2022-03-21 NOTE — TELEPHONE ENCOUNTER
Rec'd incoming call from patient to reschedule previous follow up appointment. Patient agreed to clinic and lab appointment for 5/19/2022 and 5/25/2022 @ 10:40 am. Mailed appointments.

## 2022-04-04 DIAGNOSIS — Z76.82 ORGAN TRANSPLANT CANDIDATE: Primary | ICD-10-CM

## 2022-04-04 DIAGNOSIS — R93.3 ABNORMAL FINDINGS ON DIAGNOSTIC IMAGING OF OTHER PARTS OF DIGESTIVE TRACT: ICD-10-CM

## 2022-04-06 ENCOUNTER — TELEPHONE (OUTPATIENT)
Dept: CARDIOLOGY | Facility: HOSPITAL | Age: 57
End: 2022-04-06
Payer: MEDICARE

## 2022-04-08 ENCOUNTER — OFFICE VISIT (OUTPATIENT)
Dept: TRANSPLANT | Facility: CLINIC | Age: 57
End: 2022-04-08
Attending: NURSE PRACTITIONER
Payer: MEDICARE

## 2022-04-08 ENCOUNTER — HOSPITAL ENCOUNTER (OUTPATIENT)
Dept: CARDIOLOGY | Facility: HOSPITAL | Age: 57
Discharge: HOME | End: 2022-04-08
Attending: NURSE PRACTITIONER
Payer: MEDICARE

## 2022-04-08 VITALS
HEIGHT: 67 IN | TEMPERATURE: 97 F | OXYGEN SATURATION: 96 % | RESPIRATION RATE: 18 BRPM | BODY MASS INDEX: 17.61 KG/M2 | HEART RATE: 78 BPM | SYSTOLIC BLOOD PRESSURE: 128 MMHG | WEIGHT: 112.19 LBS | DIASTOLIC BLOOD PRESSURE: 77 MMHG

## 2022-04-08 VITALS
BODY MASS INDEX: 17.28 KG/M2 | HEIGHT: 68 IN | SYSTOLIC BLOOD PRESSURE: 124 MMHG | DIASTOLIC BLOOD PRESSURE: 83 MMHG | WEIGHT: 114 LBS | HEART RATE: 76 BPM

## 2022-04-08 VITALS
HEIGHT: 68 IN | HEART RATE: 69 BPM | WEIGHT: 114 LBS | SYSTOLIC BLOOD PRESSURE: 136 MMHG | BODY MASS INDEX: 17.28 KG/M2 | DIASTOLIC BLOOD PRESSURE: 72 MMHG

## 2022-04-08 DIAGNOSIS — Z76.82 ORGAN TRANSPLANT CANDIDATE: ICD-10-CM

## 2022-04-08 DIAGNOSIS — N25.81 SECONDARY HYPERPARATHYROIDISM OF RENAL ORIGIN: ICD-10-CM

## 2022-04-08 DIAGNOSIS — N18.6 ESRD (END STAGE RENAL DISEASE) ON DIALYSIS: ICD-10-CM

## 2022-04-08 DIAGNOSIS — Z99.2 ESRD (END STAGE RENAL DISEASE) ON DIALYSIS: ICD-10-CM

## 2022-04-08 DIAGNOSIS — I12.9 RENAL HYPERTENSION: Chronic | ICD-10-CM

## 2022-04-08 DIAGNOSIS — Z76.82 PATIENT ON WAITING LIST FOR KIDNEY TRANSPLANT: Primary | Chronic | ICD-10-CM

## 2022-04-08 LAB
ASCENDING AORTA: 3.47 CM
AV INDEX (PROSTH): 0.69
AV MEAN GRADIENT: 5 MMHG
AV PEAK GRADIENT: 10 MMHG
AV VALVE AREA: 2.09 CM2
AV VELOCITY RATIO: 0.7
BSA FOR ECHO PROCEDURE: 1.58 M2
CV ECHO LV RWT: 0.28 CM
CV PHARM DOSE: 0.4 MG
CV STRESS BASE HR: 76 BPM
DIASTOLIC BLOOD PRESSURE: 83 MMHG
DOP CALC AO PEAK VEL: 1.59 M/S
DOP CALC AO VTI: 35.63 CM
DOP CALC LVOT AREA: 3 CM2
DOP CALC LVOT DIAMETER: 1.97 CM
DOP CALC LVOT PEAK VEL: 1.11 M/S
DOP CALC LVOT STROKE VOLUME: 74.43 CM3
DOP CALC MV VTI: 13.65 CM
DOP CALCLVOT PEAK VEL VTI: 24.43 CM
E WAVE DECELERATION TIME: 270.63 MSEC
E/A RATIO: 0.56
E/E' RATIO: 14.25 M/S
ECHO LV POSTERIOR WALL: 0.84 CM (ref 0.6–1.1)
EJECTION FRACTION- HIGH: 65 %
EJECTION FRACTION: 48 %
END DIASTOLIC INDEX-HIGH: 153 ML/M2
END DIASTOLIC INDEX-LOW: 93 ML/M2
END SYSTOLIC INDEX-HIGH: 71 ML/M2
END SYSTOLIC INDEX-LOW: 31 ML/M2
FRACTIONAL SHORTENING: 20 % (ref 28–44)
INTERVENTRICULAR SEPTUM: 0.51 CM (ref 0.6–1.1)
IVRT: 159.85 MSEC
LA MAJOR: 6.53 CM
LA MINOR: 6.47 CM
LA WIDTH: 4.69 CM
LEFT ATRIUM SIZE: 4.62 CM
LEFT ATRIUM VOLUME INDEX MOD: 54.5 ML/M2
LEFT ATRIUM VOLUME INDEX: 74.4 ML/M2
LEFT ATRIUM VOLUME MOD: 87.74 CM3
LEFT ATRIUM VOLUME: 119.71 CM3
LEFT INTERNAL DIMENSION IN SYSTOLE: 4.76 CM (ref 2.1–4)
LEFT VENTRICLE DIASTOLIC VOLUME INDEX: 109.78 ML/M2
LEFT VENTRICLE DIASTOLIC VOLUME: 176.75 ML
LEFT VENTRICLE MASS INDEX: 93 G/M2
LEFT VENTRICLE SYSTOLIC VOLUME INDEX: 65.6 ML/M2
LEFT VENTRICLE SYSTOLIC VOLUME: 105.62 ML
LEFT VENTRICULAR INTERNAL DIMENSION IN DIASTOLE: 5.95 CM (ref 3.5–6)
LEFT VENTRICULAR MASS: 149.01 G
LV LATERAL E/E' RATIO: 11.4 M/S
LV SEPTAL E/E' RATIO: 19 M/S
MV A" WAVE DURATION": 9.13 MSEC
MV MEAN GRADIENT: 0 MMHG
MV PEAK A VEL: 1.01 M/S
MV PEAK E VEL: 0.57 M/S
MV PEAK GRADIENT: 2 MMHG
MV STENOSIS PRESSURE HALF TIME: 51.77 MS
MV VALVE AREA BY CONTINUITY EQUATION: 5.45 CM2
MV VALVE AREA P 1/2 METHOD: 4.25 CM2
NUC REST DIASTOLIC VOLUME INDEX: 164
NUC REST EJECTION FRACTION: 55
NUC REST SYSTOLIC VOLUME INDEX: 73
NUC STRESS DIASTOLIC VOLUME INDEX: 153
NUC STRESS EJECTION FRACTION: 52 %
NUC STRESS SYSTOLIC VOLUME INDEX: 74
OHS CV CPX 1 MINUTE RECOVERY HEART RATE: 78 BPM
OHS CV CPX 85 PERCENT MAX PREDICTED HEART RATE MALE: 132
OHS CV CPX MAX PREDICTED HEART RATE: 155
OHS CV CPX PATIENT IS FEMALE: 1
OHS CV CPX PATIENT IS MALE: 0
OHS CV CPX PEAK DIASTOLIC BLOOD PRESSURE: 83 MMHG
OHS CV CPX PEAK HEAR RATE: 68 BPM
OHS CV CPX PEAK RATE PRESSURE PRODUCT: 8432
OHS CV CPX PEAK SYSTOLIC BLOOD PRESSURE: 124 MMHG
OHS CV CPX PERCENT MAX PREDICTED HEART RATE ACHIEVED: 44
OHS CV CPX RATE PRESSURE PRODUCT PRESENTING: 9424
PISA MRMAX VEL: 0.06 M/S
PISA TR MAX VEL: 2.72 M/S
PULM VEIN S/D RATIO: 1.58
PV PEAK D VEL: 0.38 M/S
PV PEAK S VEL: 0.6 M/S
RA MAJOR: 4.68 CM
RA PRESSURE: 3 MMHG
RA WIDTH: 3.52 CM
RETIRED EF AND QEF - SEE NOTES: 53 %
RIGHT VENTRICULAR END-DIASTOLIC DIMENSION: 4.12 CM
RV TISSUE DOPPLER FREE WALL SYSTOLIC VELOCITY 1 (APICAL 4 CHAMBER VIEW): 11.58 CM/S
SINUS: 2.85 CM
STJ: 2.72 CM
SYSTOLIC BLOOD PRESSURE: 124 MMHG
TDI LATERAL: 0.05 M/S
TDI SEPTAL: 0.03 M/S
TDI: 0.04 M/S
TR MAX PG: 30 MMHG
TRICUSPID ANNULAR PLANE SYSTOLIC EXCURSION: 1.33 CM
TV REST PULMONARY ARTERY PRESSURE: 33 MMHG

## 2022-04-08 PROCEDURE — 63600175 PHARM REV CODE 636 W HCPCS: Mod: TXP | Performed by: NURSE PRACTITIONER

## 2022-04-08 PROCEDURE — 93018 CV STRESS TEST I&R ONLY: CPT | Mod: TXP,,, | Performed by: INTERNAL MEDICINE

## 2022-04-08 PROCEDURE — 99215 PR OFFICE/OUTPT VISIT, EST, LEVL V, 40-54 MIN: ICD-10-PCS | Mod: S$PBB,TXP,, | Performed by: NURSE PRACTITIONER

## 2022-04-08 PROCEDURE — 99999 PR PBB SHADOW E&M-EST. PATIENT-LVL V: CPT | Mod: PBBFAC,TXP,, | Performed by: NURSE PRACTITIONER

## 2022-04-08 PROCEDURE — 93016 CV STRESS TEST SUPVJ ONLY: CPT | Mod: TXP,,, | Performed by: INTERNAL MEDICINE

## 2022-04-08 PROCEDURE — 93306 TTE W/DOPPLER COMPLETE: CPT | Mod: 26,TXP,, | Performed by: INTERNAL MEDICINE

## 2022-04-08 PROCEDURE — 99215 OFFICE O/P EST HI 40 MIN: CPT | Mod: PBBFAC,25,TXP | Performed by: NURSE PRACTITIONER

## 2022-04-08 PROCEDURE — 93306 ECHO (CUPID ONLY): ICD-10-PCS | Mod: 26,TXP,, | Performed by: INTERNAL MEDICINE

## 2022-04-08 PROCEDURE — 78452 STRESS TEST WITH MYOCARDIAL PERFUSION (CUPID ONLY): ICD-10-PCS | Mod: 26,TXP,, | Performed by: INTERNAL MEDICINE

## 2022-04-08 PROCEDURE — 93018 STRESS TEST WITH MYOCARDIAL PERFUSION (CUPID ONLY): ICD-10-PCS | Mod: TXP,,, | Performed by: INTERNAL MEDICINE

## 2022-04-08 PROCEDURE — A9502 TC99M TETROFOSMIN: HCPCS | Mod: TXP

## 2022-04-08 PROCEDURE — 93016 STRESS TEST WITH MYOCARDIAL PERFUSION (CUPID ONLY): ICD-10-PCS | Mod: TXP,,, | Performed by: INTERNAL MEDICINE

## 2022-04-08 PROCEDURE — 93306 TTE W/DOPPLER COMPLETE: CPT | Mod: TXP

## 2022-04-08 PROCEDURE — 78452 HT MUSCLE IMAGE SPECT MULT: CPT | Mod: 26,TXP,, | Performed by: INTERNAL MEDICINE

## 2022-04-08 PROCEDURE — 99999 PR PBB SHADOW E&M-EST. PATIENT-LVL V: ICD-10-PCS | Mod: PBBFAC,TXP,, | Performed by: NURSE PRACTITIONER

## 2022-04-08 PROCEDURE — 99215 OFFICE O/P EST HI 40 MIN: CPT | Mod: S$PBB,TXP,, | Performed by: NURSE PRACTITIONER

## 2022-04-08 RX ORDER — CALCITRIOL 0.5 UG/1
1.5 CAPSULE ORAL
Status: ON HOLD | COMMUNITY
Start: 2022-03-19 | End: 2023-10-04 | Stop reason: HOSPADM

## 2022-04-08 RX ORDER — REGADENOSON 0.08 MG/ML
0.4 INJECTION, SOLUTION INTRAVENOUS ONCE
Status: COMPLETED | OUTPATIENT
Start: 2022-04-08 | End: 2022-04-08

## 2022-04-08 RX ORDER — LOSARTAN POTASSIUM 100 MG/1
100 TABLET ORAL DAILY
Status: ON HOLD | COMMUNITY
Start: 2022-03-18 | End: 2023-10-04 | Stop reason: HOSPADM

## 2022-04-08 RX ORDER — MELATONIN 5 MG
5 CAPSULE ORAL NIGHTLY
COMMUNITY

## 2022-04-08 RX ORDER — OXYCODONE AND ACETAMINOPHEN 7.5; 325 MG/1; MG/1
1 TABLET ORAL 2 TIMES DAILY PRN
COMMUNITY
Start: 2022-03-18 | End: 2022-09-27

## 2022-04-08 RX ORDER — IBUPROFEN 200 MG
1 TABLET ORAL
Status: ON HOLD | COMMUNITY
End: 2023-09-14 | Stop reason: ALTCHOICE

## 2022-04-08 RX ORDER — LIDOCAINE 50 MG/G
2 PATCH TOPICAL DAILY PRN
Status: ON HOLD | COMMUNITY
Start: 2022-03-18 | End: 2023-09-14 | Stop reason: ALTCHOICE

## 2022-04-08 RX ORDER — POLYETHYLENE GLYCOL 3350 17 G/17G
17 POWDER, FOR SOLUTION ORAL
Status: ON HOLD | COMMUNITY
Start: 2022-03-18 | End: 2023-09-14 | Stop reason: ALTCHOICE

## 2022-04-08 RX ORDER — PANTOPRAZOLE SODIUM 40 MG/1
40 TABLET, DELAYED RELEASE ORAL DAILY
COMMUNITY
Start: 2022-03-18 | End: 2022-09-27

## 2022-04-08 RX ADMIN — REGADENOSON 0.4 MG: 0.08 INJECTION, SOLUTION INTRAVENOUS at 11:04

## 2022-04-08 NOTE — LETTER
April 11, 2022        Elilott Diaz  [address]  [address]  [facility]  [facility] [address]  Phone: [phone]  Fax: [phone]             [facility]- Transplant 1st Fl  [address]  [facility] [address]  Phone: [phone]   Patient: Jennifer Booth   MR Number: [id number]   YOB: 1965   Date of Visit: 4/8/2022       Dear Dr. Elliott Diaz    Thank you for referring Jennifer Booth to me for evaluation. Attached you will find relevant portions of my assessment and plan of care.    If you have questions, please do not hesitate to call me. I look forward to following Jennifer Booth along with you.    Sincerely,    Selena Marr, NP    Enclosure    If you would like to receive this communication electronically, please contact [email or web address] or [phone] to request [employer] Link access.    [employer] Link is a tool which provides read-only access to select patient information with whom you have a relationship. Its easy to use and provides real time access to review your patients record including encounter summaries, notes, results, and demographic information.    If you feel you have received this communication in error or would no longer like to receive these types of communications, please e-mail [email or web address]

## 2022-04-08 NOTE — PROGRESS NOTES
Kidney Transplant Recipient Reevalulation    Referring Physician: Elliott Diaz  Current Nephrologist: Elliott Diaz  Waitlist Status: inactive  Dialysis Start Date: 12/23/2011    Subjective:     CC:  The chief complaint leading to consultation is:  Kidney Transplant Recipient Reevalulation    HPI:  Ms. Booth is a 57 y.o. year old Black or  female with ESRD secondary to HTN and chronic glomerulosclerosis, Myeloma.  She has been on the wait list for a kidney transplant at [facility] since 12/23/2011. Patient  Was initially started on  hemodialysis   12/23/2011. She switched to PD,   And is now back on HD. Patient is dialyzing on TTS schedule.  She reports multiple Peritonitis infections were the reason she switched back to HD. Patient reports that she is tolerating dialysis well.. She has a LUE AV fistula.  Dialyzes for 3 1/2 hours per session. BP remains stable.  Recent hospitalizations or ED visits.     Currently inactive since 12/10/2019 because of patient choice - not ready for transplant at the current time.     PMH  Hx MM  IS FOLLOWED [facility] , in remission with normal SPEP, LOV 4/23/2020    Alcohol and drug--per psych 7/2021 Patient has successfully completed ABU and will enter aftercare      Hospitalizations/ED visits:  Cervical fusion surgery 2/28/22    Interval History:   Reports doing well. In neck brace in clinic today. Not frail moves well and strong.     CXR: 8/20/21 unremarkable   PXR: 8/20/21 per surgeon test result is favorable for transplant  Renal US: 8/20/21 cysts  Echo:  Results for orders placed during the hospital encounter of 04/08/22    Echo    Interpretation Summary  · The left ventricle is mildly enlarged with mildly decreased systolic function.  · The estimated ejection fraction is 48%.  · There is mild left ventricular global hypokinesis.  · Grade I left ventricular diastolic dysfunction.  · Severe left atrial enlargement.  · Normal right ventricular size with normal  right ventricular systolic function.  · Mild aortic regurgitation.  · Moderate mitral regurgitation.  · Mild tricuspid regurgitation.  · Normal central venous pressure (3 mmHg).  · The estimated PA systolic pressure is 33 mmHg.      Stress:  Pending results    Colonoscopy: 10/2011 DUE  PTH:  Lab Results   Component Value Date    .0 (H) 2020    CALCIUM 10.2 2022    CAION 1.23 2011    PHOS 5.6 (H) 06/10/2020     PAP:21: Negative for intraepithelial lesion or malignancy  MM21 negative        Current Outpatient Medications:     aspirin 81 MG Chew, Chew and swallow 1 tablet (81 mg total) by mouth once daily., Disp: 30 tablet, Rfl: 11    B COMPLEX W-C NO.20/FOLIC ACID (VIRT-CAPS ORAL), Take 1 capsule by mouth once daily. , Disp: , Rfl:     buPROPion (WELLBUTRIN) 75 MG tablet, TAKE 1 TABLET(75 MG) BY MOUTH EVERY DAY, Disp: 90 tablet, Rfl: 0    calcitRIOL (ROCALTROL) 0.5 MCG Cap, Take 1.5 mcg by mouth., Disp: , Rfl:     cinacalcet (SENSIPAR) 30 MG Tab, Take 30 mg by mouth., Disp: , Rfl:     EScitalopram oxalate (LEXAPRO) 20 MG tablet, TAKE 1 TABLET(20 MG) BY MOUTH EVERY DAY, Disp: 90 tablet, Rfl: 0    FERREX 150 FORTE PLUS 150-60-25-1 mg-mg-mcg-mg Cap, Take 1 capsule by mouth once daily., Disp: , Rfl:     fexofenadine (ALLEGRA) 180 MG tablet, Take 180 mg by mouth., Disp: , Rfl:     fluticasone propionate (FLONASE) 50 mcg/actuation nasal spray, 1 spray by Each Nostril route daily as needed., Disp: , Rfl:     guaiFENesin (MUCINEX) 600 mg 12 hr tablet, Take 1,200 mg by mouth 2 (two) times daily., Disp: , Rfl:     LIDOcaine (LIDODERM) 5 %, Place 2 patches onto the skin daily as needed., Disp: , Rfl:     losartan (COZAAR) 100 MG tablet, Take 100 mg by mouth once daily., Disp: , Rfl:     melatonin 5 mg Cap, Take by mouth., Disp: , Rfl:     mirtazapine (REMERON) 15 MG tablet, TAKE 1 TABLET(15 MG) BY MOUTH EVERY EVENING, Disp: 90 tablet, Rfl: 0    montelukast (SINGULAIR) 10 mg  tablet, TAKE 1 TABLET(10 MG) BY MOUTH EVERY EVENING, Disp: 90 tablet, Rfl: 3    nicotine (NICODERM CQ) 14 mg/24 hr, Place 1 patch onto the skin every 24 hours., Disp: , Rfl:     oxyCODONE-acetaminophen (PERCOCET) 7.5-325 mg per tablet, Take 1 tablet by mouth 2 (two) times daily as needed., Disp: , Rfl:     pantoprazole (PROTONIX) 40 MG tablet, Take 40 mg by mouth once daily., Disp: , Rfl:     polyethylene glycol (GLYCOLAX) 17 gram/dose powder, Take 17 g by mouth., Disp: , Rfl:     sevelamer carbonate (RENVELA) 800 mg Tab, Take 1 tablet by mouth 3 (three) times daily with meals., Disp: , Rfl:     sucroferric oxyhydroxide (VELPHORO) 500 mg Chew, Take 500 mg by mouth 3 (three) times daily., Disp: , Rfl:     carvediloL (COREG) 12.5 MG tablet, Take 1 tablet (12.5 mg total) by mouth 2 (two) times daily., Disp: 60 tablet, Rfl: 11    clopidogreL (PLAVIX) 75 mg tablet, Take 75 mg by mouth., Disp: , Rfl:     NIFEdipine (PROCARDIA-XL) 60 MG (OSM) 24 hr tablet, Take 1 tablet (60 mg total) by mouth once daily., Disp: 30 tablet, Rfl: 11    sevelamer carbonate (RENVELA) 800 mg Tab, Take 800 mg by mouth 3 (three) times daily., Disp: , Rfl:   No current facility-administered medications for this visit.      Past Medical History:   Diagnosis Date    Addiction to drug     Alcohol abuse     Allergic drug reaction 11/13/2017    Anemia     Anxiety     Arm pain, left 2/12/2014    Breast cyst     Chronic renal failure 2/12/2014    CKD (chronic kidney disease) stage 5, GFR less than 15 ml/min     Depression     Dialysis patient     dialysis m-w-f    Encounter for blood transfusion     GERD (gastroesophageal reflux disease)     Hx of psychiatric care     Hypertension     Mood swings     Multiple myeloma in remission 10/26/2012    per Hem/Oc note    Psychiatric exam requested by authority     Psychiatric problem     Renal hypertension     Status post dialysis 8/2012    Therapy     Thrombocytopenia 8/2/2012        Past Surgical History:   Procedure Laterality Date    AV FISTULA PLACEMENT Left     BREAST CYST ASPIRATION Left     BREAST CYST EXCISION Left      SECTION      x1    FISTULOGRAM N/A 2020    Procedure: Fistulogram;  Surgeon: Rahat Berger MD;  Location: [facility] CATH LAB/EP;  Service: Cardiology;  Laterality: N/A;    pd catheter      PERCUTANEOUS TRANSLUMINAL ANGIOPLASTY OF ARTERIOVENOUS FISTULA N/A 2020    Procedure: PTA, AV FISTULA;  Surgeon: Rahat Berger MD;  Location: [facility] CATH LAB/EP;  Service: Cardiology;  Laterality: N/A;    PERITONEAL CATHETER REMOVAL N/A 2018    Procedure: REMOVAL, CATHETER, DIALYSIS, PERITONEAL;  Surgeon: Mukesh Gonsales Jr., MD;  [place] [facility] [place] by [facility] [place];  Service: General;  Laterality: N/A;    RENAL BIOPSY  2016    THROMBECTOMY OF HEMODIALYSIS ACCESS SITE N/A 2020    Procedure: Thrombectomy, Hemodialysis Graft Or Fistula;  Surgeon: Rahat Berger MD;  Location: [facility] CATH LAB/EP;  Service: Cardiology;  Laterality: N/A;    VASCULAR SURGERY  2012    dialysis catheter to right subclavian           Review of Systems   Constitutional: Negative for activity change, appetite change, chills, fatigue, fever and unexpected weight change.   HENT: Negative for congestion, facial swelling, postnasal drip, rhinorrhea, sinus pressure, sore throat and trouble swallowing.    Eyes: Positive for visual disturbance. Negative for pain and redness.        Wears glasses   Respiratory: Negative for cough, chest tightness, shortness of breath and wheezing.    Cardiovascular: Negative.  Negative for chest pain, palpitations and leg swelling.   Gastrointestinal: Negative for abdominal pain, diarrhea, nausea and vomiting.        Takes creon    Genitourinary: Positive for decreased urine volume. Negative for dysuria, flank pain and urgency.   Musculoskeletal: Negative for gait problem, neck pain and neck stiffness.   Skin: Negative for rash.   Allergic/Immunologic:  "Negative for environmental allergies, food allergies and immunocompromised state.   Neurological: Negative for dizziness, weakness, light-headedness and headaches.   Psychiatric/Behavioral: Negative for agitation and confusion. The patient is not nervous/anxious.        Objective:   body mass index is 17.58 kg/m².  /77 (BP Location: Right arm, Patient Position: Sitting, BP Method: Large (Automatic))   Pulse 78   Temp 97.3 °F (36.3 °C) (Temporal)   Resp 18   Ht 5' 7" (1.702 m)   Wt 50.9 kg (112 lb 3.4 oz)   LMP 05/25/2016   SpO2 96%   BMI 17.58 kg/m²     Labs:  Lab Results   Component Value Date    WBC 3.65 (L) 02/16/2022    HGB 10.9 (L) 02/16/2022    HCT 34.0 (L) 02/16/2022     02/16/2022    K 5.9 (H) 02/16/2022    CL 90 (L) 02/16/2022    CO2 26 02/16/2022    BUN 48 (H) 02/16/2022    CREATININE 10.5 (H) 02/16/2022    EGFRNONAA 4 (A) 02/16/2022    CALCIUM 10.2 02/16/2022    PHOS 5.6 (H) 06/10/2020    MG 2.2 10/13/2020    ALBUMIN 3.8 02/16/2022    AST 16 02/16/2022    ALT 11 02/16/2022    .0 (H) 06/19/2020       Lab Results   Component Value Date    PREALBUMIN 23 12/25/2011    BILIRUBINUA Negative 06/09/2020    GGT 27 06/09/2014    AMYLASE 84 12/19/2011    LIPASE 36 12/19/2011    PROTEINUA 2+ (A) 06/09/2020    NITRITE Negative 06/09/2020    RBCUA 1 06/09/2020    WBCUA 1 06/09/2020       No results found for: HLAABCTYPE    Lab Results   Component Value Date    CPRA 0 09/09/2021    CPRA 0 09/09/2021    CPRA 0 09/09/2021    NF9EOTH Negative 09/09/2021    CIIAB Negative 09/09/2021       Labs were reviewed with the patient.    Pre-transplant Workup:   Reviewed with the patient.    Assessment:     1. Patient on waiting list for kidney transplant    2. ESRD (end stage renal disease) on dialysis    3. Secondary hyperparathyroidism of renal origin    4. Renal hypertension        Plan:   Pending Stress results  Needs [facility]ocpy--Due  [facility] " for MM  PETH and drug screen needed    Obtain clearance from her Orthopedic Dr. Gonzales at [facility] Had recent cervical fusion in neck brace (when can we undergo surgery speficialy when can we manipulate neck for intubation?)     Transplant Candidacy:   Ms. Booth is an unacceptable kidney transplant candidate d/t needing SW clearance, Needs UTD testing  Meets center eligibility for accepting HCV+ donor offer - yes.  Patient educated on HCV+ donors. Jennifer is willing  to accept HCV+ donor offer -  yes   Patient is a candidate for KDPI > 85 kidney donor offer - yes    She will  Remain  inactive status at present  UTD testing and social work clearance.     Selena Marr NP       Follow-up:   In addition to the tests noted in the plan, Ms. Booth will continue to have reevaluation as per the standing pre-kidney transplant protocol:  1. Monthly blood for PRA  2. Annual return to clinic, except HIV positive, > 65 years of age, or pancreas transplant candidates who will be scheduled to see transplant every 6 months while in pre-transplant phase  3. Annual re-testing: CXR, EKG, yearly mammograms for women over 40 and PSA for males over 40, cardiology follow-up as recommended by initial cardiology pre-transplant evaluation  4. Renal ultrasound every 2 years  5. Baseline colonoscopy after age 50 and repeated as recommended    UNOS Patient Status  Functional Status: 60% - Requires occasional assistance but is able to care for needs  Physical Capacity: No Limitations

## 2022-04-11 DIAGNOSIS — Z78.9 ALCOHOL USE: Primary | ICD-10-CM

## 2022-04-11 DIAGNOSIS — Z76.82 ORGAN TRANSPLANT CANDIDATE: ICD-10-CM

## 2022-04-11 NOTE — PROGRESS NOTES
INITIAL PATIENT EDUCATION NOTE    Ms. Jennifer Booth was seen in pre-kidney transplant clinic for evaluation for kidney, kidney/pancreas or pancreas only transplant.  The patient attended a an individual video education session that discussed/reviewed the following aspects of transplantation: evaluation and selection committee process, UNOS waitlist management/multiple listings, types of organs offered (KDPI < 85%, KDPI > 85%, PHS risk, DCD, HCV+, HIV+ for HIV+ recipients and enbloc/dual), financial aspects, surgical procedures, dietary instruction pre- and post-transplant, health maintenance pre- and post-transplant, post-transplant hospitalization and outpatient follow-up, potential to participate in a research protocol, and medication management and side effects.  A question and answer session was provided after the presentation.    The patient was seen by all members of the multi-disciplinary team to include: Nephrologist/PA, Surgeon, , Transplant Coordinator, , Pharmacist and Dietician (if applicable).    The patient reviewed and signed all consents for evaluation which were witnessed and sent to scanning into the [facility] chart.    The patient was given an education book and plan for further evaluation based on her individual assessment.      Reviewed program requirement for complete COVID vaccination with documentation prior to listing.  COVID education information reviewed with patient.     The patient was informed that the transplant team would manage immediate post op pain. If the patient requires long term pain management, they will need to have that pain management addressed by their PCP or previous provider who wrote for long term pain medicines.    The patient was encouraged to call with any questions or concerns.

## 2022-04-29 ENCOUNTER — LAB VISIT (OUTPATIENT)
Dept: LAB | Facility: HOSPITAL | Age: 57
End: 2022-04-29
Attending: INTERNAL MEDICINE
Payer: MEDICARE

## 2022-04-29 ENCOUNTER — TELEPHONE (OUTPATIENT)
Dept: TRANSPLANT | Facility: CLINIC | Age: 57
End: 2022-04-29
Payer: MEDICARE

## 2022-04-29 DIAGNOSIS — C90.00 MULTIPLE MYELOMA, REMISSION STATUS UNSPECIFIED: ICD-10-CM

## 2022-04-29 DIAGNOSIS — Z99.2 ESRD (END STAGE RENAL DISEASE) ON DIALYSIS: ICD-10-CM

## 2022-04-29 DIAGNOSIS — N18.6 ESRD (END STAGE RENAL DISEASE) ON DIALYSIS: ICD-10-CM

## 2022-04-29 LAB
ALBUMIN SERPL BCP-MCNC: 3.8 G/DL (ref 3.5–5.2)
ALP SERPL-CCNC: 203 U/L (ref 55–135)
ALT SERPL W/O P-5'-P-CCNC: 6 U/L (ref 10–44)
ANION GAP SERPL CALC-SCNC: 18 MMOL/L (ref 8–16)
AST SERPL-CCNC: 8 U/L (ref 10–40)
BILIRUB SERPL-MCNC: 0.5 MG/DL (ref 0.1–1)
BUN SERPL-MCNC: 33 MG/DL (ref 6–20)
CALCIUM SERPL-MCNC: 11.5 MG/DL (ref 8.7–10.5)
CHLORIDE SERPL-SCNC: 94 MMOL/L (ref 95–110)
CO2 SERPL-SCNC: 28 MMOL/L (ref 23–29)
CREAT SERPL-MCNC: 5.5 MG/DL (ref 0.5–1.4)
ERYTHROCYTE [DISTWIDTH] IN BLOOD BY AUTOMATED COUNT: 20 % (ref 11.5–14.5)
EST. GFR  (AFRICAN AMERICAN): 9 ML/MIN/1.73 M^2
EST. GFR  (NON AFRICAN AMERICAN): 8 ML/MIN/1.73 M^2
GLUCOSE SERPL-MCNC: 95 MG/DL (ref 70–110)
HCT VFR BLD AUTO: 31.4 % (ref 37–48.5)
HGB BLD-MCNC: 9.7 G/DL (ref 12–16)
IMM GRANULOCYTES # BLD AUTO: 0.01 K/UL (ref 0–0.04)
MCH RBC QN AUTO: 27.4 PG (ref 27–31)
MCHC RBC AUTO-ENTMCNC: 30.9 G/DL (ref 32–36)
MCV RBC AUTO: 89 FL (ref 82–98)
NEUTROPHILS # BLD AUTO: 2.6 K/UL (ref 1.8–7.7)
PLATELET # BLD AUTO: 147 K/UL (ref 150–450)
PMV BLD AUTO: 8.8 FL (ref 9.2–12.9)
POTASSIUM SERPL-SCNC: 6 MMOL/L (ref 3.5–5.1)
PROT SERPL-MCNC: 7.8 G/DL (ref 6–8.4)
RBC # BLD AUTO: 3.54 M/UL (ref 4–5.4)
SODIUM SERPL-SCNC: 140 MMOL/L (ref 136–145)
WBC # BLD AUTO: 4.31 K/UL (ref 3.9–12.7)

## 2022-04-29 PROCEDURE — 85027 COMPLETE CBC AUTOMATED: CPT | Mod: TXP | Performed by: INTERNAL MEDICINE

## 2022-04-29 PROCEDURE — 84165 PROTEIN E-PHORESIS SERUM: CPT | Mod: 26,NTX,, | Performed by: PATHOLOGY

## 2022-04-29 PROCEDURE — 83520 IMMUNOASSAY QUANT NOS NONAB: CPT | Mod: TXP | Performed by: INTERNAL MEDICINE

## 2022-04-29 PROCEDURE — 86334 IMMUNOFIX E-PHORESIS SERUM: CPT | Mod: 26,NTX,, | Performed by: PATHOLOGY

## 2022-04-29 PROCEDURE — 36415 COLL VENOUS BLD VENIPUNCTURE: CPT | Mod: TXP | Performed by: INTERNAL MEDICINE

## 2022-04-29 PROCEDURE — 84165 PROTEIN E-PHORESIS SERUM: CPT | Mod: NTX | Performed by: INTERNAL MEDICINE

## 2022-04-29 PROCEDURE — 86334 IMMUNOFIX E-PHORESIS SERUM: CPT | Mod: TXP | Performed by: INTERNAL MEDICINE

## 2022-04-29 PROCEDURE — 86334 PATHOLOGIST INTERPRETATION IFE: ICD-10-PCS | Mod: 26,NTX,, | Performed by: PATHOLOGY

## 2022-04-29 PROCEDURE — 84165 PATHOLOGIST INTERPRETATION SPE: ICD-10-PCS | Mod: 26,NTX,, | Performed by: PATHOLOGY

## 2022-04-29 PROCEDURE — 80053 COMPREHEN METABOLIC PANEL: CPT | Mod: NTX | Performed by: INTERNAL MEDICINE

## 2022-04-29 PROCEDURE — 82232 ASSAY OF BETA-2 PROTEIN: CPT | Mod: NTX | Performed by: INTERNAL MEDICINE

## 2022-04-29 NOTE — TELEPHONE ENCOUNTER
Spoke to patient. Provided phone number to [facility] for her to schedule her colonoscopy. She needs colonoscopy, repeat peth and drug screen, and clearance from dr. Gonzales regarding when her neck can be manipulated post cervical fusion for transplant surgery.

## 2022-04-29 NOTE — TELEPHONE ENCOUNTER
----- Message from Cam Cardenas sent at 4/29/2022 11:25 AM CDT -----  Regarding: speak to nurse  Contact: Jennifer  Diana is calling to speak to nurse in regards to being put back on the txp list    Contact: [phone]

## 2022-04-30 LAB — B2 MICROGLOB SERPL-MCNC: 23.8 UG/ML (ref 0–2.5)

## 2022-05-02 LAB
ALBUMIN SERPL ELPH-MCNC: 4.19 G/DL (ref 3.35–5.55)
ALPHA1 GLOB SERPL ELPH-MCNC: 0.43 G/DL (ref 0.17–0.41)
ALPHA2 GLOB SERPL ELPH-MCNC: 0.96 G/DL (ref 0.43–0.99)
B-GLOBULIN SERPL ELPH-MCNC: 0.71 G/DL (ref 0.5–1.1)
GAMMA GLOB SERPL ELPH-MCNC: 0.9 G/DL (ref 0.67–1.58)
INTERPRETATION SERPL IFE-IMP: NORMAL
KAPPA LC SER QL IA: 22.32 MG/DL (ref 0.33–1.94)
KAPPA LC/LAMBDA SER IA: 1.22 (ref 0.26–1.65)
LAMBDA LC SER QL IA: 18.36 MG/DL (ref 0.57–2.63)
PROT SERPL-MCNC: 7.2 G/DL (ref 6–8.4)

## 2022-05-03 ENCOUNTER — OFFICE VISIT (OUTPATIENT)
Dept: HEMATOLOGY/ONCOLOGY | Facility: CLINIC | Age: 57
End: 2022-05-03
Payer: MEDICARE

## 2022-05-03 VITALS
WEIGHT: 120 LBS | BODY MASS INDEX: 18.19 KG/M2 | OXYGEN SATURATION: 99 % | HEIGHT: 68 IN | DIASTOLIC BLOOD PRESSURE: 86 MMHG | TEMPERATURE: 98 F | SYSTOLIC BLOOD PRESSURE: 137 MMHG | HEART RATE: 84 BPM

## 2022-05-03 DIAGNOSIS — D63.1 ANEMIA IN CHRONIC KIDNEY DISEASE, ON CHRONIC DIALYSIS: ICD-10-CM

## 2022-05-03 DIAGNOSIS — C90.00 MULTIPLE MYELOMA, REMISSION STATUS UNSPECIFIED: Primary | ICD-10-CM

## 2022-05-03 DIAGNOSIS — Z99.2 ANEMIA IN CHRONIC KIDNEY DISEASE, ON CHRONIC DIALYSIS: ICD-10-CM

## 2022-05-03 DIAGNOSIS — N18.6 ESRD (END STAGE RENAL DISEASE) ON DIALYSIS: ICD-10-CM

## 2022-05-03 DIAGNOSIS — Z99.2 ESRD (END STAGE RENAL DISEASE) ON DIALYSIS: ICD-10-CM

## 2022-05-03 DIAGNOSIS — Z78.0 POSTMENOPAUSAL: ICD-10-CM

## 2022-05-03 DIAGNOSIS — N18.6 ANEMIA IN CHRONIC KIDNEY DISEASE, ON CHRONIC DIALYSIS: ICD-10-CM

## 2022-05-03 LAB
PATHOLOGIST INTERPRETATION IFE: NORMAL
PATHOLOGIST INTERPRETATION SPE: NORMAL

## 2022-05-03 PROCEDURE — 99999 PR PBB SHADOW E&M-EST. PATIENT-LVL V: ICD-10-PCS | Mod: PBBFAC,,, | Performed by: INTERNAL MEDICINE

## 2022-05-03 PROCEDURE — 99214 PR OFFICE/OUTPT VISIT, EST, LEVL IV, 30-39 MIN: ICD-10-PCS | Mod: S$PBB,,, | Performed by: INTERNAL MEDICINE

## 2022-05-03 PROCEDURE — 99215 OFFICE O/P EST HI 40 MIN: CPT | Mod: PBBFAC | Performed by: INTERNAL MEDICINE

## 2022-05-03 PROCEDURE — 99214 OFFICE O/P EST MOD 30 MIN: CPT | Mod: S$PBB,,, | Performed by: INTERNAL MEDICINE

## 2022-05-03 PROCEDURE — 99999 PR PBB SHADOW E&M-EST. PATIENT-LVL V: CPT | Mod: PBBFAC,,, | Performed by: INTERNAL MEDICINE
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
## 2022-05-03 NOTE — Clinical Note
Bone Density in may Dental clearance letter F/u   4mo with cbc,cmp, SPEP, immunofixation electrophores, free light chains ,B-2 microglobulin labs 2 weeks PRIOR to f/u

## 2022-05-06 ENCOUNTER — TELEPHONE (OUTPATIENT)
Dept: NEUROLOGY | Facility: HOSPITAL | Age: 57
End: 2022-05-06
Payer: MEDICARE

## 2022-05-06 NOTE — TELEPHONE ENCOUNTER
----- Message from Debora Renee RN sent at 5/6/2022  7:58 AM CDT -----  Regarding: Schedule Procedure  Good Morning   This patient has a request with Dr. Mcguire for a procedure, if you can schedule her please. If not send it back to me.  Her number is [phone]      Thanks

## 2022-05-09 ENCOUNTER — TELEPHONE (OUTPATIENT)
Dept: NEUROLOGY | Facility: HOSPITAL | Age: 57
End: 2022-05-09
Payer: MEDICARE

## 2022-05-09 NOTE — TELEPHONE ENCOUNTER
Clinic appt scheduled with pt on Monday, April 23, 2022 at 145pm.  Pt given clinic address and repeated correctly.

## 2022-05-12 ENCOUNTER — TELEPHONE (OUTPATIENT)
Dept: HEMATOLOGY/ONCOLOGY | Facility: CLINIC | Age: 57
End: 2022-05-12
Payer: MEDICARE

## 2022-05-12 DIAGNOSIS — Z78.0 POSTMENOPAUSAL: ICD-10-CM

## 2022-05-12 DIAGNOSIS — C90.00 MULTIPLE MYELOMA, REMISSION STATUS UNSPECIFIED: Primary | ICD-10-CM

## 2022-05-12 NOTE — TELEPHONE ENCOUNTER
chriss for bone density and CBC Oncology [VNO476]  Comprehensive Metabolic Panel [LAB17]  Protein Electrophoresis, Serum [ZKU230]  Immunofixation Electrophoresis [JYE236]  Immunoglobulin Free LT Chains Blood [NTC321]  Beta 2 Microglobulin, Serum [LAB49]

## 2022-05-19 NOTE — PROGRESS NOTES
Transplant Psychosocial Evaluation Update:  Last psychosocial evaluation for transplant was completed on 5/8/2020 by Lorena Marlow LCSW.    Pt presents today in company of dtr, Yusra Booth. Pt and dtr  present as alert, oriented to person, place, time, purpose of visit. Pt and dtr deny concerns about going through with transplant and state motivation and sense of preparedness for transplantation, caregiver role, psychosocial risk factors, medical risk factors, financial aspects, and lifetime commitments. All concerns and education points as per transplant psychosocial evaluation process addressed (also refer to psychosocial dated 3/12/2021). Pt actively participated in today's interview, with dtr participating as caregiver. Pt asking questions appropriately and denies changes to previous psychosocial evaluation for transplantation which we reviewed line by line with pt and, per pt choice, with pts caregiver today.    Primary Caregivers and Transportation for Transplant:  1. Yusra Booth, 33, dtr, [facility], [phone], drives  2. Alvaro Vines, 30, dtr's s/o, [facility] drives [phone].      Both pt and caregiver/s plan to stay locally in pt's home - information reviewed in depth. Caregivers plan to stay at hospital as appropriate for transplant and post-transplant process.    Pts Vocation: Pt previously worked at  [employer] in [facility], Last day worked:  5/31/2021.  She is now retired.    DME: walks with cane and neck brace secondary to recent fall (March 2022).     ADLS:  Pt states ability to independently accomplish all adls.    Household and Dependents: pt resides  alone and has no dependents at this time.    Income: Pt states ability to afford all monthly expenses and costs including for medications now and if transplanted based on income and on savings and assets.    Dialysis Adherence: Pt states current and expected compliance with all healthcare recommendations.    Pt and dtr deny any  additional concerns, stating preparedness and motivation to proceed with organ transplant.     Suitability for Transplant:   Pt remains high risk for transplant at this time based on psychosocial risk factors and strengths.  Lives alone, now reitred, has missed psych appointments since 8/2021 despite hx of ETOH abuse and anxiety.     Pt has stable supports, adequate insurance, financial stability, transportation and caregiver plans in place, and states is using no substances unless prescribed. Recommend pt return to psychiatry to continue treatment and for support.  Recommend PETH test and drug of abuse screen.

## 2022-05-23 ENCOUNTER — OFFICE VISIT (OUTPATIENT)
Dept: NEUROLOGY | Facility: HOSPITAL | Age: 57
End: 2022-05-23
Attending: INTERNAL MEDICINE
Payer: COMMERCIAL

## 2022-05-23 VITALS
HEART RATE: 64 BPM | SYSTOLIC BLOOD PRESSURE: 119 MMHG | DIASTOLIC BLOOD PRESSURE: 76 MMHG | BODY MASS INDEX: 18.69 KG/M2 | TEMPERATURE: 98 F | HEIGHT: 67 IN | WEIGHT: 119.06 LBS

## 2022-05-23 DIAGNOSIS — Z12.11 SCREEN FOR COLON CANCER: Primary | ICD-10-CM

## 2022-05-23 PROCEDURE — 99215 OFFICE O/P EST HI 40 MIN: CPT | Mod: TXP | Performed by: INTERNAL MEDICINE

## 2022-05-23 RX ORDER — SODIUM, POTASSIUM,MAG SULFATES 17.5-3.13G
2 SOLUTION, RECONSTITUTED, ORAL ORAL ONCE
Qty: 1 KIT | Refills: 0 | Status: SHIPPED | OUTPATIENT
Start: 2022-05-23 | End: 2022-05-23

## 2022-05-23 NOTE — PATIENT INSTRUCTIONS
Hold Plavix, 5 days prior to procedure.     SUPREP Instructions    [facility] [address]  Clinic Office [phone]  Endoscopy Lab [phone]    You are scheduled for a Colonoscopy with Dr. Nunez on Monday , June 6, 2022 at [facility] in [place].    Check in at the Hospital -1st floor, Information desk.   Call [phone] to reschedule.    An adult friend/family member must come with you to drive you home.  You cannot drive, take a taxi, Uber/Lyft or bus to leave the Endoscopy Center alone.  If you do not have someone to drive you home, your test will be cancelled.     Please follow the directions of your doctor if you take any pills that thin your blood. If you take these meds: Aggrenox, Brilinta, Effient, Eliquis, Lovenox, Plavix, Pletal, Pradaxa, Ticilid, Xarelto or Coumadin, let the doctor's office know.    DON'T: On the morning of the test do not take insulin or pills for diabetes.     DO: On the morning of the test, do take any pills for blood pressure, heart, anti-rejection and or seizures with a small sip of water. Bring any inhalers with you.    To have a good prep, you must follow these instructions - please do not use the directions from the pharmacy.    The doctor will send a prescription for the SUPREP.      The Day Before the test:    You can only drink CLEAR LIQUIDS the whole day before your test.  You can't eat any food for the whole day.    You CAN have:  Water, Coffee or decaf coffee (no milk or cream)  Tea  Soft drinks - regular and sugar free  Jello (green or yellow)  Apple Juice, white grape juice, white cranberry juice  Gatorade, Power Aid, Crystal Light, Ricardo Aid  Lemonade and Limeade  Bouillon, clear soup  Snowball, popsicles  YOU CAN'T DRINK ANYTHING RED, PURPLE ORANGE OR BLUE   YOU CAN'T DRINK ALCOHOL  ONLY DRINK WHAT IS ON THE LIST      At 5 pm the night before your test:    Pour the 1st bottle of SUPREP into the cup provided in the box. Add water to  the line on the cup and mix well.  Drink the whole cup and then drink 2 more full cups of water over 1 hour.  This can be easier to drink if it is cold. You can mix it 20 minutes ahead of time and place in the refrigerator before you drink it.  You must drink it within 30-45 minutes of mixing it.  Do NOT pour the drink over ice.  You can drink it with a straw.    The Day of the test - We will call you 2 days before your test to tell you what time to get there.    5 hours before you come to the hospital (this may be in the middle of the night)  Pour the 2nd bottle of SUPREP into the cup provided in the box. Add water to the line on the cup and mix well.  Drink the whole cup and then drink 2 more full cups of water over 1 hour.  It might be easier to drink if it is cold. You can mix it 20 minutes ahead of time and place in the refrigerator before you drink it.  You must drink it within 30-45 minutes of mixing it.  Do NOT pour the drink over ice.  You can drink it with a straw.    YOU CAN'T EAT OR DRINK ANYTHING ELSE ONCE YOU FINISH THE PREP    Leave all valuables and jewelry at home. You will be at the hospital for 2-4 hours.    Call the Endoscopy department at [phone] with any questions about your procedure.

## 2022-05-23 NOTE — PROGRESS NOTES
"[facility]      HPI 57 y.o. female with PMHx of multiple myeloma, GERD, ESRD on HD, and peripheral vascular disease with stent (on ASA/plavix) who presents for need for screening colonoscopy. Last colonoscopy in  and told she needed a repeat in 10 years. No family history of colon cancer. Denies melena or rectal bleeding. Takes plavix and ASA 81 mg daily. History of  surgery.    Past Medical History  Past Medical History:   Diagnosis Date    Addiction to drug     Alcohol abuse     Allergic drug reaction 2017    Anemia     Anxiety     Arm pain, left 2014    Breast cyst     Chronic renal failure 2014    CKD (chronic kidney disease) stage 5, GFR less than 15 ml/min     Depression     Dialysis patient     dialysis m-w-f    Encounter for blood transfusion     GERD (gastroesophageal reflux disease)     Hx of psychiatric care     Hypertension     Mood swings     Multiple myeloma in remission 10/26/2012    per Hem/Oc note    Psychiatric exam requested by authority     Psychiatric problem     Renal hypertension     Status post dialysis 2012    Therapy     Thrombocytopenia 2012       Physical Examination  /76 (BP Location: Right arm, Patient Position: Sitting, BP Method: Medium (Automatic))   Pulse 64   Temp 97.7 °F (36.5 °C) (Oral)   Ht 5' 7" (1.702 m)   Wt 54 kg (119 lb 0.8 oz)   LMP 2016   BMI 18.65 kg/m²   General appearance: alert, cooperative, no distress  Heart: regular rate and rhythm without rub; no increased work of breathing  Abdomen: soft, non-tender; bowel sounds normoactive  Extremities: extremities symmetric    Labs:  Lab Results   Component Value Date    WBC 4.31 2022    HGB 9.7 (L) 2022    HCT 31.4 (L) 2022    MCV 89 2022     (L) 2022     BMP  Lab Results   Component Value Date     2022    K 6.0 (H) 2022    CL 94 (L) 2022    CO2 28 2022    BUN 33 (H) " 04/29/2022    CREATININE 5.5 (H) 04/29/2022    CALCIUM 11.5 (H) 04/29/2022    ANIONGAP 18 (H) 04/29/2022    ESTGFRAFRICA 9 (A) 04/29/2022    EGFRNONAA 8 (A) 04/29/2022       Assessment:    57 y.o. female with PMHx of multiple myeloma, GERD, ESRD on HD, and peripheral vascular disease with stent (on ASA/plavix) who presents for need for screening colonoscopy.    1. Need for colorectal cancer screening, average risk  2. On dual antiplatelet therapy, ASA/plavix    Plan:  - Recommend screening colonoscopy on June 6, 2022.  - Suprep sent to [facility] pharmacy.  - Advised to hold plavix 5 days in advance of procedure for polypectomy planning. Continue ASA 81 mg daily.  - Reinterview next visit       Jordan Mcguire MD   [address], [address]   [place] [id number] [phone]

## 2022-05-24 ENCOUNTER — HOSPITAL ENCOUNTER (OUTPATIENT)
Dept: RADIOLOGY | Facility: HOSPITAL | Age: 57
Discharge: HOME | End: 2022-05-24
Payer: MEDICARE

## 2022-05-24 DIAGNOSIS — Z78.0 POSTMENOPAUSAL: ICD-10-CM

## 2022-05-24 PROCEDURE — 77080 DEXA BONE DENSITY SPINE HIP: ICD-10-PCS | Mod: 26,NTX,, | Performed by: RADIOLOGY

## 2022-05-24 PROCEDURE — 77080 DXA BONE DENSITY AXIAL: CPT | Mod: TC,NTX

## 2022-05-24 PROCEDURE — 77080 DXA BONE DENSITY AXIAL: CPT | Mod: 26,NTX,, | Performed by: RADIOLOGY

## 2022-05-26 ENCOUNTER — TELEPHONE (OUTPATIENT)
Dept: TRANSPLANT | Facility: CLINIC | Age: 57
End: 2022-05-26
Payer: MEDICARE

## 2022-05-26 NOTE — TELEPHONE ENCOUNTER
"Dialysis compliance found under "Media" tab->"other patient documents" ->"dialysis compliance".  Concerns noted:  Emotional changes possibly related to sister's recent death.  DIANAW recommended mental health treatment.  "

## 2022-05-31 ENCOUNTER — PATIENT MESSAGE (OUTPATIENT)
Dept: ADMINISTRATIVE | Facility: HOSPITAL | Age: 57
End: 2022-05-31
Payer: MEDICARE

## 2022-06-02 ENCOUNTER — TELEPHONE (OUTPATIENT)
Dept: ENDOSCOPY | Facility: HOSPITAL | Age: 57
End: 2022-06-02
Payer: MEDICARE

## 2022-06-02 NOTE — TELEPHONE ENCOUNTER
Spoke with patient about arrival time @ 0915.   Covid test = vacc    Prep instructions reviewed: the day before the procedure, follow a clear liquid diet all day, then start the first 1/2 of prep at 5pm and take 2nd 1/2 of prep @ 0400.  Pt must be completely NPO when prep completed @ 0600.              Medications: Do not take Insulin or oral diabetic medications the day of the procedure.  Take as prescribed: heart, seizure and blood pressure medication in the morning with a sip of water (less than an ounce).  Take any breathing medications and bring inhalers to hospital with you Leave all valuables and jewelry at home.     Wear comfortable clothes to procedure to change into hospital gown You cannot drive for 24 hours after your procedure because you will receive sedation for your procedure to make you comfortable.  A ride must be provided at discharge.

## 2022-06-06 ENCOUNTER — TELEPHONE (OUTPATIENT)
Dept: NEUROLOGY | Facility: HOSPITAL | Age: 57
End: 2022-06-06
Payer: MEDICARE

## 2022-06-06 NOTE — TELEPHONE ENCOUNTER
----- Message from Veda Rios sent at 6/6/2022  8:11 AM CDT -----  Contact: [phone]  Type: Requesting to speak with nurse    Who Called: Pt   Regarding: reschedule procedure , pt states she did not have enough time to drink prep    Would the patient rather a call back or a response via [facility]? Call back  Best Call Back Number: [phone]  Additional Information: n/a

## 2022-06-06 NOTE — PROGRESS NOTES
[facility]      HPI 57 y.o. female with PMHx of multiple myeloma, GERD, ESRD on HD, and peripheral vascular disease with stent (on ASA/plavix) who presents for need for screening colonoscopy. Last colonoscopy in  and told she needed a repeat in 10 years. No family history of colon cancer. Denies melena or rectal bleeding. Takes plavix and ASA 81 mg daily. History of  surgery.    Past Medical History  Past Medical History:   Diagnosis Date    Addiction to drug     Alcohol abuse     Allergic drug reaction 2017    Anemia     Anxiety     Arm pain, left 2014    Breast cyst     Chronic renal failure 2014    CKD (chronic kidney disease) stage 5, GFR less than 15 ml/min     Depression     Dialysis patient     dialysis m-w-f    Encounter for blood transfusion     GERD (gastroesophageal reflux disease)     Hx of psychiatric care     Hypertension     Mood swings     Multiple myeloma in remission 10/26/2012    per Hem/Oc note    Psychiatric exam requested by authority     Psychiatric problem     Renal hypertension     Status post dialysis 2012    Therapy     Thrombocytopenia 2012       Physical Examination  LMP 2016   General appearance: alert, cooperative, no distress  Heart: regular rate and rhythm without rub; no increased work of breathing  Abdomen: soft, non-tender; bowel sounds normoactive  Extremities: extremities symmetric    Labs:  Lab Results   Component Value Date    WBC 4.11 2022    HGB 11.8 (L) 2022    HCT 37.6 2022    MCV 86 2022     2022     BMP  Lab Results   Component Value Date     2022    K 4.3 2022    CL 94 (L) 2022    CO2 31 (H) 2022    BUN 18 2022    CREATININE 4.5 (H) 2022    CALCIUM 9.6 2022    ANIONGAP 14 2022    ESTGFRAFRICA 12 (A) 2022    EGFRNONAA 10 (A) 2022       Assessment:    57 y.o. female with PMHx of multiple myeloma,  GERD, ESRD on HD, and peripheral vascular disease with stent (on ASA/plavix) who presents for need for screening colonoscopy.    1. Need for colorectal cancer screening, average risk  2. On dual antiplatelet therapy, ASA/plavix    Plan:  - Proceed with screening colonoscopy today  - Advised to hold plavix 5 days in advance of procedure for polypectomy planning. Continue ASA 81 mg daily.        Jordan Mcguire MD   [address], [address]   [place] [address] [phone]

## 2022-06-06 NOTE — TELEPHONE ENCOUNTER
Pt did not start prep, requesting to reschedule colonoscopy.  Colonoscopy rescheduled to Monday, June 27, 2022.  Suprep instructions with pt and repeated correctly.

## 2022-06-23 ENCOUNTER — TELEPHONE (OUTPATIENT)
Dept: ENDOSCOPY | Facility: HOSPITAL | Age: 57
End: 2022-06-23
Payer: MEDICARE

## 2022-06-23 NOTE — TELEPHONE ENCOUNTER
Left voice and text messages instructing patient to call dept @ [phone] or [phone] between 8am-3pm.    Arrival time to be given @ 0730  Colon/Suprep  Covid - jose a  ([facility] not accessed since March)

## 2022-06-24 ENCOUNTER — TELEPHONE (OUTPATIENT)
Dept: NEUROLOGY | Facility: HOSPITAL | Age: 57
End: 2022-06-24
Payer: MEDICARE

## 2022-06-24 ENCOUNTER — TELEPHONE (OUTPATIENT)
Dept: ENDOSCOPY | Facility: HOSPITAL | Age: 57
End: 2022-06-24
Payer: MEDICARE

## 2022-06-24 NOTE — TELEPHONE ENCOUNTER
Note  Left voice messages instructing patient to call dept @ [phone] or [phone] by 3pm today.    Arrival time to be given @ 0730  Colon/Suprep  Covid - boosted

## 2022-06-24 NOTE — TELEPHONE ENCOUNTER
----- Message from Veda Rios sent at 6/24/2022  8:28 AM CDT -----  Contact: [phone]/ Self  Type: Requesting to speak with nurse    Who Called: Pt   Regarding: reschedule date of procedure    Would the patient rather a call back or a response via [facility]? Call back  Best Call Back Number: [phone]  Additional Information: n/a

## 2022-06-24 NOTE — TELEPHONE ENCOUNTER
Returned pt's call, message placed on voicemail.  Attempt to reschedule colonoscopy, hold plavix 5 days prior to procedure.

## 2022-06-24 NOTE — TELEPHONE ENCOUNTER
Colonoscopy scheduled for Monday, 06/27 cancelled, patient did not stop Plavix as instructed. Dr. Mcguire office staff notified.

## 2022-06-28 ENCOUNTER — TELEPHONE (OUTPATIENT)
Dept: NEUROLOGY | Facility: HOSPITAL | Age: 57
End: 2022-06-28
Payer: MEDICARE

## 2022-06-28 NOTE — TELEPHONE ENCOUNTER
----- Message from Chris Larose sent at 6/28/2022  3:52 PM CDT -----  Contact: [phone]  Who Called: PT  Regarding: reschedule colonoscopy   Would the patient rather a call back or a response via [facility]? Call back  Best Call Back Number:[phone]  Additional Information: n/a

## 2022-06-29 ENCOUNTER — TELEPHONE (OUTPATIENT)
Dept: NEUROLOGY | Facility: HOSPITAL | Age: 57
End: 2022-06-29
Payer: MEDICARE

## 2022-06-29 NOTE — TELEPHONE ENCOUNTER
Colonoscopy rescheduled with pt to Monday, July 18, 2022.  Pt instructed to hold plavix 5 days prior to colonoscopy.  Pt acknowledged by repeating all information given correctly.

## 2022-07-12 DIAGNOSIS — Z76.82 ORGAN TRANSPLANT CANDIDATE: Primary | ICD-10-CM

## 2022-07-14 ENCOUNTER — TELEPHONE (OUTPATIENT)
Dept: ENDOSCOPY | Facility: HOSPITAL | Age: 57
End: 2022-07-14
Payer: MEDICARE

## 2022-07-14 NOTE — TELEPHONE ENCOUNTER
Spoke with patient about arrival time @ 0830.   Covid test = boosted    Prep instructions reviewed: the day before the procedure, follow a clear liquid diet all day, then start the first 1/2 of prep at 5pm and take 2nd 1/2 of prep @ 0330.  Pt must be completely NPO when prep completed @ 0530.              Medications: Do not take Insulin or oral diabetic medications the day of the procedure.  Take as prescribed: heart, seizure and blood pressure medication in the morning with a sip of water (less than an ounce).  Take any breathing medications and bring inhalers to hospital with you Leave all valuables and jewelry at home. Reports she has stopped Plavix.    Wear comfortable clothes to procedure to change into hospital gown You cannot drive for 24 hours after your procedure because you will receive sedation for your procedure to make you comfortable.  A ride must be provided at discharge.

## 2022-07-18 ENCOUNTER — HOSPITAL ENCOUNTER (OUTPATIENT)
Facility: HOSPITAL | Age: 57
Discharge: HOME | End: 2022-07-18
Attending: INTERNAL MEDICINE | Admitting: INTERNAL MEDICINE
Payer: MEDICARE

## 2022-07-18 VITALS
BODY MASS INDEX: 18.83 KG/M2 | SYSTOLIC BLOOD PRESSURE: 132 MMHG | HEART RATE: 65 BPM | HEIGHT: 67 IN | DIASTOLIC BLOOD PRESSURE: 75 MMHG | OXYGEN SATURATION: 100 % | RESPIRATION RATE: 18 BRPM | WEIGHT: 120 LBS | TEMPERATURE: 97 F

## 2022-07-18 DIAGNOSIS — Z12.11 COLON CANCER SCREENING: ICD-10-CM

## 2022-07-18 DIAGNOSIS — Z12.11 SCREEN FOR COLON CANCER: Primary | ICD-10-CM

## 2022-07-18 LAB
ANION GAP SERPL CALC-SCNC: 17 MMOL/L (ref 8–16)
BUN SERPL-MCNC: 45 MG/DL (ref 6–20)
CALCIUM SERPL-MCNC: 9 MG/DL (ref 8.7–10.5)
CHLORIDE SERPL-SCNC: 95 MMOL/L (ref 95–110)
CO2 SERPL-SCNC: 27 MMOL/L (ref 23–29)
CREAT SERPL-MCNC: 7.9 MG/DL (ref 0.5–1.4)
EST. GFR  (AFRICAN AMERICAN): 6 ML/MIN/1.73 M^2
EST. GFR  (NON AFRICAN AMERICAN): 5 ML/MIN/1.73 M^2
GLUCOSE SERPL-MCNC: 74 MG/DL (ref 70–110)
POTASSIUM SERPL-SCNC: 6.4 MMOL/L (ref 3.5–5.1)
SODIUM SERPL-SCNC: 139 MMOL/L (ref 136–145)

## 2022-07-18 PROCEDURE — 36415 COLL VENOUS BLD VENIPUNCTURE: CPT | Mod: TXP | Performed by: ANESTHESIOLOGY

## 2022-07-18 PROCEDURE — 80048 BASIC METABOLIC PNL TOTAL CA: CPT | Mod: TXP | Performed by: ANESTHESIOLOGY

## 2022-07-18 PROCEDURE — 25000003 PHARM REV CODE 250: Mod: TXP | Performed by: INTERNAL MEDICINE

## 2022-07-18 RX ORDER — SODIUM, POTASSIUM,MAG SULFATES 17.5-3.13G
2 SOLUTION, RECONSTITUTED, ORAL ORAL ONCE
Qty: 1 KIT | Refills: 0 | Status: SHIPPED | OUTPATIENT
Start: 2022-07-18 | End: 2022-07-18

## 2022-07-18 RX ORDER — SODIUM CHLORIDE 0.9 % (FLUSH) 0.9 %
10 SYRINGE (ML) INJECTION
Status: ACTIVE | OUTPATIENT
Start: 2022-07-18

## 2022-07-18 RX ORDER — SODIUM CHLORIDE 9 MG/ML
INJECTION, SOLUTION INTRAVENOUS CONTINUOUS
Status: ACTIVE | OUTPATIENT
Start: 2022-07-18

## 2022-07-18 RX ADMIN — SODIUM CHLORIDE: 0.9 INJECTION, SOLUTION INTRAVENOUS at 09:07

## 2022-07-18 NOTE — PLAN OF CARE
Procedure cancelled by Dr Mcguire d/t K+ 6.4.    Pt discharged to home to follow up for emergent dialysis at her home facility at 230p this afternoon.    Pt will be rescheduled for procedure one day post dialysis.

## 2022-07-18 NOTE — PLAN OF CARE
Pre op complete.  Pt had 8oz water at 0830.  VSS.  NPO status encouraged and maintained.  BMP ordered-dialysis patient.

## 2022-07-18 NOTE — H&P
Procedure cancelled due to high potassium. HD today.     Jordan Mcguire MD   [address], [address]   [place] [id number] [phone]

## 2022-07-19 ENCOUNTER — TELEPHONE (OUTPATIENT)
Dept: NEUROLOGY | Facility: HOSPITAL | Age: 57
End: 2022-07-19
Payer: MEDICARE

## 2022-07-19 NOTE — TELEPHONE ENCOUNTER
----- Message from Chris Larose sent at 7/19/2022 12:33 PM CDT -----  Contact: [phone]  Who Called: PT  Regarding: schedule appointment   Would the patient rather a call back or a response via [facility]? Call back  Best Call Back Number: [phone]   Additional Information: n/a

## 2022-07-19 NOTE — TELEPHONE ENCOUNTER
Colonoscopy rescheduled with pt to Thursday, August 4, 2022 at [facility].  Suprep colon prep instructions with pt and sent via pt portal.  SUPREP Instructions    [facility]  [address]  Clinic Office [phone]  Endoscopy Lab [phone]    You are scheduled for a Colonoscopy with Dr. Mcguire  on Thursday, August 4, 2022 at [facility] in [place].    Check in at the Hospital -1st floor, Information desk.   Call [phone] to reschedule.     An adult friend/family member must come with you to drive you home.  You cannot drive, take a taxi, Uber/Lyft or bus to leave the Endoscopy Center alone.  If you do not have someone to drive you home, your test will be cancelled.      Please follow the directions of your doctor if you take any pills that thin your blood. If you take these meds: Aggrenox, Brilinta, Effient, Eliquis, Lovenox, Plavix, Pletal, Pradaxa, Ticilid, Xarelto or Coumadin, let the doctor's office know.     DON'T: On the morning of the test do not take insulin or pills for diabetes.      DO: On the morning of the test, do take any pills for blood pressure, heart, anti-rejection and or seizures with a small sip of water. Bring any inhalers with you.     To have a good prep, you must follow these instructions - please do not use the directions from the pharmacy.     The doctor will send a prescription for the SUPREP.      The Day Before the test:     You can only drink CLEAR LIQUIDS the whole day before your test.  You can't eat any food for the whole day.     You CAN have:  o Water, Coffee or decaf coffee (no milk or cream)  o Tea  o Soft drinks - regular and sugar free  o Jello (green or yellow)  o Apple Juice, white grape juice, white cranberry juice  o Gatorade, Power Aid, Crystal Light, Ricardo Aid  o Lemonade and Limeade  o Bouillon, clear soup  o Snowball, popsicles  o YOU CAN'T DRINK ANYTHING RED, PURPLE ORANGE OR BLUE   o YOU CAN'T DRINK  ALCOHOL  o ONLY DRINK WHAT IS ON THE LIST  o      At 5 pm the night before your test:    o Pour the 1st bottle of SUPREP into the cup provided in the box. Add water to the line on the cup and mix well.  Drink the whole cup and then drink 2 more full cups of water over 1 hour.  - This can be easier to drink if it is cold. You can mix it 20 minutes ahead of time and place in the refrigerator before you drink it.  You must drink it within 30-45 minutes of mixing it.  Do NOT pour the drink over ice.  You can drink it with a straw.    The Day of the test - We will call you 2 days before your test to tell you what time to get there.     5 hours before you come to the hospital (this may be in the middle of the night)  o Pour the 2nd bottle of SUPREP into the cup provided in the box. Add water to the line on the cup and mix well.  Drink the whole cup and then drink 2 more full cups of water over 1 hour.  - It might be easier to drink if it is cold. You can mix it 20 minutes ahead of time and place in the refrigerator before you drink it.  You must drink it within 30-45 minutes of mixing it.  Do NOT pour the drink over ice.  You can drink it with a straw.    o YOU CAN'T EAT OR DRINK ANYTHING ELSE ONCE YOU FINISH THE PREP    o Leave all valuables and jewelry at home. You will be at the hospital for 2-4 hours.    o Call the Endoscopy department at [phone] with any questions about your procedure.

## 2022-08-01 ENCOUNTER — TELEPHONE (OUTPATIENT)
Dept: ENDOSCOPY | Facility: HOSPITAL | Age: 57
End: 2022-08-01
Payer: MEDICARE

## 2022-08-01 NOTE — TELEPHONE ENCOUNTER
Colonoscopy prep changed to Golytely per Dr. Mcguire, Rx sent to pharmacy on file and prep instructions sent via [employer] portal.

## 2022-08-02 ENCOUNTER — TELEPHONE (OUTPATIENT)
Dept: ENDOSCOPY | Facility: HOSPITAL | Age: 57
End: 2022-08-02
Payer: MEDICARE

## 2022-08-02 NOTE — TELEPHONE ENCOUNTER
Left voice and text messages instructing patient to call dept @ [phone] or [phone] between 8am-3pm.    Arrival time to be given @ 0680  Colon/Golytely  Dialysis patient - needs pre-op labs  (Message sent via [facility])

## 2022-08-02 NOTE — TELEPHONE ENCOUNTER
Spoke with patient about arrival time @ 1130.   Covid test = boosted    Prep instructions reviewed: the day before the procedure, follow a clear liquid diet all day, then start the first 1/2 of prep at 5pm and take 2nd 1/2 of prep @ 0600.  Pt must be completely NPO when prep completed @ 0800.              Medications: Do not take Insulin or oral diabetic medications the day of the procedure.  Take as prescribed: heart, seizure and blood pressure medication in the morning with a sip of water (less than an ounce).  Take any breathing medications and bring inhalers to hospital with you Leave all valuables and jewelry at home.     Wear comfortable clothes to procedure to change into hospital gown You cannot drive for 24 hours after your procedure because you will receive sedation for your procedure to make you comfortable.  A ride must be provided at discharge.

## 2022-08-03 ENCOUNTER — ANESTHESIA EVENT (OUTPATIENT)
Dept: ENDOSCOPY | Facility: HOSPITAL | Age: 57
End: 2022-08-03
Payer: MEDICARE

## 2022-08-04 ENCOUNTER — TELEPHONE (OUTPATIENT)
Dept: NEUROLOGY | Facility: HOSPITAL | Age: 57
End: 2022-08-04

## 2022-08-04 ENCOUNTER — ANESTHESIA (OUTPATIENT)
Dept: ENDOSCOPY | Facility: HOSPITAL | Age: 57
End: 2022-08-04
Payer: MEDICARE

## 2022-08-04 NOTE — TELEPHONE ENCOUNTER
----- Message from Balbina Rae sent at 8/4/2022  9:45 AM CDT -----  Regarding: call back  Contact: [phone]  Type:  Needs Medical Advice    Who Called: PT  Would the patient rather a call back or a response via [facility]? Call back   Best Call Back Number:  [phone]  Additional Information: Patient is calling to talk to nurse in regards to her colonoscopy scheduled for today and patient has not finished her prep and still has brown stool. Please advice

## 2022-08-08 ENCOUNTER — TELEPHONE (OUTPATIENT)
Dept: TRANSPLANT | Facility: CLINIC | Age: 57
End: 2022-08-08
Payer: MEDICARE

## 2022-08-08 DIAGNOSIS — Z76.82 ORGAN TRANSPLANT CANDIDATE: Primary | ICD-10-CM

## 2022-08-08 NOTE — TELEPHONE ENCOUNTER
----- Message from Michelle Abadie, RN sent at 8/5/2022  4:47 PM CDT -----  Contact: Patient    ----- Message -----  From: Josee Calzada RN  Sent: 8/5/2022   4:22 PM CDT  To: Kidney Waitlisted Coordinator      ----- Message -----  From: Lizzeth Rodriguez  Sent: 8/5/2022   4:21 PM CDT  To: [facility] Staff    Patient call in regarding receiving a letter regarding mammogram    No order on Active Request     Please assist    Patient can be reach at [phone]

## 2022-08-09 ENCOUNTER — TELEPHONE (OUTPATIENT)
Dept: TRANSPLANT | Facility: CLINIC | Age: 57
End: 2022-08-09
Payer: MEDICARE

## 2022-08-09 NOTE — TELEPHONE ENCOUNTER
Spoke to patient, confirmed mammo on 9/13/22 in [place]. Patient also asked if drug screen could be rescheduled from previously. Informed her that appt was made for 8/30 when she goes for Dr. Shane's labs. Colonoscopy is also scheduled.

## 2022-08-16 ENCOUNTER — TELEPHONE (OUTPATIENT)
Dept: ENDOSCOPY | Facility: HOSPITAL | Age: 57
End: 2022-08-16
Payer: MEDICARE

## 2022-08-16 RX ORDER — POLYETHYLENE GLYCOL 3350, SODIUM SULFATE ANHYDROUS, SODIUM BICARBONATE, SODIUM CHLORIDE, POTASSIUM CHLORIDE 236; 22.74; 6.74; 5.86; 2.97 G/4L; G/4L; G/4L; G/4L; G/4L
4 POWDER, FOR SOLUTION ORAL ONCE
Qty: 4000 ML | Refills: 0 | Status: SHIPPED | OUTPATIENT
Start: 2022-08-16 | End: 2022-08-16

## 2022-08-16 NOTE — TELEPHONE ENCOUNTER
Spoke with patient about arrival time @ 0800.   Covid test = boosted  Prep instructions reviewed: the day before the procedure, follow a clear liquid diet all day, then start the first 1/2 of prep at 5pm and take 2nd 1/2 of prep @ 0300.  Pt must be completely NPO when prep completed @ 0500.        Dialysis patient, needs pre-op labs.          Medications: Do not take Insulin or oral diabetic medications the day of the procedure.  Take as prescribed: heart, seizure and blood pressure medication in the morning with a sip of water (less than an ounce).  Take any breathing medications and bring inhalers to hospital with you Leave all valuables and jewelry at home.     Wear comfortable clothes to procedure to change into hospital gown You cannot drive for 24 hours after your procedure because you will receive sedation for your procedure to make you comfortable.  A ride must be provided at discharge.
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
## 2022-08-18 ENCOUNTER — HOSPITAL ENCOUNTER (OUTPATIENT)
Facility: HOSPITAL | Age: 57
Discharge: HOME | End: 2022-08-18
Attending: INTERNAL MEDICINE | Admitting: INTERNAL MEDICINE
Payer: COMMERCIAL

## 2022-08-18 VITALS
OXYGEN SATURATION: 100 % | RESPIRATION RATE: 19 BRPM | TEMPERATURE: 99 F | HEART RATE: 83 BPM | WEIGHT: 120 LBS | HEIGHT: 67 IN | BODY MASS INDEX: 18.83 KG/M2 | DIASTOLIC BLOOD PRESSURE: 72 MMHG | SYSTOLIC BLOOD PRESSURE: 96 MMHG

## 2022-08-18 DIAGNOSIS — Z12.11 COLON CANCER SCREENING: ICD-10-CM

## 2022-08-18 DIAGNOSIS — K63.5 POLYP OF COLON, UNSPECIFIED PART OF COLON, UNSPECIFIED TYPE: Primary | ICD-10-CM

## 2022-08-18 LAB
ANION GAP SERPL CALC-SCNC: 23 MMOL/L (ref 8–16)
BUN SERPL-MCNC: 30 MG/DL (ref 6–20)
CALCIUM SERPL-MCNC: 10 MG/DL (ref 8.7–10.5)
CHLORIDE SERPL-SCNC: 90 MMOL/L (ref 95–110)
CO2 SERPL-SCNC: 22 MMOL/L (ref 23–29)
CREAT SERPL-MCNC: 8.7 MG/DL (ref 0.5–1.4)
EST. GFR  (NO RACE VARIABLE): 5 ML/MIN/1.73 M^2
GLUCOSE SERPL-MCNC: 75 MG/DL (ref 70–110)
POTASSIUM SERPL-SCNC: 4.6 MMOL/L (ref 3.5–5.1)
SODIUM SERPL-SCNC: 135 MMOL/L (ref 136–145)

## 2022-08-18 PROCEDURE — 88305 TISSUE EXAM BY PATHOLOGIST: CPT | Mod: 26,TXP,, | Performed by: PATHOLOGY

## 2022-08-18 PROCEDURE — 25000003 PHARM REV CODE 250: Mod: TXP | Performed by: NURSE ANESTHETIST, CERTIFIED REGISTERED

## 2022-08-18 PROCEDURE — 37000009 HC ANESTHESIA EA ADD 15 MINS: Mod: TXP | Performed by: INTERNAL MEDICINE

## 2022-08-18 PROCEDURE — 88305 TISSUE EXAM BY PATHOLOGIST: CPT | Mod: TXP | Performed by: PATHOLOGY

## 2022-08-18 PROCEDURE — 45385 COLONOSCOPY W/LESION REMOVAL: CPT | Mod: TXP | Performed by: INTERNAL MEDICINE

## 2022-08-18 PROCEDURE — 37000008 HC ANESTHESIA 1ST 15 MINUTES: Mod: TXP | Performed by: INTERNAL MEDICINE

## 2022-08-18 PROCEDURE — 63600175 PHARM REV CODE 636 W HCPCS: Mod: TXP | Performed by: NURSE ANESTHETIST, CERTIFIED REGISTERED

## 2022-08-18 PROCEDURE — 36415 COLL VENOUS BLD VENIPUNCTURE: CPT | Mod: TXP | Performed by: ANESTHESIOLOGY

## 2022-08-18 PROCEDURE — 88305 TISSUE EXAM BY PATHOLOGIST: ICD-10-PCS | Mod: 26,TXP,, | Performed by: PATHOLOGY

## 2022-08-18 PROCEDURE — 25000003 PHARM REV CODE 250: Mod: TXP | Performed by: INTERNAL MEDICINE

## 2022-08-18 PROCEDURE — 27201089 HC SNARE, DISP (ANY): Mod: TXP | Performed by: INTERNAL MEDICINE

## 2022-08-18 PROCEDURE — 80048 BASIC METABOLIC PNL TOTAL CA: CPT | Mod: TXP | Performed by: ANESTHESIOLOGY

## 2022-08-18 RX ORDER — PROPOFOL 10 MG/ML
VIAL (ML) INTRAVENOUS
Status: DISCONTINUED | OUTPATIENT
Start: 2022-08-18 | End: 2022-08-18

## 2022-08-18 RX ORDER — SODIUM CHLORIDE 9 MG/ML
INJECTION, SOLUTION INTRAVENOUS CONTINUOUS
Status: ACTIVE | OUTPATIENT
Start: 2022-08-18

## 2022-08-18 RX ORDER — SODIUM CHLORIDE 0.9 % (FLUSH) 0.9 %
10 SYRINGE (ML) INJECTION
Status: ACTIVE | OUTPATIENT
Start: 2022-08-18

## 2022-08-18 RX ORDER — PROPOFOL 10 MG/ML
VIAL (ML) INTRAVENOUS CONTINUOUS PRN
Status: DISCONTINUED | OUTPATIENT
Start: 2022-08-18 | End: 2022-08-18

## 2022-08-18 RX ORDER — LIDOCAINE HYDROCHLORIDE 20 MG/ML
INJECTION INTRAVENOUS
Status: DISCONTINUED | OUTPATIENT
Start: 2022-08-18 | End: 2022-08-18

## 2022-08-18 RX ADMIN — PROPOFOL 140 MCG/KG/MIN: 10 INJECTION, EMULSION INTRAVENOUS at 10:08

## 2022-08-18 RX ADMIN — PROPOFOL 50 MG: 10 INJECTION, EMULSION INTRAVENOUS at 10:08

## 2022-08-18 RX ADMIN — PROPOFOL 25 MG: 10 INJECTION, EMULSION INTRAVENOUS at 10:08

## 2022-08-18 RX ADMIN — SODIUM CHLORIDE: 0.9 INJECTION, SOLUTION INTRAVENOUS at 09:08

## 2022-08-18 RX ADMIN — GLYCOPYRROLATE 0.1 MG: 0.2 INJECTION, SOLUTION INTRAMUSCULAR; INTRAVITREAL at 10:08

## 2022-08-18 RX ADMIN — LIDOCAINE HYDROCHLORIDE 100 MG: 20 INJECTION, SOLUTION INTRAVENOUS at 10:08

## 2022-08-18 NOTE — PLAN OF CARE
Pt is ready for procedure. VSS. NPO status maintained. Bed in lowest position, call light within reach, bed wheels locked.

## 2022-08-18 NOTE — ANESTHESIA PREPROCEDURE EVALUATION
2022  Jennifer Booth is a 57 y.o., female for colonoscopy under MAC    Past Medical History:   Diagnosis Date    Addiction to drug     Alcohol abuse     Allergic drug reaction 2017    Anemia     Anxiety     Arm pain, left 2014    Breast cyst     Chronic renal failure 2014    CKD (chronic kidney disease) stage 5, GFR less than 15 ml/min     Depression     Dialysis patient     dialysis m-w-f    Encounter for blood transfusion     GERD (gastroesophageal reflux disease)     Hx of psychiatric care     Hypertension     Mood swings     Multiple myeloma in remission 10/26/2012    per Hem/Oc note    Psychiatric exam requested by authority     Psychiatric problem     Renal hypertension     Status post dialysis 2012    Therapy     Thrombocytopenia 2012     Past Surgical History:   Procedure Laterality Date    AV FISTULA PLACEMENT Left     BREAST CYST ASPIRATION Left     BREAST CYST EXCISION Left     CERVICAL SPINE SURGERY Bilateral      SECTION      x1    FISTULOGRAM N/A 2020    Procedure: Fistulogram;  Surgeon: Rahat Berger MD;  Location: [facility] CATH LAB/EP;  Service: Cardiology;  Laterality: N/A;    pd catheter      PERCUTANEOUS TRANSLUMINAL ANGIOPLASTY OF ARTERIOVENOUS FISTULA N/A 2020    Procedure: PTA, AV FISTULA;  Surgeon: Rahat Berger MD;  Location: [facility] CATH LAB/EP;  Service: Cardiology;  Laterality: N/A;    PERITONEAL CATHETER REMOVAL N/A 2018    Procedure: REMOVAL, CATHETER, DIALYSIS, PERITONEAL;  Surgeon: Mukesh Gonsales Jr., MD;  Location: [facility] OR;  Service: General;  Laterality: N/A;    RENAL BIOPSY  2016    THROMBECTOMY OF HEMODIALYSIS ACCESS SITE N/A 2020    Procedure: Thrombectomy, Hemodialysis Graft Or Fistula;  Surgeon: Rahat Berger MD;  Location: [facility] CATH LAB/EP;  Service: Cardiology;   Laterality: N/A;    VASCULAR SURGERY  08/2012    dialysis catheter to right subclavian           Pre-op Assessment    I have reviewed the Patient Summary Reports.    I have reviewed the NPO Status.   I have reviewed the Medications.     Review of Systems  Social:  Smoker        Physical Exam  General: Well nourished    Airway:  Mallampati: II           Anesthesia Plan  Type of Anesthesia, risks & benefits discussed:    Anesthesia Type: MAC  Intra-op Monitoring Plan: Standard ASA Monitors  Informed Consent: Informed consent signed with the Patient and all parties understand the risks and agree with anesthesia plan.  All questions answered.   ASA Score: 4    Ready For Surgery From Anesthesia Perspective.     .

## 2022-08-18 NOTE — H&P
[facility]    HPI 57 y.o. female with PMHx of multiple myeloma, GERD, ESRD on HD, and peripheral vascular disease with stent (on ASA/plavix) who presents for need for screening colonoscopy. Last colonoscopy in  and told she needed a repeat in 10 years. No family history of colon cancer. Denies melena or rectal bleeding. Takes plavix and ASA 81 mg daily. History of  surgery.    Past Medical History  Past Medical History:   Diagnosis Date    Addiction to drug     Alcohol abuse     Allergic drug reaction 2017    Anemia     Anxiety     Arm pain, left 2014    Breast cyst     Chronic renal failure 2014    CKD (chronic kidney disease) stage 5, GFR less than 15 ml/min     Depression     Dialysis patient     dialysis m-w-f    Encounter for blood transfusion     GERD (gastroesophageal reflux disease)     Hx of psychiatric care     Hypertension     Mood swings     Multiple myeloma in remission 10/26/2012    per Hem/Oc note    Psychiatric exam requested by authority     Psychiatric problem     Renal hypertension     Status post dialysis 2012    Therapy     Thrombocytopenia 2012         Review of Systems  General ROS: negative for chills, fever or weight loss  Cardiovascular ROS: no chest pain or dyspnea on exertion  Gastrointestinal ROS: no abdominal pain, change in bowel habits, or black/ bloody stools    Physical Examination  LMP 2016   General appearance: alert, cooperative, no distress  HENT: Normocephalic, atraumatic, neck symmetrical, no nasal discharge   Lungs: clear to auscultation bilaterally, no dullness to percussion bilaterally  Heart: regular rate and rhythm without rub; no displacement of the PMI   Abdomen: soft, non-tender; bowel sounds normoactive; no organomegaly  Extremities: extremities symmetric; no clubbing, cyanosis, or edema  Neurologic: Alert and oriented X 3, normal strength, normal coordination and gait    57 y.o. female with  PMHx of multiple myeloma, GERD, ESRD on HD, and peripheral vascular disease with stent (on ASA/plavix) who presents for need for screening colonoscopy.     1. Need for colorectal cancer screening, average risk  2. On dual antiplatelet therapy, ASA/plavix    Plan:  - Proceed with screening colonoscopy today  - Advised to hold plavix 5 days in advance of procedure for polypectomy planning. Continue ASA 81 mg daily.    Jordan Mcguire MD   [address], [address]   [place] [address] [phone]

## 2022-08-18 NOTE — TRANSFER OF CARE
"Anesthesia Transfer of Care Note    Patient: Jennifer Booth    Procedure(s) Performed: Procedure(s) (LRB):  COLONOSCOPY (N/A)    Patient location: GI    Anesthesia Type: MAC    Transport from OR: Transported from OR on room air with adequate spontaneous ventilation    Post pain: adequate analgesia    Post assessment: no apparent anesthetic complications and tolerated procedure well    Post vital signs: stable    Level of consciousness: awake    Nausea/Vomiting: no nausea/vomiting    Complications: none    Transfer of care protocol was followed      Last vitals:   Visit Vitals  BP (!) 149/93 (Patient Position: Lying)   Pulse 80   Temp 36.1 °C (97 °F) (Temporal)   Resp 18   Ht 5' 7" (1.702 m)   Wt 54.4 kg (120 lb)   LMP 05/25/2016   SpO2 100%   Breastfeeding No   BMI 18.79 kg/m²     "

## 2022-08-18 NOTE — ANESTHESIA POSTPROCEDURE EVALUATION
Anesthesia Post Evaluation    Patient: Jennifer Booth    Procedure(s) Performed: Procedure(s) (LRB):  COLONOSCOPY (N/A)    Final Anesthesia Type: MAC      Patient location during evaluation: GI PACU  Patient participation: Yes- Able to Participate  Level of consciousness: awake and alert and awake  Post-procedure vital signs: reviewed and stable  Pain management: adequate  Airway patency: patent    PONV status at discharge: No PONV  Anesthetic complications: no      Cardiovascular status: blood pressure returned to baseline, hemodynamically stable and stable  Respiratory status: unassisted, room air and spontaneous ventilation  Hydration status: euvolemic  Follow-up not needed.          Vitals Value Taken Time   BP  08/18/22 1048   Temp  08/18/22 1048   Pulse  08/18/22 1048   Resp  08/18/22 1048   SpO2  08/18/22 1048     See nursing notes for vitals     No case tracking events are documented in the log.      Pain/Ivan Score: No data recorded

## 2022-08-18 NOTE — [employer] PATIENT INSTRUCTIONS
Discharge Summary/Instructions after an Endoscopic Procedure  Patient Name: Jennifer Booth  Patient MRN: [id number]  Patient YOB: 1965 Thursday, August 18, 2022  Jordan Mcguire MD  Dear patient,  As a result of recent federal legislation (The Federal Cures Act), you may   receive lab or pathology results from your procedure in your [facility]   account before your physician is able to contact you. Your physician or   their representative will relay the results to you with their   recommendations at their soonest availability.  Thank you,  Your health is very important to us during the Covid Crisis. Following your   procedure today, you will receive a daily text for 2 weeks asking about   signs or symptoms of Covid 19.  Please respond to this text when you   receive it so we can follow up and keep you as safe as possible.   RESTRICTIONS:  During your procedure today, you received medications for sedation.  These   medications may affect your judgment, balance and coordination.  Therefore,   for 24 hours, you have the following restrictions:   - DO NOT drive a car, operate machinery, make legal/financial decisions,   sign important papers or drink alcohol.    ACTIVITY:  Today: no heavy lifting, straining or running due to procedural   sedation/anesthesia.  The following day: return to full activity including work.  DIET:  Eat and drink normally unless instructed otherwise.     TREATMENT FOR COMMON SIDE EFFECTS:  - Mild abdominal pain, nausea, belching, bloating or excessive gas:  rest,   eat lightly and use a heating pad.  - Sore Throat: treat with throat lozenges and/or gargle with warm salt   water.  - Because air was used during the procedure, expelling large amounts of air   from your rectum or belching is normal.  - If a bowel prep was taken, you may not have a bowel movement for 1-3 days.    This is normal.  SYMPTOMS TO WATCH FOR AND REPORT TO YOUR PHYSICIAN:  1. Abdominal pain or bloating, other than  gas cramps.  2. Chest pain.  3. Back pain.  4. Signs of infection such as: chills or fever occurring within 24 hours   after the procedure.  5. Rectal bleeding, which would show as bright red, maroon, or black stools.   (A tablespoon of blood from the rectum is not serious, especially if   hemorrhoids are present.)  6. Vomiting.  7. Weakness or dizziness.  GO DIRECTLY TO THE NEAREST EMERGENCY ROOM IF YOU HAVE ANY OF THE FOLLOWING:      Difficulty breathing              Chills and/or fever over 101 F   Persistent vomiting and/or vomiting blood   Severe abdominal pain   Severe chest pain   Black, tarry stools   Bleeding- more than one tablespoon   Any other symptom or condition that you feel may need urgent attention  Your doctor recommends these additional instructions:  If any biopsies were taken, your doctors clinic will contact you in 1 to 2   weeks with any results.  - Discharge patient to home.   - Resume previous diet.   - Continue present medications. Resume plavix today.  - Await pathology results.   - Repeat colonoscopy in 10 years for surveillance.   - Condition stable   - The signs and symptoms of potential delayed complications were discussed   with the patient. If signs or symptoms of these complications develop, call   the [facility] at 1 [phone].   - Return to normal activities tomorrow.  Written discharge instructions were   provided to the patient.  For questions, problems or results please call your physician - Jordan Mcguire MD.  EMERGENCY PHONE NUMBER: [phone],  LAB RESULTS: [phone]  IF A COMPLICATION OR EMERGENCY SITUATION ARISES AND YOU ARE UNABLE TO REACH   YOUR PHYSICIAN - GO DIRECTLY TO THE EMERGENCY ROOM.  MD Jordan Ritter MD  8/18/2022 11:13:04 AM  This report has been verified and signed electronically.  Dear patient,  As a result of recent federal legislation (The Federal Cures Act), you may   receive lab or pathology results from your  procedure in your [facility]   account before your physician is able to contact you. Your physician or   their representative will relay the results to you with their   recommendations at their soonest availability.  Thank you,  [employer]

## 2022-08-22 LAB
FINAL PATHOLOGIC DIAGNOSIS: NORMAL
GROSS: NORMAL
Lab: NORMAL

## 2022-08-28 PROBLEM — K63.5 POLYP OF COLON: Status: ACTIVE | Noted: 2022-08-28

## 2022-08-30 ENCOUNTER — LAB VISIT (OUTPATIENT)
Dept: LAB | Facility: HOSPITAL | Age: 57
End: 2022-08-30
Attending: INTERNAL MEDICINE
Payer: MEDICARE

## 2022-08-30 DIAGNOSIS — C90.00 MULTIPLE MYELOMA, REMISSION STATUS UNSPECIFIED: ICD-10-CM

## 2022-08-30 LAB
ALBUMIN SERPL BCP-MCNC: 3.9 G/DL (ref 3.5–5.2)
ALP SERPL-CCNC: 165 U/L (ref 55–135)
ALT SERPL W/O P-5'-P-CCNC: 9 U/L (ref 10–44)
ANION GAP SERPL CALC-SCNC: 16 MMOL/L (ref 8–16)
AST SERPL-CCNC: 13 U/L (ref 10–40)
BASOPHILS # BLD AUTO: 0.03 K/UL (ref 0–0.2)
BASOPHILS NFR BLD: 0.8 % (ref 0–1.9)
BILIRUB SERPL-MCNC: 0.5 MG/DL (ref 0.1–1)
BUN SERPL-MCNC: 14 MG/DL (ref 6–20)
CALCIUM SERPL-MCNC: 9.2 MG/DL (ref 8.7–10.5)
CHLORIDE SERPL-SCNC: 92 MMOL/L (ref 95–110)
CO2 SERPL-SCNC: 27 MMOL/L (ref 23–29)
CREAT SERPL-MCNC: 4.1 MG/DL (ref 0.5–1.4)
DIFFERENTIAL METHOD: ABNORMAL
EOSINOPHIL # BLD AUTO: 0.1 K/UL (ref 0–0.5)
EOSINOPHIL NFR BLD: 3 % (ref 0–8)
ERYTHROCYTE [DISTWIDTH] IN BLOOD BY AUTOMATED COUNT: 17.5 % (ref 11.5–14.5)
EST. GFR  (NO RACE VARIABLE): 12 ML/MIN/1.73 M^2
GLUCOSE SERPL-MCNC: 82 MG/DL (ref 70–110)
HCT VFR BLD AUTO: 38.3 % (ref 37–48.5)
HGB BLD-MCNC: 12.6 G/DL (ref 12–16)
IGA SERPL-MCNC: 267 MG/DL (ref 40–350)
IGG SERPL-MCNC: 949 MG/DL (ref 650–1600)
IGM SERPL-MCNC: 251 MG/DL (ref 50–300)
IMM GRANULOCYTES # BLD AUTO: 0.01 K/UL (ref 0–0.04)
IMM GRANULOCYTES NFR BLD AUTO: 0.3 % (ref 0–0.5)
LYMPHOCYTES # BLD AUTO: 1.3 K/UL (ref 1–4.8)
LYMPHOCYTES NFR BLD: 32.7 % (ref 18–48)
MCH RBC QN AUTO: 27.4 PG (ref 27–31)
MCHC RBC AUTO-ENTMCNC: 32.9 G/DL (ref 32–36)
MCV RBC AUTO: 83 FL (ref 82–98)
MONOCYTES # BLD AUTO: 0.4 K/UL (ref 0.3–1)
MONOCYTES NFR BLD: 10.2 % (ref 4–15)
NEUTROPHILS # BLD AUTO: 2.1 K/UL (ref 1.8–7.7)
NEUTROPHILS NFR BLD: 53 % (ref 38–73)
NRBC BLD-RTO: 0 /100 WBC
PLATELET # BLD AUTO: 168 K/UL (ref 150–450)
PMV BLD AUTO: 9.1 FL (ref 9.2–12.9)
POTASSIUM SERPL-SCNC: 4.2 MMOL/L (ref 3.5–5.1)
PROT SERPL-MCNC: 8.1 G/DL (ref 6–8.4)
RBC # BLD AUTO: 4.6 M/UL (ref 4–5.4)
SODIUM SERPL-SCNC: 135 MMOL/L (ref 136–145)
WBC # BLD AUTO: 3.94 K/UL (ref 3.9–12.7)

## 2022-08-30 PROCEDURE — 82784 ASSAY IGA/IGD/IGG/IGM EACH: CPT | Mod: 59,TXP | Performed by: INTERNAL MEDICINE

## 2022-08-30 PROCEDURE — 84165 PROTEIN E-PHORESIS SERUM: CPT | Mod: 26,NTX,, | Performed by: PATHOLOGY

## 2022-08-30 PROCEDURE — 86334 PATHOLOGIST INTERPRETATION IFE: ICD-10-PCS | Mod: 26,NTX,, | Performed by: PATHOLOGY

## 2022-08-30 PROCEDURE — 80053 COMPREHEN METABOLIC PANEL: CPT | Mod: TXP | Performed by: INTERNAL MEDICINE

## 2022-08-30 PROCEDURE — 85025 COMPLETE CBC W/AUTO DIFF WBC: CPT | Mod: TXP | Performed by: INTERNAL MEDICINE

## 2022-08-30 PROCEDURE — 83520 IMMUNOASSAY QUANT NOS NONAB: CPT | Mod: 59,TXP | Performed by: INTERNAL MEDICINE

## 2022-08-30 PROCEDURE — 84165 PROTEIN E-PHORESIS SERUM: CPT | Mod: TXP | Performed by: INTERNAL MEDICINE

## 2022-08-30 PROCEDURE — 84165 PATHOLOGIST INTERPRETATION SPE: ICD-10-PCS | Mod: 26,NTX,, | Performed by: PATHOLOGY

## 2022-08-30 PROCEDURE — 86334 IMMUNOFIX E-PHORESIS SERUM: CPT | Mod: TXP | Performed by: INTERNAL MEDICINE

## 2022-08-30 PROCEDURE — 86334 IMMUNOFIX E-PHORESIS SERUM: CPT | Mod: 26,NTX,, | Performed by: PATHOLOGY

## 2022-08-31 DIAGNOSIS — Z76.82 ORGAN TRANSPLANT CANDIDATE: Primary | ICD-10-CM

## 2022-08-31 LAB
ALBUMIN SERPL ELPH-MCNC: 4.52 G/DL (ref 3.35–5.55)
ALPHA1 GLOB SERPL ELPH-MCNC: 0.35 G/DL (ref 0.17–0.41)
ALPHA2 GLOB SERPL ELPH-MCNC: 0.95 G/DL (ref 0.43–0.99)
B-GLOBULIN SERPL ELPH-MCNC: 0.72 G/DL (ref 0.5–1.1)
GAMMA GLOB SERPL ELPH-MCNC: 0.97 G/DL (ref 0.67–1.58)
INTERPRETATION SERPL IFE-IMP: NORMAL
KAPPA LC SER QL IA: 24.19 MG/DL (ref 0.33–1.94)
KAPPA LC/LAMBDA SER IA: 1.26 (ref 0.26–1.65)
LAMBDA LC SER QL IA: 19.14 MG/DL (ref 0.57–2.63)
PATHOLOGIST INTERPRETATION IFE: NORMAL
PATHOLOGIST INTERPRETATION SPE: NORMAL
PROT SERPL-MCNC: 7.5 G/DL (ref 6–8.4)

## 2022-09-06 ENCOUNTER — TELEPHONE (OUTPATIENT)
Dept: TRANSPLANT | Facility: CLINIC | Age: 57
End: 2022-09-06
Payer: MEDICARE

## 2022-09-07 ENCOUNTER — TELEPHONE (OUTPATIENT)
Dept: TRANSPLANT | Facility: CLINIC | Age: 57
End: 2022-09-07
Payer: MEDICARE

## 2022-09-07 DIAGNOSIS — Z76.82 ORGAN TRANSPLANT CANDIDATE: Primary | ICD-10-CM

## 2022-09-07 NOTE — TELEPHONE ENCOUNTER
----- Message from Carroll Wakefield MD sent at 9/6/2022  3:02 PM CDT -----  Regarding: RE: reactivate?  I suggest assessment by addictive Psych here follow up clinic visit if due for it follow up with SW  Wait for the upcoming appointments results to make sure is ok. Reactivation when this data is reviewed if deemed acceptable.   ----- Message -----  From: Emelia Alas RN  Sent: 9/6/2022   2:05 PM CDT  To: [facility]  Subject: reactivate?                                      Ms. Booth has been inactive for some time initially per patient choice. Since then, she saw addictive psych  in July 2021 for drugs and alcohol and  successfully completed ABU. Her most recent PETH is negative, her drug screen is positive for THC only. She has almost 11 years accrued on the wait list. Please advise on reactivating her. Is the THC of concern? Her workup is up to date and she actually has upcoming appointments scheduled to update her mammo, gyn, chest x-ray, and renal ultrasound.    Thanks,    Emelia

## 2022-09-07 NOTE — TELEPHONE ENCOUNTER
Explained to patient that she needs to schedule follow up with SW in [facility]. Patient did not follow up at the end of 2021 due to the sudden death of her sister. Will evaluate for reactivation on the list after updated testing and psych visit.

## 2022-09-27 ENCOUNTER — HOSPITAL ENCOUNTER (OUTPATIENT)
Dept: RADIOLOGY | Facility: HOSPITAL | Age: 57
Discharge: HOME | End: 2022-09-27
Attending: NURSE PRACTITIONER
Payer: MEDICARE

## 2022-09-27 ENCOUNTER — HOSPITAL ENCOUNTER (OUTPATIENT)
Facility: HOSPITAL | Age: 57
Discharge: HOME | End: 2022-09-28
Attending: EMERGENCY MEDICINE | Admitting: HOSPITALIST
Payer: MEDICARE

## 2022-09-27 DIAGNOSIS — E87.5 HYPERKALEMIA: Primary | ICD-10-CM

## 2022-09-27 DIAGNOSIS — R06.02 SOB (SHORTNESS OF BREATH): ICD-10-CM

## 2022-09-27 DIAGNOSIS — Z76.82 ORGAN TRANSPLANT CANDIDATE: ICD-10-CM

## 2022-09-27 LAB
ALBUMIN SERPL BCP-MCNC: 3.4 G/DL (ref 3.5–5.2)
ALP SERPL-CCNC: 157 U/L (ref 55–135)
ALT SERPL W/O P-5'-P-CCNC: 6 U/L (ref 10–44)
ANION GAP SERPL CALC-SCNC: 19 MMOL/L (ref 8–16)
AST SERPL-CCNC: 10 U/L (ref 10–40)
BASOPHILS # BLD AUTO: 0.05 K/UL (ref 0–0.2)
BASOPHILS NFR BLD: 0.9 % (ref 0–1.9)
BILIRUB SERPL-MCNC: 0.3 MG/DL (ref 0.1–1)
BNP SERPL-MCNC: 1495 PG/ML (ref 0–99)
BUN SERPL-MCNC: 94 MG/DL (ref 6–20)
CALCIUM SERPL-MCNC: 8.7 MG/DL (ref 8.7–10.5)
CHLORIDE SERPL-SCNC: 92 MMOL/L (ref 95–110)
CO2 SERPL-SCNC: 19 MMOL/L (ref 23–29)
CREAT SERPL-MCNC: 11.5 MG/DL (ref 0.5–1.4)
DIFFERENTIAL METHOD: ABNORMAL
EOSINOPHIL # BLD AUTO: 0.2 K/UL (ref 0–0.5)
EOSINOPHIL NFR BLD: 4 % (ref 0–8)
ERYTHROCYTE [DISTWIDTH] IN BLOOD BY AUTOMATED COUNT: 17.2 % (ref 11.5–14.5)
EST. GFR  (NO RACE VARIABLE): 3 ML/MIN/1.73 M^2
GLUCOSE SERPL-MCNC: 76 MG/DL (ref 70–110)
HCT VFR BLD AUTO: 28.6 % (ref 37–48.5)
HGB BLD-MCNC: 9.5 G/DL (ref 12–16)
IMM GRANULOCYTES # BLD AUTO: 0.02 K/UL (ref 0–0.04)
IMM GRANULOCYTES NFR BLD AUTO: 0.3 % (ref 0–0.5)
LACTATE SERPL-SCNC: 1.2 MMOL/L (ref 0.5–2.2)
LYMPHOCYTES # BLD AUTO: 1.8 K/UL (ref 1–4.8)
LYMPHOCYTES NFR BLD: 30.8 % (ref 18–48)
MAGNESIUM SERPL-MCNC: 2.5 MG/DL (ref 1.6–2.6)
MCH RBC QN AUTO: 27.1 PG (ref 27–31)
MCHC RBC AUTO-ENTMCNC: 33.2 G/DL (ref 32–36)
MCV RBC AUTO: 82 FL (ref 82–98)
MONOCYTES # BLD AUTO: 0.5 K/UL (ref 0.3–1)
MONOCYTES NFR BLD: 8.4 % (ref 4–15)
NEUTROPHILS # BLD AUTO: 3.2 K/UL (ref 1.8–7.7)
NEUTROPHILS NFR BLD: 55.6 % (ref 38–73)
NRBC BLD-RTO: 0 /100 WBC
PLATELET # BLD AUTO: 122 K/UL (ref 150–450)
PMV BLD AUTO: 9 FL (ref 9.2–12.9)
POCT GLUCOSE: 79 MG/DL (ref 70–110)
POCT GLUCOSE: 88 MG/DL (ref 70–110)
POTASSIUM SERPL-SCNC: 6.4 MMOL/L (ref 3.5–5.1)
PROT SERPL-MCNC: 6.8 G/DL (ref 6–8.4)
RBC # BLD AUTO: 3.5 M/UL (ref 4–5.4)
SODIUM SERPL-SCNC: 130 MMOL/L (ref 136–145)
TROPONIN I SERPL DL<=0.01 NG/ML-MCNC: 0.02 NG/ML (ref 0–0.03)
TSH SERPL DL<=0.005 MIU/L-ACNC: 1.39 UIU/ML (ref 0.4–4)
WBC # BLD AUTO: 5.74 K/UL (ref 3.9–12.7)

## 2022-09-27 PROCEDURE — 93010 EKG 12-LEAD: ICD-10-PCS | Mod: NTX,,, | Performed by: INTERNAL MEDICINE

## 2022-09-27 PROCEDURE — 63600175 PHARM REV CODE 636 W HCPCS: Mod: NTX | Performed by: EMERGENCY MEDICINE

## 2022-09-27 PROCEDURE — G0257 UNSCHED DIALYSIS ESRD PT HOS: HCPCS | Mod: NTX

## 2022-09-27 PROCEDURE — 11000001 HC ACUTE MED/SURG PRIVATE ROOM: Mod: NTX

## 2022-09-27 PROCEDURE — 63600175 PHARM REV CODE 636 W HCPCS: Mod: NTX | Performed by: HOSPITALIST

## 2022-09-27 PROCEDURE — 84443 ASSAY THYROID STIM HORMONE: CPT | Mod: NTX | Performed by: EMERGENCY MEDICINE

## 2022-09-27 PROCEDURE — 85025 COMPLETE CBC W/AUTO DIFF WBC: CPT | Mod: NTX | Performed by: EMERGENCY MEDICINE

## 2022-09-27 PROCEDURE — 76770 US EXAM ABDO BACK WALL COMP: CPT | Mod: 26,TXP,, | Performed by: RADIOLOGY

## 2022-09-27 PROCEDURE — 84484 ASSAY OF TROPONIN QUANT: CPT | Mod: NTX | Performed by: EMERGENCY MEDICINE

## 2022-09-27 PROCEDURE — G0378 HOSPITAL OBSERVATION PER HR: HCPCS | Mod: NTX

## 2022-09-27 PROCEDURE — 96372 THER/PROPH/DIAG INJ SC/IM: CPT | Performed by: HOSPITALIST

## 2022-09-27 PROCEDURE — 25000003 PHARM REV CODE 250: Mod: NTX | Performed by: HOSPITALIST

## 2022-09-27 PROCEDURE — 94640 AIRWAY INHALATION TREATMENT: CPT | Mod: NTX

## 2022-09-27 PROCEDURE — 25000003 PHARM REV CODE 250: Mod: NTX | Performed by: EMERGENCY MEDICINE

## 2022-09-27 PROCEDURE — 76770 US RETROPERITONEAL COMPLETE: ICD-10-PCS | Mod: 26,TXP,, | Performed by: RADIOLOGY

## 2022-09-27 PROCEDURE — 25000242 PHARM REV CODE 250 ALT 637 W/ HCPCS: Mod: NTX | Performed by: EMERGENCY MEDICINE

## 2022-09-27 PROCEDURE — 76770 US EXAM ABDO BACK WALL COMP: CPT | Mod: TC,TXP

## 2022-09-27 PROCEDURE — 80053 COMPREHEN METABOLIC PANEL: CPT | Mod: NTX | Performed by: EMERGENCY MEDICINE

## 2022-09-27 PROCEDURE — 83880 ASSAY OF NATRIURETIC PEPTIDE: CPT | Mod: NTX | Performed by: EMERGENCY MEDICINE

## 2022-09-27 PROCEDURE — 93010 ELECTROCARDIOGRAM REPORT: CPT | Mod: NTX,,, | Performed by: INTERNAL MEDICINE

## 2022-09-27 PROCEDURE — 82962 GLUCOSE BLOOD TEST: CPT | Mod: NTX

## 2022-09-27 PROCEDURE — 71046 X-RAY EXAM CHEST 2 VIEWS: CPT | Mod: TC,FY,TXP

## 2022-09-27 PROCEDURE — 71046 X-RAY EXAM CHEST 2 VIEWS: CPT | Mod: 26,TXP,, | Performed by: RADIOLOGY

## 2022-09-27 PROCEDURE — 83605 ASSAY OF LACTIC ACID: CPT | Mod: NTX | Performed by: EMERGENCY MEDICINE

## 2022-09-27 PROCEDURE — 99285 EMERGENCY DEPT VISIT HI MDM: CPT | Mod: 25,NTX

## 2022-09-27 PROCEDURE — 93005 ELECTROCARDIOGRAM TRACING: CPT | Mod: NTX

## 2022-09-27 PROCEDURE — 96374 THER/PROPH/DIAG INJ IV PUSH: CPT | Mod: NTX

## 2022-09-27 PROCEDURE — A4216 STERILE WATER/SALINE, 10 ML: HCPCS | Mod: NTX | Performed by: HOSPITALIST

## 2022-09-27 PROCEDURE — 25000003 PHARM REV CODE 250: Mod: NTX | Performed by: INTERNAL MEDICINE

## 2022-09-27 PROCEDURE — 80100016 HC MAINTENANCE HEMODIALYSIS: Mod: NTX

## 2022-09-27 PROCEDURE — 96375 TX/PRO/DX INJ NEW DRUG ADDON: CPT | Mod: NTX,59

## 2022-09-27 PROCEDURE — 71046 XR CHEST PA AND LATERAL: ICD-10-PCS | Mod: 26,TXP,, | Performed by: RADIOLOGY

## 2022-09-27 PROCEDURE — 83735 ASSAY OF MAGNESIUM: CPT | Mod: NTX | Performed by: EMERGENCY MEDICINE

## 2022-09-27 RX ORDER — CLOPIDOGREL BISULFATE 75 MG/1
75 TABLET ORAL DAILY
Status: ON HOLD | COMMUNITY
End: 2023-10-04 | Stop reason: HOSPADM

## 2022-09-27 RX ORDER — CALCITRIOL 0.25 UG/1
1.5 CAPSULE ORAL DAILY
Status: DISCONTINUED | OUTPATIENT
Start: 2022-09-28 | End: 2022-09-28 | Stop reason: HOSPADM

## 2022-09-27 RX ORDER — SODIUM CHLORIDE 9 MG/ML
INJECTION, SOLUTION INTRAVENOUS
Status: CANCELLED | OUTPATIENT
Start: 2022-09-27

## 2022-09-27 RX ORDER — BUPROPION HYDROCHLORIDE 75 MG/1
75 TABLET ORAL DAILY
Status: DISCONTINUED | OUTPATIENT
Start: 2022-09-28 | End: 2022-09-28 | Stop reason: HOSPADM

## 2022-09-27 RX ORDER — MUPIROCIN 20 MG/G
OINTMENT TOPICAL 2 TIMES DAILY
Status: DISCONTINUED | OUTPATIENT
Start: 2022-09-27 | End: 2022-09-28 | Stop reason: HOSPADM

## 2022-09-27 RX ORDER — ESCITALOPRAM OXALATE 10 MG/1
20 TABLET ORAL DAILY
Status: DISCONTINUED | OUTPATIENT
Start: 2022-09-28 | End: 2022-09-28 | Stop reason: HOSPADM

## 2022-09-27 RX ORDER — CARVEDILOL 25 MG/1
25 TABLET ORAL 2 TIMES DAILY
COMMUNITY
Start: 2022-07-25

## 2022-09-27 RX ORDER — IBUPROFEN 200 MG
1 TABLET ORAL
Status: DISCONTINUED | OUTPATIENT
Start: 2022-09-27 | End: 2022-09-28 | Stop reason: HOSPADM

## 2022-09-27 RX ORDER — SODIUM CHLORIDE 9 MG/ML
INJECTION, SOLUTION INTRAVENOUS ONCE
Status: CANCELLED | OUTPATIENT
Start: 2022-09-27 | End: 2022-09-27

## 2022-09-27 RX ORDER — ACETAMINOPHEN 325 MG/1
650 TABLET ORAL EVERY 4 HOURS PRN
COMMUNITY
Start: 2022-03-18

## 2022-09-27 RX ORDER — NAPROXEN SODIUM 220 MG/1
81 TABLET, FILM COATED ORAL DAILY
COMMUNITY
End: 2024-01-23

## 2022-09-27 RX ORDER — IBUPROFEN 200 MG
16 TABLET ORAL
Status: DISCONTINUED | OUTPATIENT
Start: 2022-09-27 | End: 2022-09-28 | Stop reason: HOSPADM

## 2022-09-27 RX ORDER — NALOXONE HCL 0.4 MG/ML
0.02 VIAL (ML) INJECTION
Status: DISCONTINUED | OUTPATIENT
Start: 2022-09-27 | End: 2022-09-28 | Stop reason: HOSPADM

## 2022-09-27 RX ORDER — MONTELUKAST SODIUM 10 MG/1
10 TABLET ORAL NIGHTLY
Status: DISCONTINUED | OUTPATIENT
Start: 2022-09-27 | End: 2022-09-28 | Stop reason: HOSPADM

## 2022-09-27 RX ORDER — HEPARIN SODIUM 5000 [USP'U]/ML
5000 INJECTION, SOLUTION INTRAVENOUS; SUBCUTANEOUS EVERY 8 HOURS
Status: DISCONTINUED | OUTPATIENT
Start: 2022-09-27 | End: 2022-09-28 | Stop reason: HOSPADM

## 2022-09-27 RX ORDER — ONDANSETRON 2 MG/ML
4 INJECTION INTRAMUSCULAR; INTRAVENOUS EVERY 8 HOURS PRN
Status: DISCONTINUED | OUTPATIENT
Start: 2022-09-27 | End: 2022-09-28 | Stop reason: HOSPADM

## 2022-09-27 RX ORDER — ALBUTEROL SULFATE 2.5 MG/.5ML
10 SOLUTION RESPIRATORY (INHALATION)
Status: COMPLETED | OUTPATIENT
Start: 2022-09-27 | End: 2022-09-27

## 2022-09-27 RX ORDER — ACETAMINOPHEN 325 MG/1
650 TABLET ORAL EVERY 4 HOURS PRN
Status: DISCONTINUED | OUTPATIENT
Start: 2022-09-27 | End: 2022-09-28 | Stop reason: HOSPADM

## 2022-09-27 RX ORDER — IBUPROFEN 200 MG
24 TABLET ORAL
Status: DISCONTINUED | OUTPATIENT
Start: 2022-09-27 | End: 2022-09-28 | Stop reason: HOSPADM

## 2022-09-27 RX ORDER — ONDANSETRON 8 MG/1
8 TABLET, ORALLY DISINTEGRATING ORAL EVERY 8 HOURS PRN
Status: DISCONTINUED | OUTPATIENT
Start: 2022-09-27 | End: 2022-09-28 | Stop reason: HOSPADM

## 2022-09-27 RX ORDER — DEXTROSE 50 % IN WATER (D50W) INTRAVENOUS SYRINGE
25
Status: COMPLETED | OUTPATIENT
Start: 2022-09-27 | End: 2022-09-27

## 2022-09-27 RX ORDER — CINACALCET 30 MG/1
30 TABLET, FILM COATED ORAL
Status: ON HOLD | COMMUNITY
End: 2023-10-04 | Stop reason: HOSPADM

## 2022-09-27 RX ORDER — ACETAMINOPHEN 500 MG
1000 TABLET ORAL EVERY 8 HOURS PRN
Status: DISCONTINUED | OUTPATIENT
Start: 2022-09-27 | End: 2022-09-28 | Stop reason: HOSPADM

## 2022-09-27 RX ORDER — SODIUM CHLORIDE 0.9 % (FLUSH) 0.9 %
10 SYRINGE (ML) INJECTION EVERY 8 HOURS
Status: DISCONTINUED | OUTPATIENT
Start: 2022-09-27 | End: 2022-09-28 | Stop reason: HOSPADM

## 2022-09-27 RX ORDER — CARVEDILOL 12.5 MG/1
12.5 TABLET ORAL 2 TIMES DAILY
Status: DISCONTINUED | OUTPATIENT
Start: 2022-09-27 | End: 2022-09-28 | Stop reason: HOSPADM

## 2022-09-27 RX ORDER — CINACALCET 30 MG/1
30 TABLET, FILM COATED ORAL
Status: DISCONTINUED | OUTPATIENT
Start: 2022-09-28 | End: 2022-09-28 | Stop reason: HOSPADM

## 2022-09-27 RX ORDER — CALCIUM GLUCONATE 20 MG/ML
1 INJECTION, SOLUTION INTRAVENOUS
Status: COMPLETED | OUTPATIENT
Start: 2022-09-27 | End: 2022-09-27

## 2022-09-27 RX ORDER — NIFEDIPINE 30 MG/1
60 TABLET, EXTENDED RELEASE ORAL DAILY
Status: DISCONTINUED | OUTPATIENT
Start: 2022-09-28 | End: 2022-09-28 | Stop reason: HOSPADM

## 2022-09-27 RX ORDER — GLUCAGON 1 MG
1 KIT INJECTION
Status: DISCONTINUED | OUTPATIENT
Start: 2022-09-27 | End: 2022-09-28 | Stop reason: HOSPADM

## 2022-09-27 RX ORDER — POLYETHYLENE GLYCOL 3350 17 G/17G
17 POWDER, FOR SOLUTION ORAL DAILY
Status: DISCONTINUED | OUTPATIENT
Start: 2022-09-27 | End: 2022-09-28 | Stop reason: HOSPADM

## 2022-09-27 RX ORDER — MIRTAZAPINE 7.5 MG/1
15 TABLET, FILM COATED ORAL EVERY EVENING
Status: DISCONTINUED | OUTPATIENT
Start: 2022-09-27 | End: 2022-09-28 | Stop reason: HOSPADM

## 2022-09-27 RX ORDER — NAPROXEN SODIUM 220 MG/1
81 TABLET, FILM COATED ORAL DAILY
Status: DISCONTINUED | OUTPATIENT
Start: 2022-09-28 | End: 2022-09-28 | Stop reason: HOSPADM

## 2022-09-27 RX ORDER — NIFEDIPINE 60 MG/1
60 TABLET, EXTENDED RELEASE ORAL DAILY
COMMUNITY

## 2022-09-27 RX ORDER — TALC
6 POWDER (GRAM) TOPICAL NIGHTLY PRN
Status: DISCONTINUED | OUTPATIENT
Start: 2022-09-27 | End: 2022-09-28 | Stop reason: HOSPADM

## 2022-09-27 RX ORDER — CLOPIDOGREL BISULFATE 75 MG/1
75 TABLET ORAL DAILY
Status: DISCONTINUED | OUTPATIENT
Start: 2022-09-27 | End: 2022-09-28 | Stop reason: HOSPADM

## 2022-09-27 RX ORDER — SEVELAMER CARBONATE 800 MG/1
800 TABLET, FILM COATED ORAL
Status: DISCONTINUED | OUTPATIENT
Start: 2022-09-27 | End: 2022-09-28 | Stop reason: HOSPADM

## 2022-09-27 RX ADMIN — ACETAMINOPHEN 1000 MG: 500 TABLET ORAL at 02:09

## 2022-09-27 RX ADMIN — MONTELUKAST 10 MG: 10 TABLET, FILM COATED ORAL at 08:09

## 2022-09-27 RX ADMIN — MIRTAZAPINE 15 MG: 7.5 TABLET ORAL at 08:09

## 2022-09-27 RX ADMIN — CARVEDILOL 12.5 MG: 12.5 TABLET, FILM COATED ORAL at 08:09

## 2022-09-27 RX ADMIN — MUPIROCIN: 20 OINTMENT TOPICAL at 08:09

## 2022-09-27 RX ADMIN — INSULIN HUMAN 5 UNITS: 100 INJECTION, SOLUTION PARENTERAL at 01:09

## 2022-09-27 RX ADMIN — HEPARIN SODIUM 5000 UNITS: 5000 INJECTION, SOLUTION INTRAVENOUS; SUBCUTANEOUS at 09:09

## 2022-09-27 RX ADMIN — CALCIUM GLUCONATE 1 G: 20 INJECTION, SOLUTION INTRAVENOUS at 01:09

## 2022-09-27 RX ADMIN — ALBUTEROL SULFATE 10 MG: 2.5 SOLUTION RESPIRATORY (INHALATION) at 01:09

## 2022-09-27 RX ADMIN — Medication 10 ML: at 09:09

## 2022-09-27 RX ADMIN — DEXTROSE MONOHYDRATE 25 G: 25 INJECTION, SOLUTION INTRAVENOUS at 01:09

## 2022-09-27 NOTE — PROGRESS NOTES
09/27/22 1835   Vital Signs   Temp 98.2 °F (36.8 °C)   Pulse 80   Resp 20   /72   During Hemodialysis Assessment   Blood Flow Rate (mL/min) 350 mL/min   Dialysate Flow Rate (mL/min) 700 ml/min   Ultrafiltration Rate (mL/Hr) 498 mL/Hr   Arteriovenous Lines Secure Yes   Arterial Pressure (mmHg) -205 mmHg   Venous Pressure (mmHg) 224   UF Removed (mL) 2500 mL   TMP 29   Venous Line in Air Detector Yes   Transducer Dry Yes   Access Visible Yes   Intra-Hemodialysis Comments HD completed   Post-Hemodialysis Assessment   Rinseback Volume (mL) 250 mL   Blood Volume Processed (Liters) 91 L   Dialyzer Clearance Clear   Duration of Treatment 240 minutes   Additional Fluid Intake (mL) 250 mL   Total UF (mL) 2500 mL   Net Fluid Removal 2000   Patient Response to Treatment tolerated   Arterial bleeding stop time (min) 5 min   Venous bleeding stop time (min) 5 min   Post-Hemodialysis Comments Post HD report given to the primary nurse.

## 2022-09-27 NOTE — ASSESSMENT & PLAN NOTE
-took blood pressure medications this morning  -mildly hypotensive today   -reordering home antihypertensives for tomorrow; carvedilol 12.5 mg b.i.d., nifedipine 60 mg daily; holding Arb

## 2022-09-27 NOTE — HPI
Ms. Booth is a 58yo woman with ESRD on HD, Multiple Myeloma in remission, AoCKD, HTN, GERD, MDD/Anxiety, Constipation, and tobacco use who presents with shortness of breath after missing day of dialysis while traveling over the weekend.  She states that she was out of town on her normal dialysis day on Saturday and missed her HD. She was volume overloaded and called the center; was dialyzed for 2 hours on Monday but still had worsening shortness of breath today.  In the ED, found to have significant hyperkalemia as well as volume overload.  Nephrology consulted in the ED with plans for emergent dialysis.

## 2022-09-27 NOTE — PLAN OF CARE
57 year old woman with history of ESRD-HD TTS, last dialysis was Thursday, she went out of town and did not make arrangements to have dialysis performed in [place]. She returned and felt unwell and contacted her dialysis center for dialysis yesterday She did not complete a full session and only ran for about 2 hours. She presents to the ED with SOB, weakness and fatigue. CXR and CT show CHF/Pulmonary Edema and small effusions L>R. Potassium 6.4, HCO3 19    A/P   - ESRD-HD  -  4 hours, 4 L UF as tolerated  - Hyperkalemia 2K bath  - Anemia Hgb 9.5, previous 4 weeks ago 12.6  Full consult to follow

## 2022-09-27 NOTE — H&P
[place] - Emergency Dept  [facility] Medicine  History & Physical    Patient Name: Jennifer Booth  MRN: [id number]  Patient Class: IP- Inpatient  Admission Date: 9/27/2022  Attending Physician: Andrew Anderson MD  Primary Care Provider: Leodan Sanchez MD         Patient information was obtained from patient, past medical records and ER records.     Subjective:     Principal Problem:Hyperkalemia    Chief Complaint:   Chief Complaint   Patient presents with    Shortness of Breath     SOB since yesterday, dialysis pt, last dialyzed yesterday and got 3L off. Dialysis access to left, upper arm. SOB persists despite pt being compliant with dialysis.         HPI: Ms. Booth is a 56yo woman with ESRD on HD, Multiple Myeloma in remission, AoCKD, HTN, GERD, MDD/Anxiety, Constipation, and tobacco use who presents with shortness of breath after missing day of dialysis while traveling over the weekend.  She states that she was out of town on her normal dialysis day on Saturday and missed her HD. She was volume overloaded and called the center; was dialyzed for 2 hours on Monday but still had worsening shortness of breath today.  In the ED, found to have significant hyperkalemia as well as volume overload.  Nephrology consulted in the ED with plans for emergent dialysis.      Past Medical History:   Diagnosis Date    Addiction to drug     Alcohol abuse     Allergic drug reaction 11/13/2017    Anemia     Anxiety     Arm pain, left 2/12/2014    Breast cyst     Chronic renal failure 2/12/2014    CKD (chronic kidney disease) stage 5, GFR less than 15 ml/min     Depression     Dialysis patient     dialysis m-w-f    Encounter for blood transfusion     GERD (gastroesophageal reflux disease)     Hx of psychiatric care     Hypertension     Mood swings     Multiple myeloma in remission 10/26/2012    per Hem/Oc note    Psychiatric exam requested by authority     Psychiatric problem     Renal hypertension     Status  post dialysis 2012    Therapy     Thrombocytopenia 2012       Past Surgical History:   Procedure Laterality Date    AV FISTULA PLACEMENT Left     BREAST CYST ASPIRATION Left     BREAST CYST EXCISION Left     CERVICAL SPINE SURGERY Bilateral      SECTION      x1    COLONOSCOPY N/A 2022    Procedure: COLONOSCOPY;  Surgeon: Jordan Mcguire MD;  Location: [facility] ENDO;  Service: Endoscopy;  Laterality: N/A;    FISTULOGRAM N/A 2020    Procedure: Fistulogram;  Surgeon: Rahat Berger MD;  Location: [facility] CATH LAB/EP;  Service: Cardiology;  Laterality: N/A;    pd catheter      PERCUTANEOUS TRANSLUMINAL ANGIOPLASTY OF ARTERIOVENOUS FISTULA N/A 2020    Procedure: PTA, AV FISTULA;  Surgeon: Rahat Berger MD;  Location: [facility] CATH LAB/EP;  Service: Cardiology;  Laterality: N/A;    PERITONEAL CATHETER REMOVAL N/A 2018    Procedure: REMOVAL, CATHETER, DIALYSIS, PERITONEAL;  Surgeon: Mukesh Gonsales Jr., MD;  Location: [facility] OR;  Service: General;  Laterality: N/A;    RENAL BIOPSY  2016    THROMBECTOMY OF HEMODIALYSIS ACCESS SITE N/A 2020    Procedure: Thrombectomy, Hemodialysis Graft Or Fistula;  Surgeon: Rahat Berger MD;  Location: [facility] CATH LAB/EP;  Service: Cardiology;  Laterality: N/A;    VASCULAR SURGERY  2012    dialysis catheter to right subclavian       Review of patient's allergies indicates:   Allergen Reactions    Doxycycline Swelling, Rash and Hives    Gentamicin Anaphylaxis    Vancomycin analogues Anaphylaxis       Current Facility-Administered Medications on File Prior to Encounter   Medication    0.9%  NaCl infusion    0.9%  NaCl infusion    sodium chloride 0.9% flush 10 mL    sodium chloride 0.9% flush 10 mL     Current Outpatient Medications on File Prior to Encounter   Medication Sig    aspirin 81 MG Chew Chew and swallow 1 tablet (81 mg total) by mouth once daily.    B COMPLEX W-C NO.20/FOLIC ACID (VIRT-CAPS ORAL) Take 1 capsule  by mouth once daily.     buPROPion (WELLBUTRIN) 75 MG tablet TAKE 1 TABLET(75 MG) BY MOUTH EVERY DAY    calcitRIOL (ROCALTROL) 0.5 MCG Cap Take 1.5 mcg by mouth.    carvediloL (COREG) 12.5 MG tablet Take 1 tablet (12.5 mg total) by mouth 2 (two) times daily.    cinacalcet (SENSIPAR) 30 MG Tab Take 30 mg by mouth.    clopidogreL (PLAVIX) 75 mg tablet Take 75 mg by mouth.    EScitalopram oxalate (LEXAPRO) 20 MG tablet TAKE 1 TABLET(20 MG) BY MOUTH EVERY DAY    FERREX 150 FORTE PLUS 150-60-25-1 mg-mg-mcg-mg Cap Take 1 capsule by mouth once daily.    fexofenadine (ALLEGRA) 180 MG tablet Take 180 mg by mouth.    fluticasone propionate (FLONASE) 50 mcg/actuation nasal spray 1 spray by Each Nostril route daily as needed.    guaiFENesin (MUCINEX) 600 mg 12 hr tablet Take 1,200 mg by mouth 2 (two) times daily.    LIDOcaine (LIDODERM) 5 % Place 2 patches onto the skin daily as needed.    losartan (COZAAR) 100 MG tablet Take 100 mg by mouth once daily.    melatonin 5 mg Cap Take by mouth.    mirtazapine (REMERON) 15 MG tablet TAKE 1 TABLET(15 MG) BY MOUTH EVERY EVENING    montelukast (SINGULAIR) 10 mg tablet TAKE 1 TABLET(10 MG) BY MOUTH EVERY EVENING    nicotine (NICODERM CQ) 14 mg/24 hr Place 1 patch onto the skin every 24 hours.    NIFEdipine (PROCARDIA-XL) 60 MG (OSM) 24 hr tablet Take 1 tablet (60 mg total) by mouth once daily.    polyethylene glycol (GLYCOLAX) 17 gram/dose powder Take 17 g by mouth.    sevelamer carbonate (RENVELA) 800 mg Tab Take 1 tablet by mouth 3 (three) times daily with meals.    sucroferric oxyhydroxide (VELPHORO) 500 mg Chew Take 500 mg by mouth 3 (three) times daily.    [DISCONTINUED] oxyCODONE-acetaminophen (PERCOCET) 7.5-325 mg per tablet Take 1 tablet by mouth 2 (two) times daily as needed.    [DISCONTINUED] pantoprazole (PROTONIX) 40 MG tablet Take 40 mg by mouth once daily.     Family History       Problem Relation (Age of Onset)    Diabetes Maternal Grandmother     Heart failure Mother    Hypertension Mother    Ovarian cancer Maternal Aunt    Stroke Maternal Grandmother          Tobacco Use    Smoking status: Every Day     Packs/day: 0.50     Years: 36.00     Pack years: 18.00     Types: Cigarettes     Start date: 1984    Smokeless tobacco: Never    Tobacco comments:     Pt enrolled in the [employer]. Ambulatory referral to Smoking Cessation program.    Substance and Sexual Activity    Alcohol use: Not Currently     Comment: occasional    Drug use: Not Currently     Types: Marijuana    Sexual activity: Not Currently     Partners: Male     Review of Systems   Constitutional:  Negative for chills, diaphoresis and fever.   HENT:  Negative for congestion and sore throat.    Eyes:  Negative for discharge and visual disturbance.   Respiratory:  Positive for shortness of breath. Negative for cough.    Cardiovascular:  Positive for leg swelling. Negative for chest pain.   Gastrointestinal:  Negative for abdominal pain, nausea and vomiting.   Genitourinary:  Negative for difficulty urinating.   Musculoskeletal:  Negative for arthralgias and joint swelling.   Skin:  Negative for rash and wound.   Allergic/Immunologic: Negative for immunocompromised state.   Neurological:  Negative for light-headedness and headaches.   Psychiatric/Behavioral:  Negative for agitation and confusion.    Objective:     Vital Signs (Most Recent):  Temp: 96.7 °F (35.9 °C) (09/27/22 1231)  Pulse: 62 (09/27/22 1358)  Resp: 16 (09/27/22 1326)  BP: 101/70 (09/27/22 1345)  SpO2: 100 % (09/27/22 1357) Vital Signs (24h Range):  Temp:  [96.7 °F (35.9 °C)] 96.7 °F (35.9 °C)  Pulse:  [35-62] 62  Resp:  [16-20] 16  SpO2:  [98 %-100 %] 100 %  BP: ()/(63-70) 101/70     Weight: 54.4 kg (120 lb)  Body mass index is 18.79 kg/m².    Physical Exam  Vitals reviewed.   Constitutional:       General: She is not in acute distress.     Appearance: She is well-developed. She is not diaphoretic.   HENT:      Head:  Normocephalic and atraumatic.      Nose: Nose normal.   Eyes:      General: No scleral icterus.     Pupils: Pupils are equal, round, and reactive to light.   Neck:      Vascular: No JVD.      Trachea: No tracheal deviation.   Cardiovascular:      Rate and Rhythm: Normal rate and regular rhythm.      Heart sounds: Normal heart sounds.   Pulmonary:      Effort: Pulmonary effort is normal. No respiratory distress.      Breath sounds: Normal breath sounds.   Abdominal:      General: There is no distension.      Palpations: Abdomen is soft.      Tenderness: There is no abdominal tenderness.   Musculoskeletal:         General: No deformity.      Cervical back: Normal range of motion.   Skin:     General: Skin is warm and dry.      Findings: No rash.   Neurological:      Mental Status: She is alert and oriented to person, place, and time.   Psychiatric:         Behavior: Behavior normal.         CRANIAL NERVES     CN III, IV, VI   Pupils are equal, round, and reactive to light.     Significant Labs: All pertinent labs within the past 24 hours have been reviewed.    Significant Imaging: I have reviewed all pertinent imaging results/findings within the past 24 hours.    Assessment/Plan:     * Hyperkalemia  -secondary to missed dialysis   -given IV calcium gluconate in the ED   -nephrology consulted for dialysis, will give today      Volume overload  -in the setting missed dialysis   -nephrology consulted for emergent HD  -currently saturating 100% on ambient air      Iron deficiency anemia  -continue home supplements   -secondary to renal dysfunction      Secondary hyperparathyroidism of renal origin  -continue home calcitriol and cinacalcet      SATNAM (generalized anxiety disorder)  -continue home mirtazapine 50 mg nightly, bupropion 75 mg daily, and escitalopram 20 mg daily      ESRD (end stage renal disease) on dialysis  -Tuesday Thursday Saturday  -nephrology consulted      Tobacco use disorder  -order nicotine  patch      Renal hypertension  -took blood pressure medications this morning  -mildly hypotensive today   -reordering home antihypertensives for tomorrow; carvedilol 12.5 mg b.i.d., nifedipine 60 mg daily; holding Arb      Constipation - functional  -continue home MiraLax        VTE Risk Mitigation (From admission, onward)         Ordered     heparin (porcine) injection 5,000 Units  Every 8 hours         09/27/22 1359     IP VTE HIGH RISK PATIENT  Once         09/27/22 1359     Place sequential compression device  Until discontinued         09/27/22 1359                   Andrew Anderson MD  Department of Hospital Medicine   [place] - Emergency Dept

## 2022-09-27 NOTE — ED PROVIDER NOTES
Encounter Date: 9/27/2022       History     Chief Complaint   Patient presents with    Shortness of Breath     SOB since yesterday, dialysis pt, last dialyzed yesterday and got 3L off. Dialysis access to left, upper arm. SOB persists despite pt being compliant with dialysis.      Pt is a 57 year old female with PMHx significant for ESRD on dialysis TTS who presents for evaluation of shortness of breath, which began 1 day ago. She reports missing dialysis on Saturday because she was out of town in [place]. She attended a make up appointment on Monday where 3 liters were removed, but she feels as though this was not enough due to her persistent shortness of breath since then. Notes an associated lightheadedness, abdominal distension, and fatigue. No fever, chills, chest pain, nausea, or vomiting.     The history is provided by the patient and medical records.   Review of patient's allergies indicates:   Allergen Reactions    Doxycycline Swelling, Rash and Hives    Gentamicin Anaphylaxis    Vancomycin analogues Anaphylaxis     Past Medical History:   Diagnosis Date    Addiction to drug     Alcohol abuse     Allergic drug reaction 11/13/2017    Anemia     Anxiety     Arm pain, left 2/12/2014    Breast cyst     Chronic renal failure 2/12/2014    CKD (chronic kidney disease) stage 5, GFR less than 15 ml/min     Depression     Dialysis patient     dialysis m-w-f    Encounter for blood transfusion     GERD (gastroesophageal reflux disease)     Hx of psychiatric care     Hypertension     Mood swings     Multiple myeloma in remission 10/26/2012    per Hem/Oc note    Psychiatric exam requested by authority     Psychiatric problem     Renal hypertension     Status post dialysis 8/2012    Therapy     Thrombocytopenia 8/2/2012     Past Surgical History:   Procedure Laterality Date    AV FISTULA PLACEMENT Left 2014    BREAST CYST ASPIRATION Left 1995    BREAST CYST EXCISION Left 1995    CERVICAL  SPINE SURGERY Bilateral      SECTION      x1    COLONOSCOPY N/A 2022    Procedure: COLONOSCOPY;  Surgeon: Jordan Mcguire MD;  Location: [facility] ENDO;  Service: Endoscopy;  Laterality: N/A;    FISTULOGRAM N/A 2020    Procedure: Fistulogram;  Surgeon: Rahat Berger MD;  Location: [facility] CATH LAB/EP;  Service: Cardiology;  Laterality: N/A;    pd catheter      PERCUTANEOUS TRANSLUMINAL ANGIOPLASTY OF ARTERIOVENOUS FISTULA N/A 2020    Procedure: PTA, AV FISTULA;  Surgeon: Rahat Berger MD;  Location: [facility] CATH LAB/EP;  Service: Cardiology;  Laterality: N/A;    PERITONEAL CATHETER REMOVAL N/A 2018    Procedure: REMOVAL, CATHETER, DIALYSIS, PERITONEAL;  Surgeon: Mukesh Gonsales Jr., MD;  Location: [facility] OR;  Service: General;  Laterality: N/A;    RENAL BIOPSY  2016    THROMBECTOMY OF HEMODIALYSIS ACCESS SITE N/A 2020    Procedure: Thrombectomy, Hemodialysis Graft Or Fistula;  Surgeon: Rahat Berger MD;  Location: [facility] CATH LAB/EP;  Service: Cardiology;  Laterality: N/A;    VASCULAR SURGERY  2012    dialysis catheter to right subclavian     Family History   Problem Relation Age of Onset    Hypertension Mother     Heart failure Mother     Ovarian cancer Maternal Aunt     Diabetes Maternal Grandmother     Stroke Maternal Grandmother     Breast cancer Neg Hx     Colon cancer Neg Hx      Social History     Tobacco Use    Smoking status: Every Day     Packs/day: 0.50     Years: 36.00     Pack years: 18.00     Types: Cigarettes     Start date:     Smokeless tobacco: Never    Tobacco comments:     Pt enrolled in the [employer]. Ambulatory referral to Smoking Cessation program.    Substance Use Topics    Alcohol use: Not Currently     Comment: occasional    Drug use: Not Currently     Types: Marijuana     Review of Systems   Constitutional:  Positive for fatigue. Negative for chills and fever.   HENT:  Negative for ear discharge and rhinorrhea.    Eyes:  Negative  for photophobia and visual disturbance.   Respiratory:  Positive for shortness of breath. Negative for cough.    Cardiovascular:  Negative for chest pain and palpitations.   Gastrointestinal:  Positive for abdominal distention.   Genitourinary:  Negative for dysuria and hematuria.   Musculoskeletal:  Negative for back pain and neck pain.   Skin:  Negative for rash and wound.   Neurological:  Positive for light-headedness. Negative for weakness and numbness.     Physical Exam     Initial Vitals   BP Pulse Resp Temp SpO2   09/27/22 1142 09/27/22 1142 09/27/22 1142 09/27/22 1231 09/27/22 1142   98/64 (!) 37 20 96.7 °F (35.9 °C) 99 %      MAP       --                Physical Exam    Nursing note and vitals reviewed.  Constitutional: She appears well-developed and well-nourished.   HENT:   Head: Normocephalic and atraumatic.   Eyes: EOM are normal.   Neck: Neck supple.   Cardiovascular:  Regular rhythm.           No murmur heard.  Bradycardia    Pulmonary/Chest: Breath sounds normal. No stridor. No respiratory distress.   Abdominal: Abdomen is soft. She exhibits distension. There is no abdominal tenderness.   Musculoskeletal:         General: No tenderness or edema. Normal range of motion.      Cervical back: Neck supple.     Neurological: She is alert and oriented to person, place, and time. No sensory deficit.   Skin: Skin is warm and dry.       ED Course   Procedures  Labs Reviewed   CBC W/ AUTO DIFFERENTIAL - Abnormal; Notable for the following components:       Result Value    RBC 3.50 (*)     Hemoglobin 9.5 (*)     Hematocrit 28.6 (*)     RDW 17.2 (*)     Platelets 122 (*)     MPV 9.0 (*)     All other components within normal limits   COMPREHENSIVE METABOLIC PANEL - Abnormal; Notable for the following components:    Sodium 130 (*)     Potassium 6.4 (*)     Chloride 92 (*)     CO2 19 (*)     BUN 94 (*)     Creatinine 11.5 (*)     Albumin 3.4 (*)     Alkaline Phosphatase 157 (*)     ALT 6 (*)     Anion Gap 19 (*)      eGFR 3 (*)     All other components within normal limits    Narrative:        K critical result(s) called and verbal readback obtained from   Manuel Louis RN  by [facility] 09/27/2022 13:14   B-TYPE NATRIURETIC PEPTIDE - Abnormal; Notable for the following components:    BNP 1,495 (*)     All other components within normal limits   TROPONIN I   TSH   MAGNESIUM   LACTIC ACID, PLASMA   POCT GLUCOSE   POCT GLUCOSE MONITORING CONTINUOUS          Imaging Results              X-Ray Chest AP Portable (Final result)  Result time 09/27/22 13:46:53      Final result by Alden Ceballos MD (09/27/22 13:46:53)                   Impression:      As above      Electronically signed by: Alden Ceballos MD  Date:    09/27/2022  Time:    13:46               Narrative:    EXAMINATION:  XR CHEST AP PORTABLE    CLINICAL HISTORY:  Chest Pain;    TECHNIQUE:  Single frontal view of the chest was performed.    COMPARISON:  09/27/2022    FINDINGS:  There is no pneumothorax or significant interval detrimental change in the cardiopulmonary status since the previous exam.                                       Medications   calcium gluconate 1 g in NS IVPB (premixed) (1 g Intravenous New Bag 9/27/22 1358)   dextrose 10% bolus 125 mL (has no administration in time range)   dextrose 10% bolus 250 mL (has no administration in time range)   mupirocin 2 % ointment (has no administration in time range)   aspirin chewable tablet 81 mg (has no administration in time range)   vitamin renal formula (B-complex-vitamin c-folic acid) 1 mg per capsule 1 capsule (has no administration in time range)   buPROPion tablet 75 mg (has no administration in time range)   calcitRIOL capsule 1.5 mcg (has no administration in time range)   carvediloL tablet 12.5 mg (has no administration in time range)   clopidogreL tablet 75 mg (has no administration in time range)   cinacalcet tablet 30 mg (has no administration in time range)   EScitalopram oxalate tablet 20 mg (has  no administration in time range)   mirtazapine tablet 15 mg (has no administration in time range)   montelukast tablet 10 mg (has no administration in time range)   nicotine 14 mg/24 hr 1 patch (has no administration in time range)   NIFEdipine 24 hr tablet 60 mg (has no administration in time range)   sevelamer carbonate tablet 800 mg (has no administration in time range)   sodium chloride 0.9% flush 10 mL (has no administration in time range)   melatonin tablet 6 mg (has no administration in time range)   ondansetron disintegrating tablet 8 mg (has no administration in time range)   ondansetron injection 4 mg (has no administration in time range)   polyethylene glycol packet 17 g (has no administration in time range)   acetaminophen tablet 650 mg (has no administration in time range)   acetaminophen tablet 1,000 mg (has no administration in time range)   naloxone 0.4 mg/mL injection 0.02 mg (has no administration in time range)   glucose chewable tablet 16 g (has no administration in time range)   glucose chewable tablet 24 g (has no administration in time range)   glucagon (human recombinant) injection 1 mg (has no administration in time range)   dextrose 10% bolus 125 mL (has no administration in time range)   dextrose 10% bolus 250 mL (has no administration in time range)   heparin (porcine) injection 5,000 Units (has no administration in time range)   insulin regular injection 5 Units 0.05 mL (5 Units Intravenous Given 9/27/22 1340)   albuterol sulfate nebulizer solution 10 mg (10 mg Nebulization Given 9/27/22 1326)   dextrose 50 % in water (D50W) injection 25 g (25 g Intravenous Given 9/27/22 1338)     Medical Decision Making:   History:   Old Medical Records: I decided to obtain old medical records.  Initial Assessment:   Pt is a 57 year old female with PMHx significant for ESRD on dialysis TTS who presents for evaluation of shortness of breath, which began 1 day ago. She reports missing dialysis on Saturday  because she was out of town in [place]. She attended a make up appointment on Monday where 3 liters were removed, but she feels as though this was not enough due to her persistent shortness of breath since then.  Differential Diagnosis:   Differential includes but is not limited to: electrolyte abnormality, volume overload, ACS, hypothyroidism, adverse medication effect   Clinical Tests:   Lab Tests: Ordered and Reviewed  Radiological Study: Ordered and Reviewed  Medical Tests: Ordered and Reviewed  ED Management:  Pt noted to be bradycardic and hypotensive on exam. K 6.4, BNP 1495. Concern for hyperkalemia and volume overload in need of emergent dialysis. Hyperkalemia treated in ED with insulin, calcium, and albuterol. Nephrology consulted to coordinate dialysis. Pt admitted for further management.   Other:   I have discussed this case with another health care provider.       <> Summary of the Discussion: Nephrology             ED Course as of 09/27/22 1500   Tue Sep 27, 2022   1201 TSH [JS]      ED Course User Index  [JS] Jr. Gutierrez Watson MD                 Clinical Impression:   Final diagnoses:  [R06.02] SOB (shortness of breath)  [E87.5] Hyperkalemia (Primary)        ED Disposition Condition    Observation Stable                Jr. Gutierrez Watson MD  Resident  09/27/22 9461

## 2022-09-27 NOTE — ASSESSMENT & PLAN NOTE
-secondary to missed dialysis   -given IV calcium gluconate in the ED   -nephrology consulted for dialysis, will give today

## 2022-09-27 NOTE — SUBJECTIVE & OBJECTIVE
Past Medical History:   Diagnosis Date    Addiction to drug     Alcohol abuse     Allergic drug reaction 2017    Anemia     Anxiety     Arm pain, left 2014    Breast cyst     Chronic renal failure 2014    CKD (chronic kidney disease) stage 5, GFR less than 15 ml/min     Depression     Dialysis patient     dialysis m-w-    Encounter for blood transfusion     GERD (gastroesophageal reflux disease)     Hx of psychiatric care     Hypertension     Mood swings     Multiple myeloma in remission 10/26/2012    per Hem/Oc note    Psychiatric exam requested by authority     Psychiatric problem     Renal hypertension     Status post dialysis 2012    Therapy     Thrombocytopenia 2012       Past Surgical History:   Procedure Laterality Date    AV FISTULA PLACEMENT Left     BREAST CYST ASPIRATION Left     BREAST CYST EXCISION Left     CERVICAL SPINE SURGERY Bilateral      SECTION      x1    COLONOSCOPY N/A 2022    Procedure: COLONOSCOPY;  Surgeon: Jordan Mcguire MD;  Location: [facility] ENDO;  Service: Endoscopy;  Laterality: N/A;    FISTULOGRAM N/A 2020    Procedure: Fistulogram;  Surgeon: Rahat Berger MD;  Location: [facility] CATH LAB/EP;  Service: Cardiology;  Laterality: N/A;    pd catheter      PERCUTANEOUS TRANSLUMINAL ANGIOPLASTY OF ARTERIOVENOUS FISTULA N/A 2020    Procedure: PTA, AV FISTULA;  Surgeon: Rahat Berger MD;  Location: [facility] CATH LAB/EP;  Service: Cardiology;  Laterality: N/A;    PERITONEAL CATHETER REMOVAL N/A 2018    Procedure: REMOVAL, CATHETER, DIALYSIS, PERITONEAL;  Surgeon: Mukesh Gonsales Jr., MD;  Location: [facility] OR;  Service: General;  Laterality: N/A;    RENAL BIOPSY  2016    THROMBECTOMY OF HEMODIALYSIS ACCESS SITE N/A 2020    Procedure: Thrombectomy, Hemodialysis Graft Or Fistula;  Surgeon: Rahat Berger MD;  Location: [facility] CATH LAB/EP;  Service: Cardiology;  Laterality: N/A;    VASCULAR SURGERY  2012    dialysis catheter to  right subclavian       Review of patient's allergies indicates:   Allergen Reactions    Doxycycline Swelling, Rash and Hives    Gentamicin Anaphylaxis    Vancomycin analogues Anaphylaxis       Current Facility-Administered Medications on File Prior to Encounter   Medication    0.9%  NaCl infusion    0.9%  NaCl infusion    sodium chloride 0.9% flush 10 mL    sodium chloride 0.9% flush 10 mL     Current Outpatient Medications on File Prior to Encounter   Medication Sig    aspirin 81 MG Chew Chew and swallow 1 tablet (81 mg total) by mouth once daily.    B COMPLEX W-C NO.20/FOLIC ACID (VIRT-CAPS ORAL) Take 1 capsule by mouth once daily.     buPROPion (WELLBUTRIN) 75 MG tablet TAKE 1 TABLET(75 MG) BY MOUTH EVERY DAY    calcitRIOL (ROCALTROL) 0.5 MCG Cap Take 1.5 mcg by mouth.    carvediloL (COREG) 12.5 MG tablet Take 1 tablet (12.5 mg total) by mouth 2 (two) times daily.    cinacalcet (SENSIPAR) 30 MG Tab Take 30 mg by mouth.    clopidogreL (PLAVIX) 75 mg tablet Take 75 mg by mouth.    EScitalopram oxalate (LEXAPRO) 20 MG tablet TAKE 1 TABLET(20 MG) BY MOUTH EVERY DAY    FERREX 150 FORTE PLUS 150-60-25-1 mg-mg-mcg-mg Cap Take 1 capsule by mouth once daily.    fexofenadine (ALLEGRA) 180 MG tablet Take 180 mg by mouth.    fluticasone propionate (FLONASE) 50 mcg/actuation nasal spray 1 spray by Each Nostril route daily as needed.    guaiFENesin (MUCINEX) 600 mg 12 hr tablet Take 1,200 mg by mouth 2 (two) times daily.    LIDOcaine (LIDODERM) 5 % Place 2 patches onto the skin daily as needed.    losartan (COZAAR) 100 MG tablet Take 100 mg by mouth once daily.    melatonin 5 mg Cap Take by mouth.    mirtazapine (REMERON) 15 MG tablet TAKE 1 TABLET(15 MG) BY MOUTH EVERY EVENING    montelukast (SINGULAIR) 10 mg tablet TAKE 1 TABLET(10 MG) BY MOUTH EVERY EVENING    nicotine (NICODERM CQ) 14 mg/24 hr Place 1 patch onto the skin every 24 hours.    NIFEdipine (PROCARDIA-XL) 60 MG (OSM) 24 hr tablet Take 1 tablet (60 mg total) by  mouth once daily.    polyethylene glycol (GLYCOLAX) 17 gram/dose powder Take 17 g by mouth.    sevelamer carbonate (RENVELA) 800 mg Tab Take 1 tablet by mouth 3 (three) times daily with meals.    sucroferric oxyhydroxide (VELPHORO) 500 mg Chew Take 500 mg by mouth 3 (three) times daily.    [DISCONTINUED] oxyCODONE-acetaminophen (PERCOCET) 7.5-325 mg per tablet Take 1 tablet by mouth 2 (two) times daily as needed.    [DISCONTINUED] pantoprazole (PROTONIX) 40 MG tablet Take 40 mg by mouth once daily.     Family History       Problem Relation (Age of Onset)    Diabetes Maternal Grandmother    Heart failure Mother    Hypertension Mother    Ovarian cancer Maternal Aunt    Stroke Maternal Grandmother          Tobacco Use    Smoking status: Every Day     Packs/day: 0.50     Years: 36.00     Pack years: 18.00     Types: Cigarettes     Start date: 1984    Smokeless tobacco: Never    Tobacco comments:     Pt enrolled in the [employer]. Ambulatory referral to Smoking Cessation program.    Substance and Sexual Activity    Alcohol use: Not Currently     Comment: occasional    Drug use: Not Currently     Types: Marijuana    Sexual activity: Not Currently     Partners: Male     Review of Systems   Constitutional:  Negative for chills, diaphoresis and fever.   HENT:  Negative for congestion and sore throat.    Eyes:  Negative for discharge and visual disturbance.   Respiratory:  Positive for shortness of breath. Negative for cough.    Cardiovascular:  Positive for leg swelling. Negative for chest pain.   Gastrointestinal:  Negative for abdominal pain, nausea and vomiting.   Genitourinary:  Negative for difficulty urinating.   Musculoskeletal:  Negative for arthralgias and joint swelling.   Skin:  Negative for rash and wound.   Allergic/Immunologic: Negative for immunocompromised state.   Neurological:  Negative for light-headedness and headaches.   Psychiatric/Behavioral:  Negative for agitation and confusion.    Objective:      Vital Signs (Most Recent):  Temp: 96.7 °F (35.9 °C) (09/27/22 1231)  Pulse: 62 (09/27/22 1358)  Resp: 16 (09/27/22 1326)  BP: 101/70 (09/27/22 1345)  SpO2: 100 % (09/27/22 1357) Vital Signs (24h Range):  Temp:  [96.7 °F (35.9 °C)] 96.7 °F (35.9 °C)  Pulse:  [35-62] 62  Resp:  [16-20] 16  SpO2:  [98 %-100 %] 100 %  BP: ()/(63-70) 101/70     Weight: 54.4 kg (120 lb)  Body mass index is 18.79 kg/m².    Physical Exam  Vitals reviewed.   Constitutional:       General: She is not in acute distress.     Appearance: She is well-developed. She is not diaphoretic.   HENT:      Head: Normocephalic and atraumatic.      Nose: Nose normal.   Eyes:      General: No scleral icterus.     Pupils: Pupils are equal, round, and reactive to light.   Neck:      Vascular: No JVD.      Trachea: No tracheal deviation.   Cardiovascular:      Rate and Rhythm: Normal rate and regular rhythm.      Heart sounds: Normal heart sounds.   Pulmonary:      Effort: Pulmonary effort is normal. No respiratory distress.      Breath sounds: Normal breath sounds.   Abdominal:      General: There is no distension.      Palpations: Abdomen is soft.      Tenderness: There is no abdominal tenderness.   Musculoskeletal:         General: No deformity.      Cervical back: Normal range of motion.   Skin:     General: Skin is warm and dry.      Findings: No rash.   Neurological:      Mental Status: She is alert and oriented to person, place, and time.   Psychiatric:         Behavior: Behavior normal.         CRANIAL NERVES     CN III, IV, VI   Pupils are equal, round, and reactive to light.     Significant Labs: All pertinent labs within the past 24 hours have been reviewed.    Significant Imaging: I have reviewed all pertinent imaging results/findings within the past 24 hours.

## 2022-09-27 NOTE — PHARMACY MED REC
"    [facility]           Pharmacy  Admission Medication History     The home medication history was taken by Mackenzie Cardenas.      Medication history obtained from Medications listed below were obtained from: Patient/family    Based on information gathered for medication list, you may go to "Admission" then "Reconcile Home Medications" tabs to review and/or act upon those items.     The home medication list has been updated by the Pharmacy department.   Please read ALL comments highlighted in yellow.   Please address this information as you see fit.    Feel free to contact us if you have any questions or require assistance.        Current Facility-Administered Medications on File Prior to Encounter   Medication Dose Route Frequency Provider Last Rate Last Admin    0.9%  NaCl infusion   Intravenous Continuous Jordan Mcguire MD 20 mL/hr at 07/18/22 0943 New Bag at 07/18/22 0943    0.9%  NaCl infusion   Intravenous Continuous Jordan Mcguire MD   Stopped at 08/18/22 1042    sodium chloride 0.9% flush 10 mL  10 mL Intravenous PRN Jordan Mcguire MD        sodium chloride 0.9% flush 10 mL  10 mL Intravenous PRN Jordan Mcguire MD         Current Outpatient Medications on File Prior to Encounter   Medication Sig Dispense Refill    acetaminophen (TYLENOL) 325 MG tablet Take 650 mg by mouth every 4 (four) hours as needed.      aspirin 81 MG Chew Take 81 mg by mouth once daily.      B COMPLEX W-C NO.20/FOLIC ACID (VIRT-CAPS ORAL) Take 1 capsule by mouth once daily.       buPROPion (WELLBUTRIN) 75 MG tablet TAKE 1 TABLET(75 MG) BY MOUTH EVERY DAY (Patient taking differently: Take 75 mg by mouth once daily.) 90 tablet 0    calcitRIOL (ROCALTROL) 0.5 MCG Cap Take 1.5 mcg by mouth.      carvediloL (COREG) 25 MG tablet Take 25 mg by mouth 2 (two) times daily.      cinacalcet (SENSIPAR) 30 MG Tab Take 30 mg by mouth.      clopidogreL (PLAVIX) 75 mg tablet Take 75 mg by mouth once daily.      " EScitalopram oxalate (LEXAPRO) 20 MG tablet TAKE 1 TABLET(20 MG) BY MOUTH EVERY DAY (Patient taking differently: Take 20 mg by mouth once daily.) 90 tablet 0    fexofenadine (ALLEGRA) 180 MG tablet Take 180 mg by mouth once daily.      fluticasone propionate (FLONASE) 50 mcg/actuation nasal spray 1 spray by Each Nostril route daily as needed.      guaiFENesin (MUCINEX) 600 mg 12 hr tablet Take 1,200 mg by mouth 2 (two) times daily.      LIDOcaine (LIDODERM) 5 % Place 2 patches onto the skin daily as needed.      losartan (COZAAR) 100 MG tablet Take 100 mg by mouth once daily.      melatonin 5 mg Cap Take 5 mg by mouth nightly.      mirtazapine (REMERON) 15 MG tablet TAKE 1 TABLET(15 MG) BY MOUTH EVERY EVENING (Patient taking differently: Take 15 mg by mouth every evening.) 90 tablet 0    montelukast (SINGULAIR) 10 mg tablet TAKE 1 TABLET(10 MG) BY MOUTH EVERY EVENING (Patient taking differently: Take 10 mg by mouth every evening.) 90 tablet 3    NIFEdipine (PROCARDIA-XL) 60 MG (OSM) 24 hr tablet Take 60 mg by mouth once daily.      polyethylene glycol (GLYCOLAX) 17 gram/dose powder Take 17 g by mouth.      sevelamer carbonate (RENVELA) 800 mg Tab Take 800 mg by mouth 3 (three) times daily with meals.      sucroferric oxyhydroxide (VELPHORO) 500 mg Chew Take 500 mg by mouth 3 (three) times daily.      FERREX 150 FORTE PLUS 150-60-25-1 mg-mg-mcg-mg Cap Take 1 capsule by mouth once daily.      nicotine (NICODERM CQ) 14 mg/24 hr Place 1 patch onto the skin every 24 hours.      [DISCONTINUED] aspirin 81 MG Chew Chew and swallow 1 tablet (81 mg total) by mouth once daily. 30 tablet 11    [DISCONTINUED] carvediloL (COREG) 12.5 MG tablet Take 1 tablet (12.5 mg total) by mouth 2 (two) times daily. 60 tablet 11    [DISCONTINUED] cinacalcet (SENSIPAR) 30 MG Tab Take 30 mg by mouth.      [DISCONTINUED] clopidogreL (PLAVIX) 75 mg tablet Take 75 mg by mouth.      [DISCONTINUED] NIFEdipine (PROCARDIA-XL) 60 MG (OSM) 24 hr tablet  Take 1 tablet (60 mg total) by mouth once daily. 30 tablet 11    [DISCONTINUED] oxyCODONE-acetaminophen (PERCOCET) 7.5-325 mg per tablet Take 1 tablet by mouth 2 (two) times daily as needed.      [DISCONTINUED] pantoprazole (PROTONIX) 40 MG tablet Take 40 mg by mouth once daily.         Please address this information as you see fit.  Feel free to contact us if you have any questions or require assistance.    Mackenzie Cardenas  [phone]              .

## 2022-09-27 NOTE — ASSESSMENT & PLAN NOTE
-in the setting missed dialysis   -nephrology consulted for emergent HD  -currently saturating 100% on ambient air

## 2022-09-27 NOTE — Clinical Note
Diagnosis: Hyperkalemia [637667]   Future Attending Provider: MELISA THOMPSON [[id number]]   Admitting Provider:: MELISA THOMPSON [[id number]]

## 2022-09-28 ENCOUNTER — CLINICAL SUPPORT (OUTPATIENT)
Dept: SMOKING CESSATION | Facility: CLINIC | Age: 57
End: 2022-09-28
Payer: COMMERCIAL

## 2022-09-28 VITALS
TEMPERATURE: 97 F | WEIGHT: 117.94 LBS | SYSTOLIC BLOOD PRESSURE: 134 MMHG | HEIGHT: 67 IN | DIASTOLIC BLOOD PRESSURE: 86 MMHG | RESPIRATION RATE: 18 BRPM | OXYGEN SATURATION: 100 % | BODY MASS INDEX: 18.51 KG/M2 | HEART RATE: 71 BPM

## 2022-09-28 DIAGNOSIS — F17.210 CIGARETTE SMOKER: Primary | ICD-10-CM

## 2022-09-28 LAB
ANION GAP SERPL CALC-SCNC: 11 MMOL/L (ref 8–16)
BUN SERPL-MCNC: 32 MG/DL (ref 6–20)
CALCIUM SERPL-MCNC: 8.9 MG/DL (ref 8.7–10.5)
CHLORIDE SERPL-SCNC: 96 MMOL/L (ref 95–110)
CO2 SERPL-SCNC: 28 MMOL/L (ref 23–29)
CREAT SERPL-MCNC: 5.7 MG/DL (ref 0.5–1.4)
EST. GFR  (NO RACE VARIABLE): 8 ML/MIN/1.73 M^2
GLUCOSE SERPL-MCNC: 87 MG/DL (ref 70–110)
PHOSPHATE SERPL-MCNC: 5.3 MG/DL (ref 2.7–4.5)
POCT GLUCOSE: 75 MG/DL (ref 70–110)
POCT GLUCOSE: 89 MG/DL (ref 70–110)
POCT GLUCOSE: 96 MG/DL (ref 70–110)
POTASSIUM SERPL-SCNC: 4.6 MMOL/L (ref 3.5–5.1)
SODIUM SERPL-SCNC: 135 MMOL/L (ref 136–145)

## 2022-09-28 PROCEDURE — 99407 BEHAV CHNG SMOKING > 10 MIN: CPT | Mod: S$GLB,TXP,,

## 2022-09-28 PROCEDURE — 25000003 PHARM REV CODE 250: Mod: NTX | Performed by: INTERNAL MEDICINE

## 2022-09-28 PROCEDURE — 63600175 PHARM REV CODE 636 W HCPCS: Mod: NTX | Performed by: HOSPITALIST

## 2022-09-28 PROCEDURE — A4216 STERILE WATER/SALINE, 10 ML: HCPCS | Mod: NTX | Performed by: HOSPITALIST

## 2022-09-28 PROCEDURE — 99407 PR TOBACCO USE CESSATION INTENSIVE >10 MINUTES: ICD-10-PCS | Mod: S$GLB,TXP,,

## 2022-09-28 PROCEDURE — 84100 ASSAY OF PHOSPHORUS: CPT | Mod: NTX | Performed by: HOSPITALIST

## 2022-09-28 PROCEDURE — 25000003 PHARM REV CODE 250: Mod: NTX | Performed by: HOSPITALIST

## 2022-09-28 PROCEDURE — G0378 HOSPITAL OBSERVATION PER HR: HCPCS | Mod: NTX

## 2022-09-28 PROCEDURE — 36415 COLL VENOUS BLD VENIPUNCTURE: CPT | Mod: NTX | Performed by: HOSPITALIST

## 2022-09-28 PROCEDURE — 80048 BASIC METABOLIC PNL TOTAL CA: CPT | Mod: NTX | Performed by: HOSPITALIST

## 2022-09-28 PROCEDURE — 96372 THER/PROPH/DIAG INJ SC/IM: CPT | Mod: NTX | Performed by: HOSPITALIST

## 2022-09-28 RX ADMIN — SEVELAMER CARBONATE 800 MG: 800 TABLET, FILM COATED ORAL at 08:09

## 2022-09-28 RX ADMIN — MUPIROCIN: 20 OINTMENT TOPICAL at 08:09

## 2022-09-28 RX ADMIN — NEPHROCAP 1 CAPSULE: 1 CAP ORAL at 08:09

## 2022-09-28 RX ADMIN — CARVEDILOL 12.5 MG: 12.5 TABLET, FILM COATED ORAL at 08:09

## 2022-09-28 RX ADMIN — Medication 10 ML: at 05:09

## 2022-09-28 RX ADMIN — ACETAMINOPHEN 650 MG: 325 TABLET ORAL at 12:09

## 2022-09-28 RX ADMIN — CLOPIDOGREL 75 MG: 75 TABLET, FILM COATED ORAL at 08:09

## 2022-09-28 RX ADMIN — CINACALCET HYDROCHLORIDE 30 MG: 30 TABLET, FILM COATED ORAL at 08:09

## 2022-09-28 RX ADMIN — ASPIRIN 81 MG CHEWABLE TABLET 81 MG: 81 TABLET CHEWABLE at 08:09

## 2022-09-28 RX ADMIN — HEPARIN SODIUM 5000 UNITS: 5000 INJECTION, SOLUTION INTRAVENOUS; SUBCUTANEOUS at 05:09

## 2022-09-28 RX ADMIN — NIFEDIPINE 60 MG: 30 TABLET, FILM COATED, EXTENDED RELEASE ORAL at 08:09

## 2022-09-28 RX ADMIN — CALCITRIOL CAPSULES 0.25 MCG 1.5 MCG: 0.25 CAPSULE ORAL at 08:09

## 2022-09-28 RX ADMIN — BUPROPION HYDROCHLORIDE 75 MG: 75 TABLET, FILM COATED ORAL at 08:09

## 2022-09-28 RX ADMIN — ESCITALOPRAM OXALATE 20 MG: 10 TABLET ORAL at 08:09

## 2022-09-28 RX ADMIN — Medication 6 MG: at 01:09

## 2022-09-28 NOTE — PLAN OF CARE
SW communicated with pt to complete DCA.     Pt confirmed information on chart. Pt resides in home alone. Pt is independent in ADLs. Pt has cane and TTB at home. Pt has no HH services. Pt reports that she will transport her self home upon dc. Pt reports she gets HD Tuesday, Thursday, Saturday, at 5am at [facility]. Pt was made aware to contact SW with any questions or concerns. SW will continue to follow pt throughout her transitions of care and assist with any dc needs.     Future Appointments   Date Time Provider Department Center   10/3/2022 11:20 AM Leilani Shane MD [facility] HEM ONC [facility]   10/4/2022  8:20 AM Leodan Sanchez MD [facility] FAM MED [facility]   10/5/2022 10:00 AM Le Osuna MD [facility] OBGYN [facility]   10/10/2022  8:30 AM [facility]        09/28/22 0920   Discharge Assessment   Assessment Type Discharge Planning Assessment   Confirmed/corrected address, phone number and insurance Yes   Confirmed Demographics Correct on Facesheet   Source of Information patient   Communicated JOVANA with patient/caregiver Yes   Reason For Admission Hyperkalemia   Lives With alone   Do you expect to return to your current living situation? Yes   Do you have help at home or someone to help you manage your care at home? Yes   Who are your caregiver(s) and their phone number(s)? Yusra Booth (Daughter)   [phone]   Prior to hospitilization cognitive status: Alert/Oriented   Current cognitive status: Alert/Oriented   Walking or Climbing Stairs Difficulty none   Dressing/Bathing Difficulty none   Home Layout Able to live on 1st floor   Equipment Currently Used at Home shower chair;cane, straight   Readmission within 30 days? No   Patient currently being followed by outpatient case management? No   Do you currently have service(s) that help you manage your care at home? No   Do you take prescription medications? Yes   Do you have prescription coverage? Yes   Coverage  [employer]   Do you have any problems affording any of your prescribed medications? No   Is the patient taking medications as prescribed? yes   Who is going to help you get home at discharge? Pt will transport self home.   How do you get to doctors appointments? car, drives self   Are you on dialysis? Yes   Dialysis Name and Scheduled days Tuesay, Thursday, Saturday @ 5am at [facility]   Do you take coumadin? No   Discharge Plan A Home with family   DME Needed Upon Discharge  none   Discharge Plan discussed with: Patient   Discharge Barriers Identified None   Physical Activity   On average, how many days per week do you engage in moderate to strenuous exercise (like a brisk walk)? 0 days   On average, how many minutes do you engage in exercise at this level? 0 min   Financial Resource Strain   How hard is it for you to pay for the very basics like food, housing, medical care, and heating? Hard   Housing Stability   In the last 12 months, was there a time when you were not able to pay the mortgage or rent on time? N   In the last 12 months, was there a time when you did not have a steady place to sleep or slept in a shelter (including now)? N   Transportation Needs   In the past 12 months, has lack of transportation kept you from medical appointments or from getting medications? no   In the past 12 months, has lack of transportation kept you from meetings, work, or from getting things needed for daily living? No   Food Insecurity   Within the past 12 months, you worried that your food would run out before you got the money to buy more. Never true   Within the past 12 months, the food you bought just didn't last and you didn't have money to get more. Never true   Stress   Do you feel stress - tense, restless, nervous, or anxious, or unable to sleep at night because your mind is troubled all the time - these days? Not at all   Alcohol Use   Q1: How often do you have a drink containing  alcohol? Never   Q2: How many drinks containing alcohol do you have on a typical day when you are drinking? None   Q3: How often do you have six or more drinks on one occasion? Never

## 2022-09-28 NOTE — NURSING
"Unable to get pt's temp. Attempted PO and A, initially unable to achieve, then temp resulted 94.3 axillary. Pt eating ice. REMY aguiar NP notified of all above written. Also informed NP pt stated "they couldn't get a temp on me today", although 98.2 charted. Pt denies any complaints, does not feel cold. New order to apply warming blanket ordered, placed. Will CTCM.  "

## 2022-09-28 NOTE — HOSPITAL COURSE
"Ms. Booth presented with volume overload and hyperkalemia after missing outpatient dialysis while traveling over the weekend.  She received emergent dialysis with correction of her electrolytes and improvement in fluid status.  Reports she feels much better today and that she will follow up with dialysis as regularly scheduled tomorrow.  Discussed need for notifying her dialysis center when she is traveling out of town so that alternate arrangements can be made that will help keep her out of the hospital.  Patient understands.  Stable for discharge today.     /86 (BP Location: Right arm, Patient Position: Lying)   Pulse 71   Temp 96.9 °F (36.1 °C) (Oral)   Resp 18   Ht 5' 7" (1.702 m)   Wt 53.5 kg (117 lb 15.1 oz)   LMP 05/25/2016   SpO2 100%   BMI 18.47 kg/m²   Physical Exam  Vitals reviewed.   Constitutional:       General: She is not in acute distress.     Appearance: She is well-developed. She is not diaphoretic.   HENT:      Head: Normocephalic and atraumatic.      Nose: Nose normal.   Eyes:      General: No scleral icterus.     Pupils: Pupils are equal, round, and reactive to light.   Neck:      Vascular: No JVD.      Trachea: No tracheal deviation.   Cardiovascular:      Rate and Rhythm: Normal rate and regular rhythm.      Heart sounds: Normal heart sounds.   Pulmonary:      Effort: Pulmonary effort is normal. No respiratory distress.      Breath sounds: Normal breath sounds.   Abdominal:      General: There is no distension.      Palpations: Abdomen is soft.      Tenderness: There is no abdominal tenderness.   Musculoskeletal:         General: No deformity.      Cervical back: Normal range of motion.   Skin:     General: Skin is warm and dry.      Findings: No rash.   Neurological:      Mental Status: She is alert and oriented to person, place, and time.   Psychiatric:         Behavior: Behavior normal.   "

## 2022-09-28 NOTE — DISCHARGE SUMMARY
"[facility]  Discharge Summary      Patient Name: Jennifer Booth  MRN: [id number]  Patient Class: OP- Observation  Admission Date: 9/27/2022  Hospital Length of Stay: 1 days  Discharge Date and Time: 9/28/2022 10:35 AM  Attending Physician: No att. providers found   Discharging Provider: Andrew Anderson MD  Primary Care Provider: Leodan Sanchez MD      HPI:   Ms. Booth is a 56yo woman with ESRD on HD, Multiple Myeloma in remission, AoCKD, HTN, GERD, MDD/Anxiety, Constipation, and tobacco use who presents with shortness of breath after missing day of dialysis while traveling over the weekend.  She states that she was out of town on her normal dialysis day on Saturday and missed her HD. She was volume overloaded and called the center; was dialyzed for 2 hours on Monday but still had worsening shortness of breath today.  In the ED, found to have significant hyperkalemia as well as volume overload.  Nephrology consulted in the ED with plans for emergent dialysis.      * No surgery found *      Hospital Course:   Ms. Booth presented with volume overload and hyperkalemia after missing outpatient dialysis while traveling over the weekend.  She received emergent dialysis with correction of her electrolytes and improvement in fluid status.  Reports she feels much better today and that she will follow up with dialysis as regularly scheduled tomorrow.  Discussed need for notifying her dialysis center when she is traveling out of town so that alternate arrangements can be made that will help keep her out of the hospital.  Patient understands.  Stable for discharge today.     /86 (BP Location: Right arm, Patient Position: Lying)   Pulse 71   Temp 96.9 °F (36.1 °C) (Oral)   Resp 18   Ht 5' 7" (1.702 m)   Wt 53.5 kg (117 lb 15.1 oz)   LMP 05/25/2016   SpO2 100%   BMI 18.47 kg/m²   Physical Exam  Vitals reviewed.   Constitutional:       General: She is not in acute distress.     " Appearance: She is well-developed. She is not diaphoretic.   HENT:      Head: Normocephalic and atraumatic.      Nose: Nose normal.   Eyes:      General: No scleral icterus.     Pupils: Pupils are equal, round, and reactive to light.   Neck:      Vascular: No JVD.      Trachea: No tracheal deviation.   Cardiovascular:      Rate and Rhythm: Normal rate and regular rhythm.      Heart sounds: Normal heart sounds.   Pulmonary:      Effort: Pulmonary effort is normal. No respiratory distress.      Breath sounds: Normal breath sounds.   Abdominal:      General: There is no distension.      Palpations: Abdomen is soft.      Tenderness: There is no abdominal tenderness.   Musculoskeletal:         General: No deformity.      Cervical back: Normal range of motion.   Skin:     General: Skin is warm and dry.      Findings: No rash.   Neurological:      Mental Status: She is alert and oriented to person, place, and time.   Psychiatric:         Behavior: Behavior normal.        Goals of Care Treatment Preferences:  Code Status: Full Code      Consults:     No new Assessment & Plan notes have been filed under this hospital service since the last note was generated.  Service: Hospital Medicine    Final Active Diagnoses:    Diagnosis Date Noted POA    PRINCIPAL PROBLEM:  Hyperkalemia [E87.5] 05/09/2013 Yes    Volume overload [E87.70] 06/09/2020 Yes    Renal hypertension [I12.9] 09/23/2019 Yes     Chronic    SATNAM (generalized anxiety disorder) [F41.1] 07/03/2019 Yes     Chronic    Secondary hyperparathyroidism of renal origin [N25.81] 07/01/2019 Yes    Iron deficiency anemia [D50.9] 01/24/2019 Yes    ESRD (end stage renal disease) on dialysis [N18.6, Z99.2] 08/03/2017 Not Applicable    Tobacco use disorder [F17.200] 12/16/2015 Yes     Chronic    Constipation - functional [K59.04] 08/02/2012 Yes      Problems Resolved During this Admission:       Discharged Condition: good    Disposition: Home or Self Care    Follow  Up:    Patient Instructions:      Diet renal     Notify your health care provider if you experience any of the following:  temperature >100.4     Notify your health care provider if you experience any of the following:  persistent nausea and vomiting or diarrhea     Notify your health care provider if you experience any of the following:  severe uncontrolled pain     Notify your health care provider if you experience any of the following:  difficulty breathing or increased cough     Notify your health care provider if you experience any of the following:  severe persistent headache     Notify your health care provider if you experience any of the following:  persistent dizziness, light-headedness, or visual disturbances     Notify your health care provider if you experience any of the following:  increased confusion or weakness     Activity as tolerated       Significant Diagnostic Studies:  See above    Pending Diagnostic Studies:     None         Medications:  Reconciled Home Medications:      Medication List      CHANGE how you take these medications    mirtazapine 15 MG tablet  Commonly known as: REMERON  TAKE 1 TABLET(15 MG) BY MOUTH EVERY EVENING  What changed: when to take this        CONTINUE taking these medications    acetaminophen 325 MG tablet  Commonly known as: TYLENOL  Take 650 mg by mouth every 4 (four) hours as needed.     aspirin 81 MG Chew  Take 81 mg by mouth once daily.     buPROPion 75 MG tablet  Commonly known as: WELLBUTRIN  TAKE 1 TABLET(75 MG) BY MOUTH EVERY DAY     calcitRIOL 0.5 MCG Cap  Commonly known as: ROCALTROL  Take 1.5 mcg by mouth.     carvediloL 25 MG tablet  Commonly known as: COREG  Take 25 mg by mouth 2 (two) times daily.     cinacalcet 30 MG Tab  Commonly known as: SENSIPAR  Take 30 mg by mouth.     clopidogreL 75 mg tablet  Commonly known as: PLAVIX  Take 75 mg by mouth once daily.     EScitalopram oxalate 20 MG tablet  Commonly known as: LEXAPRO  TAKE 1 TABLET(20 MG) BY  MOUTH EVERY DAY     FERREX 150 FORTE PLUS 150-60-25-1 mg-mg-mcg-mg Cap  Generic drug: iron aspgly,[id number]  Take 1 capsule by mouth once daily.     fexofenadine 180 MG tablet  Commonly known as: ALLEGRA  Take 180 mg by mouth once daily.     fluticasone propionate 50 mcg/actuation nasal spray  Commonly known as: FLONASE  1 spray by Each Nostril route daily as needed.     guaiFENesin 600 mg 12 hr tablet  Commonly known as: MUCINEX  Take 1,200 mg by mouth 2 (two) times daily.     LIDOcaine 5 %  Commonly known as: LIDODERM  Place 2 patches onto the skin daily as needed.     losartan 100 MG tablet  Commonly known as: COZAAR  Take 100 mg by mouth once daily.     melatonin 5 mg Cap  Take 5 mg by mouth nightly.     montelukast 10 mg tablet  Commonly known as: SINGULAIR  TAKE 1 TABLET(10 MG) BY MOUTH EVERY EVENING     nicotine 14 mg/24 hr  Commonly known as: NICODERM CQ  Place 1 patch onto the skin every 24 hours.     NIFEdipine 60 MG (OSM) 24 hr tablet  Commonly known as: PROCARDIA-XL  Take 60 mg by mouth once daily.     polyethylene glycol 17 gram/dose powder  Commonly known as: GLYCOLAX  Take 17 g by mouth.     sevelamer carbonate 800 mg Tab  Commonly known as: RENVELA  Take 800 mg by mouth 3 (three) times daily with meals.     sucroferric oxyhydroxide 500 mg Chew  Commonly known as: VELPHORO  Take 500 mg by mouth 3 (three) times daily.     VIRT-CAPS ORAL  Take 1 capsule by mouth once daily.            Indwelling Lines/Drains at time of discharge:   Lines/Drains/Airways     Drain  Duration                Hemodialysis AV Fistula Left forearm -- days                Time spent on the discharge of patient: 31 minutes         Andrew Anderson MD  Department of Hospital Medicine  [facility]

## 2022-09-28 NOTE — PLAN OF CARE
Pt will dc with no needs noted. Pt will transport herself home. SW will continue to follow pt throughout her transitions of care and assist with any dc needs.     Cleared from CM . Bedside Nurse and VN notified.    Future Appointments   Date Time Provider Department Center   10/3/2022 11:20 AM Leilani Shane MD [facility] HEM ONC [facility]   10/4/2022  8:20 AM Leodan Sanchez MD [facility] FAM MED [facility]   10/5/2022 10:00 AM Le Osuna MD [facility] OBGYN [place] [facility]   10/10/2022  8:30 AM [facility] MAMMO2 [facility] MAMMO [place] [facility]        09/28/22 0938   Final Note   Assessment Type Final Discharge Note   Anticipated Discharge Disposition Home   Hospital Resources/Appts/Education Provided Appointments scheduled by Navigator/Coordinator   Post-Acute Status   Discharge Delays None known at this time

## 2022-09-28 NOTE — PROGRESS NOTES
Individual Follow-Up Form    9/28/2022    Quit Date: To be determined    Clinical Status of Patient: Inpatient    Length of Service: 30 minutes    Comments: Pt. states recently cut down to 0.5 pk/day. Enrolled in the [employer] on 6/9/20 ([facility] Member ID # [id number]). She declines Ambulatory referral to Smoking Cessation program. Handout provided.    Diagnosis: F17.210

## 2022-09-28 NOTE — PLAN OF CARE
09/27/22 2005   Admission   Initial VN Admission Questions Complete   Communication Issues? None   Shift   Virtual Nurse - Rounding Complete   Pain Management Interventions relaxation techniques promoted;quiet environment facilitated;pain management plan reviewed with patient/caregiver   Virtual Nurse - Patient Verbalized Approval Of Camera Use;VN Rounding   Type of Frequent Check   Type Patient Rounds;Telemetry Monitoring   Safety/Activity   Patient Rounds bed in low position;placement of personal items at bedside;bed wheels locked;call light in patient/parent reach;visualized patient;clutter free environment maintained;ID band on   Safety Promotion/Fall Prevention assistive device/personal item within reach;bed alarm set;instructed to call staff for mobility;room near unit station;nonskid shoes/socks when out of bed;side rails raised x 2   Safety Precautions emergency equipment at bedside   Positioning   Head of Bed (HOB) Positioning HOB elevated   Positioning/Transfer Devices pillows;in use    VN cued into room to complete admit assessment. VIP model introduced; VN working alongside bedside treatment team.  Plan of care reviewed with patient. Patient informed of fall risk, fall precautions, call light within reach, side rails x2 elevated. Patient notified to ask staff for assistance. Patient verbalized complete understanding. Time allowed for questions. Will continue to monitor and intervene as needed.

## 2022-09-28 NOTE — PLAN OF CARE
09/28/22 1019   AVS Confirmation   Discharge instructions and AVS given to and reviewed with patient and/or significant other. Yes     Discharge orders noted. AVS prepared with medication list, importance of medication compliance, follow up appointments, diet, home care instructions, treatment plan, self management, and when to seek medical attention. Detailed clinical reference list attached. AVS printed and handed to patient by bedside nurse. VN reviewed discharge instructions with patient using teachback method.  Allowed time for questions, all questions answered.  Patient verbalized complete understanding of discharge instructions and voices no concerns. Discharge instructions complete. Transport wheelchair requested.  Bedside nurse notified.

## 2022-10-05 ENCOUNTER — OFFICE VISIT (OUTPATIENT)
Dept: OBSTETRICS AND GYNECOLOGY | Facility: CLINIC | Age: 57
End: 2022-10-05
Payer: MEDICARE

## 2022-10-05 VITALS
SYSTOLIC BLOOD PRESSURE: 104 MMHG | BODY MASS INDEX: 20.03 KG/M2 | DIASTOLIC BLOOD PRESSURE: 69 MMHG | WEIGHT: 127.88 LBS

## 2022-10-05 DIAGNOSIS — Z01.419 WELL WOMAN EXAM WITH ROUTINE GYNECOLOGICAL EXAM: Primary | ICD-10-CM

## 2022-10-05 DIAGNOSIS — Z12.31 SCREENING MAMMOGRAM FOR BREAST CANCER: Primary | ICD-10-CM

## 2022-10-05 DIAGNOSIS — Z76.82 ORGAN TRANSPLANT CANDIDATE: ICD-10-CM

## 2022-10-05 PROCEDURE — G0101 PR CA SCREEN;PELVIC/BREAST EXAM: ICD-10-PCS | Mod: S$PBB,GZ,GC, | Performed by: STUDENT IN AN ORGANIZED HEALTH CARE EDUCATION/TRAINING PROGRAM

## 2022-10-05 PROCEDURE — G0101 CA SCREEN;PELVIC/BREAST EXAM: HCPCS | Mod: S$PBB,GZ,GC, | Performed by: STUDENT IN AN ORGANIZED HEALTH CARE EDUCATION/TRAINING PROGRAM

## 2022-10-05 PROCEDURE — 99999 PR PBB SHADOW E&M-EST. PATIENT-LVL IV: ICD-10-PCS | Mod: PBBFAC,,, | Performed by: STUDENT IN AN ORGANIZED HEALTH CARE EDUCATION/TRAINING PROGRAM

## 2022-10-05 PROCEDURE — 87624 HPV HI-RISK TYP POOLED RSLT: CPT | Performed by: STUDENT IN AN ORGANIZED HEALTH CARE EDUCATION/TRAINING PROGRAM

## 2022-10-05 PROCEDURE — 99214 OFFICE O/P EST MOD 30 MIN: CPT | Mod: PBBFAC,PO | Performed by: STUDENT IN AN ORGANIZED HEALTH CARE EDUCATION/TRAINING PROGRAM

## 2022-10-05 PROCEDURE — 99999 PR PBB SHADOW E&M-EST. PATIENT-LVL IV: CPT | Mod: PBBFAC,,, | Performed by: STUDENT IN AN ORGANIZED HEALTH CARE EDUCATION/TRAINING PROGRAM

## 2022-10-05 PROCEDURE — 88175 CYTOPATH C/V AUTO FLUID REDO: CPT | Performed by: STUDENT IN AN ORGANIZED HEALTH CARE EDUCATION/TRAINING PROGRAM

## 2022-10-05 NOTE — PROGRESS NOTES
CC: Well woman exam    HPI:  Jennifer Booth is a 57 y.o. female  presents for a well woman exam.  She has no issues, problems, or complaints.      Patient history:   Past Medical History:   Diagnosis Date    Addiction to drug     Alcohol abuse     Allergic drug reaction 2017    Anemia     Anxiety     Arm pain, left 2014    Breast cyst     Chronic renal failure 2014    CKD (chronic kidney disease) stage 5, GFR less than 15 ml/min     Depression     Dialysis patient     dialysis m-w-    Encounter for blood transfusion     GERD (gastroesophageal reflux disease)     Hx of psychiatric care     Hypertension     Mood swings     Multiple myeloma in remission 10/26/2012    per Hem/Oc note    Psychiatric exam requested by authority     Psychiatric problem     Renal hypertension     Status post dialysis 2012    Therapy     Thrombocytopenia 2012     Past Surgical History:   Procedure Laterality Date    AV FISTULA PLACEMENT Left     BREAST CYST ASPIRATION Left     BREAST CYST EXCISION Left     CERVICAL SPINE SURGERY Bilateral      SECTION      x1    COLONOSCOPY N/A 2022    Procedure: COLONOSCOPY;  Surgeon: Jordan Mcguire MD;  Location: [facility] ENDO;  Service: Endoscopy;  Laterality: N/A;    FISTULOGRAM N/A 2020    Procedure: Fistulogram;  Surgeon: Rahat Berger MD;  Location: [facility] CATH LAB/EP;  Service: Cardiology;  Laterality: N/A;    pd catheter      PERCUTANEOUS TRANSLUMINAL ANGIOPLASTY OF ARTERIOVENOUS FISTULA N/A 2020    Procedure: PTA, AV FISTULA;  Surgeon: Rahat Berger MD;  Location: [facility] CATH LAB/EP;  Service: Cardiology;  Laterality: N/A;    PERITONEAL CATHETER REMOVAL N/A 2018    Procedure: REMOVAL, CATHETER, DIALYSIS, PERITONEAL;  Surgeon: Mukesh Gonsales Jr., MD;  Location: [facility] [place], operated by [facility] OR;  Service: General;  Laterality: N/A;    RENAL BIOPSY  2016    THROMBECTOMY OF HEMODIALYSIS ACCESS SITE N/A 2020    Procedure: Thrombectomy, Hemodialysis  Graft Or Fistula;  Surgeon: Rahat Berger MD;  Location: [facility] CATH LAB/EP;  Service: Cardiology;  Laterality: N/A;    VASCULAR SURGERY  2012    dialysis catheter to right subclavian     OB History    Para Term  AB Living   1 1 1     1   SAB IAB Ectopic Multiple Live Births           1      # Outcome Date GA Lbr Jeison/2nd Weight Sex Delivery Anes PTL Lv   1 Term      CS-LTranv  N ALIN       GYN  Menopausal: Yes  History of abnormal paps:  HPV+  Abnormal or postmenopausal bleeding: DENIES  History of abnormal mammograms:DENIES   Family history of breast or ovarian cancer:  aunt  Any breast masses, pain, skin changes, or nipple discharge: DENIES  Possible recent STD exposure: denies, not sexually active for 11 years  Contraception: N/A    Pap: 10/5/2022, NILM/HPV+ (non-16/18)  Mammogram: No recent documented mammogram      Family History   Problem Relation Age of Onset    Hypertension Mother     Heart failure Mother     Ovarian cancer Maternal Aunt     Diabetes Maternal Grandmother     Stroke Maternal Grandmother     Breast cancer Neg Hx     Colon cancer Neg Hx      Social History     Tobacco Use    Smoking status: Every Day     Packs/day: 1.00     Years: 39.00     Pack years: 39.00     Types: Cigarettes     Start date:     Smokeless tobacco: Never    Tobacco comments:     Pt. states recently cut down to 0.5 pk/day. Enrolled in the [employer] on 20 ([facility] Member ID # [id number]). She declines Ambulatory referral to Smoking Cessation program. Handout provided.   Substance Use Topics    Alcohol use: Not Currently     Comment: occasional    Drug use: Not Currently     Types: Marijuana     Allergies: Doxycycline, Gentamicin, and Vancomycin analogues    Current Outpatient Medications:     acetaminophen (TYLENOL) 325 MG tablet, Take 650 mg by mouth every 4 (four) hours as needed., Disp: , Rfl:     aspirin 81 MG Chew, Take 81 mg by mouth once daily., Disp: , Rfl:     B COMPLEX W-C NO.20/FOLIC ACID  (VIRT-CAPS ORAL), Take 1 capsule by mouth once daily. , Disp: , Rfl:     buPROPion (WELLBUTRIN) 75 MG tablet, TAKE 1 TABLET(75 MG) BY MOUTH EVERY DAY (Patient taking differently: Take 75 mg by mouth once daily.), Disp: 90 tablet, Rfl: 0    calcitRIOL (ROCALTROL) 0.5 MCG Cap, Take 1.5 mcg by mouth., Disp: , Rfl:     carvediloL (COREG) 25 MG tablet, Take 25 mg by mouth 2 (two) times daily., Disp: , Rfl:     cinacalcet (SENSIPAR) 30 MG Tab, Take 30 mg by mouth., Disp: , Rfl:     clopidogreL (PLAVIX) 75 mg tablet, Take 75 mg by mouth once daily., Disp: , Rfl:     EScitalopram oxalate (LEXAPRO) 20 MG tablet, TAKE 1 TABLET(20 MG) BY MOUTH EVERY DAY (Patient taking differently: Take 20 mg by mouth once daily.), Disp: 90 tablet, Rfl: 0    FERREX 150 FORTE PLUS 150-60-25-1 mg-mg-mcg-mg Cap, Take 1 capsule by mouth once daily., Disp: , Rfl:     fexofenadine (ALLEGRA) 180 MG tablet, Take 180 mg by mouth once daily., Disp: , Rfl:     fluticasone propionate (FLONASE) 50 mcg/actuation nasal spray, 1 spray by Each Nostril route daily as needed., Disp: , Rfl:     guaiFENesin (MUCINEX) 600 mg 12 hr tablet, Take 1,200 mg by mouth 2 (two) times daily., Disp: , Rfl:     LIDOcaine (LIDODERM) 5 %, Place 2 patches onto the skin daily as needed., Disp: , Rfl:     losartan (COZAAR) 100 MG tablet, Take 100 mg by mouth once daily., Disp: , Rfl:     melatonin 5 mg Cap, Take 5 mg by mouth nightly., Disp: , Rfl:     mirtazapine (REMERON) 15 MG tablet, TAKE 1 TABLET(15 MG) BY MOUTH EVERY EVENING (Patient taking differently: Take 15 mg by mouth every evening.), Disp: 90 tablet, Rfl: 0    montelukast (SINGULAIR) 10 mg tablet, TAKE 1 TABLET(10 MG) BY MOUTH EVERY EVENING (Patient taking differently: Take 10 mg by mouth every evening.), Disp: 90 tablet, Rfl: 3    nicotine (NICODERM CQ) 14 mg/24 hr, Place 1 patch onto the skin every 24 hours., Disp: , Rfl:     NIFEdipine (PROCARDIA-XL) 60 MG (OSM) 24 hr tablet, Take 60 mg by mouth once daily., Disp: ,  Rfl:     polyethylene glycol (GLYCOLAX) 17 gram/dose powder, Take 17 g by mouth., Disp: , Rfl:     sevelamer carbonate (RENVELA) 800 mg Tab, Take 800 mg by mouth 3 (three) times daily with meals., Disp: , Rfl:     sucroferric oxyhydroxide (VELPHORO) 500 mg Chew, Take 500 mg by mouth 3 (three) times daily., Disp: , Rfl:   No current facility-administered medications for this visit.    Facility-Administered Medications Ordered in Other Visits:     0.9%  NaCl infusion, , Intravenous, Continuous, Jordan Mcguire MD, Last Rate: 20 mL/hr at 07/18/22 0943, New Bag at 07/18/22 0943    0.9%  NaCl infusion, , Intravenous, Continuous, Jordan Mcguire MD, Stopped at 08/18/22 1042    sodium chloride 0.9% flush 10 mL, 10 mL, Intravenous, PRN, Jordan Mcguire MD    sodium chloride 0.9% flush 10 mL, 10 mL, Intravenous, PRN, Jordan Mcguire MD       ROS:  GENERAL: Denies weight gain or weight loss. Feeling well overall.   SKIN: Denies rash or lesions.   HEAD: Denies head injury or headache.   NODES: Denies enlarged lymph nodes.   CHEST: Denies chest pain or shortness of breath.   CARDIOVASCULAR: Denies palpitations or left sided chest pain.   ABDOMEN: No abdominal pain, constipation, diarrhea, nausea, vomiting or rectal bleeding.   URINARY: No frequency, dysuria, hematuria, or burning on urination.  REPRODUCTIVE: See HPI.   BREASTS: The patient performs breast self-examination and denies pain, lumps, or nipple discharge.   HEMATOLOGIC: No easy bruisability or excessive bleeding.  MUSCULOSKELETAL: Denies joint pain or swelling.   NEUROLOGIC: Denies syncope or weakness.   PSYCHIATRIC: Denies depression, anxiety or mood swings.    Objective:   /69   Wt 58 kg (127 lb 13.9 oz)   LMP 05/25/2016   BMI 20.03 kg/m²       Physical Exam:  APPEARANCE: Well nourished, well developed, in no acute distress.  AFFECT: WNL, alert and oriented x 3  SKIN: No acne or hirsutism  NECK: Neck symmetric without masses or thyromegaly  NODES:  No inguinal, cervical, axillary, or femoral lymph node enlargement  CHEST: Good respiratory effect  ABDOMEN: Soft.  No tenderness or masses.  No hepatosplenomegaly.  No hernias.  BREASTS: Symmetrical, no skin changes or visible lesions.  No palpable masses, nipple discharge bilaterally.  PELVIC: Normal external genitalia without lesions.  Normal hair distribution.  Adequate perineal body, normal urethral meatus.  Vagina atrophic without lesions or discharge.  Cervix atrophic flush with vaginal wall, without lesions, discharge or tenderness.  No significant cystocele or rectocele.  Bimanual exam shows uterus to be normal size, regular, mobile and nontender.  Adnexa without masses or tenderness.  EXTREMITIES: No edema.    ASSESSMENT AND PLAN  1. Well woman exam with routine gynecological exam        2. Organ transplant candidate  Ambulatory consult to Gynecology    Liquid-Based Pap Smear, Screening    HPV High Risk Genotypes, PCR          Annual exam  Breast and pelvic exam: wnl   Patient counseled on ASCCP guidelines for cervical cytology screening  Cervical screening: completed today   Patient counseled on current recommendations for breast cancer screening  Mammogram screening: scheduled 10/10 per patient   STD testing: declined   Osteoporosis screening at 65  Tobacco cessation counseling: patient previously enrolled in cessation, not currently interested    She was counseled to follow up with her PCP for other routine health maintenance      Follow up in about 1 year (around 10/5/2023).      Le Osuna MD  OBGYN [facility]

## 2022-10-07 ENCOUNTER — OFFICE VISIT (OUTPATIENT)
Dept: HEMATOLOGY/ONCOLOGY | Facility: CLINIC | Age: 57
End: 2022-10-07
Payer: MEDICARE

## 2022-10-07 VITALS
HEIGHT: 67 IN | SYSTOLIC BLOOD PRESSURE: 122 MMHG | BODY MASS INDEX: 19.89 KG/M2 | TEMPERATURE: 98 F | OXYGEN SATURATION: 99 % | WEIGHT: 126.75 LBS | DIASTOLIC BLOOD PRESSURE: 76 MMHG | HEART RATE: 72 BPM

## 2022-10-07 DIAGNOSIS — C90.00 MULTIPLE MYELOMA, REMISSION STATUS UNSPECIFIED: Primary | ICD-10-CM

## 2022-10-07 DIAGNOSIS — Z99.2 ESRD (END STAGE RENAL DISEASE) ON DIALYSIS: ICD-10-CM

## 2022-10-07 DIAGNOSIS — Z99.2 ANEMIA IN CHRONIC KIDNEY DISEASE, ON CHRONIC DIALYSIS: ICD-10-CM

## 2022-10-07 DIAGNOSIS — N18.6 ESRD (END STAGE RENAL DISEASE) ON DIALYSIS: ICD-10-CM

## 2022-10-07 DIAGNOSIS — D63.1 ANEMIA IN CHRONIC KIDNEY DISEASE, ON CHRONIC DIALYSIS: ICD-10-CM

## 2022-10-07 DIAGNOSIS — N18.6 ANEMIA IN CHRONIC KIDNEY DISEASE, ON CHRONIC DIALYSIS: ICD-10-CM

## 2022-10-07 PROCEDURE — 99999 PR PBB SHADOW E&M-EST. PATIENT-LVL V: CPT | Mod: PBBFAC,,, | Performed by: INTERNAL MEDICINE

## 2022-10-07 PROCEDURE — 99999 PR PBB SHADOW E&M-EST. PATIENT-LVL V: ICD-10-PCS | Mod: PBBFAC,,, | Performed by: INTERNAL MEDICINE

## 2022-10-07 PROCEDURE — 99214 OFFICE O/P EST MOD 30 MIN: CPT | Mod: S$PBB,,, | Performed by: INTERNAL MEDICINE

## 2022-10-07 PROCEDURE — 99215 OFFICE O/P EST HI 40 MIN: CPT | Mod: PBBFAC | Performed by: INTERNAL MEDICINE

## 2022-10-07 PROCEDURE — 99214 PR OFFICE/OUTPT VISIT, EST, LEVL IV, 30-39 MIN: ICD-10-PCS | Mod: S$PBB,,, | Performed by: INTERNAL MEDICINE
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
## 2022-10-07 NOTE — Clinical Note
F/u   4mo with cbc,cmp, SPEP, immunofixation electrophores, free light chains ,B-2 microglobulin labs 2 weeks PRIOR to f/u

## 2022-10-10 ENCOUNTER — HOSPITAL ENCOUNTER (OUTPATIENT)
Dept: RADIOLOGY | Facility: HOSPITAL | Age: 57
Discharge: HOME | End: 2022-10-10
Attending: NURSE PRACTITIONER
Payer: MEDICARE

## 2022-10-10 DIAGNOSIS — Z76.82 ORGAN TRANSPLANT CANDIDATE: ICD-10-CM

## 2022-10-10 DIAGNOSIS — Z12.31 BREAST CANCER SCREENING BY MAMMOGRAM: ICD-10-CM

## 2022-10-10 PROCEDURE — 77063 MAMMO DIGITAL SCREENING BILAT WITH TOMO: ICD-10-PCS | Mod: 26,TXP,, | Performed by: RADIOLOGY

## 2022-10-10 PROCEDURE — 77063 BREAST TOMOSYNTHESIS BI: CPT | Mod: 26,TXP,, | Performed by: RADIOLOGY

## 2022-10-10 PROCEDURE — 77067 SCR MAMMO BI INCL CAD: CPT | Mod: TC,TXP

## 2022-10-10 PROCEDURE — 77067 MAMMO DIGITAL SCREENING BILAT WITH TOMO: ICD-10-PCS | Mod: 26,TXP,, | Performed by: RADIOLOGY

## 2022-10-10 PROCEDURE — 77063 BREAST TOMOSYNTHESIS BI: CPT | Mod: TC,TXP

## 2022-10-10 PROCEDURE — 77067 SCR MAMMO BI INCL CAD: CPT | Mod: 26,TXP,, | Performed by: RADIOLOGY

## 2022-10-11 LAB
HPV HR 12 DNA SPEC QL NAA+PROBE: NEGATIVE
HPV16 AG SPEC QL: NEGATIVE
HPV18 DNA SPEC QL NAA+PROBE: NEGATIVE

## 2022-10-13 LAB
FINAL PATHOLOGIC DIAGNOSIS: NORMAL
Lab: NORMAL

## 2022-11-04 NOTE — TRANSFER OF CARE
"Anesthesia Transfer of Care Note    Patient: Jennifer Booth    Procedure(s) Performed: Procedure(s) (LRB):  INSERTION-CATHETER-TENCHOFF-LAPAROSCOPIC (N/A)    Patient location: PACU    Anesthesia Type: general    Transport from OR: Transported from OR on 2-3 L/min O2 by NC with adequate spontaneous ventilation    Post pain: adequate analgesia    Post assessment: no apparent anesthetic complications and tolerated procedure well    Post vital signs: stable    Level of consciousness: awake, alert and oriented    Nausea/Vomiting: no nausea/vomiting    Complications: none    Transfer of care protocol was followed      Last vitals:   Visit Vitals  BP (!) 140/79 (BP Location: Right arm, Patient Position: Lying, BP Method: Automatic)   Pulse 63   Temp 36.6 °C (97.8 °F) (Oral)   Resp 16   Ht 5' 8" (1.727 m)   Wt 59.9 kg (132 lb)   SpO2 100%   Breastfeeding? No   BMI 20.07 kg/m²     " Flu vaccine updated on patients chart.

## 2022-11-09 ENCOUNTER — HOSPITAL ENCOUNTER (OUTPATIENT)
Dept: RADIOLOGY | Facility: HOSPITAL | Age: 57
Discharge: HOME | End: 2022-11-09
Attending: INTERNAL MEDICINE
Payer: MEDICARE

## 2022-11-09 DIAGNOSIS — C90.00 MULTIPLE MYELOMA, REMISSION STATUS UNSPECIFIED: ICD-10-CM

## 2022-11-09 LAB — POCT GLUCOSE: 79 MG/DL (ref 70–110)

## 2022-11-09 PROCEDURE — 78816 PET IMAGE W/CT FULL BODY: CPT | Mod: 26,PS,NTX, | Performed by: RADIOLOGY

## 2022-11-09 PROCEDURE — 78816 NM PET CT WHOLE BODY: ICD-10-PCS | Mod: 26,PS,NTX, | Performed by: RADIOLOGY

## 2022-11-09 PROCEDURE — 78816 PET IMAGE W/CT FULL BODY: CPT | Mod: TC,PI,NTX

## 2022-11-10 ENCOUNTER — DOCUMENTATION ONLY (OUTPATIENT)
Dept: PSYCHIATRY | Facility: HOSPITAL | Age: 57
End: 2022-11-10
Payer: MEDICARE

## 2022-11-10 NOTE — PROGRESS NOTES
Patient called to cancel 1 hour prior to the appointment - per administrative staff, she stated she couldn't make the appointment as she has a lot going on.

## 2023-01-02 NOTE — PATIENT INSTRUCTIONS
Thank you for allowing me to participate as part of your health care team, and thank you for choosing [facility].    GREGORY OLSEN MD  Board Certified in Psychiatry & Addiction Medicine      IN CASE OF SUICIDAL THINKING, call the National Suicide Hotline Number: 988    988 Suicide & Crisis Lifeline: 988 , 2-169-617-TALK (8255)  https://[employer].org           AFTER VISIT INSTRUCTIONS:     [x] Take all medication, from all providers, as prescribed.  [x] If questions or concerns arise, or if experiencing side effects, adverse reactions or worsening symptoms, contact your provider through the [facility] portal at [email or web address], or call [phone] to reach the [facility] main line.  [x] In cases of emergencies, call 101 or [phone], or present to the emergency department for immediate assistance.    Information on mental health medications:    National [place] of Mental Health:   https://www.nimh.nih.gov/health/topics/mental-health-medications     Web MD:   https://www.[employer]       RESOURCES:     IN CASE OF SUICIDAL THINKING, call the goAct Suicide Hotline Number: 988    988 Suicide & Crisis Lifeline: 988 , 3-598-014-TALK (8255)  Provides 24/7, free and confidential support for people in distress, prevention and crisis resources for you or your loved ones, and best practices for professionals.    Call, text or chat.  https://[employer].org     National Action [place] for Suicide Prevention: the National [place] for Suicide Prevention (Action [place]) is the nations public-private partnership for suicide prevention, working with more than 250 national partners to advance the.   https://theactionalliance.org     National Strategy for Suicide Prevention & Risk Mitigation:  https://theactionalliance.org/our-strategy/national-strategy-suicide-prevention     [x] Fact Sheet:   https://www.hhs.gov/sites/default/files/national-strategy-for-suicide-prevention-factsheet.pdf     [x] Report:    https://www.ncbi.nlm.nih.gov/books/XJA179290/pdf/Bookshelf_NBK109917.pdf     Suicide Prevention Resource Center: The Suicide Prevention Resource Center (SPR) is the only federally supported resource center devoted to advancing the implementation of the National Strategy for Suicide Prevention. [facility] is funded by the U.S. Department of Health and Human Services' Substance Abuse and Mental Health Services Administration (SAMA).  https://www.[facility].org     [x] Safety Plan:   https://Equ[employer]ate/wp-content/uploads/2021/08/Tomas-Safety-Plan-8-6-21.pdf     [x] Suicide Risk Curve:  https://[employer]/wp-content/uploads/2021/08/Ifablky-rdyu-tvppq-8-6-21.pdf     [place] Mental Health Advocacy Service: the state agency tasked with protecting the legal rights of people with behavioral health diagnoses.  https://mhas.[place].[facility]     Alcoholics Anonymous (AA): find a meeting near you.  https://www.aa.org     SMI Adviser: resources for individuals and families with serious mental illness.  https://smiadviser.org     National [place] for the Mentally Ill (JOHN): the nation's largest grassroots organization dedicated to building better lives for individuals with mental illness.  https://www.john.org/Home     U.S. Department of Health and Human Services (HHS): the mission of HHS is to enhance the health and well-being of all Americans, by providing for effective health and human services and by fostering sound, sustained advances in the sciences underlying medicine, public health, and .   https://www.hhs.gov     Substance Abuse and Mental Health Services Administration (SAMHSA): SAMHSA is the agency within [facility] that leads public health efforts to advance the behavioral health of the nation. SAMHSA's mission is to reduce the impact of substance abuse and mental illness on [place] communities.   https://www.samhsa.gov     National Institutes of Health (NIH): a part of [facility], [facility] is  the largest biomedical research agency in the world.   https://www.nih.gov     National [place] on Drug Abuse (KALEE): sponsored by the NIH, the mission of KALEE is to advance science on drug use and addiction and to apply that knowledge to improve individual and public health.  https://kalee.nih.gov     National [place] on Alcohol Abuse and Alcoholism (NIAAA): sponsored by the NIH, the mission of NIAA is to generate and disseminate fundamental knowledge about the effects of alcohol on health and well-being, and apply that knowledge to improve diagnosis, prevention, and treatment of alcohol-related problems, including alcohol use disorder, across the lifespan.   https://www.niaaa.nih.gov     National Harm Reduction Coalition: resources for harm reduction, including techniques, strategies, policy, and advocacy.  https://harmreduction.org     The SHARE Approach - A Model for Shared Decision Making:  [x] Fact Sheet  https://www.[facility].gov/sites/default/files/publications/files/share-approach_factsheet.pdf     AMA Principles of Medical Ethics - Informed Consent & Shared Decision Making:  [x] Chapter  https://www.ama-assn.org/system/files/2019-06/code-of-medical-byijld-zwhtodt-6.pdf     Safety Netting for Primary Care:  [x] Article  https://www.ncbi.nlm.nih.gov/pmc/articles/JUN0782734/pdf/rknepyk-1979--e70.pdf       MEDICATION MANAGEMENT:     [x] In addition to the potential beneficial effects, the use of any medication or drug (prescribed, over the counter or otherwise) carries with it the risk of potential adverse effects.  Each has a set of typical adverse effects - some common, some rare - but idiosyncratic and unanticipated reactions unique to you are always possible.      [x] It is important to remember that untreated illness can also pose a risk, which must be taken into account when weighing the pros and cons of a medication trial.    [x] Medications and drugs can sometimes interact with each other in the  body, leading to adverse effects - it is important that all your providers know all the medications and drugs you take - prescribed, over the counter, or otherwise.  Keep all your practitioners up to date with any changes.  It's always a good idea to keep an up-to-date list in an easily accessible location.    [x] There is an inherent unpredictability to all treatment, including the use of medication.  Unexpected outcomes can occur - keep me up to date with any difficulties you encounter.    [x] It is important to take medication as directed, and to comply fully with the instructions.  Check with the appropriate provider first before adjusting or stopping your medication on your own.    If you require further information pertaining to the issues outlined above, please reach out to your providers through the [facility] portal at [email or web address], or call [phone] to discuss.  See resource list for additional material.     Additional information can be provided pertaining to your diagnosis, intended outcomes, target symptoms for treatment, and possible benefits and risks of medication - you can also access this information through the provided resources.  Possible alternatives to the current treatment plan (including no treatment) can also be reviewed.      GENERAL HEALTH & WELLNESS:     [x] Establish and follow regularly with a primary care physician for routine health maintenance and management of any medical comorbidities.  [x] Follow a healthy diet, exercise routinely, and monitor weight and metabolic parameters.  [x] Allow adequate time for sleep and practice good sleep hygiene.  [x] Do not operate a motor vehicle or heavy machinery if the effects of medications or the symptoms underlying your condition impair the ability for you to do so safely.    Dietary Guidelines for Americans, 4575-5834:  U.S. Department of Agriculture  (USDA)  https://www.dietaryguidelines.gov/sites/default/files/2020-12/Dietary_Guidelines_for_Americans_2020-2025.pdf#page=31     The Nutrition Source:  [facility] of Public Health  https://www.[facility].[place].[facility]/nutritionsource       SLEEP HYGIENE:     Follow these tips to establish healthy sleep habits:  [x] Keep a consistent sleep schedule. Get up at the same time every day, even on weekends or during vacations.  [x] Set a bedtime that is early enough for you to get at least 7-8 hours of sleep.  [x] Don't go to bed unless you are sleepy.  [x] If you don't fall asleep after 20 minutes, get out of bed. Go do a quiet activity without a lot of light exposure. It is especially important to not get on electronics.  [x] Establish a relaxing bedtime routine.  [x] Use your bed only for sleep and sex.  [x] Make your bedroom quiet and relaxing. Keep the room at a comfortable, cool temperature.  [x] Limit exposure to bright light in the evenings.  [x] Turn off electronic devices at least 30 minutes before bedtime.  [x] Don't eat a large meal before bedtime. If you are hungry at night, eat a light, healthy snack.  [x] Exercise regularly and maintain a healthy diet.  [x] Avoid consuming caffeine in the afternoon or evening.  [x] Avoid consuming alcohol before bedtime.  [x] Reduce your fluid intake before bedtime.    QUICK TIPS FOR BETTER SLEEP  Reduce smartphone usage Create and maintain a nightly ritual Avoid caffeine 4-6 hours before sleeping Don't eat or drink too much at bedtime Sleep at the same time every night        American Academy of Sleep Medicine - Healthy Sleep Habits:  https://sleepeducation.org/healthy-sleep/healthy-sleep-habits     American Academy of Sleep Medicine - Bedtime Calculator:  https://sleepeducation.org/healthy-sleep/bedtime-calculator     American Academy of Sleep Medicine - Cognitive Behavioral Therapy for Insomnia (CBT-I):  https://sleepeducation.org/patients/cognitive-behavioral-therapy      American Academy of Sleep Medicine - Insomnia:  https://sleepeducation.org/sleep-disorders/insomnia       ALCOHOL & DRUG USE COUNSELING:     - abstain from alcohol and illicit drug use  - routinely attend 12 step (or equivalent) mutual self-help meetings  - work with a sponsor  - complete a licensed addiction rehabilitation program  - establish sobriety and maintain a recovery lifestyle  - submit evidence of 12 step (or equivalent) mutual self-help meeting attendance and completion of addiction rehabilitation program to your transplant case manager  - begin [facility] Aftercare Program  [facility] is an intensive outpatient addiction rehabilitation program located at [facility] on [facility] - please call [phone] to speak with an  about enrolling in the program.      Preventing Excessive Alcohol Use (CDC):  https://www.cdc.gov/alcohol/fact-sheets/moderate-drinking.htm#:~:text=To%20reduce%20the%20risk%20of,days%20when%20alcohol%20is%20consumed.     [x] Alcohol consumption is associated with a variety of short- and long-term health risks, including motor vehicle crashes, violence, sexual risk behaviors, high blood pressure, and various cancers (e.g., breast cancer).  [x] The risk of these harms increases with the amount of alcohol you drink. For some conditions, like some cancers, the risk increases even at very low levels of alcohol consumption (less than 1 drink).  [x] To reduce the risk of alcohol-related harms, the 7330-1137 Dietary Guidelines for Americans recommends that adults of legal drinking age can choose not to drink, or to drink in moderation by limiting intake to 2 drinks or less in a day for men or 1 drink or less in a day for women, on days when alcohol is consumed.  [x] The Guidelines also do not recommend that individuals who do not drink alcohol start drinking for any reason and that if adults of legal drinking age choose to drink alcoholic beverages,  drinking less is better for health than drinking more.  [x] The Guidelines note that some people should not drink alcohol at all, such as:  - If they are pregnant or might be pregnant.  - If they are younger than age 21.  - If they have certain medical conditions or are taking certain medications that can interact with alcohol.  - If they are recovering from an alcohol use disorder or if they are unable to control the amount they drink.  [x] The Guidelines also note that not drinking alcohol is the safest option for women who are lactating. Generally, moderate consumption of alcoholic beverages by a woman who is lactating (up to 1 standard drink in a day) is not known to be harmful to the infant, especially if the woman waits at least 2 hours after a single drink before nursing or expressing breast milk. Women considering consuming alcohol during lactation should talk to their healthcare provider.  [x] The Guidelines note, Emerging evidence suggests that even drinking within the recommended limits may increase the overall risk of death from various causes, such as from several types of cancer and some forms of cardiovascular disease. Alcohol has been found to increase risk for cancer, and for some types of cancer, the risk increases even at low levels of alcohol consumption (less than 1 drink in a day).  [x] Although past studies have indicated that moderate alcohol consumption has protective health benefits (e.g., reducing risk of heart disease), recent studies show this may not be true.  [x] Its important to focus on the amount people drink on the days that they drink. Even if women consume an average of 1 drink per day or men consume an average of 2 drinks per day, binge drinking increases the risk of experiencing alcohol-related harm in the short-term and in the future.    Drinking Levels Defined (NIAAA):  https://www.niaaa.nih.gov/alcohol-health/overview-alcohol-consumption/moderate-binge-drinking  "    Drinking in Moderation:  According to the "Dietary Guidelines for Americans 8909-7393, U.S. Department of Health and Human Services and U.S. Department of Agriculture, adults of legal drinking age can choose not to drink or to drink in moderation by limiting intake to 2 drinks or less in a day for men and 1 drink or less in a day for women, when alcohol is consumed. Drinking less is better for health than drinking more.    Binge Drinking:  NIAAA defines binge drinking as a pattern of drinking alcohol that brings blood alcohol concentration (CAITIE) to 0.08 percent - or 0.08 grams of alcohol per deciliter - or higher.  For a typical adult, this pattern corresponds to consuming 5 or more drinks (male), or 4 or more drinks (female), in about 2 hours.    The Substance Abuse and Mental Health Services Administration (SAMHSA), which conducts the annual National Survey on Drug Use and Health (NSDUH), defines binge drinking as 5 or more alcoholic drinks for males or 4 or more alcoholic drinks for females on the same occasion (i.e., at the same time or within a couple of hours of each other) on at least 1 day in the past month.    Heavy Alcohol Use:  NIAAA defines heavy drinking as follows:  - For men, consuming more than 4 drinks on any day or more than 14 drinks per week.  - For women, consuming more than 3 drinks on any day or more than 7 drinks per week.     [facility] defines heavy alcohol use as binge drinking on 5 or more days in the past month.    Patterns of Drinking Associated with Alcohol Use Disorder:  Binge drinking and heavy alcohol use can increase an individual's risk of alcohol use disorder.    Certain people should avoid alcohol completely, including those who:  - Plan to drive or operate machinery, or participate in activities that require skill, coordination, and alertness.  - Take certain over-the-counter or prescription medications.  - Have certain medical conditions.  - Are recovering from alcohol use " disorder or are unable to control the amount that they drink.  - Are younger than age 21.  - Are pregnant or may become pregnant.    U.S. Standard Drink  12 oz beer   (5% ABV) 8 oz malt liquor   (7% ABV) 5 oz wine   (12% ABV) 1.5 oz 80-proof distilled spirit  (40% ABV)        Heroin use harm reduction:  1. Carry naloxone. When using heroin, make sure you have at least one dose of naloxone - the overdose reversal drug - and have it in plain view. Understand how to give it.  2. Try a small dose first. It is best to first try a small amount of the heroin to check the effect.  3. Dont use heroin alone. Always use heroin with someone else and take turns while using.    It is possible to overdose with heroin whether you are snorting, injecting or using it in another form.    Signs of an overdose or emergency:   - The person is awake but unable to talk.  - Their body is limp.  - Their breathing is shallow or slow or stopped.  - Their skin is pale, ashen or clammy/sweaty.  - They are unconscious.    In case of emergency, give naloxone. If you suspect the heroin may contain fentanyl, administer more than one dose. Seek medical help even if naloxone has been given. Call 911 for help.      SHARED DECISION MAKING & INFORMED CONSENT:     Shared medical decision making and informed consent are the hallmark and bedrock of excellent clinical care.  During the encounter, shared medical decision making was employed and informed consent was obtained, to the degree possible, whenever feasible, appropriate and relevant. Those interventions are supplemented here with written materials, detailing the topics in more depth.       PSYCHOEDUCATION:     Psychoeducation pertaining to the following -     Diagnosis Etiology Disease Processes Natural Progression   Treatment Options Time Course Safety Netting Informed Consent   Intended Benefits of Medication Expectable Adverse Effects Target Symptoms for Treatment Alternatives to Current  Treatment   Shared   Decision Making Risk Mitigation Strategies Harm Reduction Techniques Associated Bio-Med Complications     - can be further discussed and reviewed (you can also access additional information through the provided resources in this document).      Effective communication is essential in order to engage in shared medical decision making.  If you had difficulty understanding anything during your encounter or in this supplementary document, please contact your providers through the [facility] portal at [email or web address] or call [phone].     Eric Dictionary  https://dictionary.[employer].org/us       It can be easy to miss, forget, or misremember important important information that was discussed during the session - especially when you're stressed, upset, or don't feel well.  If you or a representative have any additional questions, concerns, or topics to discuss - please contact your providers through the [facility] portal at [email or web address] or call [phone].    Memory Loss  https://www.ZMP.XOJET/brain/memory-loss    Causes of Memory Loss  https://www.Spoqa/what-causes-memory-loss-8276029    Memory loss: When to seek help  https://www.mayoclinic.org/diseases-conditions/alzheimers-disease/in-depth/memory-loss/art-01304324    Memory, Forgetfulness, and Aging: What's Normal and What's Not?  https://www.ashvin.nih.gov/health/memory-forgetfulness-and-aging-whats-normal-and-whats-not    Depression and Memory Loss  https://www.Miaoyushang.XOJET/health/depression/depression-and-memory-loss    The Relationship Between Anxiety and Memory Loss  https://www.[employer].[facility]/academics/blog-posts/the-relationship-between-anxiety-and-memory-loss     PRESCRIPTION DRUG MANAGEMENT:     Prescription Drug Management entails the following:  [x] The review, recommendation, or consideration without recommendation of medications during the encounter.  [x] Discussion (to the extent possible) with the  patient and/or other interested parties of the diagnosis, target symptoms, intended outcomes, and possible benefits and risks of medication, as well as alternatives (including no treatment), if not otherwise known or stated prior.  [x] Discussion (to the extent possible) with the patient and/or other interested parties of possible expectable adverse effects of any proposed individual psychotropic agents, as well as the inherent unpredictability of treatment, if not otherwise known or stated prior.  [x] Informed consent is sought from the patient (and/or guardian/designated decision maker, if applicable) after a thorough discussion (to the extent possible) of the aforementioned points outlined above.  [x] The provision of counseling (to the extent possible) to the patient and/or other interested parties on the importance of full compliance with any prescribed medication, if not otherwise known or stated prior.    Information on psychotropic medication can be found at:   National [place] of Mental Health: Information on Mental Health Medications      RISK MITIGATION, HARM REDUCTION & SAFETY NETTING:     Risk Mitigation Strategies, Harm Reduction Techniques, and Safety Netting are important interventions that can reduce acute and chronic risk.  As such, opportunities were sought to incorporate psychoeducation and practical advice pertaining to these topics into the encounter, to the degree possible, whenever feasible, appropriate and relevant.  Those interventions are supplemented here with written materials, detailing the topics in more depth.       RISK MITIGATION STRATEGIES:     Risk mitigation strategies are used to reduce the likelihood of future episodes of suicide, homicide, violence, and/or other problematic behaviors (e.g. self-injurious, risky, addictive, compulsive, impulsive). The following are examples of risk mitigation strategies which you can employ in order to reduce your overall burden of risk.      [x] Treatment of underlying psychopathology driving acute and chronic risk to the extent possible.  [x] Use of self administered rating scales and journaling to assist in risk tracking.  [x] Exploration of protective factors to potentially counterbalance risk.  [x] Identification and avoidance of triggers and situations that increase risk, including excessive alcohol and drug use.  [x] Timely follow up and ongoing treatment of mental health issues moving forward.  [x] Full compliance with medication regimen.  [x] A good working knowledge of your medication regimen, including specific instructions on the administration of the medications.  [x] Consultation with an appropriate medical provider prior to altering or deviating from these instructions on your own.  [x] Active involvement and participation of family and natural support wherever feasible and possible.  [x] Development and review of coping strategies that can be immediately deployed in times of acute crisis.  [x] Implementation of home safety practices and the removal/reduction of access to lethal means (including, but not limited to, firearms, certain types and quantities of medication, poisons, or other methods you may have contemplated or identified).  [x] Collaborative development of a written safety plan with your treatment team and loved ones that can be immediately referred to in times of acute crisis.  [x] Utilization of a safety contract to engage your treatment team and further assess/manage risk.  [x] A good working knowledge of how to access emergency treatment in times of acute crisis.  [x] Utilization of suicide hotlines number (988) and resources in times of crisis.    If you require further information pertaining to the issues outlined above, please reach out to your providers through the [facility] portal at [email or web address], or call [phone] to discuss.  See resource list for additional material.      SAFETY NETTING:     In  healthcare, safety netting refers to the provision of information to help patients or carers identify the need to consult a health care professional if a health concern arises or changes.  The relevance of this advice is most obvious with chronic mental illnesses, as their dynamic nature, with symptoms and signs emerging at different times and in different combinations, makes safety netting particularly important.  Specific safety net advice for you includes the following:    [x] The existence of uncertainty. Mental health diagnoses and conditions contain at least some degree of uncertainty - knowing this, you should feel empowered to reconsult if necessary.  [x] What exactly to look out for. Given the recognised risk of possible deterioration or the development of complications, you should become familiar with the specific clinical features (including red flags) to look out for.    [x] How exactly to seek further help. You should know how and where to seek further help if needed.  Make a plan in advance and keep it handy.  It's also a good idea to share the plan with your treatment providers and loved ones.  [x] What to expect about time course. Mental health diagnoses and conditions often have an expected time course, which is important information for you to know.  However, if your difficulties do not conform to this time line and concerns arise, do not delay seeking further medical advice.    If you require further information pertaining to the issues outlined above, please reach out to your providers through the [facility] portal at [email or web address], or call [phone] to discuss.  See resource list for additional material.      HARM REDUCTION:     Harm Reduction techniques are used in an effort to reduce negative consequences associated with risky and maladaptive behaviors, until cessation of the problematic behaviors can be established.  Harm reduction is best thought of as a journey and not a  destination; it is not an endorsement of problematic behavior, but an acknowledgement and recognition of the step-by-step nature of recovery.      Although commonly employed in working with people who suffer with drug addiction, harm reduction can be more broadly applied to any problematic behavior.    Harm Reduction and Substance Abuse:  [x] Incorporates a spectrum of strategies that includes safer use, managed use, abstinence, meeting people who use drugs where theyre at, and addressing conditions of use along with the use itself.  [x] Accepts, for better or worse, that licit and illicit drug use is part of our world and chooses to work to minimize its harmful effects rather than simply ignore or condemn them.  [x] Understands drug use as a complex, multi-faceted phenomenon that encompasses a continuum of behaviors from severe use to total abstinence, and acknowledges that some ways of using drugs are clearly safer than others.  [x] Calls for the non-judgmental, non-coercive provision of services and resources to people who use drugs and the communities in which they live in order to assist them in reducing attendant harm.  [x] Affirms people who use drugs themselves as the primary agents of reducing the harms of their drug use and seeks to empower them to share information and support each other in strategies which meet their actual conditions of use.  [x] Does not attempt to minimize or ignore the real and tragic harm and danger that can be associated with illicit drug use.  [x] Meets people where they are, but seeks to not leave them there.  [x] Examples of specific interventions include, but are not limited to, narcan (naloxone), medication assisted treatment, syringe access, overdose prevention, and safer drug use techniques.    Key Harm Reduction Strategies: Opioid Use Disorder  [x] Safe Injection Sites & Equipment  [x] Managed Use  [x] Syringe Exchange Programs  [x] Fentanyl Test Strips  [x]  "Pharmacotherapy/Medication Assisted Treatment  [x] Narcan  [x] Good Nondenominational Laws  [x] Treatment Instead of [facility]  [x] Diversion Programs  [x] Overdose Education  [x] Abstinence    Whether or not you struggle with substance abuse, any and all opportunities to employ harm reduction techniques to address difficult to change problematic behaviors should be sought and implemented - whenever and wherever feasible, relevant and applicable. Additionally, harm reduction techniques can be applied broadly, and are relevant for a multitude of situations - even those that do not involve problematic or maladaptive behaviors.     EXAMPLES OF HARM REDUCTION IN OTHER AREAS  SUN SCREEN SEAT BELTS SPEED LIMITS BIRTH CONTROL        If you require further information pertaining to the issues outlined above, please reach out to your providers through the [facility] portal at [email or web address], or call [phone] to discuss.  See resource list for additional material.      FIREARM SAFETY:     THE SIX BASIC GUN SAFETY RULES  There are six basic gun safety rules for gun owners to understand and practice at all times:  Treat all guns as if they are loaded. Always assume that a gun is loaded even if you think it is unloaded. Every time a gun is handled for any reason, check to see that it is unloaded. If you are unable to check a gun to see if it is unloaded, leave it alone and seek help from someone more knowledgeable about guns.  Keep the gun pointed in the safest possible direction. Always be aware of where a gun is pointing. A "safe direction" is one where an accidental discharge of the gun will not cause injury or damage. Only point a gun at an object you intend to shoot. Never point a gun toward yourself or another person.  Keep your finger off the trigger until you are ready to shoot. Always keep your finger off the trigger and outside the trigger guard until you are ready to shoot. Even though it may be comfortable to rest your " finger on the trigger, it also is unsafe. If you are moving around with your finger on the trigger and stumble or fall, you could inadvertently pull the trigger. Sudden loud noises or movements can result in an accidental discharge because there is a natural tendency to tighten the muscles when startled. The trigger is for firing and the handle is for handling.  Know your target, its surroundings and beyond. Check that the areas in front of and behind your target are safe before shooting. Be aware that if the bullet misses or completely passes through the target, it could strike a person or object. Identify the target and make sure it is what you intend to shoot. If you are in doubt, DON'T SHOOT! Never fire at a target that is only a movement, color, sound or unidentifiable shape. Be aware of all the people around you before you shoot.  Know how to properly operate your gun. It is important to become thoroughly familiar with your gun. You should know its mechanical characteristics including how to properly load, unload and clear a malfunction from your gun. Obviously, not all guns are mechanically the same. Never assume that what applies to one make or model is exactly applicable to another. You should direct questions regarding the operation of your gun to your firearms dealer, or contact the  directly.  Store your gun safely and securely to prevent unauthorized use. Guns and ammunition should be stored separately. When the gun is not in your hands, you must still think of safety. Use an approved firearms safety device on the gun, such as a trigger lock or cable lock, so it cannot be fired. Store it unloaded in a locked container, such as an approved lock box or a gun safe. Store your gun in a different location than the ammunition. For maximum safety you should use both a locking device and a storage container.    ADDITIONAL SAFETY POINTS  The six basic safety rules are the foundational rules for gun  safety. However, there are additional safety points that must not be overlooked.  [x] Never handle a gun when you are in an emotional state such as anger or depression. Your judgment may be impaired. If you have acute or chronic suicidal ideation, a suicide plan, or suicidal intent, have firearms removed and your access restricted by a trusted loved one or other responsible individual or agency.  [x] Never shoot a gun in celebration (the Fourth of July or New Year's Eveline, for example). Not only is this unsafe, but it is generally illegal. A bullet fired into the air will return to the ground with enough speed to cause injury or death.  [x] Do not shoot at water, flat or hard surfaces. The bullet can ricochet and hit someone or something other than the target.  [x] Hand your gun to someone only after you verify that it is unloaded and the cylinder or action is open. Take a gun from someone only after you verify that it is unloaded and the cylinder or action is open.  [x] Guns, alcohol and drugs don't mix. Alcohol and drugs can negatively affect judgment as well as physical coordination. Alcohol and any other substance likely to impair normal mental or physical functions should not be used before or while handling guns. Avoid handling and using your gun when you are taking medications that cause drowsiness or include a warning to not operate machinery while taking this drug.   [x] The loud noise from a fired gun can cause hearing damage, and the debris and hot gas that is often emitted can result in eye injury. Always wear ear and eye protection when shooting a gun.      GUNS AND CHILDREN - FIREARM OWNER RESPONSIBILITIES    You Cannot Be Too Careful with Children and Guns  [x] There is no such thing as being too careful with children and guns. Never assume that simply because a toddler may lack finger strength, they can't pull the trigger. A child's thumb has twice the strength of the other fingers. When a toddler's  "thumb "pushes" against a trigger, invariably the barrel of the gun is pointing directly at the child's face. NEVER leave a firearm lying around the house.  [x] Child safety precautions still apply even if you have no children or if your children have grown to adulthood and left home. A nephew, niece, neighbor's child or a grandchild may come to visit. Practice gun safety at all times.  [x] To prevent injury or death caused by improper storage of guns in a home where children are likely to be present, you should store all guns unloaded, lock them with a firearms safety device and store them in a locked container. Ammunition should be stored in a location separate from the gun.    Talking to Children About Guns  [x] Children are naturally curious about things they don't know about or think are "forbidden." When a child asks questions or begins to act out "gun play," you may want to address his or her curiosity by answering the questions as honestly and openly as possible. This will remove the mystery and reduce the natural curiosity. Also, it is important to remember to talk to children in a manner they can relate to and understand. This is very important, especially when teaching children about the difference between "real" and "make-believe." Let children know that, even though they may look the same, real guns are very different than toy guns. A real gun will hurt or kill someone who is shot.    Instill a Mind Set of Safety and Responsibility  [x] The American Academy of Pediatrics reports that adolescence is a highly vulnerable stage in life for teenagers struggling to develop traits of identity, independence and autonomy. Children, of course, are both naturally curious and innocently unaware of many dangers around them. Thus, adolescents as well as children may not be sufficiently safeguarded by cautionary words, however frequent. Contrary actions can completely undermine good advice. A "Do as I say and not as I " "do" approach to gun safety is both irresponsible and dangerous.  [x] Remember that actions speak louder than words. Children learn most by observing the adults around them. By practicing safe conduct you will also be teaching safe conduct.    Safety and Storage Devices  [x] If you decide to keep a firearm in your home you must consider the issue of how to store the firearm in a safe and secure manner. There are a variety of safety and storage devices currently available to the public in a wide range of prices. Some devices are locking mechanisms designed to keep the firearm from being loaded or fired, but don't prevent the firearm from being handled or stolen. There are also locking storage containers that hold the firearm out of sight. For maximum safety you should use both a firearm safety device and a locking storage container to store your unloaded firearm.   Two of the most common locking mechanisms are trigger locks and cable locks. Trigger locks are typically two-piece devices that fit around the trigger and trigger guard to prevent access to the trigger. One side has a post that fits into a hole in the other side. They are locked by a key or combination locking mechanism. Cable locks typically work by looping a strong steel cable through the action of the firearm to block the firearm's operation and prevent accidental firing. However, neither trigger locks nor cable locks are designed to prevent access to the firearm.   [x] Smaller lock boxes and larger gun safes are two of the most common types of locking storage containers. One advantage of lock boxes and gun safes is that they are designed to completely prevent unintended handling and removal of a firearm. Lock boxes are generally constructed of sturdy, high-grade metal opened by either a key or combination lock. Gun safes are quite heavy, usually weighing at least 50 pounds. While gun safes are typically the most expensive firearm storage devices, they " are generally more reliable and secure.     Remember: Safety and storage devices are only as secure as the precautions you take to protect the key or combination to the lock.    RULES FOR KIDS  Adults should be aware that a child could discover a gun when a parent or another adult is not present. This could happen in the child's own home; the home of a neighbor, friend or relative; or in a public place such as a school or park. If this should happen, a child should know the following rules and be taught to practice them.   Stop  The first rule for a child to follow if he/she finds or sees a gun is to stop what he/she is doing.  Don't Touch!  The second rule is for a child not to touch a gun he/she finds or sees. A child may think the best thing to do if he/she finds a gun is to pick it up and take it to an adult. A child needs to know he/she should NEVER touch a gun he/she may find or see.  Leave the Area  The third rule is to immediately leave the area. This would include never taking a gun away from another child or trying to stop someone from using gun.  Tell an Adult  The last rule is for a child to tell an adult about the gun he/she has seen. This includes times when other kids are playing with or shooting a gun.     METHODS OF CHILDPROOFING YOUR FIREARM  As a responsible handgun owner, you must recognize the need and be aware of the methods of childproofing your handgun, whether or not you have children.  Whenever children could be around, whether your own, or a friend's, relative's or neighbor's, additional safety steps should be taken when storing firearms and ammunition in your home.  [x] Always store your firearm unloaded.  [x] Use a firearms safety device AND store the firearm in a locked container.  [x] Store the ammunition separately in a locked container.  Always storing your firearm securely is the best method of childproofing your firearm; however, your choice of a storage place can add another  element of safety. Carefully choose the storage place in your home especially if children may be around.  [x] Do not store your firearm where it is visible.  [x] Do not store your firearm in a bedside table, under your mattress or pillow, or on a closet shelf.  [x] Do not store your firearm among your valuables (such as jewelry or cameras) unless it is locked in a secure container.  [x] Consider storing firearms not possessed for self-defense in a safe and secure manner away from the home.    [employer] for Gun Safety:  https://www.everytown.org       Gun Violence: Prediction, Prevention and Policy  American Psychological Association Panel of Experts Report  https://www.apa.org/pubs/reports/gun-violence-report.pdf     If you require further information pertaining to any of the issues outlined above, please reach out to your providers through the [facility] portal at [email or web address], or call [phone] to discuss.  See resource list for additional material.      IN CASE OF SUICIDAL THINKING, call the National Suicide Hotline Number: 988    988 Suicide & Crisis Lifeline: 988 , 0-1759-192-620-EOLN (0143)  Provides 24/7, free and confidential support for people in distress, prevention and crisis resources for you or your loved ones, and best practices for professionals.    Call, text or chat.  https://[employer].org
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
## 2023-01-05 ENCOUNTER — OFFICE VISIT (OUTPATIENT)
Dept: PSYCHIATRY | Facility: CLINIC | Age: 58
End: 2023-01-05
Payer: MEDICARE

## 2023-01-05 ENCOUNTER — LAB VISIT (OUTPATIENT)
Dept: LAB | Facility: HOSPITAL | Age: 58
End: 2023-01-05
Attending: PSYCHIATRY & NEUROLOGY
Payer: MEDICARE

## 2023-01-05 VITALS
SYSTOLIC BLOOD PRESSURE: 97 MMHG | WEIGHT: 125.88 LBS | HEIGHT: 67 IN | DIASTOLIC BLOOD PRESSURE: 66 MMHG | BODY MASS INDEX: 19.76 KG/M2 | HEART RATE: 86 BPM

## 2023-01-05 DIAGNOSIS — F32.89 OTHER DEPRESSION: ICD-10-CM

## 2023-01-05 DIAGNOSIS — F12.20 CANNABIS USE DISORDER, SEVERE, DEPENDENCE: Chronic | ICD-10-CM

## 2023-01-05 DIAGNOSIS — F41.1 GAD (GENERALIZED ANXIETY DISORDER): Chronic | ICD-10-CM

## 2023-01-05 DIAGNOSIS — F17.200 TOBACCO USE DISORDER: Chronic | ICD-10-CM

## 2023-01-05 DIAGNOSIS — N18.6 ESRD (END STAGE RENAL DISEASE) ON DIALYSIS: ICD-10-CM

## 2023-01-05 DIAGNOSIS — Z99.2 ESRD (END STAGE RENAL DISEASE) ON DIALYSIS: ICD-10-CM

## 2023-01-05 DIAGNOSIS — Z01.818 ENCOUNTER FOR PRE-TRANSPLANT EVALUATION FOR CHRONIC KIDNEY DISEASE: Primary | ICD-10-CM

## 2023-01-05 DIAGNOSIS — F10.20 ALCOHOL USE DISORDER, MODERATE, DEPENDENCE: ICD-10-CM

## 2023-01-05 DIAGNOSIS — Z01.818 ENCOUNTER FOR PRE-TRANSPLANT EVALUATION FOR CHRONIC KIDNEY DISEASE: ICD-10-CM

## 2023-01-05 PROCEDURE — 86832 HLA CLASS I HIGH DEFIN QUAL: CPT | Mod: PO | Performed by: NURSE PRACTITIONER

## 2023-01-05 PROCEDURE — 99215 PR OFFICE/OUTPT VISIT, EST, LEVL V, 40-54 MIN: ICD-10-PCS | Mod: S$PBB,,, | Performed by: PSYCHIATRY & NEUROLOGY

## 2023-01-05 PROCEDURE — 99999 PR PBB SHADOW E&M-EST. PATIENT-LVL III: CPT | Mod: PBBFAC,TXP,, | Performed by: PSYCHIATRY & NEUROLOGY

## 2023-01-05 PROCEDURE — 80321 ALCOHOLS BIOMARKERS 1OR 2: CPT | Mod: TXP | Performed by: PSYCHIATRY & NEUROLOGY

## 2023-01-05 PROCEDURE — 36415 COLL VENOUS BLD VENIPUNCTURE: CPT | Mod: TXP | Performed by: PSYCHIATRY & NEUROLOGY

## 2023-01-05 PROCEDURE — 86833 HLA CLASS II HIGH DEFIN QUAL: CPT | Mod: PO | Performed by: NURSE PRACTITIONER

## 2023-01-05 PROCEDURE — 99215 OFFICE O/P EST HI 40 MIN: CPT | Mod: S$PBB,,, | Performed by: PSYCHIATRY & NEUROLOGY

## 2023-01-05 PROCEDURE — 80307 DRUG TEST PRSMV CHEM ANLYZR: CPT | Mod: TXP | Performed by: PSYCHIATRY & NEUROLOGY

## 2023-01-05 PROCEDURE — 99999 PR PBB SHADOW E&M-EST. PATIENT-LVL III: ICD-10-PCS | Mod: PBBFAC,TXP,, | Performed by: PSYCHIATRY & NEUROLOGY

## 2023-01-10 ENCOUNTER — LAB VISIT (OUTPATIENT)
Dept: LAB | Facility: HOSPITAL | Age: 58
End: 2023-01-10
Payer: MEDICARE

## 2023-01-10 ENCOUNTER — TELEPHONE (OUTPATIENT)
Dept: TRANSPLANT | Facility: CLINIC | Age: 58
End: 2023-01-10
Payer: MEDICARE

## 2023-01-10 DIAGNOSIS — Z76.82 ORGAN TRANSPLANT CANDIDATE: ICD-10-CM

## 2023-01-10 LAB
AMPHETAMINES SERPL QL: NEGATIVE
BARBITURATES SERPL QL SCN: NEGATIVE
BENZODIAZ SERPL QL SCN: NEGATIVE
BZE SERPL QL: NEGATIVE
CARBOXYTHC SERPL QL SCN: POSITIVE
CLINICAL BIOCHEMIST REVIEW: NORMAL
ETHANOL SERPL QL SCN: NEGATIVE
METHADONE SERPL QL SCN: NEGATIVE
OPIATES SERPL QL SCN: NEGATIVE
PCP SERPL QL SCN: NEGATIVE
PLPETH BLD-MCNC: 41 NG/ML
POPETH BLD-MCNC: 44 NG/ML
PROPOXYPH SERPL QL: NEGATIVE

## 2023-01-11 ENCOUNTER — HOSPITAL ENCOUNTER (OUTPATIENT)
Dept: PSYCHIATRY | Facility: HOSPITAL | Age: 58
Discharge: HOME | End: 2023-01-11
Attending: PSYCHIATRY & NEUROLOGY
Payer: MEDICARE

## 2023-01-11 DIAGNOSIS — F17.200 TOBACCO USE DISORDER: Chronic | ICD-10-CM

## 2023-01-11 DIAGNOSIS — F12.20 CANNABIS USE DISORDER, SEVERE, DEPENDENCE: Primary | ICD-10-CM

## 2023-01-11 DIAGNOSIS — F41.1 GAD (GENERALIZED ANXIETY DISORDER): Chronic | ICD-10-CM

## 2023-01-11 PROCEDURE — 90853 GROUP PSYCHOTHERAPY: CPT | Mod: TXP,,, | Performed by: PSYCHOLOGIST

## 2023-01-11 PROCEDURE — 99223 1ST HOSP IP/OBS HIGH 75: CPT | Mod: ,,, | Performed by: PSYCHIATRY & NEUROLOGY

## 2023-01-11 PROCEDURE — 90853 PR GROUP PSYCHOTHERAPY: ICD-10-PCS | Mod: TXP,,, | Performed by: PSYCHOLOGIST

## 2023-01-11 PROCEDURE — 90853 GROUP PSYCHOTHERAPY: CPT | Mod: TXP | Performed by: SOCIAL WORKER

## 2023-01-11 PROCEDURE — 99223 PR INITIAL HOSPITAL CARE,LEVL III: ICD-10-PCS | Mod: ,,, | Performed by: PSYCHIATRY & NEUROLOGY

## 2023-01-11 NOTE — PROGRESS NOTES
Group Psychotherapy (PhD/LCSW)    Clinical status of patient: Intensive Outpatient Program (IOP)    Date: 1/11/2023    Group Focus:  Distress Tolerance    Length of service: 08855 - 45-50 minutes    Number of patients in attendance: 7    Referred by: [facility] Treatment Team    Target symptoms: Substance Abuse    Patient's response to treatment: Active Listening and Self-disclosure    Progress toward goals: Progressing adequately    Interval History: Session focus was Distress Tolerance: Self-Soothe.  Patients were encouraged to use crisis survival skills to reduce intensity of distress.  Each patient identified something soothing for all five senses.    Diagnosis: Cannabis Use Disorder; Alcohol Use Disorder in remission    Plan: Continue treatment on ORP

## 2023-01-11 NOTE — PROGRESS NOTES
Group Psychotherapy (PhD/LCSW)    Location: [facility]    Clinical status of patient: Intensive Outpatient Program (IOP)    Date: 1/11/2023    Group Focus: Psychodynamic Group Psychotherapy    Length of service: 16207 - 45-50 minutes    Number of patients in attendance: 3    Referred by: Addictive Behavior Unit Treatment Team    Target symptoms: Substance Abuse    Patient's response to treatment: Active Listening and Self-disclosure    Progress toward goals: Progressing adequately    Interval History: Pt introduced herself appropriately to the group.     Diagnosis: Cannabis Use Disorder; Alcohol Use Disorder in remission    Plan: Continue treatment on [facility]

## 2023-01-12 NOTE — PLAN OF CARE
01/11/23 1100   Activity/Group Therapy Checklist   Group Relapse Prevention   Attendance Attended   Follows Direction Followed directions   Group Interactions/Observations Interacted appropriately   Affect/Mood Range Normal range   Affect/Mood Display Appropriate   Goal Progression Progressing

## 2023-01-13 ENCOUNTER — HOSPITAL ENCOUNTER (OUTPATIENT)
Dept: PSYCHIATRY | Facility: HOSPITAL | Age: 58
Discharge: HOME | End: 2023-01-13
Attending: PSYCHIATRY & NEUROLOGY
Payer: MEDICARE

## 2023-01-13 ENCOUNTER — COMMITTEE REVIEW (OUTPATIENT)
Dept: TRANSPLANT | Facility: CLINIC | Age: 58
End: 2023-01-13
Payer: MEDICARE

## 2023-01-13 VITALS
SYSTOLIC BLOOD PRESSURE: 131 MMHG | HEART RATE: 69 BPM | TEMPERATURE: 98 F | RESPIRATION RATE: 18 BRPM | DIASTOLIC BLOOD PRESSURE: 83 MMHG

## 2023-01-13 DIAGNOSIS — F10.20 ALCOHOL USE DISORDER, SEVERE, DEPENDENCE: Chronic | ICD-10-CM

## 2023-01-13 DIAGNOSIS — F41.1 GAD (GENERALIZED ANXIETY DISORDER): Chronic | ICD-10-CM

## 2023-01-13 DIAGNOSIS — N18.5 CKD (CHRONIC KIDNEY DISEASE) STAGE 5, GFR LESS THAN 15 ML/MIN: ICD-10-CM

## 2023-01-13 DIAGNOSIS — F17.200 TOBACCO USE DISORDER: Chronic | ICD-10-CM

## 2023-01-13 DIAGNOSIS — N18.6 ESRD (END STAGE RENAL DISEASE) ON DIALYSIS: ICD-10-CM

## 2023-01-13 DIAGNOSIS — F12.20 CANNABIS USE DISORDER, SEVERE, DEPENDENCE: Primary | Chronic | ICD-10-CM

## 2023-01-13 DIAGNOSIS — Z99.2 ESRD (END STAGE RENAL DISEASE) ON DIALYSIS: ICD-10-CM

## 2023-01-13 PROBLEM — F10.90 ALCOHOL USE: Status: RESOLVED | Noted: 2019-08-12 | Resolved: 2023-01-13

## 2023-01-13 PROBLEM — F10.21 ALCOHOL USE DISORDER, SEVERE, IN EARLY REMISSION: Chronic | Status: RESOLVED | Noted: 2020-06-09 | Resolved: 2023-01-13

## 2023-01-13 PROBLEM — Z78.9 ALCOHOL USE: Status: RESOLVED | Noted: 2019-08-12 | Resolved: 2023-01-13

## 2023-01-13 PROCEDURE — 99232 SBSQ HOSP IP/OBS MODERATE 35: CPT | Mod: ,,, | Performed by: PSYCHIATRY & NEUROLOGY

## 2023-01-13 PROCEDURE — 99232 PR SUBSEQUENT HOSPITAL CARE,LEVL II: ICD-10-PCS | Mod: ,,, | Performed by: PSYCHIATRY & NEUROLOGY

## 2023-01-13 PROCEDURE — 90853 GROUP PSYCHOTHERAPY: CPT

## 2023-01-13 NOTE — PLAN OF CARE
01/13/23 1506   Activity/Group Therapy Checklist   Group Other (Comments)  (Self Care)   Attendance Attended   Follows Direction Followed directions   Group Interactions/Observations Interacted appropriately;Alert;Sharing;Supportive   Affect/Mood Range Normal range   Affect/Mood Display Appropriate   Goal Progression Progressing        facilitated self care group and completed exercise , [place] of Control. Exercise helped pt to identify areas of life that pt had no control  some control and most control, and could help balance the most important things in their lives.

## 2023-01-13 NOTE — PROGRESS NOTES
[facility]  DEPARTMENT OF PSYCHIATRY    SUBSEQUENT VISIT  [facility]    EXAMINING PRACTITIONER: Donovan Armenta MD  BOARD CERTIFICATION: Psychiatry & Addiction Medicine      KEY:     I[]I Y = II[x][]II = Yes / Present / Present Though Denies / Endorses  I[]I N = II[][x]II = No / Absent / Absent Though Endorses / Denies  I[]I U = II[][]II = Unknown / Unable to Assess/Enact / Unwilling to Participate/Provide  I[]I A = II[x][x]II = Ambiguity / Uncertainty of Accuracy Exists  I[]I D = Denial or Minimization is Suspected/Evident  I[]I N/A = Non-Applicable    CHIEF COMPLAINT:     Patient Name: Jennifer Booth  YOB: 1965  MRN: [id number]    Jennifer Booth is a 58 y.o. female who is being seen by me for a follow up visit.  Jennifer Booth presents with the chief complaint of: anxiety, problematic substance use/abuse, and alcohol and/or drug addiction    CHART REVIEW:     Available documentation has been reviewed, and pertinent elements of the chart - including previous psychiatric evaluations - have been incorporated into this evaluation where appropriate.      PRESENTATION:     HPI, PSYCHIATRIC ROS & APPLICABLE MEDICAL ROS:   .  Patient adjusting to program.  Denies any substance use since entry into program.  We discussed upcoming weekend, how to respond to any potential cravings.  Triggers, one day at a time philosophy discussed.               .  CURRENT PSYCHOTROPIC REGIMEN:     Lexapro 20mg daily  Remeron 15mg bedtime  Wellbutrin 75mg daily      HISTORY:     PFSH: The patient's past psychiatric, family, social and medical history have been reviewed and updated as appropriate within the electronic medical record system.       The electronic chart was reviewed and updated as appropriate.  See Medcard for details.        RISK:     III[x]III  RISK PARAMETERS:     The following risk parameters were assessed during this evaluation:    I[]I Y  I[x]I N  I[]I U  I[]I A  Suicidal  "Ideation/Behavior: **   I[]I Y  I[x]I N  I[]I U  I[]I A  Homicidal Ideation/Behavior: **  I[]I Y  I[x]I N  I[]I U  I[]I A  Violence: **  I[]I Y  I[x]I N  I[]I U  I[]I A  Self-Injurious Behavior: **    I[]I Y  I[x]I N  I[]I U  I[]I A  I[]I N/A  Minimization of Symptoms Suspected/Evident: **  I[]I Y  I[x]I N  I[]I U  I[]I A  I[]I N/A  Exaggeration of Symptoms Suspected/Evident: **      III[x]III  CLINICAL RISK DETERMINATION:     Current risk is judged to be:  I[x]I Low    I[]I Moderate   I[]I High    The following criteria were met for involuntary psychiatric admission:   I[x]I None    I[]I Dangerous to Self    I[]I Dangerous to Others    I[]I Gravely Disabled      EXAMINATION:     VITALS:     Vitals:    01/13/23 0900   BP: 131/83   Pulse: 69   Resp: 18   Temp: 98 °F (36.7 °C)       MENTAL STATUS EXAMINATION:     General Appearance & Behavior: stated age, casually dressed  Involuntary Movements and Motor Activity: no abnormal involuntary movements, no pma/pmr  Speech & Language: conversational, spontaneous  Mood: "okay"  Affect: euthymic  Thought Process & Associations: linear  Thought Content & Perceptions: no suicidal ideation/homicidal ideation, no evidence of psychosis  Sensorium and Cognition: alert with clear sensorium, no significant cognitive impairments  Insight & Judgment: both intact       ASSESSMENT:     III[x]III  DIAGNOSES AND PROBLEMS:             .  Cannabis Use Disorder, severe  Alcohol Use Disorder, severe  Tobacco Use Disorder  Generalized Anxiety Disorder  Chronic Kidney Disease, End Stage Renal Disease, on dialysis  Kidney Transplant Candidate            .      PLAN:     IMPRESSION AND RECOMMENDATIONS:     MANAGEMENT PLAN, TREATMENT GOALS, THERAPEUTIC TECHNIQUES/APPROACHES & CLINICAL REASONING:     After our session, patient received disappointing news from transplant team - left the program.  We were able to reach out to her to provide support - she will be back Tuesday to continue program.  See " Dr. Danielson's plan of care note.    Disposition:    - Continue ORP.  - Continue ORP protocol.  - Labs: CMP, PETH, urine toxicology, alcohol biomarkers.  - Full engagement in 12 step (or equivalent) recovery program(s), including mandatory meeting attendance and acquisition of sponsor.  - Patient counseled on abstinence from alcohol and substances of abuse (illicit and prescription).  - Relapse prevention and motivational interviewing provided.    III[x]III  PRESCRIPTION DRUG MANAGEMENT:     Prescription Drug Management entails the review, recommendation, or consideration without recommendation of medications, and as such was employed during the encounter.    Discussed, to the extent possible, diagnosis, risks and benefits of proposed treatment vs alternative treatments vs no treatment, potential side effects of these treatments and the inherent unpredictability of treatment. The patient's ability to understand, participate and engage in a conversation surrounding this was deemed to be: present. The patient expresses understanding of the above and displays the capacity to agree with this treatment given said understanding. Patient also agrees that, currently, the benefits outweigh the risks and would like to continue treatment at this time.     Written material has additionally been provided, via the AVS or other pre-printed handouts.      MEDICAL DECISION MAKING:     Shared medical decision making and informed consent are the hallmark and bedrock of good clinical care, and as such have been employed and obtained, respectively, to the degree possible.  Written material has been provided to supplement, augment, and reinforce any discussions and interventions, via the AVS or other pre-printed handouts.    Additional psychoeducation has been provided, as warranted.      LEVEL OF MEDICAL DECISION MAKING AND TIME:     Complexity (level) of medical decision making employed in the encounter: MODERATE    The total time for  services performed on the date of the encounter: 25 minutes    Donovan Armenta MD  Department of Psychiatry  [facility]    DATA:     DIAGNOSTIC TESTING:     The chart was reviewed for recent diagnostic procedures and investigations, and pertinent results are noted below.      ADDICTION LABORATORY RESULTS:     Urine Toxicology:  Benzodiazepines (no units)   Date Value   12/14/2011 Negative     Barbiturate Screen, Ur (no units)   Date Value   12/14/2011 Negative     Opiate Scrn, Ur (no units)   Date Value   12/14/2011 Negative     Methadone metabolites (no units)   Date Value   12/14/2011 Negative     Cocaine (Metab.) (no units)   Date Value   12/14/2011 Negative     Amphetamine Screen, Ur (no units)   Date Value   12/14/2011 Negative     THC (no units)   Date Value   12/14/2011 Negative     Phencyclidine (no units)   Date Value   12/14/2011 Negative       Alcohol:  PEth 16:0/18.1 (POPEth) (ng/mL)   Date Value   01/05/2023 44   08/30/2022 <10     Phosphatidylethanol (ng/mL)   Date Value   06/11/2021 Negative   05/24/2021 Negative     Alcohol, Serum (mg/dL)   Date Value   07/02/2021 <10     Alcohol, Urine (mg/dl)   Date Value   12/14/2011 < 10     Lab Results   Component Value Date    MCV 82 09/27/2022    AST 10 09/27/2022    ALT 6 (L) 09/27/2022    GGT 27 06/09/2014       Miscellaneous:  Alcohol Scrn (no units)   Date Value   01/05/2023 Negative     Benzodiazepines (no units)   Date Value   01/05/2023 Negative     Barbiturate Scrn (no units)   Date Value   01/05/2023 Negative     Methadone (Dolophine), Serum (no units)   Date Value   01/05/2023 Negative     Amphetamine Scrn (no units)   Date Value   01/05/2023 Negative     THC (Marijuana) Metabolite, bl (no units)   Date Value   01/05/2023 Positive     Phencyclidine, Blood (no units)   Date Value   01/05/2023 Negative     Nicotine, Serum (ng/mL)   Date Value   09/05/2017 7.4 (H)     Cotinine, Serum (ng/mL)   Date Value   09/05/2017 246 (H)

## 2023-01-13 NOTE — PLAN OF CARE
"Patient reported to have left group earlier today. Called patient on number listed in chart. Patient reports she "was not ready to hear the news she got today" - states that she was told it would be a year before she would be put back on the transplant list. She states she thought she would be back on the list since she had previously completed the program. Patient informed that in order to get her place back on the list, she would have to complete the ORP program, enroll in aftercare, and maintain sobriety for 1 year before she got her place back on the transplant list. Patient states that she needs to take today off "to be with my feelings." States she intends to be back on Tuesday morning to complete remainder of program. Patient informed that she is welcome back and to call program  should she have any other questions or concerns.       Ross Wiedemann, MD  [facility] PGY-4  [facility]   "

## 2023-01-13 NOTE — COMMITTEE REVIEW
Native Organ Dx: Chronic Glomerulosclerosis - Unspecified      Not approved for LRD/CAD transplant due to failure to follow up for aftercare post drug and alcohol rehab resulting in relapse. Patient will be removed from the wait list. Can be referred in the future once she  completes the program again as recommended by psych and maintains sobriety for ONE YEAR with psych clearance.       Note written by Emelia Alas RN    ===============================================     I was present at the meeting and attest to the decision of the committee.

## 2023-01-13 NOTE — DISCHARGE INSTRUCTIONS
Thank you for allowing me to participate as part of your health care team, and thank you for choosing [facility].    GREGORY OLSEN MD  Board Certified in Psychiatry & Addiction Medicine      IN CASE OF SUICIDAL THINKING, call the National Suicide Hotline Number: 988    988 Suicide & Crisis Lifeline: 988 , 6-020-916-TALK (8255)  https://QuesCom.org           AFTER VISIT INSTRUCTIONS:     [x] Take all medication, from all providers, as prescribed.  [x] If questions or concerns arise, or if experiencing side effects, adverse reactions or worsening symptoms, contact me or the appropriate provider through the [facility] portal at [email or web address], or call [phone] to speak with my office staff or [phone] to reach the [facility] main line.  [x] In cases of emergencies, call 911 or [phone], or present to the emergency department for immediate assistance.      ADJUSTMENTS TO YOUR REGIMEN:    - no adjustments to your medication regimen were made at this encounter  - continue your medication regimen as prescribed    LAB WORK:    - lab work is up to date      Information on mental health medications:    National [place] of Mental Health:   https://www.nimh.nih.gov/health/topics/mental-health-medications     Web MD:   https://www.webmd.com       RESOURCES:     IN CASE OF SUICIDAL THINKING, call the National Suicide Hotline Number: 988    988 Suicide & Crisis Lifeline: 988 , 1-382-683-TALK (8255)  Provides 24/7, free and confidential support for people in distress, prevention and crisis resources for you or your loved ones, and best practices for professionals.    Call, text or chat.  https://QuesCom.Dataium     National Action [place] for Suicide Prevention: the National [place] for Suicide Prevention (Action [place]) is the nations public-private partnership for suicide prevention, working with more than 250 national partners to advance the.   https://theactionalliance.org     National  Strategy for Suicide Prevention & Risk Mitigation:  https://theactionalliance.org/our-strategy/national-strategy-suicide-prevention     [x] Fact Sheet:   https://www.[facility].gov/sites/default/files/national-strategy-for-suicide-prevention-factsheet.pdf     [x] Report:   https://www.ncbi.nlm.nih.gov/books/HUI096563/pdf/Bookshelf_NBK109917.pdf     Suicide Prevention Resource Center: The Suicide Prevention Resource Center (SPR) is the only federally supported resource center devoted to advancing the implementation of the National Strategy for Suicide Prevention. [facility] is funded by the U.S. Department of Health and Human Services' Substance Abuse and Mental Health Services Administration (SAMA).  https://www.[facility].org     [x] Safety Plan:   https://[employer]/wp-content/uploads/2021/08/Ravi-Mohinder-Safety-Plan-8-6-21.pdf     [x] Suicide Risk Curve:  https://[employer]/wp-content/uploads/2021/08/Xzhzjym-pwkk-spoqs-8-6-21.pdf     [place] Mental Health Advocacy Service: the state agency tasked with protecting the legal rights of people with behavioral health diagnoses.  https://mhas.[place].[facility]     Alcoholics Anonymous (AA): find a meeting near you.  https://www.aa.org     SMI Adviser: resources for individuals and families with serious mental illness.  https://smiadviser.org     National [place] for the Mentally Ill (ESTRELLITA): the nation's largest grassroots organization dedicated to building better lives for individuals with mental illness.  https://www.estrellita.org/Home     U.S. Department of Health and Human Services (HHS): the mission of HHS is to enhance the health and well-being of all Americans, by providing for effective health and human services and by fostering sound, sustained advances in the sciences underlying medicine, public health, and .   https://www.hhs.gov     Substance Abuse and Mental Health Services Administration (SAMHSA): [facility] is the agency within [facility] that  leads public health efforts to advance the behavioral health of the nation. [facility]'s mission is to reduce the impact of substance abuse and mental illness on [place] communities.   https://www.[facility].gov     National Institutes of Health ([facility]): a part of [facility], [facility] is the largest biomedical research agency in the world.   https://www.nih.gov     National [place] on Drug Abuse (KALEE): sponsored by the NIH, the mission of KALEE is to advance science on drug use and addiction and to apply that knowledge to improve individual and public health.  https://kalee.nih.gov     National [place] on Alcohol Abuse and Alcoholism (NIAAA): sponsored by the NIH, the mission of NIAA is to generate and disseminate fundamental knowledge about the effects of alcohol on health and well-being, and apply that knowledge to improve diagnosis, prevention, and treatment of alcohol-related problems, including alcohol use disorder, across the lifespan.   https://www.niaaa.nih.gov     National Harm Reduction Coalition: resources for harm reduction, including techniques, strategies, policy, and advocacy.  https://harmreduction.org     The SHARE Approach - A Model for Shared Decision Making:  [x] Fact Sheet  https://www.ahrq.gov/sites/default/files/publications/files/share-approach_factsheet.pdf     AMA Principles of Medical Ethics - Informed Consent & Shared Decision Making:  [x] Chapter  https://www.ama-assn.org/system/files/2019-06/code-of-medical-hczpru-vnglyoh-8.pdf     Safety Netting for Primary Care:  [x] Article  https://www.ncbi.nlm.nih.gov/pmc/articles/AZS7508356/pdf/ckpbplj-3888--e70.pdf       MEDICATION MANAGEMENT:     [x] In addition to the potential beneficial effects, the use of any medication or drug (prescribed, over the counter or otherwise) carries with it the risk of potential adverse effects.  Each has a set of typical adverse effects - some common, some rare - but idiosyncratic and unanticipated reactions unique to you  are always possible.      [x] It is important to remember that untreated illness can also pose a risk, which must be taken into account when weighing the pros and cons of a medication trial.    [x] Medications and drugs can sometimes interact with each other in the body, leading to adverse effects - it is important that all your providers know all the medications and drugs you take - prescribed, over the counter, or otherwise.  Keep me and all your practitioners up to date with any changes.  It's always a good idea to keep an up-to-date list in an easily accessible location.    [x] There is an inherent unpredictability to all treatment, including the use of medication.  Unexpected outcomes can occur - keep me up to date with any difficulties you encounter.    [x] It is important to take medication as directed, and to comply fully with the instructions.  Check with me or the appropriate provider first before adjusting or stopping your medication on your own.    If you have any further questions or concerns about your care, please contact me or your other providers through the [facility] portal at [email or web address], or call [phone] or [phone].  We would be happy to provide you additional information pertaining to your diagnosis, intended outcomes, target symptoms for treatment, and possible benefits and risks of medication - you can also access this information through the provided resources.  Possible alternatives to the current treatment plan (including no treatment) can also be reviewed.      GENERAL HEALTH & WELLNESS:     [x] Establish and follow regularly with a primary care physician for routine health maintenance and management of any medical comorbidities.  [x] Follow a healthy diet, exercise routinely, and monitor weight and metabolic parameters.  [x] Allow adequate time for sleep and practice good sleep hygiene.  [x] Do not operate a motor vehicle or heavy machinery if the effects of  medications or the symptoms underlying your condition impair the ability for you to do so safely.    Dietary Guidelines for Americans, 0855-3615:  U.S. Department of Agriculture (USDA)  https://www.dietaryguidelines.gov/sites/default/files/2020-12/Dietary_Guidelines_for_Americans_2020-2025.pdf#page=31     The Nutrition Source:  [facility]  https://www.[facility].[place].[facility]/nutritionsource       SLEEP HYGIENE:     Follow these tips to establish healthy sleep habits:  [x] Keep a consistent sleep schedule. Get up at the same time every day, even on weekends or during vacations.  [x] Set a bedtime that is early enough for you to get at least 7-8 hours of sleep.  [x] Don't go to bed unless you are sleepy.  [x] If you don't fall asleep after 20 minutes, get out of bed. Go do a quiet activity without a lot of light exposure. It is especially important to not get on electronics.  [x] Establish a relaxing bedtime routine.  [x] Use your bed only for sleep and sex.  [x] Make your bedroom quiet and relaxing. Keep the room at a comfortable, cool temperature.  [x] Limit exposure to bright light in the evenings.  [x] Turn off electronic devices at least 30 minutes before bedtime.  [x] Don't eat a large meal before bedtime. If you are hungry at night, eat a light, healthy snack.  [x] Exercise regularly and maintain a healthy diet.  [x] Avoid consuming caffeine in the afternoon or evening.  [x] Avoid consuming alcohol before bedtime.  [x] Reduce your fluid intake before bedtime.    QUICK TIPS FOR BETTER SLEEP  Reduce smartphone usage Create and maintain a nightly ritual Avoid caffeine 4-6 hours before sleeping Don't eat or drink too much at bedtime Sleep at the same time every night        American Academy of Sleep Medicine - Healthy Sleep Habits:  https://sleepeducation.org/healthy-sleep/healthy-sleep-habits     American Academy of Sleep Medicine - Bedtime  Calculator:  https://sleepeducation.org/healthy-sleep/bedtime-calculator     American Academy of Sleep Medicine - Cognitive Behavioral Therapy for Insomnia (CBT-I):  https://sleepeducation.org/patients/cognitive-behavioral-therapy     American Academy of Sleep Medicine - Insomnia:  https://sleepeducation.org/sleep-disorders/insomnia       ALCOHOL & DRUG USE COUNSELING:     - abstain from alcohol and illicit drug use  - routinely attend 12 step (or equivalent) mutual self-help meetings  - work with a sponsor  - complete a licensed addiction rehabilitation program  - establish sobriety and maintain a recovery lifestyle  - continue in the [facility]      Preventing Excessive Alcohol Use ([facility]):  https://www.cdc.gov/alcohol/fact-sheets/moderate-drinking.htm#:~:text=To%20reduce%20the%20risk%20of,days%20when%20alcohol%20is%20consumed.     [x] Alcohol consumption is associated with a variety of short- and long-term health risks, including motor vehicle crashes, violence, sexual risk behaviors, high blood pressure, and various cancers (e.g., breast cancer).  [x] The risk of these harms increases with the amount of alcohol you drink. For some conditions, like some cancers, the risk increases even at very low levels of alcohol consumption (less than 1 drink).  [x] To reduce the risk of alcohol-related harms, the 6431-5139 Dietary Guidelines for Americans recommends that adults of legal drinking age can choose not to drink, or to drink in moderation by limiting intake to 2 drinks or less in a day for men or 1 drink or less in a day for women, on days when alcohol is consumed.  [x] The Guidelines also do not recommend that individuals who do not drink alcohol start drinking for any reason and that if adults of legal drinking age choose to drink alcoholic beverages, drinking less is better for health than drinking more.  [x] The Guidelines note that some people should not drink alcohol at all, such as:  - If they  "are pregnant or might be pregnant.  - If they are younger than age 21.  - If they have certain medical conditions or are taking certain medications that can interact with alcohol.  - If they are recovering from an alcohol use disorder or if they are unable to control the amount they drink.  [x] The Guidelines also note that not drinking alcohol is the safest option for women who are lactating. Generally, moderate consumption of alcoholic beverages by a woman who is lactating (up to 1 standard drink in a day) is not known to be harmful to the infant, especially if the woman waits at least 2 hours after a single drink before nursing or expressing breast milk. Women considering consuming alcohol during lactation should talk to their healthcare provider.  [x] The Guidelines note, Emerging evidence suggests that even drinking within the recommended limits may increase the overall risk of death from various causes, such as from several types of cancer and some forms of cardiovascular disease. Alcohol has been found to increase risk for cancer, and for some types of cancer, the risk increases even at low levels of alcohol consumption (less than 1 drink in a day).  [x] Although past studies have indicated that moderate alcohol consumption has protective health benefits (e.g., reducing risk of heart disease), recent studies show this may not be true.  [x] Its important to focus on the amount people drink on the days that they drink. Even if women consume an average of 1 drink per day or men consume an average of 2 drinks per day, binge drinking increases the risk of experiencing alcohol-related harm in the short-term and in the future.    Drinking Levels Defined (NIAAA):  https://www.niaaa.nih.gov/alcohol-health/overview-alcohol-consumption/moderate-binge-drinking     Drinking in Moderation:  According to the "Dietary Guidelines for Americans 0887-4820, U.S. Department of Health and Human Services and U.S. Department of " Agriculture, adults of legal drinking age can choose not to drink or to drink in moderation by limiting intake to 2 drinks or less in a day for men and 1 drink or less in a day for women, when alcohol is consumed. Drinking less is better for health than drinking more.    Binge Drinking:  NIAAA defines binge drinking as a pattern of drinking alcohol that brings blood alcohol concentration (CAITIE) to 0.08 percent - or 0.08 grams of alcohol per deciliter - or higher.  For a typical adult, this pattern corresponds to consuming 5 or more drinks (male), or 4 or more drinks (female), in about 2 hours.    The Substance Abuse and Mental Health Services Administration (SAMHSA), which conducts the annual National Survey on Drug Use and Health (NSDUH), defines binge drinking as 5 or more alcoholic drinks for males or 4 or more alcoholic drinks for females on the same occasion (i.e., at the same time or within a couple of hours of each other) on at least 1 day in the past month.    Heavy Alcohol Use:  NIAAA defines heavy drinking as follows:  - For men, consuming more than 4 drinks on any day or more than 14 drinks per week.  - For women, consuming more than 3 drinks on any day or more than 7 drinks per week.     [facility] defines heavy alcohol use as binge drinking on 5 or more days in the past month.    Patterns of Drinking Associated with Alcohol Use Disorder:  Binge drinking and heavy alcohol use can increase an individual's risk of alcohol use disorder.    Certain people should avoid alcohol completely, including those who:  - Plan to drive or operate machinery, or participate in activities that require skill, coordination, and alertness.  - Take certain over-the-counter or prescription medications.  - Have certain medical conditions.  - Are recovering from alcohol use disorder or are unable to control the amount that they drink.  - Are younger than age 21.  - Are pregnant or may become pregnant.    U.S. Standard Drink  12 oz  beer   (5% ABV) 8 oz malt liquor   (7% ABV) 5 oz wine   (12% ABV) 1.5 oz 80-proof distilled spirit  (40% ABV)        Heroin use harm reduction:  1. Carry naloxone. When using heroin, make sure you have at least one dose of naloxone - the overdose reversal drug - and have it in plain view. Understand how to give it.  2. Try a small dose first. It is best to first try a small amount of the heroin to check the effect.  3. Dont use heroin alone. Always use heroin with someone else and take turns while using.    It is possible to overdose with heroin whether you are snorting, injecting or using it in another form.    Signs of an overdose or emergency:   - The person is awake but unable to talk.  - Their body is limp.  - Their breathing is shallow or slow or stopped.  - Their skin is pale, ashen or clammy/sweaty.  - They are unconscious.    In case of emergency, give naloxone. If you suspect the heroin may contain fentanyl, administer more than one dose. Seek medical help even if naloxone has been given. Call 911 for help.      SHARED DECISION MAKING & INFORMED CONSENT:     Shared medical decision making and informed consent are the hallmark and bedrock of excellent clinical care.  During the encounter, shared medical decision making was employed and informed consent was obtained, to the degree possible, whenever feasible, appropriate and relevant. Those interventions are supplemented here with written materials, detailing the topics in more depth.       PSYCHOEDUCATION:     Psychoeducation pertaining to the following -     Diagnosis Etiology Disease Processes Natural Progression   Treatment Options Time Course Safety Netting Informed Consent   Intended Benefits of Medication Expectable Adverse Effects Target Symptoms for Treatment Alternatives to Current Treatment   Shared   Decision Making Risk Mitigation Strategies Harm Reduction Techniques Associated Bio-Med Complications     - can be further discussed and reviewed  (you can also access additional information through the provided resources in this document).    Effective communication is essential in order to engage in shared medical decision making.  If you had difficulty understanding anything during your encounter or in this supplementary document, please contact your providers through the [facility] portal at [email or web address] or call [phone].     Pooja Dictionary  https://dictionary.pooja.org/us     It can be easy to miss, forget, or misremember important important information that was discussed during the session - especially when you're stressed, upset, or don't feel well.  If you or a representative have any additional questions, concerns, or topics to discuss - please contact me or your other providers through the [facility] portal at [email or web address], or call [phone] or [phone].      Memory Loss  https://www.NetScientific.Clicker/brain/memory-loss    Causes of Memory Loss  https://www.[employer]/what-causes-memory-loss-7864627    Memory loss: When to seek help  https://www.mayoclinic.org/diseases-conditions/alzheimers-disease/in-depth/memory-loss/art-16067724    Memory, Forgetfulness, and Aging: What's Normal and What's Not?  https://www.ashvin.nih.gov/health/memory-forgetfulness-and-aging-whats-normal-and-whats-not    Depression and Memory Loss  https://www.[employer].Clicker/health/depression/depression-and-memory-loss    The Relationship Between Anxiety and Memory Loss  https://www.[employer].[facility]/academics/blog-posts/the-relationship-between-anxiety-and-memory-loss     PRESCRIPTION DRUG MANAGEMENT:     Prescription Drug Management entails the following:  [x] The review, recommendation, or consideration without recommendation of medications during the encounter.  [x] Discussion (to the extent possible) with the patient and/or other interested parties of the diagnosis, target symptoms, intended outcomes, and possible benefits and risks of medication,  as well as alternatives (including no treatment), if not otherwise known or stated prior.  [x] Discussion (to the extent possible) with the patient and/or other interested parties of possible expectable adverse effects of any proposed individual psychotropic agents, as well as the inherent unpredictability of treatment, if not otherwise known or stated prior.  [x] Informed consent is sought from the patient (and/or guardian/designated decision maker, if applicable) after a thorough discussion (to the extent possible) of the aforementioned points outlined above.  [x] The provision of counseling (to the extent possible) to the patient and/or other interested parties on the importance of full compliance with any prescribed medication, if not otherwise known or stated prior.    Information on psychotropic medication can be found at:   National [place] of Mental Health: Information on Mental Health Medications      RISK MITIGATION, HARM REDUCTION & SAFETY NETTING:     Risk Mitigation Strategies, Harm Reduction Techniques, and Safety Netting are important interventions that can reduce acute and chronic risk.  As such, opportunities were sought to incorporate psychoeducation and practical advice pertaining to these topics into the encounter, to the degree possible, whenever feasible, appropriate and relevant.  Those interventions are supplemented here with written materials, detailing the topics in more depth.       RISK MITIGATION STRATEGIES:     Risk mitigation strategies are used to reduce the likelihood of future episodes of suicide, homicide, violence, and/or other problematic behaviors (e.g. self-injurious, risky, addictive, compulsive, impulsive). The following are examples of risk mitigation strategies which you can employ in order to reduce your overall burden of risk.     [x] Treatment of underlying psychopathology driving acute and chronic risk to the extent possible.  [x] Use of self administered rating  scales and journaling to assist in risk tracking.  [x] Exploration of protective factors to potentially counterbalance risk.  [x] Identification and avoidance of triggers and situations that increase risk, including excessive alcohol and drug use.  [x] Timely follow up and ongoing treatment of mental health issues moving forward.  [x] Full compliance with medication regimen.  [x] A good working knowledge of your medication regimen, including specific instructions on the administration of the medications.  [x] Consultation with an appropriate medical provider prior to altering or deviating from these instructions on your own.  [x] Active involvement and participation of family and natural support wherever feasible and possible.  [x] Development and review of coping strategies that can be immediately deployed in times of acute crisis.  [x] Implementation of home safety practices and the removal/reduction of access to lethal means (including, but not limited to, firearms, certain types and quantities of medication, poisons, or other methods you may have contemplated or identified).  [x] Collaborative development of a written safety plan with your treatment team and loved ones that can be immediately referred to in times of acute crisis.  [x] Utilization of a safety contract to engage your treatment team and further assess/manage risk.  [x] A good working knowledge of how to access emergency treatment in times of acute crisis.  [x] Utilization of suicide hotlines number (508) and resources in times of crisis.    If you require further information pertaining to the issues outlined above, please reach out to me or your other providers through the [facility] portal at [email or web address], or call [phone] or [phone] to discuss.  See resource list for additional material.      SAFETY NETTING:     In healthcare, safety netting refers to the provision of information to help patients or carers identify the need to  consult a health care professional if a health concern arises or changes.  The relevance of this advice is most obvious with chronic mental illnesses, as their dynamic nature, with symptoms and signs emerging at different times and in different combinations, makes safety netting particularly important.  Specific safety net advice for you includes the following:    [x] The existence of uncertainty. Mental health diagnoses and conditions contain at least some degree of uncertainty - knowing this, you should feel empowered to reconsult if necessary.  [x] What exactly to look out for. Given the recognised risk of possible deterioration or the development of complications, you should become familiar with the specific clinical features (including red flags) to look out for.    [x] How exactly to seek further help. You should know how and where to seek further help if needed.  Make a plan in advance and keep it handy.  It's also a good idea to share the plan with your treatment providers and loved ones.  [x] What to expect about time course. Mental health diagnoses and conditions often have an expected time course, which is important information for you to know.  However, if your difficulties do not conform to this time line and concerns arise, do not delay seeking further medical advice.    If you require further information pertaining to the issues outlined above, please reach out to me or your other providers through the [facility] portal at [email or web address], or call [phone] or [phone] to discuss.  See resource list for additional material.      HARM REDUCTION:     Harm Reduction techniques are used in an effort to reduce negative consequences associated with risky and maladaptive behaviors, until cessation of the problematic behaviors can be established.  Harm reduction is best thought of as a journey and not a destination; it is not an endorsement of problematic behavior, but an acknowledgement and  recognition of the step-by-step nature of recovery.      Although commonly employed in working with people who suffer with drug addiction, harm reduction can be more broadly applied to any problematic behavior.    Harm Reduction and Substance Abuse:  [x] Incorporates a spectrum of strategies that includes safer use, managed use, abstinence, meeting people who use drugs where theyre at, and addressing conditions of use along with the use itself.  [x] Accepts, for better or worse, that licit and illicit drug use is part of our world and chooses to work to minimize its harmful effects rather than simply ignore or condemn them.  [x] Understands drug use as a complex, multi-faceted phenomenon that encompasses a continuum of behaviors from severe use to total abstinence, and acknowledges that some ways of using drugs are clearly safer than others.  [x] Calls for the non-judgmental, non-coercive provision of services and resources to people who use drugs and the communities in which they live in order to assist them in reducing attendant harm.  [x] Affirms people who use drugs themselves as the primary agents of reducing the harms of their drug use and seeks to empower them to share information and support each other in strategies which meet their actual conditions of use.  [x] Does not attempt to minimize or ignore the real and tragic harm and danger that can be associated with illicit drug use.  [x] Meets people where they are, but seeks to not leave them there.  [x] Examples of specific interventions include, but are not limited to, narcan (naloxone), medication assisted treatment, syringe access, overdose prevention, and safer drug use techniques.    Key Harm Reduction Strategies: Opioid Use Disorder  [x] Safe Injection Sites & Equipment  [x] Managed Use  [x] Syringe Exchange Programs  [x] Fentanyl Test Strips  [x] Pharmacotherapy/Medication Assisted Treatment  [x] Narcan  [x] Good Restorationist Laws  [x] Treatment  "Instead of residential  [x] Diversion Programs  [x] Overdose Education  [x] Abstinence    Whether or not you struggle with substance abuse, any and all opportunities to employ harm reduction techniques to address difficult to change problematic behaviors should be sought and implemented - whenever and wherever feasible, relevant and applicable. Additionally, harm reduction techniques can be applied broadly, and are relevant for a multitude of situations - even those that do not involve problematic or maladaptive behaviors.     EXAMPLES OF HARM REDUCTION IN OTHER AREAS  SUN SCREEN SEAT BELTS SPEED LIMITS BIRTH CONTROL        If you require further information pertaining to the issues outlined above, please reach out to me through the [facility] portal or call [phone] to discuss.  See resource list for additional material.      FIREARM SAFETY:     THE SIX BASIC GUN SAFETY RULES  There are six basic gun safety rules for gun owners to understand and practice at all times:  Treat all guns as if they are loaded. Always assume that a gun is loaded even if you think it is unloaded. Every time a gun is handled for any reason, check to see that it is unloaded. If you are unable to check a gun to see if it is unloaded, leave it alone and seek help from someone more knowledgeable about guns.  Keep the gun pointed in the safest possible direction. Always be aware of where a gun is pointing. A "safe direction" is one where an accidental discharge of the gun will not cause injury or damage. Only point a gun at an object you intend to shoot. Never point a gun toward yourself or another person.  Keep your finger off the trigger until you are ready to shoot. Always keep your finger off the trigger and outside the trigger guard until you are ready to shoot. Even though it may be comfortable to rest your finger on the trigger, it also is unsafe. If you are moving around with your finger on the trigger and stumble or fall, you could " inadvertently pull the trigger. Sudden loud noises or movements can result in an accidental discharge because there is a natural tendency to tighten the muscles when startled. The trigger is for firing and the handle is for handling.  Know your target, its surroundings and beyond. Check that the areas in front of and behind your target are safe before shooting. Be aware that if the bullet misses or completely passes through the target, it could strike a person or object. Identify the target and make sure it is what you intend to shoot. If you are in doubt, DON'T SHOOT! Never fire at a target that is only a movement, color, sound or unidentifiable shape. Be aware of all the people around you before you shoot.  Know how to properly operate your gun. It is important to become thoroughly familiar with your gun. You should know its mechanical characteristics including how to properly load, unload and clear a malfunction from your gun. Obviously, not all guns are mechanically the same. Never assume that what applies to one make or model is exactly applicable to another. You should direct questions regarding the operation of your gun to your firearms dealer, or contact the  directly.  Store your gun safely and securely to prevent unauthorized use. Guns and ammunition should be stored separately. When the gun is not in your hands, you must still think of safety. Use an approved firearms safety device on the gun, such as a trigger lock or cable lock, so it cannot be fired. Store it unloaded in a locked container, such as an approved lock box or a gun safe. Store your gun in a different location than the ammunition. For maximum safety you should use both a locking device and a storage container.    ADDITIONAL SAFETY POINTS  The six basic safety rules are the foundational rules for gun safety. However, there are additional safety points that must not be overlooked.  [x] Never handle a gun when you are in an  "emotional state such as anger or depression. Your judgment may be impaired. If you have acute or chronic suicidal ideation, a suicide plan, or suicidal intent, have firearms removed and your access restricted by a trusted loved one or other responsible individual or agency.  [x] Never shoot a gun in celebration (the Fourth of July or New Year's Eveline, for example). Not only is this unsafe, but it is generally illegal. A bullet fired into the air will return to the ground with enough speed to cause injury or death.  [x] Do not shoot at water, flat or hard surfaces. The bullet can ricochet and hit someone or something other than the target.  [x] Hand your gun to someone only after you verify that it is unloaded and the cylinder or action is open. Take a gun from someone only after you verify that it is unloaded and the cylinder or action is open.  [x] Guns, alcohol and drugs don't mix. Alcohol and drugs can negatively affect judgment as well as physical coordination. Alcohol and any other substance likely to impair normal mental or physical functions should not be used before or while handling guns. Avoid handling and using your gun when you are taking medications that cause drowsiness or include a warning to not operate machinery while taking this drug.   [x] The loud noise from a fired gun can cause hearing damage, and the debris and hot gas that is often emitted can result in eye injury. Always wear ear and eye protection when shooting a gun.      GUNS AND CHILDREN - FIREARM OWNER RESPONSIBILITIES    You Cannot Be Too Careful with Children and Guns  [x] There is no such thing as being too careful with children and guns. Never assume that simply because a toddler may lack finger strength, they can't pull the trigger. A child's thumb has twice the strength of the other fingers. When a toddler's thumb "pushes" against a trigger, invariably the barrel of the gun is pointing directly at the child's face. NEVER leave a " "firearm lying around the house.  [x] Child safety precautions still apply even if you have no children or if your children have grown to adulthood and left home. A nephew, niece, neighbor's child or a grandchild may come to visit. Practice gun safety at all times.  [x] To prevent injury or death caused by improper storage of guns in a home where children are likely to be present, you should store all guns unloaded, lock them with a firearms safety device and store them in a locked container. Ammunition should be stored in a location separate from the gun.    Talking to Children About Guns  [x] Children are naturally curious about things they don't know about or think are "forbidden." When a child asks questions or begins to act out "gun play," you may want to address his or her curiosity by answering the questions as honestly and openly as possible. This will remove the mystery and reduce the natural curiosity. Also, it is important to remember to talk to children in a manner they can relate to and understand. This is very important, especially when teaching children about the difference between "real" and "make-believe." Let children know that, even though they may look the same, real guns are very different than toy guns. A real gun will hurt or kill someone who is shot.    Instill a Mind Set of Safety and Responsibility  [x] The American Academy of Pediatrics reports that adolescence is a highly vulnerable stage in life for teenagers struggling to develop traits of identity, independence and autonomy. Children, of course, are both naturally curious and innocently unaware of many dangers around them. Thus, adolescents as well as children may not be sufficiently safeguarded by cautionary words, however frequent. Contrary actions can completely undermine good advice. A "Do as I say and not as I do" approach to gun safety is both irresponsible and dangerous.  [x] Remember that actions speak louder than words. " Children learn most by observing the adults around them. By practicing safe conduct you will also be teaching safe conduct.    Safety and Storage Devices  [x] If you decide to keep a firearm in your home you must consider the issue of how to store the firearm in a safe and secure manner. There are a variety of safety and storage devices currently available to the public in a wide range of prices. Some devices are locking mechanisms designed to keep the firearm from being loaded or fired, but don't prevent the firearm from being handled or stolen. There are also locking storage containers that hold the firearm out of sight. For maximum safety you should use both a firearm safety device and a locking storage container to store your unloaded firearm.   Two of the most common locking mechanisms are trigger locks and cable locks. Trigger locks are typically two-piece devices that fit around the trigger and trigger guard to prevent access to the trigger. One side has a post that fits into a hole in the other side. They are locked by a key or combination locking mechanism. Cable locks typically work by looping a strong steel cable through the action of the firearm to block the firearm's operation and prevent accidental firing. However, neither trigger locks nor cable locks are designed to prevent access to the firearm.   [x] Smaller lock boxes and larger gun safes are two of the most common types of locking storage containers. One advantage of lock boxes and gun safes is that they are designed to completely prevent unintended handling and removal of a firearm. Lock boxes are generally constructed of sturdy, high-grade metal opened by either a key or combination lock. Gun safes are quite heavy, usually weighing at least 50 pounds. While gun safes are typically the most expensive firearm storage devices, they are generally more reliable and secure.     Remember: Safety and storage devices are only as secure as the  precautions you take to protect the key or combination to the lock.    RULES FOR KIDS  Adults should be aware that a child could discover a gun when a parent or another adult is not present. This could happen in the child's own home; the home of a neighbor, friend or relative; or in a public place such as a school or park. If this should happen, a child should know the following rules and be taught to practice them.   Stop  The first rule for a child to follow if he/she finds or sees a gun is to stop what he/she is doing.  Don't Touch!  The second rule is for a child not to touch a gun he/she finds or sees. A child may think the best thing to do if he/she finds a gun is to pick it up and take it to an adult. A child needs to know he/she should NEVER touch a gun he/she may find or see.  Leave the Area  The third rule is to immediately leave the area. This would include never taking a gun away from another child or trying to stop someone from using gun.  Tell an Adult  The last rule is for a child to tell an adult about the gun he/she has seen. This includes times when other kids are playing with or shooting a gun.     METHODS OF CHILDPROOFING YOUR FIREARM  As a responsible handgun owner, you must recognize the need and be aware of the methods of childproofing your handgun, whether or not you have children.  Whenever children could be around, whether your own, or a friend's, relative's or neighbor's, additional safety steps should be taken when storing firearms and ammunition in your home.  [x] Always store your firearm unloaded.  [x] Use a firearms safety device AND store the firearm in a locked container.  [x] Store the ammunition separately in a locked container.  Always storing your firearm securely is the best method of childproofing your firearm; however, your choice of a storage place can add another element of safety. Carefully choose the storage place in your home especially if children may be around.  [x] Do  not store your firearm where it is visible.  [x] Do not store your firearm in a bedside table, under your mattress or pillow, or on a closet shelf.  [x] Do not store your firearm among your valuables (such as jewelry or cameras) unless it is locked in a secure container.  [x] Consider storing firearms not possessed for self-defense in a safe and secure manner away from the home.    [place] for Gun Safety:  https://www.[place].org       Gun Violence: Prediction, Prevention and Policy  American Psychological Association Panel of Experts Report  https://www.apa.org/pubs/reports/gun-violence-report.pdf       If you require further information pertaining to any of the issues outlined above, please reach out to me or your other providers through the [facility] portal at [email or web address], or call [phone] or [phone] to discuss.  See resource list for additional material.      IN CASE OF SUICIDAL THINKING, call the National Suicide Hotline Number: 988    988 Suicide & Crisis Lifeline: 988 , 6-235-816-TALK (7867)  Provides 24/7, free and confidential support for people in distress, prevention and crisis resources for you or your loved ones, and best practices for professionals.    Call, text or chat.  https://988OneName.org

## 2023-01-13 NOTE — LETTER
January 13, 2023    Jennifer Booth  [address] [place] [address]    Dear Jennifer Booth:  MRN: [id number]    It is the duty of the [facility] Selection [facility] to determine which patients are candidates for a transplant. For this reason, our committee has the difficult task of evaluating patients to determine which ones have the greatest chance of having a successful transplant. We are aware of the magnitude of this responsibility, and we approach it with reverence and humility.    Your current health status was reviewed at a recent selection committee meeting.  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 You can be re-referred for evaluation after you complete the program again as recommended by your Psychiatrist, show at least 1 year of sobriety, and have clearance from Psychiatry.  Your name has been removed from the wait list effective January 13, 2023.    The [facility] carefully considers each patient's transplant candidacy and determines whether it is safe to proceed with transplantation on a case-by-case basis using established selection criteria.  In the past, you were considered to be a suitable transplant candidate.  At present, the risk of proceeding with an elective transplant surgery has become too high.                                                                                                 Although the selection committee believes you are not a suitable transplant candidate, you have the option to be evaluated at other transplant centers.  You may request your [facility] records be sent to any center of your choice by contacting our Medical Records Department at [phone].                                                                               Attached is a letter from the [employer] Organ  [employer]aring ([employer]).  It describes the services and information offered to patients by UN[employer] and the Organ Procurement and Transplant Network.                                                                                                                                      The [facility] sincerely wishes you the best and remains available to answer any questions.  Please do not hesitate to contact our pre-transplant office if we can assist you in any other way.                                                                               Sincerely,      Yudy Thomas MD  Medical Director, Kidney & Kidney/Pancreas Transplantation  lh/enclosure    Cc: Dr. Leodan Sanchez         [facility]               The Organ Procurement and Transplantation Network   Toll-free patient services line: Your resource for organ transplant information     If you have a question regarding your own medical care, you always should call your transplant hospital first. However, for general organ transplant-related information, you can call the Organ Procurement and Transplantation Network (OPTN) toll-free patient services line at [phone].     Anyone, including potential transplant candidates, candidates, recipients, family members, friends, living donors, and donor family members, can call this number to:     Talk about organ donation, living donation, the transplant process, the donation process, and transplant policies.   Get a free patient information kit with helpful booklets, waiting list and transplant information, and a list of all transplant hospitals.   Ask questions about the OPTN website (https://optn.transplant.hrsa.gov/), the [employer]arings (UNOS) website (https://unos.org/), or the [employer]OS website for living donors and transplant recipients. (https://www.transplantliving.[email or web address]).   Learn how the OPTN can help you.   Talk about any concerns that you may have with a  transplant hospital.     The nations transplant system, the OPTN, is managed under federal contract by the [employer]aring (UNOS), which is a non-profit charitable organization. The OPTN helps create and define organ sharing policies that make the best use of donated organs. This process continuously evaluating new advances and discoveries so policies can be adapted to best serve patients waiting for transplants. To do so, the OPTN works closely with transplant professionals, transplant patients, transplant candidates, donor families, living donors, and the public. All transplant programs and organ procurement organizations throughout the country are OPTN members and are obligated to follow the policies the OPTN creates for allocating organs.     The OPTN also is responsible for:   Providing educational material for patients, the public, and professionals.   Raising awareness of the need for donated organs and tissue.   Coordinating organ procurement, matching, and placement.   Collecting information about every organ transplant and donation that occurs in the [place].     Remember, you should contact your transplant hospital directly if you have questions or concerns about your own medical care including medical records, work-up progress, and test results.     We are not your transplant hospital, and our staff will not be able to answer questions about your case, so please keep your transplant hospitals phone number handy.   However, while you research your transplant needs and learn as much as you can about transplantation and donation, we welcome your call to our toll-free patient services line at [phone].

## 2023-01-13 NOTE — LETTER
January 13, 2023    Jennifer Booth  [address] [place] [address]          Dear Jennifer Booth:  MRN: [id number]    It is the duty of the [facility] Selection [facility] to determine which patients are candidates for a transplant. For this reason, our committee has the difficult task of evaluating patients to determine which ones have the greatest chance of having a successful transplant. We are aware of the magnitude of this responsibility, and we approach it with reverence and humility.    Your current health status was reviewed at a recent selection committee meeting.  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 You can be re-referred for evaluation after you complete the program again as recommended by your Psychiatrist, show at least 1 year of sobriety, and have clearance from Psychiatry.  Your name has been removed from the wait list effective January 13, 2023.    The [facility] carefully considers each patient's transplant candidacy and determines whether it is safe to proceed with transplantation on a case-by-case basis using established selection criteria.  In the past, you were considered to be a suitable transplant candidate.  At present, the risk of proceeding with an elective transplant surgery has become too high.                                                                                                 Although the selection committee believes you are not a suitable transplant candidate, you have the option to be evaluated at other transplant centers.  You may request your [facility] records be sent to any center of your choice by contacting our Medical Records Department at [phone].                                                                               Attached is a letter from the [employer] Network for Organ  Sharing (UNOS).  It describes the services and information offered to patients by UNOS and the Organ Procurement and Transplant Network.                                                                                                                                      The [facility] sincerely wishes you the best and remains available to answer any questions.  Please do not hesitate to contact our pre-transplant office if we can assist you in any other way.                                                                               Sincerely,      Yudy Thomas MD  Medical Director, Kidney & Kidney/Pancreas Transplantation    Cc: Dr. Leodan Sanchez         [facility]               The Organ Procurement and Transplantation Network   Toll-free patient services line: Your resource for organ transplant information     If you have a question regarding your own medical care, you always should call your transplant hospital first. However, for general organ transplant-related information, you can call the Organ Procurement and Transplantation Network (OPTN) toll-free patient services line at 1-236.847.9689.     Anyone, including potential transplant candidates, candidates, recipients, family members, friends, living donors, and donor family members, can call this number to:     Talk about organ donation, living donation, the transplant process, the donation process, and transplant policies.   Get a free patient information kit with helpful booklets, waiting list and transplant information, and a list of all transplant hospitals.   Ask questions about the OPTN website (https://optn.transplant.hrsa.gov/), the [employer] for Organ Sharings (UNOS) website (https://unos.org/), or the UNOS website for living donors and transplant recipients. (https://www.transplantliving.org/).   Learn how the OPTN can help you.   Talk about any concerns that you may have with a transplant  hospital.     The nations transplant system, the OPTN, is managed under federal contract by the [employer] for Organ Sharing (UNOS), which is a non-profit charitable organization. The OPTN helps create and define organ sharing policies that make the best use of donated organs. This process continuously evaluating new advances and discoveries so policies can be adapted to best serve patients waiting for transplants. To do so, the OPTN works closely with transplant professionals, transplant patients, transplant candidates, donor families, living donors, and the public. All transplant programs and organ procurement organizations throughout the country are OPTN members and are obligated to follow the policies the OPTN creates for allocating organs.     The OPTN also is responsible for:   Providing educational material for patients, the public, and professionals.   Raising awareness of the need for donated organs and tissue.   Coordinating organ procurement, matching, and placement.   Collecting information about every organ transplant and donation that occurs in the [place].     Remember, you should contact your transplant hospital directly if you have questions or concerns about your own medical care including medical records, work-up progress, and test results.     We are not your transplant hospital, and our staff will not be able to answer questions about your case, so please keep your transplant hospitals phone number handy.   However, while you research your transplant needs and learn as much as you can about transplantation and donation, we welcome your call to our toll-free patient services line at [phone].

## 2023-01-17 ENCOUNTER — HOSPITAL ENCOUNTER (OUTPATIENT)
Dept: PSYCHIATRY | Facility: HOSPITAL | Age: 58
Discharge: HOME | End: 2023-01-17
Attending: PSYCHIATRY & NEUROLOGY
Payer: MEDICARE

## 2023-01-17 DIAGNOSIS — N18.6 ESRD (END STAGE RENAL DISEASE) ON DIALYSIS: ICD-10-CM

## 2023-01-17 DIAGNOSIS — F41.1 GAD (GENERALIZED ANXIETY DISORDER): Chronic | ICD-10-CM

## 2023-01-17 DIAGNOSIS — F10.20 ALCOHOL USE DISORDER, SEVERE, DEPENDENCE: Chronic | ICD-10-CM

## 2023-01-17 DIAGNOSIS — F17.200 TOBACCO USE DISORDER: Chronic | ICD-10-CM

## 2023-01-17 DIAGNOSIS — F32.89 OTHER DEPRESSION: ICD-10-CM

## 2023-01-17 DIAGNOSIS — F12.20 CANNABIS USE DISORDER, SEVERE, DEPENDENCE: Primary | Chronic | ICD-10-CM

## 2023-01-17 DIAGNOSIS — N18.5 CKD (CHRONIC KIDNEY DISEASE) STAGE 5, GFR LESS THAN 15 ML/MIN: ICD-10-CM

## 2023-01-17 DIAGNOSIS — Z99.2 ESRD (END STAGE RENAL DISEASE) ON DIALYSIS: ICD-10-CM

## 2023-01-17 PROCEDURE — 99232 PR SUBSEQUENT HOSPITAL CARE,LEVL II: ICD-10-PCS | Mod: ,,, | Performed by: PSYCHIATRY & NEUROLOGY

## 2023-01-17 PROCEDURE — 99232 SBSQ HOSP IP/OBS MODERATE 35: CPT | Mod: ,,, | Performed by: PSYCHIATRY & NEUROLOGY

## 2023-01-17 PROCEDURE — 90853 GROUP PSYCHOTHERAPY: CPT

## 2023-01-17 NOTE — PLAN OF CARE
01/17/23 1401   Activity/Group Therapy Checklist   Group Other (Comments)  (Processing Group)   Attendance Attended   Follows Direction Followed directions   Group Interactions/Observations Interacted appropriately;Sharing;Supportive   Affect/Mood Range Normal range   Affect/Mood Display Appropriate   Goal Progression Progressing

## 2023-01-17 NOTE — PROGRESS NOTES
ADDICTION CONSULT FOLLOW UP VISIT     DEPARTMENT:  Psychiatry  SITE: [facility], [address]    DATE OF EXAMINATION: 1/17/2023  8:00 AM  DATE OF ADMISSION: 1/11/2023  LENGTH OF STAY: 0 days    EXAMINING PRACTITIONER: Armando Loo    SUBJECTIVE:     HISTORY    Patient Name: Jennifer Booth  YOB: 1965    CHIEF COMPLAINT   Jennifer Booth is a 58 y.o. female who is being seen today for a follow up visit by the addiction psychiatry consult service.  Jennifer Booth presents with the chief complaint of: cannabis, nicotine, and alcohol use    HPI & PSYCHIATRIC ROS      Came to program late today d/t dialysis (T/Th/Sat). Reports news regarding transplant wait list led to relapse on cannabis and nicotine use. Nicotine use 1/3 ppd, hoping to wean to three cigarettes a day - has nicotine replacement products from smoking cessation program she attended, has not started, has nicotine cravings, last smoked this morning before dialysis. Last used marijuana on Sunday afternoon, recreational, doesn't have any more marijuana at home, not planning on buying more, not having cravings. No recent alcohol use or cravings. Didn't attend community meetings this weekend because she wasn't sure if she was going to return. Feels motivated to continue program despite disappointing news.    PFSH: The patient's past medical history has been reviewed and updated as appropriate within the electronic medical record system.    ROS:  See HPI, otherwise negative.    PSYCHOTROPIC MEDICATIONS   Escitalopram 20 mg po qd  Mirtazapine 15 mg po qhs    Reports not currently taking Bupropion.      OBJECTIVE:     EXAMINATION    There were no vitals filed for this visit.    CONSTITUTIONAL  General Appearance: NAD, good h&g    MUSCULOSKELETAL  Abnormal Involuntary Movements: none noted; ambulates with cane    PSYCHIATRIC   Orientation: AOx3  Speech: spontaneous  Language: fluent, english  Mood: steady  Affect: mood-congruent,  reactive  Thought Process: linear  Associations: intact  Thought Content: no SI/HI, no delusions  Memory: intact to conversation  Attention and Concentration: intact; attentive  Fund of Knowledge: aware of current events, appropriate  Insight: intact  Judgment: intact      ASSESSMENT:     DIAGNOSES & PROBLEMS    Cannabis Use Disorder, severe  Alcohol Use Disorder, severe  Tobacco Use Disorder  Generalized Anxiety Disorder  Chronic Kidney Disease, End Stage Renal Disease, on dialysis  Kidney Transplant Candidate    Patient Active Problem List   Diagnosis    Constipation - functional    Multiple myeloma in remission    Renal hypertension    Hyperkalemia    Tobacco abuse counseling    Tobacco use disorder    Colon cancer screening    ESRD (end stage renal disease) on dialysis    Glomerulosclerosis    Anemia in chronic kidney disease, on chronic dialysis    GERD without esophagitis    Patient on waiting list for kidney transplant    SATNAM (generalized anxiety disorder)    Allergic rhinitis    Hyponatremia    Hypertension    Secondary hyperparathyroidism of renal origin    Volume overload    History of substance use    Dialysis AV fistula malfunction, initial encounter    CKD (chronic kidney disease) stage 5, GFR less than 15 ml/min    Acute respiratory failure with hypoxia    Metabolic acidosis    Murmur, cardiac    Cannabis use disorder, severe, dependence    Alcohol use disorder, severe, dependence    Preoperative clearance    Severe episode of recurrent major depressive disorder, without psychotic features    Chronic diarrhea    Chronic pancreatitis    Depression    Iron deficiency anemia    Non-compliance with renal dialysis    Osteopenia    Protein calorie malnutrition    Vitamin D deficiency    Injury of right thumb    Bilateral carpal tunnel syndrome    Polyp of colon    Encounter for pre-transplant evaluation for chronic kidney disease         PLAN:       MANAGEMENT PLAN, TREATMENT GOALS, THERAPEUTIC  TECHNIQUES/APPROACHES & CLINICAL REASONING    -continue home escitalopram 20 mg po qd and mirtazapine 15 mg po qhs    - Continue ORP.  - Continue ORP protocol.  - Labs: CMP, PETH, urine toxicology, alcohol biomarkers.  - Full engagement in 12 step (or equivalent) recovery program(s), including mandatory meeting attendance and acquisition of sponsor.  - Patient counseled on abstinence from alcohol and substances of abuse (illicit and prescription).  - Relapse prevention and motivational interviewing provided.    In cases of emergency, daily coverage provided by Acute/ER Psych MD, NP, or SW, with contact numbers located in [facility] On Call Schedule    Case discussed with staff addiction psychiatrist: MD LUCILLE CHATTERJEE DO, PGY-4    LABS/IMAGING/STUDIES   [unfilled]

## 2023-01-18 ENCOUNTER — LAB VISIT (OUTPATIENT)
Dept: LAB | Facility: HOSPITAL | Age: 58
End: 2023-01-18
Attending: PSYCHIATRY & NEUROLOGY
Payer: MEDICARE

## 2023-01-18 ENCOUNTER — HOSPITAL ENCOUNTER (OUTPATIENT)
Dept: PSYCHIATRY | Facility: HOSPITAL | Age: 58
Discharge: HOME | End: 2023-01-18
Attending: PSYCHIATRY & NEUROLOGY
Payer: COMMERCIAL

## 2023-01-18 VITALS
HEART RATE: 73 BPM | DIASTOLIC BLOOD PRESSURE: 84 MMHG | SYSTOLIC BLOOD PRESSURE: 128 MMHG | TEMPERATURE: 98 F | RESPIRATION RATE: 18 BRPM

## 2023-01-18 DIAGNOSIS — F10.20 ALCOHOL USE DISORDER, SEVERE, DEPENDENCE: Primary | Chronic | ICD-10-CM

## 2023-01-18 DIAGNOSIS — F10.20 ALCOHOL USE DISORDER, SEVERE, DEPENDENCE: Primary | ICD-10-CM

## 2023-01-18 DIAGNOSIS — F10.20 ALCOHOL USE DISORDER, SEVERE, DEPENDENCE: ICD-10-CM

## 2023-01-18 LAB
ALBUMIN SERPL BCP-MCNC: 3.9 G/DL (ref 3.5–5.2)
ALP SERPL-CCNC: 108 U/L (ref 55–135)
ALT SERPL W/O P-5'-P-CCNC: 5 U/L (ref 10–44)
ANION GAP SERPL CALC-SCNC: 16 MMOL/L (ref 8–16)
AST SERPL-CCNC: 12 U/L (ref 10–40)
BILIRUB SERPL-MCNC: 0.6 MG/DL (ref 0.1–1)
BUN SERPL-MCNC: 25 MG/DL (ref 6–20)
CALCIUM SERPL-MCNC: 10.8 MG/DL (ref 8.7–10.5)
CHLORIDE SERPL-SCNC: 91 MMOL/L (ref 95–110)
CO2 SERPL-SCNC: 27 MMOL/L (ref 23–29)
CREAT SERPL-MCNC: 6.8 MG/DL (ref 0.5–1.4)
EST. GFR  (NO RACE VARIABLE): 6.5 ML/MIN/1.73 M^2
ETHANOL SERPL-MCNC: <10 MG/DL
GLUCOSE SERPL-MCNC: 82 MG/DL (ref 70–110)
POTASSIUM SERPL-SCNC: 4.9 MMOL/L (ref 3.5–5.1)
PROT SERPL-MCNC: 7.4 G/DL (ref 6–8.4)
SODIUM SERPL-SCNC: 134 MMOL/L (ref 136–145)

## 2023-01-18 PROCEDURE — 82077 ASSAY SPEC XCP UR&BREATH IA: CPT | Performed by: PSYCHIATRY & NEUROLOGY

## 2023-01-18 PROCEDURE — 36415 COLL VENOUS BLD VENIPUNCTURE: CPT | Performed by: PSYCHIATRY & NEUROLOGY

## 2023-01-18 PROCEDURE — 90853 GROUP PSYCHOTHERAPY: CPT | Performed by: SOCIAL WORKER

## 2023-01-18 PROCEDURE — 90853 PR GROUP PSYCHOTHERAPY: ICD-10-PCS | Mod: ,,, | Performed by: PSYCHOLOGIST

## 2023-01-18 PROCEDURE — 80321 ALCOHOLS BIOMARKERS 1OR 2: CPT | Performed by: PSYCHIATRY & NEUROLOGY

## 2023-01-18 PROCEDURE — 90853 GROUP PSYCHOTHERAPY: CPT

## 2023-01-18 PROCEDURE — 90853 GROUP PSYCHOTHERAPY: CPT | Mod: ,,, | Performed by: PSYCHOLOGIST

## 2023-01-18 PROCEDURE — 80053 COMPREHEN METABOLIC PANEL: CPT | Performed by: PSYCHIATRY & NEUROLOGY

## 2023-01-18 NOTE — PROGRESS NOTES
Group Psychotherapy (PhD/LCSW)    Location: [facility]    Clinical status of patient: Intensive Outpatient Program (IOP)    Date: 1/18/2023    Group Focus: Psychodynamic Group Psychotherapy    Length of service: 71731 - 45-50 minutes    Number of patients in attendance: 4    Referred by: Addictive Behavior Unit Treatment Team    Target symptoms: Substance Abuse    Patient's response to treatment: Active Listening and Self-disclosure    Progress toward goals: Progressing adequately    Interval History: Pt reports that she remains sober. She shared the story of her sister's death with the group and asked for support in future groups around how to navigate her family's wishes versus her own needs to stay sober.    Diagnosis: Cannabis Use Disorder; Alcohol Use Disorder in remission    Plan: Continue treatment on ABU

## 2023-01-18 NOTE — PROGRESS NOTES
Group Psychotherapy (PhD/LCSW)    Clinical status of patient: Intensive Outpatient Program (IOP)    Date: 1/18/2023    Group Focus:  Distress Tolerance    Length of service: 01019 - 45-50 minutes    Number of patients in attendance: 8    Referred by: [facility]    Target symptoms: Substance Abuse    Patient's response to treatment: Active Listening and Self-disclosure    Progress toward goals: Progressing adequately    Interval History: Session focus was Distress Tolerance:  IMPROVE.  Patients were encouraged to use imagery, find meaning, use prayer, relax, focus on one thing in the moment, take a brief vacation, and use self-encouragement.    Diagnosis: Cannabis Use Disorder; Alcohol Use Disorder in remission    Plan: Continue treatment on ORP

## 2023-01-18 NOTE — PLAN OF CARE
01/18/23 1510   Activity/Group Therapy Checklist   Group Addiction Education   Attendance Attended   Follows Direction Followed directions   Group Interactions/Observations Interacted appropriately;Alert;Sharing;Supportive   Affect/Mood Range Normal range   Affect/Mood Display Appropriate   Goal Progression Progressing

## 2023-01-19 ENCOUNTER — HOSPITAL ENCOUNTER (OUTPATIENT)
Dept: PSYCHIATRY | Facility: HOSPITAL | Age: 58
Discharge: HOME | End: 2023-01-19
Attending: PSYCHIATRY & NEUROLOGY
Payer: MEDICARE

## 2023-01-19 DIAGNOSIS — F17.200 TOBACCO USE DISORDER: ICD-10-CM

## 2023-01-19 DIAGNOSIS — F41.1 GAD (GENERALIZED ANXIETY DISORDER): ICD-10-CM

## 2023-01-19 DIAGNOSIS — F12.20 CANNABIS USE DISORDER, SEVERE, DEPENDENCE: ICD-10-CM

## 2023-01-19 DIAGNOSIS — F10.20 ALCOHOL USE DISORDER, SEVERE, DEPENDENCE: Primary | ICD-10-CM

## 2023-01-19 PROCEDURE — 99232 SBSQ HOSP IP/OBS MODERATE 35: CPT | Mod: ,,, | Performed by: PSYCHIATRY & NEUROLOGY

## 2023-01-19 PROCEDURE — 99232 PR SUBSEQUENT HOSPITAL CARE,LEVL II: ICD-10-PCS | Mod: ,,, | Performed by: PSYCHIATRY & NEUROLOGY

## 2023-01-19 PROCEDURE — 90853 PR GROUP PSYCHOTHERAPY: ICD-10-PCS | Mod: ,,, | Performed by: PSYCHOLOGIST

## 2023-01-19 PROCEDURE — 90853 GROUP PSYCHOTHERAPY: CPT | Mod: ,,, | Performed by: PSYCHOLOGIST

## 2023-01-19 PROCEDURE — 90853 GROUP PSYCHOTHERAPY: CPT | Performed by: SOCIAL WORKER

## 2023-01-19 RX ORDER — BUPROPION HYDROCHLORIDE 75 MG/1
75 TABLET ORAL EVERY MORNING
Qty: 30 TABLET | Refills: 0 | Status: SHIPPED | OUTPATIENT
Start: 2023-01-19 | End: 2023-02-18

## 2023-01-19 NOTE — PROGRESS NOTES
Group Psychotherapy (PhD/LCSW)    Location: [facility]    Clinical status of patient: Intensive Outpatient Program (IOP)    Date: 1/19/2023    Group Focus: Psychodynamic Group Psychotherapy    Length of service: 43531 - 45-50 minutes    Number of patients in attendance: 3    Referred by: Addictive Behavior Unit Treatment Team    Target symptoms: Substance Abuse    Patient's response to treatment: Active Listening and Self-disclosure    Progress toward goals: Progressing adequately    Interval History: Pt reports that she remains sober. She shared her increased irritability with group, and was open to suggestions on how to work on this in early recovery.    Diagnosis: Cannabis Use Disorder; Alcohol Use Disorder in remission    Plan: Continue treatment on ABU

## 2023-01-19 NOTE — PROGRESS NOTES
Group Psychotherapy (PhD/LCSW)    Site: [facility] ([place], [place])    Clinical status of patient: Intensive Outpatient Program (IOP)    Date: 1/19/2023    Group Focus: ACT- Values    Length of service: 68704 - 45-50 minutes    Number of patients in attendance: 9    Referred by: [facility]    Target symptoms: Depression, anxiety    Patient's response to treatment: Active Listening and Self-disclosure    Progress toward goals: Progressing appropriately    Interval History: Session focus was Values Clarification.  Reviewed the distinction between values and goals. Discussed the definition of values as being freely chosen, controllable, and action based. Patients were led in a guided meditation then asked to briefly journal about what they noticed in the activity. Patients then discussed their values in certain life domains, their importance rating, and how their behavior in the past month has or has not been consistent with their values.     Risk parameters:  Patient reports no suicidal ideation  Patient reports no homicidal ideation  Patient reports no self-injurious behavior  Patient reports no violent behavior    Diagnosis:     ICD-10-CM ICD-9-CM   1. Alcohol use disorder, severe, dependence  F10.20 303.90   2. Cannabis use disorder, severe, dependence  F12.20 304.30   3. Tobacco use disorder  F17.200 305.1   4. SATNAM (generalized anxiety disorder)  F41.1 300.02           Plan: Continue treatment on ORP

## 2023-01-19 NOTE — PATIENT CARE CONFERENCE
"Diagnosis:     Generalized Anxiety Disorder   Tobacco Use disorder - moderate   Cannabis Use Disorder - moderate       HPI:     In Summary:    Per Dr. Armenta's eval 1/05/2023:      Patient is kidney transplant candidate. Patient previously completed virtual ORP in 2021.  She successfully completed the ORP to address alcohol and cannabis use disorders.  Unfortunately she failed to follow through with aftercare and subsequently relapsed to daily cannabis and intermittent alcohol use in September 2021 after death of sister.       Today patient reports she has relapsed as a result of some major life stressors. Reports she lost her sister due to "mysterious causes" and also reports a significant neck injury requiring significant physical therapy. Patient reports she relapsed smoking tobacco and cannabis since September 2021. States she has been smoking 1/3 of a pack of cigarettes/day and smoking "two cigarettes worth of cannabis" a day 3 days/week. Patient reports that she has a number of boxes of nicotine replacement patches, gums, etc she plans to start using.      Patient does report infrequent alcohol consumption, the last of which was on New Years Eveline 2022. Reports "I only have a couple of drinks when we have parties." States that she would have 3-4 drinks on these rare occasions.      Patient continues to be on Kidney transplant list, states that she was sent back to psychiatry for evaluation of her substance use and reports understanding that she must complete the program in order to comply with needs of transplant program. States that the reason she is back is due to being found positive for marijuana on a transplant drug test.     Medications:    - lexapro 20mg daily   -remeron 15mg nightly  -wellbutrin 75mg daily     Progress:  Patient staffed by team. Patient reported relapse over the weekend due to news from transplant team. Patient is now required to complete IOP and 1 year of sobriety before transplant. " Team to schedule Family Meeting with patient's daughter. Team to continue to monitor patient's progress.     Plan of Care:   Patient to continue ORP with group and individual therapies.     Aftercare/followup:   Med Management: TBD  Psychotherapy: TBD    Staff Present:    MD Jayna Jimenez, PhD  Thuan Hahn, [facility]  Mary Dior, [facility]  Pooja Diaz, [facility]  Valerie Lennon, W  Ronal Westbrook LPN    Resident: Berry Diana MD

## 2023-01-19 NOTE — PROGRESS NOTES
ADDICTION CONSULT FOLLOW UP VISIT     DEPARTMENT:  Psychiatry  SITE: [facility] [address]    DATE OF EXAMINATION: 1/19/2023  8:00 AM  DATE OF ADMISSION: 1/11/2023    EXAMINING PRACTITIONER: Armando Loo    SUBJECTIVE:     HISTORY    Patient Name: Jennifer Booth  YOB: 1965    CHIEF COMPLAINT   Jennifer Booth is a 58 y.o. female who is being seen today for a follow up visit by the addiction psychiatry consult service.  Jennifer Booth presents with the chief complaint of: cannabis, nicotine, and alcohol use    HPI & PSYCHIATRIC ROS    Had dialysis this morning. Feels more positive and motivated since last week. Feels supported by other members in the group. Cigarette cravings, trying to buy one pack for a week - has nicotine replacement products. Found Wellbutrin helpful. Stopped smoking weed last Saturday. Expressed smoking/drinking as coping mechanism rooted in cultural/community. Daughter smokes and drinks but not in front of her, patient ambivalent about daughter's willingness to quit to support patient's journey. Engaged in discussion of substance abuse and learning/building coping skills.    PFSH: The patient's past medical history has been reviewed and updated as appropriate within the electronic medical record system.    ROS:  See HPI, otherwise negative.    PSYCHOTROPIC MEDICATIONS   Escitalopram 20 mg po qd  Mirtazapine 15 mg po qhs    OBJECTIVE:     EXAMINATION    There were no vitals filed for this visit.    CONSTITUTIONAL  General Appearance: NAD, good h&g    MUSCULOSKELETAL  Abnormal Involuntary Movements: none noted; ambulates with cane    PSYCHIATRIC   Orientation: AOx3  Speech: spontaneous  Language: fluent, english  Mood: steady  Affect: mood-congruent, reactive  Thought Process: linear  Associations: intact  Thought Content: no SI/HI, no delusions  Memory: intact to conversation  Attention and Concentration: intact; attentive  Fund of Knowledge: aware of  current events, appropriate  Insight: intact  Judgment: intact      ASSESSMENT:     DIAGNOSES & PROBLEMS    Cannabis Use Disorder, severe  Alcohol Use Disorder, severe  Tobacco Use Disorder  Generalized Anxiety Disorder  Chronic Kidney Disease, End Stage Renal Disease, on dialysis  Kidney Transplant Candidate    Patient Active Problem List   Diagnosis    Constipation - functional    Multiple myeloma in remission    Renal hypertension    Hyperkalemia    Tobacco abuse counseling    Tobacco use disorder    Colon cancer screening    ESRD (end stage renal disease) on dialysis    Glomerulosclerosis    Anemia in chronic kidney disease, on chronic dialysis    GERD without esophagitis    Patient on waiting list for kidney transplant    SATNAM (generalized anxiety disorder)    Allergic rhinitis    Hyponatremia    Hypertension    Secondary hyperparathyroidism of renal origin    Volume overload    History of substance use    Dialysis AV fistula malfunction, initial encounter    CKD (chronic kidney disease) stage 5, GFR less than 15 ml/min    Acute respiratory failure with hypoxia    Metabolic acidosis    Murmur, cardiac    Cannabis use disorder, severe, dependence    Alcohol use disorder, severe, dependence    Preoperative clearance    Severe episode of recurrent major depressive disorder, without psychotic features    Chronic diarrhea    Chronic pancreatitis    Depression    Iron deficiency anemia    Non-compliance with renal dialysis    Osteopenia    Protein calorie malnutrition    Vitamin D deficiency    Injury of right thumb    Bilateral carpal tunnel syndrome    Polyp of colon    Encounter for pre-transplant evaluation for chronic kidney disease         PLAN:       MANAGEMENT PLAN, TREATMENT GOALS, THERAPEUTIC TECHNIQUES/APPROACHES & CLINICAL REASONING    -continue home escitalopram 20 mg po qd and mirtazapine 15 mg po qhs  -start Bupropion IR 75 mg po qd    - Continue ORP.  - Continue ORP protocol.  - Labs: CMP, PETH, urine  toxicology, alcohol biomarkers.  - Full engagement in 12 step (or equivalent) recovery program(s), including mandatory meeting attendance and acquisition of sponsor.  - Patient counseled on abstinence from alcohol and substances of abuse (illicit and prescription).  - Relapse prevention and motivational interviewing provided.    In cases of emergency, daily coverage provided by Acute/ER Psych MD, NP, or SW, with contact numbers located in [facility] On Call Schedule    Case discussed with staff addiction psychiatrist: MD LUCILLE CHATTERJEE DO, PGY-4    LABS/IMAGING/STUDIES   [unfilled]

## 2023-01-19 NOTE — PLAN OF CARE
01/18/23 1100   Activity/Group Therapy Checklist   Group Educational   Attendance Attended   Group Interactions/Observations Did not interact

## 2023-01-20 ENCOUNTER — LAB VISIT (OUTPATIENT)
Dept: LAB | Facility: HOSPITAL | Age: 58
End: 2023-01-20
Attending: PSYCHIATRY & NEUROLOGY
Payer: MEDICARE

## 2023-01-20 ENCOUNTER — HOSPITAL ENCOUNTER (OUTPATIENT)
Dept: PSYCHIATRY | Facility: HOSPITAL | Age: 58
Discharge: HOME | End: 2023-01-20
Attending: PSYCHIATRY & NEUROLOGY
Payer: COMMERCIAL

## 2023-01-20 DIAGNOSIS — F10.20 ALCOHOL USE DISORDER, SEVERE, DEPENDENCE: ICD-10-CM

## 2023-01-20 DIAGNOSIS — F10.20 ALCOHOL USE DISORDER, SEVERE, DEPENDENCE: Primary | ICD-10-CM

## 2023-01-20 LAB
CLINICAL BIOCHEMIST REVIEW: NORMAL
ETHANOL SERPL-MCNC: <10 MG/DL
PLPETH BLD-MCNC: 20 NG/ML
POPETH BLD-MCNC: 32 NG/ML

## 2023-01-20 PROCEDURE — 90853 GROUP PSYCHOTHERAPY: CPT | Performed by: SOCIAL WORKER

## 2023-01-20 PROCEDURE — 36415 COLL VENOUS BLD VENIPUNCTURE: CPT | Performed by: PSYCHIATRY & NEUROLOGY

## 2023-01-20 PROCEDURE — 90853 GROUP PSYCHOTHERAPY: CPT

## 2023-01-20 PROCEDURE — 82077 ASSAY SPEC XCP UR&BREATH IA: CPT | Performed by: PSYCHIATRY & NEUROLOGY

## 2023-01-20 RX ORDER — NALTREXONE HYDROCHLORIDE 50 MG/1
TABLET, FILM COATED ORAL
Qty: 30 TABLET | Refills: 0 | Status: SHIPPED | OUTPATIENT
Start: 2023-01-20 | End: 2023-04-20 | Stop reason: SDUPTHER

## 2023-01-20 NOTE — PROGRESS NOTES
"ADDICTION CONSULT FOLLOW UP VISIT     DEPARTMENT:  Psychiatry  SITE: [facility], [address]    DATE OF EXAMINATION: 1/20/2023  8:00 AM  DATE OF ADMISSION: 1/11/2023    EXAMINING PRACTITIONER: Armando Loo    SUBJECTIVE:     HISTORY    Patient Name: Jennifer Booth  YOB: 1965    CHIEF COMPLAINT   Jennifer Booth is a 58 y.o. female who is being seen today for a follow up visit by the addiction psychiatry consult service.  Jennifer Booth presents with the chief complaint of: cannabis, nicotine, and alcohol use    HPI & PSYCHIATRIC ROS    Doing well. Some situational irritation. Program and groups going well. Did not attend meetings since last visit, will meet with Ronal who can help her out with doing virtual meetings - planning on doubling up through the weekend because during the week she is exhausted. Has been so exhausted that she described urges as "imaging myself rolling up a blunt" twice yesterday evening. Denies history of MAT - would like to start Naltrexone. Interested in establishing psychotherapy after completion of program.    No recent alcohol or THC use. +nicotine, tapering use    PFSH: The patient's past medical history has been reviewed and updated as appropriate within the electronic medical record system.    ROS:  See HPI, otherwise negative for HA/CP/SOB/n/v/d.  +chills/sweats ongoing r/t menopause    PSYCHOTROPIC MEDICATIONS   Escitalopram 20 mg po qd  Mirtazapine 15 mg po qhs  Bupropion IR 75 mg po qd - filled and will  today 1/20      OBJECTIVE:     EXAMINATION    There were no vitals filed for this visit.    CONSTITUTIONAL  General Appearance: NAD, good h&g    MUSCULOSKELETAL  Abnormal Involuntary Movements: none noted; ambulates with cane    PSYCHIATRIC  Orientation: AOx3  Speech: spontaneous  Language: fluent, english  Mood: steady, euthymic  Affect: mood-congruent, reactive  Thought Process: linear  Associations: intact  Thought Content: no SI/HI, " no delusions  Memory: intact to conversation, 3/3 immediate & 3-min recall  Attention and Concentration: intact; attentive, WORLD forwards/backwards  Fund of Knowledge: aware of current events, appropriate  Insight: intact  Judgment: intact    ASSESSMENT:     DIAGNOSES & PROBLEMS    Cannabis Use Disorder, severe  Alcohol Use Disorder, severe  Tobacco Use Disorder  Generalized Anxiety Disorder  Chronic Kidney Disease, End Stage Renal Disease, on dialysis  Kidney Transplant Candidate    Patient Active Problem List   Diagnosis    Constipation - functional    Multiple myeloma in remission    Renal hypertension    Hyperkalemia    Tobacco abuse counseling    Tobacco use disorder    Colon cancer screening    ESRD (end stage renal disease) on dialysis    Glomerulosclerosis    Anemia in chronic kidney disease, on chronic dialysis    GERD without esophagitis    Patient on waiting list for kidney transplant    SATNAM (generalized anxiety disorder)    Allergic rhinitis    Hyponatremia    Hypertension    Secondary hyperparathyroidism of renal origin    Volume overload    History of substance use    Dialysis AV fistula malfunction, initial encounter    CKD (chronic kidney disease) stage 5, GFR less than 15 ml/min    Acute respiratory failure with hypoxia    Metabolic acidosis    Murmur, cardiac    Cannabis use disorder, severe, dependence    Alcohol use disorder, severe, dependence    Preoperative clearance    Severe episode of recurrent major depressive disorder, without psychotic features    Chronic diarrhea    Chronic pancreatitis    Depression    Iron deficiency anemia    Non-compliance with renal dialysis    Osteopenia    Protein calorie malnutrition    Vitamin D deficiency    Injury of right thumb    Bilateral carpal tunnel syndrome    Polyp of colon    Encounter for pre-transplant evaluation for chronic kidney disease     PLAN:     MANAGEMENT PLAN, TREATMENT GOALS, THERAPEUTIC TECHNIQUES/APPROACHES & CLINICAL  REASONING    -continue home escitalopram 20 mg po qd and mirtazapine 15 mg po qhs  -start Bupropion IR 75 mg po qd  -start Naltrexone 25 mg po qd    - Continue ORP.  - Continue ORP protocol.  - Labs: CMP, PETH, urine toxicology, alcohol biomarkers.  - Full engagement in 12 step (or equivalent) recovery program(s), including mandatory meeting attendance and acquisition of sponsor.  - Patient counseled on abstinence from alcohol and substances of abuse (illicit and prescription).  - Relapse prevention and motivational interviewing provided.    In cases of emergency, daily coverage provided by Acute/ER Psych MD, NP, or SW, with contact numbers located in [facility] [address] On Call Schedule    Case discussed with staff addiction psychiatrist: MD LUCILLE CHATTERJEE DO, PGY-4    LABS/IMAGING/STUDIES   [unfilled]

## 2023-01-20 NOTE — PLAN OF CARE
"   01/20/23 1234   Activity/Group Therapy Checklist   Group Other (Comments)  (Self Care)   Attendance Attended   Follows Direction Followed directions   Group Interactions/Observations Interacted appropriately;Alert;Sharing;Supportive   Affect/Mood Range Normal range   Affect/Mood Display Appropriate   Goal Progression Progressing      led self care group with patients. SW discussed self care and the importance of implementing healthy balances for their mental , physical and emotional well-being. Sw discussed the self care wheel and allowed pt's to create their own to focus on balance and remind themselves that "self" should be a priority.  "

## 2023-01-20 NOTE — MEDICAL/APP STUDENT
"ADDICTION CONSULT FOLLOW UP VISIT     DEPARTMENT:  Psychiatry  SITE: [facility], [address]    DATE OF ADMISSION: 1/20/2023  8:00 AM  LENGTH OF STAY: 0 days    EXAMINING PRACTITIONER: eLwis Pelaez      SUBJECTIVE:     HISTORY    Patient Name: Jennifer Booth  YOB: 1965    CHIEF COMPLAINT   Jennifer Booth is a 58 y.o. female who is being seen today for a follow up visit by the addiction psychiatry consult service.  Jennifer Booth presents with the chief complaint of: cannabis use disorder, smoking cessation    HPI & PSYCHIATRIC ROS    - General assessment   "Pretty good", a bit on edge   Groups: finding helpful  Meetings: no meetings yet, interested in Zoom meetings, intends to call in during dialysis in mornings  Cravings: visualizations of cravings, visualized rolling joint, never experienced before  - Substance use   Alcohol: holidays  Cigarettes: ~3 per day this week, self taper going well  Cannabis: last Friday as previously reported  - Progress  Goals: maintaining counseling, getting healthy enough for kidney transplant, learning more coping strategies w/ in person training  Program: positive impression, talking about issues in group settings helpful, looking forward to meditation  Previous medications for treatment: Tried bupropion in past and worked well, prevented cravings, filling new prescription today     - Naltrexone discussed, pt agreed to begin 50 mg PO daily, warned of side effects      PFSH: The patient's past medical history has been reviewed and updated as appropriate within the electronic medical record system.    ROS:    - Cardio (tachycardia, palpitations, chest pain) - nil  - Resp (shortness of breath, cough) - nil  - GI (nausea/vomiting, diarrhea, abdominal pain) - nil  - Constititional (fever, chills, sweats, tremors) - chills & sweats, a/w menopause (10y) comes and goes  - Neuro (headaches, numbness/tingling) - previous cervical spinal surgery, some " neck pain a/w it  - UG (frequency, pain) - nil      PSYCHOTROPIC MEDICATIONS   - Mirtazapine 15 mg PO nightly  - Escitalopram 20 mg PO daily   - Melatonin 5 mg PO  - NEW: bupropion 75 mg PO daily  - NEW: naltrexone 50 mg PO daily      OBJECTIVE:     EXAMINATION    There were no vitals filed for this visit.    CONSTITUTIONAL  General Appearance: well-kempt, dressed appropriately    MUSCULOSKELETAL  Abnormal Involuntary Movements: nil    PSYCHIATRIC   Orientation: AOx3  Speech: organized  Language: fluent, English  Mood: euthymic  Affect: reactive, appropriate  Thought Process: linear  Associations: logical  Thought Content: no SI/HI, appropriate  Memory: intact  Attention and Concentration: intact  Fund of Knowledge: appropriate  Insight: understands association between addiction, behaviors, issues, and goals  Judgment: intact      ASSESSMENT:     DIAGNOSES & PROBLEMS    - tobacco use, alcohol use disorder, cannabis use disorder    Patient Active Problem List   Diagnosis    Constipation - functional    Multiple myeloma in remission    Renal hypertension    Hyperkalemia    Tobacco abuse counseling    Tobacco use disorder    Colon cancer screening    ESRD (end stage renal disease) on dialysis    Glomerulosclerosis    Anemia in chronic kidney disease, on chronic dialysis    GERD without esophagitis    Patient on waiting list for kidney transplant    SATNAM (generalized anxiety disorder)    Allergic rhinitis    Hyponatremia    Hypertension    Secondary hyperparathyroidism of renal origin    Volume overload    History of substance use    Dialysis AV fistula malfunction, initial encounter    CKD (chronic kidney disease) stage 5, GFR less than 15 ml/min    Acute respiratory failure with hypoxia    Metabolic acidosis    Murmur, cardiac    Cannabis use disorder, severe, dependence    Alcohol use disorder, severe, dependence    Preoperative clearance    Severe episode of recurrent major depressive disorder, without psychotic  features    Chronic diarrhea    Chronic pancreatitis    Depression    Iron deficiency anemia    Non-compliance with renal dialysis    Osteopenia    Protein calorie malnutrition    Vitamin D deficiency    Injury of right thumb    Bilateral carpal tunnel syndrome    Polyp of colon    Encounter for pre-transplant evaluation for chronic kidney disease         PLAN:       MANAGEMENT PLAN, TREATMENT GOALS, THERAPEUTIC TECHNIQUES/APPROACHES & CLINICAL REASONING    - Initiating naltrexone and bupropion  - Continuing in IOP  Adherence to meeting attendance guidelines      In cases of emergency, daily coverage provided by Acute/ER Psych MD, NP, or SW, with contact numbers located in [facility] [address] On Call Schedule    Case discussed with staff addiction psychiatrist: MD Lewis CHATTERJEE, Y[employer] Medical Student    LABS/IMAGING/STUDIES   [unfilled]

## 2023-01-20 NOTE — PLAN OF CARE
01/20/23 1411   Activity/Group Therapy Checklist   Group Other (Comments)  (Processing Group)   Attendance Attended   Follows Direction Followed directions   Group Interactions/Observations Interacted appropriately;Sharing;Supportive   Affect/Mood Range Normal range   Affect/Mood Display Appropriate   Goal Progression Progressing

## 2023-01-23 ENCOUNTER — HOSPITAL ENCOUNTER (OUTPATIENT)
Dept: PSYCHIATRY | Facility: HOSPITAL | Age: 58
Discharge: HOME | End: 2023-01-23
Attending: PSYCHIATRY & NEUROLOGY
Payer: COMMERCIAL

## 2023-01-23 ENCOUNTER — LAB VISIT (OUTPATIENT)
Dept: LAB | Facility: HOSPITAL | Age: 58
End: 2023-01-23
Attending: PSYCHIATRY & NEUROLOGY
Payer: MEDICARE

## 2023-01-23 VITALS
DIASTOLIC BLOOD PRESSURE: 79 MMHG | HEART RATE: 63 BPM | RESPIRATION RATE: 18 BRPM | TEMPERATURE: 97 F | SYSTOLIC BLOOD PRESSURE: 123 MMHG

## 2023-01-23 DIAGNOSIS — F10.20 ALCOHOL USE DISORDER, SEVERE, DEPENDENCE: Primary | ICD-10-CM

## 2023-01-23 DIAGNOSIS — N18.6 ESRD (END STAGE RENAL DISEASE) ON DIALYSIS: ICD-10-CM

## 2023-01-23 DIAGNOSIS — F17.200 TOBACCO USE DISORDER: Chronic | ICD-10-CM

## 2023-01-23 DIAGNOSIS — F10.20 ALCOHOL USE DISORDER, SEVERE, DEPENDENCE: Chronic | ICD-10-CM

## 2023-01-23 DIAGNOSIS — F41.1 GAD (GENERALIZED ANXIETY DISORDER): Chronic | ICD-10-CM

## 2023-01-23 DIAGNOSIS — N18.5 CKD (CHRONIC KIDNEY DISEASE) STAGE 5, GFR LESS THAN 15 ML/MIN: ICD-10-CM

## 2023-01-23 DIAGNOSIS — Z99.2 ESRD (END STAGE RENAL DISEASE) ON DIALYSIS: ICD-10-CM

## 2023-01-23 DIAGNOSIS — F12.20 CANNABIS USE DISORDER, SEVERE, DEPENDENCE: Primary | Chronic | ICD-10-CM

## 2023-01-23 DIAGNOSIS — F32.89 OTHER DEPRESSION: ICD-10-CM

## 2023-01-23 DIAGNOSIS — F10.20 ALCOHOL USE DISORDER, SEVERE, DEPENDENCE: ICD-10-CM

## 2023-01-23 LAB — ETHANOL SERPL-MCNC: <10 MG/DL

## 2023-01-23 PROCEDURE — 36415 COLL VENOUS BLD VENIPUNCTURE: CPT | Performed by: PSYCHIATRY & NEUROLOGY

## 2023-01-23 PROCEDURE — 99232 PR SUBSEQUENT HOSPITAL CARE,LEVL II: ICD-10-PCS | Mod: ,,, | Performed by: PSYCHIATRY & NEUROLOGY

## 2023-01-23 PROCEDURE — 90853 GROUP PSYCHOTHERAPY: CPT | Mod: ,,, | Performed by: PSYCHOLOGIST

## 2023-01-23 PROCEDURE — 90853 GROUP PSYCHOTHERAPY: CPT | Performed by: SOCIAL WORKER

## 2023-01-23 PROCEDURE — 99232 SBSQ HOSP IP/OBS MODERATE 35: CPT | Mod: ,,, | Performed by: PSYCHIATRY & NEUROLOGY

## 2023-01-23 PROCEDURE — 82077 ASSAY SPEC XCP UR&BREATH IA: CPT | Performed by: PSYCHIATRY & NEUROLOGY

## 2023-01-23 PROCEDURE — 90853 GROUP PSYCHOTHERAPY: CPT

## 2023-01-23 PROCEDURE — 90853 PR GROUP PSYCHOTHERAPY: ICD-10-PCS | Mod: ,,, | Performed by: PSYCHOLOGIST
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
## 2023-01-23 NOTE — MEDICAL/APP STUDENT
"ADDICTION CONSULT FOLLOW UP VISIT     DEPARTMENT:  Psychiatry  SITE: [facility], [address]    DATE OF ADMISSION: 1/23/2023  8:00 AM  LENGTH OF STAY: 0 days    EXAMINING PRACTITIONER: Lewis Pelaez Y3 Medical Student      SUBJECTIVE:     HISTORY    Patient Name: Jennifer Booth  YOB: 1965    CHIEF COMPLAINT   Jennifer Booth is a 58 y.o. female who is being seen today for a follow up visit by the addiction psychiatry consult service.  Jennifer Booth presents with the chief complaint of: cannabis use disorder, smoking cessation    HPI & PSYCHIATRIC ROS    General assessment   - "good"   - guilt a/w sister's death, was previous trigger for relapse, pursuing counseling advice   - missed Saturday dialysis appointment, too tired to go, adjusted intake to compensate   - Mood  Weekend: "melancholy", attributes to weather, exhaustion, not having anything to do    - Cravings: visualization over weekend in dream   - Symptoms: dreams of feeling lost  Program   - Impression: going well, enjoys meditation sesion   - Groups: enjoys sharing and feedback   - Meetings: none yet, first one planned for this evening (4-6) on Zoom; placed on alert for program requirement nonadherence  Substance use   - Cigarettes: 3/day, inc. on weekend; 1 so far this morning   - Cannabis: removed all cannabis & paraphernalia from house  Progress   - Goals: feels like she is progressing   - Rx: has not started new prescriptions, has not restarted nicotine replacements    Summary:  Patient is steady in program and mood. She reports slightly low mood but no significant impairment. Cravings for cannabis have occurred but remain manageable. She described feeling exhausted over the weekend and attributed her missed dialysis appointment and the delay in filling her prescriptions to this tiredness.     Patient reports smoking 3 cigarettes per day as path to complete abstinence and has not used cannabis since last reported " use.    She continues to engage in program, except for meeting attendance, and finds the meditation and counseling sessions useful.     PFSH: The patient's past medical history has been reviewed and updated as appropriate within the electronic medical record system.    ROS:  - Cardio (tachycardia, palpitations, chest pain) - nil  - Resp (shortness of breath, cough) - nil  - GI (nausea/vomiting, diarrhea, abdominal pain) - nil  - Constititional (fever, chills, sweats, tremors) - hot flashes  - Neuro (headaches, numbness/tingling) - nil  - UG (frequency, pain) - nil      PSYCHOTROPIC MEDICATIONS   - Mirtazapine 15 mg PO nightly  - Escitalopram 20 mg PO daily   - Melatonin 5 mg PO  - NEW: bupropion 75 mg PO daily, not started  - NEW: naltrexone 50 mg PO daily, not started      OBJECTIVE:     EXAMINATION    Vitals:    01/23/23 0900   BP: 123/79   Pulse: 63   Resp: 18   Temp: 96.5 °F (35.8 °C)       CONSTITUTIONAL  General Appearance: well-kempt    MUSCULOSKELETAL  Abnormal Involuntary Movements: nil    PSYCHIATRIC   Orientation: A&Ox3  Speech: logical  Language: fluent, English  Mood: euthymic  Affect: reactive, appropriate  Thought Process: linear  Associations: intact  Thought Content: appropriate to context and conversation  Memory: appropriate to conversation  Attention and Concentration: appropriate to conversation  Fund of Knowledge: appropriate  Insight: associates issues and behaviors  Judgment: intact      ASSESSMENT:     DIAGNOSES & PROBLEMS    - tobacco use, cannabis use disorder, alcohol use disorder    Patient Active Problem List   Diagnosis    Constipation - functional    Multiple myeloma in remission    Renal hypertension    Hyperkalemia    Tobacco abuse counseling    Tobacco use disorder    Colon cancer screening    ESRD (end stage renal disease) on dialysis    Glomerulosclerosis    Anemia in chronic kidney disease, on chronic dialysis    GERD without esophagitis    Patient on waiting list for kidney  transplant    SATNAM (generalized anxiety disorder)    Allergic rhinitis    Hyponatremia    Hypertension    Secondary hyperparathyroidism of renal origin    Volume overload    History of substance use    Dialysis AV fistula malfunction, initial encounter    CKD (chronic kidney disease) stage 5, GFR less than 15 ml/min    Acute respiratory failure with hypoxia    Metabolic acidosis    Murmur, cardiac    Cannabis use disorder, severe, dependence    Alcohol use disorder, severe, dependence    Preoperative clearance    Severe episode of recurrent major depressive disorder, without psychotic features    Chronic diarrhea    Chronic pancreatitis    Depression    Iron deficiency anemia    Non-compliance with renal dialysis    Osteopenia    Protein calorie malnutrition    Vitamin D deficiency    Injury of right thumb    Bilateral carpal tunnel syndrome    Polyp of colon    Encounter for pre-transplant evaluation for chronic kidney disease         PLAN:       MANAGEMENT PLAN, TREATMENT GOALS, THERAPEUTIC TECHNIQUES/APPROACHES & CLINICAL REASONING    - Emphasized importance of meeting attendance and reminded patient that attendance is necessary for continuation in IOP; patient placed on alert for nonadherence.  - Reminded to fill prescriptions and begin new medications as well as continue with existing medications.        In cases of emergency, daily coverage provided by Acute/ER Psych MD, NP, or SW, with contact numbers located in [facility] On Call Schedule    Case discussed with staff addiction psychiatrist: GREGORY OLSEN MD      LABS/IMAGING/STUDIES   [unfilled]

## 2023-01-23 NOTE — PLAN OF CARE
"   01/23/23 1212   Activity/Group Therapy Checklist   Group Meditation/Relaxation   Attendance Attended   Follows Direction Followed directions   Group Interactions/Observations Interacted appropriately;Alert;Sharing;Supportive   Affect/Mood Range Normal range   Affect/Mood Display Appropriate   Goal Progression Progressing       Discussed mindfulness as a practice, the definition of mindfulness, and various research evidence to strengthen benefits of mindfulness. Engaged in "thought bubble" meditation where members were invited to witness body sensations, breath, and thoughts coming and going without attaching onto them.    "

## 2023-01-23 NOTE — PLAN OF CARE
01/23/23 1400   Activity/Group Therapy Checklist   Group Goals/Reflection   Attendance Attended   Follows Direction Followed directions   Group Interactions/Observations Interacted appropriately   Affect/Mood Range Normal range   Affect/Mood Display Appropriate

## 2023-01-23 NOTE — PLAN OF CARE
01/20/23 1400   Activity/Group Therapy Checklist   Group Goals/Reflection   Attendance Attended   Follows Direction Followed directions   Group Interactions/Observations Interacted appropriately   Affect/Mood Range Normal range   Affect/Mood Display Appropriate   Goal Progression  --

## 2023-01-23 NOTE — PROGRESS NOTES
"ADDICTION CONSULT FOLLOW UP VISIT     DEPARTMENT:  Psychiatry  SITE: [facility], [address]    DATE OF EXAMINATION: 1/23/2023  8:00 AM  DATE OF ADMISSION: 1/11/2023    EXAMINING PRACTITIONER: Ross Wiedemann    SUBJECTIVE:     HISTORY    Patient Name: Jennifer Booth  YOB: 1965    CHIEF COMPLAINT   Jennifer Booth is a 58 y.o. female who is being seen today for a follow up visit by the addiction psychiatry consult service.  Jennifer Booth presents with the chief complaint of: cannabis, nicotine, and alcohol use    HPI & PSYCHIATRIC ROS    General assessment              - "good"              - guilt a/w sister's death, was previous trigger for relapse, pursuing counseling advice              - missed Saturday dialysis appointment, too tired to go, adjusted intake to compensate              - Mood  Weekend: "maybe a little blue", attributes to dreary weather, exhaustion, not having anything to do               - Cravings: visualization over weekend in dream              - Symptoms: dreams of feeling lost  Program              - Impression: going well, enjoys meditation sesion              - Groups: enjoys sharing and feedback              - Meetings: none yet, first one planned for this evening (4-6), Zoom; placed on alert for program requirement nonadherence  Substance use              - Cigarettes: 3/day, inc. on weekend; 1 so far this morning              - Cannabis: got rid of all cannabis from house  Progress              - Goals: feels like she is progressing              - Rx: has not started new prescriptions, has not restarted nicotine replacements                   PFSH: The patient's past medical history has been reviewed and updated as appropriate within the electronic medical record system.     ROS:  - Cardio (tachycardia, palpitations, chest pain) - denies   - Resp (shortness of breath, cough) - denies   - GI (nausea/vomiting, diarrhea, abdominal pain) - denies   - " Constititional (fever, chills, sweats, tremors) - hot flashes - related to menopause   - Neuro (headaches, numbness/tingling) - denies   - UG (frequency, pain) - denies     PFSH: The patient's past medical history has been reviewed and updated as appropriate within the electronic medical record system.    ROS:  See HPI, otherwise negative for HA/CP/SOB/n/v/d.  +chills/sweats ongoing r/t menopause    PSYCHOTROPIC MEDICATIONS   Escitalopram 20 mg po qd  Mirtazapine 15 mg po qhs  Bupropion IR 75 mg po qd - filled and will  today       OBJECTIVE:     EXAMINATION    Vitals:    01/23/23 0900   BP: 123/79   Pulse: 63   Resp: 18   Temp: 96.5 °F (35.8 °C)       CONSTITUTIONAL  General Appearance: NAD, good h&g    MUSCULOSKELETAL  Abnormal Involuntary Movements: none noted; ambulates with cane    PSYCHIATRIC  Orientation: AOx3  Speech: spontaneous  Language: fluent, english  Mood: steady, euthymic  Affect: mood-congruent, reactive  Thought Process: linear, organized   Associations: intact  Thought Content: no SI/HI, no delusions  Memory: intact to conversation, 3/3 immediate & 3-min recall  Attention and Concentration: intact; attentive, WORLD forwards/backwards  Fund of Knowledge: aware of current events, appropriate  Insight: intact  Judgment: intact    ASSESSMENT:     DIAGNOSES & PROBLEMS    Cannabis Use Disorder, severe  Alcohol Use Disorder, severe  Tobacco Use Disorder  Generalized Anxiety Disorder  Chronic Kidney Disease, End Stage Renal Disease, on dialysis  Kidney Transplant Candidate    Patient Active Problem List   Diagnosis    Constipation - functional    Multiple myeloma in remission    Renal hypertension    Hyperkalemia    Tobacco abuse counseling    Tobacco use disorder    Colon cancer screening    ESRD (end stage renal disease) on dialysis    Glomerulosclerosis    Anemia in chronic kidney disease, on chronic dialysis    GERD without esophagitis    Patient on waiting list for kidney transplant    SATNAM  (generalized anxiety disorder)    Allergic rhinitis    Hyponatremia    Hypertension    Secondary hyperparathyroidism of renal origin    Volume overload    History of substance use    Dialysis AV fistula malfunction, initial encounter    CKD (chronic kidney disease) stage 5, GFR less than 15 ml/min    Acute respiratory failure with hypoxia    Metabolic acidosis    Murmur, cardiac    Cannabis use disorder, severe, dependence    Alcohol use disorder, severe, dependence    Preoperative clearance    Severe episode of recurrent major depressive disorder, without psychotic features    Chronic diarrhea    Chronic pancreatitis    Depression    Iron deficiency anemia    Non-compliance with renal dialysis    Osteopenia    Protein calorie malnutrition    Vitamin D deficiency    Injury of right thumb    Bilateral carpal tunnel syndrome    Polyp of colon    Encounter for pre-transplant evaluation for chronic kidney disease     PLAN:     MANAGEMENT PLAN, TREATMENT GOALS, THERAPEUTIC TECHNIQUES/APPROACHES & CLINICAL REASONING    -continue home escitalopram 20 mg po qd  -continue mirtazapine 15 mg po qhs  -start Bupropion IR 75 mg po qd  -start Naltrexone 25 mg po qd      Patient informed they are on alert due to her not attending AA meetings due to this being a requirement of the program.         - Continue ORP.  - Continue ORP protocol.  - Labs: CMP, PETH, urine toxicology, alcohol biomarkers.  - Full engagement in 12 step (or equivalent) recovery program(s), including mandatory meeting attendance and acquisition of sponsor.  - Patient counseled on abstinence from alcohol and substances of abuse (illicit and prescription).  - Relapse prevention and motivational interviewing provided.      In cases of emergency, daily coverage provided by Acute/ER Psych MD, NP, or SW, with contact numbers located in [facility] On Call Schedule    Case discussed with staff addiction psychiatrist: DAVID GALARNEAU, MD Ross Wiedemann,  MD [facility] PGY-4  [facility]       LABS/IMAGING/STUDIES   [unfilled]

## 2023-01-23 NOTE — PLAN OF CARE
01/19/23 1400   Activity/Group Therapy Checklist   Group Goals/Reflection   Attendance Attended   Follows Direction Followed directions   Group Interactions/Observations Interacted appropriately   Affect/Mood Range Normal range   Affect/Mood Display Appropriate

## 2023-01-23 NOTE — PROGRESS NOTES
Group Psychotherapy (PhD/LCSW)    Location: [facility]    Clinical status of patient: Intensive Outpatient Program (IOP)    Date: 1/23/2023    Group Focus: Psychodynamic Group Psychotherapy    Length of service: 72262 - 45-50 minutes    Number of patients in attendance: 3    Referred by: Addictive Behavior Unit Treatment Team    Target symptoms: Substance Abuse    Patient's response to treatment: Active Listening and Self-disclosure    Progress toward goals: Progressing adequately    Interval History: Pt reports that she remains sober. She reports being very tired over the weekend. She shared how marijuana helped her with her appetite and how she is struggling with that now in early sobriety.    Diagnosis: Cannabis Use Disorder; Alcohol Use Disorder in remission    Plan: Continue treatment on ABU

## 2023-01-24 ENCOUNTER — HOSPITAL ENCOUNTER (OUTPATIENT)
Dept: PSYCHIATRY | Facility: HOSPITAL | Age: 58
Discharge: HOME | End: 2023-01-24
Attending: PSYCHIATRY & NEUROLOGY
Payer: MEDICARE

## 2023-01-24 DIAGNOSIS — F12.20 CANNABIS USE DISORDER, SEVERE, DEPENDENCE: Primary | ICD-10-CM

## 2023-01-24 PROCEDURE — 90853 PR GROUP PSYCHOTHERAPY: ICD-10-PCS | Mod: ,,, | Performed by: PSYCHOLOGIST

## 2023-01-24 PROCEDURE — 90853 GROUP PSYCHOTHERAPY: CPT | Performed by: SOCIAL WORKER

## 2023-01-24 PROCEDURE — 90853 GROUP PSYCHOTHERAPY: CPT | Mod: ,,, | Performed by: PSYCHOLOGIST

## 2023-01-24 NOTE — PROGRESS NOTES
Group Psychotherapy (PhD/LCSW)    Location: [facility]    Clinical status of patient: Intensive Outpatient Program (IOP)    Date: 1/24/2023    Group Focus: Psychodynamic Group Psychotherapy    Length of service: 72910 - 45-50 minutes    Number of patients in attendance: 2    Referred by: Addictive Behavior Unit Treatment Team    Target symptoms: Substance Abuse    Patient's response to treatment: Active Listening and Self-disclosure    Progress toward goals: Progressing adequately    Interval History: Pt reports that she remains sober. She reports improved mood after the weekend as well as improved energy. Discussed getting daughter involved in family day soon.    Diagnosis: Cannabis Use Disorder; Alcohol Use Disorder in remission    Plan: Continue treatment on ABU

## 2023-01-25 ENCOUNTER — LAB VISIT (OUTPATIENT)
Dept: LAB | Facility: HOSPITAL | Age: 58
End: 2023-01-25
Attending: PSYCHIATRY & NEUROLOGY
Payer: MEDICARE

## 2023-01-25 ENCOUNTER — HOSPITAL ENCOUNTER (OUTPATIENT)
Dept: PSYCHIATRY | Facility: HOSPITAL | Age: 58
Discharge: HOME | End: 2023-01-25
Attending: PSYCHIATRY & NEUROLOGY
Payer: COMMERCIAL

## 2023-01-25 DIAGNOSIS — N18.5 CKD (CHRONIC KIDNEY DISEASE) STAGE 5, GFR LESS THAN 15 ML/MIN: ICD-10-CM

## 2023-01-25 DIAGNOSIS — F17.200 TOBACCO USE DISORDER: Chronic | ICD-10-CM

## 2023-01-25 DIAGNOSIS — F41.1 GAD (GENERALIZED ANXIETY DISORDER): Chronic | ICD-10-CM

## 2023-01-25 DIAGNOSIS — F10.20 ALCOHOL USE DISORDER, SEVERE, DEPENDENCE: Primary | ICD-10-CM

## 2023-01-25 DIAGNOSIS — F12.20 CANNABIS USE DISORDER, SEVERE, DEPENDENCE: Primary | Chronic | ICD-10-CM

## 2023-01-25 DIAGNOSIS — F10.20 ALCOHOL USE DISORDER, SEVERE, DEPENDENCE: Chronic | ICD-10-CM

## 2023-01-25 DIAGNOSIS — N18.6 ESRD (END STAGE RENAL DISEASE) ON DIALYSIS: ICD-10-CM

## 2023-01-25 DIAGNOSIS — Z99.2 ESRD (END STAGE RENAL DISEASE) ON DIALYSIS: ICD-10-CM

## 2023-01-25 DIAGNOSIS — F10.20 ALCOHOL USE DISORDER, SEVERE, DEPENDENCE: ICD-10-CM

## 2023-01-25 DIAGNOSIS — Z79.899 LONG-TERM USE OF HIGH-RISK MEDICATION: ICD-10-CM

## 2023-01-25 LAB — ETHANOL SERPL-MCNC: <10 MG/DL

## 2023-01-25 PROCEDURE — 82077 ASSAY SPEC XCP UR&BREATH IA: CPT | Performed by: PSYCHIATRY & NEUROLOGY

## 2023-01-25 PROCEDURE — 90853 PR GROUP PSYCHOTHERAPY: ICD-10-PCS | Mod: ,,, | Performed by: PSYCHOLOGIST

## 2023-01-25 PROCEDURE — 99232 PR SUBSEQUENT HOSPITAL CARE,LEVL II: ICD-10-PCS | Mod: ,,, | Performed by: PSYCHIATRY & NEUROLOGY

## 2023-01-25 PROCEDURE — 90853 GROUP PSYCHOTHERAPY: CPT | Mod: ,,, | Performed by: PSYCHOLOGIST

## 2023-01-25 PROCEDURE — 90853 GROUP PSYCHOTHERAPY: CPT

## 2023-01-25 PROCEDURE — 90833 PSYTX W PT W E/M 30 MIN: CPT | Mod: ,,, | Performed by: PSYCHIATRY & NEUROLOGY

## 2023-01-25 PROCEDURE — 90833 PR PSYCHOTHERAPY W/PATIENT W/E&M, 30 MIN (ADD ON): ICD-10-PCS | Mod: ,,, | Performed by: PSYCHIATRY & NEUROLOGY

## 2023-01-25 PROCEDURE — 99232 SBSQ HOSP IP/OBS MODERATE 35: CPT | Mod: ,,, | Performed by: PSYCHIATRY & NEUROLOGY

## 2023-01-25 PROCEDURE — 36415 COLL VENOUS BLD VENIPUNCTURE: CPT | Performed by: PSYCHIATRY & NEUROLOGY

## 2023-01-25 PROCEDURE — 90853 GROUP PSYCHOTHERAPY: CPT | Performed by: SOCIAL WORKER

## 2023-01-25 NOTE — PLAN OF CARE
01/25/23 1100   Activity/Group Therapy Checklist   Group Addiction Education   Attendance Attended   Follows Direction Followed directions   Group Interactions/Observations Interacted appropriately   Affect/Mood Range Normal range   Affect/Mood Display Appropriate

## 2023-01-25 NOTE — PLAN OF CARE
01/24/23 1100   Activity/Group Therapy Checklist   Group Addiction Education   Attendance Attended   Follows Direction Followed directions   Group Interactions/Observations Interacted appropriately   Affect/Mood Range Normal range   Affect/Mood Display Appropriate

## 2023-01-25 NOTE — PATIENT CARE CONFERENCE
Diagnosis:   Cannabis Use Disorder, severe  Alcohol Use Disorder, severe  Tobacco Use Disorder  Generalized Anxiety Disorder  Chronic Kidney Disease, End Stage Renal Disease, on dialysis  Kidney Transplant Candidate    Progress:  Patient staffed by team. Patient has not attended any AA/peer support meetings. Patient to be placed on Alert status. Team to schedule Family Meeting with patient and daughter. Team to continue to monitor patient's progress.     Plan of Care:   Patient to continue in ORP with group and individual therapies.     Aftercare/followup:   Med Management: TBD  Psychotherapy: TBD    Staff Present:    MD Jayna Jimenez, PhD  Thuan Hahn, [facility]  Mary Dior, [facility]  Valerie Lennon, W      Resident:   Berry Diana MD

## 2023-01-25 NOTE — PLAN OF CARE
01/25/23 1317   Activity/Group Therapy Checklist   Group Meditation/Relaxation   Attendance Attended   Group Interactions/Observations Interacted appropriately;Alert;Sharing;Supportive   Affect/Mood Range Normal range   Affect/Mood Display Appropriate   Goal Progression Progressing     Patient participated in stretch and relaxation with [facility].

## 2023-01-25 NOTE — PLAN OF CARE
01/25/23 1459   Activity/Group Therapy Checklist   Group Relapse Prevention   Attendance Attended   Follows Direction Followed directions   Group Interactions/Observations Interacted appropriately;Alert;Sharing;Supportive   Affect/Mood Range Normal range   Affect/Mood Display Appropriate   Goal Progression Progressing     Sw discussed with patients Triggers and healthy ways to respond to them. Patients identified their triggers and strategies that could be used to reduce exposure to the trigger and ways to deal with them when they can  not be avoided.

## 2023-01-25 NOTE — PROGRESS NOTES
Group Psychotherapy (PhD/LCSW)    Clinical status of patient: Intensive Outpatient Program (IOP)    Date: 1/25/2023    Group Focus:  Distress Tolerance    Length of service: 17361 - 45-50 minutes    Number of patients in attendance: 7    Referred by: [facility] Treatment Team    Target symptoms: Substance Abuse    Patient's response to treatment: Active Listening and Self-disclosure    Progress toward goals: Progressing adequately    Interval History: Session focus was Distress Tolerance:  STOP.  Patients were encouraged to use crisis survival skills to reduce intensity of distress.  They were taught to STOP in the moment to reduce unhelpful action urges.    Diagnosis: Cannabis Use Disorder; Alcohol Use Disorder in remission    Plan: Continue treatment on ORP

## 2023-01-25 NOTE — PROGRESS NOTES
Group Psychotherapy (PhD/LCSW)    Location: [facility]    Clinical status of patient: Intensive Outpatient Program (IOP)    Date: 1/25/2023    Group Focus: Psychodynamic Group Psychotherapy    Length of service: 25215 - 45-50 minutes    Number of patients in attendance: 3    Referred by: Addictive Behavior Unit Treatment Team    Target symptoms: Substance Abuse    Patient's response to treatment: Active Listening and Self-disclosure    Progress toward goals: Progressing adequately    Interval History: Pt reports that she remains sober. She had a difficult physical reaction to dialysis yesterday and feels that she is still recovering from it. Discussed importance of improved self-care after these appointments, particularly since no longer using marijuana, including better nutrition and stress management skills.    Diagnosis: Cannabis Use Disorder; Alcohol Use Disorder in remission    Plan: Continue treatment on ABU

## 2023-01-25 NOTE — PROGRESS NOTES
"ADDICTION CONSULT FOLLOW UP VISIT     DEPARTMENT:  Psychiatry  SITE: [facility], [address]    DATE OF EXAMINATION: 1/25/2023  8:00 AM  DATE OF ADMISSION: 1/11/2023    EXAMINING PRACTITIONER: Ross Wiedemann    SUBJECTIVE:     HISTORY    Patient Name: Jennifer Booth  YOB: 1965    CHIEF COMPLAINT   Jennifer Booth is a 58 y.o. female who is being seen today for a follow up visit by the addiction psychiatry consult service.  Jennifer Booth presents with the chief complaint of: cannabis, nicotine, and alcohol use    HPI & PSYCHIATRIC ROS    Patient reports feeling tired/fatigued today, states that she had bad night sweats overnight and was very tremulous after dialysis yesterday. Patient continues to crave cigarettes. States she got her warning sheet since not going to meetings. States she has been tired and that had been keeping her from being able to attend meetings. Patient states she's going to get to going to meetings but asks that "we have a little patience for her."     Patient reports her intention to start meetings this week.     Patient states her last marijuana use was the first Friday of the program. Continues to smoke cigarettes - states she is having 3-4 a day. She started taking wellbutrin 1/24 - denies adverse effects from this medication. Patient has still not taken nicotine replacement patches out of her closet. Denies adverse effects from initiation of naltrexone.                    PFSH: The patient's past medical history has been reviewed and updated as appropriate within the electronic medical record system.     ROS:  - Cardio (tachycardia, palpitations, chest pain) - denies   - Resp (shortness of breath, cough) - denies   - GI (nausea/vomiting, diarrhea, abdominal pain) - denies   - Constititional (fever, chills, sweats, tremors) - hot flashes - related to menopause   - Neuro (headaches, numbness/tingling) - denies   - UG (frequency, pain) - denies     PFSH: The " patient's past medical history has been reviewed and updated as appropriate within the electronic medical record system.    ROS:  See HPI, otherwise negative for HA/CP/SOB/n/v/d.  +chills/sweats ongoing r/t menopause    PSYCHOTROPIC MEDICATIONS   Escitalopram 20 mg po qd  Mirtazapine 15 mg po qhs  Bupropion IR 75 mg po qd -   Naltrexone 50mg - 1/2 tab daily       OBJECTIVE:     EXAMINATION    There were no vitals filed for this visit.      CONSTITUTIONAL  General Appearance: NAD, good h&g    MUSCULOSKELETAL  Abnormal Involuntary Movements: none noted; ambulates with cane    PSYCHIATRIC  Orientation: AOx3  Speech: spontaneous  Language: fluent, english  Mood: steady, euthymic  Affect: mood-congruent, reactive  Thought Process: linear, organized   Associations: intact  Thought Content: no SI/HI, no delusions  Memory: intact to conversation, 3/3 immediate & 3-min recall  Attention and Concentration: intact; attentive, WORLD forwards/backwards  Fund of Knowledge: aware of current events, appropriate  Insight: intact  Judgment: intact    ASSESSMENT:     DIAGNOSES & PROBLEMS    Cannabis Use Disorder, severe  Alcohol Use Disorder, severe  Tobacco Use Disorder  Generalized Anxiety Disorder  Chronic Kidney Disease, End Stage Renal Disease, on dialysis  Kidney Transplant Candidate    Patient Active Problem List   Diagnosis    Constipation - functional    Multiple myeloma in remission    Renal hypertension    Hyperkalemia    Tobacco abuse counseling    Tobacco use disorder    Colon cancer screening    ESRD (end stage renal disease) on dialysis    Glomerulosclerosis    Anemia in chronic kidney disease, on chronic dialysis    GERD without esophagitis    Patient on waiting list for kidney transplant    SATNAM (generalized anxiety disorder)    Allergic rhinitis    Hyponatremia    Hypertension    Secondary hyperparathyroidism of renal origin    Volume overload    History of substance use    Dialysis AV fistula malfunction, initial  encounter    CKD (chronic kidney disease) stage 5, GFR less than 15 ml/min    Acute respiratory failure with hypoxia    Metabolic acidosis    Murmur, cardiac    Cannabis use disorder, severe, dependence    Alcohol use disorder, severe, dependence    Preoperative clearance    Severe episode of recurrent major depressive disorder, without psychotic features    Chronic diarrhea    Chronic pancreatitis    Depression    Iron deficiency anemia    Non-compliance with renal dialysis    Osteopenia    Protein calorie malnutrition    Vitamin D deficiency    Injury of right thumb    Bilateral carpal tunnel syndrome    Polyp of colon    Encounter for pre-transplant evaluation for chronic kidney disease     PLAN:     MANAGEMENT PLAN, TREATMENT GOALS, THERAPEUTIC TECHNIQUES/APPROACHES & CLINICAL REASONING    -continue home escitalopram 20 mg po qd  -continue mirtazapine 15 mg po qhs  -continue Bupropion IR 75 mg po qd  -continue Naltrexone 25 mg po qd      Patient informed they are on alert due to her not attending AA meetings due to this being a requirement of the program.         - Continue ORP.  - Continue ORP protocol.  - Labs: CMP, PETH, urine toxicology, alcohol biomarkers.  - Full engagement in 12 step (or equivalent) recovery program(s), including mandatory meeting attendance and acquisition of sponsor.  - Patient counseled on abstinence from alcohol and substances of abuse (illicit and prescription).  - Relapse prevention and motivational interviewing provided.  -patient placed on alert due to       In cases of emergency, daily coverage provided by Acute/ER Psych MD, NP, or SW, with contact numbers located in [facility] [address] On Call Schedule    Case discussed with staff addiction psychiatrist: DAVID GALARNEAU, MD Ross Wiedemann, MD  [facility] PGY-4  [facility] [address]       LABS/IMAGING/STUDIES   [unfilled]

## 2023-01-26 ENCOUNTER — HOSPITAL ENCOUNTER (OUTPATIENT)
Dept: PSYCHIATRY | Facility: HOSPITAL | Age: 58
Discharge: HOME | End: 2023-01-26
Attending: PSYCHIATRY & NEUROLOGY
Payer: COMMERCIAL

## 2023-01-26 DIAGNOSIS — F12.20 CANNABIS USE DISORDER, SEVERE, DEPENDENCE: Primary | ICD-10-CM

## 2023-01-26 DIAGNOSIS — F41.1 GAD (GENERALIZED ANXIETY DISORDER): ICD-10-CM

## 2023-01-26 DIAGNOSIS — F17.200 TOBACCO USE DISORDER: ICD-10-CM

## 2023-01-26 PROCEDURE — 90853 GROUP PSYCHOTHERAPY: CPT | Mod: ,,, | Performed by: PSYCHOLOGIST

## 2023-01-26 PROCEDURE — 90853 GROUP PSYCHOTHERAPY: CPT | Performed by: SOCIAL WORKER

## 2023-01-26 PROCEDURE — 90853 PR GROUP PSYCHOTHERAPY: ICD-10-PCS | Mod: ,,, | Performed by: PSYCHOLOGIST

## 2023-01-26 NOTE — PROGRESS NOTES
Group Psychotherapy (PhD/LCSW)    Site: [facility] ([place], [place])    Clinical status of patient: Intensive Outpatient Program (IOP)    Date: 1/26/2023    Group Focus: Intro to Emotions    Length of service: 18149 - 45-50 minutes    Number of patients in attendance: 10    Referred by: [facility]    Target symptoms: Depression, anxiety    Patient's response to treatment: Active Listening and Self-disclosure    Progress toward goals: Progressing adequately    Interval History:  Session focus was introduction to emotions and the three-component model of emotions. Patients were educated on the purpose of emotions, what each common emotion alerts us to, and that each emotion exists on its own separate continuum. The three components of emotions (behavior, thought, physical sensations) and skills used to address each component were reviewed.    Diagnosis:     ICD-10-CM ICD-9-CM   1. Alcohol use disorder, severe, dependence  F10.20 303.90   2. Cannabis use disorder, severe, dependence  F12.20 304.30   3. Tobacco use disorder  F17.200 305.1   4. SATNAM (generalized anxiety disorder)  F41.1 300.02        Plan: Continue treatment on ORP

## 2023-01-26 NOTE — PROGRESS NOTES
Group Psychotherapy (PhD/LCSW)    Location: [facility]    Clinical status of patient: Intensive Outpatient Program (IOP)    Date: 1/26/2023    Group Focus: Psychodynamic Group Psychotherapy    Length of service: 51340 - 45-50 minutes    Number of patients in attendance: 5    Referred by: Addictive Behavior Unit Treatment Team    Target symptoms: Substance Abuse    Patient's response to treatment: Active Listening and Self-disclosure    Progress toward goals: Progressing adequately    Interval History: Pt reports that she remains sober. She believes Wellbutrin is starting to work and feels decreased cravings for nicotine.    Diagnosis: Cannabis Use Disorder; Alcohol Use Disorder in remission    Plan: Continue treatment on ABU

## 2023-01-26 NOTE — PROGRESS NOTES
Group Psychotherapy (PhD/LCSW)    Clinical status of patient: Intensive Outpatient Program (IOP)    Date: 1/26/2023    Group Focus:  Emotion Regulation    Length of service: 21646 - 45-50 minutes    Number of patients in attendance: 10    Referred by: [facility]    Target symptoms: Substance Abuse    Patient's response to treatment: Active Listening and Self-disclosure    Progress toward goals: Progressing adequately    Interval History: Session focus was Emotion Regulation:  Opposite Action.  Patients identified action urges for each emotion and learned how to engage in opposite action when the emotions are not justified or unhelpful.    Diagnosis:     ICD-10-CM ICD-9-CM   1. Cannabis use disorder, severe, dependence  F12.20 304.30   2. Tobacco use disorder  F17.200 305.1   3. SATNAM (generalized anxiety disorder)  F41.1 300.02        Plan: Continue treatment on ORP

## 2023-01-27 ENCOUNTER — HOSPITAL ENCOUNTER (OUTPATIENT)
Dept: PSYCHIATRY | Facility: HOSPITAL | Age: 58
Discharge: HOME | End: 2023-01-27
Attending: PSYCHIATRY & NEUROLOGY
Payer: COMMERCIAL

## 2023-01-27 ENCOUNTER — LAB VISIT (OUTPATIENT)
Dept: LAB | Facility: HOSPITAL | Age: 58
End: 2023-01-27
Attending: PSYCHIATRY & NEUROLOGY
Payer: MEDICARE

## 2023-01-27 VITALS
TEMPERATURE: 98 F | RESPIRATION RATE: 18 BRPM | DIASTOLIC BLOOD PRESSURE: 78 MMHG | HEART RATE: 60 BPM | SYSTOLIC BLOOD PRESSURE: 123 MMHG

## 2023-01-27 DIAGNOSIS — F12.20 CANNABIS USE DISORDER, SEVERE, DEPENDENCE: Primary | Chronic | ICD-10-CM

## 2023-01-27 DIAGNOSIS — F32.89 OTHER DEPRESSION: ICD-10-CM

## 2023-01-27 DIAGNOSIS — Z99.2 ESRD (END STAGE RENAL DISEASE) ON DIALYSIS: ICD-10-CM

## 2023-01-27 DIAGNOSIS — N18.6 ESRD (END STAGE RENAL DISEASE) ON DIALYSIS: ICD-10-CM

## 2023-01-27 DIAGNOSIS — F10.20 ALCOHOL USE DISORDER, SEVERE, DEPENDENCE: ICD-10-CM

## 2023-01-27 DIAGNOSIS — F17.200 TOBACCO USE DISORDER: Chronic | ICD-10-CM

## 2023-01-27 DIAGNOSIS — F10.20 ALCOHOL USE DISORDER, SEVERE, DEPENDENCE: Chronic | ICD-10-CM

## 2023-01-27 DIAGNOSIS — F10.20 ALCOHOL USE DISORDER, SEVERE, DEPENDENCE: Primary | ICD-10-CM

## 2023-01-27 DIAGNOSIS — N18.5 CKD (CHRONIC KIDNEY DISEASE) STAGE 5, GFR LESS THAN 15 ML/MIN: ICD-10-CM

## 2023-01-27 DIAGNOSIS — F41.1 GAD (GENERALIZED ANXIETY DISORDER): Chronic | ICD-10-CM

## 2023-01-27 LAB — ETHANOL SERPL-MCNC: <10 MG/DL

## 2023-01-27 PROCEDURE — 82077 ASSAY SPEC XCP UR&BREATH IA: CPT | Performed by: PSYCHIATRY & NEUROLOGY

## 2023-01-27 PROCEDURE — 90853 GROUP PSYCHOTHERAPY: CPT

## 2023-01-27 PROCEDURE — 90833 PSYTX W PT W E/M 30 MIN: CPT | Mod: ,,, | Performed by: PSYCHIATRY & NEUROLOGY

## 2023-01-27 PROCEDURE — 80307 DRUG TEST PRSMV CHEM ANLYZR: CPT | Performed by: PSYCHIATRY & NEUROLOGY

## 2023-01-27 PROCEDURE — 99232 SBSQ HOSP IP/OBS MODERATE 35: CPT | Mod: ,,, | Performed by: PSYCHIATRY & NEUROLOGY

## 2023-01-27 PROCEDURE — 90853 PR GROUP PSYCHOTHERAPY: ICD-10-PCS | Mod: ,,, | Performed by: PSYCHOLOGIST

## 2023-01-27 PROCEDURE — 90853 GROUP PSYCHOTHERAPY: CPT | Mod: ,,, | Performed by: PSYCHOLOGIST

## 2023-01-27 PROCEDURE — 90833 PR PSYCHOTHERAPY W/PATIENT W/E&M, 30 MIN (ADD ON): ICD-10-PCS | Mod: ,,, | Performed by: PSYCHIATRY & NEUROLOGY

## 2023-01-27 PROCEDURE — 90853 GROUP PSYCHOTHERAPY: CPT | Performed by: SOCIAL WORKER

## 2023-01-27 PROCEDURE — 99232 PR SUBSEQUENT HOSPITAL CARE,LEVL II: ICD-10-PCS | Mod: ,,, | Performed by: PSYCHIATRY & NEUROLOGY

## 2023-01-27 NOTE — PROGRESS NOTES
Group Psychotherapy (PhD/LCSW)    Location: [facility]    Clinical status of patient: Intensive Outpatient Program (IOP)    Date: 1/27/2023    Group Focus: Psychodynamic Group Psychotherapy    Length of service: 74448 - 45-50 minutes    Number of patients in attendance: 5    Referred by: Addictive Behavior Unit Treatment Team    Target symptoms: Substance Abuse    Patient's response to treatment: Active Listening and Self-disclosure    Progress toward goals: Progressing adequately    Interval History: Pt reports continued sobriety. Discussed upcoming weekend plan.    Diagnosis: Cannabis Use Disorder; Alcohol Use Disorder in remission    Plan: Continue treatment on ABU

## 2023-01-27 NOTE — PLAN OF CARE
01/27/23 1110   Activity/Group Therapy Checklist   Group Other (Comments)  (Communication Skills)   Attendance Attended   Follows Direction Followed directions   Group Interactions/Observations Interacted appropriately;Sharing;Supportive   Affect/Mood Range Normal range   Affect/Mood Display Appropriate   Goal Progression Progressing        and group reviewed Boundaries worksheet. Patient denied any questions or concerns at this time.

## 2023-01-27 NOTE — PROGRESS NOTES
"ADDICTION CONSULT FOLLOW UP VISIT     DEPARTMENT:  Psychiatry  SITE: [facility], [address]    DATE OF EXAMINATION: 1/27/2023  8:00 AM  DATE OF ADMISSION: 1/11/2023    EXAMINING PRACTITIONER: Ross Wiedemann    SUBJECTIVE:     HISTORY    Patient Name: Jennifer Booth  YOB: 1965    CHIEF COMPLAINT   Jennifer Booth is a 58 y.o. female who is being seen today for a follow up visit by the addiction psychiatry consult service.  Jennifer Booth presents with the chief complaint of: cannabis, nicotine, and alcohol use    HPI & PSYCHIATRIC ROS    Jennifer presents this morning looking well. She reports that she initiated both her new medications with no side effects and attending the required meetings. She has successfully continued her cigarette taper and is now down to a few puffs of one cigarette per day; she describes decreased tolerance for the smoke and a feeling that her "lungs don't want it." She reports no ongoing cravings for either cannabis or cigarettes and is less fatigued today than last visit. Her last use of cannabis remains the first Friday of the program as previously reported.                   PFSH: The patient's past medical history has been reviewed and updated as appropriate within the electronic medical record system.     ROS:  - Cardio (tachycardia, palpitations, chest pain) - denies   - Resp (shortness of breath, cough) - denies   - GI (nausea/vomiting, diarrhea, abdominal pain) - denies   - Constititional (fever, chills, sweats, tremors) - hot flashes - related to menopause   - Neuro (headaches, numbness/tingling) - denies   - UG (frequency, pain) - denies     PFSH: The patient's past medical history has been reviewed and updated as appropriate within the electronic medical record system.    ROS:  See HPI, otherwise negative for HA/CP/SOB/n/v/d.  +chills/sweats ongoing r/t menopause    PSYCHOTROPIC MEDICATIONS   Escitalopram 20 mg po qd  Mirtazapine 15 mg po " qhs  Bupropion IR 75 mg po qd -   Naltrexone 50mg - 1/2 tab daily       OBJECTIVE:     EXAMINATION    Vitals:    01/27/23 0900   BP: 123/78   Pulse: 60   Resp: 18   Temp: 97.6 °F (36.4 °C)         CONSTITUTIONAL  General Appearance: NAD, good h&g    MUSCULOSKELETAL  Abnormal Involuntary Movements: none noted; ambulates with cane    PSYCHIATRIC  Orientation: AOx3  Speech: spontaneous  Language: fluent, english  Mood: steady, euthymic  Affect: mood-congruent, reactive  Thought Process: linear, organized   Associations: intact  Thought Content: no SI/HI, no delusions  Memory: intact to conversation, 3/3 immediate & 3-min recall  Attention and Concentration: intact; attentive, WORLD forwards/backwards  Fund of Knowledge: aware of current events, appropriate  Insight: intact  Judgment: intact    ASSESSMENT:     DIAGNOSES & PROBLEMS    Cannabis Use Disorder, severe  Alcohol Use Disorder, severe  Tobacco Use Disorder  Generalized Anxiety Disorder  Chronic Kidney Disease, End Stage Renal Disease, on dialysis  Kidney Transplant Candidate    Patient Active Problem List   Diagnosis    Constipation - functional    Multiple myeloma in remission    Renal hypertension    Hyperkalemia    Tobacco abuse counseling    Tobacco use disorder    Colon cancer screening    ESRD (end stage renal disease) on dialysis    Glomerulosclerosis    Anemia in chronic kidney disease, on chronic dialysis    GERD without esophagitis    Patient on waiting list for kidney transplant    SATNAM (generalized anxiety disorder)    Allergic rhinitis    Hyponatremia    Hypertension    Secondary hyperparathyroidism of renal origin    Volume overload    History of substance use    Dialysis AV fistula malfunction, initial encounter    CKD (chronic kidney disease) stage 5, GFR less than 15 ml/min    Acute respiratory failure with hypoxia    Metabolic acidosis    Murmur, cardiac    Cannabis use disorder, severe, dependence    Alcohol use disorder, severe, dependence     Preoperative clearance    Severe episode of recurrent major depressive disorder, without psychotic features    Chronic diarrhea    Chronic pancreatitis    Depression    Iron deficiency anemia    Non-compliance with renal dialysis    Osteopenia    Protein calorie malnutrition    Vitamin D deficiency    Injury of right thumb    Bilateral carpal tunnel syndrome    Polyp of colon    Encounter for pre-transplant evaluation for chronic kidney disease     PLAN:     MANAGEMENT PLAN, TREATMENT GOALS, THERAPEUTIC TECHNIQUES/APPROACHES & CLINICAL REASONING    -continue home escitalopram 20 mg po qd  -continue mirtazapine 15 mg po qhs  -continue Bupropion IR 75 mg po qd  -continue Naltrexone 25 mg po qd      Patient informed they are on alert due to her not attending AA meetings due to this being a requirement of the program.         - Continue ORP.  - Continue ORP protocol.  - Labs: CMP, PETH, urine toxicology, alcohol biomarkers.  - Full engagement in 12 step (or equivalent) recovery program(s), including mandatory meeting attendance and acquisition of sponsor.  - Patient counseled on abstinence from alcohol and substances of abuse (illicit and prescription).  - Relapse prevention and motivational interviewing provided.  -patient placed on alert due to       In cases of emergency, daily coverage provided by Acute/ER Psych MD, NP, or SW, with contact numbers located in [facility] [address] On Call Schedule    Case discussed with staff addiction psychiatrist: DAVID GALARNEAU, MD Ross Wiedemann, MD  [facility] PGY-4  [facility] [address]       LABS/IMAGING/STUDIES   [unfilled]

## 2023-01-28 LAB — HPRA INTERPRETATION: NORMAL

## 2023-01-30 ENCOUNTER — LAB VISIT (OUTPATIENT)
Dept: LAB | Facility: HOSPITAL | Age: 58
End: 2023-01-30
Attending: INTERNAL MEDICINE
Payer: MEDICARE

## 2023-01-30 ENCOUNTER — HOSPITAL ENCOUNTER (OUTPATIENT)
Dept: PSYCHIATRY | Facility: HOSPITAL | Age: 58
Discharge: HOME | End: 2023-01-30
Attending: PSYCHIATRY & NEUROLOGY
Payer: MEDICARE

## 2023-01-30 VITALS
RESPIRATION RATE: 18 BRPM | HEART RATE: 63 BPM | DIASTOLIC BLOOD PRESSURE: 87 MMHG | SYSTOLIC BLOOD PRESSURE: 150 MMHG | TEMPERATURE: 96 F

## 2023-01-30 DIAGNOSIS — N18.5 CKD (CHRONIC KIDNEY DISEASE) STAGE 5, GFR LESS THAN 15 ML/MIN: ICD-10-CM

## 2023-01-30 DIAGNOSIS — C90.00 MULTIPLE MYELOMA, REMISSION STATUS UNSPECIFIED: ICD-10-CM

## 2023-01-30 DIAGNOSIS — F17.200 TOBACCO USE DISORDER: Chronic | ICD-10-CM

## 2023-01-30 DIAGNOSIS — F12.20 CANNABIS USE DISORDER, SEVERE, DEPENDENCE: Primary | Chronic | ICD-10-CM

## 2023-01-30 DIAGNOSIS — F41.1 GAD (GENERALIZED ANXIETY DISORDER): Chronic | ICD-10-CM

## 2023-01-30 DIAGNOSIS — Z99.2 ESRD (END STAGE RENAL DISEASE) ON DIALYSIS: ICD-10-CM

## 2023-01-30 DIAGNOSIS — F10.20 ALCOHOL USE DISORDER, SEVERE, DEPENDENCE: Primary | ICD-10-CM

## 2023-01-30 DIAGNOSIS — F10.20 ALCOHOL USE DISORDER, SEVERE, DEPENDENCE: ICD-10-CM

## 2023-01-30 DIAGNOSIS — F10.20 ALCOHOL USE DISORDER, SEVERE, DEPENDENCE: Chronic | ICD-10-CM

## 2023-01-30 DIAGNOSIS — N18.6 ESRD (END STAGE RENAL DISEASE) ON DIALYSIS: ICD-10-CM

## 2023-01-30 DIAGNOSIS — F32.89 OTHER DEPRESSION: ICD-10-CM

## 2023-01-30 LAB
ALBUMIN SERPL BCP-MCNC: 3.8 G/DL (ref 3.5–5.2)
ALP SERPL-CCNC: 105 U/L (ref 55–135)
ALT SERPL W/O P-5'-P-CCNC: 8 U/L (ref 10–44)
AMPHETAMINES SERPL QL: NEGATIVE
ANION GAP SERPL CALC-SCNC: 16 MMOL/L (ref 8–16)
AST SERPL-CCNC: 14 U/L (ref 10–40)
B2 MICROGLOB SERPL-MCNC: 24.1 UG/ML (ref 0–2.5)
BARBITURATES SERPL QL SCN: NEGATIVE
BASOPHILS # BLD AUTO: 0.04 K/UL (ref 0–0.2)
BASOPHILS NFR BLD: 1 % (ref 0–1.9)
BENZODIAZ SERPL QL SCN: NEGATIVE
BILIRUB SERPL-MCNC: 0.4 MG/DL (ref 0.1–1)
BUN SERPL-MCNC: 32 MG/DL (ref 6–20)
BZE SERPL QL: NEGATIVE
CALCIUM SERPL-MCNC: 9.7 MG/DL (ref 8.7–10.5)
CARBOXYTHC SERPL QL SCN: NEGATIVE
CHLORIDE SERPL-SCNC: 83 MMOL/L (ref 95–110)
CO2 SERPL-SCNC: 30 MMOL/L (ref 23–29)
CREAT SERPL-MCNC: 8.1 MG/DL (ref 0.5–1.4)
DIFFERENTIAL METHOD: ABNORMAL
EOSINOPHIL # BLD AUTO: 0.1 K/UL (ref 0–0.5)
EOSINOPHIL NFR BLD: 3.1 % (ref 0–8)
ERYTHROCYTE [DISTWIDTH] IN BLOOD BY AUTOMATED COUNT: 17.5 % (ref 11.5–14.5)
EST. GFR  (NO RACE VARIABLE): 5.3 ML/MIN/1.73 M^2
ETHANOL SERPL QL SCN: NEGATIVE
ETHANOL SERPL-MCNC: <10 MG/DL
GLUCOSE SERPL-MCNC: 73 MG/DL (ref 70–110)
HCT VFR BLD AUTO: 28.9 % (ref 37–48.5)
HGB BLD-MCNC: 9.4 G/DL (ref 12–16)
IGA SERPL-MCNC: 237 MG/DL (ref 40–350)
IGG SERPL-MCNC: 763 MG/DL (ref 650–1600)
IGM SERPL-MCNC: 211 MG/DL (ref 50–300)
IMM GRANULOCYTES # BLD AUTO: 0.01 K/UL (ref 0–0.04)
IMM GRANULOCYTES NFR BLD AUTO: 0.3 % (ref 0–0.5)
LYMPHOCYTES # BLD AUTO: 1.2 K/UL (ref 1–4.8)
LYMPHOCYTES NFR BLD: 32 % (ref 18–48)
MCH RBC QN AUTO: 27.9 PG (ref 27–31)
MCHC RBC AUTO-ENTMCNC: 32.5 G/DL (ref 32–36)
MCV RBC AUTO: 86 FL (ref 82–98)
METHADONE SERPL QL SCN: NEGATIVE
MONOCYTES # BLD AUTO: 0.4 K/UL (ref 0.3–1)
MONOCYTES NFR BLD: 9.8 % (ref 4–15)
NEUTROPHILS # BLD AUTO: 2.1 K/UL (ref 1.8–7.7)
NEUTROPHILS NFR BLD: 53.8 % (ref 38–73)
NRBC BLD-RTO: 0 /100 WBC
OPIATES SERPL QL SCN: NEGATIVE
PCP SERPL QL SCN: NEGATIVE
PLATELET # BLD AUTO: 167 K/UL (ref 150–450)
PMV BLD AUTO: 9.8 FL (ref 9.2–12.9)
POTASSIUM SERPL-SCNC: 4.1 MMOL/L (ref 3.5–5.1)
PROPOXYPH SERPL QL: NEGATIVE
PROT SERPL-MCNC: 7 G/DL (ref 6–8.4)
RBC # BLD AUTO: 3.37 M/UL (ref 4–5.4)
SODIUM SERPL-SCNC: 129 MMOL/L (ref 136–145)
WBC # BLD AUTO: 3.88 K/UL (ref 3.9–12.7)

## 2023-01-30 PROCEDURE — 86334 IMMUNOFIX E-PHORESIS SERUM: CPT | Mod: 26,,, | Performed by: PATHOLOGY

## 2023-01-30 PROCEDURE — 80053 COMPREHEN METABOLIC PANEL: CPT | Performed by: INTERNAL MEDICINE

## 2023-01-30 PROCEDURE — 90853 GROUP PSYCHOTHERAPY: CPT | Mod: ,,, | Performed by: PSYCHOLOGIST

## 2023-01-30 PROCEDURE — 83521 IG LIGHT CHAINS FREE EACH: CPT | Mod: 59 | Performed by: INTERNAL MEDICINE

## 2023-01-30 PROCEDURE — 84165 PROTEIN E-PHORESIS SERUM: CPT | Mod: 26,,, | Performed by: PATHOLOGY

## 2023-01-30 PROCEDURE — 82232 ASSAY OF BETA-2 PROTEIN: CPT | Performed by: INTERNAL MEDICINE

## 2023-01-30 PROCEDURE — 36415 COLL VENOUS BLD VENIPUNCTURE: CPT | Performed by: INTERNAL MEDICINE

## 2023-01-30 PROCEDURE — 99232 PR SUBSEQUENT HOSPITAL CARE,LEVL II: ICD-10-PCS | Mod: ,,, | Performed by: PSYCHIATRY & NEUROLOGY

## 2023-01-30 PROCEDURE — 99232 SBSQ HOSP IP/OBS MODERATE 35: CPT | Mod: ,,, | Performed by: PSYCHIATRY & NEUROLOGY

## 2023-01-30 PROCEDURE — 80307 DRUG TEST PRSMV CHEM ANLYZR: CPT | Performed by: PSYCHIATRY & NEUROLOGY

## 2023-01-30 PROCEDURE — 90853 GROUP PSYCHOTHERAPY: CPT | Performed by: SOCIAL WORKER

## 2023-01-30 PROCEDURE — 90853 PR GROUP PSYCHOTHERAPY: ICD-10-PCS | Mod: ,,, | Performed by: PSYCHOLOGIST

## 2023-01-30 PROCEDURE — 80321 ALCOHOLS BIOMARKERS 1OR 2: CPT | Performed by: PSYCHIATRY & NEUROLOGY

## 2023-01-30 PROCEDURE — 84165 PATHOLOGIST INTERPRETATION SPE: ICD-10-PCS | Mod: 26,,, | Performed by: PATHOLOGY

## 2023-01-30 PROCEDURE — 82077 ASSAY SPEC XCP UR&BREATH IA: CPT | Performed by: PSYCHIATRY & NEUROLOGY

## 2023-01-30 PROCEDURE — 82784 ASSAY IGA/IGD/IGG/IGM EACH: CPT | Performed by: INTERNAL MEDICINE

## 2023-01-30 PROCEDURE — 90853 GROUP PSYCHOTHERAPY: CPT

## 2023-01-30 PROCEDURE — 86334 IMMUNOFIX E-PHORESIS SERUM: CPT | Performed by: INTERNAL MEDICINE

## 2023-01-30 PROCEDURE — 84165 PROTEIN E-PHORESIS SERUM: CPT | Performed by: INTERNAL MEDICINE

## 2023-01-30 PROCEDURE — 85025 COMPLETE CBC W/AUTO DIFF WBC: CPT | Performed by: INTERNAL MEDICINE

## 2023-01-30 PROCEDURE — 86334 PATHOLOGIST INTERPRETATION IFE: ICD-10-PCS | Mod: 26,,, | Performed by: PATHOLOGY

## 2023-01-30 NOTE — PLAN OF CARE
01/27/23 1400   Activity/Group Therapy Checklist   Group Relapse Prevention   Attendance Attended   Follows Direction Followed directions   Group Interactions/Observations Interacted appropriately   Affect/Mood Range Normal range   Affect/Mood Display Appropriate

## 2023-01-30 NOTE — PLAN OF CARE
01/26/23 1400   Activity/Group Therapy Checklist   Group Addiction Education   Attendance Attended   Follows Direction Followed directions   Group Interactions/Observations Interacted appropriately   Affect/Mood Range Normal range   Affect/Mood Display Appropriate
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
## 2023-01-30 NOTE — PLAN OF CARE
01/30/23 1153   Activity/Group Therapy Checklist   Group Goals/Reflection   Attendance Attended   Follows Direction Followed directions   Group Interactions/Observations Interacted appropriately;Sharing;Supportive   Affect/Mood Range Normal range   Affect/Mood Display Appropriate   Goal Progression Progressing        and group completed Boundaries worksheet. Patient denied any questions or concerns at this time.

## 2023-01-31 ENCOUNTER — HOSPITAL ENCOUNTER (OUTPATIENT)
Dept: PSYCHIATRY | Facility: HOSPITAL | Age: 58
Discharge: HOME | End: 2023-01-31
Attending: PSYCHIATRY & NEUROLOGY
Payer: MEDICARE

## 2023-01-31 DIAGNOSIS — F41.1 GAD (GENERALIZED ANXIETY DISORDER): Chronic | ICD-10-CM

## 2023-01-31 DIAGNOSIS — N18.5 CKD (CHRONIC KIDNEY DISEASE) STAGE 5, GFR LESS THAN 15 ML/MIN: ICD-10-CM

## 2023-01-31 DIAGNOSIS — Z99.2 ESRD (END STAGE RENAL DISEASE) ON DIALYSIS: ICD-10-CM

## 2023-01-31 DIAGNOSIS — N18.6 ESRD (END STAGE RENAL DISEASE) ON DIALYSIS: ICD-10-CM

## 2023-01-31 DIAGNOSIS — F12.20 CANNABIS USE DISORDER, SEVERE, DEPENDENCE: Primary | Chronic | ICD-10-CM

## 2023-01-31 DIAGNOSIS — F17.200 TOBACCO USE DISORDER: Chronic | ICD-10-CM

## 2023-01-31 DIAGNOSIS — F10.20 ALCOHOL USE DISORDER, SEVERE, DEPENDENCE: Chronic | ICD-10-CM

## 2023-01-31 LAB
ALBUMIN SERPL ELPH-MCNC: 4.07 G/DL (ref 3.35–5.55)
ALPHA1 GLOB SERPL ELPH-MCNC: 0.3 G/DL (ref 0.17–0.41)
ALPHA2 GLOB SERPL ELPH-MCNC: 0.75 G/DL (ref 0.43–0.99)
B-GLOBULIN SERPL ELPH-MCNC: 0.67 G/DL (ref 0.5–1.1)
GAMMA GLOB SERPL ELPH-MCNC: 0.8 G/DL (ref 0.67–1.58)
INTERPRETATION SERPL IFE-IMP: NORMAL
KAPPA LC SER QL IA: 15.98 MG/DL (ref 0.33–1.94)
KAPPA LC/LAMBDA SER IA: 0.86 (ref 0.26–1.65)
LAMBDA LC SER QL IA: 18.67 MG/DL (ref 0.57–2.63)
PATHOLOGIST INTERPRETATION IFE: NORMAL
PATHOLOGIST INTERPRETATION SPE: NORMAL
PROT SERPL-MCNC: 6.6 G/DL (ref 6–8.4)

## 2023-01-31 PROCEDURE — 99232 PR SUBSEQUENT HOSPITAL CARE,LEVL II: ICD-10-PCS | Mod: 25,,, | Performed by: PSYCHIATRY & NEUROLOGY

## 2023-01-31 PROCEDURE — 99232 SBSQ HOSP IP/OBS MODERATE 35: CPT | Mod: 25,,, | Performed by: PSYCHIATRY & NEUROLOGY

## 2023-01-31 PROCEDURE — 90853 GROUP PSYCHOTHERAPY: CPT

## 2023-01-31 PROCEDURE — 90853 PR GROUP PSYCHOTHERAPY: ICD-10-PCS | Mod: ,,, | Performed by: PSYCHOLOGIST

## 2023-01-31 PROCEDURE — 90847 FAMILY PSYTX W/PT 50 MIN: CPT | Mod: ,,, | Performed by: PSYCHIATRY & NEUROLOGY

## 2023-01-31 PROCEDURE — 90853 GROUP PSYCHOTHERAPY: CPT | Mod: ,,, | Performed by: PSYCHOLOGIST

## 2023-01-31 PROCEDURE — 90847 PR FAMILY PSYCHOTHERAPY W/ PT, 50 MIN: ICD-10-PCS | Mod: ,,, | Performed by: PSYCHIATRY & NEUROLOGY

## 2023-01-31 NOTE — PLAN OF CARE
01/31/23 1242   Activity/Group Therapy Checklist   Group Other (Comments)  (Communication Skills)   Attendance Attended   Follows Direction Followed directions   Group Interactions/Observations Interacted appropriately;Sharing;Supportive   Affect/Mood Range Normal range   Affect/Mood Display Appropriate   Goal Progression Progressing        and group reviewed conflict resolution skills. Patient denied any questions or concerns at this time.

## 2023-01-31 NOTE — PROGRESS NOTES
"ADDICTION CONSULT FOLLOW UP VISIT     DEPARTMENT:  Psychiatry  SITE: [facility], [address]    DATE OF EXAMINATION: 1/31/2023  8:00 AM  DATE OF ADMISSION: 1/11/2023    EXAMINING PRACTITIONER: Donovan Armenta    SUBJECTIVE:     HISTORY    Patient Name: Jennifer Booth  YOB: 1965    CHIEF COMPLAINT   Jennifer Booth is a 58 y.o. female who is being seen today for a follow up visit by the addiction psychiatry consult service.  Jennifer Booth presents with the chief complaint of: cannabis, nicotine, and alcohol use    HPI & PSYCHIATRIC ROS    Family meeting today with daughter.  She denies any use of cannabis or alcohol over past 24 hours (since last visit).  Anxiety remains present.  Psychotropic regimen discussed.  Aftercare program discussed.  Transplant process discussed.     PFSH: The patient's past medical history has been reviewed and updated as appropriate within the electronic medical record system.      PSYCHOTROPIC MEDICATIONS   Escitalopram 20 mg po qd  Mirtazapine 15 mg po qhs  Bupropion IR 75 mg po qd -   Naltrexone 50mg - 1/2 tab daily       OBJECTIVE:     EXAMINATION    There were no vitals filed for this visit.    CONSTITUTIONAL  General Appearance: thin, casually dressed, well groomed    MUSCULOSKELETAL  Abnormal Involuntary Movements: none noted; ambulates without assistance    PSYCHIATRIC  Orientation: A and Ox3  Speech: spontaneous, conversational  Language: fluent, english  Mood: "nervous"  Affect: reactive, friendly but anxious  Thought Process: linear, organized, ruminative  Thought Content: no SI/HI, no delusions  Memory: intact to conversation, no significant deficits noted  Attention and Concentration: intact; attentive to conversation  Fund of Knowledge: aware of current events, appropriate  Insight: intact  Judgment: intact    ASSESSMENT:     DIAGNOSES & PROBLEMS    Cannabis Use Disorder, severe; in early remission   Alcohol Use Disorder, severe  Tobacco Use " Disorder  Generalized Anxiety Disorder  Chronic Kidney Disease, End Stage Renal Disease, on dialysis  Kidney Transplant Candidate    Patient Active Problem List   Diagnosis    Constipation - functional    Multiple myeloma in remission    Renal hypertension    Hyperkalemia    Tobacco abuse counseling    Tobacco use disorder    Colon cancer screening    ESRD (end stage renal disease) on dialysis    Glomerulosclerosis    Anemia in chronic kidney disease, on chronic dialysis    GERD without esophagitis    Patient on waiting list for kidney transplant    SATNAM (generalized anxiety disorder)    Allergic rhinitis    Hyponatremia    Hypertension    Secondary hyperparathyroidism of renal origin    Volume overload    History of substance use    Dialysis AV fistula malfunction, initial encounter    CKD (chronic kidney disease) stage 5, GFR less than 15 ml/min    Acute respiratory failure with hypoxia    Metabolic acidosis    Murmur, cardiac    Cannabis use disorder, severe, dependence    Alcohol use disorder, severe, dependence    Preoperative clearance    Severe episode of recurrent major depressive disorder, without psychotic features    Chronic diarrhea    Chronic pancreatitis    Depression    Iron deficiency anemia    Non-compliance with renal dialysis    Osteopenia    Protein calorie malnutrition    Vitamin D deficiency    Injury of right thumb    Bilateral carpal tunnel syndrome    Polyp of colon    Encounter for pre-transplant evaluation for chronic kidney disease     PLAN:     MANAGEMENT PLAN, TREATMENT GOALS, THERAPEUTIC TECHNIQUES/APPROACHES & CLINICAL REASONING    -continue home escitalopram 20 mg po qd  -continue mirtazapine 15 mg po qhs  -continue Bupropion IR 75 mg po qd  -continue Naltrexone 25 mg po qd    - Continue ORP.  - Continue ORP protocol.  - Labs: CMP, PETH, urine toxicology, alcohol biomarkers.  - Full engagement in 12 step (or equivalent) recovery program(s), including mandatory meeting attendance and  acquisition of sponsor.  - Patient counseled on abstinence from alcohol and substances of abuse (illicit and prescription).  - Relapse prevention and motivational interviewing provided.  -patient placed on alert due to       In cases of emergency, daily coverage provided by Acute/ER Psych MD, NP, or SW, with contact numbers located in [facility] On Call Schedule    GREGORY OLSEN MD        Add-On Psychotherapy:     [phone] Family Therapy Without Patient Present: 50 minutes (range 26 minutes or greater)    NOTE: The time spent delivering Add-On Psychotherapy is separate from and in addition to the total time spent performing E/M services on the date of the encounter.    Duration of Intervention: 30 minutes  Primary Focus: relapse prevention, philosophy of recovery, identifying triggers  Participants: patient and daughter  Therapeutic Intervention Type: insight-oriented psychotherapy, supportive psychotherapy  Why Chosen Therapy is Appropriate Versus Another Modality: relevant to diagnosis, expertise of clinician  Target Symptoms Addressed: alcohol abuse, substance abuse  Topics and Themes Discussed: substance abuse  Psychotherapeutic Techniques Employed: motivational interviewing and relapse prevention  Outcome Monitoring Methods: self-report, lab data, observation, feedback from family  Response to the Intervention: accepting  Patient Progress Toward Treatment Goals: progressing slowly

## 2023-01-31 NOTE — PROGRESS NOTES
Group Psychotherapy (PhD/LCSW)    Location: [facility]    Clinical status of patient: Intensive Outpatient Program (IOP)    Date: 1/31/2023    Group Focus: Psychodynamic Group Psychotherapy    Length of service: 97330 - 45-50 minutes    Number of patients in attendance: 5    Referred by: Addictive Behavior Unit Treatment Team    Target symptoms: Substance Abuse    Patient's response to treatment: Active Listening and Self-disclosure    Progress toward goals: Progressing adequately    Interval History: Pt reports continued sobriety. She shared how she was assertive in her doctor's visit this morning and was happy with how the interaction went as a result.    Diagnosis: Cannabis Use Disorder; Alcohol Use Disorder in remission    Plan: Continue treatment on ABU

## 2023-02-01 ENCOUNTER — HOSPITAL ENCOUNTER (OUTPATIENT)
Dept: PSYCHIATRY | Facility: HOSPITAL | Age: 58
Discharge: HOME | End: 2023-02-01
Attending: PSYCHIATRY & NEUROLOGY
Payer: MEDICARE

## 2023-02-01 ENCOUNTER — LAB VISIT (OUTPATIENT)
Dept: LAB | Facility: HOSPITAL | Age: 58
End: 2023-02-01
Attending: PSYCHIATRY & NEUROLOGY
Payer: COMMERCIAL

## 2023-02-01 VITALS
DIASTOLIC BLOOD PRESSURE: 67 MMHG | HEART RATE: 62 BPM | SYSTOLIC BLOOD PRESSURE: 109 MMHG | RESPIRATION RATE: 18 BRPM | TEMPERATURE: 98 F

## 2023-02-01 DIAGNOSIS — F10.20 ALCOHOL USE DISORDER, SEVERE, DEPENDENCE: Primary | ICD-10-CM

## 2023-02-01 DIAGNOSIS — F10.20 ALCOHOL USE DISORDER, SEVERE, DEPENDENCE: Primary | Chronic | ICD-10-CM

## 2023-02-01 DIAGNOSIS — F10.20 ALCOHOL USE DISORDER, SEVERE, DEPENDENCE: ICD-10-CM

## 2023-02-01 LAB
ALBUMIN SERPL BCP-MCNC: 3.8 G/DL (ref 3.5–5.2)
ALP SERPL-CCNC: 116 U/L (ref 55–135)
ALT SERPL W/O P-5'-P-CCNC: 6 U/L (ref 10–44)
AMPHETAMINES SERPL QL: NEGATIVE
ANION GAP SERPL CALC-SCNC: 17 MMOL/L (ref 8–16)
AST SERPL-CCNC: 12 U/L (ref 10–40)
BARBITURATES SERPL QL SCN: NEGATIVE
BENZODIAZ SERPL QL SCN: NEGATIVE
BILIRUB SERPL-MCNC: 0.4 MG/DL (ref 0.1–1)
BUN SERPL-MCNC: 25 MG/DL (ref 6–20)
BZE SERPL QL: NEGATIVE
CALCIUM SERPL-MCNC: 10.3 MG/DL (ref 8.7–10.5)
CARBOXYTHC SERPL QL SCN: NEGATIVE
CHLORIDE SERPL-SCNC: 90 MMOL/L (ref 95–110)
CLINICAL BIOCHEMIST REVIEW: NORMAL
CO2 SERPL-SCNC: 27 MMOL/L (ref 23–29)
CREAT SERPL-MCNC: 7.9 MG/DL (ref 0.5–1.4)
EST. GFR  (NO RACE VARIABLE): 5.4 ML/MIN/1.73 M^2
ETHANOL SERPL QL SCN: NEGATIVE
ETHANOL SERPL-MCNC: <10 MG/DL
GLUCOSE SERPL-MCNC: 108 MG/DL (ref 70–110)
METHADONE SERPL QL SCN: NEGATIVE
OPIATES SERPL QL SCN: NEGATIVE
PCP SERPL QL SCN: NEGATIVE
PLPETH BLD-MCNC: <10 NG/ML
POPETH BLD-MCNC: 12 NG/ML
POTASSIUM SERPL-SCNC: 4.1 MMOL/L (ref 3.5–5.1)
PROPOXYPH SERPL QL: NEGATIVE
PROT SERPL-MCNC: 7.1 G/DL (ref 6–8.4)
SODIUM SERPL-SCNC: 134 MMOL/L (ref 136–145)

## 2023-02-01 PROCEDURE — 90853 GROUP PSYCHOTHERAPY: CPT | Mod: ,,, | Performed by: PSYCHOLOGIST

## 2023-02-01 PROCEDURE — 80053 COMPREHEN METABOLIC PANEL: CPT | Performed by: PSYCHIATRY & NEUROLOGY

## 2023-02-01 PROCEDURE — 82077 ASSAY SPEC XCP UR&BREATH IA: CPT | Performed by: PSYCHIATRY & NEUROLOGY

## 2023-02-01 PROCEDURE — 80307 DRUG TEST PRSMV CHEM ANLYZR: CPT | Performed by: PSYCHIATRY & NEUROLOGY

## 2023-02-01 PROCEDURE — 90853 PR GROUP PSYCHOTHERAPY: ICD-10-PCS | Mod: ,,, | Performed by: PSYCHOLOGIST

## 2023-02-01 PROCEDURE — 90853 GROUP PSYCHOTHERAPY: CPT

## 2023-02-01 NOTE — PROGRESS NOTES
Group Psychotherapy (PhD/LCSW)    Clinical status of patient: Intensive Outpatient Program (IOP)    Date: 1/31/2023    Group Focus:  Communication Skills    Length of service: 00517 - 45-50 minutes    Number of patients in attendance: 11    Referred by: [facility] Team    Target symptoms: Substance Abuse    Patient's response to treatment: Active Listening and Self-disclosure    Progress toward goals: Progressing adequately    Interval History: Session focused on introducing three styles of communication (passive, aggressive, and assertive) with an emphasis on assertive communication as the healthiest style.    Diagnosis:     ICD-10-CM ICD-9-CM   1. Cannabis use disorder, severe, dependence  F12.20 304.30   2. Alcohol use disorder, severe, dependence  F10.20 303.90   3. Tobacco use disorder  F17.200 305.1   4. SATNAM (generalized anxiety disorder)  F41.1 300.02   5. CKD (chronic kidney disease) stage 5, GFR less than 15 ml/min  N18.5 585.5   6. ESRD (end stage renal disease) on dialysis  N18.6 585.6    Z99.2 V45.11        Plan: Continue treatment on ORP

## 2023-02-01 NOTE — PROGRESS NOTES
Group Psychotherapy (PhD/LCSW)    Location: [facility]    Clinical status of patient: Intensive Outpatient Program (IOP)    Date: 2/1/2023    Group Focus: Psychodynamic Group Psychotherapy    Length of service: 20845 - 45-50 minutes    Number of patients in attendance: 6    Referred by: Addictive Behavior Unit Treatment Team    Target symptoms: Substance Abuse    Patient's response to treatment: Active Listening and Self-disclosure    Progress toward goals: Progressing adequately    Interval History: Pt reports continued sobriety. She introduced herself and her story to new group members.    Diagnosis: Cannabis Use Disorder; Alcohol Use Disorder in remission    Plan: Continue treatment on ABU

## 2023-02-01 NOTE — PROGRESS NOTES
Group Psychotherapy (PhD/LCSW)    Clinical status of patient: Intensive Outpatient Program (IOP)    Date: 2/1/2023    Group Focus:  Distress Tolerance    Length of service: 78459 - 45-50 minutes    Number of patients in attendance: 11    Referred by: [facility] Team    Target symptoms: Substance Abuse    Patient's response to treatment: Active Listening and Self-disclosure    Progress toward goals: Progressing adequately    Interval History: Session focus was Distress Tolerance:  Pros & Cons.  Patients were encouraged to use crisis survival skills to reduce intensity of distress.  They were taught to use Pros & Cons to reinforce desired behaviors.    Diagnosis:     ICD-10-CM ICD-9-CM   1. Alcohol use disorder, severe, dependence  F10.20 303.90          Plan: Continue treatment on ORP

## 2023-02-01 NOTE — PLAN OF CARE
02/01/23 1413   Activity/Group Therapy Checklist   Group Other (Comments)  (Strengths Exercise)   Attendance Attended   Group Interactions/Observations Interacted appropriately;Alert;Sharing;Supportive   Affect/Mood Range Normal range   Affect/Mood Display Appropriate   Goal Progression Progressing      discussed with patients strengths skill. SW facilitated strengths activity , Three Good People. SW discussed with patient that by identifying strengths of self and others it could help to complete goals and build on self-esteem.

## 2023-02-01 NOTE — PATIENT CARE CONFERENCE
Diagnosis:   Cannabis Use Disorder, severe; in early remission   Alcohol Use Disorder, severe  Tobacco Use Disorder  Generalized Anxiety Disorder  Chronic Kidney Disease, End Stage Renal Disease, on dialysis  Kidney Transplant Candidate    Medications:  Escitalopram 20 mg po qd  Mirtazapine 15 mg po qhs  Bupropion IR 75 mg po qd -   Naltrexone 50mg - 1/2 tab daily     Progress:  Patient staffed by team. Patient and daughter completed family meeting Tuesday (1/31/23). Patient reported she has attended some AA/peer support meetings. Patient has not secured an sponsor. Patient's latest UDS was negative for THC. Patient reported she is struggling with smoking cessation efforts. Team to continue to monitor patient's progress.     Plan of Care:   Patient to continue ORP with group and individual therapies.     Aftercare/followup:   Med Management: TBD  Psychotherapy: TBD    Staff Present:    MD Jayna Jimenez, PhD  Mary Dior, LCSW  Pooja Diaz, LCSW  Valerie Lennon, YAHIR Westbrook LPN

## 2023-02-02 ENCOUNTER — HOSPITAL ENCOUNTER (OUTPATIENT)
Dept: PSYCHIATRY | Facility: HOSPITAL | Age: 58
Discharge: HOME | End: 2023-02-02
Attending: PSYCHIATRY & NEUROLOGY
Payer: COMMERCIAL

## 2023-02-02 DIAGNOSIS — N18.6 ESRD (END STAGE RENAL DISEASE) ON DIALYSIS: ICD-10-CM

## 2023-02-02 DIAGNOSIS — Z99.2 ESRD (END STAGE RENAL DISEASE) ON DIALYSIS: ICD-10-CM

## 2023-02-02 DIAGNOSIS — N18.5 CKD (CHRONIC KIDNEY DISEASE) STAGE 5, GFR LESS THAN 15 ML/MIN: ICD-10-CM

## 2023-02-02 DIAGNOSIS — F12.20 CANNABIS USE DISORDER, SEVERE, DEPENDENCE: Primary | Chronic | ICD-10-CM

## 2023-02-02 DIAGNOSIS — F10.20 ALCOHOL USE DISORDER, SEVERE, DEPENDENCE: Chronic | ICD-10-CM

## 2023-02-02 DIAGNOSIS — F41.1 GAD (GENERALIZED ANXIETY DISORDER): Chronic | ICD-10-CM

## 2023-02-02 PROCEDURE — 90853 GROUP PSYCHOTHERAPY: CPT | Mod: ,,, | Performed by: PSYCHOLOGIST

## 2023-02-02 PROCEDURE — 99232 SBSQ HOSP IP/OBS MODERATE 35: CPT | Mod: ,,, | Performed by: PSYCHIATRY & NEUROLOGY

## 2023-02-02 PROCEDURE — 90853 PR GROUP PSYCHOTHERAPY: ICD-10-PCS | Mod: ,,, | Performed by: PSYCHOLOGIST

## 2023-02-02 PROCEDURE — 90853 GROUP PSYCHOTHERAPY: CPT | Performed by: SOCIAL WORKER

## 2023-02-02 PROCEDURE — 99232 PR SUBSEQUENT HOSPITAL CARE,LEVL II: ICD-10-PCS | Mod: ,,, | Performed by: PSYCHIATRY & NEUROLOGY

## 2023-02-02 PROCEDURE — 90853 GROUP PSYCHOTHERAPY: CPT

## 2023-02-02 NOTE — PLAN OF CARE
02/02/23 1409   Activity/Group Therapy Checklist   Group Other (Comments)  (Processing Group)   Attendance Attended   Follows Direction Followed directions   Group Interactions/Observations Interacted appropriately;Sharing;Supportive   Affect/Mood Range Normal range   Affect/Mood Display Appropriate   Goal Progression Progressing

## 2023-02-02 NOTE — PROGRESS NOTES
Group Psychotherapy (PhD/LCSW)    Clinical status of patient: Intensive Outpatient Program (IOP)    Date: 2/2/2023    Group Focus:  Emotion Regulation    Length of service: 32110 - 45-50 minutes    Number of patients in attendance: 12    Referred by: [facility] Team    Target symptoms: Substance Abuse    Patient's response to treatment: Active Listening and Self-disclosure    Progress toward goals: Progressing adequately    Interval History: Session focus was Emotion Regulation:  ABC PLEASE.  Patients were encouraged to reduce vulnerability to distress by accumulating positive emotions, building mastery, coping ahead, and by attending to physical well-being.  Each patient identified a way to accumulate positive emotions and build mastery.    Diagnosis:     ICD-10-CM ICD-9-CM   1. Cannabis use disorder, severe, dependence  F12.20 304.30   2. Alcohol use disorder, severe, dependence  F10.20 303.90   3. CKD (chronic kidney disease) stage 5, GFR less than 15 ml/min  N18.5 585.5   4. ESRD (end stage renal disease) on dialysis  N18.6 585.6    Z99.2 V45.11   5. SATNAM (generalized anxiety disorder)  F41.1 300.02          Plan: Continue treatment on ORP

## 2023-02-02 NOTE — PLAN OF CARE
"   02/02/23 1221   Activity/Group Therapy Checklist   Group Meditation/Relaxation   Attendance Attended   Follows Direction Followed directions   Group Interactions/Observations Interacted appropriately;Alert;Supportive;Sharing   Affect/Mood Range Normal range   Affect/Mood Display Appropriate   Goal Progression Progressing     Discussed mindfulness as a practice, the definition of mindfulness, and various research evidence to strengthen benefits of mindfulness. Engaged in "thought bubble" meditation where members were invited to witness body sensations, breath, and thoughts coming and going without attaching onto them.    "

## 2023-02-02 NOTE — TREATMENT PLAN
[facility] Addiction Behavioral Unit ([facility])  ADDICTIVE BEHAVIOR UNIT  INTERDISCIPLINARY TREATMENT PLAN      INTERDISCIPLINARY  TREATMENT TEAM:    Donovan Armenta M.D., Psychiatrist      Jayna Landaverde, Ph.D., Clinical Psychologist    Ronal Westbrook L.P.N., Nurse    Thuan Hahn, [facility],     Mary Dior, [facility],     Valerie Lennon, [facility],        Resident: Matt Patel MD    (Signatures scanned into record separately).      ESTIMATED LOS:  4-6 weeks    The patient has reviewed the treatment plan with staff and has signed the Patient Responsibilities form.  Patient signature scanned into record separately    Dr. Donovan Armenta certifies that the patient would require inpatient psychiatric care if the Partial Hospitalization services were not provided, and services will be furnished under the care of a physician, and under a written Plan of Treatment.    Donovan Armenta M.D., Psychiatrist - Signature scanned into record separately.      TREATMENT PLAN    DIAGNOSIS:    Cannabis Use Disorder, severe; in early remission   Alcohol Use Disorder, severe  Tobacco Use Disorder  Generalized Anxiety Disorder  Chronic Kidney Disease, End Stage Renal Disease, on dialysis  Kidney Transplant Candidate      Patient/Family Education Needs/Barriers to Learning (i.e., Language, Reading, Comprehension): None    Support/Advocacy Services/Needs (i.e., Financial, Transportation, Medications): None    Community Resources (i.e., Alcoholics Anonymous, Al Anon, Cocaine Anonymous, Narcotics Anonymous): AA resources provided      Patient Goals:  Abstain from alcohol or smoking to get kidney transplant   2. Get close to God   3. Learning coping skills for life changing events  4. Learning sign of mental illness         Current Coping Skills:  Smoking   2. Confronting issues   3. Watching TV  4. Sleeping       Patient Identified Strengths:  Kind person   2. Good sense of humor   3. Positive attitude  4.  Mindful of others       Patient Identified Limitations:  Anxiety   2. Opinionated   3. Health issues  4. Bad life changing events          Goals and Objectives:  1. Goal:  Abstain from alcohol and illicit drugs   Objective measure: Negative breathalyzer, negative urine screens   Time frame to reach goal: By discharge    2  Goal: Attend daily 12-step meetings   Objective measure: Signed attendance sheet daily   Time frame to reach goal: Each day    3. Goal: Participate in group sessions    Objective measure: Progress notes indicating active listening,    self-disclosure, feedback   Time frame to reach goal: Each day    4. Goal: Obtain a 12-step sponsor   Objective measure: self-report   Time frame to reach goal: By discharge    5. Goal: Complete Life Story   Objective measure: Share story with group   Time frame to reach goal: Within first two weeks of treatment    6. Goal: Complete First    Objective measure: Share 1st step with group   Time frame to reach goal:  By discharge    7. Goal: Complete Relapse Prevention Plan   Objective measure: Share plan with group   Time frame to reach goal: By discharge    8.  Goal: Family involvement/participation   Objective measure: Family session documented in progress notes   Time frame to reach goal: By discharge    9. Goal:  Reduce depression   Objective measure: Physician progress note indicating depression is improved   Time frame to reach goal: By discharge    10.  Goal: Reduce anxiety   Objective measure: Physician progress note indicating anxiety is improved   Time frame to reach goal: By discharge        Group Interventions:    Psychodynamic Group Psychotherapy  1 hour, five times per week  Goals: 1. Utilize group empathy and support for problem solving; 2. Apply stress management, communication, and assertive skills to personal issues; 3. Discuss negative consequences of addictive behavior; 4. Discuss ways to change lifestyle to support sobriety; 5. Discuss addiction  history    Addiction Education Group  1 hour, 4 times per week  Goals:  1. Verbalize increased knowledge of the process of recovery; 2. Understand basic concepts of addiction (denial, powerlessness, unmanageabiltiy, etc.); 3. Develop a consistent, positive image of self; 4. Identify personal values that may aid in recovery    Steps to Recovery Group  1 hour, 5 times per week  Goals:  1. Learn 12 steps; 2. Identify ways to incorporate 12 step principles into daily life; 3. Complete first step; 4. Verbalize knowledge and understanding of the concept of a higher power    Relapse Prevention Group  1 hour, 2 times per week  Goals: 1. Verbalize increased knowledge of chemical dependence and the process of recovery; 2. Verbally identify specific behaviors, attitudes, and feelings that may lead to relapse, focusing on triggers; 3. Identify behavior patterns that will need to be changed to maintain sobriety, including people and places that must be avoided; 4. Identify specific strategies to address relevant identified relapse triggers; 5. Identify positive rewards associated with abstinence    Living Sober Group  1 hour, 2 times per week  Goals:  1. Reflect upon events of day/weekend, focusing on positive change; 2.  Discuss dynamics of 12 step meetings attended; 3. Discuss topics from book Living Sober     Stress Management Skills Group  1 hour, 3 times per week  Goals: 1. Identify types and levels of stress; 2. Identify and change maladaptive beliefs and behaviors; 3. Identify and practice techniques of stress management    Disease Concept Group  1 hour, 1 time per week  Goals: 1. Verbalize an understanding of the disease concept of addiction; 2. Increase familys understanding of the disease concept of addiction    Communication Skills Group  1 hour, 2 times per week  Goals: 1. Learn rules of effective communication; 2. Improve listening skills; 3. Practice clear communication    Promoting Healthy Lifestyles Group  1  hour, 1 time per week  Goals:  1. Understand the biopsychosocial model of health; 2. Develop insight into how substance abuse/dependency can impact dimensions of health; 3. Develop appropriate health promotion strategies    Relationship Dynamics Group  1 hour, 1 time per week  Goals:  1. Learn about factors that shape relationships; 2. Understand the central role of relationships in personal well-being; 3. Learn how to improve all relationships    Medical Complications Group  1 hour, 1 time per week  Goals:  1.  Increase knowledge of how addiction negatively affects the body; 2. Increase awareness of how abstinence can positively impact health     Mental Hygiene Group  1 hour, 1 time per week  Goals:  1. Increase healthy habits of relaxation, exercise, and nutrition; 2. Discuss strategies for coping with depression and anxiety    Daily Reflections Group  ½ hour, 5 times per week  Goals:  1. Independently journal events and emotions; 2. Independently consider positive concepts of recovery on a daily basis    Check-In/Weekend Process  1 hour, 1 time per week  Goals:  1. Identification of situations/needs that may have arisen over the weekend.  2. Identification of goals for the week.  3. Emotional, physical, spiritual, and social check-in to begin group.

## 2023-02-02 NOTE — PLAN OF CARE
01/30/23 1400   Activity/Group Therapy Checklist   Group Addiction Education   Attendance Attended   Follows Direction Followed directions   Group Interactions/Observations Interacted appropriately   Affect/Mood Range Normal range   Affect/Mood Display Appropriate

## 2023-02-02 NOTE — PROGRESS NOTES
"ADDICTION CONSULT FOLLOW UP VISIT     DEPARTMENT:  Psychiatry  SITE: [facility], [address]    DATE OF EXAMINATION: No admission date for patient encounter.  DATE OF ADMISSION: 1/11/2023    EXAMINING PRACTITIONER: Matt Patel    SUBJECTIVE:     HISTORY    Patient Name: Jennifer Booth  YOB: 1965    CHIEF COMPLAINT   Jennifer Booth is a 58 y.o. female who is being seen today for a follow up visit by the addiction psychiatry consult service.  Jennifer Booth presents with the chief complaint of: cannabis, nicotine, and alcohol use    HPI & PSYCHIATRIC ROS    Sleeping well at night, gets about 7hrs a night on dialysis days otherwise getting a bit more. Mood is good. Dialysis went well, but leaves her kind of tired. Did two hours of meetings this morning from 8:30-10:30 during dialysis - it was a 24hr co-ed AA meeting. Anxiety has been well-controlled "I've been okay." Did have an event at the dialysis center that made her frustrated and she smoked a cigarette after. Discussed alternate coping strategies. She denies any use of cannabis or alcohol. Denies cravings. Psychotropic regimen discussed, denies any issues, taking as prescribed. [place] family meeting went well on Tuesday.     PFSH: The patient's past medical history has been reviewed and updated as appropriate within the electronic medical record system.    PSYCHOTROPIC MEDICATIONS   Escitalopram 20 mg po qd  Mirtazapine 15 mg po qhs  Bupropion IR 75 mg po qd -   Naltrexone 50mg - 1/2 tab daily       OBJECTIVE:     EXAMINATION    There were no vitals filed for this visit.    CONSTITUTIONAL  General Appearance: thin, casually dressed, well groomed    MUSCULOSKELETAL  Abnormal Involuntary Movements: none noted; ambulates without assistance    PSYCHIATRIC  Orientation: A and Ox3  Speech: spontaneous, conversational  Language: fluent, english  Mood: "I'm okay"  Affect: reactive, friendly  Thought Process: linear, organized, " ruminative  Thought Content: no SI/HI, no delusions  Memory: intact to conversation, no significant deficits noted  Attention and Concentration: intact; attentive to conversation  Fund of Knowledge: aware of current events, appropriate  Insight: intact  Judgment: intact    ASSESSMENT:     DIAGNOSES & PROBLEMS    Cannabis Use Disorder, severe; in early remission   Alcohol Use Disorder, severe  Tobacco Use Disorder  Generalized Anxiety Disorder  Chronic Kidney Disease, End Stage Renal Disease, on dialysis  Kidney Transplant Candidate    Patient Active Problem List   Diagnosis    Constipation - functional    Multiple myeloma in remission    Renal hypertension    Hyperkalemia    Tobacco abuse counseling    Tobacco use disorder    Colon cancer screening    ESRD (end stage renal disease) on dialysis    Glomerulosclerosis    Anemia in chronic kidney disease, on chronic dialysis    GERD without esophagitis    Patient on waiting list for kidney transplant    SATNAM (generalized anxiety disorder)    Allergic rhinitis    Hyponatremia    Hypertension    Secondary hyperparathyroidism of renal origin    Volume overload    History of substance use    Dialysis AV fistula malfunction, initial encounter    CKD (chronic kidney disease) stage 5, GFR less than 15 ml/min    Acute respiratory failure with hypoxia    Metabolic acidosis    Murmur, cardiac    Cannabis use disorder, severe, dependence    Alcohol use disorder, severe, dependence    Preoperative clearance    Severe episode of recurrent major depressive disorder, without psychotic features    Chronic diarrhea    Chronic pancreatitis    Depression    Iron deficiency anemia    Non-compliance with renal dialysis    Osteopenia    Protein calorie malnutrition    Vitamin D deficiency    Injury of right thumb    Bilateral carpal tunnel syndrome    Polyp of colon    Encounter for pre-transplant evaluation for chronic kidney disease     PLAN:     MANAGEMENT PLAN, TREATMENT GOALS,  THERAPEUTIC TECHNIQUES/APPROACHES & CLINICAL REASONING    -continue home escitalopram 20 mg po qd  -continue mirtazapine 15 mg po qhs  -continue Bupropion IR 75 mg po qd  -continue Naltrexone 25 mg po qd    - Continue ORP.  - Continue ORP protocol.  - Labs: CMP, PETH, urine toxicology, alcohol biomarkers.  - Full engagement in 12 step (or equivalent) recovery program(s), including mandatory meeting attendance and acquisition of sponsor.  - Patient counseled on abstinence from alcohol and substances of abuse (illicit and prescription).  - Relapse prevention and motivational interviewing provided.    In cases of emergency, daily coverage provided by Acute/ER Psych MD, NP, or SW, with contact numbers located in [facility] On Call Schedule    Case discussed with Staff Psychiatrist: MD Matt CHATTERJEE MD  [facility] PGY-4

## 2023-02-03 ENCOUNTER — LAB VISIT (OUTPATIENT)
Dept: LAB | Facility: HOSPITAL | Age: 58
End: 2023-02-03
Attending: PSYCHIATRY & NEUROLOGY
Payer: MEDICARE

## 2023-02-03 ENCOUNTER — HOSPITAL ENCOUNTER (OUTPATIENT)
Dept: PSYCHIATRY | Facility: HOSPITAL | Age: 58
Discharge: HOME | End: 2023-02-03
Attending: PSYCHIATRY & NEUROLOGY
Payer: MEDICARE

## 2023-02-03 VITALS
TEMPERATURE: 98 F | HEART RATE: 67 BPM | DIASTOLIC BLOOD PRESSURE: 69 MMHG | SYSTOLIC BLOOD PRESSURE: 104 MMHG | RESPIRATION RATE: 18 BRPM

## 2023-02-03 DIAGNOSIS — Z79.899 LONG-TERM USE OF HIGH-RISK MEDICATION: ICD-10-CM

## 2023-02-03 DIAGNOSIS — F10.20 ALCOHOL USE DISORDER, SEVERE, DEPENDENCE: Primary | ICD-10-CM

## 2023-02-03 DIAGNOSIS — F12.20 CANNABIS USE DISORDER, SEVERE, DEPENDENCE: Primary | ICD-10-CM

## 2023-02-03 LAB — ETHANOL SERPL-MCNC: <10 MG/DL

## 2023-02-03 PROCEDURE — 90853 PR GROUP PSYCHOTHERAPY: ICD-10-PCS | Mod: ,,, | Performed by: PSYCHOLOGIST

## 2023-02-03 PROCEDURE — 80307 DRUG TEST PRSMV CHEM ANLYZR: CPT | Performed by: PSYCHIATRY & NEUROLOGY

## 2023-02-03 PROCEDURE — 90853 GROUP PSYCHOTHERAPY: CPT | Mod: ,,, | Performed by: PSYCHOLOGIST

## 2023-02-03 PROCEDURE — 90853 GROUP PSYCHOTHERAPY: CPT | Performed by: SOCIAL WORKER

## 2023-02-03 PROCEDURE — 36415 COLL VENOUS BLD VENIPUNCTURE: CPT | Performed by: PSYCHIATRY & NEUROLOGY

## 2023-02-03 PROCEDURE — 90853 GROUP PSYCHOTHERAPY: CPT

## 2023-02-03 PROCEDURE — 82077 ASSAY SPEC XCP UR&BREATH IA: CPT | Performed by: PSYCHIATRY & NEUROLOGY

## 2023-02-03 NOTE — PLAN OF CARE
02/03/23 1131   Activity/Group Therapy Checklist   Group Goals/Reflection   Attendance Attended   Follows Direction Followed directions   Group Interactions/Observations Interacted appropriately;Sharing;Supportive   Affect/Mood Range Normal range   Affect/Mood Display Appropriate   Goal Progression Progressing        and group completed Strengths & Qualities worksheet. Patient denied any questions or concerns at this time.

## 2023-02-03 NOTE — PLAN OF CARE
02/02/23 1400   Activity/Group Therapy Checklist   Group Goals/Reflection   Attendance Attended   Follows Direction Followed directions   Group Interactions/Observations Interacted appropriately   Affect/Mood Range Normal range   Affect/Mood Display Appropriate   Goal Progression Progressing

## 2023-02-03 NOTE — PROGRESS NOTES
Group Psychotherapy (PhD/LCSW)    Location: [facility]    Clinical status of patient: Intensive Outpatient Program (IOP)    Date: 2/3/2023    Group Focus: Psychodynamic Group Psychotherapy    Length of service: 18138 - 45-50 minutes    Number of patients in attendance: 7    Referred by: Addictive Behavior Unit Treatment Team    Target symptoms: Substance Abuse    Patient's response to treatment: Active Listening and Self-disclosure    Progress toward goals: Progressing adequately    Interval History: Pt reports continued sobriety. She engaged in a weekend planning discussion.    Diagnosis: Cannabis Use Disorder; Alcohol Use Disorder in remission    Plan: Continue treatment on ABU

## 2023-02-05 NOTE — PLAN OF CARE
02/05/23 0906   Activity/Group Therapy Checklist   Attendance Attended   Follows Direction Followed directions   Group Interactions/Observations Interacted appropriately;Alert;Supportive;Sharing   Affect/Mood Range Normal range   Affect/Mood Display Appropriate   Goal Progression Progressing     Patient participated in stretch/ relaxation group with SW.

## 2023-02-06 ENCOUNTER — LAB VISIT (OUTPATIENT)
Dept: LAB | Facility: HOSPITAL | Age: 58
End: 2023-02-06
Attending: PSYCHIATRY & NEUROLOGY
Payer: MEDICARE

## 2023-02-06 ENCOUNTER — HOSPITAL ENCOUNTER (OUTPATIENT)
Dept: PSYCHIATRY | Facility: HOSPITAL | Age: 58
Discharge: HOME | End: 2023-02-06
Attending: PSYCHIATRY & NEUROLOGY
Payer: COMMERCIAL

## 2023-02-06 DIAGNOSIS — F32.89 OTHER DEPRESSION: ICD-10-CM

## 2023-02-06 DIAGNOSIS — Z79.899 LONG-TERM USE OF HIGH-RISK MEDICATION: ICD-10-CM

## 2023-02-06 DIAGNOSIS — Z99.2 ESRD (END STAGE RENAL DISEASE) ON DIALYSIS: ICD-10-CM

## 2023-02-06 DIAGNOSIS — F10.20 ALCOHOL USE DISORDER, SEVERE, DEPENDENCE: Chronic | ICD-10-CM

## 2023-02-06 DIAGNOSIS — N18.6 ESRD (END STAGE RENAL DISEASE) ON DIALYSIS: ICD-10-CM

## 2023-02-06 DIAGNOSIS — N18.5 CKD (CHRONIC KIDNEY DISEASE) STAGE 5, GFR LESS THAN 15 ML/MIN: ICD-10-CM

## 2023-02-06 DIAGNOSIS — F12.20 CANNABIS USE DISORDER, SEVERE, DEPENDENCE: Primary | Chronic | ICD-10-CM

## 2023-02-06 DIAGNOSIS — F41.1 GAD (GENERALIZED ANXIETY DISORDER): Chronic | ICD-10-CM

## 2023-02-06 PROCEDURE — 82077 ASSAY SPEC XCP UR&BREATH IA: CPT | Performed by: PSYCHIATRY & NEUROLOGY

## 2023-02-06 PROCEDURE — 90853 GROUP PSYCHOTHERAPY: CPT | Performed by: SOCIAL WORKER

## 2023-02-06 PROCEDURE — 90833 PSYTX W PT W E/M 30 MIN: CPT | Mod: ,,, | Performed by: PSYCHIATRY & NEUROLOGY

## 2023-02-06 PROCEDURE — 80307 DRUG TEST PRSMV CHEM ANLYZR: CPT | Performed by: PSYCHIATRY & NEUROLOGY

## 2023-02-06 PROCEDURE — 90853 PR GROUP PSYCHOTHERAPY: ICD-10-PCS | Mod: ,,, | Performed by: PSYCHOLOGIST

## 2023-02-06 PROCEDURE — 99232 PR SUBSEQUENT HOSPITAL CARE,LEVL II: ICD-10-PCS | Mod: ,,, | Performed by: PSYCHIATRY & NEUROLOGY

## 2023-02-06 PROCEDURE — 90833 PR PSYCHOTHERAPY W/PATIENT W/E&M, 30 MIN (ADD ON): ICD-10-PCS | Mod: ,,, | Performed by: PSYCHIATRY & NEUROLOGY

## 2023-02-06 PROCEDURE — 99232 SBSQ HOSP IP/OBS MODERATE 35: CPT | Mod: ,,, | Performed by: PSYCHIATRY & NEUROLOGY

## 2023-02-06 PROCEDURE — 90853 GROUP PSYCHOTHERAPY: CPT

## 2023-02-06 PROCEDURE — 90853 GROUP PSYCHOTHERAPY: CPT | Mod: ,,, | Performed by: PSYCHOLOGIST

## 2023-02-06 PROCEDURE — 36415 COLL VENOUS BLD VENIPUNCTURE: CPT | Performed by: PSYCHIATRY & NEUROLOGY

## 2023-02-06 NOTE — PLAN OF CARE
"   02/06/23 1230   Activity/Group Therapy Checklist   Group Meditation/Relaxation   Attendance Attended   Follows Direction Followed directions   Group Interactions/Observations Interacted appropriately;Alert   Affect/Mood Range Normal range   Affect/Mood Display Appropriate   Goal Progression Progressing     Discussed mindfulness as a practice, the definition of mindfulness, and various research evidence to strengthen benefits of mindfulness. Engaged in "Monthly Happiness Audit" worksheet and discussed ways to cultivate mindfulness in addition to grounding techniques.   "

## 2023-02-06 NOTE — PLAN OF CARE
02/03/23 1400   Activity/Group Therapy Checklist   Group Relapse Prevention   Attendance Attended   Follows Direction Followed directions   Group Interactions/Observations Interacted appropriately   Affect/Mood Range Normal range   Affect/Mood Display Appropriate   Goal Progression Progressing

## 2023-02-06 NOTE — PROGRESS NOTES
"ADDICTION CONSULT FOLLOW UP VISIT     DEPARTMENT:  Psychiatry  SITE: [facility], [address]    DATE OF EXAMINATION: 2/6/2023  8:00 AM  DATE OF ADMISSION: 1/11/2023    EXAMINING PRACTITIONER: Matt Patel    SUBJECTIVE:     HISTORY    Patient Name: Jennifer Booth  YOB: 1965    CHIEF COMPLAINT   Jennifer Booth is a 58 y.o. female who is being seen today for a follow up visit by the addiction psychiatry consult service.  Jennifer Booth presents with the chief complaint of: cannabis, nicotine, and alcohol use    HPI & PSYCHIATRIC ROS    Had a quiet weekend. She has a bit of sinus congestion. Decided not to go the parade. She had her 4yo grand-daughter on Sunday. Planning to go go to meeting tonight and tomorrow. No alcohol or marijuana use this weekend. Did smoke a few cigarettes including half of one this morning. No issues with sleeping, states she gets great sleep lately. Dialysis Saturday without any issues. Has a doctors appointment tomorrow after dialysis so will be late. Denies cravings. Psychotropic regimen discussed, denies any issues, taking as prescribed. [place] family meeting went well on Tuesday.     PFSH: The patient's past medical history has been reviewed and updated as appropriate within the electronic medical record system.    PSYCHOTROPIC MEDICATIONS   Escitalopram 20 mg po qd  Mirtazapine 15 mg po qhs  Bupropion IR 75 mg po qd  Naltrexone 50mg - 1/2 tab daily       OBJECTIVE:     EXAMINATION    There were no vitals filed for this visit.    CONSTITUTIONAL  General Appearance: thin, casually dressed, well groomed    MUSCULOSKELETAL  Abnormal Involuntary Movements: none noted; ambulates without assistance    PSYCHIATRIC  Orientation: A and Ox3  Speech: spontaneous, conversational  Language: fluent, english  Mood: "Well"  Affect: reactive, friendly  Thought Process: linear, organized, ruminative  Thought Content: no SI/HI, no delusions  Memory: intact to conversation, no " significant deficits noted  Attention and Concentration: intact; attentive to conversation  Fund of Knowledge: aware of current events, appropriate  Insight: intact  Judgment: intact    ASSESSMENT:     DIAGNOSES & PROBLEMS    Cannabis Use Disorder, severe; in early remission   Alcohol Use Disorder, severe  Tobacco Use Disorder  Generalized Anxiety Disorder  Chronic Kidney Disease, End Stage Renal Disease, on dialysis  Kidney Transplant Candidate    Patient Active Problem List   Diagnosis    Constipation - functional    Multiple myeloma in remission    Renal hypertension    Hyperkalemia    Tobacco abuse counseling    Tobacco use disorder    Colon cancer screening    ESRD (end stage renal disease) on dialysis    Glomerulosclerosis    Anemia in chronic kidney disease, on chronic dialysis    GERD without esophagitis    Patient on waiting list for kidney transplant    SATNAM (generalized anxiety disorder)    Allergic rhinitis    Hyponatremia    Hypertension    Secondary hyperparathyroidism of renal origin    Volume overload    History of substance use    Dialysis AV fistula malfunction, initial encounter    CKD (chronic kidney disease) stage 5, GFR less than 15 ml/min    Acute respiratory failure with hypoxia    Metabolic acidosis    Murmur, cardiac    Cannabis use disorder, severe, dependence    Alcohol use disorder, severe, dependence    Preoperative clearance    Severe episode of recurrent major depressive disorder, without psychotic features    Chronic diarrhea    Chronic pancreatitis    Depression    Iron deficiency anemia    Non-compliance with renal dialysis    Osteopenia    Protein calorie malnutrition    Vitamin D deficiency    Injury of right thumb    Bilateral carpal tunnel syndrome    Polyp of colon    Encounter for pre-transplant evaluation for chronic kidney disease     PLAN:     MANAGEMENT PLAN, TREATMENT GOALS, THERAPEUTIC TECHNIQUES/APPROACHES & CLINICAL REASONING    -continue home escitalopram 20 mg po  qd  -continue mirtazapine 15 mg po qhs  -continue Bupropion IR 75 mg po qd  -continue Naltrexone 25 mg po qd    - Continue ORP.  - Continue ORP protocol.  - Labs: CMP, PETH, urine toxicology, alcohol biomarkers.  - Full engagement in 12 step (or equivalent) recovery program(s), including mandatory meeting attendance and acquisition of sponsor.  - Patient counseled on abstinence from alcohol and substances of abuse (illicit and prescription).  - Relapse prevention and motivational interviewing provided.    In cases of emergency, daily coverage provided by Acute/ER Psych MD, NP, or SW, with contact numbers located in [facility] On Call Schedule    Case discussed with Staff Psychiatrist: MD Matt CHATTERJEE MD  [facility] PGY-4

## 2023-02-06 NOTE — PROGRESS NOTES
Group Psychotherapy (PhD/LCSW)    Location: [facility]    Clinical status of patient: Intensive Outpatient Program (IOP)    Date: 2/6/2023    Group Focus: Psychodynamic Group Psychotherapy    Length of service: 54157 - 45-50 minutes    Number of patients in attendance: 8    Referred by: Addictive Behavior Unit Treatment Team    Target symptoms: Substance Abuse    Patient's response to treatment: Active Listening and Self-disclosure    Progress toward goals: Progressing adequately    Interval History: Pt reports continued sobriety. She was able to spend quality time with her granddaughter for a full day due to being sober, and felt very good about this.    Diagnosis: Cannabis Use Disorder; Alcohol Use Disorder in remission    Plan: Continue treatment on ABU

## 2023-02-06 NOTE — PLAN OF CARE
02/06/23 1400   Activity/Group Therapy Checklist   Group Addiction Education   Attendance Attended   Follows Direction Followed directions   Group Interactions/Observations Interacted appropriately   Affect/Mood Range Normal range   Affect/Mood Display Appropriate   Goal Progression Progressing

## 2023-02-07 ENCOUNTER — HOSPITAL ENCOUNTER (OUTPATIENT)
Dept: PSYCHIATRY | Facility: HOSPITAL | Age: 58
Discharge: HOME | End: 2023-02-07
Attending: PSYCHIATRY & NEUROLOGY
Payer: MEDICARE

## 2023-02-07 ENCOUNTER — OFFICE VISIT (OUTPATIENT)
Dept: HEMATOLOGY/ONCOLOGY | Facility: CLINIC | Age: 58
End: 2023-02-07
Payer: MEDICARE

## 2023-02-07 VITALS
DIASTOLIC BLOOD PRESSURE: 88 MMHG | OXYGEN SATURATION: 98 % | HEART RATE: 72 BPM | WEIGHT: 122.38 LBS | SYSTOLIC BLOOD PRESSURE: 139 MMHG | HEIGHT: 68 IN | BODY MASS INDEX: 18.55 KG/M2

## 2023-02-07 DIAGNOSIS — N18.6 ESRD (END STAGE RENAL DISEASE) ON DIALYSIS: ICD-10-CM

## 2023-02-07 DIAGNOSIS — C90.00 MULTIPLE MYELOMA, REMISSION STATUS UNSPECIFIED: Primary | ICD-10-CM

## 2023-02-07 DIAGNOSIS — D63.1 ANEMIA IN CHRONIC KIDNEY DISEASE, ON CHRONIC DIALYSIS: ICD-10-CM

## 2023-02-07 DIAGNOSIS — R94.8 ABNORMAL POSITRON EMISSION TOMOGRAPHY (PET) SCAN: ICD-10-CM

## 2023-02-07 DIAGNOSIS — Z99.2 ANEMIA IN CHRONIC KIDNEY DISEASE, ON CHRONIC DIALYSIS: ICD-10-CM

## 2023-02-07 DIAGNOSIS — Z99.2 ESRD (END STAGE RENAL DISEASE) ON DIALYSIS: ICD-10-CM

## 2023-02-07 DIAGNOSIS — N18.6 ANEMIA IN CHRONIC KIDNEY DISEASE, ON CHRONIC DIALYSIS: ICD-10-CM

## 2023-02-07 DIAGNOSIS — F12.20 CANNABIS USE DISORDER, SEVERE, DEPENDENCE: Primary | ICD-10-CM

## 2023-02-07 PROCEDURE — 90853 PR GROUP PSYCHOTHERAPY: ICD-10-PCS | Mod: ,,, | Performed by: PSYCHOLOGIST

## 2023-02-07 PROCEDURE — 99214 PR OFFICE/OUTPT VISIT, EST, LEVL IV, 30-39 MIN: ICD-10-PCS | Mod: S$PBB,,, | Performed by: INTERNAL MEDICINE

## 2023-02-07 PROCEDURE — 99999 PR PBB SHADOW E&M-EST. PATIENT-LVL III: CPT | Mod: PBBFAC,,, | Performed by: INTERNAL MEDICINE

## 2023-02-07 PROCEDURE — 99214 OFFICE O/P EST MOD 30 MIN: CPT | Mod: S$PBB,,, | Performed by: INTERNAL MEDICINE

## 2023-02-07 PROCEDURE — 90853 GROUP PSYCHOTHERAPY: CPT | Mod: ,,, | Performed by: PSYCHOLOGIST

## 2023-02-07 PROCEDURE — 99213 OFFICE O/P EST LOW 20 MIN: CPT | Mod: PBBFAC,25 | Performed by: INTERNAL MEDICINE

## 2023-02-07 PROCEDURE — 90853 GROUP PSYCHOTHERAPY: CPT | Performed by: SOCIAL WORKER

## 2023-02-07 PROCEDURE — 99999 PR PBB SHADOW E&M-EST. PATIENT-LVL III: ICD-10-PCS | Mod: PBBFAC,,, | Performed by: INTERNAL MEDICINE
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
## 2023-02-07 NOTE — Clinical Note
F/u   6mo with cbc,cmp, SPEP, immunofixation electrophores, free light chains ,B-2 microglobulin l, Ferritin TIBC  2 weeks PRIOR to f/u

## 2023-02-07 NOTE — PROGRESS NOTES
Group Psychotherapy (PhD/LCSW)    [facility]    Clinical status of patient: Intensive Outpatient Program (IOP)    Date: 2/7/2023    Group Focus: Psychodynamic Group Psychotherapy    Length of service: 11480 - 45-50 minutes    Number of patients in attendance: 8    Referred by: Addictive Behavior Unit Treatment Team    Target symptoms: Substance Abuse    Patient's response to treatment: Active Listening and Self-disclosure    Progress toward goals: Progressing adequately    Interval History: Pt reports continued sobriety. She shared that she was offered a lit marijuana joint over the weekend and turned it down, which made her feel proud.    Diagnosis: Cannabis Use Disorder; Alcohol Use Disorder in remission    Plan: Continue treatment on ABU

## 2023-02-08 ENCOUNTER — LAB VISIT (OUTPATIENT)
Dept: LAB | Facility: HOSPITAL | Age: 58
End: 2023-02-08
Attending: PSYCHIATRY & NEUROLOGY
Payer: MEDICARE

## 2023-02-08 ENCOUNTER — HOSPITAL ENCOUNTER (OUTPATIENT)
Dept: PSYCHIATRY | Facility: HOSPITAL | Age: 58
Discharge: HOME | End: 2023-02-08
Attending: PSYCHIATRY & NEUROLOGY
Payer: COMMERCIAL

## 2023-02-08 VITALS
RESPIRATION RATE: 18 BRPM | DIASTOLIC BLOOD PRESSURE: 76 MMHG | SYSTOLIC BLOOD PRESSURE: 118 MMHG | TEMPERATURE: 97 F | HEART RATE: 64 BPM

## 2023-02-08 DIAGNOSIS — F17.200 TOBACCO USE DISORDER: Chronic | ICD-10-CM

## 2023-02-08 DIAGNOSIS — F41.1 GAD (GENERALIZED ANXIETY DISORDER): Chronic | ICD-10-CM

## 2023-02-08 DIAGNOSIS — N18.6 ESRD (END STAGE RENAL DISEASE) ON DIALYSIS: ICD-10-CM

## 2023-02-08 DIAGNOSIS — F10.20 ALCOHOL USE DISORDER, SEVERE, DEPENDENCE: Primary | ICD-10-CM

## 2023-02-08 DIAGNOSIS — N18.5 CKD (CHRONIC KIDNEY DISEASE) STAGE 5, GFR LESS THAN 15 ML/MIN: ICD-10-CM

## 2023-02-08 DIAGNOSIS — F10.20 ALCOHOL USE DISORDER, SEVERE, DEPENDENCE: Chronic | ICD-10-CM

## 2023-02-08 DIAGNOSIS — Z99.2 ESRD (END STAGE RENAL DISEASE) ON DIALYSIS: ICD-10-CM

## 2023-02-08 DIAGNOSIS — F10.20 ALCOHOL USE DISORDER, SEVERE, DEPENDENCE: ICD-10-CM

## 2023-02-08 DIAGNOSIS — F12.20 CANNABIS USE DISORDER, SEVERE, DEPENDENCE: Primary | Chronic | ICD-10-CM

## 2023-02-08 DIAGNOSIS — F32.89 OTHER DEPRESSION: ICD-10-CM

## 2023-02-08 LAB — ETHANOL SERPL-MCNC: <10 MG/DL

## 2023-02-08 PROCEDURE — 90853 GROUP PSYCHOTHERAPY: CPT | Mod: ,,, | Performed by: PSYCHOLOGIST

## 2023-02-08 PROCEDURE — 90853 GROUP PSYCHOTHERAPY: CPT

## 2023-02-08 PROCEDURE — 90833 PR PSYCHOTHERAPY W/PATIENT W/E&M, 30 MIN (ADD ON): ICD-10-PCS | Mod: ,,, | Performed by: PSYCHIATRY & NEUROLOGY

## 2023-02-08 PROCEDURE — 99232 PR SUBSEQUENT HOSPITAL CARE,LEVL II: ICD-10-PCS | Mod: ,,, | Performed by: PSYCHIATRY & NEUROLOGY

## 2023-02-08 PROCEDURE — 99232 SBSQ HOSP IP/OBS MODERATE 35: CPT | Mod: ,,, | Performed by: PSYCHIATRY & NEUROLOGY

## 2023-02-08 PROCEDURE — 82077 ASSAY SPEC XCP UR&BREATH IA: CPT | Performed by: PSYCHIATRY & NEUROLOGY

## 2023-02-08 PROCEDURE — 90853 GROUP PSYCHOTHERAPY: CPT | Performed by: SOCIAL WORKER

## 2023-02-08 PROCEDURE — 90833 PSYTX W PT W E/M 30 MIN: CPT | Mod: ,,, | Performed by: PSYCHIATRY & NEUROLOGY

## 2023-02-08 PROCEDURE — 80307 DRUG TEST PRSMV CHEM ANLYZR: CPT | Performed by: PSYCHIATRY & NEUROLOGY

## 2023-02-08 PROCEDURE — 36415 COLL VENOUS BLD VENIPUNCTURE: CPT | Performed by: PSYCHIATRY & NEUROLOGY

## 2023-02-08 PROCEDURE — 90853 PR GROUP PSYCHOTHERAPY: ICD-10-PCS | Mod: ,,, | Performed by: PSYCHOLOGIST

## 2023-02-08 NOTE — PLAN OF CARE
02/08/23 1600   Activity/Group Therapy Checklist   Group Other (Comments)  ([facility])   Attendance Attended   Follows Direction Followed directions   Group Interactions/Observations Alert;Interacted appropriately;Sharing;Supportive   Affect/Mood Range Normal range   Affect/Mood Display Appropriate   Goal Progression Progressing     Patient was supportive during group members [facility].

## 2023-02-08 NOTE — PLAN OF CARE
02/08/23 1100   Activity/Group Therapy Checklist   Group Addiction Education   Attendance Attended   Follows Direction Followed directions   Group Interactions/Observations Interacted appropriately   Affect/Mood Range Normal range   Affect/Mood Display Appropriate   Goal Progression Progressing

## 2023-02-08 NOTE — PROGRESS NOTES
Group Psychotherapy (PhD/LCSW)    Location: [facility]    Clinical status of patient: Intensive Outpatient Program (IOP)    Date: 2/8/2023    Group Focus: Psychodynamic Group Psychotherapy    Length of service: 66926 - 45-50 minutes    Number of patients in attendance: 8    Referred by: Addictive Behavior Unit Treatment Team    Target symptoms: Substance Abuse    Patient's response to treatment: Active Listening and Self-disclosure    Progress toward goals: Progressing adequately    Interval History: Pt reports continued sobriety. She discussed her fear around addressing the emotions involved in her sister's death, but was also open to feedback about concern regarding her sobriety if she does not.    Diagnosis: Cannabis Use Disorder; Alcohol Use Disorder in remission    Plan: Continue treatment on ABU

## 2023-02-08 NOTE — PLAN OF CARE
02/07/23 1100   Activity/Group Therapy Checklist   Group Addiction Education   Attendance Attended   Follows Direction Followed directions   Group Interactions/Observations Interacted appropriately   Affect/Mood Range Normal range   Affect/Mood Display Appropriate   Goal Progression Progressing

## 2023-02-08 NOTE — PATIENT CARE CONFERENCE
Diagnoses:  Cannabis Use Disorder, severe; in early remission   Alcohol Use Disorder, severe  Tobacco Use Disorder  Generalized Anxiety Disorder  Chronic Kidney Disease, End Stage Renal Disease, on dialysis  Kidney Transplant Candidate    Progress:  Patient staffed by team. Patient reported she has attended some AA/peer support meetings. Patient continues to not be established with a sponsor. Team to continue to monitor patient's progress.     Plan of Care:  Patient to continue ORP with group and individual therapies.    Aftercare/Follow ups  Med Management: TBD  Psychotherapy: TBD    Staff present:  MD Matt Jimenez MD Dana Gruber, PhD  Ronal Westbrook, DIOMEDES Hahn, [facility]  Pooja Irwin, LCSW  Isabel Yepez, YAHIR Gibson

## 2023-02-08 NOTE — PROGRESS NOTES
Group Psychotherapy (PhD/LCSW)    Clinical status of patient: Intensive Outpatient Program (IOP)    Date: 2/8/2023    Group Focus:  Distress Tolerance    Length of service: 64070 - 45-50 minutes    Number of patients in attendance: 14    Referred by: [facility] Team    Target symptoms: Substance Abuse    Patient's response to treatment: Active Listening and Self-disclosure    Progress toward goals: Progressing adequately    Interval History: Session focus was Distress Tolerance:  TIP skill.  Patients were encouraged to use temperature, intense exercise, paced breathing, or paired muscle relaxation to reduce intensity of distress.    Diagnosis:     ICD-10-CM ICD-9-CM   1. Cannabis use disorder, severe, dependence  F12.20 304.30   2. Alcohol use disorder, severe, dependence  F10.20 303.90   3. CKD (chronic kidney disease) stage 5, GFR less than 15 ml/min  N18.5 585.5   4. SATNAM (generalized anxiety disorder)  F41.1 300.02   5. ESRD (end stage renal disease) on dialysis  N18.6 585.6    Z99.2 V45.11   6. Other depression  F32.89 311   7. Tobacco use disorder  F17.200 305.1          Plan: Continue treatment on ORP

## 2023-02-08 NOTE — PROGRESS NOTES
"ADDICTION CONSULT FOLLOW UP VISIT     DEPARTMENT:  Psychiatry  SITE: [facility], [address]    DATE OF EXAMINATION: 2/8/2023  8:00 AM  DATE OF ADMISSION: 1/11/2023    EXAMINING PRACTITIONER: Matt Patel    SUBJECTIVE:     HISTORY    Patient Name: Jennifer Booth  YOB: 1965    CHIEF COMPLAINT   Jennifer Booth is a 58 y.o. female who is being seen today for a follow up visit by the addiction psychiatry consult service.  Jennifer Booth presents with the chief complaint of: cannabis, nicotine, and alcohol use    HPI & PSYCHIATRIC ROS    Made it to two meetings yesterday. Mostly just listening to the stories of other participants right now. States she will get a sponsor, but wants to get one with a smaller group. Reports actively looking for smaller groups where she might be able to get a sponsor. Discussed requirements for maintaining transplant list, previous events leading to her being dropped off of it. Some congestion today, still feeling a bit tired. Denies cravings. Psychotropic regimen discussed, denies any issues, taking as prescribed.     PFSH: The patient's past medical history has been reviewed and updated as appropriate within the electronic medical record system.    PSYCHOTROPIC MEDICATIONS   Escitalopram 20 mg po qd  Mirtazapine 15 mg po qhs  Bupropion IR 75 mg po qd  Naltrexone 50mg - 1/2 tab daily       OBJECTIVE:     EXAMINATION    Vitals:    02/08/23 0900   BP: 118/76   Pulse: 64   Resp: 18   Temp: 97 °F (36.1 °C)       CONSTITUTIONAL  General Appearance: thin, casually dressed, well groomed    MUSCULOSKELETAL  Abnormal Involuntary Movements: none noted; ambulates without assistance    PSYCHIATRIC  Orientation: A and Ox3  Speech: spontaneous, conversational  Language: fluent, english  Mood: "Good"  Affect: reactive, friendly  Thought Process: linear, organized, ruminative  Thought Content: no SI/HI, no delusions  Memory: intact to conversation, no significant deficits " noted  Attention and Concentration: intact; attentive to conversation  Fund of Knowledge: aware of current events, appropriate  Insight: intact  Judgment: intact    ASSESSMENT:     DIAGNOSES & PROBLEMS    Cannabis Use Disorder, severe; in early remission   Alcohol Use Disorder, severe  Tobacco Use Disorder  Generalized Anxiety Disorder  Chronic Kidney Disease, End Stage Renal Disease, on dialysis  Kidney Transplant Candidate    Patient Active Problem List   Diagnosis    Constipation - functional    Multiple myeloma in remission    Renal hypertension    Hyperkalemia    Tobacco abuse counseling    Tobacco use disorder    Colon cancer screening    ESRD (end stage renal disease) on dialysis    Glomerulosclerosis    Anemia in chronic kidney disease, on chronic dialysis    GERD without esophagitis    Patient on waiting list for kidney transplant    SATNAM (generalized anxiety disorder)    Allergic rhinitis    Hyponatremia    Hypertension    Secondary hyperparathyroidism of renal origin    Volume overload    History of substance use    Dialysis AV fistula malfunction, initial encounter    CKD (chronic kidney disease) stage 5, GFR less than 15 ml/min    Acute respiratory failure with hypoxia    Metabolic acidosis    Murmur, cardiac    Cannabis use disorder, severe, dependence    Alcohol use disorder, severe, dependence    Preoperative clearance    Severe episode of recurrent major depressive disorder, without psychotic features    Chronic diarrhea    Chronic pancreatitis    Depression    Iron deficiency anemia    Non-compliance with renal dialysis    Osteopenia    Protein calorie malnutrition    Vitamin D deficiency    Injury of right thumb    Bilateral carpal tunnel syndrome    Polyp of colon    Encounter for pre-transplant evaluation for chronic kidney disease     PLAN:     MANAGEMENT PLAN, TREATMENT GOALS, THERAPEUTIC TECHNIQUES/APPROACHES & CLINICAL REASONING    -continue home escitalopram 20 mg po qd  -continue  mirtazapine 15 mg po qhs  -continue Bupropion IR 75 mg po qd  -continue Naltrexone 25 mg po qd    - Continue ORP.  - Continue ORP protocol.  - Labs: CMP, PETH, urine toxicology, alcohol biomarkers.  - Full engagement in 12 step (or equivalent) recovery program(s), including mandatory meeting attendance and acquisition of sponsor.  - Patient counseled on abstinence from alcohol and substances of abuse (illicit and prescription).  - Relapse prevention and motivational interviewing provided.    In cases of emergency, daily coverage provided by Acute/ER Psych MD, NP, or SW, with contact numbers located in [facility] On Call Schedule    Case discussed with Staff Psychiatrist: MD Matt CHATTERJEE MD  [employer] PGY-4

## 2023-02-09 ENCOUNTER — HOSPITAL ENCOUNTER (OUTPATIENT)
Dept: PSYCHIATRY | Facility: HOSPITAL | Age: 58
Discharge: HOME | End: 2023-02-09
Attending: PSYCHIATRY & NEUROLOGY
Payer: MEDICARE

## 2023-02-09 DIAGNOSIS — F10.20 ALCOHOL USE DISORDER, SEVERE, DEPENDENCE: Primary | Chronic | ICD-10-CM

## 2023-02-09 PROCEDURE — 90853 GROUP PSYCHOTHERAPY: CPT | Performed by: SOCIAL WORKER

## 2023-02-09 PROCEDURE — 90853 GROUP PSYCHOTHERAPY: CPT | Mod: ,,, | Performed by: PSYCHOLOGIST

## 2023-02-09 PROCEDURE — 90853 PR GROUP PSYCHOTHERAPY: ICD-10-PCS | Mod: ,,, | Performed by: PSYCHOLOGIST

## 2023-02-09 NOTE — PLAN OF CARE
02/09/23 1427   Activity/Group Therapy Checklist   Group Activity  (Processing)   Attendance Attended   Follows Direction Followed directions   Group Interactions/Observations Interacted appropriately;Alert   Affect/Mood Range Normal range   Affect/Mood Display Appropriate   Goal Progression Progressing         SW facilitated Processing group. Patient enthusiastically participated and engaged with other group members.

## 2023-02-09 NOTE — PROGRESS NOTES
Group Psychotherapy (PhD/LCSW)    Clinical status of patient: Intensive Outpatient Program (IOP)    Date: 2/9/2023    Group Focus:  Emotion Regulation    Length of service: 70084 - 45-50 minutes    Number of patients in attendance: 12    Referred by: [facility]    Target symptoms: Substance Abuse    Patient's response to treatment: Active Listening and Self-disclosure    Progress toward goals: Progressing adequately    Interval History: Session focus was Emotion Regulation:  Problem-Solving.  Patients were encouraged to identify problems, check the facts, generate solutions, look at pros/cons, and create action steps to use the solution.    Diagnosis:     ICD-10-CM ICD-9-CM   1. Alcohol use disorder, severe, dependence  F10.20 303.90            Plan: Continue treatment on ORP

## 2023-02-10 ENCOUNTER — LAB VISIT (OUTPATIENT)
Dept: LAB | Facility: HOSPITAL | Age: 58
End: 2023-02-10
Attending: PSYCHIATRY & NEUROLOGY
Payer: MEDICARE

## 2023-02-10 ENCOUNTER — HOSPITAL ENCOUNTER (OUTPATIENT)
Dept: PSYCHIATRY | Facility: HOSPITAL | Age: 58
Discharge: HOME | End: 2023-02-10
Attending: PSYCHIATRY & NEUROLOGY
Payer: MEDICARE

## 2023-02-10 VITALS
TEMPERATURE: 97 F | HEART RATE: 79 BPM | DIASTOLIC BLOOD PRESSURE: 86 MMHG | SYSTOLIC BLOOD PRESSURE: 142 MMHG | RESPIRATION RATE: 18 BRPM

## 2023-02-10 DIAGNOSIS — F41.1 GAD (GENERALIZED ANXIETY DISORDER): Chronic | ICD-10-CM

## 2023-02-10 DIAGNOSIS — Z99.2 ESRD (END STAGE RENAL DISEASE) ON DIALYSIS: ICD-10-CM

## 2023-02-10 DIAGNOSIS — N18.5 CKD (CHRONIC KIDNEY DISEASE) STAGE 5, GFR LESS THAN 15 ML/MIN: ICD-10-CM

## 2023-02-10 DIAGNOSIS — F12.20 CANNABIS USE DISORDER, SEVERE, DEPENDENCE: Primary | Chronic | ICD-10-CM

## 2023-02-10 DIAGNOSIS — F32.89 OTHER DEPRESSION: ICD-10-CM

## 2023-02-10 DIAGNOSIS — N18.6 ESRD (END STAGE RENAL DISEASE) ON DIALYSIS: ICD-10-CM

## 2023-02-10 DIAGNOSIS — F17.200 TOBACCO USE DISORDER: Chronic | ICD-10-CM

## 2023-02-10 DIAGNOSIS — Z79.899 LONG-TERM USE OF HIGH-RISK MEDICATION: Primary | ICD-10-CM

## 2023-02-10 DIAGNOSIS — Z79.899 LONG-TERM USE OF HIGH-RISK MEDICATION: ICD-10-CM

## 2023-02-10 DIAGNOSIS — F10.20 ALCOHOL USE DISORDER, SEVERE, DEPENDENCE: Chronic | ICD-10-CM

## 2023-02-10 LAB — ETHANOL SERPL-MCNC: <10 MG/DL

## 2023-02-10 PROCEDURE — 90853 GROUP PSYCHOTHERAPY: CPT | Mod: ,,, | Performed by: PSYCHOLOGIST

## 2023-02-10 PROCEDURE — 90853 PR GROUP PSYCHOTHERAPY: ICD-10-PCS | Mod: ,,, | Performed by: PSYCHOLOGIST

## 2023-02-10 PROCEDURE — 80307 DRUG TEST PRSMV CHEM ANLYZR: CPT | Performed by: PSYCHIATRY & NEUROLOGY

## 2023-02-10 PROCEDURE — 90853 GROUP PSYCHOTHERAPY: CPT | Performed by: SOCIAL WORKER

## 2023-02-10 PROCEDURE — 36415 COLL VENOUS BLD VENIPUNCTURE: CPT | Performed by: PSYCHIATRY & NEUROLOGY

## 2023-02-10 PROCEDURE — 90853 GROUP PSYCHOTHERAPY: CPT

## 2023-02-10 PROCEDURE — 99232 PR SUBSEQUENT HOSPITAL CARE,LEVL II: ICD-10-PCS | Mod: ,,, | Performed by: PSYCHIATRY & NEUROLOGY

## 2023-02-10 PROCEDURE — 99232 SBSQ HOSP IP/OBS MODERATE 35: CPT | Mod: ,,, | Performed by: PSYCHIATRY & NEUROLOGY

## 2023-02-10 PROCEDURE — 82077 ASSAY SPEC XCP UR&BREATH IA: CPT | Performed by: PSYCHIATRY & NEUROLOGY

## 2023-02-10 NOTE — PROGRESS NOTES
Group Psychotherapy (PhD/LCSW)    Site: [facility]    Clinical status of patient: Intensive Outpatient Program (IOP)    Date: 2/9/2023    Group Focus: ACT Skills    Length of service: 75633 - 45-50 minutes    Number of patients in attendance: 14    Referred by: [facility]    Target symptoms: mood    Patient's response to treatment: Active Listening and Self-disclosure    Progress toward goals: Progressing adequately    Interval History: Session focus was Psychological Flexibility and the ACT Matrix. Patient populated the matrix with personalized experiences in the designated quadrants (toward, away, internal, and external). Patients were encouraged to use present moment awareness to practice noticing their toward and away moves to assist with making values-based actions.    Risk parameters:  Patient reports no suicidal ideation  Patient reports no homicidal ideation  Patient reports no self-injurious behavior  Patient reports no violent behavior    Diagnosis:     ICD-10-CM ICD-9-CM   1. Alcohol use disorder, severe, dependence  F10.20 303.90         Plan: Continue treatment on ORP

## 2023-02-10 NOTE — PLAN OF CARE
02/10/23 1250   Activity/Group Therapy Checklist   Group Meditation/Relaxation   Attendance Attended   Follows Direction Followed directions   Group Interactions/Observations Interacted appropriately;Alert   Affect/Mood Range Normal range   Affect/Mood Display Appropriate;Calm   Goal Progression Progressing

## 2023-02-10 NOTE — PROGRESS NOTES
ADDICTION CONSULT FOLLOW UP VISIT     DEPARTMENT:  Psychiatry  SITE: [facility], [address]    DATE OF EXAMINATION: 2/10/2023  8:00 AM  DATE OF ADMISSION: 1/11/2023    EXAMINING PRACTITIONER: Matt Patel    SUBJECTIVE:     HISTORY    Patient Name: Jennifer Booth  YOB: 1965    CHIEF COMPLAINT   Jennifer Booth is a 58 y.o. female who is being seen today for a follow up visit by the addiction psychiatry consult service.  Jennifer Booth presents with the chief complaint of: cannabis, nicotine, and alcohol use    HPI & PSYCHIATRIC ROS    Made it to two meetings Tuesday. Reports actively looking for smaller groups where she might be able to get a sponsor. Denies cravings. Planning to ask other program members because she knows two of them go to a meeting near her. Also considering just asking one of the members from her online 24/7 women's group. She does have one woman there who she feels would be a good sponsor and who has put her phone number out. Has not had a sponsor in the past. Denies cravings for alcohol/thc. Dialysis Saturday will be at 3am due to the parades. Mood has been good. Sleeping well other than a restless night due to sinuses on Wednesday. Followed up with her oncologist this week as well. Benefiting from the program, sharing with groups.    Psychotropic regimen discussed, denies any issues, taking as prescribed.     PFSH: The patient's past medical history has been reviewed and updated as appropriate within the electronic medical record system.    PSYCHOTROPIC MEDICATIONS   Escitalopram 20 mg po qd  Mirtazapine 15 mg po qhs  Bupropion IR 75 mg po qd  Naltrexone 50mg - 1/2 tab daily       OBJECTIVE:     EXAMINATION    There were no vitals filed for this visit.      CONSTITUTIONAL  General Appearance: thin, casually dressed, well groomed    MUSCULOSKELETAL  Abnormal Involuntary Movements: none noted; ambulates without assistance    PSYCHIATRIC  Orientation: A and  "Ox3  Speech: spontaneous, conversational  Language: fluent, english  Mood: "Good"  Affect: reactive, friendly  Thought Process: linear, organized, ruminative  Thought Content: no SI/HI, no delusions  Memory: intact to conversation, no significant deficits noted  Attention and Concentration: intact; attentive to conversation  Fund of Knowledge: aware of current events, appropriate  Insight: intact  Judgment: intact    ASSESSMENT:     DIAGNOSES & PROBLEMS    Cannabis Use Disorder, severe; in early remission   Alcohol Use Disorder, severe  Tobacco Use Disorder  Generalized Anxiety Disorder  Chronic Kidney Disease, End Stage Renal Disease, on dialysis  Kidney Transplant Candidate    Patient Active Problem List   Diagnosis    Constipation - functional    Multiple myeloma in remission    Renal hypertension    Hyperkalemia    Tobacco abuse counseling    Tobacco use disorder    Colon cancer screening    ESRD (end stage renal disease) on dialysis    Glomerulosclerosis    Anemia in chronic kidney disease, on chronic dialysis    GERD without esophagitis    Patient on waiting list for kidney transplant    SATNAM (generalized anxiety disorder)    Allergic rhinitis    Hyponatremia    Hypertension    Secondary hyperparathyroidism of renal origin    Volume overload    History of substance use    Dialysis AV fistula malfunction, initial encounter    CKD (chronic kidney disease) stage 5, GFR less than 15 ml/min    Acute respiratory failure with hypoxia    Metabolic acidosis    Murmur, cardiac    Cannabis use disorder, severe, dependence    Alcohol use disorder, severe, dependence    Preoperative clearance    Severe episode of recurrent major depressive disorder, without psychotic features    Chronic diarrhea    Chronic pancreatitis    Depression    Iron deficiency anemia    Non-compliance with renal dialysis    Osteopenia    Protein calorie malnutrition    Vitamin D deficiency    Injury of right thumb    Bilateral carpal tunnel syndrome "    Polyp of colon    Encounter for pre-transplant evaluation for chronic kidney disease     PLAN:     MANAGEMENT PLAN, TREATMENT GOALS, THERAPEUTIC TECHNIQUES/APPROACHES & CLINICAL REASONING    -continue home escitalopram 20 mg po qd  -continue mirtazapine 15 mg po qhs  -continue Bupropion IR 75 mg po qd  -continue Naltrexone 25 mg po qd    - Continue ORP.  - Continue ORP protocol.  - Labs: CMP, PETH, urine toxicology, alcohol biomarkers.  - Full engagement in 12 step (or equivalent) recovery program(s), including mandatory meeting attendance and acquisition of sponsor.  - Patient counseled on abstinence from alcohol and substances of abuse (illicit and prescription).  - Relapse prevention and motivational interviewing provided.    In cases of emergency, daily coverage provided by Acute/ER Psych MD, NP, or SW, with contact numbers located in [facility] On Call Schedule    Case discussed with Staff Psychiatrist: MD Matt CHATTERJEE MD  [employer] PGY-4

## 2023-02-10 NOTE — PROGRESS NOTES
Group Psychotherapy (PhD/LCSW)    Location: [facility]    Clinical status of patient: Intensive Outpatient Program (IOP)    Date: 2/10/2023    Group Focus: Psychodynamic Group Psychotherapy    Length of service: 17257 - 45-50 minutes    Number of patients in attendance: 8    Referred by: Addictive Behavior Unit Treatment Team    Target symptoms: Substance Abuse    Patient's response to treatment: Active Listening and Self-disclosure    Progress toward goals: Progressing adequately    Interval History: Pt reports continued sobriety. She engaged in weekend planning discussion.    Diagnosis: Cannabis Use Disorder; Alcohol Use Disorder in remission    Plan: Continue treatment on ABU

## 2023-02-10 NOTE — PLAN OF CARE
02/10/23 1631   Activity/Group Therapy Checklist   Group Other (Comments)  (Self Care)   Attendance Attended   Follows Direction Followed directions   Group Interactions/Observations Interacted appropriately;Alert;Sharing;Supportive   Affect/Mood Range Normal range   Affect/Mood Display Appropriate   Goal Progression Progressing      discussed self care tips with patients. Facilitated self care worksheet titled, Looking Back, Looking Forward. Patient's reflected on accomplishments past and future and what ways they could improve self care overall.

## 2023-02-10 NOTE — PLAN OF CARE
02/09/23 1400   Activity/Group Therapy Checklist   Group Relapse Prevention   Attendance Attended   Follows Direction Followed directions   Group Interactions/Observations Interacted appropriately   Affect/Mood Range Normal range   Affect/Mood Display Appropriate   Goal Progression Progressing

## 2023-02-13 ENCOUNTER — HOSPITAL ENCOUNTER (OUTPATIENT)
Dept: PSYCHIATRY | Facility: HOSPITAL | Age: 58
Discharge: HOME | End: 2023-02-13
Attending: PSYCHIATRY & NEUROLOGY
Payer: COMMERCIAL

## 2023-02-13 ENCOUNTER — LAB VISIT (OUTPATIENT)
Dept: LAB | Facility: HOSPITAL | Age: 58
End: 2023-02-13
Attending: PSYCHIATRY & NEUROLOGY
Payer: MEDICARE

## 2023-02-13 DIAGNOSIS — F17.200 TOBACCO USE DISORDER: Chronic | ICD-10-CM

## 2023-02-13 DIAGNOSIS — F41.1 GAD (GENERALIZED ANXIETY DISORDER): Chronic | ICD-10-CM

## 2023-02-13 DIAGNOSIS — F12.20 CANNABIS USE DISORDER, SEVERE, DEPENDENCE: Primary | Chronic | ICD-10-CM

## 2023-02-13 DIAGNOSIS — F10.20 ALCOHOL USE DISORDER, SEVERE, DEPENDENCE: Chronic | ICD-10-CM

## 2023-02-13 DIAGNOSIS — Z99.2 ESRD (END STAGE RENAL DISEASE) ON DIALYSIS: ICD-10-CM

## 2023-02-13 DIAGNOSIS — Z79.899 LONG-TERM USE OF HIGH-RISK MEDICATION: ICD-10-CM

## 2023-02-13 DIAGNOSIS — N18.5 CKD (CHRONIC KIDNEY DISEASE) STAGE 5, GFR LESS THAN 15 ML/MIN: ICD-10-CM

## 2023-02-13 DIAGNOSIS — Z79.899 LONG-TERM USE OF HIGH-RISK MEDICATION: Primary | ICD-10-CM

## 2023-02-13 DIAGNOSIS — N18.6 ESRD (END STAGE RENAL DISEASE) ON DIALYSIS: ICD-10-CM

## 2023-02-13 DIAGNOSIS — F32.89 OTHER DEPRESSION: ICD-10-CM

## 2023-02-13 PROCEDURE — 36415 COLL VENOUS BLD VENIPUNCTURE: CPT | Performed by: PSYCHIATRY & NEUROLOGY

## 2023-02-13 PROCEDURE — 90853 PR GROUP PSYCHOTHERAPY: ICD-10-PCS | Mod: ,,, | Performed by: PSYCHOLOGIST

## 2023-02-13 PROCEDURE — 90853 GROUP PSYCHOTHERAPY: CPT | Mod: ,,, | Performed by: PSYCHOLOGIST

## 2023-02-13 PROCEDURE — 99232 PR SUBSEQUENT HOSPITAL CARE,LEVL II: ICD-10-PCS | Mod: ,,, | Performed by: PSYCHIATRY & NEUROLOGY

## 2023-02-13 PROCEDURE — 90833 PSYTX W PT W E/M 30 MIN: CPT | Mod: ,,, | Performed by: PSYCHIATRY & NEUROLOGY

## 2023-02-13 PROCEDURE — 90853 GROUP PSYCHOTHERAPY: CPT | Performed by: SOCIAL WORKER

## 2023-02-13 PROCEDURE — 90833 PR PSYCHOTHERAPY W/PATIENT W/E&M, 30 MIN (ADD ON): ICD-10-PCS | Mod: ,,, | Performed by: PSYCHIATRY & NEUROLOGY

## 2023-02-13 PROCEDURE — 99232 SBSQ HOSP IP/OBS MODERATE 35: CPT | Mod: ,,, | Performed by: PSYCHIATRY & NEUROLOGY

## 2023-02-13 PROCEDURE — 82077 ASSAY SPEC XCP UR&BREATH IA: CPT | Performed by: PSYCHIATRY & NEUROLOGY

## 2023-02-13 PROCEDURE — 80307 DRUG TEST PRSMV CHEM ANLYZR: CPT | Performed by: PSYCHIATRY & NEUROLOGY

## 2023-02-13 NOTE — PLAN OF CARE
02/13/23 1516   Activity/Group Therapy Checklist   Group Life Skills  (Stress Management)   Attendance Attended   Follows Direction Followed directions   Group Interactions/Observations Interacted appropriately;Alert   Affect/Mood Range Normal range   Affect/Mood Display Appropriate   Goal Progression Progressing         SW facilitated Skills Group focusing on stress management. Pt appropriately engaged with the topic and the group members. Pt reflected on current stressors and coping skills.

## 2023-02-13 NOTE — PROGRESS NOTES
Group Psychotherapy (PhD/LCSW)    Location: [facility]    Clinical status of patient: Intensive Outpatient Program (IOP)    Date: 2/13/2023    Group Focus: Psychodynamic Group Psychotherapy    Length of service: 91152 - 45-50 minutes    Number of patients in attendance: 7    Referred by: Addictive Behavior Unit Treatment Team    Target symptoms: Substance Abuse    Patient's response to treatment: Active Listening and Self-disclosure    Progress toward goals: Progressing adequately    Interval History: Pt reports continued sobriety. She reports that she went to 3 hours of AA meetings over the weekend.    Diagnosis: Cannabis Use Disorder; Alcohol Use Disorder in remission    Plan: Continue treatment on ABU

## 2023-02-13 NOTE — PROGRESS NOTES
"ADDICTION CONSULT FOLLOW UP VISIT     DEPARTMENT:  Psychiatry  SITE: [facility], [address]    DATE OF EXAMINATION: 2/13/2023  8:00 AM  DATE OF ADMISSION: 1/11/2023    EXAMINING PRACTITIONER: Matt Patel    SUBJECTIVE:     HISTORY    Patient Name: Jennifer Booth  YOB: 1965    CHIEF COMPLAINT   Jennifer Booth is a 58 y.o. female who is being seen today for a follow up visit by the addiction psychiatry consult service.  Jennifer Booth presents with the chief complaint of: cannabis, nicotine, and alcohol use    HPI & PSYCHIATRIC ROS    Made it to three virtual meetings this morning from 6a-9a. Mood has been okay. Had to be at the dialysis center at 3am on Saturday. She slept a lot this weekend which she attributes to having her schedule disrupted by dialysis. She also watched her grandbaby on Sunday. Has not reached out to a potential sponsor yet.    No relapse. Not struggling with cravings. Still finding the program helpful.    Psychotropic regimen discussed, denies any issues, taking as prescribed.     PFSH: The patient's past medical history has been reviewed and updated as appropriate within the electronic medical record system.    PSYCHOTROPIC MEDICATIONS   Escitalopram 20 mg po qd  Mirtazapine 15 mg po qhs  Bupropion IR 75 mg po qd  Naltrexone 50mg - 1/2 tab daily       OBJECTIVE:     EXAMINATION    There were no vitals filed for this visit.      CONSTITUTIONAL  General Appearance: thin, casually dressed, well groomed    MUSCULOSKELETAL  Abnormal Involuntary Movements: none noted; ambulates without assistance    PSYCHIATRIC  Orientation: A and Ox3  Speech: spontaneous, conversational  Language: fluent, english  Mood: "Okay"  Affect: reactive, friendly  Thought Process: linear, organized, ruminative  Thought Content: no SI/HI, no delusions  Memory: intact to conversation, no significant deficits noted  Attention and Concentration: intact; attentive to conversation  Fund of " Knowledge: aware of current events, appropriate  Insight: intact  Judgment: intact    ASSESSMENT:     DIAGNOSES & PROBLEMS    Cannabis Use Disorder, severe; in early remission   Alcohol Use Disorder, severe  Tobacco Use Disorder  Generalized Anxiety Disorder  Chronic Kidney Disease, End Stage Renal Disease, on dialysis  Kidney Transplant Candidate    Patient Active Problem List   Diagnosis    Constipation - functional    Multiple myeloma in remission    Renal hypertension    Hyperkalemia    Tobacco abuse counseling    Tobacco use disorder    Colon cancer screening    ESRD (end stage renal disease) on dialysis    Glomerulosclerosis    Anemia in chronic kidney disease, on chronic dialysis    GERD without esophagitis    Patient on waiting list for kidney transplant    SATNAM (generalized anxiety disorder)    Allergic rhinitis    Hyponatremia    Hypertension    Secondary hyperparathyroidism of renal origin    Volume overload    History of substance use    Dialysis AV fistula malfunction, initial encounter    CKD (chronic kidney disease) stage 5, GFR less than 15 ml/min    Acute respiratory failure with hypoxia    Metabolic acidosis    Murmur, cardiac    Cannabis use disorder, severe, dependence    Alcohol use disorder, severe, dependence    Preoperative clearance    Severe episode of recurrent major depressive disorder, without psychotic features    Chronic diarrhea    Chronic pancreatitis    Depression    Iron deficiency anemia    Non-compliance with renal dialysis    Osteopenia    Protein calorie malnutrition    Vitamin D deficiency    Injury of right thumb    Bilateral carpal tunnel syndrome    Polyp of colon    Encounter for pre-transplant evaluation for chronic kidney disease     PLAN:     MANAGEMENT PLAN, TREATMENT GOALS, THERAPEUTIC TECHNIQUES/APPROACHES & CLINICAL REASONING    -continue home escitalopram 20 mg po qd  -continue mirtazapine 15 mg po qhs  -continue Bupropion IR 75 mg po qd  -continue Naltrexone 25 mg  po qd    - Continue ORP.  - Continue ORP protocol.  - Labs: CMP, PETH, urine toxicology, alcohol biomarkers.  - Full engagement in 12 step (or equivalent) recovery program(s), including mandatory meeting attendance and acquisition of sponsor.  - Patient counseled on abstinence from alcohol and substances of abuse (illicit and prescription).  - Relapse prevention and motivational interviewing provided.    In cases of emergency, daily coverage provided by Acute/ER Psych MD, NP, or SW, with contact numbers located in [facility] On Call Schedule    Case discussed with Staff Psychiatrist: MD Matt CHATTERJEE MD  [employer] PGY-4

## 2023-02-13 NOTE — PLAN OF CARE
02/10/23 1400   Activity/Group Therapy Checklist   Group Addiction Education   Attendance Attended   Follows Direction Followed directions   Group Interactions/Observations Interacted appropriately   Affect/Mood Range Normal range   Affect/Mood Display Appropriate   Goal Progression Progressing

## 2023-02-14 ENCOUNTER — HOSPITAL ENCOUNTER (OUTPATIENT)
Dept: PSYCHIATRY | Facility: HOSPITAL | Age: 58
Discharge: HOME | End: 2023-02-14
Attending: PSYCHIATRY & NEUROLOGY
Payer: MEDICARE

## 2023-02-14 DIAGNOSIS — F12.20 CANNABIS USE DISORDER, SEVERE, DEPENDENCE: Primary | ICD-10-CM

## 2023-02-14 PROCEDURE — 90853 GROUP PSYCHOTHERAPY: CPT | Mod: ,,, | Performed by: PSYCHOLOGIST

## 2023-02-14 PROCEDURE — 90853 GROUP PSYCHOTHERAPY: CPT | Performed by: SOCIAL WORKER

## 2023-02-14 PROCEDURE — 90853 PR GROUP PSYCHOTHERAPY: ICD-10-PCS | Mod: ,,, | Performed by: PSYCHOLOGIST

## 2023-02-14 NOTE — PLAN OF CARE
02/13/23 1400   Activity/Group Therapy Checklist   Group Addiction Education   Attendance Attended   Follows Direction Followed directions   Group Interactions/Observations Interacted appropriately   Affect/Mood Range Normal range   Affect/Mood Display Appropriate   Goal Progression Progressing

## 2023-02-14 NOTE — PROGRESS NOTES
Group Psychotherapy (PhD/LCSW)    Clinical status of patient: Intensive Outpatient Program (IOP)    Date: 2/7/2023    Group Focus:  Communication Skills    Length of service: 73622 - 45-50 minutes    Number of patients in attendance: 14    Referred by: [facility]    Target symptoms: Substance Abuse    Patient's response to treatment: Active Listening and Self-disclosure    Progress toward goals: Progressing adequately    Interval History: Session focused on interpersonal effectiveness, specifically validation and self-validation.    Diagnosis:     ICD-10-CM ICD-9-CM   1. Cannabis use disorder, severe, dependence  F12.20 304.30        Plan: Continue treatment on ORP

## 2023-02-14 NOTE — PLAN OF CARE
02/14/23 1100   Activity/Group Therapy Checklist   Group Addiction Education   Attendance Attended   Follows Direction Followed directions   Group Interactions/Observations Interacted appropriately   Affect/Mood Range Normal range   Affect/Mood Display Appropriate   Goal Progression Progressing

## 2023-02-16 ENCOUNTER — HOSPITAL ENCOUNTER (OUTPATIENT)
Dept: PSYCHIATRY | Facility: HOSPITAL | Age: 58
Discharge: HOME | End: 2023-02-16
Attending: PSYCHIATRY & NEUROLOGY
Payer: COMMERCIAL

## 2023-02-16 DIAGNOSIS — Z99.2 ESRD (END STAGE RENAL DISEASE) ON DIALYSIS: ICD-10-CM

## 2023-02-16 DIAGNOSIS — N18.6 ESRD (END STAGE RENAL DISEASE) ON DIALYSIS: ICD-10-CM

## 2023-02-16 DIAGNOSIS — F17.200 TOBACCO USE DISORDER: Chronic | ICD-10-CM

## 2023-02-16 DIAGNOSIS — F12.20 CANNABIS USE DISORDER, SEVERE, DEPENDENCE: Primary | Chronic | ICD-10-CM

## 2023-02-16 DIAGNOSIS — N18.5 CKD (CHRONIC KIDNEY DISEASE) STAGE 5, GFR LESS THAN 15 ML/MIN: ICD-10-CM

## 2023-02-16 DIAGNOSIS — F41.1 GAD (GENERALIZED ANXIETY DISORDER): Chronic | ICD-10-CM

## 2023-02-16 DIAGNOSIS — F10.20 ALCOHOL USE DISORDER, SEVERE, DEPENDENCE: Chronic | ICD-10-CM

## 2023-02-16 DIAGNOSIS — F32.89 OTHER DEPRESSION: ICD-10-CM

## 2023-02-16 LAB
AMPHETAMINES SERPL QL: NEGATIVE
BARBITURATES SERPL QL SCN: NEGATIVE
BENZODIAZ SERPL QL SCN: NEGATIVE
BZE SERPL QL: NEGATIVE
CARBOXYTHC SERPL QL SCN: NEGATIVE
CLASS I ANTIBODIES - LUMINEX: NEGATIVE
CLASS II ANTIBODIES - LUMINEX: NEGATIVE
CPRA %: 0
ETHANOL SERPL QL SCN: NEGATIVE
METHADONE SERPL QL SCN: NEGATIVE
OPIATES SERPL QL SCN: NEGATIVE
PCP SERPL QL SCN: NEGATIVE
PROPOXYPH SERPL QL: NEGATIVE
SERUM COLLECTION DT - LUMINEX CLASS I: NORMAL
SERUM COLLECTION DT - LUMINEX CLASS II: NORMAL
SPCL1 TESTING DATE: NORMAL
SPCL2 TESTING DATE: NORMAL
SPCLU TESTING DATE: NORMAL

## 2023-02-16 PROCEDURE — 90853 GROUP PSYCHOTHERAPY: CPT | Performed by: SOCIAL WORKER

## 2023-02-16 PROCEDURE — 99232 SBSQ HOSP IP/OBS MODERATE 35: CPT | Mod: ,,, | Performed by: PSYCHIATRY & NEUROLOGY

## 2023-02-16 PROCEDURE — 90853 GROUP PSYCHOTHERAPY: CPT

## 2023-02-16 PROCEDURE — 90833 PSYTX W PT W E/M 30 MIN: CPT | Mod: ,,, | Performed by: PSYCHIATRY & NEUROLOGY

## 2023-02-16 PROCEDURE — 99232 PR SUBSEQUENT HOSPITAL CARE,LEVL II: ICD-10-PCS | Mod: ,,, | Performed by: PSYCHIATRY & NEUROLOGY

## 2023-02-16 PROCEDURE — 90833 PR PSYCHOTHERAPY W/PATIENT W/E&M, 30 MIN (ADD ON): ICD-10-PCS | Mod: ,,, | Performed by: PSYCHIATRY & NEUROLOGY

## 2023-02-16 PROCEDURE — 90853 GROUP PSYCHOTHERAPY: CPT | Mod: ,,, | Performed by: PSYCHOLOGIST

## 2023-02-16 PROCEDURE — 90853 PR GROUP PSYCHOTHERAPY: ICD-10-PCS | Mod: ,,, | Performed by: PSYCHOLOGIST

## 2023-02-16 NOTE — PROGRESS NOTES
Group Psychotherapy (PhD/LCSW)    Clinical status of patient: Intensive Outpatient Program (IOP)    Date: 2/16/2023    Group Focus:  Emotion Regulation    Length of service: 80869 - 45-50 minutes    Number of patients in attendance: 14    Referred by: [facility]    Target symptoms: Substance Abuse    Patient's response to treatment: Active Listening and Self-disclosure    Progress toward goals: Progressing adequately    Interval History: Session focus was Emotion Regulation:  Problem-Solving.  Patients were encouraged to identify problems, check the facts, generate solutions, look at pros/cons, and create action steps to use the solution.    Diagnosis:     ICD-10-CM ICD-9-CM   1. Cannabis use disorder, severe, dependence  F12.20 304.30   2. Alcohol use disorder, severe, dependence  F10.20 303.90   3. SATNAM (generalized anxiety disorder)  F41.1 300.02   4. Other depression  F32.89 311   5. CKD (chronic kidney disease) stage 5, GFR less than 15 ml/min  N18.5 585.5   6. ESRD (end stage renal disease) on dialysis  N18.6 585.6    Z99.2 V45.11   7. Tobacco use disorder  F17.200 305.1          Plan: Continue treatment on ORP

## 2023-02-16 NOTE — PROGRESS NOTES
"ADDICTION CONSULT FOLLOW UP VISIT     DEPARTMENT:  Psychiatry  SITE: [facility], [address]    DATE OF EXAMINATION: 2/16/2023  8:00 AM  DATE OF ADMISSION: 1/11/2023    EXAMINING PRACTITIONER: Matt Patel    SUBJECTIVE:     HISTORY    Patient Name: Jennifer Booth  YOB: 1965    CHIEF COMPLAINT   Jennifer Booth is a 58 y.o. female who is being seen today for a follow up visit by the addiction psychiatry consult service.  Jennifer Booth presents with the chief complaint of: cannabis, nicotine, and alcohol use    HPI & PSYCHIATRIC ROS    Jennifer is seen today doing well. Says she has been feeling more at peace lately - "a feeling of serenity." Went to dialysis this morning. She is still planning on skipping this Saturday. She has missed a session before and been okay and her next one will be the Monday after. Planning to monitor her diet and fluid intake to minimize her problems with it. Dialysis starting at 3am messed with her schedule too much. Missed yesterday IOP due to diarrhea which has since resolved, feeling physically good today.     She notes that she has been much more comfortable walking lately than when she first came in. No longer needing to use a cane, feels her strength has improved and she attributes this to the daily routine. She is considering going to two in person meetings this weekend. Having in person meetings would also give her a reason to continue with regular movement once IOP is completed.    No relapse. Not struggling with cravings. Still finding the program helpful. Wanting to make sure she stays off the marijuana and alcohol so she can be on the transplant list.    Psychotropic regimen discussed, denies any issues, taking as prescribed.     PFSH: The patient's past medical history has been reviewed and updated as appropriate within the electronic medical record system.    PSYCHOTROPIC MEDICATIONS   Escitalopram 20 mg po qd  Mirtazapine 15 mg po " "qhs  Bupropion IR 75 mg po qd  Naltrexone 50mg - 1/2 tab daily       OBJECTIVE:     EXAMINATION    There were no vitals filed for this visit.      CONSTITUTIONAL  General Appearance: thin, casually dressed, well groomed    MUSCULOSKELETAL  Abnormal Involuntary Movements: none noted; ambulates without assistance    PSYCHIATRIC  Orientation: A and Ox3  Speech: spontaneous, conversational  Language: fluent, english  Mood: "Serenity"  Affect: reactive, friendly  Thought Process: linear, organized, ruminative  Thought Content: no SI/HI, no delusions  Memory: intact to conversation, no significant deficits noted  Attention and Concentration: intact; attentive to conversation  Fund of Knowledge: aware of current events, appropriate  Insight: intact  Judgment: intact       Add-On Psychotherapy:      [phone] Add-On Psychotherapy: range 16-37 minutes     NOTE: The time spent delivering Add-On Psychotherapy is separate from and in addition to the total time spent performing E/M services on the date of the encounter.     Duration of Intervention: 18 minutes  Primary Focus: maintaining sobriety, meetings, maintaining hopefulness  Participants: patient  Therapeutic Intervention Type: supportive psychotherapy  Why Chosen Therapy is Appropriate Versus Another Modality: relevant to diagnosis, expertise of clinician  Target Symptoms Addressed: substance abuse  Topics and Themes Discussed: therapeutic alliance and fostering of rapport, coping mechanisms, substance abuse  Psychotherapeutic Techniques Employed: active listening, motivational interviewing, and relapse prevention  Outcome Monitoring Methods: self-report, observation  Response to the Intervention: accepting  Patient Progress Toward Treatment Goals: progressing as anticipated    ASSESSMENT:     DIAGNOSES & PROBLEMS    Cannabis Use Disorder, severe; in early remission   Alcohol Use Disorder, severe  Tobacco Use Disorder  Generalized Anxiety Disorder  Chronic Kidney Disease, " End Stage Renal Disease, on dialysis  Kidney Transplant Candidate    Patient Active Problem List   Diagnosis    Constipation - functional    Multiple myeloma in remission    Renal hypertension    Hyperkalemia    Tobacco abuse counseling    Tobacco use disorder    Colon cancer screening    ESRD (end stage renal disease) on dialysis    Glomerulosclerosis    Anemia in chronic kidney disease, on chronic dialysis    GERD without esophagitis    Patient on waiting list for kidney transplant    SATNAM (generalized anxiety disorder)    Allergic rhinitis    Hyponatremia    Hypertension    Secondary hyperparathyroidism of renal origin    Volume overload    History of substance use    Dialysis AV fistula malfunction, initial encounter    CKD (chronic kidney disease) stage 5, GFR less than 15 ml/min    Acute respiratory failure with hypoxia    Metabolic acidosis    Murmur, cardiac    Cannabis use disorder, severe, dependence    Alcohol use disorder, severe, dependence    Preoperative clearance    Severe episode of recurrent major depressive disorder, without psychotic features    Chronic diarrhea    Chronic pancreatitis    Depression    Iron deficiency anemia    Non-compliance with renal dialysis    Osteopenia    Protein calorie malnutrition    Vitamin D deficiency    Injury of right thumb    Bilateral carpal tunnel syndrome    Polyp of colon    Encounter for pre-transplant evaluation for chronic kidney disease     PLAN:     MANAGEMENT PLAN, TREATMENT GOALS, THERAPEUTIC TECHNIQUES/APPROACHES & CLINICAL REASONING    -continue home escitalopram 20 mg po qd  -continue mirtazapine 15 mg po qhs  -continue Bupropion IR 75 mg po qd  -continue Naltrexone 25 mg po qd    - Continue ORP.  - Continue ORP protocol.  - Labs: CMP, PETH, urine toxicology, alcohol biomarkers.  - Full engagement in 12 step (or equivalent) recovery program(s), including mandatory meeting attendance and acquisition of sponsor.  - Patient counseled on abstinence from  alcohol and substances of abuse (illicit and prescription).  - Relapse prevention and motivational interviewing provided.    In cases of emergency, daily coverage provided by Acute/ER Psych MD, NP, or SW, with contact numbers located in [facility] On Call Schedule    Case discussed with Staff Psychiatrist: MD Matt CHATTERJEE MD  [facility] PGY-4

## 2023-02-16 NOTE — PATIENT CARE CONFERENCE
Diagnoses:    Cannabis Use Disorder, severe; in early remission   Alcohol Use Disorder, severe  Tobacco Use Disorder  Generalized Anxiety Disorder  Chronic Kidney Disease, End Stage Renal Disease, on dialysis  Kidney Transplant Candidate    Progress:    Patient was staffed by Team. Patient has been appropriate and cooperative during program. Pt has been supportive and offered feedback when needed. Team will continue to monitor pt's progress.    Plan of Care:    Patient will continue ORP. Pt anticipating D/ C on 2/24.    Aftercare/Follow ups  Med Management: 3/27 [facility] 4 PM  Psychotherapy: TBD    Staff present:  MD Matt Jimenez MD, Resident  Jayna Landaverde, PhD  Ronal Westbrook, DIOMEDES Hahn, hospitalsW  Pooja Irwin, hospitalsW  Valerie Lennon, W

## 2023-02-16 NOTE — PLAN OF CARE
02/16/23 1436   Activity/Group Therapy Checklist   Group Other (Comments)  (Stress)   Attendance Attended   Follows Direction Followed directions   Group Interactions/Observations Interacted appropriately;Alert;Sharing;Supportive   Affect/Mood Range Normal range   Affect/Mood Display Appropriate   Goal Progression Progressing      discussed with patient's stress management. SW facilitated activity:  Stress Exploration, and identified factors that contributed to stress: such as daily hassles, major life changes, and life circumstances. Sw also discussed factors that protected them from stress: daily uplifts, healthy coping strategies, and protective factors.

## 2023-02-16 NOTE — PLAN OF CARE
02/16/23 1438   Activity/Group Therapy Checklist   Group Other (Comments)  (Processing)   Attendance Attended   Follows Direction Followed directions   Group Interactions/Observations Interacted appropriately;Alert   Affect/Mood Range Normal range   Affect/Mood Display Appropriate   Goal Progression Progressing       SW facilitated Processing group and Patient engaged appropriately.

## 2023-02-17 ENCOUNTER — LAB VISIT (OUTPATIENT)
Dept: LAB | Facility: HOSPITAL | Age: 58
End: 2023-02-17
Attending: PSYCHIATRY & NEUROLOGY
Payer: MEDICARE

## 2023-02-17 ENCOUNTER — HOSPITAL ENCOUNTER (OUTPATIENT)
Dept: PSYCHIATRY | Facility: HOSPITAL | Age: 58
Discharge: HOME | End: 2023-02-17
Attending: PSYCHIATRY & NEUROLOGY
Payer: MEDICARE

## 2023-02-17 VITALS
SYSTOLIC BLOOD PRESSURE: 130 MMHG | RESPIRATION RATE: 18 BRPM | TEMPERATURE: 97 F | HEART RATE: 68 BPM | DIASTOLIC BLOOD PRESSURE: 83 MMHG

## 2023-02-17 DIAGNOSIS — Z99.2 ESRD (END STAGE RENAL DISEASE) ON DIALYSIS: ICD-10-CM

## 2023-02-17 DIAGNOSIS — F10.20 ALCOHOL USE DISORDER, SEVERE, DEPENDENCE: Chronic | ICD-10-CM

## 2023-02-17 DIAGNOSIS — F12.20 CANNABIS USE DISORDER, SEVERE, DEPENDENCE: Primary | Chronic | ICD-10-CM

## 2023-02-17 DIAGNOSIS — Z79.899 LONG-TERM USE OF HIGH-RISK MEDICATION: Primary | ICD-10-CM

## 2023-02-17 DIAGNOSIS — F32.89 OTHER DEPRESSION: ICD-10-CM

## 2023-02-17 DIAGNOSIS — F41.1 GAD (GENERALIZED ANXIETY DISORDER): Chronic | ICD-10-CM

## 2023-02-17 DIAGNOSIS — N18.5 CKD (CHRONIC KIDNEY DISEASE) STAGE 5, GFR LESS THAN 15 ML/MIN: ICD-10-CM

## 2023-02-17 DIAGNOSIS — F17.200 TOBACCO USE DISORDER: Chronic | ICD-10-CM

## 2023-02-17 DIAGNOSIS — N18.6 ESRD (END STAGE RENAL DISEASE) ON DIALYSIS: ICD-10-CM

## 2023-02-17 DIAGNOSIS — Z79.899 LONG-TERM USE OF HIGH-RISK MEDICATION: ICD-10-CM

## 2023-02-17 LAB
ALBUMIN SERPL BCP-MCNC: 3.6 G/DL (ref 3.5–5.2)
ALP SERPL-CCNC: 115 U/L (ref 55–135)
ALT SERPL W/O P-5'-P-CCNC: 6 U/L (ref 10–44)
ANION GAP SERPL CALC-SCNC: 13 MMOL/L (ref 8–16)
AST SERPL-CCNC: 13 U/L (ref 10–40)
BILIRUB SERPL-MCNC: 0.3 MG/DL (ref 0.1–1)
BUN SERPL-MCNC: 27 MG/DL (ref 6–20)
CALCIUM SERPL-MCNC: 10.2 MG/DL (ref 8.7–10.5)
CHLORIDE SERPL-SCNC: 92 MMOL/L (ref 95–110)
CO2 SERPL-SCNC: 32 MMOL/L (ref 23–29)
CREAT SERPL-MCNC: 6.8 MG/DL (ref 0.5–1.4)
EST. GFR  (NO RACE VARIABLE): 6.5 ML/MIN/1.73 M^2
ETHANOL SERPL-MCNC: <10 MG/DL
GLUCOSE SERPL-MCNC: 68 MG/DL (ref 70–110)
POTASSIUM SERPL-SCNC: 5.3 MMOL/L (ref 3.5–5.1)
PROT SERPL-MCNC: 6.7 G/DL (ref 6–8.4)
SODIUM SERPL-SCNC: 137 MMOL/L (ref 136–145)

## 2023-02-17 PROCEDURE — 99232 SBSQ HOSP IP/OBS MODERATE 35: CPT | Mod: ,,, | Performed by: PSYCHIATRY & NEUROLOGY

## 2023-02-17 PROCEDURE — 82077 ASSAY SPEC XCP UR&BREATH IA: CPT | Performed by: PSYCHIATRY & NEUROLOGY

## 2023-02-17 PROCEDURE — 99232 PR SUBSEQUENT HOSPITAL CARE,LEVL II: ICD-10-PCS | Mod: ,,, | Performed by: PSYCHIATRY & NEUROLOGY

## 2023-02-17 PROCEDURE — 80053 COMPREHEN METABOLIC PANEL: CPT | Performed by: PSYCHIATRY & NEUROLOGY

## 2023-02-17 PROCEDURE — 80307 DRUG TEST PRSMV CHEM ANLYZR: CPT | Performed by: PSYCHIATRY & NEUROLOGY

## 2023-02-17 PROCEDURE — 36415 COLL VENOUS BLD VENIPUNCTURE: CPT | Performed by: PSYCHIATRY & NEUROLOGY

## 2023-02-17 PROCEDURE — 90853 GROUP PSYCHOTHERAPY: CPT | Performed by: SOCIAL WORKER

## 2023-02-17 PROCEDURE — 80321 ALCOHOLS BIOMARKERS 1OR 2: CPT | Performed by: PSYCHIATRY & NEUROLOGY

## 2023-02-17 NOTE — PLAN OF CARE
02/17/23 1445   Activity/Group Therapy Checklist   Group Other (Comments)  (Processing)   Attendance Attended   Follows Direction Followed directions   Group Interactions/Observations Interacted appropriately;Alert   Affect/Mood Range Normal range   Affect/Mood Display Appropriate   Goal Progression Progressing     SW facilitated processing group. Pt engaged appropriately and expressed support for those who were finishing the program today.

## 2023-02-17 NOTE — PROGRESS NOTES
ADDICTION CONSULT FOLLOW UP VISIT     DEPARTMENT:  Psychiatry  SITE: [facility], [address]    DATE OF EXAMINATION: 2/17/2023  8:00 AM  DATE OF ADMISSION: 1/11/2023    EXAMINING PRACTITIONER: Matt Patel    SUBJECTIVE:     HISTORY    Patient Name: Jennifer Booth  YOB: 1965    CHIEF COMPLAINT   Jennifer Booth is a 58 y.o. female who is being seen today for a follow up visit by the addiction psychiatry consult service.  Jennifer Booth presents with the chief complaint of: cannabis, nicotine, and alcohol use    HPI & PSYCHIATRIC ROS    Jennifer is seen today doing well. Slept well last night, just stayed up a bit late. Mood today is good. Still planning on missing Saturday's dialysis. Last weekend having to be awake at 2am to get there by 3am messed up her whole schedule. Encouraged her to go regardless and discussed the risks of not going. She has dialysis Monday morning which she will definitely go to. Engaged in some harm reduction planning if she decides not to go, fluid restriction, going to ED if serious changes in mental status.    No relapse. Not struggling with cravings. Still finding the program helpful. Wanting to make sure she stays off the marijuana and alcohol so she can be on the transplant list.    Denies any issues with psychotropic regimen, taking as prescribed.     PFSH: The patient's past medical history has been reviewed and updated as appropriate within the electronic medical record system.    PSYCHOTROPIC MEDICATIONS   Escitalopram 20 mg po qd  Mirtazapine 15 mg po qhs  Bupropion IR 75 mg po qd  Naltrexone 50mg - 1/2 tab daily       OBJECTIVE:     EXAMINATION    Vitals:    02/17/23 0900   BP: 130/83   Pulse: 68   Resp: 18   Temp: 96.5 °F (35.8 °C)         CONSTITUTIONAL  General Appearance: thin, casually dressed, well groomed    MUSCULOSKELETAL  Abnormal Involuntary Movements: none noted; ambulates without assistance    PSYCHIATRIC  Orientation: A and  "Ox3  Speech: spontaneous, conversational  Language: fluent, english  Mood: "Good"  Affect: reactive, friendly  Thought Process: linear, organized, ruminative  Thought Content: no SI/HI, no delusions  Memory: intact to conversation, no significant deficits noted  Attention and Concentration: intact; attentive to conversation  Fund of Knowledge: aware of current events, appropriate  Insight: intact  Judgment: intact       Add-On Psychotherapy:      [phone] Add-On Psychotherapy: range 16-37 minutes     NOTE: The time spent delivering Add-On Psychotherapy is separate from and in addition to the total time spent performing E/M services on the date of the encounter.     Duration of Intervention: 18 minutes  Primary Focus: maintaining sobriety, meetings, maintaining hopefulness  Participants: patient  Therapeutic Intervention Type: supportive psychotherapy  Why Chosen Therapy is Appropriate Versus Another Modality: relevant to diagnosis, expertise of clinician  Target Symptoms Addressed: substance abuse  Topics and Themes Discussed: therapeutic alliance and fostering of rapport, coping mechanisms, substance abuse  Psychotherapeutic Techniques Employed: active listening, motivational interviewing, and relapse prevention  Outcome Monitoring Methods: self-report, observation  Response to the Intervention: accepting  Patient Progress Toward Treatment Goals: progressing as anticipated    ASSESSMENT:     DIAGNOSES & PROBLEMS    Cannabis Use Disorder, severe; in early remission   Alcohol Use Disorder, severe  Tobacco Use Disorder  Generalized Anxiety Disorder  Chronic Kidney Disease, End Stage Renal Disease, on dialysis  Kidney Transplant Candidate    Patient Active Problem List   Diagnosis    Constipation - functional    Multiple myeloma in remission    Renal hypertension    Hyperkalemia    Tobacco abuse counseling    Tobacco use disorder    Colon cancer screening    ESRD (end stage renal disease) on dialysis    " Glomerulosclerosis    Anemia in chronic kidney disease, on chronic dialysis    GERD without esophagitis    Patient on waiting list for kidney transplant    SATNAM (generalized anxiety disorder)    Allergic rhinitis    Hyponatremia    Hypertension    Secondary hyperparathyroidism of renal origin    Volume overload    History of substance use    Dialysis AV fistula malfunction, initial encounter    CKD (chronic kidney disease) stage 5, GFR less than 15 ml/min    Acute respiratory failure with hypoxia    Metabolic acidosis    Murmur, cardiac    Cannabis use disorder, severe, dependence    Alcohol use disorder, severe, dependence    Preoperative clearance    Severe episode of recurrent major depressive disorder, without psychotic features    Chronic diarrhea    Chronic pancreatitis    Depression    Iron deficiency anemia    Non-compliance with renal dialysis    Osteopenia    Protein calorie malnutrition    Vitamin D deficiency    Injury of right thumb    Bilateral carpal tunnel syndrome    Polyp of colon    Encounter for pre-transplant evaluation for chronic kidney disease     PLAN:     MANAGEMENT PLAN, TREATMENT GOALS, THERAPEUTIC TECHNIQUES/APPROACHES & CLINICAL REASONING    -continue home escitalopram 20 mg po qd  -continue mirtazapine 15 mg po qhs  -continue Bupropion IR 75 mg po qd  -continue Naltrexone 25 mg po qd    - Continue ORP.  - Continue ORP protocol.  - Labs: CMP, PETH, urine toxicology, alcohol biomarkers.  - Full engagement in 12 step (or equivalent) recovery program(s), including mandatory meeting attendance and acquisition of sponsor.  - Patient counseled on abstinence from alcohol and substances of abuse (illicit and prescription).  - Relapse prevention and motivational interviewing provided.    In cases of emergency, daily coverage provided by Acute/ER Psych MD, NP, or SW, with contact numbers located in [facility] On Call Schedule    Case discussed with Staff Psychiatrist: GREGORY OLSEN,  MD Matt aPtel MD  [facility] PGY-4

## 2023-02-17 NOTE — PLAN OF CARE
02/17/23 1121   Activity/Group Therapy Checklist   Group Activity  (Cognitive Distortions)   Attendance Attended   Follows Direction Followed directions   Group Interactions/Observations Interacted appropriately;Alert;Supportive;Sharing   Affect/Mood Range Normal range   Affect/Mood Display Appropriate   Goal Progression Progressing       SW facilitated Skills Group on Cognitive Distortions. Pt engaged appropriately and completed the activity.

## 2023-02-20 ENCOUNTER — HOSPITAL ENCOUNTER (OUTPATIENT)
Dept: PSYCHIATRY | Facility: HOSPITAL | Age: 58
Discharge: HOME | End: 2023-02-20
Attending: PSYCHIATRY & NEUROLOGY
Payer: MEDICARE

## 2023-02-20 VITALS
HEART RATE: 71 BPM | SYSTOLIC BLOOD PRESSURE: 127 MMHG | RESPIRATION RATE: 18 BRPM | TEMPERATURE: 97 F | DIASTOLIC BLOOD PRESSURE: 81 MMHG

## 2023-02-20 DIAGNOSIS — Z99.2 ESRD (END STAGE RENAL DISEASE) ON DIALYSIS: ICD-10-CM

## 2023-02-20 DIAGNOSIS — F17.200 TOBACCO USE DISORDER: Chronic | ICD-10-CM

## 2023-02-20 DIAGNOSIS — F32.89 OTHER DEPRESSION: ICD-10-CM

## 2023-02-20 DIAGNOSIS — F12.20 CANNABIS USE DISORDER, SEVERE, DEPENDENCE: Primary | Chronic | ICD-10-CM

## 2023-02-20 DIAGNOSIS — N18.5 CKD (CHRONIC KIDNEY DISEASE) STAGE 5, GFR LESS THAN 15 ML/MIN: ICD-10-CM

## 2023-02-20 DIAGNOSIS — F41.1 GAD (GENERALIZED ANXIETY DISORDER): Chronic | ICD-10-CM

## 2023-02-20 DIAGNOSIS — F10.20 ALCOHOL USE DISORDER, SEVERE, DEPENDENCE: Chronic | ICD-10-CM

## 2023-02-20 DIAGNOSIS — N18.6 ESRD (END STAGE RENAL DISEASE) ON DIALYSIS: ICD-10-CM

## 2023-02-20 PROCEDURE — 90833 PR PSYCHOTHERAPY W/PATIENT W/E&M, 30 MIN (ADD ON): ICD-10-PCS | Mod: ,,, | Performed by: PSYCHIATRY & NEUROLOGY

## 2023-02-20 PROCEDURE — 90833 PSYTX W PT W E/M 30 MIN: CPT | Mod: ,,, | Performed by: PSYCHIATRY & NEUROLOGY

## 2023-02-20 PROCEDURE — 99232 PR SUBSEQUENT HOSPITAL CARE,LEVL II: ICD-10-PCS | Mod: ,,, | Performed by: PSYCHIATRY & NEUROLOGY

## 2023-02-20 PROCEDURE — 99232 SBSQ HOSP IP/OBS MODERATE 35: CPT | Mod: ,,, | Performed by: PSYCHIATRY & NEUROLOGY

## 2023-02-20 NOTE — PROGRESS NOTES
"ADDICTION CONSULT FOLLOW UP VISIT     DEPARTMENT:  Psychiatry  SITE: [facility], [address]    DATE OF EXAMINATION: 2/20/2023  8:00 AM  DATE OF ADMISSION: 1/11/2023    EXAMINING PRACTITIONER: Matt Patel    SUBJECTIVE:     HISTORY    Patient Name: Jennifer Booth  YOB: 1965    CHIEF COMPLAINT   Jennifer Booth is a 58 y.o. female who is being seen today for a follow up visit by the addiction psychiatry consult service.  Jennifer Booth presents with the chief complaint of: cannabis, nicotine, and alcohol use    HPI & PSYCHIATRIC ROS    Jennifer is seen today doing okay. No meetings this weekend, spent her time watching TV. Skipped dialysis on Saturday, but got it this morning and got the extra fluid off. Thinking about going out tomorrow for a day with the family, but also considering skipping it because she hasn't been feeling like going out as much. Will work on getting a sponsor this weekend - plans to go to in person women's meeting on Saturday and look there. Planning to attend virtual meetings this week. No cravings or relapse.    Denies any issues with psychotropic regimen, taking as prescribed.     PFSH: The patient's past medical history has been reviewed and updated as appropriate within the electronic medical record system.    PSYCHOTROPIC MEDICATIONS   Escitalopram 20 mg po qd  Mirtazapine 15 mg po qhs  Bupropion IR 75 mg po qd  Naltrexone 50mg - 1/2 tab daily       OBJECTIVE:     EXAMINATION    Vitals:    02/20/23 0900   BP: 127/81   Pulse: 71   Resp: 18   Temp: 97.3 °F (36.3 °C)         CONSTITUTIONAL  General Appearance: thin, casually dressed, well groomed    MUSCULOSKELETAL  Abnormal Involuntary Movements: none noted; ambulates without assistance    PSYCHIATRIC  Orientation: A and Ox3  Speech: spontaneous, conversational  Language: fluent, english  Mood: "Good"  Affect: reactive, friendly  Thought Process: linear, organized, ruminative  Thought Content: no SI/HI, no " delusions  Memory: intact to conversation, no significant deficits noted  Attention and Concentration: intact; attentive to conversation  Fund of Knowledge: aware of current events, appropriate  Insight: intact  Judgment: intact       Add-On Psychotherapy:      [phone] Add-On Psychotherapy: range 16-37 minutes     NOTE: The time spent delivering Add-On Psychotherapy is separate from and in addition to the total time spent performing E/M services on the date of the encounter.     Duration of Intervention: 16 minutes  Primary Focus: maintaining sobriety, health literacy, group expectations, meetings, social events with cravings/triggers  Participants: patient  Therapeutic Intervention Type: supportive psychotherapy  Why Chosen Therapy is Appropriate Versus Another Modality: relevant to diagnosis, expertise of clinician  Target Symptoms Addressed: substance abuse  Topics and Themes Discussed: therapeutic alliance and fostering of rapport, coping mechanisms, substance abuse  Psychotherapeutic Techniques Employed: active listening, motivational interviewing, and relapse prevention  Outcome Monitoring Methods: self-report, observation  Response to the Intervention: accepting  Patient Progress Toward Treatment Goals: progressing as anticipated    ASSESSMENT:     DIAGNOSES & PROBLEMS    Cannabis Use Disorder, severe; in early remission   Alcohol Use Disorder, severe  Tobacco Use Disorder  Generalized Anxiety Disorder  Chronic Kidney Disease, End Stage Renal Disease, on dialysis  Kidney Transplant Candidate    Patient Active Problem List   Diagnosis    Constipation - functional    Multiple myeloma in remission    Renal hypertension    Hyperkalemia    Tobacco abuse counseling    Tobacco use disorder    Colon cancer screening    ESRD (end stage renal disease) on dialysis    Glomerulosclerosis    Anemia in chronic kidney disease, on chronic dialysis    GERD without esophagitis    Patient on waiting list for kidney transplant     SATNAM (generalized anxiety disorder)    Allergic rhinitis    Hyponatremia    Hypertension    Secondary hyperparathyroidism of renal origin    Volume overload    History of substance use    Dialysis AV fistula malfunction, initial encounter    CKD (chronic kidney disease) stage 5, GFR less than 15 ml/min    Acute respiratory failure with hypoxia    Metabolic acidosis    Murmur, cardiac    Cannabis use disorder, severe, dependence    Alcohol use disorder, severe, dependence    Preoperative clearance    Severe episode of recurrent major depressive disorder, without psychotic features    Chronic diarrhea    Chronic pancreatitis    Depression    Iron deficiency anemia    Non-compliance with renal dialysis    Osteopenia    Protein calorie malnutrition    Vitamin D deficiency    Injury of right thumb    Bilateral carpal tunnel syndrome    Polyp of colon    Encounter for pre-transplant evaluation for chronic kidney disease     PLAN:     MANAGEMENT PLAN, TREATMENT GOALS, THERAPEUTIC TECHNIQUES/APPROACHES & CLINICAL REASONING    -continue home escitalopram 20 mg po qd  -continue mirtazapine 15 mg po qhs  -continue Bupropion IR 75 mg po qd  -continue Naltrexone 25 mg po qd    - Continue ORP.  - Continue ORP protocol.  - Labs: CMP, PETH, urine toxicology, alcohol biomarkers.  - Full engagement in 12 step (or equivalent) recovery program(s), including mandatory meeting attendance and acquisition of sponsor.  - Patient counseled on abstinence from alcohol and substances of abuse (illicit and prescription).  - Relapse prevention and motivational interviewing provided.    In cases of emergency, daily coverage provided by Acute/ER Psych MD, NP, or SW, with contact numbers located in [facility] On Call Schedule    Case discussed with Staff Psychiatrist: MD Matt CHATTERJEE MD  [facility] PGY-4

## 2023-02-20 NOTE — PLAN OF CARE
02/20/23 1210   Activity/Group Therapy Checklist   Group Relapse Prevention   Attendance Attended   Follows Direction Followed directions   Group Interactions/Observations Interacted appropriately;Alert   Affect/Mood Range Normal range   Affect/Mood Display Appropriate   Goal Progression Progressing       SW facilitated skills group on relapse prevention and light processing of the weekend. Pt engaged appropriately and shared thoughts/feelings openly.

## 2023-02-22 ENCOUNTER — LAB VISIT (OUTPATIENT)
Dept: LAB | Facility: HOSPITAL | Age: 58
End: 2023-02-22
Attending: PSYCHIATRY & NEUROLOGY
Payer: MEDICARE

## 2023-02-22 ENCOUNTER — HOSPITAL ENCOUNTER (OUTPATIENT)
Dept: PSYCHIATRY | Facility: HOSPITAL | Age: 58
Discharge: HOME | End: 2023-02-22
Payer: MEDICARE

## 2023-02-22 DIAGNOSIS — Z79.899 LONG-TERM USE OF HIGH-RISK MEDICATION: Primary | ICD-10-CM

## 2023-02-22 DIAGNOSIS — F32.89 OTHER DEPRESSION: ICD-10-CM

## 2023-02-22 DIAGNOSIS — F10.20 ALCOHOL USE DISORDER, SEVERE, DEPENDENCE: Primary | Chronic | ICD-10-CM

## 2023-02-22 DIAGNOSIS — Z79.899 LONG-TERM USE OF HIGH-RISK MEDICATION: ICD-10-CM

## 2023-02-22 DIAGNOSIS — F17.200 TOBACCO USE DISORDER: Chronic | ICD-10-CM

## 2023-02-22 DIAGNOSIS — F12.20 CANNABIS USE DISORDER, SEVERE, DEPENDENCE: Chronic | ICD-10-CM

## 2023-02-22 DIAGNOSIS — N18.5 CKD (CHRONIC KIDNEY DISEASE) STAGE 5, GFR LESS THAN 15 ML/MIN: ICD-10-CM

## 2023-02-22 DIAGNOSIS — N18.6 ESRD (END STAGE RENAL DISEASE) ON DIALYSIS: ICD-10-CM

## 2023-02-22 DIAGNOSIS — Z99.2 ESRD (END STAGE RENAL DISEASE) ON DIALYSIS: ICD-10-CM

## 2023-02-22 DIAGNOSIS — F41.1 GAD (GENERALIZED ANXIETY DISORDER): Chronic | ICD-10-CM

## 2023-02-22 LAB — ETHANOL SERPL-MCNC: <10 MG/DL

## 2023-02-22 PROCEDURE — 36415 COLL VENOUS BLD VENIPUNCTURE: CPT | Performed by: PSYCHIATRY & NEUROLOGY

## 2023-02-22 PROCEDURE — 90833 PSYTX W PT W E/M 30 MIN: CPT | Mod: ,,, | Performed by: PSYCHIATRY & NEUROLOGY

## 2023-02-22 PROCEDURE — 90853 GROUP PSYCHOTHERAPY: CPT

## 2023-02-22 PROCEDURE — 90853 PR GROUP PSYCHOTHERAPY: ICD-10-PCS | Mod: ,,, | Performed by: PSYCHOLOGIST

## 2023-02-22 PROCEDURE — 90833 PR PSYCHOTHERAPY W/PATIENT W/E&M, 30 MIN (ADD ON): ICD-10-PCS | Mod: ,,, | Performed by: PSYCHIATRY & NEUROLOGY

## 2023-02-22 PROCEDURE — 80307 DRUG TEST PRSMV CHEM ANLYZR: CPT | Performed by: PSYCHIATRY & NEUROLOGY

## 2023-02-22 PROCEDURE — 82077 ASSAY SPEC XCP UR&BREATH IA: CPT | Performed by: PSYCHIATRY & NEUROLOGY

## 2023-02-22 PROCEDURE — 99232 PR SUBSEQUENT HOSPITAL CARE,LEVL II: ICD-10-PCS | Mod: ,,, | Performed by: PSYCHIATRY & NEUROLOGY

## 2023-02-22 PROCEDURE — 90853 GROUP PSYCHOTHERAPY: CPT | Mod: ,,, | Performed by: PSYCHOLOGIST

## 2023-02-22 PROCEDURE — 99232 SBSQ HOSP IP/OBS MODERATE 35: CPT | Mod: ,,, | Performed by: PSYCHIATRY & NEUROLOGY

## 2023-02-22 NOTE — PROGRESS NOTES
Group Psychotherapy (PhD/LCSW)    Clinical status of patient: Intensive Outpatient Program (IOP)    Date: 2/22/2023    Group Focus:  Distress Tolerance    Length of service: 23784 - 45-50 minutes    Number of patients in attendance: 11    Referred by: [facility] Treatment Team    Target symptoms: Substance Abuse    Patient's response to treatment: Active Listening and Self-disclosure    Progress toward goals: Progressing adequately    Interval History: Session focus was Distress Tolerance: Self-Soothe.  Patients were encouraged to use crisis survival skills to reduce intensity of distress.  Each patient identified something soothing for all five senses.    Diagnosis:     ICD-10-CM ICD-9-CM   1. Alcohol use disorder, severe, dependence  F10.20 303.90            Plan: Continue treatment on ORP

## 2023-02-22 NOTE — PROGRESS NOTES
"ADDICTION CONSULT FOLLOW UP VISIT     DEPARTMENT:  Psychiatry  SITE: [facility], [address]    DATE OF EXAMINATION: 2/22/2023  8:00 AM  DATE OF ADMISSION: 1/11/2023    EXAMINING PRACTITIONER: Matt Patel    SUBJECTIVE:     HISTORY    Patient Name: Jennifer Booth  YOB: 1965    CHIEF COMPLAINT   Jennifer Booth is a 58 y.o. female who is being seen today for a follow up visit by the addiction psychiatry consult service.  Jennifer Booth presents with the chief complaint of: cannabis, nicotine, and alcohol use    HPI & PSYCHIATRIC ROS    Jennifer is seen today doing well. She went to [employer] and other parades yesterday with friends and family. She is glad she went out instead of isolating. People around her were drinking and smoking "anything and everything," but she denies engaging in anything. Caught lots of throws and still had a really good time even without intoxication. Dialysis tomorrow will be back on a normal schedule. Planning to attend virtual meetings. Will work on getting a sponsor this weekend - plans to go to in person women's meeting on Saturday and look there. Denies any issues with psychotropic regimen, taking as prescribed. Finding the program beneficial.     PFSH: The patient's past medical history has been reviewed and updated as appropriate within the electronic medical record system.    PSYCHOTROPIC MEDICATIONS   Escitalopram 20 mg po qd  Mirtazapine 15 mg po qhs  Bupropion IR 75 mg po qd  Naltrexone 50mg - 1/2 tab daily       OBJECTIVE:     EXAMINATION    There were no vitals filed for this visit.    CONSTITUTIONAL  General Appearance: thin, casually dressed, well groomed    MUSCULOSKELETAL  Abnormal Involuntary Movements: none noted; ambulates without assistance    PSYCHIATRIC  Orientation: A and Ox3  Speech: spontaneous, conversational  Language: fluent, english  Mood: "Good"  Affect: reactive, friendly  Thought Process: linear, organized, ruminative  Thought " Content: no SI/HI, no delusions  Memory: intact to conversation, no significant deficits noted  Attention and Concentration: intact; attentive to conversation  Fund of Knowledge: aware of current events, appropriate  Insight: intact  Judgment: intact    ASSESSMENT:     DIAGNOSES & PROBLEMS    Cannabis Use Disorder, severe; in early remission   Alcohol Use Disorder, severe  Tobacco Use Disorder  Generalized Anxiety Disorder  Chronic Kidney Disease, End Stage Renal Disease, on dialysis  Kidney Transplant Candidate    Patient Active Problem List   Diagnosis    Constipation - functional    Multiple myeloma in remission    Renal hypertension    Hyperkalemia    Tobacco abuse counseling    Tobacco use disorder    Colon cancer screening    ESRD (end stage renal disease) on dialysis    Glomerulosclerosis    Anemia in chronic kidney disease, on chronic dialysis    GERD without esophagitis    Patient on waiting list for kidney transplant    SATNAM (generalized anxiety disorder)    Allergic rhinitis    Hyponatremia    Hypertension    Secondary hyperparathyroidism of renal origin    Volume overload    History of substance use    Dialysis AV fistula malfunction, initial encounter    CKD (chronic kidney disease) stage 5, GFR less than 15 ml/min    Acute respiratory failure with hypoxia    Metabolic acidosis    Murmur, cardiac    Cannabis use disorder, severe, dependence    Alcohol use disorder, severe, dependence    Preoperative clearance    Severe episode of recurrent major depressive disorder, without psychotic features    Chronic diarrhea    Chronic pancreatitis    Depression    Iron deficiency anemia    Non-compliance with renal dialysis    Osteopenia    Protein calorie malnutrition    Vitamin D deficiency    Injury of right thumb    Bilateral carpal tunnel syndrome    Polyp of colon    Encounter for pre-transplant evaluation for chronic kidney disease     PLAN:     MANAGEMENT PLAN, TREATMENT GOALS, THERAPEUTIC  TECHNIQUES/APPROACHES & CLINICAL REASONING    -continue home escitalopram 20 mg po qd  -continue mirtazapine 15 mg po qhs  -continue Bupropion IR 75 mg po qd  -continue Naltrexone 25 mg po qd    - Continue ORP.  - Continue ORP protocol.  - Labs: CMP, PETH, urine toxicology, alcohol biomarkers.  - Full engagement in 12 step (or equivalent) recovery program(s), including mandatory meeting attendance and acquisition of sponsor.  - Patient counseled on abstinence from alcohol and substances of abuse (illicit and prescription).  - Relapse prevention and motivational interviewing provided.    In cases of emergency, daily coverage provided by Acute/ER Psych MD, NP, or SW, with contact numbers located in [facility] On Call Schedule    Case discussed with Staff Psychiatrist: MD Matt CHATTERJEE MD  [facility] PGY-4

## 2023-02-22 NOTE — PLAN OF CARE
02/22/23 1636   Activity/Group Therapy Checklist   Group Other (Comments)  (Life Story)   Attendance Attended   Follows Direction Followed directions   Group Interactions/Observations Interacted appropriately;Sharing;Supportive   Affect/Mood Range Normal range   Affect/Mood Display Appropriate   Goal Progression Progressing     Patient was cooperative and appropriate while listening to group member's Life Story.

## 2023-02-22 NOTE — PLAN OF CARE
02/16/23 1400   Activity/Group Therapy Checklist   Group Addiction Education   Attendance Attended   Follows Direction Followed directions   Group Interactions/Observations Interacted appropriately   Affect/Mood Range Normal range   Affect/Mood Display Appropriate   Goal Progression Progressing

## 2023-02-22 NOTE — PLAN OF CARE
02/17/23 1400   Activity/Group Therapy Checklist   Group Relapse Prevention   Attendance Attended   Follows Direction Followed directions   Group Interactions/Observations Interacted appropriately   Affect/Mood Range Normal range   Affect/Mood Display Appropriate   Goal Progression Progressing

## 2023-02-22 NOTE — PLAN OF CARE
02/22/23 1511   Activity/Group Therapy Checklist   Group Other (Comments)  (Processing)   Attendance Attended   Follows Direction Followed directions   Group Interactions/Observations Interacted appropriately;Alert;Sharing;Supportive   Affect/Mood Range Normal range   Affect/Mood Display Appropriate   Goal Progression Progressing       SW facilitated Processing Group. Pt provided support and engaged well with the other group members.

## 2023-02-22 NOTE — PLAN OF CARE
02/22/23 1635   Activity/Group Therapy Checklist   Group Other (Comments)  (Life Story)   Attendance Attended   Follows Direction Followed directions   Group Interactions/Observations Interacted appropriately;Alert;Sharing;Supportive   Affect/Mood Range Normal range   Affect/Mood Display Appropriate   Goal Progression Progressing     Patient presented Life Story.

## 2023-02-23 ENCOUNTER — HOSPITAL ENCOUNTER (OUTPATIENT)
Dept: PSYCHIATRY | Facility: HOSPITAL | Age: 58
Discharge: HOME | End: 2023-02-23
Payer: COMMERCIAL

## 2023-02-23 DIAGNOSIS — F10.20 ALCOHOL USE DISORDER, SEVERE, DEPENDENCE: Primary | ICD-10-CM

## 2023-02-23 DIAGNOSIS — F41.1 GAD (GENERALIZED ANXIETY DISORDER): ICD-10-CM

## 2023-02-23 DIAGNOSIS — F12.20 CANNABIS USE DISORDER, SEVERE, DEPENDENCE: ICD-10-CM

## 2023-02-23 PROCEDURE — 90853 GROUP PSYCHOTHERAPY: CPT | Mod: ,,, | Performed by: PSYCHOLOGIST

## 2023-02-23 PROCEDURE — 90853 PR GROUP PSYCHOTHERAPY: ICD-10-PCS | Mod: ,,, | Performed by: PSYCHOLOGIST

## 2023-02-23 PROCEDURE — 90853 GROUP PSYCHOTHERAPY: CPT

## 2023-02-23 NOTE — PLAN OF CARE
02/23/23 1527   Activity/Group Therapy Checklist   Group Other (Comments)  (LifeStory)   Attendance Attended   Follows Direction Followed directions   Group Interactions/Observations Interacted appropriately;Alert;Sharing;Supportive   Affect/Mood Range Normal range   Affect/Mood Display Appropriate   Goal Progression Progressing     Patient presented Life story.

## 2023-02-23 NOTE — PLAN OF CARE
02/23/23 1446   Activity/Group Therapy Checklist   Group Other (Comments)  (Processing)   Attendance Attended   Follows Direction Followed directions   Group Interactions/Observations Interacted appropriately;Alert;Supportive;Sharing   Affect/Mood Range Normal range   Affect/Mood Display Appropriate   Goal Progression Progressing     SW facilitated Processing Group. Pt engaged appropriately and interacted well with other group members.

## 2023-02-24 ENCOUNTER — HOSPITAL ENCOUNTER (OUTPATIENT)
Dept: PSYCHIATRY | Facility: HOSPITAL | Age: 58
Discharge: HOME | End: 2023-02-24
Payer: COMMERCIAL

## 2023-02-24 ENCOUNTER — LAB VISIT (OUTPATIENT)
Dept: LAB | Facility: HOSPITAL | Age: 58
End: 2023-02-24
Attending: PSYCHIATRY & NEUROLOGY
Payer: MEDICARE

## 2023-02-24 DIAGNOSIS — F32.89 OTHER DEPRESSION: ICD-10-CM

## 2023-02-24 DIAGNOSIS — N18.6 ESRD (END STAGE RENAL DISEASE) ON DIALYSIS: ICD-10-CM

## 2023-02-24 DIAGNOSIS — N18.5 CKD (CHRONIC KIDNEY DISEASE) STAGE 5, GFR LESS THAN 15 ML/MIN: ICD-10-CM

## 2023-02-24 DIAGNOSIS — F41.1 GAD (GENERALIZED ANXIETY DISORDER): Chronic | ICD-10-CM

## 2023-02-24 DIAGNOSIS — F10.20 ALCOHOL USE DISORDER, SEVERE, DEPENDENCE: ICD-10-CM

## 2023-02-24 DIAGNOSIS — Z99.2 ESRD (END STAGE RENAL DISEASE) ON DIALYSIS: ICD-10-CM

## 2023-02-24 DIAGNOSIS — F10.20 ALCOHOL USE DISORDER, SEVERE, DEPENDENCE: Primary | ICD-10-CM

## 2023-02-24 DIAGNOSIS — F17.200 TOBACCO USE DISORDER: Chronic | ICD-10-CM

## 2023-02-24 DIAGNOSIS — F12.20 CANNABIS USE DISORDER, SEVERE, DEPENDENCE: Primary | Chronic | ICD-10-CM

## 2023-02-24 DIAGNOSIS — F10.20 ALCOHOL USE DISORDER, SEVERE, DEPENDENCE: Chronic | ICD-10-CM

## 2023-02-24 LAB
CLINICAL BIOCHEMIST REVIEW: NORMAL
ETHANOL SERPL-MCNC: <10 MG/DL
PLPETH BLD-MCNC: <10 NG/ML
POPETH BLD-MCNC: <10 NG/ML

## 2023-02-24 PROCEDURE — 36415 COLL VENOUS BLD VENIPUNCTURE: CPT | Performed by: PSYCHIATRY & NEUROLOGY

## 2023-02-24 PROCEDURE — 99232 PR SUBSEQUENT HOSPITAL CARE,LEVL II: ICD-10-PCS | Mod: ,,, | Performed by: PSYCHIATRY & NEUROLOGY

## 2023-02-24 PROCEDURE — 90853 GROUP PSYCHOTHERAPY: CPT

## 2023-02-24 PROCEDURE — 90833 PSYTX W PT W E/M 30 MIN: CPT | Mod: ,,, | Performed by: PSYCHIATRY & NEUROLOGY

## 2023-02-24 PROCEDURE — 90833 PR PSYCHOTHERAPY W/PATIENT W/E&M, 30 MIN (ADD ON): ICD-10-PCS | Mod: ,,, | Performed by: PSYCHIATRY & NEUROLOGY

## 2023-02-24 PROCEDURE — 80307 DRUG TEST PRSMV CHEM ANLYZR: CPT | Performed by: PSYCHIATRY & NEUROLOGY

## 2023-02-24 PROCEDURE — 82077 ASSAY SPEC XCP UR&BREATH IA: CPT | Performed by: PSYCHIATRY & NEUROLOGY

## 2023-02-24 PROCEDURE — 90853 GROUP PSYCHOTHERAPY: CPT | Performed by: SOCIAL WORKER

## 2023-02-24 PROCEDURE — 99232 SBSQ HOSP IP/OBS MODERATE 35: CPT | Mod: ,,, | Performed by: PSYCHIATRY & NEUROLOGY

## 2023-02-24 RX ORDER — BUPROPION HYDROCHLORIDE 150 MG/1
150 TABLET ORAL DAILY
Qty: 30 TABLET | Refills: 1 | Status: SHIPPED | OUTPATIENT
Start: 2023-02-24 | End: 2023-04-20 | Stop reason: SDUPTHER
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
## 2023-02-24 NOTE — PLAN OF CARE
02/24/23 1420   Activity/Group Therapy Checklist   Group Other (Comments)  (Processing)   Attendance Attended   Follows Direction Followed directions   Group Interactions/Observations Interacted appropriately;Alert;Sharing;Supportive   Affect/Mood Range Normal range   Affect/Mood Display Appropriate   Goal Progression Progressing     SW facilitated Processing Group. Pt engaged appropriately.

## 2023-02-24 NOTE — PLAN OF CARE
02/24/23 1407   Activity/Group Therapy Checklist   Group Cognitive Therapy   Attendance Attended   Follows Direction Followed directions   Group Interactions/Observations Interacted appropriately;Alert   Affect/Mood Range Normal range   Affect/Mood Display Appropriate   Goal Progression Progressing     SW facilitated Skills Group with a CBT worksheet. Pt engaged appropriately and shared according to direction.

## 2023-02-24 NOTE — PROGRESS NOTES
Group Psychotherapy (PhD/LCSW)    Site: [facility] ([place], [place])    Clinical status of patient: Intensive Outpatient Program (IOP)    Date: 2/23/2023    Group Focus: ACT- Values    Length of service: 57197 - 45-50 minutes    Number of patients in attendance: 10    Referred by: [facility] Treatment Team    Target symptoms: Depression, anxiety    Patient's response to treatment: Active Listening and Self-disclosure    Progress toward goals: Progressing appropriately    Interval History: Session focus was Values Clarification.  Reviewed the distinction between values and goals. Discussed the definition of values as being freely chosen, controllable, and action based. Patients were led in a guided meditation then asked to briefly journal about what they noticed in the activity. Patients then discussed their values in certain life domains, their importance rating, and how their behavior in the past month has or has not been consistent with their values.     Risk parameters:  Patient reports no suicidal ideation  Patient reports no homicidal ideation  Patient reports no self-injurious behavior  Patient reports no violent behavior    Diagnosis:     ICD-10-CM ICD-9-CM   1. Alcohol use disorder, severe, dependence  F10.20 303.90   2. Cannabis use disorder, severe, dependence  F12.20 304.30   3. SATNAM (generalized anxiety disorder)  F41.1 300.02           Plan: Continue treatment on ORP

## 2023-02-27 ENCOUNTER — HOSPITAL ENCOUNTER (OUTPATIENT)
Dept: PSYCHIATRY | Facility: HOSPITAL | Age: 58
Discharge: HOME | End: 2023-02-27
Payer: COMMERCIAL

## 2023-02-27 DIAGNOSIS — N18.5 CKD (CHRONIC KIDNEY DISEASE) STAGE 5, GFR LESS THAN 15 ML/MIN: ICD-10-CM

## 2023-02-27 DIAGNOSIS — F32.89 OTHER DEPRESSION: ICD-10-CM

## 2023-02-27 DIAGNOSIS — Z99.2 ESRD (END STAGE RENAL DISEASE) ON DIALYSIS: ICD-10-CM

## 2023-02-27 DIAGNOSIS — F10.20 ALCOHOL USE DISORDER, SEVERE, DEPENDENCE: Chronic | ICD-10-CM

## 2023-02-27 DIAGNOSIS — F17.200 TOBACCO USE DISORDER: Chronic | ICD-10-CM

## 2023-02-27 DIAGNOSIS — F41.1 GAD (GENERALIZED ANXIETY DISORDER): Chronic | ICD-10-CM

## 2023-02-27 DIAGNOSIS — N18.6 ESRD (END STAGE RENAL DISEASE) ON DIALYSIS: ICD-10-CM

## 2023-02-27 DIAGNOSIS — F12.20 CANNABIS USE DISORDER, SEVERE, DEPENDENCE: Primary | Chronic | ICD-10-CM

## 2023-02-27 PROCEDURE — 90853 GROUP PSYCHOTHERAPY: CPT | Performed by: SOCIAL WORKER

## 2023-02-27 PROCEDURE — 90853 PR GROUP PSYCHOTHERAPY: ICD-10-PCS | Mod: ,,, | Performed by: PSYCHOLOGIST

## 2023-02-27 PROCEDURE — 90853 GROUP PSYCHOTHERAPY: CPT | Mod: ,,, | Performed by: PSYCHOLOGIST

## 2023-02-27 PROCEDURE — 99239 HOSP IP/OBS DSCHRG MGMT >30: CPT | Mod: ,,, | Performed by: PSYCHIATRY & NEUROLOGY

## 2023-02-27 PROCEDURE — 99239 PR HOSPITAL DISCHARGE DAY,>30 MIN: ICD-10-PCS | Mod: ,,, | Performed by: PSYCHIATRY & NEUROLOGY

## 2023-02-27 PROCEDURE — 90853 GROUP PSYCHOTHERAPY: CPT
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
## 2023-02-27 NOTE — PLAN OF CARE
"   02/27/23 4553   Activity/Group Therapy Checklist   Group Meditation/Relaxation   Attendance Attended   Follows Direction Followed directions   Group Interactions/Observations Alert;Interacted appropriately;Supportive;Sharing   Affect/Mood Range Normal range   Affect/Mood Display Appropriate   Goal Progression Progressing     Discussed mindfulness as a practice, the definition of mindfulness, and various research evidence to strengthen benefits of mindfulness. Engaged in "thought bubble" meditation where members were invited to witness body sensations, breath, and thoughts coming and going without attaching onto them.    "

## 2023-02-27 NOTE — PLAN OF CARE
02/24/23 1400   Activity/Group Therapy Checklist   Group Addiction Education   Attendance Attended   Follows Direction Followed directions   Group Interactions/Observations Interacted appropriately   Affect/Mood Range Normal range   Affect/Mood Display Appropriate   Goal Progression Progressing

## 2023-02-27 NOTE — PLAN OF CARE
02/27/23 1510   Activity/Group Therapy Checklist   Group Other (Comments)  (Stretch/ Relaxation)   Attendance Attended   Follows Direction Followed directions   Group Interactions/Observations Interacted appropriately;Alert;Sharing;Supportive   Affect/Mood Range Normal range   Affect/Mood Display Appropriate   Goal Progression Progressing     Patient participated in stretch and relaxation with facilitator Carol.

## 2023-02-28 ENCOUNTER — OFFICE VISIT (OUTPATIENT)
Dept: PSYCHIATRY | Facility: CLINIC | Age: 58
End: 2023-02-28
Payer: MEDICARE

## 2023-02-28 DIAGNOSIS — F12.21 CANNABIS USE DISORDER, MODERATE, IN EARLY REMISSION: Primary | ICD-10-CM

## 2023-02-28 PROCEDURE — 90853 PR GROUP PSYCHOTHERAPY: ICD-10-PCS | Mod: ,,, | Performed by: PSYCHOLOGIST

## 2023-02-28 PROCEDURE — 90853 GROUP PSYCHOTHERAPY: CPT | Mod: ,,, | Performed by: PSYCHOLOGIST

## 2023-02-28 NOTE — PLAN OF CARE
02/27/23 1400   Activity/Group Therapy Checklist   Group Addiction Education   Attendance Attended   Follows Direction Followed directions   Group Interactions/Observations Interacted appropriately   Affect/Mood Range Normal range   Affect/Mood Display Appropriate   Goal Progression Progressing

## 2023-02-28 NOTE — PROGRESS NOTES
Group Psychotherapy - ORP aftercare group    Site: [facility]    Clinical status of patient: Outpatient    2/28/2023    Length of service:[id number]    Referred by: Addictive Behavior Unit     Number of patients in attendance: 5    Target symptoms: substance abuse    Patient's response to intervention:  The patient's response to intervention is active listening, self-disclosure, feedback to other patients.    Progress toward goals and other mental status changes:  The patient's progress toward goals is good.    Interval history: Pt introduced herself to group and shared her story.    Diagnosis: Cannabis Use Disorder, in early remission    Plan: group psychotherapy    Return to clinic: 1 week

## 2023-03-07 ENCOUNTER — OFFICE VISIT (OUTPATIENT)
Dept: PSYCHIATRY | Facility: CLINIC | Age: 58
End: 2023-03-07
Payer: COMMERCIAL

## 2023-03-07 DIAGNOSIS — F12.21 CANNABIS USE DISORDER, MODERATE, IN EARLY REMISSION: Primary | ICD-10-CM

## 2023-03-07 PROCEDURE — 90853 GROUP PSYCHOTHERAPY: CPT | Mod: ,,, | Performed by: PSYCHOLOGIST

## 2023-03-07 PROCEDURE — 90853 PR GROUP PSYCHOTHERAPY: ICD-10-PCS | Mod: ,,, | Performed by: PSYCHOLOGIST

## 2023-03-07 NOTE — PROGRESS NOTES
Group Psychotherapy - ORP aftercare group    Site: [facility]    Clinical status of patient: Outpatient    3/7/2023    Length of service:[id number]    Referred by: [facility]     Number of patients in attendance: 6    Target symptoms: substance abuse    Patient's response to intervention:  The patient's response to intervention is active listening, self-disclosure, feedback to other patients.    Progress toward goals and other mental status changes:  The patient's progress toward goals is good.    Interval history: Pt reports continued sobriety. She shared some updates on her family and living situation.    Diagnosis: Cannabis Use Disorder, in early remission    Plan: group psychotherapy    Return to clinic: 1 week

## 2023-03-14 ENCOUNTER — OFFICE VISIT (OUTPATIENT)
Dept: PSYCHIATRY | Facility: CLINIC | Age: 58
End: 2023-03-14
Payer: MEDICARE

## 2023-03-14 DIAGNOSIS — F12.21 CANNABIS USE DISORDER, MODERATE, IN EARLY REMISSION: Primary | ICD-10-CM

## 2023-03-14 PROCEDURE — 90853 PR GROUP PSYCHOTHERAPY: ICD-10-PCS | Mod: ,,, | Performed by: PSYCHOLOGIST

## 2023-03-14 PROCEDURE — 90853 GROUP PSYCHOTHERAPY: CPT | Mod: ,,, | Performed by: PSYCHOLOGIST

## 2023-03-14 NOTE — PROGRESS NOTES
Group Psychotherapy - ORP aftercare group    Site: [facility]    Clinical status of patient: Outpatient    3/14/2023    Length of service:[id number]    Referred by: [facility] Unit     Number of patients in attendance: 8    Target symptoms: substance abuse    Patient's response to intervention:  The patient's response to intervention is active listening, self-disclosure, feedback to other patients.    Progress toward goals and other mental status changes:  The patient's progress toward goals is good. She came late again to group due to dialysis, and we discussed whether it might be better for her to attend afternoon group as result. We discussed trying to make some changes for one more week, and if she is unable to be on time, to switch to PM group.     Diagnosis: Cannabis Use Disorder, in early remission    Plan: group psychotherapy    Return to clinic: 1 week

## 2023-03-21 ENCOUNTER — OFFICE VISIT (OUTPATIENT)
Dept: PSYCHIATRY | Facility: CLINIC | Age: 58
End: 2023-03-21
Payer: COMMERCIAL

## 2023-03-21 DIAGNOSIS — F12.21 CANNABIS USE DISORDER, MODERATE, IN EARLY REMISSION: Primary | ICD-10-CM

## 2023-03-21 PROCEDURE — 90853 GROUP PSYCHOTHERAPY: CPT | Mod: ,,, | Performed by: PSYCHOLOGIST

## 2023-03-21 PROCEDURE — 90853 PR GROUP PSYCHOTHERAPY: ICD-10-PCS | Mod: ,,, | Performed by: PSYCHOLOGIST

## 2023-03-21 NOTE — PROGRESS NOTES
"Group Psychotherapy - ORP aftercare group    Site: [facility]    Clinical status of patient: Outpatient    3/21/2023    Length of service:[id number]    Referred by: [facility] Unit     Number of patients in attendance: 6    Target symptoms: substance abuse    Patient's response to intervention:  The patient's response to intervention is active listening, self-disclosure, feedback to other patients.    Progress toward goals and other mental status changes:  Pt only 10 minutes late today. She reports continued sobriety. She reports that she is working on getting back to school, and that she realizes she needs more structure to curb recent "laziness".    Diagnosis: Cannabis Use Disorder, in early remission    Plan: group psychotherapy    Return to clinic: 1 week                "

## 2023-04-19 ENCOUNTER — PATIENT MESSAGE (OUTPATIENT)
Dept: PSYCHIATRY | Facility: CLINIC | Age: 58
End: 2023-04-19
Payer: MEDICARE

## 2023-04-20 ENCOUNTER — OFFICE VISIT (OUTPATIENT)
Dept: PSYCHIATRY | Facility: CLINIC | Age: 58
End: 2023-04-20
Payer: MEDICARE

## 2023-04-20 VITALS
BODY MASS INDEX: 18.68 KG/M2 | DIASTOLIC BLOOD PRESSURE: 100 MMHG | HEIGHT: 68 IN | SYSTOLIC BLOOD PRESSURE: 147 MMHG | HEART RATE: 94 BPM | WEIGHT: 123.25 LBS

## 2023-04-20 DIAGNOSIS — N18.5 CKD (CHRONIC KIDNEY DISEASE) STAGE 5, GFR LESS THAN 15 ML/MIN: ICD-10-CM

## 2023-04-20 DIAGNOSIS — F12.21 CANNABIS USE DISORDER, MODERATE, IN EARLY REMISSION: ICD-10-CM

## 2023-04-20 DIAGNOSIS — F10.20 ALCOHOL USE DISORDER, SEVERE, DEPENDENCE: ICD-10-CM

## 2023-04-20 DIAGNOSIS — F41.1 GAD (GENERALIZED ANXIETY DISORDER): Primary | ICD-10-CM

## 2023-04-20 DIAGNOSIS — F19.20 OTHER PSYCHOACTIVE SUBSTANCE DEPENDENCE, UNCOMPLICATED: ICD-10-CM

## 2023-04-20 DIAGNOSIS — F12.20 CANNABIS USE DISORDER, SEVERE, DEPENDENCE: ICD-10-CM

## 2023-04-20 PROCEDURE — 99214 OFFICE O/P EST MOD 30 MIN: CPT | Mod: PBBFAC | Performed by: NURSE PRACTITIONER

## 2023-04-20 PROCEDURE — 90792 PR PSYCHIATRIC DIAGNOSTIC EVALUATION W/MEDICAL SERVICES: ICD-10-PCS | Mod: ,,, | Performed by: NURSE PRACTITIONER

## 2023-04-20 PROCEDURE — 99999 PR PBB SHADOW E&M-EST. PATIENT-LVL IV: ICD-10-PCS | Mod: PBBFAC,,, | Performed by: NURSE PRACTITIONER

## 2023-04-20 PROCEDURE — 99999 PR PBB SHADOW E&M-EST. PATIENT-LVL IV: CPT | Mod: PBBFAC,,, | Performed by: NURSE PRACTITIONER

## 2023-04-20 PROCEDURE — 90792 PSYCH DIAG EVAL W/MED SRVCS: CPT | Mod: ,,, | Performed by: NURSE PRACTITIONER

## 2023-04-20 RX ORDER — HYDROXYZINE HYDROCHLORIDE 50 MG/1
50 TABLET, FILM COATED ORAL 2 TIMES DAILY PRN
Qty: 60 TABLET | Refills: 3 | Status: ON HOLD | OUTPATIENT
Start: 2023-04-20 | End: 2023-10-04 | Stop reason: HOSPADM

## 2023-04-20 RX ORDER — BUPROPION HYDROCHLORIDE 150 MG/1
150 TABLET ORAL DAILY
Qty: 90 TABLET | Refills: 1 | Status: SHIPPED | OUTPATIENT
Start: 2023-04-20 | End: 2023-10-06 | Stop reason: SDUPTHER

## 2023-04-20 RX ORDER — MIRTAZAPINE 15 MG/1
15 TABLET, FILM COATED ORAL NIGHTLY
Qty: 90 TABLET | Refills: 1 | Status: SHIPPED | OUTPATIENT
Start: 2023-04-20 | End: 2023-10-23 | Stop reason: SDUPTHER

## 2023-04-20 RX ORDER — NALTREXONE HYDROCHLORIDE 50 MG/1
TABLET, FILM COATED ORAL
Qty: 90 TABLET | Refills: 1 | Status: SHIPPED | OUTPATIENT
Start: 2023-04-20

## 2023-04-20 NOTE — PROGRESS NOTES
Outpatient Psychiatry Initial Visit (MD/NP)    4/20/2023    Jennifer Booth, a 58 y.o. female, presenting for initial evaluation visit. Met with patient.    Reason for Encounter: Referral from [facility] IOP treatment team . Patient complains of depression, anxiety, PTSD, substance use disorder      History of Present Illness:     Patient reports a history of generalized anxiety disorder, PTSD, panic, alcohol use disorder, and cannabis use disorder, currently in remission.     Pertinent medical history of Multiple myeloma and ESRD (T,TH,S).     Was previously seen 7/28/2021 in the outpatient clinic. At that time had recently completed [facility] on 07/02/2021. Reported that she was referred by the transplant team in order to be considered for a kidney transplant. Pt reports that for the past 7 months prior she had discontinued her alcohol use. Prior to that patient stated that she was drinking ~1 bottle of wine per day. Pt denies any w/d sxs following discontinuing alcohol use. Last drink on 5/2/21. Pt reported tobacco use of 1ppd. Reports smoking history since age of 20.     Aftercare plans included sessions with Dr. Smith and establishing care with Pooja Herrera in August. Has not been able to do aftercare with Dr. Smith as she has had trouble getting MyChart set up.     Prior to first enrollment in [facility] was on lexapro 20mg. Wellbutrin IR 75mg and Remeron 15mg Q HS was added during the course of treatment. Reported at that time she continued to do well on current medication regimen. Admitted the Wellbutrin has been cutting cravings for smoking.      Had noticed a decrease in smoking. Previously smoking 1 pack per day, which was able to be decreased to smoking once a week. At the time had recently had oral surgery so had not been able to use lozenges. Had been using nicotine patches.      Was working on getting back on list for kidney transplant. Had to do additional testing in August and October  "and they will reactivate her status. Dialysis Schedule: Tuesday, Thurs, Sat. 5AM-1030AM.    Attended that one appointment 7/28/2021 to follow up post [facility] program completion. And appears to have completed one appointment with Pooja Herrera LCSW for psychotherapy.     Returned to Dr. Armenta for pre-transplant evaluation 1/05/2023. Due to failure to follow through with aftercare and subsequent daily cannabis and intermittent alcohol use since September 2021 following the loss of her sister, was recommended to return to [facility] IOP.    Started program 1/05/2023 and completed 2/27/2023.    Has attended 4 aftercare groups with Dr. Landaverde, but states she was told she needed to switch to Thuan's group.     States the change in group was to occur yesterday, but logged on and provider did not log on.    Chart review shows patient no showed appointment with Pooja Herrera on 4/04/2023.     Program added Naltrexone 50mg. Continues to take Lexapro 20mg, Wellbutrin XL 150mg and Remeron 15mg.     Presents today reporting she has tolerated medication well. Denies any side effects from naltrexone.     Reports she had found the naltrexone to help with cravings.     Admits to continuing to experience cravings, which worsen when anxiety becomes heightened. Provides example of last time experienced cravings when a family member dropped by and left kid for her to babyt without asking permission.     Admits to craving to smoke marijuana.     Able to relate cravings to smoke with periods of peaked anxiety.     Discussed importance of psychotherapy to work on establishing boundaries with family and coping skill development to treat periods of heightened anxiety that can lead to less of a desire to use marijuana as the coping skill.     Denies alcohol cravings.     Currently retired. Previously working in [employer], but has to leave due to health concerns.     Reports "the medication works very well." Reports mood has been stable. " ""Nothing too upsetting."     Reports a poor appetite. Often feeling nauseous. Reports decline in appetite has occurred for several years.     Reports Remeron has been helpful for sleep, but reports "my sleep is off schedule."  Often going to bed at midnight and sleeping later.    Reports remeron has been helpful at the dose she is prescribed, often falling asleep within 30 minutes, but has been taking it late.    "I just need to take it earlier."    Denies SI/HI/AVH.     Past Medical, Family and Social History: The patient's past medical, family and social history have been reviewed and updated as appropriate within the electronic medical record.              Review Of Systems:     Review of Systems   Constitutional:  Positive for malaise/fatigue. Negative for weight loss.   HENT:  Negative for congestion, ear pain and sore throat.    Eyes:  Negative for blurred vision and double vision.   Respiratory:  Negative for cough and shortness of breath.    Cardiovascular:  Negative for chest pain and palpitations.   Gastrointestinal:  Negative for abdominal pain, heartburn, nausea and vomiting.   Musculoskeletal:  Positive for myalgias.   Neurological:  Negative for seizures and headaches.   Endo/Heme/Allergies:  Negative for environmental allergies.   Psychiatric/Behavioral:  Negative for depression, hallucinations, substance abuse and suicidal ideas. The patient is nervous/anxious. The patient does not have insomnia.       Current Evaluation:     Nutritional Screening: Considering the patient's height and weight, medications, medical history and preferences, should a referral be made to the dietitian? no    Constitutional  Vitals:  Most recent vital signs, dated less than 90 days prior to this appointment, were reviewed.    Vitals:    04/20/23 1411   BP: (!) 147/100   Pulse: 94   Weight: 55.9 kg (123 lb 3.8 oz)   Height: 5' 8" (1.727 m)        General:  unremarkable, age appropriate     Musculoskeletal  Muscle " Strength/Tone:  not examined   Gait & Station:  non-ataxic     Psychiatric  Speech:  no latency; no press   Mood & Affect:  anxious  congruent and appropriate   Thought Process:  normal and logical   Associations:  intact   Thought Content:  normal, no suicidality, no homicidality, delusions, or paranoia   Insight:  limited awareness of illness   Judgement: limited due to previous fall through with follow up   Orientation:  grossly intact   Memory: intact for content of interview   Language: grossly intact   Attention Span & Concentration:  able to focus   Fund of Knowledge:  intact and appropriate to age and level of education       Relevant Elements of Neurological Exam: normal gait    Functioning in Relationships:  Spouse/partner: N/a  Peers: limited  Employers: Unemployed     Laboratory Data  No visits with results within 1 Month(s) from this visit.   Latest known visit with results is:   Lab Visit on 02/24/2023   Component Date Value Ref Range Status    Amphetamine Scrn 02/24/2023 Negative   Final    Barbiturate Scrn 02/24/2023 Negative   Final    Benzodiazepines 02/24/2023 Negative   Final    Cocaine Lvl 02/24/2023 Negative   Final    Methadone (Dolophine), Serum 02/24/2023 Negative   Final    Opiates, Blood 02/24/2023 Negative   Final    Phencyclidine, Blood 02/24/2023 Negative   Final    Propoxyphene 02/24/2023 Negative   Final    THC (Marijuana) Metabolite, bl 02/24/2023 Negative   Final    Alcohol Scrn 02/24/2023 Negative   Final    Alcohol, Serum 02/24/2023 <10  <10 mg/dL Final         Medications  Outpatient Encounter Medications as of 4/20/2023   Medication Sig Dispense Refill    acetaminophen (TYLENOL) 325 MG tablet Take 650 mg by mouth every 4 (four) hours as needed.      aspirin 81 MG Chew Take 81 mg by mouth once daily.      B COMPLEX W-C NO.20/FOLIC ACID (VIRT-CAPS ORAL) Take 1 capsule by mouth once daily.       buPROPion (WELLBUTRIN XL) 150 MG TB24 tablet Take 1 tablet (150 mg total) by mouth once  daily. 30 tablet 1    calcitRIOL (ROCALTROL) 0.5 MCG Cap Take 1.5 mcg by mouth.      carvediloL (COREG) 25 MG tablet Take 25 mg by mouth 2 (two) times daily.      cinacalcet (SENSIPAR) 30 MG Tab Take 30 mg by mouth.      clopidogreL (PLAVIX) 75 mg tablet Take 75 mg by mouth once daily.      EScitalopram oxalate (LEXAPRO) 20 MG tablet TAKE 1 TABLET(20 MG) BY MOUTH EVERY DAY (Patient taking differently: Take 20 mg by mouth once daily.) 90 tablet 0    FERREX 150 FORTE PLUS 150-60-25-1 mg-mg-mcg-mg Cap Take 1 capsule by mouth once daily.      fexofenadine (ALLEGRA) 180 MG tablet Take 180 mg by mouth once daily.      fluticasone propionate (FLONASE) 50 mcg/actuation nasal spray 1 spray by Each Nostril route daily as needed.      guaiFENesin (MUCINEX) 600 mg 12 hr tablet Take 1,200 mg by mouth 2 (two) times daily.      LIDOcaine (LIDODERM) 5 % Place 2 patches onto the skin daily as needed.      losartan (COZAAR) 100 MG tablet Take 100 mg by mouth once daily.      melatonin 5 mg Cap Take 5 mg by mouth nightly.      mirtazapine (REMERON) 15 MG tablet TAKE 1 TABLET(15 MG) BY MOUTH EVERY EVENING (Patient taking differently: Take 15 mg by mouth every evening.) 90 tablet 0    montelukast (SINGULAIR) 10 mg tablet TAKE 1 TABLET(10 MG) BY MOUTH EVERY EVENING (Patient taking differently: Take 10 mg by mouth every evening.) 90 tablet 3    naltrexone (DEPADE) 50 mg tablet Start with 1/2 tab (25 mg) by mouth daily, may increase to 1 tab (50 mg) daily if able. 30 tablet 0    nicotine (NICODERM CQ) 14 mg/24 hr Place 1 patch onto the skin every 24 hours.      NIFEdipine (PROCARDIA-XL) 60 MG (OSM) 24 hr tablet Take 60 mg by mouth once daily.      polyethylene glycol (GLYCOLAX) 17 gram/dose powder Take 17 g by mouth.      sevelamer carbonate (RENVELA) 800 mg Tab Take 800 mg by mouth 3 (three) times daily with meals.      sucroferric oxyhydroxide (VELPHORO) 500 mg Chew Take 500 mg by mouth 3 (three) times daily.       Facility-Administered  Encounter Medications as of 4/20/2023   Medication Dose Route Frequency Provider Last Rate Last Admin    0.9%  NaCl infusion   Intravenous Continuous Jordan Mcguire MD 20 mL/hr at 07/18/22 0943 New Bag at 07/18/22 0943    0.9%  NaCl infusion   Intravenous Continuous Jordan Mcguire MD   Stopped at 08/18/22 1042    sodium chloride 0.9% flush 10 mL  10 mL Intravenous PRN Jordan Mcguire MD        sodium chloride 0.9% flush 10 mL  10 mL Intravenous PRN Jordan Mcguire MD               Assessment - Diagnosis - Goals:     Impression: Jennifer Booth is a 58 year old female presenting to [facility] for evaluation and management of depression, anxiety, insomnia, and substance use disorder.    Recently completed another round of intensive outpatient program treatment.    Since discharge, no showed initial follow up appointment.     No showed appointment with Pooja Herrera to reestablish care for outpatient psychotherapy.    Did attend aftercare group with Dr. Landaverde, but when transitioned to Thuan Hahn's virtual group, did not attend.     States today she had technical difficulties logging on.     Discussed with patient my concern for compliance with treatment plan recommendations with plan to reach out to prior treatment team for assistance with care coordination.    Discussed with patient limitations of medication management alone for substance use disorders, and will be required to engage in aftercare plan in place from [facility] in order to receive adequate treatment.     Initially ordered UDS, but patient is anuria, ordered blood test of drug screen.       ICD-10-CM ICD-9-CM   1. SATNAM (generalized anxiety disorder)  F41.1 300.02   2. Cannabis use disorder, moderate, in early remission  F12.21 304.33   3. Cannabis use disorder, severe, dependence  F12.20 304.30   4. Other psychoactive substance dependence, uncomplicated  F19.20 304.60   5. Alcohol use disorder, severe, dependence  F10.20 303.90   6. CKD (chronic  kidney disease) stage 5, GFR less than 15 ml/min  N18.5 585.5       Strengths and Liabilities: Strength: Patient accepts guidance/feedback, Liability: Patient has poor health., Liability: Patient has poor judgment, Liability: Patient lacks coping skills.    Treatment Goals:  Specify outcomes written in observable, behavioral terms:   Anxiety: acquiring relapse prevention skills, reducing negative automatic thoughts, reducing physical symptoms of anxiety, and reducing time spent worrying (<30 minutes/day)  Depression: acquiring relapse prevention skills, eliminating all depressive symptoms (BDI score <10 for 1 month), increasing energy, increasing interest in usual activities, increasing motivation, increasing self-reward for positive behaviors (one/day), increasing self-reward for positive thoughts (one/day), increasing social contacts (three/week), reducing excessive guilt, reducing fatigue, and reducing negative automatic thoughts  Drug & Alcohol: Continue engagement in aftercare plan from [facility]    Treatment Plan/Recommendations:   Medication Management: Continue Wellbutrin XL 150mgfor depression and anxiety. Continue Remeron 15mg for insomnia and adjunct mood support. Continue naltrexone for substance use disorder. Start hydroxyzine 50mg BID PRN anxiety.   Labs: Order blood toxicology for continued abstinence monitoring   Discussed with patient informed consent, risks vs. benefits, alternative treatments, side effect profile and the inherent unpredictability of individual responses to these treatments. The patient expresses understanding of the above and displays the capacity to agree with this current plan and had no other questions.  Encouraged Patient to keep future appointments.   Take medications as prescribed and abstain from substance abuse.   Safety plan reviewed with patient for worsening condition or suicidal ideations. In the event of an emergency patient was advised to go to the emergency room.        Return to Clinic: 3 months    Counseling time: 20  Total time: 65

## 2023-04-21 ENCOUNTER — PATIENT MESSAGE (OUTPATIENT)
Dept: PSYCHIATRY | Facility: CLINIC | Age: 58
End: 2023-04-21
Payer: MEDICARE

## 2023-04-25 ENCOUNTER — OFFICE VISIT (OUTPATIENT)
Dept: PSYCHIATRY | Facility: CLINIC | Age: 58
End: 2023-04-25
Payer: COMMERCIAL

## 2023-04-25 DIAGNOSIS — F12.21 CANNABIS USE DISORDER, MODERATE, IN EARLY REMISSION: Primary | ICD-10-CM

## 2023-04-25 PROCEDURE — 90853 GROUP PSYCHOTHERAPY: CPT | Mod: 95,,, | Performed by: SOCIAL WORKER

## 2023-04-25 PROCEDURE — 90853 PR GROUP PSYCHOTHERAPY: ICD-10-PCS | Mod: 95,,, | Performed by: SOCIAL WORKER

## 2023-05-01 NOTE — PROGRESS NOTES
Group Psychotherapy    Site: [facility] is on-site; patient's location: her home.        Clinical status of patient: Outpatient    4/25/2023    Length of service:[id number]    Referred by: [facility]     Number of patients in attendance: 5     Target symptoms: substance abuse    Patient's response to intervention:  The patient's response to intervention is active listening, self-disclosure.    Progress toward goals and other mental status changes:  The patient's progress toward goals is good.    Interval history: Patient is present for virtual group therapy.  Introduced herself to the group, as this was her first session.  Discussed her relapse prevention plan since d/c from [facility].  Wants to attend more AA meetings.       Diagnosis: Cannabis Use Disorder     Plan: group psychotherapy    Return to clinic: as scheduled

## 2023-05-05 ENCOUNTER — TELEPHONE (OUTPATIENT)
Dept: UROLOGY | Facility: CLINIC | Age: 58
End: 2023-05-05
Payer: MEDICARE

## 2023-05-09 DIAGNOSIS — R53.1 WEAKNESS GENERALIZED: Primary | ICD-10-CM

## 2023-05-16 ENCOUNTER — OFFICE VISIT (OUTPATIENT)
Dept: PSYCHIATRY | Facility: CLINIC | Age: 58
End: 2023-05-16
Payer: MEDICARE

## 2023-05-16 DIAGNOSIS — F12.21 CANNABIS USE DISORDER, MODERATE, IN EARLY REMISSION: Primary | ICD-10-CM

## 2023-05-16 PROCEDURE — 90853 GROUP PSYCHOTHERAPY: CPT | Mod: ,,, | Performed by: SOCIAL WORKER

## 2023-05-16 PROCEDURE — 90853 PR GROUP PSYCHOTHERAPY: ICD-10-PCS | Mod: ,,, | Performed by: SOCIAL WORKER

## 2023-05-22 NOTE — PROGRESS NOTES
Group Psychotherapy    Site: [facility]    Clinical status of patient: Outpatient    5/16/2023    Length of service:[id number]    Referred by: Add[facility] Behavior Unit     Number of patients in attendance: 4    Target symptoms: substance abuse    Patient's response to intervention:  The patient's response to intervention is active listening, self-disclosure.    Progress toward goals and other mental status changes:  The patient's progress toward goals is good.    Interval history: Patient returned to the clinic today for follow up group therapy session.  Doing well overall.  Reports continued sobriety.  Denies cravings for cannabis.  Wants to attend meetings more regularly.      Diagnosis: Cannabis Use Disorder     Plan: group psychotherapy    Return to clinic: as scheduled

## 2023-05-30 ENCOUNTER — CLINICAL SUPPORT (OUTPATIENT)
Dept: REHABILITATION | Facility: HOSPITAL | Age: 58
End: 2023-05-30
Payer: MEDICARE

## 2023-05-30 DIAGNOSIS — G89.29 CHRONIC BILATERAL LOW BACK PAIN WITH RIGHT-SIDED SCIATICA: Primary | ICD-10-CM

## 2023-05-30 DIAGNOSIS — Z74.09 DECREASED MOBILITY AND ENDURANCE: ICD-10-CM

## 2023-05-30 DIAGNOSIS — M54.41 CHRONIC BILATERAL LOW BACK PAIN WITH RIGHT-SIDED SCIATICA: Primary | ICD-10-CM

## 2023-05-30 DIAGNOSIS — R53.1 WEAKNESS GENERALIZED: ICD-10-CM

## 2023-05-30 DIAGNOSIS — R29.898 WEAKNESS OF BOTH LOWER EXTREMITIES: ICD-10-CM

## 2023-05-30 PROCEDURE — 97110 THERAPEUTIC EXERCISES: CPT

## 2023-05-30 PROCEDURE — 97161 PT EVAL LOW COMPLEX 20 MIN: CPT

## 2023-05-30 PROCEDURE — 97112 NEUROMUSCULAR REEDUCATION: CPT

## 2023-05-30 NOTE — PLAN OF CARE
[facility]   Physical Therapy Initial Evaluation     Date: 5/30/2023   Name: Jennifer SANDERS Booth  Clinic Number: [id number]    Therapy Diagnosis:   Encounter Diagnoses   Name Primary?    Weakness generalized     Chronic bilateral low back pain with right-sided sciatica Yes    Weakness of both lower extremities     Decreased mobility and endurance      Physician: Raymon Martinez, NP    Physician Orders: PT Eval and Treat   Medical Diagnosis from Referral: R53.1 (ICD-10-CM) - Weakness generalized  Evaluation Date: 5/30/2023  Authorization Period Expiration: TBD  Plan of Care Expiration: 8/25/2023  Progress Note Due: 6/30/2023  Visit # / Visits authorized: 1/ 1   FOTO: 1/5    FOTO Initial Evaluation: 38% limitation   FOTO 2nd F/U: NA  FOTO Discharge: NA    Precautions: Standard     Time In: 100  Time Out: 145  Total Appointment Time (timed & untimed codes): 45 minutes      SUBJECTIVE     Date of onset: Years    History of current condition - Jennifer reports she has been having back pain for years. Patient reports she had a history of sciatica in the past. Patient reports she has had surgery on her neck due to compression of the nerves and losing strength/sensation. But this is feeling better. Patient reports her chief issue is difficulties standing up straight due to back pain. Patient reports she occasionally gets radiating pain down the R leg, but it is less frequent than in the past. Patient reports her symptoms are primarily a dull sensation in the back. Patient denies weakness in legs. Patient is in remission for multiple myeloma. Patient denies bowl or bladder dysfunction. Patient gets dialysis three times a week.     Imaging, none    Prior Therapy: None  Social History:  lives with their family  Occupation: Retired   Prior Level of Function: Sedentary   Current Level of Function: Sedentary     Pain:  Current 0/10, worst 6/10, best 0/10   Location: bilateral back    Description: Aching and  Dull  Aggravating Factors: standing up straight, walking briskly,   Easing Factors: Tylenol    Patients goals: To be able to stand straight up with ease and reduce pain.      Medical History:   Past Medical History:   Diagnosis Date    Addiction to drug     Alcohol abuse     Allergic drug reaction 2017    Anemia     Anxiety     Arm pain, left 2014    Breast cyst     Chronic renal failure 2014    CKD (chronic kidney disease) stage 5, GFR less than 15 ml/min     Depression     Dialysis patient     dialysis m-w-f    Encounter for blood transfusion     GERD (gastroesophageal reflux disease)     Hx of psychiatric care     Hypertension     Mood swings     Multiple myeloma in remission 10/26/2012    per Hem/Oc note    Psychiatric exam requested by authority     Psychiatric problem     Renal hypertension     Status post dialysis 2012    Therapy     Thrombocytopenia 2012       Surgical History:   Jennifer Booth  has a past surgical history that includes Vascular surgery (2012); AV fistula placement (Left, ); Renal biopsy (); Breast cyst aspiration (Left, ); Breast cyst excision (Left, );  section; pd catheter; Peritoneal catheter removal (N/A, 2018); Fistulogram (N/A, 2020); Percutaneous transluminal angioplasty of arteriovenous fistula (N/A, 2020); Thrombectomy of hemodialysis access site (N/A, 2020); Cervical spine surgery (Bilateral); and Colonoscopy (N/A, 2022).    Medications:   Jennifer has a current medication list which includes the following prescription(s): acetaminophen, aspirin, b complex w-c no.20/folic acid, bupropion, calcitriol, carvedilol, cinacalcet, clopidogrel, escitalopram oxalate, ferrex 150 forte plus, fexofenadine, fluticasone propionate, guaifenesin, hydroxyzine, lidocaine, losartan, melatonin, mirtazapine, montelukast, naltrexone, nicotine, nifedipine, polyethylene glycol, sevelamer carbonate, and sucroferric oxyhydroxide,  and the following Facility-Administered Medications: sodium chloride 0.9%, sodium chloride 0.9%, sodium chloride 0.9%, and sodium chloride 0.9%.    Allergies:   Review of patient's allergies indicates:   Allergen Reactions    Doxycycline Swelling, Rash and Hives    Gentamicin Anaphylaxis    Vancomycin analogues Anaphylaxis          OBJECTIVE     Lumbar AROM: Pain/Dysfunction with Movement:   Flexion: 100 degrees    Extension: 20 degrees Tightness when extending    Right side bendin degrees    Left side bendin degrees    Right rotation: 25% limitation Cramping sensation   Left rotation: 25% limitation Cramping sensation        Posture: Slumped fwd posture         Neural Tension Positive/Negative   Passive SLR w/ DF   -              Hip Right Right Left Left Pain/Dysfunction with Movement    AROM MMT AROM MMT    Flexion WFL 4+/5 WFL 4+/5    Abduction WFL 4/5 WFL 4/5       Knee Right Right Left Left Pain/Dysfunction with Movement    AROM MMT AROM MMT    Flexion WFL 4/5 WFL 4/5    Extension WFL 4+/5 WFL 4+/5      Ankle Right Right Left Left Pain/Dysfunction with Movement    AROM MMT AROM MMT    Plantarflexion WFL 4+/5 WFL 4+/5    Dorsiflexion WFL 4+/5 WFL 4+/5      TU.3 seconds    30s sit to stand: 12        Limitation/Restriction for FOTO complications and unspecified Survey    Therapist reviewed FOTO scores for Jennifer Henrylps on 2023.   FOTO documents entered into [employer] - see Media section.    Limitation Score: 38%         TREATMENT     Total Treatment time (time-based codes) separate from Evaluation: 23 minutes      Jennifer received the treatments listed below:      therapeutic exercises to develop strength, endurance, ROM, and flexibility for 12 minutes including:    Prone on Elbows   - 3 minutes  Standing Back Bends  - 10x  Piriformis stretch  - 30s x 3       NEUROMUSCULAR RE-EDUCATION ACTIVITIES to improve Kinesthetic, Sense, Proprioception, Posture, and Motor Control for 11 minutes.  The  following were included: .    Scapular retractions   - 15x for 5s   Rows  - Blue TB  - 2x10         PATIENT EDUCATION AND HOME EXERCISES     Education provided:   - Purpose of PT  - HEP    Written Home Exercises Provided: yes. Exercises were reviewed and Jennifer was able to demonstrate them prior to the end of the session.  Jennifer demonstrated good  understanding of the education provided. See EMR under Patient Instructions for exercises provided during therapy sessions.    ASSESSMENT     Jennifer is a 58 y.o. female referred to outpatient Physical Therapy with a medical diagnosis of generalized weakness. Patient presents with a chief complaint of lower back pain and overall weakness. Patient would like to work on her ability to be more upright in posture and walk longer. Patient presents with decreased lower ex strength and endurance. Patient presents with subjective stiffness of low back despite good range of motion of her lumbar spine. Patient was progressed with therex and neuro re ed today to address problem list mentioned above.     Patient prognosis is Fair.   Patient will benefit from skilled outpatient Physical Therapy to address the deficits stated above and in the chart below, provide patient /family education, and to maximize patientt's level of independence.     Plan of care discussed with patient: Yes  Patient's spiritual, cultural and educational needs considered and patient is agreeable to the plan of care and goals as stated below:     Anticipated Barriers for therapy: Chronic symptoms     Medical Necessity is demonstrated by the following  History  Co-morbidities and personal factors that may impact the plan of care Co-morbidities:   See medical history     Personal Factors:   no deficits     high   Examination  Body Structures and Functions, activity limitations and participation restrictions that may impact the plan of care Body Regions:   neck  back  lower extremities  upper extremities    Body  Systems:    ROM  strength    Participation Restrictions:   None    Activity limitations:   Learning and applying knowledge  no deficits    General Tasks and Commands  no deficits    Communication  no deficits    Mobility  no deficits    Self care  no deficits    Domestic Life  no deficits    Interactions/Relationships  no deficits    Life Areas  no deficits    Community and Social Life  no deficits         high   Clinical Presentation stable and uncomplicated low   Decision Making/ Complexity Score: low     Goals:  Short Term Goals (3 Weeks):  1. Pt will be compliant with HEP to supplement PT in decreasing pain with functional mobility.  2. Pt will perform pallof press with good control to demonstrate improved core strength.  3.Pt will improve impaired LE MMTs by 1/2 score to improve strength for functional tasks.  Long Term Goals (8 Weeks):   1. Pt will improve FOTO score to </= 25% limited to decrease perceived limitation with maintaining/changing body position.   2. Pt will perform goblet squat with good control / form to reduce risk of further injury  3.. Pt will improve impaired LE MMTs by 1 score to improve strength for functional tasks.  4. Pt will report no pain during lumbar ROM to promote functional mobility.         PLAN   Plan of care Certification: 5/30/2023 to 8/25/2023.    Outpatient Physical Therapy 2 times weekly for 6 weeks to include the following interventions: Cervical/Lumbar Traction, Manual Therapy, Moist Heat/ Ice, Neuromuscular Re-ed, Patient Education, Self Care, Therapeutic Activities, Therapeutic Exercise, and FDN.     Mo Valladares, PT      I CERTIFY THE NEED FOR THESE SERVICES FURNISHED UNDER THIS PLAN OF TREATMENT AND WHILE UNDER MY CARE   Physician's comments:     Physician's Signature: ___________________________________________________

## 2023-06-07 ENCOUNTER — CLINICAL SUPPORT (OUTPATIENT)
Dept: REHABILITATION | Facility: HOSPITAL | Age: 58
End: 2023-06-07
Payer: MEDICARE

## 2023-06-07 DIAGNOSIS — M53.86 DECREASED ROM OF LUMBAR SPINE: ICD-10-CM

## 2023-06-07 PROCEDURE — 97530 THERAPEUTIC ACTIVITIES: CPT | Mod: CQ

## 2023-06-07 PROCEDURE — 97110 THERAPEUTIC EXERCISES: CPT | Mod: CQ

## 2023-06-07 NOTE — PROGRESS NOTES
[facility]   Physical Therapy Treatment Note      Name: Jennifer SANDERS Cotton  Clinic Number: [id number]    Therapy Diagnosis:   Encounter Diagnosis   Name Primary?    Decreased ROM of lumbar spine      Physician: Raymon Martinez NP    Visit Date: 6/7/2023    Physician: Raymon Martinez NP     Physician Orders: PT Eval and Treat   Medical Diagnosis from Referral: R53.1 (ICD-10-CM) - Weakness generalized  Evaluation Date: 5/30/2023  Authorization Period Expiration: 12/31/23  Plan of Care Expiration: 8/25/2023  Progress Note Due: 6/30/2023  Visit # / Visits authorized: 1/ 1   FOTO: 1/5     FOTO Initial Evaluation: 38% limitation   FOTO 2nd F/U: NA  FOTO Discharge: NA     Precautions: Standard        PTA Visit #: 1/5     Time In: 1:20  Time Out: 1:45  Total Billable Time: 25 minutes    Subjective     Pt reports: R low back pain.  She was compliant with home exercise program.  Response to previous treatment: no adverse affect  Functional change: none    Pain: 4/10  Location: right back      Objective      Objective Measures updated at progress report unless specified.     Treatment     Jennifer received the treatments listed below:      therapeutic exercises to develop strength, endurance, ROM, flexibility, posture, and core stabilization for 17 minutes including:  Prone on Elbows   - 3 minutes  Standing Back Bends  - 10x  Piriformis stretch  - 30s x 3   Rowing BTB   - 2x10    manual therapy techniques: Soft tissue Mobilization were applied to the:  for 00 minutes, including:      neuromuscular re-education activities to improve: Balance, Coordination, Kinesthetic, Sense, Proprioception, and Posture for 00 minutes. The following activities were included:      therapeutic activities to improve functional performance for 08  minutes, including:  Standing hip abd/ext 2x10 ea.  Squating 2x10      Patient Education and Home Exercises       Education provided:   - ice for pain    Written Home Exercises  Provided: Patient instructed to cont prior HEP. Exercises were reviewed and Jennifer was able to demonstrate them prior to the end of the session.  Jennifer demonstrated good  understanding of the education provided. See EMR under Patient Instructions for exercises provided during therapy sessions    Assessment     Pt tolerated first full session after initial evaluation without adverse affect. Patient was 20 minutes late therefore a shortened session to focus on LE strength, functional activity tolerance and tissue extensibility was established. Plan to progress per tolerance at future sessions.    Jennifer Is progressing well towards her goals.   Pt prognosis is Good.     Pt will continue to benefit from skilled outpatient physical therapy to address the deficits listed in the problem list box on initial evaluation, provide pt/family education and to maximize pt's level of independence in the home and community environment.     Pt's spiritual, cultural and educational needs considered and pt agreeable to plan of care and goals.     Anticipated barriers to physical therapy: chronic symptoms    Goals:   Short Term Goals (3 Weeks):  1. Pt will be compliant with HEP to supplement PT in decreasing pain with functional mobility.  2. Pt will perform pallof press with good control to demonstrate improved core strength.  3.Pt will improve impaired LE MMTs by 1/2 score to improve strength for functional tasks.  Long Term Goals (8 Weeks):   1. Pt will improve FOTO score to </= 25% limited to decrease perceived limitation with maintaining/changing body position.   2. Pt will perform goblet squat with good control / form to reduce risk of further injury  3.. Pt will improve impaired LE MMTs by 1 score to improve strength for functional tasks.  4. Pt will report no pain during lumbar ROM to promote functional mobility.   Plan     Plan of care Certification: 5/30/2023 to 8/25/2023.     Outpatient Physical Therapy 2 times weekly for 6  weeks to include the following interventions: Cervical/Lumbar Traction, Manual Therapy, Moist Heat/ Ice, Neuromuscular Re-ed, Patient Education, Self Care, Therapeutic Activities, Therapeutic Exercise, and FDN.     George Lawton, PTA

## 2023-06-20 ENCOUNTER — OFFICE VISIT (OUTPATIENT)
Dept: UROLOGY | Facility: CLINIC | Age: 58
End: 2023-06-20
Payer: MEDICARE

## 2023-06-20 VITALS
HEART RATE: 69 BPM | RESPIRATION RATE: 16 BRPM | SYSTOLIC BLOOD PRESSURE: 145 MMHG | OXYGEN SATURATION: 96 % | BODY MASS INDEX: 17.94 KG/M2 | WEIGHT: 118.38 LBS | DIASTOLIC BLOOD PRESSURE: 88 MMHG | HEIGHT: 68 IN

## 2023-06-20 DIAGNOSIS — N28.1 BILATERAL RENAL CYSTS: ICD-10-CM

## 2023-06-20 DIAGNOSIS — N18.5 CKD (CHRONIC KIDNEY DISEASE) STAGE 5, GFR LESS THAN 15 ML/MIN: Primary | ICD-10-CM

## 2023-06-20 DIAGNOSIS — Z99.2 ESRD (END STAGE RENAL DISEASE) ON DIALYSIS: ICD-10-CM

## 2023-06-20 DIAGNOSIS — N18.6 ESRD (END STAGE RENAL DISEASE) ON DIALYSIS: ICD-10-CM

## 2023-06-20 PROCEDURE — 99999 PR PBB SHADOW E&M-EST. PATIENT-LVL V: ICD-10-PCS | Mod: PBBFAC,,, | Performed by: UROLOGY

## 2023-06-20 PROCEDURE — 99204 PR OFFICE/OUTPT VISIT, NEW, LEVL IV, 45-59 MIN: ICD-10-PCS | Mod: S$PBB,,, | Performed by: UROLOGY

## 2023-06-20 PROCEDURE — 99999 PR PBB SHADOW E&M-EST. PATIENT-LVL V: CPT | Mod: PBBFAC,,, | Performed by: UROLOGY

## 2023-06-20 PROCEDURE — 99215 OFFICE O/P EST HI 40 MIN: CPT | Mod: PBBFAC | Performed by: UROLOGY

## 2023-06-20 PROCEDURE — 99204 OFFICE O/P NEW MOD 45 MIN: CPT | Mod: S$PBB,,, | Performed by: UROLOGY

## 2023-06-20 RX ORDER — VITAMIN B COMPLEX
1 CAPSULE ORAL
Status: ON HOLD | COMMUNITY
Start: 2022-10-10 | End: 2023-09-14 | Stop reason: SDUPTHER

## 2023-06-20 NOTE — Clinical Note
From: Metka M Rozina  To: Arelis Palma  Sent: 10/3/2022 3:27 PM CDT  Subject: All okay?    Cristy Infante,  Are my labs okay? I did see them, but it's all Dutch to me :).   I also was thinking if my headaches could be allergy related.  I would like to call about my appointment for the scan to find out if I can wear my small hoop earring that is on the ear part that is on the side of your head going towards your ear??? I don't know what it's called. Do I call [facility]? And it's a CT Scan?  Thank you again for all that you do,  Christiano   See Dr King 11/2023 with renal US please

## 2023-06-20 NOTE — PROGRESS NOTES
Subjective:      Jennifer Booth is a 58 y.o. female who returns today regarding her     ESRD on dialysis with Dr Wolfe    Self referred for renal cysts.    No  complaints    Produces only a small amount of urine    She has seen transplant at [facility] for an evaluation  Not currently listed    The following portions of the patient's history were reviewed and updated as appropriate: allergies, current medications, past family history, past medical history, past social history, past surgical history and problem list.    Review of Systems  Pertinent items are noted in HPI.  A comprehensive multipoint review of systems was negative except as otherwise stated in the HPI.    Past Medical History:   Diagnosis Date    Addiction to drug     Alcohol abuse     Allergic drug reaction 2017    Anemia     Anxiety     Arm pain, left 2014    Breast cyst     Chronic renal failure 2014    CKD (chronic kidney disease) stage 5, GFR less than 15 ml/min     Depression     Dialysis patient     dialysis m-w-    Encounter for blood transfusion     GERD (gastroesophageal reflux disease)     Hx of psychiatric care     Hypertension     Mood swings     Multiple myeloma in remission 10/26/2012    per Hem/Oc note    Psychiatric exam requested by authority     Psychiatric problem     Renal hypertension     Status post dialysis 2012    Therapy     Thrombocytopenia 2012     Past Surgical History:   Procedure Laterality Date    AV FISTULA PLACEMENT Left     BREAST CYST ASPIRATION Left     BREAST CYST EXCISION Left     CERVICAL SPINE SURGERY Bilateral      SECTION      x1    COLONOSCOPY N/A 2022    Procedure: COLONOSCOPY;  Surgeon: Jordan Mcguire MD;  Location: [facility] ENDO;  Service: Endoscopy;  Laterality: N/A;    FISTULOGRAM N/A 2020    Procedure: Fistulogram;  Surgeon: Rahat Berger MD;  Location: [facility] CATH LAB/EP;  Service: Cardiology;  Laterality: N/A;    pd catheter      PERCUTANEOUS  TRANSLUMINAL ANGIOPLASTY OF ARTERIOVENOUS FISTULA N/A 06/09/2020    Procedure: PTA, AV FISTULA;  Surgeon: Rahat Berger MD;  Location: [facility] CATH LAB/EP;  Service: Cardiology;  Laterality: N/A;    PERITONEAL CATHETER REMOVAL N/A 11/30/2018    Procedure: REMOVAL, CATHETER, DIALYSIS, PERITONEAL;  Surgeon: Mukesh Gonsales Jr., MD;  Location: [facility] OR;  Service: General;  Laterality: N/A;    RENAL BIOPSY  2016    THROMBECTOMY OF HEMODIALYSIS ACCESS SITE N/A 06/09/2020    Procedure: Thrombectomy, Hemodialysis Graft Or Fistula;  Surgeon: Rahat Berger MD;  Location: [facility] CATH LAB/EP;  Service: Cardiology;  Laterality: N/A;    VASCULAR SURGERY  08/2012    dialysis catheter to right subclavian       Review of patient's allergies indicates:   Allergen Reactions    Doxycycline Swelling, Rash and Hives    Gentamicin Anaphylaxis    Vancomycin analogues Anaphylaxis          Objective:   Vitals: LMP 01/01/2016 (Approximate) Comment: 2016    Physical Exam   General: alert and oriented, no acute distress  Respiratory: Symmetric expansion, non-labored breathing  Cardiovascular: no peripheral edema  Abdomen:non distended  Skin: normal coloration and turgor, no rashes, no suspicious skin lesions noted  Neuro: no gross deficits  Psych: normal judgment and insight, normal mood/affect, and non-anxious    Physical Exam    Lab Review   Urinalysis demonstrates no specimen    Lab Results   Component Value Date    WBC 3.88 (L) 01/30/2023    HGB 9.4 (L) 01/30/2023    HCT 28.9 (L) 01/30/2023    MCV 86 01/30/2023     01/30/2023     Lab Results   Component Value Date    CREATININE 6.8 (H) 02/17/2023    BUN 27 (H) 02/17/2023       Imaging  NM PET CT WHOLE BODY     CLINICAL HISTORY:  Multiple myeloma; Multiple myeloma not having achieved remission     TECHNIQUE:  9.51 mCi of F18-FDG was administered intravenously in the right hand.  After an approximately 60 min distribution time, PET/CT images were acquired from the vertex to feet.   Transmission images were acquired to correct for attenuation using a whole body low-dose CT scan without contrast with the arms positioned along the side of the torso. Glycemia at the time of injection was 79 mg/dL.     COMPARISON:  Complete retroperitoneal ultrasound 09/27/2022.  MRI thoracic and lumbar spine 06/29/2018.     FINDINGS:  Quality of the study: CT images of the neck are degraded by streak artifact from dental amalgam.     In the head and neck, there are no hypermetabolic lesions worrisome for malignancy.  There is complete opacification of the right maxillary sinus extending into the nasal cavity with mild peripheral activity in keeping with active sinus disease.  There are no hypermetabolic mucosal lesions, and there are no pathologically enlarged or hypermetabolic lymph nodes. 1.0 cm mildly hypermetabolic nodule posterior to the right thyroid lobe with SUV max 2.8 (axial fused image 93).     In the chest, there are no hypermetabolic lesions worrisome for malignancy.  There are no concerning pulmonary nodules or masses.     Atherosclerosis of the aortic arch and coronary arteries.     In the abdomen and pelvis, there is physiologic tracer distribution within the abdominal organs and excretion into the genitourinary system.     Bilateral kidneys are atrophic with multiple renal cysts.     In the bones, there are no hypermetabolic lesions worrisome for malignancy.  There are multiple non hypermetabolic lytic lesions throughout the skeleton consistent with previously treated lesions.     Postsurgical changes previous spinal fixation in the cervical spine.     In the extremities, there are no hypermetabolic lesions worrisome for malignancy.     Impression:     In this patient with multiple myeloma there are no hypermetabolic osseous lesions concerning for metastatic disease.     Multiple non hypermetabolic lytic lesions throughout the axial and appendicular skeleton consistent with previously treated  lesions.     Incidental mildly hypermetabolic nodule posterior to the right thyroid lobe.  Hyperplastic parathyroid gland is a consideration.  Recommend biochemical correlation and consideration of further imaging such as parathyroid protocol 4D CT, ultrasound, or parathyroid sestamibi scan.     Right maxillary sinusitis.     This report was flagged in Epic as containing an incidental finding.     I, Jordan Cullen MD, attest that I reviewed and interpreted the images.     Electronically signed by resident: Joan To  Date:                                            11/10/2022  Time:                                           09:34     Electronically signed by: Jordan Cullen  Date:                                            11/11/2022  Time:                                           10:35    US RETROPERITONEAL COMPLETE     CLINICAL HISTORY:  Awaiting organ transplant status     TECHNIQUE:  Ultrasound of the kidneys and urinary bladder was performed including color flow and Doppler evaluation of the kidneys.     COMPARISON:  Renal ultrasound dated 08/20/2021     FINDINGS:  Right kidney: Measures 8.7 cm.  Resistive index of 0.86.  No hydronephrosis.  Increased echogenic parenchyma.  Multiple renal cysts of varying size, some containing septation.  Largest measured at the midpole at 2.1 x 1.6 x 1.4 cm.  No hydronephrosis.     Left kidney: Measures 8.9 cm.  Resistive index of 0.75.  Increased echogenic parenchyma.  Multiple renal cysts of varying size, some containing septation.  Largest measured at the lower pole at 1.3 x 1.6 x 1.0 cm.  Few cortical parenchymal echogenic foci suggesting calcification.  No hydronephrosis.     Urinary bladder is not well assessed.     Impression:     Sequela of chronic medical renal disease to include echogenic renal parenchyma with elevated resistive indices.     Bilateral renal cystic foci ranging from simple to complex containing septation as well as few parenchymal calcific foci on  the left.        Electronically signed by: Alvaro Ayala  Date:                                            09/27/2022  Time:                                           13:48    Assessment and Plan:   CKD (chronic kidney disease) stage 5, GFR less than 15 ml/min  Per nephrology, Dr Wolfe, and transplant    ESRD (end stage renal disease) on dialysis  Per nephrology, Dr Wolfe, and transplant    Bilateral renal cysts; complex  Monitor radiographically  See me 11/2023 with repeat renal US

## 2023-06-26 ENCOUNTER — CLINICAL SUPPORT (OUTPATIENT)
Dept: REHABILITATION | Facility: HOSPITAL | Age: 58
End: 2023-06-26
Payer: MEDICARE

## 2023-06-26 DIAGNOSIS — M53.86 DECREASED ROM OF LUMBAR SPINE: Primary | ICD-10-CM

## 2023-06-26 PROCEDURE — 97110 THERAPEUTIC EXERCISES: CPT

## 2023-06-26 NOTE — PROGRESS NOTES
[facility]   Physical Therapy Treatment Note      Name: Jennifer SANDERS [place]  Clinic Number: [id number]    Therapy Diagnosis:   Encounter Diagnosis   Name Primary?    Decreased ROM of lumbar spine Yes       Physician: Raymon Martinez NP    Visit Date: 6/26/2023    Physician: Raymon Martinez NP     Physician Orders: PT Eval and Treat   Medical Diagnosis from Referral: R53.1 (ICD-10-CM) - Weakness generalized  Evaluation Date: 5/30/2023  Authorization Period Expiration: 12/31/23  Plan of Care Expiration: 8/25/2023  Progress Note Due: 6/30/2023  Visit # / Visits authorized: 1/1, 2/20   FOTO: 3/5     FOTO Initial Evaluation: 38% limitation   FOTO 2nd F/U: NA  FOTO Discharge: NA     Precautions: Standard        PTA Visit #: 0/5     Time In: 400  Time Out: 445  Total Billable Time: 45 minutes    Subjective     Pt reports: she has not been able to attend therapy due to being out of town. 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   She was compliant with home exercise program.  Response to previous treatment: no adverse affect  Functional change: none    Pain: 4/10  Location: right back      Objective      Objective Measures updated at progress report unless specified.     Treatment     Jennifer received the treatments listed below:      therapeutic exercises to develop strength, endurance, ROM, flexibility, posture, and core stabilization for 45 minutes including:    Nustep 7 min   DKTC   - 20x  LTR   - 20x  Open books 15x ea way  Piriformis stretch  - 30s x 3   Ball Rolls  - 15x   Rowing BTB   - 2x10  Prone on Elbows   - 3 minutes  Standing Back Bends  - 10x      manual therapy techniques: Soft tissue Mobilization were applied to the:  for 00 minutes, including:      neuromuscular re-education activities to improve: Balance, Coordination, Kinesthetic, Sense, Proprioception, and Posture for 00 minutes. The following activities were included:      therapeutic  activities to improve functional performance for 0 minutes, including:    Standing hip abd/ext 2x10 ea.  Squating 2x10      Patient Education and Home Exercises       Education provided:   - ice for pain    Written Home Exercises Provided: Patient instructed to cont prior HEP. Exercises were reviewed and Jennifer was able to demonstrate them prior to the end of the session.  Jennifer demonstrated good  understanding of the education provided. See EMR under Patient Instructions for exercises provided during therapy sessions    Assessment     Patient returned to PT after not attending PT for about a month due to being out of town. Patient was reintroduced to therex today and did well. Patient is going to resume scheduling appointments 2x a week.     Jennifer Is progressing well towards her goals.   Pt prognosis is Good.     Pt will continue to benefit from skilled outpatient physical therapy to address the deficits listed in the problem list box on initial evaluation, provide pt/family education and to maximize pt's level of independence in the home and community environment.     Pt's spiritual, cultural and educational needs considered and pt agreeable to plan of care and goals.     Anticipated barriers to physical therapy: chronic symptoms    Goals:   Short Term Goals (3 Weeks):  1. Pt will be compliant with HEP to supplement PT in decreasing pain with functional mobility.  2. Pt will perform pallof press with good control to demonstrate improved core strength.  3.Pt will improve impaired LE MMTs by 1/2 score to improve strength for functional tasks.  Long Term Goals (8 Weeks):   1. Pt will improve FOTO score to </= 25% limited to decrease perceived limitation with maintaining/changing body position.   2. Pt will perform goblet squat with good control / form to reduce risk of further injury  3.. Pt will improve impaired LE MMTs by 1 score to improve strength for functional tasks.  4. Pt will report no pain during lumbar  ROM to promote functional mobility.   Plan     Plan of care Certification: 5/30/2023 to 8/25/2023.     Outpatient Physical Therapy 2 times weekly for 6 weeks to include the following interventions: Cervical/Lumbar Traction, Manual Therapy, Moist Heat/ Ice, Neuromuscular Re-ed, Patient Education, Self Care, Therapeutic Activities, Therapeutic Exercise, and FDN.     Mo Vallaadres, PT

## 2023-06-30 ENCOUNTER — CLINICAL SUPPORT (OUTPATIENT)
Dept: REHABILITATION | Facility: HOSPITAL | Age: 58
End: 2023-06-30
Payer: COMMERCIAL

## 2023-06-30 DIAGNOSIS — M53.86 DECREASED ROM OF LUMBAR SPINE: Primary | ICD-10-CM

## 2023-06-30 PROCEDURE — 97110 THERAPEUTIC EXERCISES: CPT | Mod: CQ

## 2023-06-30 PROCEDURE — 97530 THERAPEUTIC ACTIVITIES: CPT | Mod: CQ

## 2023-06-30 NOTE — PROGRESS NOTES
[facility]   Physical Therapy Treatment Note      Name: Jennifer SANDERS [place]  Clinic Number: [id number]    Therapy Diagnosis:   Encounter Diagnosis   Name Primary?    Decreased ROM of lumbar spine Yes       Physician: Raymon Martinez NP    Visit Date: 6/30/2023    Physician: Raymon Martinez NP     Physician Orders: PT Eval and Treat   Medical Diagnosis from Referral: R53.1 (ICD-10-CM) - Weakness generalized  Evaluation Date: 5/30/2023  Authorization Period Expiration: 12/31/23  Plan of Care Expiration: 8/25/2023  Progress Note Due: 6/30/2023  Visit # / Visits authorized: 1/1, 3/20   FOTO: 3/5     FOTO Initial Evaluation: 38% limitation   FOTO 2nd F/U: NA  FOTO Discharge: NA     Precautions: Standard        PTA Visit #: 1/5     Time In: 11:30 am  Time Out: 12:20 pm  Total Billable Time: 50 minutes    Subjective     Pt reports: she has no pain, the L hip feels like it may give out at times.  She was compliant with home exercise program.  Response to previous treatment: no adverse affect  Functional change: none    Pain: 0/10  Location: right back      Objective      Objective Measures updated at progress report unless specified.     Treatment     Jennifer received the treatments listed below:      therapeutic exercises to develop strength, endurance, ROM, flexibility, posture, and core stabilization for 38 minutes including:    Nustep 7 min   DKTC   - 20x  LTR   - 20x  Open books 15x ea way  Piriformis stretch  - 30s x 3   Ball Rolls  - 15x   Rowing BTB   - 2x10  Prone on Elbows   - 3 minutes  Standing Back Bends  - 10x      manual therapy techniques: Soft tissue Mobilization were applied to the:  for 00 minutes, including:      neuromuscular re-education activities to improve: Balance, Coordination, Kinesthetic, Sense, Proprioception, and Posture for 00 minutes. The following activities were included:      therapeutic activities to improve functional performance for 08 minutes,  including:    Standing hip abd/ext 2x10 ea.  Squating 2x10      Patient Education and Home Exercises       Education provided:   - ice for pain    Written Home Exercises Provided: Patient instructed to cont prior HEP. Exercises were reviewed and Jennifer was able to demonstrate them prior to the end of the session.  Jennifer demonstrated good  understanding of the education provided. See EMR under Patient Instructions for exercises provided during therapy sessions    Assessment     Patient tolerated session well without pain. Pt c/o mild L hip discomfort prior therapy. Plan to monitor and progress per tolerance.    Jennifer Is progressing well towards her goals.   Pt prognosis is Good.     Pt will continue to benefit from skilled outpatient physical therapy to address the deficits listed in the problem list box on initial evaluation, provide pt/family education and to maximize pt's level of independence in the home and community environment.     Pt's spiritual, cultural and educational needs considered and pt agreeable to plan of care and goals.     Anticipated barriers to physical therapy: chronic symptoms    Goals:   Short Term Goals (3 Weeks):  1. Pt will be compliant with HEP to supplement PT in decreasing pain with functional mobility.  2. Pt will perform pallof press with good control to demonstrate improved core strength.  3.Pt will improve impaired LE MMTs by 1/2 score to improve strength for functional tasks.  Long Term Goals (8 Weeks):   1. Pt will improve FOTO score to </= 25% limited to decrease perceived limitation with maintaining/changing body position.   2. Pt will perform goblet squat with good control / form to reduce risk of further injury  3.. Pt will improve impaired LE MMTs by 1 score to improve strength for functional tasks.  4. Pt will report no pain during lumbar ROM to promote functional mobility.   Plan     Plan of care Certification: 5/30/2023 to 8/25/2023.     Outpatient Physical Therapy 2  times weekly for 6 weeks to include the following interventions: Cervical/Lumbar Traction, Manual Therapy, Moist Heat/ Ice, Neuromuscular Re-ed, Patient Education, Self Care, Therapeutic Activities, Therapeutic Exercise, and FDN.     George Lawton, PTA

## 2023-07-07 ENCOUNTER — CLINICAL SUPPORT (OUTPATIENT)
Dept: REHABILITATION | Facility: HOSPITAL | Age: 58
End: 2023-07-07
Payer: COMMERCIAL

## 2023-07-07 DIAGNOSIS — M53.86 DECREASED ROM OF LUMBAR SPINE: Primary | ICD-10-CM

## 2023-07-07 PROCEDURE — 97112 NEUROMUSCULAR REEDUCATION: CPT | Mod: CQ

## 2023-07-07 PROCEDURE — 97110 THERAPEUTIC EXERCISES: CPT | Mod: CQ

## 2023-07-07 NOTE — PROGRESS NOTES
[facility]   Physical Therapy Treatment Note      Name: Jennifer SANDERS [place]  Clinic Number: [id number]    Therapy Diagnosis:   Encounter Diagnosis   Name Primary?    Decreased ROM of lumbar spine Yes         Physician: Raymon Martinez NP    Visit Date: 7/7/2023    Physician: Raymon Martinez NP     Physician Orders: PT Eval and Treat   Medical Diagnosis from Referral: R53.1 (ICD-10-CM) - Weakness generalized  Evaluation Date: 5/30/2023  Authorization Period Expiration: 12/31/23  Plan of Care Expiration: 8/25/2023  Progress Note Due: 6/30/2023  Visit # / Visits authorized: 1/1, 3/20   FOTO: 3/5     FOTO Initial Evaluation: 38% limitation   FOTO 2nd F/U: NA  FOTO Discharge: NA     Precautions: Standard        PTA Visit #: 1/5     Time In: 10:45 am  Time Out: 11:30 am  Total Billable Time: 45 minutes    Subjective     Pt reports: Yesterday her back was hurting really bad going up stairs. The back pain was not there when she woke up this morning.  She was compliant with home exercise program.  Response to previous treatment: no adverse affect  Functional change: none    Pain: 0/10  Location: right back      Objective      Objective Measures updated at progress report unless specified.     Treatment     Jennifer received the treatments listed below:      therapeutic exercises to develop strength, endurance, ROM, flexibility, posture, and core stabilization for 35 minutes including:    Nustep 7 min   DKTC   - 20x  LTR   - 20x  Open books 15x ea way  Piriformis stretch  - 30s x 3   Ball Rolls  - 15x   Rowing BTB   - 2x10  Prone on Elbows   - 3 minutes  Standing Back Bends  - 10x  SL clams 3x10 B  SL hip abd 3x10 B  manual therapy techniques: Soft tissue Mobilization were applied to the:  for 00 minutes, including:      neuromuscular re-education activities to improve: Balance, Coordination, Kinesthetic, Sense, Proprioception, and Posture for 10 minutes. The following activities were included:  Pallof  press GTB 2x10  TrA activation SB 20x  therapeutic activities to improve functional performance for 08 minutes, including:    Standing hip abd/ext 2x10 ea.  Squating 2x10      Patient Education and Home Exercises       Education provided:   - ice for pain    Written Home Exercises Provided: Patient instructed to cont prior HEP. Exercises were reviewed and Jennifer was able to demonstrate them prior to the end of the session.  Jennifer demonstrated good  understanding of the education provided. See EMR under Patient Instructions for exercises provided during therapy sessions    Assessment     Patient tolerated session well without pain. Pt c/o mild L hip discomfort during SL hip abd. Plan to monitor and progress per tolerance.    Jennifer Is progressing well towards her goals.   Pt prognosis is Good.     Pt will continue to benefit from skilled outpatient physical therapy to address the deficits listed in the problem list box on initial evaluation, provide pt/family education and to maximize pt's level of independence in the home and community environment.     Pt's spiritual, cultural and educational needs considered and pt agreeable to plan of care and goals.     Anticipated barriers to physical therapy: chronic symptoms    Goals:   Short Term Goals (3 Weeks):  1. Pt will be compliant with HEP to supplement PT in decreasing pain with functional mobility.  2. Pt will perform pallof press with good control to demonstrate improved core strength.  3.Pt will improve impaired LE MMTs by 1/2 score to improve strength for functional tasks.  Long Term Goals (8 Weeks):   1. Pt will improve FOTO score to </= 25% limited to decrease perceived limitation with maintaining/changing body position.   2. Pt will perform goblet squat with good control / form to reduce risk of further injury  3.. Pt will improve impaired LE MMTs by 1 score to improve strength for functional tasks.  4. Pt will report no pain during lumbar ROM to promote  functional mobility.   Plan     Plan of care Certification: 5/30/2023 to 8/25/2023.     Outpatient Physical Therapy 2 times weekly for 6 weeks to include the following interventions: Cervical/Lumbar Traction, Manual Therapy, Moist Heat/ Ice, Neuromuscular Re-ed, Patient Education, Self Care, Therapeutic Activities, Therapeutic Exercise, and FDN.     George Lawton, PTA

## 2023-07-10 ENCOUNTER — CLINICAL SUPPORT (OUTPATIENT)
Dept: REHABILITATION | Facility: HOSPITAL | Age: 58
End: 2023-07-10
Payer: MEDICARE

## 2023-07-10 DIAGNOSIS — R53.1 WEAKNESS GENERALIZED: ICD-10-CM

## 2023-07-10 DIAGNOSIS — R29.898 WEAKNESS OF BOTH LOWER EXTREMITIES: ICD-10-CM

## 2023-07-10 DIAGNOSIS — M54.41 CHRONIC BILATERAL LOW BACK PAIN WITH RIGHT-SIDED SCIATICA: ICD-10-CM

## 2023-07-10 DIAGNOSIS — G89.29 CHRONIC BILATERAL LOW BACK PAIN WITH RIGHT-SIDED SCIATICA: ICD-10-CM

## 2023-07-10 DIAGNOSIS — Z74.09 DECREASED MOBILITY AND ENDURANCE: ICD-10-CM

## 2023-07-10 DIAGNOSIS — M53.86 DECREASED ROM OF LUMBAR SPINE: Primary | ICD-10-CM

## 2023-07-10 PROCEDURE — 97110 THERAPEUTIC EXERCISES: CPT

## 2023-07-10 NOTE — PROGRESS NOTES
[facility]   Physical Therapy Treatment Note      Name: Jennifer SANDERS [place]  Clinic Number: [id number]    Therapy Diagnosis:   Encounter Diagnoses   Name Primary?    Decreased ROM of lumbar spine Yes    Weakness generalized     Chronic bilateral low back pain with right-sided sciatica     Weakness of both lower extremities     Decreased mobility and endurance      Physician: Raymon Martinez NP    Visit Date: 7/10/2023    Physician: Raymon Martinez NP     Physician Orders: PT Eval and Treat   Medical Diagnosis from Referral: R53.1 (ICD-10-CM) - Weakness generalized  Evaluation Date: 5/30/2023  Authorization Period Expiration: 12/31/23  Plan of Care Expiration: 8/25/2023  Progress Note Due: 6/30/2023  Visit # / Visits authorized: 1/1, 4/20   FOTO: 4/5     FOTO Initial Evaluation: 38% limitation   FOTO 2nd F/U: NA  FOTO Discharge: NA     Precautions: Standard        PTA Visit #: 1/5     Time In: 145  Time Out: 230  Total Billable Time: 45 minutes    Subjective     Pt reports: she is doing well today with minimal pain.      She was compliant with home exercise program.  Response to previous treatment: no adverse affect  Functional change: none    Pain: 0/10  Location: right back      Objective      Objective Measures updated at progress report unless specified.     Treatment     Jennifer received the treatments listed below:      therapeutic exercises to develop strength, endurance, ROM, flexibility, posture, and core stabilization for 45 minutes including:    Nustep 7 min   DKTC   - 20x  LTR   - 20x  Piriformis stretch  - 30s x 3   Ball Rolls  - 15x   SL hip abd 3x10 B  Bridging BTB 3x10   Rowing BTB   - 2x10    Not Today   Open books 15x ea way  Prone on Elbows   - 3 minutes  Standing Back Bends  - 10x  SL clams 3x10 B      manual therapy techniques: Soft tissue Mobilization were applied to the: for 00 minutes, including:      neuromuscular re-education activities to improve: Balance,  Coordination, Kinesthetic, Sense, Proprioception, and Posture for 10 minutes. The following activities were included:      Pallof press GTB 2x10  TrA activation SB 20x  therapeutic activities to improve functional performance for 0 minutes, including:  Standing hip abd/ext 2x10 ea.  Squating 2x10      Patient Education and Home Exercises       Education provided:   - ice for pain    Written Home Exercises Provided: Patient instructed to cont prior HEP. Exercises were reviewed and Jennifer was able to demonstrate them prior to the end of the session.  Jennifer demonstrated good  understanding of the education provided. See EMR under Patient Instructions for exercises provided during therapy sessions    Assessment     Patient tolerated session well without pain. Patient initial came to therapy with 3/10 pain, but after therapy she noted her pain decreased to 0/10. Patient is progressing well.       Jennifer Is progressing well towards her goals.   Pt prognosis is Good.     Pt will continue to benefit from skilled outpatient physical therapy to address the deficits listed in the problem list box on initial evaluation, provide pt/family education and to maximize pt's level of independence in the home and community environment.     Pt's spiritual, cultural and educational needs considered and pt agreeable to plan of care and goals.     Anticipated barriers to physical therapy: chronic symptoms    Goals:   Short Term Goals (3 Weeks):  1. Pt will be compliant with HEP to supplement PT in decreasing pain with functional mobility.  2. Pt will perform pallof press with good control to demonstrate improved core strength.  3.Pt will improve impaired LE MMTs by 1/2 score to improve strength for functional tasks.  Long Term Goals (8 Weeks):   1. Pt will improve FOTO score to </= 25% limited to decrease perceived limitation with maintaining/changing body position.   2. Pt will perform goblet squat with good control / form to reduce risk  of further injury  3.. Pt will improve impaired LE MMTs by 1 score to improve strength for functional tasks.  4. Pt will report no pain during lumbar ROM to promote functional mobility.   Plan     Plan of care Certification: 5/30/2023 to 8/25/2023.     Outpatient Physical Therapy 2 times weekly for 6 weeks to include the following interventions: Cervical/Lumbar Traction, Manual Therapy, Moist Heat/ Ice, Neuromuscular Re-ed, Patient Education, Self Care, Therapeutic Activities, Therapeutic Exercise, and FDN.     Mo Valladares, PT

## 2023-07-12 ENCOUNTER — CLINICAL SUPPORT (OUTPATIENT)
Dept: REHABILITATION | Facility: HOSPITAL | Age: 58
End: 2023-07-12
Payer: MEDICARE

## 2023-07-12 DIAGNOSIS — M53.86 DECREASED ROM OF LUMBAR SPINE: Primary | ICD-10-CM

## 2023-07-12 PROCEDURE — 97110 THERAPEUTIC EXERCISES: CPT | Mod: CQ

## 2023-07-12 NOTE — PROGRESS NOTES
[facility]   Physical Therapy Treatment Note      Name: Jennifer SANDERS [place]  Clinic Number: [id number]    Therapy Diagnosis:   Encounter Diagnosis   Name Primary?    Decreased ROM of lumbar spine Yes     Physician: Raymon Martinez NP    Visit Date: 7/12/2023    Physician: Raymon Martinez NP     Physician Orders: PT Eval and Treat   Medical Diagnosis from Referral: R53.1 (ICD-10-CM) - Weakness generalized  Evaluation Date: 5/30/2023  Authorization Period Expiration: 12/31/23  Plan of Care Expiration: 8/25/2023  Progress Note Due: 6/30/2023  Visit # / Visits authorized: 1/1, 6/20   FOTO: 4/5     FOTO Initial Evaluation: 38% limitation   FOTO 2nd F/U: NA  FOTO Discharge: NA     Precautions: Standard        PTA Visit #: 1/5     Time In: 1:50  Time Out: 230  Total Billable Time: 40 minutes    Subjective     Pt reports: she is doing well today with minimal pain.      She was compliant with home exercise program.  Response to previous treatment: no adverse affect  Functional change: none    Pain: 0/10  Location: right back      Objective      Objective Measures updated at progress report unless specified.     Treatment     Jennifer received the treatments listed below:      therapeutic exercises to develop strength, endurance, ROM, flexibility, posture, and core stabilization for 40 minutes including:    Nustep 8 min   DKTC   - 20x  LTR   - 20x  Piriformis stretch  - 30s x 3   Ball Rolls NP  - 15x   SL hip abd 3x10 B  Bridging BTB 3x10   Rowing BTB NP  - 2x10  SL clams GTB 3x10 B    Not Today   Open books 15x ea way  Prone on Elbows   - 3 minutes  Standing Back Bends  - 10x        manual therapy techniques: Soft tissue Mobilization were applied to the: for 00 minutes, including:      neuromuscular re-education activities to improve: Balance, Coordination, Kinesthetic, Sense, Proprioception, and Posture for 00 minutes. The following activities were included:    Pallof press GTB 2x10  TrA activation  SB 20x    therapeutic activities to improve functional performance for 0 minutes, including:  Standing hip abd/ext 2x10 ea.  Squating 2x10      Patient Education and Home Exercises       Education provided:   - ice for pain    Written Home Exercises Provided: Patient instructed to cont prior HEP. Exercises were reviewed and Jennifer was able to demonstrate them prior to the end of the session.  Jennifer demonstrated good  understanding of the education provided. See EMR under Patient Instructions for exercises provided during therapy sessions    Assessment     Patient tolerated session well without pain.       Jennifer Is progressing well towards her goals.   Pt prognosis is Good.     Pt will continue to benefit from skilled outpatient physical therapy to address the deficits listed in the problem list box on initial evaluation, provide pt/family education and to maximize pt's level of independence in the home and community environment.     Pt's spiritual, cultural and educational needs considered and pt agreeable to plan of care and goals.     Anticipated barriers to physical therapy: chronic symptoms    Goals:   Short Term Goals (3 Weeks):  1. Pt will be compliant with HEP to supplement PT in decreasing pain with functional mobility.  2. Pt will perform pallof press with good control to demonstrate improved core strength.  3.Pt will improve impaired LE MMTs by 1/2 score to improve strength for functional tasks.  Long Term Goals (8 Weeks):   1. Pt will improve FOTO score to </= 25% limited to decrease perceived limitation with maintaining/changing body position.   2. Pt will perform goblet squat with good control / form to reduce risk of further injury  3.. Pt will improve impaired LE MMTs by 1 score to improve strength for functional tasks.  4. Pt will report no pain during lumbar ROM to promote functional mobility.   Plan     Plan of care Certification: 5/30/2023 to 8/25/2023.     Outpatient Physical Therapy 2 times  weekly for 6 weeks to include the following interventions: Cervical/Lumbar Traction, Manual Therapy, Moist Heat/ Ice, Neuromuscular Re-ed, Patient Education, Self Care, Therapeutic Activities, Therapeutic Exercise, and FDN.     George Lawton, PTA

## 2023-07-18 ENCOUNTER — CLINICAL SUPPORT (OUTPATIENT)
Dept: REHABILITATION | Facility: HOSPITAL | Age: 58
End: 2023-07-18
Payer: COMMERCIAL

## 2023-07-18 DIAGNOSIS — G89.29 CHRONIC BILATERAL LOW BACK PAIN WITH RIGHT-SIDED SCIATICA: ICD-10-CM

## 2023-07-18 DIAGNOSIS — M53.86 DECREASED ROM OF LUMBAR SPINE: Primary | ICD-10-CM

## 2023-07-18 DIAGNOSIS — R53.1 WEAKNESS GENERALIZED: ICD-10-CM

## 2023-07-18 DIAGNOSIS — M54.41 CHRONIC BILATERAL LOW BACK PAIN WITH RIGHT-SIDED SCIATICA: ICD-10-CM

## 2023-07-18 PROCEDURE — 97110 THERAPEUTIC EXERCISES: CPT

## 2023-07-18 NOTE — PROGRESS NOTES
[facility]   Physical Therapy Treatment Note      Name: Jennifer SANDERS [place]  Clinic Number: [id number]    Therapy Diagnosis:   Encounter Diagnoses   Name Primary?    Decreased ROM of lumbar spine Yes    Weakness generalized     Chronic bilateral low back pain with right-sided sciatica        Physician: Raymon Martinez NP    Visit Date: 7/18/2023    Physician: Raymon Martinez NP     Physician Orders: PT Eval and Treat   Medical Diagnosis from Referral: R53.1 (ICD-10-CM) - Weakness generalized  Evaluation Date: 5/30/2023  Authorization Period Expiration: 12/31/23  Plan of Care Expiration: 8/25/2023  Progress Note Due: 6/30/2023  Visit # / Visits authorized: 1/1, 7/20   FOTO: 8     FOTO Initial Evaluation: 38% limitation   FOTO 2nd F/U: NA  FOTO Discharge: NA     Precautions: Standard        PTA Visit #: 0/5     Time In: 1125  Time Out: 1205  Total Billable Time: 40 minutes    Subjective     Pt reports: she has no pain today and feeling good      She was compliant with home exercise program.  Response to previous treatment: no adverse affect  Functional change: none    Pain: 0/10  Location: right back      Objective      Objective Measures updated at progress report unless specified.     Treatment     Jennifer received the treatments listed below:      therapeutic exercises to develop strength, endurance, ROM, flexibility, posture, and core stabilization for 40 minutes including:    Nustep 8 min   DKTC   - 20x  LTR   - 20x  Piriformis stretch  - 30s x 3   SL hip abd 3x10 B  Bridging BTB 3x10   Rowing BTB NP  - 2x10  SL clams BTB 3x10 B    Not Today   Open books 15x ea way  Prone on Elbows   - 3 minutes  Standing Back Bends  - 10x        manual therapy techniques: Soft tissue Mobilization were applied to the: for 00 minutes, including:      neuromuscular re-education activities to improve: Balance, Coordination, Kinesthetic, Sense, Proprioception, and Posture for 00 minutes. The following  activities were included:    Pallof press GTB 2x10  TrA activation SB 20x    therapeutic activities to improve functional performance for 0 minutes, including:  Standing hip abd/ext 2x10 ea.  Squating 2x10      Patient Education and Home Exercises       Education provided:   - ice for pain    Written Home Exercises Provided: Patient instructed to cont prior HEP. Exercises were reviewed and Jennifer was able to demonstrate them prior to the end of the session.  Jennifer demonstrated good  understanding of the education provided. See EMR under Patient Instructions for exercises provided during therapy sessions    Assessment     Patient continues to tolerate PT well with minimal to no pain. Patient had slight pain with bridging, but was able to minimize pain with less of a lift when bridging. Patient left without increased pain today. Patient is nearing discharge if continuing to feel good.       Jennifer Is progressing well towards her goals.   Pt prognosis is Good.     Pt will continue to benefit from skilled outpatient physical therapy to address the deficits listed in the problem list box on initial evaluation, provide pt/family education and to maximize pt's level of independence in the home and community environment.     Pt's spiritual, cultural and educational needs considered and pt agreeable to plan of care and goals.     Anticipated barriers to physical therapy: chronic symptoms    Goals:   Short Term Goals (3 Weeks):  1. Pt will be compliant with HEP to supplement PT in decreasing pain with functional mobility.  2. Pt will perform pallof press with good control to demonstrate improved core strength.  3.Pt will improve impaired LE MMTs by 1/2 score to improve strength for functional tasks.  Long Term Goals (8 Weeks):   1. Pt will improve FOTO score to </= 25% limited to decrease perceived limitation with maintaining/changing body position.   2. Pt will perform goblet squat with good control / form to reduce risk of  further injury  3.. Pt will improve impaired LE MMTs by 1 score to improve strength for functional tasks.  4. Pt will report no pain during lumbar ROM to promote functional mobility.   Plan     Plan of care Certification: 5/30/2023 to 8/25/2023.     Outpatient Physical Therapy 2 times weekly for 6 weeks to include the following interventions: Cervical/Lumbar Traction, Manual Therapy, Moist Heat/ Ice, Neuromuscular Re-ed, Patient Education, Self Care, Therapeutic Activities, Therapeutic Exercise, and FDN.     Mo Valladares, PT

## 2023-07-21 ENCOUNTER — CLINICAL SUPPORT (OUTPATIENT)
Dept: REHABILITATION | Facility: HOSPITAL | Age: 58
End: 2023-07-21
Payer: COMMERCIAL

## 2023-07-21 DIAGNOSIS — M53.86 DECREASED ROM OF LUMBAR SPINE: Primary | ICD-10-CM

## 2023-07-21 PROCEDURE — 97530 THERAPEUTIC ACTIVITIES: CPT

## 2023-07-21 PROCEDURE — 97110 THERAPEUTIC EXERCISES: CPT

## 2023-07-21 NOTE — PROGRESS NOTES
[facility]   Physical Therapy Treatment Note      Name: Jennifer SANDERS [employer]  Clinic Number: [id number]    Therapy Diagnosis:   Encounter Diagnosis   Name Primary?    Decreased ROM of lumbar spine Yes         Physician: Raymon Martinez NP    Visit Date: 7/21/2023    Physician: Raymon Martinez NP     Physician Orders: PT Eval and Treat   Medical Diagnosis from Referral: R53.1 (ICD-10-CM) - Weakness generalized  Evaluation Date: 5/30/2023  Authorization Period Expiration: 12/31/23  Plan of Care Expiration: 8/25/2023  Progress Note Due: 6/30/2023  Visit # / Visits authorized: 1/1, 8/20   FOTO: 9     FOTO Initial Evaluation: 38% limitation   FOTO 2nd F/U: NA  FOTO Discharge: NA     Precautions: Standard        PTA Visit #: 0/5     Time In: 1130  Time Out: 1219  Total Billable Time: 49 minutes    Subjective     Pt reports: she continues to feel good. Patient reports she wants to continue until next Wed and be discharged.       She was compliant with home exercise program.  Response to previous treatment: no adverse affect  Functional change: none    Pain: 0/10  Location: right back      Objective      Objective Measures updated at progress report unless specified.     Treatment     Jennifer received the treatments listed below:      therapeutic exercises to develop strength, endurance, ROM, flexibility, posture, and core stabilization for 41 minutes including:    Supine on heat     Nustep 8 min   DKTC   - 20x  LTR   - 20x  Piriformis stretch  - 30s x 3   Bridging BTB 3x10   Rowing BTB NP  SL clams BTB 3x10 B        Not Today   Open books 15x ea way  Prone on Elbows   - 3 minutes  Standing Back Bends  - 10x        manual therapy techniques: Soft tissue Mobilization were applied to the: for 00 minutes, including:      neuromuscular re-education activities to improve: Balance, Coordination, Kinesthetic, Sense, Proprioception, and Posture for 00 minutes. The following activities were  included:    Pallof press GTB 2x10  TrA activation SB 20x    therapeutic activities to improve functional performance for 8 minutes, including:    Standing hip abd/ext 3x10 ea.  - 4#  Squating 2x10 touch and go      Patient Education and Home Exercises       Education provided:   - ice for pain    Written Home Exercises Provided: Patient instructed to cont prior HEP. Exercises were reviewed and Jennifer was able to demonstrate them prior to the end of the session.  Jennifer demonstrated good  understanding of the education provided. See EMR under Patient Instructions for exercises provided during therapy sessions    Assessment     Patient continues to tolerate PT well with minimal to no pain. Patient had slight pain with bridging, but was able to minimize pain with less of a lift when bridging. Patient left without increased pain today. Patient is nearing discharge next week.       Jennifer Is progressing well towards her goals.   Pt prognosis is Good.     Pt will continue to benefit from skilled outpatient physical therapy to address the deficits listed in the problem list box on initial evaluation, provide pt/family education and to maximize pt's level of independence in the home and community environment.     Pt's spiritual, cultural and educational needs considered and pt agreeable to plan of care and goals.     Anticipated barriers to physical therapy: chronic symptoms    Goals:   Short Term Goals (3 Weeks):  1. Pt will be compliant with HEP to supplement PT in decreasing pain with functional mobility.  2. Pt will perform pallof press with good control to demonstrate improved core strength.  3.Pt will improve impaired LE MMTs by 1/2 score to improve strength for functional tasks.  Long Term Goals (8 Weeks):   1. Pt will improve FOTO score to </= 25% limited to decrease perceived limitation with maintaining/changing body position.   2. Pt will perform goblet squat with good control / form to reduce risk of further  injury  3.. Pt will improve impaired LE MMTs by 1 score to improve strength for functional tasks.  4. Pt will report no pain during lumbar ROM to promote functional mobility.   Plan     Plan of care Certification: 5/30/2023 to 8/25/2023.     Outpatient Physical Therapy 2 times weekly for 6 weeks to include the following interventions: Cervical/Lumbar Traction, Manual Therapy, Moist Heat/ Ice, Neuromuscular Re-ed, Patient Education, Self Care, Therapeutic Activities, Therapeutic Exercise, and FDN.     Mo Valladares, PT

## 2023-08-15 ENCOUNTER — OFFICE VISIT (OUTPATIENT)
Dept: PSYCHIATRY | Facility: CLINIC | Age: 58
End: 2023-08-15
Payer: COMMERCIAL

## 2023-08-15 DIAGNOSIS — F12.21 CANNABIS USE DISORDER, MODERATE, IN EARLY REMISSION: Primary | ICD-10-CM

## 2023-08-15 DIAGNOSIS — F10.20 ALCOHOL USE DISORDER, MODERATE, DEPENDENCE: ICD-10-CM

## 2023-08-15 PROCEDURE — 90853 PR GROUP PSYCHOTHERAPY: ICD-10-PCS | Mod: S$GLB,,, | Performed by: SOCIAL WORKER

## 2023-08-15 PROCEDURE — 4010F PR ACE/ARB THEARPY RXD/TAKEN: ICD-10-PCS | Mod: CPTII,S$GLB,, | Performed by: SOCIAL WORKER

## 2023-08-15 PROCEDURE — 90853 GROUP PSYCHOTHERAPY: CPT | Mod: S$GLB,,, | Performed by: SOCIAL WORKER

## 2023-08-15 PROCEDURE — 4010F ACE/ARB THERAPY RXD/TAKEN: CPT | Mod: CPTII,S$GLB,, | Performed by: SOCIAL WORKER

## 2023-08-15 NOTE — H&P
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 Quiroz Wiedemann  Department of Psychiatry  [facility]      DATA:     DIAGNOSTIC TESTING:     The chart was reviewed for recent diagnostic investigations, and pertinent results are noted below.      PERTINENT LABORATORY RESULTS:     Blood Counts, Electrolytes & Glucose: (i.e. WBC, ANC, Hemoglobin, Hematocrit, MCV, Platelets)  Lab Results   Component Value Date    WBC 5.74 09/27/2022    GRAN 3.2 09/27/2022    GRAN 55.6 09/27/2022    HGB 9.5 (L) 09/27/2022    HCT 28.6 (L) 09/27/2022    MCV 82 09/27/2022     (L) 09/27/2022     (L) 09/28/2022    K 4.6 09/28/2022    CALCIUM 8.9 09/28/2022    PHOS 5.3 (H) 09/28/2022    MG 2.5 09/27/2022    CO2 28 09/28/2022    ANIONGAP 11 09/28/2022    GLU 87 09/28/2022    HGBA1C 4.6 02/10/2021       Renal, Liver, Pancreas, Thyroid, Parathyroid, Prolactin, CPK, Lipids & Vitamin Levels: (i.e. Cr, BUN, Anion Gap, GFR, Urine Specific Gravity, Urine Protein, Microalburnin, AST, ALT, GGT, Alk Phos,Total Bili, Total Protein, Albumin, Ammonia, INR, Amylase, Lipase, TSH, Total T3, Total T4, Free T4 PTH, Prolactin, CPK, Cholesterol, Triglycerides, LDH, HDL, Vitamin B12, Folate, Vitamin D)  Lab Results   Component  Value Date    CREATININE 5.7 (H) 09/28/2022    BUN 32 (H) 09/28/2022    EGFRNORACEVR 8 (A) 09/28/2022    SPECGRAV 1.010 06/09/2020    PROTEINUA 2+ (A) 06/09/2020    AST 10 09/27/2022    ALT 6 (L) 09/27/2022    GGT 27 06/09/2014    ALKPHOS 157 (H) 09/27/2022    BILITOT 0.3 09/27/2022    LABPROT 10.3 11/13/2017    LABPROT 10.3 11/13/2017    ALBUMIN 3.4 (L) 09/27/2022    INR 0.9 11/13/2017    INR 0.9 11/13/2017    AMYLASE 84 12/19/2011    LIPASE 36 12/19/2011    TSH 1.389 09/27/2022    FREET4 1.00 12/26/2011    .1 (H) 04/08/2022    PROLACTIN 7.9 04/14/2010    CHOL 161 07/21/2021    TRIG 50 07/21/2021    LDLCALC 86.0 07/21/2021    HDL 65 07/21/2021    [id number] >2000 (H) 07/28/2020    FOLATE 18.8 11/04/2009    [id number] 34 06/19/2012       Infection Diseases, Pregnancy Screenings & Drug Levels: (i.e. Hepatitis Panel, HIV, Syphilis, Urine & Blood Pregnancy Screens, beta hCG, Lithium, Valproic Acid, Carbamazepine, Lamotrigine, Phenytoin, Phenobarbital, Clozapine, Norclozapine, Clozapine + Norclozapine)   Lab Results   Component Value Date    HEPAIGM Negative 12/14/2011    HEPBIGM Negative 06/09/2020    HEPBCAB Negative 07/26/2016    HEPCAB Negative 07/26/2016    HIV1X2 Negative 12/14/2011    [id number] Negative 07/26/2016    RPR Non-reactive 07/26/2016    PREGTESTUR Negative 04/11/2012       Addiction: (i.e. Urine Toxicology, Blood Alcohol, PETH, EtG, EtS, CDT, Buprenorphine, Norbuprenorphine)  Lab Results   Component Value Date    PCDSOALCOHOL < 10 12/14/2011    PCDSOBENZOD Negative 12/14/2011    BARBITURATES Negative 12/14/2011    PCDSCOMETHA Negative 12/14/2011    OPIATESCREEN Negative 12/14/2011    COCAINEMETAB Negative 12/14/2011    AMPHETAMINES Negative 12/14/2011    MARIJUANATHC Negative 12/14/2011    PCDSOPHENCYN Negative 12/14/2011    ALCOHOLMEDIC <10 07/02/2021    PETH Negative 06/11/2021    PETH Negative 05/24/2021    [id number] 44 01/05/2023    [id number] <10 08/30/2022       CARDIAC:     Results for  orders placed or performed during the hospital encounter of 09/27/22   EKG 12-lead    Collection Time: 09/27/22 11:41 AM    Narrative    Test Reason : R06.02,    Vent. Rate : 038 BPM     Atrial Rate : 022 BPM     P-R Int : 000 ms          QRS Dur : 084 ms      QT Int : 518 ms       P-R-T Axes : 000 044 102 degrees     QTc Int : 411 ms    Junctional bradycardia  Abnormal ECG  When compared with ECG of 08-APR-2022 11:01,  Previous ECG has undetermined rhythm, needs review  QT has shortened  Confirmed by Tiago Cruz MD ([id number]) on 9/27/2022 6:18:03 PM    Referred By: [email or web address]   SELF           Confirmed By:Tiago Cruz MD       NEUROLOGIC:     No results found. However, due to the size of the patient record, not all encounters were searched. Please check Results Review for a complete set of results.     I , Colton Smith MD, have reviewed the attached history and exam . Pt examined personally by me on 1-11-23  . The case discussed with the examining psychiatry resident physician . I agree the assessment and recommended treatment plan .      suicidal attempt

## 2023-08-16 ENCOUNTER — OFFICE VISIT (OUTPATIENT)
Dept: FAMILY MEDICINE | Facility: CLINIC | Age: 58
End: 2023-08-16
Attending: FAMILY MEDICINE
Payer: MEDICARE

## 2023-08-16 VITALS
OXYGEN SATURATION: 93 % | BODY MASS INDEX: 17.21 KG/M2 | SYSTOLIC BLOOD PRESSURE: 129 MMHG | HEIGHT: 68 IN | HEART RATE: 87 BPM | DIASTOLIC BLOOD PRESSURE: 70 MMHG | TEMPERATURE: 98 F | WEIGHT: 113.56 LBS

## 2023-08-16 DIAGNOSIS — J01.90 ACUTE BACTERIAL SINUSITIS: Primary | ICD-10-CM

## 2023-08-16 DIAGNOSIS — B96.89 ACUTE BACTERIAL SINUSITIS: Primary | ICD-10-CM

## 2023-08-16 PROCEDURE — 99999 PR PBB SHADOW E&M-EST. PATIENT-LVL III: CPT | Mod: PBBFAC,,, | Performed by: FAMILY MEDICINE

## 2023-08-16 PROCEDURE — 99214 OFFICE O/P EST MOD 30 MIN: CPT | Mod: S$PBB,,, | Performed by: FAMILY MEDICINE

## 2023-08-16 PROCEDURE — 99214 PR OFFICE/OUTPT VISIT, EST, LEVL IV, 30-39 MIN: ICD-10-PCS | Mod: S$PBB,,, | Performed by: FAMILY MEDICINE

## 2023-08-16 PROCEDURE — 99213 OFFICE O/P EST LOW 20 MIN: CPT | Mod: PBBFAC,PO | Performed by: FAMILY MEDICINE

## 2023-08-16 PROCEDURE — 99999 PR PBB SHADOW E&M-EST. PATIENT-LVL III: ICD-10-PCS | Mod: PBBFAC,,, | Performed by: FAMILY MEDICINE

## 2023-08-16 RX ORDER — AMOXICILLIN 875 MG/1
875 TABLET, FILM COATED ORAL EVERY 12 HOURS
Qty: 20 TABLET | Refills: 0 | Status: ON HOLD | OUTPATIENT
Start: 2023-08-16 | End: 2023-09-14 | Stop reason: ALTCHOICE

## 2023-08-17 NOTE — PROGRESS NOTES
Subjective     Patient ID: Jennifer Booth is a 58 y.o. female.    Chief Complaint: Sinusitis    59 yo F with PMH significant for Multiple Myeloma in remission, ESRD on HD, AoCKD, HTN, GERD, MDD/Anxiety,  Constipation, and tobacco use, presents today for sinus congestion and pressure. She had fever x 5 days and purulent sinus drainage. Tried all OTC meds and none working. Onset a month ago and not improving. Covid is tested negative. Details as follows -        Sinusitis  This is a recurrent problem. The current episode started more than 1 month ago. The problem has been rapidly worsening since onset. The maximum temperature recorded prior to her arrival was 100.4 - 100.9 F. The fever has been present for 5 days or more. Her pain is at a severity of 7/10. The pain is severe. Associated symptoms include congestion, coughing and sinus pressure. Pertinent negatives include no diaphoresis, headaches, shortness of breath or sore throat. Past treatments include oral decongestants, saline nose sprays, acetaminophen, nasal decongestants and sitting up. The treatment provided no relief.     Review of Systems   Constitutional: Negative.  Negative for activity change, diaphoresis and unexpected weight change.   HENT:  Positive for nasal congestion and sinus pressure/congestion. Negative for ear discharge, hearing loss, rhinorrhea, sore throat and voice change.    Eyes: Negative.  Negative for pain, discharge and visual disturbance.   Respiratory:  Positive for cough. Negative for chest tightness, shortness of breath and wheezing.    Cardiovascular: Negative.  Negative for chest pain.   Gastrointestinal: Negative.  Negative for abdominal distention, anal bleeding, constipation and nausea.   Endocrine: Negative.  Negative for cold intolerance, polydipsia and polyuria.   Genitourinary: Negative.  Negative for decreased urine volume, difficulty urinating, dysuria, frequency, menstrual problem and vaginal pain.   Musculoskeletal:  Negative.  Negative for arthralgias, gait problem and myalgias.   Integumentary:  Negative for color change, pallor and wound. Negative.   Allergic/Immunologic: Negative.  Negative for environmental allergies and immunocompromised state.   Neurological: Negative.  Negative for dizziness, tremors, seizures, speech difficulty and headaches.   Hematological: Negative.  Negative for adenopathy. Does not bruise/bleed easily.   Psychiatric/Behavioral: Negative.  Negative for agitation, confusion, decreased concentration, hallucinations, self-injury and suicidal ideas. The patient is not nervous/anxious.      Past Medical History:   Diagnosis Date    Addiction to drug     Alcohol abuse     Allergic drug reaction 2017    Anemia     Anxiety     Arm pain, left 2014    Breast cyst     Chronic renal failure 2014    CKD (chronic kidney disease) stage 5, GFR less than 15 ml/min     Depression     Dialysis patient     dialysis m-w-    Encounter for blood transfusion     GERD (gastroesophageal reflux disease)     Hx of psychiatric care     Hypertension     Mood swings     Multiple myeloma in remission 10/26/2012    per Hem/Oc note    Psychiatric exam requested by authority     Psychiatric problem     Renal hypertension     Status post dialysis 2012    Therapy     Thrombocytopenia 2012       Past Surgical History:   Procedure Laterality Date    AV FISTULA PLACEMENT Left     BREAST CYST ASPIRATION Left     BREAST CYST EXCISION Left     CERVICAL SPINE SURGERY Bilateral      SECTION      x1    COLONOSCOPY N/A 2022    Procedure: COLONOSCOPY;  Surgeon: Jordan Mcguire MD;  Location: [facility] ENDO;  Service: Endoscopy;  Laterality: N/A;    FISTULOGRAM N/A 2020    Procedure: Fistulogram;  Surgeon: Rahat Berger MD;  Location: [facility] CATH LAB/EP;  Service: Cardiology;  Laterality: N/A;    pd catheter      PERCUTANEOUS TRANSLUMINAL ANGIOPLASTY OF ARTERIOVENOUS FISTULA N/A 2020     Procedure: PTA, AV FISTULA;  Surgeon: Rahat Berger MD;  Location: [facility] CATH LAB/EP;  Service: Cardiology;  Laterality: N/A;    PERITONEAL CATHETER REMOVAL N/A 11/30/2018    Procedure: REMOVAL, CATHETER, DIALYSIS, PERITONEAL;  Surgeon: Mukesh Gonsales Jr., MD;  Location: [facility] OR;  Service: General;  Laterality: N/A;    RENAL BIOPSY  2016    THROMBECTOMY OF HEMODIALYSIS ACCESS SITE N/A 06/09/2020    Procedure: Thrombectomy, Hemodialysis Graft Or Fistula;  Surgeon: Rahat Berger MD;  Location: [facility] CATH LAB/EP;  Service: Cardiology;  Laterality: N/A;    VASCULAR SURGERY  08/2012    dialysis catheter to right subclavian       Family History   Problem Relation Age of Onset    Hypertension Mother     Heart failure Mother     Ovarian cancer Maternal Aunt     Diabetes Maternal Grandmother     Stroke Maternal Grandmother     Breast cancer Neg Hx     Colon cancer Neg Hx        Social History     Socioeconomic History    Marital status: Single    Number of children: 1   Occupational History    Occupation: Appeals    Tobacco Use    Smoking status: Every Day     Current packs/day: 1.00     Average packs/day: 1 pack/day for 40.6 years (40.6 ttl pk-yrs)     Types: Cigarettes     Start date: 1983    Smokeless tobacco: Never    Tobacco comments:     Pt. states recently cut down to 0.5 pk/day. Enrolled in the [employer] on 6/9/20 ([facility] Member ID # [id number]). She declines Ambulatory referral to Smoking Cessation program. Handout provided.   Substance and Sexual Activity    Alcohol use: Not Currently     Comment: occasional    Drug use: Not Currently     Types: Marijuana    Sexual activity: Not Currently     Partners: Male   Social History Narrative    Works FT; likes theatre. Lives alone.     Social Determinants of Health     Financial Resource Strain: High Risk (9/28/2022)    Overall Financial Resource Strain (CARDIA)     Difficulty of Paying Living Expenses: Hard   Food Insecurity: No Food Insecurity (9/28/2022)    Hunger  Vital Sign     Worried About Running Out of Food in the Last Year: Never true     Ran Out of Food in the Last Year: Never true   Transportation Needs: No Transportation Needs (9/28/2022)    PRAPARE - Transportation     Lack of Transportation (Medical): No     Lack of Transportation (Non-Medical): No   Physical Activity: Inactive (9/28/2022)    Exercise Vital Sign     Days of Exercise per Week: 0 days     Minutes of Exercise per Session: 0 min   Stress: No Stress Concern Present (9/28/2022)    [employer] - Occupational Stress Questionnaire     Feeling of Stress : Not at all   Housing Stability: Unknown (9/28/2022)    Housing Stability Vital Sign     Unable to Pay for Housing in the Last Year: No     Unstable Housing in the Last Year: No       Current Outpatient Medications   Medication Sig Dispense Refill    acetaminophen (TYLENOL) 325 MG tablet Take 650 mg by mouth every 4 (four) hours as needed.      amoxicillin (AMOXIL) 875 MG tablet Take 1 tablet (875 mg total) by mouth every 12 (twelve) hours. 20 tablet 0    aspirin 81 MG Chew Take 81 mg by mouth once daily.      b complex vitamins capsule Take 1 capsule by mouth.      B COMPLEX W-C NO.20/FOLIC ACID (VIRT-CAPS ORAL) Take 1 capsule by mouth once daily.       buPROPion (WELLBUTRIN XL) 150 MG TB24 tablet Take 1 tablet (150 mg total) by mouth once daily. 90 tablet 1    calcitRIOL (ROCALTROL) 0.5 MCG Cap Take 1.5 mcg by mouth.      carvediloL (COREG) 25 MG tablet Take 25 mg by mouth 2 (two) times daily.      cinacalcet (SENSIPAR) 30 MG Tab Take 30 mg by mouth.      clopidogreL (PLAVIX) 75 mg tablet Take 75 mg by mouth once daily.      EScitalopram oxalate (LEXAPRO) 20 MG tablet TAKE 1 TABLET(20 MG) BY MOUTH EVERY DAY (Patient taking differently: Take 20 mg by mouth once daily.) 90 tablet 0    FERREX 150 FORTE PLUS 150-60-25-1 mg-mg-mcg-mg Cap Take 1 capsule by mouth once daily.      fexofenadine (ALLEGRA) 180 MG tablet Take 180 mg by  mouth once daily.      fluticasone propionate (FLONASE) 50 mcg/actuation nasal spray 1 spray by Each Nostril route daily as needed.      guaiFENesin (MUCINEX) 600 mg 12 hr tablet Take 1,200 mg by mouth 2 (two) times daily.      hydrOXYzine (ATARAX) 50 MG tablet Take 1 tablet (50 mg total) by mouth 2 (two) times daily as needed for Anxiety. 60 tablet 3    LIDOcaine (LIDODERM) 5 % Place 2 patches onto the skin daily as needed.      losartan (COZAAR) 100 MG tablet Take 100 mg by mouth once daily.      melatonin 5 mg Cap Take 5 mg by mouth nightly.      mirtazapine (REMERON) 15 MG tablet Take 1 tablet (15 mg total) by mouth every evening. 90 tablet 1    montelukast (SINGULAIR) 10 mg tablet TAKE 1 TABLET(10 MG) BY MOUTH EVERY EVENING (Patient taking differently: Take 10 mg by mouth every evening.) 90 tablet 3    naltrexone (DEPADE) 50 mg tablet Start with 1/2 tab (25 mg) by mouth daily, may increase to 1 tab (50 mg) daily if able. 90 tablet 1    nicotine (NICODERM CQ) 14 mg/24 hr Place 1 patch onto the skin every 24 hours.      NIFEdipine (PROCARDIA-XL) 60 MG (OSM) 24 hr tablet Take 60 mg by mouth once daily.      polyethylene glycol (GLYCOLAX) 17 gram/dose powder Take 17 g by mouth.      sevelamer carbonate (RENVELA) 800 mg Tab Take 800 mg by mouth 3 (three) times daily with meals.      sucroferric oxyhydroxide (VELPHORO) 500 mg Chew Take 500 mg by mouth 3 (three) times daily.       No current facility-administered medications for this visit.     Facility-Administered Medications Ordered in Other Visits   Medication Dose Route Frequency Provider Last Rate Last Admin    0.9%  NaCl infusion   Intravenous Continuous Jordan Mcguire MD 20 mL/hr at 07/18/22 0943 New Bag at 07/18/22 0943    0.9%  NaCl infusion   Intravenous Continuous Jordan Mcguire MD   Stopped at 08/18/22 1042    sodium chloride 0.9% flush 10 mL  10 mL Intravenous PRN Jordan Mcguire MD        sodium chloride 0.9% flush 10 mL  10 mL Intravenous  Jordan Giang MD           Review of patient's allergies indicates:   Allergen Reactions    Doxycycline Swelling, Rash and Hives    Gentamicin Anaphylaxis    Vancomycin analogues Anaphylaxis          Objective   Vitals:    08/16/23 1041   BP: 129/70   Pulse: 87   Temp: 98 °F (36.7 °C)       Physical Exam  Constitutional:       General: She is not in acute distress.     Appearance: She is well-developed. She is not diaphoretic.   HENT:      Head: Normocephalic and atraumatic.      Nose: Mucosal edema present.      Right Sinus: Maxillary sinus tenderness present.      Left Sinus: Maxillary sinus tenderness present.   Eyes:      Pupils: Pupils are equal, round, and reactive to light.   Cardiovascular:      Rate and Rhythm: Normal rate and regular rhythm.      Heart sounds: Normal heart sounds. No murmur heard.     No friction rub. No gallop.   Pulmonary:      Effort: Pulmonary effort is normal. No respiratory distress.      Breath sounds: Normal breath sounds. No wheezing or rales.   Musculoskeletal:      Cervical back: Normal range of motion and neck supple.   Skin:     General: Skin is warm and dry.   Neurological:      Mental Status: She is alert and oriented to person, place, and time.            Assessment and Plan     1. Acute bacterial sinusitis  -     amoxicillin (AMOXIL) 875 MG tablet; Take 1 tablet (875 mg total) by mouth every 12 (twelve) hours.  Dispense: 20 tablet; Refill: 0        Jennifer was seen today for sinusitis.    Diagnoses and all orders for this visit:    Acute bacterial sinusitis  -     amoxicillin (AMOXIL) 875 MG tablet; Take 1 tablet (875 mg total) by mouth every 12 (twelve) hours.      Advised saline irrigation, warm humidifier, steam inhalation, vicks vaporub, claritin D for congestion    Amoxil x 10 days    ER/ENT precaution given       Spent adequate time in obtaining history and explaining differentials.    30 minutes spent during this visit of which greater than 50% devoted to  face-face counseling and coordination of care regarding diagnosis and management plan        Follow up in about 4 weeks (around 9/13/2023), or if symptoms worsen or fail to improve.

## 2023-08-18 ENCOUNTER — LAB VISIT (OUTPATIENT)
Dept: LAB | Facility: HOSPITAL | Age: 58
End: 2023-08-18
Payer: MEDICARE

## 2023-08-18 DIAGNOSIS — N18.6 ANEMIA IN CHRONIC KIDNEY DISEASE, ON CHRONIC DIALYSIS: ICD-10-CM

## 2023-08-18 DIAGNOSIS — D63.1 ANEMIA IN CHRONIC KIDNEY DISEASE, ON CHRONIC DIALYSIS: ICD-10-CM

## 2023-08-18 DIAGNOSIS — C90.00 MULTIPLE MYELOMA, REMISSION STATUS UNSPECIFIED: ICD-10-CM

## 2023-08-18 DIAGNOSIS — Z99.2 ANEMIA IN CHRONIC KIDNEY DISEASE, ON CHRONIC DIALYSIS: ICD-10-CM

## 2023-08-18 LAB
ALBUMIN SERPL BCP-MCNC: 3.4 G/DL (ref 3.5–5.2)
ALP SERPL-CCNC: 101 U/L (ref 55–135)
ALT SERPL W/O P-5'-P-CCNC: 8 U/L (ref 10–44)
ANION GAP SERPL CALC-SCNC: 14 MMOL/L (ref 8–16)
AST SERPL-CCNC: 16 U/L (ref 10–40)
BASOPHILS # BLD AUTO: 0.05 K/UL (ref 0–0.2)
BASOPHILS NFR BLD: 0.7 % (ref 0–1.9)
BILIRUB SERPL-MCNC: 0.5 MG/DL (ref 0.1–1)
BUN SERPL-MCNC: 17 MG/DL (ref 6–20)
CALCIUM SERPL-MCNC: 10.8 MG/DL (ref 8.7–10.5)
CHLORIDE SERPL-SCNC: 95 MMOL/L (ref 95–110)
CO2 SERPL-SCNC: 29 MMOL/L (ref 23–29)
CREAT SERPL-MCNC: 5.8 MG/DL (ref 0.5–1.4)
DIFFERENTIAL METHOD: ABNORMAL
EOSINOPHIL # BLD AUTO: 0.1 K/UL (ref 0–0.5)
EOSINOPHIL NFR BLD: 1 % (ref 0–8)
ERYTHROCYTE [DISTWIDTH] IN BLOOD BY AUTOMATED COUNT: 22 % (ref 11.5–14.5)
EST. GFR  (NO RACE VARIABLE): 8 ML/MIN/1.73 M^2
FERRITIN SERPL-MCNC: 2310 NG/ML (ref 20–300)
GLUCOSE SERPL-MCNC: 74 MG/DL (ref 70–110)
HCT VFR BLD AUTO: 28.8 % (ref 37–48.5)
HGB BLD-MCNC: 9.1 G/DL (ref 12–16)
IGA SERPL-MCNC: 296 MG/DL (ref 40–350)
IGG SERPL-MCNC: 849 MG/DL (ref 650–1600)
IGM SERPL-MCNC: 204 MG/DL (ref 50–300)
IMM GRANULOCYTES # BLD AUTO: 0.03 K/UL (ref 0–0.04)
IMM GRANULOCYTES NFR BLD AUTO: 0.4 % (ref 0–0.5)
IRON SERPL-MCNC: 61 UG/DL (ref 30–160)
LYMPHOCYTES # BLD AUTO: 1.2 K/UL (ref 1–4.8)
LYMPHOCYTES NFR BLD: 17.1 % (ref 18–48)
MCH RBC QN AUTO: 26.6 PG (ref 27–31)
MCHC RBC AUTO-ENTMCNC: 31.6 G/DL (ref 32–36)
MCV RBC AUTO: 84 FL (ref 82–98)
MONOCYTES # BLD AUTO: 0.5 K/UL (ref 0.3–1)
MONOCYTES NFR BLD: 7.1 % (ref 4–15)
NEUTROPHILS # BLD AUTO: 5.2 K/UL (ref 1.8–7.7)
NEUTROPHILS NFR BLD: 73.7 % (ref 38–73)
NRBC BLD-RTO: 0 /100 WBC
PLATELET # BLD AUTO: 192 K/UL (ref 150–450)
PMV BLD AUTO: 9.1 FL (ref 9.2–12.9)
POTASSIUM SERPL-SCNC: 3 MMOL/L (ref 3.5–5.1)
PROT SERPL-MCNC: 7.1 G/DL (ref 6–8.4)
RBC # BLD AUTO: 3.42 M/UL (ref 4–5.4)
SATURATED IRON: 32 % (ref 20–50)
SODIUM SERPL-SCNC: 138 MMOL/L (ref 136–145)
TOTAL IRON BINDING CAPACITY: 189 UG/DL (ref 250–450)
TRANSFERRIN SERPL-MCNC: 128 MG/DL (ref 200–375)
WBC # BLD AUTO: 7.06 K/UL (ref 3.9–12.7)

## 2023-08-18 PROCEDURE — 86334 IMMUNOFIX E-PHORESIS SERUM: CPT | Performed by: INTERNAL MEDICINE

## 2023-08-18 PROCEDURE — 84165 PROTEIN E-PHORESIS SERUM: CPT | Performed by: INTERNAL MEDICINE

## 2023-08-18 PROCEDURE — 36415 COLL VENOUS BLD VENIPUNCTURE: CPT | Performed by: INTERNAL MEDICINE

## 2023-08-18 PROCEDURE — 83521 IG LIGHT CHAINS FREE EACH: CPT | Mod: 59 | Performed by: INTERNAL MEDICINE

## 2023-08-18 PROCEDURE — 83540 ASSAY OF IRON: CPT | Performed by: INTERNAL MEDICINE

## 2023-08-18 PROCEDURE — 85025 COMPLETE CBC W/AUTO DIFF WBC: CPT | Performed by: INTERNAL MEDICINE

## 2023-08-18 PROCEDURE — 84466 ASSAY OF TRANSFERRIN: CPT | Performed by: INTERNAL MEDICINE

## 2023-08-18 PROCEDURE — 84165 PROTEIN E-PHORESIS SERUM: CPT | Mod: 26,,, | Performed by: PATHOLOGY

## 2023-08-18 PROCEDURE — 86334 PATHOLOGIST INTERPRETATION IFE: ICD-10-PCS | Mod: 26,,, | Performed by: PATHOLOGY

## 2023-08-18 PROCEDURE — 82784 ASSAY IGA/IGD/IGG/IGM EACH: CPT | Mod: 59 | Performed by: INTERNAL MEDICINE

## 2023-08-18 PROCEDURE — 82728 ASSAY OF FERRITIN: CPT | Performed by: INTERNAL MEDICINE

## 2023-08-18 PROCEDURE — 86334 IMMUNOFIX E-PHORESIS SERUM: CPT | Mod: 26,,, | Performed by: PATHOLOGY

## 2023-08-18 PROCEDURE — 80053 COMPREHEN METABOLIC PANEL: CPT | Performed by: INTERNAL MEDICINE

## 2023-08-18 PROCEDURE — 84165 PATHOLOGIST INTERPRETATION SPE: ICD-10-PCS | Mod: 26,,, | Performed by: PATHOLOGY

## 2023-08-18 NOTE — PROGRESS NOTES
Group Psychotherapy    Site: [facility]    Clinical status of patient: Outpatient    8/15/2023    Length of service:[id number]    Referred by: Addictive Behavior Unit     Number of patients in attendance: 5    Target symptoms: alcohol abuse, substance abuse    Patient's response to intervention:  The patient's response to intervention is active listening, self-disclosure.    Progress toward goals and other mental status changes:  The patient's progress toward goals is good.    Interval history: First group session. She introduced herself appropriately to the group. Discussed how she is currently working to incorporate more recovery and AA. Actively listening and plans to return next week.     Diagnosis: Alcohol use disorder, in early remission; Cannabis Use Disorder, in early remission     Plan: group psychotherapy    Return to clinic: 1 week

## 2023-08-21 LAB
ALBUMIN SERPL ELPH-MCNC: 3.62 G/DL (ref 3.35–5.55)
ALPHA1 GLOB SERPL ELPH-MCNC: 0.49 G/DL (ref 0.17–0.41)
ALPHA2 GLOB SERPL ELPH-MCNC: 0.82 G/DL (ref 0.43–0.99)
B-GLOBULIN SERPL ELPH-MCNC: 0.7 G/DL (ref 0.5–1.1)
GAMMA GLOB SERPL ELPH-MCNC: 0.87 G/DL (ref 0.67–1.58)
INTERPRETATION SERPL IFE-IMP: NORMAL
KAPPA LC SER QL IA: 19.42 MG/DL (ref 0.33–1.94)
KAPPA LC/LAMBDA SER IA: 0.94 (ref 0.26–1.65)
LAMBDA LC SER QL IA: 20.65 MG/DL (ref 0.57–2.63)
PATHOLOGIST INTERPRETATION IFE: NORMAL
PROT SERPL-MCNC: 6.5 G/DL (ref 6–8.4)

## 2023-08-22 ENCOUNTER — OFFICE VISIT (OUTPATIENT)
Dept: PSYCHIATRY | Facility: CLINIC | Age: 58
End: 2023-08-22
Payer: COMMERCIAL

## 2023-08-22 DIAGNOSIS — F12.21 CANNABIS USE DISORDER, MODERATE, IN EARLY REMISSION: Primary | ICD-10-CM

## 2023-08-22 DIAGNOSIS — F10.20 ALCOHOL USE DISORDER, MODERATE, DEPENDENCE: ICD-10-CM

## 2023-08-22 LAB — PATHOLOGIST INTERPRETATION SPE: NORMAL

## 2023-08-22 PROCEDURE — 4010F PR ACE/ARB THEARPY RXD/TAKEN: ICD-10-PCS | Mod: CPTII,S$GLB,, | Performed by: SOCIAL WORKER

## 2023-08-22 PROCEDURE — 90853 GROUP PSYCHOTHERAPY: CPT | Mod: S$GLB,,, | Performed by: SOCIAL WORKER

## 2023-08-22 PROCEDURE — 4010F ACE/ARB THERAPY RXD/TAKEN: CPT | Mod: CPTII,S$GLB,, | Performed by: SOCIAL WORKER

## 2023-08-22 PROCEDURE — 90853 PR GROUP PSYCHOTHERAPY: ICD-10-PCS | Mod: S$GLB,,, | Performed by: SOCIAL WORKER

## 2023-08-23 ENCOUNTER — OFFICE VISIT (OUTPATIENT)
Dept: HEMATOLOGY/ONCOLOGY | Facility: CLINIC | Age: 58
End: 2023-08-23
Payer: MEDICARE

## 2023-08-23 ENCOUNTER — LAB VISIT (OUTPATIENT)
Dept: LAB | Facility: HOSPITAL | Age: 58
End: 2023-08-23
Attending: INTERNAL MEDICINE
Payer: COMMERCIAL

## 2023-08-23 VITALS
WEIGHT: 115.06 LBS | HEART RATE: 81 BPM | OXYGEN SATURATION: 100 % | SYSTOLIC BLOOD PRESSURE: 180 MMHG | DIASTOLIC BLOOD PRESSURE: 106 MMHG | HEIGHT: 68 IN | BODY MASS INDEX: 17.44 KG/M2

## 2023-08-23 DIAGNOSIS — R63.0 ANOREXIA: ICD-10-CM

## 2023-08-23 DIAGNOSIS — C90.00 MULTIPLE MYELOMA, REMISSION STATUS UNSPECIFIED: Primary | ICD-10-CM

## 2023-08-23 DIAGNOSIS — D63.1 ANEMIA IN CHRONIC KIDNEY DISEASE, ON CHRONIC DIALYSIS: ICD-10-CM

## 2023-08-23 DIAGNOSIS — E04.2 MULTIPLE THYROID NODULES: ICD-10-CM

## 2023-08-23 DIAGNOSIS — Z99.2 ANEMIA IN CHRONIC KIDNEY DISEASE, ON CHRONIC DIALYSIS: ICD-10-CM

## 2023-08-23 DIAGNOSIS — N18.6 ANEMIA IN CHRONIC KIDNEY DISEASE, ON CHRONIC DIALYSIS: ICD-10-CM

## 2023-08-23 DIAGNOSIS — Z99.2 ESRD (END STAGE RENAL DISEASE) ON DIALYSIS: ICD-10-CM

## 2023-08-23 DIAGNOSIS — N18.6 ESRD (END STAGE RENAL DISEASE) ON DIALYSIS: ICD-10-CM

## 2023-08-23 LAB
T4 FREE SERPL-MCNC: 0.76 NG/DL (ref 0.71–1.51)
TSH SERPL DL<=0.005 MIU/L-ACNC: 1.34 UIU/ML (ref 0.4–4)

## 2023-08-23 PROCEDURE — 99214 PR OFFICE/OUTPT VISIT, EST, LEVL IV, 30-39 MIN: ICD-10-PCS | Mod: S$PBB,,, | Performed by: INTERNAL MEDICINE

## 2023-08-23 PROCEDURE — 84439 ASSAY OF FREE THYROXINE: CPT | Performed by: INTERNAL MEDICINE

## 2023-08-23 PROCEDURE — 99999 PR PBB SHADOW E&M-EST. PATIENT-LVL III: ICD-10-PCS | Mod: PBBFAC,,, | Performed by: INTERNAL MEDICINE

## 2023-08-23 PROCEDURE — 99213 OFFICE O/P EST LOW 20 MIN: CPT | Mod: PBBFAC | Performed by: INTERNAL MEDICINE

## 2023-08-23 PROCEDURE — 36415 COLL VENOUS BLD VENIPUNCTURE: CPT | Performed by: INTERNAL MEDICINE

## 2023-08-23 PROCEDURE — 99214 OFFICE O/P EST MOD 30 MIN: CPT | Mod: S$PBB,,, | Performed by: INTERNAL MEDICINE

## 2023-08-23 PROCEDURE — 84443 ASSAY THYROID STIM HORMONE: CPT | Performed by: INTERNAL MEDICINE

## 2023-08-23 PROCEDURE — 99999 PR PBB SHADOW E&M-EST. PATIENT-LVL III: CPT | Mod: PBBFAC,,, | Performed by: INTERNAL MEDICINE

## 2023-08-23 RX ORDER — CYPROHEPTADINE HYDROCHLORIDE 4 MG/1
4 TABLET ORAL 3 TIMES DAILY PRN
Qty: 90 TABLET | Refills: 0 | Status: SHIPPED | OUTPATIENT
Start: 2023-08-23

## 2023-08-23 NOTE — Clinical Note
F/u   6mo with cbc,cmp, SPEP, immunofixation electrophores, free light chains ,B-2 microglobulin l, Ferritin TIBC  2 weeks PRIOR to f/u  US thyroid \_please schedule  ( ordered 2/023)
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
## 2023-08-25 NOTE — PROGRESS NOTES
Group Psychotherapy    Site: [facility]    Clinical status of patient: Outpatient    8/22/2023    Length of service:[id number]    Referred by: Addictive Behavior Unit     Number of patients in attendance: 6    Target symptoms: alcohol abuse, substance abuse    Patient's response to intervention:  The patient's response to intervention is active listening, self-disclosure.    Progress toward goals and other mental status changes:  The patient's progress toward goals is good.    Interval history: Excited to share with group that she has enrolled in college courses- currently in general studies, interested in theology. Continues to work with her providers on medical health concerns. Open to support and feedback from the group.     Diagnosis: Alcohol use disorder, in early remission; Cannabis Use Disorder, in early remission     Plan: group psychotherapy    Return to clinic: 1 week

## 2023-08-28 ENCOUNTER — HOSPITAL ENCOUNTER (OUTPATIENT)
Dept: RADIOLOGY | Facility: HOSPITAL | Age: 58
Discharge: HOME | End: 2023-08-28
Attending: INTERNAL MEDICINE
Payer: MEDICARE

## 2023-08-28 DIAGNOSIS — R94.8 ABNORMAL POSITRON EMISSION TOMOGRAPHY (PET) SCAN: ICD-10-CM

## 2023-08-28 PROCEDURE — 76536 US EXAM OF HEAD AND NECK: CPT | Mod: TC

## 2023-08-28 PROCEDURE — 76536 US EXAM OF HEAD AND NECK: CPT | Mod: 26,,, | Performed by: RADIOLOGY

## 2023-08-28 PROCEDURE — 76536 US SOFT TISSUE HEAD NECK THYROID: ICD-10-PCS | Mod: 26,,, | Performed by: RADIOLOGY

## 2023-08-29 ENCOUNTER — CLINICAL SUPPORT (OUTPATIENT)
Dept: PSYCHIATRY | Facility: CLINIC | Age: 58
End: 2023-08-29
Payer: COMMERCIAL

## 2023-08-29 DIAGNOSIS — F12.21 CANNABIS USE DISORDER, MODERATE, IN EARLY REMISSION: Primary | ICD-10-CM

## 2023-08-29 DIAGNOSIS — F10.20 ALCOHOL USE DISORDER, MODERATE, DEPENDENCE: ICD-10-CM

## 2023-08-29 PROCEDURE — 90853 PR GROUP PSYCHOTHERAPY: ICD-10-PCS | Mod: S$GLB,,, | Performed by: SOCIAL WORKER

## 2023-08-29 PROCEDURE — 90853 GROUP PSYCHOTHERAPY: CPT | Mod: S$GLB,,, | Performed by: SOCIAL WORKER

## 2023-09-01 NOTE — PROGRESS NOTES
Group Psychotherapy    Site: [facility]    Clinical status of patient: Outpatient    8/29/2023    Length of service:[id number]    Referred by: Addictive Behavior Unit     Number of patients in attendance: 5    Target symptoms: alcohol abuse, substance abuse    Patient's response to intervention:  The patient's response to intervention is active listening, self-disclosure.    Progress toward goals and other mental status changes:  The patient's progress toward goals is good.    Interval history: Pt reports feeling fatigued today, working on medical concerns. Pt spending time with grandchild and enjoying her college courses. Open to support and feedback from the group.     Diagnosis: Alcohol use disorder, in early remission; Cannabis Use Disorder, in early remission     Plan: group psychotherapy    Return to clinic: 1 week

## 2023-09-12 ENCOUNTER — CLINICAL SUPPORT (OUTPATIENT)
Dept: PSYCHIATRY | Facility: CLINIC | Age: 58
End: 2023-09-12
Payer: COMMERCIAL

## 2023-09-12 DIAGNOSIS — F10.20 ALCOHOL USE DISORDER, MODERATE, DEPENDENCE: ICD-10-CM

## 2023-09-12 DIAGNOSIS — F12.21 CANNABIS USE DISORDER, MODERATE, IN EARLY REMISSION: Primary | ICD-10-CM

## 2023-09-12 PROCEDURE — 90853 PR GROUP PSYCHOTHERAPY: ICD-10-PCS | Mod: S$GLB,,, | Performed by: SOCIAL WORKER

## 2023-09-12 PROCEDURE — 90853 GROUP PSYCHOTHERAPY: CPT | Mod: S$GLB,,, | Performed by: SOCIAL WORKER

## 2023-09-14 ENCOUNTER — HOSPITAL ENCOUNTER (INPATIENT)
Facility: HOSPITAL | Age: 58
LOS: 3 days | Discharge: HOME | DRG: 189 | End: 2023-09-17
Attending: EMERGENCY MEDICINE | Admitting: INTERNAL MEDICINE
Payer: MEDICARE

## 2023-09-14 DIAGNOSIS — I16.1 HYPERTENSIVE EMERGENCY: ICD-10-CM

## 2023-09-14 DIAGNOSIS — J81.0 FLASH PULMONARY EDEMA: ICD-10-CM

## 2023-09-14 DIAGNOSIS — J44.1 COPD EXACERBATION: ICD-10-CM

## 2023-09-14 DIAGNOSIS — J44.9 CHRONIC OBSTRUCTIVE PULMONARY DISEASE, UNSPECIFIED COPD TYPE: Primary | ICD-10-CM

## 2023-09-14 DIAGNOSIS — R06.02 SHORTNESS OF BREATH: ICD-10-CM

## 2023-09-14 DIAGNOSIS — E87.70 HYPERVOLEMIA, UNSPECIFIED HYPERVOLEMIA TYPE: ICD-10-CM

## 2023-09-14 DIAGNOSIS — J96.01 ACUTE RESPIRATORY FAILURE WITH HYPOXIA: ICD-10-CM

## 2023-09-14 DIAGNOSIS — I50.40 COMBINED SYSTOLIC AND DIASTOLIC CONGESTIVE HEART FAILURE: ICD-10-CM

## 2023-09-14 PROBLEM — S92.414D CLOSED NONDISPLACED FRACTURE OF PROXIMAL PHALANX OF RIGHT GREAT TOE WITH ROUTINE HEALING: Status: ACTIVE | Noted: 2023-09-14

## 2023-09-14 LAB
ALBUMIN SERPL BCP-MCNC: 3 G/DL (ref 3.5–5.2)
ALBUMIN SERPL BCP-MCNC: 3.5 G/DL (ref 3.5–5.2)
ALLENS TEST: ABNORMAL
ALLENS TEST: ABNORMAL
ALP SERPL-CCNC: 107 U/L (ref 55–135)
ALP SERPL-CCNC: 142 U/L (ref 55–135)
ALT SERPL W/O P-5'-P-CCNC: 10 U/L (ref 10–44)
ALT SERPL W/O P-5'-P-CCNC: 10 U/L (ref 10–44)
ANION GAP SERPL CALC-SCNC: 22 MMOL/L (ref 8–16)
ANION GAP SERPL CALC-SCNC: 24 MMOL/L (ref 8–16)
AST SERPL-CCNC: 16 U/L (ref 10–40)
AST SERPL-CCNC: 27 U/L (ref 10–40)
BASOPHILS # BLD AUTO: 0.03 K/UL (ref 0–0.2)
BASOPHILS # BLD AUTO: 0.09 K/UL (ref 0–0.2)
BASOPHILS NFR BLD: 0.4 % (ref 0–1.9)
BASOPHILS NFR BLD: 0.7 % (ref 0–1.9)
BILIRUB SERPL-MCNC: 0.5 MG/DL (ref 0.1–1)
BILIRUB SERPL-MCNC: 0.6 MG/DL (ref 0.1–1)
BNP SERPL-MCNC: 3348 PG/ML (ref 0–99)
BUN SERPL-MCNC: 40 MG/DL (ref 6–20)
BUN SERPL-MCNC: 40 MG/DL (ref 6–30)
BUN SERPL-MCNC: 41 MG/DL (ref 6–20)
CALCIUM SERPL-MCNC: 9.1 MG/DL (ref 8.7–10.5)
CALCIUM SERPL-MCNC: 9.8 MG/DL (ref 8.7–10.5)
CHLORIDE SERPL-SCNC: 100 MMOL/L (ref 95–110)
CHLORIDE SERPL-SCNC: 98 MMOL/L (ref 95–110)
CHLORIDE SERPL-SCNC: 99 MMOL/L (ref 95–110)
CO2 SERPL-SCNC: 21 MMOL/L (ref 23–29)
CO2 SERPL-SCNC: 21 MMOL/L (ref 23–29)
CREAT SERPL-MCNC: 7.9 MG/DL (ref 0.5–1.4)
CREAT SERPL-MCNC: 8.3 MG/DL (ref 0.5–1.4)
CREAT SERPL-MCNC: 8.8 MG/DL (ref 0.5–1.4)
DELSYS: ABNORMAL
DIFFERENTIAL METHOD: ABNORMAL
DIFFERENTIAL METHOD: ABNORMAL
EOSINOPHIL # BLD AUTO: 0.1 K/UL (ref 0–0.5)
EOSINOPHIL # BLD AUTO: 0.2 K/UL (ref 0–0.5)
EOSINOPHIL NFR BLD: 0.7 % (ref 0–8)
EOSINOPHIL NFR BLD: 1.5 % (ref 0–8)
EP: 10
ERYTHROCYTE [DISTWIDTH] IN BLOOD BY AUTOMATED COUNT: 20.2 % (ref 11.5–14.5)
ERYTHROCYTE [DISTWIDTH] IN BLOOD BY AUTOMATED COUNT: 20.9 % (ref 11.5–14.5)
EST. GFR  (NO RACE VARIABLE): 4.8 ML/MIN/1.73 M^2
EST. GFR  (NO RACE VARIABLE): 5.4 ML/MIN/1.73 M^2
FIO2: 60
GLUCOSE SERPL-MCNC: 104 MG/DL (ref 70–110)
GLUCOSE SERPL-MCNC: 150 MG/DL (ref 70–110)
GLUCOSE SERPL-MCNC: 151 MG/DL (ref 70–110)
HBV SURFACE AG SERPL QL IA: NORMAL
HCO3 UR-SCNC: 29.9 MMOL/L (ref 24–28)
HCO3 UR-SCNC: 32.2 MMOL/L (ref 24–28)
HCT VFR BLD AUTO: 36 % (ref 37–48.5)
HCT VFR BLD AUTO: 40.6 % (ref 37–48.5)
HCT VFR BLD CALC: 40 %PCV (ref 36–54)
HCT VFR BLD CALC: 44 %PCV (ref 36–54)
HCV AB SERPL QL IA: NORMAL
HGB BLD-MCNC: 10.9 G/DL (ref 12–16)
HGB BLD-MCNC: 12.2 G/DL (ref 12–16)
IMM GRANULOCYTES # BLD AUTO: 0.02 K/UL (ref 0–0.04)
IMM GRANULOCYTES # BLD AUTO: 0.06 K/UL (ref 0–0.04)
IMM GRANULOCYTES NFR BLD AUTO: 0.2 % (ref 0–0.5)
IMM GRANULOCYTES NFR BLD AUTO: 0.5 % (ref 0–0.5)
INFLUENZA A, MOLECULAR: NEGATIVE
INFLUENZA B, MOLECULAR: NEGATIVE
IP: 20
LYMPHOCYTES # BLD AUTO: 0.6 K/UL (ref 1–4.8)
LYMPHOCYTES # BLD AUTO: 4 K/UL (ref 1–4.8)
LYMPHOCYTES NFR BLD: 30.6 % (ref 18–48)
LYMPHOCYTES NFR BLD: 7 % (ref 18–48)
MAGNESIUM SERPL-MCNC: 2.1 MG/DL (ref 1.6–2.6)
MCH RBC QN AUTO: 27.1 PG (ref 27–31)
MCH RBC QN AUTO: 27.7 PG (ref 27–31)
MCHC RBC AUTO-ENTMCNC: 30 G/DL (ref 32–36)
MCHC RBC AUTO-ENTMCNC: 30.3 G/DL (ref 32–36)
MCV RBC AUTO: 90 FL (ref 82–98)
MCV RBC AUTO: 91 FL (ref 82–98)
MODE: ABNORMAL
MONOCYTES # BLD AUTO: 0.6 K/UL (ref 0.3–1)
MONOCYTES # BLD AUTO: 1.1 K/UL (ref 0.3–1)
MONOCYTES NFR BLD: 7 % (ref 4–15)
MONOCYTES NFR BLD: 8.5 % (ref 4–15)
NEUTROPHILS # BLD AUTO: 7 K/UL (ref 1.8–7.7)
NEUTROPHILS # BLD AUTO: 7.6 K/UL (ref 1.8–7.7)
NEUTROPHILS NFR BLD: 58.2 % (ref 38–73)
NEUTROPHILS NFR BLD: 84.7 % (ref 38–73)
NRBC BLD-RTO: 0 /100 WBC
NRBC BLD-RTO: 0 /100 WBC
PCO2 BLDA: 57 MMHG (ref 35–45)
PCO2 BLDA: 66.4 MMHG (ref 35–45)
PH SMN: 7.29 [PH] (ref 7.35–7.45)
PH SMN: 7.33 [PH] (ref 7.35–7.45)
PHOSPHATE SERPL-MCNC: 5.1 MG/DL (ref 2.7–4.5)
PLATELET # BLD AUTO: 133 K/UL (ref 150–450)
PLATELET # BLD AUTO: 181 K/UL (ref 150–450)
PLATELET BLD QL SMEAR: ABNORMAL
PMV BLD AUTO: 11.9 FL (ref 9.2–12.9)
PMV BLD AUTO: 8.8 FL (ref 9.2–12.9)
PO2 BLDA: 48 MMHG (ref 40–60)
PO2 BLDA: 52 MMHG (ref 40–60)
POC BE: 4 MMOL/L
POC BE: 6 MMOL/L
POC IONIZED CALCIUM: 1.11 MMOL/L (ref 1.06–1.42)
POC IONIZED CALCIUM: 1.13 MMOL/L (ref 1.06–1.42)
POC SATURATED O2: 77 % (ref 95–100)
POC SATURATED O2: 83 % (ref 95–100)
POC TCO2 (MEASURED): 29 MMOL/L (ref 23–29)
POC TCO2: 32 MMOL/L (ref 24–29)
POC TCO2: 34 MMOL/L (ref 24–29)
POTASSIUM BLD-SCNC: 3.7 MMOL/L (ref 3.5–5.1)
POTASSIUM BLD-SCNC: 4.8 MMOL/L (ref 3.5–5.1)
POTASSIUM SERPL-SCNC: 3.9 MMOL/L (ref 3.5–5.1)
POTASSIUM SERPL-SCNC: 5.1 MMOL/L (ref 3.5–5.1)
PROT SERPL-MCNC: 6.8 G/DL (ref 6–8.4)
PROT SERPL-MCNC: 7.7 G/DL (ref 6–8.4)
RBC # BLD AUTO: 3.94 M/UL (ref 4–5.4)
RBC # BLD AUTO: 4.5 M/UL (ref 4–5.4)
SAMPLE: ABNORMAL
SARS-COV-2 RDRP RESP QL NAA+PROBE: NEGATIVE
SITE: ABNORMAL
SITE: ABNORMAL
SODIUM BLD-SCNC: 140 MMOL/L (ref 136–145)
SODIUM BLD-SCNC: 141 MMOL/L (ref 136–145)
SODIUM SERPL-SCNC: 142 MMOL/L (ref 136–145)
SODIUM SERPL-SCNC: 143 MMOL/L (ref 136–145)
SPECIMEN SOURCE: NORMAL
TROPONIN I SERPL DL<=0.01 NG/ML-MCNC: 0.08 NG/ML (ref 0–0.03)
TROPONIN I SERPL DL<=0.01 NG/ML-MCNC: 0.08 NG/ML (ref 0–0.03)
WBC # BLD AUTO: 13 K/UL (ref 3.9–12.7)
WBC # BLD AUTO: 8.28 K/UL (ref 3.9–12.7)

## 2023-09-14 PROCEDURE — 93010 EKG 12-LEAD: ICD-10-PCS | Mod: ,,, | Performed by: INTERNAL MEDICINE

## 2023-09-14 PROCEDURE — 85025 COMPLETE CBC W/AUTO DIFF WBC: CPT | Mod: 91

## 2023-09-14 PROCEDURE — 96375 TX/PRO/DX INJ NEW DRUG ADDON: CPT

## 2023-09-14 PROCEDURE — 84484 ASSAY OF TROPONIN QUANT: CPT

## 2023-09-14 PROCEDURE — 96374 THER/PROPH/DIAG INJ IV PUSH: CPT

## 2023-09-14 PROCEDURE — 99900035 HC TECH TIME PER 15 MIN (STAT)

## 2023-09-14 PROCEDURE — 83735 ASSAY OF MAGNESIUM: CPT

## 2023-09-14 PROCEDURE — 99223 PR INITIAL HOSPITAL CARE,LEVL III: ICD-10-PCS | Mod: ,,, | Performed by: INTERNAL MEDICINE

## 2023-09-14 PROCEDURE — 99285 EMERGENCY DEPT VISIT HI MDM: CPT | Mod: 25

## 2023-09-14 PROCEDURE — 25000003 PHARM REV CODE 250

## 2023-09-14 PROCEDURE — 93005 ELECTROCARDIOGRAM TRACING: CPT

## 2023-09-14 PROCEDURE — 83880 ASSAY OF NATRIURETIC PEPTIDE: CPT

## 2023-09-14 PROCEDURE — 99291 PR CRITICAL CARE, E/M 30-74 MINUTES: ICD-10-PCS | Mod: ,,, | Performed by: EMERGENCY MEDICINE

## 2023-09-14 PROCEDURE — 82803 BLOOD GASES ANY COMBINATION: CPT

## 2023-09-14 PROCEDURE — 87340 HEPATITIS B SURFACE AG IA: CPT | Performed by: NURSE PRACTITIONER

## 2023-09-14 PROCEDURE — 86803 HEPATITIS C AB TEST: CPT | Performed by: PHYSICIAN ASSISTANT

## 2023-09-14 PROCEDURE — 80053 COMPREHEN METABOLIC PANEL: CPT

## 2023-09-14 PROCEDURE — 93010 ELECTROCARDIOGRAM REPORT: CPT | Mod: ,,, | Performed by: INTERNAL MEDICINE

## 2023-09-14 PROCEDURE — 80053 COMPREHEN METABOLIC PANEL: CPT | Mod: 91

## 2023-09-14 PROCEDURE — U0002 COVID-19 LAB TEST NON-CDC: HCPCS | Performed by: EMERGENCY MEDICINE

## 2023-09-14 PROCEDURE — 99291 CRITICAL CARE FIRST HOUR: CPT | Mod: ,,, | Performed by: EMERGENCY MEDICINE

## 2023-09-14 PROCEDURE — 27100171 HC OXYGEN HIGH FLOW UP TO 24 HOURS

## 2023-09-14 PROCEDURE — 94761 N-INVAS EAR/PLS OXIMETRY MLT: CPT

## 2023-09-14 PROCEDURE — 80100014 HC HEMODIALYSIS 1:1

## 2023-09-14 PROCEDURE — 27000190 HC CPAP FULL FACE MASK W/VALVE

## 2023-09-14 PROCEDURE — 94660 CPAP INITIATION&MGMT: CPT

## 2023-09-14 PROCEDURE — 99223 1ST HOSP IP/OBS HIGH 75: CPT | Mod: ,,, | Performed by: INTERNAL MEDICINE

## 2023-09-14 PROCEDURE — 63600175 PHARM REV CODE 636 W HCPCS

## 2023-09-14 PROCEDURE — 25000242 PHARM REV CODE 250 ALT 637 W/ HCPCS

## 2023-09-14 PROCEDURE — 85025 COMPLETE CBC W/AUTO DIFF WBC: CPT

## 2023-09-14 PROCEDURE — 25000003 PHARM REV CODE 250: Performed by: NURSE PRACTITIONER

## 2023-09-14 PROCEDURE — 63600175 PHARM REV CODE 636 W HCPCS: Performed by: NURSE PRACTITIONER

## 2023-09-14 PROCEDURE — 20600001 HC STEP DOWN PRIVATE ROOM

## 2023-09-14 PROCEDURE — 84100 ASSAY OF PHOSPHORUS: CPT

## 2023-09-14 PROCEDURE — 25000003 PHARM REV CODE 250: Performed by: EMERGENCY MEDICINE

## 2023-09-14 PROCEDURE — 87502 INFLUENZA DNA AMP PROBE: CPT | Performed by: EMERGENCY MEDICINE

## 2023-09-14 PROCEDURE — 84484 ASSAY OF TROPONIN QUANT: CPT | Mod: 91

## 2023-09-14 RX ORDER — NIFEDIPINE 30 MG/1
60 TABLET, EXTENDED RELEASE ORAL DAILY
Status: DISCONTINUED | OUTPATIENT
Start: 2023-09-14 | End: 2023-09-14

## 2023-09-14 RX ORDER — IPRATROPIUM BROMIDE AND ALBUTEROL SULFATE 2.5; .5 MG/3ML; MG/3ML
3 SOLUTION RESPIRATORY (INHALATION)
Status: COMPLETED | OUTPATIENT
Start: 2023-09-14 | End: 2023-09-14

## 2023-09-14 RX ORDER — TRAMADOL HYDROCHLORIDE 50 MG/1
50 TABLET ORAL EVERY 6 HOURS PRN
Status: ON HOLD | COMMUNITY
Start: 2023-09-11 | End: 2023-10-04 | Stop reason: HOSPADM

## 2023-09-14 RX ORDER — NICARDIPINE HYDROCHLORIDE 0.2 MG/ML
0-15 INJECTION INTRAVENOUS CONTINUOUS
Status: DISCONTINUED | OUTPATIENT
Start: 2023-09-14 | End: 2023-09-14

## 2023-09-14 RX ORDER — HYDRALAZINE HYDROCHLORIDE 20 MG/ML
10 INJECTION INTRAMUSCULAR; INTRAVENOUS
Status: COMPLETED | OUTPATIENT
Start: 2023-09-14 | End: 2023-09-14

## 2023-09-14 RX ORDER — LOSARTAN POTASSIUM 50 MG/1
100 TABLET ORAL DAILY
Status: DISCONTINUED | OUTPATIENT
Start: 2023-09-14 | End: 2023-09-17 | Stop reason: HOSPADM

## 2023-09-14 RX ORDER — SODIUM CHLORIDE 0.9 % (FLUSH) 0.9 %
10 SYRINGE (ML) INJECTION
Status: DISCONTINUED | OUTPATIENT
Start: 2023-09-14 | End: 2023-09-17 | Stop reason: HOSPADM

## 2023-09-14 RX ORDER — SODIUM CHLORIDE 9 MG/ML
INJECTION, SOLUTION INTRAVENOUS
Status: CANCELLED | OUTPATIENT
Start: 2023-09-14

## 2023-09-14 RX ORDER — CARVEDILOL 25 MG/1
25 TABLET ORAL 2 TIMES DAILY
Status: DISCONTINUED | OUTPATIENT
Start: 2023-09-14 | End: 2023-09-14

## 2023-09-14 RX ORDER — CARVEDILOL 25 MG/1
25 TABLET ORAL 2 TIMES DAILY
Status: DISCONTINUED | OUTPATIENT
Start: 2023-09-14 | End: 2023-09-17 | Stop reason: HOSPADM

## 2023-09-14 RX ORDER — HEPARIN SODIUM 5000 [USP'U]/ML
5000 INJECTION, SOLUTION INTRAVENOUS; SUBCUTANEOUS EVERY 8 HOURS
Status: DISCONTINUED | OUTPATIENT
Start: 2023-09-14 | End: 2023-09-17 | Stop reason: HOSPADM

## 2023-09-14 RX ORDER — SODIUM CHLORIDE 9 MG/ML
INJECTION, SOLUTION INTRAVENOUS ONCE
Status: CANCELLED | OUTPATIENT
Start: 2023-09-14 | End: 2023-09-14

## 2023-09-14 RX ORDER — ACETAMINOPHEN 325 MG/1
650 TABLET ORAL EVERY 4 HOURS PRN
Status: DISCONTINUED | OUTPATIENT
Start: 2023-09-14 | End: 2023-09-17 | Stop reason: HOSPADM

## 2023-09-14 RX ORDER — OMEPRAZOLE 40 MG/1
40 CAPSULE, DELAYED RELEASE ORAL DAILY
COMMUNITY
Start: 2023-07-25 | End: 2024-01-08

## 2023-09-14 RX ORDER — CLOPIDOGREL BISULFATE 75 MG/1
75 TABLET ORAL DAILY
Status: DISCONTINUED | OUTPATIENT
Start: 2023-09-14 | End: 2023-09-14

## 2023-09-14 RX ORDER — FUROSEMIDE 10 MG/ML
80 INJECTION INTRAMUSCULAR; INTRAVENOUS
Status: COMPLETED | OUTPATIENT
Start: 2023-09-14 | End: 2023-09-14

## 2023-09-14 RX ORDER — BUPROPION HYDROCHLORIDE 150 MG/1
150 TABLET ORAL DAILY
Status: DISCONTINUED | OUTPATIENT
Start: 2023-09-14 | End: 2023-09-17 | Stop reason: HOSPADM

## 2023-09-14 RX ORDER — NIFEDIPINE 30 MG/1
60 TABLET, EXTENDED RELEASE ORAL DAILY
Status: DISCONTINUED | OUTPATIENT
Start: 2023-09-14 | End: 2023-09-17 | Stop reason: HOSPADM

## 2023-09-14 RX ORDER — ALBUTEROL SULFATE 2.5 MG/.5ML
2.5 SOLUTION RESPIRATORY (INHALATION) EVERY 4 HOURS PRN
Status: DISCONTINUED | OUTPATIENT
Start: 2023-09-14 | End: 2023-09-17 | Stop reason: HOSPADM

## 2023-09-14 RX ORDER — IPRATROPIUM BROMIDE AND ALBUTEROL SULFATE 2.5; .5 MG/3ML; MG/3ML
3 SOLUTION RESPIRATORY (INHALATION)
Status: DISCONTINUED | OUTPATIENT
Start: 2023-09-14 | End: 2023-09-14

## 2023-09-14 RX ORDER — NAPROXEN SODIUM 220 MG/1
81 TABLET, FILM COATED ORAL DAILY
Status: DISCONTINUED | OUTPATIENT
Start: 2023-09-14 | End: 2023-09-17 | Stop reason: HOSPADM

## 2023-09-14 RX ORDER — MUPIROCIN 20 MG/G
OINTMENT TOPICAL 2 TIMES DAILY
Status: DISCONTINUED | OUTPATIENT
Start: 2023-09-14 | End: 2023-09-17 | Stop reason: HOSPADM

## 2023-09-14 RX ORDER — MUPIROCIN 20 MG/G
OINTMENT TOPICAL 2 TIMES DAILY
Status: CANCELLED | OUTPATIENT
Start: 2023-09-14 | End: 2023-09-19

## 2023-09-14 RX ADMIN — MUPIROCIN: 20 OINTMENT TOPICAL at 08:09

## 2023-09-14 RX ADMIN — HYDRALAZINE HYDROCHLORIDE 10 MG: 20 INJECTION INTRAMUSCULAR; INTRAVENOUS at 02:09

## 2023-09-14 RX ADMIN — HEPARIN SODIUM 5000 UNITS: 5000 INJECTION INTRAVENOUS; SUBCUTANEOUS at 09:09

## 2023-09-14 RX ADMIN — FUROSEMIDE 80 MG: 10 INJECTION, SOLUTION INTRAVENOUS at 01:09

## 2023-09-14 RX ADMIN — CARVEDILOL 25 MG: 25 TABLET, FILM COATED ORAL at 08:09

## 2023-09-14 RX ADMIN — NITROGLYCERIN 2 INCH: 20 OINTMENT TOPICAL at 01:09

## 2023-09-14 RX ADMIN — NICARDIPINE HYDROCHLORIDE 2.5 MG/HR: 0.2 INJECTION, SOLUTION INTRAVENOUS at 03:09

## 2023-09-14 RX ADMIN — ASPIRIN 81 MG 81 MG: 81 TABLET ORAL at 12:09

## 2023-09-14 RX ADMIN — IPRATROPIUM BROMIDE AND ALBUTEROL SULFATE 3 ML: .5; 3 SOLUTION RESPIRATORY (INHALATION) at 01:09

## 2023-09-14 RX ADMIN — CLOPIDOGREL BISULFATE 75 MG: 75 TABLET ORAL at 12:09

## 2023-09-14 RX ADMIN — HEPARIN SODIUM 5000 UNITS: 5000 INJECTION INTRAVENOUS; SUBCUTANEOUS at 02:09

## 2023-09-14 RX ADMIN — ACETAMINOPHEN 650 MG: 325 TABLET ORAL at 12:09

## 2023-09-14 RX ADMIN — CARVEDILOL 25 MG: 25 TABLET, FILM COATED ORAL at 07:09

## 2023-09-14 RX ADMIN — ACETAMINOPHEN 650 MG: 325 TABLET ORAL at 07:09

## 2023-09-14 RX ADMIN — LOSARTAN POTASSIUM 100 MG: 50 TABLET, FILM COATED ORAL at 12:09

## 2023-09-14 RX ADMIN — ACETAMINOPHEN 650 MG: 325 TABLET ORAL at 05:09

## 2023-09-14 RX ADMIN — NIFEDIPINE 60 MG: 30 TABLET, FILM COATED, EXTENDED RELEASE ORAL at 07:09

## 2023-09-14 RX ADMIN — BUPROPION HYDROCHLORIDE 150 MG: 150 TABLET, FILM COATED, EXTENDED RELEASE ORAL at 12:09

## 2023-09-14 NOTE — ASSESSMENT & PLAN NOTE
Patient with Hypoxic Respiratory failure which is Acute on chronic.  she is not on home oxygen. Supplemental oxygen was provided and noted- Oxygen Concentration (%):  [] 40    Signs/symptoms of respiratory failure include- tachypnea. Contributing diagnoses includes - CHF Labs and images were reviewed. Patient Has not had a recent ABG. Will treat underlying causes and adjust management of respiratory failure as follows.    - Will continue BiPAP/supplemental oxygen as needed  - Nephrology for dialysis  - ABG/VBG as necessary  - Daily AM labs
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
## 2023-09-14 NOTE — HPI
58 y.o. female with history of ESRD on HD T/Th/Sat presenting with chief complaint of respiratory distress.  Patient was reportedly at home lying down in bed when she developed acute onset of respiratory distress.  EMS was called by patient's daughter and she was placed on CPAP during transport.  Patient arrives tripoding in respiratory distress with frothy red sputum foaming from her mouth. Patient receives dialysis Tuesday Thursday Saturday and reportedly missed on Saturday.    In the ED the patient was put on BiPAP and given lasix and nitroglycerin to treat flash pulmonary edema signs and symptoms. MICU consulted for hypertensive emergency and acute respiratory failure.

## 2023-09-14 NOTE — PROGRESS NOTES
Pt completed 3Hrs of HD, 2.5 Liters removed, tolerated  Well. Needles removed X 2, Hemostasis noted. Pt left in room in stable condition.

## 2023-09-14 NOTE — CONSULTS
[facility]  Nephrology  Consult Note    Patient Name: Jennifer Booth  MRN: [id number]  Admission Date: 9/14/2023  Hospital Length of Stay: 0 days  Attending Provider: Gerald Calzada MD   Primary Care Physician: Leodan Sanchez MD  Principal Problem:<principal problem not specified>    Nephrology  Consult performed by: Williams Mariscal MD  Consult ordered by: Ayaan Zamarripa MD        Subjective:     HPI: Patient is a pleasant 58 year old female with ESRD on HD dialysis T/Th/Sat who presents today for sudden onset shortness of breath that began yesterday evening. Patient states that she missed her dialysis session on Saturday but had dialysis on Tuesday where they removed 4L of fluid at that time. She felt well after dialysis without any shortness of breath, however yesterday evening she was watching television when she started to become short of breath and brought her self into the ER. Chest xray preformed on arrival was concerning for flash pulmonary edema. She was given 80mg of IV lasix, 10mg of IV hydralazine, and nitroglycerin ointment and placed on BiPAP with improvement in her symptoms. Nephrology has been consulted to manage her dialysis. Blood pressure has remained elevated since arrival, but consistently under 180 systolic. Labs are reviewed and appear to be within acceptable limits. She is able to speak in full sentences with the BiPAP on, and denies any chest pain, palpitations, cough, bloody sputum, but does continue to endorse dyspnea.       Past Medical History:   Diagnosis Date    Addiction to drug     Alcohol abuse     Allergic drug reaction 11/13/2017    Anemia     Anxiety     Arm pain, left 2/12/2014    Breast cyst     Chronic renal failure 2/12/2014    CKD (chronic kidney disease) stage 5, GFR less than 15 ml/min     Depression     Dialysis patient     dialysis m-w-f    Encounter for blood transfusion     GERD (gastroesophageal reflux disease)     Hx of  psychiatric care     Hypertension     Mood swings     Multiple myeloma in remission 10/26/2012    per Hem/Oc note    Psychiatric exam requested by authority     Psychiatric problem     Renal hypertension     Status post dialysis 2012    Therapy     Thrombocytopenia 2012       Past Surgical History:   Procedure Laterality Date    AV FISTULA PLACEMENT Left     BREAST CYST ASPIRATION Left     BREAST CYST EXCISION Left     CERVICAL SPINE SURGERY Bilateral      SECTION      x1    COLONOSCOPY N/A 2022    Procedure: COLONOSCOPY;  Surgeon: Jordan Mcguire MD;  Location: [facility] ENDO;  Service: Endoscopy;  Laterality: N/A;    FISTULOGRAM N/A 2020    Procedure: Fistulogram;  Surgeon: Rahat Berger MD;  Location: [facility] CATH LAB/EP;  Service: Cardiology;  Laterality: N/A;    pd catheter      PERCUTANEOUS TRANSLUMINAL ANGIOPLASTY OF ARTERIOVENOUS FISTULA N/A 2020    Procedure: PTA, AV FISTULA;  Surgeon: Rahat Berger MD;  Location: [facility] CATH LAB/EP;  Service: Cardiology;  Laterality: N/A;    PERITONEAL CATHETER REMOVAL N/A 2018    Procedure: REMOVAL, CATHETER, DIALYSIS, PERITONEAL;  Surgeon: Mukesh Gonsales Jr., MD;  Location: [facility] OR;  Service: General;  Laterality: N/A;    RENAL BIOPSY  2016    THROMBECTOMY OF HEMODIALYSIS ACCESS SITE N/A 2020    Procedure: Thrombectomy, Hemodialysis Graft Or Fistula;  Surgeon: Rahat Berger MD;  Location: [facility] CATH LAB/EP;  Service: Cardiology;  Laterality: N/A;    VASCULAR SURGERY  2012    dialysis catheter to right subclavian       Review of patient's allergies indicates:   Allergen Reactions    Doxycycline Swelling, Rash and Hives    Gentamicin Anaphylaxis    Vancomycin analogues Anaphylaxis     Current Facility-Administered Medications   Medication Frequency    acetaminophen tablet 650 mg Q4H PRN    carvediloL tablet 25 mg BID    niCARdipine 40 mg/200 mL (0.2 mg/mL) infusion Continuous    NIFEdipine  24 hr tablet 60 mg Daily    sodium chloride 0.9% flush 10 mL PRN     Facility-Administered Medications Ordered in Other Encounters   Medication Frequency    0.9%  NaCl infusion Continuous    0.9%  NaCl infusion Continuous    sodium chloride 0.9% flush 10 mL PRN    sodium chloride 0.9% flush 10 mL PRN     Family History       Problem Relation (Age of Onset)    Diabetes Maternal Grandmother    Heart failure Mother    Hypertension Mother    Ovarian cancer Maternal Aunt    Stroke Maternal Grandmother          Tobacco Use    Smoking status: Every Day     Current packs/day: 1.00     Average packs/day: 1 pack/day for 40.7 years (40.7 ttl pk-yrs)     Types: Cigarettes     Start date: 1983    Smokeless tobacco: Never    Tobacco comments:     Pt. states recently cut down to 0.5 pk/day. Enrolled in the [employer] on 6/9/20 ([facility] Member ID # [id number]). She declines Ambulatory referral to Smoking Cessation program. Handout provided.   Substance and Sexual Activity    Alcohol use: Not Currently     Comment: occasional    Drug use: Not Currently     Types: Marijuana    Sexual activity: Not Currently     Partners: Male     Review of Systems   Constitutional:  Positive for diaphoresis. Negative for chills, fatigue and fever.   Respiratory:  Positive for shortness of breath. Negative for cough, choking, chest tightness, wheezing and stridor.    Cardiovascular:  Positive for leg swelling. Negative for chest pain and palpitations.   Gastrointestinal:  Negative for abdominal distention, abdominal pain, constipation and diarrhea.   Endocrine: Negative for cold intolerance and heat intolerance.   Genitourinary:  Negative for difficulty urinating, dysuria and flank pain.   Neurological:  Negative for dizziness, tremors, seizures, syncope, speech difficulty, weakness, light-headedness, numbness and headaches.   Psychiatric/Behavioral:  Negative for agitation, behavioral problems and confusion.      Objective:     Vital Signs  (Most Recent):  Temp: 96.5 °F (35.8 °C) (09/14/23 0430)  Pulse: 92 (09/14/23 0444)  Resp: (!) 22 (09/14/23 0444)  BP: (!) 152/97 (09/14/23 0429)  SpO2: 100 % (09/14/23 0444) Vital Signs (24h Range):  Temp:  [96.5 °F (35.8 °C)-96.6 °F (35.9 °C)] 96.5 °F (35.8 °C)  Pulse:  [] 92  Resp:  [22-42] 22  SpO2:  [98 %-100 %] 100 %  BP: (152-198)/() 152/97        There is no height or weight on file to calculate BMI.  There is no height or weight on file to calculate BSA.    No intake/output data recorded.     Physical Exam  Vitals reviewed.   Constitutional:       General: She is not in acute distress.     Appearance: Normal appearance. She is diaphoretic.   HENT:      Head: Normocephalic.      Mouth/Throat:      Mouth: Mucous membranes are moist.   Eyes:      Pupils: Pupils are equal, round, and reactive to light.   Cardiovascular:      Rate and Rhythm: Normal rate and regular rhythm.      Pulses: Normal pulses.      Heart sounds: Normal heart sounds. No murmur heard.  Pulmonary:      Effort: Pulmonary effort is normal.      Comments: Crackles to bilateral mid lung fields.   Abdominal:      General: Abdomen is flat. Bowel sounds are normal.      Palpations: Abdomen is soft.   Musculoskeletal:         General: Normal range of motion.      Cervical back: Normal range of motion. No rigidity or tenderness.      Right lower leg: Edema present.      Left lower leg: Edema present.   Neurological:      General: No focal deficit present.      Mental Status: She is alert and oriented to person, place, and time.   Psychiatric:         Mood and Affect: Mood normal.          Significant Labs:  All labs within the past 24 hours have been reviewed.    Significant Imaging:  X-Ray: Reviewed      Assessment/Plan:     Pulmonary  Acute respiratory failure with hypoxia  Chest xray concerning for pulmonary edema, will plan on doing dialysis today.    Cardiac/Vascular  Hypertension  Likely secondary to fluid overload and her ESRD.  Will plan for dialysis today.     Renal/  Volume overload  - See above.     ESRD (end stage renal disease) on dialysis  Patient is a T/Th/Sat dialysis patient with her last session being Tuesday 9/12. She did miss her Saturday dialysis session prior to that one. She presented with dyspnea and pulmonary edema, which improved with hydralazine, lasix, and nitro with BiPAP.    Recommendations  -Will continue with her TTS dialysis schedule and plan on doing dialysis today to help with her respiratory status  -Consent for dialysis was obtained and left in the patient's chart.   -Daily BMP's  -Renal dose medications    Oncology  Anemia in chronic kidney disease, on chronic dialysis  -        Thank you for your consult. I will follow-up with patient. Please contact us if you have any additional questions.    Williams Mariscal MD  Nephrology  [facility]

## 2023-09-14 NOTE — ED TRIAGE NOTES
Patient presents to the ED via EMS on CPAP with complaints of shortness of breath. Patient arrives with pink frothy sputum coming, diaphoretic, and in the tripod position. Patient reports missing dialysis on Saturday.

## 2023-09-14 NOTE — SUBJECTIVE & OBJECTIVE
Past Medical History:   Diagnosis Date    Addiction to drug     Alcohol abuse     Allergic drug reaction 2017    Anemia     Anxiety     Arm pain, left 2014    Breast cyst     Chronic renal failure 2014    CKD (chronic kidney disease) stage 5, GFR less than 15 ml/min     Depression     Dialysis patient     dialysis m-w-    Encounter for blood transfusion     GERD (gastroesophageal reflux disease)     Hx of psychiatric care     Hypertension     Mood swings     Multiple myeloma in remission 10/26/2012    per Hem/Oc note    Psychiatric exam requested by authority     Psychiatric problem     Renal hypertension     Status post dialysis 2012    Therapy     Thrombocytopenia 2012       Past Surgical History:   Procedure Laterality Date    AV FISTULA PLACEMENT Left     BREAST CYST ASPIRATION Left     BREAST CYST EXCISION Left     CERVICAL SPINE SURGERY Bilateral      SECTION      x1    COLONOSCOPY N/A 2022    Procedure: COLONOSCOPY;  Surgeon: Jordan Mcguire MD;  Location: [facility] ENDO;  Service: Endoscopy;  Laterality: N/A;    FISTULOGRAM N/A 2020    Procedure: Fistulogram;  Surgeon: Rahat Berger MD;  Location: [facility] CATH LAB/EP;  Service: Cardiology;  Laterality: N/A;    pd catheter      PERCUTANEOUS TRANSLUMINAL ANGIOPLASTY OF ARTERIOVENOUS FISTULA N/A 2020    Procedure: PTA, AV FISTULA;  Surgeon: Rahat Berger MD;  Location: [facility] CATH LAB/EP;  Service: Cardiology;  Laterality: N/A;    PERITONEAL CATHETER REMOVAL N/A 2018    Procedure: REMOVAL, CATHETER, DIALYSIS, PERITONEAL;  Surgeon: Mukesh Gonsales Jr., MD;  Location: [facility] OR;  Service: General;  Laterality: N/A;    RENAL BIOPSY  2016    THROMBECTOMY OF HEMODIALYSIS ACCESS SITE N/A 2020    Procedure: Thrombectomy, Hemodialysis Graft Or Fistula;  Surgeon: Rahat Berger MD;  Location: [facility] CATH LAB/EP;  Service: Cardiology;  Laterality: N/A;    VASCULAR SURGERY  2012    dialysis catheter to  right subclavian       Review of patient's allergies indicates:   Allergen Reactions    Doxycycline Swelling, Rash and Hives    Gentamicin Anaphylaxis    Vancomycin analogues Anaphylaxis       Family History       Problem Relation (Age of Onset)    Diabetes Maternal Grandmother    Heart failure Mother    Hypertension Mother    Ovarian cancer Maternal Aunt    Stroke Maternal Grandmother          Tobacco Use    Smoking status: Every Day     Current packs/day: 1.00     Average packs/day: 1 pack/day for 40.7 years (40.7 ttl pk-yrs)     Types: Cigarettes     Start date: 1983    Smokeless tobacco: Never    Tobacco comments:     Pt. states recently cut down to 0.5 pk/day. Enrolled in the [employer] on 6/9/20 ([facility] Member ID # [id number]). She declines Ambulatory referral to Smoking Cessation program. Handout provided.   Substance and Sexual Activity    Alcohol use: Not Currently     Comment: occasional    Drug use: Not Currently     Types: Marijuana    Sexual activity: Not Currently     Partners: Male      Review of Systems   Eyes:  Negative for pain and redness.   Respiratory:  Positive for shortness of breath.    Cardiovascular:  Negative for chest pain and palpitations.   Gastrointestinal:  Negative for abdominal pain and blood in stool.   Genitourinary:  Negative for dysuria and hematuria.   Musculoskeletal:  Negative for back pain and neck pain.   Neurological:  Negative for dizziness and light-headedness.     Objective:     Vital Signs (Most Recent):  Temp: 96.6 °F (35.9 °C) (09/14/23 0122)  Pulse: 94 (09/14/23 0135)  Resp: (!) 23 (09/14/23 0135)  BP: (!) 195/114 (09/14/23 0234)  SpO2: 99 % (09/14/23 0149) Vital Signs (24h Range):  Temp:  [96.6 °F (35.9 °C)] 96.6 °F (35.9 °C)  Pulse:  [] 94  Resp:  [23-28] 23  SpO2:  [99 %-100 %] 99 %  BP: (195-198)/(114-118) 195/114      There is no height or weight on file to calculate BMI.    No intake or output data in the 24 hours ending 09/14/23 0255       Physical  Exam  Constitutional:       Comments: Appears to be uncomfortable due to SOB.   HENT:      Head: Normocephalic and atraumatic.      Right Ear: External ear normal.      Left Ear: External ear normal.      Nose: Nose normal. No congestion.      Mouth/Throat:      Mouth: Mucous membranes are moist.   Eyes:      General:         Right eye: No discharge.         Left eye: No discharge.      Extraocular Movements: Extraocular movements intact.   Cardiovascular:      Rate and Rhythm: Normal rate.      Pulses: Normal pulses.      Heart sounds: No murmur heard.  Pulmonary:      Effort: Pulmonary effort is normal.      Breath sounds: Wheezing (bibasilar) present.      Comments: Appears tachypneic.   Abdominal:      General: There is no distension.      Tenderness: There is no abdominal tenderness.   Musculoskeletal:         General: Swelling (Mild, bilateral, symmetrical.) present. Normal range of motion.      Cervical back: Normal range of motion. No rigidity.   Skin:     General: Skin is warm.      Capillary Refill: Capillary refill takes less than 2 seconds.      Coloration: Skin is not jaundiced.      Findings: No erythema.   Neurological:      General: No focal deficit present.      Mental Status: She is alert and oriented to person, place, and time.            Vents:  Oxygen Concentration (%): 40 (09/14/23 0149)  Lines/Drains/Airways       Drain  Duration             Female External Urinary Catheter 09/14/23 0139 <1 day              Peripheral Intravenous Line  Duration                  Hemodialysis AV Fistula Left forearm -- days         Peripheral IV - Single Lumen 09/14/23 0106 18 G Right Forearm <1 day                  Significant Labs:    CBC/Anemia Profile:  Recent Labs   Lab 09/14/23  0107 09/14/23  0116 09/14/23  0121   WBC 13.00*  --   --    HGB 12.2  --   --    HCT 40.6 40 44     --   --    MCV 90  --   --    RDW 20.9*  --   --         Chemistries:  Recent Labs   Lab 09/14/23 0107      K 3.9    CL 98   CO2 21*   BUN 40*   CREATININE 7.9*   CALCIUM 9.8   ALBUMIN 3.5   PROT 7.7   BILITOT 0.6   ALKPHOS 142*   ALT 10   AST 16       All pertinent labs within the past 24 hours have been reviewed.    Significant Imaging: I have reviewed all pertinent imaging results/findings within the past 24 hours.  I have reviewed and interpreted all pertinent imaging results/findings within the past 24 hours.

## 2023-09-14 NOTE — SUBJECTIVE & OBJECTIVE
Past Medical History:   Diagnosis Date    Addiction to drug     Alcohol abuse     Allergic drug reaction 2017    Anemia     Anxiety     Arm pain, left 2014    Breast cyst     Chronic renal failure 2014    CKD (chronic kidney disease) stage 5, GFR less than 15 ml/min     Depression     Dialysis patient     dialysis m-w-    Encounter for blood transfusion     GERD (gastroesophageal reflux disease)     Hx of psychiatric care     Hypertension     Mood swings     Multiple myeloma in remission 10/26/2012    per Hem/Oc note    Psychiatric exam requested by authority     Psychiatric problem     Renal hypertension     Status post dialysis 2012    Therapy     Thrombocytopenia 2012       Past Surgical History:   Procedure Laterality Date    AV FISTULA PLACEMENT Left     BREAST CYST ASPIRATION Left     BREAST CYST EXCISION Left     CERVICAL SPINE SURGERY Bilateral      SECTION      x1    COLONOSCOPY N/A 2022    Procedure: COLONOSCOPY;  Surgeon: Jordan Mcguire MD;  Location: [facility] ENDO;  Service: Endoscopy;  Laterality: N/A;    FISTULOGRAM N/A 2020    Procedure: Fistulogram;  Surgeon: Rahat Berger MD;  Location: [facility] CATH LAB/EP;  Service: Cardiology;  Laterality: N/A;    pd catheter      PERCUTANEOUS TRANSLUMINAL ANGIOPLASTY OF ARTERIOVENOUS FISTULA N/A 2020    Procedure: PTA, AV FISTULA;  Surgeon: Rahat Berger MD;  Location: [facility] CATH LAB/EP;  Service: Cardiology;  Laterality: N/A;    PERITONEAL CATHETER REMOVAL N/A 2018    Procedure: REMOVAL, CATHETER, DIALYSIS, PERITONEAL;  Surgeon: Mukesh Gonsales Jr., MD;  Location: [facility] OR;  Service: General;  Laterality: N/A;    RENAL BIOPSY  2016    THROMBECTOMY OF HEMODIALYSIS ACCESS SITE N/A 2020    Procedure: Thrombectomy, Hemodialysis Graft Or Fistula;  Surgeon: Rahat Berger MD;  Location: [facility] CATH LAB/EP;  Service: Cardiology;  Laterality: N/A;    VASCULAR SURGERY  2012    dialysis catheter to  right subclavian       Review of patient's allergies indicates:   Allergen Reactions    Doxycycline Swelling, Rash and Hives    Gentamicin Anaphylaxis    Vancomycin analogues Anaphylaxis     Current Facility-Administered Medications   Medication Frequency    acetaminophen tablet 650 mg Q4H PRN    carvediloL tablet 25 mg BID    niCARdipine 40 mg/200 mL (0.2 mg/mL) infusion Continuous    NIFEdipine 24 hr tablet 60 mg Daily    sodium chloride 0.9% flush 10 mL PRN     Facility-Administered Medications Ordered in Other Encounters   Medication Frequency    0.9%  NaCl infusion Continuous    0.9%  NaCl infusion Continuous    sodium chloride 0.9% flush 10 mL PRN    sodium chloride 0.9% flush 10 mL PRN     Family History       Problem Relation (Age of Onset)    Diabetes Maternal Grandmother    Heart failure Mother    Hypertension Mother    Ovarian cancer Maternal Aunt    Stroke Maternal Grandmother          Tobacco Use    Smoking status: Every Day     Current packs/day: 1.00     Average packs/day: 1 pack/day for 40.7 years (40.7 ttl pk-yrs)     Types: Cigarettes     Start date: 1983    Smokeless tobacco: Never    Tobacco comments:     Pt. states recently cut down to 0.5 pk/day. Enrolled in the [email or web address] on 6/9/20 ([facility] Member ID # [id number]). She declines Ambulatory referral to Smoking Cessation program. Handout provided.   Substance and Sexual Activity    Alcohol use: Not Currently     Comment: occasional    Drug use: Not Currently     Types: Marijuana    Sexual activity: Not Currently     Partners: Male     Review of Systems   Constitutional:  Positive for diaphoresis. Negative for chills, fatigue and fever.   Respiratory:  Positive for shortness of breath. Negative for cough, choking, chest tightness, wheezing and stridor.    Cardiovascular:  Positive for leg swelling. Negative for chest pain and palpitations.   Gastrointestinal:  Negative for abdominal distention, abdominal pain, constipation and diarrhea.   Endocrine:  Negative for cold intolerance and heat intolerance.   Genitourinary:  Negative for difficulty urinating, dysuria and flank pain.   Neurological:  Negative for dizziness, tremors, seizures, syncope, speech difficulty, weakness, light-headedness, numbness and headaches.   Psychiatric/Behavioral:  Negative for agitation, behavioral problems and confusion.      Objective:     Vital Signs (Most Recent):  Temp: 96.5 °F (35.8 °C) (09/14/23 0430)  Pulse: 92 (09/14/23 0444)  Resp: (!) 22 (09/14/23 0444)  BP: (!) 152/97 (09/14/23 0429)  SpO2: 100 % (09/14/23 0444) Vital Signs (24h Range):  Temp:  [96.5 °F (35.8 °C)-96.6 °F (35.9 °C)] 96.5 °F (35.8 °C)  Pulse:  [] 92  Resp:  [22-42] 22  SpO2:  [98 %-100 %] 100 %  BP: (152-198)/() 152/97        There is no height or weight on file to calculate BMI.  There is no height or weight on file to calculate BSA.    No intake/output data recorded.     Physical Exam  Vitals reviewed.   Constitutional:       General: She is not in acute distress.     Appearance: Normal appearance. She is diaphoretic.   HENT:      Head: Normocephalic.      Mouth/Throat:      Mouth: Mucous membranes are moist.   Eyes:      Pupils: Pupils are equal, round, and reactive to light.   Cardiovascular:      Rate and Rhythm: Normal rate and regular rhythm.      Pulses: Normal pulses.      Heart sounds: Normal heart sounds. No murmur heard.  Pulmonary:      Effort: Pulmonary effort is normal.      Comments: Crackles to bilateral mid lung fields.   Abdominal:      General: Abdomen is flat. Bowel sounds are normal.      Palpations: Abdomen is soft.   Musculoskeletal:         General: Normal range of motion.      Cervical back: Normal range of motion. No rigidity or tenderness.      Right lower leg: Edema present.      Left lower leg: Edema present.   Neurological:      General: No focal deficit present.      Mental Status: She is alert and oriented to person, place, and time.   Psychiatric:         Mood and  Affect: Mood normal.          Significant Labs:  All labs within the past 24 hours have been reviewed.    Significant Imaging:  X-Ray: Reviewed

## 2023-09-14 NOTE — PLAN OF CARE
Ms. Booth was admitted to [facility] overnight for acute hypoxemic respiratory failure 2/2 pulmonary edema 2/2 hypertensive urgency after missing dialysis. She was admitted to MICU on continuous bipap and underwent HD today; now on room air and hemodynamically stable for stepdown.    Plan:  Acute hypoxemic respiratory failure- resolved  ESRD- Nephrology following; tolerated HD today without complications  Right great toe proximal phalanx fracture, closed- this injury is 3 weeks old from a fall. Hard-soled shoe given. Follow up with orthopedics outpatient.  Multiple myeloma- in remission; follows with Hem/Onc outpatient  Hypertension- resumed Coreg, Nifedipine, Losartan  MDD/Anxiety- continue Welbutrin  Regarding DAPT- only on Plavix for 6 months s/p AV fistula stent placement in 2020. Continue ASA; DC Plavix.      Ashley Sierra, [facility]  Pulmonary Critical Care  [id number]

## 2023-09-14 NOTE — HPI
Patient is a pleasant 58 year old female with ESRD on HD dialysis T/Th/Sat who presents today for sudden onset shortness of breath that began yesterday evening. Patient states that she missed her dialysis session on Saturday but had dialysis on Tuesday where they removed 4L of fluid at that time. She felt well after dialysis without any shortness of breath, however yesterday evening she was watching television when she started to become short of breath and brought her self into the ER. Chest xray preformed on arrival was concerning for flash pulmonary edema. She was given 80mg of IV lasix, 10mg of IV hydralazine, and nitroglycerin ointment and placed on BiPAP with improvement in her symptoms. Nephrology has been consulted to manage her dialysis. Blood pressure has remained elevated since arrival, but consistently under 180 systolic. Labs are reviewed and appear to be within acceptable limits. She is able to speak in full sentences with the BiPAP on, and denies any chest pain, palpitations, cough, bloody sputum, but does continue to endorse dyspnea.

## 2023-09-14 NOTE — ED NOTES
I-STAT Chem-8+ Results:   Value Reference Range   Sodium 141 136-145 mmol/L   Potassium  3.7 3.5-5.1 mmol/L   Chloride 100  mmol/L   Ionized Calcium 1.11 1.06-1.42 mmol/L   CO2 (measured) 29 23-29 mmol/L   Glucose 151  mg/dL   BUN 40 6-30 mg/dL   Creatinine 8.3 0.5-1.4 mg/dL   Hematocrit 44 36-54%

## 2023-09-14 NOTE — PROGRESS NOTES
Arrived to pt's room. Pt on 2L NS C/O SOB. /90 Hr 79, O2 sat 94%. Cannulated LLA AVF x 2 without difficulty. HD started per protocol without difficulty.

## 2023-09-14 NOTE — HOSPITAL COURSE
Ms. Booth was admitted to [facility] MICU overnight for acute hypoxemic respiratory failure 2/2 pulmonary edema 2/2 hypertensive urgency after missing dialysis. She was admitted to MICU on continuous bipap and underwent HD today; now on room air and hemodynamically stable for stepdown.

## 2023-09-14 NOTE — NURSING
Nurses Note -- 4 Eyes      9/14/2023   6:57 PM      Skin assessed during: transfer      [x] No Altered Skin Integrity Present    []Prevention Measures Documented      [] Yes- Altered Skin Integrity Present or Discovered   [] LDA Added if Not in Epic (Describe Wound)   [] New Altered Skin Integrity was Present on Admit and Documented in LDA   [] Wound Image Taken    Wound Care Consulted? no    Attending Nurse:  Leo Kearney RN/Staff Member:  Elisabeth Westbrook RN

## 2023-09-14 NOTE — ED PROVIDER NOTES
Encounter Date: 2023     Source of History:   Patient, EMS, and medical record, without language barrier or      Chief complaint:  Respiratory Distress    HPI:  Jennifer Booth is a 58 y.o. female with history of ESRD on HD /Th/Sat presenting with chief complaint of respiratory distress.  Patient was reportedly at home lying down in bed when she developed acute onset of respiratory distress.  EMS was called by patient's daughter and she was placed on CPAP during transport.  Patient arrives tripoding in respiratory distress with frothy red sputum foaming from her mouth. Patient receives dialysis  and reportedly missed on Saturday    This is the extent to the patients complaints today here in the emergency department.    ROS: As per HPI and below:  ROS    Review of patient's allergies indicates:   Allergen Reactions    Doxycycline Swelling, Rash and Hives    Gentamicin Anaphylaxis    Vancomycin analogues Anaphylaxis     PMH:  As per HPI and below:  Past Medical History:   Diagnosis Date    Addiction to drug     Alcohol abuse     Allergic drug reaction 2017    Anemia     Anxiety     Arm pain, left 2014    Breast cyst     Chronic renal failure 2014    CKD (chronic kidney disease) stage 5, GFR less than 15 ml/min     Depression     Dialysis patient     dialysis m-w-f    Encounter for blood transfusion     GERD (gastroesophageal reflux disease)     Hx of psychiatric care     Hypertension     Mood swings     Multiple myeloma in remission 10/26/2012    per Hem/Oc note    Psychiatric exam requested by authority     Psychiatric problem     Renal hypertension     Status post dialysis 2012    Therapy     Thrombocytopenia 2012     Past Surgical History:   Procedure Laterality Date    AV FISTULA PLACEMENT Left     BREAST CYST ASPIRATION Left     BREAST CYST EXCISION Left     CERVICAL SPINE SURGERY Bilateral      SECTION      x1    COLONOSCOPY N/A  8/18/2022    Procedure: COLONOSCOPY;  Surgeon: Jordan Mcguire MD;  Location: [facility] ENDO;  Service: Endoscopy;  Laterality: N/A;    FISTULOGRAM N/A 06/09/2020    Procedure: Fistulogram;  Surgeon: Rahat Berger MD;  Location: [facility] CATH LAB/EP;  Service: Cardiology;  Laterality: N/A;    pd catheter      PERCUTANEOUS TRANSLUMINAL ANGIOPLASTY OF ARTERIOVENOUS FISTULA N/A 06/09/2020    Procedure: PTA, AV FISTULA;  Surgeon: Rahat Berger MD;  Location: [facility] CATH LAB/EP;  Service: Cardiology;  Laterality: N/A;    PERITONEAL CATHETER REMOVAL N/A 11/30/2018    Procedure: REMOVAL, CATHETER, DIALYSIS, PERITONEAL;  Surgeon: Mukesh Gonsales Jr., MD;  Location: [facility] OR;  Service: General;  Laterality: N/A;    RENAL BIOPSY  2016    THROMBECTOMY OF HEMODIALYSIS ACCESS SITE N/A 06/09/2020    Procedure: Thrombectomy, Hemodialysis Graft Or Fistula;  Surgeon: Rahat Berger MD;  Location: [facility] CATH LAB/EP;  Service: Cardiology;  Laterality: N/A;    VASCULAR SURGERY  08/2012    dialysis catheter to right subclavian     Social History     Tobacco Use    Smoking status: Every Day     Current packs/day: 1.00     Average packs/day: 1 pack/day for 40.7 years (40.7 ttl pk-yrs)     Types: Cigarettes     Start date: 1983    Smokeless tobacco: Never    Tobacco comments:     Pt. states recently cut down to 0.5 pk/day. Enrolled in the [employer] on 6/9/20 ([facility] Member ID # [id number]). She declines Ambulatory referral to Smoking Cessation program. Handout provided.   Substance Use Topics    Alcohol use: Not Currently     Comment: occasional    Drug use: Not Currently     Types: Marijuana     Vitals:    BP (!) 179/108   Pulse 99   Temp 96.6 °F (35.9 °C) (Axillary)   Resp (!) 28   LMP 01/01/2016 (Approximate) Comment: 2016  SpO2 100%     Physical Exam  Vitals and nursing note reviewed.   Constitutional:       General: She is not in acute distress.     Appearance: She is ill-appearing and diaphoretic. She is not toxic-appearing.   HENT:       Head: Normocephalic and atraumatic.      Mouth/Throat:      Mouth: Mucous membranes are moist.   Eyes:      General: No scleral icterus.  Neck:      Comments: JVD  Cardiovascular:      Rate and Rhythm: Regular rhythm. Tachycardia present.      Pulses: Normal pulses.      Heart sounds: Normal heart sounds. No murmur heard.     No friction rub. No gallop.   Pulmonary:      Effort: Respiratory distress present.      Breath sounds: No stridor. Wheezing and rales present. No rhonchi.   Abdominal:      General: Abdomen is flat. There is no distension.      Palpations: Abdomen is soft.      Tenderness: There is no abdominal tenderness. There is no guarding.   Musculoskeletal:         General: No swelling or deformity.      Cervical back: Normal range of motion and neck supple.   Skin:     General: Skin is warm.      Capillary Refill: Capillary refill takes less than 2 seconds.      Coloration: Skin is not jaundiced.      Findings: No rash.   Neurological:      Mental Status: She is alert and oriented to person, place, and time. Mental status is at baseline.       Procedures    Laboratory Studies:  Labs that have been ordered have been independently reviewed and interpreted by myself.  Labs Reviewed   CBC W/ AUTO DIFFERENTIAL - Abnormal; Notable for the following components:       Result Value    WBC 13.00 (*)     MCHC 30.0 (*)     RDW 20.9 (*)     MPV 8.8 (*)     Immature Grans (Abs) 0.06 (*)     Mono # 1.1 (*)     All other components within normal limits   COMPREHENSIVE METABOLIC PANEL - Abnormal; Notable for the following components:    CO2 21 (*)     Glucose 150 (*)     BUN 40 (*)     Creatinine 7.9 (*)     Alkaline Phosphatase 142 (*)     eGFR 5.4 (*)     Anion Gap 24 (*)     All other components within normal limits   TROPONIN I - Abnormal; Notable for the following components:    Troponin I 0.083 (*)     All other components within normal limits   B-TYPE NATRIURETIC PEPTIDE - Abnormal; Notable for the following  "components:    BNP 3,348 (*)     All other components within normal limits   ISTAT PROCEDURE - Abnormal; Notable for the following components:    POC PH 7.294 (*)     POC PCO2 66.4 (*)     POC HCO3 32.2 (*)     POC SATURATED O2 77 (*)     POC TCO2 34 (*)     All other components within normal limits   ISTAT PROCEDURE - Abnormal; Notable for the following components:    POC Glucose 151 (*)     POC BUN 40 (*)     POC Creatinine 8.3 (*)     All other components within normal limits   ISTAT PROCEDURE - Abnormal; Notable for the following components:    POC PH 7.328 (*)     POC PCO2 57.0 (*)     POC HCO3 29.9 (*)     POC SATURATED O2 83 (*)     POC TCO2 32 (*)     All other components within normal limits   INFLUENZA A & B BY MOLECULAR   HEPATITIS C ANTIBODY    Narrative:     Release to patient->Immediate   SARS-COV-2 RNA AMPLIFICATION, QUAL   COMPREHENSIVE METABOLIC PANEL   MAGNESIUM   PHOSPHORUS   TROPONIN I   CBC W/ AUTO DIFFERENTIAL   ISTAT CHEM8     Imaging Results              X-Ray Chest AP Portable (Final result)  Result time 09/14/23 01:50:37      Final result by Tiago Dixon MD (09/14/23 01:50:37)                   Impression:      Enlarged cardiomediastinal silhouette with bilateral airspace opacities and small pleural effusions, most likely related to CHF exacerbation and/or volume overload.  Infectious/inflammatory process could have a similar appearance.      Electronically signed by: Tiago Dixon MD  Date:    09/14/2023  Time:    01:50               Narrative:    EXAMINATION:  XR CHEST AP PORTABLE    CLINICAL HISTORY:  Provided history is "CHF;  ".    TECHNIQUE:  One view of the chest.    COMPARISON:  09/27/2022.    FINDINGS:  Cardiac wires overlie the chest.  Cardiomediastinal silhouette is enlarged and similar to the prior study.  Atherosclerotic calcifications overlie the aortic arch.  New patchy bilateral airspace and ground-glass opacities throughout the bilateral lungs, right side greater " than left.  Small bilateral pleural effusions.  No distinct pneumothorax.  Old right rib fracture.  Postoperative changes in the cervical spine.                                    EKG (independently interpreted by me): Rhythm:  sinus tachycardia, rate of  100 BPM, no ST elevations or depressions, QTc 531    Imaging (independently interpreted by me): bilateral pulmonary edema    I decided to obtain the patient's medical records.  Summary of Medical Records:      Medications   niCARdipine 40 mg/200 mL (0.2 mg/mL) infusion (2.5 mg/hr Intravenous New Bag 9/14/23 0310)   sodium chloride 0.9% flush 10 mL (has no administration in time range)   acetaminophen tablet 650 mg (has no administration in time range)   nitroGLYCERIN 2% TD oint ointment 2 inch (2 inches Topical (Top) Given 9/14/23 0130)   furosemide injection 80 mg (80 mg Intravenous Given 9/14/23 0130)   albuterol-ipratropium 2.5 mg-0.5 mg/3 mL nebulizer solution 3 mL (3 mLs Nebulization Given 9/14/23 0135)   hydrALAZINE injection 10 mg (10 mg Intravenous Given 9/14/23 0234)     MDM:    58 y.o. female with  respiratory distress    Differential Dx:  Differential includes but is not limited to flash pulmonary edema, less likely COVID, less likely PE    ED Management:   Volume overload workup started for an acute presentation of an emergent condition with multiple comorbidities.  Patient emergently placed on BiPAP for her respiratory support with improvement in her respiratory distress.  After about 30 minutes on BiPAP patient was resting comfortably unable to have conversation.  Nitro  paste given for hypertensive emergency as well as Lasix.  Will also treat with DuoNebs as patient has smoking history and wheezing on exam. Hydralazine given in an attempt to control blood pressure with minimal response, will switch to nicardipine drip.  MICU consulted and evaluated patient.  Patient's VBG improving while on BiPAP, she is resting comfortably and appears much better  than upon arrival.  Patient admitted to MICU for further workup and treatment of her hypertensive emergency with flash pulmonary edema    Discussed with:  MICU    Medical Decision Making  Problems Addressed:  Flash pulmonary edema: acute illness or injury that poses a threat to life or bodily functions  Hypervolemia, unspecified hypervolemia type: acute illness or injury  Shortness of breath: acute illness or injury    Amount and/or Complexity of Data Reviewed  Independent Historian: EMS  Labs: ordered. Decision-making details documented in ED Course.  Radiology: ordered and independent interpretation performed. Decision-making details documented in ED Course.  ECG/medicine tests: ordered and independent interpretation performed. Decision-making details documented in ED Course.    Risk  Prescription drug management.  Decision regarding hospitalization.         ED Course as of 09/14/23 0342   u Sep 14, 2023   0203 Potassium: 3.9 [AW]   0204 BNP(!): 3,348 [AW]      ED Course User Index  [AW] Andrew Miller MD     Diagnostic Impression:    Final diagnoses:  [R06.02] Shortness of breath  [J81.0] Flash pulmonary edema  [E87.70] Hypervolemia, unspecified hypervolemia type  [I16.1] Hypertensive emergency (Primary)     ED Disposition Condition    Admit                    Andrew Miller MD  Resident  09/14/23 8134

## 2023-09-14 NOTE — PLAN OF CARE
[facility] - Cardiac Medical ICU  Initial Discharge Assessment       Primary Care Provider: Leodan Sanchez MD    Admission Diagnosis: Shortness of breath [R06.02]  Flash pulmonary edema [J81.0]  Hypertensive emergency [I16.1]  Hypervolemia, unspecified hypervolemia type [E87.70]    Admission Date: 9/14/2023  Expected Discharge Date: 9/19/2023    Transition of Care Barriers: None    Payor: MEDICARE / Plan: MEDICARE PART A & B / Product Type: Government /     Extended Emergency Contact Information  Primary Emergency Contact: Yusra Booth   [facility]  Home Phone: [phone]  Relation: Daughter  Secondary Emergency Contact: Gilberto Holder  Mobile Phone: [phone]  Relation: Relative   needed? No    Discharge Plan A: Home with family  Discharge Plan B: Home      [id number] - [place] - [address] AT [facility]  [address] [address]  Phone: [phone] Fax: [phone]      Initial Assessment (most recent)       Adult Discharge Assessment - 09/14/23 1426          Discharge Assessment    Assessment Type Discharge Planning Assessment     Confirmed/corrected address, phone number and insurance Yes     Confirmed Demographics Correct on Facesheet     Source of Information patient     When was your last doctors appointment? 08/23/23     Does patient/caregiver understand observation status Yes     Communicated JOVANA with patient/caregiver Date not available/Unable to determine     Reason For Admission Acute respiratory failure with hypoxia     People in Home child(autumn), adult     Do you expect to return to your current living situation? Yes     Do you have help at home or someone to help you manage your care at home? Yes     Who are your caregiver(s) and their phone number(s)? Yusra Booth, daughter/cp# [phone] and Gilberto Holder, son-in-law/cp# [phone]     Prior to hospitilization cognitive status: Unable to Assess      Current cognitive status: Alert/Oriented     Walking or Climbing Stairs ambulation difficulty, requires equipment     Mobility Management straight cane, rolling walker     Dressing/Bathing bathing difficulty, requires equipment     Dressing/Bathing Management Shower chair     Equipment Currently Used at Home cane, straight;walker, rolling;shower chair     Readmission within 30 days? No     Patient currently being followed by outpatient case management? No     Do you currently have service(s) that help you manage your care at home? No     Do you take prescription medications? Yes     Do you have prescription coverage? Yes     Coverage Medicare A & B and [employer] Federal     Do you have any problems affording any of your prescribed medications? No     Is the patient taking medications as prescribed? yes     Who is going to help you get home at discharge? family     How do you get to doctors appointments? car, drives self     Are you on dialysis? Yes     Dialysis Name and Scheduled days [facility]/TTS     Do you take coumadin? No   Plavix    DME Needed Upon Discharge  none     Discharge Plan discussed with: Patient     Transition of Care Barriers None     Discharge Plan A Home with family     Discharge Plan B Home        Physical Activity    On average, how many days per week do you engage in moderate to strenuous exercise (like a brisk walk)? 4 days     On average, how many minutes do you engage in exercise at this level? 20 min        Financial Resource Strain    How hard is it for you to pay for the very basics like food, housing, medical care, and heating? Not very hard        Housing Stability    In the last 12 months, was there a time when you were not able to pay the mortgage or rent on time? No     In the last 12 months, how many places have you lived? 1     In the last 12 months, was there a time when you did not have a steady place to sleep or slept in a shelter (including now)? No         Transportation Needs    In the past 12 months, has lack of transportation kept you from medical appointments or from getting medications? No     In the past 12 months, has lack of transportation kept you from meetings, work, or from getting things needed for daily living? No        Food Insecurity    Within the past 12 months, you worried that your food would run out before you got the money to buy more. Never true     Within the past 12 months, the food you bought just didn't last and you didn't have money to get more. Never true        Stress    Do you feel stress - tense, restless, nervous, or anxious, or unable to sleep at night because your mind is troubled all the time - these days? Rather much        Social Connections    In a typical week, how many times do you talk on the phone with family, friends, or neighbors? More than three times a week     How often do you get together with friends or relatives? Once a week     How often do you attend Yarsanism or Baptist services? Never     Do you belong to any clubs or organizations such as Yarsanism groups, unions, fraternal or athletic groups, or school groups? No     How often do you attend meetings of the clubs or organizations you belong to? Never     Are you , , , , never , or living with a partner?         Alcohol Use    Q1: How often do you have a drink containing alcohol? Never     Q2: How many drinks containing alcohol do you have on a typical day when you are drinking? Patient does not drink     Q3: How often do you have six or more drinks on one occasion? Never        OTHER    Name(s) of People in Home darrell Hamilton, Gilberto Holder, non-in- law and 3 y/o granddaughter                   Spoke to patient.  Patient lives with her daughter and son-in-law. Post hospital stay daughter will be her support person and help in the home.  Patient has transportation at discharge with family.  There have been no  hospitalizations within the last 30 days per patient. Verified patient's PCP and preferred Pharmacy.  Patient states not on Coumadin but take plavix daily and is receiving dialysis.  All questions answered regarding Case Management Discharge Planning, patient verbalized understanding.  Discharge booklet with SW's contact information given to patient.    Ryan Montoya LMSW  [facility]  [phone]

## 2023-09-14 NOTE — H&P
[facility] - Emergency Dept  Critical Care Medicine  History & Physical    Patient Name: Jennifer Booth  MRN: [id number]  Admission Date: 9/14/2023  Hospital Length of Stay: 0 days  Code Status: Full Code  Attending Physician: Logan Nascimento MD   Primary Care Provider: Leodan Sanchez MD   Principal Problem: <principal problem not specified>    Consults  Subjective:     HPI:  58 y.o. female with history of ESRD on HD T/Th/Sat presenting with chief complaint of respiratory distress.  Patient was reportedly at home lying down in bed when she developed acute onset of respiratory distress.  EMS was called by patient's daughter and she was placed on CPAP during transport.  Patient arrives tripoding in respiratory distress with frothy red sputum foaming from her mouth. Patient receives dialysis Tuesday Thursday Saturday and reportedly missed on Saturday.    In the ED the patient was put on BiPAP and given lasix and nitroglycerin to treat flash pulmonary edema signs and symptoms. MICU consulted for hypertensive emergency and acute respiratory failure.          Hospital/ICU Course:  No notes on file     Past Medical History:   Diagnosis Date    Addiction to drug      Alcohol abuse      Allergic drug reaction 11/13/2017    Anemia      Anxiety      Arm pain, left 2/12/2014    Breast cyst      Chronic renal failure 2/12/2014    CKD (chronic kidney disease) stage 5, GFR less than 15 ml/min      Depression      Dialysis patient       dialysis [id number]-[id number]    Encounter for blood transfusion      GERD (gastroesophageal reflux disease)      Hx of psychiatric care      Hypertension      Mood swings      Multiple myeloma in remission 10/26/2012     per Hem/Oc note    Psychiatric exam requested by authority      Psychiatric problem      Renal hypertension      Status post dialysis 8/2012    Therapy      Thrombocytopenia 8/2/2012               Past Surgical History:   Procedure Laterality Date    AV FISTULA PLACEMENT Left 2014    BREAST  CYST ASPIRATION Left     BREAST CYST EXCISION Left     CERVICAL SPINE SURGERY Bilateral       SECTION         x1    COLONOSCOPY N/A 2022     Procedure: COLONOSCOPY;  Surgeon: Jordan Mcguire MD;  Location: [facility] ENDO;  Service: Endoscopy;  Laterality: N/A;    FISTULOGRAM N/A 2020     Procedure: Fistulogram;  Surgeon: Rahat Berger MD;  Location: [facility] CATH LAB/EP;  Service: Cardiology;  Laterality: N/A;    pd catheter        PERCUTANEOUS TRANSLUMINAL ANGIOPLASTY OF ARTERIOVENOUS FISTULA N/A 2020     Procedure: PTA, AV FISTULA;  Surgeon: Rahat Berger MD;  Location: [facility] CATH LAB/EP;  Service: Cardiology;  Laterality: N/A;    PERITONEAL CATHETER REMOVAL N/A 2018     Procedure: REMOVAL, CATHETER, DIALYSIS, PERITONEAL;  Surgeon: Mukesh Gonsales Jr., MD;  Location: [facility] OR;  Service: General;  Laterality: N/A;    RENAL BIOPSY       THROMBECTOMY OF HEMODIALYSIS ACCESS SITE N/A 2020     Procedure: Thrombectomy, Hemodialysis Graft Or Fistula;  Surgeon: Rahat Berger MD;  Location: [facility] CATH LAB/EP;  Service: Cardiology;  Laterality: N/A;    VASCULAR SURGERY   2012     dialysis catheter to right subclavian              Review of patient's allergies indicates:   Allergen Reactions    Doxycycline Swelling, Rash and Hives    Gentamicin Anaphylaxis    Vancomycin analogues Anaphylaxis         Family History         Problem Relation (Age of Onset)     Diabetes Maternal Grandmother     Heart failure Mother     Hypertension Mother     Ovarian cancer Maternal Aunt     Stroke Maternal Grandmother                   Tobacco Use    Smoking status: Every Day       Current packs/day: 1.00       Average packs/day: 1 pack/day for 40.7 years (40.7 ttl pk-yrs)       Types: Cigarettes       Start date:     Smokeless tobacco: Never    Tobacco comments:       Pt. states recently cut down to 0.5 pk/day. Enrolled in the [employer] on 20 ([employer] Member ID # [id number]). She declines  Ambulatory referral to Smoking Cessation program. Handout provided.   Substance and Sexual Activity    Alcohol use: Not Currently       Comment: occasional    Drug use: Not Currently       Types: Marijuana    Sexual activity: Not Currently       Partners: Male      Review of Systems   Eyes:  Negative for pain and redness.   Respiratory:  Positive for shortness of breath.    Cardiovascular:  Negative for chest pain and palpitations.   Gastrointestinal:  Negative for abdominal pain and blood in stool.   Genitourinary:  Negative for dysuria and hematuria.   Musculoskeletal:  Negative for back pain and neck pain.   Neurological:  Negative for dizziness and light-headedness.      Objective:      Vital Signs (Most Recent):  Temp: 96.6 °F (35.9 °C) (09/14/23 0122)  Pulse: 94 (09/14/23 0135)  Resp: (!) 23 (09/14/23 0135)  BP: (!) 195/114 (09/14/23 0234)  SpO2: 99 % (09/14/23 0149) Vital Signs (24h Range):  Temp:  [96.6 °F (35.9 °C)] 96.6 °F (35.9 °C)  Pulse:  [] 94  Resp:  [23-28] 23  SpO2:  [99 %-100 %] 99 %  BP: (195-198)/(114-118) 195/114   There is no height or weight on file to calculate BMI.     No intake or output data in the 24 hours ending 09/14/23 0255        Physical Exam  Constitutional:       Comments: Appears to be uncomfortable due to SOB.   HENT:      Head: Normocephalic and atraumatic.      Right Ear: External ear normal.      Left Ear: External ear normal.      Nose: Nose normal. No congestion.      Mouth/Throat:      Mouth: Mucous membranes are moist.   Eyes:      General:         Right eye: No discharge.         Left eye: No discharge.      Extraocular Movements: Extraocular movements intact.   Cardiovascular:      Rate and Rhythm: Normal rate.      Pulses: Normal pulses.      Heart sounds: No murmur heard.  Pulmonary:      Effort: Pulmonary effort is normal.      Breath sounds: Wheezing (bibasilar) present.      Comments: Appears tachypneic.   Abdominal:      General: There is no distension.       Tenderness: There is no abdominal tenderness.   Musculoskeletal:         General: Swelling (Mild, bilateral, symmetrical.) present. Normal range of motion.      Cervical back: Normal range of motion. No rigidity.   Skin:     General: Skin is warm.      Capillary Refill: Capillary refill takes less than 2 seconds.      Coloration: Skin is not jaundiced.      Findings: No erythema.   Neurological:      General: No focal deficit present.      Mental Status: She is alert and oriented to person, place, and time.              Vents:  Oxygen Concentration (%): 40 (09/14/23 0149)  Lines/Drains/Airways         Drain  Duration                Female External Urinary Catheter 09/14/23 0139 <1 day                  Peripheral Intravenous Line  Duration                     Hemodialysis AV Fistula Left forearm -- days          Peripheral IV - Single Lumen 09/14/23 0106 18 G Right Forearm <1 day                       Significant Labs:     CBC/Anemia Profile:        Recent Labs   Lab 09/14/23 0107 09/14/23  0116 09/14/23  0121   WBC 13.00*  --   --    HGB 12.2  --   --    HCT 40.6 40 44     --   --    MCV 90  --   --    RDW 20.9*  --   --          Chemistries:      Recent Labs   Lab 09/14/23 0107      K 3.9   CL 98   CO2 21*   BUN 40*   CREATININE 7.9*   CALCIUM 9.8   ALBUMIN 3.5   PROT 7.7   BILITOT 0.6   ALKPHOS 142*   ALT 10   AST 16         All pertinent labs within the past 24 hours have been reviewed.     Significant Imaging: I have reviewed all pertinent imaging results/findings within the past 24 hours.  I have reviewed and interpreted all pertinent imaging results/findings within the past 24 hours.    Assessment/Plan:     Pulmonary  Acute respiratory failure with hypoxia  Patient with Hypoxic Respiratory failure which is Acute on chronic.  she is not on home oxygen. Supplemental oxygen was provided and noted- Oxygen Concentration (%):  [] 40    Signs/symptoms of respiratory failure include- tachypnea.  Contributing diagnoses includes - CHF Labs and images were reviewed. Patient Has not had a recent ABG. Will treat underlying causes and adjust management of respiratory failure as follows.    - Will continue BiPAP/supplemental oxygen as needed  - Nephrology for dialysis  - ABG/VBG as necessary  - Daily AM labs             Critical care was time spent personally by me on the following activities: development of treatment plan with patient or surrogate and bedside caregivers, discussions with consultants, evaluation of patient's response to treatment, examination of patient, ordering and performing treatments and interventions, ordering and review of laboratory studies, ordering and review of radiographic studies, pulse oximetry, re-evaluation of patient's condition. This critical care time did not overlap with that of any other provider or involve time for any procedures.     Ayaan Zamarripa MD  Critical Care Medicine  [facility] - [facility] Dept

## 2023-09-14 NOTE — ASSESSMENT & PLAN NOTE
Patient is a T/Th/Sat dialysis patient with her last session being Tuesday 9/12. She did miss her Saturday dialysis session prior to that one. She presented with dyspnea and pulmonary edema, which improved with hydralazine, lasix, and nitro with BiPAP.    Recommendations  -Will continue with her TTS dialysis schedule and plan on doing dialysis today to help with her respiratory status  -Consent for dialysis was obtained and left in the patient's chart.   -Daily BMP's  -Renal dose medications

## 2023-09-15 LAB
ALBUMIN SERPL BCP-MCNC: 2.8 G/DL (ref 3.5–5.2)
ALP SERPL-CCNC: 106 U/L (ref 55–135)
ALT SERPL W/O P-5'-P-CCNC: 8 U/L (ref 10–44)
ANION GAP SERPL CALC-SCNC: 15 MMOL/L (ref 8–16)
AST SERPL-CCNC: 11 U/L (ref 10–40)
BASOPHILS # BLD AUTO: 0.06 K/UL (ref 0–0.2)
BASOPHILS NFR BLD: 0.7 % (ref 0–1.9)
BILIRUB SERPL-MCNC: 0.5 MG/DL (ref 0.1–1)
BUN SERPL-MCNC: 45 MG/DL (ref 6–20)
CALCIUM SERPL-MCNC: 10.2 MG/DL (ref 8.7–10.5)
CHLORIDE SERPL-SCNC: 96 MMOL/L (ref 95–110)
CO2 SERPL-SCNC: 24 MMOL/L (ref 23–29)
CREAT SERPL-MCNC: 6.8 MG/DL (ref 0.5–1.4)
DIFFERENTIAL METHOD: ABNORMAL
EOSINOPHIL # BLD AUTO: 0.2 K/UL (ref 0–0.5)
EOSINOPHIL NFR BLD: 2 % (ref 0–8)
ERYTHROCYTE [DISTWIDTH] IN BLOOD BY AUTOMATED COUNT: 20.1 % (ref 11.5–14.5)
EST. GFR  (NO RACE VARIABLE): 6.5 ML/MIN/1.73 M^2
GLUCOSE SERPL-MCNC: 80 MG/DL (ref 70–110)
HCT VFR BLD AUTO: 34.9 % (ref 37–48.5)
HGB BLD-MCNC: 10.7 G/DL (ref 12–16)
IMM GRANULOCYTES # BLD AUTO: 0.02 K/UL (ref 0–0.04)
IMM GRANULOCYTES NFR BLD AUTO: 0.2 % (ref 0–0.5)
LYMPHOCYTES # BLD AUTO: 0.8 K/UL (ref 1–4.8)
LYMPHOCYTES NFR BLD: 10.4 % (ref 18–48)
MAGNESIUM SERPL-MCNC: 1.9 MG/DL (ref 1.6–2.6)
MCH RBC QN AUTO: 27 PG (ref 27–31)
MCHC RBC AUTO-ENTMCNC: 30.7 G/DL (ref 32–36)
MCV RBC AUTO: 88 FL (ref 82–98)
MONOCYTES # BLD AUTO: 0.7 K/UL (ref 0.3–1)
MONOCYTES NFR BLD: 8.8 % (ref 4–15)
NEUTROPHILS # BLD AUTO: 6.3 K/UL (ref 1.8–7.7)
NEUTROPHILS NFR BLD: 77.9 % (ref 38–73)
NRBC BLD-RTO: 0 /100 WBC
PHOSPHATE SERPL-MCNC: 4.4 MG/DL (ref 2.7–4.5)
PLATELET # BLD AUTO: 153 K/UL (ref 150–450)
PMV BLD AUTO: 9.4 FL (ref 9.2–12.9)
POTASSIUM SERPL-SCNC: 4.6 MMOL/L (ref 3.5–5.1)
PROT SERPL-MCNC: 6.2 G/DL (ref 6–8.4)
RBC # BLD AUTO: 3.97 M/UL (ref 4–5.4)
SODIUM SERPL-SCNC: 135 MMOL/L (ref 136–145)
WBC # BLD AUTO: 8.08 K/UL (ref 3.9–12.7)

## 2023-09-15 PROCEDURE — 80053 COMPREHEN METABOLIC PANEL: CPT

## 2023-09-15 PROCEDURE — 25000242 PHARM REV CODE 250 ALT 637 W/ HCPCS: Performed by: INTERNAL MEDICINE

## 2023-09-15 PROCEDURE — 20600001 HC STEP DOWN PRIVATE ROOM

## 2023-09-15 PROCEDURE — 99900035 HC TECH TIME PER 15 MIN (STAT)

## 2023-09-15 PROCEDURE — 36415 COLL VENOUS BLD VENIPUNCTURE: CPT

## 2023-09-15 PROCEDURE — 94761 N-INVAS EAR/PLS OXIMETRY MLT: CPT

## 2023-09-15 PROCEDURE — 25000003 PHARM REV CODE 250: Performed by: NURSE PRACTITIONER

## 2023-09-15 PROCEDURE — 27000221 HC OXYGEN, UP TO 24 HOURS

## 2023-09-15 PROCEDURE — 25000003 PHARM REV CODE 250: Performed by: EMERGENCY MEDICINE

## 2023-09-15 PROCEDURE — 99233 PR SUBSEQUENT HOSPITAL CARE,LEVL III: ICD-10-PCS | Mod: ,,, | Performed by: INTERNAL MEDICINE

## 2023-09-15 PROCEDURE — 25000003 PHARM REV CODE 250

## 2023-09-15 PROCEDURE — 99233 SBSQ HOSP IP/OBS HIGH 50: CPT | Mod: ,,, | Performed by: INTERNAL MEDICINE

## 2023-09-15 PROCEDURE — 84100 ASSAY OF PHOSPHORUS: CPT

## 2023-09-15 PROCEDURE — 83735 ASSAY OF MAGNESIUM: CPT

## 2023-09-15 PROCEDURE — 85025 COMPLETE CBC W/AUTO DIFF WBC: CPT

## 2023-09-15 PROCEDURE — 63600175 PHARM REV CODE 636 W HCPCS: Performed by: NURSE PRACTITIONER

## 2023-09-15 PROCEDURE — 94640 AIRWAY INHALATION TREATMENT: CPT

## 2023-09-15 RX ORDER — SODIUM CHLORIDE 9 MG/ML
INJECTION, SOLUTION INTRAVENOUS ONCE
Status: DISCONTINUED | OUTPATIENT
Start: 2023-09-16 | End: 2023-09-16

## 2023-09-15 RX ADMIN — ALBUTEROL SULFATE 2.5 MG: 2.5 SOLUTION RESPIRATORY (INHALATION) at 08:09

## 2023-09-15 RX ADMIN — CARVEDILOL 25 MG: 25 TABLET, FILM COATED ORAL at 08:09

## 2023-09-15 RX ADMIN — ASPIRIN 81 MG 81 MG: 81 TABLET ORAL at 08:09

## 2023-09-15 RX ADMIN — MUPIROCIN: 20 OINTMENT TOPICAL at 08:09

## 2023-09-15 RX ADMIN — BUPROPION HYDROCHLORIDE 150 MG: 150 TABLET, FILM COATED, EXTENDED RELEASE ORAL at 08:09

## 2023-09-15 RX ADMIN — HEPARIN SODIUM 5000 UNITS: 5000 INJECTION INTRAVENOUS; SUBCUTANEOUS at 06:09

## 2023-09-15 RX ADMIN — NIFEDIPINE 60 MG: 30 TABLET, FILM COATED, EXTENDED RELEASE ORAL at 08:09

## 2023-09-15 RX ADMIN — LOSARTAN POTASSIUM 100 MG: 50 TABLET, FILM COATED ORAL at 08:09

## 2023-09-15 RX ADMIN — ALBUTEROL SULFATE 2.5 MG: 2.5 SOLUTION RESPIRATORY (INHALATION) at 01:09

## 2023-09-15 RX ADMIN — ACETAMINOPHEN 650 MG: 325 TABLET ORAL at 06:09

## 2023-09-15 RX ADMIN — HEPARIN SODIUM 5000 UNITS: 5000 INJECTION INTRAVENOUS; SUBCUTANEOUS at 10:09

## 2023-09-15 RX ADMIN — HEPARIN SODIUM 5000 UNITS: 5000 INJECTION INTRAVENOUS; SUBCUTANEOUS at 03:09

## 2023-09-15 NOTE — PLAN OF CARE
Problem: Adult Inpatient Plan of Care  Goal: Plan of Care Review  Outcome: Ongoing, Progressing  Goal: Patient-Specific Goal (Individualized)  Outcome: Ongoing, Progressing  Goal: Absence of Hospital-Acquired Illness or Injury  Outcome: Ongoing, Progressing  Goal: Optimal Comfort and Wellbeing  Outcome: Ongoing, Progressing  Goal: Readiness for Transition of Care  Outcome: Ongoing, Progressing     Problem: Device-Related Complication Risk (Hemodialysis)  Goal: Safe, Effective Therapy Delivery  Outcome: Ongoing, Progressing     Problem: Hemodynamic Instability (Hemodialysis)  Goal: Effective Tissue Perfusion  Outcome: Ongoing, Progressing     Problem: Infection (Hemodialysis)  Goal: Absence of Infection Signs and Symptoms  Outcome: Ongoing, Progressing     Problem: Electrolyte Imbalance (Chronic Kidney Disease)  Goal: Electrolyte Balance  Outcome: Ongoing, Progressing     Problem: Fluid Volume Excess (Chronic Kidney Disease)  Goal: Fluid Balance  Outcome: Ongoing, Progressing    Pt A&Ox4 VSS. pt complained of coughing up scant blood and nose feeling dry. Humidification added to 2L NC. Pt rested well throughout night. Equal rise and fall of the chest no SOB reported.  Poc reviewed pt monitoring ongoing

## 2023-09-15 NOTE — PLAN OF CARE
Problem: Adult Inpatient Plan of Care  Goal: Plan of Care Review  Outcome: Ongoing, Progressing  Goal: Patient-Specific Goal (Individualized)  Outcome: Ongoing, Progressing  Goal: Absence of Hospital-Acquired Illness or Injury  Outcome: Ongoing, Progressing  Goal: Optimal Comfort and Wellbeing  Outcome: Ongoing, Progressing  Goal: Readiness for Transition of Care  Outcome: Ongoing, Progressing     Problem: Device-Related Complication Risk (Hemodialysis)  Goal: Safe, Effective Therapy Delivery  Outcome: Ongoing, Progressing     Problem: Hemodynamic Instability (Hemodialysis)  Goal: Effective Tissue Perfusion  Outcome: Ongoing, Progressing     Problem: Infection (Hemodialysis)  Goal: Absence of Infection Signs and Symptoms  Outcome: Ongoing, Progressing     Problem: Electrolyte Imbalance (Chronic Kidney Disease)  Goal: Electrolyte Balance  Outcome: Ongoing, Progressing     Problem: Fluid Volume Excess (Chronic Kidney Disease)  Goal: Fluid Balance  Outcome: Ongoing, Progressing

## 2023-09-15 NOTE — PLAN OF CARE
Patient experience few episodes of SOB today, prn breathing treatment given as ordered and seen by MD, see [facility] for further orders and updates on plan of care.       Problem: Adult Inpatient Plan of Care  Goal: Plan of Care Review  Outcome: Ongoing, Progressing  Goal: Patient-Specific Goal (Individualized)  Outcome: Ongoing, Progressing  Goal: Absence of Hospital-Acquired Illness or Injury  Outcome: Ongoing, Progressing  Goal: Optimal Comfort and Wellbeing  Outcome: Ongoing, Progressing  Goal: Readiness for Transition of Care  Outcome: Ongoing, Progressing     Problem: Device-Related Complication Risk (Hemodialysis)  Goal: Safe, Effective Therapy Delivery  Outcome: Ongoing, Progressing     Problem: Hemodynamic Instability (Hemodialysis)  Goal: Effective Tissue Perfusion  Outcome: Ongoing, Progressing     Problem: Infection (Hemodialysis)  Goal: Absence of Infection Signs and Symptoms  Outcome: Ongoing, Progressing     Problem: Electrolyte Imbalance (Chronic Kidney Disease)  Goal: Electrolyte Balance  Outcome: Ongoing, Progressing     Problem: Fluid Volume Excess (Chronic Kidney Disease)  Goal: Fluid Balance  Outcome: Ongoing, Progressing     Problem: Adult Inpatient Plan of Care  Goal: Readiness for Transition of Care  Outcome: Ongoing, Progressing     Problem: Device-Related Complication Risk (Hemodialysis)  Goal: Safe, Effective Therapy Delivery  Outcome: Ongoing, Progressing

## 2023-09-16 PROBLEM — I16.1 HYPERTENSIVE EMERGENCY: Status: ACTIVE | Noted: 2020-06-09

## 2023-09-16 PROBLEM — E83.9 CHRONIC KIDNEY DISEASE-MINERAL AND BONE DISORDER: Status: ACTIVE | Noted: 2023-09-16

## 2023-09-16 PROBLEM — N18.9 CHRONIC KIDNEY DISEASE-MINERAL AND BONE DISORDER: Status: ACTIVE | Noted: 2023-09-16

## 2023-09-16 PROBLEM — J06.9 URI, ACUTE: Status: ACTIVE | Noted: 2023-09-16

## 2023-09-16 PROBLEM — M89.9 CHRONIC KIDNEY DISEASE-MINERAL AND BONE DISORDER: Status: ACTIVE | Noted: 2023-09-16

## 2023-09-16 PROBLEM — J44.1 COPD EXACERBATION: Status: ACTIVE | Noted: 2023-09-16

## 2023-09-16 LAB
ALBUMIN SERPL BCP-MCNC: 2.8 G/DL (ref 3.5–5.2)
ALP SERPL-CCNC: 93 U/L (ref 55–135)
ALT SERPL W/O P-5'-P-CCNC: 9 U/L (ref 10–44)
ANION GAP SERPL CALC-SCNC: 15 MMOL/L (ref 8–16)
AST SERPL-CCNC: 12 U/L (ref 10–40)
BASOPHILS # BLD AUTO: 0.03 K/UL (ref 0–0.2)
BASOPHILS NFR BLD: 0.3 % (ref 0–1.9)
BILIRUB SERPL-MCNC: 0.6 MG/DL (ref 0.1–1)
BUN SERPL-MCNC: 30 MG/DL (ref 6–20)
CALCIUM SERPL-MCNC: 10.5 MG/DL (ref 8.7–10.5)
CHLORIDE SERPL-SCNC: 96 MMOL/L (ref 95–110)
CO2 SERPL-SCNC: 23 MMOL/L (ref 23–29)
CREAT SERPL-MCNC: 4.7 MG/DL (ref 0.5–1.4)
DIFFERENTIAL METHOD: ABNORMAL
EOSINOPHIL # BLD AUTO: 0.1 K/UL (ref 0–0.5)
EOSINOPHIL NFR BLD: 0.9 % (ref 0–8)
ERYTHROCYTE [DISTWIDTH] IN BLOOD BY AUTOMATED COUNT: 19.3 % (ref 11.5–14.5)
EST. GFR  (NO RACE VARIABLE): 10.1 ML/MIN/1.73 M^2
GLUCOSE SERPL-MCNC: 83 MG/DL (ref 70–110)
HCT VFR BLD AUTO: 37.4 % (ref 37–48.5)
HGB BLD-MCNC: 11.8 G/DL (ref 12–16)
IMM GRANULOCYTES # BLD AUTO: 0.03 K/UL (ref 0–0.04)
IMM GRANULOCYTES NFR BLD AUTO: 0.3 % (ref 0–0.5)
LYMPHOCYTES # BLD AUTO: 0.8 K/UL (ref 1–4.8)
LYMPHOCYTES NFR BLD: 7.3 % (ref 18–48)
MAGNESIUM SERPL-MCNC: 1.9 MG/DL (ref 1.6–2.6)
MCH RBC QN AUTO: 27.3 PG (ref 27–31)
MCHC RBC AUTO-ENTMCNC: 31.6 G/DL (ref 32–36)
MCV RBC AUTO: 87 FL (ref 82–98)
MONOCYTES # BLD AUTO: 0.8 K/UL (ref 0.3–1)
MONOCYTES NFR BLD: 7.3 % (ref 4–15)
NEUTROPHILS # BLD AUTO: 8.6 K/UL (ref 1.8–7.7)
NEUTROPHILS NFR BLD: 83.9 % (ref 38–73)
NRBC BLD-RTO: 0 /100 WBC
PHOSPHATE SERPL-MCNC: 4.2 MG/DL (ref 2.7–4.5)
PLATELET # BLD AUTO: 170 K/UL (ref 150–450)
PMV BLD AUTO: 9.6 FL (ref 9.2–12.9)
POTASSIUM SERPL-SCNC: 4.4 MMOL/L (ref 3.5–5.1)
PROT SERPL-MCNC: 6.9 G/DL (ref 6–8.4)
RBC # BLD AUTO: 4.32 M/UL (ref 4–5.4)
SODIUM SERPL-SCNC: 134 MMOL/L (ref 136–145)
WBC # BLD AUTO: 10.26 K/UL (ref 3.9–12.7)

## 2023-09-16 PROCEDURE — 99233 PR SUBSEQUENT HOSPITAL CARE,LEVL III: ICD-10-PCS | Mod: ,,, | Performed by: INTERNAL MEDICINE

## 2023-09-16 PROCEDURE — 94761 N-INVAS EAR/PLS OXIMETRY MLT: CPT

## 2023-09-16 PROCEDURE — 85025 COMPLETE CBC W/AUTO DIFF WBC: CPT

## 2023-09-16 PROCEDURE — 63600175 PHARM REV CODE 636 W HCPCS: Performed by: INTERNAL MEDICINE

## 2023-09-16 PROCEDURE — 63600175 PHARM REV CODE 636 W HCPCS: Performed by: NURSE PRACTITIONER

## 2023-09-16 PROCEDURE — 99900035 HC TECH TIME PER 15 MIN (STAT)

## 2023-09-16 PROCEDURE — 80053 COMPREHEN METABOLIC PANEL: CPT

## 2023-09-16 PROCEDURE — 80100014 HC HEMODIALYSIS 1:1

## 2023-09-16 PROCEDURE — 84100 ASSAY OF PHOSPHORUS: CPT

## 2023-09-16 PROCEDURE — 25000003 PHARM REV CODE 250: Performed by: INTERNAL MEDICINE

## 2023-09-16 PROCEDURE — 94640 AIRWAY INHALATION TREATMENT: CPT

## 2023-09-16 PROCEDURE — 99232 PR SUBSEQUENT HOSPITAL CARE,LEVL II: ICD-10-PCS | Mod: ,,, | Performed by: NURSE PRACTITIONER

## 2023-09-16 PROCEDURE — 36415 COLL VENOUS BLD VENIPUNCTURE: CPT

## 2023-09-16 PROCEDURE — 99232 SBSQ HOSP IP/OBS MODERATE 35: CPT | Mod: ,,, | Performed by: NURSE PRACTITIONER

## 2023-09-16 PROCEDURE — 25000242 PHARM REV CODE 250 ALT 637 W/ HCPCS: Performed by: INTERNAL MEDICINE

## 2023-09-16 PROCEDURE — 25000003 PHARM REV CODE 250: Performed by: NURSE PRACTITIONER

## 2023-09-16 PROCEDURE — 83735 ASSAY OF MAGNESIUM: CPT

## 2023-09-16 PROCEDURE — 25000003 PHARM REV CODE 250

## 2023-09-16 PROCEDURE — 20600001 HC STEP DOWN PRIVATE ROOM

## 2023-09-16 PROCEDURE — 27000221 HC OXYGEN, UP TO 24 HOURS

## 2023-09-16 PROCEDURE — 99233 SBSQ HOSP IP/OBS HIGH 50: CPT | Mod: ,,, | Performed by: INTERNAL MEDICINE

## 2023-09-16 RX ORDER — FLUTICASONE PROPIONATE 50 MCG
2 SPRAY, SUSPENSION (ML) NASAL DAILY
Status: DISCONTINUED | OUTPATIENT
Start: 2023-09-16 | End: 2023-09-17 | Stop reason: HOSPADM

## 2023-09-16 RX ORDER — CETIRIZINE HYDROCHLORIDE 10 MG/1
10 TABLET ORAL DAILY
Status: DISCONTINUED | OUTPATIENT
Start: 2023-09-16 | End: 2023-09-17

## 2023-09-16 RX ORDER — FLUTICASONE FUROATE AND VILANTEROL 100; 25 UG/1; UG/1
1 POWDER RESPIRATORY (INHALATION) DAILY
Status: DISCONTINUED | OUTPATIENT
Start: 2023-09-16 | End: 2023-09-17 | Stop reason: HOSPADM

## 2023-09-16 RX ORDER — PREDNISONE 20 MG/1
40 TABLET ORAL DAILY
Status: DISCONTINUED | OUTPATIENT
Start: 2023-09-16 | End: 2023-09-17 | Stop reason: HOSPADM

## 2023-09-16 RX ORDER — ALBUTEROL SULFATE 2.5 MG/.5ML
2.5 SOLUTION RESPIRATORY (INHALATION)
Status: DISCONTINUED | OUTPATIENT
Start: 2023-09-16 | End: 2023-09-17 | Stop reason: HOSPADM

## 2023-09-16 RX ORDER — BISMUTH SUBSALICYLATE 525 MG/30ML
30 LIQUID ORAL EVERY 6 HOURS PRN
Status: DISCONTINUED | OUTPATIENT
Start: 2023-09-16 | End: 2023-09-17 | Stop reason: HOSPADM

## 2023-09-16 RX ORDER — OXYMETAZOLINE HCL 0.05 %
2 SPRAY, NON-AEROSOL (ML) NASAL 2 TIMES DAILY
Status: DISCONTINUED | OUTPATIENT
Start: 2023-09-16 | End: 2023-09-17 | Stop reason: HOSPADM

## 2023-09-16 RX ORDER — PREDNISONE 20 MG/1
40 TABLET ORAL DAILY
Status: DISCONTINUED | OUTPATIENT
Start: 2023-09-16 | End: 2023-09-16

## 2023-09-16 RX ADMIN — NIFEDIPINE 60 MG: 30 TABLET, FILM COATED, EXTENDED RELEASE ORAL at 08:09

## 2023-09-16 RX ADMIN — HEPARIN SODIUM 5000 UNITS: 5000 INJECTION INTRAVENOUS; SUBCUTANEOUS at 09:09

## 2023-09-16 RX ADMIN — TIOTROPIUM BROMIDE INHALATION SPRAY 2 PUFF: 3.12 SPRAY, METERED RESPIRATORY (INHALATION) at 09:09

## 2023-09-16 RX ADMIN — MUPIROCIN: 20 OINTMENT TOPICAL at 09:09

## 2023-09-16 RX ADMIN — OXYMETAZOLINE HYDROCHLORIDE 2 SPRAY: 0.05 SPRAY NASAL at 09:09

## 2023-09-16 RX ADMIN — CARVEDILOL 25 MG: 25 TABLET, FILM COATED ORAL at 09:09

## 2023-09-16 RX ADMIN — ALBUTEROL SULFATE 2.5 MG: 2.5 SOLUTION RESPIRATORY (INHALATION) at 09:09

## 2023-09-16 RX ADMIN — LOSARTAN POTASSIUM 100 MG: 50 TABLET, FILM COATED ORAL at 08:09

## 2023-09-16 RX ADMIN — FLUTICASONE FUROATE AND VILANTEROL TRIFENATATE 1 PUFF: 100; 25 POWDER RESPIRATORY (INHALATION) at 09:09

## 2023-09-16 RX ADMIN — BISMUTH SUBSALICYLATE 30 ML: 525 LIQUID ORAL at 08:09

## 2023-09-16 RX ADMIN — CETIRIZINE HYDROCHLORIDE 10 MG: 10 TABLET, FILM COATED ORAL at 08:09

## 2023-09-16 RX ADMIN — HEPARIN SODIUM 5000 UNITS: 5000 INJECTION INTRAVENOUS; SUBCUTANEOUS at 06:09

## 2023-09-16 RX ADMIN — FLUTICASONE PROPIONATE 100 MCG: 50 SPRAY, METERED NASAL at 08:09

## 2023-09-16 RX ADMIN — PREDNISONE 40 MG: 20 TABLET ORAL at 08:09

## 2023-09-16 RX ADMIN — ALBUTEROL SULFATE 2.5 MG: 2.5 SOLUTION RESPIRATORY (INHALATION) at 12:09

## 2023-09-16 RX ADMIN — OXYMETAZOLINE HYDROCHLORIDE 2 SPRAY: 0.05 SPRAY NASAL at 08:09

## 2023-09-16 RX ADMIN — ALBUTEROL SULFATE 2.5 MG: 2.5 SOLUTION RESPIRATORY (INHALATION) at 04:09

## 2023-09-16 RX ADMIN — HEPARIN SODIUM 5000 UNITS: 5000 INJECTION INTRAVENOUS; SUBCUTANEOUS at 01:09

## 2023-09-16 RX ADMIN — ALBUTEROL SULFATE 2.5 MG: 2.5 SOLUTION RESPIRATORY (INHALATION) at 07:09

## 2023-09-16 RX ADMIN — CARVEDILOL 25 MG: 25 TABLET, FILM COATED ORAL at 08:09

## 2023-09-16 RX ADMIN — ASPIRIN 81 MG 81 MG: 81 TABLET ORAL at 08:09

## 2023-09-16 RX ADMIN — BUPROPION HYDROCHLORIDE 150 MG: 150 TABLET, FILM COATED, EXTENDED RELEASE ORAL at 08:09

## 2023-09-16 NOTE — PROGRESS NOTES
[facility] - Tele[facility] Medicine  Progress Note    Patient Name: Jennifer Booth  MRN: [id number]  Patient Class: IP- Inpatient   Admission Date: 9/14/2023  Length of Stay: 2 days  Attending Physician: Edgardo Avitia MD  Primary Care Provider: Leodan Sanchez MD        Subjective:     Principal Problem:Acute respiratory failure with hypoxia        HPI:  No notes on file    Overview/Hospital Course:  Ms. Booth was admitted to [facility] MICU overnight for acute hypoxemic respiratory failure 2/2 pulmonary edema 2/2 hypertensive urgency after missing dialysis. She was admitted to MICU on continuous bipap and underwent HD today; now on room air and hemodynamically stable for stepdown. Patient still with dyspnea and oxygen needs after dialysis. Placed on steroid course, LABA/ICS, scheduled albuterol and spiriva.      Interval History: NAEON. S/p HD today , feeling better     Review of Systems   Constitutional:  Negative for appetite change.   Respiratory:  Negative for cough and shortness of breath.    Cardiovascular:  Negative for chest pain and leg swelling.   Gastrointestinal:  Negative for abdominal distention, abdominal pain, constipation and diarrhea.   Genitourinary:  Negative for difficulty urinating and dysuria.   Neurological:  Negative for dizziness and headaches.     Objective:     Vital Signs (Most Recent):  Temp: 98 °F (36.7 °C) (09/16/23 1151)  Pulse: 80 (09/16/23 1240)  Resp: 18 (09/16/23 1240)  BP: 131/75 (09/16/23 1151)  SpO2: 95 % (09/16/23 1240) Vital Signs (24h Range):  Temp:  [98 °F (36.7 °C)-98.6 °F (37 °C)] 98 °F (36.7 °C)  Pulse:  [76-95] 80  Resp:  [18-20] 18  SpO2:  [92 %-100 %] 95 %  BP: (110-158)/(69-94) 131/75        There is no height or weight on file to calculate BMI.    Intake/Output Summary (Last 24 hours) at 9/16/2023 1422  Last data filed at 9/16/2023 0600  Gross per 24 hour   Intake 0 ml   Output 3500 ml   Net -3500 ml         Physical Exam  Constitutional:        Appearance: Normal appearance.   HENT:      Head: Normocephalic and atraumatic.      Mouth/Throat:      Mouth: Mucous membranes are moist.   Cardiovascular:      Rate and Rhythm: Normal rate and regular rhythm.      Pulses: Normal pulses.      Heart sounds: Normal heart sounds.   Pulmonary:      Effort: Pulmonary effort is normal.      Breath sounds: Normal breath sounds. No rales.   Abdominal:      General: Abdomen is flat. Bowel sounds are normal. There is no distension.      Palpations: Abdomen is soft.      Tenderness: There is no abdominal tenderness.   Musculoskeletal:         General: Normal range of motion.      Cervical back: Normal range of motion and neck supple.   Skin:     General: Skin is warm and dry.   Neurological:      General: No focal deficit present.      Mental Status: She is alert and oriented to person, place, and time.   Psychiatric:         Mood and Affect: Mood normal.             Significant Labs: All pertinent labs within the past 24 hours have been reviewed.    Significant Imaging: I have reviewed all pertinent imaging results/findings within the past 24 hours.      Assessment/Plan:      * Acute respiratory failure with hypoxia  Hypervolemia  Probable COPD exacerbation  Admitted to ICU overnight for continuous BIPAP.   Attributed to volume overload possibly due to missed dialysis  Weaned off of BIPAP  on scheduled albuterol nebs, prednisone 40 mg burst, ICS/LABA, and spiriva    URI, acute  Supportive treatment  Afrin spray, flonase    Closed nondisplaced fracture of proximal phalanx of right great toe with routine healing  This injury is 3 weeks old from a fall. Hard-soled shoe given. Follow up with orthopedics outpatient.    Hypertensive emergency  Arrived with /198 and Pulmonary edema  Required cardene drip  Resumed carvedilol 25 mg BID, losartan 100 mg daily, nifedipine 60 mg daily    Anemia in chronic kidney disease, on chronic dialysis  Managed by nephrology  S/p HD today      ESRD (end stage renal disease) on dialysis  Nephrology consulted an assisting in dialysis      VTE Risk Mitigation (From admission, onward)         Ordered     heparin (porcine) injection 5,000 Units  Every 8 hours         09/14/23 1229     IP VTE HIGH RISK PATIENT  Once         09/14/23 0304     Place sequential compression device  Until discontinued         09/14/23 0304                Discharge Planning   JOVANA: 9/19/2023     Code Status: Full Code   Is the patient medically ready for discharge?:     Reason for patient still in hospital (select all that apply): Treatment  Discharge Plan A: Home with family                  Edgardo Avitia MD  Department of Hospital Medicine   [facility] - Telemetry Stepdown

## 2023-09-16 NOTE — ASSESSMENT & PLAN NOTE
This injury is 3 weeks old from a fall. Hard-soled shoe given. Follow up with orthopedics outpatient.

## 2023-09-16 NOTE — SUBJECTIVE & OBJECTIVE
Interval History: NAEON. S/p HD today , feeling better     Review of Systems   Constitutional:  Negative for appetite change.   Respiratory:  Negative for cough and shortness of breath.    Cardiovascular:  Negative for chest pain and leg swelling.   Gastrointestinal:  Negative for abdominal distention, abdominal pain, constipation and diarrhea.   Genitourinary:  Negative for difficulty urinating and dysuria.   Neurological:  Negative for dizziness and headaches.     Objective:     Vital Signs (Most Recent):  Temp: 98 °F (36.7 °C) (09/16/23 1151)  Pulse: 80 (09/16/23 1240)  Resp: 18 (09/16/23 1240)  BP: 131/75 (09/16/23 1151)  SpO2: 95 % (09/16/23 1240) Vital Signs (24h Range):  Temp:  [98 °F (36.7 °C)-98.6 °F (37 °C)] 98 °F (36.7 °C)  Pulse:  [76-95] 80  Resp:  [18-20] 18  SpO2:  [92 %-100 %] 95 %  BP: (110-158)/(69-94) 131/75        There is no height or weight on file to calculate BMI.    Intake/Output Summary (Last 24 hours) at 9/16/2023 1422  Last data filed at 9/16/2023 0600  Gross per 24 hour   Intake 0 ml   Output 3500 ml   Net -3500 ml         Physical Exam  Constitutional:       Appearance: Normal appearance.   HENT:      Head: Normocephalic and atraumatic.      Mouth/Throat:      Mouth: Mucous membranes are moist.   Cardiovascular:      Rate and Rhythm: Normal rate and regular rhythm.      Pulses: Normal pulses.      Heart sounds: Normal heart sounds.   Pulmonary:      Effort: Pulmonary effort is normal.      Breath sounds: Normal breath sounds. No rales.   Abdominal:      General: Abdomen is flat. Bowel sounds are normal. There is no distension.      Palpations: Abdomen is soft.      Tenderness: There is no abdominal tenderness.   Musculoskeletal:         General: Normal range of motion.      Cervical back: Normal range of motion and neck supple.   Skin:     General: Skin is warm and dry.   Neurological:      General: No focal deficit present.      Mental Status: She is alert and oriented to person,  place, and time.   Psychiatric:         Mood and Affect: Mood normal.             Significant Labs: All pertinent labs within the past 24 hours have been reviewed.    Significant Imaging: I have reviewed all pertinent imaging results/findings within the past 24 hours.

## 2023-09-16 NOTE — ASSESSMENT & PLAN NOTE
Arrived with /198 and Pulmonary edema  Required cardene drip  Resumed carvedilol 25 mg BID, losartan 100 mg daily, nifedipine 60 mg daily

## 2023-09-16 NOTE — ASSESSMENT & PLAN NOTE
Patient is a T/Th/Sat dialysis patient with her last session being Tuesday 9/12. She did miss her Saturday dialysis session prior to that one. She presented with dyspnea and pulmonary edema, which improved with hydralazine, lasix, and nitro with BiPAP.    Recommendations  -Will continue with her TTS dialysis schedule while inpatient. HD completed this AM. HD again on Tuesday if patient remain inpatient.   -Consent for dialysis was obtained and left in the patient's chart.   -Daily BMP's  -Renal dose medications

## 2023-09-16 NOTE — PROGRESS NOTES
Hospital Medicine  Progress note    Team: [facility] HOSP MED Z Kari Christensen MD  Admit Date: 9/14/2023    Principal Problem:  Acute respiratory failure with hypoxia    Interval hx:  Patient still dyspneic. Feels congestion and blood streaked sputum    ROS   Respiratory: neg for cough neg for shortness of breath  Cardiovascular: neg for chest pain neg for palpitations  Gastrointestinal: neg for nausea neg for vomiting, neg for abdominal pain neg for diarrhea neg for constipation   Behavioral/Psych: neg for depression neg for anxiety    PEx  Temp:  [97.8 °F (36.6 °C)-98.6 °F (37 °C)]   Pulse:  [82-92]   Resp:  [18-20]   BP: (132-168)/(78-98)   SpO2:  [92 %-100 %]   No intake or output data in the 24 hours ending 09/15/23 2324    General Appearance: no acute distress, WD, thin  Heart: regular rate and rhythm, no heave  Respiratory: Normal respiratory effort, symmetric excursion, bilateral vesicular breath sounds, decreased air movement  Abdomen: Soft, non-tender; bowel sounds active  Skin: intact, no rash, no ulcers  Neurologic:  No focal numbness or weakness  Mental status: Alert, oriented x 4, affect appropriate     Recent Labs   Lab 09/14/23 0107 09/14/23  0116 09/14/23  0121 09/14/23  0416 09/15/23  0541   WBC 13.00*  --   --  8.28 8.08   HGB 12.2  --   --  10.9* 10.7*   HCT 40.6   < > 44 36.0* 34.9*     --   --  133* 153    < > = values in this interval not displayed.     Recent Labs   Lab 09/14/23  0107 09/14/23  0416 09/15/23  0541    142 135*   K 3.9 5.1 4.6   CL 98 99 96   CO2 21* 21* 24   BUN 40* 41* 45*   CREATININE 7.9* 8.8* 6.8*   * 104 80   CALCIUM 9.8 9.1 10.2   MG  --  2.1 1.9   PHOS  --  5.1* 4.4     Recent Labs   Lab 09/14/23  0107 09/14/23  0416 09/15/23  0541   ALKPHOS 142* 107 106   ALT 10 10 8*   AST 16 27 11   ALBUMIN 3.5 3.0* 2.8*   PROT 7.7 6.8 6.2   BILITOT 0.6 0.5 0.5       Scheduled Meds:   [START ON 9/16/2023] sodium chloride 0.9%   Intravenous Once    aspirin  81 mg Oral  Daily    buPROPion  150 mg Oral Daily    carvediloL  25 mg Oral BID    heparin (porcine)  5,000 Units Subcutaneous Q8H    losartan  100 mg Oral Daily    mupirocin   Nasal BID    NIFEdipine  60 mg Oral Daily     Continuous Infusions:  As Needed:  acetaminophen, albuterol sulfate, sodium chloride 0.9%    Assessment and Plan  / Problems managed today    * Acute respiratory failure with hypoxia  Hypervolemia  Probable COPD exacerbation  Admitted to ICU overnight for continuous BIPAP.   Attributed to volume overload possibly due to missed dialysis  Weaned off of BIPAP  Start scheduled albuterol nebs, prednisone 40 mg burst, ICS/LABA, and spiriva    ESRD (end stage renal disease) on dialysis  Nephrology consulted an assisting in dialysis    Hypertensive emergency  Arrived with /198 and Pulmonary edema  Require cardene drip  Resume carvedilol 25 mg BID, losartan 100 mg daily, nifedipine 60 mg daily    URI, acute  Supportive treatment  Afrin spray, flonase    Closed nondisplaced fracture of proximal phalanx of right great toe with routine healing  This injury is 3 weeks old from a fall. Hard-soled shoe given. Follow up with orthopedics outpatient.    Anemia in chronic kidney disease, on chronic dialysis  Managed by nephrology        Discharge Planning   JOVANA: 9/19/2023     Code Status: Full Code   Is the patient medically ready for discharge?:     Reason for patient still in hospital (select all that apply): Patient trending condition and Treatment  Discharge Plan A: Home with family        Diet:  renal diet  GI PPx: not needed  DVT PPx:  heparin  Airways: room air  Wounds: none    Goals of Care:  Return to prior functional status      Time (minutes) spent in care of the patient including review of tests, flow sheets and notes since last visit, face to face contact, placing orders, communicating with consultants if needed, care coordination, and documentation: 35  min.    Kari Christensen MD

## 2023-09-16 NOTE — ASSESSMENT & PLAN NOTE
Hypervolemia  Probable COPD exacerbation  Admitted to ICU overnight for continuous BIPAP.   Attributed to volume overload possibly due to missed dialysis  Weaned off of BIPAP  on scheduled albuterol nebs, prednisone 40 mg burst, ICS/LABA, and spiriva

## 2023-09-16 NOTE — SUBJECTIVE & OBJECTIVE
Interval History: HD completed this AM with 3 L removed. No distress on exam. Oxygenating well on 2.5 L NC.     Review of patient's allergies indicates:   Allergen Reactions    Doxycycline Swelling, Rash and Hives    Gentamicin Anaphylaxis    Vancomycin analogues Anaphylaxis     Current Facility-Administered Medications   Medication Frequency    0.9%  NaCl infusion Once    acetaminophen tablet 650 mg Q4H PRN    albuterol sulfate nebulizer solution 2.5 mg Q4H PRN    albuterol sulfate nebulizer solution 2.5 mg Q4H WAKE    aspirin chewable tablet 81 mg Daily    buPROPion TB24 tablet 150 mg Daily    carvediloL tablet 25 mg BID    cetirizine tablet 10 mg Daily    fluticasone furoate-vilanteroL 100-25 mcg/dose diskus inhaler 1 puff Daily    fluticasone propionate 50 mcg/actuation nasal spray 100 mcg Daily    heparin (porcine) injection 5,000 Units Q8H    losartan tablet 100 mg Daily    mupirocin 2 % ointment BID    NIFEdipine 24 hr tablet 60 mg Daily    oxymetazoline 0.05 % nasal spray 2 spray BID    predniSONE tablet 40 mg Daily    sodium chloride 0.9% flush 10 mL PRN    tiotropium bromide 2.5 mcg/actuation inhaler 2 puff Daily     Facility-Administered Medications Ordered in Other Encounters   Medication Frequency    0.9%  NaCl infusion Continuous    0.9%  NaCl infusion Continuous    sodium chloride 0.9% flush 10 mL PRN    sodium chloride 0.9% flush 10 mL PRN       Objective:     Vital Signs (Most Recent):  Temp: 98 °F (36.7 °C) (09/16/23 0735)  Pulse: 83 (09/16/23 0919)  Resp: 18 (09/16/23 0919)  BP: (!) 158/94 (09/16/23 0735)  SpO2: 99 % (09/16/23 0919) Vital Signs (24h Range):  Temp:  [98 °F (36.7 °C)-98.6 °F (37 °C)] 98 °F (36.7 °C)  Pulse:  [77-95] 83  Resp:  [18-20] 18  SpO2:  [92 %-100 %] 99 %  BP: (110-158)/(69-94) 158/94        There is no height or weight on file to calculate BMI.  There is no height or weight on file to calculate BSA.    I/O last 3 completed shifts:  In: 0   Out: 3500 [Other:3500]      Physical Exam  Vitals reviewed.   Constitutional:       General: She is not in acute distress.     Appearance: Normal appearance. She is not ill-appearing or diaphoretic.   HENT:      Head: Normocephalic and atraumatic.      Mouth/Throat:      Mouth: Mucous membranes are moist.   Eyes:      General: No scleral icterus.  Cardiovascular:      Rate and Rhythm: Normal rate and regular rhythm.      Pulses: Normal pulses.      Heart sounds: Normal heart sounds. No murmur heard.  Pulmonary:      Effort: Pulmonary effort is normal.   Abdominal:      General: Abdomen is flat. Bowel sounds are normal.      Palpations: Abdomen is soft.   Musculoskeletal:         General: Normal range of motion.      Cervical back: Normal range of motion. No rigidity or tenderness.      Right lower leg: Edema present.      Left lower leg: Edema present.   Neurological:      General: No focal deficit present.      Mental Status: She is alert and oriented to person, place, and time.   Psychiatric:         Mood and Affect: Mood normal.          Significant Labs:  CBC:   Recent Labs   Lab 09/16/23  0842   WBC 10.26   RBC 4.32   HGB 11.8*   HCT 37.4      MCV 87   MCH 27.3   MCHC 31.6*     CMP:   Recent Labs   Lab 09/16/23  0842   GLU 83   CALCIUM 10.5   ALBUMIN 2.8*   PROT 6.9   *   K 4.4   CO2 23   CL 96   BUN 30*   CREATININE 4.7*   ALKPHOS 93   ALT 9*   AST 12   BILITOT 0.6     All labs within the past 24 hours have been reviewed.

## 2023-09-16 NOTE — NURSING
End of shift note    AAOx4  RA  Uneventful shift  VSS  NADN- safety checks performed  Call light in reach

## 2023-09-16 NOTE — ASSESSMENT & PLAN NOTE
Recent Labs   Lab 09/14/23  0416 09/15/23  0541 09/16/23  0842   WBC 8.28 8.08 10.26   HGB 10.9* 10.7* 11.8*   HCT 36.0* 34.9* 37.4   * 153 170       - Target Hgb 10-11 gm/dL. Hgb 11.8.  - EPO can be administered and dosed per his OP unit upon discharge.

## 2023-09-16 NOTE — HOSPITAL COURSE
Ms. Booth was admitted to [facility] MICU overnight for acute hypoxemic respiratory failure 2/2 pulmonary edema 2/2 hypertensive urgency after missing dialysis. She was admitted to MICU on continuous bipap and underwent HD today; now on room air and hemodynamically stable for stepdown. Patient still with dyspnea and oxygen needs after dialysis. Placed on steroid course, LABA/ICS, scheduled albuterol and spiriva. Hard-soled shoe given for the toe fracture and patient set up for follow up with orthopedics outpatient.Patient was initially placed on cardene drip for hypertensive urgency but was weaned off and BP managed with carvedilol 25 mg BID, losartan 100 mg daily, nifedipine 60 mg daily. She tolerated HD which also assisted with volume removal and she was stable for discharge home.

## 2023-09-16 NOTE — PLAN OF CARE
Pt resting in bed on continuous monitor. Pt experienced SOB tonight and desats between 87-89%. O2 increased to 4l nc for a few hours. Pt now  titrated down to 3lnc, dialysis in progress now. Pt denies questions or concerns. Call light in reach

## 2023-09-16 NOTE — PROGRESS NOTES
[facility] - Telemetry Stepdown  Nephrology  Progress Note    Patient Name: Jennifer Booth  MRN: [id number]  Admission Date: 9/14/2023  Hospital Length of Stay: 2 days  Attending Provider: Edgardo Avitia MD   Primary Care Physician: Leodan Sanchez MD  Principal Problem:Acute respiratory failure with hypoxia    Subjective:     Interval History: HD completed this AM with 3 L removed. No distress on exam. Oxygenating well on 2.5 L NC.     Review of patient's allergies indicates:   Allergen Reactions    Doxycycline Swelling, Rash and Hives    Gentamicin Anaphylaxis    Vancomycin analogues Anaphylaxis     Current Facility-Administered Medications   Medication Frequency    0.9%  NaCl infusion Once    acetaminophen tablet 650 mg Q4H PRN    albuterol sulfate nebulizer solution 2.5 mg Q4H PRN    albuterol sulfate nebulizer solution 2.5 mg Q4H WAKE    aspirin chewable tablet 81 mg Daily    buPROPion TB24 tablet 150 mg Daily    carvediloL tablet 25 mg BID    cetirizine tablet 10 mg Daily    fluticasone furoate-vilanteroL 100-25 mcg/dose diskus inhaler 1 puff Daily    fluticasone propionate 50 mcg/actuation nasal spray 100 mcg Daily    heparin (porcine) injection 5,000 Units Q8H    losartan tablet 100 mg Daily    mupirocin 2 % ointment BID    NIFEdipine 24 hr tablet 60 mg Daily    oxymetazoline 0.05 % nasal spray 2 spray BID    predniSONE tablet 40 mg Daily    sodium chloride 0.9% flush 10 mL PRN    tiotropium bromide 2.5 mcg/actuation inhaler 2 puff Daily     Facility-Administered Medications Ordered in Other Encounters   Medication Frequency    0.9%  NaCl infusion Continuous    0.9%  NaCl infusion Continuous    sodium chloride 0.9% flush 10 mL PRN    sodium chloride 0.9% flush 10 mL PRN       Objective:     Vital Signs (Most Recent):  Temp: 98 °F (36.7 °C) (09/16/23 0735)  Pulse: 83 (09/16/23 0919)  Resp: 18 (09/16/23 0919)  BP: (!) 158/94 (09/16/23 0735)  SpO2: 99 % (09/16/23 0919) Vital Signs (24h  Range):  Temp:  [98 °F (36.7 °C)-98.6 °F (37 °C)] 98 °F (36.7 °C)  Pulse:  [77-95] 83  Resp:  [18-20] 18  SpO2:  [92 %-100 %] 99 %  BP: (110-158)/(69-94) 158/94        There is no height or weight on file to calculate BMI.  There is no height or weight on file to calculate BSA.    I/O last 3 completed shifts:  In: 0   Out: 3500 [Other:3500]     Physical Exam  Vitals reviewed.   Constitutional:       General: She is not in acute distress.     Appearance: Normal appearance. She is not ill-appearing or diaphoretic.   HENT:      Head: Normocephalic and atraumatic.      Mouth/Throat:      Mouth: Mucous membranes are moist.   Eyes:      General: No scleral icterus.  Cardiovascular:      Rate and Rhythm: Normal rate and regular rhythm.      Pulses: Normal pulses.      Heart sounds: Normal heart sounds. No murmur heard.  Pulmonary:      Effort: Pulmonary effort is normal.   Abdominal:      General: Abdomen is flat. Bowel sounds are normal.      Palpations: Abdomen is soft.   Musculoskeletal:         General: Normal range of motion.      Cervical back: Normal range of motion. No rigidity or tenderness.      Right lower leg: Edema present.      Left lower leg: Edema present.   Neurological:      General: No focal deficit present.      Mental Status: She is alert and oriented to person, place, and time.   Psychiatric:         Mood and Affect: Mood normal.          Significant Labs:  CBC:   Recent Labs   Lab 09/16/23  0842   WBC 10.26   RBC 4.32   HGB 11.8*   HCT 37.4      MCV 87   MCH 27.3   MCHC 31.6*     CMP:   Recent Labs   Lab 09/16/23  0842   GLU 83   CALCIUM 10.5   ALBUMIN 2.8*   PROT 6.9   *   K 4.4   CO2 23   CL 96   BUN 30*   CREATININE 4.7*   ALKPHOS 93   ALT 9*   AST 12   BILITOT 0.6     All labs within the past 24 hours have been reviewed.       Assessment/Plan:     Pulmonary  * Acute respiratory failure with hypoxia  Improved  - defer to primary team     Renal/  Chronic kidney disease-mineral and  bone disorder  Lab Results   Component Value Date    .1 (H) 04/08/2022    CALCIUM 10.5 09/16/2023    ALBUMIN 2.8 (L) 09/16/2023    CAION 1.23 12/26/2011    PHOS 4.2 09/16/2023       -F/U PO4, Mg, Calcium. And albumin levels.   -Renal diet with protein intake goal 1.5 g/kg/d with 1 L fluid restriction   -Novasource with meals.  -Phos 4.2, no binders    ESRD (end stage renal disease) on dialysis  Patient is a T/Th/Sat dialysis patient with her last session being Tuesday 9/12. She did miss her Saturday dialysis session prior to that one. She presented with dyspnea and pulmonary edema, which improved with hydralazine, lasix, and nitro with BiPAP.    Recommendations  -Will continue with her TTS dialysis schedule while inpatient. HD completed this AM. HD again on Tuesday if patient remain inpatient.   -Consent for dialysis was obtained and left in the patient's chart.   -Daily BMP's  -Renal dose medications    Oncology  Anemia in chronic kidney disease, on chronic dialysis  Recent Labs   Lab 09/14/23  0416 09/15/23  0541 09/16/23  0842   WBC 8.28 8.08 10.26   HGB 10.9* 10.7* 11.8*   HCT 36.0* 34.9* 37.4   * 153 170       - Target Hgb 10-11 gm/dL. Hgb 11.8.  - EPO can be administered and dosed per his OP unit upon discharge.          Thank you for your consult. I will follow-up with patient. Please contact us if you have any additional questions.    Annita Costello DNP, FNP-C  Nephrology  [facility] - Telemetry Stepdown

## 2023-09-16 NOTE — PROGRESS NOTES
HD tx completed without any issues.  Net UF: 3L per pt request.  Manual pressure held until hemostasis was achieved.  Dressing dry and intact; no oozing or bleeding noted from cannulation sites.

## 2023-09-16 NOTE — ASSESSMENT & PLAN NOTE
Lab Results   Component Value Date    .1 (H) 04/08/2022    CALCIUM 10.5 09/16/2023    ALBUMIN 2.8 (L) 09/16/2023    CAION 1.23 12/26/2011    PHOS 4.2 09/16/2023       -F/U PO4, Mg, Calcium. And albumin levels.   -Renal diet with protein intake goal 1.5 g/kg/d with 1 L fluid restriction   -Novasource with meals.  -Phos 4.2, no binders

## 2023-09-17 VITALS
DIASTOLIC BLOOD PRESSURE: 81 MMHG | OXYGEN SATURATION: 95 % | HEART RATE: 76 BPM | SYSTOLIC BLOOD PRESSURE: 134 MMHG | RESPIRATION RATE: 18 BRPM | TEMPERATURE: 98 F

## 2023-09-17 LAB
ALBUMIN SERPL BCP-MCNC: 2.7 G/DL (ref 3.5–5.2)
ALP SERPL-CCNC: 104 U/L (ref 55–135)
ALT SERPL W/O P-5'-P-CCNC: 9 U/L (ref 10–44)
ANION GAP SERPL CALC-SCNC: 22 MMOL/L (ref 8–16)
AST SERPL-CCNC: 11 U/L (ref 10–40)
BASOPHILS # BLD AUTO: 0.01 K/UL (ref 0–0.2)
BASOPHILS NFR BLD: 0.1 % (ref 0–1.9)
BILIRUB SERPL-MCNC: 0.4 MG/DL (ref 0.1–1)
BUN SERPL-MCNC: 72 MG/DL (ref 6–20)
CALCIUM SERPL-MCNC: 11 MG/DL (ref 8.7–10.5)
CHLORIDE SERPL-SCNC: 97 MMOL/L (ref 95–110)
CO2 SERPL-SCNC: 15 MMOL/L (ref 23–29)
CREAT SERPL-MCNC: 6.6 MG/DL (ref 0.5–1.4)
DIFFERENTIAL METHOD: ABNORMAL
EOSINOPHIL # BLD AUTO: 0 K/UL (ref 0–0.5)
EOSINOPHIL NFR BLD: 0.2 % (ref 0–8)
ERYTHROCYTE [DISTWIDTH] IN BLOOD BY AUTOMATED COUNT: 18.7 % (ref 11.5–14.5)
EST. GFR  (NO RACE VARIABLE): 6.7 ML/MIN/1.73 M^2
GLUCOSE SERPL-MCNC: 95 MG/DL (ref 70–110)
HCT VFR BLD AUTO: 34.8 % (ref 37–48.5)
HGB BLD-MCNC: 11.3 G/DL (ref 12–16)
IMM GRANULOCYTES # BLD AUTO: 0.04 K/UL (ref 0–0.04)
IMM GRANULOCYTES NFR BLD AUTO: 0.3 % (ref 0–0.5)
LYMPHOCYTES # BLD AUTO: 1.1 K/UL (ref 1–4.8)
LYMPHOCYTES NFR BLD: 9.1 % (ref 18–48)
MAGNESIUM SERPL-MCNC: 2.1 MG/DL (ref 1.6–2.6)
MCH RBC QN AUTO: 27.7 PG (ref 27–31)
MCHC RBC AUTO-ENTMCNC: 32.5 G/DL (ref 32–36)
MCV RBC AUTO: 85 FL (ref 82–98)
MONOCYTES # BLD AUTO: 1 K/UL (ref 0.3–1)
MONOCYTES NFR BLD: 8.5 % (ref 4–15)
NEUTROPHILS # BLD AUTO: 9.8 K/UL (ref 1.8–7.7)
NEUTROPHILS NFR BLD: 81.8 % (ref 38–73)
NRBC BLD-RTO: 0 /100 WBC
PHOSPHATE SERPL-MCNC: 7.2 MG/DL (ref 2.7–4.5)
PLATELET # BLD AUTO: 181 K/UL (ref 150–450)
PMV BLD AUTO: 9.9 FL (ref 9.2–12.9)
POTASSIUM SERPL-SCNC: 4.7 MMOL/L (ref 3.5–5.1)
PROT SERPL-MCNC: 6.7 G/DL (ref 6–8.4)
RBC # BLD AUTO: 4.08 M/UL (ref 4–5.4)
SODIUM SERPL-SCNC: 134 MMOL/L (ref 136–145)
WBC # BLD AUTO: 11.93 K/UL (ref 3.9–12.7)

## 2023-09-17 PROCEDURE — 99239 HOSP IP/OBS DSCHRG MGMT >30: CPT | Mod: ,,, | Performed by: INTERNAL MEDICINE

## 2023-09-17 PROCEDURE — 84100 ASSAY OF PHOSPHORUS: CPT

## 2023-09-17 PROCEDURE — 94640 AIRWAY INHALATION TREATMENT: CPT

## 2023-09-17 PROCEDURE — 27000221 HC OXYGEN, UP TO 24 HOURS

## 2023-09-17 PROCEDURE — 94761 N-INVAS EAR/PLS OXIMETRY MLT: CPT

## 2023-09-17 PROCEDURE — 25000003 PHARM REV CODE 250: Performed by: NURSE PRACTITIONER

## 2023-09-17 PROCEDURE — 36415 COLL VENOUS BLD VENIPUNCTURE: CPT

## 2023-09-17 PROCEDURE — 83735 ASSAY OF MAGNESIUM: CPT

## 2023-09-17 PROCEDURE — 25000003 PHARM REV CODE 250: Performed by: INTERNAL MEDICINE

## 2023-09-17 PROCEDURE — 25000242 PHARM REV CODE 250 ALT 637 W/ HCPCS: Performed by: INTERNAL MEDICINE

## 2023-09-17 PROCEDURE — 99239 PR HOSPITAL DISCHARGE DAY,>30 MIN: ICD-10-PCS | Mod: ,,, | Performed by: INTERNAL MEDICINE

## 2023-09-17 PROCEDURE — 63600175 PHARM REV CODE 636 W HCPCS: Performed by: NURSE PRACTITIONER

## 2023-09-17 PROCEDURE — 63600175 PHARM REV CODE 636 W HCPCS: Performed by: INTERNAL MEDICINE

## 2023-09-17 PROCEDURE — 80053 COMPREHEN METABOLIC PANEL: CPT

## 2023-09-17 PROCEDURE — 25000003 PHARM REV CODE 250

## 2023-09-17 PROCEDURE — 85025 COMPLETE CBC W/AUTO DIFF WBC: CPT

## 2023-09-17 RX ORDER — FLUTICASONE PROPIONATE AND SALMETEROL 100; 50 UG/1; UG/1
1 POWDER RESPIRATORY (INHALATION) 2 TIMES DAILY
Qty: 60 EACH | Refills: 2 | Status: SHIPPED | OUTPATIENT
Start: 2023-09-17 | End: 2024-09-16

## 2023-09-17 RX ORDER — PREDNISONE 20 MG/1
40 TABLET ORAL DAILY
Qty: 8 TABLET | Refills: 0 | Status: SHIPPED | OUTPATIENT
Start: 2023-09-18 | End: 2023-09-22

## 2023-09-17 RX ORDER — FLUTICASONE FUROATE AND VILANTEROL 100; 25 UG/1; UG/1
1 POWDER RESPIRATORY (INHALATION) DAILY
Qty: 30 EACH | Refills: 2 | Status: SHIPPED | OUTPATIENT
Start: 2023-09-18 | End: 2023-09-17 | Stop reason: HOSPADM

## 2023-09-17 RX ORDER — CETIRIZINE HYDROCHLORIDE 5 MG/1
5 TABLET ORAL DAILY
Qty: 30 TABLET | Refills: 2 | Status: SHIPPED | OUTPATIENT
Start: 2023-09-18

## 2023-09-17 RX ORDER — CETIRIZINE HYDROCHLORIDE 5 MG/1
5 TABLET ORAL DAILY
Status: DISCONTINUED | OUTPATIENT
Start: 2023-09-18 | End: 2023-09-17 | Stop reason: HOSPADM

## 2023-09-17 RX ADMIN — CARVEDILOL 25 MG: 25 TABLET, FILM COATED ORAL at 08:09

## 2023-09-17 RX ADMIN — ALBUTEROL SULFATE 2.5 MG: 2.5 SOLUTION RESPIRATORY (INHALATION) at 07:09

## 2023-09-17 RX ADMIN — FLUTICASONE FUROATE AND VILANTEROL TRIFENATATE 1 PUFF: 100; 25 POWDER RESPIRATORY (INHALATION) at 08:09

## 2023-09-17 RX ADMIN — NIFEDIPINE 60 MG: 30 TABLET, FILM COATED, EXTENDED RELEASE ORAL at 08:09

## 2023-09-17 RX ADMIN — ASPIRIN 81 MG 81 MG: 81 TABLET ORAL at 08:09

## 2023-09-17 RX ADMIN — OXYMETAZOLINE HYDROCHLORIDE 2 SPRAY: 0.05 SPRAY NASAL at 08:09

## 2023-09-17 RX ADMIN — TIOTROPIUM BROMIDE INHALATION SPRAY 2 PUFF: 3.12 SPRAY, METERED RESPIRATORY (INHALATION) at 08:09

## 2023-09-17 RX ADMIN — ALBUTEROL SULFATE 2.5 MG: 2.5 SOLUTION RESPIRATORY (INHALATION) at 11:09

## 2023-09-17 RX ADMIN — PREDNISONE 40 MG: 20 TABLET ORAL at 08:09

## 2023-09-17 RX ADMIN — FLUTICASONE PROPIONATE 100 MCG: 50 SPRAY, METERED NASAL at 08:09

## 2023-09-17 RX ADMIN — HEPARIN SODIUM 5000 UNITS: 5000 INJECTION INTRAVENOUS; SUBCUTANEOUS at 06:09

## 2023-09-17 RX ADMIN — BUPROPION HYDROCHLORIDE 150 MG: 150 TABLET, FILM COATED, EXTENDED RELEASE ORAL at 08:09

## 2023-09-17 RX ADMIN — MUPIROCIN: 20 OINTMENT TOPICAL at 08:09

## 2023-09-17 RX ADMIN — ALBUTEROL SULFATE 2.5 MG: 2.5 SOLUTION RESPIRATORY (INHALATION) at 03:09

## 2023-09-17 RX ADMIN — CETIRIZINE HYDROCHLORIDE 10 MG: 10 TABLET, FILM COATED ORAL at 08:09

## 2023-09-17 RX ADMIN — LOSARTAN POTASSIUM 100 MG: 50 TABLET, FILM COATED ORAL at 08:09

## 2023-09-17 NOTE — PLAN OF CARE
[facility] - Telemetry Stepdown  Discharge Final Note    Primary Care Provider: Leodan Sanchez MD    Expected Discharge Date: 9/17/2023    Patient cleared for discharge from case management standpoint.    Final Discharge Note (most recent)       Final Note - 09/17/23 1356          Final Note    Assessment Type Final Discharge Note     Anticipated Discharge Disposition Home or Self Care     Hospital Resources/Appts/Education Provided Provided education on problems/symptoms using teachback;Provided patient/caregiver with written discharge plan information;Appointments scheduled and added to AVS (P)         Post-Acute Status    Post-Acute Authorization Other (P)      Other Status No Post-Acute Service Needs (P)      Discharge Delays None known at this time (P)                      Important Message from Medicare             Contact Info       Leodan Sanchez MD   Specialty: Internal Medicine   Relationship: PCP - General    [address] [facility]  [address] [place] [address]   Phone: [phone]       Next Steps: Go on 9/18/2023    Instructions: at 4:20pm

## 2023-09-17 NOTE — NURSING
1230: Patient ambulated on valenzuela x 3 rounds, ambulating room air sats %, patient denied SOB, no SOB noted on exertion, Ordering MD aware of results.

## 2023-09-17 NOTE — PLAN OF CARE
Patient discharged to home with discharge instructions given, medications delivered @ bedside, patient verbalized understanding, plan of care completed.         Problem: Adult Inpatient Plan of Care  Goal: Plan of Care Review  Outcome: Met  Goal: Patient-Specific Goal (Individualized)  Outcome: Met  Goal: Absence of Hospital-Acquired Illness or Injury  Outcome: Met  Goal: Optimal Comfort and Wellbeing  Outcome: Met  Goal: Readiness for Transition of Care  Outcome: Met     Problem: Device-Related Complication Risk (Hemodialysis)  Goal: Safe, Effective Therapy Delivery  Outcome: Met     Problem: Hemodynamic Instability (Hemodialysis)  Goal: Effective Tissue Perfusion  Outcome: Met     Problem: Infection (Hemodialysis)  Goal: Absence of Infection Signs and Symptoms  Outcome: Met     Problem: Electrolyte Imbalance (Chronic Kidney Disease)  Goal: Electrolyte Balance  Outcome: Met     Problem: Fluid Volume Excess (Chronic Kidney Disease)  Goal: Fluid Balance  Outcome: Met

## 2023-09-17 NOTE — NURSING
Patient dc'd home with discharged instructions, medications and follow-ups. IV and telemetry removed prior to discharge.

## 2023-09-18 ENCOUNTER — OFFICE VISIT (OUTPATIENT)
Dept: FAMILY MEDICINE | Facility: CLINIC | Age: 58
End: 2023-09-18
Attending: FAMILY MEDICINE
Payer: MEDICARE

## 2023-09-18 VITALS
BODY MASS INDEX: 17.37 KG/M2 | WEIGHT: 114.63 LBS | DIASTOLIC BLOOD PRESSURE: 70 MMHG | SYSTOLIC BLOOD PRESSURE: 110 MMHG | TEMPERATURE: 98 F | HEART RATE: 70 BPM | HEIGHT: 68 IN | OXYGEN SATURATION: 95 %

## 2023-09-18 DIAGNOSIS — D69.6 THROMBOCYTOPENIA: ICD-10-CM

## 2023-09-18 DIAGNOSIS — Z87.891 PERSONAL HISTORY OF NICOTINE DEPENDENCE: ICD-10-CM

## 2023-09-18 DIAGNOSIS — E44.0 MODERATE PROTEIN-CALORIE MALNUTRITION: ICD-10-CM

## 2023-09-18 DIAGNOSIS — N18.6 ESRD (END STAGE RENAL DISEASE) ON DIALYSIS: ICD-10-CM

## 2023-09-18 DIAGNOSIS — G95.9 CERVICAL MYELOPATHY: ICD-10-CM

## 2023-09-18 DIAGNOSIS — Z12.31 ENCOUNTER FOR SCREENING MAMMOGRAM FOR BREAST CANCER: ICD-10-CM

## 2023-09-18 DIAGNOSIS — G12.29: ICD-10-CM

## 2023-09-18 DIAGNOSIS — J44.1 COPD EXACERBATION: ICD-10-CM

## 2023-09-18 DIAGNOSIS — Z99.2 ESRD (END STAGE RENAL DISEASE) ON DIALYSIS: ICD-10-CM

## 2023-09-18 DIAGNOSIS — Z23 IMMUNIZATION DUE: ICD-10-CM

## 2023-09-18 DIAGNOSIS — F41.1 GAD (GENERALIZED ANXIETY DISORDER): Chronic | ICD-10-CM

## 2023-09-18 DIAGNOSIS — N18.5 CKD (CHRONIC KIDNEY DISEASE) STAGE 5, GFR LESS THAN 15 ML/MIN: ICD-10-CM

## 2023-09-18 DIAGNOSIS — K86.1 CHRONIC PANCREATITIS, UNSPECIFIED PANCREATITIS TYPE: ICD-10-CM

## 2023-09-18 DIAGNOSIS — F33.2 SEVERE EPISODE OF RECURRENT MAJOR DEPRESSIVE DISORDER, WITHOUT PSYCHOTIC FEATURES: ICD-10-CM

## 2023-09-18 DIAGNOSIS — F32.89 OTHER DEPRESSION: ICD-10-CM

## 2023-09-18 DIAGNOSIS — Z09 HOSPITAL DISCHARGE FOLLOW-UP: Primary | ICD-10-CM

## 2023-09-18 PROCEDURE — 99215 PR OFFICE/OUTPT VISIT, EST, LEVL V, 40-54 MIN: ICD-10-PCS | Mod: 25,S$PBB,, | Performed by: FAMILY MEDICINE

## 2023-09-18 PROCEDURE — 90677 PCV20 VACCINE IM: CPT | Mod: PBBFAC,PO

## 2023-09-18 PROCEDURE — 99213 OFFICE O/P EST LOW 20 MIN: CPT | Mod: PBBFAC,PO,25 | Performed by: FAMILY MEDICINE

## 2023-09-18 PROCEDURE — 99215 OFFICE O/P EST HI 40 MIN: CPT | Mod: 25,S$PBB,, | Performed by: FAMILY MEDICINE

## 2023-09-18 PROCEDURE — 99999 PR PBB SHADOW E&M-EST. PATIENT-LVL III: CPT | Mod: PBBFAC,,, | Performed by: FAMILY MEDICINE

## 2023-09-18 PROCEDURE — 99999PBSHW PNEUMOCOCCAL CONJUGATE VACCINE 20-VALENT: Mod: PBBFAC,,,

## 2023-09-18 PROCEDURE — 99999PBSHW PNEUMOCOCCAL CONJUGATE VACCINE 20-VALENT: ICD-10-PCS | Mod: PBBFAC,,,

## 2023-09-18 PROCEDURE — 99999 PR PBB SHADOW E&M-EST. PATIENT-LVL III: ICD-10-PCS | Mod: PBBFAC,,, | Performed by: FAMILY MEDICINE

## 2023-09-18 NOTE — PROGRESS NOTES
Subjective:       Patient ID: Jennifer Booth is a 58 y.o. female.    Chief Complaint: Follow-up    57 yo F with PMH significant for Multiple Myeloma in remission, ESRD on HD, AoCKD, HTN, GERD, MDD/Anxiety,  Constipation, and tobacco use, presents today for her follow up after hospitalization. She was admitted on 9/14 for COPD exacerbation and pleural effusion. She was sent home on inhalers. She has referral to see pulmonology. She never had CT chest and she is still smoking.        Chronic Diseases  Multiple Myeloma in remission: Followed by [facility]; last visit 5/13/19. Shecompleted bortezomib/dexamethasone/Revlimid 6 cycles on 04/13/2012 for the multiple myeloma kappa light chain disease, IPSS stage III. She underwent bortezomib maintenance therapy three weeks on, one week off (05/15, 05/22 and 05/29) with her last cycle received on 05/29/2012. She was followed at [facility] and was diagnosed with DRESS syndrome during hospitalization and then [facility] team elected not to proceed with auto SCT. Velcade maintenance was discontinued due to side effects. MRI T and L spine 6/29/2018 - no evidence of myeloma involvement  SPEP 5/7/2019- no monoclonal peaks. Has upcoming appt on 8/14/19    ESRD on HD:  She is followed by a nephrologist at [facility].  Receives hemodialysis on Tuesdays/Thursdays/Saturdays. Iinitially diagnosed with advanced kidney disease secondary to multiple myeloma & HTN.  She was diagnosed with NANETTE from MM in 2010 that required initiation of dialysis.  She started chronic dialysis on 12/23/11 and ended on 8/28/12. She then underwent chemotherapy for her myeloma, and stopped dialysis in 2013.  Kidney biopsy performed 2017due to worsening  Kidney--revealed glomerulosclerosis and no evidence of MM. Her most recent visit with renal transplant at [facility] was 6/14/19. Patient met selection criteria for kidney transplant related to ESRD due to Hypertensive Nephrosclerosis. During most recent  visit, she was placed on inactive status as she was deemed a high risk candidate for kidney transplant due to lack of caregiver support and financial hardship. Plans for caregivers to be her daughter (currently in third trimester of pregnancy) and her daughter's fiance. Pt is required to attend appt with caregiver.Her next appt with transplant is scheduled for 5/19/2020. She plans to f/u with her daughter prior to this time.     CKD:  Followed by [facility].  Receives Procrit infusions during HD.     HTN: controlled. Adherent with Coreg 12.5 mg BID    She continues to smoke cigarettes. Contemplative stage of quitting. Referred to tobacco cessation at previous visit, but has not scheduled appointment.     Follow-up  Associated symptoms include congestion and headaches. Pertinent negatives include no arthralgias, chest pain, diaphoresis, myalgias, nausea or sore throat.     Review of Systems   Constitutional: Negative.  Negative for activity change, diaphoresis and unexpected weight change.   HENT:  Positive for nasal congestion, rhinorrhea and sinus pressure/congestion. Negative for ear discharge, hearing loss, sore throat and voice change.    Eyes: Negative.  Negative for pain, discharge and visual disturbance.   Respiratory: Negative.  Negative for chest tightness, shortness of breath and wheezing.    Cardiovascular: Negative.  Negative for chest pain.   Gastrointestinal: Negative.  Negative for abdominal distention, anal bleeding, constipation and nausea.   Endocrine: Negative.  Negative for cold intolerance, polydipsia and polyuria.   Genitourinary: Negative.  Negative for decreased urine volume, difficulty urinating, dysuria, frequency, menstrual problem and vaginal pain.   Musculoskeletal: Negative.  Negative for arthralgias, gait problem and myalgias.   Integumentary:  Negative for color change, pallor and wound. Negative.   Allergic/Immunologic: Negative.  Negative for environmental allergies  and immunocompromised state.   Neurological:  Positive for headaches. Negative for dizziness, tremors, seizures and speech difficulty.   Hematological: Negative.  Negative for adenopathy. Does not bruise/bleed easily.   Psychiatric/Behavioral: Negative.  Negative for agitation, confusion, decreased concentration, hallucinations, self-injury and suicidal ideas. The patient is not nervous/anxious.      Active Ambulatory Problems     Diagnosis Date Noted    Constipation - functional 08/02/2012    Multiple myeloma in remission 08/02/2012    Renal hypertension 09/23/2019    Hyperkalemia 05/09/2013    Tobacco abuse counseling 05/09/2013    Tobacco use disorder 12/16/2015    Colon cancer screening 09/20/2016    ESRD (end stage renal disease) on dialysis 08/03/2017    Glomerulosclerosis 02/24/2018    Anemia in chronic kidney disease, on chronic dialysis 04/18/2018    GERD without esophagitis 09/13/2018    Patient on waiting list for kidney transplant 06/14/2019    SATNAM (generalized anxiety disorder) 07/03/2019    Allergic rhinitis 07/03/2019    Hyponatremia 09/13/2018    Hypertensive emergency 06/09/2020    Secondary hyperparathyroidism of renal origin 07/01/2019    History of substance use 06/09/2020    Dialysis AV fistula malfunction, initial encounter 06/09/2020    CKD (chronic kidney disease) stage 5, GFR less than 15 ml/min     Acute respiratory failure with hypoxia 10/13/2020    Metabolic acidosis 10/13/2020    Murmur, cardiac 11/11/2020    Cannabis use disorder, severe, dependence 05/24/2021    Alcohol use disorder, severe, dependence     Preoperative clearance 07/22/2021    Severe episode of recurrent major depressive disorder, without psychotic features 08/18/2021    Chronic diarrhea 09/13/2018    Chronic pancreatitis 07/01/2019    Depression 07/01/2019    Iron deficiency anemia 01/24/2019    Non-compliance with renal dialysis 07/01/2019    Osteopenia 03/08/2021    Protein calorie malnutrition 09/13/2018    Vitamin  D deficiency 01/24/2019    Injury of right thumb 12/16/2021    Bilateral carpal tunnel syndrome 02/07/2022    Polyp of colon 08/28/2022    Encounter for pre-transplant evaluation for chronic kidney disease 01/05/2023    Decreased ROM of lumbar spine 06/07/2023    Closed nondisplaced fracture of proximal phalanx of right great toe with routine healing 09/14/2023    COPD exacerbation 09/16/2023    URI, acute 09/16/2023    Chronic kidney disease-mineral and bone disorder 09/16/2023    Cervical myelopathy 09/18/2023    Motor neuron disease, upper 09/18/2023     Resolved Ambulatory Problems     Diagnosis Date Noted    Acne 08/02/2012    Abnormal blood chemistry level 08/02/2012    Thrombocytopenia 08/02/2012    ARF (acute renal failure) 08/02/2012    ESRD (end stage renal disease) 08/02/2012    Anemia 08/02/2012    Hyperuricemia 11/19/2012    Hyperphosphatemia 11/19/2012    Arm pain, left 02/12/2014    CKD stage 4, GFR 15-29 ml/min from myeloma kidney & HTN      Anemia in stage 5 chronic kidney disease, not on chronic dialysis 11/09/2017    Allergic drug reaction 11/13/2017    Multiple myeloma without mention of remission 05/13/2019    Alcohol use disorder, severe, in early remission 06/09/2020    Diarrhea 06/09/2020    Elevated troponin 06/09/2020    Acute respiratory failure with hypoxia 10/13/2020    Cannabis use disorder, mild, abuse     Alcohol use 08/12/2019     Past Medical History:   Diagnosis Date    Addiction to drug     Alcohol abuse     Anxiety     Breast cyst     Chronic renal failure 2/12/2014    Dialysis patient     Encounter for blood transfusion     GERD (gastroesophageal reflux disease)     Hx of psychiatric care     Hypertension     Mood swings     Psychiatric exam requested by authority     Psychiatric problem     Status post dialysis 8/2012    Therapy      Past Surgical History:   Procedure Laterality Date    AV FISTULA PLACEMENT Left 2014    BREAST CYST ASPIRATION Left 1995    BREAST CYST EXCISION  Left 1995    CERVICAL SPINE SURGERY Bilateral      SECTION      x1    COLONOSCOPY N/A 2022    Procedure: COLONOSCOPY;  Surgeon: Jordan Mcguire MD;  Location: [facility] ENDO;  Service: Endoscopy;  Laterality: N/A;    FISTULOGRAM N/A 2020    Procedure: Fistulogram;  Surgeon: Rahat Berger MD;  Location: [facility] CATH LAB/EP;  Service: Cardiology;  Laterality: N/A;    pd catheter      PERCUTANEOUS TRANSLUMINAL ANGIOPLASTY OF ARTERIOVENOUS FISTULA N/A 2020    Procedure: PTA, AV FISTULA;  Surgeon: Rahat Berger MD;  Location: [facility] CATH LAB/EP;  Service: Cardiology;  Laterality: N/A;    PERITONEAL CATHETER REMOVAL N/A 2018    Procedure: REMOVAL, CATHETER, DIALYSIS, PERITONEAL;  Surgeon: Mukesh Gonsales Jr., MD;  Location: [facility] OR;  Service: General;  Laterality: N/A;    RENAL BIOPSY  2016    THROMBECTOMY OF HEMODIALYSIS ACCESS SITE N/A 2020    Procedure: Thrombectomy, Hemodialysis Graft Or Fistula;  Surgeon: Rahat Berger MD;  Location: [facility] CATH LAB/EP;  Service: Cardiology;  Laterality: N/A;    VASCULAR SURGERY  2012    dialysis catheter to right subclavian     Family History   Problem Relation Age of Onset    Hypertension Mother     Heart failure Mother     Ovarian cancer Maternal Aunt     Diabetes Maternal Grandmother     Stroke Maternal Grandmother     Breast cancer Neg Hx     Colon cancer Neg Hx      Social History     Socioeconomic History    Marital status: Single    Number of children: 1   Occupational History    Occupation: Appeals    Tobacco Use    Smoking status: Every Day     Current packs/day: 1.00     Average packs/day: 1 pack/day for 40.7 years (40.7 ttl pk-yrs)     Types: Cigarettes     Start date:      Passive exposure: Never    Smokeless tobacco: Never    Tobacco comments:     Pt. states recently cut down to 0.5 pk/day. Enrolled in the [employer] on 20 ([facility] Member ID # [id number]). She declines Ambulatory referral to Smoking Cessation program. Handout  provided.   Substance and Sexual Activity    Alcohol use: Not Currently     Comment: occasional    Drug use: Not Currently     Types: Marijuana    Sexual activity: Not Currently     Partners: Male   Social History Narrative    Works FT; likes theatre. Lives alone.     Social Determinants of Health     Financial Resource Strain: Low Risk  (9/14/2023)    Overall Financial Resource Strain (CARDIA)     Difficulty of Paying Living Expenses: Not very hard   Food Insecurity: No Food Insecurity (9/14/2023)    Hunger Vital Sign     Worried About Running Out of Food in the Last Year: Never true     Ran Out of Food in the Last Year: Never true   Transportation Needs: No Transportation Needs (9/14/2023)    PRAPARE - Transportation     Lack of Transportation (Medical): No     Lack of Transportation (Non-Medical): No   Physical Activity: Insufficiently Active (9/14/2023)    Exercise Vital Sign     Days of Exercise per Week: 4 days     Minutes of Exercise per Session: 20 min   Stress: Stress Concern Present (9/14/2023)    [employer] [place] - Occupational Stress Questionnaire     Feeling of Stress : Rather much   Social Connections: Socially Isolated (9/14/2023)    Social Connection and Isolation Panel [NHANES]     Frequency of Communication with Friends and Family: More than three times a week     Frequency of Social Gatherings with Friends and Family: Once a week     Attends [employer] Services: Never     Active Member of Clubs or Organizations: No     Attends Club or Organization Meetings: Never     Marital Status:    Housing Stability: Low Risk  (9/14/2023)    Housing Stability Vital Sign     Unable to Pay for Housing in the Last Year: No     Number of Places Lived in the Last Year: 1     Unstable Housing in the Last Year: No     Review of patient's allergies indicates:   Allergen Reactions    Doxycycline Swelling, Rash and Hives    Gentamicin Anaphylaxis    Vancomycin analogues Anaphylaxis      Current Outpatient Medications on File Prior to Visit   Medication Sig Dispense Refill    acetaminophen (TYLENOL) 325 MG tablet Take 650 mg by mouth every 4 (four) hours as needed.      aspirin 81 MG Chew Take 81 mg by mouth once daily.      B COMPLEX W-C NO.20/FOLIC ACID (VIRT-CAPS ORAL) Take 1 capsule by mouth once daily.       buPROPion (WELLBUTRIN XL) 150 MG TB24 tablet Take 1 tablet (150 mg total) by mouth once daily. 90 tablet 1    calcitRIOL (ROCALTROL) 0.5 MCG Cap Take 1.5 mcg by mouth.      carvediloL (COREG) 25 MG tablet Take 25 mg by mouth 2 (two) times daily.      cetirizine (ZYRTEC) 5 MG tablet Take 1 tablet (5 mg total) by mouth once daily. 30 tablet 2    cinacalcet (SENSIPAR) 30 MG Tab Take 30 mg by mouth.      clopidogreL (PLAVIX) 75 mg tablet Take 75 mg by mouth once daily.      cyproheptadine (PERIACTIN) 4 mg tablet Take 1 tablet (4 mg total) by mouth 3 (three) times daily as needed (appetite). 90 tablet 0    EScitalopram oxalate (LEXAPRO) 20 MG tablet TAKE 1 TABLET(20 MG) BY MOUTH EVERY DAY 90 tablet 0    fluticasone propionate (FLONASE) 50 mcg/actuation nasal spray 1 spray by Each Nostril route daily as needed.      fluticasone-salmeterol diskus inhaler 100-50 mcg Inhale 1 puff into the lungs 2 (two) times daily. Controller 60 each 2    hydrOXYzine (ATARAX) 50 MG tablet Take 1 tablet (50 mg total) by mouth 2 (two) times daily as needed for Anxiety. 60 tablet 3    losartan (COZAAR) 100 MG tablet Take 100 mg by mouth once daily.      melatonin 5 mg Cap Take 5 mg by mouth nightly.      mirtazapine (REMERON) 15 MG tablet Take 1 tablet (15 mg total) by mouth every evening. 90 tablet 1    montelukast (SINGULAIR) 10 mg tablet TAKE 1 TABLET(10 MG) BY MOUTH EVERY EVENING 90 tablet 3    naltrexone (DEPADE) 50 mg tablet Start with 1/2 tab (25 mg) by mouth daily, may increase to 1 tab (50 mg) daily if able. 90 tablet 1    NIFEdipine (PROCARDIA-XL) 60 MG (OSM) 24 hr tablet Take 60 mg by mouth once daily.       omeprazole (PRILOSEC) 40 MG capsule Take 40 mg by mouth once daily.      predniSONE (DELTASONE) 20 MG tablet Take 2 tablets (40 mg total) by mouth once daily. for 4 days 8 tablet 0    sevelamer carbonate (RENVELA) 800 mg Tab Take 800 mg by mouth 3 (three) times daily with meals.      sucroferric oxyhydroxide (VELPHORO) 500 mg Chew Take 500 mg by mouth 3 (three) times daily.      tiotropium bromide (SPIRIVA RESPIMAT) 2.5 mcg/actuation inhaler Inhale 2 puffs into the lungs once daily. Controller 4 g 2    traMADoL (ULTRAM) 50 mg tablet Take 50 mg by mouth every 6 (six) hours as needed.       Current Facility-Administered Medications on File Prior to Visit   Medication Dose Route Frequency Provider Last Rate Last Admin    0.9%  NaCl infusion   Intravenous Continuous Jordan Mcguire MD 20 mL/hr at 07/18/22 0943 New Bag at 07/18/22 0943    0.9%  NaCl infusion   Intravenous Continuous Jordan Mcguire MD   Stopped at 08/18/22 1042    sodium chloride 0.9% flush 10 mL  10 mL Intravenous PRN Jordan Mcguire MD        sodium chloride 0.9% flush 10 mL  10 mL Intravenous PRN Jordan Mcguire MD        [DISCONTINUED] acetaminophen tablet 650 mg  650 mg Oral Q4H PRN Ayaan Zamarripa MD   650 mg at 09/15/23 0619    [DISCONTINUED] albuterol sulfate nebulizer solution 2.5 mg  2.5 mg Nebulization Q4H PRN Kari Christensen MD   2.5 mg at 09/16/23 0922    [DISCONTINUED] albuterol sulfate nebulizer solution 2.5 mg  2.5 mg Nebulization Q4H WAKE Kari Christensen MD   2.5 mg at 09/17/23 1544    [DISCONTINUED] aspirin chewable tablet 81 mg  81 mg Oral Daily Ashley Sierra NP   81 mg at 09/17/23 0823    [DISCONTINUED] bismuth subsalicylate 262 mg/15 mL suspension 30 mL  30 mL Oral Q6H PRN Edgardo Avitia MD   30 mL at 09/16/23 2018    [DISCONTINUED] buPROPion TB24 tablet 150 mg  150 mg Oral Daily Ashley Sierra NP   150 mg at 09/17/23 0826    [DISCONTINUED] carvediloL tablet 25 mg  25 mg Oral BID [facility]  MD Ayaan   25 mg at 09/17/23 0823    [DISCONTINUED] cetirizine tablet 10 mg  10 mg Oral Daily Kari Christensen MD   10 mg at 09/17/23 0823    [DISCONTINUED] cetirizine tablet 5 mg  5 mg Oral Daily Shon Arreguin MD        [DISCONTINUED] fluticasone furoate-vilanteroL 100-25 mcg/dose diskus inhaler 1 puff  1 puff Inhalation Daily Kari Christensen MD   1 puff at 09/17/23 0828    [DISCONTINUED] fluticasone propionate 50 mcg/actuation nasal spray 100 mcg  2 spray Each Nostril Daily Kari Christensen MD   100 mcg at 09/17/23 0827    [DISCONTINUED] heparin (porcine) injection 5,000 Units  5,000 Units Subcutaneous Q8H Ashley Sierra, NP   5,000 Units at 09/17/23 0620    [DISCONTINUED] losartan tablet 100 mg  100 mg Oral Daily Ashley Sierra NP   100 mg at 09/17/23 0823    [DISCONTINUED] mupirocin 2 % ointment   Nasal BID Logan Nascimento MD   Given at 09/17/23 0827    [DISCONTINUED] NIFEdipine 24 hr tablet 60 mg  60 mg Oral Daily Ayaan Zamarripa MD   60 mg at 09/17/23 0823    [DISCONTINUED] oxymetazoline 0.05 % nasal spray 2 spray  2 spray Each Nostril BID Kari Christensen MD   2 spray at 09/17/23 0823    [DISCONTINUED] predniSONE tablet 40 mg  40 mg Oral Daily Kari Christensen MD   40 mg at 09/17/23 0823    [DISCONTINUED] sodium chloride 0.9% flush 10 mL  10 mL Intravenous PRN Ayaan Zamarripa MD        [DISCONTINUED] tiotropium bromide 2.5 mcg/actuation inhaler 2 puff  2 puff Inhalation Daily Kari Christensen MD   2 puff at 09/17/23 0829           Objective:       Vitals:    09/18/23 0909   BP: 110/70   Pulse: 70   Temp: 98 °F (36.7 °C)       Physical Exam  Constitutional:       General: She is not in acute distress.     Appearance: She is well-developed. She is not diaphoretic.   HENT:      Head: Normocephalic and atraumatic.      Right Ear: External ear normal.      Left Ear: External ear normal.      Nose: Nose normal.      Mouth/Throat:      Pharynx: No oropharyngeal exudate.   Eyes:       General: No scleral icterus.        Right eye: No discharge.         Left eye: No discharge.      Conjunctiva/sclera: Conjunctivae normal.      Pupils: Pupils are equal, round, and reactive to light.   Neck:      Thyroid: No thyromegaly.      Vascular: No JVD.      Trachea: No tracheal deviation.   Cardiovascular:      Rate and Rhythm: Normal rate and regular rhythm.      Heart sounds: Normal heart sounds. No murmur heard.     No friction rub. No gallop.   Pulmonary:      Effort: Pulmonary effort is normal.      Breath sounds: Normal breath sounds. No stridor. No wheezing or rales.   Chest:      Chest wall: No tenderness.   Abdominal:      General: Bowel sounds are normal. There is no distension.      Palpations: Abdomen is soft. There is no mass.      Tenderness: There is no abdominal tenderness. There is no guarding or rebound.      Hernia: No hernia is present.   Musculoskeletal:         General: No tenderness. Normal range of motion.      Cervical back: Normal range of motion and neck supple.      Comments: LUE - dialysis fistula   Lymphadenopathy:      Cervical: No cervical adenopathy.   Skin:     General: Skin is warm and dry.      Coloration: Skin is not pale.      Findings: No erythema or rash.   Neurological:      Mental Status: She is alert and oriented to person, place, and time.      Cranial Nerves: No cranial nerve deficit.      Motor: No abnormal muscle tone.      Coordination: Coordination normal.      Deep Tendon Reflexes: Reflexes are normal and symmetric. Reflexes normal.   Psychiatric:         Behavior: Behavior normal.         Thought Content: Thought content normal.         Judgment: Judgment normal.         Assessment:       1. Hospital discharge follow-up    2. ESRD (end stage renal disease) on dialysis    3. CKD (chronic kidney disease) stage 5, GFR less than 15 ml/min    4. COPD exacerbation    5. SATNAM (generalized anxiety disorder)    6. Other depression    7. Severe episode of recurrent  major depressive disorder, without psychotic features    8. Personal history of nicotine dependence    9. Encounter for screening mammogram for breast cancer    10. Immunization due    11. Cervical myelopathy    12. Motor neuron disease, upper    13. Moderate protein-calorie malnutrition    14. Chronic pancreatitis, unspecified pancreatitis type    15. Thrombocytopenia        Plan:           Jennifer was seen today for follow-up.    Diagnoses and all orders for this visit:    Hospital discharge follow-up    ESRD (end stage renal disease) on dialysis    CKD (chronic kidney disease) stage 5, GFR less than 15 ml/min    COPD exacerbation  -     CT Chest Lung Screening Low Dose; Future    SATNAM (generalized anxiety disorder)    Other depression    Severe episode of recurrent major depressive disorder, without psychotic features    Personal history of nicotine dependence  -     CT Chest Lung Screening Low Dose; Future    Encounter for screening mammogram for breast cancer  -     Mammo Digital Screening Bilat w/ Jeffery; Future    Immunization due  -     (In Office Administered) Pneumococcal Conjugate Vaccine (20 Valent) (IM)    Cervical myelopathy    Motor neuron disease, upper    Moderate protein-calorie malnutrition    Chronic pancreatitis, unspecified pancreatitis type    Thrombocytopenia      Hospital discharge f/u  -doing well  -follow pulmonology  -CT chest    ESRD on HD  -T/Thu/Sat    SATNAM/depression  -controlled - multiple meds    Smoking cessation  -1 min counseling done  -she will quit soon    Spent adequate time in obtaining history and explaining differentials    40 minutes spent during this visit of which greater than 50% devoted to face-face counseling and coordination of care regarding diagnosis and management plan      Follow up in about 3 months (around 12/18/2023), or if symptoms worsen or fail to improve.

## 2023-09-19 NOTE — PHYSICIAN QUERY
PT Name: Jennifer Booth  MR #: [id number]     DOCUMENTATION CLARIFICATION     CDS/: Hailee Browning RN             Contact information: [email or web address]  This form is a permanent document in the medical record.     Query Date: September 19, 2023    By submitting this query, we are merely seeking further clarification of documentation.  Please utilize your independent clinical judgment when addressing the question(s) below.    The Medical Record contains the following   Indicators Supporting Clinical Findings   Location in Medical Record   x Heart Failure documented HFpEF  Acute hypoxemic respiratory failure-Findings support cardiogenic pulmonary edema as etiology.   9/14 HP   x BNP BNP= 3348 9/14 Lab     x EF/Echo The estimated ejection fraction is 48%.  Grade I left ventricular diastolic dysfunction. 4/8/22 ECHO       x Radiology findings Flash pulmonary edema    Enlarged cardiomediastinal silhouette with bilateral airspace opacities and small pleural effusions, most likely related to CHF exacerbation and/or volume overload.  Infectious/inflammatory process could have a similar appearance   9/14 ED MD PN    9/14 CXR   x Subjective/Objective Respiratory Conditions Patient arrives tripoding in respiratory distress with frothy red sputum foaming from her mouth  Respiratory distress  Rales  Shortness of breath    Acute hypoxemic respiratory failure-Findings support cardiogenic pulmonary edema as etiology.    9/14 ED MD PN          9/14 HP    Recent/Current MI      Heart Transplant, LVAD      Edema, JVD      Ascites     x Diuretics/Meds Coreg 25mg   Lasix 80mg IVP    9/14-17 MAR  9/14 MAR     x Other Treatment patient was put on BiPAP and given lasix and nitroglycerin to treat flash pulmonary edema signs and symptoms    Pt completed 3Hrs of HD, 2.5 Liters removed    HD tx completed   Net UF: 3L     Dialysis  Renal consult  BNP  Cardiac monitor  BIPAP  Oxygen  Pulse oximetry   9/14 HP    9/14 Dialysis  RN PN    9/16 Dialysis RN PN    9/14 Orders     x Other PMH:  Etoh abuse, Anemia, Anxiety, CKD, Depression, Dialysis, GERD. HTN, Mood swings, MM, Renal HTN, Thrombocytopenia   9/14 HP       Heart failure is a clinical diagnosis which includes symptomatic fluid retention, elevated intracardiac pressures, and/or the inability of the heart to deliver adequate blood flow.    Heart Failure with reduced Ejection Fraction (HFrEF) or Systolic Heart Failure (loses ability to contract normally, EF is <40%)    Heart Failure with preserved Ejection Fraction (HFpEF) or Diastolic Heart Failure (stiff ventricles, does not relax properly, EF is >50%)     Heart Failure with Combined Systolic and Diastolic Failure (stiff ventricles, does not relax properly and EF is <50%)    Mid-range or mildly reduced ejection fraction (HFmrEF) is classified as systolic heart failure.  Congestive heart failure with a recovered EF is classified as Diastolic Heart Failure.  Common clues to acute exacerbation:  Rapidly progressive symptoms (w/in 2 weeks of presentation), using IV diuretics, using supplemental O2, pulmonary edema on Xray, new or worsening pleural effusion, +JVD or other signs of volume overload, MI w/in 4 weeks, and/or BNP >500  The clinical guidelines noted are only system guidelines, and do not replace the providers clinical judgment.    Provider, please specify the diagnosis associated with the above clinical findings.    [   ]  Acute Diastolic Heart Failure (HFpEF) - new diagnosis     [   ]  Acute on Chronic Diastolic Heart Failure (HFpEF) - worsening of CHF signs/symptoms in preexisting CHF     [ x  ]  Chronic Diastolic Heart Failure & Acute Pulmonary Edema (noncardiac)     [   ]  Other (please specify): ___________________________________     [  ]  Clinically Undetermined         Please document in your progress notes daily for the duration of treatment until resolved and include in your discharge  summary.    References:  American Heart Association editorial staff. (2017, May). Ejection Fraction Heart Failure Measurement. American Heart Association. https://www.heart.org/en/health-topics/heart-failure/diagnosing-heart-failure/ejection-fraction-heart-failure-measurement#:~:text=Ejection%20fraction%20(EF)%20is%20a,pushed%20out%20with%20each%20heartbeat  RUSTY Pierce (2020, December 15). Heart failure with preserved ejection fraction: Clinical manifestations and diagnosis. [employer]. https://www.[employer]/contents/heart-failure-with-preserved-ejection-fraction-clinical-manifestations-and-diagnosis.  ICD-10-CM/PCS Coding Clinic Third Quarter ICD-10, Effective with discharges: September 8, 2020 [facility] § Heart failure with mid-range or mildly reduced ejection fraction (2020).  ICD-10-CM/PCS Coding Clinic Third Quarter ICD-10, Effective with discharges: September 8, 2020 [facility] § Heart failure with recovered ejection fraction (2020).  Form No. [id number]

## 2023-09-20 ENCOUNTER — PATIENT OUTREACH (OUTPATIENT)
Dept: ADMINISTRATIVE | Facility: CLINIC | Age: 58
End: 2023-09-20
Payer: COMMERCIAL

## 2023-09-20 ENCOUNTER — HOSPITAL ENCOUNTER (OUTPATIENT)
Dept: RADIOLOGY | Facility: HOSPITAL | Age: 58
Discharge: HOME | End: 2023-09-20
Attending: FAMILY MEDICINE
Payer: MEDICARE

## 2023-09-20 DIAGNOSIS — J44.1 COPD EXACERBATION: ICD-10-CM

## 2023-09-20 DIAGNOSIS — Z87.891 PERSONAL HISTORY OF NICOTINE DEPENDENCE: ICD-10-CM

## 2023-09-20 PROCEDURE — 71271 CT THORAX LUNG CANCER SCR C-: CPT | Mod: TC

## 2023-09-20 PROCEDURE — 71271 CT THORAX LUNG CANCER SCR C-: CPT | Mod: 26,,, | Performed by: RADIOLOGY

## 2023-09-20 PROCEDURE — 71271 CT CHEST LUNG SCREENING LOW DOSE: ICD-10-PCS | Mod: 26,,, | Performed by: RADIOLOGY

## 2023-09-20 NOTE — PROGRESS NOTES
C3 nurse attempted to contact Jennifer Booth for a TCC post hospital discharge follow up call. No answer. Left voicemail with callback information. The patient has a scheduled HOSFU appointment with Leodan Sanchez MD on 09/18/2023 @ 8248.

## 2023-09-21 ENCOUNTER — PATIENT MESSAGE (OUTPATIENT)
Dept: ADMINISTRATIVE | Facility: CLINIC | Age: 58
End: 2023-09-21
Payer: COMMERCIAL

## 2023-09-21 ENCOUNTER — PATIENT MESSAGE (OUTPATIENT)
Dept: FAMILY MEDICINE | Facility: CLINIC | Age: 58
End: 2023-09-21
Payer: COMMERCIAL

## 2023-09-21 DIAGNOSIS — R91.1 LUNG NODULE: Primary | ICD-10-CM

## 2023-09-21 NOTE — TELEPHONE ENCOUNTER
Please inform pt that CT lungs showed lung nodules and per radiology need another CT in 6 weeks    CT chest ordered - need on 11/02/23

## 2023-09-21 NOTE — PROGRESS NOTES
C3 nurse attempted to contact Jennifer Booth for a TCC post hospital discharge follow up call. No answer. Left voicemail with callback information. The patient has a scheduled HOSFU appointment with Leodan Sanchez MD on 09/18/2023 @ 7322.

## 2023-09-21 NOTE — PROGRESS NOTES
C3 nurse attempted to contact Jennifer Booth for a TCC post hospital discharge follow up call. No answer. Left voicemail with callback information. The patient has a scheduled HOSFU appointment with Leodan Sanchez MD on 09/18/2023 @ 2945.

## 2023-09-26 ENCOUNTER — PATIENT MESSAGE (OUTPATIENT)
Dept: PSYCHIATRY | Facility: CLINIC | Age: 58
End: 2023-09-26
Payer: COMMERCIAL

## 2023-10-01 ENCOUNTER — HOSPITAL ENCOUNTER (INPATIENT)
Facility: HOSPITAL | Age: 58
LOS: 3 days | Discharge: HOME | DRG: 304 | End: 2023-10-04
Attending: EMERGENCY MEDICINE | Admitting: INTERNAL MEDICINE
Payer: MEDICARE

## 2023-10-01 DIAGNOSIS — G56.03 BILATERAL CARPAL TUNNEL SYNDROME: ICD-10-CM

## 2023-10-01 DIAGNOSIS — I16.1 HYPERTENSIVE EMERGENCY: ICD-10-CM

## 2023-10-01 DIAGNOSIS — I50.20 HFREF (HEART FAILURE WITH REDUCED EJECTION FRACTION): ICD-10-CM

## 2023-10-01 DIAGNOSIS — J81.0 FLASH PULMONARY EDEMA: Primary | ICD-10-CM

## 2023-10-01 DIAGNOSIS — R01.1 MURMUR, CARDIAC: ICD-10-CM

## 2023-10-01 DIAGNOSIS — R06.02 SOB (SHORTNESS OF BREATH): ICD-10-CM

## 2023-10-01 LAB
ALBUMIN SERPL BCP-MCNC: 3.2 G/DL (ref 3.5–5.2)
ALLENS TEST: ABNORMAL
ALP SERPL-CCNC: 185 U/L (ref 55–135)
ALT SERPL W/O P-5'-P-CCNC: 13 U/L (ref 10–44)
ANION GAP SERPL CALC-SCNC: 11 MMOL/L (ref 8–16)
ANION GAP SERPL CALC-SCNC: 16 MMOL/L (ref 8–16)
AST SERPL-CCNC: 25 U/L (ref 10–40)
BASOPHILS # BLD AUTO: 0.06 K/UL (ref 0–0.2)
BASOPHILS NFR BLD: 0.7 % (ref 0–1.9)
BILIRUB SERPL-MCNC: 0.6 MG/DL (ref 0.1–1)
BNP SERPL-MCNC: 2665 PG/ML (ref 0–99)
BUN SERPL-MCNC: 28 MG/DL (ref 6–20)
BUN SERPL-MCNC: 33 MG/DL (ref 6–20)
CALCIUM SERPL-MCNC: 8.4 MG/DL (ref 8.7–10.5)
CALCIUM SERPL-MCNC: 9.4 MG/DL (ref 8.7–10.5)
CHLORIDE SERPL-SCNC: 101 MMOL/L (ref 95–110)
CHLORIDE SERPL-SCNC: 102 MMOL/L (ref 95–110)
CO2 SERPL-SCNC: 27 MMOL/L (ref 23–29)
CO2 SERPL-SCNC: 29 MMOL/L (ref 23–29)
CREAT SERPL-MCNC: 5.1 MG/DL (ref 0.5–1.4)
CREAT SERPL-MCNC: 5.5 MG/DL (ref 0.5–1.4)
DIFFERENTIAL METHOD: ABNORMAL
EOSINOPHIL # BLD AUTO: 0.2 K/UL (ref 0–0.5)
EOSINOPHIL NFR BLD: 1.6 % (ref 0–8)
ERYTHROCYTE [DISTWIDTH] IN BLOOD BY AUTOMATED COUNT: 19.8 % (ref 11.5–14.5)
EST. GFR  (NO RACE VARIABLE): 8.4 ML/MIN/1.73 M^2
EST. GFR  (NO RACE VARIABLE): 9.2 ML/MIN/1.73 M^2
GLUCOSE SERPL-MCNC: 123 MG/DL (ref 70–110)
GLUCOSE SERPL-MCNC: 87 MG/DL (ref 70–110)
HAV IGM SERPL QL IA: NORMAL
HBV CORE IGM SERPL QL IA: NORMAL
HBV CORE IGM SERPL QL IA: NORMAL
HBV SURFACE AG SERPL QL IA: NORMAL
HBV SURFACE AG SERPL QL IA: NORMAL
HCO3 UR-SCNC: 35.3 MMOL/L (ref 24–28)
HCT VFR BLD AUTO: 43.6 % (ref 37–48.5)
HCV AB SERPL QL IA: NORMAL
HGB BLD-MCNC: 13.4 G/DL (ref 12–16)
HIV 1+2 AB+HIV1 P24 AG SERPL QL IA: NORMAL
IMM GRANULOCYTES # BLD AUTO: 0.04 K/UL (ref 0–0.04)
IMM GRANULOCYTES NFR BLD AUTO: 0.4 % (ref 0–0.5)
LYMPHOCYTES # BLD AUTO: 1.3 K/UL (ref 1–4.8)
LYMPHOCYTES NFR BLD: 13.7 % (ref 18–48)
MCH RBC QN AUTO: 27.9 PG (ref 27–31)
MCHC RBC AUTO-ENTMCNC: 30.7 G/DL (ref 32–36)
MCV RBC AUTO: 91 FL (ref 82–98)
MONOCYTES # BLD AUTO: 0.7 K/UL (ref 0.3–1)
MONOCYTES NFR BLD: 7.4 % (ref 4–15)
NEUTROPHILS # BLD AUTO: 7 K/UL (ref 1.8–7.7)
NEUTROPHILS NFR BLD: 76.2 % (ref 38–73)
NRBC BLD-RTO: 0 /100 WBC
PCO2 BLDA: 56.8 MMHG (ref 35–45)
PH SMN: 7.4 [PH] (ref 7.35–7.45)
PLATELET # BLD AUTO: 178 K/UL (ref 150–450)
PMV BLD AUTO: 10.7 FL (ref 9.2–12.9)
PO2 BLDA: 50 MMHG (ref 40–60)
POC BE: 11 MMOL/L
POC CARDIAC TROPONIN I: 0.04 NG/ML (ref 0–0.08)
POC SATURATED O2: 84 % (ref 95–100)
POC TCO2: 37 MMOL/L (ref 24–29)
POTASSIUM SERPL-SCNC: 5 MMOL/L (ref 3.5–5.1)
POTASSIUM SERPL-SCNC: 5.6 MMOL/L (ref 3.5–5.1)
PROCALCITONIN SERPL IA-MCNC: 0.26 NG/ML
PROT SERPL-MCNC: 7.5 G/DL (ref 6–8.4)
RBC # BLD AUTO: 4.81 M/UL (ref 4–5.4)
SAMPLE: ABNORMAL
SAMPLE: NORMAL
SITE: ABNORMAL
SODIUM SERPL-SCNC: 141 MMOL/L (ref 136–145)
SODIUM SERPL-SCNC: 145 MMOL/L (ref 136–145)
TROPONIN I SERPL DL<=0.01 NG/ML-MCNC: 0.08 NG/ML (ref 0–0.03)
TROPONIN I SERPL DL<=0.01 NG/ML-MCNC: 0.08 NG/ML (ref 0–0.03)
WBC # BLD AUTO: 9.15 K/UL (ref 3.9–12.7)

## 2023-10-01 PROCEDURE — 84484 ASSAY OF TROPONIN QUANT: CPT | Mod: 91 | Performed by: EMERGENCY MEDICINE

## 2023-10-01 PROCEDURE — 99291 PR CRITICAL CARE, E/M 30-74 MINUTES: ICD-10-PCS | Mod: ,,, | Performed by: NURSE PRACTITIONER

## 2023-10-01 PROCEDURE — 99291 CRITICAL CARE FIRST HOUR: CPT | Mod: ,,, | Performed by: NURSE PRACTITIONER

## 2023-10-01 PROCEDURE — 83880 ASSAY OF NATRIURETIC PEPTIDE: CPT | Performed by: EMERGENCY MEDICINE

## 2023-10-01 PROCEDURE — 84484 ASSAY OF TROPONIN QUANT: CPT

## 2023-10-01 PROCEDURE — 84145 PROCALCITONIN (PCT): CPT | Performed by: EMERGENCY MEDICINE

## 2023-10-01 PROCEDURE — 94660 CPAP INITIATION&MGMT: CPT

## 2023-10-01 PROCEDURE — 93005 ELECTROCARDIOGRAM TRACING: CPT

## 2023-10-01 PROCEDURE — 27000190 HC CPAP FULL FACE MASK W/VALVE

## 2023-10-01 PROCEDURE — 80074 ACUTE HEPATITIS PANEL: CPT | Performed by: STUDENT IN AN ORGANIZED HEALTH CARE EDUCATION/TRAINING PROGRAM

## 2023-10-01 PROCEDURE — 93010 ELECTROCARDIOGRAM REPORT: CPT | Mod: ,,, | Performed by: INTERNAL MEDICINE

## 2023-10-01 PROCEDURE — 99291 CRITICAL CARE FIRST HOUR: CPT

## 2023-10-01 PROCEDURE — 93010 EKG 12-LEAD: ICD-10-PCS | Mod: ,,, | Performed by: INTERNAL MEDICINE

## 2023-10-01 PROCEDURE — 25000003 PHARM REV CODE 250

## 2023-10-01 PROCEDURE — 96374 THER/PROPH/DIAG INJ IV PUSH: CPT

## 2023-10-01 PROCEDURE — 99900035 HC TECH TIME PER 15 MIN (STAT)

## 2023-10-01 PROCEDURE — 82803 BLOOD GASES ANY COMBINATION: CPT

## 2023-10-01 PROCEDURE — 27100171 HC OXYGEN HIGH FLOW UP TO 24 HOURS

## 2023-10-01 PROCEDURE — 63600175 PHARM REV CODE 636 W HCPCS: Performed by: EMERGENCY MEDICINE

## 2023-10-01 PROCEDURE — 63600175 PHARM REV CODE 636 W HCPCS

## 2023-10-01 PROCEDURE — 63600175 PHARM REV CODE 636 W HCPCS: Performed by: NURSE PRACTITIONER

## 2023-10-01 PROCEDURE — 94640 AIRWAY INHALATION TREATMENT: CPT

## 2023-10-01 PROCEDURE — 85025 COMPLETE CBC W/AUTO DIFF WBC: CPT | Performed by: EMERGENCY MEDICINE

## 2023-10-01 PROCEDURE — 25000003 PHARM REV CODE 250: Performed by: NURSE PRACTITIONER

## 2023-10-01 PROCEDURE — 20000000 HC ICU ROOM

## 2023-10-01 PROCEDURE — 25000242 PHARM REV CODE 250 ALT 637 W/ HCPCS: Performed by: NURSE PRACTITIONER

## 2023-10-01 PROCEDURE — 25000003 PHARM REV CODE 250: Performed by: STUDENT IN AN ORGANIZED HEALTH CARE EDUCATION/TRAINING PROGRAM

## 2023-10-01 PROCEDURE — 80100014 HC HEMODIALYSIS 1:1

## 2023-10-01 PROCEDURE — 99223 1ST HOSP IP/OBS HIGH 75: CPT | Mod: ,,, | Performed by: INTERNAL MEDICINE

## 2023-10-01 PROCEDURE — 80053 COMPREHEN METABOLIC PANEL: CPT | Performed by: EMERGENCY MEDICINE

## 2023-10-01 PROCEDURE — 87389 HIV-1 AG W/HIV-1&-2 AB AG IA: CPT | Performed by: PHYSICIAN ASSISTANT

## 2023-10-01 PROCEDURE — 80048 BASIC METABOLIC PNL TOTAL CA: CPT | Mod: XB

## 2023-10-01 PROCEDURE — 99223 PR INITIAL HOSPITAL CARE,LEVL III: ICD-10-PCS | Mod: ,,, | Performed by: INTERNAL MEDICINE

## 2023-10-01 PROCEDURE — 94761 N-INVAS EAR/PLS OXIMETRY MLT: CPT

## 2023-10-01 RX ORDER — MUPIROCIN 20 MG/G
OINTMENT TOPICAL 2 TIMES DAILY
Status: DISCONTINUED | OUTPATIENT
Start: 2023-10-01 | End: 2023-10-04 | Stop reason: HOSPADM

## 2023-10-01 RX ORDER — PANTOPRAZOLE SODIUM 40 MG/1
40 TABLET, DELAYED RELEASE ORAL DAILY
Status: DISCONTINUED | OUTPATIENT
Start: 2023-10-01 | End: 2023-10-04 | Stop reason: HOSPADM

## 2023-10-01 RX ORDER — CINACALCET 30 MG/1
30 TABLET, FILM COATED ORAL
Status: DISCONTINUED | OUTPATIENT
Start: 2023-10-02 | End: 2023-10-04 | Stop reason: HOSPADM

## 2023-10-01 RX ORDER — BUPROPION HYDROCHLORIDE 150 MG/1
150 TABLET ORAL DAILY
Status: DISCONTINUED | OUTPATIENT
Start: 2023-10-01 | End: 2023-10-04 | Stop reason: HOSPADM

## 2023-10-01 RX ORDER — CARVEDILOL 25 MG/1
25 TABLET ORAL 2 TIMES DAILY
Status: DISCONTINUED | OUTPATIENT
Start: 2023-10-01 | End: 2023-10-04 | Stop reason: HOSPADM

## 2023-10-01 RX ORDER — NITROGLYCERIN 20 MG/100ML
0-400 INJECTION INTRAVENOUS CONTINUOUS
Status: DISCONTINUED | OUTPATIENT
Start: 2023-10-01 | End: 2023-10-02

## 2023-10-01 RX ORDER — SEVELAMER CARBONATE 800 MG/1
800 TABLET, FILM COATED ORAL
Status: DISCONTINUED | OUTPATIENT
Start: 2023-10-01 | End: 2023-10-04 | Stop reason: HOSPADM

## 2023-10-01 RX ORDER — SODIUM CHLORIDE 9 MG/ML
INJECTION, SOLUTION INTRAVENOUS ONCE
Status: DISCONTINUED | OUTPATIENT
Start: 2023-10-01 | End: 2023-10-03

## 2023-10-01 RX ORDER — FLUTICASONE PROPIONATE 50 MCG
2 SPRAY, SUSPENSION (ML) NASAL DAILY
Status: DISCONTINUED | OUTPATIENT
Start: 2023-10-01 | End: 2023-10-04 | Stop reason: HOSPADM

## 2023-10-01 RX ORDER — NITROGLYCERIN 5 MG/ML
400 INJECTION, SOLUTION INTRAVENOUS
Status: COMPLETED | OUTPATIENT
Start: 2023-10-01 | End: 2023-10-01

## 2023-10-01 RX ORDER — HYDROXYZINE HYDROCHLORIDE 25 MG/1
50 TABLET, FILM COATED ORAL 2 TIMES DAILY PRN
Status: DISCONTINUED | OUTPATIENT
Start: 2023-10-01 | End: 2023-10-04 | Stop reason: HOSPADM

## 2023-10-01 RX ORDER — NAPROXEN SODIUM 220 MG/1
81 TABLET, FILM COATED ORAL DAILY
Status: DISCONTINUED | OUTPATIENT
Start: 2023-10-01 | End: 2023-10-04 | Stop reason: HOSPADM

## 2023-10-01 RX ORDER — ACETAMINOPHEN 325 MG/1
650 TABLET ORAL EVERY 6 HOURS PRN
Status: DISCONTINUED | OUTPATIENT
Start: 2023-10-01 | End: 2023-10-04 | Stop reason: HOSPADM

## 2023-10-01 RX ORDER — CALCITRIOL 0.5 UG/1
1.5 CAPSULE ORAL DAILY
Status: DISCONTINUED | OUTPATIENT
Start: 2023-10-01 | End: 2023-10-04 | Stop reason: HOSPADM

## 2023-10-01 RX ORDER — NITROGLYCERIN 20 MG/100ML
INJECTION INTRAVENOUS
Status: COMPLETED
Start: 2023-10-01 | End: 2023-10-01

## 2023-10-01 RX ORDER — HEPARIN SODIUM 1000 [USP'U]/ML
1000 INJECTION, SOLUTION INTRAVENOUS; SUBCUTANEOUS
Status: ACTIVE | OUTPATIENT
Start: 2023-10-01 | End: 2023-10-02

## 2023-10-01 RX ORDER — HEPARIN SODIUM 5000 [USP'U]/ML
5000 INJECTION, SOLUTION INTRAVENOUS; SUBCUTANEOUS EVERY 8 HOURS
Status: DISCONTINUED | OUTPATIENT
Start: 2023-10-01 | End: 2023-10-04 | Stop reason: HOSPADM

## 2023-10-01 RX ORDER — ESCITALOPRAM OXALATE 10 MG/1
20 TABLET ORAL DAILY
Status: DISCONTINUED | OUTPATIENT
Start: 2023-10-01 | End: 2023-10-04 | Stop reason: HOSPADM

## 2023-10-01 RX ORDER — MIRTAZAPINE 15 MG/1
15 TABLET, FILM COATED ORAL NIGHTLY
Status: DISCONTINUED | OUTPATIENT
Start: 2023-10-01 | End: 2023-10-04 | Stop reason: HOSPADM

## 2023-10-01 RX ORDER — MONTELUKAST SODIUM 10 MG/1
10 TABLET ORAL NIGHTLY
Status: DISCONTINUED | OUTPATIENT
Start: 2023-10-01 | End: 2023-10-04 | Stop reason: HOSPADM

## 2023-10-01 RX ORDER — NIFEDIPINE 30 MG/1
60 TABLET, EXTENDED RELEASE ORAL DAILY
Status: DISCONTINUED | OUTPATIENT
Start: 2023-10-01 | End: 2023-10-02

## 2023-10-01 RX ORDER — FLUTICASONE FUROATE AND VILANTEROL 100; 25 UG/1; UG/1
1 POWDER RESPIRATORY (INHALATION) DAILY
Status: DISCONTINUED | OUTPATIENT
Start: 2023-10-01 | End: 2023-10-04 | Stop reason: HOSPADM

## 2023-10-01 RX ORDER — SODIUM CHLORIDE 0.9 % (FLUSH) 0.9 %
10 SYRINGE (ML) INJECTION
Status: DISCONTINUED | OUTPATIENT
Start: 2023-10-01 | End: 2023-10-04 | Stop reason: HOSPADM

## 2023-10-01 RX ADMIN — ESCITALOPRAM OXALATE 20 MG: 10 TABLET ORAL at 08:10

## 2023-10-01 RX ADMIN — CALCITRIOL 1.5 MCG: 0.5 CAPSULE, LIQUID FILLED ORAL at 08:10

## 2023-10-01 RX ADMIN — MUPIROCIN: 20 OINTMENT TOPICAL at 08:10

## 2023-10-01 RX ADMIN — TIOTROPIUM BROMIDE INHALATION SPRAY 2 PUFF: 3.12 SPRAY, METERED RESPIRATORY (INHALATION) at 08:10

## 2023-10-01 RX ADMIN — HEPARIN SODIUM 5000 UNITS: 5000 INJECTION INTRAVENOUS; SUBCUTANEOUS at 03:10

## 2023-10-01 RX ADMIN — NITROGLYCERIN 5 MCG/MIN: 20 INJECTION INTRAVENOUS at 02:10

## 2023-10-01 RX ADMIN — CARVEDILOL 25 MG: 25 TABLET, FILM COATED ORAL at 08:10

## 2023-10-01 RX ADMIN — MONTELUKAST 10 MG: 10 TABLET, FILM COATED ORAL at 08:10

## 2023-10-01 RX ADMIN — MUPIROCIN: 20 OINTMENT TOPICAL at 09:10

## 2023-10-01 RX ADMIN — NIFEDIPINE 60 MG: 30 TABLET, FILM COATED, EXTENDED RELEASE ORAL at 08:10

## 2023-10-01 RX ADMIN — MIRTAZAPINE 15 MG: 15 TABLET, FILM COATED ORAL at 08:10

## 2023-10-01 RX ADMIN — SEVELAMER CARBONATE 800 MG: 800 TABLET, FILM COATED ORAL at 08:10

## 2023-10-01 RX ADMIN — SODIUM ZIRCONIUM CYCLOSILICATE 5 G: 5 POWDER, FOR SUSPENSION ORAL at 11:10

## 2023-10-01 RX ADMIN — FLUTICASONE PROPIONATE 100 MCG: 50 SPRAY, METERED NASAL at 09:10

## 2023-10-01 RX ADMIN — ACETAMINOPHEN 650 MG: 325 TABLET ORAL at 06:10

## 2023-10-01 RX ADMIN — SEVELAMER CARBONATE 800 MG: 800 TABLET, FILM COATED ORAL at 05:10

## 2023-10-01 RX ADMIN — ASPIRIN 81 MG CHEWABLE TABLET 81 MG: 81 TABLET CHEWABLE at 08:10

## 2023-10-01 RX ADMIN — SEVELAMER CARBONATE 800 MG: 800 TABLET, FILM COATED ORAL at 11:10

## 2023-10-01 RX ADMIN — ACETAMINOPHEN 650 MG: 325 TABLET ORAL at 07:10

## 2023-10-01 RX ADMIN — FLUTICASONE FUROATE AND VILANTEROL TRIFENATATE 1 PUFF: 100; 25 POWDER RESPIRATORY (INHALATION) at 08:10

## 2023-10-01 RX ADMIN — NITROGLYCERIN 400 MCG: 5 INJECTION, SOLUTION INTRAVENOUS at 02:10

## 2023-10-01 RX ADMIN — BUPROPION HYDROCHLORIDE 150 MG: 150 TABLET, FILM COATED, EXTENDED RELEASE ORAL at 08:10

## 2023-10-01 RX ADMIN — PANTOPRAZOLE SODIUM 40 MG: 40 TABLET, DELAYED RELEASE ORAL at 08:10

## 2023-10-01 RX ADMIN — HEPARIN SODIUM 5000 UNITS: 5000 INJECTION INTRAVENOUS; SUBCUTANEOUS at 09:10

## 2023-10-01 NOTE — HPI
Ms Booth is a 58 year old female with Multiple Myeloma in remission, ESRD on HD (T/Th/Sat dialysis - last dialysis yesterday), HTN, GERD, MDD/Anxiety, constipation, history of EtOH abuse- complete cessation since 2020, and tobacco use who presented to [facility] ED with acute respiratory distress. She reports increasing shortness of breath at home and acute onset of respiratory distress. She had dialysis yesterday and she reports it was a normal session which she was able to tolerate for the entirety of time with her usual volume removal. EMS was activated tonight as she had increasing shortness of breath. Hypoxic upon EMS arrival and she recovered to 86% with initiation of NIPPV.     In the emergency department she was given an IV bolus of nitroglycerin and started on a continuous infusion. Chest XRAY with bilateral pulmonary opacifications R > L. VBG 7.40/56. BNP 2665. Trop 0.077.    Critical care consulted for admission as patient is on continuous nitroglycerin infusion and NIPPV.

## 2023-10-01 NOTE — HPI
Ms Booth is a 58-year-old woman with multiple myeloma reportedly in remission, ESRD on iHD every Tuesday, Thursday and Saturday via left upper extremity AVF, hypertension, GERD, MDD/SATNAM, constipation, history of EtOH abuse and tobacco admitted to [facility] with hypoxic respiratory failure and respiratory distress after presenting with complaints of worsening dyspnea. She received iHD yesterday for full duration with 3 liters of fluid removed. On presentation she was hypertensive with blood pressures readings as high as 210/130s mmHg. Chest x-ray was notable for bilateral pulmonary opacifications with right being greater the left and BNP at that time was 2.7K. Her blood pressures improved with IV nitroglycerin and resumption of PO anti-hypertensive medications however she remains short breath on nasal cannula at this time. Nephrology has been consulted for inpatient RRT needs.    Dialysis modality: Hemodialysis  -Outpatient HD unit: [facility] (affiliated with [facility])  -Nephrologist: Dr. Yarelis Jones   -HD TX days: Tuesday, Thursday & Saturday duration of treatment: 3.5 hr  -Last HD TX prior to hospital admission: 09/30/2023 with 3 liters removed  -Dialysis access: LUE AV fistula   -Residual urine: Negative  -EDW: 53.5 kg

## 2023-10-01 NOTE — ASSESSMENT & PLAN NOTE
-- Resume home oral regimen   -- Continue to titrate nitroglycerin   -- SBP < 180 goal  -- Nephrology consulted for dialysis management

## 2023-10-01 NOTE — ASSESSMENT & PLAN NOTE
-- T/Th/Sat dialysis. Reports last dialysis yesterday with completion of full session   -- Nephrology consulted for iHD management   -- Volume removal per nephrology   -- Continue phos binders   -- Avoid nephrotoxins   -- Renally dose all medications to appropriate GFR / Crcl   -- Goal Hgb > 7   -- Renal diet when tolerating PO   -- BP management per hypertensive emergency

## 2023-10-01 NOTE — PLAN OF CARE
MICU DAILY GOALS     Family/Goals of care/Code Status   Code Status: Full Code    24H Vital Sign Range  Temp:  [96.5 °F (35.8 °C)-97 °F (36.1 °C)]   Pulse:  []   Resp:  [17-34]   BP: (125-212)/()   SpO2:  [85 %-100 %]      Shift Events   Pt off nitro gtt at 1120. BP stable since with SBP <160. Notified MD of rising BP toward end of shift, no new orders at this time, but med recs to be made. 3 hours HD completed with 3L off. Pt tolerated well with no after affects noted. All other VSS.     AWAKE RASS: Goal -    Actual -      Restraint necessity: Not necessary   BREATHE SBT: Not intubated    Coordinate A & B, analgesics/sedatives Pain: managed   SAT: Not intubated   Delirium CAM-ICU: Overall CAM-ICU: Negative   Early(intubated/ Progressive (non-intubated) Mobility MOVE Screen (INTUBATED ONLY): Not intubated    Activity: Activity Management: Rolling - L1   Feeding/Nutrition Diet order: Diet/Nutrition Received: no added salt, low saturated fat/low cholesterol, consistent carb/diabetic diet, Specialty Diet/Nutrition Received: renal diet   Thrombus DVT prophylaxis: VTE Required Core Measure: Pharmacological prophylaxis initiated/maintained   HOB Elevation Head of Bed (HOB) Positioning: HOB at 30-45 degrees   Ulcer Prophylaxis GI: yes   Glucose control managed     Skin Skin assessed during: Daily Assessment    [x] No Altered Skin Integrity Present    [x]Prevention Measures Documented      [] Yes- Altered Skin Integrity Present or Discovered   [] LDA Added if Not in Epic (Describe Wound)   [] New Altered Skin Integrity was Present on Admit and Documented in LDA   [] Wound Image Taken    Wound Care Consulted? No    Attending Nurse:  Yudelka Kearney RN/Staff Member:  Porter RN/Maya RN   Bowel Function no issues    Indwelling Catheter Necessity            De-escalation Antibiotics No       VS and assessment per flow sheet, patient progressing towards goals as tolerated, plan of care reviewed with  patient ,  all concerns addressed, care ongoing

## 2023-10-01 NOTE — SUBJECTIVE & OBJECTIVE
Past Medical History:   Diagnosis Date    Addiction to drug     Alcohol abuse     Allergic drug reaction 2017    Anemia     Anxiety     Arm pain, left 2014    Breast cyst     Chronic renal failure 2014    CKD (chronic kidney disease) stage 5, GFR less than 15 ml/min     COPD exacerbation 2023    Depression     Dialysis patient     dialysis m-w-f    Encounter for blood transfusion     GERD (gastroesophageal reflux disease)     Hx of psychiatric care     Hypertension     Mood swings     Multiple myeloma in remission 10/26/2012    per Hem/Oc note    Psychiatric exam requested by authority     Psychiatric problem     Renal hypertension     Status post dialysis 2012    Therapy     Thrombocytopenia 2012       Past Surgical History:   Procedure Laterality Date    AV FISTULA PLACEMENT Left     BREAST CYST ASPIRATION Left     BREAST CYST EXCISION Left     CERVICAL SPINE SURGERY Bilateral      SECTION      x1    COLONOSCOPY N/A 2022    Procedure: COLONOSCOPY;  Surgeon: Jordan Mcguire MD;  Location: [facility] ENDO;  Service: Endoscopy;  Laterality: N/A;    FISTULOGRAM N/A 2020    Procedure: Fistulogram;  Surgeon: Rahat Berger MD;  Location: [facility] CATH LAB/EP;  Service: Cardiology;  Laterality: N/A;    pd catheter      PERCUTANEOUS TRANSLUMINAL ANGIOPLASTY OF ARTERIOVENOUS FISTULA N/A 2020    Procedure: PTA, AV FISTULA;  Surgeon: Rahat Berger MD;  Location: [facility] CATH LAB/EP;  Service: Cardiology;  Laterality: N/A;    PERITONEAL CATHETER REMOVAL N/A 2018    Procedure: REMOVAL, CATHETER, DIALYSIS, PERITONEAL;  Surgeon: Mukesh Gonsales Jr., MD;  Location: [facility] OR;  Service: General;  Laterality: N/A;    RENAL BIOPSY  2016    THROMBECTOMY OF HEMODIALYSIS ACCESS SITE N/A 2020    Procedure: Thrombectomy, Hemodialysis Graft Or Fistula;  Surgeon: Rahat Berger MD;  Location: [facility] CATH LAB/EP;  Service: Cardiology;  Laterality: N/A;    VASCULAR SURGERY   08/2012    dialysis catheter to right subclavian       Review of patient's allergies indicates:   Allergen Reactions    Doxycycline Swelling, Rash and Hives    Gentamicin Anaphylaxis    Vancomycin analogues Anaphylaxis     Current Facility-Administered Medications   Medication Frequency    acetaminophen tablet 650 mg Q6H PRN    aspirin chewable tablet 81 mg Daily    buPROPion TB24 tablet 150 mg Daily    calcitRIOL capsule 1.5 mcg Daily    carvediloL tablet 25 mg BID    [START ON 10/2/2023] cinacalcet tablet 30 mg Daily with breakfast    EScitalopram oxalate tablet 20 mg Daily    fluticasone furoate-vilanteroL 100-25 mcg/dose diskus inhaler 1 puff Daily    heparin (porcine) injection 5,000 Units Q8H    hydrOXYzine HCL tablet 50 mg BID PRN    mirtazapine tablet 15 mg QHS    montelukast tablet 10 mg QHS    NIFEdipine 24 hr tablet 60 mg Daily    nitroGLYCERIN in 5 % dextrose 50 mg/250 mL (200 mcg/mL) infusion Continuous    pantoprazole EC tablet 40 mg Daily    sevelamer carbonate tablet 800 mg TID WM    sodium chloride 0.9% flush 10 mL PRN    tiotropium bromide 2.5 mcg/actuation inhaler 2 puff Daily     Facility-Administered Medications Ordered in Other Encounters   Medication Frequency    0.9%  NaCl infusion Continuous    0.9%  NaCl infusion Continuous    sodium chloride 0.9% flush 10 mL PRN    sodium chloride 0.9% flush 10 mL PRN     Family History       Problem Relation (Age of Onset)    Diabetes Maternal Grandmother    Heart failure Mother    Hypertension Mother    Ovarian cancer Maternal Aunt    Stroke Maternal Grandmother          Tobacco Use    Smoking status: Every Day     Current packs/day: 1.00     Average packs/day: 1 pack/day for 40.7 years (40.7 ttl pk-yrs)     Types: Cigarettes     Start date: 1983     Passive exposure: Never    Smokeless tobacco: Never    Tobacco comments:     Pt. states recently cut down to 0.5 pk/day. Enrolled in the [employer] on 6/9/20 (S[employer] Member ID # [id number]). She declines  Ambulatory referral to Smoking Cessation program. Handout provided.   Substance and Sexual Activity    Alcohol use: Not Currently     Comment: occasional    Drug use: Not Currently     Types: Marijuana    Sexual activity: Not Currently     Partners: Male     Review of Systems   Constitutional:  Negative for activity change, appetite change, chills, diaphoresis, fatigue and fever.   HENT:  Positive for congestion, sinus pressure and trouble swallowing. Negative for rhinorrhea.    Eyes:  Negative for pain, redness and visual disturbance.   Respiratory:  Positive for shortness of breath and wheezing. Negative for cough.    Cardiovascular:  Negative for chest pain and leg swelling.   Gastrointestinal:  Negative for abdominal distention, abdominal pain, blood in stool, constipation, diarrhea, nausea and vomiting.   Genitourinary:  Positive for decreased urine volume. Negative for difficulty urinating, dysuria and hematuria.        Reports scant urine only once in AM.   Musculoskeletal:  Positive for myalgias. Negative for back pain, gait problem, joint swelling, neck pain and neck stiffness.   Skin:  Negative for rash and wound.   Neurological:  Negative for dizziness, tremors, seizures, syncope, weakness, light-headedness and headaches.   Psychiatric/Behavioral:  Negative for confusion and sleep disturbance. The patient is not nervous/anxious.      Objective:     Vital Signs (Most Recent):  Temp: 96.5 °F (35.8 °C) (10/01/23 0616)  Pulse: 92 (10/01/23 0630)  Resp: 19 (10/01/23 0630)  BP: (!) 157/97 (10/01/23 0630)  SpO2: 100 % (10/01/23 0630) Vital Signs (24h Range):  Temp:  [96.5 °F (35.8 °C)] 96.5 °F (35.8 °C)  Pulse:  [] 92  Resp:  [18-32] 19  SpO2:  [95 %-100 %] 100 %  BP: (150-212)/() 157/97     Weight: 54 kg (119 lb 0.8 oz) (10/01/23 0616)  Body mass index is 18.1 kg/m².  Body surface area is 1.61 meters squared.    No intake/output data recorded.     Physical Exam  Vitals and nursing note reviewed.    Constitutional:       General: She is awake. She is not in acute distress.     Appearance: She is cachectic. She is ill-appearing. She is not diaphoretic.      Interventions: Nasal cannula in place.   HENT:      Head: Normocephalic and atraumatic.      Right Ear: External ear normal.      Left Ear: External ear normal.      Nose:      Comments: Nasal cannula in place.     Mouth/Throat:      Mouth: Mucous membranes are moist.      Pharynx: Oropharynx is clear. No oropharyngeal exudate or posterior oropharyngeal erythema.   Eyes:      General: No scleral icterus.        Right eye: No discharge.         Left eye: No discharge.      Extraocular Movements: Extraocular movements intact.      Conjunctiva/sclera: Conjunctivae normal.   Cardiovascular:      Rate and Rhythm: Normal rate.      Heart sounds: Murmur heard.      Systolic murmur is present with a grade of 1/6.      No friction rub. No gallop.      Arteriovenous access: Left arteriovenous access is present.     Comments: Left upper extremity AVF with palpable thrill and audible bruit.  Pulmonary:      Effort: Pulmonary effort is normal. No respiratory distress.      Breath sounds: Rales present. No wheezing or rhonchi.      Comments: Bibasilar crackles appreciated.  Abdominal:      General: Bowel sounds are normal. There is no distension.      Palpations: Abdomen is soft.      Tenderness: There is no abdominal tenderness.   Musculoskeletal:      Cervical back: Neck supple.      Right lower leg: No edema.      Left lower leg: No edema.   Skin:     General: Skin is warm and dry.      Coloration: Skin is not jaundiced.   Neurological:      General: No focal deficit present.      Mental Status: She is alert. Mental status is at baseline.      Cranial Nerves: No cranial nerve deficit.      Motor: No weakness.   Psychiatric:         Mood and Affect: Mood normal.         Behavior: Behavior normal. Behavior is cooperative.          Significant Labs:  ABGs:   Recent Labs    Lab 10/01/23  0313   PH 7.402   PCO2 56.8*   HCO3 35.3*   POCSATURATED 84   BE 11     BMP:   Recent Labs   Lab 10/01/23  0315   GLU 87      K 5.6*      CO2 27   BUN 28*   CREATININE 5.1*   CALCIUM 9.4     CBC:   Recent Labs   Lab 10/01/23  0315   WBC 9.15   RBC 4.81   HGB 13.4   HCT 43.6      MCV 91   MCH 27.9   MCHC 30.7*     CMP:   Recent Labs   Lab 10/01/23  0315   GLU 87   CALCIUM 9.4   ALBUMIN 3.2*   PROT 7.5      K 5.6*   CO2 27      BUN 28*   CREATININE 5.1*   ALKPHOS 185*   ALT 13   AST 25   BILITOT 0.6     LFTs:   Recent Labs   Lab 10/01/23  0315   ALT 13   AST 25   ALKPHOS 185*   BILITOT 0.6   PROT 7.5   ALBUMIN 3.2*     Microbiology Results (last 7 days)       ** No results found for the last 168 hours. **          Specimen (24h ago, onward)      None          Significant Imaging:  I have reviewed all imagining in the last 24 hours.

## 2023-10-01 NOTE — CARE UPDATE
Patient transported to MICU 6097 by RN, RT x2. On BiPAP, continuous cardiac, SpO2, NIBP monitoring. Nitro gtt infusing. Bedside handoff given to MICU RN

## 2023-10-01 NOTE — ED PROVIDER NOTES
Encounter Date: 10/1/2023       History     Chief Complaint   Patient presents with    Shortness of Breath     Arrives on CPAP     HPI    Patient is a 58-year-old female with a history of COPD and ESRD on dialysis receiving  presenting to acute presentation of shortness of breath and hypertension.  Patient had received full dialysis session earlier today, was not cut short.  Patient's dyspnea occurred all of a sudden without provocation.  Systolic was over 200 per EMS inpatient was placed on CPAP due to respiratory status and crackles heard diffusely.  Patient was admitted on  of this year due to same etiology, she states this is like the last time.  She denies chest pain.    Review of patient's allergies indicates:   Allergen Reactions    Doxycycline Swelling, Rash and Hives    Gentamicin Anaphylaxis    Vancomycin analogues Anaphylaxis     Past Medical History:   Diagnosis Date    Addiction to drug     Alcohol abuse     Allergic drug reaction 2017    Anemia     Anxiety     Arm pain, left 2014    Breast cyst     Chronic renal failure 2014    CKD (chronic kidney disease) stage 5, GFR less than 15 ml/min     COPD exacerbation 2023    Depression     Dialysis patient     dialysis m-w-f    Encounter for blood transfusion     GERD (gastroesophageal reflux disease)     Hx of psychiatric care     Hypertension     Mood swings     Multiple myeloma in remission 10/26/2012    per Hem/Oc note    Psychiatric exam requested by authority     Psychiatric problem     Renal hypertension     Status post dialysis 2012    Therapy     Thrombocytopenia 2012     Past Surgical History:   Procedure Laterality Date    AV FISTULA PLACEMENT Left     BREAST CYST ASPIRATION Left     BREAST CYST EXCISION Left     CERVICAL SPINE SURGERY Bilateral      SECTION      x1    COLONOSCOPY N/A 2022    Procedure: COLONOSCOPY;  Surgeon: Jordan Mcguire MD;  Location:  [facility] ENDO;  Service: Endoscopy;  Laterality: N/A;    FISTULOGRAM N/A 06/09/2020    Procedure: Fistulogram;  Surgeon: Rahat Berger MD;  Location: [facility] CATH LAB/EP;  Service: Cardiology;  Laterality: N/A;    pd catheter      PERCUTANEOUS TRANSLUMINAL ANGIOPLASTY OF ARTERIOVENOUS FISTULA N/A 06/09/2020    Procedure: PTA, AV FISTULA;  Surgeon: Rahat Berger MD;  Location: [facility] CATH LAB/EP;  Service: Cardiology;  Laterality: N/A;    PERITONEAL CATHETER REMOVAL N/A 11/30/2018    Procedure: REMOVAL, CATHETER, DIALYSIS, PERITONEAL;  Surgeon: Mukesh Gonsales Jr., MD;  Location: [facility] OR;  Service: General;  Laterality: N/A;    RENAL BIOPSY  2016    THROMBECTOMY OF HEMODIALYSIS ACCESS SITE N/A 06/09/2020    Procedure: Thrombectomy, Hemodialysis Graft Or Fistula;  Surgeon: Rahat Berger MD;  Location: [facility] CATH LAB/EP;  Service: Cardiology;  Laterality: N/A;    VASCULAR SURGERY  08/2012    dialysis catheter to right subclavian     Family History   Problem Relation Age of Onset    Hypertension Mother     Heart failure Mother     Ovarian cancer Maternal Aunt     Diabetes Maternal Grandmother     Stroke Maternal Grandmother     Breast cancer Neg Hx     Colon cancer Neg Hx      Social History     Tobacco Use    Smoking status: Every Day     Current packs/day: 1.00     Average packs/day: 1 pack/day for 40.7 years (40.7 ttl pk-yrs)     Types: Cigarettes     Start date: 1983     Passive exposure: Never    Smokeless tobacco: Never    Tobacco comments:     Pt. states recently cut down to 0.5 pk/day. Enrolled in the [employer] on 6/9/20 ([facility] Member ID # [id number]). She declines Ambulatory referral to Smoking Cessation program. Handout provided.   Substance Use Topics    Alcohol use: Not Currently     Comment: occasional    Drug use: Not Currently     Types: Marijuana     Review of Systems  Unable to obtain due to respiratory status.  Physical Exam     Initial Vitals   BP Pulse Resp Temp SpO2   10/01/23 0251 10/01/23 0251 10/01/23  0251 10/01/23 0616 10/01/23 0251   (!) 212/133 (!) 115 (!) 32 96.5 °F (35.8 °C) 100 %      MAP       --                Physical Exam    Constitutional: She is not diaphoretic. She appears distressed.   Thin-appearing   HENT:   Head: Normocephalic and atraumatic.   Eyes: Right eye exhibits no discharge. Left eye exhibits no discharge. No scleral icterus.   Cardiovascular:  Intact distal pulses.           Difficult to appreciate due to volume crackles   Pulmonary/Chest: She is in respiratory distress.   Patient is a in respiratory distress, placed on BiPAP  Diffuse coarse crackles appreciated on auscultation   Abdominal: Abdomen is soft. She exhibits no distension. There is no abdominal tenderness.   Musculoskeletal:         General: No edema.     Neurological: She is alert.   Skin: Skin is warm and dry.         ED Course   Procedures  Labs Reviewed   CBC W/ AUTO DIFFERENTIAL - Abnormal; Notable for the following components:       Result Value    MCHC 30.7 (*)     RDW 19.8 (*)     Gran % 76.2 (*)     Lymph % 13.7 (*)     All other components within normal limits    Narrative:     Release to patient->Immediate   COMPREHENSIVE METABOLIC PANEL - Abnormal; Notable for the following components:    Potassium 5.6 (*)     BUN 28 (*)     Creatinine 5.1 (*)     Albumin 3.2 (*)     Alkaline Phosphatase 185 (*)     eGFR 9.2 (*)     All other components within normal limits    Narrative:     Release to patient->Immediate   TROPONIN I - Abnormal; Notable for the following components:    Troponin I 0.077 (*)     All other components within normal limits    Narrative:     Release to patient->Immediate   TROPONIN I - Abnormal; Notable for the following components:    Troponin I 0.079 (*)     All other components within normal limits   B-TYPE NATRIURETIC PEPTIDE - Abnormal; Notable for the following components:    BNP 2,665 (*)     All other components within normal limits    Narrative:     Release to patient->Immediate   PROCALCITONIN  - Abnormal; Notable for the following components:    Procalcitonin 0.26 (*)     All other components within normal limits    Narrative:     Release to patient->Immediate   ISTAT PROCEDURE - Abnormal; Notable for the following components:    POC PCO2 56.8 (*)     POC HCO3 35.3 (*)     POC TCO2 37 (*)     All other components within normal limits   HIV 1 / 2 ANTIBODY    Narrative:     Release to patient->Immediate   TROPONIN ISTAT   POCT TROPONIN   POCT TROPONIN     EKG Readings: (Independently Interpreted)   Initial Reading: No STEMI. Rhythm: Sinus Tachycardia. Heart Rate: 105. Ectopy: No Ectopy. Conduction: Normal. ST Segment Elevation: V4. T Waves Flipped: AVR, AVL and V1. Other Findings: Prolonged QT Interval. Clinical Impression: Left Ventricular Hypertrophy (LDH) and Sinus Tachycardia       Imaging Results              X-Ray Chest AP Portable (Final result)  Result time 10/01/23 05:15:37      Final result by Ld Anderson MD (10/01/23 05:15:37)                   Impression:      Persistent bilateral pulmonary airspace opacities, again more extensive on the right than the left.  DDX includes pneumonia, pulmonary edema, or other pathology.  Neoplasm neither confirmed or full excluded.    Correlate clinically, and with continued imaging follow-up.      Electronically signed by: Ld Anderson  Date:    10/01/2023  Time:    05:15               Narrative:    EXAMINATION:  XR CHEST AP PORTABLE    CLINICAL HISTORY:  Chest Pain;    TECHNIQUE:  Single frontal view of the chest was performed.    COMPARISON:  Portable chest x-rays 09/14/2023 and 09/27/2022    FINDINGS:  Midline trachea.    Borderline-enlarged cardiopericardial silhouette, similar to the comparison studies.    Relative to 09/14/2023, there is little interval radiographic improvement in the bilateral airspace opacities, again more extensive on the right than the left.    No pneumothorax or blunting of the right lateral costophrenic angle.  The left  lateral costophrenic angle is blunted, possibly on the basis of pleural thickening or a small amount of pleural fluid.    Osseous detail is suboptimally visualized in the spine and skeleton.  Partially visualized hardware related to a posterior cervicothoracic fusion construct.  Old right 7th rib fracture deformity, also present on 09/17/2022..    EKG leads project over the chest.                                       Medications   nitroGLYCERIN in 5 % dextrose 50 mg/250 mL (200 mcg/mL) infusion ( Intravenous Canceled Entry 10/1/23 0700)   sodium chloride 0.9% flush 10 mL (has no administration in time range)   heparin (porcine) injection 5,000 Units (5,000 Units Subcutaneous Back Association 10/1/23 0600)   aspirin chewable tablet 81 mg (has no administration in time range)   buPROPion TB24 tablet 150 mg (has no administration in time range)   calcitRIOL capsule 1.5 mcg (has no administration in time range)   carvediloL tablet 25 mg (has no administration in time range)   cinacalcet tablet 30 mg (has no administration in time range)   EScitalopram oxalate tablet 20 mg (has no administration in time range)   fluticasone furoate-vilanteroL 100-25 mcg/dose diskus inhaler 1 puff (has no administration in time range)   hydrOXYzine HCL tablet 50 mg (has no administration in time range)   mirtazapine tablet 15 mg (has no administration in time range)   montelukast tablet 10 mg (has no administration in time range)   NIFEdipine 24 hr tablet 60 mg (has no administration in time range)   pantoprazole EC tablet 40 mg (has no administration in time range)   sevelamer carbonate tablet 800 mg (has no administration in time range)   tiotropium bromide 2.5 mcg/actuation inhaler 2 puff (has no administration in time range)   acetaminophen tablet 650 mg (650 mg Oral Given 10/1/23 0632)   nitroGLYCERIN injection 400 mcg (400 mcg Intravenous Given 10/1/23 0256)     Medical Decision Making  Patient is a 58-year-old female with history  of COPD and ESRD last received dialysis on 9/30 presenting to the ED with a flash pulmonary edema.  Patient developed acute shortness of breath and was significantly hypertensive, systolic over 200, crackles appreciated diffusely.  Patient's respiratory status improved on BiPAP.  Large bolus nitroglycerin IV administered, 400 mcg, and nitroglycerin infusion initiated.  Patient's blood pressure improved significantly, patient reported starting to feel better, still remaining on BiPAP.  CBC, CMP, BNP, troponin, procalcitonin ordered.  VBG revealed normal pH, 7.402, CO2 of 56.  EKG showed sinus tachycardia with LVH.  Critical care team accepted patient for admission to the MICU prior to labs having resulted.  Patient is stable for admission at that time.    Amount and/or Complexity of Data Reviewed  Labs: ordered.  Radiology: ordered.    Risk  Prescription drug management.  Decision regarding hospitalization.              Attending Attestation:   Physician Attestation Statement for Resident:  As the supervising MD   Physician Attestation Statement: I have personally seen and examined this patient.   I agree with the above history.  -:   As the supervising MD I agree with the above PE.     As the supervising MD I agree with the above treatment, course, plan, and disposition.   -:   58-year-old female presenting to emergency department by EMS in respiratory distress, extremely hypertensive, hypoxic, tripoding, concerning for flash pulmonary edema given her clinical picture.  Transitioned from EMS CPAP to our BiPAP and started on high-dose nitroglycerin infusion with improvement of her work of breathing.  Labs and x-ray consistent with flash pulmonary edema.  Patient admitted to medical ICU.   I have reviewed and agree with the residents interpretation of the following: lab data, x-rays and EKG.         Attending Critical Care:   Critical Care Times:   Direct Patient Care (initial evaluation, reassessments, and time  considering the case)................................................................36 minutes.   Additional History from reviewing old medical records or taking additional history from the family, EMS, PCP, etc.......................5 minutes.   Ordering, Reviewing, and Interpreting Diagnostic Studies...............................................................................................................5 minutes.   Documentation..................................................................................................................................................................................5 minutes.   Consultation with other Physicians. .................................................................................................................................................11 minutes.   ==============================================================  Total Critical Care Time - exclusive of procedural time: 62 minutes.  ==============================================================  Critical care was necessary to treat or prevent imminent or life-threatening deterioration of the following conditions: respiratory failure.                           Clinical Impression:   Final diagnoses:  [R06.02] SOB (shortness of breath)  [J81.0] Flash pulmonary edema (Primary)        ED Disposition Condition    Admit Stable                Cherie Allen,   Resident  10/01/23 0727       Yusra Schofield MD  10/01/23 [id number]

## 2023-10-01 NOTE — H&P
[facility] - Emergency Dept  Critical Care Medicine  History & Physical    Patient Name: Jennifer Booth  MRN: [id number]  Admission Date: 10/1/2023  Hospital Length of Stay: 0 days  Code Status: Full Code  Attending Physician: Phill Peres MD   Primary Care Provider: Leodan Sanchez MD   Principal Problem: Hypertensive emergency    Subjective:     HPI:  Ms Booth is a 58 year old female with Multiple Myeloma in remission, ESRD on HD (T/Th/Sat dialysis - last dialysis yesterday), HTN, GERD, MDD/Anxiety, constipation, history of EtOH abuse- complete cessation since 2020, and tobacco use who presented to [facility] ED with acute respiratory distress. She reports increasing shortness of breath at home and acute onset of respiratory distress. She had dialysis yesterday and she reports it was a normal session which she was able to tolerate for the entirety of time with her usual volume removal. EMS was activated tonight as she had increasing shortness of breath. Hypoxic upon EMS arrival and she recovered to 86% with initiation of NIPPV.     In the emergency department she was given an IV bolus of nitroglycerin and started on a continuous infusion. Chest XRAY with bilateral pulmonary opacifications R > L. VBG 7.40/56. BNP 2665. Trop 0.077.    Critical care consulted for admission as patient is on continuous nitroglycerin infusion and NIPPV.       Hospital/ICU Course:  No notes on file     Past Medical History:   Diagnosis Date    Addiction to drug     Alcohol abuse     Allergic drug reaction 11/13/2017    Anemia     Anxiety     Arm pain, left 2/12/2014    Breast cyst     Chronic renal failure 2/12/2014    CKD (chronic kidney disease) stage 5, GFR less than 15 ml/min     COPD exacerbation 9/16/2023    Depression     Dialysis patient     dialysis m-w-f    Encounter for blood transfusion     GERD (gastroesophageal reflux disease)     Hx of psychiatric care     Hypertension     Mood swings     Multiple myeloma in  remission 10/26/2012    per Hem/Oc note    Psychiatric exam requested by authority     Psychiatric problem     Renal hypertension     Status post dialysis 2012    Therapy     Thrombocytopenia 2012       Past Surgical History:   Procedure Laterality Date    AV FISTULA PLACEMENT Left     BREAST CYST ASPIRATION Left     BREAST CYST EXCISION Left     CERVICAL SPINE SURGERY Bilateral      SECTION      x1    COLONOSCOPY N/A 2022    Procedure: COLONOSCOPY;  Surgeon: Jordan Mcguire MD;  Location: [facility] ENDO;  Service: Endoscopy;  Laterality: N/A;    FISTULOGRAM N/A 2020    Procedure: Fistulogram;  Surgeon: Rahat Berger MD;  Location: [facility] CATH LAB/EP;  Service: Cardiology;  Laterality: N/A;    pd catheter      PERCUTANEOUS TRANSLUMINAL ANGIOPLASTY OF ARTERIOVENOUS FISTULA N/A 2020    Procedure: PTA, AV FISTULA;  Surgeon: Rahat Berger MD;  Location: [facility] CATH LAB/EP;  Service: Cardiology;  Laterality: N/A;    PERITONEAL CATHETER REMOVAL N/A 2018    Procedure: REMOVAL, CATHETER, DIALYSIS, PERITONEAL;  Surgeon: Mukesh Gonsales Jr., MD;  Location: [facility] OR;  Service: General;  Laterality: N/A;    RENAL BIOPSY  2016    THROMBECTOMY OF HEMODIALYSIS ACCESS SITE N/A 2020    Procedure: Thrombectomy, Hemodialysis Graft Or Fistula;  Surgeon: Rahat Berger MD;  Location: [facility] CATH LAB/EP;  Service: Cardiology;  Laterality: N/A;    VASCULAR SURGERY  2012    dialysis catheter to right subclavian       Review of patient's allergies indicates:   Allergen Reactions    Doxycycline Swelling, Rash and Hives    Gentamicin Anaphylaxis    Vancomycin analogues Anaphylaxis       Family History       Problem Relation (Age of Onset)    Diabetes Maternal Grandmother    Heart failure Mother    Hypertension Mother    Ovarian cancer Maternal Aunt    Stroke Maternal Grandmother          Tobacco Use    Smoking status: Every Day     Current packs/day: 1.00     Average  packs/day: 1 pack/day for 40.7 years (40.7 ttl pk-yrs)     Types: Cigarettes     Start date: 1983     Passive exposure: Never    Smokeless tobacco: Never    Tobacco comments:     Pt. states recently cut down to 0.5 pk/day. Enrolled in the [employer] on 6/9/20 ([facility] Member ID # [id number]). She declines Ambulatory referral to Smoking Cessation program. Handout provided.   Substance and Sexual Activity    Alcohol use: Not Currently     Comment: occasional    Drug use: Not Currently     Types: Marijuana    Sexual activity: Not Currently     Partners: Male      Review of Systems   Constitutional:  Negative for chills and fever.   Respiratory:  Positive for shortness of breath. Negative for cough.    Cardiovascular:  Negative for chest pain and leg swelling.   Gastrointestinal:  Negative for abdominal pain, nausea and vomiting.   Neurological:  Negative for syncope and light-headedness.   Psychiatric/Behavioral:  Negative for agitation and confusion.      Objective:     Vital Signs (Most Recent):  Pulse: 96 (10/01/23 0415)  Resp: (!) 22 (10/01/23 0415)  BP: (!) 166/106 (10/01/23 0415)  SpO2: 100 % (10/01/23 0415) Vital Signs (24h Range):  Pulse:  [] 96  Resp:  [21-32] 22  SpO2:  [95 %-100 %] 100 %  BP: (163-212)/(104-133) 166/106   Weight: 49.9 kg (110 lb)  Body mass index is 16.73 kg/m².    No intake or output data in the 24 hours ending 10/01/23 0428       Physical Exam  Vitals and nursing note reviewed.   Constitutional:       General: She is not in acute distress.     Appearance: She is ill-appearing.   HENT:      Head: Normocephalic and atraumatic.      Mouth/Throat:      Pharynx: No oropharyngeal exudate.   Eyes:      General: No scleral icterus.     Pupils: Pupils are equal, round, and reactive to light.   Cardiovascular:      Rate and Rhythm: Normal rate and regular rhythm.      Pulses: Normal pulses.      Heart sounds: No murmur heard.  Pulmonary:      Effort: Pulmonary effort is normal. Tachypnea  present.      Breath sounds: Rales present.   Abdominal:      General: Bowel sounds are normal. There is no distension.      Palpations: Abdomen is soft.   Musculoskeletal:      Right lower leg: No edema.      Left lower leg: No edema.   Skin:     General: Skin is dry.      Capillary Refill: Capillary refill takes less than 2 seconds.      Coloration: Skin is not jaundiced.   Neurological:      General: No focal deficit present.      Mental Status: She is alert.      GCS: GCS eye subscore is 4. GCS verbal subscore is 5. GCS motor subscore is 6.            Vents:  Oxygen Concentration (%): 100 (10/01/23 0251)  Lines/Drains/Airways       Peripheral Intravenous Line  Duration                  Hemodialysis AV Fistula Left forearm -- days         Peripheral IV - Single Lumen 10/01/23 0230 18 G Anterior;Proximal;Right Forearm <1 day         Peripheral IV - Single Lumen 10/01/23 0315 20 G Anterior;Right Forearm <1 day                  Significant Labs:    CBC/Anemia Profile:  Recent Labs   Lab 10/01/23  0315   WBC 9.15   HGB 13.4   HCT 43.6      MCV 91   RDW 19.8*        Chemistries:  Recent Labs   Lab 10/01/23  0315      K 5.6*      CO2 27   BUN 28*   CREATININE 5.1*   CALCIUM 9.4   ALBUMIN 3.2*   PROT 7.5   BILITOT 0.6   ALKPHOS 185*   ALT 13   AST 25       All pertinent labs within the past 24 hours have been reviewed.    Significant Imaging: I have reviewed all pertinent imaging results/findings within the past 24 hours.    Assessment/Plan:     Psychiatric  Depression  -- Continue home regimen     SATNAM (generalized anxiety disorder)  -- Continue home regimen     ENT  Allergic rhinitis  -- Continue Cetrizine     Pulmonary  Acute respiratory failure with hypoxia  Patient with Hypoxic Respiratory failure which is Acute.  she is not on home oxygen. Supplemental oxygen was provided and noted- Oxygen Concentration (%):  [100] 100    Signs/symptoms of respiratory failure include- tachypnea, increased work of  breathing and respiratory distress. Contributing diagnoses includes - Flash Pulmonary Edema Labs and images were reviewed. Patient Has recent ABG, which has been reviewed. Will treat underlying causes and adjust management of respiratory failure as follows-     -- NIPPV   -- Wean FIO2 for Spo2 > 90%   -- Continue home inhalers   -- BP management per HTN emergency     Cardiac/Vascular  * Hypertensive emergency  58 year old female with extensive PMH who presented to [facility] [place] ED with respiratory distress and hypoxia. CXR concerning for bilateral pulmonary edema R > L. In the ER she was given IVP nitroglycerin and started on a continuous infusion for BP control.    -- Resume home regimen   -- Titrate nitroglycerin for SBP < 180   -- Nephrology consulted for iHD management. Appreciate their assistance   -- BNP elevated. Volume removal with iHD   -- Wean BiPap as tolerated    Renal hypertension  -- Resume home oral regimen   -- Continue to titrate nitroglycerin   -- SBP < 180 goal  -- Nephrology consulted for dialysis management     Renal/  ESRD (end stage renal disease) on dialysis  -- T/Th/Sat dialysis. Reports last dialysis yesterday with completion of full session   -- Nephrology consulted for iHD management   -- Volume removal per nephrology   -- Continue phos binders   -- Avoid nephrotoxins   -- Renally dose all medications to appropriate GFR / Crcl   -- Goal Hgb > 7   -- Renal diet when tolerating PO   -- BP management per hypertensive emergency     Oncology  Anemia in chronic kidney disease, on chronic dialysis  Recent Labs   Lab 10/01/23  0315   WBC 9.15   RBC 4.81   HGB 13.4   HCT 43.6      MCV 91   MCH 27.9   MCHC 30.7*     -- CBC daily and trend   -- Type and screen   -- Transfuse for hgb < 7     GI  GERD without esophagitis  -- Continue Protonix         Critical Care Daily Checklist:    A: Awake: RASS Goal/Actual Goal:    Actual:     B: Spontaneous Breathing Trial Performed?     C: SAT & SBT Coordinated?   N/A                      D: Delirium: CAM-ICU     E: Early Mobility Performed? No   F: Feeding Goal:    Status:     Current Diet Order   Procedures    Diet NPO      AS: Analgesia/Sedation    T: Thromboembolic Prophylaxis Heparin    H: HOB > 300 Yes   U: Stress Ulcer Prophylaxis (if needed) Protonix    G: Glucose Control < 180 goal    B: Bowel Function     I: Indwelling Catheter (Lines & Enriquez) Necessity PIV   D: De-escalation of Antimicrobials/Pharmacotherapies     Plan for the day/ETD Admit to ICU for management of HTN emergency and resp failure. Nitro gtt. NIPPV    Code Status:  Family/Goals of Care: Full Code  At bedside      Critical Care Time: 50 minutes    Plan of care discussed with Dr. Fischer. To be discussed with Dr. Peres and attestation to follow.     Critical secondary to Patient has a condition that poses threat to life and bodily function: hypertensive emergency      Critical care was time spent personally by me on the following activities: development of treatment plan with patient or surrogate and bedside caregivers, discussions with consultants, evaluation of patient's response to treatment, examination of patient, ordering and performing treatments and interventions, ordering and review of laboratory studies, ordering and review of radiographic studies, pulse oximetry, re-evaluation of patient's condition. This critical care time did not overlap with that of any other provider or involve time for any procedures.     Timo Collier NP  Critical Care Medicine  [facility] - Emergency Dept

## 2023-10-01 NOTE — ASSESSMENT & PLAN NOTE
Dialysis modality: Hemodialysis  -Outpatient HD unit: [facility] (affiliated with [facility])  -Nephrologist: Dr. Yarelis Jones   -HD TX days: Tuesday, Thursday & Saturday duration of treatment: 3.5 hr  -Last HD TX prior to hospital admission: 09/30/2023 with 3 liters removed  -Dialysis access: LUE AV fistula   -Residual urine: Negative  -EDW: 53.5 kg    Plan/Recommendations:  - iHD today for metabolic clearance and volume management   - can continue home dose Renvela 800 mg TIDWM, cinacalcet 30 mg daily and calcitriol 1.5 mcg daily for now  - renal diet/tube feeds when not NPO with volume restriction of 1 liter for now  - strict I/O's and daily weights  - daily renal function panels and magnesium levels  - renally all dose medications to eGFR  - avoid gadolinium, fleets, phos-based laxatives, NSAIDs, etc.

## 2023-10-01 NOTE — PLAN OF CARE
[facility] - Cardiac Medical ICU  Initial Discharge Assessment       Primary Care Provider: Leodan Sanchez MD    Admission Diagnosis: SOB (shortness of breath) [R06.02]  Flash pulmonary edema [J81.0]    Admission Date: 10/1/2023  Expected Discharge Date:     Transition of Care Barriers: None    Payor: MEDICARE / Plan: MEDICARE PART A & B / Product Type: Government /     Extended Emergency Contact Information  Primary Emergency Contact: Yusra Booth   [facility]  Home Phone: [phone]  Relation: Daughter  Secondary Emergency Contact: GloryGilberto  Mobile Phone: [phone]  Relation: Relative   needed? No    Discharge Plan A: Home with family  Discharge Plan B: Home      [id number] - [place] - [address] AT [facility] [address]  [address] [place] [address]  Phone: [phone] Fax: [phone]      Initial Assessment (most recent)       Adult Discharge Assessment - 10/01/23 1653          Discharge Assessment    Assessment Type Discharge Planning Assessment     Confirmed/corrected address, phone number and insurance Yes     Confirmed Demographics Correct on Facesheet     Source of Information patient     Does patient/caregiver understand observation status Yes     Communicated JOVANA with patient/caregiver Date not available/Unable to determine     Reason For Admission Hypertensive Emergency     People in Home child(autumn), adult     Facility Arrived From: home     Do you expect to return to your current living situation? Yes     Do you have help at home or someone to help you manage your care at home? Yes     Who are your caregiver(s) and their phone number(s)? Yusra Booth (dtr) [phone]     Prior to hospitilization cognitive status: Alert/Oriented     Current cognitive status: Alert/Oriented     Walking or Climbing Stairs ambulation difficulty, requires equipment     Mobility Management cane RW     Dressing/Bathing bathing  difficulty, requires equipment     Dressing/Bathing Management shower chair     Home Accessibility not wheelchair accessible     Home Layout Able to live on 1st floor     Equipment Currently Used at Home cane, straight;walker, rolling;shower chair     Readmission within 30 days? Yes     Patient currently being followed by outpatient case management? No     Do you currently have service(s) that help you manage your care at home? No     Do you take prescription medications? Yes     Do you have prescription coverage? Yes     Coverage Medicare/BCBS     Do you have any problems affording any of your prescribed medications? No     Is the patient taking medications as prescribed? yes     Who is going to help you get home at discharge? Yusra Booth (dtr) [phone]     How do you get to doctors appointments? family or friend will provide     Are you on dialysis? Yes     Dialysis Name and Scheduled days [facility]     Do you take coumadin? No     DME Needed Upon Discharge  other (see comments)   TBD    Discharge Plan discussed with: Patient     Transition of Care Barriers None     Discharge Plan A Home with family     Discharge Plan B Home        Physical Activity    On average, how many days per week do you engage in moderate to strenuous exercise (like a brisk walk)? 4 days     On average, how many minutes do you engage in exercise at this level? 20 min        Financial Resource Strain    How hard is it for you to pay for the very basics like food, housing, medical care, and heating? Not very hard        Housing Stability    In the last 12 months, was there a time when you were not able to pay the mortgage or rent on time? No     In the last 12 months, how many places have you lived? 1     In the last 12 months, was there a time when you did not have a steady place to sleep or slept in a shelter (including now)? No        Transportation Needs    In the past 12 months, has lack of transportation kept you from medical  appointments or from getting medications? No     In the past 12 months, has lack of transportation kept you from meetings, work, or from getting things needed for daily living? No        Food Insecurity    Within the past 12 months, you worried that your food would run out before you got the money to buy more. Never true     Within the past 12 months, the food you bought just didn't last and you didn't have money to get more. Never true        Stress    Do you feel stress - tense, restless, nervous, or anxious, or unable to sleep at night because your mind is troubled all the time - these days? To some extent        Social Connections    In a typical week, how many times do you talk on the phone with family, friends, or neighbors? More than three times a week     How often do you get together with friends or relatives? Once a week     How often do you attend Druze or Sabianist services? Never     Do you belong to any clubs or organizations such as Druze groups, unions, fraternal or athletic groups, or school groups? No     How often do you attend meetings of the clubs or organizations you belong to? Never     Are you , , , , never , or living with a partner?         Alcohol Use    Q1: How often do you have a drink containing alcohol? Never     Q2: How many drinks containing alcohol do you have on a typical day when you are drinking? Patient does not drink     Q3: How often do you have six or more drinks on one occasion? Never        OTHER    Name(s) of People in Home Yusra Booth (claudiar) [phone] and son-in-law                   SW met with pt at bedside. Pt confirmed face sheet information. Lives at address with her dtr, son-in-law, and grandchild. Reported that she is mostly independent with ADLs. Has RW, cane, and shower chair at home. No HH. Goes to dialysis [facility]. No coumadin. Good family support. Has ride. Preferred pharmacy [facility]. MOHSEN/TOREY  to follow.    LALO Wheat, [facility]  Weekend -[facility]  [facility] [phone]

## 2023-10-01 NOTE — PROGRESS NOTES
HD completed, 3 Hrs, 3 liters removed. Needles removed x2 hemostasis achieved. Pt left in room NAD.

## 2023-10-01 NOTE — SUBJECTIVE & OBJECTIVE
Past Medical History:   Diagnosis Date    Addiction to drug     Alcohol abuse     Allergic drug reaction 2017    Anemia     Anxiety     Arm pain, left 2014    Breast cyst     Chronic renal failure 2014    CKD (chronic kidney disease) stage 5, GFR less than 15 ml/min     COPD exacerbation 2023    Depression     Dialysis patient     dialysis m-w-f    Encounter for blood transfusion     GERD (gastroesophageal reflux disease)     Hx of psychiatric care     Hypertension     Mood swings     Multiple myeloma in remission 10/26/2012    per Hem/Oc note    Psychiatric exam requested by authority     Psychiatric problem     Renal hypertension     Status post dialysis 2012    Therapy     Thrombocytopenia 2012       Past Surgical History:   Procedure Laterality Date    AV FISTULA PLACEMENT Left     BREAST CYST ASPIRATION Left     BREAST CYST EXCISION Left     CERVICAL SPINE SURGERY Bilateral      SECTION      x1    COLONOSCOPY N/A 2022    Procedure: COLONOSCOPY;  Surgeon: Jordan Mcguire MD;  Location: [facility] ENDO;  Service: Endoscopy;  Laterality: N/A;    FISTULOGRAM N/A 2020    Procedure: Fistulogram;  Surgeon: Rahat Berger MD;  Location: [facility] CATH LAB/EP;  Service: Cardiology;  Laterality: N/A;    pd catheter      PERCUTANEOUS TRANSLUMINAL ANGIOPLASTY OF ARTERIOVENOUS FISTULA N/A 2020    Procedure: PTA, AV FISTULA;  Surgeon: Rahat Berger MD;  Location: [facility] CATH LAB/EP;  Service: Cardiology;  Laterality: N/A;    PERITONEAL CATHETER REMOVAL N/A 2018    Procedure: REMOVAL, CATHETER, DIALYSIS, PERITONEAL;  Surgeon: Mukesh Gonsales Jr., MD;  Location: [facility] OR;  Service: General;  Laterality: N/A;    RENAL BIOPSY  2016    THROMBECTOMY OF HEMODIALYSIS ACCESS SITE N/A 2020    Procedure: Thrombectomy, Hemodialysis Graft Or Fistula;  Surgeon: Rahat Berger MD;  Location: [facility] CATH LAB/EP;  Service: Cardiology;  Laterality: N/A;    VASCULAR SURGERY   08/2012    dialysis catheter to right subclavian       Review of patient's allergies indicates:   Allergen Reactions    Doxycycline Swelling, Rash and Hives    Gentamicin Anaphylaxis    Vancomycin analogues Anaphylaxis       Family History       Problem Relation (Age of Onset)    Diabetes Maternal Grandmother    Heart failure Mother    Hypertension Mother    Ovarian cancer Maternal Aunt    Stroke Maternal Grandmother          Tobacco Use    Smoking status: Every Day     Current packs/day: 1.00     Average packs/day: 1 pack/day for 40.7 years (40.7 ttl pk-yrs)     Types: Cigarettes     Start date: 1983     Passive exposure: Never    Smokeless tobacco: Never    Tobacco comments:     Pt. states recently cut down to 0.5 pk/day. Enrolled in the [employer] on 6/9/20 ([facility] Member ID # [id number]). She declines Ambulatory referral to Smoking Cessation program. Handout provided.   Substance and Sexual Activity    Alcohol use: Not Currently     Comment: occasional    Drug use: Not Currently     Types: Marijuana    Sexual activity: Not Currently     Partners: Male      Review of Systems   Constitutional:  Negative for chills and fever.   Respiratory:  Positive for shortness of breath. Negative for cough.    Cardiovascular:  Negative for chest pain and leg swelling.   Gastrointestinal:  Negative for abdominal pain, nausea and vomiting.   Neurological:  Negative for syncope and light-headedness.   Psychiatric/Behavioral:  Negative for agitation and confusion.      Objective:     Vital Signs (Most Recent):  Pulse: 96 (10/01/23 0415)  Resp: (!) 22 (10/01/23 0415)  BP: (!) 166/106 (10/01/23 0415)  SpO2: 100 % (10/01/23 0415) Vital Signs (24h Range):  Pulse:  [] 96  Resp:  [21-32] 22  SpO2:  [95 %-100 %] 100 %  BP: (163-212)/(104-133) 166/106   Weight: 49.9 kg (110 lb)  Body mass index is 16.73 kg/m².    No intake or output data in the 24 hours ending 10/01/23 0428       Physical Exam  Vitals and nursing note reviewed.    Constitutional:       General: She is not in acute distress.     Appearance: She is ill-appearing.   HENT:      Head: Normocephalic and atraumatic.      Mouth/Throat:      Pharynx: No oropharyngeal exudate.   Eyes:      General: No scleral icterus.     Pupils: Pupils are equal, round, and reactive to light.   Cardiovascular:      Rate and Rhythm: Normal rate and regular rhythm.      Pulses: Normal pulses.      Heart sounds: No murmur heard.  Pulmonary:      Effort: Pulmonary effort is normal. Tachypnea present.      Breath sounds: Rales present.   Abdominal:      General: Bowel sounds are normal. There is no distension.      Palpations: Abdomen is soft.   Musculoskeletal:      Right lower leg: No edema.      Left lower leg: No edema.   Skin:     General: Skin is dry.      Capillary Refill: Capillary refill takes less than 2 seconds.      Coloration: Skin is not jaundiced.   Neurological:      General: No focal deficit present.      Mental Status: She is alert.      GCS: GCS eye subscore is 4. GCS verbal subscore is 5. GCS motor subscore is 6.            Vents:  Oxygen Concentration (%): 100 (10/01/23 0251)  Lines/Drains/Airways       Peripheral Intravenous Line  Duration                  Hemodialysis AV Fistula Left forearm -- days         Peripheral IV - Single Lumen 10/01/23 0230 18 G Anterior;Proximal;Right Forearm <1 day         Peripheral IV - Single Lumen 10/01/23 0315 20 G Anterior;Right Forearm <1 day                  Significant Labs:    CBC/Anemia Profile:  Recent Labs   Lab 10/01/23  0315   WBC 9.15   HGB 13.4   HCT 43.6      MCV 91   RDW 19.8*        Chemistries:  Recent Labs   Lab 10/01/23  0315      K 5.6*      CO2 27   BUN 28*   CREATININE 5.1*   CALCIUM 9.4   ALBUMIN 3.2*   PROT 7.5   BILITOT 0.6   ALKPHOS 185*   ALT 13   AST 25       All pertinent labs within the past 24 hours have been reviewed.    Significant Imaging: I have reviewed all pertinent imaging results/findings  within the past 24 hours.

## 2023-10-01 NOTE — ASSESSMENT & PLAN NOTE
Recent Labs   Lab 10/01/23  0315   WBC 9.15   RBC 4.81   HGB 13.4   HCT 43.6      MCV 91   MCH 27.9   MCHC 30.7*     -- CBC daily and trend   -- Type and screen   -- Transfuse for hgb < 7

## 2023-10-01 NOTE — ASSESSMENT & PLAN NOTE
58 year old female with extensive PMH who [place] [facility] [place] distress and hypoxia. CXR concerning for bilateral pulmonary edema R > L. In the ER she was given IVP nitroglycerin and started on a continuous infusion for BP control.    -- Resume home regimen   -- Titrate nitroglycerin for SBP < 180   -- Nephrology consulted for iHD management. Appreciate their assistance   -- BNP elevated. Volume removal with iHD   -- Wean BiPap as tolerated

## 2023-10-01 NOTE — ASSESSMENT & PLAN NOTE
Patient with Hypoxic Respiratory failure which is Acute.  she is not on home oxygen. Supplemental oxygen was provided and noted- Oxygen Concentration (%):  [100] 100    Signs/symptoms of respiratory failure include- tachypnea, increased work of breathing and respiratory distress. Contributing diagnoses includes - Flash Pulmonary Edema Labs and images were reviewed. Patient Has recent ABG, which has been reviewed. Will treat underlying causes and adjust management of respiratory failure as follows-     -- NIPPV   -- Wean FIO2 for Spo2 > 90%   -- Continue home inhalers   -- BP management per HTN emergency

## 2023-10-01 NOTE — CONSULTS
[facility] - Cardiac Medical ICU  Nephrology  Consult Note    Patient Name: Jennifer Booth  MRN: [id number]  Admission Date: 10/1/2023  Hospital Length of Stay: 0 days  Attending Provider: Phill Peres MD   Primary Care Physician: Leodan Sanchez MD  Principal Problem:Hypertensive emergency    Inpatient consult to Nephrology  Consult performed by: Alvaro Amaro MD  Consult ordered by: Timo Collier NP        Subjective:     HPI: Ms Booth is a 58-year-old woman with multiple myeloma reportedly in remission, ESRD on iHD every Tuesday, Thursday and Saturday via left upper extremity AVF, hypertension, GERD, MDD/SATNAM, constipation, history of EtOH abuse and tobacco admitted to [facility] with hypoxic respiratory failure and respiratory distress after presenting with complaints of worsening dyspnea. She received iHD yesterday for full duration with 3 liters of fluid removed. On presentation she was hypertensive with blood pressures readings as high as 210/130s mmHg. Chest x-ray was notable for bilateral pulmonary opacifications with right being greater the left and BNP at that time was 2.7K. Her blood pressures improved with IV nitroglycerin and resumption of PO anti-hypertensive medications however she remains short breath on nasal cannula at this time. Nephrology has been consulted for inpatient RRT needs.    Dialysis modality: Hemodialysis  -Outpatient HD unit: [facility] (affiliated with [facility])  -Nephrologist: Dr. Yarelis Jones   -HD TX days: Tuesday, Thursday & Saturday duration of treatment: 3.5 hr  -Last HD TX prior to hospital admission: 09/30/2023 with 3 liters removed  -Dialysis access: LUE AV fistula   -Residual urine: Negative  -EDW: 53.5 kg      Past Medical History:   Diagnosis Date    Addiction to drug     Alcohol abuse     Allergic drug reaction 11/13/2017    Anemia     Anxiety     Arm pain, left 2/12/2014    Breast cyst     Chronic renal failure 2/12/2014    CKD  (chronic kidney disease) stage 5, GFR less than 15 ml/min     COPD exacerbation 2023    Depression     Dialysis patient     dialysis m-w-f    Encounter for blood transfusion     GERD (gastroesophageal reflux disease)     Hx of psychiatric care     Hypertension     Mood swings     Multiple myeloma in remission 10/26/2012    per Hem/Oc note    Psychiatric exam requested by authority     Psychiatric problem     Renal hypertension     Status post dialysis 2012    Therapy     Thrombocytopenia 2012       Past Surgical History:   Procedure Laterality Date    AV FISTULA PLACEMENT Left 2014    BREAST CYST ASPIRATION Left     BREAST CYST EXCISION Left     CERVICAL SPINE SURGERY Bilateral      SECTION      x1    COLONOSCOPY N/A 2022    Procedure: COLONOSCOPY;  Surgeon: Jordan Mcguire MD;  Location: [facility] ENDO;  Service: Endoscopy;  Laterality: N/A;    FISTULOGRAM N/A 2020    Procedure: Fistulogram;  Surgeon: Rahat Berger MD;  Location: [facility] CATH LAB/EP;  Service: Cardiology;  Laterality: N/A;    pd catheter      PERCUTANEOUS TRANSLUMINAL ANGIOPLASTY OF ARTERIOVENOUS FISTULA N/A 2020    Procedure: PTA, AV FISTULA;  Surgeon: Rahat Berger MD;  Location: [facility] CATH LAB/EP;  Service: Cardiology;  Laterality: N/A;    PERITONEAL CATHETER REMOVAL N/A 2018    Procedure: REMOVAL, CATHETER, DIALYSIS, PERITONEAL;  Surgeon: Mukesh Gonsales Jr., MD;  Location: [facility] OR;  Service: General;  Laterality: N/A;    RENAL BIOPSY  2016    THROMBECTOMY OF HEMODIALYSIS ACCESS SITE N/A 2020    Procedure: Thrombectomy, Hemodialysis Graft Or Fistula;  Surgeon: Rahat Berger MD;  Location: [facility] CATH LAB/EP;  Service: Cardiology;  Laterality: N/A;    VASCULAR SURGERY  2012    dialysis catheter to right subclavian       Review of patient's allergies indicates:   Allergen Reactions    Doxycycline Swelling, Rash and Hives    Gentamicin Anaphylaxis    Vancomycin  analogues Anaphylaxis     Current Facility-Administered Medications   Medication Frequency    acetaminophen tablet 650 mg Q6H PRN    aspirin chewable tablet 81 mg Daily    buPROPion TB24 tablet 150 mg Daily    calcitRIOL capsule 1.5 mcg Daily    carvediloL tablet 25 mg BID    [START ON 10/2/2023] cinacalcet tablet 30 mg Daily with breakfast    EScitalopram oxalate tablet 20 mg Daily    fluticasone furoate-vilanteroL 100-25 mcg/dose diskus inhaler 1 puff Daily    heparin (porcine) injection 5,000 Units Q8H    hydrOXYzine HCL tablet 50 mg BID PRN    mirtazapine tablet 15 mg QHS    montelukast tablet 10 mg QHS    NIFEdipine 24 hr tablet 60 mg Daily    nitroGLYCERIN in 5 % dextrose 50 mg/250 mL (200 mcg/mL) infusion Continuous    pantoprazole EC tablet 40 mg Daily    sevelamer carbonate tablet 800 mg TID WM    sodium chloride 0.9% flush 10 mL PRN    tiotropium bromide 2.5 mcg/actuation inhaler 2 puff Daily     Facility-Administered Medications Ordered in Other Encounters   Medication Frequency    0.9%  NaCl infusion Continuous    0.9%  NaCl infusion Continuous    sodium chloride 0.9% flush 10 mL PRN    sodium chloride 0.9% flush 10 mL PRN     Family History       Problem Relation (Age of Onset)    Diabetes Maternal Grandmother    Heart failure Mother    Hypertension Mother    Ovarian cancer Maternal Aunt    Stroke Maternal Grandmother          Tobacco Use    Smoking status: Every Day     Current packs/day: 1.00     Average packs/day: 1 pack/day for 40.7 years (40.7 ttl pk-yrs)     Types: Cigarettes     Start date: 1983     Passive exposure: Never    Smokeless tobacco: Never    Tobacco comments:     Pt. states recently cut down to 0.5 pk/day. Enrolled in the [employer] on 6/9/20 ([facility] Member ID # [id number]). She declines Ambulatory referral to Smoking Cessation program. Handout provided.   Substance and Sexual Activity    Alcohol use: Not Currently     Comment: occasional    Drug use: Not  Currently     Types: Marijuana    Sexual activity: Not Currently     Partners: Male     Review of Systems   Constitutional:  Negative for activity change, appetite change, chills, diaphoresis, fatigue and fever.   HENT:  Positive for congestion, sinus pressure and trouble swallowing. Negative for rhinorrhea.    Eyes:  Negative for pain, redness and visual disturbance.   Respiratory:  Positive for shortness of breath and wheezing. Negative for cough.    Cardiovascular:  Negative for chest pain and leg swelling.   Gastrointestinal:  Negative for abdominal distention, abdominal pain, blood in stool, constipation, diarrhea, nausea and vomiting.   Genitourinary:  Positive for decreased urine volume. Negative for difficulty urinating, dysuria and hematuria.        Reports scant urine only once in AM.   Musculoskeletal:  Positive for myalgias. Negative for back pain, gait problem, joint swelling, neck pain and neck stiffness.   Skin:  Negative for rash and wound.   Neurological:  Negative for dizziness, tremors, seizures, syncope, weakness, light-headedness and headaches.   Psychiatric/Behavioral:  Negative for confusion and sleep disturbance. The patient is not nervous/anxious.      Objective:     Vital Signs (Most Recent):  Temp: 96.5 °F (35.8 °C) (10/01/23 0616)  Pulse: 92 (10/01/23 0630)  Resp: 19 (10/01/23 0630)  BP: (!) 157/97 (10/01/23 0630)  SpO2: 100 % (10/01/23 0630) Vital Signs (24h Range):  Temp:  [96.5 °F (35.8 °C)] 96.5 °F (35.8 °C)  Pulse:  [] 92  Resp:  [18-32] 19  SpO2:  [95 %-100 %] 100 %  BP: (150-212)/() 157/97     Weight: 54 kg (119 lb 0.8 oz) (10/01/23 0616)  Body mass index is 18.1 kg/m².  Body surface area is 1.61 meters squared.    No intake/output data recorded.     Physical Exam  Vitals and nursing note reviewed.   Constitutional:       General: She is awake. She is not in acute distress.     Appearance: She is cachectic. She is ill-appearing. She is not diaphoretic.       Interventions: Nasal cannula in place.   HENT:      Head: Normocephalic and atraumatic.      Right Ear: External ear normal.      Left Ear: External ear normal.      Nose:      Comments: Nasal cannula in place.     Mouth/Throat:      Mouth: Mucous membranes are moist.      Pharynx: Oropharynx is clear. No oropharyngeal exudate or posterior oropharyngeal erythema.   Eyes:      General: No scleral icterus.        Right eye: No discharge.         Left eye: No discharge.      Extraocular Movements: Extraocular movements intact.      Conjunctiva/sclera: Conjunctivae normal.   Cardiovascular:      Rate and Rhythm: Normal rate.      Heart sounds: Murmur heard.      Systolic murmur is present with a grade of 1/6.      No friction rub. No gallop.      Arteriovenous access: Left arteriovenous access is present.     Comments: Left upper extremity AVF with palpable thrill and audible bruit.  Pulmonary:      Effort: Pulmonary effort is normal. No respiratory distress.      Breath sounds: Rales present. No wheezing or rhonchi.      Comments: Bibasilar crackles appreciated.  Abdominal:      General: Bowel sounds are normal. There is no distension.      Palpations: Abdomen is soft.      Tenderness: There is no abdominal tenderness.   Musculoskeletal:      Cervical back: Neck supple.      Right lower leg: No edema.      Left lower leg: No edema.   Skin:     General: Skin is warm and dry.      Coloration: Skin is not jaundiced.   Neurological:      General: No focal deficit present.      Mental Status: She is alert. Mental status is at baseline.      Cranial Nerves: No cranial nerve deficit.      Motor: No weakness.   Psychiatric:         Mood and Affect: Mood normal.         Behavior: Behavior normal. Behavior is cooperative.          Significant Labs:  ABGs:   Recent Labs   Lab 10/01/23  0313   PH 7.402   PCO2 56.8*   HCO3 35.3*   POCSATURATED 84   BE 11     BMP:   Recent Labs   Lab 10/01/23  0315   GLU 87      K 5.6*   CL  102   CO2 27   BUN 28*   CREATININE 5.1*   CALCIUM 9.4     CBC:   Recent Labs   Lab 10/01/23  0315   WBC 9.15   RBC 4.81   HGB 13.4   HCT 43.6      MCV 91   MCH 27.9   MCHC 30.7*     CMP:   Recent Labs   Lab 10/01/23  0315   GLU 87   CALCIUM 9.4   ALBUMIN 3.2*   PROT 7.5      K 5.6*   CO2 27      BUN 28*   CREATININE 5.1*   ALKPHOS 185*   ALT 13   AST 25   BILITOT 0.6     LFTs:   Recent Labs   Lab 10/01/23  0315   ALT 13   AST 25   ALKPHOS 185*   BILITOT 0.6   PROT 7.5   ALBUMIN 3.2*     Microbiology Results (last 7 days)       ** No results found for the last 168 hours. **          Specimen (24h ago, onward)      None          Significant Imaging:  I have reviewed all imagining in the last 24 hours.    Assessment/Plan:     Pulmonary  Acute respiratory failure with hypoxia  - management per primary team  - UF with iHD today    Cardiac/Vascular  * Hypertensive emergency  Renal hypertension  - management per primary team    Renal/  ESRD (end stage renal disease) on dialysis  Dialysis modality: Hemodialysis  -Outpatient HD unit: [facility] (affiliated with [facility])  -Nephrologist: Dr. Yarelis Jones   -HD TX days: Tuesday, Thursday & Saturday duration of treatment: 3.5 hr  -Last HD TX prior to hospital admission: 09/30/2023 with 3 liters removed  -Dialysis access: LUE AV fistula   -Residual urine: Negative  -EDW: 53.5 kg    Plan/Recommendations:  - iHD today for metabolic clearance and volume management   - can continue home dose Renvela 800 mg TIDWM, cinacalcet 30 mg daily and calcitriol 1.5 mcg daily for now  - renal diet/tube feeds when not NPO with volume restriction of 1 liter for now  - strict I/O's and daily weights  - daily renal function panels and magnesium levels  - renally all dose medications to eGFR  - avoid gadolinium, fleets, phos-based laxatives, NSAIDs, etc.    Thank you for your consult. I will follow-up with patient. Please contact us if you have any additional  questions.    Alvaro Amaro MD  Nephrology  [facility] - Cardiac Medical ICU

## 2023-10-01 NOTE — CONSULTS
Patient seen and evaluated by critical care medicine. To be admitted to ICU for further management. Full H&P to follow.     JONAH Grover, [facility]  Pulmonary Critical Care Medicine   10/01/2023

## 2023-10-02 LAB
ALBUMIN SERPL BCP-MCNC: 2.7 G/DL (ref 3.5–5.2)
ALBUMIN SERPL BCP-MCNC: 2.9 G/DL (ref 3.5–5.2)
ALP SERPL-CCNC: 144 U/L (ref 55–135)
ALT SERPL W/O P-5'-P-CCNC: 9 U/L (ref 10–44)
ANION GAP SERPL CALC-SCNC: 12 MMOL/L (ref 8–16)
ANION GAP SERPL CALC-SCNC: 9 MMOL/L (ref 8–16)
ASCENDING AORTA: 3.19 CM
AST SERPL-CCNC: 11 U/L (ref 10–40)
AV INDEX (PROSTH): 0.42
AV MEAN GRADIENT: 5 MMHG
AV PEAK GRADIENT: 7 MMHG
AV VALVE AREA BY VELOCITY RATIO: 2.24 CM²
AV VALVE AREA: 1.44 CM²
AV VELOCITY RATIO: 0.65
BASOPHILS # BLD AUTO: 0.05 K/UL (ref 0–0.2)
BASOPHILS NFR BLD: 0.4 % (ref 0–1.9)
BILIRUB SERPL-MCNC: 0.5 MG/DL (ref 0.1–1)
BSA FOR ECHO PROCEDURE: 1.61 M2
BUN SERPL-MCNC: 31 MG/DL (ref 6–20)
BUN SERPL-MCNC: 42 MG/DL (ref 6–20)
CALCIUM SERPL-MCNC: 8.7 MG/DL (ref 8.7–10.5)
CALCIUM SERPL-MCNC: 9.1 MG/DL (ref 8.7–10.5)
CHLORIDE SERPL-SCNC: 102 MMOL/L (ref 95–110)
CHLORIDE SERPL-SCNC: 107 MMOL/L (ref 95–110)
CO2 SERPL-SCNC: 22 MMOL/L (ref 23–29)
CO2 SERPL-SCNC: 22 MMOL/L (ref 23–29)
CREAT SERPL-MCNC: 4.2 MG/DL (ref 0.5–1.4)
CREAT SERPL-MCNC: 5.4 MG/DL (ref 0.5–1.4)
CV ECHO LV RWT: 0.28 CM
DIFFERENTIAL METHOD: ABNORMAL
DOP CALC AO PEAK VEL: 1.33 M/S
DOP CALC AO VTI: 22.89 CM
DOP CALC LVOT AREA: 3.5 CM2
DOP CALC LVOT DIAMETER: 2.1 CM
DOP CALC LVOT PEAK VEL: 0.86 M/S
DOP CALC LVOT STROKE VOLUME: 32.92 CM3
DOP CALCLVOT PEAK VEL VTI: 9.51 CM
E WAVE DECELERATION TIME: 142.95 MSEC
E/A RATIO: 1.17
E/E' RATIO: 17.87 M/S
ECHO LV POSTERIOR WALL: 0.9 CM (ref 0.6–1.1)
EOSINOPHIL # BLD AUTO: 0.2 K/UL (ref 0–0.5)
EOSINOPHIL NFR BLD: 1.3 % (ref 0–8)
ERYTHROCYTE [DISTWIDTH] IN BLOOD BY AUTOMATED COUNT: 18.8 % (ref 11.5–14.5)
EST. GFR  (NO RACE VARIABLE): 11.6 ML/MIN/1.73 M^2
EST. GFR  (NO RACE VARIABLE): 8.6 ML/MIN/1.73 M^2
FRACTIONAL SHORTENING: 13 % (ref 28–44)
GLUCOSE SERPL-MCNC: 120 MG/DL (ref 70–110)
GLUCOSE SERPL-MCNC: 88 MG/DL (ref 70–110)
HCT VFR BLD AUTO: 46.2 % (ref 37–48.5)
HGB BLD-MCNC: 14 G/DL (ref 12–16)
IMM GRANULOCYTES # BLD AUTO: 0.03 K/UL (ref 0–0.04)
IMM GRANULOCYTES NFR BLD AUTO: 0.3 % (ref 0–0.5)
INTERVENTRICULAR SEPTUM: 0.8 CM (ref 0.6–1.1)
LA MAJOR: 6.15 CM
LA MINOR: 3.9 CM
LA WIDTH: 4.71 CM
LEFT ATRIUM SIZE: 4.64 CM
LEFT ATRIUM VOLUME INDEX: 54.1 ML/M2
LEFT ATRIUM VOLUME: 88.67 CM3
LEFT INTERNAL DIMENSION IN SYSTOLE: 5.6 CM (ref 2.1–4)
LEFT VENTRICLE DIASTOLIC VOLUME INDEX: 110.07 ML/M2
LEFT VENTRICLE DIASTOLIC VOLUME: 180.52 ML
LEFT VENTRICLE MASS INDEX: 137 G/M2
LEFT VENTRICLE SYSTOLIC VOLUME INDEX: 78.9 ML/M2
LEFT VENTRICLE SYSTOLIC VOLUME: 129.42 ML
LEFT VENTRICULAR INTERNAL DIMENSION IN DIASTOLE: 6.4 CM (ref 3.5–6)
LEFT VENTRICULAR MASS: 224.66 G
LV LATERAL E/E' RATIO: 14.89 M/S
LV SEPTAL E/E' RATIO: 22.33 M/S
LYMPHOCYTES # BLD AUTO: 0.8 K/UL (ref 1–4.8)
LYMPHOCYTES NFR BLD: 6.9 % (ref 18–48)
MAGNESIUM SERPL-MCNC: 2.1 MG/DL (ref 1.6–2.6)
MCH RBC QN AUTO: 27.1 PG (ref 27–31)
MCHC RBC AUTO-ENTMCNC: 30.3 G/DL (ref 32–36)
MCV RBC AUTO: 90 FL (ref 82–98)
MONOCYTES # BLD AUTO: 0.8 K/UL (ref 0.3–1)
MONOCYTES NFR BLD: 6.3 % (ref 4–15)
MV PEAK A VEL: 1.15 M/S
MV PEAK E VEL: 1.34 M/S
MV STENOSIS PRESSURE HALF TIME: 41.46 MS
MV VALVE AREA P 1/2 METHOD: 5.31 CM2
NEUTROPHILS # BLD AUTO: 10.1 K/UL (ref 1.8–7.7)
NEUTROPHILS NFR BLD: 84.8 % (ref 38–73)
NRBC BLD-RTO: 0 /100 WBC
PHOSPHATE SERPL-MCNC: 4.2 MG/DL (ref 2.7–4.5)
PHOSPHATE SERPL-MCNC: 4.9 MG/DL (ref 2.7–4.5)
PISA TR MAX VEL: 2.9 M/S
PLATELET # BLD AUTO: 145 K/UL (ref 150–450)
PMV BLD AUTO: 9 FL (ref 9.2–12.9)
POTASSIUM SERPL-SCNC: 5.6 MMOL/L (ref 3.5–5.1)
POTASSIUM SERPL-SCNC: 5.8 MMOL/L (ref 3.5–5.1)
PROT SERPL-MCNC: 6.3 G/DL (ref 6–8.4)
RA MAJOR: 4.13 CM
RA PRESSURE ESTIMATED: 8 MMHG
RA WIDTH: 2.61 CM
RBC # BLD AUTO: 5.16 M/UL (ref 4–5.4)
RIGHT VENTRICULAR END-DIASTOLIC DIMENSION: 3.09 CM
RV TB RVSP: 11 MMHG
SINUS: 2.81 CM
SODIUM SERPL-SCNC: 136 MMOL/L (ref 136–145)
SODIUM SERPL-SCNC: 138 MMOL/L (ref 136–145)
STJ: 2.88 CM
TDI LATERAL: 0.09 M/S
TDI SEPTAL: 0.06 M/S
TDI: 0.08 M/S
TR MAX PG: 34 MMHG
TRICUSPID ANNULAR PLANE SYSTOLIC EXCURSION: 1.88 CM
TV REST PULMONARY ARTERY PRESSURE: 42 MMHG
WBC # BLD AUTO: 11.9 K/UL (ref 3.9–12.7)
Z-SCORE OF LEFT VENTRICULAR DIMENSION IN END DIASTOLE: 3.27
Z-SCORE OF LEFT VENTRICULAR DIMENSION IN END SYSTOLE: 5.31

## 2023-10-02 PROCEDURE — 99900035 HC TECH TIME PER 15 MIN (STAT)

## 2023-10-02 PROCEDURE — 25000242 PHARM REV CODE 250 ALT 637 W/ HCPCS: Performed by: NURSE PRACTITIONER

## 2023-10-02 PROCEDURE — 25000003 PHARM REV CODE 250: Performed by: NURSE PRACTITIONER

## 2023-10-02 PROCEDURE — 25000003 PHARM REV CODE 250: Performed by: INTERNAL MEDICINE

## 2023-10-02 PROCEDURE — 63600175 PHARM REV CODE 636 W HCPCS: Performed by: NURSE PRACTITIONER

## 2023-10-02 PROCEDURE — 63600175 PHARM REV CODE 636 W HCPCS

## 2023-10-02 PROCEDURE — 94761 N-INVAS EAR/PLS OXIMETRY MLT: CPT

## 2023-10-02 PROCEDURE — 85025 COMPLETE CBC W/AUTO DIFF WBC: CPT | Performed by: NURSE PRACTITIONER

## 2023-10-02 PROCEDURE — 80053 COMPREHEN METABOLIC PANEL: CPT | Performed by: STUDENT IN AN ORGANIZED HEALTH CARE EDUCATION/TRAINING PROGRAM

## 2023-10-02 PROCEDURE — 99291 CRITICAL CARE FIRST HOUR: CPT | Mod: GC,,, | Performed by: STUDENT IN AN ORGANIZED HEALTH CARE EDUCATION/TRAINING PROGRAM

## 2023-10-02 PROCEDURE — 80069 RENAL FUNCTION PANEL: CPT | Performed by: STUDENT IN AN ORGANIZED HEALTH CARE EDUCATION/TRAINING PROGRAM

## 2023-10-02 PROCEDURE — 20000000 HC ICU ROOM

## 2023-10-02 PROCEDURE — 25000003 PHARM REV CODE 250

## 2023-10-02 PROCEDURE — 80100014 HC HEMODIALYSIS 1:1

## 2023-10-02 PROCEDURE — 94640 AIRWAY INHALATION TREATMENT: CPT

## 2023-10-02 PROCEDURE — 99291 PR CRITICAL CARE, E/M 30-74 MINUTES: ICD-10-PCS | Mod: GC,,, | Performed by: STUDENT IN AN ORGANIZED HEALTH CARE EDUCATION/TRAINING PROGRAM

## 2023-10-02 PROCEDURE — 83735 ASSAY OF MAGNESIUM: CPT | Performed by: STUDENT IN AN ORGANIZED HEALTH CARE EDUCATION/TRAINING PROGRAM

## 2023-10-02 PROCEDURE — 84100 ASSAY OF PHOSPHORUS: CPT | Performed by: STUDENT IN AN ORGANIZED HEALTH CARE EDUCATION/TRAINING PROGRAM

## 2023-10-02 RX ORDER — NIFEDIPINE 30 MG/1
30 TABLET, EXTENDED RELEASE ORAL ONCE
Status: COMPLETED | OUTPATIENT
Start: 2023-10-02 | End: 2023-10-02

## 2023-10-02 RX ORDER — ALBUTEROL SULFATE 2.5 MG/.5ML
2.5 SOLUTION RESPIRATORY (INHALATION) EVERY 6 HOURS PRN
Status: DISCONTINUED | OUTPATIENT
Start: 2023-10-02 | End: 2023-10-04 | Stop reason: HOSPADM

## 2023-10-02 RX ORDER — HYDRALAZINE HYDROCHLORIDE 10 MG/1
10 TABLET, FILM COATED ORAL EVERY 8 HOURS
Status: DISCONTINUED | OUTPATIENT
Start: 2023-10-02 | End: 2023-10-03

## 2023-10-02 RX ORDER — NITROGLYCERIN 20 MG/100ML
0-400 INJECTION INTRAVENOUS CONTINUOUS
Status: DISCONTINUED | OUTPATIENT
Start: 2023-10-02 | End: 2023-10-02

## 2023-10-02 RX ORDER — NITROGLYCERIN 20 MG/100ML
0-400 INJECTION INTRAVENOUS CONTINUOUS
Status: DISCONTINUED | OUTPATIENT
Start: 2023-10-02 | End: 2023-10-03

## 2023-10-02 RX ORDER — NIFEDIPINE 30 MG/1
90 TABLET, EXTENDED RELEASE ORAL DAILY
Status: DISCONTINUED | OUTPATIENT
Start: 2023-10-03 | End: 2023-10-02

## 2023-10-02 RX ORDER — NIFEDIPINE 30 MG/1
60 TABLET, EXTENDED RELEASE ORAL DAILY
Status: DISCONTINUED | OUTPATIENT
Start: 2023-10-03 | End: 2023-10-04 | Stop reason: HOSPADM

## 2023-10-02 RX ORDER — ISOSORBIDE MONONITRATE 30 MG/1
30 TABLET, EXTENDED RELEASE ORAL DAILY
Status: DISCONTINUED | OUTPATIENT
Start: 2023-10-02 | End: 2023-10-03

## 2023-10-02 RX ORDER — ONDANSETRON 2 MG/ML
4 INJECTION INTRAMUSCULAR; INTRAVENOUS EVERY 8 HOURS PRN
Status: DISCONTINUED | OUTPATIENT
Start: 2023-10-02 | End: 2023-10-04 | Stop reason: HOSPADM

## 2023-10-02 RX ORDER — SODIUM CHLORIDE 9 MG/ML
INJECTION, SOLUTION INTRAVENOUS ONCE
Status: DISCONTINUED | OUTPATIENT
Start: 2023-10-02 | End: 2023-10-04

## 2023-10-02 RX ADMIN — NITROGLYCERIN 15 MCG/MIN: 20 INJECTION INTRAVENOUS at 11:10

## 2023-10-02 RX ADMIN — MUPIROCIN: 20 OINTMENT TOPICAL at 09:10

## 2023-10-02 RX ADMIN — MONTELUKAST 10 MG: 10 TABLET, FILM COATED ORAL at 09:10

## 2023-10-02 RX ADMIN — SODIUM ZIRCONIUM CYCLOSILICATE 10 G: 5 POWDER, FOR SUSPENSION ORAL at 09:10

## 2023-10-02 RX ADMIN — ALBUTEROL SULFATE 2.5 MG: 2.5 SOLUTION RESPIRATORY (INHALATION) at 01:10

## 2023-10-02 RX ADMIN — TIOTROPIUM BROMIDE INHALATION SPRAY 2 PUFF: 3.12 SPRAY, METERED RESPIRATORY (INHALATION) at 09:10

## 2023-10-02 RX ADMIN — ONDANSETRON 4 MG: 2 INJECTION INTRAMUSCULAR; INTRAVENOUS at 02:10

## 2023-10-02 RX ADMIN — PANTOPRAZOLE SODIUM 40 MG: 40 TABLET, DELAYED RELEASE ORAL at 09:10

## 2023-10-02 RX ADMIN — CARVEDILOL 25 MG: 25 TABLET, FILM COATED ORAL at 09:10

## 2023-10-02 RX ADMIN — ACETAMINOPHEN 650 MG: 325 TABLET ORAL at 05:10

## 2023-10-02 RX ADMIN — ASPIRIN 81 MG CHEWABLE TABLET 81 MG: 81 TABLET CHEWABLE at 09:10

## 2023-10-02 RX ADMIN — SODIUM ZIRCONIUM CYCLOSILICATE 10 G: 5 POWDER, FOR SUSPENSION ORAL at 02:10

## 2023-10-02 RX ADMIN — HYDRALAZINE HYDROCHLORIDE 10 MG: 10 TABLET, FILM COATED ORAL at 09:10

## 2023-10-02 RX ADMIN — FLUTICASONE PROPIONATE 100 MCG: 50 SPRAY, METERED NASAL at 09:10

## 2023-10-02 RX ADMIN — HEPARIN SODIUM 5000 UNITS: 5000 INJECTION INTRAVENOUS; SUBCUTANEOUS at 09:10

## 2023-10-02 RX ADMIN — HEPARIN SODIUM 5000 UNITS: 5000 INJECTION INTRAVENOUS; SUBCUTANEOUS at 02:10

## 2023-10-02 RX ADMIN — MIRTAZAPINE 15 MG: 15 TABLET, FILM COATED ORAL at 09:10

## 2023-10-02 RX ADMIN — NITROGLYCERIN 20 MCG/MIN: 20 INJECTION INTRAVENOUS at 02:10

## 2023-10-02 RX ADMIN — NIFEDIPINE 60 MG: 30 TABLET, FILM COATED, EXTENDED RELEASE ORAL at 09:10

## 2023-10-02 RX ADMIN — HEPARIN SODIUM 5000 UNITS: 5000 INJECTION INTRAVENOUS; SUBCUTANEOUS at 05:10

## 2023-10-02 RX ADMIN — ESCITALOPRAM OXALATE 20 MG: 10 TABLET ORAL at 09:10

## 2023-10-02 RX ADMIN — ACETAMINOPHEN 650 MG: 325 TABLET ORAL at 04:10

## 2023-10-02 RX ADMIN — NIFEDIPINE 30 MG: 30 TABLET, FILM COATED, EXTENDED RELEASE ORAL at 11:10

## 2023-10-02 RX ADMIN — FLUTICASONE FUROATE AND VILANTEROL TRIFENATATE 1 PUFF: 100; 25 POWDER RESPIRATORY (INHALATION) at 09:10

## 2023-10-02 RX ADMIN — CALCITRIOL 1.5 MCG: 0.5 CAPSULE, LIQUID FILLED ORAL at 09:10

## 2023-10-02 RX ADMIN — BUPROPION HYDROCHLORIDE 150 MG: 150 TABLET, FILM COATED, EXTENDED RELEASE ORAL at 09:10

## 2023-10-02 RX ADMIN — ISOSORBIDE MONONITRATE 30 MG: 30 TABLET, EXTENDED RELEASE ORAL at 06:10

## 2023-10-02 RX ADMIN — CINACALCET 30 MG: 30 TABLET, FILM COATED ORAL at 07:10

## 2023-10-02 RX ADMIN — SODIUM ZIRCONIUM CYCLOSILICATE 5 G: 5 POWDER, FOR SUSPENSION ORAL at 05:10

## 2023-10-02 RX ADMIN — SEVELAMER CARBONATE 800 MG: 800 TABLET, FILM COATED ORAL at 04:10

## 2023-10-02 RX ADMIN — SEVELAMER CARBONATE 800 MG: 800 TABLET, FILM COATED ORAL at 07:10

## 2023-10-02 RX ADMIN — HYDRALAZINE HYDROCHLORIDE 10 MG: 10 TABLET, FILM COATED ORAL at 11:10

## 2023-10-02 RX ADMIN — SEVELAMER CARBONATE 800 MG: 800 TABLET, FILM COATED ORAL at 11:10

## 2023-10-02 NOTE — PROGRESS NOTES
Arrived patient's room for bedside HD TX.  Received report from primary nurse.  Pt awake, alert and responsive.  VSS.  HD TX started via LFA AV fistula using 15 g needles.

## 2023-10-02 NOTE — ASSESSMENT & PLAN NOTE
-- T/Th/Sat dialysis. Completed dialysis yesterday (10/2)   -- Nephrology consulted for iHD management   -- Volume removal per nephrology   -- Continue phos binders   -- Avoid nephrotoxins   -- Renally dose all medications to appropriate GFR / Crcl   -- Goal Hgb > 7   -- Renal diet when tolerating PO   -- BP management per hypertensive emergency

## 2023-10-02 NOTE — ASSESSMENT & PLAN NOTE
Dialysis modality: Hemodialysis  -Outpatient HD unit: [facility] (affiliated with [facility])  -Nephrologist: Dr. Yarelis Jones   -HD TX days: Tuesday, Thursday & Saturday duration of treatment: 3.5 hr  -Last HD TX prior to hospital admission: 09/30/2023 with 3 liters removed  -Dialysis access: LUE AV fistula   -Residual urine: Negative  -EDW: 53.5 kg    -Doppler U/s on 10/2 revelaed no renal stenosis to R. Kidney, however unable to examine left kidney due to early termination of the exam.   ECho 10/2- EF 35-40% CVP 8    Plan/Recommendations:  - Repeat RFP consistent with hyperkalemia - iHD today for metabolic clearance and volume management   - can continue home dose Renvela 800 mg TIDWM, cinacalcet 30 mg daily and calcitriol 1.5 mcg daily for now  - Renal doppler ultrasound   - renal diet/tube feeds when not NPO with volume restriction of 1 liter for now  - strict I/O's and daily weights  - daily renal function panels and magnesium levels  - renally all dose medications to eGFR  - avoid gadolinium, fleets, phos-based laxatives, NSAIDs, etc.

## 2023-10-02 NOTE — ASSESSMENT & PLAN NOTE
58 year old female with extensive PMH who [place] [facility] [place] with respiratory distress and hypoxia. CXR concerning for bilateral pulmonary edema R > L. In the ER she was given IVP nitroglycerin and started on a continuous infusion for BP control.    -- Resume home regimen   - uptitrate Nifedipine and Imdur; Continue Hydralazine  -- Titrate nitroglycerin for SBP < 180   -- Nephrology consulted for iHD management. Appreciate their assistance   -- BNP elevated on arrival. Volume removal with iHD   -- Wean BiPap as tolerated

## 2023-10-02 NOTE — SUBJECTIVE & OBJECTIVE
Interval History: iHD yesterday, tolerated well. UF 3L. Hypertensive this am. Nitro gtt was restarted. Hyperkalemia K 5.8 this am. K 5.8. Repeat BMP. C/o orthopnea. Echo today. Renal doppler u/s yesterday.     Review of patient's allergies indicates:   Allergen Reactions    Doxycycline Swelling, Rash and Hives    Gentamicin Anaphylaxis    Vancomycin analogues Anaphylaxis     Current Facility-Administered Medications   Medication Frequency    0.9%  NaCl infusion Once    acetaminophen tablet 650 mg Q6H PRN    albuterol sulfate nebulizer solution 2.5 mg Q6H PRN    aspirin chewable tablet 81 mg Daily    buPROPion TB24 tablet 150 mg Daily    calcitRIOL capsule 1.5 mcg Daily    carvediloL tablet 25 mg BID    cinacalcet tablet 30 mg Daily with breakfast    EScitalopram oxalate tablet 20 mg Daily    fluticasone furoate-vilanteroL 100-25 mcg/dose diskus inhaler 1 puff Daily    fluticasone propionate 50 mcg/actuation nasal spray 100 mcg Daily    heparin (porcine) injection 5,000 Units Q8H    hydrALAZINE tablet 10 mg Q8H    hydrOXYzine HCL tablet 50 mg BID PRN    mirtazapine tablet 15 mg QHS    montelukast tablet 10 mg QHS    mupirocin 2 % ointment BID    [START ON 10/3/2023] NIFEdipine 24 hr tablet 90 mg Daily    nitroGLYCERIN in 5 % dextrose 50 mg/250 mL (200 mcg/mL) infusion Continuous    ondansetron injection 4 mg Q8H PRN    pantoprazole EC tablet 40 mg Daily    sevelamer carbonate tablet 800 mg TID WM    sodium chloride 0.9% bolus 250 mL 250 mL PRN    sodium chloride 0.9% flush 10 mL PRN    sodium zirconium cyclosilicate packet 10 g TID    tiotropium bromide 2.5 mcg/actuation inhaler 2 puff Daily     Facility-Administered Medications Ordered in Other Encounters   Medication Frequency    0.9%  NaCl infusion Continuous    0.9%  NaCl infusion Continuous    sodium chloride 0.9% flush 10 mL PRN    sodium chloride 0.9% flush 10 mL PRN       Objective:     Vital Signs (Most Recent):  Temp: 97.9 °F (36.6 °C) (10/02/23  1100)  Pulse: 89 (10/02/23 1100)  Resp: (!) 32 (10/02/23 1100)  BP: (!) 154/96 (10/02/23 1100)  SpO2: 98 % (10/02/23 1100) Vital Signs (24h Range):  Temp:  [97.6 °F (36.4 °C)-98.1 °F (36.7 °C)] 97.9 °F (36.6 °C)  Pulse:  [82-99] 89  Resp:  [18-36] 32  SpO2:  [86 %-100 %] 98 %  BP: (144-204)/() 154/96     Weight: 54 kg (119 lb) (10/02/23 1100)  Body mass index is 18.09 kg/m².  Body surface area is 1.61 meters squared.    I/O last 3 completed shifts:  In: 142.9 [I.V.:142.9]  Out: 3500 [Other:3500]     Physical Exam  Vitals and nursing note reviewed.   Constitutional:       General: She is awake. She is not in acute distress.     Appearance: She is cachectic. She is ill-appearing. She is not diaphoretic.      Interventions: Nasal cannula in place.   HENT:      Head: Normocephalic and atraumatic.      Right Ear: External ear normal.      Left Ear: External ear normal.      Nose:      Comments: Nasal cannula in place.     Mouth/Throat:      Mouth: Mucous membranes are moist.      Pharynx: Oropharynx is clear. No oropharyngeal exudate or posterior oropharyngeal erythema.   Eyes:      General: No scleral icterus.        Right eye: No discharge.         Left eye: No discharge.      Extraocular Movements: Extraocular movements intact.      Conjunctiva/sclera: Conjunctivae normal.   Cardiovascular:      Rate and Rhythm: Normal rate.      Heart sounds: Murmur heard.      Systolic murmur is present with a grade of 1/6.      No friction rub. No gallop.      Arteriovenous access: Left arteriovenous access is present.     Comments: Left upper extremity AVF with palpable thrill and audible bruit.  Pulmonary:      Effort: Pulmonary effort is normal. No respiratory distress.      Breath sounds: Rales present. No wheezing or rhonchi.      Comments: Bibasilar crackles appreciated.  Abdominal:      General: Bowel sounds are normal. There is no distension.      Palpations: Abdomen is soft.      Tenderness: There is no abdominal  tenderness.   Musculoskeletal:      Cervical back: Neck supple.      Right lower leg: No edema.      Left lower leg: No edema.   Skin:     General: Skin is warm and dry.      Coloration: Skin is not jaundiced.   Neurological:      General: No focal deficit present.      Mental Status: She is alert. Mental status is at baseline.      Cranial Nerves: No cranial nerve deficit.      Motor: No weakness.   Psychiatric:         Mood and Affect: Mood normal.         Behavior: Behavior normal. Behavior is cooperative.          Significant Labs:  CBC:   Recent Labs   Lab 10/02/23  0251   WBC 11.90   RBC 5.16   HGB 14.0   HCT 46.2   *   MCV 90   MCH 27.1   MCHC 30.3*     CMP:   Recent Labs   Lab 10/02/23  0417 10/02/23  1424   GLU 88  --    CALCIUM 9.1  --    ALBUMIN 2.9*  --    PROT 6.3  --     136   K 5.8* 5.6*   CO2 22*  --     102   BUN 31*  --    CREATININE 4.2*  --    ALKPHOS 144*  --    ALT 9*  --    AST 11  --    BILITOT 0.5  --      All labs within the past 24 hours have been reviewed.

## 2023-10-02 NOTE — ASSESSMENT & PLAN NOTE
Patient with Hypoxic Respiratory failure which is Acute.  she is not on home oxygen. Supplemental oxygen was provided and noted- Oxygen Concentration (%):  [50] 50    Signs/symptoms of respiratory failure include- tachypnea, increased work of breathing and respiratory distress. Contributing diagnoses includes - Flash Pulmonary Edema Labs and images were reviewed. Patient Has recent ABG, which has been reviewed. Will treat underlying causes and adjust management of respiratory failure as follows-     -- NIPPV   -- Wean FIO2 for Spo2 > 90%   -- Continue home inhalers   -- BP management per HTN emergency

## 2023-10-02 NOTE — PROGRESS NOTES
[facility]  Nephrology  Progress Note    Patient Name: Jennifer Booth  MRN: [id number]  Admission Date: 10/1/2023  Hospital Length of Stay: 1 days  Attending Provider: Jackson Mirza MD   Primary Care Physician: Leodan Sanchez MD  Principal Problem:Hypertensive emergency    Subjective:     Interval History: iHD yesterday, tolerated well. UF 3L. Hypertensive this am. Nitro gtt was restarted. Hyperkalemia K 5.8 this am. K 5.8. Repeat BMP. C/o orthopnea. Echo today. Renal doppler u/s yesterday.     Review of patient's allergies indicates:   Allergen Reactions    Doxycycline Swelling, Rash and Hives    Gentamicin Anaphylaxis    Vancomycin analogues Anaphylaxis     Current Facility-Administered Medications   Medication Frequency    0.9%  NaCl infusion Once    acetaminophen tablet 650 mg Q6H PRN    albuterol sulfate nebulizer solution 2.5 mg Q6H PRN    aspirin chewable tablet 81 mg Daily    buPROPion TB24 tablet 150 mg Daily    calcitRIOL capsule 1.5 mcg Daily    carvediloL tablet 25 mg BID    cinacalcet tablet 30 mg Daily with breakfast    EScitalopram oxalate tablet 20 mg Daily    fluticasone furoate-vilanteroL 100-25 mcg/dose diskus inhaler 1 puff Daily    fluticasone propionate 50 mcg/actuation nasal spray 100 mcg Daily    heparin (porcine) injection 5,000 Units Q8H    hydrALAZINE tablet 10 mg Q8H    hydrOXYzine HCL tablet 50 mg BID PRN    mirtazapine tablet 15 mg QHS    montelukast tablet 10 mg QHS    mupirocin 2 % ointment BID    [START ON 10/3/2023] NIFEdipine 24 hr tablet 90 mg Daily    nitroGLYCERIN in 5 % dextrose 50 mg/250 mL (200 mcg/mL) infusion Continuous    ondansetron injection 4 mg Q8H PRN    pantoprazole EC tablet 40 mg Daily    sevelamer carbonate tablet 800 mg TID WM    sodium chloride 0.9% bolus 250 mL 250 mL PRN    sodium chloride 0.9% flush 10 mL PRN    sodium zirconium cyclosilicate packet 10 g TID    tiotropium bromide 2.5 mcg/actuation inhaler 2  puff Daily     Facility-Administered Medications Ordered in Other Encounters   Medication Frequency    0.9%  NaCl infusion Continuous    0.9%  NaCl infusion Continuous    sodium chloride 0.9% flush 10 mL PRN    sodium chloride 0.9% flush 10 mL PRN       Objective:     Vital Signs (Most Recent):  Temp: 97.9 °F (36.6 °C) (10/02/23 1100)  Pulse: 89 (10/02/23 1100)  Resp: (!) 32 (10/02/23 1100)  BP: (!) 154/96 (10/02/23 1100)  SpO2: 98 % (10/02/23 1100) Vital Signs (24h Range):  Temp:  [97.6 °F (36.4 °C)-98.1 °F (36.7 °C)] 97.9 °F (36.6 °C)  Pulse:  [82-99] 89  Resp:  [18-36] 32  SpO2:  [86 %-100 %] 98 %  BP: (144-204)/() 154/96     Weight: 54 kg (119 lb) (10/02/23 1100)  Body mass index is 18.09 kg/m².  Body surface area is 1.61 meters squared.    I/O last 3 completed shifts:  In: 142.9 [I.V.:142.9]  Out: 3500 [Other:3500]     Physical Exam  Vitals and nursing note reviewed.   Constitutional:       General: She is awake. She is not in acute distress.     Appearance: She is cachectic. She is ill-appearing. She is not diaphoretic.      Interventions: Nasal cannula in place.   HENT:      Head: Normocephalic and atraumatic.      Right Ear: External ear normal.      Left Ear: External ear normal.      Nose:      Comments: Nasal cannula in place.     Mouth/Throat:      Mouth: Mucous membranes are moist.      Pharynx: Oropharynx is clear. No oropharyngeal exudate or posterior oropharyngeal erythema.   Eyes:      General: No scleral icterus.        Right eye: No discharge.         Left eye: No discharge.      Extraocular Movements: Extraocular movements intact.      Conjunctiva/sclera: Conjunctivae normal.   Cardiovascular:      Rate and Rhythm: Normal rate.      Heart sounds: Murmur heard.      Systolic murmur is present with a grade of 1/6.      No friction rub. No gallop.      Arteriovenous access: Left arteriovenous access is present.     Comments: Left upper extremity AVF with palpable thrill and audible  bruit.  Pulmonary:      Effort: Pulmonary effort is normal. No respiratory distress.      Breath sounds: Rales present. No wheezing or rhonchi.      Comments: Bibasilar crackles appreciated.  Abdominal:      General: Bowel sounds are normal. There is no distension.      Palpations: Abdomen is soft.      Tenderness: There is no abdominal tenderness.   Musculoskeletal:      Cervical back: Neck supple.      Right lower leg: No edema.      Left lower leg: No edema.   Skin:     General: Skin is warm and dry.      Coloration: Skin is not jaundiced.   Neurological:      General: No focal deficit present.      Mental Status: She is alert. Mental status is at baseline.      Cranial Nerves: No cranial nerve deficit.      Motor: No weakness.   Psychiatric:         Mood and Affect: Mood normal.         Behavior: Behavior normal. Behavior is cooperative.          Significant Labs:  CBC:   Recent Labs   Lab 10/02/23  0251   WBC 11.90   RBC 5.16   HGB 14.0   HCT 46.2   *   MCV 90   MCH 27.1   MCHC 30.3*     CMP:   Recent Labs   Lab 10/02/23  0417 10/02/23  1424   GLU 88  --    CALCIUM 9.1  --    ALBUMIN 2.9*  --    PROT 6.3  --     136   K 5.8* 5.6*   CO2 22*  --     102   BUN 31*  --    CREATININE 4.2*  --    ALKPHOS 144*  --    ALT 9*  --    AST 11  --    BILITOT 0.5  --      All labs within the past 24 hours have been reviewed.         Assessment/Plan:     Pulmonary  Acute respiratory failure with hypoxia  - management per primary team  - UF with iHD today    Cardiac/Vascular  * Hypertensive emergency  -    Renal hypertension  - management per primary team    Renal/  ESRD (end stage renal disease) on dialysis  Dialysis modality: Hemodialysis  -Outpatient HD unit: [facility] (affiliated with [facility])  -Nephrologist: Dr. Yarelis Jones   -HD TX days: Tuesday, Thursday & Saturday duration of treatment: 3.5 hr  -Last HD TX prior to hospital admission: 09/30/2023 with 3 liters removed  -Dialysis  access: LUE AV fistula   -Residual urine: Negative  -EDW: 53.5 kg    -Doppler U/s on 10/2 revelaed no renal stenosis to R. Kidney, however unable to examine left kidney due to early termination of the exam.   ECho 10/2- EF 35-40% CVP 8    Plan/Recommendations:  - Repeat RFP consistent with hyperkalemia - iHD today for metabolic clearance and volume management   - can continue home dose Renvela 800 mg TIDWM, cinacalcet 30 mg daily and calcitriol 1.5 mcg daily for now  - Renal doppler ultrasound   - renal diet/tube feeds when not NPO with volume restriction of 1 liter for now  - strict I/O's and daily weights  - daily renal function panels and magnesium levels  - renally all dose medications to eGFR  - avoid gadolinium, fleets, phos-based laxatives, NSAIDs, etc.        Thank you for your consult. I will follow-up with patient. Please contact us if you have any additional questions.    Dorothea Rios DNP  Nephrology  [facility]

## 2023-10-02 NOTE — PROGRESS NOTES
[facility]  Critical Care Medicine  Progress Note    Patient Name: Jennifer Booth  MRN: [id number]  Admission Date: 10/1/2023  Hospital Length of Stay: 1 days  Code Status: Full Code  Attending Provider: Jackson Mirza MD  Primary Care Provider: Leodan Sanchez MD  Principal Problem: Hypertensive emergency    Subjective:     HPI:  Ms Booth is a 58 year old female with Multiple Myeloma in remission, ESRD on HD (T/Th/Sat dialysis - last dialysis yesterday), HTN, GERD, MDD/Anxiety, constipation, history of EtOH abuse- complete cessation since 2020, and tobacco use who presented to [facility] ED with acute respiratory distress. She reports increasing shortness of breath at home and acute onset of respiratory distress. She had dialysis yesterday and she reports it was a normal session which she was able to tolerate for the entirety of time with her usual volume removal. EMS was activated tonight as she had increasing shortness of breath. Hypoxic upon EMS arrival and she recovered to 86% with initiation of NIPPV.     In the emergency department she was given an IV bolus of nitroglycerin and started on a continuous infusion. Chest XRAY with bilateral pulmonary opacifications R > L. VBG 7.40/56. BNP 2665. Trop 0.077.    Critical care consulted for admission as patient is on continuous nitroglycerin infusion and NIPPV.       Hospital/ICU Course:  Admitted to MICU for hypertensive emergency causing flash pulm edema, on NG gtt.  HD per Nephrology w/ 3 L removed; BP improved, weaned off NG gtt.  Restarted NG gtt 2/2 rising BPs, uncontrolled w/ home PO meds.  Uptitrating home meds w/ addition of PO Hydralazine.  Weaning NG.    Interval History/Significant Events: restarted NG gtt 2/2 rising BP.  C/o orthopnea, improved when sitting up & hunched over.    Review of Systems   Constitutional:  Positive for activity change. Negative for chills, fever and unexpected weight change.   HENT:  Negative for sinus pain,  trouble swallowing and voice change.    Eyes:  Negative for photophobia, pain and visual disturbance.   Respiratory:  Positive for shortness of breath and wheezing. Negative for cough.    Cardiovascular:  Negative for chest pain, palpitations and leg swelling.   Gastrointestinal:  Negative for abdominal pain, blood in stool, constipation, diarrhea, nausea and vomiting.   Endocrine: Negative for polydipsia and polyuria.   Genitourinary:  Negative for dysuria, frequency, hematuria and urgency.   Musculoskeletal:  Negative for arthralgias, back pain and myalgias.   Skin:  Negative for color change and pallor.   Neurological:  Negative for syncope, weakness, numbness and headaches.   Hematological:  Does not bruise/bleed easily.   Psychiatric/Behavioral:  Negative for confusion, dysphoric mood and sleep disturbance.      Objective:     Vital Signs (Most Recent):  Temp: 97.9 °F (36.6 °C) (10/02/23 1100)  Pulse: 89 (10/02/23 1100)  Resp: (!) 32 (10/02/23 1100)  BP: (!) 154/96 (10/02/23 1100)  SpO2: 98 % (10/02/23 1100) Vital Signs (24h Range):  Temp:  [97.6 °F (36.4 °C)-98.1 °F (36.7 °C)] 97.9 °F (36.6 °C)  Pulse:  [82-99] 89  Resp:  [18-36] 32  SpO2:  [86 %-100 %] 98 %  BP: (144-204)/() 154/96   Weight: 54 kg (119 lb)  Body mass index is 18.09 kg/m².      Intake/Output Summary (Last 24 hours) at 10/2/2023 1347  Last data filed at 10/2/2023 1100  Gross per 24 hour   Intake 43.05 ml   Output 3500 ml   Net -3456.95 ml          Physical Exam  Vitals and nursing note reviewed.   Constitutional:       General: She is in acute distress.      Appearance: She is ill-appearing. She is not toxic-appearing or diaphoretic.   HENT:      Head: Normocephalic and atraumatic.      Right Ear: External ear normal.      Left Ear: External ear normal.      Mouth/Throat:      Mouth: Mucous membranes are moist.      Pharynx: No oropharyngeal exudate.   Eyes:      Extraocular Movements: Extraocular movements intact.      Pupils: Pupils are  equal, round, and reactive to light.   Cardiovascular:      Rate and Rhythm: Normal rate and regular rhythm.      Pulses: Normal pulses.      Heart sounds: Normal heart sounds. No murmur heard.  Pulmonary:      Effort: Respiratory distress present.      Breath sounds: Wheezing and rales present.   Abdominal:      General: Abdomen is flat. Bowel sounds are normal. There is no distension.      Palpations: Abdomen is soft. There is no mass.      Tenderness: There is no abdominal tenderness.   Musculoskeletal:         General: No swelling or tenderness. Normal range of motion.      Cervical back: Normal range of motion and neck supple.      Right lower leg: No edema.      Left lower leg: No edema.   Skin:     General: Skin is warm and dry.      Capillary Refill: Capillary refill takes less than 2 seconds.   Neurological:      General: No focal deficit present.      Mental Status: She is alert and oriented to person, place, and time. Mental status is at baseline.   Psychiatric:         Mood and Affect: Mood normal.         Behavior: Behavior normal.            Vents:  Oxygen Concentration (%): 50 (10/02/23 0700)  Lines/Drains/Airways       Peripheral Intravenous Line  Duration                  Hemodialysis AV Fistula Left forearm -- days         Peripheral IV - Single Lumen 10/01/23 0315 20 G Anterior;Right Forearm 1 day         Peripheral IV - Single Lumen 10/02/23 0300 20 G Anterior;Proximal;Right Forearm <1 day                  Significant Labs:    CBC/Anemia Profile:  Recent Labs   Lab 10/01/23  0315 10/02/23  0251   WBC 9.15 11.90   HGB 13.4 14.0   HCT 43.6 46.2    145*   MCV 91 90   RDW 19.8* 18.8*        Chemistries:  Recent Labs   Lab 10/01/23  0315 10/01/23  1216 10/02/23  0417    141 138   K 5.6* 5.0 5.8*    101 107   CO2 27 29 22*   BUN 28* 33* 31*   CREATININE 5.1* 5.5* 4.2*   CALCIUM 9.4 8.4* 9.1   ALBUMIN 3.2*  --  2.9*   PROT 7.5  --  6.3   BILITOT 0.6  --  0.5   ALKPHOS 185*  --  144*    ALT 13  --  9*   AST 25  --  11   MG  --   --  2.1   PHOS  --   --  4.9*       All pertinent labs within the past 24 hours have been reviewed.    Significant Imaging:  I have reviewed and interpreted all pertinent imaging results/findings within the past 24 hours.      ABG  Recent Labs   Lab 10/01/23  0313   PH 7.402   PO2 50   PCO2 56.8*   HCO3 35.3*   BE 11     Assessment/Plan:     Psychiatric  Depression  -- Continue home regimen     SATNAM (generalized anxiety disorder)  -- Continue home regimen     ENT  Allergic rhinitis  -- Continue Cetrizine     Pulmonary  Acute respiratory failure with hypoxia  Patient with Hypoxic Respiratory failure which is Acute.  she is not on home oxygen. Supplemental oxygen was provided and noted- Oxygen Concentration (%):  [50] 50    Signs/symptoms of respiratory failure include- tachypnea, increased work of breathing and respiratory distress. Contributing diagnoses includes - Flash Pulmonary Edema Labs and images were reviewed. Patient Has recent ABG, which has been reviewed. Will treat underlying causes and adjust management of respiratory failure as follows-     -- NIPPV   -- Wean FIO2 for Spo2 > 90%   -- Continue home inhalers   -- BP management per HTN emergency     Cardiac/Vascular  * Hypertensive emergency  58 year old female with extensive PMH who presented to [facility] ED with respiratory distress and hypoxia. CXR concerning for bilateral pulmonary edema R > L. In the ER she was given IVP nitroglycerin and started on a continuous infusion for BP control.    -- Resume home regimen   - uptitrate Nifedipine, add Hydralazine  -- Titrate nitroglycerin for SBP < 180   -- Nephrology consulted for iHD management. Appreciate their assistance   -- BNP elevated. Volume removal with iHD   -- Wean BiPap as tolerated    Renal hypertension  -- Resume home oral regimen   -- Continue to titrate nitroglycerin   -- SBP < 160 goal  -- Nephrology consulted for dialysis management     Renal/  ESRD  (end stage renal disease) on dialysis  -- T/Th/Sat dialysis. Reports last dialysis yesterday with completion of full session   -- Nephrology consulted for iHD management   -- Volume removal per nephrology   -- Continue phos binders   -- Avoid nephrotoxins   -- Renally dose all medications to appropriate GFR / Crcl   -- Goal Hgb > 7   -- Renal diet when tolerating PO   -- BP management per hypertensive emergency     Oncology  Anemia in chronic kidney disease, on chronic dialysis  Recent Labs   Lab 10/02/23  0251   WBC 11.90   RBC 5.16   HGB 14.0   HCT 46.2   *   MCV 90   MCH 27.1   MCHC 30.3*     -- CBC daily and trend   -- Type and screen   -- Transfuse for hgb < 7     GI  GERD without esophagitis  -- Continue Protonix        Critical Care Daily Checklist:    A: Awake: RASS Goal/Actual Goal:    Actual:     B: Spontaneous Breathing Trial Performed?     C: SAT & SBT Coordinated?  N/A   D: Delirium: CAM-ICU Overall CAM-ICU: Negative   E: Early Mobility Performed? Yes   F: Feeding Goal:    Status:    Current Diet Order   Procedures    Diet renal Low Potassium     Order Specific Question:   Additional Diet Options:     Answer:   Low Potassium      AS: Analgesia/Sedation N/A   T: Thromboembolic Prophylaxis Heparin   H: HOB > 300 Yes   U: Stress Ulcer Prophylaxis (if needed) Pantoprazole   G: Glucose Control Yes   B: Bowel Function     I: Indwelling Catheter (Lines & Enriquez) Necessity Reviewed   D: De-escalation of Antimicrobials/Pharmacotherapies Appropriate    Plan for the day/ETD F/u BP, Stepdown?    Code Status:  Family/Goals of Care: Full Code  Ongoing       Critical secondary to Patient has a condition that poses threat to life and bodily function: Hypertensive emergency      Critical care was time spent personally by me on the following activities: development of treatment plan with patient or surrogate and bedside caregivers, discussions with consultants, evaluation of patient's response to treatment,  examination of patient, ordering and performing treatments and interventions, ordering and review of laboratory studies, ordering and review of radiographic studies, pulse oximetry, re-evaluation of patient's condition. This critical care time did not overlap with that of any other provider or involve time for any procedures.    Timo Corona MD  Critical Care Medicine  [facility] - Cardiac Medical [facility]

## 2023-10-02 NOTE — HOSPITAL COURSE
Admitted to MICU for hypertensive emergency causing flash pulm edema. She was placed on a Nitroglycerin drip. Nephrology was consulted and emergency iHD was performed Monday (Oct. 2) and Wednesday (Oct. 4) with adequate removal. She was weaned off the NG drip. BP continued to be uncontrolled so her home BP medications were titrated up and PO Hydralazine and Imdur were added for goal directed therapy. On 10/4, MICU team felt that patient was stable for discharge home. She was instructed to resume her normal HD schedule which includes returning to dialysis on 10/5. She was also counseled on taking her medications as prescribed, which includes two new BP medications, and the importance of a low-salt diet. Her medications were reviewed during hospitalization and chart updated. A Cardiology referral was placed for her to establish care. She should follow-up with her PCP within the next week.

## 2023-10-02 NOTE — ASSESSMENT & PLAN NOTE
Recent Labs   Lab 10/02/23  0251   WBC 11.90   RBC 5.16   HGB 14.0   HCT 46.2   *   MCV 90   MCH 27.1   MCHC 30.3*     -- CBC daily and trend   -- Type and screen   -- Transfuse for hgb < 7

## 2023-10-02 NOTE — SUBJECTIVE & OBJECTIVE
Interval History/Significant Events: NAEON. Weaned off nitro gtt this morning. Tolerated HD well yesterday with good  clearance. Orthopnea/dyspnea improving. Stable for stepdown today.     Review of Systems   Constitutional:  Positive for activity change. Negative for chills, fever and unexpected weight change.   HENT:  Negative for sinus pain, trouble swallowing and voice change.    Eyes:  Negative for photophobia, pain and visual disturbance.   Respiratory:  Positive for shortness of breath and wheezing. Negative for cough.    Cardiovascular:  Negative for chest pain, palpitations and leg swelling.   Gastrointestinal:  Negative for abdominal pain, blood in stool, constipation, diarrhea, nausea and vomiting.   Endocrine: Negative for polydipsia and polyuria.   Genitourinary:  Negative for dysuria, frequency, hematuria and urgency.   Musculoskeletal:  Negative for arthralgias, back pain and myalgias.   Skin:  Negative for color change and pallor.   Neurological:  Negative for syncope, weakness, numbness and headaches.   Hematological:  Does not bruise/bleed easily.   Psychiatric/Behavioral:  Negative for confusion, dysphoric mood and sleep disturbance.      Objective:     Vital Signs (Most Recent):  Temp: 97.9 °F (36.6 °C) (10/02/23 1100)  Pulse: 89 (10/02/23 1100)  Resp: (!) 32 (10/02/23 1100)  BP: (!) 154/96 (10/02/23 1100)  SpO2: 98 % (10/02/23 1100) Vital Signs (24h Range):  Temp:  [97.6 °F (36.4 °C)-98.1 °F (36.7 °C)] 97.9 °F (36.6 °C)  Pulse:  [82-99] 89  Resp:  [18-36] 32  SpO2:  [86 %-100 %] 98 %  BP: (144-204)/() 154/96   Weight: 54 kg (119 lb)  Body mass index is 18.09 kg/m².      Intake/Output Summary (Last 24 hours) at 10/2/2023 1347  Last data filed at 10/2/2023 1100  Gross per 24 hour   Intake 43.05 ml   Output 3500 ml   Net -3456.95 ml          Physical Exam  Vitals and nursing note reviewed.   Constitutional:       General: She is not in acute distress.     Appearance: She is ill-appearing. She  is not toxic-appearing or diaphoretic.   HENT:      Head: Normocephalic and atraumatic.      Right Ear: External ear normal.      Left Ear: External ear normal.      Mouth/Throat:      Mouth: Mucous membranes are moist.      Pharynx: No oropharyngeal exudate.   Eyes:      Extraocular Movements: Extraocular movements intact.      Pupils: Pupils are equal, round, and reactive to light.   Cardiovascular:      Rate and Rhythm: Normal rate and regular rhythm.      Pulses: Normal pulses.      Heart sounds: Normal heart sounds. No murmur heard.  Pulmonary:      Effort: Respiratory distress present.      Breath sounds: Rales present. No wheezing.      Comments: Coarse breath sounds heard, R>L.   Abdominal:      General: Abdomen is flat. Bowel sounds are normal. There is no distension.      Palpations: Abdomen is soft. There is no mass.      Tenderness: There is no abdominal tenderness.   Musculoskeletal:         General: No swelling or tenderness. Normal range of motion.      Cervical back: Normal range of motion and neck supple.      Right lower leg: No edema.      Left lower leg: No edema.   Skin:     General: Skin is warm and dry.      Capillary Refill: Capillary refill takes less than 2 seconds.   Neurological:      General: No focal deficit present.      Mental Status: She is alert and oriented to person, place, and time. Mental status is at baseline.   Psychiatric:         Mood and Affect: Mood normal.         Behavior: Behavior normal.            Vents:  Oxygen Concentration (%): 50 (10/02/23 0700)  Lines/Drains/Airways       Peripheral Intravenous Line  Duration                  Hemodialysis AV Fistula Left forearm -- days         Peripheral IV - Single Lumen 10/01/23 0315 20 G Anterior;Right Forearm 1 day         Peripheral IV - Single Lumen 10/02/23 0300 20 G Anterior;Proximal;Right Forearm <1 day                  Significant Labs:    CBC/Anemia Profile:  Recent Labs   Lab 10/01/23  0315 10/02/23  0251   WBC 9.15  11.90   HGB 13.4 14.0   HCT 43.6 46.2    145*   MCV 91 90   RDW 19.8* 18.8*        Chemistries:  Recent Labs   Lab 10/01/23  0315 10/01/23  1216 10/02/23  0417    141 138   K 5.6* 5.0 5.8*    101 107   CO2 27 29 22*   BUN 28* 33* 31*   CREATININE 5.1* 5.5* 4.2*   CALCIUM 9.4 8.4* 9.1   ALBUMIN 3.2*  --  2.9*   PROT 7.5  --  6.3   BILITOT 0.6  --  0.5   ALKPHOS 185*  --  144*   ALT 13  --  9*   AST 25  --  11   MG  --   --  2.1   PHOS  --   --  4.9*       All pertinent labs within the past 24 hours have been reviewed.    Significant Imaging:  I have reviewed and interpreted all pertinent imaging results/findings within the past 24 hours.

## 2023-10-03 LAB
ALBUMIN SERPL BCP-MCNC: 2.8 G/DL (ref 3.5–5.2)
ALP SERPL-CCNC: 155 U/L (ref 55–135)
ALT SERPL W/O P-5'-P-CCNC: 9 U/L (ref 10–44)
ANION GAP SERPL CALC-SCNC: 9 MMOL/L (ref 8–16)
AST SERPL-CCNC: 11 U/L (ref 10–40)
BASOPHILS # BLD AUTO: 0.03 K/UL (ref 0–0.2)
BASOPHILS NFR BLD: 0.6 % (ref 0–1.9)
BILIRUB SERPL-MCNC: 0.4 MG/DL (ref 0.1–1)
BUN SERPL-MCNC: 25 MG/DL (ref 6–20)
CALCIUM SERPL-MCNC: 9.6 MG/DL (ref 8.7–10.5)
CHLORIDE SERPL-SCNC: 104 MMOL/L (ref 95–110)
CO2 SERPL-SCNC: 24 MMOL/L (ref 23–29)
CREAT SERPL-MCNC: 3.4 MG/DL (ref 0.5–1.4)
DIFFERENTIAL METHOD: ABNORMAL
EOSINOPHIL # BLD AUTO: 0.1 K/UL (ref 0–0.5)
EOSINOPHIL NFR BLD: 2.4 % (ref 0–8)
ERYTHROCYTE [DISTWIDTH] IN BLOOD BY AUTOMATED COUNT: 18.5 % (ref 11.5–14.5)
EST. GFR  (NO RACE VARIABLE): 14.9 ML/MIN/1.73 M^2
GLUCOSE SERPL-MCNC: 97 MG/DL (ref 70–110)
HCT VFR BLD AUTO: 39.5 % (ref 37–48.5)
HGB BLD-MCNC: 12.4 G/DL (ref 12–16)
IMM GRANULOCYTES # BLD AUTO: 0.02 K/UL (ref 0–0.04)
IMM GRANULOCYTES NFR BLD AUTO: 0.4 % (ref 0–0.5)
LYMPHOCYTES # BLD AUTO: 1 K/UL (ref 1–4.8)
LYMPHOCYTES NFR BLD: 19.7 % (ref 18–48)
MAGNESIUM SERPL-MCNC: 2 MG/DL (ref 1.6–2.6)
MCH RBC QN AUTO: 27.5 PG (ref 27–31)
MCHC RBC AUTO-ENTMCNC: 31.4 G/DL (ref 32–36)
MCV RBC AUTO: 88 FL (ref 82–98)
MONOCYTES # BLD AUTO: 0.5 K/UL (ref 0.3–1)
MONOCYTES NFR BLD: 9.7 % (ref 4–15)
NEUTROPHILS # BLD AUTO: 3.3 K/UL (ref 1.8–7.7)
NEUTROPHILS NFR BLD: 67.2 % (ref 38–73)
NRBC BLD-RTO: 0 /100 WBC
PHOSPHATE SERPL-MCNC: 4.9 MG/DL (ref 2.7–4.5)
PLATELET # BLD AUTO: 114 K/UL (ref 150–450)
PMV BLD AUTO: 10 FL (ref 9.2–12.9)
POTASSIUM SERPL-SCNC: 4.7 MMOL/L (ref 3.5–5.1)
PROT SERPL-MCNC: 6.4 G/DL (ref 6–8.4)
RBC # BLD AUTO: 4.51 M/UL (ref 4–5.4)
SODIUM SERPL-SCNC: 137 MMOL/L (ref 136–145)
WBC # BLD AUTO: 4.97 K/UL (ref 3.9–12.7)

## 2023-10-03 PROCEDURE — 85025 COMPLETE CBC W/AUTO DIFF WBC: CPT | Performed by: NURSE PRACTITIONER

## 2023-10-03 PROCEDURE — 63600175 PHARM REV CODE 636 W HCPCS: Performed by: NURSE PRACTITIONER

## 2023-10-03 PROCEDURE — 99291 PR CRITICAL CARE, E/M 30-74 MINUTES: ICD-10-PCS | Mod: GC,,, | Performed by: STUDENT IN AN ORGANIZED HEALTH CARE EDUCATION/TRAINING PROGRAM

## 2023-10-03 PROCEDURE — 83735 ASSAY OF MAGNESIUM: CPT | Performed by: NURSE PRACTITIONER

## 2023-10-03 PROCEDURE — 25000003 PHARM REV CODE 250: Performed by: NURSE PRACTITIONER

## 2023-10-03 PROCEDURE — 25000003 PHARM REV CODE 250

## 2023-10-03 PROCEDURE — 84100 ASSAY OF PHOSPHORUS: CPT | Performed by: NURSE PRACTITIONER

## 2023-10-03 PROCEDURE — 20600001 HC STEP DOWN PRIVATE ROOM

## 2023-10-03 PROCEDURE — 94761 N-INVAS EAR/PLS OXIMETRY MLT: CPT

## 2023-10-03 PROCEDURE — 94660 CPAP INITIATION&MGMT: CPT

## 2023-10-03 PROCEDURE — 99232 SBSQ HOSP IP/OBS MODERATE 35: CPT | Mod: FS,,, | Performed by: INTERNAL MEDICINE

## 2023-10-03 PROCEDURE — 80053 COMPREHEN METABOLIC PANEL: CPT | Performed by: NURSE PRACTITIONER

## 2023-10-03 PROCEDURE — 99232 PR SUBSEQUENT HOSPITAL CARE,LEVL II: ICD-10-PCS | Mod: FS,,, | Performed by: INTERNAL MEDICINE

## 2023-10-03 PROCEDURE — 99900035 HC TECH TIME PER 15 MIN (STAT)

## 2023-10-03 PROCEDURE — 99291 CRITICAL CARE FIRST HOUR: CPT | Mod: GC,,, | Performed by: STUDENT IN AN ORGANIZED HEALTH CARE EDUCATION/TRAINING PROGRAM

## 2023-10-03 PROCEDURE — 25000003 PHARM REV CODE 250: Performed by: INTERNAL MEDICINE

## 2023-10-03 PROCEDURE — 94640 AIRWAY INHALATION TREATMENT: CPT

## 2023-10-03 PROCEDURE — 20000000 HC ICU ROOM

## 2023-10-03 PROCEDURE — 27100171 HC OXYGEN HIGH FLOW UP TO 24 HOURS

## 2023-10-03 RX ORDER — ISOSORBIDE MONONITRATE 30 MG/1
30 TABLET, EXTENDED RELEASE ORAL ONCE
Status: COMPLETED | OUTPATIENT
Start: 2023-10-03 | End: 2023-10-03

## 2023-10-03 RX ORDER — HYDRALAZINE HYDROCHLORIDE 25 MG/1
25 TABLET, FILM COATED ORAL ONCE
Status: COMPLETED | OUTPATIENT
Start: 2023-10-03 | End: 2023-10-03

## 2023-10-03 RX ORDER — HYDRALAZINE HYDROCHLORIDE 25 MG/1
25 TABLET, FILM COATED ORAL EVERY 8 HOURS
Status: DISCONTINUED | OUTPATIENT
Start: 2023-10-03 | End: 2023-10-04 | Stop reason: HOSPADM

## 2023-10-03 RX ORDER — ISOSORBIDE MONONITRATE 60 MG/1
60 TABLET, EXTENDED RELEASE ORAL DAILY
Status: DISCONTINUED | OUTPATIENT
Start: 2023-10-04 | End: 2023-10-04 | Stop reason: HOSPADM

## 2023-10-03 RX ADMIN — ISOSORBIDE MONONITRATE 30 MG: 30 TABLET, EXTENDED RELEASE ORAL at 08:10

## 2023-10-03 RX ADMIN — ISOSORBIDE MONONITRATE 30 MG: 30 TABLET, EXTENDED RELEASE ORAL at 10:10

## 2023-10-03 RX ADMIN — HYDRALAZINE HYDROCHLORIDE 25 MG: 25 TABLET, FILM COATED ORAL at 09:10

## 2023-10-03 RX ADMIN — CARVEDILOL 25 MG: 25 TABLET, FILM COATED ORAL at 08:10

## 2023-10-03 RX ADMIN — CALCITRIOL 1.5 MCG: 0.5 CAPSULE, LIQUID FILLED ORAL at 08:10

## 2023-10-03 RX ADMIN — SEVELAMER CARBONATE 800 MG: 800 TABLET, FILM COATED ORAL at 12:10

## 2023-10-03 RX ADMIN — MONTELUKAST 10 MG: 10 TABLET, FILM COATED ORAL at 09:10

## 2023-10-03 RX ADMIN — FLUTICASONE FUROATE AND VILANTEROL TRIFENATATE 1 PUFF: 100; 25 POWDER RESPIRATORY (INHALATION) at 08:10

## 2023-10-03 RX ADMIN — PANTOPRAZOLE SODIUM 40 MG: 40 TABLET, DELAYED RELEASE ORAL at 08:10

## 2023-10-03 RX ADMIN — SEVELAMER CARBONATE 800 MG: 800 TABLET, FILM COATED ORAL at 04:10

## 2023-10-03 RX ADMIN — HYDROXYZINE HYDROCHLORIDE 50 MG: 25 TABLET, FILM COATED ORAL at 09:10

## 2023-10-03 RX ADMIN — CARVEDILOL 25 MG: 25 TABLET, FILM COATED ORAL at 09:10

## 2023-10-03 RX ADMIN — HEPARIN SODIUM 5000 UNITS: 5000 INJECTION INTRAVENOUS; SUBCUTANEOUS at 02:10

## 2023-10-03 RX ADMIN — MIRTAZAPINE 15 MG: 15 TABLET, FILM COATED ORAL at 09:10

## 2023-10-03 RX ADMIN — HEPARIN SODIUM 5000 UNITS: 5000 INJECTION INTRAVENOUS; SUBCUTANEOUS at 05:10

## 2023-10-03 RX ADMIN — FLUTICASONE PROPIONATE 100 MCG: 50 SPRAY, METERED NASAL at 08:10

## 2023-10-03 RX ADMIN — HYDRALAZINE HYDROCHLORIDE 25 MG: 25 TABLET, FILM COATED ORAL at 12:10

## 2023-10-03 RX ADMIN — ACETAMINOPHEN 650 MG: 325 TABLET ORAL at 04:10

## 2023-10-03 RX ADMIN — HEPARIN SODIUM 5000 UNITS: 5000 INJECTION INTRAVENOUS; SUBCUTANEOUS at 09:10

## 2023-10-03 RX ADMIN — SODIUM ZIRCONIUM CYCLOSILICATE 10 G: 5 POWDER, FOR SUSPENSION ORAL at 08:10

## 2023-10-03 RX ADMIN — TIOTROPIUM BROMIDE INHALATION SPRAY 2 PUFF: 3.12 SPRAY, METERED RESPIRATORY (INHALATION) at 08:10

## 2023-10-03 RX ADMIN — CINACALCET 30 MG: 30 TABLET, FILM COATED ORAL at 07:10

## 2023-10-03 RX ADMIN — MUPIROCIN: 20 OINTMENT TOPICAL at 09:10

## 2023-10-03 RX ADMIN — HYDRALAZINE HYDROCHLORIDE 10 MG: 10 TABLET, FILM COATED ORAL at 05:10

## 2023-10-03 RX ADMIN — BUPROPION HYDROCHLORIDE 150 MG: 150 TABLET, FILM COATED, EXTENDED RELEASE ORAL at 08:10

## 2023-10-03 RX ADMIN — NIFEDIPINE 60 MG: 30 TABLET, FILM COATED, EXTENDED RELEASE ORAL at 08:10

## 2023-10-03 RX ADMIN — SEVELAMER CARBONATE 800 MG: 800 TABLET, FILM COATED ORAL at 07:10

## 2023-10-03 RX ADMIN — ASPIRIN 81 MG CHEWABLE TABLET 81 MG: 81 TABLET CHEWABLE at 08:10

## 2023-10-03 RX ADMIN — ESCITALOPRAM OXALATE 20 MG: 10 TABLET ORAL at 08:10

## 2023-10-03 NOTE — PROGRESS NOTES
[facility] - Cardiac Medical ICU  Critical Care Medicine  Progress Note    Patient Name: Jennifer Booth  MRN: [id number]  Admission Date: 10/1/2023  Hospital Length of Stay: 2 days  Code Status: Full Code  Attending Provider: Jackson Mirza MD  Primary Care Provider: Leodan Sanchez MD   Principal Problem: Hypertensive emergency    Subjective:     HPI:  Ms Booth is a 58 year old female with Multiple Myeloma in remission, ESRD on HD (T/Th/Sat dialysis - last dialysis yesterday), HTN, GERD, MDD/Anxiety, constipation, history of EtOH abuse- complete cessation since 2020, and tobacco use who [place] [facility] [place]. She reports increasing shortness of breath at home and acute onset of respiratory distress. She had dialysis yesterday and she reports it was a normal session which she was able to tolerate for the entirety of time with her usual volume removal. EMS was activated tonight as she had increasing shortness of breath. Hypoxic upon EMS arrival and she recovered to 86% with initiation of NIPPV.     In the emergency department she was given an IV bolus of nitroglycerin and started on a continuous infusion. Chest XRAY with bilateral pulmonary opacifications R > L. VBG 7.40/56. BNP 2665. Trop 0.077.    Critical care consulted for admission as patient is on continuous nitroglycerin infusion and NIPPV.       Hospital/ICU Course:  Admitted to MICU for hypertensive emergency causing flash pulm edema, on NG gtt.  HD per Nephrology w/ 3 L removed; BP improved, weaned off NG gtt.  Restarted NG gtt 2/2 rising BPs, uncontrolled w/ home PO meds.  Uptitrating home meds w/ addition of PO Hydralazine.  Weaning NG.      Interval History/Significant Events: NAEON. Weaned off nitro gtt this morning. Tolerated HD well yesterday with good  clearance. Orthopnea/dyspnea improving. Stable for stepdown today.     Review of Systems   Constitutional:  Positive for activity change. Negative for chills, fever and  unexpected weight change.   HENT:  Negative for sinus pain, trouble swallowing and voice change.    Eyes:  Negative for photophobia, pain and visual disturbance.   Respiratory:  Positive for shortness of breath and wheezing. Negative for cough.    Cardiovascular:  Negative for chest pain, palpitations and leg swelling.   Gastrointestinal:  Negative for abdominal pain, blood in stool, constipation, diarrhea, nausea and vomiting.   Endocrine: Negative for polydipsia and polyuria.   Genitourinary:  Negative for dysuria, frequency, hematuria and urgency.   Musculoskeletal:  Negative for arthralgias, back pain and myalgias.   Skin:  Negative for color change and pallor.   Neurological:  Negative for syncope, weakness, numbness and headaches.   Hematological:  Does not bruise/bleed easily.   Psychiatric/Behavioral:  Negative for confusion, dysphoric mood and sleep disturbance.      Objective:     Vital Signs (Most Recent):  Temp: 97.9 °F (36.6 °C) (10/02/23 1100)  Pulse: 89 (10/02/23 1100)  Resp: (!) 32 (10/02/23 1100)  BP: (!) 154/96 (10/02/23 1100)  SpO2: 98 % (10/02/23 1100) Vital Signs (24h Range):  Temp:  [97.6 °F (36.4 °C)-98.1 °F (36.7 °C)] 97.9 °F (36.6 °C)  Pulse:  [82-99] 89  Resp:  [18-36] 32  SpO2:  [86 %-100 %] 98 %  BP: (144-204)/() 154/96   Weight: 54 kg (119 lb)  Body mass index is 18.09 kg/m².      Intake/Output Summary (Last 24 hours) at 10/2/2023 1347  Last data filed at 10/2/2023 1100  Gross per 24 hour   Intake 43.05 ml   Output 3500 ml   Net -3456.95 ml          Physical Exam  Vitals and nursing note reviewed.   Constitutional:       General: She is not in acute distress.     Appearance: She is ill-appearing. She is not toxic-appearing or diaphoretic.   HENT:      Head: Normocephalic and atraumatic.      Right Ear: External ear normal.      Left Ear: External ear normal.      Mouth/Throat:      Mouth: Mucous membranes are moist.      Pharynx: No oropharyngeal exudate.   Eyes:      Extraocular  Movements: Extraocular movements intact.      Pupils: Pupils are equal, round, and reactive to light.   Cardiovascular:      Rate and Rhythm: Normal rate and regular rhythm.      Pulses: Normal pulses.      Heart sounds: Normal heart sounds. No murmur heard.  Pulmonary:      Effort: Respiratory distress present.      Breath sounds: Rales present. No wheezing.      Comments: Coarse breath sounds heard, R>L.   Abdominal:      General: Abdomen is flat. Bowel sounds are normal. There is no distension.      Palpations: Abdomen is soft. There is no mass.      Tenderness: There is no abdominal tenderness.   Musculoskeletal:         General: No swelling or tenderness. Normal range of motion.      Cervical back: Normal range of motion and neck supple.      Right lower leg: No edema.      Left lower leg: No edema.   Skin:     General: Skin is warm and dry.      Capillary Refill: Capillary refill takes less than 2 seconds.   Neurological:      General: No focal deficit present.      Mental Status: She is alert and oriented to person, place, and time. Mental status is at baseline.   Psychiatric:         Mood and Affect: Mood normal.         Behavior: Behavior normal.            Vents:  Oxygen Concentration (%): 50 (10/02/23 0700)  Lines/Drains/Airways       Peripheral Intravenous Line  Duration                  Hemodialysis AV Fistula Left forearm -- days         Peripheral IV - Single Lumen 10/01/23 0315 20 G Anterior;Right Forearm 1 day         Peripheral IV - Single Lumen 10/02/23 0300 20 G Anterior;Proximal;Right Forearm <1 day                  Significant Labs:    CBC/Anemia Profile:  Recent Labs   Lab 10/01/23  0315 10/02/23  0251   WBC 9.15 11.90   HGB 13.4 14.0   HCT 43.6 46.2    145*   MCV 91 90   RDW 19.8* 18.8*        Chemistries:  Recent Labs   Lab 10/01/23  0315 10/01/23  1216 10/02/23  0417    141 138   K 5.6* 5.0 5.8*    101 107   CO2 27 29 22*   BUN 28* 33* 31*   CREATININE 5.1* 5.5* 4.2*    CALCIUM 9.4 8.4* 9.1   ALBUMIN 3.2*  --  2.9*   PROT 7.5  --  6.3   BILITOT 0.6  --  0.5   ALKPHOS 185*  --  144*   ALT 13  --  9*   AST 25  --  11   MG  --   --  2.1   PHOS  --   --  4.9*       All pertinent labs within the past 24 hours have been reviewed.    Significant Imaging:  I have reviewed and interpreted all pertinent imaging results/findings within the past 24 hours.      ABG  Recent Labs   Lab 10/01/23  0313   PH 7.402   PO2 50   PCO2 56.8*   HCO3 35.3*   BE 11     Assessment/Plan:     Psychiatric  Depression  -- Continue home regimen     SATNAM (generalized anxiety disorder)  -- Continue home Wellbutrin and Lexapro    ENT  Allergic rhinitis  -- Continue Cetrizine     Pulmonary  Acute respiratory failure with hypoxia  Patient with Hypoxic Respiratory failure which is Acute.  she is not on home oxygen. Supplemental oxygen was provided and noted- Oxygen Concentration (%):  [50] 50    Signs/symptoms of respiratory failure include- tachypnea, increased work of breathing and respiratory distress. Contributing diagnoses includes - Flash Pulmonary Edema Labs and images were reviewed. Patient Has recent ABG, which has been reviewed. Will treat underlying causes and adjust management of respiratory failure as follows-     -- NIPPV   -- Wean FIO2 for Spo2 > 90%   -- Continue home inhalers   -- BP management per HTN emergency     Cardiac/Vascular  * Hypertensive emergency  58 year old female with extensive PMH who [place] [facility] [place] distress and hypoxia. CXR concerning for bilateral pulmonary edema R > L. In the ER she was given IVP nitroglycerin and started on a continuous infusion for BP control.    -- Resume home regimen   - uptitrate Nifedipine and Imdur; Continue Hydralazine  -- Titrate nitroglycerin for SBP < 180   -- Nephrology consulted for iHD management. Appreciate their assistance   -- BNP elevated on arrival. Volume removal with iHD   -- Wean BiPap as tolerated    Renal hypertension  --  Resume home oral regimen   -- SBP < 160 goal  -- Nephrology consulted for dialysis management     Renal/  ESRD (end stage renal disease) on dialysis  -- T/Th/Sat dialysis. Completed dialysis yesterday (10/2)   -- Nephrology consulted for iHD management   -- Volume removal per nephrology   -- Continue phos binders   -- Avoid nephrotoxins   -- Renally dose all medications to appropriate GFR / Crcl   -- Goal Hgb > 7   -- Renal diet when tolerating PO   -- BP management per hypertensive emergency     Oncology  Anemia in chronic kidney disease, on chronic dialysis  Recent Labs   Lab 10/02/23  0251   WBC 11.90   RBC 5.16   HGB 14.0   HCT 46.2   *   MCV 90   MCH 27.1   MCHC 30.3*     -- CBC daily and trend   -- Type and screen   -- Transfuse for hgb < 7     GI  GERD without esophagitis  -- Continue Protonix        Critical Care Daily Checklist:    A: Awake: RASS Goal/Actual Goal:    Actual:     B: Spontaneous Breathing Trial Performed?     C: SAT & SBT Coordinated?  N/a                     D: Delirium: CAM-ICU Overall CAM-ICU: Negative   E: Early Mobility Performed? No   F: Feeding Goal:    Status:     Current Diet Order   Procedures    Diet renal Low Potassium     Order Specific Question:   Additional Diet Options:     Answer:   Low Potassium      AS: Analgesia/Sedation Tylenol   T: Thromboembolic Prophylaxis Heparin   H: HOB > 300 Yes   U: Stress Ulcer Prophylaxis (if needed) Protonix   G: Glucose Control Good   B: Bowel Function Last BM 10/2   I: Indwelling Catheter (Lines & Enriquez) Necessity PIV, HD L AVF   D: De-escalation of Antimicrobials/Pharmacotherapies None    Plan for the day/ETD Stepdown    Code Status:  Family/Goals of Care: Full Code         Critical secondary to Patient has a condition that poses threat to life and bodily function: Severe Respiratory Distress      Critical care was time spent personally by me on the following activities: development of treatment plan with patient or surrogate and  bedside caregivers, discussions with consultants, evaluation of patient's response to treatment, examination of patient, ordering and performing treatments and interventions, ordering and review of laboratory studies, ordering and review of radiographic studies, pulse oximetry, re-evaluation of patient's condition. This critical care time did not overlap with that of any other provider or involve time for any procedures.     Dorys Mtz MD  Critical Care Medicine  [facility] - Cardiac Medical [facility] [place]

## 2023-10-03 NOTE — ASSESSMENT & PLAN NOTE
Dialysis modality: Hemodialysis  -Outpatient HD unit: [facility] (affiliated with [facility])  -Nephrologist: Dr. Yarelis Jones   -HD TX days: Tuesday, Thursday & Saturday duration of treatment: 3.5 hr  -Last HD TX prior to hospital admission: 09/30/2023 with 3 liters removed  -Dialysis access: LUE AV fistula   -Residual urine: Negative  -EDW: 53.5 kg    -Doppler U/s on 10/2 revelaed no renal stenosis to R. Kidney, however unable to examine left kidney due to early termination of the exam.   ECho 10/2- EF 35-40% CVP 8    Plan/Recommendations:  - No further plans for RRT at this time.   - iHD tomorrow   - can continue home dose Renvela 800 mg TIDWM, cinacalcet 30 mg daily and calcitriol 1.5 mcg daily for now  - Renal doppler ultrasound   - renal diet/tube feeds when not NPO with volume restriction of 1 liter for now  - strict I/O's and daily weights  - daily renal function panels and magnesium levels  - renally all dose medications to eGFR  - avoid gadolinium, fleets, phos-based laxatives, NSAIDs, etc.

## 2023-10-03 NOTE — PLAN OF CARE
Handoff     Primary Team: [facility] [place] CRITICAL CARE MEDICINE TEAM 1 Room Number: [id number]     Patient Name: Jennifer Booth MRN: [id number]     Date of Birth: [id number] Allergies: Doxycycline, Gentamicin, and Vancomycin analogues     Age: 58 y.o. Admit Date: 10/1/2023     Sex: female  BMI: Body mass index is 18.09 kg/m².     Code Status: Full Code        Illness Level (current clinical status): Watcher - No    Reason for Admission: Hypertensive emergency    Brief HPI (pertinent PMH and diagnosis or differential diagnosis): Ms Booth is a 58 year old female with Multiple Myeloma in remission, ESRD on HD (T/Th/Sat dialysis - last dialysis yesterday), HTN, GERD, MDD/Anxiety, constipation, history of EtOH abuse- complete cessation since 2020, and tobacco use who presented to [facility] [place] ED with acute respiratory distress. She reports increasing shortness of breath at home and acute onset of respiratory distress. She had dialysis yesterday and she reports it was a normal session which she was able to tolerate for the entirety of time with her usual volume removal. EMS was activated tonight as she had increasing shortness of breath. Hypoxic upon EMS arrival and she recovered to 86% with initiation of NIPPV.      In the emergency department she was given an IV bolus of nitroglycerin and started on a continuous infusion. Chest XRAY with bilateral pulmonary opacifications R > L. VBG 7.40/56. BNP 2665. Trop 0.077.     Critical care consulted for admission as patient is on continuous nitroglycerin infusion and NIPPV.    Procedure Date: None    Hospital Course (updated, brief assessment by system or problem, significant events):     Admitted to MICU for hypertensive emergency causing flash pulm edema, on NG gtt.  HD per Nephrology w/ 3 L removed; BP improved, weaned off NG gtt.  Restarted NG gtt 2/2 rising BPs, uncontrolled w/ home PO meds.  Uptitrating home meds w/ addition of PO Hydralazine. Weaned off Nitro gtt with SBPs in the 130s-140s.         Tasks (specific, using if-then statements):  If hypertensive, then consider Nitro bolus. If hypoxic, repeat CXR and consider small dose of lasix.     Contingency Plan (special circumstances anticipated and plan): Receives dialysis TThS at home but receiving it MWF while inpatient. We counseled her on the importance of sustaining a low-salt diet and how she will continue to have flash pulmonary edema if she continues to eat salt.     Estimated Discharge Date: TBD    Discharge Disposition: Home or Self Care    Mentored By: Dr. Mirza

## 2023-10-03 NOTE — PROGRESS NOTES
[facility]  Nephrology  Progress Note    Patient Name: Jennifer Booth  MRN: [id number]  Admission Date: 10/1/2023  Hospital Length of Stay: 2 days  Attending Provider: Jackson Mirza MD   Primary Care Physician: Leodan Sanchez MD  Principal Problem:Hypertensive emergency    Subjective:     Interval History: HD yesterday, tolerated well with good clearance. UF 2.5L. Off nitro today. Plan for stepdown today.     Review of patient's allergies indicates:   Allergen Reactions    Doxycycline Swelling, Rash and Hives    Gentamicin Anaphylaxis    Vancomycin analogues Anaphylaxis     Current Facility-Administered Medications   Medication Frequency    0.9%  NaCl infusion Once    acetaminophen tablet 650 mg Q6H PRN    albuterol sulfate nebulizer solution 2.5 mg Q6H PRN    aspirin chewable tablet 81 mg Daily    buPROPion TB24 tablet 150 mg Daily    calcitRIOL capsule 1.5 mcg Daily    carvediloL tablet 25 mg BID    cinacalcet tablet 30 mg Daily with breakfast    EScitalopram oxalate tablet 20 mg Daily    fluticasone furoate-vilanteroL 100-25 mcg/dose diskus inhaler 1 puff Daily    fluticasone propionate 50 mcg/actuation nasal spray 100 mcg Daily    heparin (porcine) injection 5,000 Units Q8H    hydrALAZINE tablet 25 mg Q8H    hydrOXYzine HCL tablet 50 mg BID PRN    [START ON 10/4/2023] isosorbide mononitrate 24 hr tablet 60 mg Daily    mirtazapine tablet 15 mg QHS    montelukast tablet 10 mg QHS    mupirocin 2 % ointment BID    NIFEdipine 24 hr tablet 60 mg Daily    ondansetron injection 4 mg Q8H PRN    pantoprazole EC tablet 40 mg Daily    sevelamer carbonate tablet 800 mg TID WM    sodium chloride 0.9% bolus 250 mL 250 mL PRN    sodium chloride 0.9% flush 10 mL PRN    tiotropium bromide 2.5 mcg/actuation inhaler 2 puff Daily     Facility-Administered Medications Ordered in Other Encounters   Medication Frequency    0.9%  NaCl infusion Continuous    0.9%  NaCl infusion  Continuous    sodium chloride 0.9% flush 10 mL PRN    sodium chloride 0.9% flush 10 mL PRN       Objective:     Vital Signs (Most Recent):  Temp: 97 °F (36.1 °C) (10/03/23 0700)  Pulse: 87 (10/03/23 1200)  Resp: 20 (10/03/23 0826)  BP: (!) 167/103 (10/03/23 1200)  SpO2: (!) 92 % (10/03/23 1200) Vital Signs (24h Range):  Temp:  [97 °F (36.1 °C)-97.8 °F (36.6 °C)] 97 °F (36.1 °C)  Pulse:  [75-92] 87  Resp:  [16-38] 20  SpO2:  [91 %-100 %] 92 %  BP: (115-167)/() 167/103     Weight: 54 kg (119 lb) (10/02/23 1100)  Body mass index is 18.09 kg/m².  Body surface area is 1.61 meters squared.    I/O last 3 completed shifts:  In: 68 [I.V.:68]  Out: -      Physical Exam  Vitals and nursing note reviewed.   Constitutional:       General: She is awake. She is not in acute distress.     Appearance: She is cachectic. She is ill-appearing. She is not diaphoretic.      Interventions: Nasal cannula in place.   HENT:      Head: Normocephalic and atraumatic.      Right Ear: External ear normal.      Left Ear: External ear normal.      Nose:      Comments: Nasal cannula in place.     Mouth/Throat:      Mouth: Mucous membranes are moist.      Pharynx: Oropharynx is clear. No oropharyngeal exudate or posterior oropharyngeal erythema.   Eyes:      General: No scleral icterus.        Right eye: No discharge.         Left eye: No discharge.      Extraocular Movements: Extraocular movements intact.      Conjunctiva/sclera: Conjunctivae normal.   Cardiovascular:      Rate and Rhythm: Normal rate.      Heart sounds: Murmur heard.      Systolic murmur is present with a grade of 1/6.      No friction rub. No gallop.      Arteriovenous access: Left arteriovenous access is present.     Comments: Left upper extremity AVF with palpable thrill and audible bruit.  Pulmonary:      Effort: Pulmonary effort is normal. No respiratory distress.      Breath sounds: Rales present. No wheezing or rhonchi.      Comments: Bibasilar crackles  appreciated.  Abdominal:      General: Bowel sounds are normal. There is no distension.      Palpations: Abdomen is soft.      Tenderness: There is no abdominal tenderness.   Musculoskeletal:      Cervical back: Neck supple.      Right lower leg: No edema.      Left lower leg: No edema.   Skin:     General: Skin is warm and dry.      Coloration: Skin is not jaundiced.   Neurological:      General: No focal deficit present.      Mental Status: She is alert. Mental status is at baseline.      Cranial Nerves: No cranial nerve deficit.      Motor: No weakness.   Psychiatric:         Mood and Affect: Mood normal.         Behavior: Behavior normal. Behavior is cooperative.          Significant Labs:  CBC:   Recent Labs   Lab 10/03/23  0348   WBC 4.97   RBC 4.51   HGB 12.4   HCT 39.5   *   MCV 88   MCH 27.5   MCHC 31.4*     CMP:   Recent Labs   Lab 10/03/23  0348   GLU 97   CALCIUM 9.6   ALBUMIN 2.8*   PROT 6.4      K 4.7   CO2 24      BUN 25*   CREATININE 3.4*   ALKPHOS 155*   ALT 9*   AST 11   BILITOT 0.4     All labs within the past 24 hours have been reviewed.         Assessment/Plan:     Pulmonary  Acute respiratory failure with hypoxia  - management per primary team  - UF with iHD today    Cardiac/Vascular  * Hypertensive emergency  -    Renal/  ESRD (end stage renal disease) on dialysis  Dialysis modality: Hemodialysis  -Outpatient HD unit: [facility] (affiliated with [facility])  -Nephrologist: Dr. Yarelis Jones   -HD TX days: Tuesday, Thursday & Saturday duration of treatment: 3.5 hr  -Last HD TX prior to hospital admission: 09/30/2023 with 3 liters removed  -Dialysis access: LUE AV fistula   -Residual urine: Negative  -EDW: 53.5 kg    -Doppler U/s on 10/2 revelaed no renal stenosis to R. Kidney, however unable to examine left kidney due to early termination of the exam.   ECho 10/2- EF 35-40% CVP 8    Plan/Recommendations:  - No further plans for RRT at this time.   - iHD  tomorrow   - can continue home dose Renvela 800 mg TIDWM, cinacalcet 30 mg daily and calcitriol 1.5 mcg daily for now  - Renal doppler ultrasound   - renal diet/tube feeds when not NPO with volume restriction of 1 liter for now  - strict I/O's and daily weights  - daily renal function panels and magnesium levels  - renally all dose medications to eGFR  - avoid gadolinium, fleets, phos-based laxatives, NSAIDs, etc.        Thank you for your consult. I will follow-up with patient. Please contact us if you have any additional questions.    Dorothea Rios DNP  Nephrology  [facility]

## 2023-10-03 NOTE — SUBJECTIVE & OBJECTIVE
Interval History: HD yesterday, tolerated well with good clearance. UF 2.5L. Off nitro today. Plan for stepdown today.     Review of patient's allergies indicates:   Allergen Reactions    Doxycycline Swelling, Rash and Hives    Gentamicin Anaphylaxis    Vancomycin analogues Anaphylaxis     Current Facility-Administered Medications   Medication Frequency    0.9%  NaCl infusion Once    acetaminophen tablet 650 mg Q6H PRN    albuterol sulfate nebulizer solution 2.5 mg Q6H PRN    aspirin chewable tablet 81 mg Daily    buPROPion TB24 tablet 150 mg Daily    calcitRIOL capsule 1.5 mcg Daily    carvediloL tablet 25 mg BID    cinacalcet tablet 30 mg Daily with breakfast    EScitalopram oxalate tablet 20 mg Daily    fluticasone furoate-vilanteroL 100-25 mcg/dose diskus inhaler 1 puff Daily    fluticasone propionate 50 mcg/actuation nasal spray 100 mcg Daily    heparin (porcine) injection 5,000 Units Q8H    hydrALAZINE tablet 25 mg Q8H    hydrOXYzine HCL tablet 50 mg BID PRN    [START ON 10/4/2023] isosorbide mononitrate 24 hr tablet 60 mg Daily    mirtazapine tablet 15 mg QHS    montelukast tablet 10 mg QHS    mupirocin 2 % ointment BID    NIFEdipine 24 hr tablet 60 mg Daily    ondansetron injection 4 mg Q8H PRN    pantoprazole EC tablet 40 mg Daily    sevelamer carbonate tablet 800 mg TID WM    sodium chloride 0.9% bolus 250 mL 250 mL PRN    sodium chloride 0.9% flush 10 mL PRN    tiotropium bromide 2.5 mcg/actuation inhaler 2 puff Daily     Facility-Administered Medications Ordered in Other Encounters   Medication Frequency    0.9%  NaCl infusion Continuous    0.9%  NaCl infusion Continuous    sodium chloride 0.9% flush 10 mL PRN    sodium chloride 0.9% flush 10 mL PRN       Objective:     Vital Signs (Most Recent):  Temp: 97 °F (36.1 °C) (10/03/23 0700)  Pulse: 87 (10/03/23 1200)  Resp: 20 (10/03/23 0826)  BP: (!) 167/103 (10/03/23 1200)  SpO2: (!) 92 % (10/03/23 1200) Vital Signs (24h Range):  Temp:  [97 °F (36.1 °C)-97.8  °F (36.6 °C)] 97 °F (36.1 °C)  Pulse:  [75-92] 87  Resp:  [16-38] 20  SpO2:  [91 %-100 %] 92 %  BP: (115-167)/() 167/103     Weight: 54 kg (119 lb) (10/02/23 1100)  Body mass index is 18.09 kg/m².  Body surface area is 1.61 meters squared.    I/O last 3 completed shifts:  In: 68 [I.V.:68]  Out: -      Physical Exam  Vitals and nursing note reviewed.   Constitutional:       General: She is awake. She is not in acute distress.     Appearance: She is cachectic. She is ill-appearing. She is not diaphoretic.      Interventions: Nasal cannula in place.   HENT:      Head: Normocephalic and atraumatic.      Right Ear: External ear normal.      Left Ear: External ear normal.      Nose:      Comments: Nasal cannula in place.     Mouth/Throat:      Mouth: Mucous membranes are moist.      Pharynx: Oropharynx is clear. No oropharyngeal exudate or posterior oropharyngeal erythema.   Eyes:      General: No scleral icterus.        Right eye: No discharge.         Left eye: No discharge.      Extraocular Movements: Extraocular movements intact.      Conjunctiva/sclera: Conjunctivae normal.   Cardiovascular:      Rate and Rhythm: Normal rate.      Heart sounds: Murmur heard.      Systolic murmur is present with a grade of 1/6.      No friction rub. No gallop.      Arteriovenous access: Left arteriovenous access is present.     Comments: Left upper extremity AVF with palpable thrill and audible bruit.  Pulmonary:      Effort: Pulmonary effort is normal. No respiratory distress.      Breath sounds: Rales present. No wheezing or rhonchi.      Comments: Bibasilar crackles appreciated.  Abdominal:      General: Bowel sounds are normal. There is no distension.      Palpations: Abdomen is soft.      Tenderness: There is no abdominal tenderness.   Musculoskeletal:      Cervical back: Neck supple.      Right lower leg: No edema.      Left lower leg: No edema.   Skin:     General: Skin is warm and dry.      Coloration: Skin is not  jaundiced.   Neurological:      General: No focal deficit present.      Mental Status: She is alert. Mental status is at baseline.      Cranial Nerves: No cranial nerve deficit.      Motor: No weakness.   Psychiatric:         Mood and Affect: Mood normal.         Behavior: Behavior normal. Behavior is cooperative.          Significant Labs:  CBC:   Recent Labs   Lab 10/03/23  0348   WBC 4.97   RBC 4.51   HGB 12.4   HCT 39.5   *   MCV 88   MCH 27.5   MCHC 31.4*     CMP:   Recent Labs   Lab 10/03/23  0348   GLU 97   CALCIUM 9.6   ALBUMIN 2.8*   PROT 6.4      K 4.7   CO2 24      BUN 25*   CREATININE 3.4*   ALKPHOS 155*   ALT 9*   AST 11   BILITOT 0.4     All labs within the past 24 hours have been reviewed.

## 2023-10-04 VITALS
TEMPERATURE: 97 F | DIASTOLIC BLOOD PRESSURE: 76 MMHG | BODY MASS INDEX: 18.04 KG/M2 | SYSTOLIC BLOOD PRESSURE: 128 MMHG | OXYGEN SATURATION: 92 % | HEIGHT: 68 IN | HEART RATE: 87 BPM | WEIGHT: 119 LBS | RESPIRATION RATE: 18 BRPM

## 2023-10-04 LAB
ALBUMIN SERPL BCP-MCNC: 2.7 G/DL (ref 3.5–5.2)
ALP SERPL-CCNC: 161 U/L (ref 55–135)
ALT SERPL W/O P-5'-P-CCNC: 8 U/L (ref 10–44)
ANION GAP SERPL CALC-SCNC: 14 MMOL/L (ref 8–16)
ANISOCYTOSIS BLD QL SMEAR: SLIGHT
AST SERPL-CCNC: 16 U/L (ref 10–40)
BASOPHILS # BLD AUTO: 0.07 K/UL (ref 0–0.2)
BASOPHILS NFR BLD: 1.4 % (ref 0–1.9)
BILIRUB SERPL-MCNC: 0.4 MG/DL (ref 0.1–1)
BUN SERPL-MCNC: 56 MG/DL (ref 6–20)
CALCIUM SERPL-MCNC: 9.5 MG/DL (ref 8.7–10.5)
CHLORIDE SERPL-SCNC: 103 MMOL/L (ref 95–110)
CO2 SERPL-SCNC: 20 MMOL/L (ref 23–29)
CREAT SERPL-MCNC: 5.5 MG/DL (ref 0.5–1.4)
DIFFERENTIAL METHOD: ABNORMAL
EOSINOPHIL # BLD AUTO: 0.1 K/UL (ref 0–0.5)
EOSINOPHIL NFR BLD: 2.7 % (ref 0–8)
ERYTHROCYTE [DISTWIDTH] IN BLOOD BY AUTOMATED COUNT: 17.8 % (ref 11.5–14.5)
EST. GFR  (NO RACE VARIABLE): 8.4 ML/MIN/1.73 M^2
GLUCOSE SERPL-MCNC: 82 MG/DL (ref 70–110)
HCT VFR BLD AUTO: 38.3 % (ref 37–48.5)
HGB BLD-MCNC: 12.2 G/DL (ref 12–16)
HYPOCHROMIA BLD QL SMEAR: ABNORMAL
IMM GRANULOCYTES # BLD AUTO: 0.03 K/UL (ref 0–0.04)
IMM GRANULOCYTES NFR BLD AUTO: 0.6 % (ref 0–0.5)
LYMPHOCYTES # BLD AUTO: 1.2 K/UL (ref 1–4.8)
LYMPHOCYTES NFR BLD: 23.2 % (ref 18–48)
MAGNESIUM SERPL-MCNC: 2.2 MG/DL (ref 1.6–2.6)
MCH RBC QN AUTO: 27.8 PG (ref 27–31)
MCHC RBC AUTO-ENTMCNC: 31.9 G/DL (ref 32–36)
MCV RBC AUTO: 87 FL (ref 82–98)
MONOCYTES # BLD AUTO: 0.5 K/UL (ref 0.3–1)
MONOCYTES NFR BLD: 9.6 % (ref 4–15)
NEUTROPHILS # BLD AUTO: 3.2 K/UL (ref 1.8–7.7)
NEUTROPHILS NFR BLD: 62.5 % (ref 38–73)
NRBC BLD-RTO: 0 /100 WBC
OVALOCYTES BLD QL SMEAR: ABNORMAL
PHOSPHATE SERPL-MCNC: 6.1 MG/DL (ref 2.7–4.5)
PLATELET # BLD AUTO: 114 K/UL (ref 150–450)
PMV BLD AUTO: 10.6 FL (ref 9.2–12.9)
POIKILOCYTOSIS BLD QL SMEAR: SLIGHT
POLYCHROMASIA BLD QL SMEAR: ABNORMAL
POTASSIUM SERPL-SCNC: 5.5 MMOL/L (ref 3.5–5.1)
PROT SERPL-MCNC: 6.4 G/DL (ref 6–8.4)
RBC # BLD AUTO: 4.39 M/UL (ref 4–5.4)
SODIUM SERPL-SCNC: 137 MMOL/L (ref 136–145)
SPHEROCYTES BLD QL SMEAR: ABNORMAL
WBC # BLD AUTO: 5.12 K/UL (ref 3.9–12.7)

## 2023-10-04 PROCEDURE — 85025 COMPLETE CBC W/AUTO DIFF WBC: CPT | Performed by: NURSE PRACTITIONER

## 2023-10-04 PROCEDURE — 25000003 PHARM REV CODE 250: Performed by: NURSE PRACTITIONER

## 2023-10-04 PROCEDURE — 80100014 HC HEMODIALYSIS 1:1

## 2023-10-04 PROCEDURE — 94761 N-INVAS EAR/PLS OXIMETRY MLT: CPT

## 2023-10-04 PROCEDURE — 63600175 PHARM REV CODE 636 W HCPCS: Performed by: NURSE PRACTITIONER

## 2023-10-04 PROCEDURE — 80053 COMPREHEN METABOLIC PANEL: CPT | Performed by: NURSE PRACTITIONER

## 2023-10-04 PROCEDURE — 25000003 PHARM REV CODE 250: Performed by: INTERNAL MEDICINE

## 2023-10-04 PROCEDURE — 99232 PR SUBSEQUENT HOSPITAL CARE,LEVL II: ICD-10-PCS | Mod: FS,,, | Performed by: INTERNAL MEDICINE

## 2023-10-04 PROCEDURE — 99232 SBSQ HOSP IP/OBS MODERATE 35: CPT | Mod: FS,,, | Performed by: INTERNAL MEDICINE

## 2023-10-04 PROCEDURE — 94640 AIRWAY INHALATION TREATMENT: CPT

## 2023-10-04 PROCEDURE — 84100 ASSAY OF PHOSPHORUS: CPT | Performed by: NURSE PRACTITIONER

## 2023-10-04 PROCEDURE — 83735 ASSAY OF MAGNESIUM: CPT | Performed by: NURSE PRACTITIONER

## 2023-10-04 PROCEDURE — 25000003 PHARM REV CODE 250

## 2023-10-04 RX ORDER — ISOSORBIDE MONONITRATE 60 MG/1
60 TABLET, EXTENDED RELEASE ORAL DAILY
Qty: 30 TABLET | Refills: 11 | Status: SHIPPED | OUTPATIENT
Start: 2023-10-05 | End: 2024-01-23

## 2023-10-04 RX ORDER — SODIUM CHLORIDE 9 MG/ML
INJECTION, SOLUTION INTRAVENOUS ONCE
Status: DISCONTINUED | OUTPATIENT
Start: 2023-10-04 | End: 2023-10-04 | Stop reason: HOSPADM

## 2023-10-04 RX ORDER — HYDRALAZINE HYDROCHLORIDE 25 MG/1
25 TABLET, FILM COATED ORAL EVERY 8 HOURS
Qty: 90 TABLET | Refills: 11 | Status: SHIPPED | OUTPATIENT
Start: 2023-10-04 | End: 2024-01-23

## 2023-10-04 RX ADMIN — BUPROPION HYDROCHLORIDE 150 MG: 150 TABLET, FILM COATED, EXTENDED RELEASE ORAL at 08:10

## 2023-10-04 RX ADMIN — CALCITRIOL 1.5 MCG: 0.5 CAPSULE, LIQUID FILLED ORAL at 08:10

## 2023-10-04 RX ADMIN — SEVELAMER CARBONATE 800 MG: 800 TABLET, FILM COATED ORAL at 07:10

## 2023-10-04 RX ADMIN — TIOTROPIUM BROMIDE INHALATION SPRAY 2 PUFF: 3.12 SPRAY, METERED RESPIRATORY (INHALATION) at 09:10

## 2023-10-04 RX ADMIN — FLUTICASONE FUROATE AND VILANTEROL TRIFENATATE 1 PUFF: 100; 25 POWDER RESPIRATORY (INHALATION) at 09:10

## 2023-10-04 RX ADMIN — HEPARIN SODIUM 5000 UNITS: 5000 INJECTION INTRAVENOUS; SUBCUTANEOUS at 05:10

## 2023-10-04 RX ADMIN — FLUTICASONE PROPIONATE 100 MCG: 50 SPRAY, METERED NASAL at 08:10

## 2023-10-04 RX ADMIN — SEVELAMER CARBONATE 800 MG: 800 TABLET, FILM COATED ORAL at 12:10

## 2023-10-04 RX ADMIN — HYDRALAZINE HYDROCHLORIDE 25 MG: 25 TABLET, FILM COATED ORAL at 05:10

## 2023-10-04 RX ADMIN — ASPIRIN 81 MG CHEWABLE TABLET 81 MG: 81 TABLET CHEWABLE at 08:10

## 2023-10-04 RX ADMIN — CARVEDILOL 25 MG: 25 TABLET, FILM COATED ORAL at 08:10

## 2023-10-04 RX ADMIN — NIFEDIPINE 60 MG: 30 TABLET, FILM COATED, EXTENDED RELEASE ORAL at 08:10

## 2023-10-04 RX ADMIN — CINACALCET 30 MG: 30 TABLET, FILM COATED ORAL at 07:10

## 2023-10-04 RX ADMIN — SEVELAMER CARBONATE 800 MG: 800 TABLET, FILM COATED ORAL at 05:10

## 2023-10-04 RX ADMIN — ESCITALOPRAM OXALATE 20 MG: 10 TABLET ORAL at 08:10

## 2023-10-04 RX ADMIN — ISOSORBIDE MONONITRATE 60 MG: 60 TABLET, EXTENDED RELEASE ORAL at 08:10

## 2023-10-04 RX ADMIN — PANTOPRAZOLE SODIUM 40 MG: 40 TABLET, DELAYED RELEASE ORAL at 08:10

## 2023-10-04 NOTE — PROGRESS NOTES
Dialysis started at bedside. Left forearm AVF accessed using 15 gauge needles, tolerated well, flows good. UF goal 3L as tolerated. Resp even and unlabored with no s/s of distress notes. Denies any difficulties or c/o at this time.

## 2023-10-04 NOTE — PROGRESS NOTES
[facility]  Nephrology  Progress Note    Patient Name: Jennifer Booth  MRN: [id number]  Admission Date: 10/1/2023  Hospital Length of Stay: 3 days  Attending Provider: Jackson Mirza MD   Primary Care Physician: Leodan Sanchez MD  Principal Problem:Hypertensive emergency    Subjective:     Interval History: Plan for stepdown today. K 5.5. iHD today     Review of patient's allergies indicates:   Allergen Reactions    Doxycycline Swelling, Rash and Hives    Gentamicin Anaphylaxis    Vancomycin analogues Anaphylaxis     Current Facility-Administered Medications   Medication Frequency    0.9%  NaCl infusion Once    acetaminophen tablet 650 mg Q6H PRN    albuterol sulfate nebulizer solution 2.5 mg Q6H PRN    aspirin chewable tablet 81 mg Daily    buPROPion TB24 tablet 150 mg Daily    calcitRIOL capsule 1.5 mcg Daily    carvediloL tablet 25 mg BID    cinacalcet tablet 30 mg Daily with breakfast    EScitalopram oxalate tablet 20 mg Daily    fluticasone furoate-vilanteroL 100-25 mcg/dose diskus inhaler 1 puff Daily    fluticasone propionate 50 mcg/actuation nasal spray 100 mcg Daily    heparin (porcine) injection 5,000 Units Q8H    hydrALAZINE tablet 25 mg Q8H    hydrOXYzine HCL tablet 50 mg BID PRN    isosorbide mononitrate 24 hr tablet 60 mg Daily    mirtazapine tablet 15 mg QHS    montelukast tablet 10 mg QHS    mupirocin 2 % ointment BID    NIFEdipine 24 hr tablet 60 mg Daily    ondansetron injection 4 mg Q8H PRN    pantoprazole EC tablet 40 mg Daily    sevelamer carbonate tablet 800 mg TID WM    sodium chloride 0.9% bolus 250 mL 250 mL PRN    sodium chloride 0.9% flush 10 mL PRN    tiotropium bromide 2.5 mcg/actuation inhaler 2 puff Daily     Facility-Administered Medications Ordered in Other Encounters   Medication Frequency    0.9%  NaCl infusion Continuous    0.9%  NaCl infusion Continuous    sodium chloride 0.9% flush 10 mL PRN    sodium chloride 0.9% flush 10  mL PRN       Objective:     Vital Signs (Most Recent):  Temp: 97.1 °F (36.2 °C) (10/04/23 0700)  Pulse: 73 (10/04/23 1100)  Resp: 18 (10/04/23 1100)  BP: (!) 152/83 (10/04/23 1100)  SpO2: (!) 92 % (10/04/23 0900) Vital Signs (24h Range):  Temp:  [96.9 °F (36.1 °C)-97.1 °F (36.2 °C)] 97.1 °F (36.2 °C)  Pulse:  [64-82] 73  Resp:  [14-34] 18  SpO2:  [90 %-100 %] 92 %  BP: (111-163)/(64-98) 152/83     Weight: 54 kg (119 lb) (10/02/23 1100)  Body mass index is 18.09 kg/m².  Body surface area is 1.61 meters squared.    I/O last 3 completed shifts:  In: 24.9 [I.V.:24.9]  Out: -      Physical Exam  Vitals and nursing note reviewed.   Eyes:      Conjunctiva/sclera: Conjunctivae normal.   Cardiovascular:      Rate and Rhythm: Normal rate.   Pulmonary:      Effort: Pulmonary effort is normal.   Musculoskeletal:      Right lower leg: No edema.      Left lower leg: No edema.   Neurological:      Mental Status: She is alert and oriented to person, place, and time.   Psychiatric:         Mood and Affect: Mood normal.          Significant Labs:  CBC:   Recent Labs   Lab 10/04/23  0438   WBC 5.12   RBC 4.39   HGB 12.2   HCT 38.3   *   MCV 87   MCH 27.8   MCHC 31.9*     CMP:   Recent Labs   Lab 10/04/23  0438   GLU 82   CALCIUM 9.5   ALBUMIN 2.7*   PROT 6.4      K 5.5*   CO2 20*      BUN 56*   CREATININE 5.5*   ALKPHOS 161*   ALT 8*   AST 16   BILITOT 0.4     All labs within the past 24 hours have been reviewed.       Assessment/Plan:     Pulmonary  Acute respiratory failure with hypoxia  - management per primary team  - UF with iHD today    Cardiac/Vascular  * Hypertensive emergency  -    Renal/  ESRD (end stage renal disease) on dialysis  Dialysis modality: Hemodialysis  -Outpatient HD unit: [facility] (affiliated with [facility])  -Nephrologist: Dr. Yarelis Jones   -HD TX days: Tuesday, Thursday & Saturday duration of treatment: 3.5 hr  -Last HD TX prior to hospital admission: 09/30/2023 with 3  liters removed  -Dialysis access: LUE AV fistula   -Residual urine: Negative  -EDW: 53.5 kg    -Doppler U/s on 10/2 revelaed no renal stenosis to R. Kidney, however unable to examine left kidney due to early termination of the exam.   ECho 10/2- EF 35-40% CVP 8    Plan/Recommendations:    -iHD today, UG goal 3L  - can continue home dose Renvela 800 mg TIDWM, cinacalcet 30 mg daily and calcitriol 1.5 mcg daily for now  - renal diet/tube feeds when not NPO with volume restriction of 1 liter for now  - strict I/O's and daily weights  - daily renal function panels and magnesium levels  - renally all dose medications to eGFR  - avoid gadolinium, fleets, phos-based laxatives, NSAIDs, etc.        Thank you for your consult. I will follow-up with patient. Please contact us if you have any additional questions.    Dorothea Rios, RASHEL  Nephrology  [facility]

## 2023-10-04 NOTE — ASSESSMENT & PLAN NOTE
Dialysis modality: Hemodialysis  -Outpatient HD unit: [facility] (affiliated with [facility])  -Nephrologist: Dr. Yarelis Jones   -HD TX days: Tuesday, Thursday & Saturday duration of treatment: 3.5 hr  -Last HD TX prior to hospital admission: 09/30/2023 with 3 liters removed  -Dialysis access: LUE AV fistula   -Residual urine: Negative  -EDW: 53.5 kg    -Doppler U/s on 10/2 revelaed no renal stenosis to R. Kidney, however unable to examine left kidney due to early termination of the exam.   ECho 10/2- EF 35-40% CVP 8    Plan/Recommendations:    -iHD today, UG goal 3L  - can continue home dose Renvela 800 mg TIDWM, cinacalcet 30 mg daily and calcitriol 1.5 mcg daily for now  - renal diet/tube feeds when not NPO with volume restriction of 1 liter for now  - strict I/O's and daily weights  - daily renal function panels and magnesium levels  - renally all dose medications to eGFR  - avoid gadolinium, fleets, phos-based laxatives, NSAIDs, etc.

## 2023-10-04 NOTE — DISCHARGE SUMMARY
[facility] - Cardiac Medical ICU  Critical Care Medicine  Discharge Summary      Patient Name: Jennifer Booth  MRN: [id number]  Admission Date: 10/1/2023  Hospital Length of Stay: 3 days  Discharge Date and Time:  10/04/2023 5:39 PM  Attending Physician: Jackson Mirza MD   Discharging Provider: Dorys Mtz MD  Primary Care Provider: Leodan Sanchez MD  Reason for Admission: Hypertensive Emergency     HPI:   Ms Booth is a 58 year old female with Multiple Myeloma in remission, ESRD on HD (T/Th/Sat dialysis - last dialysis yesterday), HTN, GERD, MDD/Anxiety, constipation, history of EtOH abuse- complete cessation since 2020, and tobacco use who [place] [facility] [place] acute respiratory distress. She reports increasing shortness of breath at home and acute onset of respiratory distress. She had dialysis yesterday and she reports it was a normal session which she was able to tolerate for the entirety of time with her usual volume removal. EMS was activated tonight as she had increasing shortness of breath. Hypoxic upon EMS arrival and she recovered to 86% with initiation of NIPPV.     In the emergency department she was given an IV bolus of nitroglycerin and started on a continuous infusion. Chest XRAY with bilateral pulmonary opacifications R > L. VBG 7.40/56. BNP 2665. Trop 0.077.    Critical care consulted for admission as patient is on continuous nitroglycerin infusion and NIPPV.       * No surgery found *    Indwelling Lines/Drains at Time of Discharge:   Lines/Drains/Airways     Drain  Duration                Hemodialysis AV Fistula Left forearm -- days              Hospital Course:   Admitted to MICU for hypertensive emergency causing flash pulm edema. She was placed on a Nitroglycerin drip. Nephrology was consulted and emergency iHD was performed Monday (Oct. 2) and Wednesday (Oct. 4) with adequate removal. She was weaned off the NG drip. BP continued to be uncontrolled so her home BP medications were  "titrated up and PO Hydralazine and Imdur were added for goal directed therapy. On 10/4, MICU team felt that patient was stable for discharge home. She was instructed to resume her normal HD schedule which includes returning to dialysis on 10/5. She was also counseled on taking her medications as prescribed, which includes two new BP medications, and the importance of a low-salt diet. Her medications were reviewed during hospitalization and chart updated. A Cardiology referral was placed for her to establish care. She should follow-up with her PCP within the next week.       Consults (From admission, onward)        Status Ordering Provider     Inpatient consult to Nephrology  Once        Provider:  (Not yet assigned)    Completed BARBARA YANES     Inpatient consult to Critical Care Medicine  Once        Provider:  (Not yet assigned)    Completed ALEX LEBLANC        Significant Labs:  CMP:   Recent Labs   Lab 10/03/23  0348 10/04/23  0438    137   K 4.7 5.5*    103   CO2 24 20*   GLU 97 82   BUN 25* 56*   CREATININE 3.4* 5.5*   CALCIUM 9.6 9.5   PROT 6.4 6.4   ALBUMIN 2.8* 2.7*   BILITOT 0.4 0.4   ALKPHOS 155* 161*   AST 11 16   ALT 9* 8*   ANIONGAP 9 14     Cardiac Markers: No results for input(s): "CKMB", "TROPONINT", "MYOGLOBIN" in the last 48 hours.  Troponin: No results for input(s): "TROPONINI" in the last 48 hours.    Significant Imaging:  I have reviewed all pertinent imaging results/findings within the past 24 hours.    Pending Diagnostic Studies:     None        Final Active Diagnoses:    Diagnosis Date Noted POA    PRINCIPAL PROBLEM:  Hypertensive emergency [I16.1] 06/09/2020 Yes    Acute respiratory failure with hypoxia [J96.01] 10/13/2020 Yes    Renal hypertension [I12.9] 09/23/2019 Yes     Chronic    SATNAM (generalized anxiety disorder) [F41.1] 07/03/2019 Yes     Chronic    Allergic rhinitis [J30.9] 07/03/2019 Yes    Depression [F32.A] 07/01/2019 Yes    GERD without " esophagitis [K21.9] 09/13/2018 Yes    Anemia in chronic kidney disease, on chronic dialysis [N18.6, D63.1, Z99.2] 04/18/2018 Not Applicable    ESRD (end stage renal disease) on dialysis [N18.6, Z99.2] 08/03/2017 Not Applicable      Problems Resolved During this Admission:     No new Assessment & Plan notes have been filed under this hospital service since the last note was generated.  Service: Critical Care Medicine    Discharged Condition: good    Disposition:      Follow-up Information     Leodan Sanchez MD. Go in 1 week(s).    Specialty: Internal Medicine  Contact information:  [address]  [address]  [facility] [address]  [phone]             [facility] - Cardiology - [facility]. Schedule an appointment as soon as possible for a visit in 1 month(s).    Specialty: Cardiology  Why: To Establish Care  Contact information:  [address] [facility] [address] [phone]  Additional information:  Cardiology Services Clinics - 3rd floor                     Patient Instructions:      Ambulatory referral/consult to Cardiology   Standing Status: Future   Referral Priority: Routine Referral Type: Consultation   Referral Reason: Specialty Services Required   Requested Specialty: Cardiology   Number of Visits Requested: 1     Medications:  Reconciled Home Medications:      Medication List      START taking these medications    hydrALAZINE 25 MG tablet  Commonly known as: APRESOLINE  Take 1 tablet (25 mg total) by mouth every 8 (eight) hours.     isosorbide mononitrate 60 MG 24 hr tablet  Commonly known as: IMDUR  Take 1 tablet (60 mg total) by mouth once daily.  Start taking on: October 5, 2023        CONTINUE taking these medications    acetaminophen 325 MG tablet  Commonly known as: TYLENOL  Take 650 mg by mouth every 4 (four) hours as needed.     aspirin 81 MG Chew  Take 81 mg by mouth once daily.     buPROPion 150 MG TB24 tablet  Commonly known as: WELLBUTRIN XL  Take 1 tablet (150 mg total) by  mouth once daily.     carvediloL 25 MG tablet  Commonly known as: COREG  Take 25 mg by mouth 2 (two) times daily.     cetirizine 5 MG tablet  Commonly known as: ZYRTEC  Take 1 tablet (5 mg total) by mouth once daily.     cyproheptadine 4 mg tablet  Commonly known as: PERIACTIN  Take 1 tablet (4 mg total) by mouth 3 (three) times daily as needed (appetite).     EScitalopram oxalate 20 MG tablet  Commonly known as: LEXAPRO  TAKE 1 TABLET(20 MG) BY MOUTH EVERY DAY     fluticasone propionate 50 mcg/actuation nasal spray  Commonly known as: FLONASE  1 spray by Each Nostril route daily as needed.     fluticasone-salmeterol 100-50 mcg/dose 100-50 mcg/dose diskus inhaler  Commonly known as: ADVAIR  Inhale 1 puff into the lungs 2 (two) times daily. Controller     melatonin 5 mg Cap  Take 5 mg by mouth nightly.     mirtazapine 15 MG tablet  Commonly known as: REMERON  Take 1 tablet (15 mg total) by mouth every evening.     montelukast 10 mg tablet  Commonly known as: SINGULAIR  TAKE 1 TABLET(10 MG) BY MOUTH EVERY EVENING     NIFEdipine 60 MG (OSM) 24 hr tablet  Commonly known as: PROCARDIA-XL  Take 60 mg by mouth once daily.     omeprazole 40 MG capsule  Commonly known as: PRILOSEC  Take 40 mg by mouth once daily.     sevelamer carbonate 800 mg Tab  Commonly known as: RENVELA  Take 800 mg by mouth 3 (three) times daily with meals.     SPIRIVA RESPIMAT 2.5 mcg/actuation inhaler  Generic drug: tiotropium bromide  Inhale 2 puffs into the lungs once daily. Controller     VIRT-CAPS ORAL  Take 1 capsule by mouth once daily.        STOP taking these medications    calcitRIOL 0.5 MCG Cap  Commonly known as: ROCALTROL     cinacalcet 30 MG Tab  Commonly known as: SENSIPAR     clopidogreL 75 mg tablet  Commonly known as: PLAVIX     hydrOXYzine 50 MG tablet  Commonly known as: ATARAX     losartan 100 MG tablet  Commonly known as: COZAAR     sucroferric oxyhydroxide 500 mg Chew  Commonly known as: VELPHORO     traMADoL 50 mg  tablet  Commonly known as: ULTRAM        ASK your doctor about these medications    naltrexone 50 mg tablet  Commonly known as: DEPADE  Start with 1/2 tab (25 mg) by mouth daily, may increase to 1 tab (50 mg) daily if able.             Dorys Mtz MD  Critical Care Medicine  [facility] - Cardiac Medical ICU

## 2023-10-04 NOTE — CARE UPDATE
Discussed with patient the importance of returning to her regular dialysis schedule (TThS) which means returning to dialysis on 10/5. She confirmed that she will have a chair at her outpatient unit ([facility]) and will return tomorrow for her appointment. This  information was relayed to Nephrology who agreed with the plan. Patient was stable for discharge on 10/4.

## 2023-10-04 NOTE — SUBJECTIVE & OBJECTIVE
Interval History: Plan for stepdown today. K 5.5. iHD today     Review of patient's allergies indicates:   Allergen Reactions    Doxycycline Swelling, Rash and Hives    Gentamicin Anaphylaxis    Vancomycin analogues Anaphylaxis     Current Facility-Administered Medications   Medication Frequency    0.9%  NaCl infusion Once    acetaminophen tablet 650 mg Q6H PRN    albuterol sulfate nebulizer solution 2.5 mg Q6H PRN    aspirin chewable tablet 81 mg Daily    buPROPion TB24 tablet 150 mg Daily    calcitRIOL capsule 1.5 mcg Daily    carvediloL tablet 25 mg BID    cinacalcet tablet 30 mg Daily with breakfast    EScitalopram oxalate tablet 20 mg Daily    fluticasone furoate-vilanteroL 100-25 mcg/dose diskus inhaler 1 puff Daily    fluticasone propionate 50 mcg/actuation nasal spray 100 mcg Daily    heparin (porcine) injection 5,000 Units Q8H    hydrALAZINE tablet 25 mg Q8H    hydrOXYzine HCL tablet 50 mg BID PRN    isosorbide mononitrate 24 hr tablet 60 mg Daily    mirtazapine tablet 15 mg QHS    montelukast tablet 10 mg QHS    mupirocin 2 % ointment BID    NIFEdipine 24 hr tablet 60 mg Daily    ondansetron injection 4 mg Q8H PRN    pantoprazole EC tablet 40 mg Daily    sevelamer carbonate tablet 800 mg TID WM    sodium chloride 0.9% bolus 250 mL 250 mL PRN    sodium chloride 0.9% flush 10 mL PRN    tiotropium bromide 2.5 mcg/actuation inhaler 2 puff Daily     Facility-Administered Medications Ordered in Other Encounters   Medication Frequency    0.9%  NaCl infusion Continuous    0.9%  NaCl infusion Continuous    sodium chloride 0.9% flush 10 mL PRN    sodium chloride 0.9% flush 10 mL PRN       Objective:     Vital Signs (Most Recent):  Temp: 97.1 °F (36.2 °C) (10/04/23 0700)  Pulse: 73 (10/04/23 1100)  Resp: 18 (10/04/23 1100)  BP: (!) 152/83 (10/04/23 1100)  SpO2: (!) 92 % (10/04/23 0900) Vital Signs (24h Range):  Temp:  [96.9 °F (36.1 °C)-97.1 °F (36.2 °C)] 97.1 °F (36.2 °C)  Pulse:  [64-82] 73  Resp:  [14-34]  18  SpO2:  [90 %-100 %] 92 %  BP: (111-163)/(64-98) 152/83     Weight: 54 kg (119 lb) (10/02/23 1100)  Body mass index is 18.09 kg/m².  Body surface area is 1.61 meters squared.    I/O last 3 completed shifts:  In: 24.9 [I.V.:24.9]  Out: -      Physical Exam  Vitals and nursing note reviewed.   Eyes:      Conjunctiva/sclera: Conjunctivae normal.   Cardiovascular:      Rate and Rhythm: Normal rate.   Pulmonary:      Effort: Pulmonary effort is normal.   Musculoskeletal:      Right lower leg: No edema.      Left lower leg: No edema.   Neurological:      Mental Status: She is alert and oriented to person, place, and time.   Psychiatric:         Mood and Affect: Mood normal.          Significant Labs:  CBC:   Recent Labs   Lab 10/04/23  0438   WBC 5.12   RBC 4.39   HGB 12.2   HCT 38.3   *   MCV 87   MCH 27.8   MCHC 31.9*     CMP:   Recent Labs   Lab 10/04/23  0438   GLU 82   CALCIUM 9.5   ALBUMIN 2.7*   PROT 6.4      K 5.5*   CO2 20*      BUN 56*   CREATININE 5.5*   ALKPHOS 161*   ALT 8*   AST 16   BILITOT 0.4     All labs within the past 24 hours have been reviewed.

## 2023-10-05 ENCOUNTER — PATIENT OUTREACH (OUTPATIENT)
Dept: ADMINISTRATIVE | Facility: CLINIC | Age: 58
End: 2023-10-05
Payer: COMMERCIAL

## 2023-10-05 NOTE — PROGRESS NOTES
C3 nurse spoke with Jennifer Booth for a TCC post hospital discharge follow up call. Pt denies any new symptoms. The patient did not have a scheduled HOSFU appointment with her PCP, Leodan Sanchez MD. C3 nurse scheduled a HOSFU with Leodan Sanchez MD (Internal Medicine) on 10/6/23 at 0820. Pt verbalized understanding of appt information. She states also at her appt with Leodan Sanchez MD tomorrow, she will discuss the ringing sensation she has been having in her ears the past few months. Pt has OOC number. No messages routed at this time.

## 2023-10-06 ENCOUNTER — TELEPHONE (OUTPATIENT)
Dept: ENDOCRINOLOGY | Facility: CLINIC | Age: 58
End: 2023-10-06
Payer: COMMERCIAL

## 2023-10-06 ENCOUNTER — PATIENT MESSAGE (OUTPATIENT)
Dept: FAMILY MEDICINE | Facility: CLINIC | Age: 58
End: 2023-10-06

## 2023-10-06 ENCOUNTER — HOSPITAL ENCOUNTER (OUTPATIENT)
Dept: RADIOLOGY | Facility: HOSPITAL | Age: 58
Discharge: HOME | End: 2023-10-06
Attending: FAMILY MEDICINE
Payer: MEDICARE

## 2023-10-06 ENCOUNTER — OFFICE VISIT (OUTPATIENT)
Dept: FAMILY MEDICINE | Facility: CLINIC | Age: 58
End: 2023-10-06
Attending: FAMILY MEDICINE
Payer: MEDICARE

## 2023-10-06 VITALS
DIASTOLIC BLOOD PRESSURE: 82 MMHG | BODY MASS INDEX: 17.44 KG/M2 | HEIGHT: 68 IN | RESPIRATION RATE: 18 BRPM | HEART RATE: 78 BPM | OXYGEN SATURATION: 97 % | WEIGHT: 115.06 LBS | SYSTOLIC BLOOD PRESSURE: 126 MMHG

## 2023-10-06 DIAGNOSIS — Z99.2 ESRD (END STAGE RENAL DISEASE) ON DIALYSIS: ICD-10-CM

## 2023-10-06 DIAGNOSIS — N18.5 CKD (CHRONIC KIDNEY DISEASE) STAGE 5, GFR LESS THAN 15 ML/MIN: ICD-10-CM

## 2023-10-06 DIAGNOSIS — S99.921A INJURY OF TOE ON RIGHT FOOT, INITIAL ENCOUNTER: ICD-10-CM

## 2023-10-06 DIAGNOSIS — F41.1 GAD (GENERALIZED ANXIETY DISORDER): ICD-10-CM

## 2023-10-06 DIAGNOSIS — Z09 HOSPITAL DISCHARGE FOLLOW-UP: Primary | ICD-10-CM

## 2023-10-06 DIAGNOSIS — N18.6 ESRD (END STAGE RENAL DISEASE) ON DIALYSIS: ICD-10-CM

## 2023-10-06 DIAGNOSIS — Z23 IMMUNIZATION DUE: ICD-10-CM

## 2023-10-06 DIAGNOSIS — F32.89 OTHER DEPRESSION: ICD-10-CM

## 2023-10-06 PROCEDURE — 99496 TRANSITIONAL CARE MANAGE SERVICE 7 DAY DISCHARGE: ICD-10-PCS | Mod: 25,S$PBB,, | Performed by: FAMILY MEDICINE

## 2023-10-06 PROCEDURE — 99999 PR PBB SHADOW E&M-EST. PATIENT-LVL III: ICD-10-PCS | Mod: PBBFAC,,, | Performed by: FAMILY MEDICINE

## 2023-10-06 PROCEDURE — 73630 X-RAY EXAM OF FOOT: CPT | Mod: TC,FY,RT

## 2023-10-06 PROCEDURE — 99999 PR PBB SHADOW E&M-EST. PATIENT-LVL III: CPT | Mod: PBBFAC,,, | Performed by: FAMILY MEDICINE

## 2023-10-06 PROCEDURE — 99999PBSHW FLU VACCINE (QUAD) GREATER THAN OR EQUAL TO 3YO PRESERVATIVE FREE IM: ICD-10-PCS | Mod: PBBFAC,,,

## 2023-10-06 PROCEDURE — G0008 ADMIN INFLUENZA VIRUS VAC: HCPCS | Mod: PBBFAC,PO

## 2023-10-06 PROCEDURE — 99213 OFFICE O/P EST LOW 20 MIN: CPT | Mod: PBBFAC,PO | Performed by: FAMILY MEDICINE

## 2023-10-06 PROCEDURE — 99496 TRANSJ CARE MGMT HIGH F2F 7D: CPT | Mod: 25,S$PBB,, | Performed by: FAMILY MEDICINE

## 2023-10-06 PROCEDURE — 73630 XR FOOT COMPLETE 3 VIEW RIGHT: ICD-10-PCS | Mod: 26,RT,, | Performed by: INTERNAL MEDICINE

## 2023-10-06 PROCEDURE — 99999PBSHW FLU VACCINE (QUAD) GREATER THAN OR EQUAL TO 3YO PRESERVATIVE FREE IM: Mod: PBBFAC,,,

## 2023-10-06 PROCEDURE — 73630 X-RAY EXAM OF FOOT: CPT | Mod: 26,RT,, | Performed by: INTERNAL MEDICINE

## 2023-10-06 RX ORDER — BUPROPION HYDROCHLORIDE 150 MG/1
150 TABLET ORAL DAILY
Qty: 90 TABLET | Refills: 3 | Status: SHIPPED | OUTPATIENT
Start: 2023-10-06 | End: 2024-10-05

## 2023-10-06 RX ORDER — ESCITALOPRAM OXALATE 20 MG/1
20 TABLET ORAL DAILY
Qty: 90 TABLET | Refills: 3 | Status: SHIPPED | OUTPATIENT
Start: 2023-10-06

## 2023-10-06 RX ORDER — CLOPIDOGREL BISULFATE 75 MG/1
75 TABLET ORAL DAILY
Qty: 30 TABLET | Refills: 11 | Status: SHIPPED | OUTPATIENT
Start: 2023-10-06 | End: 2024-10-05

## 2023-10-06 NOTE — PROGRESS NOTES
Transitional Care Note  Subjective:       Patient ID: Jennifer Booth is a 58 y.o. female.  Chief Complaint: Follow-up (Hospital f/u)    Family and/or Caretaker present at visit?  No.  Diagnostic tests reviewed/disposition: I have reviewed all completed as well as pending diagnostic tests at the time of discharge.  Disease/illness education: yes  Home health/community services discussion/referrals: Patient does not have home health established from hospital visit.  They do not need home health.  If needed, we will set up home health for the patient.   Establishment or re-establishment of referral orders for community resources: No other necessary community resources.   Discussion with other health care providers: No discussion with other health care providers necessary.   57 yo F with PMH significant for Multiple Myeloma in remission, ESRD on HD, AoCKD, HTN, GERD, MDD/Anxiety,  Constipation, and tobacco use, presents today for her follow up after hospitalization. She was admitted recently for pulmonary edema and SOB. She received breathing treatments and diuretics. She is feeling fine now. Her medications have been changed. She also injured her right great toe when a chair fell on it.        Chronic Diseases  Multiple Myeloma in remission: Followed by [facility]; last visit 5/13/19. Shecompleted bortezomib/dexamethasone/Revlimid 6 cycles on 04/13/2012 for the multiple myeloma kappa light chain disease, IPSS stage III. She underwent bortezomib maintenance therapy three weeks on, one week off (05/15, 05/22 and 05/29) with her last cycle received on 05/29/2012. She was followed at [facility] and was diagnosed with DRESS syndrome during hospitalization and then [facility] team elected not to proceed with auto SCT. Velcade maintenance was discontinued due to side effects. MRI T and L spine 6/29/2018 - no evidence of myeloma involvement  SPEP 5/7/2019- no monoclonal peaks. Has upcoming appt on 8/14/19    ESRD  on HD:  She is followed by a nephrologist at [facility].  Receives hemodialysis on Tuesdays/Thursdays/Saturdays. Iinitially diagnosed with advanced kidney disease secondary to multiple myeloma & HTN.  She was diagnosed with NANETTE from MM in 2010 that required initiation of dialysis.  She started chronic dialysis on 12/23/11 and ended on 8/28/12. She then underwent chemotherapy for her myeloma, and stopped dialysis in 2013.  Kidney biopsy performed 2017due to worsening  Kidney--revealed glomerulosclerosis and no evidence of MM. [place] most [place] [facility] [place]. Patient met selection criteria for kidney transplant related to ESRD due to Hypertensive Nephrosclerosis. During most recent visit, she was placed on inactive status as she was deemed a high risk candidate for kidney transplant due to lack of caregiver support and financial hardship. Plans for caregivers to be her daughter (currently in third trimester of pregnancy) and her daughter's fiance. Pt is required to attend appt with caregiver.Her next appt with transplant is scheduled for 5/19/2020. She plans to f/u with her daughter prior to this time.     CKD:  Followed by [facility].  Receives Procrit infusions during HD.     HTN: controlled. Adherent with Coreg 12.5 mg BID    She continues to smoke cigarettes. Contemplative stage of quitting. Referred to tobacco cessation at previous visit, but has not scheduled appointment.     Follow-up  Associated symptoms include arthralgias, congestion, headaches and myalgias. Pertinent negatives include no chest pain, diaphoresis, nausea or sore throat.     Review of Systems   Constitutional: Negative.  Negative for activity change, diaphoresis and unexpected weight change.   HENT:  Positive for congestion. Negative for ear discharge, hearing loss, rhinorrhea, sore throat and voice change.    Eyes: Negative.  Negative for pain, discharge and visual disturbance.    Respiratory: Negative.  Negative for chest tightness, shortness of breath and wheezing.    Cardiovascular: Negative.  Negative for chest pain.   Gastrointestinal: Negative.  Negative for abdominal distention, anal bleeding, constipation and nausea.   Endocrine: Negative.  Negative for cold intolerance, polydipsia and polyuria.   Genitourinary: Negative.  Negative for decreased urine volume, difficulty urinating, dysuria, frequency, menstrual problem and vaginal pain.   Musculoskeletal:  Positive for arthralgias and myalgias. Negative for gait problem.   Skin: Negative.  Negative for color change, pallor and wound.   Allergic/Immunologic: Negative.  Negative for environmental allergies and immunocompromised state.   Neurological:  Positive for headaches. Negative for dizziness, tremors, seizures and speech difficulty.   Hematological: Negative.  Negative for adenopathy. Does not bruise/bleed easily.   Psychiatric/Behavioral: Negative.  Negative for agitation, confusion, decreased concentration, hallucinations, self-injury and suicidal ideas. The patient is not nervous/anxious.        Objective:      Physical Exam  Constitutional:       General: She is not in acute distress.     Appearance: She is well-developed. She is not diaphoretic.   HENT:      Head: Normocephalic and atraumatic.      Right Ear: External ear normal.      Left Ear: External ear normal.      Nose: Nose normal.      Mouth/Throat:      Pharynx: No oropharyngeal exudate.   Eyes:      General: No scleral icterus.        Right eye: No discharge.         Left eye: No discharge.      Conjunctiva/sclera: Conjunctivae normal.      Pupils: Pupils are equal, round, and reactive to light.   Neck:      Thyroid: No thyromegaly.      Vascular: No JVD.      Trachea: No tracheal deviation.   Cardiovascular:      Rate and Rhythm: Normal rate and regular rhythm.      Heart sounds: Normal heart sounds. No murmur heard.     No friction rub. No gallop.   Pulmonary:       Effort: Pulmonary effort is normal.      Breath sounds: Normal breath sounds. No stridor. No wheezing or rales.   Chest:      Chest wall: No tenderness.   Abdominal:      General: Bowel sounds are normal. There is no distension.      Palpations: Abdomen is soft. There is no mass.      Tenderness: There is no abdominal tenderness. There is no guarding or rebound.      Hernia: No hernia is present.   Musculoskeletal:         General: Tenderness (mild TTP right great toe) present. Normal range of motion.      Cervical back: Normal range of motion and neck supple.      Comments: LUE - dialysis fistula   Lymphadenopathy:      Cervical: No cervical adenopathy.   Skin:     General: Skin is warm and dry.      Coloration: Skin is not pale.      Findings: No erythema or rash.   Neurological:      Mental Status: She is alert and oriented to person, place, and time.      Cranial Nerves: No cranial nerve deficit.      Motor: No abnormal muscle tone.      Coordination: Coordination normal.      Deep Tendon Reflexes: Reflexes are normal and symmetric. Reflexes normal.   Psychiatric:         Behavior: Behavior normal.         Thought Content: Thought content normal.         Judgment: Judgment normal.         Assessment:       1. Hospital discharge follow-up    2. ESRD (end stage renal disease) on dialysis    3. CKD (chronic kidney disease) stage 5, GFR less than 15 ml/min    4. SATNAM (generalized anxiety disorder)    5. Other depression    6. Immunization due    7. Injury of toe on right foot, initial encounter        Plan:       Jennifer was seen today for follow-up.    Diagnoses and all orders for this visit:    Hospital discharge follow-up    ESRD (end stage renal disease) on dialysis  -     clopidogreL (PLAVIX) 75 mg tablet; Take 1 tablet (75 mg total) by mouth once daily.    CKD (chronic kidney disease) stage 5, GFR less than 15 ml/min  -     clopidogreL (PLAVIX) 75 mg tablet; Take 1 tablet (75 mg total) by mouth once  daily.    SATNAM (generalized anxiety disorder)  -     buPROPion (WELLBUTRIN XL) 150 MG TB24 tablet; Take 1 tablet (150 mg total) by mouth once daily.  -     EScitalopram oxalate (LEXAPRO) 20 MG tablet; Take 1 tablet (20 mg total) by mouth once daily.    Other depression  -     buPROPion (WELLBUTRIN XL) 150 MG TB24 tablet; Take 1 tablet (150 mg total) by mouth once daily.  -     EScitalopram oxalate (LEXAPRO) 20 MG tablet; Take 1 tablet (20 mg total) by mouth once daily.    Immunization due  -     Influenza - Quadrivalent *Preferred* (6 months+) (PF)    Injury of toe on right foot, initial encounter  -     X-Ray Foot Complete Right; Future      Hospital discharge f/u  -doing well  -follow pulmonology  -CT chest    ESRD on HD  -T/Thu/Sat    SATNAM/depression  -controlled - multiple meds    Smoking cessation  -1 min counseling done  -she will quit soon    Right great pain  -x ray    Spent adequate time in obtaining history and explaining differentials    40 minutes spent during this visit of which greater than 50% devoted to face-face counseling and coordination of care regarding diagnosis and management plan    Follow up in about 2 months (around 12/18/2023), or if symptoms worsen or fail to improve.

## 2023-10-06 NOTE — TELEPHONE ENCOUNTER
Please inform x ray showed old fracture in toes - if want to see orthopedics let me know - otherwise ice and avoid prolonged walking

## 2023-10-06 NOTE — TELEPHONE ENCOUNTER
----- Message from Ekta Higginbotham MA sent at 9/18/2023 10:50 AM CDT -----  Shell C[place]ton!    This patient has a referral in to see dr Lopez, can you please contact her to schedule an appointment.    Thank you   SOUTH Dash  ----- Message -----  From: Leilani Shane MD  Sent: 9/16/2023  11:16 PM CDT  To: [facility] Staff    Ambulatory Referral to Endocrinology on WB-abnormal thyroid/parathyroid

## 2023-10-10 ENCOUNTER — CLINICAL SUPPORT (OUTPATIENT)
Dept: PSYCHIATRY | Facility: CLINIC | Age: 58
End: 2023-10-10
Payer: MEDICARE

## 2023-10-10 DIAGNOSIS — F12.21 CANNABIS USE DISORDER, MODERATE, IN EARLY REMISSION: Primary | ICD-10-CM

## 2023-10-10 DIAGNOSIS — F10.20 ALCOHOL USE DISORDER, MODERATE, DEPENDENCE: ICD-10-CM

## 2023-10-10 PROCEDURE — 90853 PR GROUP PSYCHOTHERAPY: ICD-10-PCS | Mod: ,,, | Performed by: SOCIAL WORKER

## 2023-10-10 PROCEDURE — 90853 GROUP PSYCHOTHERAPY: CPT | Mod: ,,, | Performed by: SOCIAL WORKER

## 2023-10-10 NOTE — DISCHARGE SUMMARY
Anton Craft - Telemetry [facility] Medicine  Discharge Summary      Patient Name: Jennifer Booth  MRN: [id number]  NATHAN: [id number]  Patient Class: IP- Inpatient  Admission Date: 9/14/2023  Hospital Length of Stay: 3 days  Discharge Date and Time: 9/17/2023  5:48 PM  Attending Physician: Tea att. providers found   Discharging Provider: Shon Arreguin MD  Primary Care Provider: Leodan Sanchez MD  Hospital Medicine Team: [facility] HOSP MED  Shon Arreguin MD  Primary Care Team: [facility] HOSP [facility]    HPI:   No notes on file    * No surgery found *      Hospital Course:   Ms. Booth was admitted to [facility] MICU overnight for acute hypoxemic respiratory failure 2/2 pulmonary edema 2/2 hypertensive urgency after missing dialysis. She was admitted to MICU on continuous bipap and underwent HD today; now on room air and hemodynamically stable for stepdown. Patient still with dyspnea and oxygen needs after dialysis. Placed on steroid course, LABA/ICS, scheduled albuterol and spiriva. Hard-soled shoe given for the toe fracture and patient set up for follow up with orthopedics outpatient.Patient was initially placed on cardene drip for hypertensive urgency but was weaned off and BP managed with carvedilol 25 mg BID, losartan 100 mg daily, nifedipine 60 mg daily. She tolerated HD which also assisted with volume removal and she was stable for discharge home.       Goals of Care Treatment Preferences:  Code Status: Full Code      Consults:   Consults (From admission, onward)        Status Ordering Provider     Nephrology  Once        Provider:  (Not yet assigned)    Completed RAYMOND WEBBER     Inpatient consult to Critical Care Medicine  Once        Provider:  (Not yet assigned)    Completed MELISA LAIRD          No new Assessment & Plan notes have been filed under this hospital service since the last note was generated.  Service: Hospital Medicine    Final Active Diagnoses:    Diagnosis Date Noted POA    PRINCIPAL PROBLEM:   Acute respiratory failure with hypoxia [J96.01] 10/13/2020 Yes    COPD exacerbation [J44.1] 09/16/2023 Yes    URI, acute [J06.9] 09/16/2023 Yes    Chronic kidney disease-mineral and bone disorder [N18.9, E83.9, M89.9] 09/16/2023 Yes    Closed nondisplaced fracture of proximal phalanx of right great toe with routine healing [S92.414D] 09/14/2023 Not Applicable    Hypertensive emergency [I16.1] 06/09/2020 Yes    Anemia in chronic kidney disease, on chronic dialysis [N18.6, D63.1, Z99.2] 04/18/2018 Not Applicable    ESRD (end stage renal disease) on dialysis [N18.6, Z99.2] 08/03/2017 Not Applicable      Problems Resolved During this Admission:       Discharged Condition: good    Disposition: Home or Self Care    Follow Up:   Follow-up Information     Leodan Sanchez MD. Go on 9/18/2023.    Specialty: Internal Medicine  Why: at 4:20pm  Contact information:  [address]  [address]  [facility] [address]  [phone]                       Patient Instructions:      Ambulatory referral/consult to Pulmonology   Standing Status: Future   Referral Priority: Routine Referral Type: Consultation   Referral Reason: Specialty Services Required   Requested Specialty: Pulmonary Disease   Number of Visits Requested: 1     Diet renal     Diet Cardiac     Notify your health care provider if you experience any of the following:  temperature >100.4     Notify your health care provider if you experience any of the following:  severe uncontrolled pain     Notify your health care provider if you experience any of the following:  persistent nausea and vomiting or diarrhea     Notify your health care provider if you experience any of the following:  redness, tenderness, or signs of infection (pain, swelling, redness, odor or green/yellow discharge around incision site)     Notify your health care provider if you experience any of the following:  difficulty breathing or increased cough     Notify your health care provider if you  experience any of the following:  severe persistent headache     Notify your health care provider if you experience any of the following:  worsening rash     Notify your health care provider if you experience any of the following:  persistent dizziness, light-headedness, or visual disturbances     Notify your health care provider if you experience any of the following:  increased confusion or weakness     Activity as tolerated       Significant Diagnostic Studies: Labs:     CBC:       Latest Reference Range & Units 09/17/23 04:04   WBC 3.90 - 12.70 K/uL 11.93   RBC 4.00 - 5.40 M/uL 4.08   Hemoglobin 12.0 - 16.0 g/dL 11.3 (L)   Hematocrit 37.0 - 48.5 % 34.8 (L)   MCV 82 - 98 fL 85   MCH 27.0 - 31.0 pg 27.7   MCHC 32.0 - 36.0 g/dL 32.5   RDW 11.5 - 14.5 % 18.7 (H)   Platelet Count 150 - 450 K/uL 181   (L): Data is abnormally low  (H): Data is abnormally high      CMP:     Latest Reference Range & Units 09/17/23 04:04   Sodium 136 - 145 mmol/L 134 (L)   Potassium 3.5 - 5.1 mmol/L 4.7   Chloride 95 - 110 mmol/L 97   CO2 23 - 29 mmol/L 15 (L)   Anion Gap 8 - 16 mmol/L 22 (H)   BUN 6 - 20 mg/dL 72 (H)   Creatinine 0.5 - 1.4 mg/dL 6.6 (H)   eGFR >60 mL/min/1.73 m^2 6.7 !   Glucose 70 - 110 mg/dL 95   Calcium 8.7 - 10.5 mg/dL 11.0 (H)   Phosphorus Level 2.7 - 4.5 mg/dL 7.2 (H)   Magnesium  1.6 - 2.6 mg/dL 2.1   ALP 55 - 135 U/L 104   PROTEIN TOTAL 6.0 - 8.4 g/dL 6.7   Albumin 3.5 - 5.2 g/dL 2.7 (L)   BILIRUBIN TOTAL 0.1 - 1.0 mg/dL 0.4   AST 10 - 40 U/L 11   ALT 10 - 44 U/L 9 (L)   (L): Data is abnormally low  (H): Data is abnormally high  !: Data is abnormal    Microbiology:   Microbiology Results (last 7 days)     Procedure Component Value Units Date/Time    Influenza A & B by Molecular [[id number]] Collected: 09/14/23 0114    Order Status: Completed Specimen: Nasopharyngeal Swab Updated: 09/14/23 0220     Influenza A, Molecular Negative     Influenza B, Molecular Negative     Flu A & B Source Nasal swab     "      Radiology:    Imaging Results          X-Ray Chest AP Portable (Final result)  Result time 09/14/23 01:50:37    Final result by Tiago Dixon MD (09/14/23 01:50:37)                 Impression:      Enlarged cardiomediastinal silhouette with bilateral airspace opacities and small pleural effusions, most likely related to CHF exacerbation and/or volume overload.  Infectious/inflammatory process could have a similar appearance.      Electronically signed by: Tiago Dixon MD  Date:    09/14/2023  Time:    01:50             Narrative:    EXAMINATION:  XR CHEST AP PORTABLE    CLINICAL HISTORY:  Provided history is "CHF;  ".    TECHNIQUE:  One view of the chest.    COMPARISON:  09/27/2022.    FINDINGS:  Cardiac wires overlie the chest.  Cardiomediastinal silhouette is enlarged and similar to the prior study.  Atherosclerotic calcifications overlie the aortic arch.  New patchy bilateral airspace and ground-glass opacities throughout the bilateral lungs, right side greater than left.  Small bilateral pleural effusions.  No distinct pneumothorax.  Old right rib fracture.  Postoperative changes in the cervical spine.                                Pending Diagnostic Studies:     None         Medications:  Reconciled Home Medications:      Medication List      START taking these medications    cetirizine 5 MG tablet  Commonly known as: ZYRTEC  Take 1 tablet (5 mg total) by mouth once daily.     fluticasone-salmeterol 100-50 mcg/dose 100-50 mcg/dose diskus inhaler  Commonly known as: ADVAIR  Inhale 1 puff into the lungs 2 (two) times daily. Controller     SPIRIVA RESPIMAT 2.5 mcg/actuation inhaler  Generic drug: tiotropium bromide  Inhale 2 puffs into the lungs once daily. Controller        CONTINUE taking these medications    acetaminophen 325 MG tablet  Commonly known as: TYLENOL  Take 650 mg by mouth every 4 (four) hours as needed.     aspirin 81 MG Chew  Take 81 mg by mouth once daily.     carvediloL 25 MG " tablet  Commonly known as: COREG  Take 25 mg by mouth 2 (two) times daily.     cyproheptadine 4 mg tablet  Commonly known as: PERIACTIN  Take 1 tablet (4 mg total) by mouth 3 (three) times daily as needed (appetite).     fluticasone propionate 50 mcg/actuation nasal spray  Commonly known as: FLONASE  1 spray by Each Nostril route daily as needed.     melatonin 5 mg Cap  Take 5 mg by mouth nightly.     mirtazapine 15 MG tablet  Commonly known as: REMERON  Take 1 tablet (15 mg total) by mouth every evening.     montelukast 10 mg tablet  Commonly known as: SINGULAIR  TAKE 1 TABLET(10 MG) BY MOUTH EVERY EVENING     naltrexone 50 mg tablet  Commonly known as: DEPADE  Start with 1/2 tab (25 mg) by mouth daily, may increase to 1 tab (50 mg) daily if able.     NIFEdipine 60 MG (OSM) 24 hr tablet  Commonly known as: PROCARDIA-XL  Take 60 mg by mouth once daily.     omeprazole 40 MG capsule  Commonly known as: PRILOSEC  Take 40 mg by mouth once daily.     sevelamer carbonate 800 mg Tab  Commonly known as: RENVELA  Take 800 mg by mouth 3 (three) times daily with meals.     VIRT-CAPS ORAL  Take 1 capsule by mouth once daily.        ASK your doctor about these medications    predniSONE 20 MG tablet  Commonly known as: DELTASONE  Take 2 tablets (40 mg total) by mouth once daily. for 4 days  Ask about: Should I take this medication?            Indwelling Lines/Drains at time of discharge:   Lines/Drains/Airways     Drain  Duration                Hemodialysis AV Fistula Left forearm -- days                Time spent on the discharge of patient: 45 minutes         Shon Arreguin MD  Department of Hospital Medicine  [facility] - Telemetry Stepdown

## 2023-10-11 NOTE — PHYSICIAN QUERY
PT Name: Jennifer Booth  MR #: [id number]     DOCUMENTATION CLARIFICATION     CDS/: Vinita Floyd RN CDIS           Contact information: [email or web address]    This form is a permanent document in the medical record.     Query Date: October 11, 2023    By submitting this query, we are merely seeking further clarification of documentation.  Please utilize your independent clinical judgment when addressing the question(s) below.    The Medical Record contains the following   Indicators Supporting Clinical Findings Location in Medical Record   x Heart Failure documented Hypertensive emergency complicated by diastolic heart failure CCM note 10/3    x BNP  Latest Reference Range & Units 10/01/23 03:15   BNP 0 - 99 pg/mL 2,665 (H)    Labs    x EF/Echo   Left Ventricle: The left ventricle is moderately dilated. Normal wall thickness. There is eccentric hypertrophy. Normal wall motion. There is moderately reduced systolic function with a visually estimated ejection fraction of 35 - 40%.    Right Ventricle: Normal right ventricular cavity size. Wall thickness is normal. Right ventricle wall motion  is normal. Systolic function is normal.    Left Atrium: Left atrium is severely dilated.    Aortic Valve: The aortic valve is a trileaflet valve. There is mild aortic valve sclerosis. Mildly restricted motion. There is mild to moderate aortic regurgitation.    Mitral Valve: There is mild mitral annular calcification present. There is severe regurgitation with a centrally directed jet.    Pulmonary Artery: The estimated pulmonary artery systolic pressure is 42 mmHg.    IVC/SVC: Intermediate venous pressure at 8 mmHg ECHO 10/2   x Radiology findings Persistent bilateral pulmonary airspace opacities, again more extensive on the right than the left.  DDX includes pneumonia, pulmonary edema, or other pathology.  Neoplasm neither confirmed or full excluded.     Correlate clinically, and with continued imaging follow-up CXR  10/1    x Subjective/Objective Respiratory Conditions Hypoxic Resp Failure H&P     Recent/Current MI      Heart Transplant, LVAD      Edema, JVD      Ascites      Diuretics/Meds     x Other Treatment HD completed, 3 Hrs, 3 liters removed    HD yesterday, tolerated well with good clearance. UF 2.5L    HD yesterday, tolerated well with good clearance. UF 2.5L RN note 10/1     Nephrology note 10/3    RN note 10/4   x Other Hypertensive emergency with flash pulm edema H&P      Heart failure is a clinical diagnosis which includes symptomatic fluid retention, elevated intracardiac pressures, and/or the inability of the heart to deliver adequate blood flow.    Heart Failure with reduced Ejection Fraction (HFrEF) or Systolic Heart Failure (loses ability to contract normally, EF is <40%)    Heart Failure with preserved Ejection Fraction (HFpEF) or Diastolic Heart Failure (stiff ventricles, does not relax properly, EF is >50%)     Heart Failure with Combined Systolic and Diastolic Failure (stiff ventricles, does not relax properly and EF is <50%)    Mid-range or mildly reduced ejection fraction (HFmrEF) is classified as systolic heart failure.  Congestive heart failure with a recovered EF is classified as Diastolic Heart Failure.  Common clues to acute exacerbation:  Rapidly progressive symptoms (w/in 2 weeks of presentation), using IV diuretics, using supplemental O2, pulmonary edema on Xray, new or worsening pleural effusion, +JVD or other signs of volume overload, MI w/in 4 weeks, and/or BNP >500  The clinical guidelines noted are only system guidelines, and do not replace the providers clinical judgment.    Provider, please clarify the ACUITY of the heart failure     [   ]  Acute on Chronic Diastolic Heart Failure (HFpEF) - worsening of CHF signs/symptoms in preexisting CHF   [ X ]  Chronic Diastolic Heart Failure (HFpEF) - preexisting and stable   [   ]  Other (please specify): ___________________________________            Please document in your progress notes daily for the duration of treatment until resolved and include in your discharge summary.    References:  American Heart Association editorial staff. (2017, May). Ejection Fraction Heart Failure Measurement. American Heart Association. https://www.heart.org/en/health-topics/heart-failure/diagnosing-heart-failure/ejection-fraction-heart-failure-measurement#:~:text=Ejection%20fraction%20(EF)%20is%20a,pushed%20out%20with%20each%20heartbeat  RUSTY Pierce (2020, December 15). Heart failure with preserved ejection fraction: Clinical manifestations and diagnosis. [employer] https://www.[employer].com/contents/heart-failure-with-preserved-ejection-fraction-clinical-manifestations-and-diagnosis.  ICD-10-CM/PCS Coding Clinic Third Quarter ICD-10, Effective with discharges: September 8, 2020 [facility] § Heart failure with mid-range or mildly reduced ejection fraction (2020).  ICD-10-CM/PCS Coding Clinic Third Quarter ICD-10, Effective with discharges: September 8, 2020 [facility] § Heart failure with recovered ejection fraction (2020).  Form No. 99866

## 2023-10-12 ENCOUNTER — OFFICE VISIT (OUTPATIENT)
Dept: ENDOCRINOLOGY | Facility: CLINIC | Age: 58
End: 2023-10-12
Payer: MEDICARE

## 2023-10-12 ENCOUNTER — TELEPHONE (OUTPATIENT)
Dept: ENDOCRINOLOGY | Facility: CLINIC | Age: 58
End: 2023-10-12
Payer: COMMERCIAL

## 2023-10-12 ENCOUNTER — HOSPITAL ENCOUNTER (OUTPATIENT)
Dept: RADIOLOGY | Facility: HOSPITAL | Age: 58
Discharge: HOME | End: 2023-10-12
Attending: FAMILY MEDICINE
Payer: MEDICARE

## 2023-10-12 VITALS
SYSTOLIC BLOOD PRESSURE: 145 MMHG | HEART RATE: 97 BPM | BODY MASS INDEX: 17.63 KG/M2 | DIASTOLIC BLOOD PRESSURE: 88 MMHG | WEIGHT: 115.94 LBS | OXYGEN SATURATION: 97 %

## 2023-10-12 DIAGNOSIS — E04.2 MULTIPLE THYROID NODULES: Primary | ICD-10-CM

## 2023-10-12 DIAGNOSIS — M85.89 OSTEOPENIA OF MULTIPLE SITES: ICD-10-CM

## 2023-10-12 DIAGNOSIS — Z12.31 ENCOUNTER FOR SCREENING MAMMOGRAM FOR BREAST CANCER: ICD-10-CM

## 2023-10-12 DIAGNOSIS — N25.81 SECONDARY HYPERPARATHYROIDISM OF RENAL ORIGIN: ICD-10-CM

## 2023-10-12 PROCEDURE — 99215 OFFICE O/P EST HI 40 MIN: CPT | Mod: PBBFAC | Performed by: INTERNAL MEDICINE

## 2023-10-12 PROCEDURE — 77067 MAMMO DIGITAL SCREENING BILAT WITH TOMO: ICD-10-PCS | Mod: 26,,, | Performed by: RADIOLOGY

## 2023-10-12 PROCEDURE — 99999 PR PBB SHADOW E&M-EST. PATIENT-LVL V: ICD-10-PCS | Mod: PBBFAC,,, | Performed by: INTERNAL MEDICINE

## 2023-10-12 PROCEDURE — 77067 SCR MAMMO BI INCL CAD: CPT | Mod: TC

## 2023-10-12 PROCEDURE — 77067 SCR MAMMO BI INCL CAD: CPT | Mod: 26,,, | Performed by: RADIOLOGY

## 2023-10-12 PROCEDURE — 77063 BREAST TOMOSYNTHESIS BI: CPT | Mod: 26,,, | Performed by: RADIOLOGY

## 2023-10-12 PROCEDURE — 77063 MAMMO DIGITAL SCREENING BILAT WITH TOMO: ICD-10-PCS | Mod: 26,,, | Performed by: RADIOLOGY

## 2023-10-12 PROCEDURE — 99204 OFFICE O/P NEW MOD 45 MIN: CPT | Mod: S$PBB,,, | Performed by: INTERNAL MEDICINE

## 2023-10-12 PROCEDURE — 99999 PR PBB SHADOW E&M-EST. PATIENT-LVL V: CPT | Mod: PBBFAC,,, | Performed by: INTERNAL MEDICINE

## 2023-10-12 PROCEDURE — 99204 PR OFFICE/OUTPT VISIT, NEW, LEVL IV, 45-59 MIN: ICD-10-PCS | Mod: S$PBB,,, | Performed by: INTERNAL MEDICINE

## 2023-10-12 RX ORDER — LOSARTAN POTASSIUM 100 MG/1
1 TABLET ORAL DAILY
COMMUNITY
Start: 2023-06-26 | End: 2023-11-30

## 2023-10-12 NOTE — TELEPHONE ENCOUNTER
Informed pt that Dr Jessica combs appt was in January. Pt was able to get an appt with Dr Durbin in [place] for today. Understanding verbalized.

## 2023-10-12 NOTE — TELEPHONE ENCOUNTER
----- Message from Souleymane Zhu sent at 10/12/2023  8:38 AM CDT -----  Regarding: Jennifer  Type:  Sooner Appointment Request     Patient is requesting a sooner appointment.  Patient declined first available appointment listed as well as another facility and provider .  Patient will not accept being placed on the waitlist and is requesting a message be sent to doctor.     Name of Caller: Jennifer     When is the first available appointment? 01/23    Symptoms: Consultation..Patient was referred by doctor     Would the patient rather a call back or a response via [facility]? Callback     Best Call Back Number: [phone]      Additional Information:

## 2023-10-12 NOTE — PROGRESS NOTES
Subjective:      Patient ID: Jennifer Booth is referred by Leilani Shane MD     Chief Complaint:  Thyroid nodules    History of Present Illness    Jennifer Booth is a 58 y.o. female who presents for evaluation of thyroid nodules.      Thyroid nodules    Incidentally found on PET scan 8/2023: mildly hypermetabolic nodule posterior to the right thyroid lobe.  Hyperplastic parathyroid gland is a consideration.    US Thyroid 08/2023:   Right thyroid lobe measures 5.9 x 2.2 x 2.5 cm.  Left thyroid lobe measures 4.6 x 2.1 x 2.1 cm.  Isthmus measures 0.3 cm.    Thyroid demonstrates multiple cystic nodules, some containing internal colloid.      Two measured nodules posteriorly adjacent at the right lobe, one of which appears more heterogeneous hypoechoic with calcification measuring 2.3 x 0.7 x 1.3 and the other more rounded hypoechoic at 1.0 cm.     No evidence for cervical adenopathy.     Impression:     Two measured nodules posteriorly adjacent to the right thyroid lobe as detailed.    Overall findings which could relate to exophytic thyroid nodule(s) or parathyroid adenoma(s).      Continued follow-up recommended with additional assessment to include parathyroid protocol 4D CT or nuclear medicine sestamibi scan as warranted.     Prior US thyroid: Denies    Prior FNA biopsy:  Denies    Thyroid function test:  Normal    Neck symptoms:  Denies compressive neck symptoms    Family history of thyroid disease:  Denies      Lab Results   Component Value Date    TSH 1.342 08/23/2023    TSH 1.389 09/27/2022    TSH 0.697 12/26/2011    TSH 0.993 12/14/2011    TSH 0.590 05/27/2011    FREET4 0.76 08/23/2023    FREET4 1.00 12/26/2011       Hyperparathyroidism    Likely secondary to ESRD  Patient has history of ESRD since 12/2011, hemodialysis on Tuesday, Thursday and Saturday.  Follows Nephrology - Dr. Whitney at [facility], every 6 months.   On Sensipar 90 mg daily per notes, care everywhere.   Gets calcitriol during her  dialysis.     Patient has transient hypercalcemia, highest corrected calcium of 12 on 2023.  Since then corrected calcium has ranged between 9-10.6.  Patient had elevated PTH of 79 in .  PTH of 1149 in 2019, 306 in 2020 and 627 in 2022.    Denies current or previous use of thiazides or lithium.    Supplements:  VitD 1000 units  Ca None    Symptoms: (if not marked patient denied)  [x] nephrolithiasis  [] kidney injury  [] polyuria  [] abd pain  [] fragility fracture  [] bone pain  [] mood disturbance  [] DENIES ALL    Abd/kidney imagin2023  Upper abdomen: A 2 mm nonobstructing left renal stone or cortical calcification, otherwise unremarkable.    Hx of malignancy:  Multiple myeloma,in remission, follows up with Hematology/Oncology at [facility]      Lab Results   Component Value Date    CALCIUM 9.5 10/04/2023    ALBUMIN 2.7 (L) 10/04/2023    EGFRNORACEVR 8.4 (A) 10/04/2023    PHOS 6.1 (H) 10/04/2023    ALKPHOS 161 (H) 10/04/2023    [id number] 34 2012    .1 (H) 2022         Osteopenia    T-score  Year Lumbar Spine Left femoral neck Right Femoral neck   2022 -1.3 -2.3 -1.9       FRAX 10 year probability of fracture:   Major Osteoporotic Fracture:  6.6%   Hip Fracture:  1.8%    History of previous fracture: No.  Has history of rib, toe and finger fracture.  Parent with fractured hip: No  Smoking status: Yes  Glucocorticoid use: No  History of RA: No  Alcohol 3 or more/day: No  Nephrolithiasis: Yes  Diabetes: No  Frequent falls: No  Loss of height: No  Menopause: Early 50s    Dental visit: For 2 years    GERD: Yes on PPI    Family history:   Hyperparathyroidism: No  Malignancy: Yes Great aunt had colon cancer  Osteoporosis: No      ROS:   As above    Objective:     BP (!) 145/88 (BP Location: Right arm, Patient Position: Sitting, BP Method: Medium (Automatic))   Pulse 97   Wt 52.6 kg (115 lb 15.4 oz)   LMP 2016 (Approximate) Comment: 2016  SpO2 97%   BMI 17.63 kg/m²      Body mass index is 17.63 kg/m².    Physical Exam  Constitutional:       General: She is not in acute distress.     Appearance: She is not ill-appearing.   HENT:      Head: Normocephalic.   Eyes:      Conjunctiva/sclera: Conjunctivae normal.   Cardiovascular:      Rate and Rhythm: Normal rate.   Pulmonary:      Effort: Pulmonary effort is normal.   Abdominal:      General: Abdomen is flat.   Musculoskeletal:      Cervical back: Neck supple.   Skin:     General: Skin is warm and dry.   Neurological:      Mental Status: She is alert and oriented to person, place, and time.         Lab Review:   Lab Results   Component Value Date    HGBA1C 4.6 02/10/2021     Lab Results   Component Value Date    CHOL 161 07/21/2021    HDL 65 07/21/2021    LDLCALC 86.0 07/21/2021    TRIG 50 07/21/2021    CHOLHDL 40.4 07/21/2021     Lab Results   Component Value Date     10/04/2023    K 5.5 (H) 10/04/2023     10/04/2023    CO2 20 (L) 10/04/2023    GLU 82 10/04/2023    BUN 56 (H) 10/04/2023    CREATININE 5.5 (H) 10/04/2023    CALCIUM 9.5 10/04/2023    PROT 6.4 10/04/2023    ALBUMIN 2.7 (L) 10/04/2023    BILITOT 0.4 10/04/2023    ALKPHOS 161 (H) 10/04/2023    AST 16 10/04/2023    ALT 8 (L) 10/04/2023    ANIONGAP 14 10/04/2023    EGFRNORACEVR 8.4 (A) 10/04/2023    TSH 1.342 08/23/2023        Vit D, 25-Hydroxy   Date Value Ref Range Status   06/19/2012 34 30 - 100 ng/mL Final     Comment:     Vitamin D, 25-Hydroxy:   Vitamin D deficiency <10 ng/mL   Vitamin D insufficiency 10-30 ng/mL   Vitamin D sufficiency >30 ng/mL   Vitamin D potential toxicity >100 ng/mL        Assessment and Plan     Problem List Items Addressed This Visit          Endocrine    Multiple thyroid nodules - Primary       US thyroid 8/2023: multiple cystic nodules, some containing internal colloid.    US thyroid done for follow up on abnormal PET.   Patient is clinically and biochemically euthyroid.   Denies any compressive neck symptoms.   Will monitor TFTs  and symptoms, repeat US thyroid as needed.     Will get NM parathyroid scan to assess for parathyroid adenoma.                  Secondary hyperparathyroidism of renal origin       Patient following with her nephrologist.   On Sensipar.   Vitamin D and calcium recommendations as per her nephrologist.                 Orthopedic    Osteopenia       T-score  Year Lumbar Spine Left femoral neck Right Femoral neck   05/2022 -1.3 -2.3 -1.9       FRAX 10 year probability of fracture:   Major Osteoporotic Fracture:  6.6%   Hip Fracture:  1.8%    Her FRAX score show low risk of fracture and no prior history of fragility fracture  She does understand her fracture risk with osteopenia and advised to take recommended precautions.  I have discussed starting treatment if she has any fragility fractures, increased fracture risk or T scores are <-2.5    Encouraged safe movements and fall precautions  Continue routine dental visits  Instructed on Calcium and vitamin D   DXA due in 5/2024.                  Annia ANNE Rai, MD

## 2023-10-13 NOTE — PROGRESS NOTES
Group Psychotherapy    Site: [facility]    Clinical status of patient: Outpatient    10/10/2023    Length of service:[id number]    Referred by: Addictive Behavior Unit     Number of patients in attendance: 3    Target symptoms: alcohol abuse, substance abuse    Patient's response to intervention:  The patient's response to intervention is active listening, self-disclosure.    Progress toward goals and other mental status changes:  The patient's progress toward goals is good.    Interval history: Pt arrived a few minutes late to scheduled group. First group in several weeks. Processed recent hospitalizations- very scary experience, currently feeling better. Brings up grief related to sister and hx of trauma. Open to feedback and support from the group, who encouraged her to continue attending and focus on self care.     Diagnosis: Alcohol use disorder, in early remission; Cannabis Use Disorder, in early remission     Plan: group psychotherapy    Return to clinic: 1 week

## 2023-10-18 ENCOUNTER — TELEPHONE (OUTPATIENT)
Dept: HEMATOLOGY/ONCOLOGY | Facility: CLINIC | Age: 58
End: 2023-10-18
Payer: COMMERCIAL

## 2023-10-18 NOTE — TELEPHONE ENCOUNTER
Tc to pt Advised her that DR Shane received a copy of her MRI results from [facility] and she is recommending a BM Biopsy to evaluate status of MM  Pt agreed w/ recommendation    Informed her it will be scheduled in IR at this time and they will contact her with date,time and instructions  She acknowledged understanding .    Message sent to ramses caba in IR to schedule pt for BM Bx    Good morning This is Zyra at Dr Shane  We have another Bone Marrow Biopsy that needs scheduling  Her MRN is [id number]  Jennifer Booth  She has been diagnosed e/ multiple myeloma in the past and we are doing the current biopsy to check status of disease to see if she has had progression.  Thanks Yajaira Adair let me know if you rather send you a message or call you on these BM 's Tks

## 2023-10-19 ENCOUNTER — OFFICE VISIT (OUTPATIENT)
Dept: PULMONOLOGY | Facility: CLINIC | Age: 58
End: 2023-10-19
Payer: MEDICARE

## 2023-10-19 VITALS
OXYGEN SATURATION: 97 % | DIASTOLIC BLOOD PRESSURE: 80 MMHG | SYSTOLIC BLOOD PRESSURE: 124 MMHG | RESPIRATION RATE: 17 BRPM | HEART RATE: 75 BPM | WEIGHT: 114 LBS | HEIGHT: 68 IN | BODY MASS INDEX: 17.28 KG/M2

## 2023-10-19 DIAGNOSIS — R93.89 ABNORMAL CT OF THE CHEST: ICD-10-CM

## 2023-10-19 DIAGNOSIS — J44.1 COPD EXACERBATION: ICD-10-CM

## 2023-10-19 DIAGNOSIS — J44.9 CHRONIC OBSTRUCTIVE PULMONARY DISEASE, UNSPECIFIED COPD TYPE: ICD-10-CM

## 2023-10-19 DIAGNOSIS — F17.200 TOBACCO USE DISORDER: Chronic | ICD-10-CM

## 2023-10-19 DIAGNOSIS — R06.02 SHORTNESS OF BREATH: Primary | ICD-10-CM

## 2023-10-19 PROCEDURE — 99214 OFFICE O/P EST MOD 30 MIN: CPT | Mod: PBBFAC,PN

## 2023-10-19 PROCEDURE — 99215 OFFICE O/P EST HI 40 MIN: CPT | Mod: S$PBB,,,

## 2023-10-19 PROCEDURE — 99215 PR OFFICE/OUTPT VISIT, EST, LEVL V, 40-54 MIN: ICD-10-PCS | Mod: S$PBB,,,

## 2023-10-19 PROCEDURE — 99999 PR PBB SHADOW E&M-EST. PATIENT-LVL IV: ICD-10-PCS | Mod: PBBFAC,,,

## 2023-10-19 PROCEDURE — 99999 PR PBB SHADOW E&M-EST. PATIENT-LVL IV: CPT | Mod: PBBFAC,,,

## 2023-10-19 RX ORDER — ALBUTEROL SULFATE 90 UG/1
2 AEROSOL, METERED RESPIRATORY (INHALATION) EVERY 6 HOURS PRN
Qty: 18 G | Refills: 6 | Status: SHIPPED | OUTPATIENT
Start: 2023-10-19 | End: 2024-10-18

## 2023-10-19 NOTE — ASSESSMENT & PLAN NOTE
-Per recent CT chest: Scattered areas of ill-defined density and patchy opacity including component of right lower lobe tree-in-bud.  Overall findings suggestive of residual or resolving infectious versus inflammatory etiology. Recommend CT chest follow-up in 6 weeks to assess for continued improvement or resolution  -Will order follow up CT in 2 weeks

## 2023-10-19 NOTE — PROGRESS NOTES
Patient ID:  Jennifer Booth is a 58 y.o. female    New Patient Consult    Subjective:       HPI:  10/19/2023:  Jennifer Booth is a 58 y.o. female who presents in office for evaluation of COPD. Referred by Shon Arreguin MD. The chief complaint problem is new to me. Per discharge summary, hospitalized last month 09/14/2023-09/17/2023 for acute respiratory failure with hypoxia 2/2 pulmonary edema 2/2 hypertensive urgency after missing dialysis. Treated with continuous BiPAP, course of steroids, ICS/LABA, albuterol inhaler. Was then again hospitalized on 10/01/2023-10/04/2023 for hypertensive emergency. States she is doing much better since discharge. States her shortness of breath has significantly improved since starting inhalers, mild dyspnea associated with a wet and dry cough, chest tightness, and occasional wheezing. Stable GERD sxs, takes omeprazole, sinuses, post nasal drip, allergies, takes zyrtec. Reports unintentional weight loss (about 10 lbs within last 3 months) and decreased appetite. Report still smoking about 0.5 ppd.     Pertinent Hx:  Inhalers: advair, spiriva  Smoking hx-  smokes about 0.5 ppd x over 20 years  Occupational/Environmental Exposures: No known Mold/Asbestosis exposure  Family History of lung disease: sister Asthma  No Childhood history of Lung Disease  Medical history: Lung Disease, ESRD, multiple myeloma in remission, treated with Chemo, follows hem/onc    Past Medical History:   Diagnosis Date    Addiction to drug     Alcohol abuse     Allergic drug reaction 11/13/2017    Anemia     Anxiety     Arm pain, left 2/12/2014    Breast cyst     Chronic renal failure 2/12/2014    CKD (chronic kidney disease) stage 5, GFR less than 15 ml/min     COPD exacerbation 9/16/2023    Depression     Dialysis patient     dialysis [id number]-f    Encounter for blood transfusion     GERD (gastroesophageal reflux disease)     Hx of psychiatric care     Hypertension     Mood swings     Multiple myeloma in  "remission 10/26/2012    per Hem/Oc note    Psychiatric exam requested by authority     Psychiatric problem     Renal hypertension     Status post dialysis 8/2012    Therapy     Thrombocytopenia 8/2/2012       Performance Status:The patient's activity level is functions out of house.          Review of Systems   Constitutional:  Positive for weight loss (10 lbs in 3 months). Negative for fever, chills and fatigue.   HENT:  Negative for postnasal drip and sinus pressure.    Respiratory:  Positive for cough, chest tightness, shortness of breath and wheezing.    Cardiovascular:  Negative for chest pain, palpitations and leg swelling.   Skin:  Negative for rash.   Neurological:  Negative for dizziness, weakness, light-headedness and headaches.       Objective:     Vitals:    10/19/23 1515   BP: 124/80   Pulse: 75   Resp: 17   SpO2: 97%   Weight: 51.7 kg (114 lb)   Height: 5' 8" (1.727 m)           Physical Exam   Constitutional: She is oriented to person, place, and time. She appears well-developed and well-nourished.   HENT:   Head: Normocephalic.   Cardiovascular: Normal rate and regular rhythm.   Murmur heard.  Pulmonary/Chest: Normal expansion, effort normal and breath sounds normal. No respiratory distress. She has no wheezes. She has no rhonchi.   Musculoskeletal:         General: Normal range of motion.      Cervical back: Normal range of motion.   Neurological: She is alert and oriented to person, place, and time.   Psychiatric: She has a normal mood and affect. Her behavior is normal. Judgment and thought content normal.       Pertinent Studies Reviewed & Interpreted:     Labs reviewed       Lab Results   Component Value Date    WBC 5.12 10/04/2023    RBC 4.39 10/04/2023    HGB 12.2 10/04/2023    HCT 38.3 10/04/2023    MCV 87 10/04/2023    MCH 27.8 10/04/2023    MCHC 31.9 (L) 10/04/2023    RDW 17.8 (H) 10/04/2023     (L) 10/04/2023    MPV 10.6 10/04/2023    GRAN 3.2 10/04/2023    GRAN 62.5 10/04/2023    " LYMPH 1.2 10/04/2023    LYMPH 23.2 10/04/2023    MONO 0.5 10/04/2023    MONO 9.6 10/04/2023    EOS 0.1 10/04/2023    BASO 0.07 10/04/2023    EOSINOPHIL 2.7 10/04/2023    BASOPHIL 1.4 10/04/2023         Personal Diagnostic Review and Interpretation  Radiographs (ct chest and cxr) images personally reviewed: view by direct vision    CT Chest Without Contrast 11/10/2023: Lungs/Pleura: There is a ground-glass opacity in the right upper lobe measuring 3.1 x 2.0 cm (series 4, image 233).  In comparison with prior CT dated 09/20/2023, this lesion is significantly decreased in conspicuity.  There are mild bandlike opacities in the left lower lobe compatible with subsegmental atelectasis or scarring.  There are several pulmonary micro nodules in the anterior aspect of the left lower lobe, stable.  The pleural spaces are clear.     Impression:  Right upper lobe ground-glass opacity, significantly decreased in conspicuity when compared with prior imaging, likely related to a resolving infectious or inflammatory process.    CT Chest Lung Screening Low Dose 09/20/23: Lungs: Lungs demonstrate areas of scattered ill-defined densities and patchy opacity.  Reference somewhat linear and irregular opacity at the inferior right upper lobe measuring 3.3 x 1.9 cm (series 2, image 56).  Ill-defined opacity at the posterior left upper lobe measuring 1.4 cm (series 4, image 182).  More conspicuous area of vague tree-in-bud at the posterior right lower lobe (series 4, image 379).  The lungs show no findings consistent with emphysema.     Impression:  Lung-RADS Category:  3 - Probably benign.  Scattered areas of ill-defined density and patchy opacity including component of right lower lobe tree-in-bud.  Overall findings suggestive of residual or resolving infectious versus inflammatory etiology.  Of note, there is significant improved clearing of the lung fields on today's  image from comparison 09/14/2023 chest radiograph.  Recommend CT  chest follow-up in 6 weeks to assess for continued improvement or resolution (which varies from conventional lung-rads recommendation).    X-Ray Chest 10/02/23: Extensive bilateral patchy airspace opacities throughout both lungs, right side mildly worse than left.  Aeration has not significantly changed when compared with recent prior exam.  Probable small bilateral pleural effusions.  No distinct pneumothorax.  Postoperative changes in the cervical spine as seen previously.  There is a stable prominent remote calcification in the right shoulder region.     Impression:  Overall similar distribution and severity of extensive bilateral pulmonary opacities which could be related to multifocal pneumonia versus aspiration or pulmonary edema.  Suggest continued follow-up to ensure resolution and exclude underlying lung masses.    10/01/23: Relative to 09/14/2023, there is little interval radiographic improvement in the bilateral airspace opacities, again more extensive on the right than the left. No pneumothorax or blunting of the right lateral costophrenic angle.  The left lateral costophrenic angle is blunted, possibly on the basis of pleural thickening or a small amount of pleural fluid.    Impression:  Persistent bilateral pulmonary airspace opacities, again more extensive on the right than the left.  DDX includes pneumonia, pulmonary edema, or other pathology.  Neoplasm neither confirmed or full excluded. Correlate clinically, and with continued imaging follow-up.    NM PET CT Whole Body 11/09/22: In the chest, there are no hypermetabolic lesions worrisome for malignancy.  There are no concerning pulmonary nodules or masses.     Impression:  In this patient with multiple myeloma there are no hypermetabolic osseous lesions concerning for metastatic disease. Multiple non hypermetabolic lytic lesions throughout the axial and appendicular skeleton consistent with previously treated lesions. Incidental mildly  hypermetabolic nodule posterior to the right thyroid lobe.  Hyperplastic parathyroid gland is a consideration.  Recommend biochemical correlation and consideration of further imaging such as parathyroid protocol 4D CT, ultrasound, or parathyroid sestamibi scan.    Pulmonary Function Tests: will be done and results to be reviewed  Procedure Note  Patient unable to do FVL. We attempted numerous times to do procedure and patient continuely placed her tongue in mouthpiece and/or was unable to achieve a six second blow       6 Minute Walk Tests: will be done and results to be reviewed      Transthoracic echo (TTE) complete 10/2/23: There is moderately reduced systolic function with a visually estimated ejection fraction of 35 - 40%. Systolic function is normal. The estimated pulmonary artery systolic pressure is 42 mmHg.     Assessment & Plan:         Problem List Items Addressed This Visit          Pulmonary    COPD exacerbation     -Hospitalized for shortness of breath 2/2 COPD exacerbation, however no prior PFTs to confirm diagnosis?  -Will order PFT's to evaluate lung strength function  -6 minute walk test to evaluate need for home oxygen  -CT chest to evaluate lungs and follow up on previous abnormal CT  -Continue taking advair twice a day and spiriva once a day  -Full explanation of inhaler technique using demo inhaler performed during the visit.   -Start using albuterol rescue inhaler 1-2 puffs every 4-6 hours as needed              Other    Tobacco use disorder (Chronic)     -Recommend smoking cessation  -Recent LDCT showed probable benign finding ssuggestive of residual or resolving infectious versus inflammatory etiology  -Will repeat CT in 2 weeks           Abnormal CT of the chest     -Per recent CT chest: Scattered areas of ill-defined density and patchy opacity including component of right lower lobe tree-in-bud.  Overall findings suggestive of residual or resolving infectious versus inflammatory etiology.  Recommend CT chest follow-up in 6 weeks to assess for continued improvement or resolution  -Will order follow up CT in 2 weeks         Relevant Orders    CT Chest Without Contrast     Other Visit Diagnoses       Shortness of breath    -  Primary    Relevant Medications    albuterol (VENTOLIN HFA) 90 mcg/actuation inhaler    Other Relevant Orders    Six Minute Walk Test to qualify for Home Oxygen    Complete PFT with bronchodilator    Chronic obstructive pulmonary disease, unspecified COPD type        Relevant Medications    albuterol (VENTOLIN HFA) 90 mcg/actuation inhaler    Other Relevant Orders    Six Minute Walk Test to qualify for Home Oxygen    Complete PFT with bronchodilator                       Follow up if symptoms worsen or fail to improve.    Discussed with patient above for education the following:      Patient Instructions   Ordering PFT's to evaluate lung strength function. Do not use any inhalers 4 hours prior to exam.     6 minute walk test to evaluate need for home oxygen    CT chest to evaluate lungs and follow up on previous abnormal CT    Continue taking advair twice a day and spiriva one a day. Rinse mouth/brush after using due to risk for thrush; if mouth or tongue has white sores contact clinic. Full explanation of inhaler technique using demo inhaler performed during the visit.     Start using albuterol rescue inhaler 1-2 puffs every 4-6 hours as needed, for cough, shortness of breath, chest tightness, or wheezing    Follow up with cardiology scheduled next week           Rachel Littlejohn NP    60 minutes of total time spent on the encounter, which includes face to face time and non-face to face time preparing to see the patient (eg, review of tests), Obtaining and/or reviewing separately obtained history, Documenting clinical information in the electronic or other health record, Independently interpreting results (not separately reported) and communicating results to the  patient/family/caregiver, or Care coordination (not separately reported).

## 2023-10-19 NOTE — ASSESSMENT & PLAN NOTE
-Hospitalized for shortness of breath 2/2 COPD exacerbation, however no prior PFTs to confirm diagnosis?  -Will order PFT's to evaluate lung strength function  -6 minute walk test to evaluate need for home oxygen  -CT chest to evaluate lungs and follow up on previous abnormal CT  -Continue taking advair twice a day and spiriva once a day  -Full explanation of inhaler technique using demo inhaler performed during the visit.   -Start using albuterol rescue inhaler 1-2 puffs every 4-6 hours as needed

## 2023-10-19 NOTE — ASSESSMENT & PLAN NOTE
-Recommend smoking cessation  -Recent LDCT showed probable benign finding ssuggestive of residual or resolving infectious versus inflammatory etiology  -Will repeat CT in 2 weeks

## 2023-10-19 NOTE — PATIENT INSTRUCTIONS
Ordering PFT's to evaluate lung strength function. Do not use any inhalers 4 hours prior to exam.     6 minute walk test to evaluate need for home oxygen    CT chest to evaluate lungs and follow up on previous abnormal CT    Continue taking advair twice a day and spiriva one a day. Rinse mouth/brush after using due to risk for thrush; if mouth or tongue has white sores contact clinic. Full explanation of inhaler technique using demo inhaler performed during the visit.     Start using albuterol rescue inhaler 1-2 puffs every 4-6 hours as needed, for cough, shortness of breath, chest tightness, or wheezing    Follow up with cardiology scheduled next week

## 2023-10-22 PROBLEM — E04.2 MULTIPLE THYROID NODULES: Status: ACTIVE | Noted: 2023-10-22

## 2023-10-22 NOTE — ASSESSMENT & PLAN NOTE
Patient following with her nephrologist.   On Sensipar.   Vitamin D and calcium recommendations as per her nephrologist.

## 2023-10-22 NOTE — ASSESSMENT & PLAN NOTE
T-score  Year Lumbar Spine Left femoral neck Right Femoral neck   05/2022 -1.3 -2.3 -1.9       FRAX 10 year probability of fracture:   Major Osteoporotic Fracture:  6.6%   Hip Fracture:  1.8%    Her FRAX score show low risk of fracture and no prior history of fragility fracture  She does understand her fracture risk with osteopenia and advised to take recommended precautions.  I have discussed starting treatment if she has any fragility fractures, increased fracture risk or T scores are <-2.5    Encouraged safe movements and fall precautions  Continue routine dental visits  Instructed on Calcium and vitamin D   DXA due in 5/2024.

## 2023-10-22 NOTE — ASSESSMENT & PLAN NOTE
US thyroid 8/2023: multiple cystic nodules, some containing internal colloid.    US thyroid done for follow up on abnormal PET.   Patient is clinically and biochemically euthyroid.   Denies any compressive neck symptoms.   Will monitor TFTs and symptoms, repeat US thyroid as needed.     Will get NM parathyroid scan to assess for parathyroid adenoma.

## 2023-10-23 RX ORDER — MIRTAZAPINE 15 MG/1
15 TABLET, FILM COATED ORAL NIGHTLY
Qty: 90 TABLET | Refills: 1 | Status: SHIPPED | OUTPATIENT
Start: 2023-10-23

## 2023-10-24 ENCOUNTER — CLINICAL SUPPORT (OUTPATIENT)
Dept: PSYCHIATRY | Facility: CLINIC | Age: 58
End: 2023-10-24
Payer: COMMERCIAL

## 2023-10-24 DIAGNOSIS — F12.21 CANNABIS USE DISORDER, MODERATE, IN EARLY REMISSION: Primary | ICD-10-CM

## 2023-10-24 PROCEDURE — 90853 PR GROUP PSYCHOTHERAPY: ICD-10-PCS | Mod: S$GLB,,, | Performed by: SOCIAL WORKER

## 2023-10-24 PROCEDURE — 90853 GROUP PSYCHOTHERAPY: CPT | Mod: S$GLB,,, | Performed by: SOCIAL WORKER

## 2023-10-25 ENCOUNTER — OFFICE VISIT (OUTPATIENT)
Dept: CARDIOLOGY | Facility: CLINIC | Age: 58
End: 2023-10-25
Payer: MEDICARE

## 2023-10-25 VITALS
BODY MASS INDEX: 17.31 KG/M2 | HEIGHT: 68 IN | OXYGEN SATURATION: 100 % | SYSTOLIC BLOOD PRESSURE: 161 MMHG | HEART RATE: 91 BPM | DIASTOLIC BLOOD PRESSURE: 93 MMHG | WEIGHT: 114.19 LBS

## 2023-10-25 DIAGNOSIS — I50.42 CHRONIC COMBINED SYSTOLIC AND DIASTOLIC HEART FAILURE: ICD-10-CM

## 2023-10-25 DIAGNOSIS — Z99.2 ESRD (END STAGE RENAL DISEASE) ON DIALYSIS: Primary | ICD-10-CM

## 2023-10-25 DIAGNOSIS — I16.1 HYPERTENSIVE EMERGENCY: ICD-10-CM

## 2023-10-25 DIAGNOSIS — N18.6 ESRD (END STAGE RENAL DISEASE) ON DIALYSIS: Primary | ICD-10-CM

## 2023-10-25 DIAGNOSIS — R01.1 MURMUR, CARDIAC: ICD-10-CM

## 2023-10-25 DIAGNOSIS — F17.200 TOBACCO USE DISORDER: Chronic | ICD-10-CM

## 2023-10-25 DIAGNOSIS — I50.20 HFREF (HEART FAILURE WITH REDUCED EJECTION FRACTION): ICD-10-CM

## 2023-10-25 PROCEDURE — 99215 OFFICE O/P EST HI 40 MIN: CPT | Mod: PBBFAC | Performed by: INTERNAL MEDICINE

## 2023-10-25 PROCEDURE — 99999 PR PBB SHADOW E&M-EST. PATIENT-LVL V: CPT | Mod: PBBFAC,GC,, | Performed by: INTERNAL MEDICINE

## 2023-10-25 PROCEDURE — 99204 PR OFFICE/OUTPT VISIT, NEW, LEVL IV, 45-59 MIN: ICD-10-PCS | Mod: 25,S$PBB,GC, | Performed by: INTERNAL MEDICINE

## 2023-10-25 PROCEDURE — 99204 OFFICE O/P NEW MOD 45 MIN: CPT | Mod: 25,S$PBB,GC, | Performed by: INTERNAL MEDICINE

## 2023-10-25 PROCEDURE — 99406 BEHAV CHNG SMOKING 3-10 MIN: CPT | Mod: S$PBB,GC,, | Performed by: INTERNAL MEDICINE

## 2023-10-25 PROCEDURE — 99406 PR TOBACCO USE CESSATION INTERMEDIATE 3-10 MINUTES: ICD-10-PCS | Mod: S$PBB,GC,, | Performed by: INTERNAL MEDICINE

## 2023-10-25 PROCEDURE — 99999 PR PBB SHADOW E&M-EST. PATIENT-LVL V: ICD-10-PCS | Mod: PBBFAC,GC,, | Performed by: INTERNAL MEDICINE

## 2023-10-25 RX ORDER — PANTOPRAZOLE SODIUM 40 MG/1
TABLET, DELAYED RELEASE ORAL
COMMUNITY
Start: 2023-10-24 | End: 2024-01-08

## 2023-10-25 RX ORDER — PANTOPRAZOLE SODIUM 40 MG/1
40 TABLET, DELAYED RELEASE ORAL
COMMUNITY
Start: 2023-10-24 | End: 2024-01-08

## 2023-10-25 RX ORDER — TRAMADOL HYDROCHLORIDE 50 MG/1
50 TABLET ORAL EVERY 6 HOURS PRN
COMMUNITY
Start: 2023-10-13

## 2023-10-25 NOTE — PROGRESS NOTES
Cardiology Clinic Note  Reason for Visit: Murmur, HTN  Referring Physician: Dorys Dickinson MD    HPI:   Problem List:  Chronic Combined Systolic/Diastolic Heart Failure; EF 35-40% 10/23; NICM with negative SPECT 4/22  Multiple Myeloma, in remission  ESRD on HD TTS  Hypertension  Tobacco Use      Patient presenting as referral from hospital medicine. Recently admitted for respiratory distress due to HTN emergency (212/133 mmHg) needing Nitro gtt, HD, and up-titration of BP meds. Today, she is feeling well. No SOB, MONTES since getting out of the hospital. No chest discomfort. No missed HD sessions. Better dietary compliance as well. BP has been running 140s in the mornings and 110s in the evenings. No low BPs during HD.     ROS:    Constitution: Negative for fever, chills, weight loss or gain.   HENT: Negative for sore throat, rhinorrhea, or headache.  Eyes: Negative for blurred or double vision.   Cardiovascular: See above  Pulmonary: Negative for SOB   Gastrointestinal: Negative for abdominal pain, nausea, vomiting, or diarrhea.   : Negative for dysuria.   Neurological: Negative for focal weakness or sensory changes.  PMH:     Past Medical History:   Diagnosis Date    Addiction to drug     Alcohol abuse     Allergic drug reaction 11/13/2017    Anemia     Anxiety     Arm pain, left 2/12/2014    Breast cyst     Chronic renal failure 2/12/2014    CKD (chronic kidney disease) stage 5, GFR less than 15 ml/min     COPD exacerbation 9/16/2023    Depression     Dialysis patient     dialysis m-w-f    Encounter for blood transfusion     GERD (gastroesophageal reflux disease)     Hx of psychiatric care     Hypertension     Mood swings     Multiple myeloma in remission 10/26/2012    per Hem/Oc note    Multiple thyroid nodules 10/22/2023    Psychiatric exam requested by authority     Psychiatric problem     Renal hypertension     Status post dialysis 8/2012    Therapy     Thrombocytopenia 8/2/2012     Past Surgical History:    Procedure Laterality Date    AV FISTULA PLACEMENT Left     BREAST CYST ASPIRATION Left     BREAST CYST EXCISION Left     CERVICAL SPINE SURGERY Bilateral      SECTION      x1    COLONOSCOPY N/A 2022    Procedure: COLONOSCOPY;  Surgeon: Jordan Mcguire MD;  Location: [facility] ENDO;  Service: Endoscopy;  Laterality: N/A;    FISTULOGRAM N/A 2020    Procedure: Fistulogram;  Surgeon: Rahat Berger MD;  Location: [facility] CATH LAB/EP;  Service: Cardiology;  Laterality: N/A;    pd catheter      PERCUTANEOUS TRANSLUMINAL ANGIOPLASTY OF ARTERIOVENOUS FISTULA N/A 2020    Procedure: PTA, AV FISTULA;  Surgeon: Rahat Berger MD;  Location: [facility] CATH LAB/EP;  Service: Cardiology;  Laterality: N/A;    PERITONEAL CATHETER REMOVAL N/A 2018    Procedure: REMOVAL, CATHETER, DIALYSIS, PERITONEAL;  Surgeon: Mukesh Gonsales Jr., MD;  Location: [facility] OR;  Service: General;  Laterality: N/A;    RENAL BIOPSY      THROMBECTOMY OF HEMODIALYSIS ACCESS SITE N/A 2020    Procedure: Thrombectomy, Hemodialysis Graft Or Fistula;  Surgeon: Rahat Berger MD;  Location: [facility] CATH LAB/EP;  Service: Cardiology;  Laterality: N/A;    VASCULAR SURGERY  2012    dialysis catheter to right subclavian     Allergies:     Review of patient's allergies indicates:   Allergen Reactions    Doxycycline Swelling, Rash and Hives    Gentamicin Anaphylaxis    Vancomycin analogues Anaphylaxis     Medications:     Current Outpatient Medications on File Prior to Visit   Medication Sig Dispense Refill    acetaminophen (TYLENOL) 325 MG tablet Take 650 mg by mouth every 4 (four) hours as needed.      albuterol (VENTOLIN HFA) 90 mcg/actuation inhaler Inhale 2 puffs into the lungs every 6 (six) hours as needed for Wheezing or Shortness of Breath. Rescue 18 g 6    aspirin 81 MG Chew Take 81 mg by mouth once daily.      B COMPLEX W-C NO.20/FOLIC ACID (VIRT-CAPS ORAL) Take 1 capsule by mouth once daily.       buPROPion  (WELLBUTRIN XL) 150 MG TB24 tablet Take 1 tablet (150 mg total) by mouth once daily. 90 tablet 3    carvediloL (COREG) 25 MG tablet Take 25 mg by mouth 2 (two) times daily.      cetirizine (ZYRTEC) 5 MG tablet Take 1 tablet (5 mg total) by mouth once daily. 30 tablet 2    clopidogreL (PLAVIX) 75 mg tablet Take 1 tablet (75 mg total) by mouth once daily. 30 tablet 11    cyproheptadine (PERIACTIN) 4 mg tablet Take 1 tablet (4 mg total) by mouth 3 (three) times daily as needed (appetite). 90 tablet 0    EScitalopram oxalate (LEXAPRO) 20 MG tablet Take 1 tablet (20 mg total) by mouth once daily. 90 tablet 3    fluticasone propionate (FLONASE) 50 mcg/actuation nasal spray 1 spray by Each Nostril route daily as needed.      fluticasone-salmeterol diskus inhaler 100-50 mcg Inhale 1 puff into the lungs 2 (two) times daily. Controller 60 each 2    hydrALAZINE (APRESOLINE) 25 MG tablet Take 1 tablet (25 mg total) by mouth every 8 (eight) hours. 90 tablet 11    isosorbide mononitrate (IMDUR) 60 MG 24 hr tablet Take 1 tablet (60 mg total) by mouth once daily. 30 tablet 11    losartan (COZAAR) 100 MG tablet Take 1 tablet by mouth once daily.      melatonin 5 mg Cap Take 5 mg by mouth nightly.      mirtazapine (REMERON) 15 MG tablet Take 1 tablet (15 mg total) by mouth every evening. 90 tablet 1    montelukast (SINGULAIR) 10 mg tablet TAKE 1 TABLET(10 MG) BY MOUTH EVERY EVENING 90 tablet 3    naltrexone (DEPADE) 50 mg tablet Start with 1/2 tab (25 mg) by mouth daily, may increase to 1 tab (50 mg) daily if able. 90 tablet 1    NIFEdipine (PROCARDIA-XL) 60 MG (OSM) 24 hr tablet Take 60 mg by mouth once daily.      omeprazole (PRILOSEC) 40 MG capsule Take 40 mg by mouth once daily.      pantoprazole (PROTONIX) 40 MG tablet Take 40 mg by mouth.      pantoprazole (PROTONIX) 40 MG tablet ONE TABLET BY MOUTH DAILY AT OPPOSITE TIME OF DAY FROM CLOPIDOGREL      sevelamer carbonate (RENVELA) 800 mg Tab Take 800 mg by mouth 3 (three) times  "daily with meals.      tiotropium bromide (SPIRIVA RESPIMAT) 2.5 mcg/actuation inhaler Inhale 2 puffs into the lungs once daily. Controller 4 g 2    traMADoL (ULTRAM) 50 mg tablet Take 50 mg by mouth every 6 (six) hours as needed.       Current Facility-Administered Medications on File Prior to Visit   Medication Dose Route Frequency Provider Last Rate Last Admin    0.9%  NaCl infusion   Intravenous Continuous Jordan Mcguire MD 20 mL/hr at 07/18/22 0943 New Bag at 07/18/22 0943    0.9%  NaCl infusion   Intravenous Continuous Jordan Mcguire MD   Stopped at 08/18/22 1042    sodium chloride 0.9% flush 10 mL  10 mL Intravenous PRN Jordan Mcguire MD        sodium chloride 0.9% flush 10 mL  10 mL Intravenous PRN Jordan Mcguire MD         Social History:     Social History     Tobacco Use    Smoking status: Every Day     Current packs/day: 1.00     Average packs/day: 1 pack/day for 40.8 years (40.8 ttl pk-yrs)     Types: Cigarettes     Start date: 1983     Passive exposure: Never    Smokeless tobacco: Never    Tobacco comments:     Pt. states recently cut down to 0.5 pk/day. Enrolled in the for; to ([employer]) [employer] on 6/9/20 (SCT Member ID # [id number]). She declines Ambulatory referral to Smoking Cessation program. Handout provided.   Substance Use Topics    Alcohol use: Not Currently     Comment: occasional     Family History:     Family History   Problem Relation Age of Onset    Hypertension Mother     Heart failure Mother     Ovarian cancer Maternal Aunt     Diabetes Maternal Grandmother     Stroke Maternal Grandmother     Breast cancer Neg Hx     Colon cancer Neg Hx      Physical Exam:   BP (!) 161/93   Pulse 91   Ht 5' 8" (1.727 m)   Wt 51.8 kg (114 lb 3.2 oz)   LMP 01/01/2016 (Approximate) Comment: 2016  SpO2 100%   BMI 17.36 kg/m²        Physical Exam   Constitutional: She is oriented to person, place, and time. No distress.   HENT:   Mouth/Throat: Mucous membranes are moist.   Cardiovascular: Normal rate, " regular rhythm and normal pulses.   AVF in LUE with thrill and bruit   Pulmonary/Chest: Effort normal. No respiratory distress.   Abdominal: Normal appearance.   Musculoskeletal:      Right lower leg: No edema.      Left lower leg: No edema.   Neurological: She is alert and oriented to person, place, and time.        Labs:     Lab Results   Component Value Date     10/04/2023    K 5.5 (H) 10/04/2023     10/04/2023    CO2 20 (L) 10/04/2023    BUN 56 (H) 10/04/2023    CREATININE 5.5 (H) 10/04/2023    GLUCOSE 83 03/16/2022    ANIONGAP 14 10/04/2023     Lab Results   Component Value Date    HGBA1C 4.6 02/10/2021     Lab Results   Component Value Date    BNP 2,665 (H) 10/01/2023    BNP 3,348 (H) 09/14/2023    BNP 1,495 (H) 09/27/2022    Lab Results   Component Value Date    WBC 5.12 10/04/2023    HGB 12.2 10/04/2023    HCT 38.3 10/04/2023    HCT 44 09/14/2023     (L) 10/04/2023    GRAN 3.2 10/04/2023    GRAN 62.5 10/04/2023     Lab Results   Component Value Date    CHOL 161 07/21/2021    HDL 65 07/21/2021    LDLCALC 86.0 07/21/2021    TRIG 50 07/21/2021          Imaging:   TTE 10/2/23    Left Ventricle: The left ventricle is moderately dilated. Normal wall thickness. There is eccentric hypertrophy. Normal wall motion. There is moderately reduced systolic function with a visually estimated ejection fraction of 35 - 40%.    Right Ventricle: Normal right ventricular cavity size. Wall thickness is normal. Right ventricle wall motion  is normal. Systolic function is normal.    Left Atrium: Left atrium is severely dilated.    Aortic Valve: The aortic valve is a trileaflet valve. There is mild aortic valve sclerosis. Mildly restricted motion. There is mild to moderate aortic regurgitation.    Mitral Valve: There is mild mitral annular calcification present. There is severe regurgitation with a centrally directed jet.    Pulmonary Artery: The estimated pulmonary artery systolic pressure is 42 mmHg.    IVC/SVC:  Intermediate venous pressure at 8 mmHg.    TTE 4/8/22  The left ventricle is mildly enlarged with mildly decreased systolic function.  The estimated ejection fraction is 48%.  There is mild left ventricular global hypokinesis.  Grade I left ventricular diastolic dysfunction.  Severe left atrial enlargement.  Normal right ventricular size with normal right ventricular systolic function.  Mild aortic regurgitation.  Moderate mitral regurgitation.  Mild tricuspid regurgitation.  Normal central venous pressure (3 mmHg).  The estimated PA systolic pressure is 33 mmHg.    SPECT 4/8/22    Normal myocardial perfusion scan. There is no evidence of myocardial ischemia or infarction.    Bowel is overlying the inferior wall.    The gated perfusion images showed an ejection fraction of 55% at rest. The gated perfusion images showed an ejection fraction of 52% post stress. Normal ejection fraction is greater than 53%.    There is normal wall motion at rest and post stress.    LV cavity size is  and normal at stress.    The EKG portion of this study is negative for ischemia.    The patient reported no chest pain during the stress test.    There were no arrhythmias during stress.    When compared to the previous study from 6/14/2019, there are no significant changes.    EKG:  sinus tachycardia, LAE, LVH per my read  Assessment:     1. ESRD (end stage renal disease) on dialysis        2. Hypertensive emergency  Ambulatory referral/consult to Cardiology      3. HFrEF (heart failure with reduced ejection fraction)  Ambulatory referral/consult to Cardiology    Ambulatory referral/consult to Transplant, Heart    ACCEPT - Ambulatory referral/consult to General Congestive Heart Failure Clinic      4. Murmur, cardiac  Ambulatory referral/consult to Cardiology      5. Tobacco use disorder  [96175] [place] TOBACCO USE CESSATION INTERMEDIATE 3-10 MIN      6. Chronic combined systolic and diastolic heart failure              Plan:   Would like to  start Entresto 49/51 mg BID and stop Losartan, but will send message to Nephrologist  Would then use Imdur next for antihypertensive if needed  Referral to [facility] for reduced EF and hospitalization for heart failure  Continue follow-up with renal transplant for consideration of listing  Continue medical regimen otherwise without changes      RTC 3 months or sooner PRN.    Discussed case with Dr Ky Zapata MD.    Timo Rojo MD PGY6  Cardiovascular Medicine Fellow  [facility]  Pager: [phone]    Tobacco Use/Cessation:  I assessed Jennifer Booth and discussed smoking cessation with her for 3-10 minutes. She reports that she has been smoking cigarettes. She started smoking about 40 years ago. She has a 40.8 pack-year smoking history. She has never been exposed to tobacco smoke. She has never used smokeless tobacco.

## 2023-10-26 ENCOUNTER — TELEPHONE (OUTPATIENT)
Dept: TRANSPLANT | Facility: CLINIC | Age: 58
End: 2023-10-26
Payer: COMMERCIAL

## 2023-10-26 DIAGNOSIS — I50.9 CONGESTIVE HEART FAILURE, UNSPECIFIED HF CHRONICITY, UNSPECIFIED HEART FAILURE TYPE: Primary | ICD-10-CM

## 2023-10-26 NOTE — PROGRESS NOTES
I have reviewed the notes, assessments, and/or procedures performed by Dr Rojo, I concur with her/his documentation of Jennifer Booth.  Date of Service: 10/25/2023    Change to entresto. Then, add bidil

## 2023-11-02 NOTE — PROGRESS NOTES
Group Psychotherapy    Site: [facility]    Clinical status of patient: Outpatient    10/24/2023    Length of service:[id number]    Referred by: Addictive Behavior Unit     Number of patients in attendance: 4    Target symptoms: alcohol abuse, substance abuse    Patient's response to intervention:  The patient's response to intervention is active listening, self-disclosure.    Progress toward goals and other mental status changes:  The patient's progress toward goals is good.    Interval history: Pt arrived a few minutes late to scheduled group. Continues to recover medically from recent hospitalizations. Staying active with online courses. Introduced herself and shared story with new group member.     Diagnosis: Alcohol use disorder, in early remission; Cannabis Use Disorder, in early remission     Plan: group psychotherapy    Return to clinic: 1 week

## 2023-11-06 ENCOUNTER — PATIENT MESSAGE (OUTPATIENT)
Dept: PSYCHIATRY | Facility: CLINIC | Age: 58
End: 2023-11-06
Payer: COMMERCIAL

## 2023-11-10 ENCOUNTER — HOSPITAL ENCOUNTER (OUTPATIENT)
Dept: PULMONOLOGY | Facility: HOSPITAL | Age: 58
Discharge: HOME | End: 2023-11-10
Payer: MEDICARE

## 2023-11-10 ENCOUNTER — HOSPITAL ENCOUNTER (OUTPATIENT)
Dept: RADIOLOGY | Facility: HOSPITAL | Age: 58
Discharge: HOME | End: 2023-11-10
Payer: MEDICARE

## 2023-11-10 DIAGNOSIS — R06.02 SHORTNESS OF BREATH: ICD-10-CM

## 2023-11-10 DIAGNOSIS — J44.9 CHRONIC OBSTRUCTIVE PULMONARY DISEASE, UNSPECIFIED COPD TYPE: ICD-10-CM

## 2023-11-10 DIAGNOSIS — R93.89 ABNORMAL CT OF THE CHEST: ICD-10-CM

## 2023-11-10 PROCEDURE — 94799 UNLISTED PULMONARY SVC/PX: CPT | Mod: 26,,, | Performed by: INTERNAL MEDICINE

## 2023-11-10 PROCEDURE — 71250 CT CHEST WITHOUT CONTRAST: ICD-10-PCS | Mod: 26,,, | Performed by: RADIOLOGY

## 2023-11-10 PROCEDURE — 94618 PULMONARY STRESS TESTING: CPT

## 2023-11-10 PROCEDURE — 71250 CT THORAX DX C-: CPT | Mod: TC

## 2023-11-10 PROCEDURE — 71250 CT THORAX DX C-: CPT | Mod: 26,,, | Performed by: RADIOLOGY

## 2023-11-10 PROCEDURE — 94799 COMPL PULMO FUNCTION W/BR: ICD-10-PCS | Mod: 26,,, | Performed by: INTERNAL MEDICINE

## 2023-11-10 NOTE — PROCEDURES
Jennifer Booth is a 58 y.o.   female patient, who presents for a 6 minute walk test ordered by Rachel Littlejohn .  The diagnosis is  SOB .  The patient's BMI is  17.36 kg/m2.    Predicted distance (lower limit of normal) is  433 meters.      Test Results:    The test was  completed.  The patient stopped  0 times for a total of 0 seconds.  The total time walked was 360 seconds.  During walking, the patient reported: no issues  .    11/10/2023---------Distance:   (444m )     O2 Sat % Supplemental Oxygen Heart Rate Blood Pressure Kristina Scale   Pre-exercise  (Resting)  97  ra 88  na  2    During Exercise  90 ra   90 na   4   Post-exercise  (Recovery)  98   ra 95  na   2     Recovery Time:  30 seconds    Performing nurse/tech: Alanna CUMMINS     SUMMARY/INTERPRETATION:    Total distance walked:  444 meters  Predicted distance:  433/572 meters  Percentage predicted: 78 %      The patient has not had a previous study.      CLINICAL INTERPRETATION:  Six minute walk distance is   (444m ) with very light dyspnea.  During exercise, there was no significant desaturation while breathing room air.

## 2023-11-10 NOTE — PROCEDURES
Patient unable to do FVL. We attempted numerous times to do procedure and patient continuely placed her tongue in mouthpiece and/or was unable to achieve a six second blow

## 2023-11-14 ENCOUNTER — CLINICAL SUPPORT (OUTPATIENT)
Dept: PSYCHIATRY | Facility: CLINIC | Age: 58
End: 2023-11-14
Payer: COMMERCIAL

## 2023-11-14 DIAGNOSIS — F10.20 ALCOHOL USE DISORDER, MODERATE, DEPENDENCE: ICD-10-CM

## 2023-11-14 DIAGNOSIS — F12.21 CANNABIS USE DISORDER, MODERATE, IN EARLY REMISSION: Primary | ICD-10-CM

## 2023-11-14 PROCEDURE — 90853 GROUP PSYCHOTHERAPY: CPT | Mod: S$GLB,,, | Performed by: SOCIAL WORKER

## 2023-11-14 PROCEDURE — 90853 PR GROUP PSYCHOTHERAPY: ICD-10-PCS | Mod: S$GLB,,, | Performed by: SOCIAL WORKER

## 2023-11-16 ENCOUNTER — HOSPITAL ENCOUNTER (OUTPATIENT)
Dept: RADIOLOGY | Facility: HOSPITAL | Age: 58
Discharge: HOME | End: 2023-11-16
Attending: UROLOGY
Payer: MEDICARE

## 2023-11-16 DIAGNOSIS — N18.5 CKD (CHRONIC KIDNEY DISEASE) STAGE 5, GFR LESS THAN 15 ML/MIN: ICD-10-CM

## 2023-11-16 DIAGNOSIS — Z99.2 ESRD (END STAGE RENAL DISEASE) ON DIALYSIS: ICD-10-CM

## 2023-11-16 DIAGNOSIS — N18.6 ESRD (END STAGE RENAL DISEASE) ON DIALYSIS: ICD-10-CM

## 2023-11-16 DIAGNOSIS — N28.1 BILATERAL RENAL CYSTS: ICD-10-CM

## 2023-11-16 PROCEDURE — 76770 US EXAM ABDO BACK WALL COMP: CPT | Mod: TC

## 2023-11-16 PROCEDURE — 76770 US KIDNEY: ICD-10-PCS | Mod: 26,,, | Performed by: RADIOLOGY

## 2023-11-16 PROCEDURE — 76770 US EXAM ABDO BACK WALL COMP: CPT | Mod: 26,,, | Performed by: RADIOLOGY

## 2023-11-16 NOTE — PROGRESS NOTES
Pt was instructed to keep appointment with Dr King to discuss results or to make an appointment if one is not already scheduled

## 2023-11-21 ENCOUNTER — PATIENT MESSAGE (OUTPATIENT)
Dept: CARDIOLOGY | Facility: CLINIC | Age: 58
End: 2023-11-21
Payer: COMMERCIAL

## 2023-11-28 NOTE — PROGRESS NOTES
Group Psychotherapy    Site: [facility]    Clinical status of patient: Outpatient    11/14/2023    Length of service:[id number]    Referred by: Addictive Behavior Unit     Number of patients in attendance: 4    Target symptoms: alcohol abuse, substance abuse    Patient's response to intervention:  The patient's response to intervention is active listening, self-disclosure.    Progress toward goals and other mental status changes:  The patient's progress toward goals is good.    Interval history: Pt arrived a few minutes late to scheduled group. Discussed her experiences since completion of IOP to new group member. Pt reports health to be motivator for ongoing sobriety.     Diagnosis: Alcohol use disorder, in early remission; Cannabis Use Disorder, in early remission     Plan: group psychotherapy    Return to clinic: 1 week

## 2023-11-30 RX ORDER — SACUBITRIL AND VALSARTAN 49; 51 MG/1; MG/1
1 TABLET, FILM COATED ORAL 2 TIMES DAILY
Qty: 180 TABLET | Refills: 3 | Status: SHIPPED | OUTPATIENT
Start: 2023-11-30 | End: 2024-01-02 | Stop reason: SDUPTHER

## 2023-12-01 ENCOUNTER — TELEPHONE (OUTPATIENT)
Dept: CARDIOLOGY | Facility: CLINIC | Age: 58
End: 2023-12-01
Payer: COMMERCIAL

## 2023-12-01 NOTE — TELEPHONE ENCOUNTER
----- Message from Sue Tinoco MA sent at 12/1/2023 11:40 AM CST -----  Regarding: FW: Expensive    ----- Message -----  From: Miesha Mendoza  Sent: 12/1/2023  10:38 AM CST  To: [facility]rel [facility]wood Staff  Subject: Expensive                                        GM,    Pt returning call about Entresto, the meds are too expensive and can't afford $700 for medication.   Lease call pt for another alternative.     Contact @ [phone]      Thanks

## 2023-12-01 NOTE — TELEPHONE ENCOUNTER
Called a 30 days free  coupon to pharmacy (went through). Pt updated and told about cost next year. She stated that she can afford for now the yearly deductible (already had to pay it this year) and the $47s/ month. I told her to call her insurance as w. FEP she might get a better deal depending on the pharmacy she uses (usually mail-in). She verbalized understanding .

## 2023-12-05 ENCOUNTER — CLINICAL SUPPORT (OUTPATIENT)
Dept: PSYCHIATRY | Facility: CLINIC | Age: 58
End: 2023-12-05
Payer: MEDICARE

## 2023-12-05 DIAGNOSIS — F12.21 CANNABIS USE DISORDER, MODERATE, IN EARLY REMISSION: Primary | ICD-10-CM

## 2023-12-05 PROCEDURE — 90853 GROUP PSYCHOTHERAPY: CPT | Mod: ,,, | Performed by: SOCIAL WORKER

## 2023-12-05 PROCEDURE — 90853 PR GROUP PSYCHOTHERAPY: ICD-10-PCS | Mod: ,,, | Performed by: SOCIAL WORKER

## 2023-12-05 NOTE — PROGRESS NOTES
Subjective:      Jennifer Booth is a 58 y.o. female who returns today regarding her     ESRD on dialysis with Dr Wolfe     Self referred for renal cysts.     No  complaints  No hematuria  No flank pain     No previous ab surgeries     Produces only a small amount of urine     She has seen transplant at [facility] for an evaluation  Not currently listed.    The following portions of the patient's history were reviewed and updated as appropriate: allergies, current medications, past family history, past medical history, past social history, past surgical history and problem list.    Review of Systems  Pertinent items are noted in HPI.  A comprehensive multipoint review of systems was negative except as otherwise stated in the HPI.    Past Medical History:   Diagnosis Date    Addiction to drug     Alcohol abuse     Allergic drug reaction 2017    Anemia     Anxiety     Arm pain, left 2014    Breast cyst     Chronic renal failure 2014    CKD (chronic kidney disease) stage 5, GFR less than 15 ml/min     COPD exacerbation 2023    Depression     Dialysis patient     dialysis m-w-f    Encounter for blood transfusion     GERD (gastroesophageal reflux disease)     Hx of psychiatric care     Hypertension     Mood swings     Multiple myeloma in remission 10/26/2012    per Hem/Oc note    Multiple thyroid nodules 10/22/2023    Psychiatric exam requested by authority     Psychiatric problem     Renal hypertension     Status post dialysis 2012    Therapy     Thrombocytopenia 2012     Past Surgical History:   Procedure Laterality Date    AV FISTULA PLACEMENT Left     BREAST CYST ASPIRATION Left     BREAST CYST EXCISION Left     CERVICAL SPINE SURGERY Bilateral      SECTION      x1    COLONOSCOPY N/A 2022    Procedure: COLONOSCOPY;  Surgeon: Jordan Mcguire MD;  Location: [facility];  Service: Endoscopy;  Laterality: N/A;    FISTULOGRAM N/A 2020    Procedure: Fistulogram;   Surgeon: Rahat Berger MD;  Location: [facility] CATH LAB/EP;  Service: Cardiology;  Laterality: N/A;    pd catheter      PERCUTANEOUS TRANSLUMINAL ANGIOPLASTY OF ARTERIOVENOUS FISTULA N/A 06/09/2020    Procedure: PTA, AV FISTULA;  Surgeon: Rahat Berger MD;  Location: [facility] CATH LAB/EP;  Service: Cardiology;  Laterality: N/A;    PERITONEAL CATHETER REMOVAL N/A 11/30/2018    Procedure: REMOVAL, CATHETER, DIALYSIS, PERITONEAL;  Surgeon: Mukesh Gonsales Jr., MD;  Location: [facility] OR;  Service: General;  Laterality: N/A;    RENAL BIOPSY  2016    THROMBECTOMY OF HEMODIALYSIS ACCESS SITE N/A 06/09/2020    Procedure: Thrombectomy, Hemodialysis Graft Or Fistula;  Surgeon: Rahat Berger MD;  Location: [facility] CATH LAB/EP;  Service: Cardiology;  Laterality: N/A;    VASCULAR SURGERY  08/2012    dialysis catheter to right subclavian       Review of patient's allergies indicates:   Allergen Reactions    Doxycycline Swelling, Rash and Hives    Gentamicin Anaphylaxis    Vancomycin analogues Anaphylaxis          Objective:   Vitals: LMP 01/01/2016 (Approximate) Comment: 2016    Physical Exam   General: alert and oriented, no acute distress  Respiratory: Symmetric expansion, non-labored breathing  Cardiovascular: no peripheral edema  Abdomen: soft, non distended  Skin: normal coloration and turgor, no rashes, no suspicious skin lesions noted  Neuro: no gross deficits  Psych: normal judgment and insight, normal mood/affect, and non-anxious    Physical Exam    Lab Review   Urinalysis demonstrates no specimen    Lab Results   Component Value Date    WBC 5.12 10/04/2023    HGB 12.2 10/04/2023    HCT 38.3 10/04/2023    HCT 44 09/14/2023    MCV 87 10/04/2023     (L) 10/04/2023     Lab Results   Component Value Date    CREATININE 5.5 (H) 10/04/2023    BUN 56 (H) 10/04/2023         Imaging  US KIDNEY     CLINICAL HISTORY:  Chronic kidney disease, stage 5     TECHNIQUE:  Ultrasound of the kidneys was performed including color flow and  Doppler evaluation of the kidneys.     COMPARISON:  Ultrasound Doppler renal artery 10/12/2023     Ultrasound retroperitoneal 09/27/2022     FINDINGS:  Right kidney: The right kidney measures 9.5 cm. No cortical thinning. No loss of corticomedullary distinction. Resistive index measures 1.0.  Midpole simple cyst measuring up to 1.7 cm.  Inferior pole cyst with avascular echogenic component measuring 2.6 x 1.4 x 2.0 cm previously 2.1 x 1.6 x 1.4 cm with thin septation.  Conceivably this could represent a hemorrhagic cyst but further assessment is warranted.  Further assessment could be obtained with dedicated CT or an ultrasound contrast study.  No renal stone. No hydronephrosis.  Low-level perfusion.     Left kidney: The left kidney measures 9.1 cm. No cortical thinning. No loss of corticomedullary distinction. Resistive index measures 1.0.  Two simple cysts measuring up to 1.6 cm there no renal stone. No hydronephrosis.  Low-level perfusion.     The bladder is collapsed.     Impression:     Right renal complex cyst demonstrates mild interval increase in size and septation thickness.  Recommend dedicated CT exam with renal mass protocol or ultrasound contrast study focused on this mass.     Bilateral renal simple cysts again noted.     Persistent medical renal disease detailed as above.     This report was flagged in Epic as abnormal.     Electronically signed by resident: Taisha Galvan  Date:                                            11/16/2023  Time:                                           13:19     Electronically signed by: Cezar Cueva MD  Date:                                            11/16/2023  Time:                                           13:39      Assessment and Plan:   Bilateral renal cysts    CKD (chronic kidney disease) stage 5, GFR less than 15 ml/min; in dialysis  Per Dr Wolfe    CT renal protocol on the morning of a dialysis day  Then RTC to see Dr King    Will likely schedule right robotic  nephrectomy if there is a solid/enhancing component to the cyst

## 2023-12-06 ENCOUNTER — HOSPITAL ENCOUNTER (OUTPATIENT)
Dept: RADIOLOGY | Facility: HOSPITAL | Age: 58
Discharge: HOME | End: 2023-12-06
Attending: INTERNAL MEDICINE
Payer: MEDICARE

## 2023-12-06 DIAGNOSIS — N25.81 SECONDARY HYPERPARATHYROIDISM OF RENAL ORIGIN: ICD-10-CM

## 2023-12-06 DIAGNOSIS — E04.2 MULTIPLE THYROID NODULES: ICD-10-CM

## 2023-12-06 PROCEDURE — 78072 PARATHYRD PLANAR W/SPECT&CT: CPT | Mod: 26,,, | Performed by: STUDENT IN AN ORGANIZED HEALTH CARE EDUCATION/TRAINING PROGRAM

## 2023-12-06 PROCEDURE — 78072 NM PARATHYROID SCAN WITH SPECT AND CT: ICD-10-PCS | Mod: 26,,, | Performed by: STUDENT IN AN ORGANIZED HEALTH CARE EDUCATION/TRAINING PROGRAM

## 2023-12-06 PROCEDURE — 78072 PARATHYRD PLANAR W/SPECT&CT: CPT | Mod: TC

## 2023-12-08 ENCOUNTER — OFFICE VISIT (OUTPATIENT)
Dept: UROLOGY | Facility: CLINIC | Age: 58
End: 2023-12-08
Payer: MEDICARE

## 2023-12-08 VITALS
HEIGHT: 68 IN | SYSTOLIC BLOOD PRESSURE: 123 MMHG | DIASTOLIC BLOOD PRESSURE: 85 MMHG | OXYGEN SATURATION: 96 % | HEART RATE: 88 BPM | BODY MASS INDEX: 18.26 KG/M2 | WEIGHT: 120.5 LBS

## 2023-12-08 DIAGNOSIS — N28.1 BILATERAL RENAL CYSTS: Primary | ICD-10-CM

## 2023-12-08 DIAGNOSIS — N18.5 CKD (CHRONIC KIDNEY DISEASE) STAGE 5, GFR LESS THAN 15 ML/MIN: ICD-10-CM

## 2023-12-08 PROCEDURE — 99214 OFFICE O/P EST MOD 30 MIN: CPT | Mod: S$GLB,,, | Performed by: UROLOGY

## 2023-12-08 PROCEDURE — 99214 PR OFFICE/OUTPT VISIT, EST, LEVL IV, 30-39 MIN: ICD-10-PCS | Mod: S$GLB,,, | Performed by: UROLOGY

## 2023-12-08 NOTE — Clinical Note
CT renal protocol on a T, Th, or Sat.  This needs to be done in the early AM on a day that she gets dialysis  Then RTC to see Dr Fernando pepe.  thanks

## 2023-12-08 NOTE — PROGRESS NOTES
Group Psychotherapy    Site: [facility]    Clinical status of patient: Outpatient    12/5/2023    Length of service:[id number]    Referred by: Addictive Behavior Unit     Number of patients in attendance: 7    Target symptoms: alcohol abuse, substance abuse    Patient's response to intervention:  The patient's response to intervention is active listening, self-disclosure.    Progress toward goals and other mental status changes:  The patient's progress toward goals is good.    Interval history: Pt arrived a few minutes late to scheduled group. Pt notes health has stabilized. Upcoming one year anniversary of her sobriety. Hopes to work with transplant team again. Group very supportive around her fears of next steps.     Diagnosis: Alcohol use disorder, in early remission; Cannabis Use Disorder, in early remission     Plan: group psychotherapy    Return to clinic: 1 week

## 2023-12-11 ENCOUNTER — TELEPHONE (OUTPATIENT)
Dept: PULMONOLOGY | Facility: CLINIC | Age: 58
End: 2023-12-11
Payer: COMMERCIAL

## 2023-12-11 NOTE — TELEPHONE ENCOUNTER
----- Message from Antonia Skinner sent at 12/11/2023  1:41 PM CST -----  Contact: pt  Type:  Sooner Appointment Request    Caller is requesting a sooner appointment.  Caller declined first available appointment listed below.  Caller will not accept being placed on the waitlist and is requesting a message be sent to doctor.  Name of Caller:pt   When is the first available appointment?Books closed   Symptoms:Follow up   Would the patient rather a call back or a [facility]? Call   Best Call Back Number:[phone]  Additional Information:

## 2023-12-12 ENCOUNTER — CLINICAL SUPPORT (OUTPATIENT)
Dept: PSYCHIATRY | Facility: CLINIC | Age: 58
End: 2023-12-12
Payer: MEDICARE

## 2023-12-12 ENCOUNTER — TELEPHONE (OUTPATIENT)
Dept: UROLOGY | Facility: CLINIC | Age: 58
End: 2023-12-12
Payer: COMMERCIAL

## 2023-12-12 DIAGNOSIS — F12.21 CANNABIS USE DISORDER, MODERATE, IN EARLY REMISSION: Primary | ICD-10-CM

## 2023-12-12 PROCEDURE — 90853 GROUP PSYCHOTHERAPY: CPT | Mod: ,,, | Performed by: SOCIAL WORKER

## 2023-12-12 PROCEDURE — 90853 PR GROUP PSYCHOTHERAPY: ICD-10-PCS | Mod: ,,, | Performed by: SOCIAL WORKER

## 2023-12-13 NOTE — PROGRESS NOTES
Subjective:      Jennifer Booth is a 58 y.o. female who returns today regarding her     No complaints    .    The following portions of the patient's history were reviewed and updated as appropriate: allergies, current medications, past family history, past medical history, past social history, past surgical history and problem list.    Review of Systems  Pertinent items are noted in HPI.  A comprehensive multipoint review of systems was negative except as otherwise stated in the HPI.    Past Medical History:   Diagnosis Date    Addiction to drug     Alcohol abuse     Allergic drug reaction 2017    Anemia     Anxiety     Arm pain, left 2014    Breast cyst     Chronic renal failure 2014    CKD (chronic kidney disease) stage 5, GFR less than 15 ml/min     COPD exacerbation 2023    Depression     Dialysis patient     dialysis m-w-f    Encounter for blood transfusion     GERD (gastroesophageal reflux disease)     Hx of psychiatric care     Hypertension     Mood swings     Multiple myeloma in remission 10/26/2012    per Hem/Oc note    Multiple thyroid nodules 10/22/2023    Psychiatric exam requested by authority     Psychiatric problem     Renal hypertension     Status post dialysis 2012    Therapy     Thrombocytopenia 2012     Past Surgical History:   Procedure Laterality Date    AV FISTULA PLACEMENT Left     BREAST CYST ASPIRATION Left     BREAST CYST EXCISION Left     CERVICAL SPINE SURGERY Bilateral      SECTION      x1    COLONOSCOPY N/A 2022    Procedure: COLONOSCOPY;  Surgeon: Jordan Mcguire MD;  Location: [facility] ENDO;  Service: Endoscopy;  Laterality: N/A;    FISTULOGRAM N/A 2020    Procedure: Fistulogram;  Surgeon: Rahat Berger MD;  Location: [facility] CATH LAB/EP;  Service: Cardiology;  Laterality: N/A;    pd catheter      PERCUTANEOUS TRANSLUMINAL ANGIOPLASTY OF ARTERIOVENOUS FISTULA N/A 2020    Procedure: PTA, AV FISTULA;  Surgeon: Rahat SINGH  MD Ab;  Location: [facility] CATH LAB/EP;  Service: Cardiology;  Laterality: N/A;    PERITONEAL CATHETER REMOVAL N/A 11/30/2018    Procedure: REMOVAL, CATHETER, DIALYSIS, PERITONEAL;  Surgeon: Mukesh Gonsales Jr., MD;  Location: [facility] OR;  Service: General;  Laterality: N/A;    RENAL BIOPSY  2016    THROMBECTOMY OF HEMODIALYSIS ACCESS SITE N/A 06/09/2020    Procedure: Thrombectomy, Hemodialysis Graft Or Fistula;  Surgeon: Rahat Berger MD;  Location: [facility] CATH LAB/EP;  Service: Cardiology;  Laterality: N/A;    VASCULAR SURGERY  08/2012    dialysis catheter to right subclavian       Review of patient's allergies indicates:   Allergen Reactions    Doxycycline Swelling, Rash and Hives    Gentamicin Anaphylaxis    Vancomycin analogues Anaphylaxis          Objective:   Vitals: LMP 01/01/2016 (Approximate) Comment: 2016    Physical Exam   General: alert and oriented, no acute distress  Respiratory: Symmetric expansion, non-labored breathing  Cardiovascular: no peripheral edema  Abdomen: soft, non distended  Skin: normal coloration and turgor, no rashes, no suspicious skin lesions noted  Neuro: no gross deficits  Psych: normal judgment and insight, normal mood/affect, and non-anxious    Physical Exam    Lab Review   Urinalysis demonstrates no specimen    Lab Results   Component Value Date    WBC 5.12 10/04/2023    HGB 12.2 10/04/2023    HCT 38.3 10/04/2023    HCT 44 09/14/2023    MCV 87 10/04/2023     (L) 10/04/2023     Lab Results   Component Value Date    CREATININE 5.5 (H) 10/04/2023    BUN 56 (H) 10/04/2023       Imaging  CT ABDOMEN PELVIS W WO CONTRAST     CLINICAL HISTORY:  ESRD; right complex renal cyst; do on T, Th, or Sat before dialysis please;Cyst of kidney, acquired     TECHNIQUE:  CT abdomen/pelvis performed before and after administration of 100 mL IV Omnipaque 350, as per renal mass protocol.  No oral contrast administered.     COMPARISON:  Renal ultrasound from 11/16/2023.     FINDINGS:  Subsegmental  bibasilar bandlike opacities suggestive of atelectasis.  No focal consolidation.  No significant pleural fluid.     Partially imaged heart appears within normal limits for size.  Calcific coronary atherosclerosis.  No significant pericardial fluid.     Liver is upper limit of normal for size.  No focal suspicious hepatic lesion or abnormal enhancement.  Portal vein is patent.     Gallbladder is unremarkable.  No abnormal biliary duct dilatation.     Pancreas, spleen, bilateral adrenal glands are unremarkable.     Bilateral kidneys are small in size, compatible with reported end-stage renal disease.  Numerous scattered hypodensities throughout bilateral kidneys, many of which are too small to characterize.  Larger lesions demonstrate fluid attenuation compatible with cysts.  No solid enhancing mass.  Additional scattered bilateral punctate calcifications.  No hydronephrosis or hydroureter.     Bladder is nondistended.  Uterus is unremarkable.  No adnexal mass.     Normal caliber small bowel.  Few colonic diverticuli.  No evidence of bowel inflammation or obstruction.     No free intraperitoneal air.  No significant ascites.     No suspicious lymphadenopathy.     Abdominal aorta demonstrates normal caliber and course.  Mild atherosclerotic plaque.  No aneurysm.     Scattered lucent foci throughout osseous structures, compatible with reported history of multiple myeloma.  Degenerative changes.  No acute fracture.     Impression:     Bilateral atrophic kidneys with numerous hypodensities, compatible with acquired cystic disease in the setting of end-stage renal disease.  No solid enhancing mass noting reported complex cyst on previous ultrasound.  Recommend continued surveillance.     Lucent foci throughout osseous structures in patient with known history of multiple myeloma.     Additional findings as above.        Electronically signed by: Jose Estes  Date:                                             12/16/2023  Time:                                           10:31  Assessment and Plan:   Bilateral renal cysts    ESRD (end stage renal disease) on dialysis      RTC 12 months with renal US  Follow up with nephrology and PCP and heme onc for non-urologic findings on US

## 2023-12-14 ENCOUNTER — TELEPHONE (OUTPATIENT)
Dept: TRANSPLANT | Facility: CLINIC | Age: 58
End: 2023-12-14
Payer: COMMERCIAL

## 2023-12-14 NOTE — PROGRESS NOTES
Group Psychotherapy    Site: [facility]    Clinical status of patient: Outpatient    12/12/2023    Length of service:[id number]    Referred by: Addictive Behavior Unit     Number of patients in attendance: 7    Target symptoms: alcohol abuse, substance abuse    Patient's response to intervention:  The patient's response to intervention is active listening, self-disclosure.    Progress toward goals and other mental status changes:  The patient's progress toward goals is good.    Interval history: Pt arrived on time to scheduled group. Pt processed recent conflict with daughter. Able to recognize difficult time of year for them both- 2/2 death of pt's previous partner/daughter's father. Pt open to feedback around communication once emotions have calmed. Gave support and welcomed new group member.     Diagnosis: Alcohol use disorder, in early remission; Cannabis Use Disorder, in early remission     Plan: group psychotherapy    Return to clinic: 1 week

## 2023-12-14 NOTE — TELEPHONE ENCOUNTER
----- Message from Emelia Alas RN sent at 12/13/2023 11:44 AM CST -----  Regarding: FW: call back    ----- Message -----  From: Harjit Pryor  Sent: 12/13/2023  11:41 AM CST  To: [facility] Pre-Kidney Transplant Clinical  Subject: call back                                        Pt call to speak with Emelia in regards to transplant status requesting call back    Call

## 2023-12-14 NOTE — TELEPHONE ENCOUNTER
Spoke to patient, she has her one year anniversity for sobriety coming up soon. She can be re-referred for kidney transplant evaluation. I asked her to have her Psychiatrist put in an official clearance for transplant in Epic.

## 2023-12-16 ENCOUNTER — HOSPITAL ENCOUNTER (OUTPATIENT)
Dept: RADIOLOGY | Facility: HOSPITAL | Age: 58
Discharge: HOME | End: 2023-12-16
Attending: UROLOGY
Payer: MEDICARE

## 2023-12-16 DIAGNOSIS — N18.5 CKD (CHRONIC KIDNEY DISEASE) STAGE 5, GFR LESS THAN 15 ML/MIN: ICD-10-CM

## 2023-12-16 DIAGNOSIS — N28.1 BILATERAL RENAL CYSTS: ICD-10-CM

## 2023-12-16 PROCEDURE — 74178 CT ABD&PLV WO CNTR FLWD CNTR: CPT | Mod: TC

## 2023-12-16 PROCEDURE — 74178 CT ABD&PLV WO CNTR FLWD CNTR: CPT | Mod: 26,,, | Performed by: STUDENT IN AN ORGANIZED HEALTH CARE EDUCATION/TRAINING PROGRAM

## 2023-12-16 PROCEDURE — 25500020 PHARM REV CODE 255: Performed by: UROLOGY

## 2023-12-16 PROCEDURE — 74178 CT ABDOMEN PELVIS W WO CONTRAST: ICD-10-PCS | Mod: 26,,, | Performed by: STUDENT IN AN ORGANIZED HEALTH CARE EDUCATION/TRAINING PROGRAM

## 2023-12-16 RX ADMIN — IOHEXOL 100 ML: 350 INJECTION, SOLUTION INTRAVENOUS at 09:12

## 2023-12-18 ENCOUNTER — OFFICE VISIT (OUTPATIENT)
Dept: UROLOGY | Facility: CLINIC | Age: 58
End: 2023-12-18
Payer: MEDICARE

## 2023-12-18 ENCOUNTER — OFFICE VISIT (OUTPATIENT)
Dept: FAMILY MEDICINE | Facility: CLINIC | Age: 58
End: 2023-12-18
Attending: FAMILY MEDICINE
Payer: MEDICARE

## 2023-12-18 VITALS
HEART RATE: 79 BPM | SYSTOLIC BLOOD PRESSURE: 139 MMHG | DIASTOLIC BLOOD PRESSURE: 84 MMHG | OXYGEN SATURATION: 98 % | WEIGHT: 128.31 LBS | HEIGHT: 68 IN | BODY MASS INDEX: 19.45 KG/M2

## 2023-12-18 VITALS
HEART RATE: 85 BPM | DIASTOLIC BLOOD PRESSURE: 86 MMHG | OXYGEN SATURATION: 97 % | WEIGHT: 130.5 LBS | SYSTOLIC BLOOD PRESSURE: 145 MMHG | HEIGHT: 68 IN | BODY MASS INDEX: 19.78 KG/M2

## 2023-12-18 DIAGNOSIS — N28.1 BILATERAL RENAL CYSTS: Primary | ICD-10-CM

## 2023-12-18 DIAGNOSIS — F41.1 GAD (GENERALIZED ANXIETY DISORDER): ICD-10-CM

## 2023-12-18 DIAGNOSIS — Z99.2 ESRD (END STAGE RENAL DISEASE) ON DIALYSIS: Primary | ICD-10-CM

## 2023-12-18 DIAGNOSIS — N25.81 SECONDARY HYPERPARATHYROIDISM OF RENAL ORIGIN: ICD-10-CM

## 2023-12-18 DIAGNOSIS — N18.6 ESRD (END STAGE RENAL DISEASE) ON DIALYSIS: Primary | ICD-10-CM

## 2023-12-18 DIAGNOSIS — I12.9 RENAL HYPERTENSION: Chronic | ICD-10-CM

## 2023-12-18 DIAGNOSIS — Z99.2 ESRD (END STAGE RENAL DISEASE) ON DIALYSIS: ICD-10-CM

## 2023-12-18 DIAGNOSIS — N18.5 CKD (CHRONIC KIDNEY DISEASE) STAGE 5, GFR LESS THAN 15 ML/MIN: ICD-10-CM

## 2023-12-18 DIAGNOSIS — N18.6 ESRD (END STAGE RENAL DISEASE) ON DIALYSIS: ICD-10-CM

## 2023-12-18 PROCEDURE — 99214 PR OFFICE/OUTPT VISIT, EST, LEVL IV, 30-39 MIN: ICD-10-PCS | Mod: S$PBB,,, | Performed by: FAMILY MEDICINE

## 2023-12-18 PROCEDURE — 99214 PR OFFICE/OUTPT VISIT, EST, LEVL IV, 30-39 MIN: ICD-10-PCS | Mod: S$GLB,,, | Performed by: UROLOGY

## 2023-12-18 PROCEDURE — 99214 OFFICE O/P EST MOD 30 MIN: CPT | Mod: S$GLB,,, | Performed by: UROLOGY

## 2023-12-18 PROCEDURE — 99999 PR PBB SHADOW E&M-EST. PATIENT-LVL V: ICD-10-PCS | Mod: PBBFAC,,, | Performed by: FAMILY MEDICINE

## 2023-12-18 PROCEDURE — 99214 OFFICE O/P EST MOD 30 MIN: CPT | Mod: S$PBB,,, | Performed by: FAMILY MEDICINE

## 2023-12-18 PROCEDURE — 99999 PR PBB SHADOW E&M-EST. PATIENT-LVL V: CPT | Mod: PBBFAC,,, | Performed by: FAMILY MEDICINE

## 2023-12-18 PROCEDURE — 99215 OFFICE O/P EST HI 40 MIN: CPT | Mod: PBBFAC,PO | Performed by: FAMILY MEDICINE

## 2023-12-18 NOTE — PROGRESS NOTES
Subjective     Patient ID: Jennifer Booth is a 58 y.o. female.    Chief Complaint: Follow-up    59 yo F with PMH significant for Multiple Myeloma in remission, ESRD on HD, AoCKD, HTN, GERD, MDD/Anxiety,  Constipation, and tobacco use, presents today for her follow up. No concerns today.        Chronic Diseases  Multiple Myeloma in remission: Followed by [facility]; last visit 5/13/19. Shecompleted bortezomib/dexamethasone/Revlimid 6 cycles on 04/13/2012 for the multiple myeloma kappa light chain disease, IPSS stage III. She underwent bortezomib maintenance therapy three weeks on, one week off (05/15, 05/22 and 05/29) with her last cycle received on 05/29/2012. She was followed at [facility] and was diagnosed with DRESS syndrome during hospitalization and then [facility] team elected not to proceed with auto SCT. Velcade maintenance was discontinued due to side effects. MRI T and L spine 6/29/2018 - no evidence of myeloma involvement  SPEP 5/7/2019- no monoclonal peaks. Has upcoming appt on 8/14/19    ESRD on HD:  She is followed by a nephrologist at [facility].  Receives hemodialysis on Tuesdays/Thursdays/Saturdays. Iinitially diagnosed with advanced kidney disease secondary to multiple myeloma & HTN.  She was diagnosed with NANETTE from MM in 2010 that required initiation of dialysis.  She started chronic dialysis on 12/23/11 and ended on 8/28/12. She then underwent chemotherapy for her myeloma, and stopped dialysis in 2013.  Kidney biopsy performed 2017due to worsening  Kidney--revealed glomerulosclerosis and no evidence of MM. Her most recent visit with renal transplant at [facility] was 6/14/19. Patient met selection criteria for kidney transplant related to ESRD due to Hypertensive Nephrosclerosis. During most recent visit, she was placed on inactive status as she was deemed a high risk candidate for kidney transplant due to lack of caregiver support and financial hardship. Plans for caregivers to be her  daughter (currently in third trimester of pregnancy) and her daughter's fiance. Pt is required to attend appt with caregiver.Her next appt with transplant is scheduled for 5/19/2020. She plans to f/u with her daughter prior to this time.     CKD:  Followed by [facility].  Receives Procrit infusions during HD.     HTN: controlled. Adherent with Coreg 12.5 mg BID    She continues to smoke cigarettes. Contemplative stage of quitting. Referred to tobacco cessation at previous visit, but has not scheduled appointment.     Follow-up  Associated symptoms include arthralgias, congestion, headaches and myalgias. Pertinent negatives include no chest pain, diaphoresis, nausea or sore throat.     Review of Systems   Constitutional: Negative.  Negative for activity change, diaphoresis and unexpected weight change.   HENT:  Positive for nasal congestion, rhinorrhea and sinus pressure/congestion. Negative for ear discharge, hearing loss, sore throat and voice change.    Eyes: Negative.  Negative for pain, discharge and visual disturbance.   Respiratory: Negative.  Negative for chest tightness, shortness of breath and wheezing.    Cardiovascular: Negative.  Negative for chest pain.   Gastrointestinal: Negative.  Negative for abdominal distention, anal bleeding, constipation and nausea.   Endocrine: Negative.  Negative for cold intolerance, polydipsia and polyuria.   Genitourinary: Negative.  Negative for decreased urine volume, difficulty urinating, dysuria, frequency, menstrual problem and vaginal pain.   Musculoskeletal:  Positive for arthralgias and myalgias. Negative for gait problem.   Integumentary:  Negative for color change, pallor and wound. Negative.   Allergic/Immunologic: Negative.  Negative for environmental allergies and immunocompromised state.   Neurological:  Positive for headaches. Negative for dizziness, tremors, seizures and speech difficulty.   Hematological: Negative.  Negative for adenopathy.  Does not bruise/bleed easily.   Psychiatric/Behavioral: Negative.  Negative for agitation, confusion, decreased concentration, hallucinations, self-injury and suicidal ideas. The patient is not nervous/anxious.      Past Medical History:   Diagnosis Date    Addiction to drug     Alcohol abuse     Allergic drug reaction 2017    Anemia     Anxiety     Arm pain, left 2014    Breast cyst     Chronic renal failure 2014    CKD (chronic kidney disease) stage 5, GFR less than 15 ml/min     COPD exacerbation 2023    Depression     Dialysis patient     dialysis m-w-f    Encounter for blood transfusion     GERD (gastroesophageal reflux disease)     Hx of psychiatric care     Hypertension     Mood swings     Multiple myeloma in remission 10/26/2012    per Hem/Oc note    Multiple thyroid nodules 10/22/2023    Psychiatric exam requested by authority     Psychiatric problem     Renal hypertension     Status post dialysis 2012    Therapy     Thrombocytopenia 2012       Past Surgical History:   Procedure Laterality Date    AV FISTULA PLACEMENT Left     BREAST CYST ASPIRATION Left     BREAST CYST EXCISION Left     CERVICAL SPINE SURGERY Bilateral      SECTION      x1    COLONOSCOPY N/A 2022    Procedure: COLONOSCOPY;  Surgeon: Jordan Mcguire MD;  Location: [facility] ENDO;  Service: Endoscopy;  Laterality: N/A;    FISTULOGRAM N/A 2020    Procedure: Fistulogram;  Surgeon: Rahat Berger MD;  Location: [facility] CATH LAB/EP;  Service: Cardiology;  Laterality: N/A;    pd catheter      PERCUTANEOUS TRANSLUMINAL ANGIOPLASTY OF ARTERIOVENOUS FISTULA N/A 2020    Procedure: PTA, AV FISTULA;  Surgeon: Rahat Berger MD;  Location: [facility] CATH LAB/EP;  Service: Cardiology;  Laterality: N/A;    PERITONEAL CATHETER REMOVAL N/A 2018    Procedure: REMOVAL, CATHETER, DIALYSIS, PERITONEAL;  Surgeon: Mukesh Gonsales Jr., MD;  Location: [facility] OR;  Service: General;  Laterality: N/A;    RENAL  BIOPSY  2016    THROMBECTOMY OF HEMODIALYSIS ACCESS SITE N/A 06/09/2020    Procedure: Thrombectomy, Hemodialysis Graft Or Fistula;  Surgeon: Rahat Berger MD;  Location[facility] CATH LAB/EP;  Service: Cardiology;  Laterality: N/A;    VASCULAR SURGERY  08/2012    dialysis catheter to right subclavian       Family History   Problem Relation Age of Onset    Hypertension Mother     Heart failure Mother     Ovarian cancer Maternal Aunt     Diabetes Maternal Grandmother     Stroke Maternal Grandmother     Breast cancer Neg Hx     Colon cancer Neg Hx        Social History     Socioeconomic History    Marital status: Single    Number of children: 1   Occupational History    Occupation: [employer]   Tobacco Use    Smoking status: Every Day     Current packs/day: 1.00     Average packs/day: 1 pack/day for 41.0 years (41.0 ttl pk-yrs)     Types: Cigarettes     Start date: 1983     Passive exposure: Never    Smokeless tobacco: Never    Tobacco comments:     Pt. states recently cut down to 0.5 pk/day. Enrolled in the [employer] on 6/9/20 ([facility] Member ID # [id number]). She declines Ambulatory referral to Smoking Cessation program. Handout provided.   Substance and Sexual Activity    Alcohol use: Not Currently     Comment: occasional    Drug use: Not Currently     Types: Marijuana    Sexual activity: Not Currently     Partners: Male   Social History Narrative    Works FT; likes theatre. Lives alone.     Social Determinants of Health     Financial Resource Strain: Low Risk  (10/1/2023)    Overall Financial Resource Strain (CARDIA)     Difficulty of Paying Living Expenses: Not very hard   Food Insecurity: No Food Insecurity (10/1/2023)    Hunger Vital Sign     Worried About Running Out of Food in the Last Year: Never true     Ran Out of Food in the Last Year: Never true   Transportation Needs: No Transportation Needs (10/1/2023)    PRAPARE - Transportation     Lack of Transportation (Medical): No     Lack of Transportation  (Non-Medical): No   Physical Activity: Insufficiently Active (10/1/2023)    Exercise Vital Sign     Days of Exercise per Week: 4 days     Minutes of Exercise per Session: 20 min   Stress: Stress Concern Present (10/1/2023)    [employer] [place] [employer] - Occupational Stress Questionnaire     Feeling of Stress : To some extent   Social Connections: Socially Isolated (10/1/2023)    Social Connection and Isolation Panel [NHANES]     Frequency of Communication with Friends and Family: More than three times a week     Frequency of Social Gatherings with Friends and Family: Once a week     Attends Sikh Services: Never     Active Member of Clubs or Organizations: No     Attends Club or Organization Meetings: Never     Marital Status:    Housing Stability: Low Risk  (10/1/2023)    Housing Stability Vital Sign     Unable to Pay for Housing in the Last Year: No     Number of Places Lived in the Last Year: 1     Unstable Housing in the Last Year: No       Current Outpatient Medications   Medication Sig Dispense Refill    acetaminophen (TYLENOL) 325 MG tablet Take 650 mg by mouth every 4 (four) hours as needed.      albuterol (VENTOLIN HFA) 90 mcg/actuation inhaler Inhale 2 puffs into the lungs every 6 (six) hours as needed for Wheezing or Shortness of Breath. Rescue 18 g 6    aspirin 81 MG Chew Take 81 mg by mouth once daily.      B COMPLEX W-C NO.20/FOLIC ACID (VIRT-CAPS ORAL) Take 1 capsule by mouth once daily.       buPROPion (WELLBUTRIN XL) 150 MG TB24 tablet Take 1 tablet (150 mg total) by mouth once daily. 90 tablet 3    carvediloL (COREG) 25 MG tablet Take 25 mg by mouth 2 (two) times daily.      cetirizine (ZYRTEC) 5 MG tablet Take 1 tablet (5 mg total) by mouth once daily. 30 tablet 2    clopidogreL (PLAVIX) 75 mg tablet Take 1 tablet (75 mg total) by mouth once daily. 30 tablet 11    cyproheptadine (PERIACTIN) 4 mg tablet Take 1 tablet (4 mg total) by mouth 3 (three) times daily as needed  (appetite). 90 tablet 0    EScitalopram oxalate (LEXAPRO) 20 MG tablet Take 1 tablet (20 mg total) by mouth once daily. 90 tablet 3    fluticasone propionate (FLONASE) 50 mcg/actuation nasal spray 1 spray by Each Nostril route daily as needed.      fluticasone-salmeterol diskus inhaler 100-50 mcg Inhale 1 puff into the lungs 2 (two) times daily. Controller 60 each 2    hydrALAZINE (APRESOLINE) 25 MG tablet Take 1 tablet (25 mg total) by mouth every 8 (eight) hours. 90 tablet 11    isosorbide mononitrate (IMDUR) 60 MG 24 hr tablet Take 1 tablet (60 mg total) by mouth once daily. 30 tablet 11    melatonin 5 mg Cap Take 5 mg by mouth nightly.      mirtazapine (REMERON) 15 MG tablet Take 1 tablet (15 mg total) by mouth every evening. 90 tablet 1    montelukast (SINGULAIR) 10 mg tablet TAKE 1 TABLET(10 MG) BY MOUTH EVERY EVENING 90 tablet 3    naltrexone (DEPADE) 50 mg tablet Start with 1/2 tab (25 mg) by mouth daily, may increase to 1 tab (50 mg) daily if able. 90 tablet 1    NIFEdipine (PROCARDIA-XL) 60 MG (OSM) 24 hr tablet Take 60 mg by mouth once daily.      omeprazole (PRILOSEC) 40 MG capsule Take 40 mg by mouth once daily.      pantoprazole (PROTONIX) 40 MG tablet Take 40 mg by mouth.      pantoprazole (PROTONIX) 40 MG tablet ONE TABLET BY MOUTH DAILY AT OPPOSITE TIME OF DAY FROM CLOPIDOGREL      sacubitriL-valsartan (ENTRESTO) 49-51 mg per tablet Take 1 tablet by mouth 2 (two) times daily. 180 tablet 3    sevelamer carbonate (RENVELA) 800 mg Tab Take 800 mg by mouth 3 (three) times daily with meals.      tiotropium bromide (SPIRIVA RESPIMAT) 2.5 mcg/actuation inhaler Inhale 2 puffs into the lungs once daily. Controller 4 g 2    traMADoL (ULTRAM) 50 mg tablet Take 50 mg by mouth every 6 (six) hours as needed.       No current facility-administered medications for this visit.     Facility-Administered Medications Ordered in Other Visits   Medication Dose Route Frequency Provider Last Rate Last Admin    0.9%  NaCl  infusion   Intravenous Continuous Jordan Mcguire MD 20 mL/hr at 07/18/22 0943 New Bag at 07/18/22 0943    0.9%  NaCl infusion   Intravenous Continuous Jordan Mcguire MD   Stopped at 08/18/22 1042    sodium chloride 0.9% flush 10 mL  10 mL Intravenous PRN Jordan Mcguire MD        sodium chloride 0.9% flush 10 mL  10 mL Intravenous PRN Jordan Mcguire MD           Review of patient's allergies indicates:   Allergen Reactions    Doxycycline Swelling, Rash and Hives    Gentamicin Anaphylaxis    Vancomycin analogues Anaphylaxis          Objective   Vitals:    12/18/23 0829   BP: 139/84   Pulse: 79       Physical Exam  Constitutional:       General: She is not in acute distress.     Appearance: She is well-developed. She is not diaphoretic.   HENT:      Head: Normocephalic and atraumatic.      Right Ear: External ear normal.      Left Ear: External ear normal.      Nose: Nose normal.      Mouth/Throat:      Pharynx: No oropharyngeal exudate.   Eyes:      General: No scleral icterus.        Right eye: No discharge.         Left eye: No discharge.      Conjunctiva/sclera: Conjunctivae normal.      Pupils: Pupils are equal, round, and reactive to light.   Neck:      Thyroid: No thyromegaly.      Vascular: No JVD.      Trachea: No tracheal deviation.   Cardiovascular:      Rate and Rhythm: Normal rate and regular rhythm.      Heart sounds: Normal heart sounds. No murmur heard.     No friction rub. No gallop.   Pulmonary:      Effort: Pulmonary effort is normal.      Breath sounds: Normal breath sounds. No stridor. No wheezing or rales.   Chest:      Chest wall: No tenderness.   Abdominal:      General: Bowel sounds are normal. There is no distension.      Palpations: Abdomen is soft. There is no mass.      Tenderness: There is no abdominal tenderness. There is no guarding or rebound.      Hernia: No hernia is present.   Musculoskeletal:         General: Tenderness (mild TTP right great toe) present. Normal  range of motion.      Cervical back: Normal range of motion and neck supple.      Comments: LUE - dialysis fistula   Lymphadenopathy:      Cervical: No cervical adenopathy.   Skin:     General: Skin is warm and dry.      Coloration: Skin is not pale.      Findings: No erythema or rash.   Neurological:      Mental Status: She is alert and oriented to person, place, and time.      Cranial Nerves: No cranial nerve deficit.      Motor: No abnormal muscle tone.      Coordination: Coordination normal.      Deep Tendon Reflexes: Reflexes are normal and symmetric. Reflexes normal.   Psychiatric:         Behavior: Behavior normal.         Thought Content: Thought content normal.         Judgment: Judgment normal.            Assessment and Plan     1. ESRD (end stage renal disease) on dialysis    2. CKD (chronic kidney disease) stage 5, GFR less than 15 ml/min    3. SATNAM (generalized anxiety disorder)    4. Renal hypertension  Overview:  Formatting of this note might be different from the original.  Last Assessment & Plan:   -Currently hypotensive  -Hold carvedilol and nifedical  -No evidence of infection  -Will continue to monitor clinically      5. Secondary hyperparathyroidism of renal origin      ESRD on HD  -T/Thu/Sat    SATNAM/depression  -controlled - multiple meds    Smoking cessation  -1 min counseling done  -she will quit soon        Spent adequate time in obtaining history and explaining differentials    30 minutes spent during this visit of which greater than 50% devoted to face-face counseling and coordination of care regarding diagnosis and management plan           Follow up in about 3 months (around 3/18/2024), or if symptoms worsen or fail to improve.

## 2023-12-19 ENCOUNTER — CLINICAL SUPPORT (OUTPATIENT)
Dept: PSYCHIATRY | Facility: CLINIC | Age: 58
End: 2023-12-19
Payer: MEDICARE

## 2023-12-19 DIAGNOSIS — F12.21 CANNABIS USE DISORDER, MODERATE, IN EARLY REMISSION: Primary | ICD-10-CM

## 2023-12-19 PROCEDURE — 90853 PR GROUP PSYCHOTHERAPY: ICD-10-PCS | Mod: ,,, | Performed by: SOCIAL WORKER

## 2023-12-19 PROCEDURE — 90853 GROUP PSYCHOTHERAPY: CPT | Mod: ,,, | Performed by: SOCIAL WORKER

## 2023-12-20 NOTE — PROGRESS NOTES
Group Psychotherapy    Site: [facility]    Clinical status of patient: Outpatient    12/19/2023    Length of service:[id number]    Referred by: Addictive Behavior Unit     Number of patients in attendance: 8    Target symptoms: alcohol abuse, substance abuse    Patient's response to intervention:  The patient's response to intervention is active listening, self-disclosure.    Progress toward goals and other mental status changes:  The patient's progress toward goals is good.    Interval history: Pt arrived on time to scheduled group. Conflict with daughter has resolved- created plan for holidays. Pt has upcoming one year anniversary of sobriety- reached out to transplant team for next steps. Group was supportive and encouraging. She shared her story with new group member, discussed benefits of sobriety to her overall health.     Diagnosis: Alcohol use disorder, in early remission; Cannabis Use Disorder, in early remission     Plan: group psychotherapy    Return to clinic: 1 week

## 2023-12-22 ENCOUNTER — TELEPHONE (OUTPATIENT)
Dept: ENDOCRINOLOGY | Facility: CLINIC | Age: 58
End: 2023-12-22

## 2023-12-22 NOTE — TELEPHONE ENCOUNTER
Called patient and discussed her recent parathyroid scan which showed uptake in the inferior aspect of the right lobe, suggestive of parathyroid adenoma.  Patient has secondary hyperparathyroidism due to her ESRD.  On sensipar and calcitriol and follows up with her nephrologist.  Recent corrected calcium was 10.5. Will monitor her labs for now.  Discussed indications for Sx for secondary hyperparathyroidism.

## 2023-12-29 ENCOUNTER — TELEPHONE (OUTPATIENT)
Dept: CARDIOLOGY | Facility: CLINIC | Age: 58
End: 2023-12-29
Payer: COMMERCIAL

## 2023-12-29 NOTE — TELEPHONE ENCOUNTER
----- Message from Maia Valadez, RN sent at 12/29/2023  2:03 PM CST -----  Regarding: FW: Meds  Spoke w. pharmacy and pt. Pt has about 13 pills left. Per pharmacy pt is in the doughnut hole however she was quoted $300 for one month. Pt is actually insured privately through [facility] and I told her to contact her insurance as FEP at times require the use of a specific pharmacy (mail in [facility]). She verbalized understanding and will call me back after talking to insurance.  ----- Message -----  From: Ryann Wynn  Sent: 12/29/2023  12:25 PM CST  To: Maia Valadez, RN  Subject: Meds                                             Patient copay is expense and she's trying to see if she can get some assistance with sacubitriL-valsartan (ENTRESTO) 49-51 mg per tablet. Please call back @ [phone]. Thank you Ryann

## 2024-01-01 PROBLEM — J96.01 ACUTE RESPIRATORY FAILURE WITH HYPOXIA: Status: RESOLVED | Noted: 2020-10-13 | Resolved: 2024-01-01

## 2024-01-03 RX ORDER — SACUBITRIL AND VALSARTAN 49; 51 MG/1; MG/1
1 TABLET, FILM COATED ORAL 2 TIMES DAILY
Qty: 180 TABLET | Refills: 3 | Status: SHIPPED | OUTPATIENT
Start: 2024-01-03 | End: 2024-01-08 | Stop reason: ALTCHOICE

## 2024-01-08 ENCOUNTER — LAB VISIT (OUTPATIENT)
Dept: LAB | Facility: HOSPITAL | Age: 59
End: 2024-01-08
Attending: INTERNAL MEDICINE
Payer: MEDICARE

## 2024-01-08 ENCOUNTER — OFFICE VISIT (OUTPATIENT)
Dept: TRANSPLANT | Facility: CLINIC | Age: 59
End: 2024-01-08
Payer: MEDICARE

## 2024-01-08 VITALS
HEIGHT: 68 IN | SYSTOLIC BLOOD PRESSURE: 137 MMHG | DIASTOLIC BLOOD PRESSURE: 78 MMHG | HEART RATE: 82 BPM | BODY MASS INDEX: 20.41 KG/M2 | WEIGHT: 134.69 LBS

## 2024-01-08 DIAGNOSIS — N18.6 ESRD (END STAGE RENAL DISEASE) ON DIALYSIS: ICD-10-CM

## 2024-01-08 DIAGNOSIS — Z99.2 ESRD (END STAGE RENAL DISEASE) ON DIALYSIS: ICD-10-CM

## 2024-01-08 DIAGNOSIS — I50.9 CONGESTIVE HEART FAILURE, UNSPECIFIED HF CHRONICITY, UNSPECIFIED HEART FAILURE TYPE: ICD-10-CM

## 2024-01-08 DIAGNOSIS — I50.20 HFREF (HEART FAILURE WITH REDUCED EJECTION FRACTION): ICD-10-CM

## 2024-01-08 DIAGNOSIS — Z76.82 PATIENT ON WAITING LIST FOR KIDNEY TRANSPLANT: Chronic | ICD-10-CM

## 2024-01-08 DIAGNOSIS — I50.42 CHRONIC COMBINED SYSTOLIC AND DIASTOLIC HEART FAILURE: Primary | ICD-10-CM

## 2024-01-08 PROBLEM — I15.1 HYPERTENSION SECONDARY TO OTHER RENAL DISORDERS: Status: ACTIVE | Noted: 2019-09-23

## 2024-01-08 LAB
ALBUMIN SERPL BCP-MCNC: 3.7 G/DL (ref 3.5–5.2)
ALP SERPL-CCNC: 168 U/L (ref 55–135)
ALT SERPL W/O P-5'-P-CCNC: 11 U/L (ref 10–44)
ANION GAP SERPL CALC-SCNC: 18 MMOL/L (ref 8–16)
AST SERPL-CCNC: 11 U/L (ref 10–40)
BILIRUB SERPL-MCNC: 0.6 MG/DL (ref 0.1–1)
BNP SERPL-MCNC: 110 PG/ML (ref 0–99)
BUN SERPL-MCNC: 61 MG/DL (ref 6–20)
CALCIUM SERPL-MCNC: 9.4 MG/DL (ref 8.7–10.5)
CHLORIDE SERPL-SCNC: 95 MMOL/L (ref 95–110)
CO2 SERPL-SCNC: 29 MMOL/L (ref 23–29)
CREAT SERPL-MCNC: 9.6 MG/DL (ref 0.5–1.4)
EST. GFR  (NO RACE VARIABLE): 4.3 ML/MIN/1.73 M^2
GLUCOSE SERPL-MCNC: 77 MG/DL (ref 70–110)
MAGNESIUM SERPL-MCNC: 1.9 MG/DL (ref 1.6–2.6)
POTASSIUM SERPL-SCNC: 5.6 MMOL/L (ref 3.5–5.1)
PROT SERPL-MCNC: 7.6 G/DL (ref 6–8.4)
SODIUM SERPL-SCNC: 142 MMOL/L (ref 136–145)
TSH SERPL DL<=0.005 MIU/L-ACNC: 0.96 UIU/ML (ref 0.4–4)

## 2024-01-08 PROCEDURE — 83880 ASSAY OF NATRIURETIC PEPTIDE: CPT | Performed by: INTERNAL MEDICINE

## 2024-01-08 PROCEDURE — 36415 COLL VENOUS BLD VENIPUNCTURE: CPT | Performed by: INTERNAL MEDICINE

## 2024-01-08 PROCEDURE — 83880 ASSAY OF NATRIURETIC PEPTIDE: CPT | Mod: 91 | Performed by: INTERNAL MEDICINE

## 2024-01-08 PROCEDURE — 80053 COMPREHEN METABOLIC PANEL: CPT | Performed by: INTERNAL MEDICINE

## 2024-01-08 PROCEDURE — 99213 OFFICE O/P EST LOW 20 MIN: CPT | Mod: PBBFAC | Performed by: INTERNAL MEDICINE

## 2024-01-08 PROCEDURE — 99215 OFFICE O/P EST HI 40 MIN: CPT | Mod: S$PBB,,, | Performed by: INTERNAL MEDICINE

## 2024-01-08 PROCEDURE — 84443 ASSAY THYROID STIM HORMONE: CPT | Performed by: INTERNAL MEDICINE

## 2024-01-08 PROCEDURE — 99999 PR PBB SHADOW E&M-EST. PATIENT-LVL III: CPT | Mod: PBBFAC,,, | Performed by: INTERNAL MEDICINE

## 2024-01-08 PROCEDURE — 83735 ASSAY OF MAGNESIUM: CPT | Performed by: INTERNAL MEDICINE

## 2024-01-08 RX ORDER — SACUBITRIL AND VALSARTAN 97; 103 MG/1; MG/1
1 TABLET, FILM COATED ORAL 2 TIMES DAILY
Qty: 180 TABLET | Refills: 3 | Status: SHIPPED | OUTPATIENT
Start: 2024-01-08 | End: 2025-01-07

## 2024-01-08 NOTE — PROGRESS NOTES
Subjective:   Patient ID:  Jennifer Booth is a 58 y.o. female who presents for evaluation of CHF    59 YO F w/ NICM, HFrEF, LVEF 35-40%, ESRD on HD, HTN, Multiple myeloma in remission who presents for evaluation of HFrEF.    She sees Dr. Timo Rojo MD with the general cardiology fellows clinic.    She comes in today for evaluation.  She says that overall she is doing okay.  She does not make any urine.  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  At present she stays active by taking care of basic work around the house.  With day-to-day activity she denies any cardiac symptoms such as chest discomfort, shortness of breath, palpitations, presyncope, syncope, leg swelling, PND, or orthopnea.    TTE 10/2/23    Left Ventricle: The left ventricle is moderately dilated. Normal wall thickness. There is eccentric hypertrophy. Normal wall motion. There is moderately reduced systolic function with a visually estimated ejection fraction of 35 - 40%.    Right Ventricle: Normal right ventricular cavity size. Wall thickness is normal. Right ventricle wall motion  is normal. Systolic function is normal.    Left Atrium: Left atrium is severely dilated.    Aortic Valve: The aortic valve is a trileaflet valve. There is mild aortic valve sclerosis. Mildly restricted motion. There is mild to moderate aortic regurgitation.    Mitral Valve: There is mild mitral annular calcification present. There is severe regurgitation with a centrally directed jet.    Pulmonary Artery: The estimated pulmonary artery systolic pressure is 42 mmHg.    IVC/SVC: Intermediate venous pressure at 8 mmHg.     TTE 4/8/22  The left ventricle is mildly enlarged with mildly decreased systolic function.  The estimated ejection fraction is 48%.  There is mild left ventricular global hypokinesis.  Grade I left ventricular diastolic dysfunction.  Severe left atrial enlargement.  Normal right ventricular size with  normal right ventricular systolic function.  Mild aortic regurgitation.  Moderate mitral regurgitation.  Mild tricuspid regurgitation.  Normal central venous pressure (3 mmHg).  The estimated PA systolic pressure is 33 mmHg.     SPECT 4/8/22    Normal myocardial perfusion scan. There is no evidence of myocardial ischemia or infarction.    Bowel is overlying the inferior wall.    The gated perfusion images showed an ejection fraction of 55% at rest. The gated perfusion images showed an ejection fraction of 52% post stress. Normal ejection fraction is greater than 53%.    There is normal wall motion at rest and post stress.    LV cavity size is  and normal at stress.    The EKG portion of this study is negative for ischemia.    The patient reported no chest pain during the stress test.    There were no arrhythmias during stress.    When compared to the previous study from 6/14/2019, there are no significant changes.    Review of Systems   Constitutional: Negative for chills and fever.   HENT:  Negative for hearing loss.    Eyes:  Negative for visual disturbance.   Cardiovascular:  Negative for chest pain, dyspnea on exertion, irregular heartbeat, leg swelling, orthopnea, palpitations, paroxysmal nocturnal dyspnea and syncope.   Respiratory:  Negative for cough and shortness of breath.    Musculoskeletal:  Negative for muscle weakness.   Gastrointestinal:  Negative for diarrhea, nausea and vomiting.   Neurological:  Negative for focal weakness.   Psychiatric/Behavioral:  Negative for memory loss.        Objective:   Physical Exam  Constitutional:       Appearance: Normal appearance.   HENT:      Head: Atraumatic.   Eyes:      Extraocular Movements: Extraocular movements intact.   Cardiovascular:      Rate and Rhythm: Normal rate and regular rhythm.      Pulses: Normal pulses.      Heart sounds: Normal heart sounds.      Comments: JVP not visible at 45 degrees.  Pulmonary:      Breath sounds: Normal breath sounds.    Abdominal:      Palpations: Abdomen is soft.      Tenderness: There is no abdominal tenderness.   Musculoskeletal:         General: Normal range of motion.      Right lower leg: No edema.      Left lower leg: No edema.      Comments: AVF in left forearm.   Neurological:      General: No focal deficit present.      Mental Status: She is alert and oriented to person, place, and time.         Assessment:      1. Chronic combined systolic and diastolic heart failure    2. HFrEF (heart failure with reduced ejection fraction)    3. Patient on waiting list for kidney transplant    4. ESRD (end stage renal disease) on dialysis        Plan:     NICM  HFrEF, LVEF 35-40%  ESRD on HD  HTN  Multiple myeloma in remission  - presents today for evaluation of her heart failure.  - at present doing well.  Class 2 symptoms.  Euvolemic on exam today.   - limited in terms of guideline directed medical therapy due to her ESRD.  Is on Coreg 25 mg b.i.d., and recently switched from losartan to medium dose Entresto by Dr. Rojo.  He is also on hydralazine 25 mg t.i.d., and isosorbide mononitrate 60 mg daily.  - not on MRA or SGLT2i due to ESRD.  - there was blood pressure room in clinic today.  We will therefore increase her to maximum dose Entresto.  We will recommend that if during subsequent clinic visits there is still room on blood pressure and heart rate, can go up to 50 mg b.i.d. on her Coreg.  Finally, can also consider titrating up hydralazine/Isordil further.  We will recommend echocardiogram after 3 months of maximum tolerated guideline directed medical therapy to see if there is any improvement in cardiac function.  - at present too well for consideration for heart transplant.  If however there is progression of her heart failure to where the LV ejection fraction is less than 20%, can consider combined heart/kidney transplantation.    Continue follow-up with Dr. Timo Rojo MD in fellow's general cardiology clinic.

## 2024-01-08 NOTE — PATIENT INSTRUCTIONS
I have increased the dose of your Entresto pills. You are currently on 49-51, and we are increasing this to the max dose which is .  I have sent this new prescription to your mail order pharmacy. Until you receive the new prescription, you can increase your current pills (49-51) to 2 tablets twice a days. Once you get your new pills (), then it is 1 tablet twice a day.  Continue your other medicines unchanged.

## 2024-01-08 NOTE — LETTER
January 8, 2024        Elliott Diaz  [address]  [address]  [facility]  [facility] [address]  Phone: [phone]  Fax: [phone]             [id number]  [address] [facility] [address]  Phone: [phone]   Patient: Jennifer Booth   MR Number: [id number]   YOB: 1965   Date of Visit: 1/8/2024       Dear Dr. Elliott Diaz    Thank you for referring Jennifer Booth to me for evaluation. Attached you will find relevant portions of my assessment and plan of care.    If you have questions, please do not hesitate to call me. I look forward to following Jennifer Booth along with you.    Sincerely,    Zbigniew Marion MD    Enclosure    If you would like to receive this communication electronically, please contact [email or web address] or [phone] to request [employer] access.    [employer] is a tool which provides read-only access to select patient information with whom you have a relationship. Its easy to use and provides real time access to review your patients record including encounter summaries, notes, results, and demographic information.    If you feel you have received this communication in error or would no longer like to receive these types of communications, please e-mail [email or web address]

## 2024-01-09 ENCOUNTER — CLINICAL SUPPORT (OUTPATIENT)
Dept: PSYCHIATRY | Facility: CLINIC | Age: 59
End: 2024-01-09
Payer: MEDICARE

## 2024-01-09 DIAGNOSIS — F12.21 CANNABIS USE DISORDER, MODERATE, IN EARLY REMISSION: Primary | ICD-10-CM

## 2024-01-09 DIAGNOSIS — F10.20 ALCOHOL USE DISORDER, MODERATE, DEPENDENCE: ICD-10-CM

## 2024-01-09 PROCEDURE — 90853 GROUP PSYCHOTHERAPY: CPT | Mod: ,,, | Performed by: SOCIAL WORKER

## 2024-01-10 DIAGNOSIS — F10.20 ALCOHOL USE DISORDER, MODERATE, DEPENDENCE: Primary | ICD-10-CM

## 2024-01-10 LAB — NT-PROBNP SERPL IA-MCNC: ABNORMAL PG/ML

## 2024-01-12 ENCOUNTER — PATIENT MESSAGE (OUTPATIENT)
Dept: PSYCHIATRY | Facility: CLINIC | Age: 59
End: 2024-01-12
Payer: MEDICARE

## 2024-01-22 NOTE — PROGRESS NOTES
Cardiology Clinic Note  Reason for Visit: Murmur, HTN  Referring Physician: Dorys Dickinson MD    HPI:   Problem List:  Chronic Combined Systolic/Diastolic Heart Failure; EF 35-40% 10/23; NICM with negative SPECT 4/22  Multiple Myeloma, in remission  ESRD on HD TTS  Hypertension  Tobacco Use    Last seen in clinic 10/23. Has since seen HTS and GDMT up-titrated. Today, she states she's doing well. No angina or heart failure symptoms. Her nephrologist thinks she's hiding more volume, so she's going for an extra HD session tomorrow. Weight 132 lbs today, but she thinks her dry weight is around 120s lbs. No bleeding on ASA, Plavix. Her Coreg, Hydralazine, and Imdur have all been stopped by her nephrologist due to hypotension with HD. BP can get to 70/40s and she feels terrible where they have to give fluid back.     ROS:    Constitution: Negative for fever, chills, weight loss or gain.   HENT: Negative for sore throat, rhinorrhea, or headache.  Eyes: Negative for blurred or double vision.   Cardiovascular: See above  Pulmonary: Negative for SOB   Gastrointestinal: Negative for abdominal pain, nausea, vomiting, or diarrhea.   : Negative for dysuria.   Neurological: Negative for focal weakness or sensory changes.  PMH:     Past Medical History:   Diagnosis Date    Addiction to drug     Alcohol abuse     Allergic drug reaction 11/13/2017    Anemia     Anxiety     Arm pain, left 2/12/2014    Breast cyst     Chronic renal failure 2/12/2014    CKD (chronic kidney disease) stage 5, GFR less than 15 ml/min     COPD exacerbation 9/16/2023    Depression     Dialysis patient     dialysis m-w-f    Encounter for blood transfusion     GERD (gastroesophageal reflux disease)     Hx of psychiatric care     Hypertension     Mood swings     Multiple myeloma in remission 10/26/2012    per Hem/Oc note    Multiple thyroid nodules 10/22/2023    Psychiatric exam requested by authority     Psychiatric problem     Renal hypertension      Status post dialysis 2012    Therapy     Thrombocytopenia 2012     Past Surgical History:   Procedure Laterality Date    AV FISTULA PLACEMENT Left 2014    BREAST CYST ASPIRATION Left     BREAST CYST EXCISION Left     CERVICAL SPINE SURGERY Bilateral      SECTION      x1    COLONOSCOPY N/A 2022    Procedure: COLONOSCOPY;  Surgeon: Jordan Mcguire MD;  Location: [facility] ENDO;  Service: Endoscopy;  Laterality: N/A;    FISTULOGRAM N/A 2020    Procedure: Fistulogram;  Surgeon: Rahat Berger MD;  Location: [facility] CATH LAB/EP;  Service: Cardiology;  Laterality: N/A;    pd catheter      PERCUTANEOUS TRANSLUMINAL ANGIOPLASTY OF ARTERIOVENOUS FISTULA N/A 2020    Procedure: PTA, AV FISTULA;  Surgeon: Rahat Berger MD;  Location: [facility] CATH LAB/EP;  Service: Cardiology;  Laterality: N/A;    PERITONEAL CATHETER REMOVAL N/A 2018    Procedure: REMOVAL, CATHETER, DIALYSIS, PERITONEAL;  Surgeon: Mukesh Gonsales Jr., MD;  Location: [facility] OR;  Service: General;  Laterality: N/A;    RENAL BIOPSY  2016    THROMBECTOMY OF HEMODIALYSIS ACCESS SITE N/A 2020    Procedure: Thrombectomy, Hemodialysis Graft Or Fistula;  Surgeon: Rahat Berger MD;  Location: [facility] CATH LAB/EP;  Service: Cardiology;  Laterality: N/A;    VASCULAR SURGERY  2012    dialysis catheter to right subclavian     Allergies:     Review of patient's allergies indicates:   Allergen Reactions    Doxycycline Swelling, Rash and Hives    Gentamicin Anaphylaxis    Vancomycin analogues Anaphylaxis     Medications:     Current Outpatient Medications on File Prior to Visit   Medication Sig Dispense Refill    acetaminophen (TYLENOL) 325 MG tablet Take 650 mg by mouth every 4 (four) hours as needed.      albuterol (VENTOLIN HFA) 90 mcg/actuation inhaler Inhale 2 puffs into the lungs every 6 (six) hours as needed for Wheezing or Shortness of Breath. Rescue 18 g 6    B COMPLEX W-C NO.20/FOLIC ACID (VIRT-CAPS ORAL) Take 1 capsule by  mouth once daily.       buPROPion (WELLBUTRIN XL) 150 MG TB24 tablet Take 1 tablet (150 mg total) by mouth once daily. 90 tablet 3    cetirizine (ZYRTEC) 5 MG tablet Take 1 tablet (5 mg total) by mouth once daily. 30 tablet 2    clopidogreL (PLAVIX) 75 mg tablet Take 1 tablet (75 mg total) by mouth once daily. 30 tablet 11    EScitalopram oxalate (LEXAPRO) 20 MG tablet Take 1 tablet (20 mg total) by mouth once daily. 90 tablet 3    fluticasone propionate (FLONASE) 50 mcg/actuation nasal spray 1 spray by Each Nostril route daily as needed.      fluticasone-salmeterol diskus inhaler 100-50 mcg Inhale 1 puff into the lungs 2 (two) times daily. Controller 60 each 2    melatonin 5 mg Cap Take 5 mg by mouth nightly.      mirtazapine (REMERON) 15 MG tablet Take 1 tablet (15 mg total) by mouth every evening. 90 tablet 1    montelukast (SINGULAIR) 10 mg tablet TAKE 1 TABLET(10 MG) BY MOUTH EVERY EVENING 90 tablet 3    NIFEdipine (PROCARDIA-XL) 60 MG (OSM) 24 hr tablet Take 60 mg by mouth once daily.      sacubitriL-valsartan (ENTRESTO)  mg per tablet Take 1 tablet by mouth 2 (two) times daily. 180 tablet 3    sevelamer carbonate (RENVELA) 800 mg Tab Take 800 mg by mouth 3 (three) times daily with meals.      tiotropium bromide (SPIRIVA RESPIMAT) 2.5 mcg/actuation inhaler Inhale 2 puffs into the lungs once daily. Controller 4 g 2    traMADoL (ULTRAM) 50 mg tablet Take 50 mg by mouth every 6 (six) hours as needed.      [DISCONTINUED] aspirin 81 MG Chew Take 81 mg by mouth once daily.      carvediloL (COREG) 25 MG tablet Take 25 mg by mouth 2 (two) times daily.      cyproheptadine (PERIACTIN) 4 mg tablet Take 1 tablet (4 mg total) by mouth 3 (three) times daily as needed (appetite). (Patient not taking: Reported on 1/23/2024) 90 tablet 0    naltrexone (DEPADE) 50 mg tablet Start with 1/2 tab (25 mg) by mouth daily, may increase to 1 tab (50 mg) daily if able. (Patient not taking: Reported on 1/23/2024) 90 tablet 1     "[DISCONTINUED] hydrALAZINE (APRESOLINE) 25 MG tablet Take 1 tablet (25 mg total) by mouth every 8 (eight) hours. (Patient not taking: Reported on 1/23/2024) 90 tablet 11    [DISCONTINUED] isosorbide mononitrate (IMDUR) 60 MG 24 hr tablet Take 1 tablet (60 mg total) by mouth once daily. (Patient not taking: Reported on 1/23/2024) 30 tablet 11     Current Facility-Administered Medications on File Prior to Visit   Medication Dose Route Frequency Provider Last Rate Last Admin    0.9%  NaCl infusion   Intravenous Continuous Jordan Mcguire MD 20 mL/hr at 07/18/22 0943 New Bag at 07/18/22 0943    0.9%  NaCl infusion   Intravenous Continuous Jordan Mcguire MD   Stopped at 08/18/22 1042    sodium chloride 0.9% flush 10 mL  10 mL Intravenous PRN Jordan Mcguire MD        sodium chloride 0.9% flush 10 mL  10 mL Intravenous PRN Jordan Mcguire MD         Social History:     Social History     Tobacco Use    Smoking status: Every Day     Current packs/day: 1.00     Average packs/day: 1 pack/day for 41.1 years (41.1 ttl pk-yrs)     Types: Cigarettes     Start date: 1983     Passive exposure: Never    Smokeless tobacco: Never    Tobacco comments:     Pt. states recently cut down to 0.5 pk/day. Enrolled in the [employer] on 6/9/20 ([employer] Member ID # [id number]). She declines Ambulatory referral to Smoking Cessation program. Handout provided.   Substance Use Topics    Alcohol use: Not Currently     Comment: occasional     Family History:     Family History   Problem Relation Age of Onset    Hypertension Mother     Heart failure Mother     Ovarian cancer Maternal Aunt     Diabetes Maternal Grandmother     Stroke Maternal Grandmother     Breast cancer Neg Hx     Colon cancer Neg Hx      Physical Exam:   /83   Pulse 89   Ht 5' 8" (1.727 m)   Wt 60.1 kg (132 lb 7.9 oz)   LMP 01/01/2016 (Approximate) Comment: 2016  SpO2 100%   BMI 20.15 kg/m²        Physical Exam   Constitutional: She is oriented to person, " place, and time. No distress.   HENT:   Mouth/Throat: Mucous membranes are moist.   Cardiovascular: Normal rate, regular rhythm and normal pulses.   AVF in LUE with thrill and bruit   Pulmonary/Chest: Effort normal. No respiratory distress.   Abdominal: Normal appearance.   Musculoskeletal:      Right lower leg: No edema.      Left lower leg: No edema.   Neurological: She is alert and oriented to person, place, and time.        Labs:     Lab Results   Component Value Date     01/08/2024    K 5.6 (H) 01/08/2024    CL 95 01/08/2024    CO2 29 01/08/2024    BUN 61 (H) 01/08/2024    CREATININE 9.6 (H) 01/08/2024    GLUCOSE 83 03/16/2022    ANIONGAP 18 (H) 01/08/2024     Lab Results   Component Value Date    HGBA1C 4.6 02/10/2021     Lab Results   Component Value Date     (H) 01/08/2024    BNP 2,665 (H) 10/01/2023    BNP 3,348 (H) 09/14/2023    Lab Results   Component Value Date    WBC 5.12 10/04/2023    HGB 12.2 10/04/2023    HCT 38.3 10/04/2023    HCT 44 09/14/2023     (L) 10/04/2023    GRAN 3.2 10/04/2023    GRAN 62.5 10/04/2023     Lab Results   Component Value Date    CHOL 161 07/21/2021    HDL 65 07/21/2021    LDLCALC 86.0 07/21/2021    TRIG 50 07/21/2021          Imaging:   TTE 10/2/23    Left Ventricle: The left ventricle is moderately dilated. Normal wall thickness. There is eccentric hypertrophy. Normal wall motion. There is moderately reduced systolic function with a visually estimated ejection fraction of 35 - 40%.    Right Ventricle: Normal right ventricular cavity size. Wall thickness is normal. Right ventricle wall motion  is normal. Systolic function is normal.    Left Atrium: Left atrium is severely dilated.    Aortic Valve: The aortic valve is a trileaflet valve. There is mild aortic valve sclerosis. Mildly restricted motion. There is mild to moderate aortic regurgitation.    Mitral Valve: There is mild mitral annular calcification present. There is severe regurgitation with a  centrally directed jet.    Pulmonary Artery: The estimated pulmonary artery systolic pressure is 42 mmHg.    IVC/SVC: Intermediate venous pressure at 8 mmHg.    TTE 4/8/22  The left ventricle is mildly enlarged with mildly decreased systolic function.  The estimated ejection fraction is 48%.  There is mild left ventricular global hypokinesis.  Grade I left ventricular diastolic dysfunction.  Severe left atrial enlargement.  Normal right ventricular size with normal right ventricular systolic function.  Mild aortic regurgitation.  Moderate mitral regurgitation.  Mild tricuspid regurgitation.  Normal central venous pressure (3 mmHg).  The estimated PA systolic pressure is 33 mmHg.    SPECT 4/8/22    Normal myocardial perfusion scan. There is no evidence of myocardial ischemia or infarction.    Bowel is overlying the inferior wall.    The gated perfusion images showed an ejection fraction of 55% at rest. The gated perfusion images showed an ejection fraction of 52% post stress. Normal ejection fraction is greater than 53%.    There is normal wall motion at rest and post stress.    LV cavity size is  and normal at stress.    The EKG portion of this study is negative for ischemia.    The patient reported no chest pain during the stress test.    There were no arrhythmias during stress.    When compared to the previous study from 6/14/2019, there are no significant changes.    EKG:  sinus tachycardia, LAE, LVH per my read  Assessment:     1. Chronic combined systolic and diastolic heart failure        2. ESRD (end stage renal disease) on dialysis        3. Renovascular hypertension        4. Tobacco use disorder        5. History of tobacco use  [73846] [place] TOBACCO USE CESSATION INTERMEDIATE 3-10 MIN              Plan:   Continue Entresto 97/103 mg BID  Start Coreg 20 mg CR daily and up-tirate as BP allows  Stop Nifedipine 60 mg daily to give more BP room for med titration  Previously seen by \[facility]"" 1/24; will repeat echo when  on max dose GDMT  Stop ASA and continue Plavix for SAPT for LUE stenting previously  Continue follow-up with renal transplant for consideration of listing      RTC 3 months or sooner PRN.    Discussed case with Dr Luz García MD.    Timo Rojo MD PGY6  Cardiovascular Medicine Fellow  [facility]  Pager: [phone]    Tobacco Use/Cessation:  I assessed Jennifer Booth and discussed smoking cessation with her for 3-10 minutes. She reports that she has been smoking cigarettes. She started smoking about 41 years ago. She has a 41.1 pack-year smoking history. She has never been exposed to tobacco smoke. She has never used smokeless tobacco.

## 2024-01-22 NOTE — PROGRESS NOTES
Group Psychotherapy    Site: [facility]    Clinical status of patient: Outpatient    1/9/2024    Length of service:[id number]    Referred by: Addictive Behavior Unit     Number of patients in attendance: 8    Target symptoms: alcohol abuse, substance abuse    Patient's response to intervention:  The patient's response to intervention is active listening, self-disclosure.    Progress toward goals and other mental status changes:  The patient's progress toward goals is good.    Interval history: Pt notes one year of sobriety this week- feeling proud of herself. Group supportive and congratulated her on accomplishment. Processed recent holidays, went well overall. Provided feedback to other group members, actively engaged in group.     Diagnosis: Alcohol use disorder, in early remission; Cannabis Use Disorder, in early remission     Plan: group psychotherapy    Return to clinic: 1 week

## 2024-01-23 ENCOUNTER — OFFICE VISIT (OUTPATIENT)
Dept: CARDIOLOGY | Facility: CLINIC | Age: 59
End: 2024-01-23
Payer: MEDICARE

## 2024-01-23 ENCOUNTER — LAB VISIT (OUTPATIENT)
Dept: LAB | Facility: HOSPITAL | Age: 59
End: 2024-01-23
Attending: PSYCHIATRY & NEUROLOGY
Payer: MEDICARE

## 2024-01-23 ENCOUNTER — CLINICAL SUPPORT (OUTPATIENT)
Dept: PSYCHIATRY | Facility: CLINIC | Age: 59
End: 2024-01-23
Payer: MEDICARE

## 2024-01-23 VITALS
OXYGEN SATURATION: 100 % | HEIGHT: 68 IN | WEIGHT: 132.5 LBS | DIASTOLIC BLOOD PRESSURE: 83 MMHG | HEART RATE: 89 BPM | BODY MASS INDEX: 20.08 KG/M2 | SYSTOLIC BLOOD PRESSURE: 121 MMHG

## 2024-01-23 DIAGNOSIS — F10.20 ALCOHOL USE DISORDER, MODERATE, DEPENDENCE: ICD-10-CM

## 2024-01-23 DIAGNOSIS — F10.21 ALCOHOL USE DISORDER, MODERATE, IN SUSTAINED REMISSION: Primary | ICD-10-CM

## 2024-01-23 DIAGNOSIS — F12.21 CANNABIS USE DISORDER, MODERATE, IN EARLY REMISSION: ICD-10-CM

## 2024-01-23 DIAGNOSIS — Z87.891 HISTORY OF TOBACCO USE: ICD-10-CM

## 2024-01-23 DIAGNOSIS — F17.200 TOBACCO USE DISORDER: ICD-10-CM

## 2024-01-23 DIAGNOSIS — Z99.2 ESRD (END STAGE RENAL DISEASE) ON DIALYSIS: ICD-10-CM

## 2024-01-23 DIAGNOSIS — I50.42 CHRONIC COMBINED SYSTOLIC AND DIASTOLIC HEART FAILURE: Primary | ICD-10-CM

## 2024-01-23 DIAGNOSIS — I15.0 RENOVASCULAR HYPERTENSION: ICD-10-CM

## 2024-01-23 DIAGNOSIS — N18.6 ESRD (END STAGE RENAL DISEASE) ON DIALYSIS: ICD-10-CM

## 2024-01-23 PROCEDURE — 99999 PR PBB SHADOW E&M-EST. PATIENT-LVL V: CPT | Mod: PBBFAC,GC,, | Performed by: INTERNAL MEDICINE

## 2024-01-23 PROCEDURE — 80307 DRUG TEST PRSMV CHEM ANLYZR: CPT | Performed by: PSYCHIATRY & NEUROLOGY

## 2024-01-23 PROCEDURE — 36415 COLL VENOUS BLD VENIPUNCTURE: CPT | Performed by: PSYCHIATRY & NEUROLOGY

## 2024-01-23 PROCEDURE — 99214 OFFICE O/P EST MOD 30 MIN: CPT | Mod: 25,S$PBB,GC, | Performed by: INTERNAL MEDICINE

## 2024-01-23 PROCEDURE — 99215 OFFICE O/P EST HI 40 MIN: CPT | Mod: PBBFAC | Performed by: INTERNAL MEDICINE

## 2024-01-23 PROCEDURE — 90853 GROUP PSYCHOTHERAPY: CPT | Mod: TXP,,, | Performed by: SOCIAL WORKER

## 2024-01-23 PROCEDURE — 99406 BEHAV CHNG SMOKING 3-10 MIN: CPT | Mod: S$PBB,GC,, | Performed by: INTERNAL MEDICINE

## 2024-01-23 RX ORDER — CARVEDILOL PHOSPHATE 20 MG/1
80 CAPSULE, EXTENDED RELEASE ORAL DAILY
Qty: 360 CAPSULE | Refills: 3 | Status: SHIPPED | OUTPATIENT
Start: 2024-01-23 | End: 2024-04-08 | Stop reason: SINTOL

## 2024-01-26 ENCOUNTER — DOCUMENTATION ONLY (OUTPATIENT)
Dept: PSYCHIATRY | Facility: CLINIC | Age: 59
End: 2024-01-26
Payer: MEDICARE

## 2024-01-26 NOTE — PROGRESS NOTES
STAFF NOTE    The chart was reviewed and the case was discussed, including the assessment and management plan.  I agree with the overall findings, with corrections or clarifications, if any, noted herein.    **    Donvoan Armenta MD  Board Certification: Psychiatry and Addiction Medicine

## 2024-01-26 NOTE — PROGRESS NOTES
Pt was enrolled in [facility] for treatment from 01/11/23 to 02/11/23. Per discharge note on 02/11/23: Throughout the program Jennifer participated fully in groups and regular psychiatric follow-up. She did not have any slips or relapses as demonstrated by her regular labs. She attended virtual 12 step meetings and found an in person meeting she enjoyed and obtained a sponsor prior to completion of the program. She was adherent with the rules of the [facility], future oriented and goal directed, and showed sustained interest in maintaining sobriety.      Pt continues to meet weekly with her aftercare group since IOP completion and appears actively engaged in maintaining her sobriety. Most recent labs dated 01/23/24 showed no active use. At this time, we have no further treatment recommendations which would prohibit her from proceeding with kidney transplant.

## 2024-02-06 ENCOUNTER — TELEPHONE (OUTPATIENT)
Dept: TRANSPLANT | Facility: CLINIC | Age: 59
End: 2024-02-06
Payer: MEDICARE

## 2024-02-06 ENCOUNTER — CLINICAL SUPPORT (OUTPATIENT)
Dept: PSYCHIATRY | Facility: CLINIC | Age: 59
End: 2024-02-06
Payer: MEDICARE

## 2024-02-06 DIAGNOSIS — F10.21 ALCOHOL USE DISORDER, MODERATE, IN SUSTAINED REMISSION: Primary | ICD-10-CM

## 2024-02-06 PROCEDURE — 90853 GROUP PSYCHOTHERAPY: CPT | Mod: ,,, | Performed by: SOCIAL WORKER

## 2024-02-08 NOTE — PROGRESS NOTES
Group Psychotherapy    Site: [facility]    Clinical status of patient: Outpatient    1/23/2024    Length of service:[id number]    Referred by: Addictive Behavior Unit     Number of patients in attendance: 8    Target symptoms: alcohol abuse, substance abuse    Patient's response to intervention:  The patient's response to intervention is active listening, self-disclosure.    Progress toward goals and other mental status changes:  The patient's progress toward goals is good.    Interval history: Pt reports ongoing sobriety- feeling excited to move forward with transplant team. Discussed school and staying busy while in recovery.  Provided feedback to other group members, actively engaged.     Diagnosis: Alcohol use disorder, in early remission; Cannabis Use Disorder, in early remission     Plan: group psychotherapy    Return to clinic: 1 week

## 2024-02-15 ENCOUNTER — TELEPHONE (OUTPATIENT)
Dept: TRANSPLANT | Facility: CLINIC | Age: 59
End: 2024-02-15
Payer: MEDICARE

## 2024-02-15 NOTE — TELEPHONE ENCOUNTER
"Returned call, left voicemail with contact information for patient to return call.     ----- Message from Emelia Alas RN sent at 2/15/2024  9:51 AM CST -----  Referral denial    ----- Message -----  From: Que Kirkland  Sent: 2/15/2024   9:37 AM CST  To: Kidney Waitlisted Coordinator    Consult/Advisory:      Name Of Caller: Self      Contact Preference?: [phone]       What is the nature of the call?: Calling to speak w/ Emelia about letter received      Additional Notes:  "Thank you for all that you do for our patients"           " no

## 2024-02-23 ENCOUNTER — LAB VISIT (OUTPATIENT)
Dept: LAB | Facility: HOSPITAL | Age: 59
End: 2024-02-23
Attending: FAMILY MEDICINE
Payer: MEDICARE

## 2024-02-23 DIAGNOSIS — C90.00 MULTIPLE MYELOMA, REMISSION STATUS UNSPECIFIED: ICD-10-CM

## 2024-02-23 LAB
ALBUMIN SERPL BCP-MCNC: 3.5 G/DL (ref 3.5–5.2)
ALP SERPL-CCNC: 187 U/L (ref 55–135)
ALT SERPL W/O P-5'-P-CCNC: 9 U/L (ref 10–44)
ANION GAP SERPL CALC-SCNC: 17 MMOL/L (ref 8–16)
AST SERPL-CCNC: 11 U/L (ref 10–40)
BASOPHILS # BLD AUTO: 0.03 K/UL (ref 0–0.2)
BASOPHILS NFR BLD: 0.5 % (ref 0–1.9)
BILIRUB SERPL-MCNC: 0.4 MG/DL (ref 0.1–1)
BUN SERPL-MCNC: 45 MG/DL (ref 6–20)
CALCIUM SERPL-MCNC: 9.6 MG/DL (ref 8.7–10.5)
CHLORIDE SERPL-SCNC: 96 MMOL/L (ref 95–110)
CO2 SERPL-SCNC: 30 MMOL/L (ref 23–29)
CREAT SERPL-MCNC: 8.9 MG/DL (ref 0.5–1.4)
DIFFERENTIAL METHOD BLD: ABNORMAL
EOSINOPHIL # BLD AUTO: 0.1 K/UL (ref 0–0.5)
EOSINOPHIL NFR BLD: 2.3 % (ref 0–8)
ERYTHROCYTE [DISTWIDTH] IN BLOOD BY AUTOMATED COUNT: 15.3 % (ref 11.5–14.5)
EST. GFR  (NO RACE VARIABLE): 5 ML/MIN/1.73 M^2
FERRITIN SERPL-MCNC: 1447 NG/ML (ref 20–300)
GLUCOSE SERPL-MCNC: 97 MG/DL (ref 70–110)
HCT VFR BLD AUTO: 38.3 % (ref 37–48.5)
HGB BLD-MCNC: 12.3 G/DL (ref 12–16)
IGA SERPL-MCNC: 316 MG/DL (ref 40–350)
IGG SERPL-MCNC: 974 MG/DL (ref 650–1600)
IGM SERPL-MCNC: 264 MG/DL (ref 50–300)
IMM GRANULOCYTES # BLD AUTO: 0.01 K/UL (ref 0–0.04)
IMM GRANULOCYTES NFR BLD AUTO: 0.2 % (ref 0–0.5)
IRON SERPL-MCNC: 82 UG/DL (ref 30–160)
LYMPHOCYTES # BLD AUTO: 1.6 K/UL (ref 1–4.8)
LYMPHOCYTES NFR BLD: 25.7 % (ref 18–48)
MCH RBC QN AUTO: 28.9 PG (ref 27–31)
MCHC RBC AUTO-ENTMCNC: 32.1 G/DL (ref 32–36)
MCV RBC AUTO: 90 FL (ref 82–98)
MONOCYTES # BLD AUTO: 0.6 K/UL (ref 0.3–1)
MONOCYTES NFR BLD: 9 % (ref 4–15)
NEUTROPHILS # BLD AUTO: 3.8 K/UL (ref 1.8–7.7)
NEUTROPHILS NFR BLD: 62.3 % (ref 38–73)
NRBC BLD-RTO: 0 /100 WBC
PLATELET # BLD AUTO: 233 K/UL (ref 150–450)
PMV BLD AUTO: 11.2 FL (ref 9.2–12.9)
POTASSIUM SERPL-SCNC: 5.1 MMOL/L (ref 3.5–5.1)
PROT SERPL-MCNC: 7.4 G/DL (ref 6–8.4)
RBC # BLD AUTO: 4.25 M/UL (ref 4–5.4)
SATURATED IRON: 31 % (ref 20–50)
SODIUM SERPL-SCNC: 143 MMOL/L (ref 136–145)
TOTAL IRON BINDING CAPACITY: 265 UG/DL (ref 250–450)
TRANSFERRIN SERPL-MCNC: 179 MG/DL (ref 200–375)
WBC # BLD AUTO: 6.12 K/UL (ref 3.9–12.7)

## 2024-02-23 PROCEDURE — 84165 PROTEIN E-PHORESIS SERUM: CPT | Mod: 26,,, | Performed by: PATHOLOGY

## 2024-02-23 PROCEDURE — 84165 PROTEIN E-PHORESIS SERUM: CPT | Performed by: INTERNAL MEDICINE

## 2024-02-23 PROCEDURE — 36415 COLL VENOUS BLD VENIPUNCTURE: CPT | Performed by: INTERNAL MEDICINE

## 2024-02-23 PROCEDURE — 80053 COMPREHEN METABOLIC PANEL: CPT | Performed by: INTERNAL MEDICINE

## 2024-02-23 PROCEDURE — 83521 IG LIGHT CHAINS FREE EACH: CPT | Mod: 59 | Performed by: INTERNAL MEDICINE

## 2024-02-23 PROCEDURE — 86334 IMMUNOFIX E-PHORESIS SERUM: CPT | Performed by: INTERNAL MEDICINE

## 2024-02-23 PROCEDURE — 85025 COMPLETE CBC W/AUTO DIFF WBC: CPT | Performed by: INTERNAL MEDICINE

## 2024-02-23 PROCEDURE — 86334 IMMUNOFIX E-PHORESIS SERUM: CPT | Mod: 26,,, | Performed by: PATHOLOGY

## 2024-02-23 PROCEDURE — 82784 ASSAY IGA/IGD/IGG/IGM EACH: CPT | Performed by: INTERNAL MEDICINE

## 2024-02-23 PROCEDURE — 82728 ASSAY OF FERRITIN: CPT | Performed by: INTERNAL MEDICINE

## 2024-02-23 PROCEDURE — 83540 ASSAY OF IRON: CPT | Performed by: INTERNAL MEDICINE

## 2024-02-26 ENCOUNTER — OFFICE VISIT (OUTPATIENT)
Dept: HEMATOLOGY/ONCOLOGY | Facility: CLINIC | Age: 59
End: 2024-02-26
Payer: MEDICARE

## 2024-02-26 VITALS
DIASTOLIC BLOOD PRESSURE: 76 MMHG | BODY MASS INDEX: 20.65 KG/M2 | WEIGHT: 136.25 LBS | HEIGHT: 68 IN | OXYGEN SATURATION: 98 % | SYSTOLIC BLOOD PRESSURE: 130 MMHG | HEART RATE: 63 BPM

## 2024-02-26 DIAGNOSIS — Z99.2 ESRD (END STAGE RENAL DISEASE) ON DIALYSIS: ICD-10-CM

## 2024-02-26 DIAGNOSIS — N18.6 ESRD (END STAGE RENAL DISEASE) ON DIALYSIS: ICD-10-CM

## 2024-02-26 DIAGNOSIS — N28.1 BILATERAL RENAL CYSTS: ICD-10-CM

## 2024-02-26 DIAGNOSIS — N18.6 ANEMIA IN CHRONIC KIDNEY DISEASE, ON CHRONIC DIALYSIS: ICD-10-CM

## 2024-02-26 DIAGNOSIS — C90.00 MULTIPLE MYELOMA, REMISSION STATUS UNSPECIFIED: Primary | ICD-10-CM

## 2024-02-26 DIAGNOSIS — E04.2 MULTIPLE THYROID NODULES: ICD-10-CM

## 2024-02-26 DIAGNOSIS — D63.1 ANEMIA IN CHRONIC KIDNEY DISEASE, ON CHRONIC DIALYSIS: ICD-10-CM

## 2024-02-26 DIAGNOSIS — Z99.2 ANEMIA IN CHRONIC KIDNEY DISEASE, ON CHRONIC DIALYSIS: ICD-10-CM

## 2024-02-26 LAB
ALBUMIN SERPL ELPH-MCNC: 3.89 G/DL (ref 3.35–5.55)
ALPHA1 GLOB SERPL ELPH-MCNC: 0.36 G/DL (ref 0.17–0.41)
ALPHA2 GLOB SERPL ELPH-MCNC: 0.88 G/DL (ref 0.43–0.99)
B-GLOBULIN SERPL ELPH-MCNC: 0.75 G/DL (ref 0.5–1.1)
GAMMA GLOB SERPL ELPH-MCNC: 1.02 G/DL (ref 0.67–1.58)
INTERPRETATION SERPL IFE-IMP: NORMAL
KAPPA LC SER QL IA: 38.38 MG/DL (ref 0.33–1.94)
KAPPA LC/LAMBDA SER IA: 1.39 (ref 0.26–1.65)
LAMBDA LC SER QL IA: 27.54 MG/DL (ref 0.57–2.63)
PROT SERPL-MCNC: 6.9 G/DL (ref 6–8.4)

## 2024-02-26 PROCEDURE — 99214 OFFICE O/P EST MOD 30 MIN: CPT | Mod: S$PBB,,, | Performed by: INTERNAL MEDICINE

## 2024-02-26 PROCEDURE — 99999 PR PBB SHADOW E&M-EST. PATIENT-LVL IV: CPT | Mod: PBBFAC,,, | Performed by: INTERNAL MEDICINE

## 2024-02-26 PROCEDURE — 99214 OFFICE O/P EST MOD 30 MIN: CPT | Mod: PBBFAC | Performed by: INTERNAL MEDICINE

## 2024-02-26 RX ORDER — CINACALCET 30 MG/1
TABLET, FILM COATED ORAL
COMMUNITY

## 2024-02-26 RX ORDER — SUCROFERRIC OXYHYDROXIDE 500 MG/1
1000 TABLET, CHEWABLE ORAL 3 TIMES DAILY
COMMUNITY
Start: 2024-01-12

## 2024-02-26 NOTE — Clinical Note
PET/CT in next couple of weeks Advise pt to call day after results  Consider referral to C Pt will be contacted with f/u
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
## 2024-02-27 LAB
PATHOLOGIST INTERPRETATION IFE: NORMAL
PATHOLOGIST INTERPRETATION SPE: NORMAL

## 2024-03-15 ENCOUNTER — HOSPITAL ENCOUNTER (OUTPATIENT)
Dept: RADIOLOGY | Facility: HOSPITAL | Age: 59
Discharge: HOME | End: 2024-03-15
Attending: INTERNAL MEDICINE
Payer: MEDICARE

## 2024-03-15 DIAGNOSIS — C90.00 MULTIPLE MYELOMA, REMISSION STATUS UNSPECIFIED: ICD-10-CM

## 2024-03-15 LAB — POCT GLUCOSE: 83 MG/DL (ref 70–110)

## 2024-03-15 PROCEDURE — 78816 PET IMAGE W/CT FULL BODY: CPT | Mod: TC

## 2024-03-15 PROCEDURE — 78816 PET IMAGE W/CT FULL BODY: CPT | Mod: 26,PS,, | Performed by: STUDENT IN AN ORGANIZED HEALTH CARE EDUCATION/TRAINING PROGRAM

## 2024-03-15 PROCEDURE — A9552 F18 FDG: HCPCS | Performed by: INTERNAL MEDICINE

## 2024-03-15 RX ORDER — FLUDEOXYGLUCOSE F18 500 MCI/ML
12 INJECTION INTRAVENOUS ONCE
Status: COMPLETED | OUTPATIENT
Start: 2024-03-15 | End: 2024-03-15

## 2024-03-15 RX ADMIN — FLUDEOXYGLUCOSE F-18 12.12 MILLICURIE: 500 INJECTION INTRAVENOUS at 09:03

## 2024-03-18 ENCOUNTER — TELEPHONE (OUTPATIENT)
Dept: HEMATOLOGY/ONCOLOGY | Facility: CLINIC | Age: 59
End: 2024-03-18
Payer: MEDICARE

## 2024-03-18 ENCOUNTER — PATIENT MESSAGE (OUTPATIENT)
Dept: PSYCHIATRY | Facility: CLINIC | Age: 59
End: 2024-03-18
Payer: MEDICARE

## 2024-03-20 ENCOUNTER — HOSPITAL ENCOUNTER (OUTPATIENT)
Facility: OTHER | Age: 59
Discharge: HOME | End: 2024-03-20
Attending: INTERNAL MEDICINE | Admitting: INTERNAL MEDICINE
Payer: MEDICARE

## 2024-03-20 VITALS
OXYGEN SATURATION: 100 % | DIASTOLIC BLOOD PRESSURE: 81 MMHG | WEIGHT: 136 LBS | TEMPERATURE: 98 F | BODY MASS INDEX: 20.61 KG/M2 | HEIGHT: 68 IN | HEART RATE: 75 BPM | SYSTOLIC BLOOD PRESSURE: 122 MMHG | RESPIRATION RATE: 16 BRPM

## 2024-03-20 DIAGNOSIS — N18.6 END STAGE RENAL DISEASE: ICD-10-CM

## 2024-03-20 DIAGNOSIS — T82.590A MECHANICAL COMPLICATION OF ARTERIOVENOUS FISTULA SURGICALLY CREATED, INITIAL ENCOUNTER: Primary | ICD-10-CM

## 2024-03-20 PROCEDURE — 27201423 OPTIME MED/SURG SUP & DEVICES STERILE SUPPLY: Performed by: INTERNAL MEDICINE

## 2024-03-20 PROCEDURE — 63600175 PHARM REV CODE 636 W HCPCS: Performed by: INTERNAL MEDICINE

## 2024-03-20 PROCEDURE — C1769 GUIDE WIRE: HCPCS | Performed by: INTERNAL MEDICINE

## 2024-03-20 PROCEDURE — 36902 INTRO CATH DIALYSIS CIRCUIT: CPT | Mod: LT | Performed by: INTERNAL MEDICINE

## 2024-03-20 PROCEDURE — C1894 INTRO/SHEATH, NON-LASER: HCPCS | Performed by: INTERNAL MEDICINE

## 2024-03-20 PROCEDURE — 25500020 PHARM REV CODE 255: Performed by: INTERNAL MEDICINE

## 2024-03-20 PROCEDURE — C1725 CATH, TRANSLUMIN NON-LASER: HCPCS | Performed by: INTERNAL MEDICINE

## 2024-03-20 PROCEDURE — 36902 INTRO CATH DIALYSIS CIRCUIT: CPT | Mod: LT

## 2024-03-20 RX ORDER — MIDAZOLAM HYDROCHLORIDE 1 MG/ML
INJECTION, SOLUTION INTRAMUSCULAR; INTRAVENOUS
Status: DISCONTINUED | OUTPATIENT
Start: 2024-03-20 | End: 2024-03-20 | Stop reason: HOSPADM

## 2024-03-20 RX ORDER — FENTANYL CITRATE 50 UG/ML
INJECTION, SOLUTION INTRAMUSCULAR; INTRAVENOUS
Status: DISCONTINUED | OUTPATIENT
Start: 2024-03-20 | End: 2024-03-20 | Stop reason: HOSPADM

## 2024-03-20 NOTE — PROCEDURES
[facility] Interventional Nephrology Service    Fistulogram Procedure Report    Date of Procedure:  03/20/2024 3:00 PM    Procedures Performed: Fistulogram with venous angioplasty of the JA region; reflux arteriogram    Indication: diminished bruit at the dialysis unit    Referring Provider: Dr. Wolfe [facility]    Primary Surgeon: Matheus Galaviz MD  Assist: none    Medications Administered:  1% lidocaine, 1 mg versed, 50 mcg fentanyl    Procedure Report in Detail:  After informed consent was obtained the patient was prepped and draped in the usual sterile fashion.  Her left upper Radiocephalic AVF was cannulated in the venous facing direction with a micropuncture system, confirmed in correct placement under fluroscopy, and a fistulogram was performed using iodinated contrast.  Fistulogram revealed a collateral vessel coming off the cannulation zone and no outflow stenosis that was felt to be the culprit lesion, there was appropriate collateral circulation up the arm once the cephalic vein came to an end near the antecubital region.  Central vasculature was then evaluated with contrasted film revealing widely patent central vasculature.  Reflux arteriogram revealed an arterial juxta-anastomosis stent which had a 60-70% stenosis on the proximal end of the stent as well as inside the stent.  More than 6cm of the radial artery was visualized and it was patent.  The micropuncture sheath was flipped was cannulated in the arterial direction with a glidewire which was advanced to the radial artery, and the micropuncture sheath was removed.  A 6 Fr sheath was inserted over the glidewire, and the following interventions were performed after the patient was given 1 mg versed and 50 mcg fentanyl: using a 6 x 40 mm mustang pta balloon and endeflator, we advanced the balloon to the proximal end of the inflow stent and inflated to full effacement with waist seen on balloon prior to full effacement.  This was advanced into the stent  and again was inflated to full effacement with waist seen prior to full effacement.  Follow up film revealed improvement of the stenosis with no evidence of extravasation.  The sheath was pulled, a #2 ethilon suture was used to achieve hemostasis.  No complications occurred during the procedure.     Estimated blood loss: < 10 mL   Estimated contrast used: 33 mL    Impression/Plan:  This was a successful fistulogram with angioplasty performed in the inflow stent region of the fistula.  We will have the patient return in 1 week for suture removal and follow up exam.  Please do refer the patient back if there are any further signs of stenosis (increased venous pressure alarms, prolonged bleeding or worsening adequacy).      In the meantime, the access was marked with a marker on where the in-flow stent is located, so as to avoid it.  One of the cannulation needle spots is directly overlying the inflow stent and I would avoid this area as it could lead to fracture and other complications, including loss of the access.  The arterial needle should be placed where the venous needle originally was, and the new venous cannulation location should be above the old spot, as she has a good caliber fistula which runs up the forearm.    Matheus Galaviz MD  [phone]

## 2024-03-20 NOTE — H&P
[facility] - Cath Lab ([place])  History & Physical    Subjective:      Chief Complaint/Reason for Admission: Here for fistulogram    Jennifer Booth is a 59 y.o. female with ESRD (TTS at [facility] with Dr. Wolfe) who presents today for fistulogram after her dialysis unit recently noted diminished bruit on exam.  This access was created approximately 7 years ago, stent was placed in the ?JA region shortly after creation, she had a fistulogram around that time but no recent procedures or interventions as far as she could recall.    Past Medical History:   Diagnosis Date    Addiction to drug     Alcohol abuse     Allergic drug reaction 2017    Anemia     Anxiety     Arm pain, left 2014    Breast cyst     Chronic renal failure 2014    CKD (chronic kidney disease) stage 5, GFR less than 15 ml/min     COPD exacerbation 2023    Depression     Dialysis patient     dialysis m-w-    Encounter for blood transfusion     GERD (gastroesophageal reflux disease)     Hx of psychiatric care     Hypertension     Mood swings     Multiple myeloma in remission 10/26/2012    per Hem/Oc note    Multiple thyroid nodules 10/22/2023    Psychiatric exam requested by authority     Psychiatric problem     Renal hypertension     Status post dialysis 2012    Therapy     Thrombocytopenia 2012     Past Surgical History:   Procedure Laterality Date    AV FISTULA PLACEMENT Left     BREAST CYST ASPIRATION Left     BREAST CYST EXCISION Left     CERVICAL SPINE SURGERY Bilateral      SECTION      x1    COLONOSCOPY N/A 2022    Procedure: COLONOSCOPY;  Surgeon: Jordan Mcguire MD;  Location: [facility] ENDO;  Service: Endoscopy;  Laterality: N/A;    FISTULOGRAM N/A 2020    Procedure: Fistulogram;  Surgeon: Rahat Berger MD;  Location: [facility] CATH LAB/EP;  Service: Cardiology;  Laterality: N/A;    pd catheter      PERCUTANEOUS TRANSLUMINAL ANGIOPLASTY OF ARTERIOVENOUS FISTULA N/A 2020    Procedure:  PTA, AV FISTULA;  Surgeon: Rahat Berger MD;  Location: [facility] CATH LAB/EP;  Service: Cardiology;  Laterality: N/A;    PERITONEAL CATHETER REMOVAL N/A 11/30/2018    Procedure: REMOVAL, CATHETER, DIALYSIS, PERITONEAL;  Surgeon: Mukesh Gonsales Jr., MD;  Location: [facility] [place] OR;  Service: General;  Laterality: N/A;    RENAL BIOPSY  2016    THROMBECTOMY OF HEMODIALYSIS ACCESS SITE N/A 06/09/2020    Procedure: Thrombectomy, Hemodialysis Graft Or Fistula;  Surgeon: Rahat Berger MD;  Location: [facility] CATH LAB/EP;  Service: Cardiology;  Laterality: N/A;    VASCULAR SURGERY  08/2012    dialysis catheter to right subclavian     Social History     Tobacco Use    Smoking status: Every Day     Current packs/day: 1.00     Average packs/day: 1 pack/day for 41.2 years (41.2 ttl pk-yrs)     Types: Cigarettes     Start date: 1983     Passive exposure: Never    Smokeless tobacco: Never    Tobacco comments:     Pt. states recently cut down to 0.5 pk/day. Enrolled in the [employer] on 6/9/20 ([facility] Member ID # [id number]). She declines Ambulatory referral to Smoking Cessation program. Handout provided.   Substance Use Topics    Alcohol use: Not Currently     Comment: occasional    Drug use: Not Currently     Types: Marijuana       PTA Medications   Medication Sig    acetaminophen (TYLENOL) 325 MG tablet Take 650 mg by mouth every 4 (four) hours as needed.    B COMPLEX W-C NO.20/FOLIC ACID (VIRT-CAPS ORAL) Take by mouth once daily.    buPROPion (WELLBUTRIN XL) 150 MG TB24 tablet Take 1 tablet (150 mg total) by mouth once daily.    carvedilol (COREG CR) 20 mg 24 hr capsule Take 4 capsules (80 mg total) by mouth once daily. Can hold on dialysis days    carvediloL (COREG) 25 MG tablet Take 25 mg by mouth 2 (two) times daily.    cinacalcet (SENSIPAR) 30 MG Tab Take by mouth daily with breakfast.    clopidogreL (PLAVIX) 75 mg tablet Take 1 tablet (75 mg total) by mouth once daily.    EScitalopram oxalate (LEXAPRO) 20 MG tablet Take 1 tablet (20 mg  total) by mouth once daily.    fluticasone propionate (FLONASE) 50 mcg/actuation nasal spray 1 spray by Each Nostril route daily as needed.    melatonin 5 mg Cap Take 5 mg by mouth nightly.    mirtazapine (REMERON) 15 MG tablet Take 1 tablet (15 mg total) by mouth every evening.    montelukast (SINGULAIR) 10 mg tablet TAKE 1 TABLET(10 MG) BY MOUTH EVERY EVENING    NIFEdipine (PROCARDIA-XL) 60 MG (OSM) 24 hr tablet Take 60 mg by mouth once daily.    sacubitriL-valsartan (ENTRESTO)  mg per tablet Take 1 tablet by mouth 2 (two) times daily.    sevelamer carbonate (RENVELA) 800 mg Tab Take 800 mg by mouth 3 (three) times daily with meals.    sucroferric oxyhydroxide (VELPHORO) 500 mg Chew Take 1,000 mg by mouth 3 (three) times daily.    traMADoL (ULTRAM) 50 mg tablet Take 50 mg by mouth every 6 (six) hours as needed.    albuterol (VENTOLIN HFA) 90 mcg/actuation inhaler Inhale 2 puffs into the lungs every 6 (six) hours as needed for Wheezing or Shortness of Breath. Rescue    cetirizine (ZYRTEC) 5 MG tablet Take 1 tablet (5 mg total) by mouth once daily. (Patient not taking: Reported on 2/26/2024)    cyproheptadine (PERIACTIN) 4 mg tablet Take 1 tablet (4 mg total) by mouth 3 (three) times daily as needed (appetite). (Patient not taking: Reported on 1/23/2024)    fluticasone-salmeterol diskus inhaler 100-50 mcg Inhale 1 puff into the lungs 2 (two) times daily. Controller    naltrexone (DEPADE) 50 mg tablet Start with 1/2 tab (25 mg) by mouth daily, may increase to 1 tab (50 mg) daily if able. (Patient not taking: Reported on 1/23/2024)    tiotropium bromide (SPIRIVA RESPIMAT) 2.5 mcg/actuation inhaler Inhale 2 puffs into the lungs once daily. Controller     Review of patient's allergies indicates:   Allergen Reactions    Doxycycline Swelling, Rash and Hives    Gentamicin Anaphylaxis    Vancomycin analogues Anaphylaxis        Review of Systems   Constitutional: Negative.    Respiratory: Negative.     Cardiovascular:  Negative.        Objective:      Vital Signs (Most Recent)  Temp: 97.5 °F (36.4 °C) (03/20/24 1115)  Pulse: 77 (03/20/24 1115)  Resp: 16 (03/20/24 1115)  BP: 113/85 (03/20/24 1115)  SpO2: 99 % (03/20/24 1115)    Vital Signs Range (Last 24H):  Temp:  [97.5 °F (36.4 °C)]   Pulse:  [77]   Resp:  [16]   BP: (113)/(85)   SpO2:  [99 %]     Physical Exam  Constitutional:       General: She is not in acute distress.     Appearance: She is well-developed. She is not diaphoretic.   HENT:      Head: Normocephalic and atraumatic.   Pulmonary:      Effort: Pulmonary effort is normal. No respiratory distress.   Musculoskeletal:      Cervical back: Neck supple.      Comments: LUE radiocephalic AVF with weak thrill, moderate pulsatility, appropriate pulse augmentation, no change on elevation.   Skin:     General: Skin is warm and dry.   Neurological:      Mental Status: She is alert and oriented to person, place, and time.   Psychiatric:         Behavior: Behavior normal.         Assessment:      There are no hospital problems to display for this patient.      Plan:      Fistulogram today.  Risks explained, consent signed.

## 2024-03-20 NOTE — BRIEF OP NOTE
[facility] - Cath Lab ([place])  Brief Operative Note    Surgery Date: 3/20/2024     Surgeon(s) and Role:     * Matheus Galaviz MD - Primary    Assisting Surgeon: None    Pre-op Diagnosis:  End stage renal disease [N18.6]  Mechanical complication of arteriovenous fistula surgically created, initial encounter [T82.590A]    Post-op Diagnosis:  Post-Op Diagnosis Codes:     * End stage renal disease [N18.6]     * Mechanical complication of arteriovenous fistula surgically created, initial encounter [T82.590A]    Procedure(s) (LRB):  Fistulogram LEFT FOREARM (Left)    Anesthesia: 1% lidocaine, 1 mg versed, 50 mcg fentanyl    Operative Findings: Patient was prepped and draped in a sterile fashion.  This was a successful fistulogram of her LUE radiocephalic AV fistula with angioplasty performed in the JA portion of the fistula.  Patient tolerated the procedure well and no immediate complications noted.    Estimated Blood Loss: < 10 mL         Specimens:   Specimen (24h ago, onward)      None              Discharge Note    OUTCOME:  Patient was prepped and draped in a sterile fashion.  This was a successful fistulogram of her LUE radiocephalic AV fistula with angioplasty performed in the JA portion of the fistula.  Patient tolerated the procedure well and no immediate complications noted.    DISPOSITION: Home or Self Care    FINAL DIAGNOSIS:  <principal problem not specified>    FOLLOWUP: In clinic in 1 week for follow up ultrasound, suture removal and exam    DISCHARGE INSTRUCTIONS:    Discharge Procedure Orders   Lifting restrictions   Order Comments: No heavy lifting for 24 hours.     Call MD for:  temperature >100.4     Call MD for:  severe uncontrolled pain     Call MD for:  redness, tenderness, or signs of infection (pain, swelling, redness, odor or green/yellow discharge around incision site)     Call MD for:   Order Comments: Bleeding from the access site.     Activity as tolerated

## 2024-04-02 ENCOUNTER — TELEPHONE (OUTPATIENT)
Dept: HEMATOLOGY/ONCOLOGY | Facility: CLINIC | Age: 59
End: 2024-04-02
Payer: MEDICARE

## 2024-04-03 NOTE — TELEPHONE ENCOUNTER
Called and spoke with Ms Booth on 04/02/2024. Informed Ms Booth, Dr Galaviz will not be in clinic on 04/03, the day of her scheduled appointment. I was calling to reschedule her appointment. Ms Booth states she was scheduled for her follow up appointment with Dr Galaviz on March 28 and when she arrived for her appointment -- 1 1/2 late for her appointment. Ms Booth states she called and spoke with someone and informed them she will be arriving late for her appointment. Informed Ms Booth we will need to reschedule her appointment. Ms Henrylps appointment was rescheduled to 04/03. At the time the appointment was rescheduled, Dr Galaviz schedule was open. Dr Galaviz has since booked out and all the appointments on today and tomorrow were rescheduled. Ms Booth voiced she did not wish to be rescheduled. She ask that Dr Galaviz call her. She was not please that her appointment was cancelled Phone message forwarded to Dr Galaviz.

## 2024-04-08 ENCOUNTER — TELEPHONE (OUTPATIENT)
Dept: CARDIOLOGY | Facility: CLINIC | Age: 59
End: 2024-04-08
Payer: MEDICARE

## 2024-04-08 NOTE — TELEPHONE ENCOUNTER
----- Message from Maia Valadez RN sent at 4/8/2024  2:17 PM CDT -----  Regarding: FW: BP  Sorry for the misunderstanding. She did stop the nifedipine as instructed way back.    Please advise,    ----- Message -----  From: Timo Rojo MD  Sent: 4/8/2024   2:00 PM CDT  To: Maia Valadez RN  Subject: RE: BP                                           Shell Carvalho,    I appreciate the message. I would be in favor of continuing her Coreg, but stopping her daily Nifedipine if that's ok with her. She has a low EF on her prior echocardiograms and Nifedipine isn't great in that situation.     You mind letting her know to make this change and to keep monitoring her BP? If her nephrologist has any issues, then I'll be happy to discuss further.     Thank you!    Timo  ----- Message -----  From: Maia Valadez RN  Sent: 4/8/2024   1:02 PM CDT  To: Maia Valadez RN; Timo Rojo MD  Subject: FW: BP                                           Pt c/o low bPs during dialysis down to 70s and not able to get enough fluid taken out/ finish dialysis.  Her BP runs 110/70 -120 in the morning (did not write readings down).  Even if holds her carvedilol phos 40 the day of dialysis she still drops down.  Readings:  bef dialysis 120/85 one day  evenings bef pills on both dialysis and non dialysis day: 117/80, 100/71, 116/80, 108/74, no hr record  Dialysis loreleirachel, Thu, sat am  For some reason she was 93/79 this Am and held her carvedilol.  She takes:  Carvedilol phos 80 mg q am, holds on dialysis days  carvedilol 25 bid was stopped  nifedipine 60 qam  Entresto  bid    Please advise,        ----- Message -----  From: Sue Tinoco MA  Sent: 4/8/2024  11:44 AM CDT  To: Maia Valadez RN  Subject: FW: BP                                             ----- Message -----  From: Ryann Wynn  Sent: 4/8/2024  11:41 AM CDT  To: [facility] Staff  Subject: BP                                                Patient was instructed to called in her bp. Pressure been running low since meds was changed.and today reading was 97/73 without meds. Please call back @ [phone]. Thank you Ryann

## 2024-04-08 NOTE — TELEPHONE ENCOUNTER
Dr Rojo was updated and responded to stop the carvedilol then. Pt updated and told to record bp hr. She verbalized understanding. Epic profile meds updated.

## 2024-04-17 ENCOUNTER — OFFICE VISIT (OUTPATIENT)
Dept: NEPHROLOGY | Facility: CLINIC | Age: 59
End: 2024-04-17
Payer: MEDICARE

## 2024-04-17 VITALS
DIASTOLIC BLOOD PRESSURE: 92 MMHG | HEART RATE: 81 BPM | BODY MASS INDEX: 20.68 KG/M2 | RESPIRATION RATE: 18 BRPM | WEIGHT: 136.44 LBS | OXYGEN SATURATION: 100 % | HEIGHT: 68 IN | SYSTOLIC BLOOD PRESSURE: 139 MMHG

## 2024-04-17 DIAGNOSIS — N18.6 ESRD ON DIALYSIS: Primary | ICD-10-CM

## 2024-04-17 DIAGNOSIS — Z99.2 ESRD ON DIALYSIS: Primary | ICD-10-CM

## 2024-04-17 PROCEDURE — 99214 OFFICE O/P EST MOD 30 MIN: CPT | Mod: PBBFAC

## 2024-04-17 PROCEDURE — 99999 PR PBB SHADOW E&M-EST. PATIENT-LVL IV: CPT | Mod: PBBFAC,,,

## 2024-04-17 PROCEDURE — 99213 OFFICE O/P EST LOW 20 MIN: CPT | Mod: S$PBB,,, | Performed by: INTERNAL MEDICINE

## 2024-04-25 ENCOUNTER — OFFICE VISIT (OUTPATIENT)
Dept: PULMONOLOGY | Facility: CLINIC | Age: 59
End: 2024-04-25
Payer: MEDICARE

## 2024-04-25 DIAGNOSIS — J44.1 COPD EXACERBATION: Primary | ICD-10-CM

## 2024-04-25 DIAGNOSIS — R05.9 COUGH, UNSPECIFIED TYPE: ICD-10-CM

## 2024-04-25 DIAGNOSIS — R93.89 ABNORMAL CT OF THE CHEST: ICD-10-CM

## 2024-04-25 PROCEDURE — 99999 PR PBB SHADOW E&M-EST. PATIENT-LVL II: CPT | Mod: PBBFAC,,, | Performed by: INTERNAL MEDICINE

## 2024-04-25 PROCEDURE — 99214 OFFICE O/P EST MOD 30 MIN: CPT | Mod: S$PBB,,, | Performed by: INTERNAL MEDICINE

## 2024-04-25 PROCEDURE — 99212 OFFICE O/P EST SF 10 MIN: CPT | Mod: PBBFAC,PN | Performed by: INTERNAL MEDICINE

## 2024-04-25 NOTE — PROGRESS NOTES
Subjective:      Patient ID: Jennifer Booth is a 59 y.o. female.    Chief Complaint: No chief complaint on file.    Jennifer Booth is a 58 y.o. female who presents in office for evaluation of COPD. Referred by Shon Arreguin MD. The chief complaint problem is new to me. Per discharge summary, hospitalized last month 09/14/2023-09/17/2023 for acute respiratory failure with hypoxia 2/2 pulmonary edema 2/2 hypertensive urgency after missing dialysis. Treated with continuous BiPAP, course of steroids, ICS/LABA, albuterol inhaler. Was then again hospitalized on 10/01/2023-10/04/2023 for hypertensive emergency. States she is doing much better since discharge. States her shortness of breath has significantly improved since starting inhalers, mild dyspnea associated with a wet and dry cough, chest tightness, and occasional wheezing. Stable GERD sxs, takes omeprazole, sinuses, post nasal drip, allergies, takes zyrtec. Reports unintentional weight loss (about 10 lbs within last 3 months) and decreased appetite. Report still smoking about 0.5 ppd.     Interval hx: Doing well from a lung standpoint.       Review of Systems   Respiratory:  Positive for shortness of breath and dyspnea on extertion. Negative for cough.      Objective:     Physical Exam   Constitutional: She is oriented to person, place, and time. She appears well-developed and well-nourished.   Cardiovascular: Normal rate and normal heart sounds.   Pulmonary/Chest: Normal expansion and effort normal. She has no rhonchi. She has no rales.   Neurological: She is alert and oriented to person, place, and time.   Skin: Skin is warm and dry.   Nursing note and vitals reviewed.    Personal Diagnostic Review  none pertinent      4/17/2024     1:23 PM 3/20/2024     2:10 PM 3/20/2024     1:55 PM 3/20/2024     1:40 PM 3/20/2024    11:15 AM 2/26/2024    10:14 AM 1/23/2024     2:34 PM   Pulmonary Function Tests   SpO2 100 % 100 % 100 % 100 % 99 % 98 % 100 %   Height 5'  "8" (1.727 m)    5' 8" (1.727 m) 5' 8" (1.727 m) 5' 8" (1.727 m)   Weight 61.9 kg (136 lb 7.4 oz)    61.7 kg (136 lb) 61.8 kg (136 lb 3.9 oz) 60.1 kg (132 lb 7.9 oz)   BMI (Calculated) 20.8    20.7 20.7 20.2        Assessment:     1. COPD exacerbation    2. Abnormal CT of the chest    3. Cough, unspecified type         Outpatient Encounter Medications as of 4/25/2024   Medication Sig Dispense Refill    acetaminophen (TYLENOL) 325 MG tablet Take 650 mg by mouth every 4 (four) hours as needed.      albuterol (VENTOLIN HFA) 90 mcg/actuation inhaler Inhale 2 puffs into the lungs every 6 (six) hours as needed for Wheezing or Shortness of Breath. Rescue 18 g 6    B COMPLEX W-C NO.20/FOLIC ACID (VIRT-CAPS ORAL) Take by mouth once daily.      buPROPion (WELLBUTRIN XL) 150 MG TB24 tablet Take 1 tablet (150 mg total) by mouth once daily. 90 tablet 3    cetirizine (ZYRTEC) 5 MG tablet Take 1 tablet (5 mg total) by mouth once daily. (Patient not taking: Reported on 2/26/2024) 30 tablet 2    cinacalcet (SENSIPAR) 30 MG Tab Take by mouth daily with breakfast.      clopidogreL (PLAVIX) 75 mg tablet Take 1 tablet (75 mg total) by mouth once daily. 30 tablet 11    cyproheptadine (PERIACTIN) 4 mg tablet Take 1 tablet (4 mg total) by mouth 3 (three) times daily as needed (appetite). (Patient not taking: Reported on 1/23/2024) 90 tablet 0    EScitalopram oxalate (LEXAPRO) 20 MG tablet Take 1 tablet (20 mg total) by mouth once daily. 90 tablet 3    fluticasone propionate (FLONASE) 50 mcg/actuation nasal spray 1 spray by Each Nostril route daily as needed.      fluticasone-salmeterol diskus inhaler 100-50 mcg Inhale 1 puff into the lungs 2 (two) times daily. Controller 60 each 2    melatonin 5 mg Cap Take 5 mg by mouth nightly.      mirtazapine (REMERON) 15 MG tablet Take 1 tablet (15 mg total) by mouth every evening. 90 tablet 1    montelukast (SINGULAIR) 10 mg tablet TAKE 1 TABLET(10 MG) BY MOUTH EVERY EVENING 90 tablet 3    naltrexone " (DEPADE) 50 mg tablet Start with 1/2 tab (25 mg) by mouth daily, may increase to 1 tab (50 mg) daily if able. (Patient not taking: Reported on 1/23/2024) 90 tablet 1    sacubitriL-valsartan (ENTRESTO)  mg per tablet Take 1 tablet by mouth 2 (two) times daily. 180 tablet 3    sevelamer carbonate (RENVELA) 800 mg Tab Take 800 mg by mouth 3 (three) times daily with meals.      sucroferric oxyhydroxide (VELPHORO) 500 mg Chew Take 1,000 mg by mouth 3 (three) times daily.      tiotropium bromide (SPIRIVA RESPIMAT) 2.5 mcg/actuation inhaler Inhale 2 puffs into the lungs once daily. Controller 4 g 2    traMADoL (ULTRAM) 50 mg tablet Take 50 mg by mouth every 6 (six) hours as needed.       Facility-Administered Encounter Medications as of 4/25/2024   Medication Dose Route Frequency Provider Last Rate Last Admin    0.9%  NaCl infusion   Intravenous Continuous Jordan Mcguire MD 20 mL/hr at 07/18/22 0943 New Bag at 07/18/22 0943    0.9%  NaCl infusion   Intravenous Continuous Jordan Mcguire MD   Stopped at 08/18/22 1042    sodium chloride 0.9% flush 10 mL  10 mL Intravenous PRN Jordan Mcguire MD        sodium chloride 0.9% flush 10 mL  10 mL Intravenous PRN Jordan Mcguire MD         No orders of the defined types were placed in this encounter.      Plan:     1. COPD exacerbation  Assessment & Plan:  Stable. Still smoking      2. Abnormal CT of the chest  Assessment & Plan:  GGOs may have been fluid. PET recently without lung issues.       3. Cough, unspecified type      Follow up in 6 months.     Clinton Wilson MD

## 2024-04-29 NOTE — PROGRESS NOTES
Cardiology Clinic Note  Reason for Visit: Murmur, HTN  Referring Physician: Dorys Dickinson MD    HPI:   Problem List:  Chronic Combined Systolic/Diastolic Heart Failure; EF 35-40% 10/23; NICM with negative SPECT 4/22  Multiple Myeloma, in remission  ESRD on HD TTS  Hypertension  Tobacco Use    Presenting for follow-up. Last seen in clinic 1/24. Had Coreg restarted last visit, but had to stop again to low BP with HD (on-going issue when trying to up-titrate GDMT for HF).     Today, she is doing ok, but got a letter saying she isn't a candidate for kidney transplant given her EF is 35-40%. Low BPs continue to be an issue with HD. Hold her Entresto dose morning of HD, but takes it afterwards. She is currently at her dry weight. No angina or other heart failure symptoms. No falls, syncope, or bleeding.    ROS:    Constitution: Negative for fever, chills, weight loss or gain.   HENT: Negative for sore throat, rhinorrhea, or headache.  Eyes: Negative for blurred or double vision.   Cardiovascular: See above  Pulmonary: Negative for SOB   Gastrointestinal: Negative for abdominal pain, nausea, vomiting, or diarrhea.   : Negative for dysuria.   Neurological: Negative for focal weakness or sensory changes.  PMH:     Past Medical History:   Diagnosis Date    Addiction to drug     Alcohol abuse     Allergic drug reaction 11/13/2017    Anemia     Anxiety     Arm pain, left 2/12/2014    Breast cyst     Chronic renal failure 2/12/2014    CKD (chronic kidney disease) stage 5, GFR less than 15 ml/min     COPD exacerbation 9/16/2023    Depression     Dialysis patient     dialysis m-w-f    Encounter for blood transfusion     GERD (gastroesophageal reflux disease)     Hx of psychiatric care     Hypertension     Mood swings     Multiple myeloma in remission 10/26/2012    per Hem/Oc note    Multiple thyroid nodules 10/22/2023    Psychiatric exam requested by authority     Psychiatric problem     Renal hypertension     Status post  dialysis 2012    Therapy     Thrombocytopenia 2012     Past Surgical History:   Procedure Laterality Date    AV FISTULA PLACEMENT Left 2014    BREAST CYST ASPIRATION Left 1995    BREAST CYST EXCISION Left     CERVICAL SPINE SURGERY Bilateral      SECTION      x1    COLONOSCOPY N/A 2022    Procedure: COLONOSCOPY;  Surgeon: Jordan Mcguire MD;  Location: [facility] ENDO;  Service: Endoscopy;  Laterality: N/A;    FISTULOGRAM N/A 2020    Procedure: Fistulogram;  Surgeon: Rahat Berger MD;  Location: [facility] CATH LAB/EP;  Service: Cardiology;  Laterality: N/A;    FISTULOGRAM Left 3/20/2024    Procedure: Fistulogram LEFT FOREARM;  Surgeon: Matheus Galaviz MD;  Location: [facility] CATH LAB;  Service: Nephrology;  Laterality: Left;    pd catheter      PERCUTANEOUS TRANSLUMINAL ANGIOPLASTY OF ARTERIOVENOUS FISTULA N/A 2020    Procedure: PTA, AV FISTULA;  Surgeon: Rahat Berger MD;  Location: [facility] CATH LAB/EP;  Service: Cardiology;  Laterality: N/A;    PERITONEAL CATHETER REMOVAL N/A 2018    Procedure: REMOVAL, CATHETER, DIALYSIS, PERITONEAL;  Surgeon: Mukesh Gonsales Jr., MD;  Location: [facility] OR;  Service: General;  Laterality: N/A;    RENAL BIOPSY  2016    THROMBECTOMY OF HEMODIALYSIS ACCESS SITE N/A 2020    Procedure: Thrombectomy, Hemodialysis Graft Or Fistula;  Surgeon: Rahat Berger MD;  Location: [facility] CATH LAB/EP;  Service: Cardiology;  Laterality: N/A;    VASCULAR SURGERY  2012    dialysis catheter to right subclavian     Allergies:     Review of patient's allergies indicates:   Allergen Reactions    Doxycycline Swelling, Rash and Hives    Gentamicin Anaphylaxis    Vancomycin analogues Anaphylaxis     Medications:     Current Outpatient Medications on File Prior to Visit   Medication Sig Dispense Refill    acetaminophen (TYLENOL) 325 MG tablet Take 650 mg by mouth every 4 (four) hours as needed.      albuterol (VENTOLIN HFA) 90 mcg/actuation inhaler Inhale 2 puffs into the  lungs every 6 (six) hours as needed for Wheezing or Shortness of Breath. Rescue 18 g 6    B COMPLEX W-C NO.20/FOLIC ACID (VIRT-CAPS ORAL) Take by mouth once daily.      buPROPion (WELLBUTRIN XL) 150 MG TB24 tablet Take 1 tablet (150 mg total) by mouth once daily. 90 tablet 3    cetirizine (ZYRTEC) 5 MG tablet Take 1 tablet (5 mg total) by mouth once daily. 30 tablet 2    cinacalcet (SENSIPAR) 30 MG Tab Take by mouth daily with breakfast.      clopidogreL (PLAVIX) 75 mg tablet Take 1 tablet (75 mg total) by mouth once daily. 30 tablet 11    cyproheptadine (PERIACTIN) 4 mg tablet Take 1 tablet (4 mg total) by mouth 3 (three) times daily as needed (appetite). 90 tablet 0    EScitalopram oxalate (LEXAPRO) 20 MG tablet Take 1 tablet (20 mg total) by mouth once daily. 90 tablet 3    fluticasone propionate (FLONASE) 50 mcg/actuation nasal spray 1 spray by Each Nostril route daily as needed.      fluticasone-salmeterol diskus inhaler 100-50 mcg Inhale 1 puff into the lungs 2 (two) times daily. Controller 60 each 2    melatonin 5 mg Cap Take 5 mg by mouth nightly.      mirtazapine (REMERON) 15 MG tablet Take 1 tablet (15 mg total) by mouth every evening. 90 tablet 1    montelukast (SINGULAIR) 10 mg tablet TAKE 1 TABLET(10 MG) BY MOUTH EVERY EVENING 90 tablet 3    sacubitriL-valsartan (ENTRESTO)  mg per tablet Take 1 tablet by mouth 2 (two) times daily. 180 tablet 3    sevelamer carbonate (RENVELA) 800 mg Tab Take 800 mg by mouth 3 (three) times daily with meals.      tiotropium bromide (SPIRIVA RESPIMAT) 2.5 mcg/actuation inhaler Inhale 2 puffs into the lungs once daily. Controller 4 g 2    traMADoL (ULTRAM) 50 mg tablet Take 50 mg by mouth every 6 (six) hours as needed.      naltrexone (DEPADE) 50 mg tablet Start with 1/2 tab (25 mg) by mouth daily, may increase to 1 tab (50 mg) daily if able. (Patient not taking: Reported on 4/30/2024) 90 tablet 1    sucroferric oxyhydroxide (VELPHORO) 500 mg Chew Take 1,000 mg by  "mouth 3 (three) times daily. (Patient not taking: Reported on 4/30/2024)       Current Facility-Administered Medications on File Prior to Visit   Medication Dose Route Frequency Provider Last Rate Last Admin    0.9%  NaCl infusion   Intravenous Continuous Jordan Mcguire MD 20 mL/hr at 07/18/22 0943 New Bag at 07/18/22 0943    0.9%  NaCl infusion   Intravenous Continuous Jordan Mcguire MD   Stopped at 08/18/22 1042    sodium chloride 0.9% flush 10 mL  10 mL Intravenous PRN Jordan Mcguire MD        sodium chloride 0.9% flush 10 mL  10 mL Intravenous PRN Jordan Mcguire MD         Social History:     Social History     Tobacco Use    Smoking status: Every Day     Current packs/day: 1.00     Average packs/day: 1 pack/day for 41.3 years (41.3 ttl pk-yrs)     Types: Cigarettes     Start date: 1983     Passive exposure: Never    Smokeless tobacco: Never    Tobacco comments:     Pt. states recently cut down to 0.5 pk/day. Enrolled in the [employer] on 6/9/20 ([facility] Member ID # [id number]). She declines Ambulatory referral to Smoking Cessation program. Handout provided.   Substance Use Topics    Alcohol use: Not Currently     Comment: occasional     Family History:     Family History   Problem Relation Name Age of Onset    Hypertension Mother      Heart failure Mother      Ovarian cancer Maternal Aunt      Diabetes Maternal Grandmother      Stroke Maternal Grandmother      Breast cancer Neg Hx      Colon cancer Neg Hx       Physical Exam:   /84   Pulse 86   Ht 5' 8" (1.727 m)   Wt 63 kg (138 lb 14.2 oz)   LMP 01/01/2016 (Approximate) Comment: 2016  SpO2 97%   BMI 21.12 kg/m²        Physical Exam   Constitutional: She is oriented to person, place, and time. No distress.   HENT:   Mouth/Throat: Mucous membranes are moist.   Cardiovascular: Normal rate, regular rhythm and normal pulses.   AVF in LUE with thrill and bruit   Pulmonary/Chest: Effort normal. No respiratory distress.   Abdominal: Normal " appearance.   Musculoskeletal:      Right lower leg: No edema.      Left lower leg: No edema.   Neurological: She is alert and oriented to person, place, and time.        Labs:     Lab Results   Component Value Date     02/23/2024    K 5.1 02/23/2024    CL 96 02/23/2024    CO2 30 (H) 02/23/2024    BUN 45 (H) 02/23/2024    CREATININE 8.9 (H) 02/23/2024    GLUCOSE 83 03/16/2022    ANIONGAP 17 (H) 02/23/2024     Lab Results   Component Value Date    HGBA1C 4.6 02/10/2021     Lab Results   Component Value Date     (H) 01/08/2024    BNP 2,665 (H) 10/01/2023    BNP 3,348 (H) 09/14/2023    Lab Results   Component Value Date    WBC 6.12 02/23/2024    HGB 12.3 02/23/2024    HCT 38.3 02/23/2024    HCT 44 09/14/2023     02/23/2024    GRAN 3.8 02/23/2024    GRAN 62.3 02/23/2024     Lab Results   Component Value Date    CHOL 161 07/21/2021    HDL 65 07/21/2021    LDLCALC 86.0 07/21/2021    TRIG 50 07/21/2021          Imaging:   TTE 10/2/23    Left Ventricle: The left ventricle is moderately dilated. Normal wall thickness. There is eccentric hypertrophy. Normal wall motion. There is moderately reduced systolic function with a visually estimated ejection fraction of 35 - 40%.    Right Ventricle: Normal right ventricular cavity size. Wall thickness is normal. Right ventricle wall motion  is normal. Systolic function is normal.    Left Atrium: Left atrium is severely dilated.    Aortic Valve: The aortic valve is a trileaflet valve. There is mild aortic valve sclerosis. Mildly restricted motion. There is mild to moderate aortic regurgitation.    Mitral Valve: There is mild mitral annular calcification present. There is severe regurgitation with a centrally directed jet.    Pulmonary Artery: The estimated pulmonary artery systolic pressure is 42 mmHg.    IVC/SVC: Intermediate venous pressure at 8 mmHg.    TTE 4/8/22  The left ventricle is mildly enlarged with mildly decreased systolic function.  The estimated  ejection fraction is 48%.  There is mild left ventricular global hypokinesis.  Grade I left ventricular diastolic dysfunction.  Severe left atrial enlargement.  Normal right ventricular size with normal right ventricular systolic function.  Mild aortic regurgitation.  Moderate mitral regurgitation.  Mild tricuspid regurgitation.  Normal central venous pressure (3 mmHg).  The estimated PA systolic pressure is 33 mmHg.    SPECT 4/8/22    Normal myocardial perfusion scan. There is no evidence of myocardial ischemia or infarction.    Bowel is overlying the inferior wall.    The gated perfusion images showed an ejection fraction of 55% at rest. The gated perfusion images showed an ejection fraction of 52% post stress. Normal ejection fraction is greater than 53%.    There is normal wall motion at rest and post stress.    LV cavity size is  and normal at stress.    The EKG portion of this study is negative for ischemia.    The patient reported no chest pain during the stress test.    There were no arrhythmias during stress.    When compared to the previous study from 6/14/2019, there are no significant changes.    EKG:  sinus tachycardia, LAE, LVH per my read  Assessment:     1. Chronic combined systolic and diastolic heart failure  metoprolol succinate (TOPROL-XL) 25 MG 24 hr tablet    Echo      2. Hypertension secondary to other renal disorders        3. ESRD (end stage renal disease) on dialysis        4. Tobacco use disorder                  Plan:   Continue Entresto 97/103 mg BID  Will stop Coreg, but will start Toprol 25 mg daily (minimal effect on BP); will up-titrate each week as possible  Repeat echo in 4-6 weeks to reassess EF for transplant candidacy  Previously seen by [facility] 1/24 as well  Continue Plavix for LUE stent  Continue follow-up with renal transplant for consideration of listing    RTC 3 months or sooner PRN.    Discussed case with Dr Sandy Ledezma MD.    Timo Rojo MD PGY6  Cardiovascular  Medicine Fellow  [facility]  Pager: [phone]

## 2024-04-30 ENCOUNTER — OFFICE VISIT (OUTPATIENT)
Dept: CARDIOLOGY | Facility: CLINIC | Age: 59
End: 2024-04-30
Payer: MEDICARE

## 2024-04-30 VITALS
BODY MASS INDEX: 21.05 KG/M2 | WEIGHT: 138.88 LBS | SYSTOLIC BLOOD PRESSURE: 136 MMHG | DIASTOLIC BLOOD PRESSURE: 84 MMHG | OXYGEN SATURATION: 97 % | HEART RATE: 86 BPM | HEIGHT: 68 IN

## 2024-04-30 DIAGNOSIS — Z99.2 ESRD (END STAGE RENAL DISEASE) ON DIALYSIS: ICD-10-CM

## 2024-04-30 DIAGNOSIS — I50.42 CHRONIC COMBINED SYSTOLIC AND DIASTOLIC HEART FAILURE: Primary | ICD-10-CM

## 2024-04-30 DIAGNOSIS — F17.200 TOBACCO USE DISORDER: Chronic | ICD-10-CM

## 2024-04-30 DIAGNOSIS — I15.1 HYPERTENSION SECONDARY TO OTHER RENAL DISORDERS: ICD-10-CM

## 2024-04-30 DIAGNOSIS — N18.6 ESRD (END STAGE RENAL DISEASE) ON DIALYSIS: ICD-10-CM

## 2024-04-30 PROCEDURE — 99215 OFFICE O/P EST HI 40 MIN: CPT | Mod: PBBFAC | Performed by: INTERNAL MEDICINE

## 2024-04-30 PROCEDURE — 99999 PR PBB SHADOW E&M-EST. PATIENT-LVL V: CPT | Mod: PBBFAC,GC,, | Performed by: INTERNAL MEDICINE

## 2024-04-30 PROCEDURE — 99214 OFFICE O/P EST MOD 30 MIN: CPT | Mod: S$PBB,GC,, | Performed by: INTERNAL MEDICINE

## 2024-04-30 RX ORDER — METOPROLOL SUCCINATE 25 MG/1
25 TABLET, EXTENDED RELEASE ORAL DAILY
Qty: 90 TABLET | Refills: 3 | Status: SHIPPED | OUTPATIENT
Start: 2024-04-30 | End: 2025-04-30

## 2024-04-30 NOTE — PATIENT INSTRUCTIONS
Start Toprol 25 mg daily; ok to hold morning prior to dialysis and can take afterwards  After 1 week, if feeling well please increase to 50 mg daily  Repeat echo to reassess EF for kidney transplant eval

## 2024-05-03 ENCOUNTER — LAB VISIT (OUTPATIENT)
Dept: LAB | Facility: HOSPITAL | Age: 59
End: 2024-05-03
Attending: NURSE PRACTITIONER
Payer: MEDICARE

## 2024-05-03 ENCOUNTER — TELEPHONE (OUTPATIENT)
Dept: TRANSPLANT | Facility: CLINIC | Age: 59
End: 2024-05-03
Payer: MEDICARE

## 2024-05-03 DIAGNOSIS — Z99.2 ANEMIA IN CHRONIC KIDNEY DISEASE, ON CHRONIC DIALYSIS: ICD-10-CM

## 2024-05-03 DIAGNOSIS — N18.6 ANEMIA IN CHRONIC KIDNEY DISEASE, ON CHRONIC DIALYSIS: ICD-10-CM

## 2024-05-03 DIAGNOSIS — D63.1 ANEMIA IN CHRONIC KIDNEY DISEASE, ON CHRONIC DIALYSIS: ICD-10-CM

## 2024-05-03 LAB
BASOPHILS # BLD AUTO: 0.05 K/UL (ref 0–0.2)
BASOPHILS NFR BLD: 1.1 % (ref 0–1.9)
DIFFERENTIAL METHOD BLD: ABNORMAL
EOSINOPHIL # BLD AUTO: 0.1 K/UL (ref 0–0.5)
EOSINOPHIL NFR BLD: 3 % (ref 0–8)
ERYTHROCYTE [DISTWIDTH] IN BLOOD BY AUTOMATED COUNT: 16 % (ref 11.5–14.5)
HCT VFR BLD AUTO: 36.2 % (ref 37–48.5)
HGB BLD-MCNC: 11.5 G/DL (ref 12–16)
IMM GRANULOCYTES # BLD AUTO: 0.01 K/UL (ref 0–0.04)
IMM GRANULOCYTES NFR BLD AUTO: 0.2 % (ref 0–0.5)
LYMPHOCYTES # BLD AUTO: 1.9 K/UL (ref 1–4.8)
LYMPHOCYTES NFR BLD: 40.3 % (ref 18–48)
MCH RBC QN AUTO: 28.8 PG (ref 27–31)
MCHC RBC AUTO-ENTMCNC: 31.8 G/DL (ref 32–36)
MCV RBC AUTO: 91 FL (ref 82–98)
MONOCYTES # BLD AUTO: 0.6 K/UL (ref 0.3–1)
MONOCYTES NFR BLD: 11.8 % (ref 4–15)
NEUTROPHILS # BLD AUTO: 2.1 K/UL (ref 1.8–7.7)
NEUTROPHILS NFR BLD: 43.6 % (ref 38–73)
NRBC BLD-RTO: 0 /100 WBC
PLATELET # BLD AUTO: 188 K/UL (ref 150–450)
PMV BLD AUTO: 10.7 FL (ref 9.2–12.9)
RBC # BLD AUTO: 4 M/UL (ref 4–5.4)
WBC # BLD AUTO: 4.74 K/UL (ref 3.9–12.7)

## 2024-05-03 PROCEDURE — 85025 COMPLETE CBC W/AUTO DIFF WBC: CPT | Performed by: NURSE PRACTITIONER

## 2024-05-03 PROCEDURE — 36415 COLL VENOUS BLD VENIPUNCTURE: CPT | Performed by: NURSE PRACTITIONER

## 2024-05-03 NOTE — TELEPHONE ENCOUNTER
Returned call to patient, reviewed denial letter and the requirements for re-referral. Patient verbalized understanding.        ----- Message from Wendy Sierra MA sent at 5/3/2024 12:02 PM CDT -----  Regarding: RE: Letter  Contact: [phone]    ----- Message -----  From: Isabelle Veliz  Sent: 5/3/2024  10:18 AM CDT  To: [facility] Pre-Kidney Transplant Non-Clinical  Subject: Letter                                           CONSULT/ADVISORY    Name of Caller:  JOHN BEST [[id number]]    Contact Preference:  [phone]    Nature of Call:  Pt is requesting a call back in regards to a letter she received.

## 2024-05-07 ENCOUNTER — PATIENT MESSAGE (OUTPATIENT)
Dept: ADMINISTRATIVE | Facility: HOSPITAL | Age: 59
End: 2024-05-07
Payer: MEDICARE

## 2024-05-07 ENCOUNTER — DOCUMENTATION ONLY (OUTPATIENT)
Dept: PSYCHIATRY | Facility: CLINIC | Age: 59
End: 2024-05-07
Payer: MEDICARE

## 2024-05-07 ENCOUNTER — PATIENT MESSAGE (OUTPATIENT)
Dept: PSYCHIATRY | Facility: CLINIC | Age: 59
End: 2024-05-07
Payer: MEDICARE

## 2024-05-07 NOTE — PROGRESS NOTES
Pt reached out to office regarding re/enrollment. Called pt to extend offer for Tuesday ORP aftercare group- left office contact number and [employer] information for further follow up.

## 2024-05-08 ENCOUNTER — PATIENT OUTREACH (OUTPATIENT)
Dept: ADMINISTRATIVE | Facility: HOSPITAL | Age: 59
End: 2024-05-08
Payer: MEDICARE

## 2024-05-08 DIAGNOSIS — Z12.31 ENCOUNTER FOR SCREENING MAMMOGRAM FOR BREAST CANCER: Primary | ICD-10-CM

## 2024-05-08 NOTE — PROGRESS NOTES
Population Health Chart Review & Patient Outreach Details      Additional [facility] Health Notes:    Called and assisted patient setting up mammogram appointment. Patient agreed to appt date/time.        Updates Requested / Reviewed:      Updated Care Coordination Note, Care Everywhere, Care Team Updated, and Immunizations Reconciliation Completed or Queried: [facility] Topics Overdue:      [facility] Score: 0     Patient is not due for any topics at this time.    Shingles/Zoster Vaccine  RSV Vaccine                  Health Maintenance Topic(s) Outreach Outcomes & Actions Taken:    Breast Cancer Screening - Outreach Outcomes & Actions Taken  : Mammogram Order Placed and Mammogram Screening Scheduled

## 2024-05-14 ENCOUNTER — HOSPITAL ENCOUNTER (INPATIENT)
Facility: OTHER | Age: 59
LOS: 1 days | Discharge: HOME | DRG: 628 | End: 2024-05-15
Attending: INTERNAL MEDICINE | Admitting: INTERNAL MEDICINE
Payer: MEDICARE

## 2024-05-14 ENCOUNTER — ANESTHESIA EVENT (OUTPATIENT)
Dept: SURGERY | Facility: OTHER | Age: 59
DRG: 628 | End: 2024-05-14
Payer: MEDICARE

## 2024-05-14 ENCOUNTER — ANESTHESIA (OUTPATIENT)
Dept: SURGERY | Facility: OTHER | Age: 59
DRG: 628 | End: 2024-05-14
Payer: MEDICARE

## 2024-05-14 DIAGNOSIS — N18.6 END STAGE RENAL DISEASE: ICD-10-CM

## 2024-05-14 DIAGNOSIS — Z99.2 ESRD (END STAGE RENAL DISEASE) ON DIALYSIS: ICD-10-CM

## 2024-05-14 DIAGNOSIS — N18.6 ESRD (END STAGE RENAL DISEASE) ON DIALYSIS: ICD-10-CM

## 2024-05-14 DIAGNOSIS — N18.6 ESRD ON DIALYSIS: ICD-10-CM

## 2024-05-14 DIAGNOSIS — T82.868A: ICD-10-CM

## 2024-05-14 DIAGNOSIS — T82.868A THROMBOSIS OF DIALYSIS VASCULAR ACCESS, INITIAL ENCOUNTER: Primary | ICD-10-CM

## 2024-05-14 DIAGNOSIS — Z99.2 ESRD ON DIALYSIS: ICD-10-CM

## 2024-05-14 LAB
ALBUMIN SERPL BCP-MCNC: 3.1 G/DL (ref 3.5–5.2)
ANION GAP SERPL CALC-SCNC: 17 MMOL/L (ref 8–16)
ANION GAP SERPL CALC-SCNC: 20 MMOL/L (ref 8–16)
BASOPHILS # BLD AUTO: 0 K/UL (ref 0–0.2)
BASOPHILS NFR BLD: 0 % (ref 0–1.9)
BUN SERPL-MCNC: 113 MG/DL (ref 6–20)
BUN SERPL-MCNC: 116 MG/DL (ref 6–20)
CALCIUM SERPL-MCNC: 8.1 MG/DL (ref 8.7–10.5)
CALCIUM SERPL-MCNC: 8.2 MG/DL (ref 8.7–10.5)
CHLORIDE SERPL-SCNC: 98 MMOL/L (ref 95–110)
CHLORIDE SERPL-SCNC: 99 MMOL/L (ref 95–110)
CO2 SERPL-SCNC: 21 MMOL/L (ref 23–29)
CO2 SERPL-SCNC: 22 MMOL/L (ref 23–29)
CREAT SERPL-MCNC: 16.7 MG/DL (ref 0.5–1.4)
CREAT SERPL-MCNC: 17.5 MG/DL (ref 0.5–1.4)
DIFFERENTIAL METHOD BLD: ABNORMAL
EOSINOPHIL # BLD AUTO: 0.1 K/UL (ref 0–0.5)
EOSINOPHIL NFR BLD: 1.1 % (ref 0–8)
ERYTHROCYTE [DISTWIDTH] IN BLOOD BY AUTOMATED COUNT: 15.9 % (ref 11.5–14.5)
EST. GFR  (NO RACE VARIABLE): 2 ML/MIN/1.73 M^2
EST. GFR  (NO RACE VARIABLE): 2 ML/MIN/1.73 M^2
GLUCOSE SERPL-MCNC: 245 MG/DL (ref 70–110)
GLUCOSE SERPL-MCNC: 76 MG/DL (ref 70–110)
HCT VFR BLD AUTO: 24.7 % (ref 37–48.5)
HGB BLD-MCNC: 8.1 G/DL (ref 12–16)
IMM GRANULOCYTES # BLD AUTO: 0.04 K/UL (ref 0–0.04)
IMM GRANULOCYTES NFR BLD AUTO: 0.9 % (ref 0–0.5)
LYMPHOCYTES # BLD AUTO: 0.8 K/UL (ref 1–4.8)
LYMPHOCYTES NFR BLD: 17.2 % (ref 18–48)
MAGNESIUM SERPL-MCNC: 1.6 MG/DL (ref 1.6–2.6)
MCH RBC QN AUTO: 28.7 PG (ref 27–31)
MCHC RBC AUTO-ENTMCNC: 32.8 G/DL (ref 32–36)
MCV RBC AUTO: 88 FL (ref 82–98)
MONOCYTES # BLD AUTO: 0 K/UL (ref 0.3–1)
MONOCYTES NFR BLD: 0.7 % (ref 4–15)
NEUTROPHILS # BLD AUTO: 3.5 K/UL (ref 1.8–7.7)
NEUTROPHILS NFR BLD: 80.1 % (ref 38–73)
NRBC BLD-RTO: 0 /100 WBC
PHOSPHATE SERPL-MCNC: 6.4 MG/DL (ref 2.7–4.5)
PLATELET # BLD AUTO: 122 K/UL (ref 150–450)
PMV BLD AUTO: 11 FL (ref 9.2–12.9)
POCT GLUCOSE: 139 MG/DL (ref 70–110)
POCT GLUCOSE: 234 MG/DL (ref 70–110)
POTASSIUM SERPL-SCNC: 7.4 MMOL/L (ref 3.5–5.1)
POTASSIUM SERPL-SCNC: 7.4 MMOL/L (ref 3.5–5.1)
RBC # BLD AUTO: 2.82 M/UL (ref 4–5.4)
SODIUM SERPL-SCNC: 138 MMOL/L (ref 136–145)
SODIUM SERPL-SCNC: 139 MMOL/L (ref 136–145)
WBC # BLD AUTO: 4.36 K/UL (ref 3.9–12.7)

## 2024-05-14 PROCEDURE — 63600175 PHARM REV CODE 636 W HCPCS: Performed by: INTERNAL MEDICINE

## 2024-05-14 PROCEDURE — 85025 COMPLETE CBC W/AUTO DIFF WBC: CPT | Performed by: NURSE PRACTITIONER

## 2024-05-14 PROCEDURE — 77001 FLUOROGUIDE FOR VEIN DEVICE: CPT | Mod: 26,,, | Performed by: SPECIALIST

## 2024-05-14 PROCEDURE — 25000003 PHARM REV CODE 250: Mod: JZ,JG | Performed by: INTERNAL MEDICINE

## 2024-05-14 PROCEDURE — 25500020 PHARM REV CODE 255: Performed by: INTERNAL MEDICINE

## 2024-05-14 PROCEDURE — 25000003 PHARM REV CODE 250: Performed by: SPECIALIST

## 2024-05-14 PROCEDURE — C1887 CATHETER, GUIDING: HCPCS | Performed by: INTERNAL MEDICINE

## 2024-05-14 PROCEDURE — 37000009 HC ANESTHESIA EA ADD 15 MINS: Performed by: SPECIALIST

## 2024-05-14 PROCEDURE — 20000000 HC ICU ROOM

## 2024-05-14 PROCEDURE — D9220A PRA ANESTHESIA: Mod: CRNA,,, | Performed by: STUDENT IN AN ORGANIZED HEALTH CARE EDUCATION/TRAINING PROGRAM

## 2024-05-14 PROCEDURE — C1757 CATH, THROMBECTOMY/EMBOLECT: HCPCS | Performed by: INTERNAL MEDICINE

## 2024-05-14 PROCEDURE — D9220A PRA ANESTHESIA: Mod: ANES,,, | Performed by: ANESTHESIOLOGY

## 2024-05-14 PROCEDURE — C1750 CATH, HEMODIALYSIS,LONG-TERM: HCPCS | Performed by: SPECIALIST

## 2024-05-14 PROCEDURE — 83735 ASSAY OF MAGNESIUM: CPT | Performed by: NURSE PRACTITIONER

## 2024-05-14 PROCEDURE — 37000008 HC ANESTHESIA 1ST 15 MINUTES: Performed by: SPECIALIST

## 2024-05-14 PROCEDURE — 25000003 PHARM REV CODE 250: Performed by: NURSE PRACTITIONER

## 2024-05-14 PROCEDURE — 99153 MOD SED SAME PHYS/QHP EA: CPT | Performed by: INTERNAL MEDICINE

## 2024-05-14 PROCEDURE — 36415 COLL VENOUS BLD VENIPUNCTURE: CPT | Performed by: INTERNAL MEDICINE

## 2024-05-14 PROCEDURE — B5141ZA FLUOROSCOPY OF LEFT JUGULAR VEINS USING LOW OSMOLAR CONTRAST, GUIDANCE: ICD-10-PCS | Performed by: INTERNAL MEDICINE

## 2024-05-14 PROCEDURE — 63600175 PHARM REV CODE 636 W HCPCS: Performed by: STUDENT IN AN ORGANIZED HEALTH CARE EDUCATION/TRAINING PROGRAM

## 2024-05-14 PROCEDURE — 86706 HEP B SURFACE ANTIBODY: CPT | Performed by: INTERNAL MEDICINE

## 2024-05-14 PROCEDURE — 36000706: Performed by: SPECIALIST

## 2024-05-14 PROCEDURE — 27201423 OPTIME MED/SURG SUP & DEVICES STERILE SUPPLY: Performed by: INTERNAL MEDICINE

## 2024-05-14 PROCEDURE — 99152 MOD SED SAME PHYS/QHP 5/>YRS: CPT | Performed by: INTERNAL MEDICINE

## 2024-05-14 PROCEDURE — 36558 INSERT TUNNELED CV CATH: CPT | Mod: LT,,, | Performed by: SPECIALIST

## 2024-05-14 PROCEDURE — 02HV33Z INSERTION OF INFUSION DEVICE INTO SUPERIOR VENA CAVA, PERCUTANEOUS APPROACH: ICD-10-PCS | Performed by: SPECIALIST

## 2024-05-14 PROCEDURE — 80048 BASIC METABOLIC PNL TOTAL CA: CPT | Performed by: INTERNAL MEDICINE

## 2024-05-14 PROCEDURE — 36905 THRMBC/NFS DIALYSIS CIRCUIT: CPT | Mod: LT | Performed by: INTERNAL MEDICINE

## 2024-05-14 PROCEDURE — 25000003 PHARM REV CODE 250: Performed by: INTERNAL MEDICINE

## 2024-05-14 PROCEDURE — 5A1D70Z PERFORMANCE OF URINARY FILTRATION, INTERMITTENT, LESS THAN 6 HOURS PER DAY: ICD-10-PCS | Performed by: HOSPITALIST

## 2024-05-14 PROCEDURE — 03C83ZZ EXTIRPATION OF MATTER FROM LEFT BRACHIAL ARTERY, PERCUTANEOUS APPROACH: ICD-10-PCS | Performed by: INTERNAL MEDICINE

## 2024-05-14 PROCEDURE — 25000003 PHARM REV CODE 250: Performed by: STUDENT IN AN ORGANIZED HEALTH CARE EDUCATION/TRAINING PROGRAM

## 2024-05-14 PROCEDURE — 80100014 HC HEMODIALYSIS 1:1

## 2024-05-14 PROCEDURE — 36415 COLL VENOUS BLD VENIPUNCTURE: CPT | Mod: XB | Performed by: INTERNAL MEDICINE

## 2024-05-14 PROCEDURE — C1725 CATH, TRANSLUMIN NON-LASER: HCPCS | Performed by: INTERNAL MEDICINE

## 2024-05-14 PROCEDURE — 99223 1ST HOSP IP/OBS HIGH 75: CPT | Mod: 25,,, | Performed by: SPECIALIST

## 2024-05-14 PROCEDURE — C1769 GUIDE WIRE: HCPCS | Performed by: INTERNAL MEDICINE

## 2024-05-14 PROCEDURE — 3E03317 INTRODUCTION OF OTHER THROMBOLYTIC INTO PERIPHERAL VEIN, PERCUTANEOUS APPROACH: ICD-10-PCS | Performed by: INTERNAL MEDICINE

## 2024-05-14 PROCEDURE — 05CF3ZZ EXTIRPATION OF MATTER FROM LEFT CEPHALIC VEIN, PERCUTANEOUS APPROACH: ICD-10-PCS | Performed by: INTERNAL MEDICINE

## 2024-05-14 PROCEDURE — 63600175 PHARM REV CODE 636 W HCPCS: Performed by: NURSE PRACTITIONER

## 2024-05-14 PROCEDURE — C1894 INTRO/SHEATH, NON-LASER: HCPCS | Performed by: INTERNAL MEDICINE

## 2024-05-14 PROCEDURE — 63600175 PHARM REV CODE 636 W HCPCS: Performed by: SPECIALIST

## 2024-05-14 PROCEDURE — 36000707: Performed by: SPECIALIST

## 2024-05-14 PROCEDURE — 80069 RENAL FUNCTION PANEL: CPT | Performed by: INTERNAL MEDICINE

## 2024-05-14 DEVICE — CATH EQUISTREAM 19CM: Type: IMPLANTABLE DEVICE | Site: CHEST | Status: FUNCTIONAL

## 2024-05-14 RX ORDER — AMOXICILLIN 250 MG
1 CAPSULE ORAL 2 TIMES DAILY
Status: DISCONTINUED | OUTPATIENT
Start: 2024-05-14 | End: 2024-05-16 | Stop reason: HOSPADM

## 2024-05-14 RX ORDER — MIDAZOLAM HYDROCHLORIDE 1 MG/ML
INJECTION INTRAMUSCULAR; INTRAVENOUS
Status: DISCONTINUED | OUTPATIENT
Start: 2024-05-14 | End: 2024-05-14

## 2024-05-14 RX ORDER — FENTANYL CITRATE 50 UG/ML
INJECTION, SOLUTION INTRAMUSCULAR; INTRAVENOUS
Status: DISCONTINUED | OUTPATIENT
Start: 2024-05-14 | End: 2024-05-14 | Stop reason: HOSPADM

## 2024-05-14 RX ORDER — CINACALCET 30 MG/1
30 TABLET, FILM COATED ORAL
Status: DISCONTINUED | OUTPATIENT
Start: 2024-05-15 | End: 2024-05-16 | Stop reason: HOSPADM

## 2024-05-14 RX ORDER — ONDANSETRON HYDROCHLORIDE 2 MG/ML
4 INJECTION, SOLUTION INTRAVENOUS EVERY 8 HOURS PRN
Status: DISCONTINUED | OUTPATIENT
Start: 2024-05-14 | End: 2024-05-16 | Stop reason: HOSPADM

## 2024-05-14 RX ORDER — SODIUM CHLORIDE 0.9 % (FLUSH) 0.9 %
10 SYRINGE (ML) INJECTION
Status: DISCONTINUED | OUTPATIENT
Start: 2024-05-14 | End: 2024-05-16 | Stop reason: HOSPADM

## 2024-05-14 RX ORDER — OXYCODONE HYDROCHLORIDE 5 MG/1
5 TABLET ORAL
Status: CANCELLED | OUTPATIENT
Start: 2024-05-14

## 2024-05-14 RX ORDER — ONDANSETRON 8 MG/1
8 TABLET, ORALLY DISINTEGRATING ORAL EVERY 8 HOURS PRN
Status: DISCONTINUED | OUTPATIENT
Start: 2024-05-14 | End: 2024-05-16 | Stop reason: HOSPADM

## 2024-05-14 RX ORDER — HYDROMORPHONE HYDROCHLORIDE 1 MG/ML
1 INJECTION, SOLUTION INTRAMUSCULAR; INTRAVENOUS; SUBCUTANEOUS EVERY 4 HOURS PRN
Status: DISCONTINUED | OUTPATIENT
Start: 2024-05-14 | End: 2024-05-16 | Stop reason: HOSPADM

## 2024-05-14 RX ORDER — MUPIROCIN 20 MG/G
OINTMENT TOPICAL 2 TIMES DAILY
Status: DISCONTINUED | OUTPATIENT
Start: 2024-05-14 | End: 2024-05-16 | Stop reason: HOSPADM

## 2024-05-14 RX ORDER — FENTANYL CITRATE 50 UG/ML
INJECTION, SOLUTION INTRAMUSCULAR; INTRAVENOUS
Status: DISCONTINUED | OUTPATIENT
Start: 2024-05-14 | End: 2024-05-14

## 2024-05-14 RX ORDER — SODIUM CHLORIDE 0.9 % (FLUSH) 0.9 %
3 SYRINGE (ML) INJECTION
Status: CANCELLED | OUTPATIENT
Start: 2024-05-14

## 2024-05-14 RX ORDER — HYDROMORPHONE HYDROCHLORIDE 2 MG/ML
0.4 INJECTION, SOLUTION INTRAMUSCULAR; INTRAVENOUS; SUBCUTANEOUS EVERY 5 MIN PRN
Status: CANCELLED | OUTPATIENT
Start: 2024-05-14

## 2024-05-14 RX ORDER — INDOMETHACIN 25 MG/1
50 CAPSULE ORAL ONCE
Status: COMPLETED | OUTPATIENT
Start: 2024-05-14 | End: 2024-05-14

## 2024-05-14 RX ORDER — HEPARIN SODIUM 5000 [USP'U]/ML
INJECTION, SOLUTION INTRAVENOUS; SUBCUTANEOUS
Status: DISCONTINUED | OUTPATIENT
Start: 2024-05-14 | End: 2024-05-14 | Stop reason: HOSPADM

## 2024-05-14 RX ORDER — LIDOCAINE HYDROCHLORIDE 10 MG/ML
INJECTION INFILTRATION; PERINEURAL
Status: DISCONTINUED | OUTPATIENT
Start: 2024-05-14 | End: 2024-05-14 | Stop reason: HOSPADM

## 2024-05-14 RX ORDER — ACETAMINOPHEN 325 MG/1
650 TABLET ORAL EVERY 4 HOURS PRN
Status: DISCONTINUED | OUTPATIENT
Start: 2024-05-14 | End: 2024-05-16 | Stop reason: HOSPADM

## 2024-05-14 RX ORDER — HEPARIN SODIUM 5000 [USP'U]/ML
5000 INJECTION, SOLUTION INTRAVENOUS; SUBCUTANEOUS EVERY 8 HOURS
Status: DISCONTINUED | OUTPATIENT
Start: 2024-05-14 | End: 2024-05-16 | Stop reason: HOSPADM

## 2024-05-14 RX ORDER — MIRTAZAPINE 15 MG/1
15 TABLET, FILM COATED ORAL NIGHTLY
Status: DISCONTINUED | OUTPATIENT
Start: 2024-05-14 | End: 2024-05-16 | Stop reason: HOSPADM

## 2024-05-14 RX ORDER — BUPROPION HYDROCHLORIDE 150 MG/1
150 TABLET ORAL DAILY
Status: DISCONTINUED | OUTPATIENT
Start: 2024-05-15 | End: 2024-05-16 | Stop reason: HOSPADM

## 2024-05-14 RX ORDER — TRAMADOL HYDROCHLORIDE 50 MG/1
50 TABLET ORAL EVERY 6 HOURS PRN
Status: DISCONTINUED | OUTPATIENT
Start: 2024-05-14 | End: 2024-05-16 | Stop reason: HOSPADM

## 2024-05-14 RX ORDER — CLOPIDOGREL BISULFATE 75 MG/1
75 TABLET ORAL DAILY
Status: DISCONTINUED | OUTPATIENT
Start: 2024-05-15 | End: 2024-05-16 | Stop reason: HOSPADM

## 2024-05-14 RX ORDER — MEPERIDINE HYDROCHLORIDE 25 MG/ML
12.5 INJECTION INTRAMUSCULAR; INTRAVENOUS; SUBCUTANEOUS ONCE AS NEEDED
Status: CANCELLED | OUTPATIENT
Start: 2024-05-14 | End: 2024-05-15

## 2024-05-14 RX ORDER — CALCIUM GLUCONATE 20 MG/ML
1 INJECTION, SOLUTION INTRAVENOUS EVERY 10 MIN PRN
Status: DISCONTINUED | OUTPATIENT
Start: 2024-05-14 | End: 2024-05-16 | Stop reason: HOSPADM

## 2024-05-14 RX ORDER — HEPARIN SODIUM 1000 [USP'U]/ML
INJECTION, SOLUTION INTRAVENOUS; SUBCUTANEOUS
Status: DISCONTINUED | OUTPATIENT
Start: 2024-05-14 | End: 2024-05-14 | Stop reason: HOSPADM

## 2024-05-14 RX ORDER — POLYETHYLENE GLYCOL 3350 17 G/17G
17 POWDER, FOR SOLUTION ORAL 2 TIMES DAILY PRN
Status: DISCONTINUED | OUTPATIENT
Start: 2024-05-14 | End: 2024-05-16 | Stop reason: HOSPADM

## 2024-05-14 RX ORDER — DEXTROSE MONOHYDRATE 25 G/50ML
25 INJECTION, SOLUTION INTRAVENOUS ONCE
Status: COMPLETED | OUTPATIENT
Start: 2024-05-14 | End: 2024-05-14

## 2024-05-14 RX ORDER — BUPIVACAINE HCL/EPINEPHRINE 0.25-.0005
VIAL (ML) INJECTION
Status: DISCONTINUED | OUTPATIENT
Start: 2024-05-14 | End: 2024-05-14 | Stop reason: HOSPADM

## 2024-05-14 RX ORDER — IPRATROPIUM BROMIDE AND ALBUTEROL SULFATE 2.5; .5 MG/3ML; MG/3ML
3 SOLUTION RESPIRATORY (INHALATION) EVERY 4 HOURS PRN
Status: DISCONTINUED | OUTPATIENT
Start: 2024-05-14 | End: 2024-05-16 | Stop reason: HOSPADM

## 2024-05-14 RX ORDER — TALC
6 POWDER (GRAM) TOPICAL NIGHTLY PRN
Status: DISCONTINUED | OUTPATIENT
Start: 2024-05-14 | End: 2024-05-16 | Stop reason: HOSPADM

## 2024-05-14 RX ORDER — FLUTICASONE FUROATE AND VILANTEROL 100; 25 UG/1; UG/1
1 POWDER RESPIRATORY (INHALATION) DAILY
Status: DISCONTINUED | OUTPATIENT
Start: 2024-05-15 | End: 2024-05-16 | Stop reason: HOSPADM

## 2024-05-14 RX ORDER — CALCIUM GLUCONATE 20 MG/ML
1 INJECTION, SOLUTION INTRAVENOUS ONCE
Status: COMPLETED | OUTPATIENT
Start: 2024-05-14 | End: 2024-05-14

## 2024-05-14 RX ORDER — METOPROLOL SUCCINATE 25 MG/1
25 TABLET, EXTENDED RELEASE ORAL DAILY
Status: DISCONTINUED | OUTPATIENT
Start: 2024-05-15 | End: 2024-05-16 | Stop reason: HOSPADM

## 2024-05-14 RX ORDER — MONTELUKAST SODIUM 10 MG/1
10 TABLET ORAL NIGHTLY
Status: DISCONTINUED | OUTPATIENT
Start: 2024-05-14 | End: 2024-05-16 | Stop reason: HOSPADM

## 2024-05-14 RX ORDER — HEPARIN SODIUM 1000 [USP'U]/ML
4000 INJECTION, SOLUTION INTRAVENOUS; SUBCUTANEOUS
Status: DISCONTINUED | OUTPATIENT
Start: 2024-05-14 | End: 2024-05-16 | Stop reason: HOSPADM

## 2024-05-14 RX ORDER — SEVELAMER CARBONATE 800 MG/1
800 TABLET, FILM COATED ORAL
Status: DISCONTINUED | OUTPATIENT
Start: 2024-05-15 | End: 2024-05-16 | Stop reason: HOSPADM

## 2024-05-14 RX ORDER — MIDAZOLAM HYDROCHLORIDE 1 MG/ML
INJECTION INTRAMUSCULAR; INTRAVENOUS
Status: DISCONTINUED | OUTPATIENT
Start: 2024-05-14 | End: 2024-05-14 | Stop reason: HOSPADM

## 2024-05-14 RX ORDER — HEPARIN SODIUM 10000 [USP'U]/ML
INJECTION, SOLUTION INTRAVENOUS; SUBCUTANEOUS
Status: DISCONTINUED | OUTPATIENT
Start: 2024-05-14 | End: 2024-05-14 | Stop reason: HOSPADM

## 2024-05-14 RX ORDER — SODIUM CHLORIDE 9 MG/ML
INJECTION, SOLUTION INTRAVENOUS ONCE
Status: DISCONTINUED | OUTPATIENT
Start: 2024-05-14 | End: 2024-05-14

## 2024-05-14 RX ORDER — ESCITALOPRAM OXALATE 10 MG/1
20 TABLET ORAL DAILY
Status: DISCONTINUED | OUTPATIENT
Start: 2024-05-15 | End: 2024-05-16 | Stop reason: HOSPADM

## 2024-05-14 RX ORDER — PROCHLORPERAZINE EDISYLATE 5 MG/ML
5 INJECTION INTRAMUSCULAR; INTRAVENOUS EVERY 30 MIN PRN
Status: CANCELLED | OUTPATIENT
Start: 2024-05-14

## 2024-05-14 RX ADMIN — MONTELUKAST 10 MG: 10 TABLET, FILM COATED ORAL at 09:05

## 2024-05-14 RX ADMIN — CALCIUM GLUCONATE 1 G: 20 INJECTION, SOLUTION INTRAVENOUS at 07:05

## 2024-05-14 RX ADMIN — HEPARIN SODIUM 5000 UNITS: 5000 INJECTION INTRAVENOUS; SUBCUTANEOUS at 10:05

## 2024-05-14 RX ADMIN — MIRTAZAPINE 15 MG: 15 TABLET, FILM COATED ORAL at 09:05

## 2024-05-14 RX ADMIN — MIDAZOLAM HYDROCHLORIDE 1 MG: 1 INJECTION INTRAMUSCULAR; INTRAVENOUS at 07:05

## 2024-05-14 RX ADMIN — HEPARIN SODIUM 4000 UNITS: 1000 INJECTION, SOLUTION INTRAVENOUS; SUBCUTANEOUS at 11:05

## 2024-05-14 RX ADMIN — SODIUM CHLORIDE: 0.9 INJECTION, SOLUTION INTRAVENOUS at 07:05

## 2024-05-14 RX ADMIN — SODIUM BICARBONATE 50 MEQ: 84 INJECTION, SOLUTION INTRAVENOUS at 07:05

## 2024-05-14 RX ADMIN — SACUBITRIL AND VALSARTAN 1 TABLET: 97; 103 TABLET, FILM COATED ORAL at 09:05

## 2024-05-14 RX ADMIN — MUPIROCIN: 20 OINTMENT TOPICAL at 09:05

## 2024-05-14 RX ADMIN — DEXTROSE MONOHYDRATE 25 G: 25 INJECTION, SOLUTION INTRAVENOUS at 07:05

## 2024-05-14 RX ADMIN — DEXTROSE 2 G: 50 INJECTION, SOLUTION INTRAVENOUS at 07:05

## 2024-05-14 RX ADMIN — FENTANYL CITRATE 50 MCG: 50 INJECTION, SOLUTION INTRAMUSCULAR; INTRAVENOUS at 07:05

## 2024-05-14 RX ADMIN — HUMAN INSULIN 10 UNITS: 100 INJECTION, SOLUTION SUBCUTANEOUS at 07:05

## 2024-05-14 NOTE — H&P
[facility] - Cath Lab  History & Physical    Subjective:      Chief Complaint/Reason for Admission: Here for declot    Jennifer Booth is a 59 y.o. female with ESRD (TTS at [facility] with Dr. Wolfe) who presents today for declot after they noted no flow in her access at the dialysis unit on Saturday.  Of note, we last did a fistulogram with venous angioplasty of the inflow stent on 3/20/24.  She denies any warning signs of thrombosis preceding the clotting episode.    Past Medical History:   Diagnosis Date    Addiction to drug     Alcohol abuse     Allergic drug reaction 2017    Anemia     Anxiety     Arm pain, left 2014    Breast cyst     Chronic renal failure 2014    CKD (chronic kidney disease) stage 5, GFR less than 15 ml/min     COPD exacerbation 2023    Depression     Dialysis patient     dialysis m-w-    Encounter for blood transfusion     GERD (gastroesophageal reflux disease)     Hx of psychiatric care     Hypertension     Mood swings     Multiple myeloma in remission 10/26/2012    per Hem/Oc note    Multiple thyroid nodules 10/22/2023    Psychiatric exam requested by authority     Psychiatric problem     Renal hypertension     Status post dialysis 2012    Therapy     Thrombocytopenia 2012     Past Surgical History:   Procedure Laterality Date    AV FISTULA PLACEMENT Left     BREAST CYST ASPIRATION Left     BREAST CYST EXCISION Left     CERVICAL SPINE SURGERY Bilateral      SECTION      x1    COLONOSCOPY N/A 2022    Procedure: COLONOSCOPY;  Surgeon: Jordan Mcguire MD;  Location: [facility] ENDO;  Service: Endoscopy;  Laterality: N/A;    FISTULOGRAM N/A 2020    Procedure: Fistulogram;  Surgeon: Rahat Berger MD;  Location: [facility] CATH LAB/EP;  Service: Cardiology;  Laterality: N/A;    FISTULOGRAM Left 3/20/2024    Procedure: Fistulogram LEFT FOREARM;  Surgeon: Matheus Galaviz MD;  Location: [facility] CATH LAB;  Service: Nephrology;  Laterality: Left;    pd  catheter      PERCUTANEOUS TRANSLUMINAL ANGIOPLASTY OF ARTERIOVENOUS FISTULA N/A 06/09/2020    Procedure: PTA, AV FISTULA;  Surgeon: Rahat Berger MD;  Location: [facility] CATH LAB/EP;  Service: Cardiology;  Laterality: N/A;    PERITONEAL CATHETER REMOVAL N/A 11/30/2018    Procedure: REMOVAL, CATHETER, DIALYSIS, PERITONEAL;  Surgeon: Mukesh Gonsales Jr., MD;  Location: [facility] [place] OR;  Service: General;  Laterality: N/A;    RENAL BIOPSY  2016    THROMBECTOMY OF HEMODIALYSIS ACCESS SITE N/A 06/09/2020    Procedure: Thrombectomy, Hemodialysis Graft Or Fistula;  Surgeon: Rahat Berger MD;  Location: [facility] CATH LAB/EP;  Service: Cardiology;  Laterality: N/A;    VASCULAR SURGERY  08/2012    dialysis catheter to right subclavian     Social History     Tobacco Use    Smoking status: Every Day     Current packs/day: 1.00     Average packs/day: 1 pack/day for 41.4 years (41.4 ttl pk-yrs)     Types: Cigarettes     Start date: 1983     Passive exposure: Never    Smokeless tobacco: Never    Tobacco comments:     Pt. states recently cut down to 0.5 pk/day. Enrolled in the [employer] on 6/9/20 ([employer] Member ID # [id number]). She declines Ambulatory referral to Smoking Cessation program. Handout provided.   Substance Use Topics    Alcohol use: Not Currently     Comment: occasional    Drug use: Not Currently     Types: Marijuana       PTA Medications   Medication Sig    B COMPLEX W-C NO.20/FOLIC ACID (VIRT-CAPS ORAL) Take by mouth once daily.    buPROPion (WELLBUTRIN XL) 150 MG TB24 tablet Take 1 tablet (150 mg total) by mouth once daily.    cetirizine (ZYRTEC) 5 MG tablet Take 1 tablet (5 mg total) by mouth once daily.    cinacalcet (SENSIPAR) 30 MG Tab Take by mouth daily with breakfast.    clopidogreL (PLAVIX) 75 mg tablet Take 1 tablet (75 mg total) by mouth once daily.    cyproheptadine (PERIACTIN) 4 mg tablet Take 1 tablet (4 mg total) by mouth 3 (three) times daily as needed (appetite).    EScitalopram oxalate (LEXAPRO) 20 MG tablet  Take 1 tablet (20 mg total) by mouth once daily.    fluticasone-salmeterol diskus inhaler 100-50 mcg Inhale 1 puff into the lungs 2 (two) times daily. Controller    metoprolol succinate (TOPROL-XL) 25 MG 24 hr tablet Take 1 tablet (25 mg total) by mouth once daily.    mirtazapine (REMERON) 15 MG tablet Take 1 tablet (15 mg total) by mouth every evening.    sacubitriL-valsartan (ENTRESTO)  mg per tablet Take 1 tablet by mouth 2 (two) times daily.    sevelamer carbonate (RENVELA) 800 mg Tab Take 800 mg by mouth 3 (three) times daily with meals.    traMADoL (ULTRAM) 50 mg tablet Take 50 mg by mouth every 6 (six) hours as needed.    acetaminophen (TYLENOL) 325 MG tablet Take 650 mg by mouth every 4 (four) hours as needed.    albuterol (VENTOLIN HFA) 90 mcg/actuation inhaler Inhale 2 puffs into the lungs every 6 (six) hours as needed for Wheezing or Shortness of Breath. Rescue    fluticasone propionate (FLONASE) 50 mcg/actuation nasal spray 1 spray by Each Nostril route daily as needed.    melatonin 5 mg Cap Take 5 mg by mouth nightly.    montelukast (SINGULAIR) 10 mg tablet TAKE 1 TABLET(10 MG) BY MOUTH EVERY EVENING    naltrexone (DEPADE) 50 mg tablet Start with 1/2 tab (25 mg) by mouth daily, may increase to 1 tab (50 mg) daily if able. (Patient not taking: Reported on 4/30/2024)    sucroferric oxyhydroxide (VELPHORO) 500 mg Chew Take 1,000 mg by mouth 3 (three) times daily. (Patient not taking: Reported on 4/30/2024)    tiotropium bromide (SPIRIVA RESPIMAT) 2.5 mcg/actuation inhaler Inhale 2 puffs into the lungs once daily. Controller     Review of patient's allergies indicates:   Allergen Reactions    Doxycycline Swelling, Rash and Hives    Gentamicin Anaphylaxis    Vancomycin analogues Anaphylaxis        Review of Systems   Constitutional: Negative.    Respiratory: Negative.     Cardiovascular: Negative.        Objective:      Vital Signs (Most Recent)  Temp: 98 °F (36.7 °C) (05/14/24 0920)  Pulse: (!) 49  (05/14/24 0920)  Resp: 18 (05/14/24 0920)  BP: 136/76 (05/14/24 0920)  SpO2: (!) 94 % (05/14/24 0920)    Vital Signs Range (Last 24H):  Temp:  [98 °F (36.7 °C)]   Pulse:  [49]   Resp:  [18]   BP: (136)/(76)   SpO2:  [94 %]     Physical Exam  Constitutional:       General: She is not in acute distress.     Appearance: She is well-developed. She is not diaphoretic.   HENT:      Head: Normocephalic and atraumatic.   Pulmonary:      Effort: Pulmonary effort is normal. No respiratory distress.   Musculoskeletal:      Cervical back: Neck supple.      Comments: LUE radiocephalic AVF with no thrill or pulse augmentation   Skin:     General: Skin is warm and dry.   Neurological:      Mental Status: She is alert and oriented to person, place, and time.   Psychiatric:         Behavior: Behavior normal.     .     Assessment:      There are no hospital problems to display for this patient.      Plan:      Declot planned for today.  Risks explained, consent signed.

## 2024-05-14 NOTE — PROCEDURES
[facility] [place] Interventional Nephrology Procedure Report    Date of Procedure:  05/14/2024 12:21 PM     :  Matheus Galaviz MD  Assistant: Clark Oneal DO     Indication for Procedure:  Thrombosed LUE radiocephalic AVF      Referring Provider:  [facility] dialysis unit and Dr. Wolfe     Procedures Performed:  1.  Access cannulation  2.  Second cannulation  3.  Venous administration of TPA  4.  Venous angioplasty  5.  Percutaneous thrombectomy  6.  Arteriogram  7.  Moderate conscious sedation     Medications Administered:  1.  2 mg of tPA IV  2.  Heparin 5000 units IV  3.  Versed 2 mg IV  4.  Fentanyl 100 mcg IV     Blood Loss:  Approximately 15 mL     Contrast Used:  Approximately 60 mL     Procedure in Detail:    After informed consent was obtained, the patient was prepped and draped in the usual sterile fashion.  Her LUE radiocephalic AV fistula was cannulated in the venous facing direction with a micropuncture set under direct ultrasound guidance (so as to avoid her JA stent).  The microwire was confirmed to be in correct location under fluoroscopy and a 4-Sinhala microsheath was established.  A glidewire was advanced easily to the central circulation and the microsheath was removed.  A 6-Sinhala sheath was established.  Then, an over-the-wire Berenstein guiding catheter was advanced to the central circulation and a central venogram was performed, which revealed patent central vasculature.  After the patient was evaluated with the physician, moderate sedation was then administered.  A pullback venogram was performed, which revealed stenosis beginning at the cephalic arch portion of the fistula.  So, the wire was reinserted and the Berenstein catheter was removed.  Then, 2 mg of TPA was injected into the venous facing sheath with pressure was held at the arterial anastomosis.  Following this, a 7 x 80 mm conquest PTA balloon was advanced to the proximal end of the cephlic vein and inflated to full effacement with  waist seen on the balloon prior to full effacement.  Angioplasty was repeated back within the graft to macerate the thrombus all the while holding pressure at the arterial anastomosis.  The vessel that we ballooned was the cephalic vein (technically the true fistula outflow) but she had collateral flows through the basilic vein, which we were not able to access with the wire (entrance was tortuous).     The balloon was then removed and a second cannulation was made in the arterial facing direction with the micropuncture set again under direct ultrasound visualization.  Again, the microwire was confirmed to be in the correct location under fluoroscopy.  Microsheath was established and a glidewire was advanced across the arterial anastomosis and up into the brachial artery and then the micro sheath was removed and a 6-Kiswahili sheath was established.  We attempted to pass the Sirena catheter past the anastomosis but due to calcifications surrounding the anastomosis, we opted to angioplasty the anastomosis, so a 6 x 40 mm mustang was advanced just past the arterial anastomosis and was inflated to full effacement with waist seen prior to full effacement.  This was swept back towards the arterial facing sheath and we started to note some flow within the fistula.      Then using an over-the-wire Sirena catheter, we readvanced the Sirena across the arterial anastomosis, inflated and a pullback maneuver was performed with significant thrombus aspirated through the arterial facing sheath.  This procedure was repeated approximately 3-4 times after which there was noted to be improved pulsatility of the AV fistula.  So at this point, the Sirena catheter was advanced across the arterial anastomosis and an arteriogram was performed.  At least 6 cm of the radial artery was seen passing down into the hand and no distal emboli were seen.      We then took the Sirena catheter on the venous facing wire and swept towards the  central circulation to sweep out any residual thrombus and there was improved thrilling flow felt up the AV fistula.  At this point, we reevaluated the patient again as he/she was having some pain and an additional 1 mg of Versed and 50 mcg of fentanyl were administered.       The arterial facing guidewire and sheath were then removed and hemostasis was achieved using a #2 Ethilon suture.  We then administered 5000 units of IV heparin and a repeat fistulogram was performed, which revealed brisk flow through the lower portion of the AV fistula without residual significant stenosis seen.  Repeat angiogram following the angioplasty revealed improvement in the outflow stenosis and no extravasation of contrast seen.  Hemostasis was achieved using a #2 Ethilon suture.        IMPRESSION AND PLAN:  This is a successful percutaneous thrombectomy using mechanical and pharmacologic thrombolysis.  We will have the patient return in 1 week for suture removal and a followup ultrasound.        Matheus Galaviz MD  [phone]

## 2024-05-14 NOTE — CONSULTS
Called by Dr. Galaviz following extensive declot of Mrs. Booth who is well known to my/group.  Has been having issues with her HD access.  Dr. Galaviz would like to see her run while in house to evaluate flow.    Ordered placed for HD.  Will see on HD later.    Thanks.     Pt clotted 30min into treatment

## 2024-05-14 NOTE — BRIEF OP NOTE
[facility] - Cath Lab  Brief Operative Note    Surgery Date: 5/14/2024     Surgeons and Role:     * Matheus Galaviz MD - Primary    Assisting Surgeon: Clark Oneal DO    Pre-op Diagnosis:  End stage renal disease [N18.6]    Post-op Diagnosis:  Post-Op Diagnosis Codes:     * End stage renal disease [N18.6]    Procedure(s) (LRB):  THROMBECTOMY, HEMODIALYSIS GRAFT OR FISTULA / left lower AV/FISTULA (Left)    Anesthesia: 1% lidocaine, 2 mg versed, 100 mcg fentanyl    Operative Findings: Patient was prepped and draped in a sterile fashion.  This was a successful declot of her LUE radiocephalic AV fistula with angioplasty performed in the arterial JA portion of the fistula.  Patient tolerated the procedure well and no immediate complications noted.    Estimated Blood Loss: < 15 mL         Specimens:   Specimen (24h ago, onward)      None              Discharge Note    OUTCOME:  Patient was prepped and draped in a sterile fashion.  This was a successful declot of her LUE radiocephalic AV fistula with angioplasty performed in the arterial JA portion of the fistula.  Patient tolerated the procedure well and no immediate complications noted.    DISPOSITION: Home or Self Care    FINAL DIAGNOSIS:  <principal problem not specified>    FOLLOWUP: In clinic in 1 week for suture removal and follow up exam/ultrasound    DISCHARGE INSTRUCTIONS:    Discharge Procedure Orders   Lifting restrictions   Order Comments: No heavy lifting for 48 hours.     Call MD for:  temperature >100.4     Call MD for:  severe uncontrolled pain     Call MD for:  redness, tenderness, or signs of infection (pain, swelling, redness, odor or green/yellow discharge around incision site)     Call MD for:   Order Comments: Bleeding from the access site.     Activity as tolerated

## 2024-05-15 VITALS
HEIGHT: 68 IN | WEIGHT: 146.19 LBS | BODY MASS INDEX: 22.16 KG/M2 | RESPIRATION RATE: 39 BRPM | DIASTOLIC BLOOD PRESSURE: 78 MMHG | TEMPERATURE: 98 F | SYSTOLIC BLOOD PRESSURE: 123 MMHG | OXYGEN SATURATION: 99 % | HEART RATE: 74 BPM

## 2024-05-15 LAB
ALBUMIN SERPL BCP-MCNC: 2.9 G/DL (ref 3.5–5.2)
ALP SERPL-CCNC: 122 U/L (ref 55–135)
ALT SERPL W/O P-5'-P-CCNC: 6 U/L (ref 10–44)
ANION GAP SERPL CALC-SCNC: 12 MMOL/L (ref 8–16)
ANION GAP SERPL CALC-SCNC: 12 MMOL/L (ref 8–16)
ANION GAP SERPL CALC-SCNC: 16 MMOL/L (ref 8–16)
AST SERPL-CCNC: 10 U/L (ref 10–40)
BASOPHILS # BLD AUTO: 0.04 K/UL (ref 0–0.2)
BASOPHILS NFR BLD: 0.9 % (ref 0–1.9)
BILIRUB SERPL-MCNC: 0.4 MG/DL (ref 0.1–1)
BUN SERPL-MCNC: 59 MG/DL (ref 6–20)
BUN SERPL-MCNC: 61 MG/DL (ref 6–20)
BUN SERPL-MCNC: 61 MG/DL (ref 6–20)
CALCIUM SERPL-MCNC: 8.6 MG/DL (ref 8.7–10.5)
CHLORIDE SERPL-SCNC: 102 MMOL/L (ref 95–110)
CHLORIDE SERPL-SCNC: 104 MMOL/L (ref 95–110)
CHLORIDE SERPL-SCNC: 104 MMOL/L (ref 95–110)
CO2 SERPL-SCNC: 21 MMOL/L (ref 23–29)
CREAT SERPL-MCNC: 10.4 MG/DL (ref 0.5–1.4)
CREAT SERPL-MCNC: 10.6 MG/DL (ref 0.5–1.4)
CREAT SERPL-MCNC: 10.6 MG/DL (ref 0.5–1.4)
DIFFERENTIAL METHOD BLD: ABNORMAL
EOSINOPHIL # BLD AUTO: 0.1 K/UL (ref 0–0.5)
EOSINOPHIL NFR BLD: 2.4 % (ref 0–8)
ERYTHROCYTE [DISTWIDTH] IN BLOOD BY AUTOMATED COUNT: 16 % (ref 11.5–14.5)
EST. GFR  (NO RACE VARIABLE): 4 ML/MIN/1.73 M^2
GLUCOSE SERPL-MCNC: 114 MG/DL (ref 70–110)
GLUCOSE SERPL-MCNC: 123 MG/DL (ref 70–110)
GLUCOSE SERPL-MCNC: 123 MG/DL (ref 70–110)
HAV IGM SERPL QL IA: NORMAL
HBV CORE IGM SERPL QL IA: NORMAL
HBV SURFACE AG SERPL QL IA: NORMAL
HBV SURFACE AG SERPL QL IA: NORMAL
HCT VFR BLD AUTO: 25.9 % (ref 37–48.5)
HCV AB SERPL QL IA: NORMAL
HGB BLD-MCNC: 8.4 G/DL (ref 12–16)
IMM GRANULOCYTES # BLD AUTO: 0.03 K/UL (ref 0–0.04)
IMM GRANULOCYTES NFR BLD AUTO: 0.7 % (ref 0–0.5)
LYMPHOCYTES # BLD AUTO: 1.1 K/UL (ref 1–4.8)
LYMPHOCYTES NFR BLD: 23.4 % (ref 18–48)
MAGNESIUM SERPL-MCNC: 1.6 MG/DL (ref 1.6–2.6)
MCH RBC QN AUTO: 28.8 PG (ref 27–31)
MCHC RBC AUTO-ENTMCNC: 32.4 G/DL (ref 32–36)
MCV RBC AUTO: 89 FL (ref 82–98)
MONOCYTES # BLD AUTO: 0.5 K/UL (ref 0.3–1)
MONOCYTES NFR BLD: 10 % (ref 4–15)
NEUTROPHILS # BLD AUTO: 2.9 K/UL (ref 1.8–7.7)
NEUTROPHILS NFR BLD: 62.6 % (ref 38–73)
NRBC BLD-RTO: 0 /100 WBC
OHS QRS DURATION: 82 MS
OHS QTC CALCULATION: 460 MS
PHOSPHATE SERPL-MCNC: 6.2 MG/DL (ref 2.7–4.5)
PLATELET # BLD AUTO: 136 K/UL (ref 150–450)
PMV BLD AUTO: 10.9 FL (ref 9.2–12.9)
POCT GLUCOSE: 140 MG/DL (ref 70–110)
POTASSIUM SERPL-SCNC: 4.9 MMOL/L (ref 3.5–5.1)
POTASSIUM SERPL-SCNC: 5.6 MMOL/L (ref 3.5–5.1)
POTASSIUM SERPL-SCNC: 5.6 MMOL/L (ref 3.5–5.1)
PROT SERPL-MCNC: 6.1 G/DL (ref 6–8.4)
RBC # BLD AUTO: 2.92 M/UL (ref 4–5.4)
SODIUM SERPL-SCNC: 137 MMOL/L (ref 136–145)
SODIUM SERPL-SCNC: 137 MMOL/L (ref 136–145)
SODIUM SERPL-SCNC: 139 MMOL/L (ref 136–145)
WBC # BLD AUTO: 4.58 K/UL (ref 3.9–12.7)

## 2024-05-15 PROCEDURE — 63600175 PHARM REV CODE 636 W HCPCS: Performed by: INTERNAL MEDICINE

## 2024-05-15 PROCEDURE — 36415 COLL VENOUS BLD VENIPUNCTURE: CPT | Mod: XB | Performed by: HOSPITALIST

## 2024-05-15 PROCEDURE — 80053 COMPREHEN METABOLIC PANEL: CPT | Performed by: NURSE PRACTITIONER

## 2024-05-15 PROCEDURE — 25000242 PHARM REV CODE 250 ALT 637 W/ HCPCS: Performed by: NURSE PRACTITIONER

## 2024-05-15 PROCEDURE — 80048 BASIC METABOLIC PNL TOTAL CA: CPT | Mod: XB | Performed by: INTERNAL MEDICINE

## 2024-05-15 PROCEDURE — 84100 ASSAY OF PHOSPHORUS: CPT | Performed by: NURSE PRACTITIONER

## 2024-05-15 PROCEDURE — 63600175 PHARM REV CODE 636 W HCPCS: Mod: JZ,JG | Performed by: HOSPITALIST

## 2024-05-15 PROCEDURE — 36415 COLL VENOUS BLD VENIPUNCTURE: CPT | Performed by: NURSE PRACTITIONER

## 2024-05-15 PROCEDURE — 20000000 HC ICU ROOM

## 2024-05-15 PROCEDURE — 94640 AIRWAY INHALATION TREATMENT: CPT

## 2024-05-15 PROCEDURE — 85025 COMPLETE CBC W/AUTO DIFF WBC: CPT | Performed by: NURSE PRACTITIONER

## 2024-05-15 PROCEDURE — 94761 N-INVAS EAR/PLS OXIMETRY MLT: CPT

## 2024-05-15 PROCEDURE — 80074 ACUTE HEPATITIS PANEL: CPT | Performed by: HOSPITALIST

## 2024-05-15 PROCEDURE — 25000003 PHARM REV CODE 250: Performed by: NURSE PRACTITIONER

## 2024-05-15 PROCEDURE — 83735 ASSAY OF MAGNESIUM: CPT | Performed by: NURSE PRACTITIONER

## 2024-05-15 PROCEDURE — 36415 COLL VENOUS BLD VENIPUNCTURE: CPT | Performed by: INTERNAL MEDICINE

## 2024-05-15 PROCEDURE — 80100014 HC HEMODIALYSIS 1:1

## 2024-05-15 PROCEDURE — 63600175 PHARM REV CODE 636 W HCPCS: Performed by: NURSE PRACTITIONER

## 2024-05-15 PROCEDURE — 87340 HEPATITIS B SURFACE AG IA: CPT | Performed by: HOSPITALIST

## 2024-05-15 RX ADMIN — HEPARIN SODIUM 4000 UNITS: 1000 INJECTION, SOLUTION INTRAVENOUS; SUBCUTANEOUS at 12:05

## 2024-05-15 RX ADMIN — METOPROLOL SUCCINATE 25 MG: 25 TABLET, EXTENDED RELEASE ORAL at 08:05

## 2024-05-15 RX ADMIN — SACUBITRIL AND VALSARTAN 1 TABLET: 97; 103 TABLET, FILM COATED ORAL at 08:05

## 2024-05-15 RX ADMIN — CLOPIDOGREL BISULFATE 75 MG: 75 TABLET ORAL at 08:05

## 2024-05-15 RX ADMIN — TIOTROPIUM BROMIDE INHALATION SPRAY 2 PUFF: 3.12 SPRAY, METERED RESPIRATORY (INHALATION) at 08:05

## 2024-05-15 RX ADMIN — CINACALCET 30 MG: 30 TABLET, FILM COATED ORAL at 08:05

## 2024-05-15 RX ADMIN — HEPARIN SODIUM 5000 UNITS: 5000 INJECTION INTRAVENOUS; SUBCUTANEOUS at 02:05

## 2024-05-15 RX ADMIN — SENNOSIDES AND DOCUSATE SODIUM 1 TABLET: 8.6; 5 TABLET ORAL at 08:05

## 2024-05-15 RX ADMIN — SEVELAMER CARBONATE 800 MG: 800 TABLET, FILM COATED ORAL at 08:05

## 2024-05-15 RX ADMIN — SEVELAMER CARBONATE 800 MG: 800 TABLET, FILM COATED ORAL at 12:05

## 2024-05-15 RX ADMIN — HEPARIN SODIUM 5000 UNITS: 5000 INJECTION INTRAVENOUS; SUBCUTANEOUS at 06:05

## 2024-05-15 RX ADMIN — ACETAMINOPHEN 650 MG: 325 TABLET, FILM COATED ORAL at 09:05

## 2024-05-15 RX ADMIN — ESCITALOPRAM OXALATE 20 MG: 10 TABLET ORAL at 08:05

## 2024-05-15 RX ADMIN — BUPROPION HYDROCHLORIDE 150 MG: 150 TABLET, EXTENDED RELEASE ORAL at 08:05

## 2024-05-15 RX ADMIN — EPOETIN ALFA-EPBX 10000 UNITS: 10000 INJECTION, SOLUTION INTRAVENOUS; SUBCUTANEOUS at 09:05

## 2024-05-15 RX ADMIN — SEVELAMER CARBONATE 800 MG: 800 TABLET, FILM COATED ORAL at 04:05

## 2024-05-15 RX ADMIN — NEPHROCAP 1 CAPSULE: 1 CAP ORAL at 08:05

## 2024-05-15 RX ADMIN — ACETAMINOPHEN 650 MG: 325 TABLET, FILM COATED ORAL at 12:05

## 2024-05-15 RX ADMIN — FLUTICASONE FUROATE AND VILANTEROL TRIFENATATE 1 PUFF: 100; 25 POWDER RESPIRATORY (INHALATION) at 08:05

## 2024-05-15 NOTE — ANESTHESIA PREPROCEDURE EVALUATION
05/14/2024  Jennifer Booth is a 59 y.o., female.      Pre-op Assessment    I have reviewed the Patient Summary Reports.     I have reviewed the Nursing Notes. I have reviewed the NPO Status.   I have reviewed the Medications.     Review of Systems  Anesthesia Hx:  No problems with previous Anesthesia                Social:  Non-Smoker       Hematology/Oncology:    Oncology Normal    -- Anemia:                                  EENT/Dental:  EENT/Dental Normal           Cardiovascular:     Hypertension, well controlled                                        Pulmonary:   COPD, mild Asthma mild                   Renal/:  Chronic Renal Disease, ESRD, Dialysis                Hepatic/GI:     GERD, well controlled             Neurological:    Neuromuscular Disease,                                   Endocrine:  Endocrine Normal            Psych:  Psychiatric History anxiety depression                Physical Exam  General: Well nourished, Cooperative and Alert    Airway:  Mallampati: II   Mouth Opening: Normal  TM Distance: Normal  Tongue: Normal  Neck ROM: Normal ROM    Dental:  Intact        Anesthesia Plan  Type of Anesthesia, risks & benefits discussed:    Anesthesia Type: MAC  Intra-op Monitoring Plan: Standard ASA Monitors  Post Op Pain Control Plan: multimodal analgesia  Induction:  IV  Informed Consent: Informed consent signed with the Patient and all parties understand the risks and agree with anesthesia plan.  All questions answered.   ASA Score: 4 Emergent  Anesthesia Plan Notes: Pt with urgent need for dialysis, k 7.4, hemodynamically stable. Pt given insulin ,bicarb and calcium in icu prior to rolling to OR.    Ready For Surgery From Anesthesia Perspective.     .

## 2024-05-15 NOTE — OP NOTE
[facility], [place] Intensive Care [facility]  Surgery Department  Operative Note    SUMMARY     Patient: Jennifer Booth    Medical Record: [id number]    Date of Procedure: 5/14/2024     Surgeon: Surgeons and Role:     * Mukesh Gonsales Jr., MD - Primary    Assisting Surgeon: None    Pre-Operative Diagnosis: Thrombosis of dialysis vascular access, initial encounter [T82.868A]    Post-Operative Diagnosis: Post-Op Diagnosis Codes:     * Thrombosis of dialysis vascular access, initial encounter [T82.868A], end-stage renal disease with hyperkalemia, atretic right internal jugular vein    Procedure:  Insertion of cuffed dialysis catheter via left internal jugular approach using ultrasound and fluoroscopy.  19 cm Equistream    Procedure in Detail:  The patient was brought to the operating room and placed in a supine position.  The neck and chest prepped and draped in a sterile fashion.  Ultrasound used to evaluate the internal jugular veins.  The right internal jugular vein appeared to be atretic.  The left internal jugular vein appeared to be within normal limits.  Using ultrasonic guidance a 20 gauge vascular needle inserted into the left internal jugular vein.  Under fluoroscopic guidance a guidewire was advanced through the needle and into the superior vena cava.  Counter incision then made on the left side of the neck.  A 19 cm EKOS stream catheter was then brought through a subcutaneous tissue tunnel to exit the neck at the site of the exit of the guidewire between the heads of the sternocleidomastoid on the left.  Under fluoroscopic guidance a peel-away dilator was placed over the guidewire and the guidewire removed.  The catheter then advanced through the peel-away dilator and the peel-away dilator was removed.  Catheter irrigated and aspirated without difficulty.  The catheter secured with 2-0 silk and sterilely dressed.  Heparin instilled in each port.  Patient tolerated the procedure well and left the operating room in good  condition.  At the end the procedure all sponge, lap and instrument counts were correct.

## 2024-05-15 NOTE — ASSESSMENT & PLAN NOTE
Generalized anxiety disorder  -controlled   -continue home Wellbutrin and mirtazapine  -further PRN supportive care as indicated  -monitor

## 2024-05-15 NOTE — HPI
Ms. Booth is a 59 YOF with PMHx of Multiple Myeloma in remission, chronic combined systolic and diastolic CHF (EF 35-40% 10/2023), NICM (negative SPECT 04/2022), HTN, ESRD on HD (TTS at [facility] with Dr. Wolfe), anemia of chronic renal failure, GERD, MDD/anxiety, history of EtOH abuse- complete cessation since 2020, and tobacco dependency.     She presented initially to outpt surgery for planned AV declot procedure due to AVF access issues at last HD session. She underwent extensive declot with Dr. Galaviz today and was planned for HD session immediately afterwards however approximately 30 minutes into treatment access again clotted.  She was evaluated by Nephrology in postprocedure area and repeat chemistry noted hyperkalemia of 7.4; she was shifted with calcium gluconate and General Surgery consulted who placed left IJ catheter for urgent HD. She is seen in ICU post line placement and just prior to repeat initiation of HD. She denies SOB, MONTES, CP, abdominal pain, N/V/D, constipation, changes in bowel habits or blood in stool, decreased appetite, changes in PO intake, light headiness, dizziness, seizures, or syncope.     At admission she is HDS and afebrile.  CBC with WBC 4, hemoglobin 8.1, hematocrit 24.7, platelets 122.  Chemistry was , K 7.4 (s/p shifting), chloride 98, CO2 21, , SCr 17.5, glucose 76.  Phosphorus 6.4.  Magnesium 1.6.    The patient was admitted to the Hospital Medicine Service for further evaluation and management.

## 2024-05-15 NOTE — CONSULTS
[facility] - Intensive Care ([place])  Consult Note      Date of Consultation:2024    Reason for Consult:  Placement of hemodialysis catheter  SUBJECTIVE:     History of Present Illness:  59-year-old female with end-stage renal disease and thrombosed AV fistula left upper extremity.  Patient had percutaneous thrombectomy and angioplasty earlier today.  Patient went to dialysis and the fistula subsequently thrombosed.  Patient now in need of emergent hemodialysis with potassium of 7.4.  Spoke with Nephrology.  Medications given to shift potassium.    Past Medical History:   Diagnosis Date    Addiction to drug     Alcohol abuse     Allergic drug reaction 2017    Anemia     Anxiety     Arm pain, left 2014    Breast cyst     Chronic renal failure 2014    CKD (chronic kidney disease) stage 5, GFR less than 15 ml/min     COPD exacerbation 2023    Depression     Dialysis patient     dialysis m-w-    Encounter for blood transfusion     GERD (gastroesophageal reflux disease)     Hx of psychiatric care     Hypertension     Mood swings     Multiple myeloma in remission 10/26/2012    per Hem/Oc note    Multiple thyroid nodules 10/22/2023    Psychiatric exam requested by authority     Psychiatric problem     Renal hypertension     Status post dialysis 2012    Therapy     Thrombocytopenia 2012     Past Surgical History:   Procedure Laterality Date    AV FISTULA PLACEMENT Left     BREAST CYST ASPIRATION Left     BREAST CYST EXCISION Left     CERVICAL SPINE SURGERY Bilateral      SECTION      x1    COLONOSCOPY N/A 2022    Procedure: COLONOSCOPY;  Surgeon: Jordan Mcguire MD;  Location: [facility] ENDO;  Service: Endoscopy;  Laterality: N/A;    FISTULOGRAM N/A 2020    Procedure: Fistulogram;  Surgeon: Rahat Berger MD;  Location: [facility] CATH LAB/EP;  Service: Cardiology;  Laterality: N/A;    FISTULOGRAM Left 3/20/2024    Procedure: Fistulogram LEFT FOREARM;  Surgeon: Anastacia  MD Matheus;  Location: [facility] CATH LAB;  Service: Nephrology;  Laterality: Left;    pd catheter      PERCUTANEOUS TRANSLUMINAL ANGIOPLASTY OF ARTERIOVENOUS FISTULA N/A 06/09/2020    Procedure: PTA, AV FISTULA;  Surgeon: Rahat Berger MD;  Location: [facility] CATH LAB/EP;  Service: Cardiology;  Laterality: N/A;    PERITONEAL CATHETER REMOVAL N/A 11/30/2018    Procedure: REMOVAL, CATHETER, DIALYSIS, PERITONEAL;  Surgeon: Mukesh Gonsales Jr., MD;  Location: [facility] OR;  Service: General;  Laterality: N/A;    RENAL BIOPSY  2016    THROMBECTOMY OF HEMODIALYSIS ACCESS SITE N/A 06/09/2020    Procedure: Thrombectomy, Hemodialysis Graft Or Fistula;  Surgeon: Rahat Berger MD;  Location: [facility] CATH LAB/EP;  Service: Cardiology;  Laterality: N/A;    VASCULAR SURGERY  08/2012    dialysis catheter to right subclavian     Family History   Problem Relation Name Age of Onset    Hypertension Mother      Heart failure Mother      Ovarian cancer Maternal Aunt      Diabetes Maternal Grandmother      Stroke Maternal Grandmother      Breast cancer Neg Hx      Colon cancer Neg Hx       Social History     Tobacco Use    Smoking status: Every Day     Current packs/day: 1.00     Average packs/day: 1 pack/day for 41.4 years (41.4 ttl pk-yrs)     Types: Cigarettes     Start date: 1983     Passive exposure: Never    Smokeless tobacco: Never    Tobacco comments:     Pt. states recently cut down to 0.5 pk/day. Enrolled in the [employer] on 6/9/20 ([employer] Member ID # [id number]). She declines Ambulatory referral to Smoking Cessation program. Handout provided.   Substance Use Topics    Alcohol use: Not Currently     Comment: occasional    Drug use: Not Currently     Types: Marijuana       Current Facility-Administered Medications:     [COMPLETED] calcium gluconate 1 g in NS IVPB (premixed), 1 g, Intravenous, Once, Last Rate: 300 mL/hr at 05/14/24 1912, 1 g at 05/14/24 1912 **AND** calcium gluconate 1 g in NS IVPB (premixed), 1 g, Intravenous, Q10 Min PRN,  Yarelis Wolfe MD    dextrose 10% bolus 125 mL 125 mL, 12.5 g, Intravenous, PRN, Yarelis Wolfe MD    dextrose 10% bolus 250 mL 250 mL, 25 g, Intravenous, PRN, Yarelis Wolfe MD    mupirocin 2 % ointment, , Nasal, BID, Raymon Martinez, ABRAHAM    sodium chloride 0.9% bolus 250 mL 250 mL, 250 mL, Intravenous, PRN, Raymon Martinez, NP    Facility-Administered Medications Ordered in Other Encounters:     0.9%  NaCl infusion, , Intravenous, Continuous, Jordan Mcguire MD, Last Rate: 20 mL/hr at 07/18/22 0943, New Bag at 07/18/22 0943    0.9%  NaCl infusion, , Intravenous, Continuous, Jordan Mcguire MD, Stopped at 08/18/22 1042    sodium chloride 0.9% flush 10 mL, 10 mL, Intravenous, Shayla RAMIREZ Daniel L., MD    sodium chloride 0.9% flush 10 mL, 10 mL, Intravenous, PRNShayla Daniel L., MD     Review of patient's allergies indicates:   Allergen Reactions    Doxycycline Swelling, Rash and Hives    Gentamicin Anaphylaxis    Vancomycin analogues Anaphylaxis       Review of Systems:  Review of Systems   Constitutional: Negative.  Negative for fatigue and fever.   HENT: Negative.  Negative for sore throat and trouble swallowing.    Eyes: Negative.    Respiratory: Negative.     Cardiovascular: Negative.  Negative for chest pain.   Gastrointestinal: Negative.    Genitourinary: Negative.    Musculoskeletal: Negative.    Skin: Negative.    Neurological: Negative.    Psychiatric/Behavioral: Negative.          Current Facility-Administered Medications   Medication    calcium gluconate 1 g in NS IVPB (premixed)    dextrose 10% bolus 125 mL 125 mL    dextrose 10% bolus 250 mL 250 mL    mupirocin 2 % ointment    sodium chloride 0.9% bolus 250 mL 250 mL     Facility-Administered Medications Ordered in Other Encounters   Medication    0.9%  NaCl infusion    0.9%  NaCl infusion    sodium chloride 0.9% flush 10 mL    sodium chloride 0.9% flush 10 mL       OBJECTIVE:     Physical Exam:  Physical  "Exam  Constitutional:       Appearance: She is well-developed.   HENT:      Head: Normocephalic and atraumatic.      Right Ear: External ear normal.      Left Ear: External ear normal.   Eyes:      Pupils: Pupils are equal, round, and reactive to light.   Cardiovascular:      Rate and Rhythm: Normal rate and regular rhythm.      Heart sounds: Normal heart sounds.   Pulmonary:      Breath sounds: Normal breath sounds.   Abdominal:      General: Bowel sounds are normal.      Palpations: Abdomen is soft.   Musculoskeletal:         General: Normal range of motion.      Cervical back: Neck supple.      Comments: Thrombosed Susan fistula left wrist   Skin:     General: Skin is warm.   Neurological:      Mental Status: She is alert and oriented to person, place, and time.            Laboratory:  No results for input(s): "WBC", "RBC", "HGB", "HCT", "PLT", "MCV", "MCH", "MCHC" in the last 24 hours.  BMP:   Recent Labs   Lab 05/14/24  1615   GLU 76      K 7.4*   CL 98   CO2 21*   *   CREATININE 17.5*   CALCIUM 8.2*   PHOS 6.4*     Lab Results   Component Value Date    CALCIUM 8.2 (L) 05/14/2024    PHOS 6.4 (H) 05/14/2024     BNP  No results for input(s): "BNP", "BNPTRIAGEBLO" in the last 168 hours.  Lab Results   Component Value Date    URICACID 4.5 04/06/2018     Lab Results   Component Value Date    IRON 82 02/23/2024    TIBC 265 02/23/2024    FERRITIN 1,447 (H) 02/23/2024     Lab Results   Component Value Date    .1 (H) 04/08/2022    CALCIUM 8.2 (L) 05/14/2024    CAION 1.23 12/26/2011    PHOS 6.4 (H) 05/14/2024         IMPRESSION:  End-stage renal disease with hyperkalemia and thrombosed AV fistula.  Patient in need of emergent dialysis access.    Plan:  Spoke with patient who understands that catheter placement is the only means of acute hemodialysis access in the setting of failed thrombectomy and hyperkalemia.  Consent signed for placement of hemodialysis catheter.    Thank you for the opportunity " of seeing Jennifer Booth in consultation

## 2024-05-15 NOTE — SUBJECTIVE & OBJECTIVE
Interval History:  No acute events, patient underwent emergent dialysis without difficulty and has no complaints.    Review of Systems   Constitutional:  Negative for chills and fever.   Respiratory:  Negative for shortness of breath.    Cardiovascular:  Negative for chest pain.     Objective:     Vital Signs (Most Recent):  Temp: 97.9 °F (36.6 °C) (05/15/24 1215)  Pulse: 73 (05/15/24 1505)  Resp: (!) 23 (05/15/24 1505)  BP: (!) 114/49 (05/15/24 1415)  SpO2: 100 % (05/15/24 1505) Vital Signs (24h Range):  Temp:  [95.2 °F (35.1 °C)-98.7 °F (37.1 °C)] 97.9 °F (36.6 °C)  Pulse:  [58-82] 73  Resp:  [15-36] 23  SpO2:  [55 %-100 %] 100 %  BP: (101-179)/() 114/49     Weight: 66.3 kg (146 lb 2.6 oz)  Body mass index is 22.22 kg/m².    Intake/Output Summary (Last 24 hours) at 5/15/2024 1619  Last data filed at 5/15/2024 1215  Gross per 24 hour   Intake 1170 ml   Output 4000 ml   Net -2830 ml         Physical Exam  Vitals and nursing note reviewed.   Constitutional:       Appearance: She is well-developed.   HENT:      Head: Normocephalic and atraumatic.      Mouth/Throat:      Dentition: Normal dentition.   Eyes:      General: Lids are normal.      Extraocular Movements: Extraocular movements intact.      Conjunctiva/sclera: Conjunctivae normal.   Cardiovascular:      Rate and Rhythm: Normal rate and regular rhythm.   Pulmonary:      Effort: Pulmonary effort is normal.      Breath sounds: Normal breath sounds.   Abdominal:      General: There is no distension.      Palpations: Abdomen is soft.   Musculoskeletal:      Cervical back: Neck supple.      Right lower leg: No edema.      Left lower leg: No edema.   Skin:     General: Skin is warm and dry.      Findings: No erythema or rash.      Comments: L IJ catheter noted with dressing C/D/I. LUE AVF dressed, no bruit or thrill noted.   Neurological:      Mental Status: She is alert and oriented to person, place, and time.             Significant Labs: All pertinent labs  within the past 24 hours have been reviewed.    Significant Imaging: I have reviewed all pertinent imaging results/findings within the past 24 hours.

## 2024-05-15 NOTE — EICU
"EICU Note    58 y/o female with a PMH of CHF, NICM, HTN, ESRD on HD, multiple myeloma ( in remission) GERD and MDD/anxiety presents with complaints of having the AV fistula declotted. However in about 30 minutes of treatment the fistula re clotted.  Repeat labs showed a K of 7.4. Surgery placed a left IJ catheter for emergent HD.     Currently:    BP (!) 101/55 (BP Location: Right arm, Patient Position: Lying)   Pulse 72   Temp 98.7 °F (37.1 °C) (Axillary)   Resp 18   Ht 5' 8" (1.727 m)   Wt 66.3 kg (146 lb 2.6 oz)   LMP 01/01/2016 (Approximate) Comment: 2016  SpO2 100%   Breastfeeding No   BMI 22.22 kg/m²     Labs:    CBC: WBC 4.36/ Hgb 8.1/ Hct 24.7/ plts 122 K    BMP: Na 138-->139/ K 7.4--> 4.9/ / CO2 21/ BUN 59             Cr 10.4/ glucose 114    Chest xray:  Final reading  FINDINGS:  Left-sided central venous catheter distal tip is seen just proximal to the cavoatrial junction.  Cardiac silhouette is normal in size.  Lungs are symmetrically expanded.  No evidence of focal consolidative process, pneumothorax, or significant pleural effusion.  Impression:  No acute cardiopulmonary process identified.    Impression:  -Thrombosis of dialysis vascular access  -Hyperkalemia  -ESRD on HD  -HTN  -CHF    Plan: Continue present management of HD and monitor potassium.      "

## 2024-05-15 NOTE — ASSESSMENT & PLAN NOTE
- Continue home metoprolol and Entresto   - Continue tele monitoring and strict I&Os and daily weights

## 2024-05-15 NOTE — OR NURSING
1815-Results of renal function panel reviewed. Critical value of K+ called to Dr. Wolfe. MD paged.   1821- Dr. Wolfe answered page. Stated she saw the labs and adding hyperkalemic orders now. Pt placed on tele  1830- Dr. Gonsales in room to see pt. Consented for surgery. RN establishing IV  1850: Pt transferred to ICU. Bedside report given. Patient transferred with belongings.

## 2024-05-15 NOTE — H&P
[facility] Intensive Care [facility] Medicine  History & Physical    Patient Name: Jennifer Booth  MRN: [id number]  Patient Class: IP- Inpatient  Admission Date: 5/14/2024  Attending Physician: Jennifer Flor MD   Primary Care Provider: Leodan Sanchez MD    Patient information was obtained from patient, past medical records, and ER records.     Subjective:     Principal Problem:Thrombosis of dialysis vascular access    Chief Complaint: No chief complaint on file.       HPI: Ms. Booth is a 59 YOF with PMHx of Multiple Myeloma in remission, chronic combined systolic and diastolic CHF (EF 35-40% 10/2023), NICM (negative SPECT 04/2022), HTN, ESRD on HD (TTS at [facility] with Dr. Wolfe), anemia of chronic renal failure, GERD, MDD/anxiety, history of EtOH abuse- complete cessation since 2020, and tobacco dependency.     She presented initially to outpt surgery for planned AV declot procedure due to AVF access issues at last HD session. She underwent extensive declot with Dr. Galaviz today and was planned for HD session immediately afterwards however approximately 30 minutes into treatment access again clotted.  She was evaluated by Nephrology in postprocedure area and repeat chemistry noted hyperkalemia of 7.4; she was shifted with calcium gluconate and General Surgery consulted who placed left IJ catheter for urgent HD. She is seen in ICU post line placement and just prior to repeat initiation of HD. She denies SOB, MONTES, CP, abdominal pain, N/V/D, constipation, changes in bowel habits or blood in stool, decreased appetite, changes in PO intake, light headiness, dizziness, seizures, or syncope.     At admission she is HDS and afebrile.  CBC with WBC 4, hemoglobin 8.1, hematocrit 24.7, platelets 122.  Chemistry was , K 7.4 (s/p shifting), chloride 98, CO2 21, , SCr 17.5, glucose 76.  Phosphorus 6.4.  Magnesium 1.6.    The patient was admitted to the Hospital Medicine Service for further evaluation and  management.    Past Medical History:   Diagnosis Date    Addiction to drug     Alcohol abuse     Allergic drug reaction 2017    Anemia     Anxiety     Arm pain, left 2014    Breast cyst     Chronic renal failure 2014    CKD (chronic kidney disease) stage 5, GFR less than 15 ml/min     COPD exacerbation 2023    Depression     Dialysis patient     dialysis m-w-f    Encounter for blood transfusion     GERD (gastroesophageal reflux disease)     Hx of psychiatric care     Hypertension     Mood swings     Multiple myeloma in remission 10/26/2012    per Hem/Oc note    Multiple thyroid nodules 10/22/2023    Psychiatric exam requested by authority     Psychiatric problem     Renal hypertension     Status post dialysis 2012    Therapy     Thrombocytopenia 2012       Past Surgical History:   Procedure Laterality Date    AV FISTULA PLACEMENT Left     BREAST CYST ASPIRATION Left     BREAST CYST EXCISION Left     CERVICAL SPINE SURGERY Bilateral      SECTION      x1    COLONOSCOPY N/A 2022    Procedure: COLONOSCOPY;  Surgeon: Jordan Mcguire MD;  Location: [facility] ENDO;  Service: Endoscopy;  Laterality: N/A;    FISTULOGRAM N/A 2020    Procedure: Fistulogram;  Surgeon: Rahat Berger MD;  Location: [facility] CATH LAB/EP;  Service: Cardiology;  Laterality: N/A;    FISTULOGRAM Left 3/20/2024    Procedure: Fistulogram LEFT FOREARM;  Surgeon: Matheus Galaviz MD;  Location: [facility] CATH LAB;  Service: Nephrology;  Laterality: Left;    pd catheter      PERCUTANEOUS TRANSLUMINAL ANGIOPLASTY OF ARTERIOVENOUS FISTULA N/A 2020    Procedure: PTA, AV FISTULA;  Surgeon: Rahat Berger MD;  Location: [facility] CATH LAB/EP;  Service: Cardiology;  Laterality: N/A;    PERITONEAL CATHETER REMOVAL N/A 2018    Procedure: REMOVAL, CATHETER, DIALYSIS, PERITONEAL;  Surgeon: Mukesh Gonsales Jr., MD;  Location: [facility] OR;  Service: General;  Laterality: N/A;    RENAL BIOPSY  2016    THROMBECTOMY OF  HEMODIALYSIS ACCESS SITE N/A 06/09/2020    Procedure: Thrombectomy, Hemodialysis Graft Or Fistula;  Surgeon: Rahat Berger MD;  Location: [facility] CATH LAB/EP;  Service: Cardiology;  Laterality: N/A;    VASCULAR SURGERY  08/2012    dialysis catheter to right subclavian       Review of patient's allergies indicates:   Allergen Reactions    Doxycycline Swelling, Rash and Hives    Gentamicin Anaphylaxis    Vancomycin analogues Anaphylaxis       Current Facility-Administered Medications on File Prior to Encounter   Medication    0.9%  NaCl infusion    0.9%  NaCl infusion    sodium chloride 0.9% flush 10 mL    sodium chloride 0.9% flush 10 mL     Current Outpatient Medications on File Prior to Encounter   Medication Sig    B COMPLEX W-C NO.20/FOLIC ACID (VIRT-CAPS ORAL) Take by mouth once daily.    buPROPion (WELLBUTRIN XL) 150 MG TB24 tablet Take 1 tablet (150 mg total) by mouth once daily.    cetirizine (ZYRTEC) 5 MG tablet Take 1 tablet (5 mg total) by mouth once daily.    cinacalcet (SENSIPAR) 30 MG Tab Take by mouth daily with breakfast.    clopidogreL (PLAVIX) 75 mg tablet Take 1 tablet (75 mg total) by mouth once daily.    cyproheptadine (PERIACTIN) 4 mg tablet Take 1 tablet (4 mg total) by mouth 3 (three) times daily as needed (appetite).    EScitalopram oxalate (LEXAPRO) 20 MG tablet Take 1 tablet (20 mg total) by mouth once daily.    fluticasone-salmeterol diskus inhaler 100-50 mcg Inhale 1 puff into the lungs 2 (two) times daily. Controller    metoprolol succinate (TOPROL-XL) 25 MG 24 hr tablet Take 1 tablet (25 mg total) by mouth once daily.    mirtazapine (REMERON) 15 MG tablet Take 1 tablet (15 mg total) by mouth every evening.    sacubitriL-valsartan (ENTRESTO)  mg per tablet Take 1 tablet by mouth 2 (two) times daily.    sevelamer carbonate (RENVELA) 800 mg Tab Take 800 mg by mouth 3 (three) times daily with meals.    traMADoL (ULTRAM) 50 mg tablet Take 50 mg by mouth every 6 (six) hours as needed.     acetaminophen (TYLENOL) 325 MG tablet Take 650 mg by mouth every 4 (four) hours as needed.    albuterol (VENTOLIN HFA) 90 mcg/actuation inhaler Inhale 2 puffs into the lungs every 6 (six) hours as needed for Wheezing or Shortness of Breath. Rescue    fluticasone propionate (FLONASE) 50 mcg/actuation nasal spray 1 spray by Each Nostril route daily as needed.    melatonin 5 mg Cap Take 5 mg by mouth nightly.    montelukast (SINGULAIR) 10 mg tablet TAKE 1 TABLET(10 MG) BY MOUTH EVERY EVENING    naltrexone (DEPADE) 50 mg tablet Start with 1/2 tab (25 mg) by mouth daily, may increase to 1 tab (50 mg) daily if able. (Patient not taking: Reported on 4/30/2024)    sucroferric oxyhydroxide (VELPHORO) 500 mg Chew Take 1,000 mg by mouth 3 (three) times daily. (Patient not taking: Reported on 4/30/2024)    tiotropium bromide (SPIRIVA RESPIMAT) 2.5 mcg/actuation inhaler Inhale 2 puffs into the lungs once daily. Controller     Family History       Problem Relation (Age of Onset)    Diabetes Maternal Grandmother    Heart failure Mother    Hypertension Mother    Ovarian cancer Maternal Aunt    Stroke Maternal Grandmother          Tobacco Use    Smoking status: Every Day     Current packs/day: 1.00     Average packs/day: 1 pack/day for 41.4 years (41.4 ttl pk-yrs)     Types: Cigarettes     Start date: 1983     Passive exposure: Never    Smokeless tobacco: Never    Tobacco comments:     Pt. states recently cut down to 0.5 pk/day. Enrolled in the [employer] on 6/9/20 ([facility] Member ID # [id number]). She declines Ambulatory referral to Smoking Cessation program. Handout provided.   Substance and Sexual Activity    Alcohol use: Not Currently     Comment: occasional    Drug use: Not Currently     Types: Marijuana    Sexual activity: Not Currently     Partners: Male     Review of Systems   Constitutional:  Negative for activity change, appetite change, chills, diaphoresis, fatigue and fever.   Respiratory:  Negative for cough, chest  tightness, shortness of breath and wheezing.    Cardiovascular:  Negative for chest pain, palpitations and leg swelling.   Gastrointestinal:  Negative for abdominal distention, abdominal pain, constipation, diarrhea, nausea and vomiting.   Neurological:  Negative for dizziness, syncope, weakness, light-headedness and headaches.     Objective:     Vital Signs (Most Recent):  Temp: 97.6 °F (36.4 °C) (05/14/24 1901)  Pulse: 74 (05/14/24 2215)  Resp: 17 (05/14/24 2215)  BP: (!) 132/57 (05/14/24 2215)  SpO2: 98 % (05/14/24 2215) Vital Signs (24h Range):  Temp:  [97.6 °F (36.4 °C)-98 °F (36.7 °C)] 97.6 °F (36.4 °C)  Pulse:  [49-77] 74  Resp:  [16-25] 17  SpO2:  [92 %-100 %] 98 %  BP: (117-159)/(51-89) 132/57     Weight: 66.3 kg (146 lb 2.6 oz)  Body mass index is 22.22 kg/m².     Physical Exam  Vitals and nursing note reviewed.   Constitutional:       Appearance: She is well-developed.   HENT:      Head: Normocephalic and atraumatic.      Mouth/Throat:      Dentition: Normal dentition.   Eyes:      General: Lids are normal.      Extraocular Movements: Extraocular movements intact.      Conjunctiva/sclera: Conjunctivae normal.   Cardiovascular:      Rate and Rhythm: Normal rate and regular rhythm.   Pulmonary:      Effort: Pulmonary effort is normal.      Breath sounds: Normal breath sounds.   Abdominal:      General: There is no distension.      Palpations: Abdomen is soft.   Musculoskeletal:      Cervical back: Neck supple.      Right lower leg: No edema.      Left lower leg: No edema.   Skin:     General: Skin is warm and dry.      Findings: No erythema or rash.      Comments: L IJ catheter noted with dressing C/D/I. LUE AVF dressed, no bruit or thrill noted.   Neurological:      Mental Status: She is alert and oriented to person, place, and time.             Significant Labs: All pertinent labs within the past 24 hours have been reviewed.  CBC:   Recent Labs   Lab 05/14/24  1933   WBC 4.36   HGB 8.1*   HCT 24.7*   PLT  122*     CMP:   Recent Labs   Lab 05/14/24  1615 05/14/24  1933    138   K 7.4* 7.4*   CL 98 99   CO2 21* 22*   GLU 76 245*   * 116*   CREATININE 17.5* 16.7*   CALCIUM 8.2* 8.1*   ALBUMIN 3.1*  --    ANIONGAP 20* 17*       Significant Imaging: I have reviewed all pertinent imaging results/findings within the past 24 hours.  Assessment/Plan:     * Thrombosis of dialysis vascular access  ESRD on dialysis   Hyperkalemia  Anemia in chronic kidney disease, on chronic dialysis   Secondary hyperparathyroidism renal origin   Chronic kidney disease-mineral and bone disorder  LUE stent in place  -presented to outpt surgery for planned AV declot procedure due to AVF access issues at last HD session  -underwent extensive declot with Dr. Galaviz today and was planned for HD session immediately afterwards however approximately 30 minutes into treatment access again clotted.  -evaluated by Nephrology in postprocedure area and repeat chemistry noted hyperkalemia of 7.4  -shifted with calcium gluconate  -General Surgery consulted who placed left IJ catheter for urgent HD  -at admission HDS and afebrile, labs detailed above  -continue home cinacalcet, Renvela, Nephro-[place]  -continue home plavix due to LUE stenting in the past  -dose/medication adjustment as appropriate   -trend labs, address/replete electrolytes and transfuse as indicated  -avoid nephrotoxins and hypotension as able, renally dose medications  -strict I&Os and daily weights  -continue tele monitoring  -EKG PRN concerns   -monitor     Hypertension secondary to other renal disorders  -chronic   -controlled at admission   -continue home metoprolol and Entresto  -dose/medication adjustment as appropriate   -monitor     Chronic combined systolic and diastolic heart failure  -chronic   -compensated on exam   -continue home metoprolol and Entresto   -dose/medication adjustment as appropriate   -continue tele monitoring  -strict I&Os and daily weights    GERD  without esophagitis  -chronic   -controlled   -PRN supportive care is indicated    Depression  Generalized anxiety disorder  -controlled   -continue home Wellbutrin and mirtazapine  -further PRN supportive care as indicated  -monitor       VTE Risk Mitigation (From admission, onward)           Ordered     heparin (porcine) injection 5,000 Units  Every 8 hours         05/14/24 1949     IP VTE HIGH RISK PATIENT  Once         05/14/24 1949     Place sequential compression device  Until discontinued         05/14/24 1949                    Claudette Henderson, DNP, AG-ACNP, BC  Department of Hospital Medicine  [facility]

## 2024-05-15 NOTE — ASSESSMENT & PLAN NOTE
ESRD on dialysis   Hyperkalemia  Anemia in chronic kidney disease, on chronic dialysis   Secondary hyperparathyroidism renal origin   Chronic kidney disease-mineral and bone disorder  LUE stent in place  - Presented to outpt surgery for planned AV declot procedure due to AVF access issues at last HD session. Underwent extensive declot with Dr. Galaviz and was planned for HD session immediately afterwards, however, approximately 30 minutes into treatment access again clotted. Evaluated by Nephrology in postprocedure area and repeat chemistry noted hyperkalemia of 7.4  - Shifted with calcium gluconate and General Surgery consulted who placed left IJ catheter for urgent HD on 5/14  - Continue home cinacalcet, Renvela, Nephro-[place]  - Continue home plavix due to LUE stenting in the past  - Nephrology planning for dialysis today and plan for new AV fistula tomorrow  - Trend labs, address/replete electrolytes and transfuse as indicated, strict I&Os and daily weights

## 2024-05-15 NOTE — PROGRESS NOTES
HD Note   05/15/24 1215   Post-Hemodialysis Assessment   Rinseback Volume (mL) 500 mL   Blood Volume Processed (Liters) 72.6 L   Dialyzer Clearance Clear   Duration of Treatment 180 minutes   Total UF (mL) 2500 mL   Net Fluid Removal 2000   Patient Response to Treatment Patient tolerated treatment well   Post-Hemodialysis Comments Patient voiced no complaints post treatment

## 2024-05-15 NOTE — HOSPITAL COURSE
Ms. Booth presented after failed outpatient AV declot. Nephrology and Vascular surgery consulted.  Left IJ single lumen catheter (Fadi) placed and patient underwent emergent dialysis on 5/14. The next day, the patient had another session of dialysis. Case discussed with Nephrology and patient was stable for discharge. Patient to follow up with Dr. Gonsales to schedule procedure for new AV fistula and resume her normal dialysis schedule, and follow up with Dr. Wolfe.

## 2024-05-15 NOTE — PROGRESS NOTES
"Nephrology  Progress Note    Admit Date: 5/14/2024   LOS: 1 day     SUBJECTIVE:     Follow-up For:  Thrombosis of dialysis vascular access    Patient is a 59 y.o. female presents with with clotted left forearm AV fistula and underwent declot with Dr. Galaviz earlier today.  She denies any shortness of breath nausea or vomiting she does complain of heaviness in her legs.  She then was sent for emergent dialysis prior to being discharged home to ensure that the access remained open however 30 minutes into her dialysis treatment the access re-clotted.  We eliot labs approximately 2 hours after this point to allow for any equilibration and unfortunately potassium is 7.4  CO2 21.  EKG shows sinus rhythm with a rate of 60.  Last dialysis Thursday5/9.  Case discussed at bedside with Dr. Gonsales and anesthesia team     Interval History:     Events noted.  JAVIER placed by Dr. Gonsales and underwent emergent dialysis.  Uneventful night.  No complaints this morning.  Plans for another round of dialysis this morning for metabolic clearances and anticipation of OR on Thursday.  Eating breakfast and no complaints.    Review of Systems:  Constitutional: No fever or chills  Respiratory: No cough or shortness of breath  Cardiovascular: No chest pain or palpitations  Gastrointestinal: No nausea or vomiting  Neurological: No confusion or weakness    OBJECTIVE:     Vital Signs Range (Last 24H):  BP (!) 117/51   Pulse 63   Temp 98.6 °F (37 °C) (Axillary)   Resp 18   Ht 5' 8" (1.727 m)   Wt 66.3 kg (146 lb 2.6 oz)   LMP 01/01/2016 (Approximate) Comment: 2016  SpO2 100%   Breastfeeding No   BMI 22.22 kg/m²     Temp:  [97.6 °F (36.4 °C)-98.7 °F (37.1 °C)]   Pulse:  [49-81]   Resp:  [15-25]   BP: (101-176)/(51-89)   SpO2:  [92 %-100 %]     I & O (Last 24H):  Intake/Output Summary (Last 24 hours) at 5/15/2024 0814  Last data filed at 5/14/2024 2310  Gross per 24 hour   Intake 950 ml   Output 2300 ml   Net -1350 ml       Physical " "Exam:  HEENT:  Neck supple, oropharynx clear.  Lungs: Clear to auscultation bilaterally and normal respiratory effort  Heart: Regular rate and rhythm, S1, S2 normal  Abdomen: Soft, non-tender non-distended; bowel sounds normal; no masses,  no organomegaly  Extremities: No cyanosis or clubbing. No edema  Left forearm AV fistula no thrill or bruit   Left IJ JAVIER    Laboratory Data:  Recent Labs   Lab 05/15/24  0331   WBC 4.58   RBC 2.92*   HGB 8.4*   HCT 25.9*   *   MCV 89   MCH 28.8   MCHC 32.4       BMP:   Recent Labs   Lab 05/15/24  0331   *  123*     137   K 5.6*  5.6*     104   CO2 21*  21*   BUN 61*  61*   CREATININE 10.6*  10.6*   CALCIUM 8.6*  8.6*   MG 1.6   PHOS 6.2*     Lab Results   Component Value Date    CALCIUM 8.6 (L) 05/15/2024    CALCIUM 8.6 (L) 05/15/2024    PHOS 6.2 (H) 05/15/2024       Lab Results   Component Value Date    .1 (H) 04/08/2022    CALCIUM 8.6 (L) 05/15/2024    CALCIUM 8.6 (L) 05/15/2024    CAION 1.23 12/26/2011    PHOS 6.2 (H) 05/15/2024       Lab Results   Component Value Date    URICACID 4.5 04/06/2018       BNP  No results for input(s): "BNP", "BNPTRIAGEBLO" in the last 168 hours.    Medications:  Medication list was reviewed and changes noted under Assessment/Plan.    Diagnostic Results:        ASSESSMENT/PLAN:     ESRD/HD, clotted AV fistula, uremia, hyperkalemia, metabolic acidosis  -last dialysis was Thursday 5/9  -usual dialysis Tuesday Thursday Saturday at [facility]  -given degree of hyperkalemia and uremia have asked Dr. Gonsales for catheter placement and he will take to the OR shortly for Trialysis versus tunneled catheter  -hyperkalemia treated emergently with calcium gluconate bicarbonate insulin and D50 I have discussed this will given 90 minute window and we will need to have repeat dose of calcium gluconate possibly before she initiates dialysis.  -dialysis orders placed for this evening given degree of uremia we will run 2-1/2 hours at " a blood flow rate of 300 with a 2K bath for the 1st 2 hours and 1K bath for the last hour.  -we will plan repeat dialysis in the morning.  5/15:  Fadi catheter placed and underwent dialysis.  We will continue another round of dialysis today and anticipate OR tomorrow for new AV fistula placement. Renally dose meds, avoid nephrotoxins, and monitor I/O's closely.       Acute on chronic combined CHF/pulmonary hypertension/ hypertension  -mild overload secondary to malfunctioning dialysis access       Secondary hyperparathyroidism/hyperphosphatemia  -continue outpatient cinacalcet, calcitriol and home binder     Anemia of ESRD/iron deficiency/relative B12 deficiency  -recheck hemoglobin in the morning gets Mircera per protocol at outpatient unit  -SHERMAN on HD     Depression/anxiety  -hold Lexapro at this time until hyperkalemia treated due to known QT prolongation with this drug     Former smoker  Continue inhalers at home       Total critical care time (exclusive of procedural time) : 60 minutes  Critical care was necessary to treat or prevent imminent or life-threatening deterioration of the following conditions:  Severe hyperkalemia, ESRD on dialysis, hypertension, uremia, metabolic acidosis, anemia, acute on chronic combined heart failure, malfunctioning dialysis access .     Critical care was time spent personally by me on the following activities: development of treatment plan with patient or surrogate, discussions with consultants, interpretation of cardiac output measurements, examination of patient, ordering and performing treatments and interventions, evaluation of patient's response to treatment, obtaining history from patient or surrogate, ordering and review of laboratory studies, ordering and review of radiographic studies, re-evaluation of patient's condition, pulse oximetry and blood draw for specimens

## 2024-05-15 NOTE — ASSESSMENT & PLAN NOTE
ESRD on dialysis   Hyperkalemia  Anemia in chronic kidney disease, on chronic dialysis   Secondary hyperparathyroidism renal origin   Chronic kidney disease-mineral and bone disorder  LUE stent in place  -presented to outpt surgery for planned AV declot procedure due to AVF access issues at last HD session  -underwent extensive declot with Dr. Galaviz today and was planned for HD session immediately afterwards however approximately 30 minutes into treatment access again clotted.  -evaluated by Nephrology in postprocedure area and repeat chemistry noted hyperkalemia of 7.4  -shifted with calcium gluconate  -General Surgery consulted who placed left IJ catheter for urgent HD  -at admission HDS and afebrile, labs detailed above  -continue home cinacalcet, Renvela, Nephro-[place]  -continue home plavix due to LUE stenting in the past  -dose/medication adjustment as appropriate   -trend labs, address/replete electrolytes and transfuse as indicated  -avoid nephrotoxins and hypotension as able, renally dose medications  -strict I&Os and daily weights  -continue tele monitoring  -EKG PRN concerns   -monitor

## 2024-05-15 NOTE — ASSESSMENT & PLAN NOTE
-chronic   -controlled at admission   -continue home metoprolol and Entresto  -dose/medication adjustment as appropriate   -monitor

## 2024-05-15 NOTE — CONSULTS
Consult Note  Nephrology    Consult Requested By: Jennifer Flor MD  Reason for Consult: ESRD/ Hyperkalemia    SUBJECTIVE:     History of Present Illness:  Patient is a 59 y.o. female presents with with clotted left forearm AV fistula and underwent declot with Dr. Galaviz earlier today.  She denies any shortness of breath nausea or vomiting she does complain of heaviness in her legs.  She then was sent for emergent dialysis prior to being discharged home to ensure that the access remained open however 30 minutes into her dialysis treatment the access re-clotted.  We eliot labs approximately 2 hours after this point to allow for any equilibration and unfortunately potassium is 7.4  CO2 21.  EKG shows sinus rhythm with a rate of 60.  Last dialysis .  Case discussed at bedside with Dr. Gonsales and anesthesia team.    Past Medical History:   Diagnosis Date    Addiction to drug     Alcohol abuse     Allergic drug reaction 2017    Anemia     Anxiety     Arm pain, left 2014    Breast cyst     Chronic renal failure 2014    CKD (chronic kidney disease) stage 5, GFR less than 15 ml/min     COPD exacerbation 2023    Depression     Dialysis patient     dialysis m-w-    Encounter for blood transfusion     GERD (gastroesophageal reflux disease)     Hx of psychiatric care     Hypertension     Mood swings     Multiple myeloma in remission 10/26/2012    per Hem/Oc note    Multiple thyroid nodules 10/22/2023    Psychiatric exam requested by authority     Psychiatric problem     Renal hypertension     Status post dialysis 2012    Therapy     Thrombocytopenia 2012     Past Surgical History:   Procedure Laterality Date    AV FISTULA PLACEMENT Left     BREAST CYST ASPIRATION Left     BREAST CYST EXCISION Left     CERVICAL SPINE SURGERY Bilateral      SECTION      x1    COLONOSCOPY N/A 2022    Procedure: COLONOSCOPY;  Surgeon: Jordna Mcguire MD;  Location: [facility];   Service: Endoscopy;  Laterality: N/A;    FISTULOGRAM N/A 06/09/2020    Procedure: Fistulogram;  Surgeon: Rahat Berger MD;  Location: [facility] CATH LAB/EP;  Service: Cardiology;  Laterality: N/A;    FISTULOGRAM Left 3/20/2024    Procedure: Fistulogram LEFT FOREARM;  Surgeon: Matheus Galaviz MD;  Location: [facility] CATH LAB;  Service: Nephrology;  Laterality: Left;    pd catheter      PERCUTANEOUS TRANSLUMINAL ANGIOPLASTY OF ARTERIOVENOUS FISTULA N/A 06/09/2020    Procedure: PTA, AV FISTULA;  Surgeon: Rahat Berger MD;  Location: [facility] CATH LAB/EP;  Service: Cardiology;  Laterality: N/A;    PERITONEAL CATHETER REMOVAL N/A 11/30/2018    Procedure: REMOVAL, CATHETER, DIALYSIS, PERITONEAL;  Surgeon: Mukesh Gonsales Jr., MD;  Location: [facility] OR;  Service: General;  Laterality: N/A;    RENAL BIOPSY  2016    THROMBECTOMY OF HEMODIALYSIS ACCESS SITE N/A 06/09/2020    Procedure: Thrombectomy, Hemodialysis Graft Or Fistula;  Surgeon: Rahat Berger MD;  Location: [facility] CATH LAB/EP;  Service: Cardiology;  Laterality: N/A;    VASCULAR SURGERY  08/2012    dialysis catheter to right subclavian     Family History   Problem Relation Name Age of Onset    Hypertension Mother      Heart failure Mother      Ovarian cancer Maternal Aunt      Diabetes Maternal Grandmother      Stroke Maternal Grandmother      Breast cancer Neg Hx      Colon cancer Neg Hx       Social History     Tobacco Use    Smoking status: Every Day     Current packs/day: 1.00     Average packs/day: 1 pack/day for 41.4 years (41.4 ttl pk-yrs)     Types: Cigarettes     Start date: 1983     Passive exposure: Never    Smokeless tobacco: Never    Tobacco comments:     Pt. states recently cut down to 0.5 pk/day. Enrolled in the [employer] on 6/9/20 ([facility] Member ID # [id number]). She declines Ambulatory referral to Smoking Cessation program. Handout provided.   Substance Use Topics    Alcohol use: Not Currently     Comment: occasional    Drug use: Not Currently     Types: Marijuana        Medications Prior to Admission   Medication Sig Dispense Refill Last Dose    B COMPLEX W-C NO.20/FOLIC ACID (VIRT-CAPS ORAL) Take by mouth once daily.   5/13/2024    buPROPion (WELLBUTRIN XL) 150 MG TB24 tablet Take 1 tablet (150 mg total) by mouth once daily. 90 tablet 3 5/14/2024    cetirizine (ZYRTEC) 5 MG tablet Take 1 tablet (5 mg total) by mouth once daily. 30 tablet 2 5/14/2024    cinacalcet (SENSIPAR) 30 MG Tab Take by mouth daily with breakfast.   5/13/2024    clopidogreL (PLAVIX) 75 mg tablet Take 1 tablet (75 mg total) by mouth once daily. 30 tablet 11 5/14/2024    cyproheptadine (PERIACTIN) 4 mg tablet Take 1 tablet (4 mg total) by mouth 3 (three) times daily as needed (appetite). 90 tablet 0 5/14/2024    EScitalopram oxalate (LEXAPRO) 20 MG tablet Take 1 tablet (20 mg total) by mouth once daily. 90 tablet 3 5/14/2024    fluticasone-salmeterol diskus inhaler 100-50 mcg Inhale 1 puff into the lungs 2 (two) times daily. Controller 60 each 2 5/14/2024    metoprolol succinate (TOPROL-XL) 25 MG 24 hr tablet Take 1 tablet (25 mg total) by mouth once daily. 90 tablet 3 5/14/2024    mirtazapine (REMERON) 15 MG tablet Take 1 tablet (15 mg total) by mouth every evening. 90 tablet 1 5/14/2024    sacubitriL-valsartan (ENTRESTO)  mg per tablet Take 1 tablet by mouth 2 (two) times daily. 180 tablet 3 5/14/2024    sevelamer carbonate (RENVELA) 800 mg Tab Take 800 mg by mouth 3 (three) times daily with meals.   5/13/2024    traMADoL (ULTRAM) 50 mg tablet Take 50 mg by mouth every 6 (six) hours as needed.   5/13/2024    acetaminophen (TYLENOL) 325 MG tablet Take 650 mg by mouth every 4 (four) hours as needed.   Unknown    albuterol (VENTOLIN HFA) 90 mcg/actuation inhaler Inhale 2 puffs into the lungs every 6 (six) hours as needed for Wheezing or Shortness of Breath. Rescue 18 g 6 Unknown    fluticasone propionate (FLONASE) 50 mcg/actuation nasal spray 1 spray by Each Nostril route daily as needed.   Unknown     melatonin 5 mg Cap Take 5 mg by mouth nightly.   Unknown    montelukast (SINGULAIR) 10 mg tablet TAKE 1 TABLET(10 MG) BY MOUTH EVERY EVENING 90 tablet 3 Unknown    naltrexone (DEPADE) 50 mg tablet Start with 1/2 tab (25 mg) by mouth daily, may increase to 1 tab (50 mg) daily if able. (Patient not taking: Reported on 4/30/2024) 90 tablet 1 Unknown    sucroferric oxyhydroxide (VELPHORO) 500 mg Chew Take 1,000 mg by mouth 3 (three) times daily. (Patient not taking: Reported on 4/30/2024)   Unknown    tiotropium bromide (SPIRIVA RESPIMAT) 2.5 mcg/actuation inhaler Inhale 2 puffs into the lungs once daily. Controller 4 g 2 Unknown       Review of patient's allergies indicates:   Allergen Reactions    Doxycycline Swelling, Rash and Hives    Gentamicin Anaphylaxis    Vancomycin analogues Anaphylaxis          OBJECTIVE:     Vital Signs (Most Recent)  Temp: 98 °F (36.7 °C) (05/14/24 1545)  Pulse: (!) 58 (05/14/24 1630)  Resp: 16 (05/14/24 1630)  BP: (!) 140/70 (05/14/24 1630)  SpO2: 96 % (05/14/24 1630)    Vital Signs Range (Last 24H):  Temp:  [97.9 °F (36.6 °C)-98 °F (36.7 °C)]   Pulse:  [49-71]   Resp:  [16-20]   BP: (117-149)/(68-87)   SpO2:  [94 %-98 %]     Physical Exam:    HEENT:  Neck supple, oropharynx clear.  Lungs: Clear to auscultation bilaterally and normal respiratory effort  Heart: Regular rate and rhythm, S1, S2 normal  Abdomen: Soft, non-tender non-distended; bowel sounds normal; no masses,  no organomegaly  Extremities: No cyanosis or clubbing. No edema  Left forearm AV fistula no thrill or bruit     Diagnostic Results:  Labs: Reviewed  ECG: Reviewed  X-Ray: Reviewed    ASSESSMENT/PLAN:     ESRD/HD, clotted AV fistula, uremia, hyperkalemia, metabolic acidosis  -last dialysis was Thursday 5/9  -usual dialysis Tuesday Thursday Saturday at an [facility]  -given degree of hyperkalemia and uremia have asked Dr. Gonsales for catheter placement and he will take to the OR shortly for Trialysis versus tunneled  catheter  -hyperkalemia treated emergently with calcium gluconate bicarbonate insulin and D50 I have discussed this will given 90 minute window and we will need to have repeat dose of calcium gluconate possibly before she initiates dialysis.  -dialysis orders placed for this evening given degree of uremia we will run 2-1/2 hours at a blood flow rate of 300 with a 2K bath for the 1st 2 hours and 1K bath for the last hour.  -we will plan repeat dialysis in the morning.    Acute on chronic combined CHF/pulmonary hypertension/ hypertension  -mild viral and overload secondary to malfunctioning dialysis access  -hold Coreg and Entresto    Secondary hyperparathyroidism/hyperphosphatemia  -continue outpatient cinacalcet, calcitriol and home binder    Anemia of ESRD/iron deficiency/relative B12 deficiency  -recheck hemoglobin in the morning gets Mircera per protocol at outpatient unit    Depression/anxiety  -hold Lexapro at this time until hyperkalemia treated due to known QT prolongation with this drug    Former smoker  Continue inhalers at home    Total critical care time (exclusive of procedural time) : 80 minutes  Critical care was necessary to treat or prevent imminent or life-threatening deterioration of the following conditions:  Severe hyperkalemia, ESRD on dialysis, hypertension, uremia, metabolic acidosis, anemia, acute on chronic combined heart failure, malfunctioning dialysis access .     Critical care was time spent personally by me on the following activities: development of treatment plan with patient or surrogate, discussions with consultants, interpretation of cardiac output measurements, examination of patient, ordering and performing treatments and interventions, evaluation of patient's response to treatment, obtaining history from patient or surrogate, ordering and review of laboratory studies, ordering and review of radiographic studies, re-evaluation of patient's condition, pulse oximetry and blood draw  for specimens

## 2024-05-15 NOTE — ASSESSMENT & PLAN NOTE
-chronic   -compensated on exam   -continue home metoprolol and Entresto   -dose/medication adjustment as appropriate   -continue tele monitoring  -strict I&Os and daily weights

## 2024-05-15 NOTE — PLAN OF CARE
Patient AAOX3, independent at baseline. PCP correct on facesheet. Home DME-cane. Dialyzes at [facility] 5:30am. Family to provide transportation home.   05/15/24 1337   Discharge Assessment   Assessment Type Discharge Planning Assessment   Confirmed/corrected address, phone number and insurance Yes   Confirmed Demographics Correct on Facesheet   Source of Information patient   Communicated JOVANA with patient/caregiver Date not available/Unable to determine   People in Home child(autumn), adult   Do you expect to return to your current living situation? Yes   Do you have help at home or someone to help you manage your care at home? Yes   Prior to hospitilization cognitive status: Alert/Oriented   Current cognitive status: Alert/Oriented   Walking or Climbing Stairs Difficulty no   Dressing/Bathing Difficulty no   Equipment Currently Used at Home cane, straight   Readmission within 30 days? No   Do you currently have service(s) that help you manage your care at home? No   Do you take prescription medications? Yes   Do you have prescription coverage? Yes   Do you have any problems affording any of your prescribed medications? No   Is the patient taking medications as prescribed? yes   How do you get to doctors appointments? car, drives self   Are you on dialysis? Yes   Dialysis Name and Scheduled days [facility] 5:30am   Do you take coumadin? No   Discharge Plan A Home with family   Discharge Plan B Home   DME Needed Upon Discharge  none   Discharge Plan discussed with: Patient   Transition of Care Barriers None     [facility]  Initial Discharge Assessment       Primary Care Provider: Leodan Sanchez MD    Admission Diagnosis: End stage renal disease [N18.6]  Thrombosis of dialysis vascular access, initial encounter [T82.868A]    Admission Date: 5/14/2024  Expected Discharge Date: 5/14/2024    Transition of Care Barriers: (P) None    Payor: MEDICARE / Plan: MEDICARE PART A & B /  Product Type: Government /     Extended Emergency Contact Information  Primary Emergency Contact: Yusra Booth   [facility]  Home Phone: [phone]  Relation: Daughter  Secondary Emergency Contact: Gilberto Holder  Mobile Phone: [phone]  Relation: Relative   needed? No    Discharge Plan A: (P) Home with family  Discharge Plan B: (P) Home      [id number] - [place] - [address] AT [facility] [address]  [address]  [place] [address]  Phone: [phone] Fax: [phone]    [facility] [place] - [facility] AT [facility]  [place] [id number]  Phone: [phone] Fax: [phone]      Initial Assessment (most recent)       Adult Discharge Assessment - 05/15/24 1337          Discharge Assessment    Assessment Type Discharge Planning Assessment (P)      Confirmed/corrected address, phone number and insurance Yes (P)      Confirmed Demographics Correct on Facesheet (P)      Source of Information patient (P)      Communicated JOVANA with patient/caregiver Date not available/Unable to determine (P)      People in Home child(autumn), adult (P)      Do you expect to return to your current living situation? Yes (P)      Do you have help at home or someone to help you manage your care at home? Yes (P)      Prior to hospitilization cognitive status: Alert/Oriented (P)      Current cognitive status: Alert/Oriented (P)      Walking or Climbing Stairs Difficulty no (P)      Dressing/Bathing Difficulty no (P)      Equipment Currently Used at Home cane, straight (P)      Readmission within 30 days? No (P)      Do you currently have service(s) that help you manage your care at home? No (P)      Do you take prescription medications? Yes (P)      Do you have prescription coverage? Yes (P)      Do you have any problems affording any of your prescribed medications? No  (P)      Is the patient taking medications as prescribed? yes (P)      How do you get to doctors appointments? car, drives self (P)      Are you on dialysis? Yes (P)      Dialysis Name and Scheduled days [facility] 5:30am (P)      Do you take coumadin? No (P)      Discharge Plan A Home with family (P)      Discharge Plan B Home (P)      DME Needed Upon Discharge  none (P)      Discharge Plan discussed with: Patient (P)      Transition of Care Barriers None (P)

## 2024-05-15 NOTE — PROGRESS NOTES
[facility]  Progress Note    Patient Name: Jennifer Booth  MRN: [id number]  Patient Class: IP- Inpatient   Admission Date: 5/14/2024  Length of Stay: 1 days  Attending Physician: Jennifer Flor MD  Primary Care Provider: Leodan Sanchez MD        Subjective:     Principal Problem:Thrombosis of dialysis vascular access        HPI:  Ms. Booth is a 59 YOF with PMHx of Multiple Myeloma in remission, chronic combined systolic and diastolic CHF (EF 35-40% 10/2023), NICM (negative SPECT 04/2022), HTN, ESRD on HD (TTS at [facility] with Dr. Wolfe), anemia of chronic renal failure, GERD, MDD/anxiety, history of EtOH abuse- complete cessation since 2020, and tobacco dependency.     She presented initially to outpt surgery for planned AV declot procedure due to AVF access issues at last HD session. She underwent extensive declot with Dr. Galaviz today and was planned for HD session immediately afterwards however approximately 30 minutes into treatment access again clotted.  She was evaluated by Nephrology in postprocedure area and repeat chemistry noted hyperkalemia of 7.4; she was shifted with calcium gluconate and General Surgery consulted who placed left IJ catheter for urgent HD. She is seen in ICU post line placement and just prior to repeat initiation of HD. She denies SOB, MONTES, CP, abdominal pain, N/V/D, constipation, changes in bowel habits or blood in stool, decreased appetite, changes in PO intake, light headiness, dizziness, seizures, or syncope.     At admission she is HDS and afebrile.  CBC with WBC 4, hemoglobin 8.1, hematocrit 24.7, platelets 122.  Chemistry was , K 7.4 (s/p shifting), chloride 98, CO2 21, , SCr 17.5, glucose 76.  Phosphorus 6.4.  Magnesium 1.6.    The patient was admitted to the Hospital Medicine Service for further evaluation and management.    Overview/Hospital Course:  Ms. Booth presented after failed outpatient AV declot. Nephrology and  Vascular surgery consulted.  Left IJ Trialysis catheter placed and patient underwent emergent dialysis on 5/14.    Interval History:  No acute events, patient underwent emergent dialysis without difficulty and has no complaints.    Review of Systems   Constitutional:  Negative for chills and fever.   Respiratory:  Negative for shortness of breath.    Cardiovascular:  Negative for chest pain.     Objective:     Vital Signs (Most Recent):  Temp: 97.9 °F (36.6 °C) (05/15/24 1215)  Pulse: 73 (05/15/24 1505)  Resp: (!) 23 (05/15/24 1505)  BP: (!) 114/49 (05/15/24 1415)  SpO2: 100 % (05/15/24 1505) Vital Signs (24h Range):  Temp:  [95.2 °F (35.1 °C)-98.7 °F (37.1 °C)] 97.9 °F (36.6 °C)  Pulse:  [58-82] 73  Resp:  [15-36] 23  SpO2:  [55 %-100 %] 100 %  BP: (101-179)/() 114/49     Weight: 66.3 kg (146 lb 2.6 oz)  Body mass index is 22.22 kg/m².    Intake/Output Summary (Last 24 hours) at 5/15/2024 1619  Last data filed at 5/15/2024 1215  Gross per 24 hour   Intake 1170 ml   Output 4000 ml   Net -2830 ml         Physical Exam  Vitals and nursing note reviewed.   Constitutional:       Appearance: She is well-developed.   HENT:      Head: Normocephalic and atraumatic.      Mouth/Throat:      Dentition: Normal dentition.   Eyes:      General: Lids are normal.      Extraocular Movements: Extraocular movements intact.      Conjunctiva/sclera: Conjunctivae normal.   Cardiovascular:      Rate and Rhythm: Normal rate and regular rhythm.   Pulmonary:      Effort: Pulmonary effort is normal.      Breath sounds: Normal breath sounds.   Abdominal:      General: There is no distension.      Palpations: Abdomen is soft.   Musculoskeletal:      Cervical back: Neck supple.      Right lower leg: No edema.      Left lower leg: No edema.   Skin:     General: Skin is warm and dry.      Findings: No erythema or rash.      Comments: L IJ catheter noted with dressing C/D/I. LUE AVF dressed, no bruit or thrill noted.   Neurological:       Mental Status: She is alert and oriented to person, place, and time.             Significant Labs: All pertinent labs within the past 24 hours have been reviewed.    Significant Imaging: I have reviewed all pertinent imaging results/findings within the past 24 hours.    Assessment/Plan:      * Thrombosis of dialysis vascular access  ESRD on dialysis   Hyperkalemia  Anemia in chronic kidney disease, on chronic dialysis   Secondary hyperparathyroidism renal origin   Chronic kidney disease-mineral and bone disorder  LUE stent in place  - Presented to outpt surgery for planned AV declot procedure due to AVF access issues at last HD session. Underwent extensive declot with Dr. Galaviz and was planned for HD session immediately afterwards, however, approximately 30 minutes into treatment access again clotted. Evaluated by Nephrology in postprocedure area and repeat chemistry noted hyperkalemia of 7.4  - Shifted with calcium gluconate and General Surgery consulted who placed left IJ catheter for urgent HD on 5/14  - Continue home cinacalcet, Renvela, Nephro-[place]  - Continue home plavix due to LUE stenting in the past  - Nephrology planning for dialysis today and plan for new AV fistula tomorrow  - Trend labs, address/replete electrolytes and transfuse as indicated, strict I&Os and daily weights    Chronic combined systolic and diastolic heart failure  - Continue home metoprolol and Entresto   - Continue tele monitoring and strict I&Os and daily weights    Depression  Generalized anxiety disorder  - Continue home Wellbutrin and mirtazapine  - Further PRN supportive care as indicated    GERD without esophagitis  - Chronic, controlled   - PRN supportive care is indicated    Hypertension secondary to other renal disorders  - Continue home metoprolol and Entresto      VTE Risk Mitigation (From admission, onward)           Ordered     heparin (porcine) injection 4,000 Units  As needed (PRN)         05/14/24 9382     heparin  (porcine) injection 5,000 Units  Every 8 hours         05/14/24 1949     IP VTE HIGH RISK PATIENT  Once         05/14/24 1949     Place sequential compression device  Until discontinued         05/14/24 1949                        Jennifer Flor MD  Department of Hospital Medicine   [facility] - Intensive Care ([place])

## 2024-05-15 NOTE — SUBJECTIVE & OBJECTIVE
Past Medical History:   Diagnosis Date    Addiction to drug     Alcohol abuse     Allergic drug reaction 2017    Anemia     Anxiety     Arm pain, left 2014    Breast cyst     Chronic renal failure 2014    CKD (chronic kidney disease) stage 5, GFR less than 15 ml/min     COPD exacerbation 2023    Depression     Dialysis patient     dialysis m-w-f    Encounter for blood transfusion     GERD (gastroesophageal reflux disease)     Hx of psychiatric care     Hypertension     Mood swings     Multiple myeloma in remission 10/26/2012    per Hem/Oc note    Multiple thyroid nodules 10/22/2023    Psychiatric exam requested by authority     Psychiatric problem     Renal hypertension     Status post dialysis 2012    Therapy     Thrombocytopenia 2012       Past Surgical History:   Procedure Laterality Date    AV FISTULA PLACEMENT Left     BREAST CYST ASPIRATION Left     BREAST CYST EXCISION Left     CERVICAL SPINE SURGERY Bilateral      SECTION      x1    COLONOSCOPY N/A 2022    Procedure: COLONOSCOPY;  Surgeon: Jordan Mcguire MD;  Location: [facility] ENDO;  Service: Endoscopy;  Laterality: N/A;    FISTULOGRAM N/A 2020    Procedure: Fistulogram;  Surgeon: Rahat Berger MD;  Location: [facility] CATH LAB/EP;  Service: Cardiology;  Laterality: N/A;    FISTULOGRAM Left 3/20/2024    Procedure: Fistulogram LEFT FOREARM;  Surgeon: Matheus Galaviz MD;  Location: [facility] CATH LAB;  Service: Nephrology;  Laterality: Left;    pd catheter      PERCUTANEOUS TRANSLUMINAL ANGIOPLASTY OF ARTERIOVENOUS FISTULA N/A 2020    Procedure: PTA, AV FISTULA;  Surgeon: Rahat Berger MD;  Location: [facility] CATH LAB/EP;  Service: Cardiology;  Laterality: N/A;    PERITONEAL CATHETER REMOVAL N/A 2018    Procedure: REMOVAL, CATHETER, DIALYSIS, PERITONEAL;  Surgeon: Mukesh Gonsales Jr., MD;  Location: [facility] OR;  Service: General;  Laterality: N/A;    RENAL BIOPSY  2016    THROMBECTOMY OF HEMODIALYSIS  ACCESS SITE N/A 06/09/2020    Procedure: Thrombectomy, Hemodialysis Graft Or Fistula;  Surgeon: Rahat Berger MD;  Location: [facility] CATH LAB/EP;  Service: Cardiology;  Laterality: N/A;    VASCULAR SURGERY  08/2012    dialysis catheter to right subclavian       Review of patient's allergies indicates:   Allergen Reactions    Doxycycline Swelling, Rash and Hives    Gentamicin Anaphylaxis    Vancomycin analogues Anaphylaxis       Current Facility-Administered Medications on File Prior to Encounter   Medication    0.9%  NaCl infusion    0.9%  NaCl infusion    sodium chloride 0.9% flush 10 mL    sodium chloride 0.9% flush 10 mL     Current Outpatient Medications on File Prior to Encounter   Medication Sig    B COMPLEX W-C NO.20/FOLIC ACID (VIRT-CAPS ORAL) Take by mouth once daily.    buPROPion (WELLBUTRIN XL) 150 MG TB24 tablet Take 1 tablet (150 mg total) by mouth once daily.    cetirizine (ZYRTEC) 5 MG tablet Take 1 tablet (5 mg total) by mouth once daily.    cinacalcet (SENSIPAR) 30 MG Tab Take by mouth daily with breakfast.    clopidogreL (PLAVIX) 75 mg tablet Take 1 tablet (75 mg total) by mouth once daily.    cyproheptadine (PERIACTIN) 4 mg tablet Take 1 tablet (4 mg total) by mouth 3 (three) times daily as needed (appetite).    EScitalopram oxalate (LEXAPRO) 20 MG tablet Take 1 tablet (20 mg total) by mouth once daily.    fluticasone-salmeterol diskus inhaler 100-50 mcg Inhale 1 puff into the lungs 2 (two) times daily. Controller    metoprolol succinate (TOPROL-XL) 25 MG 24 hr tablet Take 1 tablet (25 mg total) by mouth once daily.    mirtazapine (REMERON) 15 MG tablet Take 1 tablet (15 mg total) by mouth every evening.    sacubitriL-valsartan (ENTRESTO)  mg per tablet Take 1 tablet by mouth 2 (two) times daily.    sevelamer carbonate (RENVELA) 800 mg Tab Take 800 mg by mouth 3 (three) times daily with meals.    traMADoL (ULTRAM) 50 mg tablet Take 50 mg by mouth every 6 (six) hours as needed.     acetaminophen (TYLENOL) 325 MG tablet Take 650 mg by mouth every 4 (four) hours as needed.    albuterol (VENTOLIN HFA) 90 mcg/actuation inhaler Inhale 2 puffs into the lungs every 6 (six) hours as needed for Wheezing or Shortness of Breath. Rescue    fluticasone propionate (FLONASE) 50 mcg/actuation nasal spray 1 spray by Each Nostril route daily as needed.    melatonin 5 mg Cap Take 5 mg by mouth nightly.    montelukast (SINGULAIR) 10 mg tablet TAKE 1 TABLET(10 MG) BY MOUTH EVERY EVENING    naltrexone (DEPADE) 50 mg tablet Start with 1/2 tab (25 mg) by mouth daily, may increase to 1 tab (50 mg) daily if able. (Patient not taking: Reported on 4/30/2024)    sucroferric oxyhydroxide (VELPHORO) 500 mg Chew Take 1,000 mg by mouth 3 (three) times daily. (Patient not taking: Reported on 4/30/2024)    tiotropium bromide (SPIRIVA RESPIMAT) 2.5 mcg/actuation inhaler Inhale 2 puffs into the lungs once daily. Controller     Family History       Problem Relation (Age of Onset)    Diabetes Maternal Grandmother    Heart failure Mother    Hypertension Mother    Ovarian cancer Maternal Aunt    Stroke Maternal Grandmother          Tobacco Use    Smoking status: Every Day     Current packs/day: 1.00     Average packs/day: 1 pack/day for 41.4 years (41.4 ttl pk-yrs)     Types: Cigarettes     Start date: 1983     Passive exposure: Never    Smokeless tobacco: Never    Tobacco comments:     Pt. states recently cut down to 0.5 pk/day. Enrolled in the [employer] on 6/9/20 ([facility] Member ID # [id number]). She declines Ambulatory referral to Smoking Cessation program. Handout provided.   Substance and Sexual Activity    Alcohol use: Not Currently     Comment: occasional    Drug use: Not Currently     Types: Marijuana    Sexual activity: Not Currently     Partners: Male     Review of Systems   Constitutional:  Negative for activity change, appetite change, chills, diaphoresis, fatigue and fever.   Respiratory:  Negative for cough, chest  tightness, shortness of breath and wheezing.    Cardiovascular:  Negative for chest pain, palpitations and leg swelling.   Gastrointestinal:  Negative for abdominal distention, abdominal pain, constipation, diarrhea, nausea and vomiting.   Neurological:  Negative for dizziness, syncope, weakness, light-headedness and headaches.     Objective:     Vital Signs (Most Recent):  Temp: 97.6 °F (36.4 °C) (05/14/24 1901)  Pulse: 74 (05/14/24 2215)  Resp: 17 (05/14/24 2215)  BP: (!) 132/57 (05/14/24 2215)  SpO2: 98 % (05/14/24 2215) Vital Signs (24h Range):  Temp:  [97.6 °F (36.4 °C)-98 °F (36.7 °C)] 97.6 °F (36.4 °C)  Pulse:  [49-77] 74  Resp:  [16-25] 17  SpO2:  [92 %-100 %] 98 %  BP: (117-159)/(51-89) 132/57     Weight: 66.3 kg (146 lb 2.6 oz)  Body mass index is 22.22 kg/m².     Physical Exam  Vitals and nursing note reviewed.   Constitutional:       Appearance: She is well-developed.   HENT:      Head: Normocephalic and atraumatic.      Mouth/Throat:      Dentition: Normal dentition.   Eyes:      General: Lids are normal.      Extraocular Movements: Extraocular movements intact.      Conjunctiva/sclera: Conjunctivae normal.   Cardiovascular:      Rate and Rhythm: Normal rate and regular rhythm.   Pulmonary:      Effort: Pulmonary effort is normal.      Breath sounds: Normal breath sounds.   Abdominal:      General: There is no distension.      Palpations: Abdomen is soft.   Musculoskeletal:      Cervical back: Neck supple.      Right lower leg: No edema.      Left lower leg: No edema.   Skin:     General: Skin is warm and dry.      Findings: No erythema or rash.      Comments: L IJ catheter noted with dressing C/D/I. LUE AVF dressed, no bruit or thrill noted.   Neurological:      Mental Status: She is alert and oriented to person, place, and time.             Significant Labs: All pertinent labs within the past 24 hours have been reviewed.  CBC:   Recent Labs   Lab 05/14/24  1933   WBC 4.36   HGB 8.1*   HCT 24.7*   PLT  122*     CMP:   Recent Labs   Lab 05/14/24  1615 05/14/24  1933    138   K 7.4* 7.4*   CL 98 99   CO2 21* 22*   GLU 76 245*   * 116*   CREATININE 17.5* 16.7*   CALCIUM 8.2* 8.1*   ALBUMIN 3.1*  --    ANIONGAP 20* 17*       Significant Imaging: I have reviewed all pertinent imaging results/findings within the past 24 hours.

## 2024-05-15 NOTE — CHAPLAIN
"   05/15/24 1309   Clinical Encounter Type   Visit Type Initial Visit   Visit Category General Rounding   Visited With Patient   Length of Visit 15 Minutes   Continue Visiting Yes   Advent Encounters   Spiritual Resources Requested Prayer   Patient Spiritual Encounters   Care Provided Reflective listening;Compassionate presence;Prayer support   Patient Coping Accepting;Active thu;Family/ friends resources   Comments - Patient Pt was alert and welcoming. "I'm doing alright. I 'm being taken good care of. I didn't know how sick I was." Told me what brought her in (dialysis) and then had to have a port put in, which she said hurt less in her chest than the work on her bandaged arm. She was pleasant. Pt 37-your old daughter coming to visit today. "We get along - we live together, so we HAVE to get along!" she said with a smile. We discussed the bad tv content "terrible, there's nothing" and tv generally. I offered some prayer or to let her be, she said a prayer would be good. I asked her what was most on her heart for which she wanted prayer, and she responded "just to know God is with me, and to guide me." PT expressed that she is Zoroastrianism. We prayed a few minutes., with her chiming in at "In Homer's Name, Amen." She said she would let us know if she wanted prayer or a chance to just visit, and she said all of that would feel good.       "

## 2024-05-15 NOTE — ANESTHESIA POSTPROCEDURE EVALUATION
Anesthesia Post Evaluation    Patient: Jennifer Booth    Procedure(s) Performed: Procedure(s) (LRB):  INSERTION, CATHETER, HEMODIALYSIS SINGLE LUMEN (N/A)    Final Anesthesia Type: MAC      Patient location during evaluation: ICU  Patient participation: Yes- Able to Participate  Level of consciousness: awake and alert  Post-procedure vital signs: reviewed and stable  Pain management: adequate  Airway patency: patent    PONV status at discharge: No PONV  Anesthetic complications: no      Cardiovascular status: hemodynamically stable  Respiratory status: unassisted  Hydration status: euvolemic  Follow-up not needed.              Vitals Value Taken Time   /68 05/14/24 2039   Temp 98 05/14/24 2040   Pulse 68 05/14/24 2039   Resp 12 05/14/24 2039   SpO2 99 % 05/14/24 2039   Vitals shown include unfiled device data.      No case tracking events are documented in the log.      Pain/Ivan Score: Ivan Score: 10 (5/14/2024  5:30 PM)

## 2024-05-15 NOTE — PLAN OF CARE
Discharge instructions review with patient.   You can access the FollowMyHealth Patient Portal offered by [facility] by registering at the following website: [email or web address]. By joining [facility]’s FollowMyHealth portal, you will also be able to view your health information using other applications (apps) compatible with our system.

## 2024-05-15 NOTE — PROGRESS NOTES
MDI's delivered successfully  Pt has a good breath hold  Pt understands how to discharge both MDI's and has taken them at home previously

## 2024-05-15 NOTE — ASSESSMENT & PLAN NOTE
Generalized anxiety disorder  - Continue home Wellbutrin and mirtazapine  - Further PRN supportive care as indicated

## 2024-05-15 NOTE — TRANSFER OF CARE
Anesthesia Transfer of Care Note    Patient: Jennifer Booth    Procedure(s) Performed: Procedure(s) (LRB):  INSERTION, CATHETER, HEMODIALYSIS SINGLE LUMEN (N/A)    Patient location: ICU    Anesthesia Type: MAC    Transport from OR: Transported from OR on room air with adequate spontaneous ventilation    Post pain: adequate analgesia    Post assessment: no apparent anesthetic complications and tolerated procedure well    Post vital signs: stable    Level of consciousness: awake and responds to stimulation    Nausea/Vomiting: no nausea/vomiting    Complications: none    Transfer of care protocol was followed      Last vitals: Visit Vitals  BP (!) 140/70 (BP Location: Right arm, Patient Position: Lying)   Pulse (!) 58   Temp 36.7 °C (98 °F) (Oral)   Resp 16   LMP 01/01/2016 (Approximate)   SpO2 96%   Breastfeeding No

## 2024-05-16 NOTE — DISCHARGE SUMMARY
[facility] Intensive Care [facility] Medicine  Discharge Summary      Patient Name: Jennifer Booth  MRN: [id number]  [facility]: [id number]  Patient Class: IP- Inpatient  Admission Date: 5/14/2024  Hospital Length of Stay: 1 days  Discharge Date and Time: 5/15/2024  Attending Physician: Jennifer Flor MD   Discharging Provider: Jennifer Flor MD  Primary Care Provider: Leodan Sanchez MD    Primary Care Team: Networked reference to record PCT     HPI:   Ms. Booth is a 59 YOF with PMHx of Multiple Myeloma in remission, chronic combined systolic and diastolic CHF (EF 35-40% 10/2023), NICM (negative SPECT 04/2022), HTN, ESRD on HD (TTS at [facility] with Dr. Wolfe), anemia of chronic renal failure, GERD, MDD/anxiety, history of EtOH abuse- complete cessation since 2020, and tobacco dependency.     She presented initially to outpt surgery for planned AV declot procedure due to AVF access issues at last HD session. She underwent extensive declot with Dr. Galaviz today and was planned for HD session immediately afterwards however approximately 30 minutes into treatment access again clotted.  She was evaluated by Nephrology in postprocedure area and repeat chemistry noted hyperkalemia of 7.4; she was shifted with calcium gluconate and General Surgery consulted who placed left IJ catheter for urgent HD. She is seen in ICU post line placement and just prior to repeat initiation of HD. She denies SOB, MONTES, CP, abdominal pain, N/V/D, constipation, changes in bowel habits or blood in stool, decreased appetite, changes in PO intake, light headiness, dizziness, seizures, or syncope.     At admission she is HDS and afebrile.  CBC with WBC 4, hemoglobin 8.1, hematocrit 24.7, platelets 122.  Chemistry was , K 7.4 (s/p shifting), chloride 98, CO2 21, , SCr 17.5, glucose 76.  Phosphorus 6.4.  Magnesium 1.6.    The patient was admitted to the Hospital Medicine Service for further evaluation and management.    Procedure(s)  (LRB):  INSERTION, CATHETER, HEMODIALYSIS SINGLE LUMEN (N/A)      Hospital Course:   Ms. Booth presented after failed outpatient AV declot. Nephrology and Vascular surgery consulted.  Left IJ single lumen catheter (Fadi) placed and patient underwent emergent dialysis on 5/14. The next day, the patient had another session of dialysis. Case discussed with Nephrology and patient was stable for discharge. Patient to follow up with Dr. Gonsales to schedule procedure for new AV fistula and resume her normal dialysis schedule, and follow up with Dr. Wolfe.     Goals of Care Treatment Preferences:  Code Status: Full Code      Consults: General/Vascular surgery and Nephrology    Service: Hospital Medicine    Final Active Diagnoses:    Diagnosis Date Noted POA    PRINCIPAL PROBLEM:  Thrombosis of dialysis vascular access [T82.868A] 05/14/2024 Yes    Chronic combined systolic and diastolic heart failure [I50.42] 10/25/2023 Yes    Chronic kidney disease-mineral and bone disorder [N18.9, E83.9, M89.9] 09/16/2023 Yes    Hypertension secondary to other renal disorders [I15.1] 09/23/2019 Yes    SATNAM (generalized anxiety disorder) [F41.1] 07/03/2019 Yes     Chronic    Secondary hyperparathyroidism of renal origin [N25.81] 07/01/2019 Yes    Depression [F32.A] 07/01/2019 Yes    GERD without esophagitis [K21.9] 09/13/2018 Yes    Anemia in chronic kidney disease, on chronic dialysis [N18.6, D63.1, Z99.2] 04/18/2018 Not Applicable    ESRD (end stage renal disease) on dialysis [N18.6, Z99.2] 08/03/2017 Not Applicable    Hyperkalemia [E87.5] 05/09/2013 Yes      Problems Resolved During this Admission:       Discharged Condition: good    Disposition: Home or Self Care    Follow Up:   Follow-up Information       Yarelis Wolfe MD. Schedule an appointment as soon as possible for a visit in 1 week(s).    Specialty: Nephrology  Why: for hospital follow up.  Contact information:  [address]  [address]  [phone]               María Gonsales Jr., MD. Schedule an appointment as soon as possible for a visit in 1 week(s).    Specialties: General Surgery, Vascular Surgery  Why: to schedule appointment for fistula management.  Contact information:  [address]  [address] [place] [address]  [phone]                           Patient Instructions:      Ambulatory referral/consult to General Surgery   Standing Status: Future   Referral Priority: Routine Referral Type: Consultation   Referral Reason: Specialty Services Required   Referred to Provider: MARÍA GONSALES JR Requested Specialty: General Surgery   Number of Visits Requested: 1     Lifting restrictions   Order Comments: No heavy lifting for 48 hours.     Call MD for:  temperature >100.4     Call MD for:  severe uncontrolled pain     Call MD for:  redness, tenderness, or signs of infection (pain, swelling, redness, odor or green/yellow discharge around incision site)     Call MD for:   Order Comments: Bleeding from the access site.     Activity as tolerated     Activity as tolerated       Significant Diagnostic Studies: N/A    Pending Diagnostic Studies:       Procedure Component Value Units Date/Time    Hepatitis B Surface Antibody, Qual/Quant [[id number]] Collected: 05/14/24 1244    Order Status: Sent Lab Status: In process Updated: 05/14/24 5056    Specimen: Blood            Medications:  Reconciled Home Medications:      Medication List        CONTINUE taking these medications      acetaminophen 325 MG tablet  Commonly known as: TYLENOL  Take 650 mg by mouth every 4 (four) hours as needed.     albuterol 90 mcg/actuation inhaler  Commonly known as: VENTOLIN HFA  Inhale 2 puffs into the lungs every 6 (six) hours as needed for Wheezing or Shortness of Breath. Rescue     buPROPion 150 MG TB24 tablet  Commonly known as: WELLBUTRIN XL  Take 1 tablet (150 mg total) by mouth once daily.     cetirizine 5 MG tablet  Commonly known as: ZYRTEC  Take  1 tablet (5 mg total) by mouth once daily.     cinacalcet 30 MG Tab  Commonly known as: SENSIPAR  Take by mouth daily with breakfast.     clopidogreL 75 mg tablet  Commonly known as: PLAVIX  Take 1 tablet (75 mg total) by mouth once daily.     cyproheptadine 4 mg tablet  Commonly known as: PERIACTIN  Take 1 tablet (4 mg total) by mouth 3 (three) times daily as needed (appetite).     ENTRESTO  mg per tablet  Generic drug: sacubitriL-valsartan  Take 1 tablet by mouth 2 (two) times daily.     EScitalopram oxalate 20 MG tablet  Commonly known as: LEXAPRO  Take 1 tablet (20 mg total) by mouth once daily.     fluticasone propionate 50 mcg/actuation nasal spray  Commonly known as: FLONASE  1 spray by Each Nostril route daily as needed.     fluticasone-salmeterol 100-50 mcg/dose 100-50 mcg/dose diskus inhaler  Commonly known as: ADVAIR  Inhale 1 puff into the lungs 2 (two) times daily. Controller     melatonin 5 mg Cap  Take 5 mg by mouth nightly.     metoprolol succinate 25 MG 24 hr tablet  Commonly known as: TOPROL-XL  Take 1 tablet (25 mg total) by mouth once daily.     mirtazapine 15 MG tablet  Commonly known as: REMERON  Take 1 tablet (15 mg total) by mouth every evening.     montelukast 10 mg tablet  Commonly known as: SINGULAIR  TAKE 1 TABLET(10 MG) BY MOUTH EVERY EVENING     naltrexone 50 mg tablet  Commonly known as: DEPADE  Start with 1/2 tab (25 mg) by mouth daily, may increase to 1 tab (50 mg) daily if able.     sevelamer carbonate 800 mg Tab  Commonly known as: RENVELA  Take 800 mg by mouth 3 (three) times daily with meals.     SPIRIVA RESPIMAT 2.5 mcg/actuation inhaler  Generic drug: tiotropium bromide  Inhale 2 puffs into the lungs once daily. Controller     traMADoL 50 mg tablet  Commonly known as: ULTRAM  Take 50 mg by mouth every 6 (six) hours as needed.     VELPHORO 500 mg Chew  Generic drug: sucroferric oxyhydroxide  Take 1,000 mg by mouth 3 (three) times daily.     VIRT-CAPS ORAL  Take by mouth  once daily.              Indwelling Lines/Drains at time of discharge:   Lines/Drains/Airways       Central Venous Catheter Line  Duration                  Hemodialysis Catheter 05/14/24 left internal jugular 1 day              Drain  Duration                  Hemodialysis AV Fistula Left forearm -- days         Hemodialysis AV Fistula Left forearm -- days                    Time spent on the discharge of patient: 20 minutes         Jennifer Flor MD  Department of Hospital Medicine  [facility] - Intensive Care [facility]

## 2024-05-17 LAB
HBV SURFACE AB SER QL IA: POSITIVE
HBV SURFACE AB SERPL IA-ACNC: 499 MIU/ML

## 2024-05-22 ENCOUNTER — OFFICE VISIT (OUTPATIENT)
Dept: NEPHROLOGY | Facility: CLINIC | Age: 59
End: 2024-05-22
Payer: MEDICARE

## 2024-05-22 ENCOUNTER — OFFICE VISIT (OUTPATIENT)
Dept: SURGERY | Facility: CLINIC | Age: 59
End: 2024-05-22
Attending: SPECIALIST
Payer: MEDICARE

## 2024-05-22 ENCOUNTER — OFFICE VISIT (OUTPATIENT)
Dept: FAMILY MEDICINE | Facility: CLINIC | Age: 59
End: 2024-05-22
Attending: FAMILY MEDICINE
Payer: MEDICARE

## 2024-05-22 VITALS
WEIGHT: 140 LBS | DIASTOLIC BLOOD PRESSURE: 82 MMHG | BODY MASS INDEX: 21.22 KG/M2 | SYSTOLIC BLOOD PRESSURE: 134 MMHG | HEIGHT: 68 IN

## 2024-05-22 VITALS
DIASTOLIC BLOOD PRESSURE: 77 MMHG | HEIGHT: 68 IN | BODY MASS INDEX: 21.42 KG/M2 | RESPIRATION RATE: 12 BRPM | WEIGHT: 139 LBS | HEART RATE: 71 BPM | SYSTOLIC BLOOD PRESSURE: 129 MMHG | DIASTOLIC BLOOD PRESSURE: 70 MMHG | SYSTOLIC BLOOD PRESSURE: 112 MMHG | HEIGHT: 68 IN | OXYGEN SATURATION: 100 % | WEIGHT: 141.31 LBS | OXYGEN SATURATION: 100 % | BODY MASS INDEX: 21.07 KG/M2 | HEART RATE: 74 BPM | TEMPERATURE: 98 F

## 2024-05-22 DIAGNOSIS — Z99.2 ESRD ON DIALYSIS: Primary | ICD-10-CM

## 2024-05-22 DIAGNOSIS — F41.1 GAD (GENERALIZED ANXIETY DISORDER): ICD-10-CM

## 2024-05-22 DIAGNOSIS — F33.2 SEVERE EPISODE OF RECURRENT MAJOR DEPRESSIVE DISORDER, WITHOUT PSYCHOTIC FEATURES: ICD-10-CM

## 2024-05-22 DIAGNOSIS — N18.6 ESRD ON DIALYSIS: Primary | ICD-10-CM

## 2024-05-22 DIAGNOSIS — F32.89 OTHER DEPRESSION: ICD-10-CM

## 2024-05-22 DIAGNOSIS — E44.0 MODERATE PROTEIN-CALORIE MALNUTRITION: ICD-10-CM

## 2024-05-22 DIAGNOSIS — N18.6 ESRD (END STAGE RENAL DISEASE) ON DIALYSIS: Primary | ICD-10-CM

## 2024-05-22 DIAGNOSIS — I12.9 RENAL HYPERTENSION: ICD-10-CM

## 2024-05-22 DIAGNOSIS — D69.6 THROMBOCYTOPENIA: ICD-10-CM

## 2024-05-22 DIAGNOSIS — F12.21 CANNABIS USE DISORDER, MODERATE, IN EARLY REMISSION: ICD-10-CM

## 2024-05-22 DIAGNOSIS — N25.81 SECONDARY HYPERPARATHYROIDISM OF RENAL ORIGIN: ICD-10-CM

## 2024-05-22 DIAGNOSIS — T82.868A THROMBOSIS OF DIALYSIS VASCULAR ACCESS, INITIAL ENCOUNTER: ICD-10-CM

## 2024-05-22 DIAGNOSIS — N18.5 CKD (CHRONIC KIDNEY DISEASE) STAGE 5, GFR LESS THAN 15 ML/MIN: ICD-10-CM

## 2024-05-22 DIAGNOSIS — Z99.2 ESRD (END STAGE RENAL DISEASE) ON DIALYSIS: Primary | ICD-10-CM

## 2024-05-22 DIAGNOSIS — C90.01 MULTIPLE MYELOMA IN REMISSION: ICD-10-CM

## 2024-05-22 PROCEDURE — 99999 PR PBB SHADOW E&M-EST. PATIENT-LVL IV: CPT | Mod: PBBFAC,,,

## 2024-05-22 PROCEDURE — 99024 POSTOP FOLLOW-UP VISIT: CPT | Mod: POP,,, | Performed by: SPECIALIST

## 2024-05-22 PROCEDURE — 99999 PR PBB SHADOW E&M-EST. PATIENT-LVL V: CPT | Mod: PBBFAC,,, | Performed by: SPECIALIST

## 2024-05-22 PROCEDURE — 99213 OFFICE O/P EST LOW 20 MIN: CPT | Mod: S$PBB,,, | Performed by: INTERNAL MEDICINE

## 2024-05-22 PROCEDURE — 99213 OFFICE O/P EST LOW 20 MIN: CPT | Mod: PBBFAC,PO | Performed by: FAMILY MEDICINE

## 2024-05-22 PROCEDURE — 99215 OFFICE O/P EST HI 40 MIN: CPT | Mod: PBBFAC,27 | Performed by: SPECIALIST

## 2024-05-22 PROCEDURE — 99999 PR PBB SHADOW E&M-EST. PATIENT-LVL III: CPT | Mod: PBBFAC,,, | Performed by: FAMILY MEDICINE

## 2024-05-22 PROCEDURE — 99214 OFFICE O/P EST MOD 30 MIN: CPT | Mod: S$PBB,,, | Performed by: FAMILY MEDICINE

## 2024-05-22 PROCEDURE — 99214 OFFICE O/P EST MOD 30 MIN: CPT | Mod: PBBFAC,27

## 2024-05-22 NOTE — PROGRESS NOTES
Subjective     Patient ID: Jennifer Booth is a 59 y.o. female.    Chief Complaint: Follow-up    57 yo F with PMH significant for Multiple Myeloma in remission, ESRD on HD, AoCKD, HTN, GERD, MDD/Anxiety,  Constipation, and tobacco use, presents today for her follow up. No concerns today.        Chronic Diseases  Multiple Myeloma in remission: Followed by [facility]; last visit 5/13/19. Shecompleted bortezomib/dexamethasone/Revlimid 6 cycles on 04/13/2012 for the multiple myeloma kappa light chain disease, IPSS stage III. She underwent bortezomib maintenance therapy three weeks on, one week off (05/15, 05/22 and 05/29) with her last cycle received on 05/29/2012. She was followed at [facility] and was diagnosed with DRESS syndrome during hospitalization and then [facility] team elected not to proceed with auto SCT. Velcade maintenance was discontinued due to side effects. MRI T and L spine 6/29/2018 - no evidence of myeloma involvement  SPEP 5/7/2019- no monoclonal peaks. Has upcoming appt on 8/14/19    ESRD on HD:  She is followed by a nephrologist at [facility].  Receives hemodialysis on Tuesdays/Thursdays/Saturdays. Iinitially diagnosed with advanced kidney disease secondary to multiple myeloma & HTN.  She was diagnosed with NANETTE from MM in 2010 that required initiation of dialysis.  She started chronic dialysis on 12/23/11 and ended on 8/28/12. She then underwent chemotherapy for her myeloma, and stopped dialysis in 2013.  Kidney biopsy performed 2017due to worsening  Kidney--revealed glomerulosclerosis and no evidence of MM. Her most recent visit with renal transplant at [facility] was 6/14/19. Patient met selection criteria for kidney transplant related to ESRD due to Hypertensive Nephrosclerosis. During most recent visit, she was placed on inactive status as she was deemed a high risk candidate for kidney transplant due to lack of caregiver support and financial hardship. Plans for caregivers to be her  daughter (currently in third trimester of pregnancy) and her daughter's fiance. Pt is required to attend appt with caregiver.Her next appt with transplant is scheduled for 5/19/2020. She plans to f/u with her daughter prior to this time.     CKD:  Followed by [facility].  Receives Procrit infusions during HD.     HTN: controlled. Adherent with Coreg 12.5 mg BID    She continues to smoke cigarettes. Contemplative stage of quitting. Referred to tobacco cessation at previous visit, but has not scheduled appointment.     Follow-up  Associated symptoms include arthralgias, congestion, headaches and myalgias. Pertinent negatives include no chest pain, diaphoresis, nausea or sore throat.     Review of Systems   Constitutional: Negative.  Negative for activity change, diaphoresis and unexpected weight change.   HENT:  Positive for nasal congestion, rhinorrhea and sinus pressure/congestion. Negative for ear discharge, hearing loss, sore throat and voice change.    Eyes: Negative.  Negative for pain, discharge and visual disturbance.   Respiratory: Negative.  Negative for chest tightness, shortness of breath and wheezing.    Cardiovascular: Negative.  Negative for chest pain.   Gastrointestinal: Negative.  Negative for abdominal distention, anal bleeding, constipation and nausea.   Endocrine: Negative.  Negative for cold intolerance, polydipsia and polyuria.   Genitourinary: Negative.  Negative for decreased urine volume, difficulty urinating, dysuria, frequency, menstrual problem and vaginal pain.   Musculoskeletal:  Positive for arthralgias and myalgias. Negative for gait problem.   Integumentary:  Negative for color change, pallor and wound. Negative.   Allergic/Immunologic: Negative.  Negative for environmental allergies and immunocompromised state.   Neurological:  Positive for headaches. Negative for dizziness, tremors, seizures and speech difficulty.   Hematological: Negative.  Negative for adenopathy.  Does not bruise/bleed easily.   Psychiatric/Behavioral: Negative.  Negative for agitation, confusion, decreased concentration, hallucinations, self-injury and suicidal ideas. The patient is not nervous/anxious.      Past Medical History:   Diagnosis Date    Addiction to drug     Alcohol abuse     Allergic drug reaction 2017    Anemia     Anxiety     Arm pain, left 2014    Breast cyst     Chronic renal failure 2014    CKD (chronic kidney disease) stage 5, GFR less than 15 ml/min     COPD exacerbation 2023    Depression     Dialysis patient     dialysis [id number]-[id number]    Encounter for blood transfusion     GERD (gastroesophageal reflux disease)     Hx of psychiatric care     Hypertension     Mood swings     Multiple myeloma in remission 10/26/2012    per Hem/Oc note    Multiple thyroid nodules 10/22/2023    Psychiatric exam requested by authority     Psychiatric problem     Renal hypertension     Status post dialysis 2012    Therapy     Thrombocytopenia 2012       Past Surgical History:   Procedure Laterality Date    AV FISTULA PLACEMENT Left     BREAST CYST ASPIRATION Left     BREAST CYST EXCISION Left     CERVICAL SPINE SURGERY Bilateral      SECTION      x1    COLONOSCOPY N/A 2022    Procedure: COLONOSCOPY;  Surgeon: Jordan Mcguire MD;  Location: [facility] ENDO;  Service: Endoscopy;  Laterality: N/A;    FISTULOGRAM N/A 2020    Procedure: Fistulogram;  Surgeon: Rahat Berger MD;  Location: [facility] CATH LAB/EP;  Service: Cardiology;  Laterality: N/A;    FISTULOGRAM Left 3/20/2024    Procedure: Fistulogram LEFT FOREARM;  Surgeon: Matheus Galaviz MD;  Location: [facility] CATH LAB;  Service: Nephrology;  Laterality: Left;    INSERTION OF CATHETER FOR HEMODIALYSIS N/A 2024    Procedure: INSERTION, CATHETER, HEMODIALYSIS SINGLE LUMEN;  Surgeon: Mukesh Gonsales Jr., MD;  Location: [facility] OR;  Service: General;  Laterality: N/A;    pd catheter      PERCUTANEOUS TRANSLUMINAL  ANGIOPLASTY OF ARTERIOVENOUS FISTULA N/A 06/09/2020    Procedure: PTA, AV FISTULA;  Surgeon: Rahat Berger MD;  Location: [facility] CATH LAB/EP;  Service: Cardiology;  Laterality: N/A;    PERITONEAL CATHETER REMOVAL N/A 11/30/2018    Procedure: REMOVAL, CATHETER, DIALYSIS, PERITONEAL;  Surgeon: Mukesh Gonsales Jr., MD;  Location: [facility] OR;  Service: General;  Laterality: N/A;    RENAL BIOPSY  2016    THROMBECTOMY OF HEMODIALYSIS ACCESS SITE N/A 06/09/2020    Procedure: Thrombectomy, Hemodialysis Graft Or Fistula;  Surgeon: Rahat Berger MD;  Location: [facility] CATH LAB/EP;  Service: Cardiology;  Laterality: N/A;    THROMBECTOMY OF HEMODIALYSIS ACCESS SITE Left 5/14/2024    Procedure: THROMBECTOMY, HEMODIALYSIS GRAFT OR FISTULA / left lower AV/FISTULA;  Surgeon: Matheus Galaviz MD;  Location: [facility] CATH LAB;  Service: Nephrology;  Laterality: Left;    VASCULAR SURGERY  08/2012    dialysis catheter to right subclavian       Family History   Problem Relation Name Age of Onset    Hypertension Mother      Heart failure Mother      Ovarian cancer Maternal Aunt      Diabetes Maternal Grandmother      Stroke Maternal Grandmother      Breast cancer Neg Hx      Colon cancer Neg Hx         Social History     Socioeconomic History    Marital status: Single    Number of children: 1   Occupational History    Occupation: Appeals    Tobacco Use    Smoking status: Every Day     Current packs/day: 1.00     Average packs/day: 1 pack/day for 41.4 years (41.4 ttl pk-yrs)     Types: Cigarettes     Start date: 1983     Passive exposure: Never    Smokeless tobacco: Never    Tobacco comments:     Pt. states recently cut down to 0.5 pk/day. Enrolled in the [employer] on 6/9/20 ([facility] Member ID # [id number]). She declines Ambulatory referral to Smoking Cessation program. Handout provided.   Substance and Sexual Activity    Alcohol use: Not Currently     Comment: occasional    Drug use: Not Currently     Types: Marijuana    Sexual activity: Not  Currently     Partners: Male   Social History Narrative    Works FT; likes theatre. Lives alone.     Social Determinants of Health     Financial Resource Strain: Low Risk  (10/1/2023)    Overall Financial Resource Strain (CARDIA)     Difficulty of Paying Living Expenses: Not very hard   Food Insecurity: No Food Insecurity (10/1/2023)    Hunger Vital Sign     Worried About Running Out of Food in the Last Year: Never true     Ran Out of Food in the Last Year: Never true   Transportation Needs: No Transportation Needs (10/1/2023)    PRAPARE - Transportation     Lack of Transportation (Medical): No     Lack of Transportation (Non-Medical): No   Physical Activity: Insufficiently Active (10/1/2023)    Exercise Vital Sign     Days of Exercise per Week: 4 days     Minutes of Exercise per Session: 20 min   Stress: Stress Concern Present (10/1/2023)    [employer] - Occupational Stress Questionnaire     Feeling of Stress : To some extent   Housing Stability: Low Risk  (10/1/2023)    Housing Stability Vital Sign     Unable to Pay for Housing in the Last Year: No     Number of Places Lived in the Last Year: 1     Unstable Housing in the Last Year: No       Current Outpatient Medications   Medication Sig Dispense Refill    acetaminophen (TYLENOL) 325 MG tablet Take 650 mg by mouth every 4 (four) hours as needed.      albuterol (VENTOLIN HFA) 90 mcg/actuation inhaler Inhale 2 puffs into the lungs every 6 (six) hours as needed for Wheezing or Shortness of Breath. Rescue 18 g 6    B COMPLEX W-C NO.20/FOLIC ACID (VIRT-CAPS ORAL) Take by mouth once daily.      buPROPion (WELLBUTRIN XL) 150 MG TB24 tablet Take 1 tablet (150 mg total) by mouth once daily. 90 tablet 3    cetirizine (ZYRTEC) 5 MG tablet Take 1 tablet (5 mg total) by mouth once daily. 30 tablet 2    cinacalcet (SENSIPAR) 30 MG Tab Take by mouth daily with breakfast.      clopidogreL (PLAVIX) 75 mg tablet Take 1 tablet (75 mg total) by mouth  once daily. 30 tablet 11    cyproheptadine (PERIACTIN) 4 mg tablet Take 1 tablet (4 mg total) by mouth 3 (three) times daily as needed (appetite). 90 tablet 0    EScitalopram oxalate (LEXAPRO) 20 MG tablet Take 1 tablet (20 mg total) by mouth once daily. 90 tablet 3    fluticasone propionate (FLONASE) 50 mcg/actuation nasal spray 1 spray by Each Nostril route daily as needed.      fluticasone-salmeterol diskus inhaler 100-50 mcg Inhale 1 puff into the lungs 2 (two) times daily. Controller 60 each 2    melatonin 5 mg Cap Take 5 mg by mouth nightly.      metoprolol succinate (TOPROL-XL) 25 MG 24 hr tablet Take 1 tablet (25 mg total) by mouth once daily. 90 tablet 3    mirtazapine (REMERON) 15 MG tablet Take 1 tablet (15 mg total) by mouth every evening. 90 tablet 1    montelukast (SINGULAIR) 10 mg tablet TAKE 1 TABLET(10 MG) BY MOUTH EVERY EVENING 90 tablet 3    naltrexone (DEPADE) 50 mg tablet Start with 1/2 tab (25 mg) by mouth daily, may increase to 1 tab (50 mg) daily if able. 90 tablet 1    sacubitriL-valsartan (ENTRESTO)  mg per tablet Take 1 tablet by mouth 2 (two) times daily. 180 tablet 3    sevelamer carbonate (RENVELA) 800 mg Tab Take 800 mg by mouth 3 (three) times daily with meals.      sucroferric oxyhydroxide (VELPHORO) 500 mg Chew Take 1,000 mg by mouth 3 (three) times daily.      tiotropium bromide (SPIRIVA RESPIMAT) 2.5 mcg/actuation inhaler Inhale 2 puffs into the lungs once daily. Controller 4 g 2    traMADoL (ULTRAM) 50 mg tablet Take 50 mg by mouth every 6 (six) hours as needed.       No current facility-administered medications for this visit.     Facility-Administered Medications Ordered in Other Visits   Medication Dose Route Frequency Provider Last Rate Last Admin    0.9%  NaCl infusion   Intravenous Continuous Jordan Mcguire MD 20 mL/hr at 07/18/22 0943 New Bag at 07/18/22 0943    0.9%  NaCl infusion   Intravenous Continuous Jordan Mcguire MD   Stopped at 08/18/22 2421     sodium chloride 0.9% flush 10 mL  10 mL Intravenous PRN Jordan Mcguire MD        sodium chloride 0.9% flush 10 mL  10 mL Intravenous PRN Jordan Mcguire MD           Review of patient's allergies indicates:   Allergen Reactions    Doxycycline Swelling, Rash and Hives    Gentamicin Anaphylaxis    Vancomycin analogues Anaphylaxis          Objective   Vitals:    05/22/24 0856   BP: 134/82       Physical Exam  Constitutional:       General: She is not in acute distress.     Appearance: She is well-developed. She is not diaphoretic.   HENT:      Head: Normocephalic and atraumatic.      Right Ear: External ear normal.      Left Ear: External ear normal.      Nose: Nose normal.      Mouth/Throat:      Pharynx: No oropharyngeal exudate.   Eyes:      General: No scleral icterus.        Right eye: No discharge.         Left eye: No discharge.      Conjunctiva/sclera: Conjunctivae normal.      Pupils: Pupils are equal, round, and reactive to light.   Neck:      Thyroid: No thyromegaly.      Vascular: No JVD.      Trachea: No tracheal deviation.   Cardiovascular:      Rate and Rhythm: Normal rate and regular rhythm.      Heart sounds: Normal heart sounds. No murmur heard.     No friction rub. No gallop.   Pulmonary:      Effort: Pulmonary effort is normal.      Breath sounds: Normal breath sounds. No stridor. No wheezing or rales.   Chest:      Chest wall: No tenderness.   Abdominal:      General: Bowel sounds are normal. There is no distension.      Palpations: Abdomen is soft. There is no mass.      Tenderness: There is no abdominal tenderness. There is no guarding or rebound.      Hernia: No hernia is present.   Musculoskeletal:         General: Tenderness (mild TTP right great toe) present. Normal range of motion.      Cervical back: Normal range of motion and neck supple.      Comments: LUE - dialysis fistula   Lymphadenopathy:      Cervical: No cervical adenopathy.   Skin:     General: Skin is warm and dry.       Coloration: Skin is not pale.      Findings: No erythema or rash.   Neurological:      Mental Status: She is alert and oriented to person, place, and time.      Cranial Nerves: No cranial nerve deficit.      Motor: No abnormal muscle tone.      Coordination: Coordination normal.      Deep Tendon Reflexes: Reflexes are normal and symmetric. Reflexes normal.   Psychiatric:         Behavior: Behavior normal.         Thought Content: Thought content normal.         Judgment: Judgment normal.            Assessment and Plan     1. ESRD (end stage renal disease) on dialysis    2. CKD (chronic kidney disease) stage 5, GFR less than 15 ml/min    3. SATNAM (generalized anxiety disorder)    4. Secondary hyperparathyroidism of renal origin    5. Other depression    6. Renal hypertension    7. Multiple myeloma in remission    8. Thrombocytopenia    9. Cannabis use disorder, moderate, in early remission    10. Severe episode of recurrent major depressive disorder, without psychotic features    11. Moderate protein-calorie malnutrition      ESRD on HD  -T/Thu/Sat    SATNAM/depression  -controlled - multiple meds    Smoking cessation  -1 min counseling done  -she will quit soon     MM  -follows specialist  -stable    Thrombocytopenia  -monitor, stable        Spent adequate time in obtaining history and explaining differentials    30 minutes spent during this visit of which greater than 50% devoted to face-face counseling and coordination of care regarding diagnosis and management plan           Follow up in about 6 months (around 11/22/2024), or if symptoms worsen or fail to improve.

## 2024-05-22 NOTE — PROGRESS NOTES
[facility] Interventional [facility]phrology Follow-up Exam    Date:   05/22/2024 1:50 PM    HPI:  59 year old woman with ESKD (TTS at [facility] with Dr. Wolfe) who had recent thrombosed access, underwent successful thrombectomy of her LUE radiocephalic AVF on 5/14/24, but afterwards, dialysis only worked for 40 minutes prior to the machine shutting off due to poor flows.  Dr. Gonsales placed a L IJ TDC during that hospitalization and that has been used for HD since.  Here for follow up exam, ultrasound and suture removal.  She has not used her fistula since the declot.    Vitals:    05/22/24 1318   BP: 112/70   Pulse: 74   Resp: 12   Temp: 97.9 °F (36.6 °C)       Exam:   ACCESS:  LUE radiocephalic AV fistula with soft thrill and moderate pulsatility, appropriate pulse augmentation and partial collapse with arm elevation.  Two sutures removed without complication.      Ultrasound:  - arterial anastomosis patent without stenosis, measured at 5 mm  - mid-AVF patent with no stenosis seen, vessel diameter measured at 6-7 mm throughout  - AVF outflow tract patent with no stenosis as far up as the antecubital region, unable to assess beyond this  - BFV approximately 250 ml/min    Impression/Plan:  Patent AV fistula following recent declot.  Although her BFV measurements are subpar for dialysis, there may be further interventions which would allow this access to be salvageable (at least until new access can be established) and the catheter removed.  She will discuss with Dr. Gonsales after this appt if he feels a fistulogram would be beneficial in this case.      Matheus Galaviz MD  [phone]

## 2024-05-22 NOTE — PROGRESS NOTES
Patient with longstanding semi no fistula left wrist.   Recent recurrent thrombosis after percutaneous thrombectomy   Patient had what appeared to be recurrent occlusion of fistula requiring placement of left internal jugular cuffed dialysis catheter.  Right jugular appeared thrombosed on ultrasound.    Subjective   No complaints     PE   Fistula appears to be patent however, there is a cord extending up the brachium along the cephalic vein which may be occluding outflow    Impression/plan   Mechanical issue with AV fistula, spoke with invasive Nephrology and we will obtain repeat fistulogram

## 2024-06-03 ENCOUNTER — HOSPITAL ENCOUNTER (OUTPATIENT)
Facility: OTHER | Age: 59
Discharge: HOME | End: 2024-06-03
Attending: INTERNAL MEDICINE | Admitting: INTERNAL MEDICINE
Payer: MEDICARE

## 2024-06-03 VITALS
OXYGEN SATURATION: 99 % | TEMPERATURE: 97 F | DIASTOLIC BLOOD PRESSURE: 76 MMHG | HEART RATE: 69 BPM | WEIGHT: 139 LBS | RESPIRATION RATE: 16 BRPM | BODY MASS INDEX: 21.07 KG/M2 | HEIGHT: 68 IN | SYSTOLIC BLOOD PRESSURE: 136 MMHG

## 2024-06-03 DIAGNOSIS — N18.6 END STAGE RENAL DISEASE: ICD-10-CM

## 2024-06-03 DIAGNOSIS — T82.590A MECHANICAL COMPLICATION OF ARTERIOVENOUS FISTULA SURGICALLY CREATED, INITIAL ENCOUNTER: Primary | ICD-10-CM

## 2024-06-03 PROCEDURE — 63600175 PHARM REV CODE 636 W HCPCS: Performed by: INTERNAL MEDICINE

## 2024-06-03 PROCEDURE — C1894 INTRO/SHEATH, NON-LASER: HCPCS | Performed by: INTERNAL MEDICINE

## 2024-06-03 PROCEDURE — 25000003 PHARM REV CODE 250: Performed by: INTERNAL MEDICINE

## 2024-06-03 PROCEDURE — 36901 INTRO CATH DIALYSIS CIRCUIT: CPT | Mod: LT | Performed by: INTERNAL MEDICINE

## 2024-06-03 PROCEDURE — 25500020 PHARM REV CODE 255: Performed by: INTERNAL MEDICINE

## 2024-06-03 RX ORDER — HEPARIN SOD,PORCINE/0.9 % NACL 1000/500ML
INTRAVENOUS SOLUTION INTRAVENOUS
Status: DISCONTINUED | OUTPATIENT
Start: 2024-06-03 | End: 2024-06-03 | Stop reason: HOSPADM

## 2024-06-03 RX ORDER — LIDOCAINE HYDROCHLORIDE 10 MG/ML
INJECTION INFILTRATION; PERINEURAL
Status: DISCONTINUED | OUTPATIENT
Start: 2024-06-03 | End: 2024-06-03 | Stop reason: HOSPADM

## 2024-06-03 NOTE — Clinical Note
The site was marked. The left radial was prepped. The site was prepped with ChloraPrep. The patient was draped. Left forearm

## 2024-06-03 NOTE — BRIEF OP NOTE
[facility], [place] - Cath Lab  Brief Operative Note    Surgery Date: 6/3/2024     Surgeons and Role:     * Matheus Galaviz MD - Primary    Assisting Surgeon: None    Pre-op Diagnosis:  End stage renal disease [N18.6]    Post-op Diagnosis:  Post-Op Diagnosis Codes:     * End stage renal disease [N18.6]    Procedure(s) (LRB):  Fistulogram (Left)    Anesthesia: 1% lidocaine    Operative Findings: Patient was prepped and draped in a sterile fashion.  This was a successful fistulogram of her LUE radiocephalic AV fistula with no interventions performed in the fistula.  Patient tolerated the procedure well and no immediate complications noted.    Estimated Blood Loss: < 10 mL         Specimens:   Specimen (24h ago, onward)      None              Discharge Note    OUTCOME:  Patient was prepped and draped in a sterile fashion.  This was a successful fistulogram of her LUE radiocephalic AV fistula with no interventions performed in the fistula.  Patient tolerated the procedure well and no immediate complications noted.    DISPOSITION: Home or Self Care    FINAL DIAGNOSIS:  <principal problem not specified>    FOLLOWUP: In clinic as needed    DISCHARGE INSTRUCTIONS:    Discharge Procedure Orders   Lifting restrictions   Order Comments: No heavy lifting for 24 hours.     Call MD for:  temperature >100.4     Call MD for:  severe uncontrolled pain     Call MD for:  redness, tenderness, or signs of infection (pain, swelling, redness, odor or green/yellow discharge around incision site)     Call MD for:   Order Comments: Bleeding from the access site.     Activity as tolerated

## 2024-06-03 NOTE — H&P
[facility] Cath Lab  History & Physical    Subjective:      Chief Complaint/Reason for Admission: Here for fistulogram    Jennifer Booth is a 59 y.o. female with ESRD (TTS at [facility] with Dr. Wolfe) who had recent thrombectomy of her access, during which we were able to salvage the access but it was unsuccessful for use during dialysis.  A follow up ultrasound and exam revealed patent access but with inadequate blood flow volumes.  She is here for follow up fistulogram to assess for further intervene-able lesions which would make her access functional for dialysis.    Past Medical History:   Diagnosis Date    Addiction to drug     Alcohol abuse     Allergic drug reaction 2017    Anemia     Anxiety     Arm pain, left 2014    Breast cyst     Chronic renal failure 2014    CKD (chronic kidney disease) stage 5, GFR less than 15 ml/min     COPD exacerbation 2023    Depression     Dialysis patient     dialysis m-w-f    Encounter for blood transfusion     GERD (gastroesophageal reflux disease)     Hx of psychiatric care     Hypertension     Mood swings     Multiple myeloma in remission 10/26/2012    per Hem/Oc note    Multiple thyroid nodules 10/22/2023    Psychiatric exam requested by authority     Psychiatric problem     Renal hypertension     Status post dialysis 2012    Therapy     Thrombocytopenia 2012     Past Surgical History:   Procedure Laterality Date    AV FISTULA PLACEMENT Left     BREAST CYST ASPIRATION Left     BREAST CYST EXCISION Left     CERVICAL SPINE SURGERY Bilateral      SECTION      x1    COLONOSCOPY N/A 2022    Procedure: COLONOSCOPY;  Surgeon: Jordan Mcguire MD;  Location: [facility] ENDO;  Service: Endoscopy;  Laterality: N/A;    FISTULOGRAM N/A 2020    Procedure: Fistulogram;  Surgeon: Rahat Berger MD;  Location: [facility] CATH LAB/EP;  Service: Cardiology;  Laterality: N/A;    FISTULOGRAM Left 3/20/2024    Procedure: Fistulogram LEFT FOREARM;   Surgeon: Matheus Galaviz MD;  Location: [facility] CATH LAB;  Service: Nephrology;  Laterality: Left;    INSERTION OF CATHETER FOR HEMODIALYSIS N/A 5/14/2024    Procedure: INSERTION, CATHETER, HEMODIALYSIS SINGLE LUMEN;  Surgeon: Mukesh Gonsales Jr., MD;  Location: [facility] OR;  Service: General;  Laterality: N/A;    pd catheter      PERCUTANEOUS TRANSLUMINAL ANGIOPLASTY OF ARTERIOVENOUS FISTULA N/A 06/09/2020    Procedure: PTA, AV FISTULA;  Surgeon: Rahat Berger MD;  Location: [facility] CATH LAB/EP;  Service: Cardiology;  Laterality: N/A;    PERITONEAL CATHETER REMOVAL N/A 11/30/2018    Procedure: REMOVAL, CATHETER, DIALYSIS, PERITONEAL;  Surgeon: Mukesh Gonsales Jr., MD;  Location: [facility] OR;  Service: General;  Laterality: N/A;    RENAL BIOPSY  2016    THROMBECTOMY OF HEMODIALYSIS ACCESS SITE N/A 06/09/2020    Procedure: Thrombectomy, Hemodialysis Graft Or Fistula;  Surgeon: Rahat Berger MD;  Location: [facility] CATH LAB/EP;  Service: Cardiology;  Laterality: N/A;    THROMBECTOMY OF HEMODIALYSIS ACCESS SITE Left 5/14/2024    Procedure: THROMBECTOMY, HEMODIALYSIS GRAFT OR FISTULA / left lower AV/FISTULA;  Surgeon: Matheus Galaviz MD;  Location: [facility] CATH LAB;  Service: Nephrology;  Laterality: Left;    VASCULAR SURGERY  08/2012    dialysis catheter to right subclavian     Social History     Tobacco Use    Smoking status: Every Day     Current packs/day: 1.00     Average packs/day: 1 pack/day for 41.4 years (41.4 ttl pk-yrs)     Types: Cigarettes     Start date: 1983     Passive exposure: Never    Smokeless tobacco: Never    Tobacco comments:     Pt. states recently cut down to 0.5 pk/day. Enrolled in the [employer] on 6/9/20 ([facility] Member ID # [id number]). She declines Ambulatory referral to Smoking Cessation program. Handout provided.   Substance Use Topics    Alcohol use: Not Currently     Comment: occasional    Drug use: Not Currently     Types: Marijuana       PTA Medications   Medication Sig    acetaminophen (TYLENOL) 325 MG  tablet Take 650 mg by mouth every 4 (four) hours as needed.    buPROPion (WELLBUTRIN XL) 150 MG TB24 tablet Take 1 tablet (150 mg total) by mouth once daily.    cetirizine (ZYRTEC) 5 MG tablet Take 1 tablet (5 mg total) by mouth once daily.    cinacalcet (SENSIPAR) 30 MG Tab Take by mouth daily with breakfast.    clopidogreL (PLAVIX) 75 mg tablet Take 1 tablet (75 mg total) by mouth once daily.    cyproheptadine (PERIACTIN) 4 mg tablet Take 1 tablet (4 mg total) by mouth 3 (three) times daily as needed (appetite).    EScitalopram oxalate (LEXAPRO) 20 MG tablet Take 1 tablet (20 mg total) by mouth once daily.    melatonin 5 mg Cap Take 5 mg by mouth nightly.    metoprolol succinate (TOPROL-XL) 25 MG 24 hr tablet Take 1 tablet (25 mg total) by mouth once daily.    mirtazapine (REMERON) 15 MG tablet Take 1 tablet (15 mg total) by mouth every evening.    montelukast (SINGULAIR) 10 mg tablet TAKE 1 TABLET(10 MG) BY MOUTH EVERY EVENING    naltrexone (DEPADE) 50 mg tablet Start with 1/2 tab (25 mg) by mouth daily, may increase to 1 tab (50 mg) daily if able.    sacubitriL-valsartan (ENTRESTO)  mg per tablet Take 1 tablet by mouth 2 (two) times daily.    sevelamer carbonate (RENVELA) 800 mg Tab Take 800 mg by mouth 3 (three) times daily with meals.    sucroferric oxyhydroxide (VELPHORO) 500 mg Chew Take 1,000 mg by mouth 3 (three) times daily.    traMADoL (ULTRAM) 50 mg tablet Take 50 mg by mouth every 6 (six) hours as needed.    albuterol (VENTOLIN HFA) 90 mcg/actuation inhaler Inhale 2 puffs into the lungs every 6 (six) hours as needed for Wheezing or Shortness of Breath. Rescue    B COMPLEX W-C NO.20/FOLIC ACID (VIRT-CAPS ORAL) Take by mouth once daily.    fluticasone propionate (FLONASE) 50 mcg/actuation nasal spray 1 spray by Each Nostril route daily as needed.    fluticasone-salmeterol diskus inhaler 100-50 mcg Inhale 1 puff into the lungs 2 (two) times daily. Controller    tiotropium bromide (SPIRIVA RESPIMAT)  2.5 mcg/actuation inhaler Inhale 2 puffs into the lungs once daily. Controller     Review of patient's allergies indicates:   Allergen Reactions    Doxycycline Swelling, Rash and Hives    Gentamicin Anaphylaxis    Vancomycin analogues Anaphylaxis        Review of Systems   Constitutional: Negative.    Respiratory: Negative.     Cardiovascular: Negative.        Objective:      Vital Signs (Most Recent)       Vital Signs Range (Last 24H):       Physical Exam  Constitutional:       General: She is not in acute distress.     Appearance: She is well-developed. She is not diaphoretic.   HENT:      Head: Normocephalic and atraumatic.   Pulmonary:      Effort: Pulmonary effort is normal. No respiratory distress.   Musculoskeletal:      Cervical back: Neck supple.      Comments:  LUE radiocephalic AV fistula with soft thrill and moderate pulsatility, appropriate pulse augmentation and partial collapse with arm elevation.   Skin:     General: Skin is warm and dry.   Neurological:      Mental Status: She is alert and oriented to person, place, and time.   Psychiatric:         Behavior: Behavior normal.         Assessment:      There are no hospital problems to display for this patient.      Plan:      Fistulogram today.  Risks explained, consent signed.

## 2024-06-03 NOTE — PROCEDURES
[facility] Interventional Nephrology Service    Fistulogram Procedure Report    Date of Procedure:  06/03/2024 1:21 PM    Procedures Performed: Fistulogram with no interventions; reflux arteriogram    Indication: poor blood flow rate, access unable to be used for dialysis    Referring Provider: Dr. Wolfe and [facility]    Primary Surgeon: Matheus Galaviz MD  Assist: none    Medications Administered:  1% lidocaine    Procedure Report in Detail:  After informed consent was obtained the patient was prepped and draped in the usual sterile fashion.  Her left upper Radiocephalic AVF was cannulated in the venous facing direction with a micropuncture system, confirmed in correct placement under fluroscopy, and a fistulogram was performed using iodinated contrast.  Fistulogram revealed no apparent stenosis.  Central vasculature was then evaluated with contrasted film revealing widely patent central vasculature.  Reflux arteriogram revealed patent arterial anastomosis with no apparent JA stenosis, 2 angles attempted.  More than 6cm of the radial artery was visualized and it was patent.  At this point, it was felt no further interventions were warranted, so the microsheath was pulled and direct pressure was used to achieve hemostasis.  No complications occurred during the procedure.     Estimated blood loss: < 10 mL   Estimated contrast used: 24 mL    Impression/Plan:  This was a successful fistulogram with no interventions performed in the fistula.  We will have the patient return as needed for further assessment.  In the meantime, I would recommend using this fistula to see if we are able to achieve adequate blood flow rates during dialysis.  If not, please do refer her back and we will consider attempting angioplasty near the arterial anastomosis (no apparent stenosis but there was some artifact on the post-procedure ultrasound showed no apparent stenosis either).  Doppler blood flow measurements post-procedure ranged from 250 mL/min  to 350 mL/min.      Matheus Galaviz MD  [phone]

## 2024-06-06 NOTE — PROGRESS NOTES
Group Psychotherapy    Site: [facility]    Clinical status of patient: Outpatient    2/6/2024    Length of service:[id number]    Referred by: Addictive Behavior Unit     Number of patients in attendance: 6    Target symptoms: alcohol abuse, substance abuse    Patient's response to intervention:  The patient's response to intervention is active listening, self-disclosure.    Progress toward goals and other mental status changes:  The patient's progress toward goals is good.    Interval history: Pt presents a few minutes late for group- coming from dialysis. Processed frustrations with process 2/2 interruptions in care during holidays. Group supportive, offered suggestions to advocate for self. Pt reports ongoing sobriety, feels proud of herself and ready to move forward with transplant team. Provided feedback to other group members, actively engaged.     Diagnosis: Alcohol use disorder, in early remission; Cannabis Use Disorder, in early remission     Plan: group psychotherapy    Return to clinic: 1 week    
46.9

## 2024-06-07 DIAGNOSIS — I50.20 HFREF (HEART FAILURE WITH REDUCED EJECTION FRACTION): ICD-10-CM

## 2024-06-10 ENCOUNTER — HOSPITAL ENCOUNTER (OUTPATIENT)
Dept: RADIOLOGY | Facility: HOSPITAL | Age: 59
Discharge: HOME | End: 2024-06-10
Attending: INTERNAL MEDICINE
Payer: MEDICARE

## 2024-06-10 DIAGNOSIS — R05.9 COUGH, UNSPECIFIED TYPE: ICD-10-CM

## 2024-06-10 PROCEDURE — 71250 CT THORAX DX C-: CPT | Mod: 26,,, | Performed by: RADIOLOGY

## 2024-06-10 PROCEDURE — 71250 CT THORAX DX C-: CPT | Mod: TC

## 2024-06-10 RX ORDER — SACUBITRIL AND VALSARTAN 97; 103 MG/1; MG/1
1 TABLET, FILM COATED ORAL 2 TIMES DAILY
Qty: 180 TABLET | Refills: 3 | Status: SHIPPED | OUTPATIENT
Start: 2024-06-10 | End: 2025-06-10

## 2024-06-11 ENCOUNTER — CLINICAL SUPPORT (OUTPATIENT)
Dept: PSYCHIATRY | Facility: CLINIC | Age: 59
End: 2024-06-11
Payer: MEDICARE

## 2024-06-11 DIAGNOSIS — F10.21 ALCOHOL USE DISORDER, MODERATE, IN SUSTAINED REMISSION: Primary | ICD-10-CM

## 2024-06-11 PROCEDURE — 90853 GROUP PSYCHOTHERAPY: CPT | Mod: ,,, | Performed by: SOCIAL WORKER

## 2024-06-12 ENCOUNTER — PATIENT MESSAGE (OUTPATIENT)
Dept: CARDIOLOGY | Facility: HOSPITAL | Age: 59
End: 2024-06-12
Payer: MEDICARE

## 2024-06-12 ENCOUNTER — HOSPITAL ENCOUNTER (OUTPATIENT)
Dept: CARDIOLOGY | Facility: HOSPITAL | Age: 59
Discharge: HOME | End: 2024-06-12
Attending: INTERNAL MEDICINE
Payer: MEDICARE

## 2024-06-12 VITALS
SYSTOLIC BLOOD PRESSURE: 136 MMHG | BODY MASS INDEX: 21.05 KG/M2 | HEIGHT: 68 IN | HEART RATE: 69 BPM | WEIGHT: 138.88 LBS | DIASTOLIC BLOOD PRESSURE: 76 MMHG

## 2024-06-12 DIAGNOSIS — I50.42 CHRONIC COMBINED SYSTOLIC AND DIASTOLIC HEART FAILURE: ICD-10-CM

## 2024-06-12 LAB
ASCENDING AORTA: 3.46 CM
AV INDEX (PROSTH): 0.84
AV MEAN GRADIENT: 4 MMHG
AV PEAK GRADIENT: 7 MMHG
AV VALVE AREA BY VELOCITY RATIO: 2.83 CM²
AV VALVE AREA: 2.99 CM²
AV VELOCITY RATIO: 0.8
BSA FOR ECHO PROCEDURE: 1.73 M2
CV ECHO LV RWT: 0.3 CM
DOP CALC AO PEAK VEL: 1.32 M/S
DOP CALC AO VTI: 27.04 CM
DOP CALC LVOT AREA: 3.6 CM2
DOP CALC LVOT DIAMETER: 2.13 CM
DOP CALC LVOT PEAK VEL: 1.05 M/S
DOP CALC LVOT STROKE VOLUME: 80.88 CM3
DOP CALCLVOT PEAK VEL VTI: 22.71 CM
E WAVE DECELERATION TIME: 267.2 MSEC
E/A RATIO: 0.62
E/E' RATIO: 11.6 M/S
ECHO LV POSTERIOR WALL: 0.78 CM (ref 0.6–1.1)
FRACTIONAL SHORTENING: 29 % (ref 28–44)
GLOBAL LONGITUIDAL STRAIN: 16 %
INTERVENTRICULAR SEPTUM: 0.63 CM (ref 0.6–1.1)
IVRT: 156.04 MSEC
LA MAJOR: 4.37 CM
LA MINOR: 4.43 CM
LA WIDTH: 4.23 CM
LEFT ATRIUM SIZE: 4 CM
LEFT ATRIUM VOLUME INDEX MOD: 34.3 ML/M2
LEFT ATRIUM VOLUME INDEX: 36.2 ML/M2
LEFT ATRIUM VOLUME MOD: 60 CM3
LEFT ATRIUM VOLUME: 63.28 CM3
LEFT INTERNAL DIMENSION IN SYSTOLE: 3.7 CM (ref 2.1–4)
LEFT VENTRICLE DIASTOLIC VOLUME INDEX: 73.6 ML/M2
LEFT VENTRICLE DIASTOLIC VOLUME: 128.8 ML
LEFT VENTRICLE MASS INDEX: 71 G/M2
LEFT VENTRICLE SYSTOLIC VOLUME INDEX: 33.2 ML/M2
LEFT VENTRICLE SYSTOLIC VOLUME: 58.16 ML
LEFT VENTRICULAR INTERNAL DIMENSION IN DIASTOLE: 5.19 CM (ref 3.5–6)
LEFT VENTRICULAR MASS: 123.48 G
LV LATERAL E/E' RATIO: 9.67 M/S
LV SEPTAL E/E' RATIO: 14.5 M/S
MV A" WAVE DURATION": 15.22 MSEC
MV PEAK A VEL: 0.93 M/S
MV PEAK E VEL: 0.58 M/S
MV STENOSIS PRESSURE HALF TIME: 77.49 MS
MV VALVE AREA P 1/2 METHOD: 2.84 CM2
OHS CV RV/LV RATIO: 0.69 CM
PISA TR MAX VEL: 2.32 M/S
PULM VEIN S/D RATIO: 1.78
PV PEAK D VEL: 0.37 M/S
PV PEAK S VEL: 0.66 M/S
RA MAJOR: 3.28 CM
RA PRESSURE ESTIMATED: 3 MMHG
RA WIDTH: 2.92 CM
RIGHT VENTRICULAR END-DIASTOLIC DIMENSION: 3.58 CM
RV TB RVSP: 5 MMHG
SINUS: 3.28 CM
STJ: 2.91 CM
TDI LATERAL: 0.06 M/S
TDI SEPTAL: 0.04 M/S
TDI: 0.05 M/S
TR MAX PG: 22 MMHG
TRICUSPID ANNULAR PLANE SYSTOLIC EXCURSION: 1.78 CM
TV REST PULMONARY ARTERY PRESSURE: 25 MMHG
Z-SCORE OF LEFT VENTRICULAR DIMENSION IN END DIASTOLE: 0.7
Z-SCORE OF LEFT VENTRICULAR DIMENSION IN END SYSTOLE: 1.67

## 2024-06-12 PROCEDURE — 93356 MYOCRD STRAIN IMG SPCKL TRCK: CPT | Mod: ,,, | Performed by: INTERNAL MEDICINE

## 2024-06-12 PROCEDURE — 93306 TTE W/DOPPLER COMPLETE: CPT | Mod: 26,,, | Performed by: INTERNAL MEDICINE

## 2024-06-12 PROCEDURE — 93356 MYOCRD STRAIN IMG SPCKL TRCK: CPT

## 2024-06-18 ENCOUNTER — CLINICAL SUPPORT (OUTPATIENT)
Dept: PSYCHIATRY | Facility: CLINIC | Age: 59
End: 2024-06-18
Payer: MEDICARE

## 2024-06-18 DIAGNOSIS — F10.21 ALCOHOL USE DISORDER, MODERATE, IN SUSTAINED REMISSION: Primary | ICD-10-CM

## 2024-06-18 PROCEDURE — 90853 GROUP PSYCHOTHERAPY: CPT | Mod: ,,, | Performed by: SOCIAL WORKER

## 2024-06-19 ENCOUNTER — OFFICE VISIT (OUTPATIENT)
Dept: NEPHROLOGY | Facility: CLINIC | Age: 59
End: 2024-06-19
Payer: MEDICARE

## 2024-06-19 VITALS
WEIGHT: 140.88 LBS | HEART RATE: 73 BPM | HEIGHT: 68 IN | RESPIRATION RATE: 16 BRPM | DIASTOLIC BLOOD PRESSURE: 79 MMHG | SYSTOLIC BLOOD PRESSURE: 118 MMHG | BODY MASS INDEX: 21.35 KG/M2 | TEMPERATURE: 98 F | OXYGEN SATURATION: 98 %

## 2024-06-19 DIAGNOSIS — N18.6 ESRD ON DIALYSIS: Primary | ICD-10-CM

## 2024-06-19 DIAGNOSIS — Z99.2 ESRD ON DIALYSIS: Primary | ICD-10-CM

## 2024-06-19 PROCEDURE — 99999 PR PBB SHADOW E&M-EST. PATIENT-LVL IV: CPT | Mod: PBBFAC,,,

## 2024-06-19 PROCEDURE — 99214 OFFICE O/P EST MOD 30 MIN: CPT | Mod: PBBFAC

## 2024-06-19 NOTE — PROGRESS NOTES
[facility] Interventional Nephrology Follow-up Exam    Date:   06/19/2024 1:50 PM    HPI:  59 year old woman with ESKD (TTS at [facility] with Dr. Wolfe) who had recent thrombosed access, underwent successful thrombectomy of her LUE radiocephalic AVF on 5/14/24, but afterwards, dialysis only worked for 40 minutes prior to the machine shutting off due to poor flows.  Dr. Gonsales placed a L IJ TDC during that hospitalization which was being used but now she is using the fistula with a lower blood flow (300-350) which does not have any issues.  Here for follow up exam and ultrasound.    Vitals:    06/19/24 1352   BP: 118/79   Pulse: 73   Resp: 16   Temp: 97.8 °F (36.6 °C)     Exam:   ACCESS:  LUE radiocephalic AV fistula with soft thrill and moderate pulsatility, appropriate pulse augmentation and full collapse with arm elevation.     Ultrasound:  - arterial anastomosis patent without stenosis, measured at 5 mm  - mid-AVF patent with no stenosis seen, vessel diameter measured at 6-7 mm throughout  - AVF outflow tract patent with no stenosis as far up as the antecubital region, unable to assess beyond this  - two collateral vessels, one distal to the cannulation area they have been using, one proximal to the cannulation area  - BFV approximately 300-400 ml/min    Impression/Plan:  Patent AV fistula following recent declot.  We performed a fistulogram after her declot that warranted no interventions, although it was difficult to visualize her arterial anastomosis due to the significant calcifications in the juxta-anastomosis region.  Will discuss with Dr. Gonsales to see if he would be willing to ligate the collateral vessels as this may optimize the flow near the cannulation/within the fistula.  The other possibility is repeating fistulogram to see if the arterial anastomosis can be angioplastied (but this is risky given the degree of severe calcification near the artery).        Matheus Galaviz MD  [phone]

## 2024-06-24 NOTE — PROGRESS NOTES
Group Psychotherapy    Site: [facility]    Clinical status of patient: Outpatient    6/11/2024    Length of service:[id number]    Referred by: Addictive Behavior Unit     Number of patients in attendance: 6    Target symptoms: alcohol abuse, substance abuse    Patient's response to intervention:  The patient's response to intervention is active listening, self-disclosure.    Progress toward goals and other mental status changes:  The patient's progress toward goals is good.    Interval history: Pt presents a few minutes late for group- coming from dialysis. First group in several months. Pt processed recent health concerns, rejected from transplant list, difficult emotions. Group supportive and actively engaged in discussion around self care and welcoming her back to group.     Diagnosis: Alcohol use disorder, in early remission; Cannabis Use Disorder, in early remission     Plan: group psychotherapy    Return to clinic: 1 week

## 2024-06-25 ENCOUNTER — CLINICAL SUPPORT (OUTPATIENT)
Dept: PSYCHIATRY | Facility: CLINIC | Age: 59
End: 2024-06-25
Payer: MEDICARE

## 2024-06-25 ENCOUNTER — TELEPHONE (OUTPATIENT)
Dept: CARDIOLOGY | Facility: CLINIC | Age: 59
End: 2024-06-25
Payer: MEDICARE

## 2024-06-25 DIAGNOSIS — F10.21 ALCOHOL USE DISORDER, MODERATE, IN SUSTAINED REMISSION: Primary | ICD-10-CM

## 2024-06-25 PROCEDURE — 90853 GROUP PSYCHOTHERAPY: CPT | Mod: ,,, | Performed by: SOCIAL WORKER

## 2024-06-25 NOTE — PROGRESS NOTES
Group Psychotherapy    Site: [facility]    Clinical status of patient: Outpatient    6/18/2024    Length of service:[id number]    Referred by: Addictive Behavior Unit     Number of patients in attendance: 6    Target symptoms: alcohol abuse, substance abuse    Patient's response to intervention:  The patient's response to intervention is active listening, self-disclosure.    Progress toward goals and other mental status changes:  The patient's progress toward goals is good.    Interval history: Pt presents late for group- coming from dialysis. Pt notes feeling well this week, has some medical follow ups planned. Group discussed AA community and personal histories of relapse as part of their recovery story. Actively engaged in group discussion, giving support to other group members.     Diagnosis: Alcohol use disorder, in early remission; Cannabis Use Disorder, in early remission     Plan: group psychotherapy    Return to clinic: 1 week

## 2024-06-25 NOTE — TELEPHONE ENCOUNTER
Spoke to pt and read the echo results that Dr Rojo wrote in her chart to her.  She was happy to get good news.  Sue

## 2024-07-01 ENCOUNTER — OFFICE VISIT (OUTPATIENT)
Dept: SURGERY | Facility: CLINIC | Age: 59
End: 2024-07-01
Attending: SPECIALIST
Payer: MEDICARE

## 2024-07-01 VITALS
SYSTOLIC BLOOD PRESSURE: 118 MMHG | DIASTOLIC BLOOD PRESSURE: 78 MMHG | HEIGHT: 68 IN | BODY MASS INDEX: 20.46 KG/M2 | WEIGHT: 135 LBS | HEART RATE: 67 BPM

## 2024-07-01 DIAGNOSIS — Z99.2 ESRD (END STAGE RENAL DISEASE) ON DIALYSIS: Primary | ICD-10-CM

## 2024-07-01 DIAGNOSIS — N18.6 ESRD (END STAGE RENAL DISEASE) ON DIALYSIS: Primary | ICD-10-CM

## 2024-07-01 PROCEDURE — 99214 OFFICE O/P EST MOD 30 MIN: CPT | Mod: PBBFAC | Performed by: SPECIALIST

## 2024-07-01 PROCEDURE — 36589 REMOVAL TUNNELED CV CATH: CPT | Mod: PBBFAC | Performed by: SPECIALIST

## 2024-07-01 PROCEDURE — 36589 REMOVAL TUNNELED CV CATH: CPT | Mod: S$PBB,,, | Performed by: SPECIALIST

## 2024-07-01 PROCEDURE — 99499 UNLISTED E&M SERVICE: CPT | Mod: S$PBB,,, | Performed by: SPECIALIST

## 2024-07-01 PROCEDURE — 99999 PR PBB SHADOW E&M-EST. PATIENT-LVL IV: CPT | Mod: PBBFAC,,, | Performed by: SPECIALIST

## 2024-07-01 NOTE — PROCEDURES
Removal, Left IJ Cuffed Catheter    Date/Time: 7/1/2024 10:15 AM    Performed by: Mukesh Gonsales Jr., MD  Authorized by: Mukesh Gonsales Jr., MD    Consent Done?:  Yes (Written) and Yes (Verbal)  Timeout: prior to procedure the correct patient, procedure, and site was verified    Prep: patient was prepped and draped in usual sterile fashion    Local anesthesia used?: Yes    Anesthesia:  Local infiltration  Local anesthetic:  Lidocaine 1% with epinephrine  Anesthetic total (ml):  10  Assistants?: Yes    List of assistants:  Joycelyn Ivey I was present for the entire procedure.  : Left IJ Catheter.  Laterality:  Left  Position:  Supine   Patient was prepped and draped in the normal sterile fashion.  Anesthesia:  Local infiltration  Local anesthetic:  Lidocaine 1% with epinephrine  Specimens?: No     Hemostasis was obtained.  Sterile dressings:  Gauze   Needle, instrument, and sponge counts were correct.   Patient tolerated the procedure well with no immediate complications.   Post-operative instructions were provided for the patient.   Patient was discharged and will follow up for wound check.

## 2024-07-01 NOTE — PROGRESS NOTES
59-year-old female with cuffed hemodialysis catheter  AVF left arm now function  Patient for removal of catheter

## 2024-07-09 ENCOUNTER — CLINICAL SUPPORT (OUTPATIENT)
Dept: PSYCHIATRY | Facility: CLINIC | Age: 59
End: 2024-07-09
Payer: MEDICARE

## 2024-07-09 DIAGNOSIS — F10.21 ALCOHOL USE DISORDER, MODERATE, IN SUSTAINED REMISSION: Primary | ICD-10-CM

## 2024-07-09 PROCEDURE — 90853 GROUP PSYCHOTHERAPY: CPT | Mod: ,,, | Performed by: SOCIAL WORKER

## 2024-07-17 ENCOUNTER — OFFICE VISIT (OUTPATIENT)
Dept: NEPHROLOGY | Facility: CLINIC | Age: 59
End: 2024-07-17
Payer: MEDICARE

## 2024-07-17 VITALS
TEMPERATURE: 98 F | BODY MASS INDEX: 21.05 KG/M2 | SYSTOLIC BLOOD PRESSURE: 98 MMHG | HEART RATE: 86 BPM | HEIGHT: 68 IN | WEIGHT: 138.88 LBS | DIASTOLIC BLOOD PRESSURE: 67 MMHG | RESPIRATION RATE: 12 BRPM | OXYGEN SATURATION: 98 %

## 2024-07-17 DIAGNOSIS — N18.6 ESRD ON DIALYSIS: Primary | ICD-10-CM

## 2024-07-17 DIAGNOSIS — Z99.2 ESRD ON DIALYSIS: Primary | ICD-10-CM

## 2024-07-17 PROCEDURE — 99214 OFFICE O/P EST MOD 30 MIN: CPT | Mod: PBBFAC

## 2024-07-17 PROCEDURE — 99999 PR PBB SHADOW E&M-EST. PATIENT-LVL IV: CPT | Mod: PBBFAC,,,

## 2024-07-17 PROCEDURE — 99213 OFFICE O/P EST LOW 20 MIN: CPT | Mod: S$PBB,,, | Performed by: INTERNAL MEDICINE

## 2024-07-17 NOTE — PROGRESS NOTES
[facility] Interventional Nephrology Follow-up Exam    Date:   07/17/2024 1:50 PM    HPI:  59 year old woman with ESKD (TTS at [facility] with Dr. Wolfe) who had recent thrombosed access, underwent successful thrombectomy of her LUE radiocephalic AVF on 5/14/24, but afterwards, dialysis only worked for 40 minutes prior to the machine shutting off due to poor flows.  Dr. Gonsales placed a L IJ TDC during that hospitalization which was being used but now she is using the fistula without any issues, she says they may sometimes lower the blood flow rate on dialysis but she is unsure.  Catheter now out.  Here for follow up exam and ultrasound.    Vitals:    07/17/24 1332   BP: 98/67   Pulse: 86   Resp: 12   Temp: 98.2 °F (36.8 °C)     Exam:   ACCESS:  LUE radiocephalic AV fistula with soft thrill and moderate pulsatility, appropriate pulse augmentation and full collapse with arm elevation.     Ultrasound:  - arterial anastomosis patent without stenosis, measured at 5 mm  - mid-AVF patent with no stenosis seen, vessel diameter measured at 6-7 mm throughout  - AVF outflow tract patent with no stenosis as far up as the antecubital region, unable to assess beyond this  - two collateral vessels, one distal to the cannulation area they have been using, one proximal to the cannulation area  - BFV approximately 600-1000 ml/min    Impression/Plan:  Patent AV fistula following recent declot.  We performed a fistulogram after her declot that warranted no interventions, although it was difficult to visualize her arterial anastomosis due to the significant calcifications in the juxta-anastomosis region.  Will have her return here in 1 month to further evaluate her access with ultrasound and exam so as to maintain patency of her recently clotted fistula.  Please do refer her back if there are signs of stenosis (prolonged bleeding, having to lower the blood flow rate, downtrending adequacy, etc).        Matheus Galaviz MD  [phone]

## 2024-07-19 DIAGNOSIS — N18.6 ESRD (END STAGE RENAL DISEASE) ON DIALYSIS: ICD-10-CM

## 2024-07-19 DIAGNOSIS — Z99.2 ESRD (END STAGE RENAL DISEASE) ON DIALYSIS: ICD-10-CM

## 2024-07-19 DIAGNOSIS — F41.1 GAD (GENERALIZED ANXIETY DISORDER): ICD-10-CM

## 2024-07-19 DIAGNOSIS — F32.89 OTHER DEPRESSION: ICD-10-CM

## 2024-07-19 DIAGNOSIS — N18.5 CKD (CHRONIC KIDNEY DISEASE) STAGE 5, GFR LESS THAN 15 ML/MIN: ICD-10-CM

## 2024-07-19 RX ORDER — CLOPIDOGREL BISULFATE 75 MG/1
75 TABLET ORAL
Qty: 90 TABLET | Refills: 0 | Status: SHIPPED | OUTPATIENT
Start: 2024-07-19

## 2024-07-19 RX ORDER — BUPROPION HYDROCHLORIDE 150 MG/1
150 TABLET ORAL
Qty: 90 TABLET | Refills: 3 | Status: SHIPPED | OUTPATIENT
Start: 2024-07-19

## 2024-07-19 NOTE — TELEPHONE ENCOUNTER
Refill Routing Note   Medication(s) are not appropriate for processing by [facility] for the following reason(s):        Required labs abnormal    [facility] action(s):  Defer  Approve             Pharmacist review requested: Yes     Appointments  past 12m or future 3m with PCP    Date Provider   Last Visit   5/22/2024 Leodan Sanchez MD   Next Visit   11/22/2024 Leodan Sanchez MD   ED visits in past 90 days: 0        Note composed:10:04 AM 07/19/2024

## 2024-07-19 NOTE — TELEPHONE ENCOUNTER
Refill Routing Note   Medication(s) are not appropriate for processing by [facility] for the following reason(s):      Required labs outdated  Drug-disease interaction    [facility] action(s):  Defer Care Due:  None identified     Medication Therapy Plan: Drug-Disease: buPROPion and Alcohol use disorder, severe, dependence    Pharmacist review requested: Yes     Appointments  past 12m or future 3m with PCP    Date Provider   Last Visit   5/22/2024 Leodan Sanchez MD   Next Visit   11/22/2024 Leodan Sanchez MD   ED visits in past 90 days: 0        Note composed:9:09 AM 07/19/2024

## 2024-07-19 NOTE — TELEPHONE ENCOUNTER
No care due was identified.  [facility] Embedded Care Due Messages. Reference number: [id number].   7/19/2024 5:50:39 AM CDT

## 2024-07-22 NOTE — PROGRESS NOTES
Group Psychotherapy    Site: [facility]    Clinical status of patient: Outpatient    7/9/2024    Length of service:[id number]    Referred by: Addictive Behavior Unit     Number of patients in attendance: 6    Target symptoms: alcohol abuse, substance abuse    Patient's response to intervention:  The patient's response to intervention is active listening, self-disclosure.    Progress toward goals and other mental status changes:  The patient's progress toward goals is good.    Interval history: Pt presents late for group- coming from dialysis. Pt reports doing well overall. Group discussed mental health and impacts of social media.  Pt actively engaged in group discussion, giving support to other group members.     Diagnosis: Alcohol use disorder, in early remission; Cannabis Use Disorder, in early remission     Plan: group psychotherapy    Return to clinic: 1 week

## 2024-07-23 ENCOUNTER — CLINICAL SUPPORT (OUTPATIENT)
Dept: PSYCHIATRY | Facility: CLINIC | Age: 59
End: 2024-07-23
Payer: MEDICARE

## 2024-07-23 DIAGNOSIS — F10.21 ALCOHOL USE DISORDER, MODERATE, IN SUSTAINED REMISSION: Primary | ICD-10-CM

## 2024-07-23 PROCEDURE — 90853 GROUP PSYCHOTHERAPY: CPT | Mod: ,,, | Performed by: SOCIAL WORKER

## 2024-07-24 ENCOUNTER — PATIENT MESSAGE (OUTPATIENT)
Dept: PSYCHIATRY | Facility: CLINIC | Age: 59
End: 2024-07-24
Payer: MEDICARE

## 2024-07-26 NOTE — PROGRESS NOTES
Group Psychotherapy    Site: [facility]    Clinical status of patient: Outpatient    7/23/2024    Length of service:[id number]    Referred by: Addictive Behavior Unit     Number of patients in attendance: 5    Target symptoms: alcohol abuse, substance abuse    Patient's response to intervention:  The patient's response to intervention is active listening, self-disclosure.    Progress toward goals and other mental status changes:  The patient's progress toward goals is good.    Interval history: Pt presents on time to scheduled group. Had peaceful week- daughter and grandchild out of town, enjoyed down time. Feeling positive around health. Pt supportive of struggling group member who disclosed relapse.     Diagnosis: Alcohol use disorder, in early remission; Cannabis Use Disorder, in early remission     Plan: group psychotherapy    Return to clinic: 1 week

## 2024-07-29 ENCOUNTER — OFFICE VISIT (OUTPATIENT)
Dept: FAMILY MEDICINE | Facility: CLINIC | Age: 59
End: 2024-07-29
Attending: FAMILY MEDICINE
Payer: MEDICARE

## 2024-07-29 VITALS
DIASTOLIC BLOOD PRESSURE: 89 MMHG | HEIGHT: 68 IN | BODY MASS INDEX: 21.11 KG/M2 | WEIGHT: 139.31 LBS | SYSTOLIC BLOOD PRESSURE: 139 MMHG | OXYGEN SATURATION: 98 % | HEART RATE: 76 BPM | TEMPERATURE: 98 F

## 2024-07-29 DIAGNOSIS — D69.6 THROMBOCYTOPENIA: ICD-10-CM

## 2024-07-29 DIAGNOSIS — C90.01 MULTIPLE MYELOMA IN REMISSION: ICD-10-CM

## 2024-07-29 DIAGNOSIS — Z99.2 ESRD (END STAGE RENAL DISEASE) ON DIALYSIS: ICD-10-CM

## 2024-07-29 DIAGNOSIS — E44.0 MODERATE PROTEIN-CALORIE MALNUTRITION: ICD-10-CM

## 2024-07-29 DIAGNOSIS — N25.81 SECONDARY HYPERPARATHYROIDISM OF RENAL ORIGIN: ICD-10-CM

## 2024-07-29 DIAGNOSIS — G95.9 CERVICAL MYELOPATHY: ICD-10-CM

## 2024-07-29 DIAGNOSIS — I12.9 RENAL HYPERTENSION: ICD-10-CM

## 2024-07-29 DIAGNOSIS — F33.2 SEVERE EPISODE OF RECURRENT MAJOR DEPRESSIVE DISORDER, WITHOUT PSYCHOTIC FEATURES: ICD-10-CM

## 2024-07-29 DIAGNOSIS — M65.331 TRIGGER MIDDLE FINGER OF RIGHT HAND: ICD-10-CM

## 2024-07-29 DIAGNOSIS — N18.5 CKD (CHRONIC KIDNEY DISEASE) STAGE 5, GFR LESS THAN 15 ML/MIN: ICD-10-CM

## 2024-07-29 DIAGNOSIS — N18.6 ESRD (END STAGE RENAL DISEASE) ON DIALYSIS: ICD-10-CM

## 2024-07-29 DIAGNOSIS — F41.1 GAD (GENERALIZED ANXIETY DISORDER): Primary | ICD-10-CM

## 2024-07-29 PROCEDURE — 99213 OFFICE O/P EST LOW 20 MIN: CPT | Mod: PBBFAC,PO | Performed by: FAMILY MEDICINE

## 2024-07-29 PROCEDURE — 99999 PR PBB SHADOW E&M-EST. PATIENT-LVL III: CPT | Mod: PBBFAC,,, | Performed by: FAMILY MEDICINE

## 2024-07-29 RX ORDER — HYDROXYZINE HYDROCHLORIDE 25 MG/1
25 TABLET, FILM COATED ORAL 4 TIMES DAILY PRN
Qty: 60 TABLET | Refills: 3 | Status: SHIPPED | OUTPATIENT
Start: 2024-07-29

## 2024-07-29 NOTE — PROGRESS NOTES
Subjective     Patient ID: Jennifer Booth is a 59 y.o. female.    Chief Complaint: Follow-up    60 yo F with PMH significant for Multiple Myeloma in remission, ESRD on HD, AoCKD, HTN, GERD, MDD/Anxiety,  Constipation, and tobacco use, presents today for her follow up. C/o right pain in fingers and they are stuck sometimes/also need refill of atarax. No trauma or fall.        Chronic Diseases  Multiple Myeloma in remission: Followed by [facility]; last visit 5/13/19. Shecompleted bortezomib/dexamethasone/Revlimid 6 cycles on 04/13/2012 for the multiple myeloma kappa light chain disease, IPSS stage III. She underwent bortezomib maintenance therapy three weeks on, one week off (05/15, 05/22 and 05/29) with her last cycle received on 05/29/2012. She was followed at [facility] and was diagnosed with DRESS syndrome during hospitalization and then [facility] team elected not to proceed with auto SCT. Velcade maintenance was discontinued due to side effects. MRI T and L spine 6/29/2018 - no evidence of myeloma involvement  SPEP 5/7/2019- no monoclonal peaks. Has upcoming appt on 8/14/19    ESRD on HD:  She is followed by a nephrologist at [facility].  Receives hemodialysis on Tuesdays/Thursdays/Saturdays. Iinitially diagnosed with advanced kidney disease secondary to multiple myeloma & HTN.  She was diagnosed with NANETTE from MM in 2010 that required initiation of dialysis.  She started chronic dialysis on 12/23/11 and ended on 8/28/12. She then underwent chemotherapy for her myeloma, and stopped dialysis in 2013.  Kidney biopsy performed 2017due to worsening  Kidney--revealed glomerulosclerosis and no evidence of MM. Her most recent visit with renal transplant at [facility] was 6/14/19. Patient met selection criteria for kidney transplant related to ESRD due to Hypertensive Nephrosclerosis. During most recent visit, she was placed on inactive status as she was deemed a high risk candidate for kidney transplant due  to lack of caregiver support and financial hardship. Plans for caregivers to be her daughter (currently in third trimester of pregnancy) and her daughter's fiance. Pt is required to attend appt with caregiver.Her next appt with transplant is scheduled for 5/19/2020. She plans to f/u with her daughter prior to this time.     CKD:  Followed by [facility].  Receives Procrit infusions during HD.     HTN: controlled. Adherent with Coreg 12.5 mg BID    She continues to smoke cigarettes. Contemplative stage of quitting. Referred to tobacco cessation at previous visit, but has not scheduled appointment.     Follow-up  Associated symptoms include arthralgias, congestion, headaches and myalgias. Pertinent negatives include no chest pain, diaphoresis, nausea or sore throat.     Review of Systems   Constitutional: Negative.  Negative for activity change, diaphoresis and unexpected weight change.   HENT:  Positive for nasal congestion, rhinorrhea and sinus pressure/congestion. Negative for ear discharge, hearing loss, sore throat and voice change.    Eyes: Negative.  Negative for pain, discharge and visual disturbance.   Respiratory: Negative.  Negative for chest tightness, shortness of breath and wheezing.    Cardiovascular: Negative.  Negative for chest pain.   Gastrointestinal: Negative.  Negative for abdominal distention, anal bleeding, constipation and nausea.   Endocrine: Negative.  Negative for cold intolerance, polydipsia and polyuria.   Genitourinary: Negative.  Negative for decreased urine volume, difficulty urinating, dysuria, frequency, menstrual problem and vaginal pain.   Musculoskeletal:  Positive for arthralgias and myalgias. Negative for gait problem.   Integumentary:  Negative for color change, pallor and wound. Negative.   Allergic/Immunologic: Negative.  Negative for environmental allergies and immunocompromised state.   Neurological:  Positive for headaches. Negative for dizziness, tremors,  seizures and speech difficulty.   Hematological: Negative.  Negative for adenopathy. Does not bruise/bleed easily.   Psychiatric/Behavioral: Negative.  Negative for agitation, confusion, decreased concentration, hallucinations, self-injury and suicidal ideas. The patient is not nervous/anxious.      Past Medical History:   Diagnosis Date    Addiction to drug     Alcohol abuse     Allergic drug reaction 2017    Anemia     Anxiety     Arm pain, left 2014    Breast cyst     Chronic renal failure 2014    CKD (chronic kidney disease) stage 5, GFR less than 15 ml/min     COPD exacerbation 2023    Depression     Dialysis patient     dialysis m-w-[id number]    Encounter for blood transfusion     GERD (gastroesophageal reflux disease)     Hx of psychiatric care     Hypertension     Mood swings     Multiple myeloma in remission 10/26/2012    per Hem/Oc note    Multiple thyroid nodules 10/22/2023    Psychiatric exam requested by authority     Psychiatric problem     Renal hypertension     Status post dialysis 2012    Therapy     Thrombocytopenia 2012       Past Surgical History:   Procedure Laterality Date    AV FISTULA PLACEMENT Left     BREAST CYST ASPIRATION Left     BREAST CYST EXCISION Left     CERVICAL SPINE SURGERY Bilateral      SECTION      x1    COLONOSCOPY N/A 2022    Procedure: COLONOSCOPY;  Surgeon: Jordan Mcguire MD;  Location: [facility] ENDO;  Service: Endoscopy;  Laterality: N/A;    FISTULOGRAM N/A 2020    Procedure: Fistulogram;  Surgeon: Rahat Berger MD;  Location: [facility] CATH LAB/EP;  Service: Cardiology;  Laterality: N/A;    FISTULOGRAM Left 3/20/2024    Procedure: Fistulogram LEFT FOREARM;  Surgeon: Matheus Galaviz MD;  Location: [facility] CATH LAB;  Service: Nephrology;  Laterality: Left;    FISTULOGRAM Left 6/3/2024    Procedure: Fistulogram;  Surgeon: Matheus Galaviz MD;  Location: [facility] CATH LAB;  Service: Nephrology;  Laterality: Left;    INSERTION OF CATHETER  FOR HEMODIALYSIS N/A 5/14/2024    Procedure: INSERTION, CATHETER, HEMODIALYSIS SINGLE LUMEN;  Surgeon: Mukesh Gonsales Jr., MD;  Location: [facility] OR;  Service: General;  Laterality: N/A;    pd catheter      PERCUTANEOUS TRANSLUMINAL ANGIOPLASTY OF ARTERIOVENOUS FISTULA N/A 06/09/2020    Procedure: PTA, AV FISTULA;  Surgeon: Rahat Berger MD;  Location: [facility] CATH LAB/EP;  Service: Cardiology;  Laterality: N/A;    PERITONEAL CATHETER REMOVAL N/A 11/30/2018    Procedure: REMOVAL, CATHETER, DIALYSIS, PERITONEAL;  Surgeon: Mukesh Gonsales Jr., MD;  Location: [facility] OR;  Service: General;  Laterality: N/A;    RENAL BIOPSY  2016    THROMBECTOMY OF HEMODIALYSIS ACCESS SITE N/A 06/09/2020    Procedure: Thrombectomy, Hemodialysis Graft Or Fistula;  Surgeon: Rahat Berger MD;  Location: [facility] CATH LAB/EP;  Service: Cardiology;  Laterality: N/A;    THROMBECTOMY OF HEMODIALYSIS ACCESS SITE Left 5/14/2024    Procedure: THROMBECTOMY, HEMODIALYSIS GRAFT OR FISTULA / left lower AV/FISTULA;  Surgeon: Matheus Galaviz MD;  Location: [facility] CATH LAB;  Service: Nephrology;  Laterality: Left;    VASCULAR SURGERY  08/2012    dialysis catheter to right subclavian       Family History   Problem Relation Name Age of Onset    Hypertension Mother      Heart failure Mother      Ovarian cancer Maternal Aunt      Diabetes Maternal Grandmother      Stroke Maternal Grandmother      Breast cancer Neg Hx      Colon cancer Neg Hx         Social History     Socioeconomic History    Marital status: Single    Number of children: 1   Occupational History    Occupation: Appeals    Tobacco Use    Smoking status: Every Day     Current packs/day: 1.00     Average packs/day: 1 pack/day for 41.6 years (41.6 ttl pk-yrs)     Types: Cigarettes     Start date: 1983     Passive exposure: Never    Smokeless tobacco: Never    Tobacco comments:     Pt. states recently cut down to 0.5 pk/day. Enrolled in the [employer] on 6/9/20 ([facility] Member ID # [id number]). She declines  Ambulatory referral to Smoking Cessation program. Handout provided.   Substance and Sexual Activity    Alcohol use: Not Currently     Comment: occasional    Drug use: Not Currently     Types: Marijuana    Sexual activity: Not Currently     Partners: Male   Social History Narrative    Works FT; likes theatre. Lives alone.     Social Determinants of Health     Financial Resource Strain: Low Risk  (10/1/2023)    Overall Financial Resource Strain (CARDIA)     Difficulty of Paying Living Expenses: Not very hard   Food Insecurity: No Food Insecurity (10/1/2023)    Hunger Vital Sign     Worried About Running Out of Food in the Last Year: Never true     Ran Out of Food in the Last Year: Never true   Transportation Needs: No Transportation Needs (10/1/2023)    PRAPARE - Transportation     Lack of Transportation (Medical): No     Lack of Transportation (Non-Medical): No   Physical Activity: Insufficiently Active (10/1/2023)    Exercise Vital Sign     Days of Exercise per Week: 4 days     Minutes of Exercise per Session: 20 min   Stress: Stress Concern Present (10/1/2023)    [employer] [place] - Occupational Stress Questionnaire     Feeling of Stress : To some extent   Housing Stability: Low Risk  (10/1/2023)    Housing Stability Vital Sign     Unable to Pay for Housing in the Last Year: No     Number of Places Lived in the Last Year: 1     Unstable Housing in the Last Year: No       Current Outpatient Medications   Medication Sig Dispense Refill    acetaminophen (TYLENOL) 325 MG tablet Take 650 mg by mouth every 4 (four) hours as needed.      albuterol (VENTOLIN HFA) 90 mcg/actuation inhaler Inhale 2 puffs into the lungs every 6 (six) hours as needed for Wheezing or Shortness of Breath. Rescue 18 g 6    B COMPLEX W-C NO.20/FOLIC ACID (VIRT-CAPS ORAL) Take by mouth once daily. (Patient not taking: Reported on 6/19/2024)      buPROPion (WELLBUTRIN XL) 150 MG TB24 tablet TAKE 1 TABLET(150 MG) BY MOUTH EVERY  DAY 90 tablet 3    cetirizine (ZYRTEC) 5 MG tablet Take 1 tablet (5 mg total) by mouth once daily. 30 tablet 2    cinacalcet (SENSIPAR) 30 MG Tab Take by mouth daily with breakfast.      clopidogreL (PLAVIX) 75 mg tablet TAKE 1 TABLET(75 MG) BY MOUTH EVERY DAY 90 tablet 0    cyproheptadine (PERIACTIN) 4 mg tablet Take 1 tablet (4 mg total) by mouth 3 (three) times daily as needed (appetite). 90 tablet 0    EScitalopram oxalate (LEXAPRO) 20 MG tablet Take 1 tablet (20 mg total) by mouth once daily. 90 tablet 3    fluticasone propionate (FLONASE) 50 mcg/actuation nasal spray 1 spray by Each Nostril route daily as needed.      fluticasone-salmeterol diskus inhaler 100-50 mcg Inhale 1 puff into the lungs 2 (two) times daily. Controller 60 each 2    hydrOXYzine HCL (ATARAX) 25 MG tablet Take 1 tablet (25 mg total) by mouth 4 (four) times daily as needed for Itching. 60 tablet 3    melatonin 5 mg Cap Take 5 mg by mouth nightly.      metoprolol succinate (TOPROL-XL) 25 MG 24 hr tablet Take 1 tablet (25 mg total) by mouth once daily. 90 tablet 3    mirtazapine (REMERON) 15 MG tablet Take 1 tablet (15 mg total) by mouth every evening. 90 tablet 1    montelukast (SINGULAIR) 10 mg tablet TAKE 1 TABLET(10 MG) BY MOUTH EVERY EVENING 90 tablet 3    naltrexone (DEPADE) 50 mg tablet Start with 1/2 tab (25 mg) by mouth daily, may increase to 1 tab (50 mg) daily if able. (Patient not taking: Reported on 6/19/2024) 90 tablet 1    sacubitriL-valsartan (ENTRESTO)  mg per tablet Take 1 tablet by mouth 2 (two) times daily. 180 tablet 3    sevelamer carbonate (RENVELA) 800 mg Tab Take 800 mg by mouth 3 (three) times daily with meals.      sucroferric oxyhydroxide (VELPHORO) 500 mg Chew Take 1,000 mg by mouth 3 (three) times daily.      tiotropium bromide (SPIRIVA RESPIMAT) 2.5 mcg/actuation inhaler Inhale 2 puffs into the lungs once daily. Controller 4 g 2    traMADoL (ULTRAM) 50 mg tablet Take 50 mg by mouth every 6 (six) hours as  needed.       No current facility-administered medications for this visit.     Facility-Administered Medications Ordered in Other Visits   Medication Dose Route Frequency Provider Last Rate Last Admin    0.9%  NaCl infusion   Intravenous Continuous Jordan Mcguire MD 20 mL/hr at 07/18/22 0943 New Bag at 07/18/22 0943    0.9%  NaCl infusion   Intravenous Continuous Jordan Mcguire MD   Stopped at 08/18/22 1042    sodium chloride 0.9% flush 10 mL  10 mL Intravenous PRN Jordan Mcguire MD        sodium chloride 0.9% flush 10 mL  10 mL Intravenous PRN Jordan Mcguire MD           Review of patient's allergies indicates:   Allergen Reactions    Doxycycline Swelling, Rash and Hives    Gentamicin Anaphylaxis    Vancomycin analogues Anaphylaxis          Objective   Vitals:    07/29/24 0833   BP: 139/89   Pulse: 76   Temp: 98 °F (36.7 °C)       Physical Exam  Constitutional:       General: She is not in acute distress.     Appearance: She is well-developed. She is not diaphoretic.   HENT:      Head: Normocephalic and atraumatic.      Right Ear: External ear normal.      Left Ear: External ear normal.      Nose: Nose normal.      Mouth/Throat:      Pharynx: No oropharyngeal exudate.   Eyes:      General: No scleral icterus.        Right eye: No discharge.         Left eye: No discharge.      Conjunctiva/sclera: Conjunctivae normal.      Pupils: Pupils are equal, round, and reactive to light.   Neck:      Thyroid: No thyromegaly.      Vascular: No JVD.      Trachea: No tracheal deviation.   Cardiovascular:      Rate and Rhythm: Normal rate and regular rhythm.      Heart sounds: Normal heart sounds. No murmur heard.     No friction rub. No gallop.   Pulmonary:      Effort: Pulmonary effort is normal.      Breath sounds: Normal breath sounds. No stridor. No wheezing or rales.   Chest:      Chest wall: No tenderness.   Abdominal:      General: Bowel sounds are normal. There is no distension.      Palpations: Abdomen  is soft. There is no mass.      Tenderness: There is no abdominal tenderness. There is no guarding or rebound.      Hernia: No hernia is present.   Musculoskeletal:         General: Tenderness (mild TTP right two middle fingers and trigger finger+) present. Normal range of motion.      Cervical back: Normal range of motion and neck supple.      Comments: LUE - dialysis fistula   Lymphadenopathy:      Cervical: No cervical adenopathy.   Skin:     General: Skin is warm and dry.      Coloration: Skin is not pale.      Findings: No erythema or rash.   Neurological:      Mental Status: She is alert and oriented to person, place, and time.      Cranial Nerves: No cranial nerve deficit.      Motor: No abnormal muscle tone.      Coordination: Coordination normal.      Deep Tendon Reflexes: Reflexes are normal and symmetric. Reflexes normal.   Psychiatric:         Behavior: Behavior normal.         Thought Content: Thought content normal.         Judgment: Judgment normal.            Assessment and Plan     1. SATNAM (generalized anxiety disorder)  -     hydrOXYzine HCL (ATARAX) 25 MG tablet; Take 1 tablet (25 mg total) by mouth 4 (four) times daily as needed for Itching.  Dispense: 60 tablet; Refill: 3    2. ESRD (end stage renal disease) on dialysis    3. CKD (chronic kidney disease) stage 5, GFR less than 15 ml/min    4. Secondary hyperparathyroidism of renal origin    5. Renal hypertension    6. Multiple myeloma in remission    7. Thrombocytopenia    8. Severe episode of recurrent major depressive disorder, without psychotic features    9. Moderate protein-calorie malnutrition    10. Cervical myelopathy    11. Trigger middle finger of right hand      ESRD on HD  -T/Thu/Sat    SATNAM/depression  -controlled - multiple meds  -atarax prn    Smoking cessation  -1 min counseling done  -she will quit soon     MM  -follows specialist  -stable    Thrombocytopenia  -monitor, stable    Trigger finger  -ice. Aspercreme prn          Spent  adequate time in obtaining history and explaining differentials    30 minutes spent during this visit of which greater than 50% devoted to face-face counseling and coordination of care regarding diagnosis and management plan           Follow up in about 4 months (around 11/22/2024), or if symptoms worsen or fail to improve.

## 2024-07-31 ENCOUNTER — TELEPHONE (OUTPATIENT)
Dept: TRANSPLANT | Facility: CLINIC | Age: 59
End: 2024-07-31
Payer: MEDICARE

## 2024-08-01 ENCOUNTER — TELEPHONE (OUTPATIENT)
Dept: TRANSPLANT | Facility: CLINIC | Age: 59
End: 2024-08-01
Payer: MEDICARE

## 2024-08-01 NOTE — TELEPHONE ENCOUNTER
----- Message from Amos Hernandes Jr., MD sent at 8/1/2024  3:06 PM CDT -----  Looks acceptable.   DS  ----- Message -----  From: Abadie, Michelle, RN  Sent: 7/31/2024   5:24 PM CDT  To: Amos Hernandes Jr., MD    Hi Dr Hernandes,    We have received a referral on the above patient for possible evaluation. Please review CTSCAN to determine eligibility to come to [facility].  Thanks,  Dorothea

## 2024-08-06 ENCOUNTER — TELEPHONE (OUTPATIENT)
Dept: TRANSPLANT | Facility: CLINIC | Age: 59
End: 2024-08-06

## 2024-08-06 ENCOUNTER — CLINICAL SUPPORT (OUTPATIENT)
Dept: PSYCHIATRY | Facility: CLINIC | Age: 59
End: 2024-08-06
Payer: MEDICARE

## 2024-08-06 DIAGNOSIS — F10.21 ALCOHOL USE DISORDER, MODERATE, IN SUSTAINED REMISSION: Primary | ICD-10-CM

## 2024-08-06 PROCEDURE — 90853 GROUP PSYCHOTHERAPY: CPT | Mod: TXP,,, | Performed by: SOCIAL WORKER

## 2024-08-12 PROCEDURE — 84165 PROTEIN E-PHORESIS SERUM: CPT | Mod: TXP | Performed by: INTERNAL MEDICINE

## 2024-08-12 PROCEDURE — 80053 COMPREHEN METABOLIC PANEL: CPT | Mod: TXP | Performed by: INTERNAL MEDICINE

## 2024-08-12 PROCEDURE — 86334 IMMUNOFIX E-PHORESIS SERUM: CPT | Mod: TXP | Performed by: INTERNAL MEDICINE

## 2024-08-12 PROCEDURE — 36415 COLL VENOUS BLD VENIPUNCTURE: CPT | Mod: TXP | Performed by: INTERNAL MEDICINE

## 2024-08-12 PROCEDURE — 85025 COMPLETE CBC W/AUTO DIFF WBC: CPT | Mod: TXP | Performed by: INTERNAL MEDICINE

## 2024-08-12 PROCEDURE — 82784 ASSAY IGA/IGD/IGG/IGM EACH: CPT | Mod: 59,TXP | Performed by: INTERNAL MEDICINE

## 2024-08-12 PROCEDURE — 83521 IG LIGHT CHAINS FREE EACH: CPT | Mod: TXP | Performed by: INTERNAL MEDICINE

## 2024-08-14 ENCOUNTER — TELEPHONE (OUTPATIENT)
Dept: TRANSPLANT | Facility: CLINIC | Age: 59
End: 2024-08-14
Payer: MEDICARE

## 2024-08-20 ENCOUNTER — CLINICAL SUPPORT (OUTPATIENT)
Dept: PSYCHIATRY | Facility: CLINIC | Age: 59
End: 2024-08-20
Payer: MEDICARE

## 2024-08-20 DIAGNOSIS — F10.21 ALCOHOL USE DISORDER, MODERATE, IN SUSTAINED REMISSION: Primary | ICD-10-CM

## 2024-08-20 PROCEDURE — 90853 GROUP PSYCHOTHERAPY: CPT | Mod: TXP,,, | Performed by: SOCIAL WORKER

## 2024-08-21 ENCOUNTER — OFFICE VISIT (OUTPATIENT)
Dept: NEPHROLOGY | Facility: CLINIC | Age: 59
End: 2024-08-21
Payer: MEDICARE

## 2024-08-21 VITALS
SYSTOLIC BLOOD PRESSURE: 125 MMHG | TEMPERATURE: 98 F | WEIGHT: 143.06 LBS | BODY MASS INDEX: 21.68 KG/M2 | RESPIRATION RATE: 12 BRPM | HEIGHT: 68 IN | DIASTOLIC BLOOD PRESSURE: 80 MMHG | OXYGEN SATURATION: 98 % | HEART RATE: 73 BPM

## 2024-08-21 DIAGNOSIS — Z99.2 ESRD ON DIALYSIS: Primary | ICD-10-CM

## 2024-08-21 DIAGNOSIS — N18.6 ESRD ON DIALYSIS: Primary | ICD-10-CM

## 2024-08-21 PROCEDURE — 99999 PR PBB SHADOW E&M-EST. PATIENT-LVL IV: CPT | Mod: PBBFAC,,,

## 2024-08-21 PROCEDURE — 99214 OFFICE O/P EST MOD 30 MIN: CPT | Mod: PBBFAC

## 2024-08-21 NOTE — PROGRESS NOTES
[facility] Interventional [facility]phrology Follow-up Exam    Date:   08/21/2024 2:50 PM    HPI:  59 year old woman with ESKD (TTS at [facility] with Dr. Wolfe) who had recent thrombosed access, underwent successful thrombectomy of her LUE radiocephalic AVF on 5/14/24, but afterwards, dialysis only worked for 40 minutes prior to the machine shutting off due to poor flows.  Dr. Gonsales placed a L IJ TDC during that hospitalization which was being used but now she is using the fistula without any issues, she says they may sometimes lower the blood flow rate on dialysis but she is unsure.  Catheter now out.  Here for follow up exam and ultrasound.    Vitals:    08/21/24 1439   BP: 125/80   Pulse: 73   Resp: 12   Temp: 97.8 °F (36.6 °C)     Exam:   ACCESS:  LUE radiocephalic AV fistula with soft thrill and moderate pulsatility, appropriate pulse augmentation and full collapse with arm elevation.     Ultrasound:  - arterial anastomosis patent without stenosis, measured at 5 mm  - mid-AVF patent with no stenosis seen, vessel diameter measured at 6-7 mm throughout  - AVF outflow tract patent with no stenosis as far up as the antecubital region, unable to assess beyond this  - two collateral vessels, one distal to the cannulation area they have been using, one proximal to the cannulation area  - BFV approximately 1400 ml/min    Impression/Plan:  Patent AV fistula following recent declot.  We performed a fistulogram after her declot that warranted no interventions, although it was difficult to visualize her arterial anastomosis due to the significant calcifications in the juxta-anastomosis region.  Will have her return here in 2 months to further evaluate her access with ultrasound and exam so as to maintain patency of her recently clotted fistula.  Please do refer her back if there are signs of stenosis (prolonged bleeding, having to lower the blood flow rate, downtrending adequacy, etc).        Matheus Galaviz MD  [phone]

## 2024-08-23 DIAGNOSIS — N18.6 END STAGE RENAL DISEASE: Primary | ICD-10-CM

## 2024-08-23 DIAGNOSIS — Z76.82 ORGAN TRANSPLANT CANDIDATE: ICD-10-CM

## 2024-08-26 ENCOUNTER — TELEPHONE (OUTPATIENT)
Dept: TRANSPLANT | Facility: CLINIC | Age: 59
End: 2024-08-26
Payer: MEDICARE

## 2024-08-26 DIAGNOSIS — Z76.82 ORGAN TRANSPLANT CANDIDATE: Primary | ICD-10-CM

## 2024-08-26 DIAGNOSIS — Z91.89 CARDIOVASCULAR EVENT RISK: ICD-10-CM

## 2024-08-26 DIAGNOSIS — N18.6 END STAGE RENAL DISEASE: ICD-10-CM

## 2024-08-27 ENCOUNTER — CLINICAL SUPPORT (OUTPATIENT)
Dept: PSYCHIATRY | Facility: CLINIC | Age: 59
End: 2024-08-27
Payer: MEDICARE

## 2024-08-27 DIAGNOSIS — F10.21 ALCOHOL USE DISORDER, MODERATE, IN SUSTAINED REMISSION: Primary | ICD-10-CM

## 2024-08-27 PROCEDURE — 90853 GROUP PSYCHOTHERAPY: CPT | Mod: TXP,,, | Performed by: SOCIAL WORKER

## 2024-09-03 ENCOUNTER — OFFICE VISIT (OUTPATIENT)
Dept: HEMATOLOGY/ONCOLOGY | Facility: CLINIC | Age: 59
End: 2024-09-03
Payer: MEDICARE

## 2024-09-03 VITALS
DIASTOLIC BLOOD PRESSURE: 63 MMHG | HEART RATE: 78 BPM | OXYGEN SATURATION: 95 % | BODY MASS INDEX: 20.72 KG/M2 | SYSTOLIC BLOOD PRESSURE: 96 MMHG | HEIGHT: 68 IN | WEIGHT: 136.69 LBS

## 2024-09-03 DIAGNOSIS — Z99.2 ANEMIA IN CHRONIC KIDNEY DISEASE, ON CHRONIC DIALYSIS: ICD-10-CM

## 2024-09-03 DIAGNOSIS — N28.1 BILATERAL RENAL CYSTS: ICD-10-CM

## 2024-09-03 DIAGNOSIS — N18.6 ANEMIA IN CHRONIC KIDNEY DISEASE, ON CHRONIC DIALYSIS: ICD-10-CM

## 2024-09-03 DIAGNOSIS — N18.6 ESRD (END STAGE RENAL DISEASE) ON DIALYSIS: ICD-10-CM

## 2024-09-03 DIAGNOSIS — C90.00 MULTIPLE MYELOMA, REMISSION STATUS UNSPECIFIED: Primary | ICD-10-CM

## 2024-09-03 DIAGNOSIS — D63.1 ANEMIA IN CHRONIC KIDNEY DISEASE, ON CHRONIC DIALYSIS: ICD-10-CM

## 2024-09-03 DIAGNOSIS — E04.2 MULTIPLE THYROID NODULES: ICD-10-CM

## 2024-09-03 DIAGNOSIS — Z99.2 ESRD (END STAGE RENAL DISEASE) ON DIALYSIS: ICD-10-CM

## 2024-09-03 PROCEDURE — 99214 OFFICE O/P EST MOD 30 MIN: CPT | Mod: S$PBB,,, | Performed by: INTERNAL MEDICINE

## 2024-09-03 PROCEDURE — 99213 OFFICE O/P EST LOW 20 MIN: CPT | Mod: PBBFAC | Performed by: INTERNAL MEDICINE

## 2024-09-03 PROCEDURE — 99999 PR PBB SHADOW E&M-EST. PATIENT-LVL III: CPT | Mod: PBBFAC,,, | Performed by: INTERNAL MEDICINE
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
## 2024-09-03 NOTE — PROGRESS NOTES
Group Psychotherapy    Site: [facility]    Clinical status of patient: Outpatient    8/20/2024    Length of service:[id number]    Referred by: Addictive Behavior Unit     Number of patients in attendance: 7    Target symptoms: alcohol abuse, substance abuse    Patient's response to intervention:  The patient's response to intervention is active listening, self-disclosure.    Progress toward goals and other mental status changes:  The patient's progress toward goals is good.    Interval history: Pt presents a few minutes late to scheduled group- coming from dialysis. Pt shares she is still waiting to hear back from transplant. Group supportive and encouraged her to advocate for herself. Pt actively involved in group discussion.     Diagnosis: Alcohol use disorder, in early remission; Cannabis Use Disorder, in early remission     Plan: group psychotherapy    Return to clinic: 1 week

## 2024-09-09 NOTE — PROGRESS NOTES
Group Psychotherapy    Site: [facility]    Clinical status of patient: Outpatient    8/27/2024    Length of service:[id number]    Referred by: Addictive Behavior Unit     Number of patients in attendance: 4    Target symptoms: alcohol abuse, substance abuse    Patient's response to intervention:  The patient's response to intervention is active listening, self-disclosure.    Progress toward goals and other mental status changes:  The patient's progress toward goals is good.    Interval history: Pt presents on time to scheduled group- coming from dialysis. Shares with group that she will be re-evaluated for potential transplant. Group celebrated with her, discussed next steps on her health journey.  Pt actively involved in group discussion.     Diagnosis: Alcohol use disorder, in early remission; Cannabis Use Disorder, in early remission     Plan: group psychotherapy    Return to clinic: 1 week

## 2024-09-15 DIAGNOSIS — E04.2 MULTIPLE THYROID NODULES: Primary | ICD-10-CM

## 2024-09-23 ENCOUNTER — TELEPHONE (OUTPATIENT)
Dept: TRANSPLANT | Facility: CLINIC | Age: 59
End: 2024-09-23
Payer: MEDICARE

## 2024-09-23 NOTE — TELEPHONE ENCOUNTER
MA notes per Adherence form     FOR THE PAST THREE MONTHS:    0-AMA's  0-No-shows    No concerns with care giving, transportation, or mental health    Scanned in pt's media    Yenifer Braden  Abdominal Transplant MA

## 2024-09-25 ENCOUNTER — HOSPITAL ENCOUNTER (OUTPATIENT)
Dept: RADIOLOGY | Facility: HOSPITAL | Age: 59
Discharge: HOME | End: 2024-09-25
Attending: NURSE PRACTITIONER
Payer: MEDICARE

## 2024-09-25 ENCOUNTER — OFFICE VISIT (OUTPATIENT)
Dept: TRANSPLANT | Facility: CLINIC | Age: 59
End: 2024-09-25
Payer: MEDICARE

## 2024-09-25 ENCOUNTER — TELEPHONE (OUTPATIENT)
Dept: TRANSPLANT | Facility: CLINIC | Age: 59
End: 2024-09-25
Payer: MEDICARE

## 2024-09-25 VITALS
WEIGHT: 139.75 LBS | TEMPERATURE: 97 F | SYSTOLIC BLOOD PRESSURE: 132 MMHG | BODY MASS INDEX: 22.46 KG/M2 | HEART RATE: 73 BPM | HEIGHT: 66 IN | DIASTOLIC BLOOD PRESSURE: 82 MMHG | OXYGEN SATURATION: 100 % | RESPIRATION RATE: 18 BRPM

## 2024-09-25 DIAGNOSIS — Z76.82 ORGAN TRANSPLANT CANDIDATE: ICD-10-CM

## 2024-09-25 DIAGNOSIS — I10 PRIMARY HYPERTENSION: ICD-10-CM

## 2024-09-25 DIAGNOSIS — N18.6 ESRD ON HEMODIALYSIS: ICD-10-CM

## 2024-09-25 DIAGNOSIS — Z99.2 ESRD ON HEMODIALYSIS: ICD-10-CM

## 2024-09-25 DIAGNOSIS — C90.01 MULTIPLE MYELOMA IN REMISSION: ICD-10-CM

## 2024-09-25 DIAGNOSIS — F10.20 ALCOHOL USE DISORDER, SEVERE, DEPENDENCE: Chronic | ICD-10-CM

## 2024-09-25 DIAGNOSIS — Z01.818 PRE-TRANSPLANT EVALUATION FOR KIDNEY TRANSPLANT: Primary | ICD-10-CM

## 2024-09-25 PROBLEM — S69.91XA INJURY OF RIGHT THUMB: Status: RESOLVED | Noted: 2021-12-16 | Resolved: 2024-09-25

## 2024-09-25 PROBLEM — J30.9 ALLERGIC RHINITIS: Status: RESOLVED | Noted: 2019-07-03 | Resolved: 2024-09-25

## 2024-09-25 PROBLEM — K86.1 CHRONIC PANCREATITIS: Status: RESOLVED | Noted: 2019-07-01 | Resolved: 2024-09-25

## 2024-09-25 PROBLEM — T82.590A DIALYSIS AV FISTULA MALFUNCTION, INITIAL ENCOUNTER: Status: RESOLVED | Noted: 2020-06-09 | Resolved: 2024-09-25

## 2024-09-25 PROBLEM — K52.9 CHRONIC DIARRHEA: Status: RESOLVED | Noted: 2018-09-13 | Resolved: 2024-09-25

## 2024-09-25 PROBLEM — J06.9 URI, ACUTE: Status: RESOLVED | Noted: 2023-09-16 | Resolved: 2024-09-25

## 2024-09-25 PROBLEM — J44.1 COPD EXACERBATION: Status: RESOLVED | Noted: 2023-09-16 | Resolved: 2024-09-25

## 2024-09-25 PROBLEM — K63.5 POLYP OF COLON: Status: RESOLVED | Noted: 2022-08-28 | Resolved: 2024-09-25

## 2024-09-25 PROBLEM — M85.80 OSTEOPENIA: Status: RESOLVED | Noted: 2021-03-08 | Resolved: 2024-09-25

## 2024-09-25 PROBLEM — S92.414D CLOSED NONDISPLACED FRACTURE OF PROXIMAL PHALANX OF RIGHT GREAT TOE WITH ROUTINE HEALING: Status: RESOLVED | Noted: 2023-09-14 | Resolved: 2024-09-25

## 2024-09-25 PROBLEM — Z91.158 NON-COMPLIANCE WITH RENAL DIALYSIS: Status: RESOLVED | Noted: 2019-07-01 | Resolved: 2024-09-25

## 2024-09-25 PROBLEM — R93.89 ABNORMAL CT OF THE CHEST: Status: RESOLVED | Noted: 2023-10-19 | Resolved: 2024-09-25

## 2024-09-25 PROBLEM — D69.6 THROMBOCYTOPENIA: Status: RESOLVED | Noted: 2024-05-22 | Resolved: 2024-09-25

## 2024-09-25 PROBLEM — R01.1 MURMUR, CARDIAC: Status: RESOLVED | Noted: 2020-11-11 | Resolved: 2024-09-25

## 2024-09-25 PROCEDURE — 71046 X-RAY EXAM CHEST 2 VIEWS: CPT | Mod: TC,TXP

## 2024-09-25 PROCEDURE — 93978 VASCULAR STUDY: CPT | Mod: TC,TXP

## 2024-09-25 PROCEDURE — 72192 CT PELVIS W/O DYE: CPT | Mod: 26,TXP,, | Performed by: RADIOLOGY

## 2024-09-25 PROCEDURE — 71046 X-RAY EXAM CHEST 2 VIEWS: CPT | Mod: 26,TXP,, | Performed by: RADIOLOGY

## 2024-09-25 PROCEDURE — 72192 CT PELVIS W/O DYE: CPT | Mod: TC,TXP

## 2024-09-25 PROCEDURE — 76770 US EXAM ABDO BACK WALL COMP: CPT | Mod: TC,TXP

## 2024-09-25 PROCEDURE — 93978 VASCULAR STUDY: CPT | Mod: 26,TXP,, | Performed by: INTERNAL MEDICINE

## 2024-09-25 PROCEDURE — 99204 OFFICE O/P NEW MOD 45 MIN: CPT | Mod: S$PBB,TXP,, | Performed by: PHYSICIAN ASSISTANT

## 2024-09-25 PROCEDURE — 76770 US EXAM ABDO BACK WALL COMP: CPT | Mod: 26,TXP,, | Performed by: INTERNAL MEDICINE

## 2024-09-25 PROCEDURE — 99215 OFFICE O/P EST HI 40 MIN: CPT | Mod: PBBFAC,25,TXP | Performed by: NURSE PRACTITIONER

## 2024-09-25 PROCEDURE — 99999 PR PBB SHADOW E&M-EST. PATIENT-LVL V: CPT | Mod: PBBFAC,TXP,, | Performed by: NURSE PRACTITIONER

## 2024-09-25 PROCEDURE — 99204 OFFICE O/P NEW MOD 45 MIN: CPT | Mod: S$PBB,TXP,, | Performed by: TRANSPLANT SURGERY

## 2024-09-25 RX ORDER — ISOSORBIDE MONONITRATE 60 MG/1
60 TABLET, EXTENDED RELEASE ORAL DAILY
COMMUNITY
Start: 2024-07-19

## 2024-09-25 RX ORDER — HYDRALAZINE HYDROCHLORIDE 25 MG/1
25 TABLET, FILM COATED ORAL DAILY PRN
COMMUNITY
Start: 2024-07-23

## 2024-09-25 RX ORDER — OMEPRAZOLE 40 MG/1
40 CAPSULE, DELAYED RELEASE ORAL DAILY
COMMUNITY
Start: 2024-04-20

## 2024-09-25 NOTE — PROGRESS NOTES
Transplant Nephrology  Kidney Transplant Recipient Evaluation    Referring Physician: Palak Galvez  Current Nephrologist: Palak Galvez    Subjective:   CC:  Initial evaluation of kidney transplant candidacy.    HPI:  Ms. Booth is a 59 y.o. year old Black or  female who has presented to be evaluated as a potential kidney transplant recipient.  She has ESRD secondary to HTN and multiple myeloma .  Patient is currently on hemodialysis started on 12/23/2011. Patient is dialyzing on TTS schedule.  Patient reports that she is tolerating dialysis well.. She has a LUE AV fistula for dialysis access.     Previously listed for transplant but removed in January 2023: Not approved for LRD/CAD transplant due to failure to follow up for aftercare post drug and alcohol rehab resulting in relapse. Patient will be removed from the wait list. Can be referred in the future once she  completes the program again as recommended by psych and maintains sobriety for ONE YEAR with psych clearance.     She completed additional rehabilitation last year and reports that she remains alcohol and marijuana free. She also has completely quit smoking. She attends weekly therapy group every Tuesday.       Diagnosed with kappa free light chain disease, multiple myeloma IPSS with deletion 13, t4:14 in 12/2011. Follows with heme onc Dr. Shane. LOV 9/3/2024 with no evidence of relapse. Had PET done in March with no new hypermetabolic lesion to suggest active multiple myeloma.     Last year EF depressed 35-40%. Follows with cardiology and on entresto. Last echo with improved EF 50-55%. She is currently on plavix for AV fistula patency.     She is now retired. She lives with her daughter and assists with caring for her 4 year old granddaughter. Uses no assistive devices. Looks good, not frail. Daughter may consider donation in the future.    Current Outpatient Medications   Medication Sig Dispense Refill    acetaminophen (TYLENOL) 325  MG tablet Take 650 mg by mouth every 4 (four) hours as needed.      buPROPion (WELLBUTRIN XL) 150 MG TB24 tablet TAKE 1 TABLET(150 MG) BY MOUTH EVERY DAY (Patient taking differently: Take 150 mg by mouth once daily.) 90 tablet 3    cinacalcet (SENSIPAR) 30 MG Tab Take 90 mg by mouth once daily.      clopidogreL (PLAVIX) 75 mg tablet TAKE 1 TABLET(75 MG) BY MOUTH EVERY DAY (Patient taking differently: Take 75 mg by mouth once daily.) 90 tablet 0    EScitalopram oxalate (LEXAPRO) 20 MG tablet Take 1 tablet (20 mg total) by mouth once daily. 90 tablet 3    fluticasone propionate (FLONASE) 50 mcg/actuation nasal spray 1 spray by Each Nostril route daily as needed.      hydrALAZINE (APRESOLINE) 25 MG tablet Take 25 mg by mouth daily as needed.      hydrOXYzine HCL (ATARAX) 25 MG tablet Take 1 tablet (25 mg total) by mouth 4 (four) times daily as needed for Itching. 60 tablet 3    isosorbide mononitrate (IMDUR) 60 MG 24 hr tablet Take 60 mg by mouth once daily.      metoprolol succinate (TOPROL-XL) 25 MG 24 hr tablet Take 1 tablet (25 mg total) by mouth once daily. 90 tablet 3    mirtazapine (REMERON) 15 MG tablet Take 1 tablet (15 mg total) by mouth every evening. 90 tablet 1    montelukast (SINGULAIR) 10 mg tablet TAKE 1 TABLET(10 MG) BY MOUTH EVERY EVENING (Patient taking differently: Take 10 mg by mouth every evening.) 90 tablet 3    omeprazole (PRILOSEC) 40 MG capsule Take 40 mg by mouth once daily.      sacubitriL-valsartan (ENTRESTO)  mg per tablet Take 1 tablet by mouth 2 (two) times daily. (Patient taking differently: Take 1 tablet by mouth once daily.) 180 tablet 3    sevelamer carbonate (RENVELA) 800 mg Tab Take 800 mg by mouth 3 (three) times daily with meals.      traMADoL (ULTRAM) 50 mg tablet Take 50 mg by mouth every 6 (six) hours as needed.      sucroferric oxyhydroxide (VELPHORO) 500 mg Chew Take 1,000 mg by mouth 3 (three) times daily. (Patient not taking: Reported on 9/25/2024)       No current  facility-administered medications for this visit.     Facility-Administered Medications Ordered in Other Visits   Medication Dose Route Frequency Provider Last Rate Last Admin    0.9%  NaCl infusion   Intravenous Continuous Jordan Mcguire MD 20 mL/hr at 07/18/22 0943 New Bag at 07/18/22 0943    0.9%  NaCl infusion   Intravenous Continuous Jordan Mcguire MD   Stopped at 08/18/22 1042    sodium chloride 0.9% flush 10 mL  10 mL Intravenous PRN Jordan Mcguire MD        sodium chloride 0.9% flush 10 mL  10 mL Intravenous PRN Jordan Mcguire MD           Past Medical History:   Diagnosis Date    Addiction to drug     Alcohol abuse     Allergic drug reaction 11/13/2017    Anemia     Anxiety     Arm pain, left 02/12/2014    Breast cyst     Chronic renal failure 02/12/2014    CKD (chronic kidney disease) stage 5, GFR less than 15 ml/min     COPD exacerbation 09/16/2023    Depression     Dialysis patient     dialysis [id number]    Encounter for blood transfusion     GERD (gastroesophageal reflux disease)     Hx of psychiatric care     Hypertension     Mood swings     Multiple myeloma in remission 10/26/2012    per Hem/Oc note    Multiple thyroid nodules 10/22/2023    Psychiatric exam requested by authority     Psychiatric problem     Renal hypertension     Status post dialysis 08/2012    Therapy     Thrombocytopenia 08/02/2012       Past Medical and Surgical History: Ms. Booth  has a past medical history of Addiction to drug, Alcohol abuse, Allergic drug reaction, Anemia, Anxiety, Arm pain, left, Breast cyst, Chronic renal failure, CKD (chronic kidney disease) stage 5, GFR less than 15 ml/min, COPD exacerbation, Depression, Dialysis patient, Encounter for blood transfusion, GERD (gastroesophageal reflux disease), psychiatric care, Hypertension, Mood swings, Multiple myeloma in remission, Multiple thyroid nodules, Psychiatric exam requested by authority, Psychiatric problem, Renal hypertension, Status post dialysis,  Therapy, and Thrombocytopenia.  She has a past surgical history that includes Vascular surgery (2012); AV fistula placement (Left, ); Renal biopsy (); Breast cyst aspiration (Left, ); Breast cyst excision (Left, );  section; pd catheter; Peritoneal catheter removal (N/A, 2018); Fistulogram (N/A, 2020); Percutaneous transluminal angioplasty of arteriovenous fistula (N/A, 2020); Thrombectomy of hemodialysis access site (N/A, 2020); Cervical spine surgery (Bilateral); Colonoscopy (N/A, 2022); Fistulogram (Left, 2024); Insertion of catheter for hemodialysis (N/A, 2024); Thrombectomy of hemodialysis access site (Left, 2024); and Fistulogram (Left, 2024).    Past Social and Family History: Ms. Booth reports that she has quit smoking. She has never used smokeless tobacco. She reports that she does not currently use alcohol. She reports that she does not currently use drugs after having used the following drugs: Marijuana. Her family history includes Diabetes in her maternal grandmother; Heart failure in her mother; Hypertension in her mother; Ovarian cancer in her maternal aunt; Stroke in her maternal grandmother.    Review of Systems   Constitutional:  Negative for activity change and fever.   Eyes:  Positive for visual disturbance.        Wears glasses   Respiratory:  Negative for shortness of breath.    Cardiovascular:  Negative for chest pain and leg swelling.   Gastrointestinal:  Negative for constipation, diarrhea and nausea.   Genitourinary:  Positive for decreased urine volume (anuric).   Musculoskeletal:  Negative for arthralgias and myalgias.   Skin:  Negative for wound.   Neurological:  Negative for weakness and numbness.   Psychiatric/Behavioral:  Negative for sleep disturbance. The patient is not nervous/anxious.        Objective:   Blood pressure 132/82, pulse 73, temperature 97.3 °F (36.3 °C), temperature source Tympanic, resp.  "rate 18, height 5' 6.46" (1.688 m), weight 63.4 kg (139 lb 12.4 oz), last menstrual period 01/01/2016, SpO2 100%.body mass index is 22.25 kg/m².    Physical Exam  Vitals and nursing note reviewed.   Constitutional:       Appearance: Normal appearance.   Cardiovascular:      Rate and Rhythm: Normal rate and regular rhythm.      Heart sounds: Normal heart sounds.   Pulmonary:      Effort: Pulmonary effort is normal.      Breath sounds: Normal breath sounds.   Abdominal:      General: There is no distension.   Musculoskeletal:         General: Normal range of motion.      Comments: LUE AV fistula + thrill    Skin:     General: Skin is warm and dry.   Neurological:      Mental Status: She is alert.         Labs:  Lab Results   Component Value Date    WBC 4.99 09/25/2024    HGB 12.1 09/25/2024    HCT 37.8 09/25/2024     09/25/2024    K 4.3 09/25/2024    CL 92 (L) 09/25/2024    CO2 31 (H) 09/25/2024    BUN 39 (H) 09/25/2024    CREATININE 7.7 (H) 09/25/2024    EGFRNORACEVR 5.6 (A) 09/25/2024    GLUCOSE 83 03/16/2022    CALCIUM 10.5 09/25/2024    PHOS 6.5 (H) 09/25/2024    MG 1.6 05/15/2024    ALBUMIN 3.9 09/25/2024    AST 14 09/25/2024    ALT 16 09/25/2024    .5 (H) 09/25/2024       Lab Results   Component Value Date    PREALBUMIN 23 12/25/2011    BILIRUBINUA Negative 02/10/2021    GGT 27 06/09/2014    AMYLASE 84 12/19/2011    LIPASE 36 12/19/2011    PROTEINUA 2+ (A) 06/09/2020    NITRITE Negative 06/09/2020    RBCUA 1 06/09/2020    WBCUA 1 06/09/2020     Labs were reviewed with the patient.    Assessment:     1. Pre-transplant evaluation for kidney transplant    2. ESRD on hemodialysis    3. Alcohol use disorder, severe, dependence    4. Multiple myeloma in remission    5. Primary hypertension    6. BMI 22.0-22.9, adult      Plan:   59 y.o. year old Black or  female who has presented to be evaluated as a potential kidney transplant recipient.  She has ESRD secondary to HTN and multiple " myeloma.  Patient is currently on hemodialysis started on 12/23/2011. Previously removed from wait list due to relapse with alcohol and marijuana. She has completed additional rehab program and remains substance free, peth and tox screen drawn today to confirm. She attends weekly therapy. She is very motivated for transplant. MM remains in remission. Will need afternoon imaging, stress test, MMG, and pap prior to selection.       Transplant Candidacy:   Based on available information, Ms. Booth is a suitable kidney transplant candidate.   Meets center eligibility for accepting HCV+ donor offer - Yes.  Patient educated on HCV+ donors. Jennifer is willing to accept HCV+ donor offer - Yes   Patient is a candidate for KDPI > 85 kidney donor offer - Yes.  Final determination of transplant candidacy will be made once workup is complete and reviewed by the selection committee.    Patient advised that it is recommended that all transplant candidates, and their close contacts and household members receive Covid vaccination.    UNOS Patient Status  Functional Status: 80% - Normal activity with effort: some symptoms of disease    Suma Gibbs NP

## 2024-09-25 NOTE — PROGRESS NOTES
INITIAL PATIENT EDUCATION NOTE AA    Ms. Jennifer Booth was seen in pre-kidney transplant clinic for evaluation for kidney, kidney/pancreas or pancreas only transplant.  The patient attended an individual video education session that discussed/reviewed the following aspects of transplantation: evaluation including diagnostic and laboratory testing,(Chemistries, Hematology, Serologies including HIV and Hepatitis and HLA) required for transplantation and selection committee process, UNOS waitlist management/multiple listings, types of organs offered (KDPI < 85%, KDPI > 85%, PHS risk, DCD, HCV+, HIV+ for HIV+ recipients and enbloc/dual), financial aspects, surgical procedures, dietary instruction pre- and post-transplant, health maintenance pre- and post-transplant, post-transplant hospitalization and outpatient follow-up, potential to participate in a research protocol, and medication management and side effects.  A question and answer session was provided after the presentation.    The patient was seen by all members of the multi-disciplinary team to include: Nephrologist/ADENIKE, Surgeon, , Transplant Coordinator, , Pharmacist and Dietician (if applicable).    The patient reviewed and signed all consents for evaluation which were witnessed and sent to scanning into the [facility] chart.    The patient was given an education book and plan for further evaluation based on her individual assessment.      The Patient was educated on OPTN policy change regarding race based eGFR. For Black or  Americans, this eGFR could have shown that their kidneys were working better than they were.    Because of this change, we are looking at everyones record and assessing waiting time for people who are eligible. We will be reviewing your medical records and will notify you if you are eligible. We also encouraged patient to provide span 20 labs that are not in our electronic medical  records    Reviewed program requirement for complete COVID vaccination with documentation prior to listing.  COVID education information reviewed with patient. Patient encouraged to be up to date on all vaccinations.     The patient was informed that the transplant team would manage immediate post op pain. If the patient requires long term pain management, they will need to have that pain management addressed by their PCP or previous provider who wrote for long term pain medicines.    The patient was encouraged to call with any questions or concerns.

## 2024-09-25 NOTE — PROGRESS NOTES
PRE-TRANSPLANT INFECTIOUS DISEASE CONSULT    Reason for Visit:  Pre-transplant evaluation  Referring Provider: Dr. Palak Galvez     History of Present Illness:    59 y.o. female with a history of ESRD on HD presents for pre-kidney transplant evaluation.    Infectious History:  Recent hospital admissions: No  Recent infections: No  Recent or current antibiotic use: No  History of recurrent infections *(sinus / pneumonia / UTI / SBP)*: No  History of  foot wound or bone/joint infection: No  Recent dental infections, issues or procedures: No  History of chicken pox: Yes  History of shingles: No  History of STI: No  History of COVID infection: No    History of Immunosuppression:  Prior chemotherapy / immunosuppression: Yes 2010 for multiple myeloma - nothing since  Prior transplant: No  History of splenectomy: No    Tuberculosis:  Prior screening for latent TB: Yes - Quant negative 2019  Prior diagnosis of latent TB: No  Risk factors for TB *known exposure, incarceration, homelessness*: No    Geographical exposures:  Currently lives in [place] with daughter//grandchild  Lived in the following states: [place]  Lived or travelled to the [facility] [place]: No  International travel: [place] on cruise  Travel-associated illness: No    Social/Environmental:  Occupation:  Retired from [employer]  Pets: Yes Cat and dog - daughter cleans litter box  Livestock: No  Fishing / hunting: No  Hobbies: None  Water: [place]  Consumption of raw/undercooked meat or seafood?  No  Tobacco: No  Alcohol: No  Recreational drug use:  No  Sexual partners: none      Past Histories:   Past Medical History:   Diagnosis Date    Addiction to drug     Alcohol abuse     Allergic drug reaction 11/13/2017    Anemia     Anxiety     Arm pain, left 02/12/2014    Breast cyst     Chronic renal failure 02/12/2014    CKD (chronic kidney disease) stage 5, GFR less than 15 ml/min     COPD exacerbation 09/16/2023    Depression     Dialysis patient     dialysis  [id number]    Encounter for blood transfusion     GERD (gastroesophageal reflux disease)     Hx of psychiatric care     Hypertension     Mood swings     Multiple myeloma in remission 10/26/2012    per Hem/Oc note    Multiple thyroid nodules 10/22/2023    Psychiatric exam requested by authority     Psychiatric problem     Renal hypertension     Status post dialysis 2012    Therapy     Thrombocytopenia 2012     Past Surgical History:   Procedure Laterality Date    AV FISTULA PLACEMENT Left 2014    BREAST CYST ASPIRATION Left     BREAST CYST EXCISION Left     CERVICAL SPINE SURGERY Bilateral      SECTION      x1    COLONOSCOPY N/A 2022    Procedure: COLONOSCOPY;  Surgeon: Jordan Mcguire MD;  Location: [facility] ENDO;  Service: Endoscopy;  Laterality: N/A;    FISTULOGRAM N/A 2020    Procedure: Fistulogram;  Surgeon: Rahat Berger MD;  Location: [facility] CATH LAB/EP;  Service: Cardiology;  Laterality: N/A;    FISTULOGRAM Left 2024    Procedure: Fistulogram LEFT FOREARM;  Surgeon: Matheus Galaviz MD;  Location: [facility] CATH LAB;  Service: Nephrology;  Laterality: Left;    FISTULOGRAM Left 2024    Procedure: Fistulogram;  Surgeon: Matheus Galaviz MD;  Location: [facility] CATH LAB;  Service: Nephrology;  Laterality: Left;    INSERTION OF CATHETER FOR HEMODIALYSIS N/A 2024    Procedure: INSERTION, CATHETER, HEMODIALYSIS SINGLE LUMEN;  Surgeon: Mukesh Gonsales Jr., MD;  Location: [facility] OR;  Service: General;  Laterality: N/A;    pd catheter      PERCUTANEOUS TRANSLUMINAL ANGIOPLASTY OF ARTERIOVENOUS FISTULA N/A 2020    Procedure: PTA, AV FISTULA;  Surgeon: Rahat Berger MD;  Location: [facility] CATH LAB/EP;  Service: Cardiology;  Laterality: N/A;    PERITONEAL CATHETER REMOVAL N/A 2018    Procedure: REMOVAL, CATHETER, DIALYSIS, PERITONEAL;  Surgeon: Mukesh Gonsales Jr., MD;  Location: [facility] OR;  Service: General;  Laterality: N/A;    RENAL BIOPSY  2016    THROMBECTOMY OF HEMODIALYSIS  ACCESS SITE N/A 06/09/2020    Procedure: Thrombectomy, Hemodialysis Graft Or Fistula;  Surgeon: Rahat Berger MD;  Location: [facility] CATH LAB/EP;  Service: Cardiology;  Laterality: N/A;    THROMBECTOMY OF HEMODIALYSIS ACCESS SITE Left 05/14/2024    Procedure: THROMBECTOMY, HEMODIALYSIS GRAFT OR FISTULA / left lower AV/FISTULA;  Surgeon: Matheus Galaviz MD;  Location: [facility] CATH LAB;  Service: Nephrology;  Laterality: Left;    VASCULAR SURGERY  08/2012    dialysis catheter to right subclavian     Family History   Problem Relation Name Age of Onset    Hypertension Mother      Heart failure Mother      Ovarian cancer Maternal Aunt      Diabetes Maternal Grandmother      Stroke Maternal Grandmother      Breast cancer Neg Hx      Colon cancer Neg Hx       Social History     Tobacco Use    Smoking status: Every Day     Current packs/day: 1.00     Average packs/day: 1 pack/day for 41.7 years (41.7 ttl pk-yrs)     Types: Cigarettes     Start date: 1983     Passive exposure: Never    Smokeless tobacco: Never    Tobacco comments:     Pt. states recently cut down to 0.5 pk/day. Enrolled in the [employer] on 6/9/20 ([facility] Member ID # [id number]). She declines Ambulatory referral to Smoking Cessation program. Handout provided.   Substance Use Topics    Alcohol use: Not Currently     Comment: occasional    Drug use: Not Currently     Types: Marijuana     Review of patient's allergies indicates:   Allergen Reactions    Doxycycline Swelling, Rash and Hives    Gentamicin Anaphylaxis    Vancomycin analogues Anaphylaxis         Current antibiotics:  Antibiotics (From admission, onward)      None              Review of Systems  Review of Systems   Constitutional: Negative for chills, fever, malaise/fatigue and night sweats.   Cardiovascular:  Negative for chest pain and leg swelling.   Respiratory:  Negative for cough, hemoptysis, shortness of breath, sputum production and wheezing.    Skin:  Negative for rash and suspicious lesions.    Gastrointestinal:  Positive for heartburn. Negative for abdominal pain, constipation, diarrhea, nausea and vomiting.   Genitourinary:  Negative for dysuria and hematuria.        Anuric          Objective  Physical Exam  Constitutional:       General: She is not in acute distress.     Appearance: Normal appearance. She is well-developed. She is not ill-appearing, toxic-appearing or diaphoretic.   HENT:      Head: Normocephalic and atraumatic.      Mouth/Throat:      Lips: Pink. No lesions.      Mouth: Mucous membranes are moist. No injury or oral lesions.      Dentition: Abnormal dentition (some missing). Does not have dentures. Dental caries (filled/crowned) present. No gingival swelling, dental abscesses or gum lesions.      Tongue: No lesions.      Palate: No lesions.      Pharynx: Pharyngeal swelling present.   Cardiovascular:      Rate and Rhythm: Normal rate and regular rhythm.      Heart sounds: Normal heart sounds. No murmur heard.     No friction rub. No gallop.   Pulmonary:      Effort: Pulmonary effort is normal. No respiratory distress.      Breath sounds: Normal breath sounds. No wheezing or rales.   Abdominal:      General: Bowel sounds are normal. There is no distension.      Palpations: Abdomen is soft. There is no mass.      Tenderness: There is no abdominal tenderness. There is no guarding or rebound.   Skin:     General: Skin is warm and dry.   Neurological:      Mental Status: She is alert and oriented to person, place, and time.   Psychiatric:         Behavior: Behavior normal.           Labs:    CBC:   Lab Results   Component Value Date    WBC 4.71 08/12/2024    HGB 11.6 (L) 08/12/2024    HCT 35.5 (L) 08/12/2024    MCV 89 08/12/2024     08/12/2024    GRAN 2.4 08/12/2024    GRAN 51.1 08/12/2024    LYMPH 1.6 08/12/2024    LYMPH 34.4 08/12/2024    MONO 0.5 08/12/2024    MONO 10.0 08/12/2024    EOSINOPHIL 2.8 08/12/2024       Syphilis screening:   Lab Results   Component Value Date    RPR  "Non-reactive 07/26/2016        TB screening: No results found for: "TBGOLDPLUS", "TSPOTSCREN"    HIV screening:   Lab Results   Component Value Date    [id number] Non-reactive 10/01/2023       Strongyloides IgG:   Lab Results   Component Value Date    STRONGANTIGG 0.04 12/16/2011       Hepatitis Serologies:   Lab Results   Component Value Date    HEPBCAB Negative 07/26/2016    HEPBSAB Negative 07/26/2016    HEPBSURFABQU POSITIVE 05/14/2024    HEPBSURFABQU 499 05/14/2024    HEPCAB Non-reactive 05/15/2024        Varicella IgG:   Lab Results   Component Value Date    VARICELLAINT Positive (A) 07/26/2016         Immunization History   Administered Date(s) Administered    COVID-19, MRNA, LN-S, PF (MODERNA FULL 0.5 ML DOSE) 02/12/2021, 03/12/2021    COVID-19, MRNA, LN-S, PF (Pfizer) (Purple Cap) 01/22/2022    Hepatitis A / Hepatitis B 07/26/2016, 08/26/2016    Influenza - Quadrivalent - PF *Preferred* (6 months and older) 10/06/2023    Pneumococcal Conjugate - 13 Valent 07/26/2016    Pneumococcal Conjugate - 20 Valent 09/18/2023    Pneumococcal Polysaccharide - 23 Valent 10/10/2016, 08/14/2021    Td (ADULT) 10/05/2005    Td - Not Adsorbed (Adult) 10/05/2005    Tdap 07/26/2016    Zoster Recombinant 08/18/2021        Immunization History:  Received all childhood vaccines: Yes  All household members receive annual flu vaccine: No  All household members are up to date on COVID vaccine: Yes    Assessment and Plan    1. Risks of Infection: Available serologies were reviewed. No unusual risks of infection or significant barriers to transplantation were identified from the infectious disease standpoint given the information available at this time.    - Acute infectious issues: None   - Pending serologies: Quantiferon gold / T-spot, Strongyloides IgG, and VZV IgG   - Please call if any pending serologic testing is positive.    2. Immunizations:  Based on the patient's immunization history and serologies, the following immunizations " are recommended:  - Hepatitis A    Patient does have immunity to hepatitis A    Vaccination ordered today: No. Reason for not ordering: Immunity demonstrated on serology   - Hepatitis B    Patient does have immunity to hepatitis B    Vaccination ordered today: No. Reason for not ordering: Immunity   - COVID    Current CDC vaccination recommendations were discussed with the patient   - Annual high dose influenza     Vaccination ordered today: No. Reason for not ordering: Other will get at  center   - Prevnar 20    Vaccination ordered today: No. Reason for not ordering: Vaccination up to date   - Tdap    Vaccination ordered today: No. Reason for not ordering: Vaccination up to date   - Shingrix    Vaccination ordered today: No. Reason for not ordering: Vaccination up to date    Recommended Pre-Transplant Immunization Schedule   Vaccine  0m 1m 2m 6m   Pneumococcal conjugate vaccine (Prevnar 20) X      Tetanus-diphtheria-pertussis (Tdap)* X      Hepatitis A Vaccine (Havrix)** X   X   Hepatitis B Vaccine (Heplisav)** X X     Influenza (annual) X      Zoster Recombinant Vaccine (Shingrix) X  X           *Administer booster every 10 years.       **Administer if no immunity demonstrated on serologies               Patient will receive vaccines at local pharmacy. A written prescription was provided for all vaccine doses.     3. Counseling:   I discussed with the patient the risk for increased susceptibility to infections following transplantation including increased risk for infection right after transplant and if rejection should occur.  The patient has been counseled on the importance of vaccinations to decrease risk of infection and severe illness. Specific guidance has been provided to the patient regarding the patient's occupation, hobbies and activities to avoid future infectious complications.     4. Transplant Candidacy: Based on available information, there are no identified significant barriers to transplantation  from an infectious disease standpoint.  Final determination of transplant candidacy will be made once evaluation is complete and reviewed by the Selection Committee.      Follow up with infectious disease as needed.       The total time for evaluation and management services performed on 09/25/2024 was greater than 45 minutes.

## 2024-09-25 NOTE — PROGRESS NOTES
Transplant Recipient Adult Psychosocial Assessment    Jennifer Booth   [place] [address]  Telephone Information:   Mobile [phone]   Home  [phone] (home)  Work  [phone] (work)  E-mail  [email or web address]    Sex: female  YOB: 1965  Age: 59 y.o.    Encounter Date: 2024  [place] Citizen: yes  Primary Language: English   Needed: no    Emergency Contact:  Yusra Mcghee, 35 yo daughter, [place], does drive/own car, works part time as barista. [phone]    Family/Social Support:   Number of dependents/: pt denies  Marital history:    Other family dynamics: Pt reports mother and sister are . Pt reports step father is living. Pt reports lives with her daughter Yusra Mcghee, son in law Gilberto Mcghee and 5 yo granddaughter Kendrick Mcghee in [place]. Pt reports having only one child, Yusra Lopez. Pt reports Yusra and Gilberto Mcghee will be assisting as transplant caregivers.    Household Composition:  Yusra Mcghee, 35 yo daughter, [place] MACE, does drive/own car, works part time as barista. [phone]  Gilberto Mcghee, 35 yo son in law, [place] M[place],does drive/own car, works full time as  at [employer]. [phone]    Do you and your caregivers have access to reliable transportation? yes  PRIMARY CAREGIVER: chris De Leon, will be primary caregiver, phone number [phone]     provided in-depth information to patient and caregiver regarding pre- and post-transplant caregiver role.   strongly encourages patient and caregiver to have concrete plan regarding post-transplant care giving, including back-up caregiver(s) to ensure care giving needs are met as needed.    Patient and Caregiver states understanding all aspects of caregiver role/commitment and is able/willing/committed to being caregiver to the fullest extent necessary.    Patient and Caregiver verbalizes  understanding of the education provided today and caregiver responsibilities.         remains available. Patient and Caregiver agree to contact  in a timely manner if concerns arise.      Able to take time off work without financial concerns: yes.     Additional Significant Others who will Assist with Transplant:  Gilberto Mcghee, 35 yo son in law, [place] M[place],does drive/own car, works full time as  at [employer]. [phone]    Living Will: no  Healthcare Power of : no  Advance Directives on file: <<no information> per medical record.  Verbally reviewed LW/HCPA information.   provided patient with copy of LW/HCPA documents and provided education on completion of forms.    Living Donors: Education and resource information given to patient.    Highest Education Level: Attended College/Technical School Pt reports enrolled at [employer] at this time.  Reading Ability: college  Reports difficulty with: seeing must wear glasses  Learns Best By:  multisensory     Status: no  VA Benefits: no     Working for Income: No  If no, reason not working: Disability  Patient reports work history as  at [employer] [address] [place] [address] for about 20 + years until ESRD.    Spouse/Significant Other Employment: pt reports is not     Disabled: yes. ESRD . Stopped working .    Monthly Income:  $2,400 from disability ESRD  Able to afford all costs now and if transplanted, including medications: yes  Patient and Caregiver verbalizes understanding of personal responsibilities related to transplant costs and the importance of having a financial plan to ensure that patients transplant costs are fully covered.       provided fundraising information/education. Patient and Caregiververbalizes understanding.   remains available.    Insurance:   Payer/Plan Subscr  Sex Relation Sub. Ins. ID Effective  Group Num   1. MEDICARE - ME* JOHN BEST 1965 Female Self [id number] 3/1/12                                    [address]   2. MEDICARE - ME* JOHN BEST D 1965 Female Self [id number] 5/1/17                                    [address]     Primary Insurance (for UNOS reporting): Public Insurance - Medicare FFS (Fee For Service)  Secondary Insurance (for UNOS reporting): Private Insurance  Patient and Caregiver verbalizes clear understanding that patient may experience difficulty obtaining and/or be denied insurance coverage post-surgery. This includes and is not limited to disability insurance, life insurance, health insurance, burial insurance, long term care insurance, and other insurances.      Patient and Caregiver also reports understanding that future health concerns related to or unrelated to transplantation may not be covered by patient's insurance.  Resources and information provided and reviewed.     Patient and Caregiver provides verbal permission to release any necessary information to outside resources for patient care and discharge planning.  Resources and information provided are reviewed.      [facility], [phone]. Hemodialysis: Tues, Thurs and Sat 4 hours.    Dialysis Adherence: Patient and Caregiver reports high dialysis compliance with treatments and instructions within last 3 months.  9-23-24 Dialysis compliance update shows suitable compliance.    Infusion Service: patient utilizing? no  Home Health: patient utilizing? no  DME: no  Pulmonary/Cardiac Rehab: pt denies  PT: yes, 2 x week for 1 months for neck and back issues; just recently completed it.   ADLS:  independent with self care, driving and medication monitoring.    Adherence:   Pt reports having high medical compliance with medical appointments and instructions within last 3 months.  Adherence education and counseling provided.     Per History Section:  Past Medical History:   Diagnosis Date     Addiction to drug     Alcohol abuse     Allergic drug reaction 11/13/2017    Anemia     Anxiety     Arm pain, left 02/12/2014    Breast cyst     Chronic renal failure 02/12/2014    CKD (chronic kidney disease) stage 5, GFR less than 15 ml/min     COPD exacerbation 09/16/2023    Depression     Dialysis patient     dialysis m-w-f    Encounter for blood transfusion     GERD (gastroesophageal reflux disease)     Hx of psychiatric care     Hypertension     Mood swings     Multiple myeloma in remission 10/26/2012    per Hem/Oc note    Multiple thyroid nodules 10/22/2023    Psychiatric exam requested by authority     Psychiatric problem     Renal hypertension     Status post dialysis 08/2012    Therapy     Thrombocytopenia 08/02/2012     Social History     Tobacco Use    Smoking status: Former    Smokeless tobacco: Never   Substance Use Topics    Alcohol use: Not Currently     Social History     Substance and Sexual Activity   Drug Use Not Currently    Types: Marijuana     Social History     Substance and Sexual Activity   Sexual Activity Not Currently    Partners: Male       Per Today's Psychosocial:  Tobacco: none, patient denies any use at this time. Former smoker and quit June 2020. Pt reports plan to abstain.  Alcohol: none, patient denies any use at this time. Pt reports completed alcohol abuse program at [facility] and has been sober for 1 years. Please review 1-26-24 [facility] psychiat[facility] note In pt's EMR showing psychiatry clearance. Pt reports plan to abstain.  Illicit Drugs/Non-prescribed Medications: none, patient denies any use at this time. Pt reports former marijuana smoker. Discontinued all use. Please review 1-26-24 [facility] psychiat[facility] note In pt's EMR showing psychiatry clearance. Pt reports plan to abstain.    Patient and Caregiver states clear understanding of the potential impact of substance use as it relates to transplant candidacy and is aware of possible random substance screening.  Substance  abstinence/cessation counseling, education and resources provided and reviewed.     Arrests/DWI/Treatment/Rehab: patient denies    Psychiatric History:    Mental Health: depression and anxiety. Pt reports completed [facility] alcohol abuse program. Please review 1-26-24 [facility] psychiat[facility] note In pt's EMR showing psychiatry clearance. Pt reports in group therapy at [facility] with Pooja Kevin LCSW every other week. Pt reports plan to continue with all mental health medicines and with therapy as prescribed.  Psychiatrist/Counselor: Pooja Kevin LCSW, [facility]  Medications:  Wellbutin  mg TB24 tablet QD; Lexapro 20 mg QD; Remeron 15 mg QD  Suicide/Homicide Issues: Pt denies any history of si/hi at this time.  Safety at home: pt reports living in safe home environment with no abuse at this time.    Knowledge: Patient and Caregiver states having clear understanding and realistic expectations regarding the potential risks and potential benefits of organ transplantation and organ donation and agrees to discuss with health care team members and support system members, as well as to utilize available resources and express questions and/or concerns in order to further facilitate the pt informed decision-making.  Resources and information provided and reviewed.    Patient and Caregiver is aware of [facility]'s affiliation and/or partnership with agencies in home health care, LTAC, SNF, [facility], and other hospitals and clinics.    Understanding: Patient and Caregiver reports having a clear understanding of the many lifetime commitments involved with being a transplant recipient, including costs, compliance, medications, lab work, procedures, appointments, concrete and financial planning, preparedness, timely and appropriate communication of concerns, abstinence (ETOH, tobacco, illicit non-prescribed drugs), adherence to all health care team recommendations, support system and caregiver involvement, appropriate  and timely resource utilization and follow-through, mental health counseling as needed/recommended, and patient and caregiver responsibilities.  Social Service Handbook, resources and detailed educational information provided and reviewed.  Educational information provided.    Patient and Caregiver also reports current and expected compliance with health care regime and states having a clear understanding of the importance of compliance.      Patient and Caregiver reports a clear understanding that risks and benefits may be involved with organ transplantation and with organ donation.       Patient and Caregiver also reports clear understanding that psychosocial risk factors may affect patient, and include but are not limited to feelings of depression, generalized anxiety, anxiety regarding dependence on others, post traumatic stress disorder, feelings of guilt and other emotional and/or mental concerns, and/or exacerbation of existing mental health concerns.  Detailed resources provided and discussed.      Patient and Caregiver agrees to access appropriate resources in a timely manner as needed and/or as recommended, and to communicate concerns appropriately.  Patient and Caregiver also reports a clear understanding of treatment options available.     Patient and Caregiver received education in a group setting.   reviewed education, provided additional information, and answered questions.    Feelings or Concerns: Pt reports high motivation to pursue kidney organ transplant at this time.    Coping: Identify Patient & Caregiver Strategies to [place]:   1. In the past, coping with major surgery and/or related stress - family support; thu and prayer; journaling, computer games   2. Currently & Pre-transplant -  family support; thu and prayer; journaling, computer games   3. At the time of surgery -  family support; thu and prayer; journaling, computer games   4. During post-Transplant & Recovery Period -   family support; thu and prayer; journaling, computer games    Goals: Pt reports hope for successful kidney organ transplant so she can discontinue dialysis and have healthier life. Patient referred to Vocational Rehabilitation.    Interview Behavior: Patient and Caregiver presents as alert and oriented x 4, pleasant, good eye contact, well groomed, recall good, concentration/judgement good, average intelligence, calm, communicative, cooperative, and asking and answering questions appropriately. Pt's highly supportive daughter Yusra in session with patient's permission.         Transplant Social Work - Candidacy  Assessment/Plan:     Psychosocial Suitability: Patient presents as low risk candidate for kidney transplant at this time. Pt has history of alcohol use disorder and marijuana use. Pt reports completed [facility] substance abuse program and has been cleared by [facility] for organ transplant. Please review 1-26-24 [facility] psychiat[facility] note In pt's EMR showing psychiatry clearance. Pt reports having organ transplant caregiver/transportation plan, medical insurance plan and plan to afford transplant costs all in place.    Recommendations/Additional Comments: 9-23-24 Dialysis compliance update shows suitable compliance. [facility] clearance in Media section and in red chart.    SW recommends that pt conduct fundraising to assist pt with pay for cost of medications, food, gas, and other transplant related needs.  SW recommends that pt remain aware of potential mental health concerns and contact the team if any concerns arise.  SW recommends that pt remain abstinent from tobacco, ETOH, and drug use.  SW supports pt's continued adherence. SW remains available to answer any questions or concerns that arise as the pt moves through the transplant process.     Final determination of transplant candidacy will be reviewed by the selection committee.       Kari MAY LCSW

## 2024-09-25 NOTE — LETTER
September 27, 2024        Palak Galvez  [address]  [facility] [address]  Phone: [phone]  Fax: [phone]             [facility]- Transplant 1st Fl  [address]  [facility] [address]  Phone: [phone]   Patient: Jennifer Booth   MR Number: [id number]   YOB: 1965   Date of Visit: 9/25/2024       Dear Dr. Palak Galvez    Thank you for referring Jennifer Booth to me for evaluation. Attached you will find relevant portions of my assessment and plan of care.    If you have questions, please do not hesitate to call me. I look forward to following Jennifer Booth along with you.    Sincerely,    Suma Gibbs, ABRAHAM    Enclosure    If you would like to receive this communication electronically, please contact [email or web address] or [phone] to request [employer] Link access.    [employer] Link is a tool which provides read-only access to select patient information with whom you have a relationship. Its easy to use and provides real time access to review your patients record including encounter summaries, notes, results, and demographic information.    If you feel you have received this communication in error or would no longer like to receive these types of communications, please e-mail [email or web address]

## 2024-09-25 NOTE — TELEPHONE ENCOUNTER
Reviewed pt transplant labs.  Notified dialysis unit dietitian of the following abnormal labs via fax and requested their most recent nutrition note on this pt.  Once this note is received it will be scanned into pt's chart.    Triglycerides 213  Phos 6.5

## 2024-09-25 NOTE — PROGRESS NOTES
PHARM.D. PRE-TRANSPLANT NOTE:    This patient's medication therapy was evaluated as part of her pre-transplant evaluation.      The following general pharmacologic concerns were noted: Pt taking Plavix for dialysis stent which is a concern for bleeding risk for surgery. Toprol-XL, Lexapro, and Remeron should be started shortly after txp to prevent withdrawal symptoms.     The following concerns for post-operative pain management were noted: N/A    The following pharmacologic concerns related to HCV therapy were noted: N/A      This patient's medication profile was reviewed for considerations for DAA Hepatitis C therapy:    [X]  No current inducers of CYP 3A4 or PGP  [X]  No amiodarone on this patient's EMR profile in the last 24 months  [X]  No past or current atrial fibrillation on this patient's EMR profile       Current Outpatient Medications   Medication Sig Dispense Refill    acetaminophen (TYLENOL) 325 MG tablet Take 650 mg by mouth every 4 (four) hours as needed.      buPROPion (WELLBUTRIN XL) 150 MG TB24 tablet TAKE 1 TABLET(150 MG) BY MOUTH EVERY DAY (Patient taking differently: Take 150 mg by mouth once daily.) 90 tablet 3    cinacalcet (SENSIPAR) 30 MG Tab Take 90 mg by mouth once daily.      clopidogreL (PLAVIX) 75 mg tablet TAKE 1 TABLET(75 MG) BY MOUTH EVERY DAY (Patient taking differently: Take 75 mg by mouth once daily.) 90 tablet 0    EScitalopram oxalate (LEXAPRO) 20 MG tablet Take 1 tablet (20 mg total) by mouth once daily. 90 tablet 3    fluticasone propionate (FLONASE) 50 mcg/actuation nasal spray 1 spray by Each Nostril route daily as needed.      hydrALAZINE (APRESOLINE) 25 MG tablet Take 25 mg by mouth daily as needed.      hydrOXYzine HCL (ATARAX) 25 MG tablet Take 1 tablet (25 mg total) by mouth 4 (four) times daily as needed for Itching. 60 tablet 3    isosorbide mononitrate (IMDUR) 60 MG 24 hr tablet Take 60 mg by mouth once daily.      metoprolol succinate (TOPROL-XL) 25 MG 24 hr tablet  Take 1 tablet (25 mg total) by mouth once daily. 90 tablet 3    mirtazapine (REMERON) 15 MG tablet Take 1 tablet (15 mg total) by mouth every evening. 90 tablet 1    montelukast (SINGULAIR) 10 mg tablet TAKE 1 TABLET(10 MG) BY MOUTH EVERY EVENING (Patient taking differently: Take 10 mg by mouth every evening.) 90 tablet 3    omeprazole (PRILOSEC) 40 MG capsule Take 40 mg by mouth once daily.      sacubitriL-valsartan (ENTRESTO)  mg per tablet Take 1 tablet by mouth 2 (two) times daily. (Patient taking differently: Take 1 tablet by mouth once daily.) 180 tablet 3    sevelamer carbonate (RENVELA) 800 mg Tab Take 800 mg by mouth 3 (three) times daily with meals.      traMADoL (ULTRAM) 50 mg tablet Take 50 mg by mouth every 6 (six) hours as needed.      sucroferric oxyhydroxide (VELPHORO) 500 mg Chew Take 1,000 mg by mouth 3 (three) times daily. (Patient not taking: Reported on 9/25/2024)       No current facility-administered medications for this visit.     Facility-Administered Medications Ordered in Other Visits   Medication Dose Route Frequency Provider Last Rate Last Admin    0.9%  NaCl infusion   Intravenous Continuous Jordan Mcguire MD 20 mL/hr at 07/18/22 0943 New Bag at 07/18/22 0943    0.9%  NaCl infusion   Intravenous Continuous Jordan Mcguire MD   Stopped at 08/18/22 1042    sodium chloride 0.9% flush 10 mL  10 mL Intravenous PRN Jordan Mcguire MD        sodium chloride 0.9% flush 10 mL  10 mL Intravenous PRN Jordan Mcguire MD               I am available for consultation and can be contacted, as needed by the other members of the Transplant team.

## 2024-09-25 NOTE — PROGRESS NOTES
Transplant Surgery  Kidney Transplant Recipient Evaluation    Referring Physician: Palak Galvez  Current Nephrologist: Palak Galvez    Subjective:     Reason for Visit: evaluate transplant candidacy    History of Present Illness: Jennifer Booth is a 59 y.o. year old female undergoing transplant evaluation.    Dialysis History: Jennifer is on hemodialysis.      Transplant History: N/A    Etiology of Renal Disease: Chronic Glomerulosclerosis - Unspecified (based on medical records from referral).    External provider notes reviewed: Yes    Review of Systems  Objective:     Physical Exam:  Constitutional:   Vitals reviewed: yes   Well-nourished and well-groomed: yes  Eyes:   Sclerae icteric: no   Extraocular movements intact: yes  GI:    Bowel sounds normal: yes   Tenderness: no    If yes, quadrant/location: not applicable   Palpable masses: no    If yes, quadrant/location: not applicable   Hepatosplenomegaly: no   Ascites: no   Hernia: no    If yes, type/location: not applicable   Surgical scars: no    If yes, type/location: Pfannenstiel  Resp:   Effort normal: yes   Breath sounds normal: yes    CV:   Regular rate and rhythm: yes   Heart sounds normal: yes   Femoral pulses normal: yes   Extremities edematous: no  Skin:   Rashes or lesions present: no    If yes, describe:not applicable   Jaundice:: no    Musculoskeletal:   Gait normal: yes   Strength normal: yes  Psych:   Oriented to person, place, and time: yes   Affect and mood normal: yes    Additional comments: not applicable    Diagnostics:  The following labs have been reviewed: CBC  CMP  INR  The following radiology images have been independently reviewed and interpreted: CT Abd/Pelvis    Counseling: We provided Jennifer Booth with a group education session today.  We discussed kidney transplantation at length with her, including risks, potential complications, and alternatives in the management of her renal failure.  The discussion included complications related  to anesthesia, bleeding, infection, primary nonfunction, and ATN.  I discussed the typical postoperative course, length of hospitalization, the need for long-term immunosuppression, and the need for long-term routine follow-up.  I discussed living-donor and -donor transplantation and the relative advantages and disadvantages of each.  I also discussed average waiting times for both living donation and  donation.  I discussed national and center-specific survival rates.  I also mentioned the potential benefit of multicenter listing to candidates listed with centers within more than one organ procurement organization.  All questions were answered.    Patient advised that it is recommended that all transplant candidates, and their close contacts and household members receive Covid vaccination.    Final determination of transplant candidacy will be made once evaluation is complete and reviewed by the Kidney & Kidney/Pancreas Selection Committee.    Coronavirus disease (COVID-19) caused by severe acute respiratory virus coronavirus 2 (SARS-C0V 2) is associated with increased mortality in solid organ transplant recipients (SOT) compared to non-transplant patients. Vaccine responses to vaccination are depressed against SARS-CoV2 compared to normal individuals but improve with third vaccination doses. Vaccination prior to SOT provides both the best opportunity for transplant candidates to develop protective immunity and to reduce the risk of serious COVID19 infections post transplantation. Organ transplant candidates at [facility] will be required to receive SARS-CoV-2 vaccination prior to being listed with a an active status, whenever possible. Exceptions will be made for disability related reasons or for sincerely held Jehovah's witness beliefs.          Transplant Surgery - Candidacy   Assessment/Plan:   Jennifer SANDERS Booth has end stage renal disease (ESRD) on dialysis. I see no  surgical contraindication to placing a kidney transplant. Based on available information, Jennifer Booth is a suitable kidney transplant candidate. CT 2023 looks ok    Additional testing to be completed according to the Written Order Guidelines for Adult Pre-kidney and Pancreas Transplant Evaluation (KI-02).  Interpretation of tests and discussion of patient management involves all members of the multidisciplinary transplant team.    Amos Hernandes Jr, MD

## 2024-09-30 ENCOUNTER — HOSPITAL ENCOUNTER (OUTPATIENT)
Dept: RADIOLOGY | Facility: HOSPITAL | Age: 59
Discharge: HOME | End: 2024-09-30
Attending: INTERNAL MEDICINE
Payer: MEDICARE

## 2024-09-30 DIAGNOSIS — E04.2 MULTIPLE THYROID NODULES: ICD-10-CM

## 2024-09-30 PROCEDURE — 76536 US EXAM OF HEAD AND NECK: CPT | Mod: TC,TXP

## 2024-09-30 PROCEDURE — 76536 US EXAM OF HEAD AND NECK: CPT | Mod: 26,NTX,, | Performed by: RADIOLOGY

## 2024-10-03 ENCOUNTER — DOCUMENTATION ONLY (OUTPATIENT)
Dept: TRANSPLANT | Facility: CLINIC | Age: 59
End: 2024-10-03
Payer: MEDICARE

## 2024-10-04 ENCOUNTER — DOCUMENTATION ONLY (OUTPATIENT)
Dept: TRANSPLANT | Facility: CLINIC | Age: 59
End: 2024-10-04
Payer: MEDICARE

## 2024-10-04 ENCOUNTER — COMMITTEE REVIEW (OUTPATIENT)
Dept: TRANSPLANT | Facility: CLINIC | Age: 59
End: 2024-10-04
Payer: MEDICARE

## 2024-10-04 NOTE — COMMITTEE REVIEW
Native Organ Dx: Chronic Glomerulosclerosis - Unspecified      Not approved for LRD/CAD transplant due to positive PETH results.  Patient will need 1(one) year of sobriety. Will need a negative PETH for re-referral. Committee strongly recommends in inpatient treatment program.       Note written by: Michelle Abadie, RN     ===============================================    I was present at the meeting and attest to the decision of the committee.    Carroll Wakefield  10/04/2024

## 2024-10-08 ENCOUNTER — TELEPHONE (OUTPATIENT)
Dept: ENDOCRINOLOGY | Facility: CLINIC | Age: 59
End: 2024-10-08
Payer: MEDICARE

## 2024-10-08 NOTE — TELEPHONE ENCOUNTER
Called patient no answer, left message to contact us.    ----- Message from Annia Durbin MD sent at 10/8/2024 12:26 PM CDT -----    Patient's recent ultrasound showed thyroid nodules which do not meet criteria for biopsy.  Parathyroid adenoma is seen similar to her previous ultrasound.  Please schedule a follow-up in 6-8 weeks and repeat DXA scan and labs prior to the appointment.  Please let me know if patient has any questions.

## 2024-10-08 NOTE — TELEPHONE ENCOUNTER
Called and spoke to patient, ultrasound results and follow up instructions given to patient.  Patient verbalized understanding.    ----- Message from Antonieta sent at 10/8/2024  3:53 PM CDT -----  Contact: pt @ [phone]  JOHN BEST calling regarding Patient Advice (message) for #pt returning call from Kaylie, asking for call back

## 2024-10-10 ENCOUNTER — TELEPHONE (OUTPATIENT)
Dept: NEPHROLOGY | Facility: CLINIC | Age: 59
End: 2024-10-10
Payer: MEDICARE

## 2024-10-10 NOTE — TELEPHONE ENCOUNTER
Called and spoke with Ms Booth. Informed Ms Booth, Dr Graff will be away from the clinic on the day of her appointment on Oct 23 rd. We will need to reschedule her appointment to Oct 30th at 2:00. Ms Booth states that was OK. She asked that I mail her appointment information to her home. Done.

## 2024-10-15 ENCOUNTER — TELEPHONE (OUTPATIENT)
Dept: FAMILY MEDICINE | Facility: CLINIC | Age: 59
End: 2024-10-15
Payer: MEDICARE

## 2024-10-15 RX ORDER — MIRTAZAPINE 15 MG/1
15 TABLET, FILM COATED ORAL NIGHTLY
Qty: 90 TABLET | Refills: 1 | Status: SHIPPED | OUTPATIENT
Start: 2024-10-15

## 2024-10-15 NOTE — TELEPHONE ENCOUNTER
No care due was identified.  [facility] Embedded Care Due Messages. Reference number: [id number].   10/15/2024 12:05:46 PM CDT

## 2024-10-15 NOTE — TELEPHONE ENCOUNTER
----- Message from Dayana sent at 10/14/2024 10:46 AM CDT -----  Type:  RX Refill Request    Who Called: pt  Refill or New Rx:refill  RX Name and Strength:mirtazapine (REMERON) 15 MG tablet  How is the patient currently taking it? (ex. 1XDay):Route: Take 1 tablet (15 mg total) by mouth every evening. - Oral  Is this a 30 day or 90 day RX:90  Preferred Pharmacy with phone number:[id number] - [place] - [address] AT [facility]   Phone: [phone]  Fax: [phone]  Local or Mail Order:local  Ordering Provider:Christina Rainey NP  Would the patient rather a call back or a response via [facility]?   Best Call Back Number:  Additional Information:

## 2024-10-15 NOTE — TELEPHONE ENCOUNTER
----- Message from Rei-Frontier sent at 10/14/2024  9:13 AM CDT -----  Regarding: Reschedule Existing Appointment  Contact: Jennifer Booth  Reschedule Existing Appointment     Current appt date:10/14     Type of appt :Mammo     Physician:Laura     Reason for rescheduling:Patient is calling to reschedule due to family emergency. Requesting a call back     Caller:Jennifer Booth      Contact Preference:[phone]

## 2024-10-16 ENCOUNTER — HOSPITAL ENCOUNTER (OUTPATIENT)
Dept: RADIOLOGY | Facility: HOSPITAL | Age: 59
Discharge: HOME | End: 2024-10-16
Attending: FAMILY MEDICINE
Payer: MEDICARE

## 2024-10-16 DIAGNOSIS — Z12.31 ENCOUNTER FOR SCREENING MAMMOGRAM FOR BREAST CANCER: ICD-10-CM

## 2024-10-16 PROCEDURE — 77067 SCR MAMMO BI INCL CAD: CPT | Mod: TC

## 2024-10-16 PROCEDURE — 77063 BREAST TOMOSYNTHESIS BI: CPT | Mod: TC

## 2024-10-19 DIAGNOSIS — Z99.2 ESRD (END STAGE RENAL DISEASE) ON DIALYSIS: ICD-10-CM

## 2024-10-19 DIAGNOSIS — N18.5 CKD (CHRONIC KIDNEY DISEASE) STAGE 5, GFR LESS THAN 15 ML/MIN: ICD-10-CM

## 2024-10-19 DIAGNOSIS — N18.6 ESRD (END STAGE RENAL DISEASE) ON DIALYSIS: ICD-10-CM

## 2024-10-19 NOTE — TELEPHONE ENCOUNTER
No care due was identified.  [facility] Embedded Care Due Messages. Reference number: [id number].   10/19/2024 1:16:36 PM CDT

## 2024-10-20 RX ORDER — CLOPIDOGREL BISULFATE 75 MG/1
75 TABLET ORAL
Qty: 90 TABLET | Refills: 3 | Status: SHIPPED | OUTPATIENT
Start: 2024-10-20

## 2024-10-20 NOTE — TELEPHONE ENCOUNTER
Refill Decision Note   Jennifer Emmy  is requesting a refill authorization.  Brief Assessment and Rationale for Refill:  Approve     Medication Therapy Plan:  PPI D/C on patient's profile.      Comments:     Note composed:2:02 PM 10/20/2024

## 2024-10-22 ENCOUNTER — CLINICAL SUPPORT (OUTPATIENT)
Dept: PSYCHIATRY | Facility: CLINIC | Age: 59
End: 2024-10-22
Payer: MEDICARE

## 2024-10-22 DIAGNOSIS — F10.20 ALCOHOL USE DISORDER, MODERATE, DEPENDENCE: Primary | ICD-10-CM

## 2024-10-22 PROCEDURE — 90853 GROUP PSYCHOTHERAPY: CPT | Mod: ,,, | Performed by: SOCIAL WORKER

## 2024-10-30 ENCOUNTER — OFFICE VISIT (OUTPATIENT)
Dept: NEPHROLOGY | Facility: CLINIC | Age: 59
End: 2024-10-30
Payer: MEDICARE

## 2024-10-30 VITALS
RESPIRATION RATE: 16 BRPM | SYSTOLIC BLOOD PRESSURE: 119 MMHG | HEART RATE: 66 BPM | OXYGEN SATURATION: 99 % | BODY MASS INDEX: 22.88 KG/M2 | WEIGHT: 143.75 LBS | DIASTOLIC BLOOD PRESSURE: 72 MMHG | TEMPERATURE: 98 F

## 2024-10-30 DIAGNOSIS — Z99.2 ESRD ON DIALYSIS: Primary | ICD-10-CM

## 2024-10-30 DIAGNOSIS — N18.6 ESRD ON DIALYSIS: Primary | ICD-10-CM

## 2024-10-30 PROCEDURE — 99213 OFFICE O/P EST LOW 20 MIN: CPT | Mod: PBBFAC

## 2024-10-30 PROCEDURE — 99999 PR PBB SHADOW E&M-EST. PATIENT-LVL III: CPT | Mod: PBBFAC,,,

## 2024-10-30 PROCEDURE — 99213 OFFICE O/P EST LOW 20 MIN: CPT | Mod: S$PBB,,, | Performed by: INTERNAL MEDICINE

## 2024-11-13 DIAGNOSIS — F41.1 GAD (GENERALIZED ANXIETY DISORDER): ICD-10-CM

## 2024-11-13 DIAGNOSIS — F32.89 OTHER DEPRESSION: ICD-10-CM

## 2024-11-13 RX ORDER — ESCITALOPRAM OXALATE 20 MG/1
20 TABLET ORAL
Qty: 90 TABLET | Refills: 2 | Status: SHIPPED | OUTPATIENT
Start: 2024-11-13

## 2024-11-13 NOTE — TELEPHONE ENCOUNTER
No care due was identified.  [facility] Embedded Care Due Messages. Reference number: [id number].   11/13/2024 9:12:49 AM CST

## 2024-11-13 NOTE — TELEPHONE ENCOUNTER
Refill Routing Note   Medication(s) are not appropriate for processing by [facility] for the following reason(s):        Drug-disease interaction    [facility] action(s):  Defer      Medication Therapy Plan: Drug-Disease: EScitalopram oxalate and Hyponatremia    Pharmacist review requested: Yes     Appointments  past 12m or future 3m with PCP    Date Provider   Last Visit   7/29/2024 Leodan Sanchez MD   Next Visit   11/22/2024 Leodan Sanchez MD   ED visits in past 90 days: 0        Note composed:12:08 PM 11/13/2024

## 2024-11-13 NOTE — TELEPHONE ENCOUNTER
Refill Decision Note   Jennifer Booth  is requesting a refill authorization.  Brief Assessment and Rationale for Refill:  Approve     Medication Therapy Plan:        Alert overridden per protocol: Yes   Pharmacist review requested: Yes   Comments:     Note composed:12:23 PM 11/13/2024

## 2024-11-21 ENCOUNTER — LAB VISIT (OUTPATIENT)
Dept: LAB | Facility: HOSPITAL | Age: 59
End: 2024-11-21
Attending: INTERNAL MEDICINE
Payer: COMMERCIAL

## 2024-11-21 ENCOUNTER — OFFICE VISIT (OUTPATIENT)
Dept: ENDOCRINOLOGY | Facility: CLINIC | Age: 59
End: 2024-11-21
Payer: COMMERCIAL

## 2024-11-21 VITALS
SYSTOLIC BLOOD PRESSURE: 109 MMHG | DIASTOLIC BLOOD PRESSURE: 76 MMHG | OXYGEN SATURATION: 100 % | BODY MASS INDEX: 22.11 KG/M2 | WEIGHT: 138.88 LBS | HEART RATE: 86 BPM

## 2024-11-21 DIAGNOSIS — M85.89 OSTEOPENIA OF MULTIPLE SITES: ICD-10-CM

## 2024-11-21 DIAGNOSIS — E04.2 MULTIPLE THYROID NODULES: ICD-10-CM

## 2024-11-21 DIAGNOSIS — E04.2 MULTIPLE THYROID NODULES: Primary | ICD-10-CM

## 2024-11-21 DIAGNOSIS — N25.81 SECONDARY HYPERPARATHYROIDISM OF RENAL ORIGIN: Primary | ICD-10-CM

## 2024-11-21 DIAGNOSIS — N25.81 SECONDARY HYPERPARATHYROIDISM OF RENAL ORIGIN: ICD-10-CM

## 2024-11-21 LAB
25(OH)D3+25(OH)D2 SERPL-MCNC: 66 NG/ML (ref 30–96)
ALBUMIN SERPL BCP-MCNC: 3.8 G/DL (ref 3.5–5.2)
ANION GAP SERPL CALC-SCNC: 16 MMOL/L (ref 8–16)
BUN SERPL-MCNC: 10 MG/DL (ref 6–20)
CALCIUM SERPL-MCNC: 9.7 MG/DL (ref 8.7–10.5)
CHLORIDE SERPL-SCNC: 96 MMOL/L (ref 95–110)
CO2 SERPL-SCNC: 30 MMOL/L (ref 23–29)
CREAT SERPL-MCNC: 4.5 MG/DL (ref 0.5–1.4)
EST. GFR  (NO RACE VARIABLE): 10.6 ML/MIN/1.73 M^2
GLUCOSE SERPL-MCNC: 67 MG/DL (ref 70–110)
PHOSPHATE SERPL-MCNC: 4 MG/DL (ref 2.7–4.5)
POTASSIUM SERPL-SCNC: 4.1 MMOL/L (ref 3.5–5.1)
PTH-INTACT SERPL-MCNC: 685.7 PG/ML (ref 9–77)
SODIUM SERPL-SCNC: 142 MMOL/L (ref 136–145)
TSH SERPL DL<=0.005 MIU/L-ACNC: 0.95 UIU/ML (ref 0.4–4)

## 2024-11-21 PROCEDURE — 99214 OFFICE O/P EST MOD 30 MIN: CPT | Mod: S$GLB,,, | Performed by: INTERNAL MEDICINE

## 2024-11-21 PROCEDURE — 82306 VITAMIN D 25 HYDROXY: CPT | Performed by: INTERNAL MEDICINE

## 2024-11-21 PROCEDURE — 99213 OFFICE O/P EST LOW 20 MIN: CPT | Mod: PBBFAC | Performed by: INTERNAL MEDICINE

## 2024-11-21 PROCEDURE — 99999 PR PBB SHADOW E&M-EST. PATIENT-LVL III: CPT | Mod: PBBFAC,,, | Performed by: INTERNAL MEDICINE

## 2024-11-21 PROCEDURE — 80069 RENAL FUNCTION PANEL: CPT | Performed by: INTERNAL MEDICINE

## 2024-11-21 PROCEDURE — 36415 COLL VENOUS BLD VENIPUNCTURE: CPT | Performed by: INTERNAL MEDICINE

## 2024-11-21 PROCEDURE — 83970 ASSAY OF PARATHORMONE: CPT | Performed by: INTERNAL MEDICINE

## 2024-11-21 PROCEDURE — 84443 ASSAY THYROID STIM HORMONE: CPT | Performed by: INTERNAL MEDICINE

## 2024-11-21 NOTE — PROGRESS NOTES
Subjective:          Chief Complaint:  Thyroid nodules    History of Present Illness    Jennifer Booth is a 59 y.o. female who presents for follow-up of thyroid nodules.  Patient was last seen in the clinic on 10/12/2023.      Thyroid nodules    Incidentally found on PET scan 8/2023: mildly hypermetabolic nodule posterior to the right thyroid lobe.  Hyperplastic parathyroid gland is a consideration.    US Thyroid 08/2023:   Right thyroid lobe measures 5.9 x 2.2 x 2.5 cm.  Left thyroid lobe measures 4.6 x 2.1 x 2.1 cm.  Isthmus measures 0.3 cm.    Thyroid demonstrates multiple cystic nodules, some containing internal colloid.      Two measured nodules posteriorly adjacent at the right lobe, one of which appears more heterogeneous hypoechoic with calcification measuring 2.3 x 0.7 x 1.3 and the other more rounded hypoechoic at 1.0 cm.     No evidence for cervical adenopathy.     Impression:     Two measured nodules posteriorly adjacent to the right thyroid lobe as detailed.    Overall findings which could relate to exophytic thyroid nodule(s) or parathyroid adenoma(s).      Continued follow-up recommended with additional assessment to include parathyroid protocol 4D CT or nuclear medicine sestamibi scan as warranted.      US thyroid 9/2024:     COMPARISON:  Nuclear medicine parathyroid scan with SPECT and CT 12/06/2023     FINDINGS:  The thyroid is normal in size.  Background thyroid parenchyma is normal.  Normal vascularity.     Right lobe of the thyroid measures 6.1 x 2.4 x 2.0 cm.     Multiple additional subcentimeter thyroid nodules with internal calcification that do not meet criteria for follow-up.     Isthmus measures 0.3 cm.     Left lobe of the thyroid measures 4.6 x 2.3 x 2.3 cm.     Multiple subcentimeter thyroid nodules that do not meet criteria for further follow-up.     Cervical lymph nodes demonstrate normal morphology and size.     There is an anechoic lesion at the superior aspect of the left thyroid  lobe with internal echoes and internal Doppler signal that appears separate from the thyroid gland.  This likely represents lymph node or possible parathyroid adenoma in the proper clinical context.     Impression:     Multiple subcentimeter thyroid nodules that do not meet criteria for further follow-up.     Lymph node or possible parathyroid adenoma superior to left thyroid lobe.  Clinical correlation is recommended.      Prior FNA biopsy:  Denies    Thyroid function test:  Normal    Neck symptoms:  Denies compressive neck symptoms    Family history of thyroid disease:  Denies      Lab Results   Component Value Date    TSH 0.963 2024    TSH 1.342 2023    TSH 1.389 2022    TSH 0.697 2011    TSH 0.993 2011    FREET4 0.76 2023    FREET4 1.00 2011       Hyperparathyroidism    Likely secondary to ESRD  Patient has history of ESRD since 2011, hemodialysis on Tuesday, Thursday and Saturday.  Follows Nephrology - Dr. Sal at [facility], every 6 months.   Also sees Dr. Matheus Galaviz.   On Sensipar 90 mg daily per notes, care everywhere.   Gets calcitriol during her dialysis.     Patient has transient hypercalcemia, highest corrected calcium of 12 on 2023.  Since then corrected calcium has ranged between 9-10.6.  Patient had elevated PTH of 79 in .  PTH of 1149 in 2019, 306 in 2020 and 627 in 2022.    Denies current or previous use of thiazides or lithium.    Supplements:  VitD 1000 units  Ca None    Symptoms: (if not marked patient denied)  [x] nephrolithiasis  [] kidney injury  [] polyuria  [] abd pain  [] fragility fracture  [] bone pain  [] mood disturbance  [] DENIES ALL    Abd/kidney imagin2023  Upper abdomen: A 2 mm nonobstructing left renal stone or cortical calcification, otherwise unremarkable.    NM parathyroid 2023:    FINDINGS:  There is physiological tracer uptake in the myocardium, salivary glands, and thyroid gland. Delayed images  demonstrate near-complete tracer washout from the thyroid.     Focal abnormal persistent uptake is present near the inferior aspect of the right lobe of thyroid.     Impression:     Focal persistent uptake near inferior aspect right lobe of thyroid on delayed images, suggestive of parathyroid adenoma.    Hx of malignancy: Multiple myeloma,in remission, follows up with Hematology/Oncology at [facility]      Lab Results   Component Value Date    CALCIUM 10.5 09/25/2024    ALBUMIN 3.9 09/25/2024    EGFRNORACEVR 5.6 (A) 09/25/2024    PHOS 6.5 (H) 09/25/2024    ALKPHOS 160 (H) 09/25/2024    VYIBJDNE25RH 34 06/19/2012    .5 (H) 09/25/2024         Osteopenia    T-score  Year Lumbar Spine Left femoral neck Right Femoral neck   05/2022 -1.3 -2.3 -1.9       FRAX 10 year probability of fracture:   Major Osteoporotic Fracture:  6.6%   Hip Fracture:  1.8%    History of previous fracture: No.  Has history of rib, toe and finger fracture.  Parent with fractured hip: No  Smoking status: Yes  Glucocorticoid use: No  History of RA: No  Alcohol 3 or more/day: No  Nephrolithiasis: Yes  Diabetes: No  Frequent falls: No  Loss of height: No  Menopause: Early 50s    Dental visit: For 2 years    GERD: Yes on PPI    Family history:   Hyperparathyroidism: No  Malignancy: Yes Great aunt had colon cancer  Osteoporosis: No      ROS:   As above    Objective:     /76 (BP Location: Right arm, Patient Position: Sitting)   Pulse 86   Wt 63 kg (138 lb 14.2 oz)   LMP 01/01/2016 (Approximate) Comment: 2016  SpO2 100%   BMI 22.11 kg/m²     Body mass index is 22.11 kg/m².    Physical Exam  Constitutional:       General: She is not in acute distress.     Appearance: She is not ill-appearing.   Eyes:      Conjunctiva/sclera: Conjunctivae normal.   Cardiovascular:      Rate and Rhythm: Normal rate.   Pulmonary:      Effort: Pulmonary effort is normal.   Musculoskeletal:      Cervical back: Neck supple.   Neurological:      Mental Status: She  is alert and oriented to person, place, and time.         Lab Review:   Lab Results   Component Value Date    HGBA1C 4.6 02/10/2021     Lab Results   Component Value Date    CHOL 188 09/25/2024    HDL 44 09/25/2024    LDLCALC 101.4 09/25/2024    TRIG 213 (H) 09/25/2024    CHOLHDL 23.4 09/25/2024     Lab Results   Component Value Date     09/25/2024    K 4.3 09/25/2024    CL 92 (L) 09/25/2024    CO2 31 (H) 09/25/2024    GLU 88 09/25/2024    BUN 39 (H) 09/25/2024    CREATININE 7.7 (H) 09/25/2024    CALCIUM 10.5 09/25/2024    PROT 8.0 09/25/2024    ALBUMIN 3.9 09/25/2024    BILITOT 0.5 09/25/2024    ALKPHOS 160 (H) 09/25/2024    AST 14 09/25/2024    ALT 16 09/25/2024    ANIONGAP 15 09/25/2024    EGFRNORACEVR 5.6 (A) 09/25/2024    TSH 0.963 01/08/2024        Vit D, 25-Hydroxy   Date Value Ref Range Status   06/19/2012 34 30 - 100 ng/mL Final     Comment:     Vitamin D, 25-Hydroxy:   Vitamin D deficiency <10 ng/mL   Vitamin D insufficiency 10-30 ng/mL   Vitamin D sufficiency >30 ng/mL   Vitamin D potential toxicity >100 ng/mL        Assessment and Plan      1. Multiple thyroid nodules  Assessment & Plan:    US thyroid 8/2023: multiple cystic nodules, some containing internal colloid.    US thyroid done for follow up on abnormal PET.   Repeat ultrasound in 09/2024 showed multiple subcentimeter thyroid nodules that do not meet criteria for further follow-up.  Lymph node or possible parathyroid adenoma superior to the left thyroid lobe.  Patient is clinically and biochemically euthyroid.   Denies any compressive neck symptoms.   Will monitor TFTs and ultrasound thyroid.                2. Secondary hyperparathyroidism of renal origin  Assessment & Plan:    Patient following with her nephrologist.   On Sensipar.   Vitamin D and calcium recommendations as per her nephrologist.     NM parathyroid: Focal persistent uptake near inferior aspect right lobe of thyroid on delayed images, suggestive of parathyroid  adenoma.      3. Osteopenia of multiple sites  Assessment & Plan:    No history of fragility fractures.   Her FRAX score show low risk of fracture.  Discussed her fracture risk with osteopenia and advised to take recommended precautions.  Recommend starting treatment if she has any fragility fractures, increased fracture risk or T scores are <-2.5    Repeat DXA scan.    Encouraged safe movements and fall precautions  Continue routine dental visits  Instructed on Calcium and vitamin D   Labs ordered.                 Annia ANNE Rai, MD

## 2024-11-22 ENCOUNTER — OFFICE VISIT (OUTPATIENT)
Dept: FAMILY MEDICINE | Facility: CLINIC | Age: 59
End: 2024-11-22
Attending: FAMILY MEDICINE
Payer: COMMERCIAL

## 2024-11-22 VITALS
BODY MASS INDEX: 22.64 KG/M2 | SYSTOLIC BLOOD PRESSURE: 110 MMHG | HEART RATE: 68 BPM | OXYGEN SATURATION: 98 % | DIASTOLIC BLOOD PRESSURE: 64 MMHG | WEIGHT: 142.19 LBS

## 2024-11-22 DIAGNOSIS — I12.9 RENAL HYPERTENSION: ICD-10-CM

## 2024-11-22 DIAGNOSIS — Z99.2 ESRD (END STAGE RENAL DISEASE) ON DIALYSIS: ICD-10-CM

## 2024-11-22 DIAGNOSIS — F41.1 GAD (GENERALIZED ANXIETY DISORDER): ICD-10-CM

## 2024-11-22 DIAGNOSIS — F32.89 OTHER DEPRESSION: ICD-10-CM

## 2024-11-22 DIAGNOSIS — M65.331 TRIGGER MIDDLE FINGER OF RIGHT HAND: ICD-10-CM

## 2024-11-22 DIAGNOSIS — N18.6 ESRD (END STAGE RENAL DISEASE) ON DIALYSIS: ICD-10-CM

## 2024-11-22 DIAGNOSIS — N25.81 SECONDARY HYPERPARATHYROIDISM OF RENAL ORIGIN: ICD-10-CM

## 2024-11-22 DIAGNOSIS — N18.5 CKD (CHRONIC KIDNEY DISEASE) STAGE 5, GFR LESS THAN 15 ML/MIN: ICD-10-CM

## 2024-11-22 DIAGNOSIS — C90.01 MULTIPLE MYELOMA IN REMISSION: Primary | ICD-10-CM

## 2024-11-22 DIAGNOSIS — Z23 FLU VACCINE NEED: ICD-10-CM

## 2024-11-22 PROCEDURE — 99999 PR PBB SHADOW E&M-EST. PATIENT-LVL IV: CPT | Mod: PBBFAC,,, | Performed by: FAMILY MEDICINE

## 2024-11-22 RX ORDER — HYDROCODONE BITARTRATE AND ACETAMINOPHEN 5; 325 MG/1; MG/1
1 TABLET ORAL EVERY 6 HOURS PRN
Qty: 30 TABLET | Refills: 0 | Status: SHIPPED | OUTPATIENT
Start: 2024-11-22

## 2024-11-22 NOTE — PROGRESS NOTES
Subjective     Patient ID: Jennifer Booth is a 59 y.o. female.    Chief Complaint: Follow-up (Pt would like flu vac )    58 yo F with PMH significant for Multiple Myeloma in remission, ESRD on HD, AoCKD, HTN, GERD, MDD/Anxiety,  Constipation, and tobacco use, presents today for her follow up. C/o right pain in fingers and they are stuck sometimes/also need refill of atarax. No trauma or fall.        Chronic Diseases  Multiple Myeloma in remission: Followed by [facility]; last visit 5/13/19. Shecompleted bortezomib/dexamethasone/Revlimid 6 cycles on 04/13/2012 for the multiple myeloma kappa light chain disease, IPSS stage III. She underwent bortezomib maintenance therapy three weeks on, one week off (05/15, 05/22 and 05/29) with her last cycle received on 05/29/2012. She was followed at [facility] and was diagnosed with DRESS syndrome during hospitalization and then [facility] team elected not to proceed with auto SCT. Velcade maintenance was discontinued due to side effects. MRI T and L spine 6/29/2018 - no evidence of myeloma involvement  SPEP 5/7/2019- no monoclonal peaks. Has upcoming appt on 8/14/19    ESRD on HD:  She is followed by a nephrologist at [facility].  Receives hemodialysis on Tuesdays/Thursdays/Saturdays. Iinitially diagnosed with advanced kidney disease secondary to multiple myeloma & HTN.  She was diagnosed with NANETTE from MM in 2010 that required initiation of dialysis.  She started chronic dialysis on 12/23/11 and ended on 8/28/12. She then underwent chemotherapy for her myeloma, and stopped dialysis in 2013.  Kidney biopsy performed 2017due to worsening  Kidney--revealed glomerulosclerosis and no evidence of MM. Her most recent visit with renal transplant at [facility] was 6/14/19. Patient met selection criteria for kidney transplant related to ESRD due to Hypertensive Nephrosclerosis. During most recent visit, she was placed on inactive status as she was deemed a high risk candidate  for kidney transplant due to lack of caregiver support and financial hardship. Plans for caregivers to be her daughter (currently in third trimester of pregnancy) and her daughter's fiance. Pt is required to attend appt with caregiver.Her next appt with transplant is scheduled for 5/19/2020. She plans to f/u with her daughter prior to this time.     CKD:  Followed by [facility].  Receives Procrit infusions during HD.     HTN: controlled. Adherent with Coreg 12.5 mg BID    She continues to smoke cigarettes. Contemplative stage of quitting. Referred to tobacco cessation at previous visit, but has not scheduled appointment.     Follow-up  Associated symptoms include arthralgias, congestion, headaches and myalgias. Pertinent negatives include no chest pain, diaphoresis, nausea or sore throat.     Review of Systems   Constitutional: Negative.  Negative for activity change, diaphoresis and unexpected weight change.   HENT:  Positive for nasal congestion, rhinorrhea and sinus pressure/congestion. Negative for ear discharge, hearing loss, sore throat and voice change.    Eyes: Negative.  Negative for pain, discharge and visual disturbance.   Respiratory: Negative.  Negative for chest tightness, shortness of breath and wheezing.    Cardiovascular: Negative.  Negative for chest pain.   Gastrointestinal: Negative.  Negative for abdominal distention, anal bleeding, constipation and nausea.   Endocrine: Negative.  Negative for cold intolerance, polydipsia and polyuria.   Genitourinary: Negative.  Negative for decreased urine volume, difficulty urinating, dysuria, frequency, menstrual problem and vaginal pain.   Musculoskeletal:  Positive for arthralgias and myalgias. Negative for gait problem.   Integumentary:  Negative for color change, pallor and wound. Negative.   Allergic/Immunologic: Negative.  Negative for environmental allergies and immunocompromised state.   Neurological:  Positive for headaches.  Negative for dizziness, tremors, seizures and speech difficulty.   Hematological: Negative.  Negative for adenopathy. Does not bruise/bleed easily.   Psychiatric/Behavioral: Negative.  Negative for agitation, confusion, decreased concentration, hallucinations, self-injury and suicidal ideas. The patient is not nervous/anxious.      Past Medical History:   Diagnosis Date    Addiction to drug     Alcohol abuse     Allergic drug reaction 2017    Anemia     Anxiety     Arm pain, left 2014    Breast cyst     Chronic renal failure 2014    CKD (chronic kidney disease) stage 5, GFR less than 15 ml/min     COPD exacerbation 2023    Depression     Dialysis patient     dialysis [id number]-[id number]    Encounter for blood transfusion     GERD (gastroesophageal reflux disease)     Hx of psychiatric care     Hypertension     Mood swings     Multiple myeloma in remission 10/26/2012    per Hem[facility]c note    Multiple thyroid nodules 10/22/2023    Psychiatric exam requested by authority     Psychiatric problem     Renal hypertension     Status post dialysis 2012    Therapy     Thrombocytopenia 2012       Past Surgical History:   Procedure Laterality Date    AV FISTULA PLACEMENT Left     BREAST CYST ASPIRATION Left     BREAST CYST EXCISION Left     CERVICAL SPINE SURGERY Bilateral      SECTION      x1    COLONOSCOPY N/A 2022    Procedure: COLONOSCOPY;  Surgeon: Jordan Mcguire MD;  Location: [facility] ENDO;  Service: Endoscopy;  Laterality: N/A;    FISTULOGRAM N/A 2020    Procedure: Fistulogram;  Surgeon: Rahat Berger MD;  Location: [facility] CATH LAB/EP;  Service: Cardiology;  Laterality: N/A;    FISTULOGRAM Left 2024    Procedure: Fistulogram LEFT FOREARM;  Surgeon: Matheus Galaviz MD;  Location: [facility] CATH LAB;  Service: Nephrology;  Laterality: Left;    FISTULOGRAM Left 2024    Procedure: Fistulogram;  Surgeon: Matheus Galaviz MD;  Location: [facility] CATH LAB;  Service: Nephrology;   Laterality: Left;    INSERTION OF CATHETER FOR HEMODIALYSIS N/A 05/14/2024    Procedure: INSERTION, CATHETER, HEMODIALYSIS SINGLE LUMEN;  Surgeon: Mukesh Gonsales Jr., MD;  Location: [facility] OR;  Service: General;  Laterality: N/A;    pd catheter      PERCUTANEOUS TRANSLUMINAL ANGIOPLASTY OF ARTERIOVENOUS FISTULA N/A 06/09/2020    Procedure: PTA, AV FISTULA;  Surgeon: Rahat Berger MD;  Location: [facility] CATH LAB/EP;  Service: Cardiology;  Laterality: N/A;    PERITONEAL CATHETER REMOVAL N/A 11/30/2018    Procedure: REMOVAL, CATHETER, DIALYSIS, PERITONEAL;  Surgeon: Mukesh Gonsales Jr., MD;  Location: [facility] OR;  Service: General;  Laterality: N/A;    RENAL BIOPSY  2016    THROMBECTOMY OF HEMODIALYSIS ACCESS SITE N/A 06/09/2020    Procedure: Thrombectomy, Hemodialysis Graft Or Fistula;  Surgeon: Rahat Berger MD;  Location: [facility] CATH LAB/EP;  Service: Cardiology;  Laterality: N/A;    THROMBECTOMY OF HEMODIALYSIS ACCESS SITE Left 05/14/2024    Procedure: THROMBECTOMY, HEMODIALYSIS GRAFT OR FISTULA / left lower AV/FISTULA;  Surgeon: Matheus Galaviz MD;  Location: [facility] CATH LAB;  Service: Nephrology;  Laterality: Left;    VASCULAR SURGERY  08/2012    dialysis catheter to right subclavian       Family History   Problem Relation Name Age of Onset    Hypertension Mother      Heart failure Mother      Ovarian cancer Maternal Aunt      Diabetes Maternal Grandmother      Stroke Maternal Grandmother      Breast cancer Neg Hx      Colon cancer Neg Hx         Social History     Socioeconomic History    Marital status: Single    Number of children: 1   Occupational History    Occupation: [employer]    Tobacco Use    Smoking status: Former    Smokeless tobacco: Never   Substance and Sexual Activity    Alcohol use: Not Currently    Drug use: Not Currently     Types: Marijuana    Sexual activity: Not Currently     Partners: Male   Social History Narrative    Works FT; likes theatre. Lives alone.     Social Drivers of Health     Financial  Resource Strain: Low Risk  (10/1/2023)    Overall Financial Resource Strain (CARDIA)     Difficulty of Paying Living Expenses: Not very hard   Food Insecurity: No Food Insecurity (10/1/2023)    Hunger Vital Sign     Worried About Running Out of Food in the Last Year: Never true     Ran Out of Food in the Last Year: Never true   Transportation Needs: No Transportation Needs (10/1/2023)    PRAPARE - Transportation     Lack of Transportation (Medical): No     Lack of Transportation (Non-Medical): No   Physical Activity: Insufficiently Active (10/1/2023)    Exercise Vital Sign     Days of Exercise per Week: 4 days     Minutes of Exercise per Session: 20 min   Stress: Stress Concern Present (10/1/2023)    Citizen of [place] [employer] - Occupational Stress Questionnaire     Feeling of Stress : To some extent   Housing Stability: Low Risk  (10/1/2023)    Housing Stability Vital Sign     Unable to Pay for Housing in the Last Year: No     Number of Places Lived in the Last Year: 1     Unstable Housing in the Last Year: No       Current Outpatient Medications   Medication Sig Dispense Refill    acetaminophen (TYLENOL) 325 MG tablet Take 650 mg by mouth every 4 (four) hours as needed.      buPROPion (WELLBUTRIN XL) 150 MG TB24 tablet TAKE 1 TABLET(150 MG) BY MOUTH EVERY DAY 90 tablet 3    cinacalcet (SENSIPAR) 30 MG Tab Take 90 mg by mouth once daily.      clopidogreL (PLAVIX) 75 mg tablet TAKE 1 TABLET(75 MG) BY MOUTH EVERY DAY 90 tablet 3    EScitalopram oxalate (LEXAPRO) 20 MG tablet TAKE 1 TABLET(20 MG) BY MOUTH EVERY DAY 90 tablet 2    fluticasone propionate (FLONASE) 50 mcg/actuation nasal spray 1 spray by Each Nostril route daily as needed.      hydrALAZINE (APRESOLINE) 25 MG tablet Take 25 mg by mouth daily as needed.      hydrOXYzine HCL (ATARAX) 25 MG tablet Take 1 tablet (25 mg total) by mouth 4 (four) times daily as needed for Itching. 60 tablet 3    isosorbide mononitrate (IMDUR) 60 MG 24 hr tablet  Take 60 mg by mouth once daily.      metoprolol succinate (TOPROL-XL) 25 MG 24 hr tablet Take 1 tablet (25 mg total) by mouth once daily. 90 tablet 3    mirtazapine (REMERON) 15 MG tablet Take 1 tablet (15 mg total) by mouth every evening. 90 tablet 1    montelukast (SINGULAIR) 10 mg tablet TAKE 1 TABLET(10 MG) BY MOUTH EVERY EVENING (Patient taking differently: Take 10 mg by mouth every evening.) 90 tablet 3    omeprazole (PRILOSEC) 40 MG capsule Take 40 mg by mouth once daily.      sacubitriL-valsartan (ENTRESTO)  mg per tablet Take 1 tablet by mouth 2 (two) times daily. (Patient taking differently: Take 1 tablet by mouth once daily.) 180 tablet 3    sevelamer carbonate (RENVELA) 800 mg Tab Take 800 mg by mouth 3 (three) times daily with meals.      sucroferric oxyhydroxide (VELPHORO) 500 mg Chew Take 1,000 mg by mouth 3 (three) times daily.      HYDROcodone-acetaminophen (NORCO) 5-325 mg per tablet Take 1 tablet by mouth every 6 (six) hours as needed for Pain. 30 tablet 0     No current facility-administered medications for this visit.     Facility-Administered Medications Ordered in Other Visits   Medication Dose Route Frequency Provider Last Rate Last Admin    0.9%  NaCl infusion   Intravenous Continuous Jordan Mcguire MD 20 mL/hr at 07/18/22 0943 New Bag at 07/18/22 0943    0.9%  NaCl infusion   Intravenous Continuous Jordan Mcguire MD   Stopped at 08/18/22 1042    sodium chloride 0.9% flush 10 mL  10 mL Intravenous PRN Jordan Mcguire MD        sodium chloride 0.9% flush 10 mL  10 mL Intravenous PRN Jordan Mcguire MD           Review of patient's allergies indicates:   Allergen Reactions    Doxycycline Swelling, Rash and Hives    Gentamicin Anaphylaxis    Vancomycin analogues Anaphylaxis          Objective   Vitals:    11/22/24 0856   BP: 110/64   Pulse: 68       Physical Exam  Constitutional:       General: She is not in acute distress.     Appearance: She is well-developed. She is not  diaphoretic.   HENT:      Head: Normocephalic and atraumatic.      Right Ear: External ear normal.      Left Ear: External ear normal.      Nose: Nose normal.      Mouth/Throat:      Pharynx: No oropharyngeal exudate.   Eyes:      General: No scleral icterus.        Right eye: No discharge.         Left eye: No discharge.      Conjunctiva/sclera: Conjunctivae normal.      Pupils: Pupils are equal, round, and reactive to light.   Neck:      Thyroid: No thyromegaly.      Vascular: No JVD.      Trachea: No tracheal deviation.   Cardiovascular:      Rate and Rhythm: Normal rate and regular rhythm.      Heart sounds: Normal heart sounds. No murmur heard.     No friction rub. No gallop.   Pulmonary:      Effort: Pulmonary effort is normal.      Breath sounds: Normal breath sounds. No stridor. No wheezing or rales.   Chest:      Chest wall: No tenderness.   Abdominal:      General: Bowel sounds are normal. There is no distension.      Palpations: Abdomen is soft. There is no mass.      Tenderness: There is no abdominal tenderness. There is no guarding or rebound.      Hernia: No hernia is present.   Musculoskeletal:         General: Tenderness (mild TTP right two middle fingers and trigger finger+) present. Normal range of motion.      Cervical back: Normal range of motion and neck supple.      Comments: LUE - dialysis fistula   Lymphadenopathy:      Cervical: No cervical adenopathy.   Skin:     General: Skin is warm and dry.      Coloration: Skin is not pale.      Findings: No erythema or rash.   Neurological:      Mental Status: She is alert and oriented to person, place, and time.      Cranial Nerves: No cranial nerve deficit.      Motor: No abnormal muscle tone.      Coordination: Coordination normal.      Deep Tendon Reflexes: Reflexes are normal and symmetric. Reflexes normal.   Psychiatric:         Behavior: Behavior normal.         Thought Content: Thought content normal.         Judgment: Judgment normal.             Assessment and Plan     1. Multiple myeloma in remission  -     HYDROcodone-acetaminophen (NORCO) 5-325 mg per tablet; Take 1 tablet by mouth every 6 (six) hours as needed for Pain.  Dispense: 30 tablet; Refill: 0    2. Flu vaccine need  -     influenza (Flulaval, Fluzone, Fluarix) 45 mcg/0.5 mL IM vaccine (> or = 6 mo) 0.5 mL    3. SATNAM (generalized anxiety disorder)    4. Other depression    5. CKD (chronic kidney disease) stage 5, GFR less than 15 ml/min    6. Renal hypertension    7. ESRD (end stage renal disease) on dialysis    8. Secondary hyperparathyroidism of renal origin    9. Trigger middle finger of right hand  -     HYDROcodone-acetaminophen (NORCO) 5-325 mg per tablet; Take 1 tablet by mouth every 6 (six) hours as needed for Pain.  Dispense: 30 tablet; Refill: 0      ESRD on HD  -T/Thu/Sat    SATNAM/depression  -controlled - multiple meds  -atarax prn    Smoking cessation  -1 min counseling done  -she will quit soon     MM  -follows specialist  -stable    Thrombocytopenia  -monitor, stable    Trigger finger  -ice. Aspercreme prn          Spent adequate time in obtaining history and explaining differentials    30 minutes spent during this visit of which greater than 50% devoted to face-face counseling and coordination of care regarding diagnosis and management plan           Follow up in about 6 months (around 5/22/2025), or if symptoms worsen or fail to improve.

## 2024-11-25 ENCOUNTER — TELEPHONE (OUTPATIENT)
Dept: PSYCHIATRY | Facility: CLINIC | Age: 59
End: 2024-11-25
Payer: MEDICARE

## 2024-11-26 ENCOUNTER — HOSPITAL ENCOUNTER (OUTPATIENT)
Dept: RADIOLOGY | Facility: HOSPITAL | Age: 59
Discharge: HOME | End: 2024-11-26
Attending: INTERNAL MEDICINE
Payer: COMMERCIAL

## 2024-11-26 DIAGNOSIS — M85.89 OSTEOPENIA OF MULTIPLE SITES: ICD-10-CM

## 2024-11-26 DIAGNOSIS — N25.81 SECONDARY HYPERPARATHYROIDISM OF RENAL ORIGIN: ICD-10-CM

## 2024-11-26 PROCEDURE — 77080 DXA BONE DENSITY AXIAL: CPT | Mod: TC

## 2024-11-26 PROCEDURE — 77080 DXA BONE DENSITY AXIAL: CPT | Mod: 26,,, | Performed by: RADIOLOGY

## 2024-12-16 DIAGNOSIS — I50.20 HFREF (HEART FAILURE WITH REDUCED EJECTION FRACTION): ICD-10-CM

## 2024-12-16 RX ORDER — SACUBITRIL AND VALSARTAN 97; 103 MG/1; MG/1
1 TABLET, FILM COATED ORAL 2 TIMES DAILY
Qty: 180 TABLET | Refills: 3 | Status: SHIPPED | OUTPATIENT
Start: 2024-12-16 | End: 2025-12-16

## 2024-12-16 NOTE — TELEPHONE ENCOUNTER
----- Message from Dorothea sent at 12/16/2024 11:45 AM CST -----  Type:  RX Refill Request    Who Called: Pt   Refill or New Rx: Refill   RX Name and Strength: sacubitriL-valsartan (ENTRESTO)  mg per tablet  Preferred Pharmacy with phone number:  [facility] [place] [place] - [facility] AT [address] to [facility] [place] [address]  Phone: [phone] Fax: [phone]  Local or Mail Order: Mail order   Ordering Provider: Laura   Would the patient rather a call back or a response via [facility]? Call back   Best Call Back Number: [phone]  Additional Information: Please be advised, pt states that she sent a refill request already, but it was for the wrong pharmacy, pt states to send it to the pharmacy listed above

## 2024-12-16 NOTE — TELEPHONE ENCOUNTER
----- Message from Claudia sent at 12/16/2024 11:25 AM CST -----  Regarding: refill med  Contact: Pt  Pt calling in regards to medication    Needs refill: sacubitriL-valsartan (ENTRESTO)  mg per tablet       Pharmacy: Preferred : [place] [address]  Phone [phone]    Patient states , she needs medication soon as possible    Please call,     phone [phone]    Thank You

## 2024-12-30 PROBLEM — M85.89 OSTEOPENIA OF MULTIPLE SITES: Status: ACTIVE | Noted: 2021-03-08

## 2024-12-30 NOTE — ASSESSMENT & PLAN NOTE
US thyroid 8/2023: multiple cystic nodules, some containing internal colloid.    US thyroid done for follow up on abnormal PET.   Repeat ultrasound in 09/2024 showed multiple subcentimeter thyroid nodules that do not meet criteria for further follow-up.  Lymph node or possible parathyroid adenoma superior to the left thyroid lobe.  Patient is clinically and biochemically euthyroid.   Denies any compressive neck symptoms.   Will monitor TFTs and ultrasound thyroid.

## 2024-12-30 NOTE — ASSESSMENT & PLAN NOTE
Patient following with her nephrologist.   On Sensipar.   Vitamin D and calcium recommendations as per her nephrologist.     NM parathyroid: Focal persistent uptake near inferior aspect right lobe of thyroid on delayed images, suggestive of parathyroid adenoma.

## 2024-12-30 NOTE — ASSESSMENT & PLAN NOTE
No history of fragility fractures.   Her FRAX score show low risk of fracture.  Discussed her fracture risk with osteopenia and advised to take recommended precautions.  Recommend starting treatment if she has any fragility fractures, increased fracture risk or T scores are <-2.5    Repeat DXA scan.    Encouraged safe movements and fall precautions  Continue routine dental visits  Instructed on Calcium and vitamin D   Labs ordered.

## 2025-01-06 ENCOUNTER — LAB VISIT (OUTPATIENT)
Dept: LAB | Facility: HOSPITAL | Age: 60
End: 2025-01-06
Attending: INTERNAL MEDICINE
Payer: MEDICARE

## 2025-01-06 DIAGNOSIS — C90.00 MULTIPLE MYELOMA, REMISSION STATUS UNSPECIFIED: ICD-10-CM

## 2025-01-06 LAB
ALBUMIN SERPL BCP-MCNC: 3.6 G/DL (ref 3.5–5.2)
ALP SERPL-CCNC: 103 U/L (ref 40–150)
ALT SERPL W/O P-5'-P-CCNC: 10 U/L (ref 10–44)
ANION GAP SERPL CALC-SCNC: 19 MMOL/L (ref 8–16)
AST SERPL-CCNC: 15 U/L (ref 10–40)
BASOPHILS # BLD AUTO: 0.02 K/UL (ref 0–0.2)
BASOPHILS NFR BLD: 0.5 % (ref 0–1.9)
BILIRUB SERPL-MCNC: 0.5 MG/DL (ref 0.1–1)
BUN SERPL-MCNC: 43 MG/DL (ref 6–20)
CALCIUM SERPL-MCNC: 9.6 MG/DL (ref 8.7–10.5)
CHLORIDE SERPL-SCNC: 92 MMOL/L (ref 95–110)
CO2 SERPL-SCNC: 27 MMOL/L (ref 23–29)
CREAT SERPL-MCNC: 9.6 MG/DL (ref 0.5–1.4)
DIFFERENTIAL METHOD BLD: ABNORMAL
EOSINOPHIL # BLD AUTO: 0.1 K/UL (ref 0–0.5)
EOSINOPHIL NFR BLD: 1.6 % (ref 0–8)
ERYTHROCYTE [DISTWIDTH] IN BLOOD BY AUTOMATED COUNT: 15.9 % (ref 11.5–14.5)
EST. GFR  (NO RACE VARIABLE): 4 ML/MIN/1.73 M^2
FERRITIN SERPL-MCNC: 1815 NG/ML (ref 20–300)
GLUCOSE SERPL-MCNC: 69 MG/DL (ref 70–110)
HCT VFR BLD AUTO: 34.5 % (ref 37–48.5)
HGB BLD-MCNC: 11.1 G/DL (ref 12–16)
IGA SERPL-MCNC: 294 MG/DL (ref 40–350)
IGG SERPL-MCNC: 1003 MG/DL (ref 650–1600)
IGM SERPL-MCNC: 242 MG/DL (ref 50–300)
IMM GRANULOCYTES # BLD AUTO: 0.01 K/UL (ref 0–0.04)
IMM GRANULOCYTES NFR BLD AUTO: 0.2 % (ref 0–0.5)
IRON SERPL-MCNC: 104 UG/DL (ref 30–160)
LYMPHOCYTES # BLD AUTO: 1.7 K/UL (ref 1–4.8)
LYMPHOCYTES NFR BLD: 39.5 % (ref 18–48)
MCH RBC QN AUTO: 28 PG (ref 27–31)
MCHC RBC AUTO-ENTMCNC: 32.2 G/DL (ref 32–36)
MCV RBC AUTO: 87 FL (ref 82–98)
MONOCYTES # BLD AUTO: 0.3 K/UL (ref 0.3–1)
MONOCYTES NFR BLD: 6.7 % (ref 4–15)
NEUTROPHILS # BLD AUTO: 2.2 K/UL (ref 1.8–7.7)
NEUTROPHILS NFR BLD: 51.5 % (ref 38–73)
NRBC BLD-RTO: 0 /100 WBC
PLATELET # BLD AUTO: 165 K/UL (ref 150–450)
PMV BLD AUTO: 10 FL (ref 9.2–12.9)
POTASSIUM SERPL-SCNC: 4.3 MMOL/L (ref 3.5–5.1)
PROT SERPL-MCNC: 7.2 G/DL (ref 6–8.4)
RBC # BLD AUTO: 3.97 M/UL (ref 4–5.4)
SATURATED IRON: 44 % (ref 20–50)
SODIUM SERPL-SCNC: 138 MMOL/L (ref 136–145)
TOTAL IRON BINDING CAPACITY: 237 UG/DL (ref 250–450)
TRANSFERRIN SERPL-MCNC: 160 MG/DL (ref 200–375)
WBC # BLD AUTO: 4.33 K/UL (ref 3.9–12.7)

## 2025-01-06 PROCEDURE — 86334 IMMUNOFIX E-PHORESIS SERUM: CPT | Performed by: INTERNAL MEDICINE

## 2025-01-06 PROCEDURE — 84165 PROTEIN E-PHORESIS SERUM: CPT | Mod: 26,,, | Performed by: PATHOLOGY

## 2025-01-06 PROCEDURE — 82232 ASSAY OF BETA-2 PROTEIN: CPT | Performed by: INTERNAL MEDICINE

## 2025-01-06 PROCEDURE — 84165 PROTEIN E-PHORESIS SERUM: CPT | Performed by: INTERNAL MEDICINE

## 2025-01-06 PROCEDURE — 86334 IMMUNOFIX E-PHORESIS SERUM: CPT | Mod: 26,,, | Performed by: PATHOLOGY

## 2025-01-06 PROCEDURE — 82728 ASSAY OF FERRITIN: CPT | Performed by: INTERNAL MEDICINE

## 2025-01-06 PROCEDURE — 83521 IG LIGHT CHAINS FREE EACH: CPT | Mod: 59 | Performed by: INTERNAL MEDICINE

## 2025-01-06 PROCEDURE — 36415 COLL VENOUS BLD VENIPUNCTURE: CPT | Performed by: INTERNAL MEDICINE

## 2025-01-06 PROCEDURE — 83540 ASSAY OF IRON: CPT | Performed by: INTERNAL MEDICINE

## 2025-01-06 PROCEDURE — 82784 ASSAY IGA/IGD/IGG/IGM EACH: CPT | Mod: 59 | Performed by: INTERNAL MEDICINE

## 2025-01-06 PROCEDURE — 85025 COMPLETE CBC W/AUTO DIFF WBC: CPT | Performed by: INTERNAL MEDICINE

## 2025-01-06 PROCEDURE — 80053 COMPREHEN METABOLIC PANEL: CPT | Performed by: INTERNAL MEDICINE

## 2025-01-07 LAB
ALBUMIN SERPL ELPH-MCNC: 3.92 G/DL (ref 3.35–5.55)
ALPHA1 GLOB SERPL ELPH-MCNC: 0.35 G/DL (ref 0.17–0.41)
ALPHA2 GLOB SERPL ELPH-MCNC: 0.92 G/DL (ref 0.43–0.99)
B-GLOBULIN SERPL ELPH-MCNC: 0.78 G/DL (ref 0.5–1.1)
B2 MICROGLOB SERPL-MCNC: 28.5 UG/ML (ref 0–2.5)
GAMMA GLOB SERPL ELPH-MCNC: 1.02 G/DL (ref 0.67–1.58)
INTERPRETATION SERPL IFE-IMP: NORMAL
KAPPA LC SER QL IA: 29.14 MG/DL (ref 0.33–1.94)
KAPPA LC/LAMBDA SER IA: 1.23 (ref 0.26–1.65)
LAMBDA LC SER QL IA: 23.75 MG/DL (ref 0.57–2.63)
PATHOLOGIST INTERPRETATION IFE: NORMAL
PATHOLOGIST INTERPRETATION SPE: NORMAL
PROT SERPL-MCNC: 7 G/DL (ref 6–8.4)

## 2025-01-17 ENCOUNTER — PATIENT MESSAGE (OUTPATIENT)
Dept: PSYCHIATRY | Facility: CLINIC | Age: 60
End: 2025-01-17
Payer: MEDICARE

## 2025-01-21 ENCOUNTER — TELEPHONE (OUTPATIENT)
Dept: HEMATOLOGY/ONCOLOGY | Facility: CLINIC | Age: 60
End: 2025-01-21
Payer: MEDICARE

## 2025-01-28 ENCOUNTER — CLINICAL SUPPORT (OUTPATIENT)
Dept: PSYCHIATRY | Facility: CLINIC | Age: 60
End: 2025-01-28
Payer: MEDICARE

## 2025-01-28 DIAGNOSIS — F10.20 ALCOHOL USE DISORDER, MODERATE, DEPENDENCE: Primary | ICD-10-CM

## 2025-01-28 PROCEDURE — 90853 GROUP PSYCHOTHERAPY: CPT | Mod: ,,, | Performed by: SOCIAL WORKER

## 2025-02-04 DIAGNOSIS — Z99.2 ANEMIA IN CHRONIC KIDNEY DISEASE, ON CHRONIC DIALYSIS: ICD-10-CM

## 2025-02-04 DIAGNOSIS — N18.6 ANEMIA IN CHRONIC KIDNEY DISEASE, ON CHRONIC DIALYSIS: ICD-10-CM

## 2025-02-04 DIAGNOSIS — D63.1 ANEMIA IN CHRONIC KIDNEY DISEASE, ON CHRONIC DIALYSIS: ICD-10-CM

## 2025-02-04 DIAGNOSIS — C90.00 MULTIPLE MYELOMA, REMISSION STATUS UNSPECIFIED: Primary | ICD-10-CM

## 2025-02-11 ENCOUNTER — CLINICAL SUPPORT (OUTPATIENT)
Dept: PSYCHIATRY | Facility: CLINIC | Age: 60
End: 2025-02-11
Payer: MEDICARE

## 2025-02-11 DIAGNOSIS — R69 DIAGNOSIS DEFERRED: Primary | ICD-10-CM

## 2025-02-12 ENCOUNTER — PATIENT MESSAGE (OUTPATIENT)
Dept: FAMILY MEDICINE | Facility: CLINIC | Age: 60
End: 2025-02-12
Payer: MEDICARE

## 2025-02-12 ENCOUNTER — TELEPHONE (OUTPATIENT)
Dept: FAMILY MEDICINE | Facility: CLINIC | Age: 60
End: 2025-02-12
Payer: MEDICARE

## 2025-02-12 NOTE — TELEPHONE ENCOUNTER
Have called and sent portal messages  Pthas appt 2/21 and still has not responded   LMTCB again and another portal message

## 2025-02-13 NOTE — PROGRESS NOTES
Group Psychotherapy    Site: [facility]    Clinical status of patient: Outpatient    1/28/2025    Length of service:[id number]    Referred by: Addictive Behavior Unit     Number of patients in attendance: 7    Target symptoms: alcohol abuse, substance abuse    Patient's response to intervention:  The patient's response to intervention is active listening, self-disclosure.    Progress toward goals and other mental status changes:  The patient's progress toward goals is good.    Interval history: Pt presents a few minutes late to scheduled group- coming from dialysis. First group in three months, introduced herself to new group members. Pt processed experiences with holidays- reports she is maintaining her sobriety. Discussed her goals to return to group and prioritize her health. Group welcoming and supportive.     Diagnosis: Alcohol use disorder, in early remission; Cannabis Use Disorder, in early remission     Plan: group psychotherapy    Return to clinic: 1 week

## 2025-02-16 DIAGNOSIS — F41.1 GAD (GENERALIZED ANXIETY DISORDER): ICD-10-CM

## 2025-02-17 ENCOUNTER — TELEPHONE (OUTPATIENT)
Dept: FAMILY MEDICINE | Facility: CLINIC | Age: 60
End: 2025-02-17
Payer: MEDICARE

## 2025-02-17 DIAGNOSIS — F41.1 GAD (GENERALIZED ANXIETY DISORDER): ICD-10-CM

## 2025-02-17 RX ORDER — HYDROXYZINE HYDROCHLORIDE 25 MG/1
TABLET, FILM COATED ORAL
Qty: 60 TABLET | Refills: 3 | Status: SHIPPED | OUTPATIENT
Start: 2025-02-17 | End: 2025-02-17 | Stop reason: SDUPTHER

## 2025-02-17 NOTE — TELEPHONE ENCOUNTER
----- Message from Elizabeth sent at 2/17/2025  8:00 AM CST -----  Type:  RX Refill RequestWho Called: Pt Refill or New Rx:rx RX Name and Strength:hydrOXYzine HCL (ATARAX) 25 MG tabletPreferred Pharmacy with phone number:[id number] - [place], [place] - [address] [facility] or [facility]:local Ordering Provider:Jhonny the patient rather a call back or a response via [facility]? callBest Call Back Number: [phone] Information:

## 2025-02-18 RX ORDER — HYDROXYZINE HYDROCHLORIDE 25 MG/1
25 TABLET, FILM COATED ORAL 3 TIMES DAILY PRN
Qty: 60 TABLET | Refills: 0 | Status: SHIPPED | OUTPATIENT
Start: 2025-02-18 | End: 2025-02-19 | Stop reason: SDUPTHER

## 2025-02-19 ENCOUNTER — OFFICE VISIT (OUTPATIENT)
Dept: FAMILY MEDICINE | Facility: CLINIC | Age: 60
End: 2025-02-19
Attending: FAMILY MEDICINE
Payer: MEDICARE

## 2025-02-19 VITALS
DIASTOLIC BLOOD PRESSURE: 72 MMHG | SYSTOLIC BLOOD PRESSURE: 118 MMHG | BODY MASS INDEX: 21.25 KG/M2 | OXYGEN SATURATION: 96 % | HEIGHT: 67 IN | HEART RATE: 77 BPM | WEIGHT: 135.38 LBS

## 2025-02-19 DIAGNOSIS — N25.81 SECONDARY HYPERPARATHYROIDISM OF RENAL ORIGIN: ICD-10-CM

## 2025-02-19 DIAGNOSIS — F32.89 OTHER DEPRESSION: ICD-10-CM

## 2025-02-19 DIAGNOSIS — I12.9 RENAL HYPERTENSION: ICD-10-CM

## 2025-02-19 DIAGNOSIS — J01.91 ACUTE RECURRENT SINUSITIS, UNSPECIFIED LOCATION: ICD-10-CM

## 2025-02-19 DIAGNOSIS — N18.5 CKD (CHRONIC KIDNEY DISEASE) STAGE 5, GFR LESS THAN 15 ML/MIN: ICD-10-CM

## 2025-02-19 DIAGNOSIS — I50.20 HFREF (HEART FAILURE WITH REDUCED EJECTION FRACTION): ICD-10-CM

## 2025-02-19 DIAGNOSIS — D69.6 THROMBOCYTOPENIA: ICD-10-CM

## 2025-02-19 DIAGNOSIS — Z99.2 ESRD (END STAGE RENAL DISEASE) ON DIALYSIS: ICD-10-CM

## 2025-02-19 DIAGNOSIS — F41.1 GAD (GENERALIZED ANXIETY DISORDER): Primary | ICD-10-CM

## 2025-02-19 DIAGNOSIS — N18.6 ESRD (END STAGE RENAL DISEASE) ON DIALYSIS: ICD-10-CM

## 2025-02-19 DIAGNOSIS — C90.01 MULTIPLE MYELOMA IN REMISSION: ICD-10-CM

## 2025-02-19 PROCEDURE — 99214 OFFICE O/P EST MOD 30 MIN: CPT | Mod: PBBFAC,PO | Performed by: FAMILY MEDICINE

## 2025-02-19 RX ORDER — MONTELUKAST SODIUM 10 MG/1
10 TABLET ORAL NIGHTLY
Qty: 90 TABLET | Refills: 3 | Status: SHIPPED | OUTPATIENT
Start: 2025-02-19

## 2025-02-19 RX ORDER — HYDROXYZINE HYDROCHLORIDE 25 MG/1
25 TABLET, FILM COATED ORAL 3 TIMES DAILY PRN
Qty: 60 TABLET | Refills: 10 | Status: SHIPPED | OUTPATIENT
Start: 2025-02-19

## 2025-02-19 NOTE — PROGRESS NOTES
Subjective     Patient ID: Jennifer Booth is a 60 y.o. female.    Chief Complaint: Follow-up    60 yo F with PMH significant for Multiple Myeloma in remission, ESRD on HD, AoCKD, HTN, GERD, MDD/Anxiety,  Constipation, and tobacco use, presents today for her follow up. C/o right pain in fingers and they are stuck sometimes/also need refill of atarax. No trauma or fall.        Chronic Diseases  Multiple Myeloma in remission: Followed by [facility]; last visit 5/13/19. Shecompleted bortezomib/dexamethasone/Revlimid 6 cycles on 04/13/2012 for the multiple myeloma kappa light chain disease, IPSS stage III. She underwent bortezomib maintenance therapy three weeks on, one week off (05/15, 05/22 and 05/29) with her last cycle received on 05/29/2012. She was followed at [facility] and was diagnosed with DRESS syndrome during hospitalization and then [facility] team elected not to proceed with auto SCT. Velcade maintenance was discontinued due to side effects. MRI T and L spine 6/29/2018 - no evidence of myeloma involvement  SPEP 5/7/2019- no monoclonal peaks. Has upcoming appt on 8/14/19    ESRD on HD:  She is followed by a nephrologist at [facility].  Receives hemodialysis on Tuesdays/Thursdays/Saturdays. Iinitially diagnosed with advanced kidney disease secondary to multiple myeloma & HTN.  She was diagnosed with NANETTE from MM in 2010 that required initiation of dialysis.  She started chronic dialysis on 12/23/11 and ended on 8/28/12. She then underwent chemotherapy for her myeloma, and stopped dialysis in 2013.  Kidney biopsy performed 2017due to worsening  Kidney--revealed glomerulosclerosis and no evidence of MM. Her most recent visit with renal transplant at [facility] was 6/14/19. Patient met selection criteria for kidney transplant related to ESRD due to Hypertensive Nephrosclerosis. During most recent visit, she was placed on inactive status as she was deemed a high risk candidate for kidney transplant due  to lack of caregiver support and financial hardship. Plans for caregivers to be her daughter (currently in third trimester of pregnancy) and her daughter's fiance. Pt is required to attend appt with caregiver.Her next appt with transplant is scheduled for 5/19/2020. She plans to f/u with her daughter prior to this time.     CKD:  Followed by [facility].  Receives Procrit infusions during HD.     HTN: controlled. Adherent with Coreg 12.5 mg BID    She continues to smoke cigarettes. Contemplative stage of quitting. Referred to tobacco cessation at previous visit, but has not scheduled appointment.     Follow-up  Associated symptoms include arthralgias, congestion, headaches and myalgias. Pertinent negatives include no chest pain, diaphoresis, nausea or sore throat.     Review of Systems   Constitutional: Negative.  Negative for activity change, diaphoresis and unexpected weight change.   HENT:  Positive for nasal congestion, rhinorrhea and sinus pressure/congestion. Negative for ear discharge, hearing loss, sore throat and voice change.    Eyes: Negative.  Negative for pain, discharge and visual disturbance.   Respiratory: Negative.  Negative for chest tightness, shortness of breath and wheezing.    Cardiovascular: Negative.  Negative for chest pain.   Gastrointestinal: Negative.  Negative for abdominal distention, anal bleeding, constipation and nausea.   Endocrine: Negative.  Negative for cold intolerance, polydipsia and polyuria.   Genitourinary: Negative.  Negative for decreased urine volume, difficulty urinating, dysuria, frequency, menstrual problem and vaginal pain.   Musculoskeletal:  Positive for arthralgias and myalgias. Negative for gait problem.   Integumentary:  Negative for color change, pallor and wound. Negative.   Allergic/Immunologic: Negative.  Negative for environmental allergies and immunocompromised state.   Neurological:  Positive for headaches. Negative for dizziness, tremors,  seizures and speech difficulty.   Hematological: Negative.  Negative for adenopathy. Does not bruise/bleed easily.   Psychiatric/Behavioral: Negative.  Negative for agitation, confusion, decreased concentration, hallucinations, self-injury and suicidal ideas. The patient is not nervous/anxious.      Past Medical History:   Diagnosis Date    Addiction to drug     Alcohol abuse     Allergic drug reaction 2017    Anemia     Anxiety     Arm pain, left 2014    Breast cyst     Chronic renal failure 2014    CKD (chronic kidney disease) stage 5, GFR less than 15 ml/min     COPD exacerbation 2023    Depression     Dialysis patient     dialysis [id number]    Encounter for blood transfusion     GERD (gastroesophageal reflux disease)     Hx of psychiatric care     Hypertension     Mood swings     Multiple myeloma in remission 10/26/2012    per [facility]c note    Multiple thyroid nodules 10/22/2023    Psychiatric exam requested by authority     Psychiatric problem     Renal hypertension     Status post dialysis 2012    Therapy     Thrombocytopenia 2012       Past Surgical History:   Procedure Laterality Date    AV FISTULA PLACEMENT Left     BREAST CYST ASPIRATION Left     BREAST CYST EXCISION Left     CERVICAL SPINE SURGERY Bilateral      SECTION      x1    COLONOSCOPY N/A 2022    Procedure: COLONOSCOPY;  Surgeon: Jordan Mcguire MD;  Location: [facility] ENDO;  Service: Endoscopy;  Laterality: N/A;    FISTULOGRAM N/A 2020    Procedure: Fistulogram;  Surgeon: Rahat Berger MD;  Location: [facility] CATH LAB/EP;  Service: Cardiology;  Laterality: N/A;    FISTULOGRAM Left 2024    Procedure: Fistulogram LEFT FOREARM;  Surgeon: Matheus Galaviz MD;  Location: [facility] CATH LAB;  Service: Nephrology;  Laterality: Left;    FISTULOGRAM Left 2024    Procedure: Fistulogram;  Surgeon: Matheus Galaviz MD;  Location: [facility] CATH LAB;  Service: Nephrology;  Laterality: Left;    INSERTION  OF CATHETER FOR HEMODIALYSIS N/A 05/14/2024    Procedure: INSERTION, CATHETER, HEMODIALYSIS SINGLE LUMEN;  Surgeon: Mukesh Gonsales Jr., MD;  Location: [facility] OR;  Service: General;  Laterality: N/A;    pd catheter      PERCUTANEOUS TRANSLUMINAL ANGIOPLASTY OF ARTERIOVENOUS FISTULA N/A 06/09/2020    Procedure: PTA, AV FISTULA;  Surgeon: Rahat Berger MD;  Location: [facility] CATH LAB/EP;  Service: Cardiology;  Laterality: N/A;    PERITONEAL CATHETER REMOVAL N/A 11/30/2018    Procedure: REMOVAL, CATHETER, DIALYSIS, PERITONEAL;  Surgeon: Mukesh Gonsales Jr., MD;  Location: [facility] OR;  Service: General;  Laterality: N/A;    RENAL BIOPSY  2016    THROMBECTOMY OF HEMODIALYSIS ACCESS SITE N/A 06/09/2020    Procedure: Thrombectomy, Hemodialysis Graft Or Fistula;  Surgeon: Rahat Berger MD;  Location: [facility] CATH LAB/EP;  Service: Cardiology;  Laterality: N/A;    THROMBECTOMY OF HEMODIALYSIS ACCESS SITE Left 05/14/2024    Procedure: THROMBECTOMY, HEMODIALYSIS GRAFT OR FISTULA / left lower AV/FISTULA;  Surgeon: Matheus Galaviz MD;  Location: [facility] CATH LAB;  Service: Nephrology;  Laterality: Left;    VASCULAR SURGERY  08/2012    dialysis catheter to right subclavian       Family History   Problem Relation Name Age of Onset    Hypertension Mother      Heart failure Mother      Ovarian cancer Maternal Aunt      Diabetes Maternal Grandmother      Stroke Maternal Grandmother      Breast cancer Neg Hx      Colon cancer Neg Hx         Social History     Socioeconomic History    Marital status: Single    Number of children: 1   Occupational History    Occupation: [employer]    Tobacco Use    Smoking status: Former    Smokeless tobacco: Never   Substance and Sexual Activity    Alcohol use: Not Currently    Drug use: Not Currently     Types: Marijuana    Sexual activity: Not Currently     Partners: Male   Social History Narrative    Works FT; likes theatre. Lives alone.     Social Drivers of Health     Financial Resource Strain: Low Risk   (10/1/2023)    Overall Financial Resource Strain (CARDIA)     Difficulty of Paying Living Expenses: Not very hard   Food Insecurity: No Food Insecurity (10/1/2023)    Hunger Vital Sign     Worried About Running Out of Food in the Last Year: Never true     Ran Out of Food in the Last Year: Never true   Transportation Needs: No Transportation Needs (10/1/2023)    PRAPARE - Transportation     Lack of Transportation (Medical): No     Lack of Transportation (Non-Medical): No   Physical Activity: Insufficiently Active (10/1/2023)    Exercise Vital Sign     Days of Exercise per Week: 4 days     Minutes of Exercise per Session: 20 min   Stress: Stress Concern Present (10/1/2023)    [employer] - Occupational Stress Questionnaire     Feeling of Stress : To some extent   Housing Stability: Low Risk  (10/1/2023)    Housing Stability Vital Sign     Unable to Pay for Housing in the Last Year: No     Number of Places Lived in the Last Year: 1     Unstable Housing in the Last Year: No       Current Outpatient Medications   Medication Sig Dispense Refill    acetaminophen (TYLENOL) 325 MG tablet Take 650 mg by mouth every 4 (four) hours as needed.      buPROPion (WELLBUTRIN XL) 150 MG TB24 tablet TAKE 1 TABLET(150 MG) BY MOUTH EVERY DAY 90 tablet 3    cinacalcet (SENSIPAR) 30 MG Tab Take 90 mg by mouth once daily.      clopidogreL (PLAVIX) 75 mg tablet TAKE 1 TABLET(75 MG) BY MOUTH EVERY DAY 90 tablet 3    EScitalopram oxalate (LEXAPRO) 20 MG tablet TAKE 1 TABLET(20 MG) BY MOUTH EVERY DAY 90 tablet 2    fluticasone propionate (FLONASE) 50 mcg/actuation nasal spray 1 spray by Each Nostril route daily as needed.      hydrALAZINE (APRESOLINE) 25 MG tablet Take 25 mg by mouth daily as needed.      HYDROcodone-acetaminophen (NORCO) 5-325 mg per tablet Take 1 tablet by mouth every 6 (six) hours as needed for Pain. 30 tablet 0    isosorbide mononitrate (IMDUR) 60 MG 24 hr tablet Take 60 mg by mouth once daily.       metoprolol succinate (TOPROL-XL) 25 MG 24 hr tablet Take 1 tablet (25 mg total) by mouth once daily. 90 tablet 3    mirtazapine (REMERON) 15 MG tablet Take 1 tablet (15 mg total) by mouth every evening. 90 tablet 1    omeprazole (PRILOSEC) 40 MG capsule Take 40 mg by mouth once daily.      sacubitriL-valsartan (ENTRESTO)  mg per tablet Take 1 tablet by mouth 2 (two) times daily. 180 tablet 3    sevelamer carbonate (RENVELA) 800 mg Tab Take 800 mg by mouth 3 (three) times daily with meals.      sucroferric oxyhydroxide (VELPHORO) 500 mg Chew Take 1,000 mg by mouth 3 (three) times daily.      hydrOXYzine HCL (ATARAX) 25 MG tablet Take 1 tablet (25 mg total) by mouth 3 (three) times daily as needed for Itching. TAKE 1 TABLET(25 MG) BY MOUTH FOUR TIMES DAILY AS NEEDED FOR ITCHING 60 tablet 10    montelukast (SINGULAIR) 10 mg tablet Take 1 tablet (10 mg total) by mouth every evening. 90 tablet 3     No current facility-administered medications for this visit.     Facility-Administered Medications Ordered in Other Visits   Medication Dose Route Frequency Provider Last Rate Last Admin    0.9%  NaCl infusion   Intravenous Continuous Jordan Mcguire MD 20 mL/hr at 07/18/22 0943 New Bag at 07/18/22 0943    0.9%  NaCl infusion   Intravenous Continuous Jordan Mcguire MD   Stopped at 08/18/22 1042    sodium chloride 0.9% flush 10 mL  10 mL Intravenous PRN Jordan Mcguire MD        sodium chloride 0.9% flush 10 mL  10 mL Intravenous PRN Jordan Mcguire MD           Review of patient's allergies indicates:   Allergen Reactions    Doxycycline Swelling, Rash and Hives    Gentamicin Anaphylaxis    Vancomycin analogues Anaphylaxis          Objective   Vitals:    02/19/25 0820   BP: 118/72   Pulse: 77       Physical Exam  Constitutional:       General: She is not in acute distress.     Appearance: She is well-developed. She is not diaphoretic.   HENT:      Head: Normocephalic and atraumatic.      Right Ear:  External ear normal.      Left Ear: External ear normal.      Nose: Nose normal.      Mouth/Throat:      Pharynx: No oropharyngeal exudate.   Eyes:      General: No scleral icterus.        Right eye: No discharge.         Left eye: No discharge.      Conjunctiva/sclera: Conjunctivae normal.      Pupils: Pupils are equal, round, and reactive to light.   Neck:      Thyroid: No thyromegaly.      Vascular: No JVD.      Trachea: No tracheal deviation.   Cardiovascular:      Rate and Rhythm: Normal rate and regular rhythm.      Heart sounds: Normal heart sounds. No murmur heard.     No friction rub. No gallop.   Pulmonary:      Effort: Pulmonary effort is normal.      Breath sounds: Normal breath sounds. No stridor. No wheezing or rales.   Chest:      Chest wall: No tenderness.   Abdominal:      General: Bowel sounds are normal. There is no distension.      Palpations: Abdomen is soft. There is no mass.      Tenderness: There is no abdominal tenderness. There is no guarding or rebound.      Hernia: No hernia is present.   Musculoskeletal:         General: Tenderness (mild TTP right two middle fingers and trigger finger+) present. Normal range of motion.      Cervical back: Normal range of motion and neck supple.      Comments: LUE - dialysis fistula   Lymphadenopathy:      Cervical: No cervical adenopathy.   Skin:     General: Skin is warm and dry.      Coloration: Skin is not pale.      Findings: No erythema or rash.   Neurological:      Mental Status: She is alert and oriented to person, place, and time.      Cranial Nerves: No cranial nerve deficit.      Motor: No abnormal muscle tone.      Coordination: Coordination normal.      Deep Tendon Reflexes: Reflexes are normal and symmetric. Reflexes normal.   Psychiatric:         Behavior: Behavior normal.         Thought Content: Thought content normal.         Judgment: Judgment normal.            Assessment and Plan     1. SATNAM (generalized anxiety disorder)  -      hydrOXYzine HCL (ATARAX) 25 MG tablet; Take 1 tablet (25 mg total) by mouth 3 (three) times daily as needed for Itching. TAKE 1 TABLET(25 MG) BY MOUTH FOUR TIMES DAILY AS NEEDED FOR ITCHING  Dispense: 60 tablet; Refill: 10    2. HFrEF (heart failure with reduced ejection fraction)    3. CKD (chronic kidney disease) stage 5, GFR less than 15 ml/min    4. Other depression    5. ESRD (end stage renal disease) on dialysis    6. Secondary hyperparathyroidism of renal origin    7. Multiple myeloma in remission    8. Renal hypertension    9. Thrombocytopenia    10. Acute recurrent sinusitis, unspecified location  -     montelukast (SINGULAIR) 10 mg tablet; Take 1 tablet (10 mg total) by mouth every evening.  Dispense: 90 tablet; Refill: 3      ESRD on HD  -T/Thu/Sat    SATNAM/depression  -controlled - multiple meds  -atarax prn    Smoking cessation  -1 min counseling done  -she will quit soon     MM  -follows specialist  -stable    Thrombocytopenia  -monitor, stable    Trigger finger  -ice. Aspercreme prn          Spent adequate time in obtaining history and explaining differentials    40 minutes spent during this visit of which greater than 50% devoted to face-face counseling and coordination of care regarding diagnosis and management plan           Follow up in about 3 months (around 5/19/2025), or if symptoms worsen or fail to improve.

## 2025-02-20 NOTE — PROGRESS NOTES
Pt presents for the last 10 minutes of group. Late 2/2 complications with dialysis. Pt able to check in briefly, group welcoming. No los/no charge

## 2025-02-21 ENCOUNTER — LAB VISIT (OUTPATIENT)
Dept: LAB | Facility: HOSPITAL | Age: 60
End: 2025-02-21
Attending: INTERNAL MEDICINE
Payer: MEDICARE

## 2025-02-21 DIAGNOSIS — D63.1 ANEMIA IN CHRONIC KIDNEY DISEASE, ON CHRONIC DIALYSIS: ICD-10-CM

## 2025-02-21 DIAGNOSIS — Z99.2 ANEMIA IN CHRONIC KIDNEY DISEASE, ON CHRONIC DIALYSIS: ICD-10-CM

## 2025-02-21 DIAGNOSIS — Z00.00 ENCOUNTER FOR MEDICARE ANNUAL WELLNESS EXAM: ICD-10-CM

## 2025-02-21 DIAGNOSIS — N18.6 ANEMIA IN CHRONIC KIDNEY DISEASE, ON CHRONIC DIALYSIS: ICD-10-CM

## 2025-02-21 DIAGNOSIS — C90.00 MULTIPLE MYELOMA, REMISSION STATUS UNSPECIFIED: ICD-10-CM

## 2025-02-21 LAB
ALBUMIN SERPL BCP-MCNC: 3.8 G/DL (ref 3.5–5.2)
ALP SERPL-CCNC: 100 U/L (ref 40–150)
ALT SERPL W/O P-5'-P-CCNC: 5 U/L (ref 10–44)
ANION GAP SERPL CALC-SCNC: 15 MMOL/L (ref 8–16)
AST SERPL-CCNC: 13 U/L (ref 10–40)
B2 MICROGLOB SERPL-MCNC: 20.9 UG/ML (ref 0–2.5)
BASOPHILS # BLD AUTO: 0.03 K/UL (ref 0–0.2)
BASOPHILS NFR BLD: 0.7 % (ref 0–1.9)
BILIRUB SERPL-MCNC: 0.6 MG/DL (ref 0.1–1)
BUN SERPL-MCNC: 28 MG/DL (ref 8–23)
CALCIUM SERPL-MCNC: 11.1 MG/DL (ref 8.7–10.5)
CHLORIDE SERPL-SCNC: 94 MMOL/L (ref 95–110)
CO2 SERPL-SCNC: 33 MMOL/L (ref 23–29)
CREAT SERPL-MCNC: 7 MG/DL (ref 0.5–1.4)
DIFFERENTIAL METHOD BLD: ABNORMAL
EOSINOPHIL # BLD AUTO: 0.1 K/UL (ref 0–0.5)
EOSINOPHIL NFR BLD: 2.4 % (ref 0–8)
ERYTHROCYTE [DISTWIDTH] IN BLOOD BY AUTOMATED COUNT: 18.5 % (ref 11.5–14.5)
EST. GFR  (NO RACE VARIABLE): 6 ML/MIN/1.73 M^2
FERRITIN SERPL-MCNC: 1584 NG/ML (ref 20–300)
GLUCOSE SERPL-MCNC: 152 MG/DL (ref 70–110)
HCT VFR BLD AUTO: 34.7 % (ref 37–48.5)
HGB BLD-MCNC: 11 G/DL (ref 12–16)
IGA SERPL-MCNC: 330 MG/DL (ref 40–350)
IGG SERPL-MCNC: 1064 MG/DL (ref 650–1600)
IGM SERPL-MCNC: 241 MG/DL (ref 50–300)
IMM GRANULOCYTES # BLD AUTO: 0 K/UL (ref 0–0.04)
IMM GRANULOCYTES NFR BLD AUTO: 0 % (ref 0–0.5)
IRON SERPL-MCNC: 51 UG/DL (ref 30–160)
IRON SERPL-MCNC: 51 UG/DL (ref 30–160)
LYMPHOCYTES # BLD AUTO: 1.5 K/UL (ref 1–4.8)
LYMPHOCYTES NFR BLD: 34.9 % (ref 18–48)
MCH RBC QN AUTO: 28.2 PG (ref 27–31)
MCHC RBC AUTO-ENTMCNC: 31.7 G/DL (ref 32–36)
MCV RBC AUTO: 89 FL (ref 82–98)
MONOCYTES # BLD AUTO: 0.4 K/UL (ref 0.3–1)
MONOCYTES NFR BLD: 8.6 % (ref 4–15)
NEUTROPHILS # BLD AUTO: 2.2 K/UL (ref 1.8–7.7)
NEUTROPHILS NFR BLD: 53.4 % (ref 38–73)
NRBC BLD-RTO: 0 /100 WBC
PLATELET # BLD AUTO: 172 K/UL (ref 150–450)
PMV BLD AUTO: 8.9 FL (ref 9.2–12.9)
POTASSIUM SERPL-SCNC: 3.7 MMOL/L (ref 3.5–5.1)
PROT SERPL-MCNC: 8 G/DL (ref 6–8.4)
RBC # BLD AUTO: 3.9 M/UL (ref 4–5.4)
SATURATED IRON: 19 % (ref 20–50)
SODIUM SERPL-SCNC: 142 MMOL/L (ref 136–145)
TOTAL IRON BINDING CAPACITY: 269 UG/DL (ref 250–450)
TRANSFERRIN SERPL-MCNC: 182 MG/DL (ref 200–375)
WBC # BLD AUTO: 4.18 K/UL (ref 3.9–12.7)

## 2025-02-21 PROCEDURE — 82784 ASSAY IGA/IGD/IGG/IGM EACH: CPT | Performed by: INTERNAL MEDICINE

## 2025-02-21 PROCEDURE — 82728 ASSAY OF FERRITIN: CPT | Performed by: INTERNAL MEDICINE

## 2025-02-21 PROCEDURE — 82232 ASSAY OF BETA-2 PROTEIN: CPT | Performed by: INTERNAL MEDICINE

## 2025-02-21 PROCEDURE — 36415 COLL VENOUS BLD VENIPUNCTURE: CPT | Performed by: INTERNAL MEDICINE

## 2025-02-21 PROCEDURE — 84165 PROTEIN E-PHORESIS SERUM: CPT | Performed by: INTERNAL MEDICINE

## 2025-02-21 PROCEDURE — 83540 ASSAY OF IRON: CPT | Performed by: INTERNAL MEDICINE

## 2025-02-21 PROCEDURE — 85025 COMPLETE CBC W/AUTO DIFF WBC: CPT | Performed by: INTERNAL MEDICINE

## 2025-02-21 PROCEDURE — 84165 PROTEIN E-PHORESIS SERUM: CPT | Mod: 26,,, | Performed by: PATHOLOGY

## 2025-02-21 PROCEDURE — 80053 COMPREHEN METABOLIC PANEL: CPT | Performed by: INTERNAL MEDICINE

## 2025-02-21 PROCEDURE — 83521 IG LIGHT CHAINS FREE EACH: CPT | Performed by: INTERNAL MEDICINE

## 2025-02-21 PROCEDURE — 86334 IMMUNOFIX E-PHORESIS SERUM: CPT | Performed by: INTERNAL MEDICINE

## 2025-02-21 PROCEDURE — 86334 IMMUNOFIX E-PHORESIS SERUM: CPT | Mod: 26,,, | Performed by: PATHOLOGY

## 2025-02-24 ENCOUNTER — OFFICE VISIT (OUTPATIENT)
Dept: HEMATOLOGY/ONCOLOGY | Facility: CLINIC | Age: 60
End: 2025-02-24
Payer: MEDICARE

## 2025-02-24 VITALS
BODY MASS INDEX: 21.63 KG/M2 | DIASTOLIC BLOOD PRESSURE: 89 MMHG | WEIGHT: 137.81 LBS | SYSTOLIC BLOOD PRESSURE: 153 MMHG | HEIGHT: 67 IN | HEART RATE: 65 BPM | TEMPERATURE: 98 F

## 2025-02-24 DIAGNOSIS — N18.6 ANEMIA IN CHRONIC KIDNEY DISEASE, ON CHRONIC DIALYSIS: ICD-10-CM

## 2025-02-24 DIAGNOSIS — D63.1 ANEMIA IN CHRONIC KIDNEY DISEASE, ON CHRONIC DIALYSIS: ICD-10-CM

## 2025-02-24 DIAGNOSIS — Z99.2 ANEMIA IN CHRONIC KIDNEY DISEASE, ON CHRONIC DIALYSIS: ICD-10-CM

## 2025-02-24 DIAGNOSIS — Z99.2 ESRD (END STAGE RENAL DISEASE) ON DIALYSIS: ICD-10-CM

## 2025-02-24 DIAGNOSIS — N18.6 ESRD (END STAGE RENAL DISEASE) ON DIALYSIS: ICD-10-CM

## 2025-02-24 DIAGNOSIS — C90.00 MULTIPLE MYELOMA, REMISSION STATUS UNSPECIFIED: Primary | ICD-10-CM

## 2025-02-24 LAB
ALBUMIN SERPL ELPH-MCNC: 4.15 G/DL (ref 3.35–5.55)
ALPHA1 GLOB SERPL ELPH-MCNC: 0.35 G/DL (ref 0.17–0.41)
ALPHA2 GLOB SERPL ELPH-MCNC: 0.84 G/DL (ref 0.43–0.99)
B-GLOBULIN SERPL ELPH-MCNC: 0.82 G/DL (ref 0.5–1.1)
GAMMA GLOB SERPL ELPH-MCNC: 1.04 G/DL (ref 0.67–1.58)
INTERPRETATION SERPL IFE-IMP: NORMAL
KAPPA LC SER QL IA: 21.09 MG/DL (ref 0.33–1.94)
KAPPA LC/LAMBDA SER IA: 0.82 (ref 0.26–1.65)
LAMBDA LC SER QL IA: 25.58 MG/DL (ref 0.57–2.63)
PROT SERPL-MCNC: 7.2 G/DL (ref 6–8.4)

## 2025-02-24 PROCEDURE — 99214 OFFICE O/P EST MOD 30 MIN: CPT | Mod: PBBFAC | Performed by: INTERNAL MEDICINE

## 2025-02-24 PROCEDURE — 99999 PR PBB SHADOW E&M-EST. PATIENT-LVL IV: CPT | Mod: PBBFAC,,, | Performed by: INTERNAL MEDICINE
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
## 2025-02-25 LAB
PATHOLOGIST INTERPRETATION IFE: NORMAL
PATHOLOGIST INTERPRETATION SPE: NORMAL

## 2025-03-18 ENCOUNTER — CLINICAL SUPPORT (OUTPATIENT)
Dept: PSYCHIATRY | Facility: CLINIC | Age: 60
End: 2025-03-18
Payer: MEDICARE

## 2025-03-18 DIAGNOSIS — F12.20 CANNABIS USE DISORDER, SEVERE, DEPENDENCE: ICD-10-CM

## 2025-03-18 DIAGNOSIS — F10.20 ALCOHOL USE DISORDER, MODERATE, DEPENDENCE: Primary | ICD-10-CM

## 2025-03-18 PROCEDURE — 90853 GROUP PSYCHOTHERAPY: CPT | Mod: ,,, | Performed by: SOCIAL WORKER

## 2025-03-25 ENCOUNTER — CLINICAL SUPPORT (OUTPATIENT)
Dept: PSYCHIATRY | Facility: CLINIC | Age: 60
End: 2025-03-25
Payer: MEDICARE

## 2025-03-25 DIAGNOSIS — F10.20 ALCOHOL USE DISORDER, MODERATE, DEPENDENCE: Primary | ICD-10-CM

## 2025-03-25 DIAGNOSIS — F12.20 CANNABIS USE DISORDER, SEVERE, DEPENDENCE: ICD-10-CM

## 2025-03-25 PROCEDURE — 90853 GROUP PSYCHOTHERAPY: CPT | Mod: ,,, | Performed by: SOCIAL WORKER

## 2025-03-26 ENCOUNTER — PATIENT MESSAGE (OUTPATIENT)
Dept: PSYCHIATRY | Facility: CLINIC | Age: 60
End: 2025-03-26
Payer: MEDICARE

## 2025-03-26 DIAGNOSIS — F41.1 GAD (GENERALIZED ANXIETY DISORDER): ICD-10-CM

## 2025-03-26 RX ORDER — HYDROXYZINE HYDROCHLORIDE 25 MG/1
25 TABLET, FILM COATED ORAL 3 TIMES DAILY PRN
Qty: 60 TABLET | Refills: 10 | Status: SHIPPED | OUTPATIENT
Start: 2025-03-26

## 2025-03-26 NOTE — TELEPHONE ENCOUNTER
----- Message from Ruddy sent at 3/26/2025 10:33 AM CDT -----  .Type:  RX Refill RequestWho Called: ptRefill or New Rx:refillRX Name and Strength:hydrOXYzine HCL (ATARAX) 25 MG tabletHow is the patient currently taking it? (ex. 1XDay):Take 1 tablet (25 mg total) by mouth 3 (three) times daily as needed for Itching. TAKE 1 TABLET(25 MG) BY MOUTH FOUR TIMES DAILY AS NEEDED FOR ITCHING - OralIs this a 30 day or 90 day RX:60 tabletPreferred Pharmacy with phone number:[id number] - [place], [place] - [address] [facility] or Mail Order:localOrdering Provider:Yolie the patient rather a call back or a response via [facility]? [facility] Back Number:[phone] Information:

## 2025-03-28 ENCOUNTER — TELEPHONE (OUTPATIENT)
Dept: FAMILY MEDICINE | Facility: CLINIC | Age: 60
End: 2025-03-28
Payer: MEDICARE

## 2025-03-28 NOTE — TELEPHONE ENCOUNTER
Pt requested hydroxyzine HCL on 3/26/2025    Placed another call to pt to let her know was filled

## 2025-03-28 NOTE — TELEPHONE ENCOUNTER
----- Message from Ruddy sent at 3/28/2025 10:18 AM CDT -----  .Type:  Needs Medical AdviceWho Called: ptWould the patient rather a call back or a response via [facility]? Call [facility] Call Back Number: [phone] Information: Pt stated she would like a call back about getting a prescription renewed

## 2025-04-02 NOTE — PROGRESS NOTES
Group Psychotherapy    Site: [facility]    Clinical status of patient: Outpatient    3/18/2025    Length of service:[id number]    Referred by: Addictive Behavior Unit     Number of patients in attendance: 5    Target symptoms: alcohol abuse, substance abuse    Patient's response to intervention:  The patient's response to intervention is active listening, self-disclosure.    Progress toward goals and other mental status changes:  The patient's progress toward goals is good.    Interval history: Pt presents a few minutes late to scheduled group- coming from dialysis. First group in a month. Processed conflicts with daughter, navigating caretaking granddaughter and health concerns. Encouraged her to write out letter. Group discussed communication styles with family and potential triggers to their sobriety.     Diagnosis: Alcohol use disorder, in early remission; Cannabis Use Disorder, in early remission     Plan: group psychotherapy    Return to clinic: 1 week

## 2025-04-09 NOTE — PROGRESS NOTES
Group Psychotherapy    Site: [facility]    Clinical status of patient: Outpatient    3/25/2025    Length of service:[id number]    Referred by: Addictive Behavior Unit     Number of patients in attendance: 7    Target symptoms: alcohol abuse, substance abuse    Patient's response to intervention:  The patient's response to intervention is active listening, self-disclosure.    Progress toward goals and other mental status changes:  The patient's progress toward goals is good.    Interval history: Pt presents a few minutes late to scheduled group- coming from dialysis. Reports doing well overall- restarted college classes, enjoying staying busy. Pt reports doing well in sobriety. Pt actively involved in group discussion, offering support and feedback to other group members.     Diagnosis: Alcohol use disorder, in early remission; Cannabis Use Disorder, in early remission     Plan: group psychotherapy    Return to clinic: 1 week

## 2025-04-11 ENCOUNTER — PATIENT MESSAGE (OUTPATIENT)
Dept: FAMILY MEDICINE | Facility: CLINIC | Age: 60
End: 2025-04-11
Payer: MEDICARE

## 2025-04-22 ENCOUNTER — CLINICAL SUPPORT (OUTPATIENT)
Dept: PSYCHIATRY | Facility: CLINIC | Age: 60
End: 2025-04-22
Payer: MEDICARE

## 2025-04-22 DIAGNOSIS — F12.20 CANNABIS USE DISORDER, SEVERE, DEPENDENCE: ICD-10-CM

## 2025-04-22 DIAGNOSIS — F10.20 ALCOHOL USE DISORDER, MODERATE, DEPENDENCE: Primary | ICD-10-CM

## 2025-04-22 PROCEDURE — 90853 GROUP PSYCHOTHERAPY: CPT | Mod: ,,, | Performed by: SOCIAL WORKER

## 2025-04-24 ENCOUNTER — HOSPITAL ENCOUNTER (OUTPATIENT)
Dept: RADIOLOGY | Facility: HOSPITAL | Age: 60
Discharge: HOME | End: 2025-04-24
Attending: INTERNAL MEDICINE
Payer: MEDICARE

## 2025-04-24 DIAGNOSIS — C90.00 MULTIPLE MYELOMA, REMISSION STATUS UNSPECIFIED: ICD-10-CM

## 2025-04-24 LAB — POCT GLUCOSE: 75 MG/DL (ref 70–110)

## 2025-04-24 PROCEDURE — 78815 PET IMAGE W/CT SKULL-THIGH: CPT | Mod: TC

## 2025-04-24 PROCEDURE — 78815 PET IMAGE W/CT SKULL-THIGH: CPT | Mod: 26,PS,, | Performed by: NUCLEAR MEDICINE

## 2025-04-24 PROCEDURE — A9552 F18 FDG: HCPCS | Performed by: INTERNAL MEDICINE

## 2025-04-24 RX ORDER — FLUDEOXYGLUCOSE F18 500 MCI/ML
12.88 INJECTION INTRAVENOUS
Status: COMPLETED | OUTPATIENT
Start: 2025-04-24 | End: 2025-04-24

## 2025-04-24 RX ADMIN — FLUDEOXYGLUCOSE F-18 12.88 MILLICURIE: 500 INJECTION INTRAVENOUS at 09:04

## 2025-04-25 ENCOUNTER — TELEPHONE (OUTPATIENT)
Dept: FAMILY MEDICINE | Facility: CLINIC | Age: 60
End: 2025-04-25
Payer: COMMERCIAL

## 2025-05-02 NOTE — PROGRESS NOTES
Group Psychotherapy    Site: [facility]    Clinical status of patient: Outpatient    4/22/2025    Length of service:[id number]    Referred by: Addictive Behavior Unit     Number of patients in attendance: 7    Target symptoms: alcohol abuse, substance abuse    Patient's response to intervention:  The patient's response to intervention is active listening, self-disclosure.    Progress toward goals and other mental status changes:  The patient's progress toward goals is good.    Interval history: Pt presents a few minutes late to scheduled group- coming from dialysis. Pt notes working on changing routine with her aunt so she can attend more meetings. Pt actively involved in group discussion, offering support and feedback to other group members.     Diagnosis: Alcohol use disorder, in early remission; Cannabis Use Disorder, in early remission     Plan: group psychotherapy    Return to clinic: 1 week

## 2025-05-12 NOTE — TELEPHONE ENCOUNTER
----- Message from Emelia Cary sent at 6/26/2020 12:50 PM CDT -----  Contact: pt    ----- Message -----  From: George León  Sent: 6/26/2020  12:47 PM CDT  To: [facility] Pre-Kidney Transplant Clinical    Calling to speak with coordinator     Call back: [phone]      
Pt calling to get mammo scheduled. Will schedule appt. Pt also wanted to confirm Psych appt because Central Scheduling told her they don't see appt. Provided direct number to psych for patient to call to confirm.   
Impaired gait

## 2025-05-13 ENCOUNTER — CLINICAL SUPPORT (OUTPATIENT)
Dept: PSYCHIATRY | Facility: CLINIC | Age: 60
End: 2025-05-13
Payer: MEDICARE

## 2025-05-13 DIAGNOSIS — F12.20 CANNABIS USE DISORDER, SEVERE, DEPENDENCE: ICD-10-CM

## 2025-05-13 DIAGNOSIS — F10.20 ALCOHOL USE DISORDER, MODERATE, DEPENDENCE: Primary | ICD-10-CM

## 2025-05-13 PROCEDURE — 90853 GROUP PSYCHOTHERAPY: CPT | Mod: ,,, | Performed by: SOCIAL WORKER

## 2025-05-30 NOTE — PROGRESS NOTES
Group Psychotherapy    Site: [facility]    Clinical status of patient: Outpatient    5/13/2025    Length of service:[id number]    Referred by: Addictive Behavior Unit     Number of patients in attendance: 6    Target symptoms: alcohol abuse, substance abuse    Patient's response to intervention:  The patient's response to intervention is active listening, self-disclosure.    Progress toward goals and other mental status changes:  The patient's progress toward goals is good.    Interval history: Pt presents a few minutes late to scheduled group- coming from dialysis. Finishing up semester of school. Plans to take summer off. Discussed dynamics with daughter, open to support and feedback from the group. Pt actively involved in group discussion.    Diagnosis: Alcohol use disorder, in early remission; Cannabis Use Disorder, in early remission     Plan: group psychotherapy    Return to clinic: 1 week

## 2025-06-18 ENCOUNTER — HOSPITAL ENCOUNTER (OUTPATIENT)
Dept: RADIOLOGY | Facility: HOSPITAL | Age: 60
Discharge: HOME | End: 2025-06-18
Attending: INTERNAL MEDICINE
Payer: MEDICARE

## 2025-06-18 DIAGNOSIS — E04.2 MULTIPLE THYROID NODULES: ICD-10-CM

## 2025-06-18 PROCEDURE — 76536 US EXAM OF HEAD AND NECK: CPT | Mod: TC

## 2025-06-18 PROCEDURE — 76536 US EXAM OF HEAD AND NECK: CPT | Mod: 26,,, | Performed by: STUDENT IN AN ORGANIZED HEALTH CARE EDUCATION/TRAINING PROGRAM

## 2025-06-20 ENCOUNTER — RESULTS FOLLOW-UP (OUTPATIENT)
Dept: ENDOCRINOLOGY | Facility: CLINIC | Age: 60
End: 2025-06-20

## 2025-06-25 ENCOUNTER — OFFICE VISIT (OUTPATIENT)
Dept: HEMATOLOGY/ONCOLOGY | Facility: CLINIC | Age: 60
End: 2025-06-25
Payer: MEDICARE

## 2025-06-25 VITALS
OXYGEN SATURATION: 96 % | TEMPERATURE: 98 F | HEART RATE: 89 BPM | DIASTOLIC BLOOD PRESSURE: 92 MMHG | BODY MASS INDEX: 19.8 KG/M2 | HEIGHT: 67 IN | WEIGHT: 126.13 LBS | SYSTOLIC BLOOD PRESSURE: 141 MMHG

## 2025-06-25 DIAGNOSIS — N18.6 ANEMIA IN CHRONIC KIDNEY DISEASE, ON CHRONIC DIALYSIS: ICD-10-CM

## 2025-06-25 DIAGNOSIS — D63.1 ANEMIA IN CHRONIC KIDNEY DISEASE, ON CHRONIC DIALYSIS: ICD-10-CM

## 2025-06-25 DIAGNOSIS — E04.2 MULTIPLE THYROID NODULES: ICD-10-CM

## 2025-06-25 DIAGNOSIS — N18.6 ESRD (END STAGE RENAL DISEASE) ON DIALYSIS: ICD-10-CM

## 2025-06-25 DIAGNOSIS — C90.00 MULTIPLE MYELOMA, REMISSION STATUS UNSPECIFIED: Primary | ICD-10-CM

## 2025-06-25 DIAGNOSIS — Z99.2 ESRD (END STAGE RENAL DISEASE) ON DIALYSIS: ICD-10-CM

## 2025-06-25 DIAGNOSIS — Z99.2 ANEMIA IN CHRONIC KIDNEY DISEASE, ON CHRONIC DIALYSIS: ICD-10-CM

## 2025-06-25 DIAGNOSIS — F32.A DEPRESSION, UNSPECIFIED DEPRESSION TYPE: ICD-10-CM

## 2025-06-25 PROCEDURE — 99215 OFFICE O/P EST HI 40 MIN: CPT | Mod: PBBFAC | Performed by: INTERNAL MEDICINE

## 2025-06-25 PROCEDURE — 99999 PR PBB SHADOW E&M-EST. PATIENT-LVL V: CPT | Mod: PBBFAC,,, | Performed by: INTERNAL MEDICINE
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
## 2025-08-26 ENCOUNTER — DOCUMENTATION ONLY (OUTPATIENT)
Dept: PSYCHIATRY | Facility: CLINIC | Age: 60
End: 2025-08-26
Payer: MEDICARE

## 2025-08-27 ENCOUNTER — DOCUMENTATION ONLY (OUTPATIENT)
Dept: PSYCHIATRY | Facility: CLINIC | Age: 60
End: 2025-08-27
Payer: MEDICARE

## (undated) DEVICE — DRESSING TRANS 4X4 TEGADERM

## (undated) DEVICE — SET TRANSFER MINICAP PD 4

## (undated) DEVICE — ADHESIVE DERMABOND ADVANCED

## (undated) DEVICE — SUT VICRYL 3-0 27 SH

## (undated) DEVICE — SUT MCRYL PLUS 4-0 PS2 27IN

## (undated) DEVICE — CATH ANGIO BRAID BERENSTEIN 5F

## (undated) DEVICE — PRESTO INFLATION DEVICE

## (undated) DEVICE — SUT 2-0 VICRYL / FS-1

## (undated) DEVICE — CATH EMB FOGARTY 5.5FRX40CM

## (undated) DEVICE — SEE MEDLINE ITEM 156925

## (undated) DEVICE — TEGADERM IV

## (undated) DEVICE — TRAY ANGIO BAPTIST

## (undated) DEVICE — CATH CONQUEST 40 7 X 80 X 75

## (undated) DEVICE — FLOWSWITCH HP 1-W W/O LL

## (undated) DEVICE — Device

## (undated) DEVICE — TROCAR ENDOPATH XCEL 5X100MM

## (undated) DEVICE — KIT URINE METER 350ML STAT LOC

## (undated) DEVICE — SOL DIANEAL 1.5% LOW ULTRABAG

## (undated) DEVICE — CATH EMBO FOGARTY 5.5FRX80CM

## (undated) DEVICE — SOL IRR SOD CHL .9% POUR

## (undated) DEVICE — DRESSING TEGADERM CHG 4X4.5

## (undated) DEVICE — OMNIPAQUE CONTRAST 300MG/50ML

## (undated) DEVICE — BLLN MUSTANG 6 X 40 X 75

## (undated) DEVICE — NDL HYPO REG 25G X 1 1/2

## (undated) DEVICE — APPLICATOR CHLORAPREP ORN 26ML

## (undated) DEVICE — CANNULA ENDOPATH XCEL 5X100MM

## (undated) DEVICE — SHEATH PINNACLE 6FR HIFLO

## (undated) DEVICE — SET MICRO PUNCT 4FR/MPIS-401

## (undated) DEVICE — SET MICROPUNCTUREECHO STIFF

## (undated) DEVICE — GUIDEWIRE LAUREATE 035IN 180CM

## (undated) DEVICE — GOWN SMART IMP BREATHABLE XXLG

## (undated) DEVICE — NDL HYPO STD REG BVL 18GX1.5IN

## (undated) DEVICE — SUT SILK 2.0 BLK 18

## (undated) DEVICE — COVER PROBE US 5.5X58L NON LTX

## (undated) DEVICE — GLOVE SENSICARE PI MICRO 6.5

## (undated) DEVICE — GLOVE BIOGEL SKINSENSE PI 6.0

## (undated) DEVICE — PENCIL ELECTROSURG HOLST W/BLD

## (undated) DEVICE — DRAPE T THYROID STERILE

## (undated) DEVICE — NDL INSUF ULTRA VERESS 120MM

## (undated) DEVICE — SYR LUER LOCK TIP 6CC

## (undated) DEVICE — SOL POVIDONE SCRUB IODINE 4 OZ

## (undated) DEVICE — GUIDEWIRE COMMAND .014X300CM

## (undated) DEVICE — CATH GLIDE ANGLED 5FR 65CM

## (undated) DEVICE — KIT PROBE COVER WITH GEL

## (undated) DEVICE — IRRIGATOR ENDOSCOPY DISP.

## (undated) DEVICE — DRESSING CHG FOAM 4MM 1IN

## (undated) DEVICE — SEE MEDLINE ITEM 157187

## (undated) DEVICE — CLOSURE SKIN STERI STRIP 1/2X4

## (undated) DEVICE — SPONGE COTTON TRAY 4X4IN

## (undated) DEVICE — SOL 9P NACL IRR PIC IL

## (undated) DEVICE — ELECTRODE REM PLYHSV RETURN 9

## (undated) DEVICE — GUIDEWIRE STD .035X260CM ANG

## (undated) DEVICE — ADAPTER CHECK FLO ADAPTER

## (undated) DEVICE — TUBING CONTRAST INJCTN F-M 10

## (undated) DEVICE — NDL 18GA X1 1/2 REG BEVEL

## (undated) DEVICE — SYR 50CC LL

## (undated) DEVICE — CONTRAST VISIPAQUE 150ML

## (undated) DEVICE — GUIDEWIRE STF .035X180CM STR

## (undated) DEVICE — GLOVE BIOGEL SKINSENSE PI 8.0

## (undated) DEVICE — SYR 10CC LUER LOCK

## (undated) DEVICE — NDL HYPO A BEVEL 18X1 1/2

## (undated) DEVICE — WIRE MANDRIL 98/60CM

## (undated) DEVICE — SOL MINICAP W/IODINE

## (undated) DEVICE — GLOVE BIOGEL SKINSENSE PI 6.5

## (undated) DEVICE — GAUZE SPONGE 4X4 12PLY

## (undated) DEVICE — KIT LEFT HEART MANIFOLD CUSTOM

## (undated) DEVICE — DRESSING CHG FOAM 7MM 1IN

## (undated) DEVICE — GLOVE SENSICARE PI MICRO 8

## (undated) DEVICE — INFLATOR ENCORE 26 BLLN INFL

## (undated) DEVICE — ELECTRODE BLD EXT INSUL 1

## (undated) DEVICE — SYR B-D DISP CONTROL 10CC100/C

## (undated) DEVICE — TOWEL OR DISP STRL BLUE 4/PK

## (undated) DEVICE — BLADE SURG STAINLESS STEEL #11

## (undated) DEVICE — PAD DEFIB CADENCE ADULT R2

## (undated) DEVICE — KIT GLIDESHEATH SLEND 6FR 10CM